# Patient Record
Sex: FEMALE | Race: WHITE | NOT HISPANIC OR LATINO | Employment: OTHER | URBAN - METROPOLITAN AREA
[De-identification: names, ages, dates, MRNs, and addresses within clinical notes are randomized per-mention and may not be internally consistent; named-entity substitution may affect disease eponyms.]

---

## 2017-01-04 ENCOUNTER — HOSPITAL ENCOUNTER (OUTPATIENT)
Dept: LAB | Facility: MEDICAL CENTER | Age: 62
End: 2017-01-04
Attending: COLON & RECTAL SURGERY
Payer: MEDICARE

## 2017-01-04 LAB
25(OH)D3 SERPL-MCNC: 24 NG/ML (ref 30–100)
ALBUMIN SERPL BCP-MCNC: 4.3 G/DL (ref 3.2–4.9)
ALBUMIN/GLOB SERPL: 2 G/DL
ALP SERPL-CCNC: 59 U/L (ref 30–99)
ALT SERPL-CCNC: 8 U/L (ref 2–50)
ANION GAP SERPL CALC-SCNC: 8 MMOL/L (ref 0–11.9)
AST SERPL-CCNC: 14 U/L (ref 12–45)
BASOPHILS # BLD AUTO: 0.05 K/UL (ref 0–0.12)
BASOPHILS NFR BLD AUTO: 0.9 % (ref 0–1.8)
BILIRUB SERPL-MCNC: 0.5 MG/DL (ref 0.1–1.5)
BUN SERPL-MCNC: 14 MG/DL (ref 8–22)
CALCIUM SERPL-MCNC: 10 MG/DL (ref 8.5–10.5)
CHLORIDE SERPL-SCNC: 106 MMOL/L (ref 96–112)
CHOLEST SERPL-MCNC: 337 MG/DL (ref 100–199)
CO2 SERPL-SCNC: 27 MMOL/L (ref 20–33)
CREAT SERPL-MCNC: 0.88 MG/DL (ref 0.5–1.4)
EOSINOPHIL # BLD: 0.06 K/UL (ref 0–0.51)
EOSINOPHIL NFR BLD AUTO: 1.1 % (ref 0–6.9)
ERYTHROCYTE [DISTWIDTH] IN BLOOD BY AUTOMATED COUNT: 47.2 FL (ref 35.9–50)
FOLATE SERPL-MCNC: 7.2 NG/ML
GLOBULIN SER CALC-MCNC: 2.2 G/DL (ref 1.9–3.5)
GLUCOSE SERPL-MCNC: 85 MG/DL (ref 65–99)
HCT VFR BLD AUTO: 46.8 % (ref 37–47)
HDLC SERPL-MCNC: 51 MG/DL
HGB BLD-MCNC: 15.1 G/DL (ref 12–16)
IMM GRANULOCYTES # BLD AUTO: 0.01 K/UL (ref 0–0.11)
IMM GRANULOCYTES NFR BLD AUTO: 0.2 % (ref 0–0.9)
IRON SATN MFR SERPL: 16 % (ref 15–55)
IRON SERPL-MCNC: 55 UG/DL (ref 40–170)
LDLC SERPL CALC-MCNC: 238 MG/DL
LYMPHOCYTES # BLD: 2.26 K/UL (ref 1–4.8)
LYMPHOCYTES NFR BLD AUTO: 40 % (ref 22–41)
MCH RBC QN AUTO: 31.1 PG (ref 27–33)
MCHC RBC AUTO-ENTMCNC: 32.3 G/DL (ref 33.6–35)
MCV RBC AUTO: 96.3 FL (ref 81.4–97.8)
MONOCYTES # BLD: 0.33 K/UL (ref 0–0.85)
MONOCYTES NFR BLD AUTO: 5.8 % (ref 0–13.4)
NEUTROPHILS # BLD: 2.94 K/UL (ref 2–7.15)
NEUTROPHILS NFR BLD AUTO: 52 % (ref 44–72)
NRBC # BLD AUTO: 0 K/UL
NRBC BLD-RTO: 0 /100 WBC
PLATELET # BLD AUTO: 230 K/UL (ref 164–446)
PMV BLD AUTO: 10.6 FL (ref 9–12.9)
POTASSIUM SERPL-SCNC: 4.1 MMOL/L (ref 3.6–5.5)
PREALB SERPL-MCNC: 18 MG/DL (ref 18–38)
PROT SERPL-MCNC: 6.5 G/DL (ref 6–8.2)
RBC # BLD AUTO: 4.86 M/UL (ref 4.2–5.4)
SODIUM SERPL-SCNC: 141 MMOL/L (ref 135–145)
TIBC SERPL-MCNC: 342 UG/DL (ref 250–450)
TRANSFERRIN SERPL-MCNC: 245 MG/DL (ref 200–370)
TRIGL SERPL-MCNC: 242 MG/DL (ref 0–149)
VIT B12 SERPL-MCNC: 236 PG/ML (ref 211–911)
WBC # BLD AUTO: 5.7 K/UL (ref 4.8–10.8)

## 2017-01-04 PROCEDURE — 83550 IRON BINDING TEST: CPT

## 2017-01-04 PROCEDURE — 82306 VITAMIN D 25 HYDROXY: CPT

## 2017-01-04 PROCEDURE — 80053 COMPREHEN METABOLIC PANEL: CPT

## 2017-01-04 PROCEDURE — 82607 VITAMIN B-12: CPT

## 2017-01-04 PROCEDURE — 36415 COLL VENOUS BLD VENIPUNCTURE: CPT

## 2017-01-04 PROCEDURE — 82746 ASSAY OF FOLIC ACID SERUM: CPT

## 2017-01-04 PROCEDURE — 83540 ASSAY OF IRON: CPT

## 2017-01-04 PROCEDURE — 85025 COMPLETE CBC W/AUTO DIFF WBC: CPT

## 2017-01-04 PROCEDURE — 84466 ASSAY OF TRANSFERRIN: CPT

## 2017-01-04 PROCEDURE — 84134 ASSAY OF PREALBUMIN: CPT

## 2017-01-04 PROCEDURE — 84425 ASSAY OF VITAMIN B-1: CPT

## 2017-01-04 PROCEDURE — 80061 LIPID PANEL: CPT

## 2017-01-07 LAB — VIT B1 BLD-MCNC: 72 NMOL/L (ref 70–180)

## 2017-02-08 ENCOUNTER — HOSPITAL ENCOUNTER (OUTPATIENT)
Dept: RADIOLOGY | Facility: MEDICAL CENTER | Age: 62
End: 2017-02-08
Attending: FAMILY MEDICINE
Payer: MEDICARE

## 2017-02-08 DIAGNOSIS — Z12.31 SCREENING MAMMOGRAM, ENCOUNTER FOR: ICD-10-CM

## 2017-02-08 PROCEDURE — 77063 BREAST TOMOSYNTHESIS BI: CPT

## 2017-03-16 ENCOUNTER — HOSPITAL ENCOUNTER (OUTPATIENT)
Dept: RADIOLOGY | Facility: REHABILITATION | Age: 62
End: 2017-03-16
Attending: PHYSICAL MEDICINE & REHABILITATION
Payer: MEDICARE

## 2017-03-16 ENCOUNTER — HOSPITAL ENCOUNTER (OUTPATIENT)
Dept: PAIN MANAGEMENT | Facility: REHABILITATION | Age: 62
End: 2017-03-16
Attending: PHYSICAL MEDICINE & REHABILITATION
Payer: MEDICARE

## 2017-03-16 VITALS
OXYGEN SATURATION: 100 % | RESPIRATION RATE: 16 BRPM | DIASTOLIC BLOOD PRESSURE: 68 MMHG | WEIGHT: 173.5 LBS | SYSTOLIC BLOOD PRESSURE: 122 MMHG | TEMPERATURE: 97.5 F | HEIGHT: 64 IN | HEART RATE: 52 BPM | BODY MASS INDEX: 29.62 KG/M2

## 2017-03-16 PROCEDURE — 64493 INJ PARAVERT F JNT L/S 1 LEV: CPT

## 2017-03-16 PROCEDURE — 700117 HCHG RX CONTRAST REV CODE 255

## 2017-03-16 PROCEDURE — 700111 HCHG RX REV CODE 636 W/ 250 OVERRIDE (IP)

## 2017-03-16 PROCEDURE — 700101 HCHG RX REV CODE 250

## 2017-03-16 PROCEDURE — 99152 MOD SED SAME PHYS/QHP 5/>YRS: CPT

## 2017-03-16 PROCEDURE — 64494 INJ PARAVERT F JNT L/S 2 LEV: CPT

## 2017-03-16 RX ORDER — BUPIVACAINE HYDROCHLORIDE 5 MG/ML
INJECTION, SOLUTION EPIDURAL; INTRACAUDAL
Status: COMPLETED
Start: 2017-03-16 | End: 2017-03-16

## 2017-03-16 RX ORDER — MIDAZOLAM HYDROCHLORIDE 1 MG/ML
INJECTION INTRAMUSCULAR; INTRAVENOUS
Status: COMPLETED
Start: 2017-03-16 | End: 2017-03-16

## 2017-03-16 RX ADMIN — BUPIVACAINE HYDROCHLORIDE 3 ML: 5 INJECTION, SOLUTION EPIDURAL; INTRACAUDAL; PERINEURAL at 11:11

## 2017-03-16 RX ADMIN — IOHEXOL 2 ML: 240 INJECTION, SOLUTION INTRATHECAL; INTRAVASCULAR; INTRAVENOUS; ORAL at 11:08

## 2017-03-16 RX ADMIN — MIDAZOLAM HYDROCHLORIDE 2 MG: 1 INJECTION, SOLUTION INTRAMUSCULAR; INTRAVENOUS at 11:00

## 2017-03-16 ASSESSMENT — PAIN SCALES - GENERAL: PAINLEVEL_OUTOF10: 8

## 2017-03-28 DIAGNOSIS — Z01.810 PRE-OPERATIVE CARDIOVASCULAR EXAMINATION: ICD-10-CM

## 2017-03-28 RX ORDER — BIOTIN 1 MG
1 TABLET ORAL DAILY
COMMUNITY
End: 2018-08-03

## 2017-03-30 LAB — EKG IMPRESSION: NORMAL

## 2017-04-03 ENCOUNTER — HOSPITAL ENCOUNTER (OUTPATIENT)
Dept: LAB | Facility: MEDICAL CENTER | Age: 62
End: 2017-04-03
Attending: COLON & RECTAL SURGERY
Payer: MEDICARE

## 2017-04-03 LAB
25(OH)D3 SERPL-MCNC: 34 NG/ML (ref 30–100)
ALBUMIN SERPL BCP-MCNC: 4.3 G/DL (ref 3.2–4.9)
ALBUMIN/GLOB SERPL: 1.7 G/DL
ALP SERPL-CCNC: 61 U/L (ref 30–99)
ALT SERPL-CCNC: 11 U/L (ref 2–50)
ANION GAP SERPL CALC-SCNC: 8 MMOL/L (ref 0–11.9)
AST SERPL-CCNC: 19 U/L (ref 12–45)
BASOPHILS # BLD AUTO: 0.5 % (ref 0–1.8)
BASOPHILS # BLD: 0.04 K/UL (ref 0–0.12)
BILIRUB SERPL-MCNC: 0.5 MG/DL (ref 0.1–1.5)
BUN SERPL-MCNC: 15 MG/DL (ref 8–22)
CALCIUM SERPL-MCNC: 9.7 MG/DL (ref 8.5–10.5)
CHLORIDE SERPL-SCNC: 104 MMOL/L (ref 96–112)
CHOLEST SERPL-MCNC: 207 MG/DL (ref 100–199)
CO2 SERPL-SCNC: 29 MMOL/L (ref 20–33)
CREAT SERPL-MCNC: 1 MG/DL (ref 0.5–1.4)
EOSINOPHIL # BLD AUTO: 0.04 K/UL (ref 0–0.51)
EOSINOPHIL NFR BLD: 0.5 % (ref 0–6.9)
ERYTHROCYTE [DISTWIDTH] IN BLOOD BY AUTOMATED COUNT: 44.9 FL (ref 35.9–50)
FOLATE SERPL-MCNC: 18 NG/ML
GFR SERPL CREATININE-BSD FRML MDRD: 56 ML/MIN/1.73 M 2
GLOBULIN SER CALC-MCNC: 2.6 G/DL (ref 1.9–3.5)
GLUCOSE SERPL-MCNC: 85 MG/DL (ref 65–99)
HCT VFR BLD AUTO: 43.4 % (ref 37–47)
HDLC SERPL-MCNC: 60 MG/DL
HGB BLD-MCNC: 14.2 G/DL (ref 12–16)
IMM GRANULOCYTES # BLD AUTO: 0.02 K/UL (ref 0–0.11)
IMM GRANULOCYTES NFR BLD AUTO: 0.3 % (ref 0–0.9)
IRON SATN MFR SERPL: 27 % (ref 15–55)
IRON SERPL-MCNC: 93 UG/DL (ref 40–170)
LDLC SERPL CALC-MCNC: 117 MG/DL
LYMPHOCYTES # BLD AUTO: 1.6 K/UL (ref 1–4.8)
LYMPHOCYTES NFR BLD: 21 % (ref 22–41)
MCH RBC QN AUTO: 30.9 PG (ref 27–33)
MCHC RBC AUTO-ENTMCNC: 32.7 G/DL (ref 33.6–35)
MCV RBC AUTO: 94.3 FL (ref 81.4–97.8)
MONOCYTES # BLD AUTO: 0.5 K/UL (ref 0–0.85)
MONOCYTES NFR BLD AUTO: 6.6 % (ref 0–13.4)
NEUTROPHILS # BLD AUTO: 5.42 K/UL (ref 2–7.15)
NEUTROPHILS NFR BLD: 71.1 % (ref 44–72)
NRBC # BLD AUTO: 0 K/UL
NRBC BLD AUTO-RTO: 0 /100 WBC
PLATELET # BLD AUTO: 227 K/UL (ref 164–446)
PMV BLD AUTO: 10.9 FL (ref 9–12.9)
POTASSIUM SERPL-SCNC: 3.7 MMOL/L (ref 3.6–5.5)
PREALB SERPL-MCNC: 19 MG/DL (ref 18–38)
PROT SERPL-MCNC: 6.9 G/DL (ref 6–8.2)
RBC # BLD AUTO: 4.6 M/UL (ref 4.2–5.4)
SODIUM SERPL-SCNC: 141 MMOL/L (ref 135–145)
TIBC SERPL-MCNC: 342 UG/DL (ref 250–450)
TRANSFERRIN SERPL-MCNC: 248 MG/DL (ref 200–370)
TRIGL SERPL-MCNC: 152 MG/DL (ref 0–149)
VIT B12 SERPL-MCNC: 430 PG/ML (ref 211–911)
WBC # BLD AUTO: 7.6 K/UL (ref 4.8–10.8)

## 2017-04-03 PROCEDURE — 36415 COLL VENOUS BLD VENIPUNCTURE: CPT

## 2017-04-03 PROCEDURE — 82746 ASSAY OF FOLIC ACID SERUM: CPT

## 2017-04-03 PROCEDURE — 80053 COMPREHEN METABOLIC PANEL: CPT

## 2017-04-03 PROCEDURE — 82607 VITAMIN B-12: CPT

## 2017-04-03 PROCEDURE — 85025 COMPLETE CBC W/AUTO DIFF WBC: CPT

## 2017-04-03 PROCEDURE — 83540 ASSAY OF IRON: CPT

## 2017-04-03 PROCEDURE — 84466 ASSAY OF TRANSFERRIN: CPT

## 2017-04-03 PROCEDURE — 80061 LIPID PANEL: CPT

## 2017-04-03 PROCEDURE — 82306 VITAMIN D 25 HYDROXY: CPT

## 2017-04-03 PROCEDURE — 84425 ASSAY OF VITAMIN B-1: CPT

## 2017-04-03 PROCEDURE — 84134 ASSAY OF PREALBUMIN: CPT

## 2017-04-03 PROCEDURE — 83550 IRON BINDING TEST: CPT

## 2017-04-04 ENCOUNTER — HOSPITAL ENCOUNTER (OUTPATIENT)
Facility: MEDICAL CENTER | Age: 62
End: 2017-04-04
Attending: OPHTHALMOLOGY | Admitting: OPHTHALMOLOGY
Payer: MEDICARE

## 2017-04-04 VITALS
DIASTOLIC BLOOD PRESSURE: 60 MMHG | WEIGHT: 171.08 LBS | HEIGHT: 64 IN | OXYGEN SATURATION: 93 % | RESPIRATION RATE: 16 BRPM | HEART RATE: 72 BPM | SYSTOLIC BLOOD PRESSURE: 99 MMHG | TEMPERATURE: 98.4 F | BODY MASS INDEX: 29.21 KG/M2

## 2017-04-04 PROBLEM — H25.811 COMBINED FORMS OF AGE-RELATED CATARACT OF RIGHT EYE: Status: ACTIVE | Noted: 2017-04-04

## 2017-04-04 PROCEDURE — 160002 HCHG RECOVERY MINUTES (STAT): Performed by: OPHTHALMOLOGY

## 2017-04-04 PROCEDURE — 160029 HCHG SURGERY MINUTES - 1ST 30 MINS LEVEL 4: Performed by: OPHTHALMOLOGY

## 2017-04-04 PROCEDURE — 700101 HCHG RX REV CODE 250: Performed by: OPHTHALMOLOGY

## 2017-04-04 PROCEDURE — 500791 HCHG KNIFE, SLIT: Performed by: OPHTHALMOLOGY

## 2017-04-04 PROCEDURE — 501749 HCHG SHELL REV 276: Performed by: OPHTHALMOLOGY

## 2017-04-04 PROCEDURE — 700111 HCHG RX REV CODE 636 W/ 250 OVERRIDE (IP)

## 2017-04-04 PROCEDURE — 160035 HCHG PACU - 1ST 60 MINS PHASE I: Performed by: OPHTHALMOLOGY

## 2017-04-04 PROCEDURE — 700101 HCHG RX REV CODE 250

## 2017-04-04 PROCEDURE — 160048 HCHG OR STATISTICAL LEVEL 1-5: Performed by: OPHTHALMOLOGY

## 2017-04-04 PROCEDURE — 700111 HCHG RX REV CODE 636 W/ 250 OVERRIDE (IP): Performed by: ANESTHESIOLOGY

## 2017-04-04 PROCEDURE — 99152 MOD SED SAME PHYS/QHP 5/>YRS: CPT | Performed by: OPHTHALMOLOGY

## 2017-04-04 PROCEDURE — 99153 MOD SED SAME PHYS/QHP EA: CPT | Performed by: OPHTHALMOLOGY

## 2017-04-04 DEVICE — IMPLANTABLE DEVICE: Type: IMPLANTABLE DEVICE | Status: FUNCTIONAL

## 2017-04-04 RX ORDER — TETRACAINE HYDROCHLORIDE 5 MG/ML
1 SOLUTION OPHTHALMIC
Status: COMPLETED | OUTPATIENT
Start: 2017-04-04 | End: 2017-04-04

## 2017-04-04 RX ORDER — MOXIFLOXACIN 5 MG/ML
1 SOLUTION/ DROPS OPHTHALMIC
Status: COMPLETED | OUTPATIENT
Start: 2017-04-04 | End: 2017-04-04

## 2017-04-04 RX ORDER — KETOROLAC TROMETHAMINE 5 MG/ML
1 SOLUTION OPHTHALMIC
Status: COMPLETED | OUTPATIENT
Start: 2017-04-04 | End: 2017-04-04

## 2017-04-04 RX ORDER — SODIUM CHLORIDE, SODIUM LACTATE, POTASSIUM CHLORIDE, CALCIUM CHLORIDE 600; 310; 30; 20 MG/100ML; MG/100ML; MG/100ML; MG/100ML
INJECTION, SOLUTION INTRAVENOUS CONTINUOUS
Status: DISCONTINUED | OUTPATIENT
Start: 2017-04-04 | End: 2017-04-04 | Stop reason: HOSPADM

## 2017-04-04 RX ORDER — PHENYLEPHRINE HYDROCHLORIDE 25 MG/ML
SOLUTION/ DROPS OPHTHALMIC
Status: DISCONTINUED
Start: 2017-04-04 | End: 2017-04-04

## 2017-04-04 RX ORDER — MOXIFLOXACIN 5 MG/ML
SOLUTION/ DROPS OPHTHALMIC
Status: DISCONTINUED
Start: 2017-04-04 | End: 2017-04-04

## 2017-04-04 RX ORDER — KETOROLAC TROMETHAMINE 5 MG/ML
SOLUTION OPHTHALMIC
Status: DISCONTINUED
Start: 2017-04-04 | End: 2017-04-04

## 2017-04-04 RX ORDER — DIAZEPAM 5 MG/1
5 TABLET ORAL EVERY 6 HOURS PRN
COMMUNITY
End: 2018-01-18

## 2017-04-04 RX ORDER — TROPICAMIDE 10 MG/ML
1 SOLUTION/ DROPS OPHTHALMIC
Status: COMPLETED | OUTPATIENT
Start: 2017-04-04 | End: 2017-04-04

## 2017-04-04 RX ORDER — PHENYLEPHRINE HYDROCHLORIDE 25 MG/ML
1 SOLUTION/ DROPS OPHTHALMIC
Status: COMPLETED | OUTPATIENT
Start: 2017-04-04 | End: 2017-04-04

## 2017-04-04 RX ORDER — MOXIFLOXACIN 5 MG/ML
SOLUTION OPHTHALMIC
Status: DISCONTINUED | OUTPATIENT
Start: 2017-04-04 | End: 2017-04-04 | Stop reason: HOSPADM

## 2017-04-04 RX ORDER — TETRACAINE HYDROCHLORIDE 5 MG/ML
SOLUTION OPHTHALMIC
Status: DISCONTINUED | OUTPATIENT
Start: 2017-04-04 | End: 2017-04-04 | Stop reason: HOSPADM

## 2017-04-04 RX ADMIN — MOXIFLOXACIN 1 DROP: 5 SOLUTION/ DROPS OPHTHALMIC at 08:00

## 2017-04-04 RX ADMIN — TROPICAMIDE 1 DROP: 10 SOLUTION/ DROPS OPHTHALMIC at 07:55

## 2017-04-04 RX ADMIN — TROPICAMIDE 1 DROP: 10 SOLUTION/ DROPS OPHTHALMIC at 08:00

## 2017-04-04 RX ADMIN — PHENYLEPHRINE HYDROCHLORIDE 1 DROP: 25 SOLUTION/ DROPS OPHTHALMIC at 07:50

## 2017-04-04 RX ADMIN — MOXIFLOXACIN 1 DROP: 5 SOLUTION/ DROPS OPHTHALMIC at 07:50

## 2017-04-04 RX ADMIN — KETOROLAC TROMETHAMINE 1 DROP: 5 SOLUTION OPHTHALMIC at 07:55

## 2017-04-04 RX ADMIN — TETRACAINE HYDROCHLORIDE 1 DROP: 5 SOLUTION OPHTHALMIC at 07:50

## 2017-04-04 RX ADMIN — TROPICAMIDE 1 DROP: 10 SOLUTION/ DROPS OPHTHALMIC at 07:50

## 2017-04-04 RX ADMIN — KETOROLAC TROMETHAMINE 1 DROP: 5 SOLUTION OPHTHALMIC at 08:00

## 2017-04-04 RX ADMIN — KETOROLAC TROMETHAMINE 1 DROP: 5 SOLUTION OPHTHALMIC at 07:50

## 2017-04-04 RX ADMIN — PHENYLEPHRINE HYDROCHLORIDE 1 DROP: 25 SOLUTION/ DROPS OPHTHALMIC at 07:55

## 2017-04-04 RX ADMIN — PHENYLEPHRINE HYDROCHLORIDE 1 DROP: 25 SOLUTION/ DROPS OPHTHALMIC at 08:00

## 2017-04-04 RX ADMIN — TETRACAINE HYDROCHLORIDE 1 DROP: 5 SOLUTION OPHTHALMIC at 08:00

## 2017-04-04 RX ADMIN — MOXIFLOXACIN 1 DROP: 5 SOLUTION/ DROPS OPHTHALMIC at 07:55

## 2017-04-04 RX ADMIN — SODIUM CHLORIDE, POTASSIUM CHLORIDE, SODIUM LACTATE AND CALCIUM CHLORIDE: 600; 310; 30; 20 INJECTION, SOLUTION INTRAVENOUS at 07:55

## 2017-04-04 ASSESSMENT — PAIN SCALES - GENERAL
PAINLEVEL_OUTOF10: 0
PAINLEVEL_OUTOF10: 0

## 2017-04-04 NOTE — DISCHARGE INSTRUCTIONS
ACTIVITY: Rest and take it easy for the first 24 hours.  A responsible adult is recommended to remain with you during that time.  It is normal to feel sleepy.  We encourage you to not do anything that requires balance, judgment or coordination.    MILD FLU-LIKE SYMPTOMS ARE NORMAL. YOU MAY EXPERIENCE GENERALIZED MUSCLE ACHES, THROAT IRRITATION, HEADACHE AND/OR SOME NAUSEA.    FOR 24 HOURS DO NOT:  Drive, operate machinery or run household appliances.  Drink beer or alcoholic beverages.   Make important decisions or sign legal documents.    SPECIAL INSTRUCTIONS: follow Dr. Christian's discharge instructions. Begin eye drops as prescribed. Keep operative eye closed frequently today.     DIET: To avoid nausea, slowly advance diet as tolerated, avoiding spicy or greasy foods for the first day.  Add more substantial food to your diet according to your physician's instructions.  Babies can be fed formula or breast milk as soon as they are hungry.  INCREASE FLUIDS AND FIBER TO AVOID CONSTIPATION.    SURGICAL DRESSING/BATHING: na    FOLLOW-UP APPOINTMENT:  A follow-up appointment should be arranged with your doctor TODAY; call to schedule.    You should CALL YOUR PHYSICIAN if you develop:  Fever greater than 101 degrees F.  Pain not relieved by medication, or persistent nausea or vomiting.  Excessive bleeding (blood soaking through dressing) or unexpected drainage from the wound.  Extreme redness or swelling around the incision site, drainage of pus or foul smelling drainage.  Inability to urinate or empty your bladder within 8 hours.  Problems with breathing or chest pain.    You should call 911 if you develop problems with breathing or chest pain.  If you are unable to contact your doctor or surgical center, you should go to the nearest emergency room or urgent care center.  Physician's telephone #: [phone]    If any questions arise, call your doctor.  If your doctor is not available, please feel free to call the Surgical  Center at [phone].  The Center is open Monday through Friday from 7AM to 7PM.  You can also call the HEALTH HOTLINE open 24 hours/day, 7 days/week and speak to a nurse at [phone], or toll free at [phone].    A registered nurse may call you a few days after your surgery to see how you are doing after your procedure.    MEDICATIONS: Resume taking daily medication.  Take prescribed pain medication with food.  If no medication is prescribed, you may take non-aspirin pain medication if needed.  PAIN MEDICATION CAN BE VERY CONSTIPATING.  Take a stool softener or laxative such as senokot, pericolace, or milk of magnesia if needed.    Prescription given for NA.  Last pain medication given at NA.    If your physician has prescribed pain medication that includes Acetaminophen (Tylenol), do not take additional Acetaminophen (Tylenol) while taking the prescribed medication.    Depression / Suicide Risk    As you are discharged from this [facility] facility, it is important to learn how to keep safe from harming yourself.    Recognize the warning signs:  · Abrupt changes in personality, positive or negative- including increase in energy   · Giving away possessions  · Change in eating patterns- significant weight changes-  positive or negative  · Change in sleeping patterns- unable to sleep or sleeping all the time   · Unwillingness or inability to communicate  · Depression  · Unusual sadness, discouragement and loneliness  · Talk of wanting to die  · Neglect of personal appearance   · Rebelliousness- reckless behavior  · Withdrawal from people/activities they love  · Confusion- inability to concentrate     If you or a loved one observes any of these behaviors or has concerns about self-harm, here's what you can do:  · Talk about it- your feelings and reasons for harming yourself  · Remove any means that you might use to hurt yourself (examples: pills, rope, extension cords, firearm)  · Get professional  help from the community (Mental Health, Substance Abuse, psychological counseling)  · Do not be alone:Call your Safe Contact- someone whom you trust who will be there for you.  · Call your local CRISIS HOTLINE 594-1228 or 446-095-1713  · Call your local Children's Mobile Crisis Response Team [place] (486) 324-0757 or www.[employer]  · Call the toll free National Suicide Prevention Hotlines   · National Suicide Prevention Lifeline 745-033-EBGQ (3018)  · National Hope Line Network 800-SUICIDE (368-3747)

## 2017-04-04 NOTE — IP AVS SNAPSHOT
" Home Care Instructions                                                                                                                Name:Nils Alfonso  Medical Record Number:[id number]  CSN: [id number]    YOB: 1955   Age: 61 y.o.  Sex: female  HT:1.626 m (5' 4\") WT: 77.6 kg (171 lb 1.2 oz)          Admit Date: 4/4/2017     Discharge Date:   Today's Date: 4/4/2017  Attending Doctor:  Stuart Estevez M.D.                  Allergies:  Ampicillin; Demerol; Keflex; Morphine; Tetracyclines; Codeine; Clindamycin; Dilaudid; Pcn; and Sulfa drugs                Discharge Instructions         ACTIVITY: Rest and take it easy for the first 24 hours.  A responsible adult is recommended to remain with you during that time.  It is normal to feel sleepy.  We encourage you to not do anything that requires balance, judgment or coordination.    MILD FLU-LIKE SYMPTOMS ARE NORMAL. YOU MAY EXPERIENCE GENERALIZED MUSCLE ACHES, THROAT IRRITATION, HEADACHE AND/OR SOME NAUSEA.    FOR 24 HOURS DO NOT:  Drive, operate machinery or run household appliances.  Drink beer or alcoholic beverages.   Make important decisions or sign legal documents.    SPECIAL INSTRUCTIONS: follow Dr. Christian's discharge instructions. Begin eye drops as prescribed. Keep operative eye closed frequently today.     DIET: To avoid nausea, slowly advance diet as tolerated, avoiding spicy or greasy foods for the first day.  Add more substantial food to your diet according to your physician's instructions.  Babies can be fed formula or breast milk as soon as they are hungry.  INCREASE FLUIDS AND FIBER TO AVOID CONSTIPATION.    SURGICAL DRESSING/BATHING: na    FOLLOW-UP APPOINTMENT:  A follow-up appointment should be arranged with your doctor TODAY; call to schedule.    You should CALL YOUR PHYSICIAN if you develop:  Fever greater than 101 degrees F.  Pain not relieved by medication, or persistent nausea or vomiting.  Excessive bleeding (blood soaking through " dressing) or unexpected drainage from the wound.  Extreme redness or swelling around the incision site, drainage of pus or foul smelling drainage.  Inability to urinate or empty your bladder within 8 hours.  Problems with breathing or chest pain.    You should call 911 if you develop problems with breathing or chest pain.  If you are unable to contact your doctor or surgical center, you should go to the nearest emergency room or urgent care center.  Physician's telephone #: [phone]    If any questions arise, call your doctor.  If your doctor is not available, please feel free to call the Surgical Center at [phone].  The Center is open Monday through Friday from 7AM to 7PM.  You can also call the [employer] HOTLINE open 24 hours/day, 7 days/week and speak to a nurse at [phone], or toll free at [phone].    A registered nurse may call you a few days after your surgery to see how you are doing after your procedure.    MEDICATIONS: Resume taking daily medication.  Take prescribed pain medication with food.  If no medication is prescribed, you may take non-aspirin pain medication if needed.  PAIN MEDICATION CAN BE VERY CONSTIPATING.  Take a stool softener or laxative such as senokot, pericolace, or milk of magnesia if needed.    Prescription given for NA.  Last pain medication given at .    If your physician has prescribed pain medication that includes Acetaminophen (Tylenol), do not take additional Acetaminophen (Tylenol) while taking the prescribed medication.    Depression / Suicide Risk    As you are discharged from this [facility] facility, it is important to learn how to keep safe from harming yourself.    Recognize the warning signs:  · Abrupt changes in personality, positive or negative- including increase in energy   · Giving away possessions  · Change in eating patterns- significant weight changes-  positive or negative  · Change in sleeping patterns- unable to sleep or sleeping all the  time   · Unwillingness or inability to communicate  · Depression  · Unusual sadness, discouragement and loneliness  · Talk of wanting to die  · Neglect of personal appearance   · Rebelliousness- reckless behavior  · Withdrawal from people/activities they love  · Confusion- inability to concentrate     If you or a loved one observes any of these behaviors or has concerns about self-harm, here's what you can do:  · Talk about it- your feelings and reasons for harming yourself  · Remove any means that you might use to hurt yourself (examples: pills, rope, extension cords, firearm)  · Get professional help from the community (Mental Health, Substance Abuse, psychological counseling)  · Do not be alone:Call your Safe Contact- someone whom you trust who will be there for you.  · Call your local CRISIS HOTLINE 519-4269 or 092-407-4237  · Call your local Children's Mobile Crisis Response Team [place] (249) 596-1145 or www.[employer]  · Call the toll free National Suicide Prevention Hotlines   · National Suicide Prevention Lifeline 932-405-VZDH (1314)  · [employer] Hope Line Network 800-SUICIDE (997-8947)       Medication List      ASK your doctor about these medications        Instructions    atorvastatin 40 MG Tabs   Commonly known as:  LIPITOR    Take 40 mg by mouth every evening.   Dose:  40 mg       Biotin 1000 MCG Tabs    Take 1 Tab by mouth every day.   Dose:  1 Tab       diazepam 5 MG Tabs   Commonly known as:  VALIUM    Take 5 mg by mouth every 6 hours as needed for Anxiety.   Dose:  5 mg       hydrocodone-acetaminophen 5-325 MG Tabs per tablet   Commonly known as:  NORCO    Doctor's comments:  Pt unable to tolerate liquid.  Please crush pill. Do not take with liquid pain medication.   Take 1-2 Tabs by mouth every 6 hours as needed (pain).   Dose:  1-2 Tab       lisinopril 5 MG Tabs   Commonly known as:  PRINIVIL    Take 1 Tab by mouth every day.   Dose:  5 mg       multivitamin Tabs    Take 1 Tab by mouth  every day.   Dose:  1 Tab       vitamin D 1000 UNIT Tabs   Commonly known as:  cholecalciferol    Take 1,000 Units by mouth every 48 hours.   Dose:  1000 Units               Medication Information     Above and/or attached are the medications your physician expects you to take upon discharge. Review all of your home medications and newly ordered medications with your doctor and/or pharmacist. Follow medication instructions as directed by your doctor and/or pharmacist. Please keep your medication list with you and share with your physician. Update the information when medications are discontinued, doses are changed, or new medications (including over-the-counter products) are added; and carry medication information at all times in the event of emergency situations.        Resources     Quit Smoking / Tobacco Use:    I understand the use of any tobacco products increases my chance of suffering from future heart disease or stroke and could cause other illnesses which may shorten my life. Quitting the use of tobacco products is the single most important thing I can do to improve my health. For further information on smoking / tobacco cessation call a Toll Free Quit Line at 1-523.887.4258 (*National [employer] [place]) or 1-530.369.5857 (American Lung Association) or you can access the web based program at www.lungusa.org.    [place] Tobacco Users Help Line:  (498) 784-1080       Toll Free: 1-214.725.2039  Quit Tobacco Program [facility] (463)165-8495    Crisis Hotline:    [place] Crisis Hotline:  3-362-THFOUDO or 1-348.662.3401    [place] Crisis Hotline:    1-976.729.4902 or 471-957-1282    Discharge Survey:   Thank you for choosing [facility]. We hope we did everything we could to make your hospital stay a pleasant one. You may be receiving a survey and we would appreciate your time and participation in answering the questions. Your input is very valuable to us in our efforts to improve our  service to our patients and their families.            Signatures     My signature on this form indicates that:    1. I acknowledge receipt and understanding of these Home Care Instruction.  2. My questions regarding this information have been answered to my satisfaction.  3. I have formulated a plan with my discharge nurse to obtain my prescribed medications for home.    __________________________________      __________________________________                   Patient Signature                                 Guardian/Responsible Adult Signature      __________________________________                 __________       ________                       Nurse Signature                                               Date                 Time

## 2017-04-04 NOTE — OR NURSING
0942: To PACU from OR via gurney, respirations spontaneous and non-labored.  right eye dilated.  No drainage/swelling/redness noted.  Pt denies pain/nausea.

## 2017-04-04 NOTE — OP REPORT
DATE OF SERVICE:  04/04/2017    DATE OF OPERATION:  04/04/2017     PREOPERATIVE DIAGNOSIS:   Cataract, right eye.     POSTOPERATIVE DIAGNOSIS:  Cataract, right eye.     PROCEDURE:   Clear cornea phacoemulsification, cataract extraction, with   posterior chamber intraocular lens implant, right eye.     SURGEON:  Stuart Estevez MD     ASSISTANT:  Kimi Milton, scrub tech     ANESTHESIOLOGIST:  Pramod Banegas MD     ANESTHESIA:  Topical with IV sedation.     INDICATION:   Visually significant cataract, right eye.  Patient requested   surgery.     COMPLICATIONS:  None.     PROCEDURE:   After informed consent was obtained, the patient was transported   to the operating room and placed in the supine position.  The right   periorbital area was sterilely prepped and draped.  A lid speculum was   inserted.  Tetracaine ophthalmic anesthetic was applied to the corneal   surface.  Using a fixation ring and a leonie Step-Knife, a temporal clear   corneal groove incision was created.  This was followed by a paracentesis.    The anterior chamber was inflated with Viscoat.  The temporal incision was   completed using a 2.4 mm keratome.  A cystotome was inserted and a   capsulorrhexis was initiated.  This was completed using Utrata forceps.  BSS   on a Binkhorst followed angle cannula was used to hydrodissect the lens.  The   nucleus splitters were inserted, and the lens was divided into 4 quadrants.    The phacoemulsification handpiece was inserted and the lens was removed with   phacoemulsification parameters of 5.22 CDE.  The irrigation aspiration   handpiece was used to remove cortical material.  The capsular bag was inflated   with Provisc.  An AcrySof intraocular lens mode PCB00 lens implant with 14.0   diopters of power was injected into the capsular bag.  Irrigation aspiration   was performed to remove viscoelastic.  The margins of the wound were hydrated   with BSS.  The wounds were noted to be maintaining  physiologic pressure.  The   speculum was removed and the eye was dressed with Vigamox  ophthalmic drops.  The patient tolerated the procedure well and was   transported to the recovery room in good condition.  No complications were   encountered.       ____________________________________     MD ZABRINA SMITH / RYNE    DD:  04/04/2017 10:12:59  DT:  04/04/2017 10:23:20    D#:  [id number]  Job#:  [id number]

## 2017-04-04 NOTE — OR NURSING
Patient allergies and NPO status verified, home medication reconciliation completed and belongings secured. Patient verbalizes understanding of pain scale, expected course of stay and plan of care. Surgical site verified with patient. . IV access established.   Tamia Castle

## 2017-04-04 NOTE — IP AVS SNAPSHOT
4/4/2017          Nils Alfonso  [address] [address]    Dear Nils:    [facility] wants to ensure your discharge home is safe and you or your loved ones have had all your questions answered regarding your care after you leave the hospital.    You may receive a telephone call within two days of your discharge.  This call is to make certain you understand your discharge instructions as well as ensure we provided you with the best care possible during your stay with us.     The call will only last approximately 3-5 minutes and will be done by a nurse.    Once again, we want to ensure your discharge home is safe and that you have a clear understanding of any next steps in your care.  If you have any questions or concerns, please do not hesitate to contact us, we are here for you.  Thank you for choosing [facility] for your healthcare needs.    Sincerely,    Mian Rucker    [facility]

## 2017-04-06 LAB — VIT B1 BLD-MCNC: 67 NMOL/L (ref 70–180)

## 2017-04-18 ENCOUNTER — HOSPITAL ENCOUNTER (OUTPATIENT)
Facility: MEDICAL CENTER | Age: 62
End: 2017-04-18
Attending: OPHTHALMOLOGY | Admitting: OPHTHALMOLOGY
Payer: MEDICARE

## 2017-04-18 VITALS
HEART RATE: 54 BPM | RESPIRATION RATE: 12 BRPM | TEMPERATURE: 97.4 F | SYSTOLIC BLOOD PRESSURE: 131 MMHG | DIASTOLIC BLOOD PRESSURE: 58 MMHG | WEIGHT: 163.36 LBS | HEIGHT: 64 IN | OXYGEN SATURATION: 97 % | BODY MASS INDEX: 27.89 KG/M2

## 2017-04-18 PROBLEM — H25.812 COMBINED FORM OF SENILE CATARACT OF LEFT EYE: Status: ACTIVE | Noted: 2017-04-18

## 2017-04-18 PROCEDURE — 99152 MOD SED SAME PHYS/QHP 5/>YRS: CPT | Performed by: OPHTHALMOLOGY

## 2017-04-18 PROCEDURE — 160048 HCHG OR STATISTICAL LEVEL 1-5: Performed by: OPHTHALMOLOGY

## 2017-04-18 PROCEDURE — 700101 HCHG RX REV CODE 250

## 2017-04-18 PROCEDURE — 500882 HCHG PACK, EYE CUSTOM CATARACT: Performed by: OPHTHALMOLOGY

## 2017-04-18 PROCEDURE — 502240 HCHG MISC OR SUPPLY RC 0272: Performed by: OPHTHALMOLOGY

## 2017-04-18 PROCEDURE — 160002 HCHG RECOVERY MINUTES (STAT): Performed by: OPHTHALMOLOGY

## 2017-04-18 PROCEDURE — 160035 HCHG PACU - 1ST 60 MINS PHASE I: Performed by: OPHTHALMOLOGY

## 2017-04-18 PROCEDURE — 700111 HCHG RX REV CODE 636 W/ 250 OVERRIDE (IP)

## 2017-04-18 PROCEDURE — 99153 MOD SED SAME PHYS/QHP EA: CPT | Performed by: OPHTHALMOLOGY

## 2017-04-18 PROCEDURE — 160029 HCHG SURGERY MINUTES - 1ST 30 MINS LEVEL 4: Performed by: OPHTHALMOLOGY

## 2017-04-18 PROCEDURE — 500778 HCHG KIT, SLEEVE PARTS (TURBO SONICS): Performed by: OPHTHALMOLOGY

## 2017-04-18 PROCEDURE — A4606 OXYGEN PROBE USED W OXIMETER: HCPCS | Performed by: OPHTHALMOLOGY

## 2017-04-18 PROCEDURE — 500855 HCHG NEEDLE, IRRIG CYSTOTOME 27G: Performed by: OPHTHALMOLOGY

## 2017-04-18 PROCEDURE — 501749 HCHG SHELL REV 276: Performed by: OPHTHALMOLOGY

## 2017-04-18 DEVICE — IMPLANTABLE DEVICE: Type: IMPLANTABLE DEVICE | Status: FUNCTIONAL

## 2017-04-18 RX ORDER — MOXIFLOXACIN 5 MG/ML
SOLUTION/ DROPS OPHTHALMIC
Status: COMPLETED
Start: 2017-04-18 | End: 2017-04-18

## 2017-04-18 RX ORDER — TETRACAINE HYDROCHLORIDE 5 MG/ML
SOLUTION OPHTHALMIC
Status: DISCONTINUED | OUTPATIENT
Start: 2017-04-18 | End: 2017-04-18 | Stop reason: HOSPADM

## 2017-04-18 RX ORDER — PHENYLEPHRINE HYDROCHLORIDE 25 MG/ML
SOLUTION/ DROPS OPHTHALMIC
Status: COMPLETED
Start: 2017-04-18 | End: 2017-04-18

## 2017-04-18 RX ORDER — TETRACAINE HYDROCHLORIDE 5 MG/ML
SOLUTION OPHTHALMIC
Status: DISCONTINUED
Start: 2017-04-18 | End: 2017-04-18 | Stop reason: HOSPADM

## 2017-04-18 RX ORDER — KETOROLAC TROMETHAMINE 5 MG/ML
SOLUTION OPHTHALMIC
Status: COMPLETED
Start: 2017-04-18 | End: 2017-04-18

## 2017-04-18 RX ORDER — SODIUM CHLORIDE, SODIUM LACTATE, POTASSIUM CHLORIDE, CALCIUM CHLORIDE 600; 310; 30; 20 MG/100ML; MG/100ML; MG/100ML; MG/100ML
INJECTION, SOLUTION INTRAVENOUS CONTINUOUS
Status: DISCONTINUED | OUTPATIENT
Start: 2017-04-18 | End: 2017-04-18 | Stop reason: HOSPADM

## 2017-04-18 RX ORDER — TETRACAINE HYDROCHLORIDE 5 MG/ML
SOLUTION OPHTHALMIC
Status: COMPLETED
Start: 2017-04-18 | End: 2017-04-18

## 2017-04-18 RX ORDER — TROPICAMIDE 10 MG/ML
SOLUTION/ DROPS OPHTHALMIC
Status: COMPLETED
Start: 2017-04-18 | End: 2017-04-18

## 2017-04-18 RX ORDER — VANCOMYCIN HYDROCHLORIDE 500 MG/10ML
INJECTION, POWDER, LYOPHILIZED, FOR SOLUTION INTRAVENOUS
Status: DISCONTINUED | OUTPATIENT
Start: 2017-04-18 | End: 2017-04-18 | Stop reason: HOSPADM

## 2017-04-18 RX ADMIN — TETRACAINE HYDROCHLORIDE 1 DROP: 5 SOLUTION OPHTHALMIC at 08:25

## 2017-04-18 RX ADMIN — MOXIFLOXACIN HYDROCHLORIDE 1 DROP: 5 SOLUTION/ DROPS OPHTHALMIC at 08:25

## 2017-04-18 RX ADMIN — TROPICAMIDE 1 DROP: 10 SOLUTION/ DROPS OPHTHALMIC at 08:15

## 2017-04-18 RX ADMIN — KETOROLAC TROMETHAMINE 1 DROP: 5 SOLUTION OPHTHALMIC at 08:25

## 2017-04-18 RX ADMIN — KETOROLAC TROMETHAMINE 1 DROP: 5 SOLUTION OPHTHALMIC at 08:20

## 2017-04-18 RX ADMIN — TETRACAINE HYDROCHLORIDE 1 DROP: 5 SOLUTION OPHTHALMIC at 08:15

## 2017-04-18 RX ADMIN — MOXIFLOXACIN HYDROCHLORIDE 1 DROP: 5 SOLUTION/ DROPS OPHTHALMIC at 08:20

## 2017-04-18 RX ADMIN — TROPICAMIDE 1 DROP: 10 SOLUTION/ DROPS OPHTHALMIC at 08:25

## 2017-04-18 RX ADMIN — PHENYLEPHRINE HYDROCHLORIDE 1 DROP: 2.5 SOLUTION/ DROPS OPHTHALMIC at 08:25

## 2017-04-18 RX ADMIN — SODIUM CHLORIDE, SODIUM LACTATE, POTASSIUM CHLORIDE, CALCIUM CHLORIDE: 600; 310; 30; 20 INJECTION, SOLUTION INTRAVENOUS at 08:15

## 2017-04-18 RX ADMIN — MOXIFLOXACIN HYDROCHLORIDE 1 DROP: 5 SOLUTION/ DROPS OPHTHALMIC at 08:15

## 2017-04-18 RX ADMIN — TROPICAMIDE 1 DROP: 10 SOLUTION/ DROPS OPHTHALMIC at 08:20

## 2017-04-18 RX ADMIN — PHENYLEPHRINE HYDROCHLORIDE 1 DROP: 2.5 SOLUTION/ DROPS OPHTHALMIC at 08:15

## 2017-04-18 RX ADMIN — PHENYLEPHRINE HYDROCHLORIDE 1 DROP: 2.5 SOLUTION/ DROPS OPHTHALMIC at 08:20

## 2017-04-18 RX ADMIN — KETOROLAC TROMETHAMINE 1 DROP: 5 SOLUTION OPHTHALMIC at 08:15

## 2017-04-18 ASSESSMENT — PAIN SCALES - GENERAL
PAINLEVEL_OUTOF10: 0
PAINLEVEL_OUTOF10: 0

## 2017-04-18 NOTE — OR NURSING
0954: [facility] report received from Dr. Banegas. respirations spontaneous and non-labored. left eye dilated.  No drainage/swelling/redness noted.  Pt denies pain/nausea.

## 2017-04-18 NOTE — DISCHARGE INSTRUCTIONS
ACTIVITY: Rest and take it easy for the first 24 hours.  A responsible adult is recommended to remain with you during that time.  It is normal to feel sleepy.  We encourage you to not do anything that requires balance, judgment or coordination.    MILD FLU-LIKE SYMPTOMS ARE NORMAL. YOU MAY EXPERIENCE GENERALIZED MUSCLE ACHES, THROAT IRRITATION, HEADACHE AND/OR SOME NAUSEA.    FOR 24 HOURS DO NOT:  Drive, operate machinery or run household appliances.  Drink beer or alcoholic beverages.   Make important decisions or sign legal documents.    SPECIAL INSTRUCTIONS: follow Dr. Christian's discharge instructions.  Begin eye drops today as prescribed.    DIET: To avoid nausea, slowly advance diet as tolerated, avoiding spicy or greasy foods for the first day.  Add more substantial food to your diet according to your physician's instructions.  Babies can be fed formula or breast milk as soon as they are hungry.  INCREASE FLUIDS AND FIBER TO AVOID CONSTIPATION.    SURGICAL DRESSING/BATHING: na    FOLLOW-UP APPOINTMENT:  A follow-up appointment should be arranged with your doctor in as directed.; call to schedule.    You should CALL YOUR PHYSICIAN if you develop:  Fever greater than 101 degrees F.  Pain not relieved by medication, or persistent nausea or vomiting.  Excessive bleeding (blood soaking through dressing) or unexpected drainage from the wound.  Extreme redness or swelling around the incision site, drainage of pus or foul smelling drainage.  Inability to urinate or empty your bladder within 8 hours.  Problems with breathing or chest pain.    You should call 911 if you develop problems with breathing or chest pain.  If you are unable to contact your doctor or surgical center, you should go to the nearest emergency room or urgent care center.  Physician's telephone #: [phone]    If any questions arise, call your doctor.  If your doctor is not available, please feel free to call the Surgical Center at [phone].  The  Center is open Monday through Friday from 7AM to 7PM.  You can also call the HEALTH HOTLINE open 24 hours/day, 7 days/week and speak to a nurse at [phone], or toll free at [phone].    A registered nurse may call you a few days after your surgery to see how you are doing after your procedure.    MEDICATIONS: Resume taking daily medication.  Take prescribed pain medication with food.  If no medication is prescribed, you may take non-aspirin pain medication if needed.  PAIN MEDICATION CAN BE VERY CONSTIPATING.  Take a stool softener or laxative such as senokot, pericolace, or milk of magnesia if needed.    Prescription given for na.  Last pain medication given at na.    If your physician has prescribed pain medication that includes Acetaminophen (Tylenol), do not take additional Acetaminophen (Tylenol) while taking the prescribed medication.    Depression / Suicide Risk    As you are discharged from this [facility] [place] [facility] facility, it is important to learn how to keep safe from harming yourself.    Recognize the warning signs:  · Abrupt changes in personality, positive or negative- including increase in energy   · Giving away possessions  · Change in eating patterns- significant weight changes-  positive or negative  · Change in sleeping patterns- unable to sleep or sleeping all the time   · Unwillingness or inability to communicate  · Depression  · Unusual sadness, discouragement and loneliness  · Talk of wanting to die  · Neglect of personal appearance   · Rebelliousness- reckless behavior  · Withdrawal from people/activities they love  · Confusion- inability to concentrate     If you or a loved one observes any of these behaviors or has concerns about self-harm, here's what you can do:  · Talk about it- your feelings and reasons for harming yourself  · Remove any means that you might use to hurt yourself (examples: pills, rope, extension cords, firearm)  · Get professional help from the community (Mental  Health, Substance Abuse, psychological counseling)  · Do not be alone:Call your Safe Contact- someone whom you trust who will be there for you.  · Call your local CRISIS HOTLINE 187-4293 or 644-973-8904  · Call your local Children's Mobile Crisis Response Team [place] (819) 050-6886 or www.[employer]  · Call the toll free National Suicide Prevention Hotlines   · National Suicide Prevention Lifeline 453-574-WNLW (4367)  · National Hope Line Network 800-SUICIDE (959-2491)

## 2017-04-18 NOTE — IP AVS SNAPSHOT
4/18/2017    Nils Alfonso  [address] [place] [address]    Dear Nils:    [facility] wants to ensure your discharge home is safe and you or your loved ones have had all of your questions answered regarding your care after you leave the hospital.    Below is a list of resources and contact information should you have any questions regarding your hospital stay, follow-up instructions, or active medical symptoms.    Questions or Concerns Regarding… Contact   Medical Questions Related to Your Discharge  (7 days a week, 8am-5pm) Contact a Nurse Care Coordinator   [phone]   Medical Questions Not Related to Your Discharge  (24 hours a day / 7 days a week)  Contact the Nurse Health Line   [phone]    Medications or Discharge Instructions Refer to your discharge packet   or contact your [facility] [place] Primary Care Provider   [phone]   Follow-up Appointment(s) Schedule your appointment via [email or web address]   or contact Scheduling [phone]   Billing Review your statement via [email or web address]  or contact Billing [phone]   Medical Records Review your records via [email or web address]   or contact Medical Records [phone]     You may receive a telephone call within two days of discharge. This call is to make certain you understand your discharge instructions and have the opportunity to have any questions answered. You can also easily access your medical information, test results and upcoming appointments via the [email or web address] free online health management tool. You can learn more and sign up at [email or web address]. For assistance setting up your [email or web address] account, please call [phone].    Once again, we want to ensure your discharge home is safe and that you have a clear understanding of any next steps in your care. If you have any questions or concerns, please do not hesitate to contact us, we are here for you. Thank you for choosing [facility] [place] for your healthcare needs.    Sincerely,    Your [facility] [place] Healthcare Team

## 2017-04-18 NOTE — IP AVS SNAPSHOT
" Home Care Instructions                                                                                                                Name:Nils Alfonso  Medical Record Number:[id number]  CSN: [id number]    YOB: 1955   Age: 61 y.o.  Sex: female  HT:1.626 m (5' 4\") WT: 74.1 kg (163 lb 5.8 oz)          Admit Date: 4/18/2017     Discharge Date:   Today's Date: 4/18/2017  Attending Doctor:  Stuart Estevez M.D.                  Allergies:  Ampicillin; Demerol; Keflex; Morphine; Tetracyclines; Codeine; Zanaflex; Clindamycin; Dilaudid; Pcn; and Sulfa drugs                Discharge Instructions         ACTIVITY: Rest and take it easy for the first 24 hours.  A responsible adult is recommended to remain with you during that time.  It is normal to feel sleepy.  We encourage you to not do anything that requires balance, judgment or coordination.    MILD FLU-LIKE SYMPTOMS ARE NORMAL. YOU MAY EXPERIENCE GENERALIZED MUSCLE ACHES, THROAT IRRITATION, HEADACHE AND/OR SOME NAUSEA.    FOR 24 HOURS DO NOT:  Drive, operate machinery or run household appliances.  Drink beer or alcoholic beverages.   Make important decisions or sign legal documents.    SPECIAL INSTRUCTIONS: follow Dr. Christian's discharge instructions.  Begin eye drops today as prescribed.    DIET: To avoid nausea, slowly advance diet as tolerated, avoiding spicy or greasy foods for the first day.  Add more substantial food to your diet according to your physician's instructions.  Babies can be fed formula or breast milk as soon as they are hungry.  INCREASE FLUIDS AND FIBER TO AVOID CONSTIPATION.    SURGICAL DRESSING/BATHING: na    FOLLOW-UP APPOINTMENT:  A follow-up appointment should be arranged with your doctor in as directed.; call to schedule.    You should CALL YOUR PHYSICIAN if you develop:  Fever greater than 101 degrees F.  Pain not relieved by medication, or persistent nausea or vomiting.  Excessive bleeding (blood soaking through dressing) or " unexpected drainage from the wound.  Extreme redness or swelling around the incision site, drainage of pus or foul smelling drainage.  Inability to urinate or empty your bladder within 8 hours.  Problems with breathing or chest pain.    You should call 911 if you develop problems with breathing or chest pain.  If you are unable to contact your doctor or surgical center, you should go to the nearest emergency room or urgent care center.  Physician's telephone #: [phone]    If any questions arise, call your doctor.  If your doctor is not available, please feel free to call the Surgical Center at [phone].  The Center is open Monday through Friday from 7AM to 7PM.  You can also call the [facility] HOTLINE open 24 hours/day, 7 days/week and speak to a nurse at [phone], or toll free at [phone].    A registered nurse may call you a few days after your surgery to see how you are doing after your procedure.    MEDICATIONS: Resume taking daily medication.  Take prescribed pain medication with food.  If no medication is prescribed, you may take non-aspirin pain medication if needed.  PAIN MEDICATION CAN BE VERY CONSTIPATING.  Take a stool softener or laxative such as senokot, pericolace, or milk of magnesia if needed.    Prescription given for na.  Last pain medication given at na.    If your physician has prescribed pain medication that includes Acetaminophen (Tylenol), do not take additional Acetaminophen (Tylenol) while taking the prescribed medication.    Depression / Suicide Risk    As you are discharged from this [facility] facility, it is important to learn how to keep safe from harming yourself.    Recognize the warning signs:  · Abrupt changes in personality, positive or negative- including increase in energy   · Giving away possessions  · Change in eating patterns- significant weight changes-  positive or negative  · Change in sleeping patterns- unable to sleep or sleeping all the  time   · Unwillingness or inability to communicate  · Depression  · Unusual sadness, discouragement and loneliness  · Talk of wanting to die  · Neglect of personal appearance   · Rebelliousness- reckless behavior  · Withdrawal from people/activities they love  · Confusion- inability to concentrate     If you or a loved one observes any of these behaviors or has concerns about self-harm, here's what you can do:  · Talk about it- your feelings and reasons for harming yourself  · Remove any means that you might use to hurt yourself (examples: pills, rope, extension cords, firearm)  · Get professional help from the community (Mental Health, Substance Abuse, psychological counseling)  · Do not be alone:Call your Safe Contact- someone whom you trust who will be there for you.  · Call your local CRISIS HOTLINE 846-3648 or 327-989-0923  · Call your local Children's Mobile Crisis Response Team [place] (264) 990-1842 or www.PubMatic  · Call the toll free National Suicide Prevention Hotlines   · National Suicide Prevention Lifeline 263-072-HZOM (0548)  · [facility] Network 800-SUICIDE (598-5541)       Medication List      ASK your doctor about these medications        Instructions    Morning Afternoon Evening Bedtime    atorvastatin 40 MG Tabs   Commonly known as:  LIPITOR        Take 40 mg by mouth every evening.   Dose:  40 mg                        Biotin 1000 MCG Tabs        Take 1 Tab by mouth every day.   Dose:  1 Tab                        diazepam 5 MG Tabs   Commonly known as:  VALIUM        Take 5 mg by mouth every 6 hours as needed for Anxiety.   Dose:  5 mg                        hydrocodone-acetaminophen 5-325 MG Tabs per tablet   Commonly known as:  NORCO        Doctor's comments:  Pt unable to tolerate liquid.  Please crush pill. Do not take with liquid pain medication.   Take 1-2 Tabs by mouth every 6 hours as needed (pain).   Dose:  1-2 Tab                        lisinopril 5 MG Tabs    Commonly known as:  PRINIVIL        Take 1 Tab by mouth every day.   Dose:  5 mg                        multivitamin Tabs        Take 1 Tab by mouth every day.   Dose:  1 Tab                        vitamin D 1000 UNIT Tabs   Commonly known as:  cholecalciferol        Take 1,000 Units by mouth every 48 hours.   Dose:  1000 Units                                Medication Information     Above and/or attached are the medications your physician expects you to take upon discharge. Review all of your home medications and newly ordered medications with your doctor and/or pharmacist. Follow medication instructions as directed by your doctor and/or pharmacist. Please keep your medication list with you and share with your physician. Update the information when medications are discontinued, doses are changed, or new medications (including over-the-counter products) are added; and carry medication information at all times in the event of emergency situations.        Resources     Quit Smoking / Tobacco Use:    I understand the use of any tobacco products increases my chance of suffering from future heart disease or stroke and could cause other illnesses which may shorten my life. Quitting the use of tobacco products is the single most important thing I can do to improve my health. For further information on smoking / tobacco cessation call a Toll Free Quit Line at 1-535.306.8734 (*[employer] [place]) or 1-648.245.2498 (American Lung Association) or you can access the web based program at www.lungusa.org.    [place] Tobacco Users Help Line:  (376) 567-1983       Toll Free: 1-471.758.1392  Quit Tobacco Program [facility] (170)258-3659    Crisis Hotline:    [place] Crisis Hotline:  5-087-BTVORCA or 1-317.839.9158    [place] Crisis Hotline:    1-189.501.1247 or 799-792-5962    Discharge Survey:   Thank you for choosing [facility]. We hope we did everything we could to make your hospital stay a  pleasant one. You may be receiving a survey and we would appreciate your time and participation in answering the questions. Your input is very valuable to us in our efforts to improve our service to our patients and their families.            Signatures     My signature on this form indicates that:    1. I acknowledge receipt and understanding of these Home Care Instruction.  2. My questions regarding this information have been answered to my satisfaction.  3. I have formulated a plan with my discharge nurse to obtain my prescribed medications for home.    __________________________________      __________________________________                   Patient Signature                                 Guardian/Responsible Adult Signature      __________________________________                 __________       ________                       Nurse Signature                                               Date                 Time

## 2017-04-24 NOTE — OP REPORT
DATE OF SERVICE:  04/18/2017    PREOPERATIVE DIAGNOSIS:   Cataract, left eye.      POSTOPERATIVE DIAGNOSIS:   Cataract, left eye.      PROCEDURE:   Clear cornea phacoemulsification, cataract extraction, with   posterior chamber intraocular lens implant, left eye.      SURGEON:  Stuart Estevez MD      ASSISTANT:  Sam Lantigua scrub tech.      ANESTHESIOLOGIST:  Pramod Banegas MD      ANESTHESIA:  Topical with IV sedation.      INDICATION:   Visually significant cataract, left eye.  Patient requested   surgery.      COMPLICATIONS:  None.      PROCEDURE:   After informed consent was obtained, the patient was transported   to the operating room and placed in the supine position.  The left periorbital   area was sterilely prepped and draped.  A lid speculum was inserted.    Tetracaine ophthalmic anesthetic was applied to the corneal surface.  Using a   fixation ring and a leonie Step-Knife, a temporal clear corneal groove   incision was created.  This was followed by a paracentesis.  The anterior   chamber was inflated with Viscoat.  The temporal incision was completed using   a 2.4 mm keratome.  A cystotome was inserted and a capsulorrhexis was   initiated.  This was completed using Utrata forceps.  BSS on a Binkhorst   followed angle cannula was used to hydrodissect the lens.  The nucleus   splitters were inserted, and the lens was divided into 4 quadrants.  The   phacoemulsification handpiece was inserted and the lens was removed with   phacoemulsification parameters of 5.10 CDE.  The irrigation aspiration   handpiece was used to remove cortical material.  The capsular bag was inflated   with Provisc.  An AcrySof intraocular lens mode PCB00 lens implant with 17.5   diopters of power was injected into the capsular bag.  Irrigation aspiration   was performed to remove viscoelastic.  The margins of the wound were hydrated   with BSS.  The wounds were noted to be maintaining physiologic pressure.  The   speculum was  removed and the eye was dressed with Vigamox  ophthalmic drops.  The patient tolerated the procedure well and was   transported to the recovery room in good condition.  No complications were   encountered.       ____________________________________     MD ZABRINA SMITH / RYNE    DD:  04/24/2017 08:29:35  DT:  04/24/2017 08:38:10    D#:  [id number]  Job#:  [id number]

## 2017-08-07 ENCOUNTER — HOSPITAL ENCOUNTER (OUTPATIENT)
Dept: LAB | Facility: MEDICAL CENTER | Age: 62
End: 2017-08-07
Attending: PHYSICIAN ASSISTANT
Payer: MEDICARE

## 2017-08-07 LAB
25(OH)D3 SERPL-MCNC: 32 NG/ML (ref 30–100)
ALBUMIN SERPL BCP-MCNC: 4 G/DL (ref 3.2–4.9)
ALBUMIN/GLOB SERPL: 1.6 G/DL
ALP SERPL-CCNC: 73 U/L (ref 30–99)
ALT SERPL-CCNC: 22 U/L (ref 2–50)
ANION GAP SERPL CALC-SCNC: 5 MMOL/L (ref 0–11.9)
AST SERPL-CCNC: 36 U/L (ref 12–45)
BASOPHILS # BLD AUTO: 1.2 % (ref 0–1.8)
BASOPHILS # BLD: 0.07 K/UL (ref 0–0.12)
BILIRUB SERPL-MCNC: 0.5 MG/DL (ref 0.1–1.5)
BUN SERPL-MCNC: 15 MG/DL (ref 8–22)
CALCIUM SERPL-MCNC: 10.1 MG/DL (ref 8.5–10.5)
CHLORIDE SERPL-SCNC: 105 MMOL/L (ref 96–112)
CHOLEST SERPL-MCNC: 282 MG/DL (ref 100–199)
CO2 SERPL-SCNC: 30 MMOL/L (ref 20–33)
CREAT SERPL-MCNC: 0.87 MG/DL (ref 0.5–1.4)
EOSINOPHIL # BLD AUTO: 0.11 K/UL (ref 0–0.51)
EOSINOPHIL NFR BLD: 1.9 % (ref 0–6.9)
ERYTHROCYTE [DISTWIDTH] IN BLOOD BY AUTOMATED COUNT: 42.9 FL (ref 35.9–50)
FOLATE SERPL-MCNC: 12.1 NG/ML
GFR SERPL CREATININE-BSD FRML MDRD: >60 ML/MIN/1.73 M 2
GLOBULIN SER CALC-MCNC: 2.5 G/DL (ref 1.9–3.5)
GLUCOSE SERPL-MCNC: 81 MG/DL (ref 65–99)
HCT VFR BLD AUTO: 45.4 % (ref 37–47)
HDLC SERPL-MCNC: 53 MG/DL
HGB BLD-MCNC: 15 G/DL (ref 12–16)
IMM GRANULOCYTES # BLD AUTO: 0.01 K/UL (ref 0–0.11)
IMM GRANULOCYTES NFR BLD AUTO: 0.2 % (ref 0–0.9)
IRON SATN MFR SERPL: 25 % (ref 15–55)
IRON SERPL-MCNC: 75 UG/DL (ref 40–170)
LDLC SERPL CALC-MCNC: 187 MG/DL
LYMPHOCYTES # BLD AUTO: 2.4 K/UL (ref 1–4.8)
LYMPHOCYTES NFR BLD: 41 % (ref 22–41)
MCH RBC QN AUTO: 31.3 PG (ref 27–33)
MCHC RBC AUTO-ENTMCNC: 33 G/DL (ref 33.6–35)
MCV RBC AUTO: 94.8 FL (ref 81.4–97.8)
MONOCYTES # BLD AUTO: 0.38 K/UL (ref 0–0.85)
MONOCYTES NFR BLD AUTO: 6.5 % (ref 0–13.4)
NEUTROPHILS # BLD AUTO: 2.88 K/UL (ref 2–7.15)
NEUTROPHILS NFR BLD: 49.2 % (ref 44–72)
NRBC # BLD AUTO: 0 K/UL
NRBC BLD AUTO-RTO: 0 /100 WBC
PLATELET # BLD AUTO: 221 K/UL (ref 164–446)
PMV BLD AUTO: 10.8 FL (ref 9–12.9)
POTASSIUM SERPL-SCNC: 4.4 MMOL/L (ref 3.6–5.5)
PREALB SERPL-MCNC: 18 MG/DL (ref 18–38)
PROT SERPL-MCNC: 6.5 G/DL (ref 6–8.2)
RBC # BLD AUTO: 4.79 M/UL (ref 4.2–5.4)
SODIUM SERPL-SCNC: 140 MMOL/L (ref 135–145)
TIBC SERPL-MCNC: 304 UG/DL (ref 250–450)
TRANSFERRIN SERPL-MCNC: 219 MG/DL (ref 200–370)
TRIGL SERPL-MCNC: 210 MG/DL (ref 0–149)
VIT B12 SERPL-MCNC: 1105 PG/ML (ref 211–911)
WBC # BLD AUTO: 5.9 K/UL (ref 4.8–10.8)

## 2017-08-07 PROCEDURE — 84425 ASSAY OF VITAMIN B-1: CPT

## 2017-08-07 PROCEDURE — 84134 ASSAY OF PREALBUMIN: CPT

## 2017-08-07 PROCEDURE — 80061 LIPID PANEL: CPT

## 2017-08-07 PROCEDURE — 83540 ASSAY OF IRON: CPT

## 2017-08-07 PROCEDURE — 82607 VITAMIN B-12: CPT

## 2017-08-07 PROCEDURE — 84466 ASSAY OF TRANSFERRIN: CPT

## 2017-08-07 PROCEDURE — 80053 COMPREHEN METABOLIC PANEL: CPT

## 2017-08-07 PROCEDURE — 82746 ASSAY OF FOLIC ACID SERUM: CPT

## 2017-08-07 PROCEDURE — 36415 COLL VENOUS BLD VENIPUNCTURE: CPT

## 2017-08-07 PROCEDURE — 82306 VITAMIN D 25 HYDROXY: CPT

## 2017-08-07 PROCEDURE — 85025 COMPLETE CBC W/AUTO DIFF WBC: CPT

## 2017-08-07 PROCEDURE — 83550 IRON BINDING TEST: CPT

## 2017-08-17 LAB — VIT B1 BLD-MCNC: 77 NMOL/L (ref 70–180)

## 2017-10-19 ENCOUNTER — APPOINTMENT (OUTPATIENT)
Dept: SOCIAL WORK | Facility: CLINIC | Age: 62
End: 2017-10-19
Payer: MEDICARE

## 2017-10-19 PROCEDURE — 90686 IIV4 VACC NO PRSV 0.5 ML IM: CPT | Performed by: REGISTERED NURSE

## 2017-10-19 PROCEDURE — G0008 ADMIN INFLUENZA VIRUS VAC: HCPCS | Performed by: REGISTERED NURSE

## 2017-10-31 ENCOUNTER — HOSPITAL ENCOUNTER (OUTPATIENT)
Dept: LAB | Facility: MEDICAL CENTER | Age: 62
End: 2017-10-31
Attending: PHYSICIAN ASSISTANT
Payer: MEDICARE

## 2017-10-31 LAB
25(OH)D3 SERPL-MCNC: 28 NG/ML (ref 30–100)
ALBUMIN SERPL BCP-MCNC: 3.8 G/DL (ref 3.2–4.9)
ALBUMIN/GLOB SERPL: 1.7 G/DL
ALP SERPL-CCNC: 57 U/L (ref 30–99)
ALT SERPL-CCNC: 8 U/L (ref 2–50)
ANION GAP SERPL CALC-SCNC: 6 MMOL/L (ref 0–11.9)
AST SERPL-CCNC: 16 U/L (ref 12–45)
BASOPHILS # BLD AUTO: 0.7 % (ref 0–1.8)
BASOPHILS # BLD: 0.04 K/UL (ref 0–0.12)
BILIRUB SERPL-MCNC: 0.5 MG/DL (ref 0.1–1.5)
BUN SERPL-MCNC: 17 MG/DL (ref 8–22)
CALCIUM SERPL-MCNC: 9.4 MG/DL (ref 8.5–10.5)
CHLORIDE SERPL-SCNC: 106 MMOL/L (ref 96–112)
CHOLEST SERPL-MCNC: 241 MG/DL (ref 100–199)
CO2 SERPL-SCNC: 28 MMOL/L (ref 20–33)
CREAT SERPL-MCNC: 0.81 MG/DL (ref 0.5–1.4)
EOSINOPHIL # BLD AUTO: 0.05 K/UL (ref 0–0.51)
EOSINOPHIL NFR BLD: 0.9 % (ref 0–6.9)
ERYTHROCYTE [DISTWIDTH] IN BLOOD BY AUTOMATED COUNT: 44 FL (ref 35.9–50)
GFR SERPL CREATININE-BSD FRML MDRD: >60 ML/MIN/1.73 M 2
GLOBULIN SER CALC-MCNC: 2.3 G/DL (ref 1.9–3.5)
GLUCOSE SERPL-MCNC: 81 MG/DL (ref 65–99)
HCT VFR BLD AUTO: 41.9 % (ref 37–47)
HDLC SERPL-MCNC: 53 MG/DL
HGB BLD-MCNC: 13.6 G/DL (ref 12–16)
IMM GRANULOCYTES # BLD AUTO: 0.01 K/UL (ref 0–0.11)
IMM GRANULOCYTES NFR BLD AUTO: 0.2 % (ref 0–0.9)
IRON SATN MFR SERPL: 23 % (ref 15–55)
IRON SERPL-MCNC: 66 UG/DL (ref 40–170)
LDLC SERPL CALC-MCNC: 155 MG/DL
LYMPHOCYTES # BLD AUTO: 2.37 K/UL (ref 1–4.8)
LYMPHOCYTES NFR BLD: 43.5 % (ref 22–41)
MCH RBC QN AUTO: 30.6 PG (ref 27–33)
MCHC RBC AUTO-ENTMCNC: 32.5 G/DL (ref 33.6–35)
MCV RBC AUTO: 94.4 FL (ref 81.4–97.8)
MONOCYTES # BLD AUTO: 0.32 K/UL (ref 0–0.85)
MONOCYTES NFR BLD AUTO: 5.9 % (ref 0–13.4)
NEUTROPHILS # BLD AUTO: 2.66 K/UL (ref 2–7.15)
NEUTROPHILS NFR BLD: 48.8 % (ref 44–72)
NRBC # BLD AUTO: 0 K/UL
NRBC BLD AUTO-RTO: 0 /100 WBC
PLATELET # BLD AUTO: 214 K/UL (ref 164–446)
PMV BLD AUTO: 10.9 FL (ref 9–12.9)
POTASSIUM SERPL-SCNC: 3.8 MMOL/L (ref 3.6–5.5)
PREALB SERPL-MCNC: 16 MG/DL (ref 18–38)
PROT SERPL-MCNC: 6.1 G/DL (ref 6–8.2)
RBC # BLD AUTO: 4.44 M/UL (ref 4.2–5.4)
SODIUM SERPL-SCNC: 140 MMOL/L (ref 135–145)
TIBC SERPL-MCNC: 287 UG/DL (ref 250–450)
TRANSFERRIN SERPL-MCNC: 213 MG/DL (ref 200–370)
TRIGL SERPL-MCNC: 165 MG/DL (ref 0–149)
VIT B12 SERPL-MCNC: 280 PG/ML (ref 211–911)
WBC # BLD AUTO: 5.5 K/UL (ref 4.8–10.8)

## 2017-10-31 PROCEDURE — 84425 ASSAY OF VITAMIN B-1: CPT

## 2017-10-31 PROCEDURE — 36415 COLL VENOUS BLD VENIPUNCTURE: CPT

## 2017-10-31 PROCEDURE — 83550 IRON BINDING TEST: CPT

## 2017-10-31 PROCEDURE — 80061 LIPID PANEL: CPT

## 2017-10-31 PROCEDURE — 83540 ASSAY OF IRON: CPT

## 2017-10-31 PROCEDURE — 82607 VITAMIN B-12: CPT

## 2017-10-31 PROCEDURE — 85025 COMPLETE CBC W/AUTO DIFF WBC: CPT

## 2017-10-31 PROCEDURE — 82306 VITAMIN D 25 HYDROXY: CPT

## 2017-10-31 PROCEDURE — 84466 ASSAY OF TRANSFERRIN: CPT

## 2017-10-31 PROCEDURE — 80053 COMPREHEN METABOLIC PANEL: CPT

## 2017-10-31 PROCEDURE — 84134 ASSAY OF PREALBUMIN: CPT

## 2017-11-03 LAB — VIT B1 BLD-MCNC: 92 NMOL/L (ref 70–180)

## 2018-01-18 ENCOUNTER — HOSPITAL ENCOUNTER (OUTPATIENT)
Dept: RADIOLOGY | Facility: REHABILITATION | Age: 63
End: 2018-01-18
Attending: PHYSICAL MEDICINE & REHABILITATION
Payer: MEDICARE

## 2018-01-18 ENCOUNTER — HOSPITAL ENCOUNTER (OUTPATIENT)
Dept: PAIN MANAGEMENT | Facility: REHABILITATION | Age: 63
End: 2018-01-18
Attending: PHYSICAL MEDICINE & REHABILITATION
Payer: MEDICARE

## 2018-01-18 VITALS
HEIGHT: 64 IN | SYSTOLIC BLOOD PRESSURE: 151 MMHG | DIASTOLIC BLOOD PRESSURE: 83 MMHG | BODY MASS INDEX: 23.79 KG/M2 | WEIGHT: 139.33 LBS | TEMPERATURE: 97.3 F | RESPIRATION RATE: 16 BRPM | HEART RATE: 52 BPM | OXYGEN SATURATION: 96 %

## 2018-01-18 PROCEDURE — 700111 HCHG RX REV CODE 636 W/ 250 OVERRIDE (IP)

## 2018-01-18 PROCEDURE — 700101 HCHG RX REV CODE 250

## 2018-01-18 PROCEDURE — 700117 HCHG RX CONTRAST REV CODE 255

## 2018-01-18 PROCEDURE — 62321 NJX INTERLAMINAR CRV/THRC: CPT

## 2018-01-18 PROCEDURE — 99152 MOD SED SAME PHYS/QHP 5/>YRS: CPT

## 2018-01-18 RX ORDER — DEXTROSE MONOHYDRATE 25 G/50ML
INJECTION, SOLUTION INTRAVENOUS
Status: COMPLETED
Start: 2018-01-18 | End: 2018-01-18

## 2018-01-18 RX ORDER — LIDOCAINE HYDROCHLORIDE 10 MG/ML
INJECTION, SOLUTION EPIDURAL; INFILTRATION; INTRACAUDAL; PERINEURAL
Status: COMPLETED
Start: 2018-01-18 | End: 2018-01-18

## 2018-01-18 RX ORDER — MIDAZOLAM HYDROCHLORIDE 1 MG/ML
INJECTION INTRAMUSCULAR; INTRAVENOUS
Status: COMPLETED
Start: 2018-01-18 | End: 2018-01-18

## 2018-01-18 RX ADMIN — MIDAZOLAM HYDROCHLORIDE 1 MG: 1 INJECTION, SOLUTION INTRAMUSCULAR; INTRAVENOUS at 10:18

## 2018-01-18 RX ADMIN — LIDOCAINE HYDROCHLORIDE 5 ML: 10 INJECTION, SOLUTION EPIDURAL; INFILTRATION; INTRACAUDAL; PERINEURAL at 10:15

## 2018-01-18 RX ADMIN — DEXTROSE MONOHYDRATE 1 ML: 25 INJECTION, SOLUTION INTRAVENOUS at 10:25

## 2018-01-18 RX ADMIN — IOHEXOL 8 ML: 240 INJECTION, SOLUTION INTRATHECAL; INTRAVASCULAR; INTRAVENOUS; ORAL at 10:23

## 2018-01-18 RX ADMIN — FENTANYL CITRATE 50 MCG: 50 INJECTION, SOLUTION INTRAMUSCULAR; INTRAVENOUS at 10:21

## 2018-01-18 ASSESSMENT — PAIN SCALES - GENERAL
PAINLEVEL_OUTOF10: 4
PAINLEVEL_OUTOF10: 0
PAINLEVEL_OUTOF10: 0

## 2018-01-18 NOTE — PROGRESS NOTES
Medication reconciliation reviewed with patient. Denied taking any blood thinners and any   anti- inflammatories medication.Patient had a  Clementina / spouse .Home care form and verbal  instruction given to patient and verbalized understanding. Dr. Gillette made aware  & spoke. Hand off reported to JAROCHO Li RN.

## 2018-01-18 NOTE — PROCEDURES
Date of Service: 1/18/2018    Physician/s: Moody Gillette MD    Pre-operative Diagnosis: Cervical radiculopathy, degenerative disc disease    Post-operative Diagnosis: Cervical radiculopathy, degenerative disc disease    Procedure: Cervical interlaminar epidural steroid injection #1    Description of procedure:    The risks, benefits, and alternatives of the procedure were reviewed and discussed with the patient.  Written informed consent was freely obtained. A pre-procedural time-out was conducted by the physician verifying patient’s identity, procedure to be performed, procedure site and side, and allergy verification. Appropriate equipment was determined to be in place for the procedure.     An IV was placed peripherally, and the patient received a low dose of IV Versed for anxiolysis with a low dose of IV Fentanyl for pain control. The patient's vital signs were carefully monitored before, throughout, and after the procedure.     In the fluoroscopy suite the patient was placed in a prone position, a pillow placed underneath their chest. The skin was prepped and draped in the usual sterile fashion. The fluoroscope was placed over the cervical neck at the appropriate injection AP angle view, and the target for injection was marked. A 25g needle was placed into the marked site, and approx 2cc of 1% Lidocaine was injected subcutaneously into the epidermal and dermal layers. The needle was removed. A 20g Tuohy needle was then placed and advanced under fluoroscopic guidance into the C6-7 interlaminar space at both the initial position AP view and at a lateral view to ensure proper location of the needle tip at all times. A hang drop technique was used to guide the needle into the epidural space in a lateral fluoroscopic view. In the AP and lateral views, contrast dye was used to highlight the epidural space spread while the fluoroscope was running live. Following negative aspiration, approx 3cc of 1% Lidocaine  preservative free was then injected. The syringe was replaced, and 8cc's of D50W was then injected into the epidural space, and the needle was removed intact after restyleted. The patient's back was covered with a 4x4 gauze, the area was cleansed with sterile normal saline, and a dressing was applied. There were no complications noted.     The patient was then evaluated post-procedure, and was hemodynamically stable prior to leaving the post-operative care unit.     Moody Gillette MD  PM&R/Pain Mgmt

## 2018-02-09 ENCOUNTER — HOSPITAL ENCOUNTER (OUTPATIENT)
Dept: RADIOLOGY | Facility: MEDICAL CENTER | Age: 63
End: 2018-02-09
Attending: FAMILY MEDICINE
Payer: MEDICARE

## 2018-02-09 DIAGNOSIS — Z12.39 SCREENING BREAST EXAMINATION: ICD-10-CM

## 2018-02-09 PROCEDURE — 77063 BREAST TOMOSYNTHESIS BI: CPT

## 2018-05-06 ENCOUNTER — APPOINTMENT (OUTPATIENT)
Dept: RADIOLOGY | Facility: MEDICAL CENTER | Age: 63
End: 2018-05-06
Attending: EMERGENCY MEDICINE
Payer: MEDICARE

## 2018-05-06 ENCOUNTER — HOSPITAL ENCOUNTER (EMERGENCY)
Facility: MEDICAL CENTER | Age: 63
End: 2018-05-07
Attending: EMERGENCY MEDICINE
Payer: MEDICARE

## 2018-05-06 DIAGNOSIS — K29.00 ACUTE GASTRITIS WITHOUT HEMORRHAGE, UNSPECIFIED GASTRITIS TYPE: ICD-10-CM

## 2018-05-06 LAB
ALBUMIN SERPL BCP-MCNC: 4.5 G/DL (ref 3.2–4.9)
ALBUMIN/GLOB SERPL: 2 G/DL
ALP SERPL-CCNC: 67 U/L (ref 30–99)
ALT SERPL-CCNC: 30 U/L (ref 2–50)
ANION GAP SERPL CALC-SCNC: 10 MMOL/L (ref 0–11.9)
AST SERPL-CCNC: 70 U/L (ref 12–45)
BASOPHILS # BLD AUTO: 0.7 % (ref 0–1.8)
BASOPHILS # BLD: 0.06 K/UL (ref 0–0.12)
BILIRUB SERPL-MCNC: 0.8 MG/DL (ref 0.1–1.5)
BUN SERPL-MCNC: 13 MG/DL (ref 8–22)
CALCIUM SERPL-MCNC: 9.4 MG/DL (ref 8.4–10.2)
CHLORIDE SERPL-SCNC: 105 MMOL/L (ref 96–112)
CO2 SERPL-SCNC: 24 MMOL/L (ref 20–33)
CREAT SERPL-MCNC: 0.99 MG/DL (ref 0.5–1.4)
EKG IMPRESSION: NORMAL
EOSINOPHIL # BLD AUTO: 0.05 K/UL (ref 0–0.51)
EOSINOPHIL NFR BLD: 0.6 % (ref 0–6.9)
ERYTHROCYTE [DISTWIDTH] IN BLOOD BY AUTOMATED COUNT: 41.5 FL (ref 35.9–50)
GLOBULIN SER CALC-MCNC: 2.2 G/DL (ref 1.9–3.5)
GLUCOSE SERPL-MCNC: 109 MG/DL (ref 65–99)
HCT VFR BLD AUTO: 44.3 % (ref 37–47)
HGB BLD-MCNC: 14.8 G/DL (ref 12–16)
IMM GRANULOCYTES # BLD AUTO: 0.02 K/UL (ref 0–0.11)
IMM GRANULOCYTES NFR BLD AUTO: 0.2 % (ref 0–0.9)
LYMPHOCYTES # BLD AUTO: 3.23 K/UL (ref 1–4.8)
LYMPHOCYTES NFR BLD: 36.2 % (ref 22–41)
MCH RBC QN AUTO: 30.6 PG (ref 27–33)
MCHC RBC AUTO-ENTMCNC: 33.4 G/DL (ref 33.6–35)
MCV RBC AUTO: 91.5 FL (ref 81.4–97.8)
MONOCYTES # BLD AUTO: 0.55 K/UL (ref 0–0.85)
MONOCYTES NFR BLD AUTO: 6.2 % (ref 0–13.4)
NEUTROPHILS # BLD AUTO: 5.02 K/UL (ref 2–7.15)
NEUTROPHILS NFR BLD: 56.1 % (ref 44–72)
NRBC # BLD AUTO: 0 K/UL
NRBC BLD-RTO: 0 /100 WBC
PLATELET # BLD AUTO: 170 K/UL (ref 164–446)
PMV BLD AUTO: 11.2 FL (ref 9–12.9)
POTASSIUM SERPL-SCNC: 3.6 MMOL/L (ref 3.6–5.5)
PROT SERPL-MCNC: 6.7 G/DL (ref 6–8.2)
RBC # BLD AUTO: 4.84 M/UL (ref 4.2–5.4)
SODIUM SERPL-SCNC: 139 MMOL/L (ref 135–145)
TROPONIN I SERPL-MCNC: <0.02 NG/ML (ref 0–0.04)
WBC # BLD AUTO: 8.9 K/UL (ref 4.8–10.8)

## 2018-05-06 PROCEDURE — 83690 ASSAY OF LIPASE: CPT

## 2018-05-06 PROCEDURE — 84484 ASSAY OF TROPONIN QUANT: CPT

## 2018-05-06 PROCEDURE — 74177 CT ABD & PELVIS W/CONTRAST: CPT

## 2018-05-06 PROCEDURE — 99285 EMERGENCY DEPT VISIT HI MDM: CPT

## 2018-05-06 PROCEDURE — 80053 COMPREHEN METABOLIC PANEL: CPT

## 2018-05-06 PROCEDURE — 700117 HCHG RX CONTRAST REV CODE 255: Performed by: EMERGENCY MEDICINE

## 2018-05-06 PROCEDURE — 700102 HCHG RX REV CODE 250 W/ 637 OVERRIDE(OP): Performed by: EMERGENCY MEDICINE

## 2018-05-06 PROCEDURE — 93005 ELECTROCARDIOGRAM TRACING: CPT | Performed by: EMERGENCY MEDICINE

## 2018-05-06 PROCEDURE — A9270 NON-COVERED ITEM OR SERVICE: HCPCS | Performed by: EMERGENCY MEDICINE

## 2018-05-06 PROCEDURE — 85025 COMPLETE CBC W/AUTO DIFF WBC: CPT

## 2018-05-06 PROCEDURE — 36415 COLL VENOUS BLD VENIPUNCTURE: CPT

## 2018-05-06 RX ORDER — ONDANSETRON 4 MG/1
4 TABLET, ORALLY DISINTEGRATING ORAL EVERY 6 HOURS PRN
Qty: 10 TAB | Refills: 0 | Status: SHIPPED | OUTPATIENT
Start: 2018-05-06 | End: 2018-05-16

## 2018-05-06 RX ORDER — FAMOTIDINE 20 MG/1
20 TABLET, FILM COATED ORAL ONCE
Status: COMPLETED | OUTPATIENT
Start: 2018-05-06 | End: 2018-05-06

## 2018-05-06 RX ORDER — OMEPRAZOLE 20 MG/1
20 CAPSULE, DELAYED RELEASE ORAL DAILY
Qty: 30 CAP | Refills: 0 | Status: SHIPPED | OUTPATIENT
Start: 2018-05-06 | End: 2021-09-20

## 2018-05-06 RX ADMIN — IOHEXOL 100 ML: 350 INJECTION, SOLUTION INTRAVENOUS at 22:08

## 2018-05-06 RX ADMIN — FAMOTIDINE 20 MG: 20 TABLET, FILM COATED ORAL at 23:09

## 2018-05-06 RX ADMIN — LIDOCAINE HYDROCHLORIDE 30 ML: 20 SOLUTION OROPHARYNGEAL at 23:09

## 2018-05-07 VITALS
BODY MASS INDEX: 22.58 KG/M2 | HEART RATE: 66 BPM | SYSTOLIC BLOOD PRESSURE: 130 MMHG | OXYGEN SATURATION: 95 % | TEMPERATURE: 97.6 F | RESPIRATION RATE: 18 BRPM | HEIGHT: 64 IN | WEIGHT: 132.28 LBS | DIASTOLIC BLOOD PRESSURE: 77 MMHG

## 2018-05-07 LAB — LIPASE SERPL-CCNC: 173 U/L (ref 11–82)

## 2018-05-07 NOTE — DISCHARGE INSTRUCTIONS
Gastritis, Adult  Gastritis is soreness and swelling (inflammation) of the lining of the stomach. Gastritis can develop as a sudden onset (acute) or long-term (chronic) condition. If gastritis is not treated, it can lead to stomach bleeding and ulcers.  CAUSES   Gastritis occurs when the stomach lining is weak or damaged. Digestive juices from the stomach then inflame the weakened stomach lining. The stomach lining may be weak or damaged due to viral or bacterial infections. One common bacterial infection is the Helicobacter pylori infection. Gastritis can also result from excessive alcohol consumption, taking certain medicines, or having too much acid in the stomach.   SYMPTOMS   In some cases, there are no symptoms. When symptoms are present, they may include:  · Pain or a burning sensation in the upper abdomen.  · Nausea.  · Vomiting.  · An uncomfortable feeling of fullness after eating.  DIAGNOSIS   Your caregiver may suspect you have gastritis based on your symptoms and a physical exam. To determine the cause of your gastritis, your caregiver may perform the following:  · Blood or stool tests to check for the H pylori bacterium.  · Gastroscopy. A thin, flexible tube (endoscope) is passed down the esophagus and into the stomach. The endoscope has a light and camera on the end. Your caregiver uses the endoscope to view the inside of the stomach.  · Taking a tissue sample (biopsy) from the stomach to examine under a microscope.  TREATMENT   Depending on the cause of your gastritis, medicines may be prescribed. If you have a bacterial infection, such as an H pylori infection, antibiotics may be given. If your gastritis is caused by too much acid in the stomach, H2 blockers or antacids may be given. Your caregiver may recommend that you stop taking aspirin, ibuprofen, or other nonsteroidal anti-inflammatory drugs (NSAIDs).  HOME CARE INSTRUCTIONS  · Only take over-the-counter or prescription medicines as directed by  your caregiver.  · If you were given antibiotic medicines, take them as directed. Finish them even if you start to feel better.  · Drink enough fluids to keep your urine clear or pale yellow.  · Avoid foods and drinks that make your symptoms worse, such as:  ¨ Caffeine or alcoholic drinks.  ¨ Chocolate.  ¨ Peppermint or mint flavorings.  ¨ Garlic and onions.  ¨ Spicy foods.  ¨ Citrus fruits, such as oranges, yifan, or limes.  ¨ Tomato-based foods such as sauce, chili, salsa, and pizza.  ¨ Fried and fatty foods.  · Eat small, frequent meals instead of large meals.  SEEK IMMEDIATE MEDICAL CARE IF:   · You have black or dark red stools.  · You vomit blood or material that looks like coffee grounds.  · You are unable to keep fluids down.  · Your abdominal pain gets worse.  · You have a fever.  · You do not feel better after 1 week.  · You have any other questions or concerns.  MAKE SURE YOU:  · Understand these instructions.  · Will watch your condition.  · Will get help right away if you are not doing well or get worse.  This information is not intended to replace advice given to you by your health care provider. Make sure you discuss any questions you have with your health care provider.  Document Released: 12/12/2002 Document Revised: 06/18/2013 Document Reviewed: 09/10/2016  [employer] Interactive Patient Education © 2017 [employer]

## 2018-05-07 NOTE — ED NOTES
Pt states some improvement with gi cocktail and famotidine given earlier. Pt resting quietly. Spouse remains at bedside.

## 2018-05-07 NOTE — ED NOTES
Pt resting quietly with spouse at bedside. Plan of care updated and support given. Questions answered.

## 2018-05-07 NOTE — ED NOTES
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

## 2018-05-07 NOTE — ED PROVIDER NOTES
ED Provider Note    CHIEF COMPLAINT  Chief Complaint   Patient presents with   • Abdominal Pain   • N/V       HPI  Nils Alfonso is a 62 y.o. female who presents to the emergency room complaining of midepigastric abdominal pain starting this evening. Past medical history significant for chronic pain most notably to low back and shoulders. She is on multiple pain management medications including gabapentin and a narcotics. She additionally notes prior pancreatitis which was ultimately deemed to be secondary to estrogen supplementation although she is not currently on this. She has been on a one-month trial of a herbal anticholesterol medication. She was previously on lisinopril although off of this after bariatric surgery as her blood pressure averaged lower than previous. Denies any chest pain or shortness breath. No diaphoresis. She has had some associated nausea with the midepigastric bowel pain tonight. Currently moderate in severity. No notable aggravating factors. She was unable to the [employer] beef and cabbage dinner due to pain.    REVIEW OF SYSTEMS  See HPI for further details. All other systems are negative.     PAST MEDICAL HISTORY   has a past medical history of Allergy, unspecified not elsewhere classified; Anemia; Anesthesia; Arthritis; Backpain; Bronchitis (2010); Cataract; Degeneration of cervical intervertebral disc; Dental disorder; Heart burn; Hiatus hernia syndrome; High cholesterol; Hyperlipidemia; Hypertension; Muscle disorder; and Reactive airway disease.    SOCIAL HISTORY  Social History     Social History Main Topics   • Smoking status: Former Smoker     Packs/day: 0.25     Years: 0.30     Types: Cigarettes     Quit date: 1/1/1973   • Smokeless tobacco: Never Used   • Alcohol use No   • Drug use: No   • Sexual activity: Not on file      Comment:        SURGICAL HISTORY   has a past surgical history that includes hysterectomy robotic (1/2/2009); tonsillectomy and adenoidectomy  "(1967); tubal ligation (1985); gastric resection (1982); primary c section (1976, 1979, 1985); lumbar fusion anterior (2007); cholecystectomy (1996); rotator cuff repair (Left, 5/2015); rotator cuff repair (Right, 7/7/2016); gastroscopy (N/A, 8/26/2016); gastric sleeve laparoscopy (10/19/2016); liver biopsy laparoscopic (10/19/2016); cataract phaco with iol (Right, 4/4/2017); and cataract phaco with iol (Left, 4/18/2017).    CURRENT MEDICATIONS  Home Medications    **Home medications have not yet been reviewed for this encounter**         ALLERGIES  Allergies   Allergen Reactions   • Ampicillin Rash   • Demerol Vomiting   • Keflex Diarrhea, Vomiting and Nausea     Pt states severe N/V/D   • Morphine Vomiting   • Tetracyclines Rash   • Codeine      Severe stomach pain, cramps, spasms   • Zanaflex [Tizanidine Hcl] Itching   • Clindamycin Vomiting     \"cleocin\"   • Dilaudid [Hydromorphone] Vomiting and Nausea   • Pcn [Penicillins] Itching   • Sulfa Drugs Itching       PHYSICAL EXAM  VITAL SIGNS: /77   Pulse 66   Temp 36.4 °C (97.6 °F)   Resp 18   Ht 1.626 m (5' 4\")   Wt 60 kg (132 lb 4.4 oz)   LMP 01/01/2009   SpO2 95%   BMI 22.71 kg/m²  @LORI[[id number]::@   Pulse ox interpretation: I interpret this pulse ox as normal.  Constitutional: Alert in no apparent distress.  HENT: No signs of trauma, Bilateral external ears normal, Nose normal.   Eyes: Pupils are equal and reactive, Conjunctiva normal, Non-icteric.   Neck: Normal range of motion, No tenderness, Supple, No stridor.   Cardiovascular: Regular rate and rhythm, no murmurs.   Thorax & Lungs: Normal breath sounds, No respiratory distress, No wheezing, No chest tenderness.   Abdomen: Bowel sounds normal, Soft, No appreciable tenderness, No masses, No pulsatile masses. No peritoneal signs.  Skin: Warm, Dry, No erythema, No rash.   Back: No bony tenderness, No CVA tenderness.   Extremities: Intact distal pulses, No edema, No tenderness, No " cyanosis.  Musculoskeletal: Good range of motion in all major joints. No tenderness to palpation or major deformities noted.   Neurologic: Alert , Normal motor function, Normal sensory function, No focal deficits noted.   Psychiatric: Affect normal, Judgment normal, Mood normal.       DIAGNOSTIC STUDIES / PROCEDURES    EKG  2207: Sinus rhythm at a rate of 54, normal axis, T-wave morphology to suggest left atrial enlargement, incomplete right bundle branch block.    LABS  Results for orders placed or performed during the hospital encounter of 05/06/18   CBC WITH DIFFERENTIAL   Result Value Ref Range    WBC 8.9 4.8 - 10.8 K/uL    RBC 4.84 4.20 - 5.40 M/uL    Hemoglobin 14.8 12.0 - 16.0 g/dL    Hematocrit 44.3 37.0 - 47.0 %    MCV 91.5 81.4 - 97.8 fL    MCH 30.6 27.0 - 33.0 pg    MCHC 33.4 (L) 33.6 - 35.0 g/dL    RDW 41.5 35.9 - 50.0 fL    Platelet Count 170 164 - 446 K/uL    MPV 11.2 9.0 - 12.9 fL    Neutrophils-Polys 56.10 44.00 - 72.00 %    Lymphocytes 36.20 22.00 - 41.00 %    Monocytes 6.20 0.00 - 13.40 %    Eosinophils 0.60 0.00 - 6.90 %    Basophils 0.70 0.00 - 1.80 %    Immature Granulocytes 0.20 0.00 - 0.90 %    Nucleated RBC 0.00 /100 WBC    Neutrophils (Absolute) 5.02 2.00 - 7.15 K/uL    Lymphs (Absolute) 3.23 1.00 - 4.80 K/uL    Monos (Absolute) 0.55 0.00 - 0.85 K/uL    Eos (Absolute) 0.05 0.00 - 0.51 K/uL    Baso (Absolute) 0.06 0.00 - 0.12 K/uL    Immature Granulocytes (abs) 0.02 0.00 - 0.11 K/uL    NRBC (Absolute) 0.00 K/uL   COMP METABOLIC PANEL   Result Value Ref Range    Sodium 139 135 - 145 mmol/L    Potassium 3.6 3.6 - 5.5 mmol/L    Chloride 105 96 - 112 mmol/L    Co2 24 20 - 33 mmol/L    Anion Gap 10.0 0.0 - 11.9    Glucose 109 (H) 65 - 99 mg/dL    Bun 13 8 - 22 mg/dL    Creatinine 0.99 0.50 - 1.40 mg/dL    Calcium 9.4 8.4 - 10.2 mg/dL    AST(SGOT) 70 (H) 12 - 45 U/L    ALT(SGPT) 30 2 - 50 U/L    Alkaline Phosphatase 67 30 - 99 U/L    Total Bilirubin 0.8 0.1 - 1.5 mg/dL    Albumin 4.5 3.2 - 4.9 g/dL     Total Protein 6.7 6.0 - 8.2 g/dL    Globulin 2.2 1.9 - 3.5 g/dL    A-G Ratio 2.0 g/dL   LIPASE   Result Value Ref Range    Lipase 173 (H) 11 - 82 U/L   TROPONIN   Result Value Ref Range    Troponin I <0.02 0.00 - 0.04 ng/mL   ESTIMATED GFR   Result Value Ref Range    GFR If African American >60 >60 mL/min/1.73 m 2    GFR If Non  57 (A) >60 mL/min/1.73 m 2   EKG (NOW)   Result Value Ref Range    Report       [facility]    Test Date:  2018  Pt Name:    KATERINA PATEL                  Department: [facility]  MRN:        [id number]                      Room:       -ROOM 4  Gender:     Female                       Technician: NAVARRO  :        1955                   Requested By:BRANDEN HEATH  Order #:    [id number]                    Reading MD:    Measurements  Intervals                                Axis  Rate:       54                           P:          79  AK:         159                          QRS:        59  QRSD:       123                          T:          42  QT:         455  QTc:        432    Interpretive Statements  Sinus rhythm  Left atrial enlargement  IVCD, consider atypical RBBB  Compared to ECG 2017 12:48:53  Atrial abnormality now present  Sinus bradycardia no longer present           RADIOLOGY  CT-ABDOMEN-PELVIS WITH   Final Result      1.  Postsurgical changes are noted in the stomach. There is mild mucosal enhancement with submucosal edema in the remaining portion of the stomach suspicious for gastritis. This is new since the previous CT scan dated 2011   2.  Again noted are prominent common bile duct and pancreatic ducts.   3.  There are changes secondary to the cholecystectomy.              COURSE & MEDICAL DECISION MAKING  Pertinent Labs & Imaging studies reviewed. (See chart for details)  Patient presented with abdominal pain.  Past history significant for prior pancreatitis which was believed to be secondary to  estrogen medication.the level evaluation as noted above. CT imaging shows evidence of gastritis but no acute pancreatic changes. Unfortunately the lipase was sent out to another local facility for processing. Given the prolonged observation in the ER for treatment and pain management as well as diagnostic evaluation at this point I discussed the pending lab which would help potentially differentiate between the gastritis and a recurrent pancreatitis.  At this time we have agreed that the patient will be discharged home with close follow-up by primary care physician but return precautions are recurrent pancreatitis. At This time we have agreed the patient will be discharged to home with close follow-up by primary care physician but return precautions are also understood.    Follow-up: Lipase was identified as being elevated on further review this following day. I have contacted the patient which is feeling significantly better by further questioning over the phone. She is continue with a bland diet mostly of clear liquids. At this point she has follow-up tomorrow and she will continue with that plan unless she worsens at which point she will come back to the hospital for ongoing inpatient care for possible mild pancreatitis however she does not meet criteria given current presentation and lipase level.        The patient will not drink alcohol nor drive with prescribed medications. The patient will return for worsening symptoms and is stable at the time of discharge. The patient verbalizes understanding and will comply.    FINAL IMPRESSION  1. Acute gastritis without hemorrhage, unspecified gastritis type      2. Elevated lipase      Electronically signed by: Osmar Mario, 5/6/2018 9:41 PM

## 2018-05-07 NOTE — ED NOTES
IV removed without problem. Questions answered and support given. RX x1 given. RX x1 called in. Pt will follow up with gastro tomorrow for appointment. VSS. Spouse is driving her home.

## 2018-05-16 ENCOUNTER — APPOINTMENT (OUTPATIENT)
Dept: ADMISSIONS | Facility: MEDICAL CENTER | Age: 63
End: 2018-05-16
Attending: INTERNAL MEDICINE
Payer: MEDICARE

## 2018-05-23 ENCOUNTER — HOSPITAL ENCOUNTER (OUTPATIENT)
Facility: MEDICAL CENTER | Age: 63
End: 2018-05-23
Attending: INTERNAL MEDICINE | Admitting: INTERNAL MEDICINE
Payer: MEDICARE

## 2018-05-23 VITALS
OXYGEN SATURATION: 98 % | SYSTOLIC BLOOD PRESSURE: 145 MMHG | TEMPERATURE: 96.6 F | RESPIRATION RATE: 16 BRPM | DIASTOLIC BLOOD PRESSURE: 79 MMHG | WEIGHT: 134.48 LBS | BODY MASS INDEX: 22.96 KG/M2 | HEIGHT: 64 IN | HEART RATE: 64 BPM

## 2018-05-23 PROCEDURE — 160002 HCHG RECOVERY MINUTES (STAT): Performed by: INTERNAL MEDICINE

## 2018-05-23 PROCEDURE — 160046 HCHG PACU - 1ST 60 MINS PHASE II: Performed by: INTERNAL MEDICINE

## 2018-05-23 PROCEDURE — 500066 HCHG BITE BLOCK, ECT: Performed by: INTERNAL MEDICINE

## 2018-05-23 PROCEDURE — 700101 HCHG RX REV CODE 250

## 2018-05-23 PROCEDURE — 700111 HCHG RX REV CODE 636 W/ 250 OVERRIDE (IP)

## 2018-05-23 PROCEDURE — 160009 HCHG ANES TIME/MIN: Performed by: INTERNAL MEDICINE

## 2018-05-23 PROCEDURE — 160035 HCHG PACU - 1ST 60 MINS PHASE I: Performed by: INTERNAL MEDICINE

## 2018-05-23 PROCEDURE — 160208 HCHG ENDO MINUTES - EA ADDL 1 MIN LEVEL 4: Performed by: INTERNAL MEDICINE

## 2018-05-23 PROCEDURE — 160048 HCHG OR STATISTICAL LEVEL 1-5: Performed by: INTERNAL MEDICINE

## 2018-05-23 PROCEDURE — 160025 RECOVERY II MINUTES (STATS): Performed by: INTERNAL MEDICINE

## 2018-05-23 PROCEDURE — 700105 HCHG RX REV CODE 258: Performed by: INTERNAL MEDICINE

## 2018-05-23 PROCEDURE — 160203 HCHG ENDO MINUTES - 1ST 30 MINS LEVEL 4: Performed by: INTERNAL MEDICINE

## 2018-05-23 RX ORDER — SODIUM CHLORIDE, SODIUM LACTATE, POTASSIUM CHLORIDE, CALCIUM CHLORIDE 600; 310; 30; 20 MG/100ML; MG/100ML; MG/100ML; MG/100ML
INJECTION, SOLUTION INTRAVENOUS CONTINUOUS
Status: DISCONTINUED | OUTPATIENT
Start: 2018-05-23 | End: 2018-05-23 | Stop reason: HOSPADM

## 2018-05-23 RX ADMIN — SODIUM CHLORIDE, POTASSIUM CHLORIDE, SODIUM LACTATE AND CALCIUM CHLORIDE 1000 ML: 600; 310; 30; 20 INJECTION, SOLUTION INTRAVENOUS at 08:15

## 2018-05-23 ASSESSMENT — PAIN SCALES - GENERAL
PAINLEVEL_OUTOF10: 0
PAINLEVEL_OUTOF10: ASSUMED PAIN PRESENT
PAINLEVEL_OUTOF10: 0

## 2018-05-23 NOTE — OR NURSING
0942 Pt to PACU from OR via gurney, respirations spontaneous and non-labored via nasal airway. Pt not arousable on calling, VSS.  0955 Nasal airway D/C'd, VSS. Pt arousable on calling with no complaints of pain or nausea.    1010 Pt reports still comfortable resting, VSS. Pt meets criteria for transfer to stage II.   1020 Report to TALITA Bautista  1022 Pt transported to stage II.

## 2018-05-23 NOTE — OR SURGEON
Immediate Post OP Note    PreOp Diagnosis: Abnormal CT - CBD, PD dilation, acute pancreatitis    PostOp Diagnosis: Same    Procedure(s):  GASTROSCOPY  EGD W/ENDOSCOPIC ULTRASOUND- RADIAL UPPER    Surgeon(s):  Fausto Casas M.D.    Anesthesiologist/Type of Anesthesia:  Anesthesiologist: Robin Carvajal M.D./* No anesthesia type entered *    Surgical Staff:  Circulator: Jose Hassan R.N.  Endoscopy Technician: Jatinder Nevarez; Ruthy Nash; Ade Green    Specimens removed if any:  * No specimens in log *    Dr. Casas  [facility]  [facility]  [phone]    EGD    EUS upper    Indication: Abnormal CT - CBD, PD dilation, acute pancreatitis    Sedation: MAC (Wei)    Findings:   EGD    Esophagus     Unremarkable    Stomach     Post gastric sleeve anatomy     Unremarkable mucosa    Duodenum     Unremarkable mucosa    Biliary ampulla     TJF scope used to visualize     Redundant folds     Ampulla pulled into small diverticulum     EUS    Celiac axis     No adenopathy    Pancreas body/tail     Normal appearing parenchyma throughout    Pancreatic duct     Normal course and caliber    Head of pancreas     Normal dorsal / ventral transition     Normal appearing parenchyma    Ampulla     No mass present     Surrounded by redundant folds    CBD     Polypoid filling defect      Distal duct      Layered structure      Consistent with periampullary diverticulum impingement     Normal course     No other filling defects (no stones/sludge)     Generous caliber- 7.3mm in mid duct    Plan:  Follow clinically    Will consider repeat CT vs MRCP 6 months to confirm stability      5/23/2018 9:09 AM Fausto Casas M.D.

## 2018-05-23 NOTE — PROCEDURES
DATE OF SERVICE:  05/23/2018    PROCEDURES:  1.  Esophagogastroduodenoscopy.  2.  Endoscopic ultrasound upper.    INDICATION:  History of acute pancreatitis.  Abnormal CT scan with dilated   common bile duct and pancreatic duct.    FINAL IMPRESSION:  ESOPHAGOGASTRODUODENOSCOPY FINDINGS:  1.  Esophagus:  Unremarkable mucosa throughout.  2.  Stomach:  Post-gastric sleeve anatomy, otherwise unremarkable mucosa.  3.  Duodenum:  Unremarkable mucosa.  4.  Biliary ampulla viewed with [facility] side-viewing scope demonstrating redundant   folds and ampulla pulled into a small diverticulum.    ENDOSCOPIC ULTRASOUND FINDINGS:  1.  Celiac axis, no adenopathy.  2.  Pancreatic body and tail, normal appearing parenchyma throughout.  3.  Pancreatic duct, normal course and caliber.  4.  Head of the pancreas, normal dorsal ventral transition with normal   appearing parenchyma throughout.  5.  Biliary ampulla.  No mass present.  Surrounded by redundant folds, making   it difficult to visualize.  6.  Common bile duct polyp with polypoid filling defect in the distal duct   with a layered structure just adjacent to the ampulla consistent with   periampullary diverticulum impingement.  7.  Common bile duct, normal course.  No other filling defects.  Specifically,   no stones or sludge.  Generous caliber through its length.    RECOMMENDATIONS:  1.  Follow clinically.  2.  Follow up in clinic in 3-6 months.  3.  We will consider repeat CT versus MRCP in 6 months to confirm stability   and rule out head of pancreas lesion.    DESCRIPTION OF PROCEDURE:  Prior to the procedure, physical exam was stable.    During procedure, vital signs remained within normal limits.  Prior to   sedation, an informed consent was obtained.  Risks, benefits, alternatives   including but not limited to risk of bleeding, infection, perforation, adverse   reaction to medication, failure to identify pathology, pancreatitis and death   explained at length to the patient,  she accepted all risks.  Patient prepped   in the left lateral position.  IV sedation was given in the form of propofol   by anesthesia.    EGD:  Scope tip of the Olympus flexible gastroscope passed in the proximal   esophagus.  Proximal middle and distal thirds of the esophagus were well   visualized and were unremarkable.  The stomach was entered.  Gastric pool   suctioned.  There was post-surgical anatomy consistent with previous gastric   sleeve surgery.  The immediate proximal stomach was slightly larger caliber   than the sleeve itself.  The mucosa was unremarkable throughout.  The pylorus   was visualized and was unremarkable.  Scope retroflexed in the distal stomach   and slowly withdrawn backwards with minimal resistance and unremarkable   findings.  Scope was gently straightened, then passed into the pylorus to the   duodenal bulb sweep second and third portion, which were unremarkable.  The   biliary ampulla could not be seen well and in the expected area.  The biliary   ampulla were redundant folds.  No mass was seen.  The gastroscope was   withdrawn and we proceeded with endoscopic ultrasound.    EUS:  A flexible radial echoendoscope passed in the proximal stomach.  Imaging   of the celiac axis was unremarkable with no adenopathy.  Imaging of the   pancreatic body and tail demonstrated very normal appearing parenchyma   throughout with normal salt and pepper appearance and no criteria for chronic   pancreatitis.  The pancreatic duct itself had a normal course and caliber   through to the tail.  It measured 2.4 mm in the body adjacent to the portal   vein.  The visualized portions of the left kidney and spleen were   unremarkable.  Scope was then advanced in the duodenum.  The biliary ampulla   was difficult to visualize, but there was no mass present and it was   surrounded by redundant folds with multiple layered structure.  The head of   the pancreas itself had a very normal appearing parenchyma.   Once again, no   criteria for chronic pancreatitis.  There was a normal dorsal ventral   transition.  The common bile duct was followed from the ampulla.  There was a   polypoid filling defect in the very distal common bile duct.  This had a   layered structure and likely represents periampullary diverticulum impingement   kinking the distal duct.  There did not seem to be a true solid component to   it and only layers were seen.  This measured approximately 5.5 mm in diameter.    The bile duct was followed along its course to the mid duct.  It was   generous in caliber, had a normal course and measured 7.3 mm in the mid duct.    No filling defects were seen.  Specifically, no stones or sludge.  Multiple   passes were made with the same findings.  The radial echoendoscope was   withdrawn and the side-viewing TJF flexible duodenoscope was passed, then to   the level of the biliary ampulla.  The ampulla could not be clearly   visualized.  There were redundant folds.  A biopsy forceps was passed down   this scope channel and these folds were teased to side to reveal a small   diameter opening diverticulum with the biliary ampulla pulled within it.  This   is consistent with the endoscopic ultrasound findings.  Once this was   complete, the procedure was deemed complete.  The scope was withdrawn.  Air   and liquid were suctioned.  Patient tolerated the procedure well and was sent   to recovery without immediate complications.       ____________________________________     MD ALEXANDRA PARNELL / RYNE    DD:  05/23/2018 09:54:41  DT:  05/23/2018 10:37:56    D#:  [id number]  Job#:  [id number]

## 2018-05-23 NOTE — OR NURSING
1022: Pt arrived post endo procedure/gastroscopy/EGD/EUS, RN report, Pt denies pain/nausea, Steady/dressed/sitting in recliner in Stg II  1033: Awaiting family  1038:  in Stg II, DC instructions completed with pt/, Verbalized understanding/MD also updated earlier, Cont to have no pain/nausea  1047: IV removed, Pt discharged via WC/volunteer

## 2018-05-23 NOTE — DISCHARGE INSTRUCTIONS
ENDOSCOPY HOME CARE INSTRUCTIONS    GASTROSCOPY OR ERCP  1. Don't eat or drink anything for about an hour after the test. You can then resume your regular diet.  2. Don't drive or drink alcohol for 24 hours. The medication you received will make you too drowsy.  3. Don't take any coffee, tea, or aspirin products until after you see your doctor. These can harm the lining of your stomach.  4. If you begin to vomit bloody material, or develop black or bloody stools, call your doctor as soon as possible.  5. If you have any neck, chest, abdominal pain or temp of 100 degrees, call your doctor.  6. See your doctor as scheduled   7. Additional instructions: Patient not to drive today.  Resume pre procedural diet.  8. Prescriptions: none given     Dr. Casas  [facility]  [facility]  [phone]     EGD     EUS upper     Indication:        Abnormal CT - CBD, PD dilation, acute pancreatitis     Sedation:         MAC (Wei)     Findings:              EGD                          Esophagus                                      Unremarkable                          Stomach                                      Post gastric sleeve anatomy                                      Unremarkable mucosa                          Duodenum                                      Unremarkable mucosa                          Biliary ampulla                                      TJF scope used to visualize                                      Redundant folds                                      Ampulla pulled into small diverticulum                 EUS                          Celiac axis                                      No adenopathy                          Pancreas body/tail                                      Normal appearing parenchyma throughout                          Pancreatic duct                                      Normal course and caliber                          Head of pancreas                                      Normal  dorsal / ventral transition                                      Normal appearing parenchyma                          Ampulla                                      No mass present                                      Surrounded by redundant folds                          CBD                                      Polypoid filling defect                                                  Distal duct                                                  Layered structure                                                  Consistent with periampullary diverticulum impingement                                      Normal course                                      No other filling defects (no stones/sludge)                                      Generous caliber- 7.3mm in mid duct     Plan:                Follow clinically                          Will consider repeat CT vs MRCP 6 months to confirm stability    You should call 911 if you develop problems with breathing or chest pain.  If any questions arise, call your doctor. If your doctor is not available, please feel free to call [phone]. You can also call the HEALTH HOTLINE open 24 hours/day, 7 days/week and speak to a nurse at [phone], or toll free [phone].    Depression / Suicide Risk    As you are discharged from this [facility] facility, it is important to learn how to keep safe from harming yourself.    Recognize the warning signs:  · Abrupt changes in personality, positive or negative- including increase in energy   · Giving away possessions  · Change in eating patterns- significant weight changes-  positive or negative  · Change in sleeping patterns- unable to sleep or sleeping all the time   · Unwillingness or inability to communicate  · Depression  · Unusual sadness, discouragement and loneliness  · Talk of wanting to die  · Neglect of personal appearance   · Rebelliousness- reckless behavior  · Withdrawal from people/activities they  love  · Confusion- inability to concentrate     If you or a loved one observes any of these behaviors or has concerns about self-harm, here's what you can do:  · Talk about it- your feelings and reasons for harming yourself  · Remove any means that you might use to hurt yourself (examples: pills, rope, extension cords, firearm)  · Get professional help from the community (Mental Health, Substance Abuse, psychological counseling)  · Do not be alone:Call your Safe Contact- someone whom you trust who will be there for you.  · Call your local CRISIS HOTLINE 108-2016 or 275-358-4238  · Call your local Children's Mobile Crisis Response Team [place] (521) 704-2209 or www.Lending a Helping Hand  · Call the toll free National Suicide Prevention Hotlines   · National Suicide Prevention Lifeline 330-155-WELJ (3147)  · National Hope Line Network 800-SUICIDE (120-2511)    I acknowledge receipt and understanding of these Home Care Instructions.

## 2018-08-03 ENCOUNTER — APPOINTMENT (OUTPATIENT)
Dept: ADMISSIONS | Facility: MEDICAL CENTER | Age: 63
End: 2018-08-03
Attending: COLON & RECTAL SURGERY
Payer: MEDICARE

## 2018-08-03 RX ORDER — ONDANSETRON 4 MG/1
4 TABLET, ORALLY DISINTEGRATING ORAL EVERY 6 HOURS PRN
COMMUNITY
End: 2022-11-09

## 2018-08-03 RX ORDER — HYDROCODONE BITARTRATE AND ACETAMINOPHEN 10; 325 MG/1; MG/1
0.5 TABLET ORAL EVERY 6 HOURS PRN
COMMUNITY
End: 2021-11-29 | Stop reason: ALTCHOICE

## 2018-08-08 ENCOUNTER — HOSPITAL ENCOUNTER (OUTPATIENT)
Facility: MEDICAL CENTER | Age: 63
End: 2018-08-08
Attending: COLON & RECTAL SURGERY | Admitting: COLON & RECTAL SURGERY
Payer: MEDICARE

## 2018-08-08 VITALS
WEIGHT: 128.75 LBS | SYSTOLIC BLOOD PRESSURE: 139 MMHG | BODY MASS INDEX: 21.98 KG/M2 | HEART RATE: 59 BPM | RESPIRATION RATE: 16 BRPM | TEMPERATURE: 97 F | OXYGEN SATURATION: 98 % | HEIGHT: 64 IN | DIASTOLIC BLOOD PRESSURE: 73 MMHG

## 2018-08-08 PROCEDURE — 700111 HCHG RX REV CODE 636 W/ 250 OVERRIDE (IP)

## 2018-08-08 PROCEDURE — 160036 HCHG PACU - EA ADDL 30 MINS PHASE I: Performed by: COLON & RECTAL SURGERY

## 2018-08-08 PROCEDURE — 160002 HCHG RECOVERY MINUTES (STAT): Performed by: COLON & RECTAL SURGERY

## 2018-08-08 PROCEDURE — 160025 RECOVERY II MINUTES (STATS): Performed by: COLON & RECTAL SURGERY

## 2018-08-08 PROCEDURE — 160035 HCHG PACU - 1ST 60 MINS PHASE I: Performed by: COLON & RECTAL SURGERY

## 2018-08-08 PROCEDURE — 160204 HCHG ENDO MINUTES - 1ST 30 MINS LEVEL 5: Performed by: COLON & RECTAL SURGERY

## 2018-08-08 PROCEDURE — 700101 HCHG RX REV CODE 250

## 2018-08-08 PROCEDURE — 160048 HCHG OR STATISTICAL LEVEL 1-5: Performed by: COLON & RECTAL SURGERY

## 2018-08-08 PROCEDURE — 160209 HCHG ENDO MINUTES - EA ADDL 1 MIN LEVEL 5: Performed by: COLON & RECTAL SURGERY

## 2018-08-08 PROCEDURE — 160009 HCHG ANES TIME/MIN: Performed by: COLON & RECTAL SURGERY

## 2018-08-08 PROCEDURE — 160046 HCHG PACU - 1ST 60 MINS PHASE II: Performed by: COLON & RECTAL SURGERY

## 2018-08-08 RX ORDER — MIDAZOLAM HYDROCHLORIDE 1 MG/ML
INJECTION INTRAMUSCULAR; INTRAVENOUS
Status: DISCONTINUED
Start: 2018-08-08 | End: 2018-08-08 | Stop reason: HOSPADM

## 2018-08-08 RX ORDER — LIDOCAINE HYDROCHLORIDE 10 MG/ML
INJECTION, SOLUTION INFILTRATION; PERINEURAL
Status: COMPLETED
Start: 2018-08-08 | End: 2018-08-08

## 2018-08-08 RX ORDER — SODIUM CHLORIDE, SODIUM LACTATE, POTASSIUM CHLORIDE, CALCIUM CHLORIDE 600; 310; 30; 20 MG/100ML; MG/100ML; MG/100ML; MG/100ML
INJECTION, SOLUTION INTRAVENOUS CONTINUOUS
Status: DISCONTINUED | OUTPATIENT
Start: 2018-08-08 | End: 2018-08-08 | Stop reason: HOSPADM

## 2018-08-08 RX ORDER — LIDOCAINE HYDROCHLORIDE 10 MG/ML
0.5 INJECTION, SOLUTION INFILTRATION; PERINEURAL
Status: DISCONTINUED | OUTPATIENT
Start: 2018-08-08 | End: 2018-08-08 | Stop reason: HOSPADM

## 2018-08-08 RX ORDER — GABAPENTIN 300 MG/1
600 CAPSULE ORAL 2 TIMES DAILY
COMMUNITY

## 2018-08-08 RX ADMIN — SODIUM CHLORIDE, SODIUM LACTATE, POTASSIUM CHLORIDE, CALCIUM CHLORIDE: 600; 310; 30; 20 INJECTION, SOLUTION INTRAVENOUS at 06:29

## 2018-08-08 RX ADMIN — LIDOCAINE HYDROCHLORIDE 0.5 ML: 10 INJECTION, SOLUTION INFILTRATION; PERINEURAL at 06:29

## 2018-08-08 ASSESSMENT — PAIN SCALES - GENERAL
PAINLEVEL_OUTOF10: 3
PAINLEVEL_OUTOF10: 0
PAINLEVEL_OUTOF10: 0

## 2018-08-08 NOTE — OR NURSING
I am unable to contact family or reception desk. The pt was moved to PACU 2 to wait for ride. She has been up with steady gait. Drank fluids, no nausea. No pain.

## 2018-08-08 NOTE — DISCHARGE INSTRUCTIONS
ACTIVITY: Rest and take it easy for the first 24 hours.  A responsible adult is recommended to remain with you during that time.  It is normal to feel sleepy.  We encourage you to not do anything that requires balance, judgment or coordination.    MILD FLU-LIKE SYMPTOMS ARE NORMAL. YOU MAY EXPERIENCE GENERALIZED MUSCLE ACHES, THROAT IRRITATION, HEADACHE AND/OR SOME NAUSEA.    FOR 24 HOURS DO NOT:  Drive, operate machinery or run household appliances.  Drink beer or alcoholic beverages.   Make important decisions or sign legal documents.    SPECIAL INSTRUCTIONS: Follow up in 3 months for bariatric follow up.  Repeat colonoscopy in 5-10 years.    DIET: To avoid nausea, slowly advance diet as tolerated, avoiding spicy or greasy foods for the first day.  Add more substantial food to your diet according to your physician's instructions.  INCREASE FLUIDS AND FIBER TO AVOID CONSTIPATION.    SURGICAL DRESSING/BATHING:   You may shower as normal in 24 hours.     FOLLOW-UP APPOINTMENT:  A follow-up appointment should be arranged with your doctor in 1-2 weeks; call to schedule.    You should CALL YOUR PHYSICIAN if you develop:  Fever greater than 101 degrees F.  Pain not relieved by medication, or persistent nausea or vomiting.  Excessive bleeding (blood soaking through dressing) or unexpected drainage from the wound.  Extreme redness or swelling around the incision site, drainage of pus or foul smelling drainage.  Inability to urinate or empty your bladder within 8 hours.  Problems with breathing or chest pain.    You should call 911 if you develop problems with breathing or chest pain.  If you are unable to contact your doctor or surgical center, you should go to the nearest emergency room or urgent care center.  Physician's telephone #: Dr. Condon [phone]    If any questions arise, call your doctor.  If your doctor is not available, please feel free to call the Surgical Center at [phone].  The Center is open  Monday through Friday from 7AM to 7PM.  You can also call the HEALTH HOTLINE open 24 hours/day, 7 days/week and speak to a nurse at [phone], or toll free at [phone].    A registered nurse may call you a few days after your surgery to see how you are doing after your procedure.    MEDICATIONS: Resume taking daily medication.  Take prescribed pain medication with food.  If no medication is prescribed, you may take non-aspirin pain medication if needed.  PAIN MEDICATION CAN BE VERY CONSTIPATING.  Take a stool softener or laxative such as senokot, pericolace, or milk of magnesia if needed.    If your physician has prescribed pain medication that includes Acetaminophen (Tylenol), do not take additional Acetaminophen (Tylenol) while taking the prescribed medication.    Depression / Suicide Risk    As you are discharged from this [facility] facility, it is important to learn how to keep safe from harming yourself.    Recognize the warning signs:  · Abrupt changes in personality, positive or negative- including increase in energy   · Giving away possessions  · Change in eating patterns- significant weight changes-  positive or negative  · Change in sleeping patterns- unable to sleep or sleeping all the time   · Unwillingness or inability to communicate  · Depression  · Unusual sadness, discouragement and loneliness  · Talk of wanting to die  · Neglect of personal appearance   · Rebelliousness- reckless behavior  · Withdrawal from people/activities they love  · Confusion- inability to concentrate     If you or a loved one observes any of these behaviors or has concerns about self-harm, here's what you can do:  · Talk about it- your feelings and reasons for harming yourself  · Remove any means that you might use to hurt yourself (examples: pills, rope, extension cords, firearm)  · Get professional help from the community (Mental Health, Substance Abuse, psychological counseling)  · Do not be alone:Call your  Safe Contact- someone whom you trust who will be there for you.  · Call your local CRISIS HOTLINE 057-3324 or 922-505-1790  · Call your local Children's Mobile Crisis Response Team [place] (522) 113-7326 or www.[employer]  · Call the toll free National Suicide Prevention Hotlines   · National Suicide Prevention Lifeline 254-686-OVPU (3984)  · National 9Lenses Line Network 800-SUICIDE (493-5926)

## 2018-08-08 NOTE — OP REPORT
NAME:  Nils Alfonso  MRN:  [id number]  :  1955      DATE OF OPERATION: 2018    PREOPERATIVE DIAGNOSIS: Change in bowel habits    POSTOPERATIVE DIAGNOSIS: Normal colonoscopy to cecum    OPERATION PERFORMED: Colonoscopy to cecum    SURGEON: Shukri Condon MD    ANESTHESIA: Blair Frost    SPECIMEN: None    COMPLICATIONS: None    INDICATIONS: The patient is a 63 y.o. female with a history of no colonoscopy in 13 years who has had some change in bowel habits. She is taken to the operating room today for Colonoscopy.    DETAILS OF PROCEDURE: After an extensive informed consent discussion process,   the patient was brought to the operating room. She was placed in a supine position in the endoscopy suite where he was placed in a left lateral decubitus position with continuous pulse oximetry, telemetry, and intermittent blood pressure readings. After administration of anesthesia, a digital rectal exam was performed which demonstrated generally normal tone and no lesions, mild hemorrhoids.    Colonoscopy was introduced. It was slowly advanced. The rectum appeared normal. The sigmoid colon exhibited diverticulosis. Gradually, the colonoscope was advanced beyond both flexures to the cecum. The ileocecal valve and appendiceal orifice were clearly identified and photographed for documentation purposes. The area was irrigated as significant amount of fecal material obscured the view in the cecum and throughout the colon, but mostly we had a fair to good view with multiple flushes and irrigations.     The colonoscope was slowly withdrawn. The vascular pattern appeared normal. No neoplasia or lesions were identified.  In the sigmoid colon, somewhat more prominent diverticulosis was present. Examination of the internal hemorrhoids demonstrated mild-to-moderate internal hemorrhoids. Air was aspirated and the colonoscope was withdrawn.      IMPRESSION/PLAN: Normal colonoscopy to cecum, prep suboptimal.     Recommend next  colonoscopy in 5 years.     The patient tolerated the procedure well and there were no apparent complications.  She was awakened and transferred to the recovery area in satisfactory condition.       ____________________________________   Shukri Condon MD  DD: 8/8/2018  7:52 AM    CC:  [facility];

## 2018-11-02 ENCOUNTER — HOSPITAL ENCOUNTER (OUTPATIENT)
Dept: LAB | Facility: MEDICAL CENTER | Age: 63
End: 2018-11-02
Attending: PHYSICIAN ASSISTANT
Payer: MEDICARE

## 2018-11-02 LAB
25(OH)D3 SERPL-MCNC: 69 NG/ML (ref 30–100)
CHOLEST SERPL-MCNC: 269 MG/DL (ref 100–199)
CHOLEST SERPL-MCNC: 282 MG/DL (ref 100–199)
FASTING STATUS PATIENT QL REPORTED: NORMAL
FASTING STATUS PATIENT QL REPORTED: NORMAL
HCV AB SER QL: NEGATIVE
HDLC SERPL-MCNC: 60 MG/DL
HDLC SERPL-MCNC: 61 MG/DL
IRON SATN MFR SERPL: 20 % (ref 15–55)
IRON SERPL-MCNC: 64 UG/DL (ref 40–170)
LDLC SERPL CALC-MCNC: 181 MG/DL
LDLC SERPL CALC-MCNC: 192 MG/DL
TIBC SERPL-MCNC: 319 UG/DL (ref 250–450)
TRIGL SERPL-MCNC: 142 MG/DL (ref 0–149)
TRIGL SERPL-MCNC: 147 MG/DL (ref 0–149)
VIT B12 SERPL-MCNC: 787 PG/ML (ref 211–911)

## 2018-11-02 PROCEDURE — 36415 COLL VENOUS BLD VENIPUNCTURE: CPT

## 2018-11-02 PROCEDURE — 80061 LIPID PANEL: CPT

## 2018-11-02 PROCEDURE — 84207 ASSAY OF VITAMIN B-6: CPT

## 2018-11-02 PROCEDURE — 86803 HEPATITIS C AB TEST: CPT

## 2018-11-02 PROCEDURE — 82306 VITAMIN D 25 HYDROXY: CPT

## 2018-11-02 PROCEDURE — 84252 ASSAY OF VITAMIN B-2: CPT

## 2018-11-02 PROCEDURE — 84425 ASSAY OF VITAMIN B-1: CPT

## 2018-11-02 PROCEDURE — 80061 LIPID PANEL: CPT | Mod: 91

## 2018-11-02 PROCEDURE — 83550 IRON BINDING TEST: CPT

## 2018-11-02 PROCEDURE — 82607 VITAMIN B-12: CPT

## 2018-11-02 PROCEDURE — 83540 ASSAY OF IRON: CPT

## 2018-11-06 LAB — VIT B2 SERPL-SCNC: 11 NMOL/L (ref 5–50)

## 2018-11-07 LAB — VIT B6 SERPL-MCNC: 32.5 NMOL/L (ref 20–125)

## 2018-11-08 LAB — VIT B1 BLD-MCNC: 93 NMOL/L (ref 70–180)

## 2018-12-17 ENCOUNTER — OFFICE VISIT (OUTPATIENT)
Dept: URGENT CARE | Facility: CLINIC | Age: 63
End: 2018-12-17
Payer: MEDICARE

## 2018-12-17 VITALS
SYSTOLIC BLOOD PRESSURE: 112 MMHG | OXYGEN SATURATION: 99 % | TEMPERATURE: 97.8 F | HEART RATE: 64 BPM | DIASTOLIC BLOOD PRESSURE: 74 MMHG | RESPIRATION RATE: 16 BRPM | HEIGHT: 64 IN | WEIGHT: 133 LBS | BODY MASS INDEX: 22.71 KG/M2

## 2018-12-17 DIAGNOSIS — J06.9 UPPER RESPIRATORY TRACT INFECTION, UNSPECIFIED TYPE: ICD-10-CM

## 2018-12-17 PROCEDURE — 99203 OFFICE O/P NEW LOW 30 MIN: CPT | Performed by: FAMILY MEDICINE

## 2018-12-17 RX ORDER — AZITHROMYCIN 250 MG/1
TABLET, FILM COATED ORAL
Qty: 6 TAB | Refills: 0 | Status: SHIPPED | OUTPATIENT
Start: 2018-12-17 | End: 2021-09-20

## 2018-12-17 ASSESSMENT — ENCOUNTER SYMPTOMS
SHORTNESS OF BREATH: 0
EYES NEGATIVE: 1
MUSCULOSKELETAL NEGATIVE: 1
CARDIOVASCULAR NEGATIVE: 1
NEUROLOGICAL NEGATIVE: 1
STRIDOR: 0
WHEEZING: 0
SINUS PAIN: 0
COUGH: 1
FEVER: 0

## 2018-12-18 NOTE — PROGRESS NOTES
"Subjective:      Nils Alfonso is a 63 y.o. female who presents with Cough            She is here for a 3-day history of congestion, cough without any wheezing or shortness of breath.  No travel history exposure to ill contacts  She denies any sinus surgery history.  Cough is productive.  She is a retired nurse.  She has been using over-the-counter medication for cough          Review of Systems   Constitutional: Negative for fever.   HENT: Positive for congestion. Negative for sinus pain.    Eyes: Negative.    Respiratory: Positive for cough. Negative for shortness of breath, wheezing and stridor.    Cardiovascular: Negative.    Musculoskeletal: Negative.    Skin: Negative.    Neurological: Negative.           Objective:     /74 (BP Location: Left arm, Patient Position: Sitting, BP Cuff Size: Adult)   Pulse 64   Temp 36.6 °C (97.8 °F) (Temporal)   Resp 16   Ht 1.626 m (5' 4\")   Wt 60.3 kg (133 lb)   LMP 01/01/2009   SpO2 99%   BMI 22.83 kg/m²      Physical Exam   Constitutional: She is oriented to person, place, and time. She appears well-developed and well-nourished.  Non-toxic appearance. She does not have a sickly appearance. She does not appear ill. No distress.   HENT:   Head: Normocephalic and atraumatic.   Right Ear: Tympanic membrane, external ear and ear canal normal.   Left Ear: Tympanic membrane, external ear and ear canal normal.   Nose: No rhinorrhea.   Mouth/Throat: Uvula is midline and oropharynx is clear and moist. No trismus in the jaw. No uvula swelling. No oropharyngeal exudate, posterior oropharyngeal edema, posterior oropharyngeal erythema or tonsillar abscesses. No tonsillar exudate.   Right tympanostomy tube in place   Eyes: Conjunctivae are normal.   Neck: Neck supple.   Cardiovascular: Normal rate.  Exam reveals no gallop and no friction rub.    No murmur heard.  Pulmonary/Chest: Effort normal. No stridor. No respiratory distress. She has no wheezes. She has no rales. "   Lymphadenopathy:     She has no cervical adenopathy.   Neurological: She is alert and oriented to person, place, and time.   Skin: Skin is warm. No rash noted. No pallor.               Assessment/Plan:     1. Upper respiratory tract infection, unspecified type  - azithromycin (ZITHROMAX) 250 MG Tab; 500mg on day One, then 250mg daily until finished  Dispense: 6 Tab; Refill: 0    likely viral  We discussed watchful waiting for now and Continue symptomatic care  Plan per orders and instructions  Warning signs reviewed

## 2019-02-19 ENCOUNTER — HOSPITAL ENCOUNTER (OUTPATIENT)
Dept: RADIOLOGY | Facility: MEDICAL CENTER | Age: 64
End: 2019-02-19
Attending: FAMILY MEDICINE
Payer: MEDICARE

## 2019-02-19 DIAGNOSIS — Z12.31 BREAST CANCER SCREENING BY MAMMOGRAM: ICD-10-CM

## 2019-02-19 PROCEDURE — 77063 BREAST TOMOSYNTHESIS BI: CPT

## 2019-02-22 ENCOUNTER — HOSPITAL ENCOUNTER (OUTPATIENT)
Dept: LAB | Facility: MEDICAL CENTER | Age: 64
End: 2019-02-22
Attending: INTERNAL MEDICINE
Payer: MEDICARE

## 2019-02-22 ENCOUNTER — HOSPITAL ENCOUNTER (OUTPATIENT)
Dept: RADIOLOGY | Facility: MEDICAL CENTER | Age: 64
End: 2019-02-22
Attending: INTERNAL MEDICINE
Payer: MEDICARE

## 2019-02-22 DIAGNOSIS — R93.811 ABNORMAL FINDING ON DIAGNOSTIC IMAGING OF RIGHT TESTICLE: ICD-10-CM

## 2019-02-22 DIAGNOSIS — R11.2 NAUSEA AND VOMITING, INTRACTABILITY OF VOMITING NOT SPECIFIED, UNSPECIFIED VOMITING TYPE: ICD-10-CM

## 2019-02-22 DIAGNOSIS — K85.00 IDIOPATHIC ACUTE PANCREATITIS, UNSPECIFIED COMPLICATION STATUS: ICD-10-CM

## 2019-02-22 DIAGNOSIS — R10.13 ABDOMINAL PAIN, EPIGASTRIC: ICD-10-CM

## 2019-02-22 DIAGNOSIS — K20.90 GASTROESOPHAGITIS: ICD-10-CM

## 2019-02-22 DIAGNOSIS — K29.70 GASTROESOPHAGITIS: ICD-10-CM

## 2019-02-22 DIAGNOSIS — Z12.11 SPECIAL SCREENING FOR MALIGNANT NEOPLASMS, COLON: ICD-10-CM

## 2019-02-22 LAB
ALBUMIN SERPL BCP-MCNC: 4.5 G/DL (ref 3.2–4.9)
ALBUMIN/GLOB SERPL: 1.7 G/DL
ALP SERPL-CCNC: 60 U/L (ref 30–99)
ALT SERPL-CCNC: 27 U/L (ref 2–50)
AMYLASE SERPL-CCNC: 51 U/L (ref 20–103)
ANION GAP SERPL CALC-SCNC: 7 MMOL/L (ref 0–11.9)
AST SERPL-CCNC: 29 U/L (ref 12–45)
BASOPHILS # BLD AUTO: 0.7 % (ref 0–1.8)
BASOPHILS # BLD: 0.06 K/UL (ref 0–0.12)
BILIRUB SERPL-MCNC: 0.4 MG/DL (ref 0.1–1.5)
BUN SERPL-MCNC: 12 MG/DL (ref 8–22)
CALCIUM SERPL-MCNC: 10.2 MG/DL (ref 8.5–10.5)
CHLORIDE SERPL-SCNC: 102 MMOL/L (ref 96–112)
CO2 SERPL-SCNC: 30 MMOL/L (ref 20–33)
CREAT SERPL-MCNC: 0.85 MG/DL (ref 0.5–1.4)
EOSINOPHIL # BLD AUTO: 0.04 K/UL (ref 0–0.51)
EOSINOPHIL NFR BLD: 0.5 % (ref 0–6.9)
ERYTHROCYTE [DISTWIDTH] IN BLOOD BY AUTOMATED COUNT: 45.2 FL (ref 35.9–50)
GLOBULIN SER CALC-MCNC: 2.6 G/DL (ref 1.9–3.5)
GLUCOSE SERPL-MCNC: 85 MG/DL (ref 65–99)
HCT VFR BLD AUTO: 44.2 % (ref 37–47)
HGB BLD-MCNC: 14.3 G/DL (ref 12–16)
IMM GRANULOCYTES # BLD AUTO: 0.02 K/UL (ref 0–0.11)
IMM GRANULOCYTES NFR BLD AUTO: 0.2 % (ref 0–0.9)
LIPASE SERPL-CCNC: 24 U/L (ref 11–82)
LYMPHOCYTES # BLD AUTO: 3.5 K/UL (ref 1–4.8)
LYMPHOCYTES NFR BLD: 40.9 % (ref 22–41)
MCH RBC QN AUTO: 31.4 PG (ref 27–33)
MCHC RBC AUTO-ENTMCNC: 32.4 G/DL (ref 33.6–35)
MCV RBC AUTO: 96.9 FL (ref 81.4–97.8)
MONOCYTES # BLD AUTO: 0.48 K/UL (ref 0–0.85)
MONOCYTES NFR BLD AUTO: 5.6 % (ref 0–13.4)
NEUTROPHILS # BLD AUTO: 4.45 K/UL (ref 2–7.15)
NEUTROPHILS NFR BLD: 52.1 % (ref 44–72)
NRBC # BLD AUTO: 0 K/UL
NRBC BLD-RTO: 0 /100 WBC
PLATELET # BLD AUTO: 218 K/UL (ref 164–446)
PMV BLD AUTO: 10 FL (ref 9–12.9)
POTASSIUM SERPL-SCNC: 3.7 MMOL/L (ref 3.6–5.5)
PROT SERPL-MCNC: 7.1 G/DL (ref 6–8.2)
RBC # BLD AUTO: 4.56 M/UL (ref 4.2–5.4)
SODIUM SERPL-SCNC: 139 MMOL/L (ref 135–145)
WBC # BLD AUTO: 8.6 K/UL (ref 4.8–10.8)

## 2019-02-22 PROCEDURE — 80053 COMPREHEN METABOLIC PANEL: CPT

## 2019-02-22 PROCEDURE — 36415 COLL VENOUS BLD VENIPUNCTURE: CPT

## 2019-02-22 PROCEDURE — 82150 ASSAY OF AMYLASE: CPT

## 2019-02-22 PROCEDURE — 700117 HCHG RX CONTRAST REV CODE 255: Performed by: INTERNAL MEDICINE

## 2019-02-22 PROCEDURE — 74170 CT ABD WO CNTRST FLWD CNTRST: CPT

## 2019-02-22 PROCEDURE — 83690 ASSAY OF LIPASE: CPT

## 2019-02-22 PROCEDURE — 85025 COMPLETE CBC W/AUTO DIFF WBC: CPT

## 2019-02-22 RX ADMIN — IOHEXOL 100 ML: 350 INJECTION, SOLUTION INTRAVENOUS at 17:19

## 2019-05-06 ENCOUNTER — APPOINTMENT (RX ONLY)
Dept: URBAN - METROPOLITAN AREA CLINIC 35 | Facility: CLINIC | Age: 64
Setting detail: DERMATOLOGY
End: 2019-05-06

## 2019-08-27 ENCOUNTER — HOSPITAL ENCOUNTER (OUTPATIENT)
Dept: LAB | Facility: MEDICAL CENTER | Age: 64
End: 2019-08-27
Attending: PHYSICIAN ASSISTANT
Payer: MEDICARE

## 2019-08-27 LAB
25(OH)D3 SERPL-MCNC: 64 NG/ML (ref 30–100)
25(OH)D3 SERPL-MCNC: 69 NG/ML (ref 30–100)
ALBUMIN SERPL BCP-MCNC: 4.3 G/DL (ref 3.2–4.9)
ALBUMIN SERPL BCP-MCNC: 4.4 G/DL (ref 3.2–4.9)
ALBUMIN/GLOB SERPL: 1.9 G/DL
ALBUMIN/GLOB SERPL: 1.9 G/DL
ALP SERPL-CCNC: 60 U/L (ref 30–99)
ALP SERPL-CCNC: 63 U/L (ref 30–99)
ALT SERPL-CCNC: 16 U/L (ref 2–50)
ALT SERPL-CCNC: 16 U/L (ref 2–50)
ANION GAP SERPL CALC-SCNC: 4 MMOL/L (ref 0–11.9)
ANION GAP SERPL CALC-SCNC: 6 MMOL/L (ref 0–11.9)
AST SERPL-CCNC: 22 U/L (ref 12–45)
AST SERPL-CCNC: 23 U/L (ref 12–45)
BASOPHILS # BLD AUTO: 1 % (ref 0–1.8)
BASOPHILS # BLD AUTO: 1.1 % (ref 0–1.8)
BASOPHILS # BLD: 0.06 K/UL (ref 0–0.12)
BASOPHILS # BLD: 0.07 K/UL (ref 0–0.12)
BILIRUB SERPL-MCNC: 0.5 MG/DL (ref 0.1–1.5)
BILIRUB SERPL-MCNC: 0.5 MG/DL (ref 0.1–1.5)
BUN SERPL-MCNC: 16 MG/DL (ref 8–22)
BUN SERPL-MCNC: 16 MG/DL (ref 8–22)
CALCIUM SERPL-MCNC: 9.6 MG/DL (ref 8.5–10.5)
CALCIUM SERPL-MCNC: 9.7 MG/DL (ref 8.5–10.5)
CHLORIDE SERPL-SCNC: 105 MMOL/L (ref 96–112)
CHLORIDE SERPL-SCNC: 105 MMOL/L (ref 96–112)
CHOLEST SERPL-MCNC: 166 MG/DL (ref 100–199)
CHOLEST SERPL-MCNC: 167 MG/DL (ref 100–199)
CO2 SERPL-SCNC: 30 MMOL/L (ref 20–33)
CO2 SERPL-SCNC: 32 MMOL/L (ref 20–33)
CREAT SERPL-MCNC: 0.99 MG/DL (ref 0.5–1.4)
CREAT SERPL-MCNC: 1 MG/DL (ref 0.5–1.4)
EOSINOPHIL # BLD AUTO: 0.05 K/UL (ref 0–0.51)
EOSINOPHIL # BLD AUTO: 0.06 K/UL (ref 0–0.51)
EOSINOPHIL NFR BLD: 0.8 % (ref 0–6.9)
EOSINOPHIL NFR BLD: 1 % (ref 0–6.9)
ERYTHROCYTE [DISTWIDTH] IN BLOOD BY AUTOMATED COUNT: 43.8 FL (ref 35.9–50)
ERYTHROCYTE [DISTWIDTH] IN BLOOD BY AUTOMATED COUNT: 44.6 FL (ref 35.9–50)
FASTING STATUS PATIENT QL REPORTED: NORMAL
FASTING STATUS PATIENT QL REPORTED: NORMAL
FOLATE SERPL-MCNC: >22.4 NG/ML
FOLATE SERPL-MCNC: >22.4 NG/ML
GLOBULIN SER CALC-MCNC: 2.3 G/DL (ref 1.9–3.5)
GLOBULIN SER CALC-MCNC: 2.3 G/DL (ref 1.9–3.5)
GLUCOSE SERPL-MCNC: 91 MG/DL (ref 65–99)
GLUCOSE SERPL-MCNC: 92 MG/DL (ref 65–99)
HCT VFR BLD AUTO: 44.7 % (ref 37–47)
HCT VFR BLD AUTO: 45.6 % (ref 37–47)
HDLC SERPL-MCNC: 60 MG/DL
HDLC SERPL-MCNC: 61 MG/DL
HGB BLD-MCNC: 14.3 G/DL (ref 12–16)
HGB BLD-MCNC: 14.3 G/DL (ref 12–16)
IMM GRANULOCYTES # BLD AUTO: 0.01 K/UL (ref 0–0.11)
IMM GRANULOCYTES # BLD AUTO: 0.01 K/UL (ref 0–0.11)
IMM GRANULOCYTES NFR BLD AUTO: 0.2 % (ref 0–0.9)
IMM GRANULOCYTES NFR BLD AUTO: 0.2 % (ref 0–0.9)
IRON SATN MFR SERPL: 19 % (ref 15–55)
IRON SERPL-MCNC: 64 UG/DL (ref 40–170)
LDLC SERPL CALC-MCNC: 84 MG/DL
LDLC SERPL CALC-MCNC: 84 MG/DL
LYMPHOCYTES # BLD AUTO: 2.6 K/UL (ref 1–4.8)
LYMPHOCYTES # BLD AUTO: 2.64 K/UL (ref 1–4.8)
LYMPHOCYTES NFR BLD: 41.4 % (ref 22–41)
LYMPHOCYTES NFR BLD: 42.2 % (ref 22–41)
MCH RBC QN AUTO: 30.6 PG (ref 27–33)
MCH RBC QN AUTO: 31 PG (ref 27–33)
MCHC RBC AUTO-ENTMCNC: 31.4 G/DL (ref 33.6–35)
MCHC RBC AUTO-ENTMCNC: 32 G/DL (ref 33.6–35)
MCV RBC AUTO: 96.8 FL (ref 81.4–97.8)
MCV RBC AUTO: 97.4 FL (ref 81.4–97.8)
MONOCYTES # BLD AUTO: 0.43 K/UL (ref 0–0.85)
MONOCYTES # BLD AUTO: 0.44 K/UL (ref 0–0.85)
MONOCYTES NFR BLD AUTO: 6.8 % (ref 0–13.4)
MONOCYTES NFR BLD AUTO: 7 % (ref 0–13.4)
NEUTROPHILS # BLD AUTO: 3.06 K/UL (ref 2–7.15)
NEUTROPHILS # BLD AUTO: 3.11 K/UL (ref 2–7.15)
NEUTROPHILS NFR BLD: 48.8 % (ref 44–72)
NEUTROPHILS NFR BLD: 49.5 % (ref 44–72)
NRBC # BLD AUTO: 0 K/UL
NRBC # BLD AUTO: 0 K/UL
NRBC BLD-RTO: 0 /100 WBC
NRBC BLD-RTO: 0 /100 WBC
PLATELET # BLD AUTO: 219 K/UL (ref 164–446)
PLATELET # BLD AUTO: 223 K/UL (ref 164–446)
PMV BLD AUTO: 10.1 FL (ref 9–12.9)
PMV BLD AUTO: 10.3 FL (ref 9–12.9)
POTASSIUM SERPL-SCNC: 4.2 MMOL/L (ref 3.6–5.5)
POTASSIUM SERPL-SCNC: 4.2 MMOL/L (ref 3.6–5.5)
PREALB SERPL-MCNC: 23 MG/DL (ref 18–38)
PROT SERPL-MCNC: 6.6 G/DL (ref 6–8.2)
PROT SERPL-MCNC: 6.7 G/DL (ref 6–8.2)
RBC # BLD AUTO: 4.62 M/UL (ref 4.2–5.4)
RBC # BLD AUTO: 4.68 M/UL (ref 4.2–5.4)
SODIUM SERPL-SCNC: 141 MMOL/L (ref 135–145)
SODIUM SERPL-SCNC: 141 MMOL/L (ref 135–145)
TIBC SERPL-MCNC: 339 UG/DL (ref 250–450)
TRANSFERRIN SERPL-MCNC: 242 MG/DL (ref 200–370)
TRIGL SERPL-MCNC: 110 MG/DL (ref 0–149)
TRIGL SERPL-MCNC: 111 MG/DL (ref 0–149)
VIT B12 SERPL-MCNC: 579 PG/ML (ref 211–911)
VIT B12 SERPL-MCNC: 579 PG/ML (ref 211–911)
WBC # BLD AUTO: 6.3 K/UL (ref 4.8–10.8)
WBC # BLD AUTO: 6.3 K/UL (ref 4.8–10.8)

## 2019-08-27 PROCEDURE — 82306 VITAMIN D 25 HYDROXY: CPT | Mod: 91

## 2019-08-27 PROCEDURE — 84425 ASSAY OF VITAMIN B-1: CPT

## 2019-08-27 PROCEDURE — 82746 ASSAY OF FOLIC ACID SERUM: CPT | Mod: 91

## 2019-08-27 PROCEDURE — 82607 VITAMIN B-12: CPT

## 2019-08-27 PROCEDURE — 80061 LIPID PANEL: CPT | Mod: 91

## 2019-08-27 PROCEDURE — 36415 COLL VENOUS BLD VENIPUNCTURE: CPT

## 2019-08-27 PROCEDURE — 80053 COMPREHEN METABOLIC PANEL: CPT

## 2019-08-27 PROCEDURE — 85025 COMPLETE CBC W/AUTO DIFF WBC: CPT | Mod: 91

## 2019-08-27 PROCEDURE — 85025 COMPLETE CBC W/AUTO DIFF WBC: CPT

## 2019-08-27 PROCEDURE — 84252 ASSAY OF VITAMIN B-2: CPT

## 2019-08-27 PROCEDURE — 82306 VITAMIN D 25 HYDROXY: CPT

## 2019-08-27 PROCEDURE — 82746 ASSAY OF FOLIC ACID SERUM: CPT

## 2019-08-27 PROCEDURE — 82607 VITAMIN B-12: CPT | Mod: 91

## 2019-08-27 PROCEDURE — 83550 IRON BINDING TEST: CPT

## 2019-08-27 PROCEDURE — 84134 ASSAY OF PREALBUMIN: CPT

## 2019-08-27 PROCEDURE — 80061 LIPID PANEL: CPT

## 2019-08-27 PROCEDURE — 84466 ASSAY OF TRANSFERRIN: CPT

## 2019-08-27 PROCEDURE — 83540 ASSAY OF IRON: CPT

## 2019-08-27 PROCEDURE — 84207 ASSAY OF VITAMIN B-6: CPT

## 2019-08-27 PROCEDURE — 80053 COMPREHEN METABOLIC PANEL: CPT | Mod: 91

## 2019-08-30 LAB — VIT B6 SERPL-MCNC: 77.9 NMOL/L (ref 20–125)

## 2019-08-31 LAB — VIT B1 BLD-MCNC: 139 NMOL/L (ref 70–180)

## 2019-09-01 LAB — VIT B2 SERPL-SCNC: 12 NMOL/L (ref 5–50)

## 2019-12-11 ENCOUNTER — HOSPITAL ENCOUNTER (OUTPATIENT)
Dept: LAB | Facility: MEDICAL CENTER | Age: 64
End: 2019-12-11
Attending: PHYSICIAN ASSISTANT
Payer: MEDICARE

## 2019-12-11 LAB
ALBUMIN SERPL BCP-MCNC: 4.3 G/DL (ref 3.2–4.9)
ALP SERPL-CCNC: 52 U/L (ref 30–99)
ALT SERPL-CCNC: 12 U/L (ref 2–50)
AST SERPL-CCNC: 20 U/L (ref 12–45)
BILIRUB CONJ SERPL-MCNC: <0.1 MG/DL (ref 0.1–0.5)
BILIRUB INDIRECT SERPL-MCNC: NORMAL MG/DL (ref 0–1)
BILIRUB SERPL-MCNC: 0.4 MG/DL (ref 0.1–1.5)
PROT SERPL-MCNC: 6.4 G/DL (ref 6–8.2)

## 2019-12-11 PROCEDURE — 36415 COLL VENOUS BLD VENIPUNCTURE: CPT

## 2019-12-11 PROCEDURE — 80076 HEPATIC FUNCTION PANEL: CPT

## 2019-12-17 ENCOUNTER — APPOINTMENT (RX ONLY)
Dept: URBAN - METROPOLITAN AREA CLINIC 31 | Facility: CLINIC | Age: 64
Setting detail: DERMATOLOGY
End: 2019-12-17

## 2019-12-17 DIAGNOSIS — Z41.9 ENCOUNTER FOR PROCEDURE FOR PURPOSES OTHER THAN REMEDYING HEALTH STATE, UNSPECIFIED: ICD-10-CM

## 2019-12-17 DIAGNOSIS — D22 MELANOCYTIC NEVI: ICD-10-CM

## 2019-12-17 DIAGNOSIS — L57.0 ACTINIC KERATOSIS: ICD-10-CM

## 2019-12-17 DIAGNOSIS — L81.4 OTHER MELANIN HYPERPIGMENTATION: ICD-10-CM

## 2019-12-17 PROBLEM — D22.39 MELANOCYTIC NEVI OF OTHER PARTS OF FACE: Status: ACTIVE | Noted: 2019-12-17

## 2019-12-17 PROCEDURE — ? LIQUID NITROGEN

## 2019-12-17 PROCEDURE — 17000 DESTRUCT PREMALG LESION: CPT

## 2019-12-17 PROCEDURE — 17003 DESTRUCT PREMALG LES 2-14: CPT

## 2019-12-17 PROCEDURE — ? ADDITIONAL NOTES

## 2019-12-17 PROCEDURE — 99213 OFFICE O/P EST LOW 20 MIN: CPT | Mod: 25

## 2019-12-17 PROCEDURE — ? PRESCRIPTION

## 2019-12-17 PROCEDURE — ? COUNSELING

## 2019-12-17 RX ORDER — FLUOCINOLONE ACETONIDE, HYDROQUINONE, AND TRETINOIN .1; 40; .5 MG/G; MG/G; MG/G
CREAM TOPICAL QHS
Qty: 1 | Refills: 3 | Status: ERX | COMMUNITY
Start: 2019-12-17

## 2019-12-17 RX ADMIN — FLUOCINOLONE ACETONIDE, HYDROQUINONE, AND TRETINOIN: .1; 40; .5 CREAM TOPICAL at 00:00

## 2019-12-17 ASSESSMENT — LOCATION ZONE DERM
LOCATION ZONE: NOSE
LOCATION ZONE: EAR
LOCATION ZONE: FACE

## 2019-12-17 ASSESSMENT — LOCATION SIMPLE DESCRIPTION DERM
LOCATION SIMPLE: LEFT CHEEK
LOCATION SIMPLE: RIGHT NOSE
LOCATION SIMPLE: LEFT TEMPLE
LOCATION SIMPLE: LEFT FOREHEAD
LOCATION SIMPLE: RIGHT EAR
LOCATION SIMPLE: RIGHT CHEEK

## 2019-12-17 ASSESSMENT — LOCATION DETAILED DESCRIPTION DERM
LOCATION DETAILED: RIGHT INFERIOR CENTRAL MALAR CHEEK
LOCATION DETAILED: LEFT CENTRAL MALAR CHEEK
LOCATION DETAILED: RIGHT INFERIOR HELIX
LOCATION DETAILED: RIGHT NASAL SIDEWALL
LOCATION DETAILED: LEFT INFERIOR FOREHEAD
LOCATION DETAILED: LEFT CENTRAL TEMPLE
LOCATION DETAILED: RIGHT CENTRAL BUCCAL CHEEK

## 2019-12-17 NOTE — PROCEDURE: ADDITIONAL NOTES
Detail Level: Simple
Additional Notes: Pt interested in lasers and injectables. Given info for Dotty Noel, to schedule consult.
Additional Notes: Includes lesion of concern noted on intake.\\nRecommend FBE.

## 2020-01-28 ENCOUNTER — APPOINTMENT (RX ONLY)
Dept: URBAN - METROPOLITAN AREA CLINIC 31 | Facility: CLINIC | Age: 65
Setting detail: DERMATOLOGY
End: 2020-01-28

## 2020-01-28 DIAGNOSIS — L57.0 ACTINIC KERATOSIS: ICD-10-CM

## 2020-01-28 DIAGNOSIS — D18.0 HEMANGIOMA: ICD-10-CM

## 2020-01-28 DIAGNOSIS — L81.4 OTHER MELANIN HYPERPIGMENTATION: ICD-10-CM

## 2020-01-28 DIAGNOSIS — D22 MELANOCYTIC NEVI: ICD-10-CM

## 2020-01-28 DIAGNOSIS — R20.2 PARESTHESIA OF SKIN: ICD-10-CM

## 2020-01-28 DIAGNOSIS — L82.1 OTHER SEBORRHEIC KERATOSIS: ICD-10-CM

## 2020-01-28 PROBLEM — D22.71 MELANOCYTIC NEVI OF RIGHT LOWER LIMB, INCLUDING HIP: Status: ACTIVE | Noted: 2020-01-28

## 2020-01-28 PROBLEM — D22.72 MELANOCYTIC NEVI OF LEFT LOWER LIMB, INCLUDING HIP: Status: ACTIVE | Noted: 2020-01-28

## 2020-01-28 PROBLEM — D18.01 HEMANGIOMA OF SKIN AND SUBCUTANEOUS TISSUE: Status: ACTIVE | Noted: 2020-01-28

## 2020-01-28 PROBLEM — D22.62 MELANOCYTIC NEVI OF LEFT UPPER LIMB, INCLUDING SHOULDER: Status: ACTIVE | Noted: 2020-01-28

## 2020-01-28 PROBLEM — D22.61 MELANOCYTIC NEVI OF RIGHT UPPER LIMB, INCLUDING SHOULDER: Status: ACTIVE | Noted: 2020-01-28

## 2020-01-28 PROBLEM — D22.5 MELANOCYTIC NEVI OF TRUNK: Status: ACTIVE | Noted: 2020-01-28

## 2020-01-28 PROCEDURE — 17003 DESTRUCT PREMALG LES 2-14: CPT

## 2020-01-28 PROCEDURE — ? COUNSELING

## 2020-01-28 PROCEDURE — ? LIQUID NITROGEN

## 2020-01-28 PROCEDURE — 99213 OFFICE O/P EST LOW 20 MIN: CPT | Mod: 25

## 2020-01-28 PROCEDURE — 17000 DESTRUCT PREMALG LESION: CPT

## 2020-01-28 PROCEDURE — ? ADDITIONAL NOTES

## 2020-01-28 ASSESSMENT — LOCATION DETAILED DESCRIPTION DERM
LOCATION DETAILED: LEFT ANTERIOR PROXIMAL UPPER ARM
LOCATION DETAILED: RIGHT KNEE
LOCATION DETAILED: LEFT RADIAL DORSAL HAND
LOCATION DETAILED: RIGHT ANTECUBITAL SKIN
LOCATION DETAILED: LEFT KNEE
LOCATION DETAILED: LEFT DISTAL POSTERIOR UPPER ARM
LOCATION DETAILED: LEFT DISTAL LATERAL POSTERIOR THIGH
LOCATION DETAILED: LEFT CENTRAL EYEBROW
LOCATION DETAILED: RIGHT SUPERIOR LATERAL BUCCAL CHEEK
LOCATION DETAILED: RIGHT DISTAL POSTERIOR UPPER ARM
LOCATION DETAILED: LEFT PROXIMAL DORSAL FOREARM
LOCATION DETAILED: RIGHT POPLITEAL SKIN
LOCATION DETAILED: INFERIOR LUMBAR SPINE
LOCATION DETAILED: PERIUMBILICAL SKIN
LOCATION DETAILED: LEFT CENTRAL MALAR CHEEK
LOCATION DETAILED: LEFT INFERIOR MEDIAL MIDBACK
LOCATION DETAILED: RIGHT ANTERIOR DISTAL THIGH
LOCATION DETAILED: LEFT SUPERIOR MEDIAL UPPER BACK
LOCATION DETAILED: RIGHT CENTRAL MALAR CHEEK
LOCATION DETAILED: RIGHT INFERIOR MEDIAL LOWER BACK
LOCATION DETAILED: LEFT ANTECUBITAL SKIN
LOCATION DETAILED: LEFT ELBOW
LOCATION DETAILED: RIGHT ANTERIOR PROXIMAL UPPER ARM
LOCATION DETAILED: RIGHT ULNAR DORSAL HAND
LOCATION DETAILED: LEFT DISTAL POSTERIOR THIGH
LOCATION DETAILED: RIGHT PROXIMAL DORSAL FOREARM
LOCATION DETAILED: RIGHT SUPERIOR MEDIAL MIDBACK
LOCATION DETAILED: UPPER STERNUM
LOCATION DETAILED: LEFT ANTERIOR DISTAL THIGH
LOCATION DETAILED: RIGHT ELBOW
LOCATION DETAILED: RIGHT DISTAL POSTERIOR THIGH
LOCATION DETAILED: LEFT POPLITEAL SKIN
LOCATION DETAILED: RIGHT PROXIMAL RADIAL DORSAL FOREARM

## 2020-01-28 ASSESSMENT — LOCATION SIMPLE DESCRIPTION DERM
LOCATION SIMPLE: RIGHT POPLITEAL SKIN
LOCATION SIMPLE: LEFT KNEE
LOCATION SIMPLE: RIGHT UPPER ARM
LOCATION SIMPLE: ABDOMEN
LOCATION SIMPLE: RIGHT KNEE
LOCATION SIMPLE: LEFT LOWER BACK
LOCATION SIMPLE: LEFT FOREARM
LOCATION SIMPLE: LEFT UPPER ARM
LOCATION SIMPLE: RIGHT HAND
LOCATION SIMPLE: LEFT POPLITEAL SKIN
LOCATION SIMPLE: LEFT EYEBROW
LOCATION SIMPLE: LEFT CHEEK
LOCATION SIMPLE: LEFT THIGH
LOCATION SIMPLE: LEFT POSTERIOR THIGH
LOCATION SIMPLE: CHEST
LOCATION SIMPLE: LEFT ELBOW
LOCATION SIMPLE: RIGHT ELBOW
LOCATION SIMPLE: RIGHT LOWER BACK
LOCATION SIMPLE: LEFT UPPER BACK
LOCATION SIMPLE: RIGHT POSTERIOR UPPER ARM
LOCATION SIMPLE: LEFT HAND
LOCATION SIMPLE: LOWER BACK
LOCATION SIMPLE: RIGHT CHEEK
LOCATION SIMPLE: LEFT POSTERIOR UPPER ARM
LOCATION SIMPLE: RIGHT THIGH
LOCATION SIMPLE: RIGHT POSTERIOR THIGH
LOCATION SIMPLE: RIGHT FOREARM

## 2020-01-28 ASSESSMENT — LOCATION ZONE DERM
LOCATION ZONE: HAND
LOCATION ZONE: FACE
LOCATION ZONE: LEG
LOCATION ZONE: TRUNK
LOCATION ZONE: ARM

## 2020-01-28 NOTE — PROCEDURE: ADDITIONAL NOTES
Detail Level: Simple
Additional Notes: Pt reassured no skin change on exam. 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 Emollient moistuizers to avoid dry skin.\\nF/u in event of worsening sx or any arising change in skin appearance, lesion, etc.

## 2020-02-21 ENCOUNTER — HOSPITAL ENCOUNTER (OUTPATIENT)
Dept: RADIOLOGY | Facility: MEDICAL CENTER | Age: 65
End: 2020-02-21
Attending: FAMILY MEDICINE
Payer: MEDICARE

## 2020-02-21 DIAGNOSIS — Z12.31 VISIT FOR SCREENING MAMMOGRAM: ICD-10-CM

## 2020-02-21 PROCEDURE — 77067 SCR MAMMO BI INCL CAD: CPT

## 2020-08-21 ENCOUNTER — HOSPITAL ENCOUNTER (OUTPATIENT)
Dept: LAB | Facility: MEDICAL CENTER | Age: 65
End: 2020-08-21
Attending: PHYSICIAN ASSISTANT
Payer: MEDICARE

## 2020-08-21 LAB
25(OH)D3 SERPL-MCNC: 139 NG/ML (ref 30–100)
ALBUMIN SERPL BCP-MCNC: 4.2 G/DL (ref 3.2–4.9)
ALBUMIN/GLOB SERPL: 2.1 G/DL
ALP SERPL-CCNC: 76 U/L (ref 30–99)
ALT SERPL-CCNC: 31 U/L (ref 2–50)
ANION GAP SERPL CALC-SCNC: 14 MMOL/L (ref 7–16)
AST SERPL-CCNC: 92 U/L (ref 12–45)
BILIRUB SERPL-MCNC: 0.3 MG/DL (ref 0.1–1.5)
BUN SERPL-MCNC: 14 MG/DL (ref 8–22)
CALCIUM SERPL-MCNC: 9.7 MG/DL (ref 8.5–10.5)
CHLORIDE SERPL-SCNC: 103 MMOL/L (ref 96–112)
CHOLEST SERPL-MCNC: 154 MG/DL (ref 100–199)
CO2 SERPL-SCNC: 26 MMOL/L (ref 20–33)
CREAT SERPL-MCNC: 0.94 MG/DL (ref 0.5–1.4)
FASTING STATUS PATIENT QL REPORTED: NORMAL
GLOBULIN SER CALC-MCNC: 2 G/DL (ref 1.9–3.5)
GLUCOSE SERPL-MCNC: 80 MG/DL (ref 65–99)
HDLC SERPL-MCNC: 75 MG/DL
LDLC SERPL CALC-MCNC: 60 MG/DL
POTASSIUM SERPL-SCNC: 4.1 MMOL/L (ref 3.6–5.5)
PROT SERPL-MCNC: 6.2 G/DL (ref 6–8.2)
SARS-COV-2 AB SERPL QL IA: NORMAL
SODIUM SERPL-SCNC: 143 MMOL/L (ref 135–145)
TRIGL SERPL-MCNC: 97 MG/DL (ref 0–149)
TSH SERPL DL<=0.005 MIU/L-ACNC: 6.19 UIU/ML (ref 0.38–5.33)

## 2020-08-21 PROCEDURE — 80061 LIPID PANEL: CPT

## 2020-08-21 PROCEDURE — 82306 VITAMIN D 25 HYDROXY: CPT

## 2020-08-21 PROCEDURE — 86769 SARS-COV-2 COVID-19 ANTIBODY: CPT

## 2020-08-21 PROCEDURE — 80053 COMPREHEN METABOLIC PANEL: CPT

## 2020-08-21 PROCEDURE — 84443 ASSAY THYROID STIM HORMONE: CPT

## 2020-08-21 PROCEDURE — 36415 COLL VENOUS BLD VENIPUNCTURE: CPT

## 2020-10-07 ENCOUNTER — HOSPITAL ENCOUNTER (OUTPATIENT)
Dept: LAB | Facility: MEDICAL CENTER | Age: 65
End: 2020-10-07
Attending: PHYSICIAN ASSISTANT
Payer: MEDICARE

## 2020-10-07 PROCEDURE — 80076 HEPATIC FUNCTION PANEL: CPT

## 2020-10-07 PROCEDURE — 83690 ASSAY OF LIPASE: CPT

## 2020-10-07 PROCEDURE — 82150 ASSAY OF AMYLASE: CPT

## 2020-10-07 PROCEDURE — 84443 ASSAY THYROID STIM HORMONE: CPT

## 2020-10-07 PROCEDURE — 84439 ASSAY OF FREE THYROXINE: CPT

## 2020-10-07 PROCEDURE — 36415 COLL VENOUS BLD VENIPUNCTURE: CPT

## 2020-10-08 LAB
ALBUMIN SERPL BCP-MCNC: 4.3 G/DL (ref 3.2–4.9)
ALP SERPL-CCNC: 62 U/L (ref 30–99)
ALT SERPL-CCNC: 12 U/L (ref 2–50)
AMYLASE SERPL-CCNC: 75 U/L (ref 20–103)
AST SERPL-CCNC: 19 U/L (ref 12–45)
BILIRUB CONJ SERPL-MCNC: <0.2 MG/DL (ref 0.1–0.5)
BILIRUB INDIRECT SERPL-MCNC: NORMAL MG/DL (ref 0–1)
BILIRUB SERPL-MCNC: 0.4 MG/DL (ref 0.1–1.5)
LIPASE SERPL-CCNC: 32 U/L (ref 11–82)
PROT SERPL-MCNC: 6.5 G/DL (ref 6–8.2)
T4 FREE SERPL-MCNC: 1.15 NG/DL (ref 0.93–1.7)
TSH SERPL DL<=0.005 MIU/L-ACNC: 3.22 UIU/ML (ref 0.38–5.33)

## 2021-01-08 ENCOUNTER — HOSPITAL ENCOUNTER (OUTPATIENT)
Dept: LAB | Facility: MEDICAL CENTER | Age: 66
End: 2021-01-08
Attending: PHYSICIAN ASSISTANT
Payer: MEDICARE

## 2021-01-08 PROCEDURE — 36415 COLL VENOUS BLD VENIPUNCTURE: CPT

## 2021-01-08 PROCEDURE — 82306 VITAMIN D 25 HYDROXY: CPT

## 2021-01-09 LAB — 25(OH)D3 SERPL-MCNC: 123 NG/ML (ref 30–100)

## 2021-02-01 ENCOUNTER — APPOINTMENT (RX ONLY)
Dept: URBAN - METROPOLITAN AREA CLINIC 31 | Facility: CLINIC | Age: 66
Setting detail: DERMATOLOGY
End: 2021-02-01

## 2021-02-01 DIAGNOSIS — D22 MELANOCYTIC NEVI: ICD-10-CM

## 2021-02-01 DIAGNOSIS — Z71.89 OTHER SPECIFIED COUNSELING: ICD-10-CM

## 2021-02-01 DIAGNOSIS — L82.1 OTHER SEBORRHEIC KERATOSIS: ICD-10-CM

## 2021-02-01 DIAGNOSIS — L81.4 OTHER MELANIN HYPERPIGMENTATION: ICD-10-CM

## 2021-02-01 DIAGNOSIS — L57.0 ACTINIC KERATOSIS: ICD-10-CM

## 2021-02-01 PROBLEM — D22.39 MELANOCYTIC NEVI OF OTHER PARTS OF FACE: Status: ACTIVE | Noted: 2021-02-01

## 2021-02-01 PROCEDURE — 17000 DESTRUCT PREMALG LESION: CPT

## 2021-02-01 PROCEDURE — ? COUNSELING

## 2021-02-01 PROCEDURE — ? LIQUID NITROGEN

## 2021-02-01 PROCEDURE — 99213 OFFICE O/P EST LOW 20 MIN: CPT | Mod: 25

## 2021-02-01 PROCEDURE — 17003 DESTRUCT PREMALG LES 2-14: CPT

## 2021-02-01 ASSESSMENT — LOCATION DETAILED DESCRIPTION DERM
LOCATION DETAILED: LEFT CENTRAL MALAR CHEEK
LOCATION DETAILED: RIGHT INFERIOR CENTRAL MALAR CHEEK
LOCATION DETAILED: RIGHT CENTRAL MALAR CHEEK
LOCATION DETAILED: LEFT INFERIOR CENTRAL MALAR CHEEK
LOCATION DETAILED: MID-FRONTAL SCALP
LOCATION DETAILED: LEFT MEDIAL FRONTAL SCALP

## 2021-02-01 ASSESSMENT — LOCATION SIMPLE DESCRIPTION DERM
LOCATION SIMPLE: RIGHT CHEEK
LOCATION SIMPLE: LEFT CHEEK
LOCATION SIMPLE: ANTERIOR SCALP
LOCATION SIMPLE: LEFT SCALP

## 2021-02-01 ASSESSMENT — LOCATION ZONE DERM
LOCATION ZONE: SCALP
LOCATION ZONE: FACE

## 2021-03-01 ENCOUNTER — APPOINTMENT (OUTPATIENT)
Dept: RADIOLOGY | Facility: MEDICAL CENTER | Age: 66
End: 2021-03-01
Attending: FAMILY MEDICINE
Payer: MEDICARE

## 2021-03-03 ENCOUNTER — HOSPITAL ENCOUNTER (OUTPATIENT)
Dept: RADIOLOGY | Facility: MEDICAL CENTER | Age: 66
End: 2021-03-03
Attending: FAMILY MEDICINE
Payer: MEDICARE

## 2021-03-03 DIAGNOSIS — Z12.31 VISIT FOR SCREENING MAMMOGRAM: ICD-10-CM

## 2021-03-03 PROCEDURE — 77063 BREAST TOMOSYNTHESIS BI: CPT

## 2021-03-08 ENCOUNTER — APPOINTMENT (RX ONLY)
Dept: URBAN - METROPOLITAN AREA CLINIC 20 | Facility: CLINIC | Age: 66
Setting detail: DERMATOLOGY
End: 2021-03-08

## 2021-03-08 DIAGNOSIS — Z41.9 ENCOUNTER FOR PROCEDURE FOR PURPOSES OTHER THAN REMEDYING HEALTH STATE, UNSPECIFIED: ICD-10-CM

## 2021-03-08 PROCEDURE — ? COSMETIC CONSULTATION: FRACTIONAL RESURFACING

## 2021-03-08 PROCEDURE — ? ADDITIONAL NOTES

## 2021-03-08 ASSESSMENT — LOCATION SIMPLE DESCRIPTION DERM
LOCATION SIMPLE: LEFT CHEEK
LOCATION SIMPLE: RIGHT CHEEK
LOCATION SIMPLE: RIGHT FOREHEAD

## 2021-03-08 ASSESSMENT — LOCATION ZONE DERM: LOCATION ZONE: FACE

## 2021-03-08 ASSESSMENT — LOCATION DETAILED DESCRIPTION DERM
LOCATION DETAILED: RIGHT INFERIOR CENTRAL MALAR CHEEK
LOCATION DETAILED: RIGHT MEDIAL FOREHEAD
LOCATION DETAILED: LEFT INFERIOR CENTRAL MALAR CHEEK

## 2021-03-08 NOTE — HPI: COSMETIC CONSULTATION
When Outside In The Sun, Do You...: mostly burns, rarely tans
Additional History: Px has done LED light therapy previously. Px lost 90 lbs and has skin laxity due to weight loss.

## 2021-03-08 NOTE — PROCEDURE: ADDITIONAL NOTES
Render Risk Assessment In Note?: no
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 Recommended Nectar, ultra nourishing moisturizer and hydratint. Px looking to lesson deep lines on face (only) using Fraxel 1550 and understands many tx will be necessary to achieve desired results. No history of cold sores. Treating every 4 weeks.
Detail Level: Zone

## 2021-04-08 ENCOUNTER — APPOINTMENT (RX ONLY)
Dept: URBAN - METROPOLITAN AREA CLINIC 35 | Facility: CLINIC | Age: 66
Setting detail: DERMATOLOGY
End: 2021-04-08

## 2021-04-08 DIAGNOSIS — Z41.9 ENCOUNTER FOR PROCEDURE FOR PURPOSES OTHER THAN REMEDYING HEALTH STATE, UNSPECIFIED: ICD-10-CM

## 2021-04-08 PROCEDURE — ? ADDITIONAL NOTES

## 2021-04-08 PROCEDURE — ? FRAXEL

## 2021-04-08 ASSESSMENT — LOCATION DETAILED DESCRIPTION DERM
LOCATION DETAILED: LEFT INFERIOR CENTRAL MALAR CHEEK
LOCATION DETAILED: NASAL DORSUM
LOCATION DETAILED: RIGHT INFERIOR CENTRAL MALAR CHEEK
LOCATION DETAILED: LEFT CENTRAL MALAR CHEEK
LOCATION DETAILED: LEFT LATERAL EYEBROW
LOCATION DETAILED: LEFT LOWER CUTANEOUS LIP
LOCATION DETAILED: INFERIOR MID FOREHEAD

## 2021-04-08 ASSESSMENT — LOCATION SIMPLE DESCRIPTION DERM
LOCATION SIMPLE: INFERIOR FOREHEAD
LOCATION SIMPLE: LEFT LIP
LOCATION SIMPLE: LEFT CHEEK
LOCATION SIMPLE: RIGHT CHEEK
LOCATION SIMPLE: NOSE
LOCATION SIMPLE: LEFT EYEBROW

## 2021-04-08 ASSESSMENT — LOCATION ZONE DERM
LOCATION ZONE: LIP
LOCATION ZONE: FACE
LOCATION ZONE: NOSE

## 2021-04-08 NOTE — PROCEDURE: ADDITIONAL NOTES
Render Risk Assessment In Note?: no
Detail Level: Zone
Additional Notes: Px handled tx very well with mild pain. Px has moderate pigment, but opted for the deeper tx for more skin laxity and wrinkles. Px purchased Alastin SPF and was instructed on home care procedure. Px will return in 4 weeks for 2nd tx.

## 2021-04-08 NOTE — HPI: COSMETIC (LASER RESURFACING)
Eye Color: green
Have You Had Laser Resurfacing Before?: has not had previous treatments
When Outside In The Sun, Do You...: always burns, tans with difficulty

## 2021-04-08 NOTE — PROCEDURE: FRAXEL
Energy(Mj/Cm2): 15
Treatment Level: 1
Location: dorsal hands
Location: full face except eyelids
Depth In Microns (Use Numbers Only, No Special Characters Or $): 201
Topical Anesthesia Type: BLT cream (benzocaine 20%, lidocaine 6%, tetracaine 4%)
Total Coverage: 30%
External Cooling: [place] Cryo 5
Treatment Level: 3
Number Of Passes: 8
Energy(Mj/Cm2): 70
Treatment Level: 5
Location: neck
Length Of Topical Anesthesia Application (Optional): 60 minutes
Number Of Passes: 4
Detail Level: Zone
Consent: Written consent obtained, risks reviewed including but not limited to pain and incomplete improvement.
Price (Use Numbers Only, No Special Characters Or $): 175
Depth In Microns (Use Numbers Only, No Special Characters Or $): 6342
Add Post-Care Below To The Note: No
Location: decolletage of the chest
Wavelength: 1550nm
Total Coverage: 14%
Indication: resurfacing
Post-Care Instructions: I reviewed with the patient in detail post-care instructions. Patient should avoid sun until area fully healed. Applied hydra mist, Elta moisturizer and Elta tinted spf.

## 2021-05-12 ENCOUNTER — APPOINTMENT (RX ONLY)
Dept: URBAN - METROPOLITAN AREA CLINIC 35 | Facility: CLINIC | Age: 66
Setting detail: DERMATOLOGY
End: 2021-05-12

## 2021-05-12 DIAGNOSIS — Z41.9 ENCOUNTER FOR PROCEDURE FOR PURPOSES OTHER THAN REMEDYING HEALTH STATE, UNSPECIFIED: ICD-10-CM

## 2021-05-12 PROCEDURE — ? FRAXEL

## 2021-05-12 PROCEDURE — ? ADDITIONAL NOTES

## 2021-05-12 ASSESSMENT — LOCATION SIMPLE DESCRIPTION DERM
LOCATION SIMPLE: NOSE
LOCATION SIMPLE: RIGHT CHEEK
LOCATION SIMPLE: INFERIOR FOREHEAD
LOCATION SIMPLE: LEFT LIP
LOCATION SIMPLE: LEFT EYEBROW
LOCATION SIMPLE: LEFT CHEEK

## 2021-05-12 ASSESSMENT — LOCATION ZONE DERM
LOCATION ZONE: NOSE
LOCATION ZONE: FACE
LOCATION ZONE: LIP

## 2021-05-12 ASSESSMENT — LOCATION DETAILED DESCRIPTION DERM
LOCATION DETAILED: INFERIOR MID FOREHEAD
LOCATION DETAILED: LEFT CENTRAL MALAR CHEEK
LOCATION DETAILED: LEFT LOWER CUTANEOUS LIP
LOCATION DETAILED: NASAL DORSUM
LOCATION DETAILED: LEFT LATERAL EYEBROW
LOCATION DETAILED: RIGHT INFERIOR CENTRAL MALAR CHEEK
LOCATION DETAILED: LEFT INFERIOR CENTRAL MALAR CHEEK

## 2021-05-12 NOTE — PROCEDURE: FRAXEL
Energy(Mj/Cm2): 15
Treatment Level: 1
Location: dorsal hands
Location: full face except eyelids
Treatment Number: 2
Depth In Microns (Use Numbers Only, No Special Characters Or $): 051
Topical Anesthesia Type: 23% lidocaine, 7% tetracaine
Total Coverage: 30%
External Cooling: [place] Cryo 5
Treatment Level: 3
Number Of Passes: 8
Energy(Mj/Cm2): 70
External Cooling Fan Speed: 4
Treatment Level: 6
Location: neck
Length Of Topical Anesthesia Application (Optional): 60 minutes
Detail Level: Zone
Consent: Written consent obtained, risks reviewed including but not limited to pain and incomplete improvement.
Price (Use Numbers Only, No Special Characters Or $): 099
Depth In Microns (Use Numbers Only, No Special Characters Or $): 3510
Add Post-Care Below To The Note: No
Location: decolletage of the chest
Wavelength: 1550nm
Total Coverage: 17%
Indication: resurfacing
Post-Care Instructions: I reviewed with the patient in detail post-care instructions. Patient should avoid sun until area fully healed. Applied hydra mist, Elta moisturizer and Elta tinted spf.

## 2021-05-12 NOTE — PROCEDURE: ADDITIONAL NOTES
Render Risk Assessment In Note?: no
Detail Level: Zone
Additional Notes: Px handled tx very well with mild/moderate pain level. Px saw improvement in pigment, but we still deeper tx for more tightening and textural issues. Px was instructed on post tx care and is compliant. Px will return in 5 weeks for 3rd tx. Px had mild erythema when she left with ice packs. Px is extremely happy with results of the first tx.

## 2021-05-28 DIAGNOSIS — Z00.6 RESEARCH STUDY PATIENT: ICD-10-CM

## 2021-06-01 ENCOUNTER — HOSPITAL ENCOUNTER (OUTPATIENT)
Facility: MEDICAL CENTER | Age: 66
End: 2021-06-01
Attending: PATHOLOGY
Payer: COMMERCIAL

## 2021-06-01 DIAGNOSIS — Z00.6 RESEARCH STUDY PATIENT: ICD-10-CM

## 2021-06-10 ENCOUNTER — HOSPITAL ENCOUNTER (OUTPATIENT)
Dept: LAB | Facility: MEDICAL CENTER | Age: 66
End: 2021-06-10
Attending: PHYSICIAN ASSISTANT
Payer: MEDICARE

## 2021-06-10 LAB
CHOLEST SERPL-MCNC: 282 MG/DL (ref 100–199)
ELF SCORE: 9.2
FASTING STATUS PATIENT QL REPORTED: NORMAL
HDLC SERPL-MCNC: 69 MG/DL
LDLC SERPL CALC-MCNC: 179 MG/DL
RELATIVE RISK: NORMAL
RISK GROUP: NORMAL
RISK: 3.3 %
TRIGL SERPL-MCNC: 168 MG/DL (ref 0–149)

## 2021-06-10 PROCEDURE — 80061 LIPID PANEL: CPT

## 2021-06-10 PROCEDURE — 36415 COLL VENOUS BLD VENIPUNCTURE: CPT

## 2021-06-16 ENCOUNTER — APPOINTMENT (RX ONLY)
Dept: URBAN - METROPOLITAN AREA CLINIC 20 | Facility: CLINIC | Age: 66
Setting detail: DERMATOLOGY
End: 2021-06-16

## 2021-06-16 DIAGNOSIS — Z41.9 ENCOUNTER FOR PROCEDURE FOR PURPOSES OTHER THAN REMEDYING HEALTH STATE, UNSPECIFIED: ICD-10-CM

## 2021-06-16 PROCEDURE — ? FRAXEL

## 2021-06-16 ASSESSMENT — LOCATION SIMPLE DESCRIPTION DERM
LOCATION SIMPLE: LEFT CHEEK
LOCATION SIMPLE: RIGHT CHEEK
LOCATION SIMPLE: LEFT FOREHEAD
LOCATION SIMPLE: CHIN

## 2021-06-16 ASSESSMENT — LOCATION DETAILED DESCRIPTION DERM
LOCATION DETAILED: RIGHT INFERIOR CENTRAL MALAR CHEEK
LOCATION DETAILED: LEFT CHIN
LOCATION DETAILED: LEFT INFERIOR MEDIAL FOREHEAD
LOCATION DETAILED: LEFT INFERIOR CENTRAL MALAR CHEEK

## 2021-06-16 ASSESSMENT — LOCATION ZONE DERM: LOCATION ZONE: FACE

## 2021-06-16 NOTE — PROCEDURE: FRAXEL
Total Coverage: 17%
Energy(Mj/Cm2): 1
Consent: Written consent obtained, risks reviewed including but not limited to pain and incomplete improvement.
Wavelength: 1550nm
Topical Anesthesia Type: 23% lidocaine, 7% tetracaine
Location: decolletage of the chest
Add Post-Care Below To The Note: No
Total Coverage: 25%
Energy(Mj/Cm2): 20
Location: full face except eyelids
Length Of Topical Anesthesia Application (Optional): 60 minutes
Treatment Level: 4
Post-Care Instructions: I reviewed with the patient in detail post-care instructions. Patient should avoid sun until area fully healed.
Location: neck
Detail Level: Simple
Indication: resurfacing
Energy(Mj/Cm2): 15
Energy(Mj/Cm2): 50
Total Coverage: 35%
Treatment Level: 6
External Cooling Fan Speed: 5
Treatment Number: 3
Depth In Microns (Use Numbers Only, No Special Characters Or $): 9259
Price (Use Numbers Only, No Special Characters Or $): 390

## 2021-06-28 ENCOUNTER — HOSPITAL ENCOUNTER (OUTPATIENT)
Dept: LAB | Facility: MEDICAL CENTER | Age: 66
End: 2021-06-28
Attending: OBSTETRICS & GYNECOLOGY
Payer: MEDICARE

## 2021-06-28 LAB
ALBUMIN SERPL BCP-MCNC: 4.1 G/DL (ref 3.2–4.9)
ALBUMIN/GLOB SERPL: 1.8 G/DL
ALP SERPL-CCNC: 59 U/L (ref 30–99)
ALT SERPL-CCNC: 16 U/L (ref 2–50)
ANION GAP SERPL CALC-SCNC: 10 MMOL/L (ref 7–16)
AST SERPL-CCNC: 21 U/L (ref 12–45)
BILIRUB SERPL-MCNC: 0.3 MG/DL (ref 0.1–1.5)
BUN SERPL-MCNC: 13 MG/DL (ref 8–22)
CALCIUM SERPL-MCNC: 9.2 MG/DL (ref 8.5–10.5)
CHLORIDE SERPL-SCNC: 104 MMOL/L (ref 96–112)
CO2 SERPL-SCNC: 27 MMOL/L (ref 20–33)
CREAT SERPL-MCNC: 0.89 MG/DL (ref 0.5–1.4)
GLOBULIN SER CALC-MCNC: 2.3 G/DL (ref 1.9–3.5)
GLUCOSE SERPL-MCNC: 86 MG/DL (ref 65–99)
POTASSIUM SERPL-SCNC: 3.9 MMOL/L (ref 3.6–5.5)
PROT SERPL-MCNC: 6.4 G/DL (ref 6–8.2)
SODIUM SERPL-SCNC: 141 MMOL/L (ref 135–145)

## 2021-06-28 PROCEDURE — 36415 COLL VENOUS BLD VENIPUNCTURE: CPT

## 2021-06-28 PROCEDURE — 80053 COMPREHEN METABOLIC PANEL: CPT

## 2021-07-26 ENCOUNTER — HOSPITAL ENCOUNTER (OUTPATIENT)
Dept: LAB | Facility: MEDICAL CENTER | Age: 66
End: 2021-07-26
Attending: PHYSICIAN ASSISTANT
Payer: MEDICARE

## 2021-07-26 ENCOUNTER — PATIENT OUTREACH (OUTPATIENT)
Dept: HEALTH INFORMATION MANAGEMENT | Facility: OTHER | Age: 66
End: 2021-07-26

## 2021-07-26 LAB
ALBUMIN SERPL BCP-MCNC: 4.1 G/DL (ref 3.2–4.9)
ALBUMIN/GLOB SERPL: 1.7 G/DL
ALP SERPL-CCNC: 44 U/L (ref 30–99)
ALT SERPL-CCNC: 6 U/L (ref 2–50)
AMYLASE SERPL-CCNC: 55 U/L (ref 20–103)
ANION GAP SERPL CALC-SCNC: 12 MMOL/L (ref 7–16)
AST SERPL-CCNC: 16 U/L (ref 12–45)
BASOPHILS # BLD AUTO: 0.5 % (ref 0–1.8)
BASOPHILS # BLD: 0.05 K/UL (ref 0–0.12)
BILIRUB SERPL-MCNC: 0.4 MG/DL (ref 0.1–1.5)
BUN SERPL-MCNC: 16 MG/DL (ref 8–22)
CALCIUM SERPL-MCNC: 9 MG/DL (ref 8.5–10.5)
CHLORIDE SERPL-SCNC: 102 MMOL/L (ref 96–112)
CO2 SERPL-SCNC: 25 MMOL/L (ref 20–33)
CREAT SERPL-MCNC: 0.97 MG/DL (ref 0.5–1.4)
EOSINOPHIL # BLD AUTO: 0.04 K/UL (ref 0–0.51)
EOSINOPHIL NFR BLD: 0.4 % (ref 0–6.9)
ERYTHROCYTE [DISTWIDTH] IN BLOOD BY AUTOMATED COUNT: 43.2 FL (ref 35.9–50)
GLOBULIN SER CALC-MCNC: 2.4 G/DL (ref 1.9–3.5)
GLUCOSE SERPL-MCNC: 86 MG/DL (ref 65–99)
HCT VFR BLD AUTO: 41 % (ref 37–47)
HGB BLD-MCNC: 13.4 G/DL (ref 12–16)
IMM GRANULOCYTES # BLD AUTO: 0.02 K/UL (ref 0–0.11)
IMM GRANULOCYTES NFR BLD AUTO: 0.2 % (ref 0–0.9)
LIPASE SERPL-CCNC: 45 U/L (ref 11–82)
LYMPHOCYTES # BLD AUTO: 1.88 K/UL (ref 1–4.8)
LYMPHOCYTES NFR BLD: 18.7 % (ref 22–41)
MCH RBC QN AUTO: 30.9 PG (ref 27–33)
MCHC RBC AUTO-ENTMCNC: 32.7 G/DL (ref 33.6–35)
MCV RBC AUTO: 94.5 FL (ref 81.4–97.8)
MONOCYTES # BLD AUTO: 0.79 K/UL (ref 0–0.85)
MONOCYTES NFR BLD AUTO: 7.9 % (ref 0–13.4)
NEUTROPHILS # BLD AUTO: 7.25 K/UL (ref 2–7.15)
NEUTROPHILS NFR BLD: 72.3 % (ref 44–72)
NRBC # BLD AUTO: 0 K/UL
NRBC BLD-RTO: 0 /100 WBC
PLATELET # BLD AUTO: 229 K/UL (ref 164–446)
PMV BLD AUTO: 10.6 FL (ref 9–12.9)
POTASSIUM SERPL-SCNC: 3.9 MMOL/L (ref 3.6–5.5)
PROT SERPL-MCNC: 6.5 G/DL (ref 6–8.2)
RBC # BLD AUTO: 4.34 M/UL (ref 4.2–5.4)
SODIUM SERPL-SCNC: 139 MMOL/L (ref 135–145)
WBC # BLD AUTO: 10 K/UL (ref 4.8–10.8)

## 2021-07-26 PROCEDURE — 36415 COLL VENOUS BLD VENIPUNCTURE: CPT

## 2021-07-26 PROCEDURE — 85025 COMPLETE CBC W/AUTO DIFF WBC: CPT

## 2021-07-26 PROCEDURE — 82150 ASSAY OF AMYLASE: CPT

## 2021-07-26 PROCEDURE — 80053 COMPREHEN METABOLIC PANEL: CPT

## 2021-07-26 PROCEDURE — 83690 ASSAY OF LIPASE: CPT

## 2021-07-26 NOTE — NON-PROVIDER
Outcome: Left Message    Please transfer to Patient Outreach Team at [phone] when patient returns call.    [facility] Verified: yes    Attempt # 1

## 2021-09-20 ENCOUNTER — PRE-ADMISSION TESTING (OUTPATIENT)
Dept: ADMISSIONS | Facility: MEDICAL CENTER | Age: 66
End: 2021-09-20
Attending: INTERNAL MEDICINE
Payer: MEDICARE

## 2021-09-20 DIAGNOSIS — Z01.810 PRE-OPERATIVE CARDIOVASCULAR EXAMINATION: ICD-10-CM

## 2021-09-20 DIAGNOSIS — Z01.812 PRE-OPERATIVE LABORATORY EXAMINATION: ICD-10-CM

## 2021-09-20 LAB
ANION GAP SERPL CALC-SCNC: 12 MMOL/L (ref 7–16)
BUN SERPL-MCNC: 13 MG/DL (ref 8–22)
CALCIUM SERPL-MCNC: 9.5 MG/DL (ref 8.4–10.2)
CHLORIDE SERPL-SCNC: 103 MMOL/L (ref 96–112)
CO2 SERPL-SCNC: 26 MMOL/L (ref 20–33)
CREAT SERPL-MCNC: 0.84 MG/DL (ref 0.5–1.4)
EKG IMPRESSION: NORMAL
GLUCOSE SERPL-MCNC: 84 MG/DL (ref 65–99)
POTASSIUM SERPL-SCNC: 4.1 MMOL/L (ref 3.6–5.5)
SODIUM SERPL-SCNC: 141 MMOL/L (ref 135–145)

## 2021-09-20 PROCEDURE — 36415 COLL VENOUS BLD VENIPUNCTURE: CPT

## 2021-09-20 PROCEDURE — 80048 BASIC METABOLIC PNL TOTAL CA: CPT

## 2021-09-20 PROCEDURE — 93010 ELECTROCARDIOGRAM REPORT: CPT | Performed by: INTERNAL MEDICINE

## 2021-09-20 PROCEDURE — 93005 ELECTROCARDIOGRAM TRACING: CPT

## 2021-09-20 RX ORDER — EZETIMIBE 10 MG/1
10 TABLET ORAL EVERY EVENING
COMMUNITY
Start: 2021-07-26 | End: 2022-07-15

## 2021-09-20 RX ORDER — LOSARTAN POTASSIUM 25 MG/1
25 TABLET ORAL EVERY MORNING
Status: ON HOLD | COMMUNITY
End: 2021-10-15

## 2021-09-20 RX ORDER — CALCIUM CARBONATE 300MG(750)
400 TABLET,CHEWABLE ORAL EVERY EVENING
COMMUNITY
End: 2021-12-03

## 2021-09-20 ASSESSMENT — FIBROSIS 4 INDEX: FIB4 SCORE: 1.88

## 2021-09-20 NOTE — PREPROCEDURE INSTRUCTIONS
"Preadmit appointment: \" Preparing for your Procedure information\" sheet given to patient with verbal and written instructions. Patient instructed to continue prescribed medications through the day before surgery, instructed to take the following medications the day of surgery per anesthesia protocol: gabapentin, zofran if needed, pain meds if needed.  Pt states, \"no issues with anesthesia\".  Labs per anesthesia protocol. All Pt's questions and concerns answered.  "

## 2021-10-01 ENCOUNTER — HOSPITAL ENCOUNTER (OUTPATIENT)
Facility: MEDICAL CENTER | Age: 66
End: 2021-10-01
Attending: INTERNAL MEDICINE | Admitting: INTERNAL MEDICINE
Payer: MEDICARE

## 2021-10-01 ENCOUNTER — ANESTHESIA (OUTPATIENT)
Dept: SURGERY | Facility: MEDICAL CENTER | Age: 66
End: 2021-10-01
Payer: MEDICARE

## 2021-10-01 ENCOUNTER — ANESTHESIA EVENT (OUTPATIENT)
Dept: SURGERY | Facility: MEDICAL CENTER | Age: 66
End: 2021-10-01
Payer: MEDICARE

## 2021-10-01 ENCOUNTER — APPOINTMENT (OUTPATIENT)
Dept: RADIOLOGY | Facility: MEDICAL CENTER | Age: 66
End: 2021-10-01
Attending: INTERNAL MEDICINE
Payer: MEDICARE

## 2021-10-01 VITALS
WEIGHT: 138.89 LBS | HEART RATE: 51 BPM | RESPIRATION RATE: 16 BRPM | TEMPERATURE: 97.3 F | DIASTOLIC BLOOD PRESSURE: 56 MMHG | BODY MASS INDEX: 23.71 KG/M2 | HEIGHT: 64 IN | SYSTOLIC BLOOD PRESSURE: 156 MMHG | OXYGEN SATURATION: 95 %

## 2021-10-01 LAB
EXTERNAL QUALITY CONTROL: NORMAL
PATHOLOGY CONSULT NOTE: NORMAL
SARS-COV+SARS-COV-2 AG RESP QL IA.RAPID: NEGATIVE

## 2021-10-01 PROCEDURE — 160208 HCHG ENDO MINUTES - EA ADDL 1 MIN LEVEL 4: Performed by: INTERNAL MEDICINE

## 2021-10-01 PROCEDURE — 700102 HCHG RX REV CODE 250 W/ 637 OVERRIDE(OP): Performed by: INTERNAL MEDICINE

## 2021-10-01 PROCEDURE — 88305 TISSUE EXAM BY PATHOLOGIST: CPT

## 2021-10-01 PROCEDURE — 700111 HCHG RX REV CODE 636 W/ 250 OVERRIDE (IP): Performed by: ANESTHESIOLOGY

## 2021-10-01 PROCEDURE — 160036 HCHG PACU - EA ADDL 30 MINS PHASE I: Performed by: INTERNAL MEDICINE

## 2021-10-01 PROCEDURE — 160035 HCHG PACU - 1ST 60 MINS PHASE I: Performed by: INTERNAL MEDICINE

## 2021-10-01 PROCEDURE — 503082 HCHG INFLATOR (ENDO): Performed by: INTERNAL MEDICINE

## 2021-10-01 PROCEDURE — 160203 HCHG ENDO MINUTES - 1ST 30 MINS LEVEL 4: Performed by: INTERNAL MEDICINE

## 2021-10-01 PROCEDURE — 700105 HCHG RX REV CODE 258: Performed by: ANESTHESIOLOGY

## 2021-10-01 PROCEDURE — 700101 HCHG RX REV CODE 250: Performed by: ANESTHESIOLOGY

## 2021-10-01 PROCEDURE — 160002 HCHG RECOVERY MINUTES (STAT): Performed by: INTERNAL MEDICINE

## 2021-10-01 PROCEDURE — C1726 CATH, BAL DIL, NON-VASCULAR: HCPCS | Performed by: INTERNAL MEDICINE

## 2021-10-01 PROCEDURE — C1769 GUIDE WIRE: HCPCS | Performed by: INTERNAL MEDICINE

## 2021-10-01 PROCEDURE — 160048 HCHG OR STATISTICAL LEVEL 1-5: Performed by: INTERNAL MEDICINE

## 2021-10-01 PROCEDURE — 160009 HCHG ANES TIME/MIN: Performed by: INTERNAL MEDICINE

## 2021-10-01 PROCEDURE — 110371 HCHG SHELL REV 272: Performed by: INTERNAL MEDICINE

## 2021-10-01 PROCEDURE — 87426 SARSCOV CORONAVIRUS AG IA: CPT | Performed by: INTERNAL MEDICINE

## 2021-10-01 PROCEDURE — 700105 HCHG RX REV CODE 258: Performed by: INTERNAL MEDICINE

## 2021-10-01 PROCEDURE — 160025 RECOVERY II MINUTES (STATS): Performed by: INTERNAL MEDICINE

## 2021-10-01 PROCEDURE — A9270 NON-COVERED ITEM OR SERVICE: HCPCS | Performed by: INTERNAL MEDICINE

## 2021-10-01 PROCEDURE — 160046 HCHG PACU - 1ST 60 MINS PHASE II: Performed by: INTERNAL MEDICINE

## 2021-10-01 RX ORDER — LIDOCAINE HYDROCHLORIDE 20 MG/ML
INJECTION, SOLUTION EPIDURAL; INFILTRATION; INTRACAUDAL; PERINEURAL PRN
Status: DISCONTINUED | OUTPATIENT
Start: 2021-10-01 | End: 2021-10-01 | Stop reason: SURG

## 2021-10-01 RX ORDER — MIDAZOLAM HYDROCHLORIDE 1 MG/ML
INJECTION INTRAMUSCULAR; INTRAVENOUS PRN
Status: DISCONTINUED | OUTPATIENT
Start: 2021-10-01 | End: 2021-10-01 | Stop reason: SURG

## 2021-10-01 RX ORDER — DEXAMETHASONE SODIUM PHOSPHATE 4 MG/ML
INJECTION, SOLUTION INTRA-ARTICULAR; INTRALESIONAL; INTRAMUSCULAR; INTRAVENOUS; SOFT TISSUE PRN
Status: DISCONTINUED | OUTPATIENT
Start: 2021-10-01 | End: 2021-10-01 | Stop reason: SURG

## 2021-10-01 RX ORDER — ONDANSETRON 2 MG/ML
INJECTION INTRAMUSCULAR; INTRAVENOUS PRN
Status: DISCONTINUED | OUTPATIENT
Start: 2021-10-01 | End: 2021-10-01 | Stop reason: SURG

## 2021-10-01 RX ORDER — INDOMETHACIN 50 MG/1
100 SUPPOSITORY RECTAL ONCE
Status: COMPLETED | OUTPATIENT
Start: 2021-10-01 | End: 2021-10-01

## 2021-10-01 RX ORDER — OXYCODONE HCL 5 MG/5 ML
10 SOLUTION, ORAL ORAL
Status: DISCONTINUED | OUTPATIENT
Start: 2021-10-01 | End: 2021-10-01 | Stop reason: HOSPADM

## 2021-10-01 RX ORDER — ONDANSETRON 2 MG/ML
4 INJECTION INTRAMUSCULAR; INTRAVENOUS
Status: COMPLETED | OUTPATIENT
Start: 2021-10-01 | End: 2021-10-01

## 2021-10-01 RX ORDER — OXYCODONE HCL 5 MG/5 ML
5 SOLUTION, ORAL ORAL
Status: DISCONTINUED | OUTPATIENT
Start: 2021-10-01 | End: 2021-10-01 | Stop reason: HOSPADM

## 2021-10-01 RX ORDER — HALOPERIDOL 5 MG/ML
1 INJECTION INTRAMUSCULAR
Status: DISCONTINUED | OUTPATIENT
Start: 2021-10-01 | End: 2021-10-01 | Stop reason: HOSPADM

## 2021-10-01 RX ORDER — SUCCINYLCHOLINE CHLORIDE 20 MG/ML
INJECTION INTRAMUSCULAR; INTRAVENOUS PRN
Status: DISCONTINUED | OUTPATIENT
Start: 2021-10-01 | End: 2021-10-01 | Stop reason: SURG

## 2021-10-01 RX ORDER — SODIUM CHLORIDE, SODIUM LACTATE, POTASSIUM CHLORIDE, CALCIUM CHLORIDE 600; 310; 30; 20 MG/100ML; MG/100ML; MG/100ML; MG/100ML
INJECTION, SOLUTION INTRAVENOUS CONTINUOUS
Status: ACTIVE | OUTPATIENT
Start: 2021-10-01 | End: 2021-10-01

## 2021-10-01 RX ORDER — SODIUM CHLORIDE, SODIUM LACTATE, POTASSIUM CHLORIDE, CALCIUM CHLORIDE 600; 310; 30; 20 MG/100ML; MG/100ML; MG/100ML; MG/100ML
INJECTION, SOLUTION INTRAVENOUS
Status: DISCONTINUED | OUTPATIENT
Start: 2021-10-01 | End: 2021-10-01 | Stop reason: SURG

## 2021-10-01 RX ORDER — SODIUM CHLORIDE, SODIUM LACTATE, POTASSIUM CHLORIDE, CALCIUM CHLORIDE 600; 310; 30; 20 MG/100ML; MG/100ML; MG/100ML; MG/100ML
INJECTION, SOLUTION INTRAVENOUS CONTINUOUS
Status: DISCONTINUED | OUTPATIENT
Start: 2021-10-01 | End: 2021-10-01 | Stop reason: HOSPADM

## 2021-10-01 RX ADMIN — FENTANYL CITRATE 25 MCG: 50 INJECTION, SOLUTION INTRAMUSCULAR; INTRAVENOUS at 12:10

## 2021-10-01 RX ADMIN — SODIUM CHLORIDE, POTASSIUM CHLORIDE, SODIUM LACTATE AND CALCIUM CHLORIDE: 600; 310; 30; 20 INJECTION, SOLUTION INTRAVENOUS at 09:06

## 2021-10-01 RX ADMIN — DEXAMETHASONE SODIUM PHOSPHATE 8 MG: 4 INJECTION, SOLUTION INTRAMUSCULAR; INTRAVENOUS at 10:03

## 2021-10-01 RX ADMIN — MIDAZOLAM HYDROCHLORIDE 2 MG: 1 INJECTION, SOLUTION INTRAMUSCULAR; INTRAVENOUS at 09:59

## 2021-10-01 RX ADMIN — SODIUM CHLORIDE, POTASSIUM CHLORIDE, SODIUM LACTATE AND CALCIUM CHLORIDE: 600; 310; 30; 20 INJECTION, SOLUTION INTRAVENOUS at 10:00

## 2021-10-01 RX ADMIN — PROPOFOL 200 MG: 10 INJECTION, EMULSION INTRAVENOUS at 10:03

## 2021-10-01 RX ADMIN — ONDANSETRON 4 MG: 2 INJECTION INTRAMUSCULAR; INTRAVENOUS at 12:24

## 2021-10-01 RX ADMIN — SUCCINYLCHOLINE CHLORIDE 100 MG: 20 INJECTION, SOLUTION INTRAMUSCULAR; INTRAVENOUS at 10:03

## 2021-10-01 RX ADMIN — ONDANSETRON 4 MG: 2 INJECTION INTRAMUSCULAR; INTRAVENOUS at 10:03

## 2021-10-01 RX ADMIN — INDOMETHACIN 100 MG: 50 SUPPOSITORY RECTAL at 10:55

## 2021-10-01 RX ADMIN — LIDOCAINE HYDROCHLORIDE 100 MG: 20 INJECTION, SOLUTION EPIDURAL; INFILTRATION; INTRACAUDAL; PERINEURAL at 10:03

## 2021-10-01 ASSESSMENT — PAIN DESCRIPTION - PAIN TYPE
TYPE: CHRONIC PAIN
TYPE: CHRONIC PAIN
TYPE: ACUTE PAIN;SURGICAL PAIN

## 2021-10-01 ASSESSMENT — FIBROSIS 4 INDEX: FIB4 SCORE: 1.88

## 2021-10-01 NOTE — OR SURGEON
Immediate Post OP Note    PreOp Diagnosis: Acute recurrent pancreatitis     Bile duct dilation      PostOp Diagnosis: Same      Procedure(s):  ERCP (ENDOSCOPIC RETROGRADE CHOLANGIOPANCREATOGRAPHY) - Wound Class: None    Surgeon(s):  Fausto Casas M.D.    Anesthesiologist/Type of Anesthesia:  Anesthesiologist: Tobey Gansert, M.D./* No anesthesia type entered *    Surgical Staff:  Circulator: Sajan Wise R.N.  Endoscopy Technician: Alee Samuel; Vikas Andrew  Radiology Technologist: Trini Whittington    Specimens removed if any:  ID Type Source Tests Collected by Time Destination   A : Biopsy- Bile duct polyp Other Other PATHOLOGY SPECIMEN Fausto Casas M.D. 10/1/2021 1026        Dr. Casas  [facility]  [phone]    ERCP with: Biliary sphincterotomy    Biopsy    Bile duct ampulla dilation    Radiologic interpretation of static and dynamic fluoroscopic images    Indication: Acute recurrent pancreatitis    Bile duct dilation    Sedation: GA (T. Gansert)    Findings:    Ampulla     Distal in duodenum     Unremarkable appearance    Pancreatic duct     Not cannulated     Filled with spillover with normal course and generous caliber    CBD     Normal course     Dilated down to level of ampulla     4mm polyp at distal duct - seen after sphincterotomy and balloon sweep     Therapeutics    8mm biliary sphincterotomy with bow papillotome over covered wire    Bile duct balloon sweep - (-)For stone but evaginating distal biliary polyp    Biopsy of biliary polyp    Hydraulic dilation of biliary ampulla - 10mm x 40mm    Plan:  Follow pathology    Follow up in clinic      10/1/2021 10:47 AM Fausto Casas M.D.

## 2021-10-01 NOTE — ANESTHESIA POSTPROCEDURE EVALUATION
Patient: Nils Alfonso    Procedure Summary     Date: 10/01/21 Room / Location:  ENDOSCOPIC ULTRASOUND ROOM / SURGERY [facility]    Anesthesia Start: 1000 Anesthesia Stop: 1049    Procedure: ERCP (ENDOSCOPIC RETROGRADE CHOLANGIOPANCREATOGRAPHY) (Abdomen) Diagnosis: (IDIOPATHIC ACUTE PANCREATITIS)    Surgeons: Fausto Casas M.D. Responsible Provider: Tobey Gansert, M.D.    Anesthesia Type: general ASA Status: 2          Final Anesthesia Type: general  Last vitals  BP   Blood Pressure : (!) 167/71    Temp   36.4 °C (97.5 °F)    Pulse   (!) 50   Resp   (!) 11    SpO2   100 %      Anesthesia Post Evaluation    Patient location during evaluation: PACU  Patient participation: complete - patient participated  Level of consciousness: awake and alert    Airway patency: patent  Anesthetic complications: no  Cardiovascular status: hemodynamically stable  Respiratory status: acceptable  Hydration status: euvolemic    PONV: none          No complications documented.     Nurse Pain Score: 0 (NPRS)

## 2021-10-01 NOTE — DISCHARGE INSTRUCTIONS
ENDOSCOPY HOME CARE INSTRUCTIONS    GASTROSCOPY OR ERCP  1. Don't eat or drink anything for about an hour after the test. You can then resume your regular diet.  2. Don't drive or drink alcohol for 24 hours. The medication you received will make you too drowsy.  3. Don't take any coffee, tea, or aspirin products until after you see your doctor. These can harm the lining of your stomach.  4. If you begin to vomit bloody material, or develop black or bloody stools, call your doctor as soon as possible.  5. If you have any neck, chest, abdominal pain or temp of 100 degrees, call your doctor.  6. See your doctor; Call to schedule an appointment  7. Additional instructions: You will be contacted later with pathology results    Dr. Casas  [facility]  [phone]     ERCP with:      Biliary sphincterotomy                          Biopsy                          Bile duct ampulla dilation                          Radiologic interpretation of static and dynamic fluoroscopic images     Indication:        Acute recurrent pancreatitis                          Bile duct dilation     Sedation:         GA (T. Gansert)     Findings:                          Ampulla                                      Distal in duodenum                                      Unremarkable appearance                          Pancreatic duct                                      Not cannulated                                      Filled with spillover with normal course and generous caliber                          CBD                                      Normal course                                      Dilated down to level of ampulla                                      4mm polyp at distal duct - seen after sphincterotomy and balloon sweep                 Therapeutics                          8mm biliary sphincterotomy with bow papillotome over covered wire                          Bile duct balloon sweep - (-)For stone but  evaginating distal biliary polyp                          Biopsy of biliary polyp                          Hydraulic dilation of biliary ampulla - 10mm x 40mm     Plan:                Follow pathology                          Follow up in clinic    You should call [phone] if you develop problems with breathing or chest pain.  If any questions arise, call your doctor. If your doctor is not available, please feel free to call [phone]. You can also call the HEALTH HOTLINE open 24 hours/day, 7 days/week and speak to a nurse at [phone], or toll free [phone].    Depression / Suicide Risk    As you are discharged from this [facility] facility, it is important to learn how to keep safe from harming yourself.    Recognize the warning signs:  · Abrupt changes in personality, positive or negative- including increase in energy   · Giving away possessions  · Change in eating patterns- significant weight changes-  positive or negative  · Change in sleeping patterns- unable to sleep or sleeping all the time   · Unwillingness or inability to communicate  · Depression  · Unusual sadness, discouragement and loneliness  · Talk of wanting to die  · Neglect of personal appearance   · Rebelliousness- reckless behavior  · Withdrawal from people/activities they love  · Confusion- inability to concentrate     If you or a loved one observes any of these behaviors or has concerns about self-harm, here's what you can do:  · Talk about it- your feelings and reasons for harming yourself  · Remove any means that you might use to hurt yourself (examples: pills, rope, extension cords, firearm)  · Get professional help from the community (Mental Health, Substance Abuse, psychological counseling)  · Do not be alone:Call your Safe Contact- someone whom you trust who will be there for you.  · Call your local CRISIS HOTLINE 485-8882 or 258-225-4687  · Call your local Children's Mobile Crisis Response Team [facility] (905)  413-8034 or www.[employer]  · Call the toll free National Suicide Prevention Hotlines   · National Suicide Prevention Lifeline 176-940-DNMH (0187)  · National Hope Line Network 800-SUICIDE (151-3114)    I acknowledge receipt and understanding of these Home Care Instructions.

## 2021-10-01 NOTE — ANESTHESIA PREPROCEDURE EVALUATION
Relevant Problems   CARDIAC   (positive) HTN (hypertension)      GI   (positive) Esophageal reflux       Physical Exam    Airway   Mallampati: II  TM distance: >3 FB  Neck ROM: full       Cardiovascular - normal exam  Rhythm: regular  Rate: normal  (-) murmur     Dental - normal exam           Pulmonary - normal exam  Breath sounds clear to auscultation     Abdominal    Neurological - normal exam                 Anesthesia Plan    ASA 2       Plan - general       Airway plan will be ETT          Induction: intravenous    Postoperative Plan: Postoperative administration of opioids is intended.    Pertinent diagnostic labs and testing reviewed    Informed Consent:    Anesthetic plan and risks discussed with patient.

## 2021-10-01 NOTE — ANESTHESIA TIME REPORT
Anesthesia Start and Stop Event Times     Date Time Event    10/1/2021 0901 Ready for Procedure     1000 Anesthesia Start     1049 Anesthesia Stop        Responsible Staff  10/01/21    Name Role Begin End    Tobey Gansert, M.D. Anesth 1000 1049        Preop Diagnosis (Free Text):  Pre-op Diagnosis     IDIOPATHIC ACUTE PANCREATITIS        Preop Diagnosis (Codes):    Premium Reason  Non-Premium    Comments:

## 2021-10-01 NOTE — OR NURSING
0910 Pt allergies and NPO status verified.  Home medications reconciled.  Belongings secured.  Pt verbalizes understanding of pain scale;expected course of stay and plan of care.  Surgical site verified with pt.  IV access established.  Triple AIM completed All questions answered.  Bed in low position  Call light in reach.

## 2021-10-01 NOTE — OR NURSING
1048: To PACU from [facility] via gurney, respirations spontaneous and non-labored.  1100: Pt rouses to voice, denies pain or nausea, nods to feeling cold, warm blankets applied.  1115: pt resting, rouses to voice, denies pain or nausea.  1130: No change.  1145: No change.   1150: Meets criteria to transfer to Stage 2. Report called to Em RN.

## 2021-10-01 NOTE — PROCEDURES
DATE OF PROCEDURE:  10/01/2021     PROCEDURES:  Endoscopic retrograde cholangiopancreatography with:  1.  Biliary sphincterotomy.  2.  Bile duct biopsy.  3.  Bile duct ampulla dilation.  4.  Radiologic interpretation of static and dynamic fluoroscopic images by   myself at no time was a radiologist present in the room.     INDICATIONS:  Acute recurrent pancreatitis with bile duct dilation.     FINAL IMPRESSION:  1.  Biliary ampulla distal and the duodenum requiring scope to be in the long   position; otherwise, unremarkable appearance.  2.  Pancreatic duct not cannulated, but filled with spill over from biliary   injection, showing normal course and generous caliber.  3.  Common bile duct, normal course, dilated down to the level of the ampulla.    A 4 mm polyp at the distal duct seen after sphincterotomy and balloon sweep.     THERAPEUTICS:  1.  An 8 mm biliary sphincterotomy with bow papillotome over covered wire   limited by anatomy and size.  2.  Bile duct balloon sweep negative for stone, but invaginated distal biliary   polyp.  3.  Biopsy of distal biliary polyp.  4.  Hydraulic dilation of biliary ampulla with 10x40 mm balloon.     RECOMMENDATIONS:  1.  Follow up pathology.  2.  Follow up in clinic.     PROCEDURE:  Prior to the procedure, physical exam was stable.  During   procedure, vital signs remained within normal limits.  Prior to sedation,   informed consent was obtained.  Risks, benefits and alternatives including,   but not limited to risk of bleeding, infection, and adverse reaction to   medication, failure to identify pathology and pancreatitis and death explained   to the patient at length.  She accepted all risks.  The patient was   intubated, sedated by Anesthesia, prepped in the prone position.  Scope tip of   the Olympus flexible side-viewing TJF duodenoscope was passed in the duodenum   after the gastric pool was suctioned.  Initially, this was in the short   position and the ampulla could not  be seen.  The scope was passed through a   long position and the biliary ampulla was identified distal in the second   portion of the duodenum.  It had a redundant overlying fold, but otherwise   unremarkable appearance.  Page papillotome with a 0.035 wire was utilized for   selective cannulation of the bile duct.  This was fairly straightforward and   the wire passed along the expected course of the bile duct.  This was followed   with the tome for approximately 3 cm and aspiration was positive for bile.    Injection of contrast was made demonstrating a significantly dilated bile duct   down to the level of the ampulla.  At this point, there was filling of the   pancreatic duct consistent with spillover from a long common channel.  The   wire was left in place and a biliary sphincterotomy was performed with the bow   papillotome over the covered wire.  Given the long position and dynamics of   the scope and the tome, only an 8 mm biliary sphincterotomy was made out of   concerns for cutting in the wrong direction.  The wire was left in place and a   9-12 occlusion balloon was exchanged over the wire, passed to the common   hepatic duct, inflated and withdrawn down the duct.  At the distal duct, there   was slight resistance to balloon withdrawal.  Torque was applied along with   withdrawal pressure and the balloon pulled through the sphincterotomy site.    Along with the balloon, a 4 mm polyp was seen invaginated from the distal bile   duct.  This had a benign appearance.  Biopsy forceps were used for multiple   passes and multiple bites to perform polypectomy at the distal duct.  There   did not seem to be any residual tumor.  This was saved for pathology.    Following this, the bile duct was then re-cannulated with the tome and the   wire and a 10x40 mm hydraulic dilation balloon was passed across the ampulla   and inflated to 10 mm and held in place for over a minute.  Excellent results   were seen.  The balloon  was deflated and withdrawn.  The procedure was deemed   complete.  The scope was withdrawn.  The patient tolerated the procedure well   and was sent to recovery without immediate complications.        ______________________________  MD ALEXANDRA PARNELL/FARHAT    DD:  10/01/2021 11:00  DT:  10/01/2021 11:58    Job#:  [id number]

## 2021-10-01 NOTE — ANESTHESIA PROCEDURE NOTES
Airway    Date/Time: 10/1/2021 10:04 AM  Performed by: Tobey Gansert, M.D.  Authorized by: Tobey Gansert, M.D.     Location:  OR  Urgency:  Elective  Indications for Airway Management:  Anesthesia      Spontaneous Ventilation: absent    Sedation Level:  Deep  Preoxygenated: Yes    Patient Position:  Sniffing  Final Airway Type:  Endotracheal airway  Final Endotracheal Airway:  ETT  Cuffed: Yes    Technique Used for Successful ETT Placement:  Direct laryngoscopy    Insertion Site:  Oral  Blade Type:  Chanel  Laryngoscope Blade/Videolaryngoscope Blade Size:  3  ETT Size (mm):  7.0  Measured from:  Teeth  ETT to Teeth (cm):  22  Placement Verified by: auscultation and capnometry    Cormack-Lehane Classification:  Grade I - full view of glottis  Number of Attempts at Approach:  1

## 2021-10-01 NOTE — OR NURSING
1155: To Stage 2 from PACU.  Pt states pain is tolerable and denies nausea at this time.   Patient settled in recliner chair. Pt dressed with assist by CNA.   1210: medicated for abdominal pain. Dr Young here and discussed with pt about the procedure and cause of her pain.  1225: Pt states pain subsiding and tolerable. Complains of nausea. Zofran administer and pt given quease.   1240: Pt states feeling better and ready to go home  1244: Discharge instructions and medications gone over with Pt and ride. All questions answered. Pt meets discharge criteria. PIV DC'd. Pt transported to [facility] via wheelchair in stable condition.   Discharge to care of family post uneventful stay in PACU 2.

## 2021-10-02 ENCOUNTER — HOSPITAL ENCOUNTER (EMERGENCY)
Facility: MEDICAL CENTER | Age: 66
End: 2021-10-02
Attending: EMERGENCY MEDICINE | Admitting: FAMILY MEDICINE
Payer: MEDICARE

## 2021-10-02 ENCOUNTER — APPOINTMENT (OUTPATIENT)
Dept: RADIOLOGY | Facility: MEDICAL CENTER | Age: 66
End: 2021-10-02
Attending: EMERGENCY MEDICINE
Payer: MEDICARE

## 2021-10-02 VITALS
RESPIRATION RATE: 16 BRPM | BODY MASS INDEX: 23.69 KG/M2 | DIASTOLIC BLOOD PRESSURE: 83 MMHG | HEART RATE: 76 BPM | WEIGHT: 138 LBS | TEMPERATURE: 97.2 F | SYSTOLIC BLOOD PRESSURE: 145 MMHG | OXYGEN SATURATION: 94 %

## 2021-10-02 DIAGNOSIS — K85.10 ACUTE BILIARY PANCREATITIS WITHOUT INFECTION OR NECROSIS: ICD-10-CM

## 2021-10-02 PROBLEM — K91.89 POST-ERCP ACUTE PANCREATITIS: Status: ACTIVE | Noted: 2021-10-02

## 2021-10-02 PROBLEM — D72.829 LEUKOCYTOSIS: Status: ACTIVE | Noted: 2021-10-02

## 2021-10-02 PROBLEM — N17.9 AKI (ACUTE KIDNEY INJURY) (HCC): Status: ACTIVE | Noted: 2021-10-02

## 2021-10-02 PROBLEM — K85.90 POST-ERCP ACUTE PANCREATITIS: Status: ACTIVE | Noted: 2021-10-02

## 2021-10-02 LAB
ALBUMIN SERPL BCP-MCNC: 4.2 G/DL (ref 3.2–4.9)
ALBUMIN/GLOB SERPL: 1.8 G/DL
ALP SERPL-CCNC: 62 U/L (ref 30–99)
ALT SERPL-CCNC: 13 U/L (ref 2–50)
ANION GAP SERPL CALC-SCNC: 11 MMOL/L (ref 7–16)
AST SERPL-CCNC: 24 U/L (ref 12–45)
BASOPHILS # BLD AUTO: 0.2 % (ref 0–1.8)
BASOPHILS # BLD: 0.02 K/UL (ref 0–0.12)
BILIRUB SERPL-MCNC: 0.8 MG/DL (ref 0.1–1.5)
BUN SERPL-MCNC: 30 MG/DL (ref 8–22)
CALCIUM SERPL-MCNC: 8.8 MG/DL (ref 8.4–10.2)
CHLORIDE SERPL-SCNC: 97 MMOL/L (ref 96–112)
CO2 SERPL-SCNC: 26 MMOL/L (ref 20–33)
CREAT SERPL-MCNC: 1.13 MG/DL (ref 0.5–1.4)
EKG IMPRESSION: NORMAL
EOSINOPHIL # BLD AUTO: 0 K/UL (ref 0–0.51)
EOSINOPHIL NFR BLD: 0 % (ref 0–6.9)
ERYTHROCYTE [DISTWIDTH] IN BLOOD BY AUTOMATED COUNT: 43.8 FL (ref 35.9–50)
GLOBULIN SER CALC-MCNC: 2.3 G/DL (ref 1.9–3.5)
GLUCOSE SERPL-MCNC: 126 MG/DL (ref 65–99)
HCT VFR BLD AUTO: 49.4 % (ref 37–47)
HGB BLD-MCNC: 16.1 G/DL (ref 12–16)
IMM GRANULOCYTES # BLD AUTO: 0.04 K/UL (ref 0–0.11)
IMM GRANULOCYTES NFR BLD AUTO: 0.3 % (ref 0–0.9)
LIPASE SERPL-CCNC: 576 U/L (ref 7–58)
LYMPHOCYTES # BLD AUTO: 0.72 K/UL (ref 1–4.8)
LYMPHOCYTES NFR BLD: 5.6 % (ref 22–41)
MCH RBC QN AUTO: 30.4 PG (ref 27–33)
MCHC RBC AUTO-ENTMCNC: 32.6 G/DL (ref 33.6–35)
MCV RBC AUTO: 93.2 FL (ref 81.4–97.8)
MONOCYTES # BLD AUTO: 0.57 K/UL (ref 0–0.85)
MONOCYTES NFR BLD AUTO: 4.5 % (ref 0–13.4)
NEUTROPHILS # BLD AUTO: 11.43 K/UL (ref 2–7.15)
NEUTROPHILS NFR BLD: 89.4 % (ref 44–72)
NRBC # BLD AUTO: 0 K/UL
NRBC BLD-RTO: 0 /100 WBC
PLATELET # BLD AUTO: 206 K/UL (ref 164–446)
PMV BLD AUTO: 9.9 FL (ref 9–12.9)
POTASSIUM SERPL-SCNC: 4.3 MMOL/L (ref 3.6–5.5)
PROCALCITONIN SERPL-MCNC: 0.13 NG/ML
PROT SERPL-MCNC: 6.5 G/DL (ref 6–8.2)
RBC # BLD AUTO: 5.3 M/UL (ref 4.2–5.4)
SODIUM SERPL-SCNC: 134 MMOL/L (ref 135–145)
WBC # BLD AUTO: 12.8 K/UL (ref 4.8–10.8)

## 2021-10-02 PROCEDURE — 93005 ELECTROCARDIOGRAM TRACING: CPT | Performed by: EMERGENCY MEDICINE

## 2021-10-02 PROCEDURE — 96374 THER/PROPH/DIAG INJ IV PUSH: CPT

## 2021-10-02 PROCEDURE — 83690 ASSAY OF LIPASE: CPT

## 2021-10-02 PROCEDURE — 84145 PROCALCITONIN (PCT): CPT

## 2021-10-02 PROCEDURE — 96375 TX/PRO/DX INJ NEW DRUG ADDON: CPT

## 2021-10-02 PROCEDURE — 36415 COLL VENOUS BLD VENIPUNCTURE: CPT

## 2021-10-02 PROCEDURE — 96376 TX/PRO/DX INJ SAME DRUG ADON: CPT

## 2021-10-02 PROCEDURE — 700111 HCHG RX REV CODE 636 W/ 250 OVERRIDE (IP): Performed by: EMERGENCY MEDICINE

## 2021-10-02 PROCEDURE — 99285 EMERGENCY DEPT VISIT HI MDM: CPT

## 2021-10-02 PROCEDURE — 80053 COMPREHEN METABOLIC PANEL: CPT

## 2021-10-02 PROCEDURE — 700105 HCHG RX REV CODE 258: Performed by: EMERGENCY MEDICINE

## 2021-10-02 PROCEDURE — 74177 CT ABD & PELVIS W/CONTRAST: CPT | Mod: MG

## 2021-10-02 PROCEDURE — 85025 COMPLETE CBC W/AUTO DIFF WBC: CPT

## 2021-10-02 PROCEDURE — 700117 HCHG RX CONTRAST REV CODE 255: Performed by: EMERGENCY MEDICINE

## 2021-10-02 PROCEDURE — G0378 HOSPITAL OBSERVATION PER HR: HCPCS

## 2021-10-02 RX ORDER — PROPOFOL 10 MG/ML
200 INJECTION, EMULSION INTRAVENOUS ONCE
Status: DISCONTINUED | OUTPATIENT
Start: 2021-10-02 | End: 2021-10-02

## 2021-10-02 RX ORDER — ONDANSETRON 2 MG/ML
4 INJECTION INTRAMUSCULAR; INTRAVENOUS ONCE
Status: COMPLETED | OUTPATIENT
Start: 2021-10-02 | End: 2021-10-02

## 2021-10-02 RX ORDER — SODIUM CHLORIDE, SODIUM LACTATE, POTASSIUM CHLORIDE, CALCIUM CHLORIDE 600; 310; 30; 20 MG/100ML; MG/100ML; MG/100ML; MG/100ML
INJECTION, SOLUTION INTRAVENOUS CONTINUOUS
Status: DISCONTINUED | OUTPATIENT
Start: 2021-10-02 | End: 2021-10-02 | Stop reason: HOSPADM

## 2021-10-02 RX ORDER — SODIUM CHLORIDE 9 MG/ML
1000 INJECTION, SOLUTION INTRAVENOUS ONCE
Status: COMPLETED | OUTPATIENT
Start: 2021-10-02 | End: 2021-10-02

## 2021-10-02 RX ADMIN — ONDANSETRON 4 MG: 2 INJECTION INTRAMUSCULAR; INTRAVENOUS at 13:41

## 2021-10-02 RX ADMIN — FENTANYL CITRATE 50 MCG: 50 INJECTION, SOLUTION INTRAMUSCULAR; INTRAVENOUS at 15:15

## 2021-10-02 RX ADMIN — IOHEXOL 100 ML: 350 INJECTION, SOLUTION INTRAVENOUS at 14:21

## 2021-10-02 RX ADMIN — SODIUM CHLORIDE 1000 ML: 9 INJECTION, SOLUTION INTRAVENOUS at 13:40

## 2021-10-02 RX ADMIN — FENTANYL CITRATE 50 MCG: 50 INJECTION, SOLUTION INTRAMUSCULAR; INTRAVENOUS at 13:41

## 2021-10-02 ASSESSMENT — FIBROSIS 4 INDEX: FIB4 SCORE: 1.88

## 2021-10-02 NOTE — ASSESSMENT & PLAN NOTE
- CT with acute interstitial edematous pancreatitis with surrounding mesenteric edema tracking along the right paracolic gutter  - Lipase 576  - NPO, IVFs  - Has a white count but appears dehydrated and hemoconcentrated, will hold off on antibiotics and check a procal, hydrate   - No signs of perforation on CT

## 2021-10-02 NOTE — ED NOTES
Pt BIB [facility] from home with c/o RLQ and diffuse lower abdominal pain. Pt had ERCP yesterday. Pt had pancreatitis and they found a polyp on her bile duct. Pt states took gas-x yesterday and it helped but hasnt been able to pass gas today. Denies fever.

## 2021-10-02 NOTE — ASSESSMENT & PLAN NOTE
- Suspect hemoconcentration given her rise in hemoglobin compared to labs from July  - Will check procalcitonin, hold off on antibiotics unless that is elevated

## 2021-10-02 NOTE — ED PROVIDER NOTES
ED Provider Note    CHIEF COMPLAINT  Chief Complaint   Patient presents with   • Abdominal Pain   • Nausea        HPI  Nils Alfonso is a 66 y.o. female who presents to the ED with severe abdominal pain.  The patient had an ERCP done yesterday by Dr. Stringer.  Patient was discharged and started having some increasing abdominal pain primarily epigastric region with some nausea but no vomiting.  Because of the increasing pain she contacted the office today and they are concerned about the possibility of a perforation so sent the patient to the emergency department for evaluation.  Upon arrival here she describing a severe 8-10 over 10 type pain located primarily epigastric but the whole abdominal area describes nausea but no vomiting denies any other symptoms.    REVIEW OF SYSTEMS  See HPI for further details. All other systems are negative.     PAST MEDICAL HISTORY  Past Medical History:   Diagnosis Date   • Allergy, unspecified not elsewhere classified    • Anemia     not currently   • Anesthesia     PONV (Demerol/ Dilaudid)   • Arthritis     bilateral shoulders,sacrum, osteo   • Backpain     coccyx and sacrum   • Bronchitis 2010   • Cataract     bilateral IOLI   • Degeneration of cervical intervertebral disc     C5-6,C6-7   • Dental disorder     partial upper and lower   • Heart burn    • Hiatus hernia syndrome     not a problem after gastric sleeve   • High cholesterol     resolved after gastric surgery   • Hyperlipidemia    • Hypertension     off all medication, reveresed after gastric sleeve   • Muscle disorder    • Pain 05/16/2018    low back and SI joints and sacrum   • Reactive airway disease     rescue inhaler not needed unless with bronchitis       FAMILY HISTORY  Family History   Problem Relation Age of Onset   • Stroke Mother    • Cancer Father         larynx   • Diabetes Brother        SOCIAL HISTORY  Social History     Socioeconomic History   • Marital status:      Spouse name: Not on file    • Number of children: Not on file   • Years of education: Not on file   • Highest education level: Not on file   Occupational History   • Not on file   Tobacco Use   • Smoking status: Former Smoker     Packs/day: 0.25     Years: 0.10     Pack years: 0.02     Types: Cigarettes     Quit date: 1973     Years since quittin.7   • Smokeless tobacco: Never Used   Vaping Use   • Vaping Use: Never used   Substance and Sexual Activity   • Alcohol use: No   • Drug use: No   • Sexual activity: Not on file     Comment:    Other Topics Concern   • Not on file   Social History Narrative   • Not on file     Social Determinants of Health     Financial Resource Strain:    • Difficulty of Paying Living Expenses:    Food Insecurity:    • Worried About Running Out of Food in the Last Year:    • Ran Out of Food in the Last Year:    Transportation Needs:    • Lack of Transportation (Medical):    • Lack of Transportation (Non-Medical):    Physical Activity:    • Days of Exercise per Week:    • Minutes of Exercise per Session:    Stress:    • Feeling of Stress :    Social Connections:    • Frequency of Communication with Friends and Family:    • Frequency of Social Gatherings with Friends and Family:    • Attends [employer] Services:    • Active Member of Clubs or Organizations:    • Attends Club or Organization Meetings:    • Marital Status:    Intimate Partner Violence:    • Fear of Current or Ex-Partner:    • Emotionally Abused:    • Physically Abused:    • Sexually Abused:       Pratik Gordon M.D.      SURGICAL HISTORY  Past Surgical History:   Procedure Laterality Date   • PB ERCP,DIAGNOSTIC  10/1/2021    Procedure: ERCP (ENDOSCOPIC RETROGRADE CHOLANGIOPANCREATOGRAPHY);  Surgeon: Fausto Casas M.D.;  Location: SURGERY [facility];  Service: Gastroenterology   • COLONOSCOPY  2018    Procedure: COLONOSCOPY;  Surgeon: Shukri Condon M.D.;  Location: SURGERY [facility];  Service: General   •  GASTROSCOPY  5/23/2018    Procedure: GASTROSCOPY;  Surgeon: Fausto Casas M.D.;  Location: SURGERY [facility];  Service: Gastroenterology   • EGD W/ENDOSCOPIC ULTRASOUND  5/23/2018    Procedure: EGD W/ENDOSCOPIC ULTRASOUND- RADIAL UPPER;  Surgeon: Fausto Casas M.D.;  Location: SURGERY [facility];  Service: Gastroenterology   • CATARACT PHACO WITH IOL Left 4/18/2017    Procedure: CATARACT PHACO WITH IOL;  Surgeon: Stuart Estevez M.D.;  Location: SURGERY [facility] DAY [facility];  Service:    • CATARACT PHACO WITH IOL Right 4/4/2017    Procedure: CATARACT PHACO WITH IOL;  Surgeon: Stuart Estevez M.D.;  Location: SURGERY [facility];  Service:    • GASTRIC SLEEVE LAPAROSCOPY  10/19/2016    Procedure: GASTRIC SLEEVE LAPAROSCOPY, HIATAL HERNIA;  Surgeon: Shukri Condon M.D.;  Location: SURGERY [facility];  Service:    • LIVER BIOPSY LAPAROSCOPIC  10/19/2016    Procedure: LIVER BIOPSY LAPAROSCOPIC;  Surgeon: Shukri Condon M.D.;  Location: SURGERY [facility];  Service:    • GASTROSCOPY N/A 8/26/2016    Procedure: GASTROSCOPY;  Surgeon: Shukri Condon M.D.;  Location: SURGERY [facility];  Service:    • ROTATOR CUFF REPAIR Right 7/7/2016   • ROTATOR CUFF REPAIR Left 5/2015   • HYSTERECTOMY ROBOTIC  1/2/2009    Performed by MEG VILLEGAS at [facility]   • LUMBAR FUSION ANTERIOR  2007    Dr Hillman, L3-S1 fusion   • CHOLECYSTECTOMY  1996    laparoscopic   • TUBAL LIGATION  1985   • GASTRIC RESECTION  1982    gastric stapling   • TONSILLECTOMY AND ADENOIDECTOMY  1967   • PRIMARY C SECTION  1976, 1979, 1985    x3       CURRENT MEDICATIONS  Home Medications    **Home medications have not yet been reviewed for this encounter**       No current facility-administered medications on file prior to encounter.     Current Outpatient Medications on File Prior to Encounter   Medication Sig Dispense Refill   • ezetimibe (ZETIA) 10 MG Tab qhs     • Cyclobenzaprine HCl  "(FLEXERIL PO) Take  by mouth. qhs     • LOSARTAN POTASSIUM PO Take 25 mg by mouth.     • Magnesium 400 MG Tab Take  by mouth. qhs     • gabapentin (NEURONTIN) 300 MG Cap Take 600 mg by mouth 2 Times a Day.     • BIOTIN PO Take 100 mcg by mouth every day.     • HYDROcodone/acetaminophen (NORCO)  MG Tab Take 1-2 Tabs by mouth every 6 hours as needed. Takes 1/2 pill     • Cholecalciferol (VITAMIN D3) 12015 UNIT Cap Take  by mouth. 2000 iu     • ondansetron (ZOFRAN ODT) 4 MG TABLET DISPERSIBLE Take 4 mg by mouth every 6 hours as needed for Nausea.           ALLERGIES  Allergies   Allergen Reactions   • Ampicillin Rash   • Demerol Vomiting   • Keflex Diarrhea, Vomiting and Nausea     Pt states severe N/V/D   • Levaquin      numbness   • Morphine Vomiting   • Tetracyclines Rash   • Codeine      Severe stomach pain, cramps, spasms   • Zanaflex [Tizanidine Hcl] Itching   • Clindamycin Vomiting     \"cleocin\"   • Dilaudid [Hydromorphone] Vomiting and Nausea   • Pcn [Penicillins] Itching   • Sulfa Drugs Itching       PHYSICAL EXAM  VITAL SIGNS: /78   Pulse 83   Temp 36.3 °C (97.3 °F) (Temporal)   Resp 16   Wt 62.6 kg (138 lb)   LMP 01/01/2009   SpO2 92%   BMI 23.69 kg/m²    Pulse Oximetry was obtained. It showed a reading of Pulse Oximetry: 92 %.  I interpreted this as nonhypoxic.     Constitutional: Well developed, Well nourished, No acute distress, Non-toxic appearance.   HENT: Normocephalic, Atraumatic, Bilateral external ears normal, bilateral tympanic membranes normal, Oropharynx dry mucous membranes, No oral exudates, Nose normal.   Eyes:  conjunctiva is normal, there are no signs of exudate.   Neck: Supple, no cervical lymphadenopathy, no meningeal signs..   Lymphatic: No lymphadenopathy noted.   Cardiovascular: Regular rate and rhythm without murmurs gallops or rubs.   Thorax & Lungs: Lungs are clear to auscultation bilaterally, there are no wheezes no rales. Chest wall is nontender.  Abdomen: Soft " tender about the epigastric region with guarding.  Bowel sounds are hypoactive.  Skin: Warm, Dry, No erythema,   Back: No tenderness, No CVA tenderness.  Musculoskeletal: Good range of motion in all major joints. No tenderness to palpation or major deformities noted. Intact distal pulses, no clubbing, no cyanosis, no edema     Neurologic: Alert & oriented x 3, Normal motor function, Normal sensory function, No focal deficits noted.   Psychiatric: Affect normal, Judgment normal, Mood normal.     EKG  Interpreted below by myself    RADIOLOGY/PROCEDURES  CT-ABDOMEN-PELVIS WITH   Final Result      Acute interstitial edematous pancreatitis with surrounding mesenteric edema tracking along the right paracolic gutter. Small volume ascites in the pelvis. No walled off collections.          Results for orders placed or performed during the hospital encounter of 10/02/21   CBC WITH DIFFERENTIAL   Result Value Ref Range    WBC 12.8 (H) 4.8 - 10.8 K/uL    RBC 5.30 4.20 - 5.40 M/uL    Hemoglobin 16.1 (H) 12.0 - 16.0 g/dL    Hematocrit 49.4 (H) 37.0 - 47.0 %    MCV 93.2 81.4 - 97.8 fL    MCH 30.4 27.0 - 33.0 pg    MCHC 32.6 (L) 33.6 - 35.0 g/dL    RDW 43.8 35.9 - 50.0 fL    Platelet Count 206 164 - 446 K/uL    MPV 9.9 9.0 - 12.9 fL    Neutrophils-Polys 89.40 (H) 44.00 - 72.00 %    Lymphocytes 5.60 (L) 22.00 - 41.00 %    Monocytes 4.50 0.00 - 13.40 %    Eosinophils 0.00 0.00 - 6.90 %    Basophils 0.20 0.00 - 1.80 %    Immature Granulocytes 0.30 0.00 - 0.90 %    Nucleated RBC 0.00 /100 WBC    Neutrophils (Absolute) 11.43 (H) 2.00 - 7.15 K/uL    Lymphs (Absolute) 0.72 (L) 1.00 - 4.80 K/uL    Monos (Absolute) 0.57 0.00 - 0.85 K/uL    Eos (Absolute) 0.00 0.00 - 0.51 K/uL    Baso (Absolute) 0.02 0.00 - 0.12 K/uL    Immature Granulocytes (abs) 0.04 0.00 - 0.11 K/uL    NRBC (Absolute) 0.00 K/uL   COMP METABOLIC PANEL   Result Value Ref Range    Sodium 134 (L) 135 - 145 mmol/L    Potassium 4.3 3.6 - 5.5 mmol/L    Chloride 97 96 - 112  mmol/L    Co2 26 20 - 33 mmol/L    Anion Gap 11.0 7.0 - 16.0    Glucose 126 (H) 65 - 99 mg/dL    Bun 30 (H) 8 - 22 mg/dL    Creatinine 1.13 0.50 - 1.40 mg/dL    Calcium 8.8 8.4 - 10.2 mg/dL    AST(SGOT) 24 12 - 45 U/L    ALT(SGPT) 13 2 - 50 U/L    Alkaline Phosphatase 62 30 - 99 U/L    Total Bilirubin 0.8 0.1 - 1.5 mg/dL    Albumin 4.2 3.2 - 4.9 g/dL    Total Protein 6.5 6.0 - 8.2 g/dL    Globulin 2.3 1.9 - 3.5 g/dL    A-G Ratio 1.8 g/dL   LIPASE   Result Value Ref Range    Lipase 576 (H) 7 - 58 U/L   ESTIMATED GFR   Result Value Ref Range    GFR If  58 (A) >60 mL/min/1.73 m 2    GFR If Non  48 (A) >60 mL/min/1.73 m 2   EKG (NOW)   Result Value Ref Range    Report       [facility] Emergency Dept[facility]    Test Date:  2021-10-02  Pt Name:    KATERINA PATEL                  Department: [facility]  MRN:        [id number]                      Room:       [facility]  Gender:     Female                       Technician: [id number]  :        1955                   Requested By:VIRGIL MENDENHALL  Order #:    [id number]                    Reading MD: VIRGIL MENDENHALL MD    Measurements  Intervals                                Axis  Rate:       71                           P:          76  HI:         132                          QRS:        63  QRSD:       98                           T:          67  QT:         408  QTc:        444    Interpretive Statements  SINUS RHYTHM  CONSIDER LEFT ATRIAL ABNORMALITY  RSR' IN V1 OR V2, RIGHT VCD OR RVH  Compared to ECG 2021 10:49:58  Sinus bradycardia no longer present  no acute findings  Electronically Signed On 10-2-2021 15:06:38 PDT by VIRGIL MENDENHALL MD           COURSE & MEDICAL DECISION MAKING  Pertinent Labs & Imaging studies reviewed. (See chart for details)  Patient presents emerge department for evaluation.  IV was established patient given a liter bolus of normal saline for hydration purposes as well as a total of 100 mcg  of fentanyl for pain control during the hospital course.  Laboratory studies do show an elevated lipase lightly elevated glucose of 126 and elevated BUN consistent with mild dehydration.  CT scan does confirm pancreatitis as well as elevated lipase there is no signs of perforation.  I did speak with Dr. Stringer.  I feels that this is more medical management at this point.  I spoke with the patient she has had multiple bouts of pancreatitis in the past she does have her own oral medications at home as well as Zofran she feels comfortable being able to go home.  At this point patient does have a acute pancreatitis most likely secondary to the ERCP.  The patient feels better and feels comfortable being able to be discharged.  Recommended that there is any worsening symptoms return back to the emerge department for reevaluation but otherwise follow-up with Dr. Stringer in 1 week for recheck return as needed.    FINAL IMPRESSION  1. Acute biliary pancreatitis without infection or necrosis            The patient will return for new or worsening symptoms and is stable at the time of discharge.    The patient is referred to a primary physician for blood pressure management, diabetic screening, and for all other preventative health concerns.        DISPOSITION:  Patient will be discharged home in stable condition.    FOLLOW UP:  Fausto Casas M.D.  [id number]  [place] [id number]  [phone]    Schedule an appointment as soon as possible for a visit in 1 week  For re-check, Return if any symptoms worsen      OUTPATIENT MEDICATIONS:  New Prescriptions    No medications on file               Electronically signed by: Heraclio Caruso M.D., 10/2/2021 1:13 PM

## 2021-10-02 NOTE — ASSESSMENT & PLAN NOTE
- Start IVFs  - Recheck in the am   - Secondary to underlying pancreatitis, nausea with decreased oral intake

## 2021-10-08 ENCOUNTER — APPOINTMENT (OUTPATIENT)
Dept: RADIOLOGY | Facility: MEDICAL CENTER | Age: 66
DRG: 862 | End: 2021-10-08
Attending: EMERGENCY MEDICINE
Payer: MEDICARE

## 2021-10-08 ENCOUNTER — HOSPITAL ENCOUNTER (INPATIENT)
Facility: MEDICAL CENTER | Age: 66
LOS: 7 days | DRG: 862 | End: 2021-10-15
Attending: EMERGENCY MEDICINE | Admitting: INTERNAL MEDICINE
Payer: MEDICARE

## 2021-10-08 DIAGNOSIS — A02.1 SALMONELLA SEPSIS, UNSPECIFIED WHETHER ACUTE ORGAN DYSFUNCTION PRESENT (HCC): ICD-10-CM

## 2021-10-08 DIAGNOSIS — K83.9 BILE LEAK: ICD-10-CM

## 2021-10-08 DIAGNOSIS — K65.1 ABSCESS OF ABDOMINAL CAVITY (HCC): ICD-10-CM

## 2021-10-08 PROBLEM — A41.9 SEPSIS (HCC): Status: ACTIVE | Noted: 2021-10-08

## 2021-10-08 PROBLEM — R18.8 INTRAABDOMINAL FLUID COLLECTION: Status: ACTIVE | Noted: 2021-10-08

## 2021-10-08 PROBLEM — Z87.19 HISTORY OF PANCREATITIS: Status: ACTIVE | Noted: 2021-10-08

## 2021-10-08 LAB
ALBUMIN SERPL BCP-MCNC: 3.1 G/DL (ref 3.2–4.9)
ALBUMIN/GLOB SERPL: 0.9 G/DL
ALP SERPL-CCNC: 89 U/L (ref 30–99)
ALT SERPL-CCNC: 17 U/L (ref 2–50)
ANION GAP SERPL CALC-SCNC: 20 MMOL/L (ref 7–16)
APPEARANCE UR: CLEAR
AST SERPL-CCNC: 29 U/L (ref 12–45)
BACTERIA #/AREA URNS HPF: ABNORMAL /HPF
BASOPHILS # BLD AUTO: 0 % (ref 0–1.8)
BASOPHILS # BLD: 0 K/UL (ref 0–0.12)
BILIRUB SERPL-MCNC: 1.3 MG/DL (ref 0.1–1.5)
BILIRUB UR QL STRIP.AUTO: ABNORMAL
BUN SERPL-MCNC: 19 MG/DL (ref 8–22)
CALCIUM SERPL-MCNC: 9.2 MG/DL (ref 8.4–10.2)
CHLORIDE SERPL-SCNC: 89 MMOL/L (ref 96–112)
CO2 SERPL-SCNC: 21 MMOL/L (ref 20–33)
COLOR UR: YELLOW
CREAT SERPL-MCNC: 0.66 MG/DL (ref 0.5–1.4)
EOSINOPHIL # BLD AUTO: 0 K/UL (ref 0–0.51)
EOSINOPHIL NFR BLD: 0 % (ref 0–6.9)
EPI CELLS #/AREA URNS HPF: ABNORMAL /HPF
ERYTHROCYTE [DISTWIDTH] IN BLOOD BY AUTOMATED COUNT: 44.3 FL (ref 35.9–50)
GLOBULIN SER CALC-MCNC: 3.6 G/DL (ref 1.9–3.5)
GLUCOSE SERPL-MCNC: 77 MG/DL (ref 65–99)
GLUCOSE UR STRIP.AUTO-MCNC: NEGATIVE MG/DL
HCT VFR BLD AUTO: 39.5 % (ref 37–47)
HGB BLD-MCNC: 13.2 G/DL (ref 12–16)
INR PPP: 1.36 (ref 0.87–1.13)
KETONES UR STRIP.AUTO-MCNC: >=80 MG/DL
LACTATE BLD-SCNC: 1.6 MMOL/L (ref 0.5–2)
LACTATE BLD-SCNC: 1.7 MMOL/L (ref 0.5–2)
LACTATE BLD-SCNC: 1.8 MMOL/L (ref 0.5–2)
LACTATE BLD-SCNC: 2.1 MMOL/L (ref 0.5–2)
LACTATE BLD-SCNC: 2.6 MMOL/L (ref 0.5–2)
LEUKOCYTE ESTERASE UR QL STRIP.AUTO: NEGATIVE
LIPASE SERPL-CCNC: 27 U/L (ref 7–58)
LYMPHOCYTES # BLD AUTO: 0.71 K/UL (ref 1–4.8)
LYMPHOCYTES NFR BLD: 4 % (ref 22–41)
MANUAL DIFF BLD: NORMAL
MCH RBC QN AUTO: 30.4 PG (ref 27–33)
MCHC RBC AUTO-ENTMCNC: 33.4 G/DL (ref 33.6–35)
MCV RBC AUTO: 91 FL (ref 81.4–97.8)
MICRO URNS: ABNORMAL
MONOCYTES # BLD AUTO: 0.36 K/UL (ref 0–0.85)
MONOCYTES NFR BLD AUTO: 2 % (ref 0–13.4)
MYELOCYTES NFR BLD MANUAL: 1 %
NEUTROPHILS # BLD AUTO: 16.55 K/UL (ref 2–7.15)
NEUTROPHILS NFR BLD: 85 % (ref 44–72)
NEUTS BAND NFR BLD MANUAL: 8 % (ref 0–10)
NITRITE UR QL STRIP.AUTO: NEGATIVE
NRBC # BLD AUTO: 0 K/UL
NRBC BLD-RTO: 0 /100 WBC
PH UR STRIP.AUTO: 6.5 [PH] (ref 5–8)
PLATELET # BLD AUTO: 249 K/UL (ref 164–446)
PLATELET BLD QL SMEAR: NORMAL
PMV BLD AUTO: 10.7 FL (ref 9–12.9)
POTASSIUM SERPL-SCNC: 4.3 MMOL/L (ref 3.6–5.5)
PROT SERPL-MCNC: 6.7 G/DL (ref 6–8.2)
PROT UR QL STRIP: 30 MG/DL
PROTHROMBIN TIME: 15.7 SEC (ref 12–14.6)
RBC # BLD AUTO: 4.34 M/UL (ref 4.2–5.4)
RBC # URNS HPF: ABNORMAL /HPF
RBC BLD AUTO: NORMAL
RBC UR QL AUTO: ABNORMAL
SODIUM SERPL-SCNC: 130 MMOL/L (ref 135–145)
SP GR UR STRIP.AUTO: 1.01
UNIDENT CRYS URNS QL MICRO: ABNORMAL /HPF
WBC # BLD AUTO: 17.8 K/UL (ref 4.8–10.8)
WBC #/AREA URNS HPF: ABNORMAL /HPF

## 2021-10-08 PROCEDURE — 700105 HCHG RX REV CODE 258: Performed by: INTERNAL MEDICINE

## 2021-10-08 PROCEDURE — 87186 SC STD MICRODIL/AGAR DIL: CPT | Mod: 91

## 2021-10-08 PROCEDURE — 80053 COMPREHEN METABOLIC PANEL: CPT

## 2021-10-08 PROCEDURE — 700105 HCHG RX REV CODE 258: Performed by: EMERGENCY MEDICINE

## 2021-10-08 PROCEDURE — 36415 COLL VENOUS BLD VENIPUNCTURE: CPT

## 2021-10-08 PROCEDURE — 99223 1ST HOSP IP/OBS HIGH 75: CPT | Mod: AI | Performed by: INTERNAL MEDICINE

## 2021-10-08 PROCEDURE — 83690 ASSAY OF LIPASE: CPT

## 2021-10-08 PROCEDURE — 4410111 CT-DRAIN-PERITONEAL

## 2021-10-08 PROCEDURE — 700111 HCHG RX REV CODE 636 W/ 250 OVERRIDE (IP)

## 2021-10-08 PROCEDURE — 87040 BLOOD CULTURE FOR BACTERIA: CPT | Mod: 91

## 2021-10-08 PROCEDURE — 700111 HCHG RX REV CODE 636 W/ 250 OVERRIDE (IP): Performed by: EMERGENCY MEDICINE

## 2021-10-08 PROCEDURE — 87106 FUNGI IDENTIFICATION YEAST: CPT

## 2021-10-08 PROCEDURE — 0W9G30Z DRAINAGE OF PERITONEAL CAVITY WITH DRAINAGE DEVICE, PERCUTANEOUS APPROACH: ICD-10-PCS | Performed by: RADIOLOGY

## 2021-10-08 PROCEDURE — 700111 HCHG RX REV CODE 636 W/ 250 OVERRIDE (IP): Performed by: RADIOLOGY

## 2021-10-08 PROCEDURE — A9270 NON-COVERED ITEM OR SERVICE: HCPCS | Performed by: INTERNAL MEDICINE

## 2021-10-08 PROCEDURE — 94760 N-INVAS EAR/PLS OXIMETRY 1: CPT

## 2021-10-08 PROCEDURE — 99153 MOD SED SAME PHYS/QHP EA: CPT

## 2021-10-08 PROCEDURE — 700102 HCHG RX REV CODE 250 W/ 637 OVERRIDE(OP): Performed by: INTERNAL MEDICINE

## 2021-10-08 PROCEDURE — 85027 COMPLETE CBC AUTOMATED: CPT

## 2021-10-08 PROCEDURE — 700111 HCHG RX REV CODE 636 W/ 250 OVERRIDE (IP): Performed by: INTERNAL MEDICINE

## 2021-10-08 PROCEDURE — 85007 BL SMEAR W/DIFF WBC COUNT: CPT

## 2021-10-08 PROCEDURE — 96374 THER/PROPH/DIAG INJ IV PUSH: CPT

## 2021-10-08 PROCEDURE — 74177 CT ABD & PELVIS W/CONTRAST: CPT | Mod: ME

## 2021-10-08 PROCEDURE — 87205 SMEAR GRAM STAIN: CPT

## 2021-10-08 PROCEDURE — 700117 HCHG RX CONTRAST REV CODE 255: Performed by: EMERGENCY MEDICINE

## 2021-10-08 PROCEDURE — 87070 CULTURE OTHR SPECIMN AEROBIC: CPT

## 2021-10-08 PROCEDURE — 87077 CULTURE AEROBIC IDENTIFY: CPT

## 2021-10-08 PROCEDURE — 99285 EMERGENCY DEPT VISIT HI MDM: CPT

## 2021-10-08 PROCEDURE — 96375 TX/PRO/DX INJ NEW DRUG ADDON: CPT

## 2021-10-08 PROCEDURE — 81001 URINALYSIS AUTO W/SCOPE: CPT

## 2021-10-08 PROCEDURE — 83605 ASSAY OF LACTIC ACID: CPT | Mod: 91

## 2021-10-08 PROCEDURE — 85610 PROTHROMBIN TIME: CPT

## 2021-10-08 PROCEDURE — 770001 HCHG ROOM/CARE - MED/SURG/GYN PRIV*

## 2021-10-08 RX ORDER — CEFTRIAXONE 2 G/1
2 INJECTION, POWDER, FOR SOLUTION INTRAMUSCULAR; INTRAVENOUS ONCE
Status: COMPLETED | OUTPATIENT
Start: 2021-10-08 | End: 2021-10-08

## 2021-10-08 RX ORDER — ONDANSETRON 4 MG/1
4 TABLET, ORALLY DISINTEGRATING ORAL EVERY 4 HOURS PRN
Status: DISCONTINUED | OUTPATIENT
Start: 2021-10-08 | End: 2021-10-15 | Stop reason: HOSPADM

## 2021-10-08 RX ORDER — VITAMIN B COMPLEX
1000 TABLET ORAL DAILY
Status: DISCONTINUED | OUTPATIENT
Start: 2021-10-08 | End: 2021-10-15 | Stop reason: HOSPADM

## 2021-10-08 RX ORDER — METRONIDAZOLE 500 MG/1
500 TABLET ORAL EVERY 6 HOURS
Status: DISCONTINUED | OUTPATIENT
Start: 2021-10-08 | End: 2021-10-10

## 2021-10-08 RX ORDER — CYCLOBENZAPRINE HCL 10 MG
10 TABLET ORAL NIGHTLY PRN
Status: DISCONTINUED | OUTPATIENT
Start: 2021-10-08 | End: 2021-10-15 | Stop reason: HOSPADM

## 2021-10-08 RX ORDER — SODIUM CHLORIDE, SODIUM LACTATE, POTASSIUM CHLORIDE, AND CALCIUM CHLORIDE .6; .31; .03; .02 G/100ML; G/100ML; G/100ML; G/100ML
500 INJECTION, SOLUTION INTRAVENOUS
Status: DISCONTINUED | OUTPATIENT
Start: 2021-10-08 | End: 2021-10-15 | Stop reason: HOSPADM

## 2021-10-08 RX ORDER — LORAZEPAM 2 MG/ML
0.5 INJECTION INTRAMUSCULAR ONCE
Status: COMPLETED | OUTPATIENT
Start: 2021-10-08 | End: 2021-10-08

## 2021-10-08 RX ORDER — SODIUM CHLORIDE, SODIUM LACTATE, POTASSIUM CHLORIDE, CALCIUM CHLORIDE 600; 310; 30; 20 MG/100ML; MG/100ML; MG/100ML; MG/100ML
INJECTION, SOLUTION INTRAVENOUS CONTINUOUS
Status: DISCONTINUED | OUTPATIENT
Start: 2021-10-08 | End: 2021-10-12

## 2021-10-08 RX ORDER — OXYCODONE HYDROCHLORIDE 5 MG/1
2.5 TABLET ORAL
Status: DISCONTINUED | OUTPATIENT
Start: 2021-10-08 | End: 2021-10-09

## 2021-10-08 RX ORDER — SODIUM CHLORIDE 9 MG/ML
500 INJECTION, SOLUTION INTRAVENOUS
Status: ACTIVE | OUTPATIENT
Start: 2021-10-08 | End: 2021-10-08

## 2021-10-08 RX ORDER — LOSARTAN POTASSIUM 25 MG/1
25 TABLET ORAL EVERY MORNING
Status: DISCONTINUED | OUTPATIENT
Start: 2021-10-08 | End: 2021-10-11

## 2021-10-08 RX ORDER — ONDANSETRON 2 MG/ML
INJECTION INTRAMUSCULAR; INTRAVENOUS
Status: COMPLETED
Start: 2021-10-08 | End: 2021-10-08

## 2021-10-08 RX ORDER — ONDANSETRON 2 MG/ML
4 INJECTION INTRAMUSCULAR; INTRAVENOUS ONCE
Status: COMPLETED | OUTPATIENT
Start: 2021-10-08 | End: 2021-10-08

## 2021-10-08 RX ORDER — SODIUM CHLORIDE, SODIUM LACTATE, POTASSIUM CHLORIDE, AND CALCIUM CHLORIDE .6; .31; .03; .02 G/100ML; G/100ML; G/100ML; G/100ML
30 INJECTION, SOLUTION INTRAVENOUS ONCE
Status: COMPLETED | OUTPATIENT
Start: 2021-10-08 | End: 2021-10-08

## 2021-10-08 RX ORDER — POLYETHYLENE GLYCOL 3350 17 G/17G
1 POWDER, FOR SOLUTION ORAL
Status: DISCONTINUED | OUTPATIENT
Start: 2021-10-08 | End: 2021-10-11

## 2021-10-08 RX ORDER — CYCLOBENZAPRINE HCL 10 MG
10 TABLET ORAL EVERY EVENING
COMMUNITY

## 2021-10-08 RX ORDER — OXYCODONE HYDROCHLORIDE 5 MG/1
5 TABLET ORAL
Status: DISCONTINUED | OUTPATIENT
Start: 2021-10-08 | End: 2021-10-09

## 2021-10-08 RX ORDER — ONDANSETRON 2 MG/ML
4 INJECTION INTRAMUSCULAR; INTRAVENOUS PRN
Status: ACTIVE | OUTPATIENT
Start: 2021-10-08 | End: 2021-10-08

## 2021-10-08 RX ORDER — HYDROMORPHONE HYDROCHLORIDE 1 MG/ML
0.25 INJECTION, SOLUTION INTRAMUSCULAR; INTRAVENOUS; SUBCUTANEOUS
Status: DISCONTINUED | OUTPATIENT
Start: 2021-10-08 | End: 2021-10-09

## 2021-10-08 RX ORDER — FAMOTIDINE 20 MG/1
20 TABLET, FILM COATED ORAL 2 TIMES DAILY
Status: DISCONTINUED | OUTPATIENT
Start: 2021-10-08 | End: 2021-10-15 | Stop reason: HOSPADM

## 2021-10-08 RX ORDER — METRONIDAZOLE 500 MG/1
500 TABLET ORAL EVERY 8 HOURS
Status: DISCONTINUED | OUTPATIENT
Start: 2021-10-08 | End: 2021-10-08

## 2021-10-08 RX ORDER — MIDAZOLAM HYDROCHLORIDE 1 MG/ML
INJECTION INTRAMUSCULAR; INTRAVENOUS
Status: COMPLETED
Start: 2021-10-08 | End: 2021-10-08

## 2021-10-08 RX ORDER — EZETIMIBE 10 MG/1
10 TABLET ORAL EVERY EVENING
Status: DISCONTINUED | OUTPATIENT
Start: 2021-10-08 | End: 2021-10-15 | Stop reason: HOSPADM

## 2021-10-08 RX ORDER — BISACODYL 10 MG
10 SUPPOSITORY, RECTAL RECTAL
Status: DISCONTINUED | OUTPATIENT
Start: 2021-10-08 | End: 2021-10-11

## 2021-10-08 RX ORDER — AMOXICILLIN 250 MG
2 CAPSULE ORAL 2 TIMES DAILY
Status: DISCONTINUED | OUTPATIENT
Start: 2021-10-08 | End: 2021-10-11

## 2021-10-08 RX ORDER — ACETAMINOPHEN 325 MG/1
650 TABLET ORAL EVERY 6 HOURS PRN
Status: DISCONTINUED | OUTPATIENT
Start: 2021-10-08 | End: 2021-10-12

## 2021-10-08 RX ORDER — MIDAZOLAM HYDROCHLORIDE 1 MG/ML
INJECTION INTRAMUSCULAR; INTRAVENOUS
Status: ACTIVE
Start: 2021-10-08 | End: 2021-10-09

## 2021-10-08 RX ORDER — MIDAZOLAM HYDROCHLORIDE 1 MG/ML
.5-2 INJECTION INTRAMUSCULAR; INTRAVENOUS PRN
Status: ACTIVE | OUTPATIENT
Start: 2021-10-08 | End: 2021-10-08

## 2021-10-08 RX ORDER — NALOXONE HYDROCHLORIDE 0.4 MG/ML
INJECTION, SOLUTION INTRAMUSCULAR; INTRAVENOUS; SUBCUTANEOUS
Status: ACTIVE
Start: 2021-10-08 | End: 2021-10-09

## 2021-10-08 RX ORDER — ONDANSETRON 2 MG/ML
4 INJECTION INTRAMUSCULAR; INTRAVENOUS EVERY 4 HOURS PRN
Status: DISCONTINUED | OUTPATIENT
Start: 2021-10-08 | End: 2021-10-15 | Stop reason: HOSPADM

## 2021-10-08 RX ORDER — GABAPENTIN 300 MG/1
600 CAPSULE ORAL 2 TIMES DAILY
Status: DISCONTINUED | OUTPATIENT
Start: 2021-10-08 | End: 2021-10-15 | Stop reason: HOSPADM

## 2021-10-08 RX ADMIN — ONDANSETRON 4 MG: 2 INJECTION INTRAMUSCULAR; INTRAVENOUS at 12:40

## 2021-10-08 RX ADMIN — FENTANYL CITRATE 25 MCG: 50 INJECTION, SOLUTION INTRAMUSCULAR; INTRAVENOUS at 16:37

## 2021-10-08 RX ADMIN — CEFTRIAXONE SODIUM 2 G: 2 INJECTION, POWDER, FOR SOLUTION INTRAMUSCULAR; INTRAVENOUS at 10:21

## 2021-10-08 RX ADMIN — MIDAZOLAM HYDROCHLORIDE 1 MG: 1 INJECTION, SOLUTION INTRAMUSCULAR; INTRAVENOUS at 16:19

## 2021-10-08 RX ADMIN — Medication 400 MG: at 19:34

## 2021-10-08 RX ADMIN — SODIUM CHLORIDE, POTASSIUM CHLORIDE, SODIUM LACTATE AND CALCIUM CHLORIDE: 600; 310; 30; 20 INJECTION, SOLUTION INTRAVENOUS at 12:31

## 2021-10-08 RX ADMIN — FAMOTIDINE 20 MG: 20 TABLET ORAL at 19:17

## 2021-10-08 RX ADMIN — HYDROMORPHONE HYDROCHLORIDE 0.25 MG: 1 INJECTION, SOLUTION INTRAMUSCULAR; INTRAVENOUS; SUBCUTANEOUS at 12:07

## 2021-10-08 RX ADMIN — METRONIDAZOLE 500 MG: 500 TABLET ORAL at 12:07

## 2021-10-08 RX ADMIN — MIDAZOLAM HYDROCHLORIDE 1 MG: 1 INJECTION, SOLUTION INTRAMUSCULAR; INTRAVENOUS at 16:37

## 2021-10-08 RX ADMIN — GABAPENTIN 600 MG: 300 CAPSULE ORAL at 19:16

## 2021-10-08 RX ADMIN — EZETIMIBE 10 MG: 10 TABLET ORAL at 19:16

## 2021-10-08 RX ADMIN — SODIUM CHLORIDE, POTASSIUM CHLORIDE, SODIUM LACTATE AND CALCIUM CHLORIDE 1836 ML: 600; 310; 30; 20 INJECTION, SOLUTION INTRAVENOUS at 08:15

## 2021-10-08 RX ADMIN — ACETAMINOPHEN 650 MG: 325 TABLET ORAL at 19:17

## 2021-10-08 RX ADMIN — LORAZEPAM 0.5 MG: 2 INJECTION INTRAMUSCULAR; INTRAVENOUS at 06:37

## 2021-10-08 RX ADMIN — Medication 1000 UNITS: at 19:17

## 2021-10-08 RX ADMIN — ONDANSETRON 4 MG: 2 INJECTION INTRAMUSCULAR; INTRAVENOUS at 16:16

## 2021-10-08 RX ADMIN — IOHEXOL 100 ML: 350 INJECTION, SOLUTION INTRAVENOUS at 08:26

## 2021-10-08 RX ADMIN — MIDAZOLAM HYDROCHLORIDE 1 MG: 1 INJECTION, SOLUTION INTRAMUSCULAR; INTRAVENOUS at 16:30

## 2021-10-08 RX ADMIN — FENTANYL CITRATE 50 MCG: 50 INJECTION, SOLUTION INTRAMUSCULAR; INTRAVENOUS at 16:19

## 2021-10-08 RX ADMIN — OXYCODONE HYDROCHLORIDE 5 MG: 5 TABLET ORAL at 19:34

## 2021-10-08 RX ADMIN — ONDANSETRON HYDROCHLORIDE 4 MG: 2 SOLUTION INTRAMUSCULAR; INTRAVENOUS at 06:34

## 2021-10-08 RX ADMIN — OXYCODONE HYDROCHLORIDE 5 MG: 5 TABLET ORAL at 12:40

## 2021-10-08 RX ADMIN — LOSARTAN POTASSIUM 25 MG: 25 TABLET, FILM COATED ORAL at 19:17

## 2021-10-08 RX ADMIN — METRONIDAZOLE 500 MG: 500 TABLET ORAL at 19:16

## 2021-10-08 RX ADMIN — FENTANYL CITRATE 100 MCG: 50 INJECTION, SOLUTION INTRAMUSCULAR; INTRAVENOUS at 06:36

## 2021-10-08 RX ADMIN — FENTANYL CITRATE 50 MCG: 50 INJECTION, SOLUTION INTRAMUSCULAR; INTRAVENOUS at 16:30

## 2021-10-08 ASSESSMENT — COGNITIVE AND FUNCTIONAL STATUS - GENERAL
WALKING IN HOSPITAL ROOM: A LITTLE
MOVING TO AND FROM BED TO CHAIR: A LITTLE
SUGGESTED CMS G CODE MODIFIER DAILY ACTIVITY: CJ
MOBILITY SCORE: 18
DRESSING REGULAR LOWER BODY CLOTHING: A LITTLE
MOVING FROM LYING ON BACK TO SITTING ON SIDE OF FLAT BED: A LITTLE
CLIMB 3 TO 5 STEPS WITH RAILING: A LITTLE
TOILETING: A LITTLE
SUGGESTED CMS G CODE MODIFIER MOBILITY: CK
STANDING UP FROM CHAIR USING ARMS: A LITTLE
DAILY ACTIVITIY SCORE: 20
HELP NEEDED FOR BATHING: A LITTLE
TURNING FROM BACK TO SIDE WHILE IN FLAT BAD: A LITTLE
DRESSING REGULAR UPPER BODY CLOTHING: A LITTLE

## 2021-10-08 ASSESSMENT — ENCOUNTER SYMPTOMS
EYE REDNESS: 0
WHEEZING: 0
SEIZURES: 0
COUGH: 0
ABDOMINAL PAIN: 1
PALPITATIONS: 0
CHILLS: 0
FALLS: 0
FOCAL WEAKNESS: 0
SHORTNESS OF BREATH: 0
MYALGIAS: 0
TREMORS: 0
NAUSEA: 1
HEMOPTYSIS: 0
CONSTIPATION: 0
DIZZINESS: 0
NERVOUS/ANXIOUS: 0
LOSS OF CONSCIOUSNESS: 0
FEVER: 1
DIARRHEA: 0
WEAKNESS: 0
HEADACHES: 0
INSOMNIA: 0
EYE PAIN: 0
BLOOD IN STOOL: 0

## 2021-10-08 ASSESSMENT — LIFESTYLE VARIABLES
EVER HAD A DRINK FIRST THING IN THE MORNING TO STEADY YOUR NERVES TO GET RID OF A HANGOVER: NO
HAVE YOU EVER FELT YOU SHOULD CUT DOWN ON YOUR DRINKING: NO
EVER FELT BAD OR GUILTY ABOUT YOUR DRINKING: NO
HAVE PEOPLE ANNOYED YOU BY CRITICIZING YOUR DRINKING: NO
CONSUMPTION TOTAL: NEGATIVE
AVERAGE NUMBER OF DAYS PER WEEK YOU HAVE A DRINK CONTAINING ALCOHOL: 0
HOW MANY TIMES IN THE PAST YEAR HAVE YOU HAD 5 OR MORE DRINKS IN A DAY: 0
TOTAL SCORE: 0
TOTAL SCORE: 0
ON A TYPICAL DAY WHEN YOU DRINK ALCOHOL HOW MANY DRINKS DO YOU HAVE: 0
DO YOU DRINK ALCOHOL: NO
TOTAL SCORE: 0

## 2021-10-08 ASSESSMENT — COPD QUESTIONNAIRES
HAVE YOU SMOKED AT LEAST 100 CIGARETTES IN YOUR ENTIRE LIFE: NO/DON'T KNOW
DURING THE PAST 4 WEEKS HOW MUCH DID YOU FEEL SHORT OF BREATH: NONE/LITTLE OF THE TIME
COPD SCREENING SCORE: 2
DO YOU EVER COUGH UP ANY MUCUS OR PHLEGM?: NO/ONLY WITH OCCASIONAL COLDS OR INFECTIONS

## 2021-10-08 ASSESSMENT — PATIENT HEALTH QUESTIONNAIRE - PHQ9
2. FEELING DOWN, DEPRESSED, IRRITABLE, OR HOPELESS: NOT AT ALL
SUM OF ALL RESPONSES TO PHQ9 QUESTIONS 1 AND 2: 0
1. LITTLE INTEREST OR PLEASURE IN DOING THINGS: NOT AT ALL

## 2021-10-08 ASSESSMENT — FIBROSIS 4 INDEX
FIB4 SCORE: 1.86
FIB4 SCORE: 1.86
FIB4 SCORE: 2.13

## 2021-10-08 ASSESSMENT — PAIN DESCRIPTION - PAIN TYPE
TYPE: ACUTE PAIN

## 2021-10-08 NOTE — ED NOTES
Pharmacy Medication Reconciliation      ~Medication reconciliation updated and complete per pt at bedside  ~Allergies have been verified and updated   ~No oral ABX within the last 30 days  ~Patient home pharmacy:[facility]

## 2021-10-08 NOTE — ASSESSMENT & PLAN NOTE
This is Sepsis Present on admission  SIRS criteria identified on my evaluation include: Leukocytosis, with WBC greater than 12,000  Source is intraabdominal  Sepsis protocol initiated  Fluid resuscitation ordered per protocol  IV antibiotics as appropriate for source of sepsis  While organ dysfunction may be noted elsewhere in this problem list or in the chart, degree of organ dysfunction does not meet CMS criteria for severe sepsis  IR placed drain 10/8 - bilious appearing output  Fluid culture showing e coli and e faecalis - changed to zosyn  300cc out today after drain flushed  Blood cultures now positive, ID consulted

## 2021-10-08 NOTE — ED PROVIDER NOTES
ED Provider Note    ED Provider Note    Primary care provider: Pratik Gordon M.D.  Means of arrival: EMS  History obtained from: Patient    CHIEF COMPLAINT  Chief Complaint   Patient presents with   • RLQ Pain     Worsening RLQ pain over the past week. Dx with pancreatitis 1 week ago. Denies nausea or vomting     Seen at 6:12 AM.   HPI  Nils Alfonso is a 66 y.o. female who presents to the Emergency Department for recurrent pancreatitis.  The patient does have a history of current pancreatitis, she is a patient of Dr. Casas.  Over the past several days she has had waxing and waning severe diffuse abdominal pain right lower quadrant abdominal pain, the pain does radiate to her back, feels very similar to prior episodes of pancreatitis.  She has been nauseous but has not been vomiting as it has been controlled with Zofran.  She has had decreased p.o. intake secondary to the pain but has been able to tolerate water.    She denies any fevers, chills, chest pain, shortness of breath, numbness, focal weakness.  She is status post cholecystectomy.  She does still have her appendix.    REVIEW OF SYSTEMS  See HPI,   Remainder of ROS negative.     PAST MEDICAL HISTORY   has a past medical history of Allergy, unspecified not elsewhere classified, Anemia, Anesthesia, Arthritis, Backpain, Bronchitis (2010), Cataract, Degeneration of cervical intervertebral disc, Dental disorder, Heart burn, Hiatus hernia syndrome, High cholesterol, Hyperlipidemia, Hypertension, Muscle disorder, Pain (05/16/2018), and Reactive airway disease.    SURGICAL HISTORY   has a past surgical history that includes hysterectomy robotic (1/2/2009); tonsillectomy and adenoidectomy (1967); tubal ligation (1985); gastric resection (1982); primary c section (1976, 1979, 1985); lumbar fusion anterior (2007); cholecystectomy (1996); rotator cuff repair (Left, 5/2015); rotator cuff repair (Right, 7/7/2016); gastroscopy (N/A, 8/26/2016); gastric sleeve  "laparoscopy (10/19/2016); liver biopsy laparoscopic (10/19/2016); cataract phaco with iol (Right, 2017); cataract phaco with iol (Left, 2017); gastroscopy (2018); egd w/endoscopic ultrasound (2018); colonoscopy (2018); and ercp,diagnostic (10/1/2021).    SOCIAL HISTORY  Social History     Tobacco Use   • Smoking status: Former Smoker     Packs/day: 0.25     Years: 0.10     Pack years: 0.02     Types: Cigarettes     Quit date: 1973     Years since quittin.8   • Smokeless tobacco: Never Used   Vaping Use   • Vaping Use: Never used   Substance Use Topics   • Alcohol use: No   • Drug use: No      Social History     Substance and Sexual Activity   Drug Use No       FAMILY HISTORY  Family History   Problem Relation Age of Onset   • Stroke Mother    • Cancer Father         larynx   • Diabetes Brother        CURRENT MEDICATIONS  Reviewed.  See Encounter Summary.     ALLERGIES  Allergies   Allergen Reactions   • Ampicillin Rash     Rash     • Cephalexin Diarrhea, Vomiting and Nausea     .   • Clindamycin Vomiting     \"cleocin\"   • Codeine      Severe stomach pain, cramps, spasms   • Demerol Vomiting   • Levofloxacin Unspecified     Numbness     • Tetracyclines Rash     .   • Tizanidine Itching   • Hydromorphone Vomiting and Nausea   • Morphine Vomiting   • Pcn [Penicillins] Itching   • Sulfa Drugs Itching       PHYSICAL EXAM  VITAL SIGNS: /55   Pulse 87   Temp 36.5 °C (97.7 °F) (Temporal)   Resp (!) 24   Ht 1.6 m (5' 3\")   Wt 61.2 kg (135 lb)   LMP 2009   SpO2 100%   BMI 23.91 kg/m²   Constitutional: Awake, alert in no apparent distress.  Appears uncomfortable.  HENT: Normocephalic, Bilateral external ears normal. Nose normal.   Eyes: Conjunctiva normal, non-icteric, EOMI.    Thorax & Lungs: Easy unlabored respirations, Clear to ascultation bilaterally.  Cardiovascular: Regular rate, Regular rhythm, No murmurs, rubs or gallops. Bilateral pulses symmetrical.   Abdomen:  " Soft, epigastric, right-sided abdominal tenderness without rebound or guarding, negative psoas, negative obturator, nondistended, normal active bowel sounds.   :    Skin: Visualized skin is  Dry, No erythema, No rash.   Musculoskeletal:   No cyanosis, clubbing or edema. No leg asymmetry.   Neurologic: Alert, Grossly non-focal.   Psychiatric: Normal affect, Normal mood  Lymphatic:  No cervical LAD      RADIOLOGY  CT-ABDOMEN-PELVIS WITH   Final Result         1. Large irregular irregular fluid collection with thick wall occupying most of the right abdomen surrounding the right kidney and right colon.      Additional fluid and gas collection in the midline abdomen anterior to the pancreas concerning for abscess. This collection is probably connected to the right side collection via a junction in the jl hepatis      2. Severe wall thickening of the descending colon, transverse colon, second portion duodenum, likely reactive.      3. Pneumobilia with gas in the CBD, intrahepatic bile ducts as well as pancreatic ducts are of unclear etiology.      4. Small right pleural effusion with right basilar atelectasis.            IR-CONSULT AND TREAT    (Results Pending)   CT-DRAIN-PERITONEAL    (Results Pending)         COURSE & MEDICAL DECISION MAKING  Pertinent Labs & Imaging studies reviewed. (See chart for details)    Differential diagnoses include but are not limited to: Most likely continued recurrent pancreatitis.  Patient does have some right-sided abdominal pain but after review of the CT from several days ago she had fluid along the right pericolic gutter which is likely causing the pain.  Unlikely to be appendicitis.    6:12 AM - Medical record reviewed, patient with history of recurrent pancreatitis, patient of Dr. Fabian.  She underwent ERCP on October 1 with:  1.  Biliary sphincterotomy.  2.  Bile duct biopsy.  3.  Bile duct ampulla dilation.    She then returned to the emergency room in October 2 with  abdominal pain.  Labs showed a slight leukocytosis, mildly elevated lipase, CT at that time showed: Acute interstitial edematous pancreatitis with surrounding mesenteric edema tracking along the right paracolic gutter. Small volume ascites in the pelvis. No walled off collections.    7:51 AM: Initially I plan to do screening labs, control pain medications.  The patient has a white count of 17.8 with 85% neutrophils and 8% bandemia.  This is concerning for sepsis, therefore blood cultures, 30 cc/kg fluid bolus and repeat CT with IV contrast has been ordered at this time.  With regards to the IV fluids, this would be a CMS requirement for sepsis and cannot be replaced with p.o. hydration.    After IV fluids the patient is improved.    8:52 AM-general surgery, GI paged.  Patient reexamined, she feels somewhat improved.  Last food intake was yesterday, she did drink some water about 30 minutes prior to EMS transportation, therefore roughly 3.5 hours ago at this point.    9:18 AM: Case discussed with Dr. Chin, GI.    10:00 AM: Case discussed with Dr. Figueroa, he feels that the patient would be best served at the main [facility], they would need to call the acute surgical team as well as IR.    10:36 AM: Discussed the case with interventional radiology, the patient will have an IR guided drain this afternoon.  Dr. Inman, hospitalist aware of the patient.    Decision Making:  This is a pleasant 66 y.o. year old female who presents with worsening right-sided abdominal pain, now 1 week status post ERCP with CBD dilatation.  The patient is septic with a leukocytosis, bandemia.  CT reveals a large fluid collection that appears to be predominantly peritoneal.  I discussed the case with GI, general surgery and interventional radiology.  The best plan of action at this point will be to admit the patient to [facility], continue IV antibiotics.  She received Rocephin and Flagyl in the emergency department.  IR drainage by CT  this afternoon is planned.  Patient updated with the plan of action.    CRITICAL CARE  The very real possibilty of a deterioration of this patient's condition required the highest level of my preparedness for sudden, emergent intervention.  I provided critical care services, which included medication orders, frequent reevaluations of the patient's condition and response to treatment, ordering and reviewing test results, and discussing the case with various consultants.  The critical care time associated with the care of the patient was 35 minutes. Review chart for interventions. This time is exclusive of any other billable procedures.         FINAL IMPRESSION  1. Salmonella sepsis, unspecified whether acute organ dysfunction present (HCC)    2. Abscess of abdominal cavity (HCC)

## 2021-10-08 NOTE — H&P
Hospital Medicine History & Physical Note    Date of Service  10/8/2021    Primary Care Physician  Pratik Gordon M.D.    Consultants    Code Status  Full Code    Chief Complaint  Chief Complaint   Patient presents with   • RLQ Pain     Worsening RLQ pain over the past week. Dx with pancreatitis 1 week ago. Denies nausea or vomting       History of Presenting Illness  Nils Alfonso is a 66 y.o. female who presented 10/8/2021 with RLQ Pain (Worsening RLQ pain over the past week. Dx with pancreatitis 1 week ago. Denies nausea or vomting)  She has a history of recurrent and idiopathic (was initially thought ot be gallstone related in 2011) pancreatitis s/p ERCP with sphincterotomy by Dr. Herrera on 10/01/2021 complicated by ERCP pancreatitis, s/p cholecystectomy, ex lap obesity with fatty liver s/p sleeve gastrectomy followed by Dr. Condon, s/p hysterectomy and bilateral salpingo-oophoerctomy,dyslipidemia, hypertension, DJD of the spine s/p lumbar fusion. Ever since her ERCP, she has been in pain, nauseous, inteittent mild subjective fevers, poor PO intake and minimal to no bowel movement. She ws managed outpatient for what was felt was postERCP pancreatitis. This morning she was groaning in pain and her  suggested that it would be time for her to go to the ER.  At the ED, she is afebrile, normotensive.  CT scan showed:  1. Large irregular irregular fluid collection with thick wall occupying most of the right abdomen surrounding the right kidney and right colon.  Additional fluid and gas collection in the midline abdomen anterior to the pancreas concerning for abscess. This collection is probably connected to the right side collection via a junction in the jl hepatis  2. Severe wall thickening of the descending colon, transverse colon, second portion duodenum, likely reactive.  3. Pneumobilia with gas in the CBD, intrahepatic bile ducts as well as pancreatic ducts are of unclear etiology.  4. Small  right pleural effusion with right basilar atelectasis.  Leukocytosis. Mild hyponatremia. Normal liver function panel \, normal lipase, normal lactic acid.  Dr. Figueroa, Surgery and Dr. Chin GI was called by EDP who mentioned IR for drainage and possible transfer. Dr. Ritchie, EDP spoke with IR who recommended admitting here at [facility] and planned for IR drainage this afternoon.   When I saw her she was still having abdominal pain and was asking for some water.    I discussed the plan of care with patient, family and bedside RN.    Review of Systems  Review of Systems   Constitutional: Positive for fever and malaise/fatigue. Negative for chills.   HENT: Negative for congestion, hearing loss and nosebleeds.    Eyes: Negative for pain and redness.   Respiratory: Negative for cough, hemoptysis, shortness of breath and wheezing.    Cardiovascular: Negative for chest pain and palpitations.   Gastrointestinal: Positive for abdominal pain and nausea. Negative for blood in stool, constipation and diarrhea.   Genitourinary: Negative for dysuria, frequency and hematuria.   Musculoskeletal: Negative for falls, joint pain and myalgias.   Skin: Negative for rash.   Neurological: Negative for dizziness, tremors, focal weakness, seizures, loss of consciousness, weakness and headaches.   Psychiatric/Behavioral: The patient is not nervous/anxious and does not have insomnia.    All other systems reviewed and are negative.      Past Medical History   has a past medical history of Allergy, unspecified not elsewhere classified, Anemia, Anesthesia, Arthritis, Backpain, Bronchitis (2010), Cataract, Degeneration of cervical intervertebral disc, Dental disorder, Heart burn, Hiatus hernia syndrome, High cholesterol, Hyperlipidemia, Hypertension, Muscle disorder, Pain (05/16/2018), and Reactive airway disease.    Surgical History   has a past surgical history that includes hysterectomy robotic (1/2/2009); tonsillectomy and  "adenoidectomy (1967); tubal ligation (1985); gastric resection (1982); primary c section (1976, 1979, 1985); lumbar fusion anterior (2007); cholecystectomy (1996); rotator cuff repair (Left, 5/2015); rotator cuff repair (Right, 7/7/2016); gastroscopy (N/A, 8/26/2016); gastric sleeve laparoscopy (10/19/2016); liver biopsy laparoscopic (10/19/2016); cataract phaco with iol (Right, 4/4/2017); cataract phaco with iol (Left, 4/18/2017); gastroscopy (5/23/2018); egd w/endoscopic ultrasound (5/23/2018); colonoscopy (8/8/2018); and pr ercp,diagnostic (10/1/2021).     Family History  family history includes Cancer in her father; Diabetes in her brother; Stroke in her mother.   Family history reviewed with patient. There is no family history that is pertinent to the chief complaint.     Social History   reports that she quit smoking about 48 years ago. Her smoking use included cigarettes. She has a 0.03 pack-year smoking history. She has never used smokeless tobacco. She reports that she does not drink alcohol and does not use drugs.    Allergies  Allergies   Allergen Reactions   • Ampicillin Rash     Rash     • Cephalexin Diarrhea, Vomiting and Nausea     .   • Clindamycin Vomiting     \"cleocin\"   • Codeine      Severe stomach pain, cramps, spasms   • Demerol Vomiting   • Levofloxacin Unspecified     Numbness     • Tetracyclines Rash     .   • Tizanidine Itching   • Hydromorphone Vomiting and Nausea   • Morphine Vomiting   • Pcn [Penicillins] Itching   • Sulfa Drugs Itching       Medications  Prior to Admission Medications   Prescriptions Last Dose Informant Patient Reported? Taking?   BIOTIN PO LAST WEEK at [facility] Patient Yes No   Sig: Take 1 Tablet by mouth every day.   Cholecalciferol (VITAMIN D3 PO) LAST WEEK at [facility] Patient Yes No   Sig: Take 1 Each by mouth every day.   HYDROcodone/acetaminophen (NORCO)  MG Tab 10/7/2021 at PM Patient Yes No   Sig: Take 1-2 Tabs by mouth every 6 hours as needed. Takes 1/2 pill "   Magnesium 400 MG Tab LAST WEEK at [facility] Patient Yes No   Sig: Take 400 mg by mouth every evening.   cyclobenzaprine (FLEXERIL) 10 mg Tab 10/7/2021 at PM Patient Yes Yes   Sig: Take 10 mg by mouth every evening.   ezetimibe (ZETIA) 10 MG Tab 10/7/2021 at PM Patient Yes No   Sig: Take 10 mg by mouth every evening.   gabapentin (NEURONTIN) 300 MG Cap 10/7/2021 at PM Patient Yes No   Sig: Take 600 mg by mouth 2 Times a Day.   losartan (COZAAR) 25 MG Tab 10/7/2021 at AM Patient Yes No   Sig: Take 25 mg by mouth every morning.   ondansetron (ZOFRAN ODT) 4 MG TABLET DISPERSIBLE 10/7/2021 at PM Patient Yes No   Sig: Take 4 mg by mouth every 6 hours as needed for Nausea.      Facility-Administered Medications: None       Physical Exam  Temp:  [36.5 °C (97.7 °F)] 36.5 °C (97.7 °F)  Pulse:  [81-94] 87  Resp:  [16-29] 24  BP: (101-123)/(55-62) 120/55  SpO2:  [93 %-100 %] 100 %  Blood Pressure : 120/55   Temperature: 36.5 °C (97.7 °F)   Pulse: 87   Respiration: (!) 24   Pulse Oximetry: 100 %       Physical Exam  Vitals and nursing note reviewed.   Constitutional:       General: She is not in acute distress.  HENT:      Head: Normocephalic and atraumatic.      Right Ear: External ear normal.      Left Ear: External ear normal.      Nose: Nose normal.      Mouth/Throat:      Mouth: Mucous membranes are moist.   Eyes:      General: No scleral icterus.     Conjunctiva/sclera: Conjunctivae normal.   Cardiovascular:      Rate and Rhythm: Normal rate and regular rhythm.      Pulses: Normal pulses.      Heart sounds: No murmur heard.   No friction rub. No gallop.    Pulmonary:      Effort: Pulmonary effort is normal. No respiratory distress.      Breath sounds: Normal breath sounds. No stridor. No wheezing, rhonchi or rales.   Chest:      Chest wall: No tenderness.   Abdominal:      General: Abdomen is flat. Bowel sounds are normal. There is no distension.      Palpations: Abdomen is soft.      Tenderness: There is abdominal tenderness.  There is no guarding or rebound.   Musculoskeletal:         General: No swelling, tenderness or deformity. Normal range of motion.      Cervical back: Normal range of motion and neck supple. No rigidity.   Skin:     General: Skin is warm.      Coloration: Skin is not jaundiced.   Neurological:      General: No focal deficit present.      Mental Status: She is alert and oriented to person, place, and time. Mental status is at baseline.   Psychiatric:         Mood and Affect: Mood normal.         Behavior: Behavior normal.         Thought Content: Thought content normal.         Judgment: Judgment normal.         Laboratory:  Recent Labs     10/08/21  0630   WBC 17.8*   RBC 4.34   HEMOGLOBIN 13.2   HEMATOCRIT 39.5   MCV 91.0   MCH 30.4   MCHC 33.4*   RDW 44.3   PLATELETCT 249   MPV 10.7     Recent Labs     10/08/21  0630   SODIUM 130*   POTASSIUM 4.3   CHLORIDE 89*   CO2 21   GLUCOSE 77   BUN 19   CREATININE 0.66   CALCIUM 9.2     Recent Labs     10/08/21  0630   ALTSGPT 17   ASTSGOT 29   ALKPHOSPHAT 89   TBILIRUBIN 1.3   LIPASE 27   GLUCOSE 77         No results for input(s): NTPROBNP in the last 72 hours.      No results for input(s): TROPONINT in the last 72 hours.    Imaging:  CT-ABDOMEN-PELVIS WITH   Final Result         1. Large irregular irregular fluid collection with thick wall occupying most of the right abdomen surrounding the right kidney and right colon.      Additional fluid and gas collection in the midline abdomen anterior to the pancreas concerning for abscess. This collection is probably connected to the right side collection via a junction in the jl hepatis      2. Severe wall thickening of the descending colon, transverse colon, second portion duodenum, likely reactive.      3. Pneumobilia with gas in the CBD, intrahepatic bile ducts as well as pancreatic ducts are of unclear etiology.      4. Small right pleural effusion with right basilar atelectasis.            IR-CONSULT AND TREAT     (Results Pending)   CT-DRAIN-PERITONEAL    (Results Pending)       no X-Ray or EKG requiring interpretation    Assessment/Plan:  I anticipate this patient will require at least two midnights for appropriate medical management, necessitating inpatient admission.    * Sepsis due to intraabdominal fluid collection/abscess (HCC)  Assessment & Plan  This is Sepsis Present on admission  SIRS criteria identified on my evaluation include: Leukocytosis, with WBC greater than 12,000  Source is intraabdominal  Sepsis protocol initiated  Fluid resuscitation ordered per protocol  IV antibiotics as appropriate for source of sepsis  While organ dysfunction may be noted elsewhere in this problem list or in the chart, degree of organ dysfunction does not meet CMS criteria for severe sepsis  IR has been contacted to do fluid drainage this afternoon, 10/8  Bowel rest, IV famotidine, pain control, antiemetics  Ordered antibiotics  Ordered fluid culture and gram stain        Intraabdominal fluid collection  Assessment & Plan  As above    History of pancreatitis  Assessment & Plan  Normal lipase  Pain control and bowel rest    History of morbid obesity- (present on admission)  Assessment & Plan  Body mass index is 23.91 kg/m².  Noted.     Hyperlipidemia- (present on admission)  Assessment & Plan  Continue Zetia    Vitamin D deficiency- (present on admission)  Assessment & Plan  Continue Vit D supplementation    Hypertension- (present on admission)  Assessment & Plan  Stable  Continue losartan      VTE prophylaxis: enoxaparin ppx

## 2021-10-08 NOTE — PROGRESS NOTES
Report received from night shift RN. Assume care. Pt. AAOx4 pt is bed,  Assessment completed. VSS. C/o pain will medicated shortly Pt was update for the care for the day. White board updated, All question answered. Pt has call light within reach,  bed is in the lowest position. Pt has no other needs at this time. Star IV fluids infusion  Covid 19 surge in effect  1520 Pt leaving unit to get IR drain done.\  1750 Pt back from IR. Peritoneal drain in place draining well.

## 2021-10-08 NOTE — ASSESSMENT & PLAN NOTE
"- Suspected biliary leak post ERCP with sphincterotomy  - Intra-abdominal percutaneous drain placed with improvement in pain  - GI consulting, CT showed no evidence of contrast outside of the bowel   - HIDA scan shows no biliary leak  - Cultures from drain placement with e.coli and enterococcus - covering with Zosyn.  Allergy listed to Ampicillin as \"hives\" but is tolerating Zosyn fine.  Will discuss with ID.  -Unclear etiology - possibly a biliary leak after ERCP. Discussed with lab - fluid amylase still pending but fluid bili only 0.6. Abscess more likely?  - Repeat CT pending this am   "

## 2021-10-08 NOTE — ED NOTES
Pt presents to ER via [facility]:  Chief Complaint   Patient presents with   • RLQ Pain     Worsening RLQ pain over the past week. Dx with pancreatitis 1 week ago. Denies nausea or vomting

## 2021-10-08 NOTE — ASSESSMENT & PLAN NOTE
Pt normo to hypotensive in hospital - home Losartan stopped  - Chang[facility] LR to NS for volume expansion for hypotension, Bps better today

## 2021-10-08 NOTE — OR SURGEON
Immediate Post- Operative Note        Findings: extensive peritoneal fluid collection      Procedure(s): CT drain  80 mL thin turbid brown fluid to lab      Estimated Blood Loss: Less than 5 ml        Complications: None            10/8/2021     4:45 PM     Geovanni Grimes M.D.

## 2021-10-09 ENCOUNTER — APPOINTMENT (OUTPATIENT)
Dept: RADIOLOGY | Facility: MEDICAL CENTER | Age: 66
DRG: 862 | End: 2021-10-09
Attending: INTERNAL MEDICINE
Payer: MEDICARE

## 2021-10-09 LAB
ALBUMIN SERPL BCP-MCNC: 2 G/DL (ref 3.2–4.9)
ALBUMIN/GLOB SERPL: 0.7 G/DL
ALP SERPL-CCNC: 65 U/L (ref 30–99)
ALT SERPL-CCNC: 15 U/L (ref 2–50)
ANION GAP SERPL CALC-SCNC: 12 MMOL/L (ref 7–16)
AST SERPL-CCNC: 19 U/L (ref 12–45)
BILIRUB SERPL-MCNC: 0.8 MG/DL (ref 0.1–1.5)
BUN SERPL-MCNC: 18 MG/DL (ref 8–22)
CALCIUM SERPL-MCNC: 8.2 MG/DL (ref 8.4–10.2)
CHLORIDE SERPL-SCNC: 96 MMOL/L (ref 96–112)
CO2 SERPL-SCNC: 22 MMOL/L (ref 20–33)
CREAT SERPL-MCNC: 0.47 MG/DL (ref 0.5–1.4)
ERYTHROCYTE [DISTWIDTH] IN BLOOD BY AUTOMATED COUNT: 43.8 FL (ref 35.9–50)
GLOBULIN SER CALC-MCNC: 2.7 G/DL (ref 1.9–3.5)
GLUCOSE SERPL-MCNC: 97 MG/DL (ref 65–99)
GRAM STN SPEC: NORMAL
HCT VFR BLD AUTO: 29.8 % (ref 37–47)
HGB BLD-MCNC: 10 G/DL (ref 12–16)
LACTATE BLD-SCNC: 1.6 MMOL/L (ref 0.5–2)
MCH RBC QN AUTO: 30.4 PG (ref 27–33)
MCHC RBC AUTO-ENTMCNC: 33.6 G/DL (ref 33.6–35)
MCV RBC AUTO: 90.6 FL (ref 81.4–97.8)
PLATELET # BLD AUTO: 255 K/UL (ref 164–446)
PMV BLD AUTO: 9.1 FL (ref 9–12.9)
POTASSIUM SERPL-SCNC: 3.6 MMOL/L (ref 3.6–5.5)
PROT SERPL-MCNC: 4.7 G/DL (ref 6–8.2)
RBC # BLD AUTO: 3.29 M/UL (ref 4.2–5.4)
SIGNIFICANT IND 70042: NORMAL
SITE SITE: NORMAL
SODIUM SERPL-SCNC: 130 MMOL/L (ref 135–145)
SOURCE SOURCE: NORMAL
WBC # BLD AUTO: 14.8 K/UL (ref 4.8–10.8)

## 2021-10-09 PROCEDURE — 770001 HCHG ROOM/CARE - MED/SURG/GYN PRIV*

## 2021-10-09 PROCEDURE — 700102 HCHG RX REV CODE 250 W/ 637 OVERRIDE(OP): Performed by: INTERNAL MEDICINE

## 2021-10-09 PROCEDURE — 74170 CT ABD WO CNTRST FLWD CNTRST: CPT | Mod: MG

## 2021-10-09 PROCEDURE — 700105 HCHG RX REV CODE 258: Performed by: INTERNAL MEDICINE

## 2021-10-09 PROCEDURE — 85027 COMPLETE CBC AUTOMATED: CPT

## 2021-10-09 PROCEDURE — A9270 NON-COVERED ITEM OR SERVICE: HCPCS | Performed by: INTERNAL MEDICINE

## 2021-10-09 PROCEDURE — 99233 SBSQ HOSP IP/OBS HIGH 50: CPT | Performed by: INTERNAL MEDICINE

## 2021-10-09 PROCEDURE — 80053 COMPREHEN METABOLIC PANEL: CPT

## 2021-10-09 PROCEDURE — 700111 HCHG RX REV CODE 636 W/ 250 OVERRIDE (IP): Performed by: INTERNAL MEDICINE

## 2021-10-09 PROCEDURE — 83605 ASSAY OF LACTIC ACID: CPT

## 2021-10-09 PROCEDURE — 700117 HCHG RX CONTRAST REV CODE 255: Performed by: INTERNAL MEDICINE

## 2021-10-09 PROCEDURE — 36415 COLL VENOUS BLD VENIPUNCTURE: CPT

## 2021-10-09 RX ORDER — NICOTINE 21 MG/24HR
1 PATCH, TRANSDERMAL 24 HOURS TRANSDERMAL
Status: DISCONTINUED | OUTPATIENT
Start: 2021-10-09 | End: 2021-10-15 | Stop reason: HOSPADM

## 2021-10-09 RX ORDER — OXYCODONE HYDROCHLORIDE 5 MG/1
5 TABLET ORAL
Status: DISCONTINUED | OUTPATIENT
Start: 2021-10-09 | End: 2021-10-12

## 2021-10-09 RX ORDER — HYDROMORPHONE HYDROCHLORIDE 1 MG/ML
0.5 INJECTION, SOLUTION INTRAMUSCULAR; INTRAVENOUS; SUBCUTANEOUS
Status: DISCONTINUED | OUTPATIENT
Start: 2021-10-09 | End: 2021-10-12

## 2021-10-09 RX ORDER — OXYCODONE HYDROCHLORIDE 10 MG/1
10 TABLET ORAL
Status: DISCONTINUED | OUTPATIENT
Start: 2021-10-09 | End: 2021-10-12

## 2021-10-09 RX ADMIN — OXYCODONE HYDROCHLORIDE 5 MG: 5 TABLET ORAL at 03:50

## 2021-10-09 RX ADMIN — IOHEXOL 80 ML: 350 INJECTION, SOLUTION INTRAVENOUS at 14:13

## 2021-10-09 RX ADMIN — OXYCODONE HYDROCHLORIDE 5 MG: 5 TABLET ORAL at 08:30

## 2021-10-09 RX ADMIN — OXYCODONE HYDROCHLORIDE 10 MG: 10 TABLET ORAL at 17:59

## 2021-10-09 RX ADMIN — Medication 1000 UNITS: at 05:27

## 2021-10-09 RX ADMIN — OXYCODONE HYDROCHLORIDE 10 MG: 10 TABLET ORAL at 14:33

## 2021-10-09 RX ADMIN — Medication 400 MG: at 17:56

## 2021-10-09 RX ADMIN — GABAPENTIN 600 MG: 300 CAPSULE ORAL at 17:56

## 2021-10-09 RX ADMIN — METRONIDAZOLE 500 MG: 500 TABLET ORAL at 11:50

## 2021-10-09 RX ADMIN — METRONIDAZOLE 500 MG: 500 TABLET ORAL at 05:27

## 2021-10-09 RX ADMIN — CEFTRIAXONE SODIUM 2 G: 2 INJECTION, POWDER, FOR SOLUTION INTRAMUSCULAR; INTRAVENOUS at 05:14

## 2021-10-09 RX ADMIN — SODIUM CHLORIDE, POTASSIUM CHLORIDE, SODIUM LACTATE AND CALCIUM CHLORIDE: 600; 310; 30; 20 INJECTION, SOLUTION INTRAVENOUS at 20:40

## 2021-10-09 RX ADMIN — OXYCODONE HYDROCHLORIDE 10 MG: 10 TABLET ORAL at 21:00

## 2021-10-09 RX ADMIN — SODIUM CHLORIDE, POTASSIUM CHLORIDE, SODIUM LACTATE AND CALCIUM CHLORIDE: 600; 310; 30; 20 INJECTION, SOLUTION INTRAVENOUS at 11:49

## 2021-10-09 RX ADMIN — ONDANSETRON 4 MG: 2 INJECTION INTRAMUSCULAR; INTRAVENOUS at 10:03

## 2021-10-09 RX ADMIN — FAMOTIDINE 20 MG: 20 TABLET ORAL at 17:56

## 2021-10-09 RX ADMIN — ONDANSETRON 4 MG: 2 INJECTION INTRAMUSCULAR; INTRAVENOUS at 05:25

## 2021-10-09 RX ADMIN — ONDANSETRON 4 MG: 2 INJECTION INTRAMUSCULAR; INTRAVENOUS at 14:35

## 2021-10-09 RX ADMIN — ENOXAPARIN SODIUM 40 MG: 40 INJECTION SUBCUTANEOUS at 05:13

## 2021-10-09 RX ADMIN — GABAPENTIN 600 MG: 300 CAPSULE ORAL at 05:28

## 2021-10-09 RX ADMIN — METRONIDAZOLE 500 MG: 500 TABLET ORAL at 17:56

## 2021-10-09 RX ADMIN — ONDANSETRON 4 MG: 2 INJECTION INTRAMUSCULAR; INTRAVENOUS at 20:16

## 2021-10-09 RX ADMIN — OXYCODONE HYDROCHLORIDE 5 MG: 5 TABLET ORAL at 00:25

## 2021-10-09 RX ADMIN — EZETIMIBE 10 MG: 10 TABLET ORAL at 17:56

## 2021-10-09 RX ADMIN — FAMOTIDINE 20 MG: 20 TABLET ORAL at 05:28

## 2021-10-09 RX ADMIN — SODIUM CHLORIDE, POTASSIUM CHLORIDE, SODIUM LACTATE AND CALCIUM CHLORIDE: 600; 310; 30; 20 INJECTION, SOLUTION INTRAVENOUS at 01:20

## 2021-10-09 RX ADMIN — METRONIDAZOLE 500 MG: 500 TABLET ORAL at 00:25

## 2021-10-09 ASSESSMENT — ENCOUNTER SYMPTOMS
NAUSEA: 1
SORE THROAT: 0
DEPRESSION: 0
DIZZINESS: 1
HEADACHES: 0
DIARRHEA: 0
DIZZINESS: 0
CONSTIPATION: 1
PSYCHIATRIC NEGATIVE: 1
BLURRED VISION: 0
SENSORY CHANGE: 0
PHOTOPHOBIA: 0
WEAKNESS: 1
ABDOMINAL PAIN: 1
INSOMNIA: 0
MUSCULOSKELETAL NEGATIVE: 1
RESPIRATORY NEGATIVE: 1
CLAUDICATION: 0
CONSTIPATION: 0
SPEECH CHANGE: 0
CHILLS: 0
COUGH: 0
MYALGIAS: 0
SHORTNESS OF BREATH: 0
VOMITING: 0
NERVOUS/ANXIOUS: 0
TREMORS: 1
FEVER: 1
VOMITING: 1
CARDIOVASCULAR NEGATIVE: 1
WEAKNESS: 0
FEVER: 0
CHILLS: 1
EYES NEGATIVE: 1
HEARTBURN: 0

## 2021-10-09 ASSESSMENT — PAIN DESCRIPTION - PAIN TYPE
TYPE: ACUTE PAIN
TYPE: ACUTE PAIN

## 2021-10-09 NOTE — PROGRESS NOTES
Assumed care of patient. Bedside report received from night shift RN. Patient updated on plan of care. Patient instructed to call for assistance before getting out of bed. Patient verbalized understanding. Call light is within reach and fall precautions are in place. All needs met at this time. Hourly rounding in place.     COVID-19 surge in effect.

## 2021-10-09 NOTE — CONSULTS
Gastroenterology Consult Note:    AYE Maria  Date & Time note created:    10/9/2021   2:47 PM     Referring MD:  Dr. Ana Luisa Shearer    Patient ID:  Name:             Nils Alfonso   YOB: 1955  Age:                 66 y.o.  female   MRN:               [id number]                                                             Reason for Consult:      Abdominal pain    History of Present Illness:    This is a 66-year-old female PMH recurrent idiopathic pancreatitis s/p ERCP with sphincterotomy October 1, 2021 complicated by ERCP pancreatitis, sleeve gastrectomy, dyslipidemia, hypertension, osteoarthritis of the spine status post lumbar fusion who was admitted to the hospital 10/8/2021 with worsening right lower quadrant pain over the past week.  Ever since her ERCP October 1, 2021, she has been experiencing pain, intermittent subjective fevers, nausea.  In the emergency room-labs: CBC: WBC 17.8..  Sodium 130.  LFTs-WNL.  CT scan abdomen/pelvis-large irregular fluid collection with thick wall occupying most of the right abdomen surrounding the right kidney and colon.  Severe wall thickening of the descending, transverse colon, second portion of the duodenum likely reactive.  Pneumobilia with gas in the CBD.  Drain placement by IR was performed.  Currently, the abdominal pain has improved.  No vomiting.  Started on ceftriaxone and metronidazole IV.    ERCP October 1, 2021-common bile duct-dilated down to the level of the ampulla.  4 mm polyp at the distal duct seen after sphincterotomy.  8 mm sphincterotomy.  Negative for stone.  Bile duct polyp-benign with inflammatory and hyperplastic changes.  No evidence of epithelial dysplasia/neoplasia..    Review of Systems:      Review of Systems   Constitutional: Positive for chills, fever and malaise/fatigue.   HENT: Negative.    Eyes: Negative.    Respiratory: Negative.    Cardiovascular: Negative.    Gastrointestinal: Positive for abdominal  pain, constipation, nausea and vomiting.   Genitourinary: Negative.    Musculoskeletal: Negative.    Skin: Negative.    Neurological: Positive for dizziness, tremors and weakness.   Psychiatric/Behavioral: Negative.              Physical Exam:  Vitals/ General Appearance:   Weight/BMI: Body mass index is 25.33 kg/m².    Vitals:    10/09/21 0007 10/09/21 0600 10/09/21 0807 10/09/21 1200   BP: 125/75 (!) 91/53 111/59 (!) 99/56   Pulse: 73  74 79   Resp: 16  15 16   Temp: 36.6 °C (97.9 °F)  36.9 °C (98.5 °F) 37.2 °C (98.9 °F)   TempSrc: Oral  Axillary Oral   SpO2: 94%  92% 95%   Weight:       Height:         Oxygen Therapy:  Pulse Oximetry: 95 %, O2 (LPM): 0, FiO2%: 21 %, O2 Delivery Device: None - Room Air    Physical Exam  Vitals and nursing note reviewed.   Constitutional:       Appearance: She is ill-appearing.   HENT:      Head: Normocephalic and atraumatic.      Right Ear: Tympanic membrane normal.      Left Ear: Tympanic membrane normal.      Nose: Nose normal.      Mouth/Throat:      Mouth: Mucous membranes are dry.   Eyes:      Extraocular Movements: Extraocular movements intact.      Pupils: Pupils are equal, round, and reactive to light.   Cardiovascular:      Rate and Rhythm: Normal rate and regular rhythm.      Pulses: Normal pulses.      Heart sounds: Normal heart sounds.   Pulmonary:      Effort: Pulmonary effort is normal.      Breath sounds: Normal breath sounds.   Abdominal:      General: Abdomen is flat. Bowel sounds are normal.      Tenderness: There is abdominal tenderness.   Musculoskeletal:         General: Normal range of motion.      Cervical back: Normal range of motion and neck supple.   Skin:     General: Skin is warm and dry.      Capillary Refill: Capillary refill takes less than 2 seconds.   Neurological:      General: No focal deficit present.      Mental Status: She is alert and oriented to person, place, and time.   Psychiatric:         Mood and Affect: Mood normal.         Behavior:  Behavior normal.         Thought Content: Thought content normal.         Judgment: Judgment normal.         Past Medical History:   Past Medical History:   Diagnosis Date   • Allergy, unspecified not elsewhere classified    • Anemia     not currently   • Anesthesia     PONV (Demerol/ Dilaudid)   • Arthritis     bilateral shoulders,sacrum, osteo   • Backpain     coccyx and sacrum   • Bronchitis 2010   • Cataract     bilateral IOLI   • Degeneration of cervical intervertebral disc     C5-6,C6-7   • Dental disorder     partial upper and lower   • Heart burn    • Hiatus hernia syndrome     not a problem after gastric sleeve   • High cholesterol     resolved after gastric surgery   • Hyperlipidemia    • Hypertension     off all medication, reveresed after gastric sleeve   • Muscle disorder    • Pain 05/16/2018    low back and SI joints and sacrum   • Reactive airway disease     rescue inhaler not needed unless with bronchitis       Past Surgical History:  Past Surgical History:   Procedure Laterality Date   • PB ERCP,DIAGNOSTIC  10/1/2021    Procedure: ERCP (ENDOSCOPIC RETROGRADE CHOLANGIOPANCREATOGRAPHY);  Surgeon: Fausto Casas M.D.;  Location: SURGERY [facility];  Service: Gastroenterology   • COLONOSCOPY  8/8/2018    Procedure: COLONOSCOPY;  Surgeon: Shukri Condon M.D.;  Location: [facility];  Service: General   • GASTROSCOPY  5/23/2018    Procedure: GASTROSCOPY;  Surgeon: Fausto Casas M.D.;  Location: [facility];  Service: Gastroenterology   • EGD W/ENDOSCOPIC ULTRASOUND  5/23/2018    Procedure: EGD W/ENDOSCOPIC ULTRASOUND- RADIAL UPPER;  Surgeon: Fausto Casas M.D.;  Location: [facility];  Service: Gastroenterology   • CATARACT PHACO WITH IOL Left 4/18/2017    Procedure: CATARACT PHACO WITH IOL;  Surgeon: Stuart Estevez M.D.;  Location: SURGERY SAME DAY [facility];  Service:    • CATARACT PHACO WITH IOL Right 4/4/2017    Procedure: CATARACT  PHACO WITH IOL;  Surgeon: Stuart Estevez M.D.;  Location: SURGERY [facility];  Service:    • GASTRIC SLEEVE LAPAROSCOPY  10/19/2016    Procedure: GASTRIC SLEEVE LAPAROSCOPY, HIATAL HERNIA;  Surgeon: Shukri Condon M.D.;  Location: SURGERY [facility];  Service:    • LIVER BIOPSY LAPAROSCOPIC  10/19/2016    Procedure: LIVER BIOPSY LAPAROSCOPIC;  Surgeon: Shukri Condon M.D.;  Location: SURGERY [facility];  Service:    • GASTROSCOPY N/A 8/26/2016    Procedure: GASTROSCOPY;  Surgeon: Shukri Condon M.D.;  Location: SURGERY [facility];  Service:    • ROTATOR CUFF REPAIR Right 7/7/2016   • ROTATOR CUFF REPAIR Left 5/2015   • HYSTERECTOMY ROBOTIC  1/2/2009    Performed by MEG VILLEGAS at SURGERY [facility]   • LUMBAR FUSION ANTERIOR  2007    Dr Hillman, L3-S1 fusion   • CHOLECYSTECTOMY  1996    laparoscopic   • TUBAL LIGATION  1985   • GASTRIC RESECTION  1982    gastric stapling   • TONSILLECTOMY AND ADENOIDECTOMY  1967   • PRIMARY C SECTION  1976, 1979, 1985    x3       Hospital Medications:    Current Facility-Administered Medications:   •  oxyCODONE immediate-release (ROXICODONE) tablet 5 mg, 5 mg, Oral, Q3HRS PRN **OR** oxyCODONE immediate release (ROXICODONE) tablet 10 mg, 10 mg, Oral, Q3HRS PRN, 10 mg at 10/09/21 1433 **OR** HYDROmorphone (Dilaudid) injection 0.5 mg, 0.5 mg, Intravenous, Q3HRS PRN, Ana Luisa Shearer D.O.  •  nicotine (NICODERM) 21 MG/24HR 21 mg, 1 Patch, Transdermal, Daily-0600, Ana Luisa Shearer D.OUriel  •  vitamin D3 (cholecalciferol) tablet 1,000 Units, 1,000 Units, Oral, DAILY, Orlando Jones M.D., 1,000 Units at 10/09/21 0527  •  cyclobenzaprine (Flexeril) tablet 10 mg, 10 mg, Oral, HS PRN, Orlando Jones M.D.  •  ezetimibe (ZETIA) tablet 10 mg, 10 mg, Oral, Q EVENING, Orlando Jones M.D., 10 mg at 10/08/21 1916  •  gabapentin (NEURONTIN) capsule 600 mg, 600 mg, Oral, BID, Orlando Jones M.D., 600 mg at 10/09/21 0528  •  losartan (COZAAR) tablet 25 mg, 25 mg,  Oral, QAM, Orlando Jones M.D., 25 mg at 10/08/21 1917  •  magnesium oxide (MAG-OX) tablet 400 mg, 400 mg, Oral, Q EVENING, Orlando Jones M.D., 400 mg at 10/08/21 1934  •  senna-docusate (PERICOLACE or SENOKOT S) 8.6-50 MG per tablet 2 Tablet, 2 Tablet, Oral, BID **AND** polyethylene glycol/lytes (MIRALAX) PACKET 1 Packet, 1 Packet, Oral, QDAY PRN **AND** magnesium hydroxide (MILK OF MAGNESIA) suspension 30 mL, 30 mL, Oral, QDAY PRN **AND** bisacodyl (DULCOLAX) suppository 10 mg, 10 mg, Rectal, QDAY PRN, Orlando Jones M.D.  •  Respiratory Therapy Consult, , Nebulization, Continuous RT, Orlando Jones M.D.  •  lactated ringers infusion, , Intravenous, Continuous, Orlando Jones M.D., Last Rate: 125 mL/hr at 10/09/21 1149, New Bag at 10/09/21 1149  •  enoxaparin (LOVENOX) inj 40 mg, 40 mg, Subcutaneous, DAILY, Orlando Jones M.D., 40 mg at 10/09/21 0513  •  acetaminophen (Tylenol) tablet 650 mg, 650 mg, Oral, Q6HRS PRN, Orlando Jones M.D., 650 mg at 10/08/21 1917  •  Notify provider if pain remains uncontrolled, , , CONTINUOUS **AND** Use the Numeric Rating Scale (NRS), Colon-Baker Faces (WBF), or FLACC on regular floors and Critical-Care Pain Observation Tool (CPOT) on ICUs/Trauma to assess pain, , , CONTINUOUS **AND** Pulse Ox, , , CONTINUOUS **AND** Pharmacy Consult Request ...Pain Management Review 1 Each, 1 Each, Other, PHARMACY TO DOSE **AND** If patient difficult to arouse and/or has respiratory depression (respiratory rate of 10 or less), stop any opiates that are currently infusing and call a Rapid Response., , , CONTINUOUS, Orlando Jones M.D.  •  ondansetron (ZOFRAN) syringe/vial injection 4 mg, 4 mg, Intravenous, Q4HRS PRN, Orlando Jones M.D., 4 mg at 10/09/21 1435  •  ondansetron (ZOFRAN ODT) dispertab 4 mg, 4 mg, Oral, Q4HRS PRN, Orlando Jones M.D.  •  lactated ringers infusion (BOLUS), 500 mL, Intravenous, Once PRN, Orlando Jones M.D.  •  cefTRIAXone (Rocephin) 2 g  in  mL IVPB, 2 g, Intravenous, Q24HRS, Orlando Jones M.D., Stopped at 10/09/21 0544  •  famotidine (PEPCID) tablet 20 mg, 20 mg, Oral, BID, 20 mg at 10/09/21 0528 **OR** famotidine (PEPCID) injection 20 mg, 20 mg, Intravenous, BID, Orlando Jones M.D.  •  metroNIDAZOLE (FLAGYL) tablet 500 mg, 500 mg, Oral, Q6HRS, Orlando Jones M.D., 500 mg at 10/09/21 1150    Current Outpatient Medications:  Current Facility-Administered Medications   Medication Dose Route Frequency Provider Last Rate Last Admin   • oxyCODONE immediate-release (ROXICODONE) tablet 5 mg  5 mg Oral Q3HRS PRN Ana Luisa Shearer D.O.        Or   • oxyCODONE immediate release (ROXICODONE) tablet 10 mg  10 mg Oral Q3HRS PRN Ana Luisa Shearer D.OUriel   10 mg at 10/09/21 1433    Or   • HYDROmorphone (Dilaudid) injection 0.5 mg  0.5 mg Intravenous Q3HRS PRN Ana Luisa Shearer D.O.       • nicotine (NICODERM) 21 MG/24HR 21 mg  1 Patch Transdermal Daily-0600 Ana Luisa Shearer D.O.       • vitamin D3 (cholecalciferol) tablet 1,000 Units  1,000 Units Oral DAILY Orlando Jones M.D.   1,000 Units at 10/09/21 0527   • cyclobenzaprine (Flexeril) tablet 10 mg  10 mg Oral HS PRN Orlando Jones M.D.       • ezetimibe (ZETIA) tablet 10 mg  10 mg Oral Q EVENING Orlando Jones M.D.   10 mg at 10/08/21 1916   • gabapentin (NEURONTIN) capsule 600 mg  600 mg Oral BID Orlando Jones M.D.   600 mg at 10/09/21 0528   • losartan (COZAAR) tablet 25 mg  25 mg Oral QAM Orlando Jones M.D.   25 mg at 10/08/21 1917   • magnesium oxide (MAG-OX) tablet 400 mg  400 mg Oral Q EVENING Orlando Jones M.D.   400 mg at 10/08/21 1934   • senna-docusate (PERICOLACE or SENOKOT S) 8.6-50 MG per tablet 2 Tablet  2 Tablet Oral BID Orlando Jones M.D.        And   • polyethylene glycol/lytes (MIRALAX) PACKET 1 Packet  1 Packet Oral QDAY PRN Orlando Jones M.D.        And   • magnesium hydroxide (MILK OF MAGNESIA) suspension 30 mL  30 mL Oral QDAY PRN Orlando Jones M.D.     "    And   • bisacodyl (DULCOLAX) suppository 10 mg  10 mg Rectal QDAY PRN Orlando Jones M.D.       • Respiratory Therapy Consult   Nebulization Continuous RT Orlando Jones M.D.       • lactated ringers infusion   Intravenous Continuous Orlando Jones M.D. 125 mL/hr at 10/09/21 1149 New Bag at 10/09/21 1149   • enoxaparin (LOVENOX) inj 40 mg  40 mg Subcutaneous DAILY Orlando Jones M.D.   40 mg at 10/09/21 0513   • acetaminophen (Tylenol) tablet 650 mg  650 mg Oral Q6HRS PRN Orlando Jones M.D.   650 mg at 10/08/21 1917   • Pharmacy Consult Request ...Pain Management Review 1 Each  1 Each Other PHARMACY TO DOSE Orlando Jones M.D.       • ondansetron (ZOFRAN) syringe/vial injection 4 mg  4 mg Intravenous Q4HRS PRN Orlando Jones M.D.   4 mg at 10/09/21 1435   • ondansetron (ZOFRAN ODT) dispertab 4 mg  4 mg Oral Q4HRS PRN Orlando Jones M.D.       • lactated ringers infusion (BOLUS)  500 mL Intravenous Once PRN Orlando Jones M.D.       • cefTRIAXone (Rocephin) 2 g in  mL IVPB  2 g Intravenous Q24HRS Orlando Jones M.D.   Stopped at 10/09/21 0544   • famotidine (PEPCID) tablet 20 mg  20 mg Oral BID Orlando Jones M.D.   20 mg at 10/09/21 0528    Or   • famotidine (PEPCID) injection 20 mg  20 mg Intravenous BID Orlando Jones M.D.       • metroNIDAZOLE (FLAGYL) tablet 500 mg  500 mg Oral Q6HRS Orlando Jones M.D.   500 mg at 10/09/21 1150       Medication Allergy:  Allergies   Allergen Reactions   • Ampicillin Rash     Rash     • Cephalexin Diarrhea, Vomiting and Nausea     .   • Clindamycin Vomiting     \"cleocin\"   • Codeine      Severe stomach pain, cramps, spasms   • Demerol Vomiting   • Levofloxacin Unspecified     Numbness     • Tetracyclines Rash     .   • Tizanidine Itching   • Hydromorphone Vomiting and Nausea   • Morphine Vomiting   • Pcn [Penicillins] Itching   • Sulfa Drugs Itching       Family History:  Family History   Problem Relation Age of Onset   • Stroke " Mother    • Cancer Father         larynx   • Diabetes Brother        Social History:  Social History     Socioeconomic History   • Marital status:      Spouse name: Not on file   • Number of children: Not on file   • Years of education: Not on file   • Highest education level: Not on file   Occupational History   • Not on file   Tobacco Use   • Smoking status: Former Smoker     Packs/day: 0.25     Years: 0.10     Pack years: 0.02     Types: Cigarettes     Quit date: 1973     Years since quittin.8   • Smokeless tobacco: Never Used   Vaping Use   • Vaping Use: Never used   Substance and Sexual Activity   • Alcohol use: No   • Drug use: No   • Sexual activity: Not on file     Comment:    Other Topics Concern   • Not on file   Social History Narrative   • Not on file     Social Determinants of Health     Financial Resource Strain:    • Difficulty of Paying Living Expenses:    Food Insecurity:    • Worried About Running Out of Food in the Last Year:    • Ran Out of Food in the Last Year:    Transportation Needs:    • Lack of Transportation (Medical):    • Lack of Transportation (Non-Medical):    Physical Activity:    • Days of Exercise per Week:    • Minutes of Exercise per Session:    Stress:    • Feeling of Stress :    Social Connections:    • Frequency of Communication with Friends and Family:    • Frequency of Social Gatherings with Friends and Family:    • Attends [employer] Services:    • Active Member of Clubs or Organizations:    • Attends Club or Organization Meetings:    • Marital Status:    Intimate Partner Violence:    • Fear of Current or Ex-Partner:    • Emotionally Abused:    • Physically Abused:    • Sexually Abused:          MDM (Data Review):     Records reviewed and summarized in current documentation    Lab Data Review:  Recent Results (from the past 24 hour(s))   FLUID CULTURE W/GRAM STAIN    Collection Time: 10/08/21  4:45 PM    Specimen: Peritoneal Fluid; Body Fluid   Result  Value Ref Range    Significant Indicator POS (POS)     Source BF     Site PERITONEAL FLUID     Culture Result - (A)     Gram Stain Result       Many Gram negative rods.  Many Gram positive cocci.      Culture Result (A)      Non-lactose fermenting Gram negative christopher  Heavy growth      Culture Result Group D Enterococcus species  Heavy growth   (A)    GRAM STAIN    Collection Time: 10/08/21  4:45 PM    Specimen: Body Fluid   Result Value Ref Range    Significant Indicator .     Source BF     Site PERITONEAL FLUID     Gram Stain Result       Many Gram negative rods.  Many Gram positive cocci.     Lactic Acid Every four hours after STAT order    Collection Time: 10/08/21  5:52 PM   Result Value Ref Range    Lactic Acid 2.1 (H) 0.5 - 2.0 mmol/L   Lactic Acid Every four hours after STAT order    Collection Time: 10/08/21 10:07 PM   Result Value Ref Range    Lactic Acid 1.8 0.5 - 2.0 mmol/L   CBC without Differential    Collection Time: 10/09/21  2:09 AM   Result Value Ref Range    WBC 14.8 (H) 4.8 - 10.8 K/uL    RBC 3.29 (L) 4.20 - 5.40 M/uL    Hemoglobin 10.0 (L) 12.0 - 16.0 g/dL    Hematocrit 29.8 (L) 37.0 - 47.0 %    MCV 90.6 81.4 - 97.8 fL    MCH 30.4 27.0 - 33.0 pg    MCHC 33.6 33.6 - 35.0 g/dL    RDW 43.8 35.9 - 50.0 fL    Platelet Count 255 164 - 446 K/uL    MPV 9.1 9.0 - 12.9 fL   Comp Metabolic Panel (CMP)    Collection Time: 10/09/21  2:09 AM   Result Value Ref Range    Sodium 130 (L) 135 - 145 mmol/L    Potassium 3.6 3.6 - 5.5 mmol/L    Chloride 96 96 - 112 mmol/L    Co2 22 20 - 33 mmol/L    Anion Gap 12.0 7.0 - 16.0    Glucose 97 65 - 99 mg/dL    Bun 18 8 - 22 mg/dL    Creatinine 0.47 (L) 0.50 - 1.40 mg/dL    Calcium 8.2 (L) 8.4 - 10.2 mg/dL    AST(SGOT) 19 12 - 45 U/L    ALT(SGPT) 15 2 - 50 U/L    Alkaline Phosphatase 65 30 - 99 U/L    Total Bilirubin 0.8 0.1 - 1.5 mg/dL    Albumin 2.0 (L) 3.2 - 4.9 g/dL    Total Protein 4.7 (L) 6.0 - 8.2 g/dL    Globulin 2.7 1.9 - 3.5 g/dL    A-G Ratio 0.7 g/dL   LACTIC ACID     Collection Time: 10/09/21  2:09 AM   Result Value Ref Range    Lactic Acid 1.6 0.5 - 2.0 mmol/L   ESTIMATED GFR    Collection Time: 10/09/21  2:09 AM   Result Value Ref Range    GFR If African American >60 >60 mL/min/1.73 m 2    GFR If Non African American >60 >60 mL/min/1.73 m 2       Imaging/Procedures Review:      CT-ABDOMEN-PELVIS WITH   Final Result         1. Large irregular irregular fluid collection with thick wall occupying most of the right abdomen surrounding the right kidney and right colon.      Additional fluid and gas collection in the midline abdomen anterior to the pancreas concerning for abscess. This collection is probably connected to the right side collection via a junction in the lj hepatis      2. Severe wall thickening of the descending colon, transverse colon, second portion duodenum, likely reactive.      3. Pneumobilia with gas in the CBD, intrahepatic bile ducts as well as pancreatic ducts are of unclear etiology.      4. Small right pleural effusion with right basilar atelectasis.            CT-DRAIN-PERITONEAL    (Results Pending)   CT-ABDOMEN WITH & W/O    (Results Pending)        MDM (Assessment and Plan):     Patient Active Problem List    Diagnosis Date Noted   • Sepsis due to intraabdominal fluid collection/abscess (HCC) 10/08/2021   • History of pancreatitis 10/08/2021   • Intraabdominal fluid collection 10/08/2021   • Post-ERCP acute pancreatitis 10/02/2021   • Leukocytosis 10/02/2021   • MOLINA (acute kidney injury) (HCC) 10/02/2021   • Combined form of senile cataract of left eye 04/18/2017   • Combined forms of age-related cataract of right eye 04/04/2017   • History of morbid obesity 10/19/2016   • Esophageal reflux 08/26/2016   • Hyperlipidemia 06/29/2011   • Vitamin D deficiency 06/09/2011   • Hypertension 11/09/2009   • Degeneration of cervical intervertebral disc        This is a 66-year-old female with a history of recurrent idiopathic pancreatitis s/p ERCP with  biliary sphincterotomy and biopsy of a distal BD polyp on October 1, 2021.  Postop complications-pancreatitis.  Ever since then, complaints of subjective fevers, progressive abdominal pain, nausea/vomiting.  Began to have worsening right lower quadrant pain over the past 5 days.  CT scan abdomen/pelvis large irregular fluid collection with thick wall occupying most of the right abdomen surrounding right kidney and right colon.  Additional fluid and gas collection in the midline abdomen anterior to the pancreas concerning for abscess.  Severe wall thickening of the descending and transverse colon, second portion of duodenum likely reactive.  Pneumobilia with gas in the CBD, intrahepatic bile ducts as well as pancreatic ducts (likely secondary to recent ERCP with sphincterotomy).  IR placed a drain with improvement in abdominal pain.    ASSESSMENT:  1.  Sepsis due to intra-abdominal fluid collection/abscess  2.  History of pancreatitis  3.  Intra-abdominal fluid collection  4.  History of morbid obesity s/p laparoscopic sleeve    PLAN:  1.  CT scan abdomen/pelvis-results pending  2.  Continue IV antibiotics ceftriaxone and metronidazole  3.  Continue to trend CBC, CMP  4.  Depending on CT scan, may need to proceed with ERCP with stent placement if biliary leak versus conservative measures  5.  IV antibiotics, antiemetics, pain medication per primary team          Thank your for the opportunity to assist in the care of your patient.  Please call for any questions or concerns.    Ele Mcneal, [email or web address].        GI attending attestation:  Myrna Pimentel DO, FACCONRAD    I reviewed the patients medical records.  I agree with the assessment and recommendations above as edited by me.    The patient is a 66-year-old woman with a history of recurrent idiopathic pancreatitis status post ERCP on 10/1/2021 (sphincterotomy performed, no choledocholithiasis noted, distal CBD polyp status post biopsy).  She subsequently developed  post ERCP pancreatitis on 10/2/2021.  At that time she was noted to have pancreatic head edema with normal enhancement and surrounding peripancreatic/mesenteric edema and infiltration which tracks down the right paracolic gutter.  Since her ERCP she has had continued abdominal pain which is worsening over the past 5 days bring her back today ER.  On CT with abdomen/pelvis with IV contrast, she was noted to have an irregular fluid collection with thick wall occupying most of the right abdomen some pounding the right kidney and right colon, and additional fluid/gas collection in the midline anterior to the pancreas, severe wall thickening of the descending/transverse colon and second portion of the duodenum, and biliary/PD air.  She was admitted to the hospital and IR placed a drain into the intra-abdominal abscess.  She was started on antibiotics. Lipase was negative and CT did not demonstrate persistent pancreatitis.  -Continue antibiotics  -Appreciate surgery recs  -Continue IVF and supportive care  -CT ab/pelvis with IV/po contrast timed to assess for duodenal/ampullary perforation  -Trend CBC and CMP      Thank you for involving me in the care of this patient.  Please do not hesitate to call GI consultants with additional questions/concerns or changes in the patient's clinical status.

## 2021-10-09 NOTE — PROGRESS NOTES
Hospital Medicine Daily Progress Note    Date of Service  10/9/2021    Chief Complaint  Nils Alfonso is a 66 y.o. female admitted 10/8/2021 with abdominal pain.    Hospital Course  Nils Alfonso is a 66 y.o. female who presented 10/8/2021 with RLQ Pain (Worsening RLQ pain over the past week. Dx with pancreatitis 1 week ago. Denies nausea or vomting)  She has a history of recurrent and idiopathic ) pancreatitis s/p ERCP with sphincterotomy by Dr. Gillespie on 10/01/2021 complicated by ERCP pancreatitis, s/p cholecystectomy, ex lap obesity with fatty liver s/p sleeve gastrectomy.  Ever since her ERCP, she has been in pain, nauseous, inteittent mild subjective fevers, poor PO intake and minimal to no bowel movement. She ws managed outpatient for what was felt was post ERCP pancreatitis.  CT scan showed Large irregular irregular fluid collection with thick wall occupying most of the right abdomen surrounding the right kidney and right colon.  IR placed drain into this fluid collection and had significant improvement in her symptoms.  Dr. Figueroa, Surgery was consulted and case was also discussed with Dr Chin with GI who both agreed for patient to need IR drain to be placed.  Empiric antibiotics initiated due to leukocytosis and concern of sepsis.        Interval Problem Update  10/9 Patient states her pain is no longer in her abdomen, mostly in the back due to inactivity and bed rest.  Biliary drain in place with green bile in the collection bag.  Dr Chin was not officially consulted yesterday.  I did discuss the case with him and he recommends a UGI with gastrografin and will consult pending results tomorrow - patient notified.  Will continue with flagyl/rocephin and clear liquid diet only.     Addendum: After further discussion with radiology and GI, imaging study changed to CT abdomen with IV and oral contrast and important timing of oral contrast administration.  Coordinating this with radiology,   Malgorzata.  Imaging orders changed.     I have personally seen and examined the patient at bedside. I discussed the plan of care with patient, family, bedside RN, charge RN,  and pharmacy.    Consultants/Specialty  general surgery and GI    Code Status  Full Code    Disposition  Patient is not medically cleared.   Anticipate discharge to to home with close outpatient follow-up.  I have placed the appropriate orders for post-discharge needs.    Review of Systems  Review of Systems   Constitutional: Negative for chills and fever.   HENT: Negative for congestion and sore throat.    Eyes: Negative for blurred vision and photophobia.   Respiratory: Negative for cough and shortness of breath.    Cardiovascular: Negative for chest pain, claudication and leg swelling.   Gastrointestinal: Positive for abdominal pain (mostly resolved with placement of biliary drain.). Negative for constipation, diarrhea, heartburn and vomiting.   Genitourinary: Negative for dysuria and hematuria.   Musculoskeletal: Negative for joint pain and myalgias.   Skin: Negative for itching and rash.   Neurological: Negative for dizziness, sensory change, speech change, weakness and headaches.   Psychiatric/Behavioral: Negative for depression. The patient is not nervous/anxious and does not have insomnia.         Physical Exam  Temp:  [36.3 °C (97.3 °F)-38.4 °C (101.1 °F)] 36.9 °C (98.5 °F)  Pulse:  [73-94] 74  Resp:  [15-34] 15  BP: ()/(53-82) 111/59  SpO2:  [91 %-99 %] 92 %    Physical Exam  Vitals and nursing note reviewed.   Constitutional:       General: She is not in acute distress.     Appearance: Normal appearance. She is not ill-appearing.   HENT:      Head: Normocephalic and atraumatic.      Nose: Nose normal.   Cardiovascular:      Rate and Rhythm: Normal rate and regular rhythm.      Heart sounds: Normal heart sounds. No murmur heard.     Pulmonary:      Effort: Pulmonary effort is normal.      Breath sounds: Normal breath  sounds.   Abdominal:      General: Bowel sounds are normal. There is no distension.      Palpations: Abdomen is soft.      Tenderness: There is no abdominal tenderness. There is no guarding.      Comments: Biliary drain in place - bile in collection bag.     Musculoskeletal:         General: No swelling or tenderness.      Cervical back: Neck supple.   Skin:     General: Skin is warm and dry.   Neurological:      General: No focal deficit present.      Mental Status: She is alert and oriented to person, place, and time.   Psychiatric:         Mood and Affect: Mood normal.         Fluids    Intake/Output Summary (Last 24 hours) at 10/9/2021 1101  Last data filed at 10/9/2021 0807  Gross per 24 hour   Intake 360 ml   Output 575 ml   Net -215 ml       Laboratory  Recent Labs     10/08/21  0630 10/09/21  0209   WBC 17.8* 14.8*   RBC 4.34 3.29*   HEMOGLOBIN 13.2 10.0*   HEMATOCRIT 39.5 29.8*   MCV 91.0 90.6   MCH 30.4 30.4   MCHC 33.4* 33.6   RDW 44.3 43.8   PLATELETCT 249 255   MPV 10.7 9.1     Recent Labs     10/08/21  0630 10/09/21  0209   SODIUM 130* 130*   POTASSIUM 4.3 3.6   CHLORIDE 89* 96   CO2 21 22   GLUCOSE 77 97   BUN 19 18   CREATININE 0.66 0.47*   CALCIUM 9.2 8.2*     Recent Labs     10/08/21  1401   INR 1.36*               Imaging  CT-ABDOMEN-PELVIS WITH   Final Result         1. Large irregular irregular fluid collection with thick wall occupying most of the right abdomen surrounding the right kidney and right colon.      Additional fluid and gas collection in the midline abdomen anterior to the pancreas concerning for abscess. This collection is probably connected to the right side collection via a junction in the jl hepatis      2. Severe wall thickening of the descending colon, transverse colon, second portion duodenum, likely reactive.      3. Pneumobilia with gas in the CBD, intrahepatic bile ducts as well as pancreatic ducts are of unclear etiology.      4. Small right pleural effusion with right  basilar atelectasis.            CT-DRAIN-PERITONEAL    (Results Pending)   DX-UPPER GI-SMALL BOWEL FOLLOW THRU    (Results Pending)        Assessment/Plan  * Sepsis due to intraabdominal fluid collection/abscess (HCC)  Assessment & Plan  This is Sepsis Present on admission  SIRS criteria identified on my evaluation include: Leukocytosis, with WBC greater than 12,000  Source is intraabdominal  Sepsis protocol initiated  Fluid resuscitation ordered per protocol  IV antibiotics as appropriate for source of sepsis  While organ dysfunction may be noted elsewhere in this problem list or in the chart, degree of organ dysfunction does not meet CMS criteria for severe sepsis  IR placed drain 10/8 - bile output  Bowel rest, IV famotidine, pain control, antiemetics  Ordered antibiotics  Ordered fluid culture and gram stain        Intraabdominal fluid collection  Assessment & Plan  Suspected biliary leak post ERCP with sphincterotomy  Biliary drain placed with improvement in pain  Empiric rocephin/flagyl  Appreciate surgery consult - Dr Figueroa  D/w Dr Chin, Upper GI gastrografin with small bowel follow through recommended to evaluate for further biliary leakage, official consult tomorrow pending this result.    History of pancreatitis  Assessment & Plan  Not acute  Normal lipase  Pain control and bowel rest    History of morbid obesity- (present on admission)  Assessment & Plan  Body mass index is 23.91 kg/m².  S/p sleeve gastrectomy      Hyperlipidemia- (present on admission)  Assessment & Plan  Continue Zetia    Vitamin D deficiency- (present on admission)  Assessment & Plan  Continue Vit D supplementation    Hypertension- (present on admission)  Assessment & Plan  Stable  Continue losartan         VTE prophylaxis: enoxaparin ppx    I have performed a physical exam and reviewed and updated ROS and Plan today (10/9/2021). In review of yesterday's note (10/8/2021), there are no changes except as documented above.

## 2021-10-09 NOTE — PROCEDURES
14Fr locking loop pig tail Peritoneal drain placed to RLQ, approx 80 ml thin odorous fluid obtained, sent to lab for cx. Pt tolerated procedure with intense abd. Pain, arrived to radiology with c/o 10/10 abd pain with severe RLQ TTP. Pt needing coaching throughout procedure to hold still and keep hands away from sterile field.  VSS throughout procedure.  After procedure pt verbalized ongoing abdominal pain though improved compared to pre drain placement.  Report given to crys pt taken to room via hospital bed by RN.  Post procedure orders reviewed outside room.  also informed of procedure at bedside.

## 2021-10-09 NOTE — PROGRESS NOTES
Bedside report received from day shift RN and assumed Pt's cares. Call light within reach. Safety measures in place.

## 2021-10-09 NOTE — HOSPITAL COURSE
Nils Alfonso is a 66 y.o. female who presented 10/8/2021 with RLQ Pain (Worsening RLQ pain over the past week. Dx with pancreatitis 1 week ago. Denies nausea or vomting)  She has a history of recurrent and idiopathic ) pancreatitis s/p ERCP with sphincterotomy by Dr. Gillespie on 10/01/2021 complicated by ERCP pancreatitis, s/p cholecystectomy, ex lap obesity with fatty liver s/p sleeve gastrectomy.  Ever since her ERCP, she has been in pain, nauseous, inteittent mild subjective fevers, poor PO intake and minimal to no bowel movement. She ws managed outpatient for what was felt was post ERCP pancreatitis.  CT scan showed Large irregular irregular fluid collection with thick wall occupying most of the right abdomen surrounding the right kidney and right colon.  IR placed drain into this fluid collection and had significant improvement in her symptoms.  Dr. Figueroa, Surgery was consulted and case was also discussed with Dr Chin with GI who both agreed for patient to need IR drain to be placed.  Empiric antibiotics initiated due to leukocytosis and concern of sepsis.

## 2021-10-09 NOTE — PROGRESS NOTES
"Progress Note:  10/9/2021, 10:30 AM    S: No acute events.  Patient feels better than yesterday.  Vital signs stable.      O:  /59   Pulse 74   Temp 36.9 °C (98.5 °F) (Axillary)   Resp 15   Ht 1.6 m (5' 3\")   Wt 64.9 kg (143 lb)   SpO2 92%     NAD, awake, alert  Breathing is nonlabored on room air  Abdomen is soft, mildly tender, intra-abdominal drain to gravity bag with bilious output      A:   Active Hospital Problems    Diagnosis    • Sepsis due to intraabdominal fluid collection/abscess (HCC) [A41.9]    • History of pancreatitis [Z87.19]    • Intraabdominal fluid collection [R18.8]    • History of morbid obesity [Z87.898]    • Hyperlipidemia [E78.5]    • Vitamin D deficiency [E55.9]    • Hypertension [I10]          P:   -Continue percutaneous intra-abdominal drain to gravity drainage  -The patient's GI service should be consulted about her admission.  They may need to evaluate for any further intervention, and potentially may need to discuss the case with hepatobiliary surgery (see my initial consult note)  -No acute indication for surgical intervention at this time.  -Recommended n.p.o. or very minimal clear liquids to inhibit stimulation of the hepatobiliary system, such that what appears to be a small bile leak can hopefully heal spontaneously  -General surgery to follow intermittently.  Call with questions/concerns/updates    Norris Figueroa M.D.  [facility]  [phone]    "

## 2021-10-10 ENCOUNTER — APPOINTMENT (OUTPATIENT)
Dept: RADIOLOGY | Facility: MEDICAL CENTER | Age: 66
DRG: 862 | End: 2021-10-10
Attending: INTERNAL MEDICINE
Payer: MEDICARE

## 2021-10-10 LAB
ALBUMIN SERPL BCP-MCNC: 1.8 G/DL (ref 3.2–4.9)
ALBUMIN/GLOB SERPL: 0.7 G/DL
ALP SERPL-CCNC: 71 U/L (ref 30–99)
ALT SERPL-CCNC: 13 U/L (ref 2–50)
ANION GAP SERPL CALC-SCNC: 12 MMOL/L (ref 7–16)
AST SERPL-CCNC: 17 U/L (ref 12–45)
BACTERIA FLD AEROBE CULT: ABNORMAL
BILIRUB SERPL-MCNC: 0.6 MG/DL (ref 0.1–1.5)
BUN SERPL-MCNC: 15 MG/DL (ref 8–22)
CALCIUM SERPL-MCNC: 8 MG/DL (ref 8.4–10.2)
CHLORIDE SERPL-SCNC: 97 MMOL/L (ref 96–112)
CO2 SERPL-SCNC: 22 MMOL/L (ref 20–33)
CREAT SERPL-MCNC: 0.54 MG/DL (ref 0.5–1.4)
ERYTHROCYTE [DISTWIDTH] IN BLOOD BY AUTOMATED COUNT: 44.8 FL (ref 35.9–50)
GLOBULIN SER CALC-MCNC: 2.7 G/DL (ref 1.9–3.5)
GLUCOSE SERPL-MCNC: 83 MG/DL (ref 65–99)
GRAM STN SPEC: ABNORMAL
HCT VFR BLD AUTO: 29.1 % (ref 37–47)
HGB BLD-MCNC: 9.8 G/DL (ref 12–16)
MCH RBC QN AUTO: 30.2 PG (ref 27–33)
MCHC RBC AUTO-ENTMCNC: 33.7 G/DL (ref 33.6–35)
MCV RBC AUTO: 89.8 FL (ref 81.4–97.8)
PLATELET # BLD AUTO: 331 K/UL (ref 164–446)
PMV BLD AUTO: 9.4 FL (ref 9–12.9)
POTASSIUM SERPL-SCNC: 3.5 MMOL/L (ref 3.6–5.5)
PROT SERPL-MCNC: 4.5 G/DL (ref 6–8.2)
RBC # BLD AUTO: 3.24 M/UL (ref 4.2–5.4)
SIGNIFICANT IND 70042: ABNORMAL
SITE SITE: ABNORMAL
SODIUM SERPL-SCNC: 131 MMOL/L (ref 135–145)
SOURCE SOURCE: ABNORMAL
WBC # BLD AUTO: 19 K/UL (ref 4.8–10.8)

## 2021-10-10 PROCEDURE — 36415 COLL VENOUS BLD VENIPUNCTURE: CPT

## 2021-10-10 PROCEDURE — 700111 HCHG RX REV CODE 636 W/ 250 OVERRIDE (IP): Performed by: INTERNAL MEDICINE

## 2021-10-10 PROCEDURE — 700102 HCHG RX REV CODE 250 W/ 637 OVERRIDE(OP): Performed by: INTERNAL MEDICINE

## 2021-10-10 PROCEDURE — 82247 BILIRUBIN TOTAL: CPT

## 2021-10-10 PROCEDURE — 85027 COMPLETE CBC AUTOMATED: CPT

## 2021-10-10 PROCEDURE — 700105 HCHG RX REV CODE 258: Performed by: INTERNAL MEDICINE

## 2021-10-10 PROCEDURE — A9270 NON-COVERED ITEM OR SERVICE: HCPCS | Performed by: INTERNAL MEDICINE

## 2021-10-10 PROCEDURE — 78226 HEPATOBILIARY SYSTEM IMAGING: CPT | Mod: MC

## 2021-10-10 PROCEDURE — 82150 ASSAY OF AMYLASE: CPT

## 2021-10-10 PROCEDURE — 99233 SBSQ HOSP IP/OBS HIGH 50: CPT | Performed by: INTERNAL MEDICINE

## 2021-10-10 PROCEDURE — 770001 HCHG ROOM/CARE - MED/SURG/GYN PRIV*

## 2021-10-10 PROCEDURE — 80053 COMPREHEN METABOLIC PANEL: CPT

## 2021-10-10 RX ORDER — DIPHENHYDRAMINE HCL 25 MG
25 TABLET ORAL EVERY 6 HOURS PRN
Status: DISCONTINUED | OUTPATIENT
Start: 2021-10-10 | End: 2021-10-15 | Stop reason: HOSPADM

## 2021-10-10 RX ADMIN — OXYCODONE HYDROCHLORIDE 10 MG: 10 TABLET ORAL at 05:01

## 2021-10-10 RX ADMIN — Medication 1000 UNITS: at 05:02

## 2021-10-10 RX ADMIN — SODIUM CHLORIDE, POTASSIUM CHLORIDE, SODIUM LACTATE AND CALCIUM CHLORIDE: 600; 310; 30; 20 INJECTION, SOLUTION INTRAVENOUS at 05:02

## 2021-10-10 RX ADMIN — GABAPENTIN 600 MG: 300 CAPSULE ORAL at 17:28

## 2021-10-10 RX ADMIN — PIPERACILLIN AND TAZOBACTAM 3.38 G: 3; .375 INJECTION, POWDER, LYOPHILIZED, FOR SOLUTION INTRAVENOUS; PARENTERAL at 17:29

## 2021-10-10 RX ADMIN — OXYCODONE HYDROCHLORIDE 10 MG: 10 TABLET ORAL at 20:17

## 2021-10-10 RX ADMIN — CEFTRIAXONE SODIUM 2 G: 2 INJECTION, POWDER, FOR SOLUTION INTRAMUSCULAR; INTRAVENOUS at 05:02

## 2021-10-10 RX ADMIN — OXYCODONE HYDROCHLORIDE 10 MG: 10 TABLET ORAL at 13:32

## 2021-10-10 RX ADMIN — Medication 400 MG: at 17:29

## 2021-10-10 RX ADMIN — GABAPENTIN 600 MG: 300 CAPSULE ORAL at 05:01

## 2021-10-10 RX ADMIN — ONDANSETRON 4 MG: 2 INJECTION INTRAMUSCULAR; INTRAVENOUS at 13:23

## 2021-10-10 RX ADMIN — OXYCODONE HYDROCHLORIDE 10 MG: 10 TABLET ORAL at 09:10

## 2021-10-10 RX ADMIN — ONDANSETRON 4 MG: 2 INJECTION INTRAMUSCULAR; INTRAVENOUS at 20:18

## 2021-10-10 RX ADMIN — METRONIDAZOLE 500 MG: 500 TABLET ORAL at 00:12

## 2021-10-10 RX ADMIN — EZETIMIBE 10 MG: 10 TABLET ORAL at 17:29

## 2021-10-10 RX ADMIN — FAMOTIDINE 20 MG: 20 TABLET ORAL at 05:02

## 2021-10-10 RX ADMIN — PIPERACILLIN AND TAZOBACTAM 3.38 G: 3; .375 INJECTION, POWDER, LYOPHILIZED, FOR SOLUTION INTRAVENOUS; PARENTERAL at 15:02

## 2021-10-10 RX ADMIN — METRONIDAZOLE 500 MG: 500 TABLET ORAL at 05:01

## 2021-10-10 RX ADMIN — ENOXAPARIN SODIUM 40 MG: 40 INJECTION SUBCUTANEOUS at 05:02

## 2021-10-10 RX ADMIN — FAMOTIDINE 20 MG: 20 TABLET ORAL at 17:29

## 2021-10-10 ASSESSMENT — ENCOUNTER SYMPTOMS
WEAKNESS: 0
CARDIOVASCULAR NEGATIVE: 1
DIZZINESS: 0
MUSCULOSKELETAL NEGATIVE: 1
CONSTIPATION: 1
INSOMNIA: 0
CHILLS: 0
SHORTNESS OF BREATH: 0
CLAUDICATION: 0
PSYCHIATRIC NEGATIVE: 1
SENSORY CHANGE: 0
FEVER: 0
VOMITING: 0
CONSTIPATION: 0
COUGH: 0
DEPRESSION: 0
NERVOUS/ANXIOUS: 0
SORE THROAT: 0
MYALGIAS: 0
NAUSEA: 0
TREMORS: 0
PHOTOPHOBIA: 0
HEARTBURN: 0
WEAKNESS: 1
ABDOMINAL PAIN: 1
HEADACHES: 0
EYES NEGATIVE: 1
RESPIRATORY NEGATIVE: 1
SPEECH CHANGE: 0
DIARRHEA: 0
BLURRED VISION: 0

## 2021-10-10 ASSESSMENT — PAIN DESCRIPTION - PAIN TYPE: TYPE: ACUTE PAIN

## 2021-10-10 ASSESSMENT — PAIN SCALES - WONG BAKER: WONGBAKER_NUMERICALRESPONSE: DOESN'T HURT AT ALL

## 2021-10-10 NOTE — PROGRESS NOTES
"Progress Note:  10/10/2021, 8:56 AM    S: No acute events. Pain controlled. Tolerating liquid diet    O:  BP (!) 98/55   Pulse 75   Temp 36.9 °C (98.5 °F) (Temporal)   Resp 17   Ht 1.6 m (5' 3\")   Wt 64.9 kg (143 lb)   SpO2 90%     NAD, awake, alert  Breathing is nonlabored  Abdomen is soft, nondistended, unchanged bile drain to gravity container      A:   Active Hospital Problems    Diagnosis    • Sepsis due to intraabdominal fluid collection/abscess (HCC) [A41.9]    • History of pancreatitis [Z87.19]    • Intraabdominal fluid collection [R18.8]    • History of morbid obesity [Z87.898]    • Hyperlipidemia [E78.5]    • Vitamin D deficiency [E55.9]    • Hypertension [I10]          P:   -No further surgical recommendations at this time. Please see my prior notes regarding previous recommendations  -Call with questions/concerns/updates    Norris Figueroa M.D.  [facility]  [phone]    "

## 2021-10-10 NOTE — DISCHARGE PLANNING
Anticipated Discharge Disposition: Home    Action: LSW completed chart review. Pt lives in [place]. Pt has 6-clicks of 20. No needs identified or reported at this time.    Barriers to Discharge: None    Plan: LSW to follow and assist as needed.    Care Transition Team Assessment    Information Source  Information Given By:  (chart review)  Who is responsible for making decisions for patient? : Patient         Elopement Risk  Legal Hold: No  Ambulatory or Self Mobile in Wheelchair: Yes  Disoriented: No  Psychiatric Symptoms: None  History of Wandering: No  Elopement this Admit: No  Vocalizing Wanting to Leave: No  Displays Behaviors, Body Language Wanting to Leave: No-Not at Risk for Elopement  Elopement Risk: Not at Risk for Elopement    Interdisciplinary Discharge Planning  Primary Care Physician: Pratik Gordon MD  Support Systems: Spouse / Significant Other    Discharge Preparedness  What is your plan after discharge?: Home with help  What are your discharge supports?: Spouse  Prior Functional Level: Ambulatory    Functional Assesment  Prior Functional Level: Ambulatory    Finances  Financial Barriers to Discharge: No    Advance Directive  Advance Directive?: None    Domestic Abuse  Have you ever been the victim of abuse or violence?: No    Psychological Assessment  History of Substance Abuse: None    Discharge Risks or Barriers  Discharge risks or barriers?: No    Anticipated Discharge Information  Discharge Disposition: Discharged to home/self care (01)

## 2021-10-10 NOTE — PROGRESS NOTES
Hospital Medicine Daily Progress Note    Date of Service  10/10/2021    Chief Complaint  Nils Alfonso is a 66 y.o. female admitted 10/8/2021 with abdominal pain.    Hospital Course  Nils Alfonso is a 66 y.o. female who presented 10/8/2021 with RLQ Pain (Worsening RLQ pain over the past week. Dx with pancreatitis 1 week ago. Denies nausea or vomting)  She has a history of recurrent and idiopathic ) pancreatitis s/p ERCP with sphincterotomy by Dr. Gillespie on 10/01/2021 complicated by ERCP pancreatitis, s/p cholecystectomy, ex lap obesity with fatty liver s/p sleeve gastrectomy.  Ever since her ERCP, she has been in pain, nauseous, inteittent mild subjective fevers, poor PO intake and minimal to no bowel movement. She ws managed outpatient for what was felt was post ERCP pancreatitis.  CT scan showed Large irregular irregular fluid collection with thick wall occupying most of the right abdomen surrounding the right kidney and right colon.  IR placed drain into this fluid collection and had significant improvement in her symptoms.  Dr. Figueroa, Surgery was consulted and case was also discussed with Dr Chin with GI who both agreed for patient to need IR drain to be placed.  Empiric antibiotics initiated due to leukocytosis and concern of sepsis.        Interval Problem Update  10/9 Patient states her pain is no longer in her abdomen, mostly in the back due to inactivity and bed rest.  Biliary drain in place with green bile in the collection bag.  Dr Chin was not officially consulted yesterday.  I did discuss the case with him and he recommends a UGI with gastrografin and will consult pending results tomorrow - patient notified.  Will continue with flagyl/rocephin and clear liquid diet only.     Addendum: After further discussion with radiology and GI, imaging study changed to CT abdomen with IV and oral contrast and important timing of oral contrast administration.  Coordinating this with radiology,   Malgorzata.  Imaging orders changed.   10/10 Patient feeling okay today, she did have a increase in her WBC overnight and though the e coli was being treated by the rocephin, this would not cover e faecalis - changed antiobiotic to zosyn given patients ampicillin allergy and also ordered PRN benadryl in case this PCN still results in a rash and then I would have to adjust antibiotics again.  D/w Dr Margarito OWENS scan ordered.  Patient is tolerating the full liquid diet without issue.    I have personally seen and examined the patient at bedside. I discussed the plan of care with patient, family, bedside RN, charge RN,  and pharmacy.    Consultants/Specialty  general surgery and GI    Code Status  Full Code    Disposition  Patient is not medically cleared.   Anticipate discharge to to home with close outpatient follow-up.  I have placed the appropriate orders for post-discharge needs.    Review of Systems  Review of Systems   Constitutional: Negative for chills and fever.   HENT: Negative for congestion and sore throat.    Eyes: Negative for blurred vision and photophobia.   Respiratory: Negative for cough and shortness of breath.    Cardiovascular: Negative for chest pain, claudication and leg swelling.   Gastrointestinal: Positive for abdominal pain (mostly resolved with placement of biliary drain.). Negative for constipation, diarrhea, heartburn, nausea and vomiting.   Genitourinary: Negative for dysuria and hematuria.   Musculoskeletal: Negative for joint pain and myalgias.   Skin: Negative for itching and rash.   Neurological: Negative for dizziness, sensory change, speech change, weakness and headaches.   Psychiatric/Behavioral: Negative for depression. The patient is not nervous/anxious and does not have insomnia.         Physical Exam  Temp:  [36.7 °C (98.1 °F)-37.4 °C (99.4 °F)] 36.8 °C (98.2 °F)  Pulse:  [71-81] 71  Resp:  [14-18] 14  BP: ()/(50-62) 101/62  SpO2:  [90 %-95 %] 90  %    Physical Exam  Vitals and nursing note reviewed.   Constitutional:       General: She is not in acute distress.     Appearance: Normal appearance. She is not ill-appearing.   HENT:      Head: Normocephalic and atraumatic.      Nose: Nose normal.   Cardiovascular:      Rate and Rhythm: Normal rate and regular rhythm.      Heart sounds: Normal heart sounds. No murmur heard.     Pulmonary:      Effort: Pulmonary effort is normal.      Breath sounds: Normal breath sounds.   Abdominal:      General: Bowel sounds are normal. There is no distension.      Palpations: Abdomen is soft.      Tenderness: There is no abdominal tenderness. There is no guarding.      Comments: Biliary drain in place - bile in collection bag.     Musculoskeletal:         General: No swelling or tenderness.      Cervical back: Neck supple.   Skin:     General: Skin is warm and dry.   Neurological:      General: No focal deficit present.      Mental Status: She is alert and oriented to person, place, and time.   Psychiatric:         Mood and Affect: Mood normal.         Fluids    Intake/Output Summary (Last 24 hours) at 10/10/2021 1128  Last data filed at 10/10/2021 0800  Gross per 24 hour   Intake 5258.75 ml   Output 200 ml   Net 5058.75 ml       Laboratory  Recent Labs     10/08/21  0630 10/09/21  0209 10/10/21  0339   WBC 17.8* 14.8* 19.0*   RBC 4.34 3.29* 3.24*   HEMOGLOBIN 13.2 10.0* 9.8*   HEMATOCRIT 39.5 29.8* 29.1*   MCV 91.0 90.6 89.8   MCH 30.4 30.4 30.2   MCHC 33.4* 33.6 33.7   RDW 44.3 43.8 44.8   PLATELETCT 249 255 331   MPV 10.7 9.1 9.4     Recent Labs     10/08/21  0630 10/09/21  0209 10/10/21  0339   SODIUM 130* 130* 131*   POTASSIUM 4.3 3.6 3.5*   CHLORIDE 89* 96 97   CO2 21 22 22   GLUCOSE 77 97 83   BUN 19 18 15   CREATININE 0.66 0.47* 0.54   CALCIUM 9.2 8.2* 8.0*     Recent Labs     10/08/21  1401   INR 1.36*               Imaging  CT-ABDOMEN WITH & W/O   Final Result      1.  No findings of intraluminal contrast  extravasation from the opacified bowel loops, particularly no contrast extravasation from the duodenum at the ampulla of [place].   2.  A pigtail drainage catheter is present in the right abdomen in a air and fluid collection, the extent of which is difficult to measure. The collection tracks inferiorly and medially measuring approximately 17 cm in craniocaudal dimension.   3.  A smaller loculated collection in the anterior abdomen near the inferior anterior abdominal wall sutures measures 1.5 x 8.7 cm.   4.  Unchanged pneumobilia in the CBD, intrahepatic bile ducts as well as pancreatic ducts.   5.  There is a small right pleural effusion with adjacent enhancing segmental atelectasis.   6.   Coronary artery calcifications are present      CT-ABDOMEN-PELVIS WITH   Final Result         1. Large irregular irregular fluid collection with thick wall occupying most of the right abdomen surrounding the right kidney and right colon.      Additional fluid and gas collection in the midline abdomen anterior to the pancreas concerning for abscess. This collection is probably connected to the right side collection via a junction in the jl hepatis      2. Severe wall thickening of the descending colon, transverse colon, second portion duodenum, likely reactive.      3. Pneumobilia with gas in the CBD, intrahepatic bile ducts as well as pancreatic ducts are of unclear etiology.      4. Small right pleural effusion with right basilar atelectasis.            CT-DRAIN-PERITONEAL    (Results Pending)   NM-HEPATOBILIARY SCAN    (Results Pending)        Assessment/Plan  * Sepsis due to intraabdominal fluid collection/abscess (HCC)  Assessment & Plan  This is Sepsis Present on admission  SIRS criteria identified on my evaluation include: Leukocytosis, with WBC greater than 12,000  Source is intraabdominal  Sepsis protocol initiated  Fluid resuscitation ordered per protocol  IV antibiotics as appropriate for source of sepsis  While  organ dysfunction may be noted elsewhere in this problem list or in the chart, degree of organ dysfunction does not meet CMS criteria for severe sepsis  IR placed drain 10/8 - bile output  luid culture showing e coli and e faecalis - change to zosyn, gets rash with ampicillin        Intraabdominal fluid collection  Assessment & Plan  Suspected biliary leak post ERCP with sphincterotomy  Biliary drain placed with improvement in pain  Empiric rocephin/flagyl  Appreciate surgery consult - Dr Figueroa  GI consulting, CT showed no evidence of contrast outside of the bowel   HIDA scan pending to evaluation of biliary leak  Fluid studies for amylase and bilirubin per GI      History of pancreatitis  Assessment & Plan  Not acute  Normal lipase  Pain control and bowel rest    History of morbid obesity- (present on admission)  Assessment & Plan  Body mass index is 23.91 kg/m².  S/p sleeve gastrectomy      Hyperlipidemia- (present on admission)  Assessment & Plan  Continue Zetia    Vitamin D deficiency- (present on admission)  Assessment & Plan  Continue Vit D supplementation    Hypertension- (present on admission)  Assessment & Plan  Stable  Continue losartan       VTE prophylaxis: enoxaparin ppx    I have performed a physical exam and reviewed and updated ROS and Plan today (10/10/2021). In review of yesterday's note (10/9/2021), there are no changes except as documented above.

## 2021-10-10 NOTE — PROGRESS NOTES
Gastroenterology Consult Note:    AYE Maria  Date & Time note created:    10/10/2021   10:53 AM     Referring MD:  Dr. Ana Luisa Shearer    Patient ID:  Name:             Nils Alfonso   YOB: 1955  Age:                 66 y.o.  female   MRN:               [id number]                                                             Reason for Consult:      Abdominal pain    History of Present Illness:    This is a 66-year-old female PMH recurrent idiopathic pancreatitis s/p ERCP with sphincterotomy October 1, 2021 complicated by ERCP pancreatitis, sleeve gastrectomy, dyslipidemia, hypertension, osteoarthritis of the spine status post lumbar fusion who was admitted to the hospital 10/8/2021 with worsening right lower quadrant pain over the past week.  Ever since her ERCP October 1, 2021, she has been experiencing pain, intermittent subjective fevers, nausea.  In the emergency room-labs: CBC: WBC 17.8..  Sodium 130.  LFTs-WNL.  CT scan abdomen/pelvis-large irregular irregular fluid collection with thick wall occupying most of the right abdomen surrounding the right kidney and colon.  Severe wall thickening of the descending, transverse colon, second portion of the duodenum likely reactive.  Pneumobilia with gas in the CBD.  Drain placement by IR.  Currently, the abdominal pain has improved.  No vomiting.  Started on ceftriaxone and metronidazole IV.    ERCP October 1, 2021-common bile duct-dilated down to the level of the ampulla.  4 mm polyp at the distal duct seen after sphincterotomy.  8 mm sphincterotomy.  Negative for stone.  Bile duct polyp-benign with inflammatory and hyperplastic changes.  No evidence of epithelial dysplasia/neoplasia..    INTERVAL HISTORY:    10/10/21: Stable. Stated she is feeling a little stronger each day. Minimal abdominal pain. WBC trending up 19 (14.8 on 10/9/21). No vomiting.Tolerating po intake.                           Peritoneal drain: 350 mls in 24 hours.      CT scan abdomen/pelvis: 1.  No findings of intraluminal contrast extravasation from the opacified bowel loops, particularly no contrast extravasation from the duodenum at the ampulla of [place].  2.  A pigtail drainage catheter is present in the right abdomen in a air and fluid collection, the extent of which is difficult to measure. The collection tracks inferiorly and medially measuring approximately 17 cm in craniocaudal dimension.  3.  A smaller loculated collection in the anterior abdomen near the inferior anterior abdominal wall sutures measures 1.5 x 8.7 cm.  4.  Unchanged pneumobilia in the CBD, intrahepatic bile ducts as well as pancreatic ducts.  5.  There is a small right pleural effusion with adjacent enhancing segmental atelectasis.       6.   Coronary artery calcifications are present    Review of Systems:      Review of Systems   Constitutional: Positive for malaise/fatigue. Negative for chills and fever.   HENT: Negative.    Eyes: Negative.    Respiratory: Negative.    Cardiovascular: Negative.    Gastrointestinal: Positive for abdominal pain and constipation. Negative for nausea and vomiting.   Genitourinary: Negative.    Musculoskeletal: Negative.    Skin: Negative.    Neurological: Positive for weakness. Negative for dizziness and tremors.   Psychiatric/Behavioral: Negative.              Physical Exam:  Vitals/ General Appearance:   Weight/BMI: Body mass index is 25.33 kg/m².    Vitals:    10/09/21 2025 10/10/21 0003 10/10/21 0414 10/10/21 1006   BP: 107/50 111/58 (!) 98/55 101/62   Pulse: 74 81 75 71   Resp: 17 18 17 14   Temp: 37.4 °C (99.4 °F) 36.7 °C (98.1 °F) 36.9 °C (98.5 °F) 36.8 °C (98.2 °F)   TempSrc: Temporal Temporal Temporal Oral   SpO2: 94% 91% 90% 90%   Weight:       Height:         Oxygen Therapy:  Pulse Oximetry: 90 %, O2 (LPM): 0, O2 Delivery Device: None - Room Air    Physical Exam  Vitals and nursing note reviewed.   Constitutional:       Appearance: She is ill-appearing.    HENT:      Head: Normocephalic and atraumatic.      Right Ear: Tympanic membrane normal.      Left Ear: Tympanic membrane normal.      Nose: Nose normal.      Mouth/Throat:      Mouth: Mucous membranes are moist.   Eyes:      Extraocular Movements: Extraocular movements intact.      Pupils: Pupils are equal, round, and reactive to light.   Cardiovascular:      Rate and Rhythm: Normal rate and regular rhythm.      Pulses: Normal pulses.      Heart sounds: Normal heart sounds.   Pulmonary:      Effort: Pulmonary effort is normal.      Breath sounds: Normal breath sounds.   Abdominal:      General: Abdomen is flat. Bowel sounds are normal.      Tenderness: There is abdominal tenderness.      Comments: Drain 350mls in 24 hours of dark green liquid.   Musculoskeletal:         General: Normal range of motion.      Cervical back: Normal range of motion and neck supple.   Skin:     General: Skin is warm and dry.      Capillary Refill: Capillary refill takes less than 2 seconds.   Neurological:      General: No focal deficit present.      Mental Status: She is alert and oriented to person, place, and time.   Psychiatric:         Mood and Affect: Mood normal.         Behavior: Behavior normal.         Thought Content: Thought content normal.         Judgment: Judgment normal.         Past Medical History:   Past Medical History:   Diagnosis Date   • Allergy, unspecified not elsewhere classified    • Anemia     not currently   • Anesthesia     PONV (Demerol/ Dilaudid)   • Arthritis     bilateral shoulders,sacrum, osteo   • Backpain     coccyx and sacrum   • Bronchitis 2010   • Cataract     bilateral IOLI   • Degeneration of cervical intervertebral disc     C5-6,C6-7   • Dental disorder     partial upper and lower   • Heart burn    • Hiatus hernia syndrome     not a problem after gastric sleeve   • High cholesterol     resolved after gastric surgery   • Hyperlipidemia    • Hypertension     off all medication, reveresed after  gastric sleeve   • Muscle disorder    • Pain 05/16/2018    low back and SI joints and sacrum   • Reactive airway disease     rescue inhaler not needed unless with bronchitis       Past Surgical History:  Past Surgical History:   Procedure Laterality Date   • PB ERCP,DIAGNOSTIC  10/1/2021    Procedure: ERCP (ENDOSCOPIC RETROGRADE CHOLANGIOPANCREATOGRAPHY);  Surgeon: Fausto Casas M.D.;  Location: [facility];  Service: Gastroenterology   • COLONOSCOPY  8/8/2018    Procedure: COLONOSCOPY;  Surgeon: Shukri Condon M.D.;  Location: [facility];  Service: General   • GASTROSCOPY  5/23/2018    Procedure: GASTROSCOPY;  Surgeon: Fausto Casas M.D.;  Location: [facility];  Service: Gastroenterology   • EGD W/ENDOSCOPIC ULTRASOUND  5/23/2018    Procedure: EGD W/ENDOSCOPIC ULTRASOUND- RADIAL UPPER;  Surgeon: Fausto Casas M.D.;  Location: [facility];  Service: Gastroenterology   • CATARACT PHACO WITH IOL Left 4/18/2017    Procedure: CATARACT PHACO WITH IOL;  Surgeon: Stuart Estevez M.D.;  Location: SURGERY SAME DAY [facility];  Service:    • CATARACT PHACO WITH IOL Right 4/4/2017    Procedure: CATARACT PHACO WITH IOL;  Surgeon: Stuart Estevez M.D.;  Location: [facility];  Service:    • GASTRIC SLEEVE LAPAROSCOPY  10/19/2016    Procedure: GASTRIC SLEEVE LAPAROSCOPY, HIATAL HERNIA;  Surgeon: Shukri Condon M.D.;  Location: [facility];  Service:    • LIVER BIOPSY LAPAROSCOPIC  10/19/2016    Procedure: LIVER BIOPSY LAPAROSCOPIC;  Surgeon: Shukri Condon M.D.;  Location: [facility];  Service:    • GASTROSCOPY N/A 8/26/2016    Procedure: GASTROSCOPY;  Surgeon: Shukri Condon M.D.;  Location: [facility];  Service:    • ROTATOR CUFF REPAIR Right 7/7/2016   • ROTATOR CUFF REPAIR Left 5/2015   • HYSTERECTOMY ROBOTIC  1/2/2009    Performed by ME[facility] VILLEGAS at [facility]   • LUMBAR FUSION  ANTERIOR  2007    Dr Hillman, L3-S1 fusion   • CHOLECYSTECTOMY  1996    laparoscopic   • TUBAL LIGATION  1985   • GASTRIC RESECTION  1982    gastric stapling   • TONSILLECTOMY AND ADENOIDECTOMY  1967   • PRIMARY C SECTION  1976, 1979, 1985    x3       Hospital Medications:    Current Facility-Administered Medications:   •  oxyCODONE immediate-release (ROXICODONE) tablet 5 mg, 5 mg, Oral, Q3HRS PRN **OR** oxyCODONE immediate release (ROXICODONE) tablet 10 mg, 10 mg, Oral, Q3HRS PRN, 10 mg at 10/10/21 0910 **OR** HYDROmorphone (Dilaudid) injection 0.5 mg, 0.5 mg, Intravenous, Q3HRS PRN, Ana Luisa Shearer D.O.  •  nicotine (NICODERM) 21 MG/24HR 21 mg, 1 Patch, Transdermal, Daily-0600, Ana Luisa Shearer D.O.  •  vitamin D3 (cholecalciferol) tablet 1,000 Units, 1,000 Units, Oral, DAILY, Orlando Jones M.D., 1,000 Units at 10/10/21 0502  •  cyclobenzaprine (Flexeril) tablet 10 mg, 10 mg, Oral, HS PRN, Orlando Jones M.D.  •  ezetimibe (ZETIA) tablet 10 mg, 10 mg, Oral, Q EVENING, Orlando Jones M.D., 10 mg at 10/09/21 1756  •  gabapentin (NEURONTIN) capsule 600 mg, 600 mg, Oral, BID, Orlando Jones M.D., 600 mg at 10/10/21 0501  •  losartan (COZAAR) tablet 25 mg, 25 mg, Oral, QAM, Orlando Jones M.D., 25 mg at 10/08/21 1917  •  magnesium oxide (MAG-OX) tablet 400 mg, 400 mg, Oral, Q EVENING, Orlando Jones M.D., 400 mg at 10/09/21 1756  •  senna-docusate (PERICOLACE or SENOKOT S) 8.6-50 MG per tablet 2 Tablet, 2 Tablet, Oral, BID **AND** polyethylene glycol/lytes (MIRALAX) PACKET 1 Packet, 1 Packet, Oral, QDAY PRN **AND** magnesium hydroxide (MILK OF MAGNESIA) suspension 30 mL, 30 mL, Oral, QDAY PRN **AND** bisacodyl (DULCOLAX) suppository 10 mg, 10 mg, Rectal, QDAY PRN, Orlando Jones M.D.  •  Respiratory Therapy Consult, , Nebulization, Continuous RT, Orlando Jones M.D.  •  lactated ringers infusion, , Intravenous, Continuous, Orlando Jones M.D., Last Rate: 125 mL/hr at 10/10/21 0502, New Bag at  10/10/21 0502  •  enoxaparin (LOVENOX) inj 40 mg, 40 mg, Subcutaneous, DAILY, Orlando Jones M.D., 40 mg at 10/10/21 0502  •  acetaminophen (Tylenol) tablet 650 mg, 650 mg, Oral, Q6HRS PRN, Orlando Jones M.D., 650 mg at 10/08/21 1917  •  Notify provider if pain remains uncontrolled, , , CONTINUOUS **AND** Use the Numeric Rating Scale (NRS), Colon-Baker Faces (WBF), or FLACC on regular floors and Critical-Care Pain Observation Tool (CPOT) on ICUs/Trauma to assess pain, , , CONTINUOUS **AND** Pulse Ox, , , CONTINUOUS **AND** Pharmacy Consult Request ...Pain Management Review 1 Each, 1 Each, Other, PHARMACY TO DOSE **AND** If patient difficult to arouse and/or has respiratory depression (respiratory rate of 10 or less), stop any opiates that are currently infusing and call a Rapid Response., , , CONTINUOUS, Orlando Jones M.D.  •  ondansetron (ZOFRAN) syringe/vial injection 4 mg, 4 mg, Intravenous, Q4HRS PRN, Orlando Jones M.D., 4 mg at 10/09/21 2016  •  ondansetron (ZOFRAN ODT) dispertab 4 mg, 4 mg, Oral, Q4HRS PRN, Orlando Jones M.D.  •  lactated ringers infusion (BOLUS), 500 mL, Intravenous, Once PRN, Orlando Jones M.D.  •  cefTRIAXone (Rocephin) 2 g in  mL IVPB, 2 g, Intravenous, Q24HRS, Orlando Jones M.D., Stopped at 10/10/21 0532  •  famotidine (PEPCID) tablet 20 mg, 20 mg, Oral, BID, 20 mg at 10/10/21 0502 **OR** famotidine (PEPCID) injection 20 mg, 20 mg, Intravenous, BID, Orlando Jnoes M.D.  •  metroNIDAZOLE (FLAGYL) tablet 500 mg, 500 mg, Oral, Q6HRS, Orlando Jones M.D., 500 mg at 10/10/21 0501    Current Outpatient Medications:  Current Facility-Administered Medications   Medication Dose Route Frequency Provider Last Rate Last Admin   • oxyCODONE immediate-release (ROXICODONE) tablet 5 mg  5 mg Oral Q3HRS PRN Ana Luisa Shearer D.O.        Or   • oxyCODONE immediate release (ROXICODONE) tablet 10 mg  10 mg Oral Q3HRS PRN HERBERT SagastumeO.   10 mg at 10/10/21 0910    Or    • HYDROmorphone (Dilaudid) injection 0.5 mg  0.5 mg Intravenous Q3HRS PRN Ana Luisa Shearer D.O.       • nicotine (NICODERM) 21 MG/24HR 21 mg  1 Patch Transdermal Daily-0600 Ana Luisa Shearer D.O.       • vitamin D3 (cholecalciferol) tablet 1,000 Units  1,000 Units Oral DAILY Orlando Jones M.D.   1,000 Units at 10/10/21 0502   • cyclobenzaprine (Flexeril) tablet 10 mg  10 mg Oral HS PRN Orlando Jones M.D.       • ezetimibe (ZETIA) tablet 10 mg  10 mg Oral Q EVENING Orlando Jones M.D.   10 mg at 10/09/21 1756   • gabapentin (NEURONTIN) capsule 600 mg  600 mg Oral BID Orlando Jones M.D.   600 mg at 10/10/21 0501   • losartan (COZAAR) tablet 25 mg  25 mg Oral QAM Orlando Jones M.D.   25 mg at 10/08/21 1917   • magnesium oxide (MAG-OX) tablet 400 mg  400 mg Oral Q EVENING Orlando Jones M.D.   400 mg at 10/09/21 1756   • senna-docusate (PERICOLACE or SENOKOT S) 8.6-50 MG per tablet 2 Tablet  2 Tablet Oral BID Orlando Jones M.D.        And   • polyethylene glycol/lytes (MIRALAX) PACKET 1 Packet  1 Packet Oral QDAY PRN Orlando Jones M.D.        And   • magnesium hydroxide (MILK OF MAGNESIA) suspension 30 mL  30 mL Oral QDAY PRN Orlando Jones M.D.        And   • bisacodyl (DULCOLAX) suppository 10 mg  10 mg Rectal QDAY PRN Orlando Jones M.D.       • Respiratory Therapy Consult   Nebulization Continuous RT Orlando Jones M.D.       • lactated ringers infusion   Intravenous Continuous Orlando Jones M.D. 125 mL/hr at 10/10/21 0502 New Bag at 10/10/21 0502   • enoxaparin (LOVENOX) inj 40 mg  40 mg Subcutaneous DAILY Orlando Jones M.D.   40 mg at 10/10/21 0502   • acetaminophen (Tylenol) tablet 650 mg  650 mg Oral Q6HRS PRN Orlando Jones M.D.   650 mg at 10/08/21 1917   • Pharmacy Consult Request ...Pain Management Review 1 Each  1 Each Other PHARMACY TO DOSE Orlando Jones M.D.       • ondansetron (ZOFRAN) syringe/vial injection 4 mg  4 mg Intravenous Q4HRS PRN Orlando YANG  "SHARATH Jones   4 mg at 10/09/21 2016   • ondansetron (ZOFRAN ODT) dispertab 4 mg  4 mg Oral Q4HRS PRN Orlando Jones M.D.       • lactated ringers infusion (BOLUS)  500 mL Intravenous Once PRN Orlando Jones M.D.       • cefTRIAXone (Rocephin) 2 g in  mL IVPB  2 g Intravenous Q24HRS Orlando Jones M.D.   Stopped at 10/10/21 0532   • famotidine (PEPCID) tablet 20 mg  20 mg Oral BID Orlando Jones M.D.   20 mg at 10/10/21 0502    Or   • famotidine (PEPCID) injection 20 mg  20 mg Intravenous BID Orlando Jones M.D.       • metroNIDAZOLE (FLAGYL) tablet 500 mg  500 mg Oral Q6HRS Orlando Jones M.D.   500 mg at 10/10/21 0501       Medication Allergy:  Allergies   Allergen Reactions   • Ampicillin Rash     Rash     • Cephalexin Diarrhea, Vomiting and Nausea     .   • Clindamycin Vomiting     \"cleocin\"   • Codeine      Severe stomach pain, cramps, spasms   • Demerol Vomiting   • Levofloxacin Unspecified     Numbness     • Tetracyclines Rash     .   • Tizanidine Itching   • Hydromorphone Vomiting and Nausea   • Morphine Vomiting   • Pcn [Penicillins] Itching   • Sulfa Drugs Itching       Family History:  Family History   Problem Relation Age of Onset   • Stroke Mother    • Cancer Father         larynx   • Diabetes Brother        Social History:  Social History     Socioeconomic History   • Marital status:      Spouse name: Not on file   • Number of children: Not on file   • Years of education: Not on file   • Highest education level: Not on file   Occupational History   • Not on file   Tobacco Use   • Smoking status: Former Smoker     Packs/day: 0.25     Years: 0.10     Pack years: 0.02     Types: Cigarettes     Quit date: 1973     Years since quittin.8   • Smokeless tobacco: Never Used   Vaping Use   • Vaping Use: Never used   Substance and Sexual Activity   • Alcohol use: No   • Drug use: No   • Sexual activity: Not on file     Comment:    Other Topics Concern   • Not on file "   Social History Narrative   • Not on file     Social Determinants of Health     Financial Resource Strain:    • Difficulty of Paying Living Expenses:    Food Insecurity:    • Worried About Running Out of Food in the Last Year:    • Ran Out of Food in the Last Year:    Transportation Needs:    • Lack of Transportation (Medical):    • Lack of Transportation (Non-Medical):    Physical Activity:    • Days of Exercise per Week:    • Minutes of Exercise per Session:    Stress:    • Feeling of Stress :    Social Connections:    • Frequency of Communication with Friends and Family:    • Frequency of Social Gatherings with Friends and Family:    • Attends Zoroastrianism Services:    • Active Member of Clubs or Organizations:    • Attends Club or Organization Meetings:    • Marital Status:    Intimate Partner Violence:    • Fear of Current or Ex-Partner:    • Emotionally Abused:    • Physically Abused:    • Sexually Abused:          MDM (Data Review):     Records reviewed and summarized in current documentation    Lab Data Review:  Recent Results (from the past 24 hour(s))   CBC WITHOUT DIFFERENTIAL    Collection Time: 10/10/21  3:39 AM   Result Value Ref Range    WBC 19.0 (H) 4.8 - 10.8 K/uL    RBC 3.24 (L) 4.20 - 5.40 M/uL    Hemoglobin 9.8 (L) 12.0 - 16.0 g/dL    Hematocrit 29.1 (L) 37.0 - 47.0 %    MCV 89.8 81.4 - 97.8 fL    MCH 30.2 27.0 - 33.0 pg    MCHC 33.7 33.6 - 35.0 g/dL    RDW 44.8 35.9 - 50.0 fL    Platelet Count 331 164 - 446 K/uL    MPV 9.4 9.0 - 12.9 fL   Comp Metabolic Panel    Collection Time: 10/10/21  3:39 AM   Result Value Ref Range    Sodium 131 (L) 135 - 145 mmol/L    Potassium 3.5 (L) 3.6 - 5.5 mmol/L    Chloride 97 96 - 112 mmol/L    Co2 22 20 - 33 mmol/L    Anion Gap 12.0 7.0 - 16.0    Glucose 83 65 - 99 mg/dL    Bun 15 8 - 22 mg/dL    Creatinine 0.54 0.50 - 1.40 mg/dL    Calcium 8.0 (L) 8.4 - 10.2 mg/dL    AST(SGOT) 17 12 - 45 U/L    ALT(SGPT) 13 2 - 50 U/L    Alkaline Phosphatase 71 30 - 99 U/L     Total Bilirubin 0.6 0.1 - 1.5 mg/dL    Albumin 1.8 (L) 3.2 - 4.9 g/dL    Total Protein 4.5 (L) 6.0 - 8.2 g/dL    Globulin 2.7 1.9 - 3.5 g/dL    A-G Ratio 0.7 g/dL   ESTIMATED GFR    Collection Time: 10/10/21  3:39 AM   Result Value Ref Range    GFR If African American >60 >60 mL/min/1.73 m 2    GFR If Non African American >60 >60 mL/min/1.73 m 2       Imaging/Procedures Review:      CT-ABDOMEN WITH & W/O   Final Result      1.  No findings of intraluminal contrast extravasation from the opacified bowel loops, particularly no contrast extravasation from the duodenum at the ampulla of [place].   2.  A pigtail drainage catheter is present in the right abdomen in a air and fluid collection, the extent of which is difficult to measure. The collection tracks inferiorly and medially measuring approximately 17 cm in craniocaudal dimension.   3.  A smaller loculated collection in the anterior abdomen near the inferior anterior abdominal wall sutures measures 1.5 x 8.7 cm.   4.  Unchanged pneumobilia in the CBD, intrahepatic bile ducts as well as pancreatic ducts.   5.  There is a small right pleural effusion with adjacent enhancing segmental atelectasis.   6.   Coronary artery calcifications are present      CT-ABDOMEN-PELVIS WITH   Final Result         1. Large irregular irregular fluid collection with thick wall occupying most of the right abdomen surrounding the right kidney and right colon.      Additional fluid and gas collection in the midline abdomen anterior to the pancreas concerning for abscess. This collection is probably connected to the right side collection via a junction in the jl hepatis      2. Severe wall thickening of the descending colon, transverse colon, second portion duodenum, likely reactive.      3. Pneumobilia with gas in the CBD, intrahepatic bile ducts as well as pancreatic ducts are of unclear etiology.      4. Small right pleural effusion with right basilar atelectasis.             CT-DRAIN-PERITONEAL    (Results Pending)   NM-HEPATOBILIARY SCAN    (Results Pending)        MDM (Assessment and Plan):     Patient Active Problem List    Diagnosis Date Noted   • Sepsis due to intraabdominal fluid collection/abscess (HCC) 10/08/2021   • History of pancreatitis 10/08/2021   • Intraabdominal fluid collection 10/08/2021   • Post-ERCP acute pancreatitis 10/02/2021   • Leukocytosis 10/02/2021   • MOLINA (acute kidney injury) (HCC) 10/02/2021   • Combined form of senile cataract of left eye 04/18/2017   • Combined forms of age-related cataract of right eye 04/04/2017   • History of morbid obesity 10/19/2016   • Esophageal reflux 08/26/2016   • Hyperlipidemia 06/29/2011   • Vitamin D deficiency 06/09/2011   • Hypertension 11/09/2009   • Degeneration of cervical intervertebral disc      This is a 66-year-old female with a history of recurrent idiopathic pancreatitis s/p ERCP with biliary sphincterotomy October 1, 2021.  Postop complications-pancreatitis.  Ever since then, complaints of subjective fevers, progressive abdominal pain, nausea/vomiting.  Began to have worsening right lower quadrant pain over the past 5 days.  CT scan abdomen/pelvis large irregular irregular fluid collection with thick wall occupying most of the right abdomen surrounding right kidney and right colon.  Additional fluid and gas collection in the midline abdomen anterior to the pancreas concerning for abscess.  Severe wall thickening of the descending and transverse colon, second portion of duodenum likely reactive.  Pneumobilia with gas in the CBD, intrahepatic bile ducts as well as pancreatic ducts.  IR placed a drain with improvement in abdominal pain. CT scan abdomen/pelvis: no evidence of ampullary leak but unable to evaluate for biliary leak.     ASSESSMENT:  1.  Sepsis due to intra-abdominal fluid collection/abscess  2.  History of pancreatitis  3.  Intra-abdominal fluid collection  4.  History of morbid obesity s/p  laparoscopic sleeve    PLAN:  1.  HIDA scan CCK infusion  2.  Continue IV antibiotics ceftriaxone and metronidazole  3.  Continue to trend CBC, CMP  4.  Depending on HIDA scan, may need to proceed with ERCP with stent placement if biliary leak versus conservative measures  5.  IV antibiotics, antiemetics, pain medication per primary team      Thank your for the opportunity to assist in the care of your patient.  Please call for any questions or concerns.    JACOB Maria.      GI attending attestation:  Myrna Pimentel DO, FACP    I reviewed the patients medical records.  I agree with the assessment and recommendations above as edited by me.    Interval update:    The patient is a 66-year-old woman with a history of recurrent idiopathic pancreatitis status post ERCP on 10/1/2021 (sphincterotomy performed, no choledocholithiasis noted, distal CBD polyp status post biopsy).  She subsequently developed post ERCP pancreatitis on 10/2/2021.  At that time she was noted to have pancreatic head edema with normal enhancement and surrounding peripancreatic/mesenteric edema and infiltration which tracks down the right paracolic gutter.  Since her ERCP she has had worsening abdominal pain with subsequent admission.  On CT with abdomen/pelvis with IV contrast, she was noted to have an irregular fluid collection with thick wall occupying most of the right abdomen some pounding the right kidney and right colon, and additional fluid/gas collection in the midline anterior to the pancreas, severe wall thickening of the descending/transverse colon and second portion of the duodenum, and biliary/PD air.  She was admitted to the hospital and IR placed a drain into the intra-abdominal abscess.  She was started on ceftriaxone/Flagyl but continued to have worsening leukocytosis.  Peritoneal fluid cultures grew E. coli and Enterococcus faecalis for which her antibiotics were broadened to Zosyn.  Repeat CT was negative for  perforation.  HIDA scan was negative for bile leak her multi organism intra-abdominal abscess/fluid collection is concerning for biliary or duodenal perforation which is now contained.  Given prior fluid collection associated with pancreatitis she could also have PD leak or other related pathology.  Lipase was negative and CT did not demonstrate persistent pancreatitis.  -Continue Zosyn  -Appreciate surgery recs  -Continue IVF and supportive care  -Send drain fluid for amylase and bilirubin  -Trend CBC and CMP      Thank you for involving me in the care of this patient.  Please do not hesitate to call GI consultants with additional questions/concerns or changes in the patient's clinical status.

## 2021-10-10 NOTE — PROGRESS NOTES
Received bedside patient report from TALITA Hutchison. Patient resting comfortably in bed, no complaints at this time. Safety precautions in place. Will continue to monitor. COVID-19 surge in effect.

## 2021-10-11 LAB
ALBUMIN SERPL BCP-MCNC: 1.8 G/DL (ref 3.2–4.9)
ALBUMIN/GLOB SERPL: 0.6 G/DL
ALP SERPL-CCNC: 61 U/L (ref 30–99)
ALT SERPL-CCNC: 9 U/L (ref 2–50)
ANION GAP SERPL CALC-SCNC: 12 MMOL/L (ref 7–16)
AST SERPL-CCNC: 14 U/L (ref 12–45)
BILIRUB SERPL-MCNC: 0.6 MG/DL (ref 0.1–1.5)
BUN SERPL-MCNC: 14 MG/DL (ref 8–22)
CALCIUM SERPL-MCNC: 7.8 MG/DL (ref 8.4–10.2)
CHLORIDE SERPL-SCNC: 94 MMOL/L (ref 96–112)
CO2 SERPL-SCNC: 22 MMOL/L (ref 20–33)
CREAT SERPL-MCNC: 0.59 MG/DL (ref 0.5–1.4)
ERYTHROCYTE [DISTWIDTH] IN BLOOD BY AUTOMATED COUNT: 47.8 FL (ref 35.9–50)
GLOBULIN SER CALC-MCNC: 3.1 G/DL (ref 1.9–3.5)
GLUCOSE SERPL-MCNC: 83 MG/DL (ref 65–99)
HCT VFR BLD AUTO: 32.3 % (ref 37–47)
HGB BLD-MCNC: 10.3 G/DL (ref 12–16)
MCH RBC QN AUTO: 30 PG (ref 27–33)
MCHC RBC AUTO-ENTMCNC: 31.9 G/DL (ref 33.6–35)
MCV RBC AUTO: 94.2 FL (ref 81.4–97.8)
PLATELET # BLD AUTO: 373 K/UL (ref 164–446)
PMV BLD AUTO: 9.4 FL (ref 9–12.9)
POTASSIUM SERPL-SCNC: 3.6 MMOL/L (ref 3.6–5.5)
PROT SERPL-MCNC: 4.9 G/DL (ref 6–8.2)
RBC # BLD AUTO: 3.43 M/UL (ref 4.2–5.4)
SODIUM SERPL-SCNC: 128 MMOL/L (ref 135–145)
WBC # BLD AUTO: 16.6 K/UL (ref 4.8–10.8)

## 2021-10-11 PROCEDURE — 80053 COMPREHEN METABOLIC PANEL: CPT

## 2021-10-11 PROCEDURE — 770001 HCHG ROOM/CARE - MED/SURG/GYN PRIV*

## 2021-10-11 PROCEDURE — 700102 HCHG RX REV CODE 250 W/ 637 OVERRIDE(OP): Performed by: INTERNAL MEDICINE

## 2021-10-11 PROCEDURE — 85027 COMPLETE CBC AUTOMATED: CPT

## 2021-10-11 PROCEDURE — 700105 HCHG RX REV CODE 258: Performed by: INTERNAL MEDICINE

## 2021-10-11 PROCEDURE — 99232 SBSQ HOSP IP/OBS MODERATE 35: CPT | Performed by: INTERNAL MEDICINE

## 2021-10-11 PROCEDURE — 94760 N-INVAS EAR/PLS OXIMETRY 1: CPT

## 2021-10-11 PROCEDURE — A9270 NON-COVERED ITEM OR SERVICE: HCPCS | Performed by: INTERNAL MEDICINE

## 2021-10-11 PROCEDURE — 36415 COLL VENOUS BLD VENIPUNCTURE: CPT

## 2021-10-11 PROCEDURE — 700111 HCHG RX REV CODE 636 W/ 250 OVERRIDE (IP): Performed by: INTERNAL MEDICINE

## 2021-10-11 RX ORDER — POLYETHYLENE GLYCOL 3350 17 G/17G
1 POWDER, FOR SOLUTION ORAL
Status: DISCONTINUED | OUTPATIENT
Start: 2021-10-11 | End: 2021-10-15 | Stop reason: HOSPADM

## 2021-10-11 RX ORDER — BISACODYL 10 MG
10 SUPPOSITORY, RECTAL RECTAL ONCE
Status: ACTIVE | OUTPATIENT
Start: 2021-10-11 | End: 2021-10-12

## 2021-10-11 RX ORDER — AMOXICILLIN 250 MG
2 CAPSULE ORAL 2 TIMES DAILY
Status: DISCONTINUED | OUTPATIENT
Start: 2021-10-11 | End: 2021-10-13

## 2021-10-11 RX ADMIN — GABAPENTIN 600 MG: 300 CAPSULE ORAL at 17:11

## 2021-10-11 RX ADMIN — PIPERACILLIN AND TAZOBACTAM 3.38 G: 3; .375 INJECTION, POWDER, LYOPHILIZED, FOR SOLUTION INTRAVENOUS; PARENTERAL at 04:45

## 2021-10-11 RX ADMIN — FAMOTIDINE 20 MG: 20 TABLET ORAL at 06:00

## 2021-10-11 RX ADMIN — Medication 400 MG: at 17:12

## 2021-10-11 RX ADMIN — OXYCODONE HYDROCHLORIDE 5 MG: 5 TABLET ORAL at 07:54

## 2021-10-11 RX ADMIN — OXYCODONE HYDROCHLORIDE 10 MG: 10 TABLET ORAL at 21:06

## 2021-10-11 RX ADMIN — EZETIMIBE 10 MG: 10 TABLET ORAL at 17:12

## 2021-10-11 RX ADMIN — PIPERACILLIN AND TAZOBACTAM 3.38 G: 3; .375 INJECTION, POWDER, LYOPHILIZED, FOR SOLUTION INTRAVENOUS; PARENTERAL at 22:46

## 2021-10-11 RX ADMIN — OXYCODONE HYDROCHLORIDE 5 MG: 5 TABLET ORAL at 17:12

## 2021-10-11 RX ADMIN — ONDANSETRON 4 MG: 2 INJECTION INTRAMUSCULAR; INTRAVENOUS at 22:43

## 2021-10-11 RX ADMIN — OXYCODONE HYDROCHLORIDE 10 MG: 10 TABLET ORAL at 10:53

## 2021-10-11 RX ADMIN — GABAPENTIN 600 MG: 300 CAPSULE ORAL at 04:46

## 2021-10-11 RX ADMIN — PIPERACILLIN AND TAZOBACTAM 3.38 G: 3; .375 INJECTION, POWDER, LYOPHILIZED, FOR SOLUTION INTRAVENOUS; PARENTERAL at 15:00

## 2021-10-11 RX ADMIN — SODIUM CHLORIDE, POTASSIUM CHLORIDE, SODIUM LACTATE AND CALCIUM CHLORIDE: 600; 310; 30; 20 INJECTION, SOLUTION INTRAVENOUS at 15:01

## 2021-10-11 RX ADMIN — ENOXAPARIN SODIUM 40 MG: 40 INJECTION SUBCUTANEOUS at 04:46

## 2021-10-11 RX ADMIN — Medication 1000 UNITS: at 04:46

## 2021-10-11 RX ADMIN — OXYCODONE HYDROCHLORIDE 10 MG: 10 TABLET ORAL at 14:11

## 2021-10-11 RX ADMIN — FAMOTIDINE 20 MG: 20 TABLET ORAL at 17:12

## 2021-10-11 RX ADMIN — ONDANSETRON 4 MG: 2 INJECTION INTRAMUSCULAR; INTRAVENOUS at 11:51

## 2021-10-11 ASSESSMENT — ENCOUNTER SYMPTOMS
CLAUDICATION: 0
CONSTIPATION: 0
DIARRHEA: 0
INSOMNIA: 0
VOMITING: 0
NERVOUS/ANXIOUS: 0
HEARTBURN: 0
FEVER: 0
MYALGIAS: 0
BLURRED VISION: 0
PHOTOPHOBIA: 0
SENSORY CHANGE: 0
ABDOMINAL PAIN: 1
NAUSEA: 0
CHILLS: 0
DIZZINESS: 0
SPEECH CHANGE: 0
SORE THROAT: 0
SHORTNESS OF BREATH: 0
DEPRESSION: 0
COUGH: 0
HEADACHES: 0
WEAKNESS: 0

## 2021-10-11 ASSESSMENT — PAIN SCALES - WONG BAKER
WONGBAKER_NUMERICALRESPONSE: DOESN'T HURT AT ALL
WONGBAKER_NUMERICALRESPONSE: DOESN'T HURT AT ALL

## 2021-10-11 ASSESSMENT — PAIN DESCRIPTION - PAIN TYPE
TYPE: ACUTE PAIN
TYPE: ACUTE PAIN
TYPE: ACUTE PAIN;SURGICAL PAIN

## 2021-10-11 NOTE — PROGRESS NOTES
Gastroenterology Progress Note     Author: Jason Dixon M.D.   Date & Time Created: 10/11/2021 6:52 AM    Chief Complaint:  Abdominal pain    Interval History:  10/10 HPI  66-year-old female PMH recurrent idiopathic pancreatitis s/p ERCP with sphincterotomy October 1, 2021 complicated by ERCP pancreatitis, sleeve gastrectomy, dyslipidemia, hypertension, osteoarthritis of the spine status post lumbar fusion who was admitted to the hospital 10/8/2021 with worsening right lower quadrant pain over the past week.  Ever since her ERCP October 1, 2021, she has been experiencing pain, intermittent subjective fevers, nausea.  In the emergency room-labs: CBC: WBC 17.8..  Sodium 130.  LFTs-WNL.  CT scan abdomen/pelvis-large irregular fluid collection with thick wall occupying most of the right abdomen surrounding the right kidney and colon.  Severe wall thickening of the descending, transverse colon, second portion of the duodenum likely reactive.  Pneumobilia with gas in the CBD.  Drain placement by IR was performed.  Currently, the abdominal pain has improved.  No vomiting.  Started on ceftriaxone and metronidazole IV.     ERCP October 1, 2021-common bile duct-dilated down to the level of the ampulla.  4 mm polyp at the distal duct seen after sphincterotomy.  8 mm sphincterotomy.  Negative for stone.  Bile duct polyp-benign with inflammatory and hyperplastic changes.  No evidence of epithelial dysplasia/neoplasia.    10/11: interval HIDA scan showed no bile leak.  This morning she feels more comfortable, describing minimal abdominal pain but denying nausea vomiting and fevers.  She has been able to eat a little bit more and has full liquids beside her bed.  She expresses concern about being on losartan which she says was prescribed outpatient as as needed for high blood pressures.  She also states she has not passed a bowel movement in the week which she describes not eating much.    Review of Systems:  ROS  A complete  12 point review of systems was performed.    Constitutional: Denies fevers, chills, night sweats; Denies weight changes  Eyes: Denies eye pain, denies eye discharge  Ears/Nose/Throat/Mouth: Denies nasal congestion or sore throat   Cardiovascular: Denies chest pain or palpitations.  Respiratory: Denies shortness of breath, cough, and wheezing.  Gastrointestinal/Hepatic: see HPI  Genitourinary: Denies dysuria, increased frequency, hematuria  Musculoskeletal/Rheum: Denies joint pain and swelling, denies edema  Skin: Denies rash, denies wounds  Neurological: Denies headache, confusion, memory loss or focal weakness/parasthesias  Psychiatric: denies mood disorder, denies hallucinations   Endocrine: Denies thyroid problems; denies polydipsia  Heme/Oncology/Lymph Nodes: Denies enlarged lymph nodes, denies bruising or known bleeding disorder      Physical Exam:  Physical Exam  Cardiovascular:      Rate and Rhythm: Normal rate and regular rhythm.      Pulses: Normal pulses.      Heart sounds: Normal heart sounds.   Pulmonary:      Effort: Pulmonary effort is normal.      Breath sounds: Normal breath sounds.   Abdominal:      General: Abdomen is flat. Bowel sounds are normal.        Abdominal tenderness is absent.  There is a well-placed drain.  Has scant green fluid in it.  Labs:          Recent Labs     10/09/21  0209 10/10/21  0339 10/11/21  0210   SODIUM 130* 131* 128*   POTASSIUM 3.6 3.5* 3.6   CHLORIDE 96 97 94*   CO2 22 22 22   BUN 18 15 14   CREATININE 0.47* 0.54 0.59   CALCIUM 8.2* 8.0* 7.8*     Recent Labs     10/09/21  0209 10/10/21  0339 10/11/21  0210   ALTSGPT 15 13 9   ASTSGOT 19 17 14   ALKPHOSPHAT 65 71 61   TBILIRUBIN 0.8 0.6 0.6   GLUCOSE 97 83 83     Recent Labs     10/08/21  1401 10/09/21  0209 10/10/21  0339 10/11/21  0210   RBC  --  3.29* 3.24* 3.43*   HEMOGLOBIN  --  10.0* 9.8* 10.3*   HEMATOCRIT  --  29.8* 29.1* 32.3*   PLATELETCT  --  255 331 373   PROTHROMBTM 15.7*  --   --   --    INR 1.36*  --    --   --      Recent Labs     10/09/21  0209 10/10/21  0339 10/11/21  0210   WBC 14.8* 19.0* 16.6*   ASTSGOT 19 17 14   ALTSGPT 15 13 9   ALKPHOSPHAT 65 71 61   TBILIRUBIN 0.8 0.6 0.6     Hemodynamics:  Temp (24hrs), Av.1 °C (98.7 °F), Min:36.8 °C (98.2 °F), Max:37.6 °C (99.7 °F)  Temperature: 36.9 °C (98.5 °F)  Pulse  Av.7  Min: 68  Max: 94   Blood Pressure : (!) 96/59     Respiratory:    Respiration: 18, Pulse Oximetry: 96 %           Fluids:    Intake/Output Summary (Last 24 hours) at 10/11/2021 0652  Last data filed at 10/11/2021 0500  Gross per 24 hour   Intake 480 ml   Output 980 ml   Net -500 ml        GI/Nutrition:  Orders Placed This Encounter   Procedures   • Diet Order Diet: Full Liquid     Standing Status:   Standing     Number of Occurrences:   1     Order Specific Question:   Diet:     Answer:   Full Liquid [11]     Medical Decision Making, by Problem:  Active Hospital Problems    Diagnosis    • *Sepsis due to intraabdominal fluid collection/abscess (HCC) [A41.9]    • History of pancreatitis [Z87.19]    • Intraabdominal fluid collection [R18.8]    • History of morbid obesity [Z87.898]    • Hyperlipidemia [E78.5]    • Vitamin D deficiency [E55.9]    • Hypertension [I10]      66-year-old female with a history of recurrent idiopathic pancreatitis s/p ERCP with biliary sphincterotomy and biopsy of a distal BD polyp on 2021.  Postop complications-pancreatitis.  Ever since then, complaints of subjective fevers, progressive abdominal pain, nausea/vomiting.  Began to have worsening right lower quadrant pain over the past 5 days.  CT scan abdomen/pelvis large irregular fluid collection with thick wall occupying most of the right abdomen surrounding right kidney and right colon.  Additional fluid and gas collection in the midline abdomen anterior to the pancreas concerning for abscess.  Severe wall thickening of the descending and transverse colon, second portion of duodenum likely reactive.   Pneumobilia with gas in the CBD, intrahepatic bile ducts as well as pancreatic ducts (likely secondary to recent ERCP with sphincterotomy).  IR placed a drain with improvement in abdominal pain.    She now has source control of her abscess and she has symptomatic improvement with increasing tolerance to p.o.  Etiology remains unclear.  Query if the patient had a small bile leak that has since sealed off independently.  Does not appear to need invasive management at the moment.     ASSESSMENT:  1.  Sepsis due to intra-abdominal fluid collection/abscess  2.  History of pancreatitis  3.  Intra-abdominal fluid collection  4.  History of morbid obesity s/p laparoscopic sleeve  5.  Euvolemic hyponatremia  6.  Constipation     PLAN:    -Continue IV antibiotics ceftriaxone and metronidazole  -Continue to trend CBC, CMP  - IV antibiotics, antiemetics, pain medication per primary team  -Continue scheduled senna; have added one-time scheduled dose of bisacodyl suppository  -Criteria for removal of percutaneous catheters include resolution of sepsis signs, minimal drainage from the catheter, and resolution of the abscess cavity as demonstrated by ultrasonography or CT. Would observe her white count for another day if downtrending again consider repeat CT scan to evaluate the size of her abscesses and whether the drain can be pulled.  -Consider urine sodium and creatinine    My total time spent caring for the patient on the day of the encounter was 55 minutes.   This does not include time spent on separately billable procedures/tests.    Jason Dixon MD, [facility]  [facility]      Quality-Core Measures

## 2021-10-11 NOTE — PROGRESS NOTES
Hospital Medicine Daily Progress Note    Date of Service  10/11/2021    Chief Complaint  Nils Alfonso is a 66 y.o. female admitted 10/8/2021 with abdominal pain.    Hospital Course  Nils Alfonso is a 66 y.o. female who presented 10/8/2021 with RLQ Pain (Worsening RLQ pain over the past week. Dx with pancreatitis 1 week ago. Denies nausea or vomting)  She has a history of recurrent and idiopathic ) pancreatitis s/p ERCP with sphincterotomy by Dr. Gillespie on 10/01/2021 complicated by ERCP pancreatitis, s/p cholecystectomy, ex lap obesity with fatty liver s/p sleeve gastrectomy.  Ever since her ERCP, she has been in pain, nauseous, inteittent mild subjective fevers, poor PO intake and minimal to no bowel movement. She ws managed outpatient for what was felt was post ERCP pancreatitis.  CT scan showed Large irregular irregular fluid collection with thick wall occupying most of the right abdomen surrounding the right kidney and right colon.  IR placed drain into this fluid collection and had significant improvement in her symptoms.  Dr. Figueroa, Surgery was consulted and case was also discussed with Dr Chin with GI who both agreed for patient to need IR drain to be placed.  Empiric antibiotics initiated due to leukocytosis and concern of sepsis.        Interval Problem Update  10/9 Patient states her pain is no longer in her abdomen, mostly in the back due to inactivity and bed rest.  Biliary drain in place with green bile in the collection bag.  Dr Chin was not officially consulted yesterday.  I did discuss the case with him and he recommends a UGI with gastrografin and will consult pending results tomorrow - patient notified.  Will continue with flagyl/rocephin and clear liquid diet only.     Addendum: After further discussion with radiology and GI, imaging study changed to CT abdomen with IV and oral contrast and important timing of oral contrast administration.  Coordinating this with radiology,   Totgeoffrey.  Imaging orders changed.   10/10 Patient feeling okay today, she did have a increase in her WBC overnight and though the e coli was being treated by the rocephin, this would not cover e faecalis - changed antiobiotic to zosyn given patients ampicillin allergy and also ordered PRN benadryl in case this PCN still results in a rash and then I would have to adjust antibiotics again.  D/w Dr Pimentel - HIDA scan ordered.  Patient is tolerating the full liquid diet without issue.  10/11 Patient without complaint today.  HIDA showed no evidence of active leak.  Biliary drain still with significant output.  WBC has dropped with change in antibiotics.  She is not showing any signs of allergy to the zosyn.  Continue this and pending bilirubin and amylase from the drain in place.     I have personally seen and examined the patient at bedside. I discussed the plan of care with patient, family, bedside RN, charge RN,  and pharmacy.    Consultants/Specialty  general surgery and GI    Code Status  Full Code    Disposition  Patient is not medically cleared.   Anticipate discharge to to home with close outpatient follow-up.  I have placed the appropriate orders for post-discharge needs.    Review of Systems  Review of Systems   Constitutional: Negative for chills and fever.   HENT: Negative for congestion and sore throat.    Eyes: Negative for blurred vision and photophobia.   Respiratory: Negative for cough and shortness of breath.    Cardiovascular: Negative for chest pain, claudication and leg swelling.   Gastrointestinal: Positive for abdominal pain (mild). Negative for constipation, diarrhea, heartburn, nausea and vomiting.   Genitourinary: Negative for dysuria and hematuria.   Musculoskeletal: Negative for joint pain and myalgias.   Skin: Negative for itching and rash.   Neurological: Negative for dizziness, sensory change, speech change, weakness and headaches.   Psychiatric/Behavioral: Negative for  depression. The patient is not nervous/anxious and does not have insomnia.         Physical Exam  Temp:  [36.9 °C (98.4 °F)-37.6 °C (99.7 °F)] 36.9 °C (98.5 °F)  Pulse:  [68-71] 68  Resp:  [15-18] 18  BP: ()/(50-70) 96/59  SpO2:  [89 %-96 %] 96 %    Physical Exam  Vitals and nursing note reviewed.   Constitutional:       General: She is not in acute distress.     Appearance: Normal appearance. She is not ill-appearing.   HENT:      Head: Normocephalic and atraumatic.      Nose: Nose normal.   Cardiovascular:      Rate and Rhythm: Normal rate and regular rhythm.      Heart sounds: Normal heart sounds. No murmur heard.     Pulmonary:      Effort: Pulmonary effort is normal.      Breath sounds: Normal breath sounds.   Abdominal:      General: Bowel sounds are normal. There is no distension.      Palpations: Abdomen is soft.      Tenderness: There is no abdominal tenderness. There is no guarding.      Comments: Biliary drain in place - bile in collection bag.     Musculoskeletal:         General: No swelling or tenderness.      Cervical back: Neck supple.   Skin:     General: Skin is warm and dry.   Neurological:      General: No focal deficit present.      Mental Status: She is alert and oriented to person, place, and time.   Psychiatric:         Mood and Affect: Mood normal.         Fluids    Intake/Output Summary (Last 24 hours) at 10/11/2021 1026  Last data filed at 10/11/2021 0800  Gross per 24 hour   Intake 480 ml   Output 980 ml   Net -500 ml       Laboratory  Recent Labs     10/09/21  0209 10/10/21  0339 10/11/21  0210   WBC 14.8* 19.0* 16.6*   RBC 3.29* 3.24* 3.43*   HEMOGLOBIN 10.0* 9.8* 10.3*   HEMATOCRIT 29.8* 29.1* 32.3*   MCV 90.6 89.8 94.2   MCH 30.4 30.2 30.0   MCHC 33.6 33.7 31.9*   RDW 43.8 44.8 47.8   PLATELETCT 255 331 373   MPV 9.1 9.4 9.4     Recent Labs     10/09/21  0209 10/10/21  0339 10/11/21  0210   SODIUM 130* 131* 128*   POTASSIUM 3.6 3.5* 3.6   CHLORIDE 96 97 94*   CO2 22 22 22    GLUCOSE 97 83 83   BUN 18 15 14   CREATININE 0.47* 0.54 0.59   CALCIUM 8.2* 8.0* 7.8*     Recent Labs     10/08/21  1401   INR 1.36*               Imaging  IR-US GUIDED PIV   Final Result    Ultrasound-guided PERIPHERAL IV INSERTION performed by    qualified nursing staff as above.      NM-HEPATOBILIARY SCAN   Final Result      Normal hepatobiliary scan with no findings of a bile leak.      CT-ABDOMEN WITH & W/O   Final Result      1.  No findings of intraluminal contrast extravasation from the opacified bowel loops, particularly no contrast extravasation from the duodenum at the ampulla of [place].   2.  A pigtail drainage catheter is present in the right abdomen in a air and fluid collection, the extent of which is difficult to measure. The collection tracks inferiorly and medially measuring approximately 17 cm in craniocaudal dimension.   3.  A smaller loculated collection in the anterior abdomen near the inferior anterior abdominal wall sutures measures 1.5 x 8.7 cm.   4.  Unchanged pneumobilia in the CBD, intrahepatic bile ducts as well as pancreatic ducts.   5.  There is a small right pleural effusion with adjacent enhancing segmental atelectasis.   6.   Coronary artery calcifications are present      CT-DRAIN-PERITONEAL   Final Result      Successful peritoneal drainage tube placement.      Plan: Thrice daily flushes with 10 mL of sterile saline. Monitor outputs. Please contact interventional radiology if there is any concern for tube dysfunction.         CT-ABDOMEN-PELVIS WITH   Final Result         1. Large irregular irregular fluid collection with thick wall occupying most of the right abdomen surrounding the right kidney and right colon.      Additional fluid and gas collection in the midline abdomen anterior to the pancreas concerning for abscess. This collection is probably connected to the right side collection via a junction in the jl hepatis      2. Severe wall thickening of the descending colon,  transverse colon, second portion duodenum, likely reactive.      3. Pneumobilia with gas in the CBD, intrahepatic bile ducts as well as pancreatic ducts are of unclear etiology.      4. Small right pleural effusion with right basilar atelectasis.                 Assessment/Plan  * Sepsis due to intraabdominal fluid collection/abscess (HCC)  Assessment & Plan  This is Sepsis Present on admission  SIRS criteria identified on my evaluation include: Leukocytosis, with WBC greater than 12,000  Source is intraabdominal  Sepsis protocol initiated  Fluid resuscitation ordered per protocol  IV antibiotics as appropriate for source of sepsis  While organ dysfunction may be noted elsewhere in this problem list or in the chart, degree of organ dysfunction does not meet CMS criteria for severe sepsis  IR placed drain 10/8 - bile output  Fluid culture showing e coli and e faecalis - change to zosyn, gets rash with ampicillin        Intraabdominal fluid collection  Assessment & Plan  Suspected biliary leak post ERCP with sphincterotomy  Biliary drain placed with improvement in pain  Empiric rocephin/flagyl  Appreciate surgery consult - Dr Figueroa  GI consulting, CT showed no evidence of contrast outside of the bowel   HIDA scan shows no biliary leak  Fluid studies for amylase and bilirubin per GI - collected and pending.      History of pancreatitis  Assessment & Plan  Not acute  Normal lipase  Pain control and bowel rest    History of morbid obesity- (present on admission)  Assessment & Plan  Body mass index is 23.91 kg/m².  S/p sleeve gastrectomy      Hyperlipidemia- (present on admission)  Assessment & Plan  Continue Zetia    Vitamin D deficiency- (present on admission)  Assessment & Plan  Continue Vit D supplementation    Hypertension- (present on admission)  Assessment & Plan  Stable  Continue losartan       VTE prophylaxis: enoxaparin ppx    I have performed a physical exam and reviewed and updated ROS and Plan today  (10/11/2021). In review of yesterday's note (10/10/2021), there are no changes except as documented above.

## 2021-10-11 NOTE — CARE PLAN
Problem: Pain - Standard  Goal: Alleviation of pain or a reduction in pain to the patient’s comfort goal  Outcome: Progressing  Flowsheets (Taken 10/11/2021 5925)  Non Verbal Scale:   Calm   Unlabored Breathing  Note: Pt educated to call for pain medication when needed.    The patient is Stable - Low risk of patient condition declining or worsening    Shift Goals  Clinical Goals: Pain control and ABX  Patient Goals: Pain control and rest    Progress made toward(s) clinical / shift goals:  Oxycodone given per MAR.     Patient is not progressing towards the following goals:

## 2021-10-11 NOTE — PROGRESS NOTES
Covid-19 surge in effect    Assumed care. Pt Axo4. Spouse at bedside. RA, VSS. Biliary drain in place on the right lower abd with collection bag. + greenish  Output. Pt c/o pain and nausea. Prn meds given per MAR.assessment complete . educated to call when the need arise

## 2021-10-12 PROBLEM — E87.1 HYPONATREMIA: Status: ACTIVE | Noted: 2021-10-12

## 2021-10-12 PROBLEM — D64.9 ANEMIA: Status: ACTIVE | Noted: 2021-10-12

## 2021-10-12 LAB
ALBUMIN SERPL BCP-MCNC: 2.3 G/DL (ref 3.2–4.9)
ALBUMIN/GLOB SERPL: 0.9 G/DL
ALP SERPL-CCNC: 77 U/L (ref 30–99)
ALT SERPL-CCNC: 7 U/L (ref 2–50)
ANION GAP SERPL CALC-SCNC: 11 MMOL/L (ref 7–16)
AST SERPL-CCNC: 16 U/L (ref 12–45)
BILIRUB FLD-MCNC: 0.6 MG/DL
BILIRUB SERPL-MCNC: 0.7 MG/DL (ref 0.1–1.5)
BUN SERPL-MCNC: 12 MG/DL (ref 8–22)
CALCIUM SERPL-MCNC: 7.5 MG/DL (ref 8.4–10.2)
CHLORIDE SERPL-SCNC: 97 MMOL/L (ref 96–112)
CO2 SERPL-SCNC: 25 MMOL/L (ref 20–33)
CREAT SERPL-MCNC: 0.65 MG/DL (ref 0.5–1.4)
ERYTHROCYTE [DISTWIDTH] IN BLOOD BY AUTOMATED COUNT: 47.1 FL (ref 35.9–50)
GLOBULIN SER CALC-MCNC: 2.7 G/DL (ref 1.9–3.5)
GLUCOSE SERPL-MCNC: 117 MG/DL (ref 65–99)
HCT VFR BLD AUTO: 30.8 % (ref 37–47)
HGB BLD-MCNC: 10.1 G/DL (ref 12–16)
MCH RBC QN AUTO: 30.4 PG (ref 27–33)
MCHC RBC AUTO-ENTMCNC: 32.8 G/DL (ref 33.6–35)
MCV RBC AUTO: 92.8 FL (ref 81.4–97.8)
PLATELET # BLD AUTO: 468 K/UL (ref 164–446)
PMV BLD AUTO: 9 FL (ref 9–12.9)
POTASSIUM SERPL-SCNC: 3.6 MMOL/L (ref 3.6–5.5)
PROCALCITONIN SERPL-MCNC: 0.21 NG/ML
PROT SERPL-MCNC: 5 G/DL (ref 6–8.2)
RBC # BLD AUTO: 3.32 M/UL (ref 4.2–5.4)
SODIUM SERPL-SCNC: 133 MMOL/L (ref 135–145)
SPECIMEN SOURCE: NORMAL
WBC # BLD AUTO: 14.7 K/UL (ref 4.8–10.8)

## 2021-10-12 PROCEDURE — 99233 SBSQ HOSP IP/OBS HIGH 50: CPT | Performed by: FAMILY MEDICINE

## 2021-10-12 PROCEDURE — 85027 COMPLETE CBC AUTOMATED: CPT

## 2021-10-12 PROCEDURE — A9270 NON-COVERED ITEM OR SERVICE: HCPCS | Performed by: FAMILY MEDICINE

## 2021-10-12 PROCEDURE — 700111 HCHG RX REV CODE 636 W/ 250 OVERRIDE (IP): Performed by: INTERNAL MEDICINE

## 2021-10-12 PROCEDURE — 700111 HCHG RX REV CODE 636 W/ 250 OVERRIDE (IP): Performed by: FAMILY MEDICINE

## 2021-10-12 PROCEDURE — 36415 COLL VENOUS BLD VENIPUNCTURE: CPT

## 2021-10-12 PROCEDURE — A9270 NON-COVERED ITEM OR SERVICE: HCPCS | Performed by: INTERNAL MEDICINE

## 2021-10-12 PROCEDURE — 700102 HCHG RX REV CODE 250 W/ 637 OVERRIDE(OP): Performed by: FAMILY MEDICINE

## 2021-10-12 PROCEDURE — 84145 PROCALCITONIN (PCT): CPT

## 2021-10-12 PROCEDURE — 80053 COMPREHEN METABOLIC PANEL: CPT

## 2021-10-12 PROCEDURE — 700105 HCHG RX REV CODE 258: Performed by: FAMILY MEDICINE

## 2021-10-12 PROCEDURE — 700105 HCHG RX REV CODE 258: Performed by: INTERNAL MEDICINE

## 2021-10-12 PROCEDURE — 700102 HCHG RX REV CODE 250 W/ 637 OVERRIDE(OP): Performed by: INTERNAL MEDICINE

## 2021-10-12 PROCEDURE — 770001 HCHG ROOM/CARE - MED/SURG/GYN PRIV*

## 2021-10-12 RX ORDER — HYDROCODONE BITARTRATE AND ACETAMINOPHEN 5; 325 MG/1; MG/1
1-2 TABLET ORAL EVERY 6 HOURS PRN
Status: DISCONTINUED | OUTPATIENT
Start: 2021-10-12 | End: 2021-10-12

## 2021-10-12 RX ORDER — SODIUM CHLORIDE 9 MG/ML
INJECTION, SOLUTION INTRAVENOUS CONTINUOUS
Status: DISCONTINUED | OUTPATIENT
Start: 2021-10-12 | End: 2021-10-15 | Stop reason: HOSPADM

## 2021-10-12 RX ORDER — ACETAMINOPHEN 325 MG/1
650 TABLET ORAL EVERY 6 HOURS PRN
Status: DISCONTINUED | OUTPATIENT
Start: 2021-10-12 | End: 2021-10-15 | Stop reason: HOSPADM

## 2021-10-12 RX ORDER — HYDROMORPHONE HYDROCHLORIDE 1 MG/ML
0.5 INJECTION, SOLUTION INTRAMUSCULAR; INTRAVENOUS; SUBCUTANEOUS
Status: DISCONTINUED | OUTPATIENT
Start: 2021-10-12 | End: 2021-10-15 | Stop reason: HOSPADM

## 2021-10-12 RX ORDER — OXYCODONE HYDROCHLORIDE 10 MG/1
10 TABLET ORAL EVERY 4 HOURS PRN
Status: DISCONTINUED | OUTPATIENT
Start: 2021-10-12 | End: 2021-10-15 | Stop reason: HOSPADM

## 2021-10-12 RX ORDER — PROMETHAZINE HYDROCHLORIDE 25 MG/1
25 TABLET ORAL EVERY 6 HOURS PRN
Status: DISCONTINUED | OUTPATIENT
Start: 2021-10-12 | End: 2021-10-15 | Stop reason: HOSPADM

## 2021-10-12 RX ADMIN — PROMETHAZINE HYDROCHLORIDE 25 MG: 25 TABLET ORAL at 18:31

## 2021-10-12 RX ADMIN — HYDROMORPHONE HYDROCHLORIDE 0.5 MG: 1 INJECTION, SOLUTION INTRAMUSCULAR; INTRAVENOUS; SUBCUTANEOUS at 15:52

## 2021-10-12 RX ADMIN — ONDANSETRON 4 MG: 2 INJECTION INTRAMUSCULAR; INTRAVENOUS at 15:52

## 2021-10-12 RX ADMIN — FAMOTIDINE 20 MG: 20 TABLET ORAL at 17:08

## 2021-10-12 RX ADMIN — PIPERACILLIN AND TAZOBACTAM 3.38 G: 3; .375 INJECTION, POWDER, LYOPHILIZED, FOR SOLUTION INTRAVENOUS; PARENTERAL at 21:55

## 2021-10-12 RX ADMIN — GABAPENTIN 600 MG: 300 CAPSULE ORAL at 17:08

## 2021-10-12 RX ADMIN — Medication 1000 UNITS: at 05:12

## 2021-10-12 RX ADMIN — GABAPENTIN 600 MG: 300 CAPSULE ORAL at 05:12

## 2021-10-12 RX ADMIN — SODIUM CHLORIDE: 9 INJECTION, SOLUTION INTRAVENOUS at 12:50

## 2021-10-12 RX ADMIN — Medication 400 MG: at 17:08

## 2021-10-12 RX ADMIN — EZETIMIBE 10 MG: 10 TABLET ORAL at 17:08

## 2021-10-12 RX ADMIN — PIPERACILLIN AND TAZOBACTAM 3.38 G: 3; .375 INJECTION, POWDER, LYOPHILIZED, FOR SOLUTION INTRAVENOUS; PARENTERAL at 05:12

## 2021-10-12 RX ADMIN — HYDROCODONE BITARTRATE AND ACETAMINOPHEN 1 TABLET: 5; 325 TABLET ORAL at 10:44

## 2021-10-12 RX ADMIN — OXYCODONE HYDROCHLORIDE 10 MG: 10 TABLET ORAL at 13:47

## 2021-10-12 RX ADMIN — ENOXAPARIN SODIUM 40 MG: 40 INJECTION SUBCUTANEOUS at 05:12

## 2021-10-12 RX ADMIN — FAMOTIDINE 20 MG: 20 TABLET ORAL at 05:12

## 2021-10-12 RX ADMIN — PIPERACILLIN AND TAZOBACTAM 3.38 G: 3; .375 INJECTION, POWDER, LYOPHILIZED, FOR SOLUTION INTRAVENOUS; PARENTERAL at 12:51

## 2021-10-12 ASSESSMENT — ENCOUNTER SYMPTOMS
SORE THROAT: 0
BLOOD IN STOOL: 0
MUSCULOSKELETAL NEGATIVE: 1
SPEECH CHANGE: 0
FEVER: 0
INSOMNIA: 0
SENSORY CHANGE: 0
DEPRESSION: 0
HEARTBURN: 0
BLURRED VISION: 0
COUGH: 0
VOMITING: 0
HEADACHES: 0
CHILLS: 0
NAUSEA: 0
SHORTNESS OF BREATH: 0
CARDIOVASCULAR NEGATIVE: 1
MYALGIAS: 0
CONSTIPATION: 0
WEAKNESS: 0
DIZZINESS: 0
DIARRHEA: 0
ABDOMINAL PAIN: 1
PHOTOPHOBIA: 0
DIAPHORESIS: 0
NERVOUS/ANXIOUS: 0
CLAUDICATION: 0

## 2021-10-12 ASSESSMENT — PAIN DESCRIPTION - PAIN TYPE
TYPE: ACUTE PAIN
TYPE: ACUTE PAIN;SURGICAL PAIN
TYPE: ACUTE PAIN;SURGICAL PAIN
TYPE: ACUTE PAIN

## 2021-10-12 NOTE — PROGRESS NOTES
Received patient from Night shift RN . Patient is awake and alert.No sign of distress. Patient denies any nausea,numbness and tingling.. Patient complain of Pain 8/10 to Right side of abdomen where Biliary  drain is in place,patient declines for any pain medication at this time.Dresing in place to Biliary site with greenish color .Fall precaution in placed, kept bed in lowest position and call light within reach.

## 2021-10-12 NOTE — DISCHARGE PLANNING
Anticipated Discharge Disposition: Home     Action: Pt discussed during IDT rounds 10/11. Pt to continue on drain. Pt has a six clicks score of 20. No known d/c needs at this time.     Barriers to Discharge: None     Plan: LSW to continue to follow for d/c needs

## 2021-10-12 NOTE — CARE PLAN
The patient is Stable - Low risk of patient condition declining or worsening    Shift Goals  Clinical Goals:  (pain control to right side of abdomen)  Patient Goals:  (decrease pain, have shower, rest )    Progress made toward(s) clinical / shift goals:  Patient is having pain to right side of abdomen , received Dilaudid 0.5 mg IV and Norco 1 tab PRN as ordered. Patient also received Zofran 4 mg IV for nausea PRN. Other comfort measures rendered.Fall precaution observed, kept bed in lowest position and call bell within reach.To monitor the MARTHA drain and the output    Patient is not progressing towards the following goals:

## 2021-10-12 NOTE — PROGRESS NOTES
AT 0300,Lab report pt with  postive culture. Aerobic bottle positive for budding YEAST .report repeated to ,  On call MD notified at 0308. results read back by MD.

## 2021-10-12 NOTE — PROGRESS NOTES
Gastroenterology Progress Note     Author: Fausto Casas M.D.   Date & Time Created: 10/12/2021 7:37 AM    Chief Complaint:  Abdominal pain    Interval History:  10/10 HPI  66-year-old female PMH recurrent idiopathic pancreatitis s/p ERCP with sphincterotomy October 1, 2021 complicated by ERCP pancreatitis, sleeve gastrectomy, dyslipidemia, hypertension, osteoarthritis of the spine status post lumbar fusion who was admitted to the hospital 10/8/2021 with worsening right lower quadrant pain over the past week.  Ever since her ERCP October 1, 2021, she has been experiencing pain, intermittent subjective fevers, nausea.  In the emergency room-labs: CBC: WBC 17.8..  Sodium 130.  LFTs-WNL.  CT scan abdomen/pelvis-large irregular fluid collection with thick wall occupying most of the right abdomen surrounding the right kidney and colon.  Severe wall thickening of the descending, transverse colon, second portion of the duodenum likely reactive.  Pneumobilia with gas in the CBD.  Drain placement by IR was performed.  Currently, the abdominal pain has improved.  No vomiting.  Started on ceftriaxone and metronidazole IV.     ERCP October 1, 2021-common bile duct-dilated down to the level of the ampulla.  4 mm polyp at the distal duct seen after sphincterotomy.  8 mm sphincterotomy.  Negative for stone.  Bile duct polyp-benign with inflammatory and hyperplastic changes.  No evidence of epithelial dysplasia/neoplasia.    10/11: interval HIDA scan showed no bile leak.  This morning she feels more comfortable, describing minimal abdominal pain but denying nausea vomiting and fevers.  She has been able to eat a little bit more and has full liquids beside her bed.  She expresses concern about being on losartan which she says was prescribed outpatient as as needed for high blood pressures.  She also states she has not passed a bowel movement in the week which she describes not eating much.    10/12/2021 - No events  overnight.  Tolerating diet without nausea or vomiting.  Had spontaneous, formed, brown bowel movement this morning.  Abdominal pain improving.  Feeling weak, but better each day.  Leukocytosis improving.  States that she is on hydrocodone at home, managed by pain management, and asks that her current hospital pain meds be changed.    Review of Systems:  Review of Systems   Constitutional: Positive for malaise/fatigue. Negative for chills, diaphoresis and fever.   Cardiovascular: Negative.    Gastrointestinal: Positive for abdominal pain. Negative for blood in stool, constipation, heartburn, nausea and vomiting.   Musculoskeletal: Negative.      A complete 12 point review of systems was performed.    Constitutional: Denies fevers, chills, night sweats; Denies weight changes  Eyes: Denies eye pain, denies eye discharge  Ears/Nose/Throat/Mouth: Denies nasal congestion or sore throat   Cardiovascular: Denies chest pain or palpitations.  Respiratory: Denies shortness of breath, cough, and wheezing.  Gastrointestinal/Hepatic: see HPI  Genitourinary: Denies dysuria, increased frequency, hematuria  Musculoskeletal/Rheum: Denies joint pain and swelling, denies edema  Skin: Denies rash, denies wounds  Neurological: Denies headache, confusion, memory loss or focal weakness/parasthesias  Psychiatric: denies mood disorder, denies hallucinations   Endocrine: Denies thyroid problems; denies polydipsia  Heme/Oncology/Lymph Nodes: Denies enlarged lymph nodes, denies bruising or known bleeding disorder      Physical Exam:  Physical Exam  Vitals and nursing note reviewed.   Constitutional:       Appearance: Normal appearance.   HENT:      Head: Normocephalic and atraumatic.      Mouth/Throat:      Mouth: Mucous membranes are moist.      Pharynx: Oropharynx is clear.   Eyes:      Pupils: Pupils are equal, round, and reactive to light.   Cardiovascular:      Rate and Rhythm: Normal rate and regular rhythm.      Pulses: Normal pulses.       Heart sounds: Normal heart sounds.   Pulmonary:      Effort: Pulmonary effort is normal.      Breath sounds: Normal breath sounds.   Abdominal:      General: Abdomen is flat. Bowel sounds are normal.      Palpations: Abdomen is soft.      Tenderness: There is abdominal tenderness.   Musculoskeletal:      Cervical back: Neck supple.   Skin:     General: Skin is warm.   Neurological:      General: No focal deficit present.      Mental Status: She is alert and oriented to person, place, and time.   Psychiatric:         Mood and Affect: Mood normal.       Cardiovascular:      Rate and Rhythm: Normal rate and regular rhythm.      Pulses: Normal pulses.      Heart sounds: Normal heart sounds.   Pulmonary:      Effort: Pulmonary effort is normal.      Breath sounds: Normal breath sounds.   Abdominal:      General: Abdomen is flat. Bowel sounds are normal.        Abdominal tenderness is absent.  There is a well-placed drain.  Has scant green fluid in it.  Labs:          Recent Labs     10/10/21  0339 10/11/21  0210 10/12/21  0424   SODIUM 131* 128* 133*   POTASSIUM 3.5* 3.6 3.6   CHLORIDE 97 94* 97   CO2 22 22 25   BUN 15 14 12   CREATININE 0.54 0.59 0.65   CALCIUM 8.0* 7.8* 7.5*     Recent Labs     10/10/21  0339 10/11/21  0210 10/12/21  0424   ALTSGPT 13 9 7   ASTSGOT 17 14 16   ALKPHOSPHAT 71 61 77   TBILIRUBIN 0.6 0.6 0.7   GLUCOSE 83 83 117*     Recent Labs     10/10/21  0339 10/11/21  0210 10/12/21  0424   RBC 3.24* 3.43* 3.32*   HEMOGLOBIN 9.8* 10.3* 10.1*   HEMATOCRIT 29.1* 32.3* 30.8*   PLATELETCT 331 373 468*     Recent Labs     10/10/21  0339 10/11/21  0210 10/12/21  0424   WBC 19.0* 16.6* 14.7*   ASTSGOT 17 14 16   ALTSGPT 13 9 7   ALKPHOSPHAT 71 61 77   TBILIRUBIN 0.6 0.6 0.7     Hemodynamics:  Temp (24hrs), Av.7 °C (98.1 °F), Min:36.3 °C (97.3 °F), Max:37.2 °C (98.9 °F)  Temperature: 37.2 °C (98.9 °F)  Pulse  Av.2  Min: 68  Max: 94   Blood Pressure : 108/77     Respiratory:    Respiration: 18,  Pulse Oximetry: 91 %     Work Of Breathing / Effort: Within Normal Limits     Fluids:    Intake/Output Summary (Last 24 hours) at 10/11/2021 0652  Last data filed at 10/11/2021 0500  Gross per 24 hour   Intake 480 ml   Output 980 ml   Net -500 ml        GI/Nutrition:  Orders Placed This Encounter   Procedures   • Diet Order Diet: Low Fiber(GI Soft)     Standing Status:   Standing     Number of Occurrences:   1     Order Specific Question:   Diet:     Answer:   Low Fiber(GI Soft) [2]     Medical Decision Making, by Problem:  Active Hospital Problems    Diagnosis    • *Sepsis due to intraabdominal fluid collection/abscess (HCC) [A41.9]    • History of pancreatitis [Z87.19]    • Intraabdominal fluid collection [R18.8]    • History of morbid obesity [Z87.898]    • Hyperlipidemia [E78.5]    • Vitamin D deficiency [E55.9]    • Hypertension [I10]      66-year-old female with a history of recurrent idiopathic pancreatitis s/p ERCP with biliary sphincterotomy and biopsy of a distal BD polyp on October 1, 2021.  Postop complications-pancreatitis.  Ever since then, complaints of subjective fevers, progressive abdominal pain, nausea/vomiting.  Began to have worsening right lower quadrant pain over the past 5 days.  CT scan abdomen/pelvis large irregular fluid collection with thick wall occupying most of the right abdomen surrounding right kidney and right colon.  Additional fluid and gas collection in the midline abdomen anterior to the pancreas concerning for abscess.  Severe wall thickening of the descending and transverse colon, second portion of duodenum likely reactive.  Pneumobilia with gas in the CBD, intrahepatic bile ducts as well as pancreatic ducts (likely secondary to recent ERCP with sphincterotomy).  IR placed a drain with improvement in abdominal pain.    She now has source control of her abscess and she has symptomatic improvement with increasing tolerance to p.o.  Etiology remains unclear.  Query if the patient  had a small bile leak that has since sealed off independently.  Does not appear to need invasive management at the moment.     ASSESSMENT:  1.  Sepsis due to intra-abdominal fluid collection/abscess  2.  History of pancreatitis  3.  Intra-abdominal fluid collection  4.  History of morbid obesity s/p laparoscopic sleeve  5.  Euvolemic hyponatremia  6.  Constipation     PLAN:    -Continue IV antibiotics ceftriaxone and metronidazole  -Continue to trend CBC, CMP  - IV antibiotics, antiemetics, pain medication per primary team  -Continue scheduled senna; have added one-time scheduled dose of bisacodyl suppository  -Criteria for removal of percutaneous catheters include resolution of sepsis signs, minimal drainage from the catheter, and resolution of the abscess cavity as demonstrated by ultrasonography or CT. Would continue to observe her white count.  Since down trending, and drain output decreasing consider repeat CT scan to evaluate the size of her abscesses and whether the drain can be pulled.  -Consider changing current pain control regimen to mimic her usual home medications (hydrocodone)    Fausto Casas M.D.  [facility]      Quality-Core Measures

## 2021-10-12 NOTE — PROGRESS NOTES
Hospital Medicine Daily Progress Note    Date of Service  10/12/2021    Chief Complaint  Nils Alfonso is a 66 y.o. female admitted 10/8/2021 with abdominal pain.    Hospital Course  Nils Alfonso is a 66 y.o. female who presented 10/8/2021 with RLQ Pain (Worsening RLQ pain over the past week. Dx with pancreatitis 1 week ago. Denies nausea or vomting)  She has a history of recurrent and idiopathic ) pancreatitis s/p ERCP with sphincterotomy by Dr. Gillespie on 10/01/2021 complicated by ERCP pancreatitis, s/p cholecystectomy, ex lap obesity with fatty liver s/p sleeve gastrectomy.  Ever since her ERCP, she has been in pain, nauseous, inteittent mild subjective fevers, poor PO intake and minimal to no bowel movement. She ws managed outpatient for what was felt was post ERCP pancreatitis.  CT scan showed Large irregular irregular fluid collection with thick wall occupying most of the right abdomen surrounding the right kidney and right colon.  IR placed drain into this fluid collection and had significant improvement in her symptoms.  Dr. Figueroa, Surgery was consulted and case was also discussed with Dr Chin with GI who both agreed for patient to need IR drain to be placed.  Empiric antibiotics initiated due to leukocytosis and concern of sepsis.        Interval Problem Update  10/9 Patient states her pain is no longer in her abdomen, mostly in the back due to inactivity and bed rest.  Biliary drain in place with green bile in the collection bag.  Dr Chin was not officially consulted yesterday.  I did discuss the case with him and he recommends a UGI with gastrografin and will consult pending results tomorrow - patient notified.  Will continue with flagyl/rocephin and clear liquid diet only.     Addendum: After further discussion with radiology and GI, imaging study changed to CT abdomen with IV and oral contrast and important timing of oral contrast administration.  Coordinating this with radiology,   "[place].  Imaging orders changed.   10/10 Patient feeling okay today, she did have a increase in her WBC overnight and though the e coli was being treated by the rocephin, this would not cover e faecalis - changed antiobiotic to zosyn given patients ampicillin allergy and also ordered PRN benadryl in case this PCN still results in a rash and then I would have to adjust antibiotics again.  D/w Dr Pimentel - HIDA scan ordered.  Patient is tolerating the full liquid diet without issue.  10/11 Patient without complaint today.  HIDA showed no evidence of active leak.  Biliary drain still with significant output.  WBC has dropped with change in antibiotics.  She is not showing any signs of allergy to the zosyn.  Continue this and pending bilirubin and amylase from the drain in place.     10/12 - Pt having somewhat worsening pain today, but also states she believes it's because she \"gets behind\" her pain meds, doesn't get them as regularly at night. Remains afebrile, Bps are borderline low, remains off of home Losartan. Adjusting pain meds today, repeating CT to evaluate fluid collections.     I have personally seen and examined the patient at bedside. I discussed the plan of care with patient, family, bedside RN, charge RN,  and pharmacy.    Consultants/Specialty  general surgery and GI    Code Status  Full Code    Disposition  Patient is not medically cleared.   Anticipate discharge to to home with close outpatient follow-up.  I have placed the appropriate orders for post-discharge needs.    Review of Systems  Review of Systems   Constitutional: Negative for chills and fever.   HENT: Negative for congestion and sore throat.    Eyes: Negative for blurred vision and photophobia.   Respiratory: Negative for cough and shortness of breath.    Cardiovascular: Negative for chest pain, claudication and leg swelling.   Gastrointestinal: Positive for abdominal pain (mild). Negative for constipation, diarrhea, heartburn, " nausea and vomiting.   Genitourinary: Negative for dysuria and hematuria.   Musculoskeletal: Negative for joint pain and myalgias.   Skin: Negative for itching and rash.   Neurological: Negative for dizziness, sensory change, speech change, weakness and headaches.   Psychiatric/Behavioral: Negative for depression. The patient is not nervous/anxious and does not have insomnia.         Physical Exam  Temp:  [36.3 °C (97.3 °F)-37.2 °C (98.9 °F)] 36.7 °C (98 °F)  Pulse:  [65-74] 65  Resp:  [18] 18  BP: ()/(50-77) 99/50  SpO2:  [91 %-94 %] 94 %    Physical Exam  Vitals and nursing note reviewed.   Constitutional:       General: She is not in acute distress.     Appearance: Normal appearance. She is not ill-appearing.   HENT:      Head: Normocephalic and atraumatic.      Nose: Nose normal.   Cardiovascular:      Rate and Rhythm: Normal rate and regular rhythm.      Heart sounds: Normal heart sounds. No murmur heard.     Pulmonary:      Effort: Pulmonary effort is normal.      Breath sounds: Normal breath sounds.   Abdominal:      General: Bowel sounds are normal. There is no distension.      Palpations: Abdomen is soft.      Tenderness: There is abdominal tenderness (R sided, mild). There is no guarding.      Comments: Biliary drain in place - bile in collection bag.     Musculoskeletal:         General: No swelling or tenderness.      Cervical back: Neck supple.   Skin:     General: Skin is warm and dry.   Neurological:      General: No focal deficit present.      Mental Status: She is alert and oriented to person, place, and time.   Psychiatric:         Mood and Affect: Mood normal.         Fluids    Intake/Output Summary (Last 24 hours) at 10/12/2021 1321  Last data filed at 10/12/2021 0800  Gross per 24 hour   Intake 120 ml   Output 350 ml   Net -230 ml       Laboratory  Recent Labs     10/10/21  0339 10/11/21  0210 10/12/21  0424   WBC 19.0* 16.6* 14.7*   RBC 3.24* 3.43* 3.32*   HEMOGLOBIN 9.8* 10.3* 10.1*    HEMATOCRIT 29.1* 32.3* 30.8*   MCV 89.8 94.2 92.8   MCH 30.2 30.0 30.4   MCHC 33.7 31.9* 32.8*   RDW 44.8 47.8 47.1   PLATELETCT 331 373 468*   MPV 9.4 9.4 9.0     Recent Labs     10/10/21  0339 10/11/21  0210 10/12/21  0424   SODIUM 131* 128* 133*   POTASSIUM 3.5* 3.6 3.6   CHLORIDE 97 94* 97   CO2 22 22 25   GLUCOSE 83 83 117*   BUN 15 14 12   CREATININE 0.54 0.59 0.65   CALCIUM 8.0* 7.8* 7.5*                   Imaging  IR-US GUIDED PIV   Final Result    Ultrasound-guided PERIPHERAL IV INSERTION performed by    qualified nursing staff as above.      NM-HEPATOBILIARY SCAN   Final Result      Normal hepatobiliary scan with no findings of a bile leak.      CT-ABDOMEN WITH & W/O   Final Result      1.  No findings of intraluminal contrast extravasation from the opacified bowel loops, particularly no contrast extravasation from the duodenum at the ampulla of [place].   2.  A pigtail drainage catheter is present in the right abdomen in a air and fluid collection, the extent of which is difficult to measure. The collection tracks inferiorly and medially measuring approximately 17 cm in craniocaudal dimension.   3.  A smaller loculated collection in the anterior abdomen near the inferior anterior abdominal wall sutures measures 1.5 x 8.7 cm.   4.  Unchanged pneumobilia in the CBD, intrahepatic bile ducts as well as pancreatic ducts.   5.  There is a small right pleural effusion with adjacent enhancing segmental atelectasis.   6.   Coronary artery calcifications are present      CT-DRAIN-PERITONEAL   Final Result      Successful peritoneal drainage tube placement.      Plan: Thrice daily flushes with 10 mL of sterile saline. Monitor outputs. Please contact interventional radiology if there is any concern for tube dysfunction.         CT-ABDOMEN-PELVIS WITH   Final Result         1. Large irregular irregular fluid collection with thick wall occupying most of the right abdomen surrounding the right kidney and right colon.     "  Additional fluid and gas collection in the midline abdomen anterior to the pancreas concerning for abscess. This collection is probably connected to the right side collection via a junction in the jl hepatis      2. Severe wall thickening of the descending colon, transverse colon, second portion duodenum, likely reactive.      3. Pneumobilia with gas in the CBD, intrahepatic bile ducts as well as pancreatic ducts are of unclear etiology.      4. Small right pleural effusion with right basilar atelectasis.                 Assessment/Plan  * Sepsis due to intraabdominal fluid collection/abscess (HCC)  Assessment & Plan  This is Sepsis Present on admission  SIRS criteria identified on my evaluation include: Leukocytosis, with WBC greater than 12,000  Source is intraabdominal  Sepsis protocol initiated  Fluid resuscitation ordered per protocol  IV antibiotics as appropriate for source of sepsis  While organ dysfunction may be noted elsewhere in this problem list or in the chart, degree of organ dysfunction does not meet CMS criteria for severe sepsis  IR placed drain 10/8 - bilious output  Fluid culture showing e coli and e faecalis - changed to zosyn  150cc of fluid output overnight per charting and nursing - will order CT abd for the am       Hyponatremia  Assessment & Plan  - Change LR to NS  - Given concurrent hypotension, check am cortisol level    Anemia  Assessment & Plan  - Stable, likely secondary to acute illness       Intraabdominal fluid collection  Assessment & Plan  Suspected biliary leak post ERCP with sphincterotomy  Intra-abdominal percutaneous drain drain placed with improvement in pain  GI consulting, CT showed no evidence of contrast outside of the bowel   HIDA scan shows no biliary leak  Cultures with e.coli and enterococcus - covering with Zosyn.  Allergy listed to Ampicillin as \"hives\" - will discuss with pt possible Augmentin at discharge, as well as with pharmacy    History of " pancreatitis  Assessment & Plan  Not acute  Normal lipase  Pain control and bowel rest    History of morbid obesity- (present on admission)  Assessment & Plan  Body mass index is 23.91 kg/m².  S/p sleeve gastrectomy      Hyperlipidemia- (present on admission)  Assessment & Plan  Continue Zetia    Vitamin D deficiency- (present on admission)  Assessment & Plan  Continue Vit D supplementation    Hypertension- (present on admission)  Assessment & Plan  Pt normo to hypotensive in hospital - home Losartan stopped  - Change LR to NS for volume expansion for hypotension, recheck a procalcitonin        VTE prophylaxis: enoxaparin ppx    I have performed a physical exam and reviewed and updated ROS and Plan today (10/12/2021). In review of yesterday's note (10/11/2021), there are no changes except as documented above.

## 2021-10-13 ENCOUNTER — APPOINTMENT (OUTPATIENT)
Dept: RADIOLOGY | Facility: MEDICAL CENTER | Age: 66
DRG: 862 | End: 2021-10-13
Attending: FAMILY MEDICINE
Payer: MEDICARE

## 2021-10-13 PROBLEM — R78.81 BACTEREMIA DUE TO GRAM-NEGATIVE BACTERIA: Status: ACTIVE | Noted: 2021-10-13

## 2021-10-13 LAB
ALBUMIN SERPL BCP-MCNC: 1.9 G/DL (ref 3.2–4.9)
ALBUMIN/GLOB SERPL: 0.8 G/DL
ALP SERPL-CCNC: 58 U/L (ref 30–99)
ALT SERPL-CCNC: <5 U/L (ref 2–50)
ANION GAP SERPL CALC-SCNC: 11 MMOL/L (ref 7–16)
AST SERPL-CCNC: 12 U/L (ref 12–45)
BACTERIA BLD CULT: NORMAL
BASOPHILS # BLD AUTO: 0 % (ref 0–1.8)
BASOPHILS # BLD: 0 K/UL (ref 0–0.12)
BILIRUB SERPL-MCNC: 0.9 MG/DL (ref 0.1–1.5)
BUN SERPL-MCNC: 9 MG/DL (ref 8–22)
CALCIUM SERPL-MCNC: 7.3 MG/DL (ref 8.4–10.2)
CHLORIDE SERPL-SCNC: 99 MMOL/L (ref 96–112)
CO2 SERPL-SCNC: 23 MMOL/L (ref 20–33)
CORTIS SERPL-MCNC: 23.3 UG/DL (ref 0–23)
CREAT SERPL-MCNC: 0.57 MG/DL (ref 0.5–1.4)
EOSINOPHIL # BLD AUTO: 0 K/UL (ref 0–0.51)
EOSINOPHIL NFR BLD: 0 % (ref 0–6.9)
ERYTHROCYTE [DISTWIDTH] IN BLOOD BY AUTOMATED COUNT: 48.6 FL (ref 35.9–50)
GLOBULIN SER CALC-MCNC: 2.5 G/DL (ref 1.9–3.5)
GLUCOSE SERPL-MCNC: 100 MG/DL (ref 65–99)
HCT VFR BLD AUTO: 29.4 % (ref 37–47)
HGB BLD-MCNC: 9.5 G/DL (ref 12–16)
INR PPP: 1.45 (ref 0.87–1.13)
LYMPHOCYTES # BLD AUTO: 1.67 K/UL (ref 1–4.8)
LYMPHOCYTES NFR BLD: 12 % (ref 22–41)
MANUAL DIFF BLD: NORMAL
MCH RBC QN AUTO: 30 PG (ref 27–33)
MCHC RBC AUTO-ENTMCNC: 32.3 G/DL (ref 33.6–35)
MCV RBC AUTO: 92.7 FL (ref 81.4–97.8)
METAMYELOCYTES NFR BLD MANUAL: 1 %
MONOCYTES # BLD AUTO: 1.67 K/UL (ref 0–0.85)
MONOCYTES NFR BLD AUTO: 12 % (ref 0–13.4)
NEUTROPHILS # BLD AUTO: 10.43 K/UL (ref 2–7.15)
NEUTROPHILS NFR BLD: 73 % (ref 44–72)
NEUTS BAND NFR BLD MANUAL: 2 % (ref 0–10)
NRBC # BLD AUTO: 0 K/UL
NRBC BLD-RTO: 0 /100 WBC
PLATELET # BLD AUTO: 493 K/UL (ref 164–446)
PLATELET BLD QL SMEAR: NORMAL
PMV BLD AUTO: 8.7 FL (ref 9–12.9)
POTASSIUM SERPL-SCNC: 3.7 MMOL/L (ref 3.6–5.5)
PROT SERPL-MCNC: 4.4 G/DL (ref 6–8.2)
PROTHROMBIN TIME: 16.5 SEC (ref 12–14.6)
RBC # BLD AUTO: 3.17 M/UL (ref 4.2–5.4)
RBC BLD AUTO: NORMAL
SIGNIFICANT IND 70042: NORMAL
SITE SITE: NORMAL
SODIUM SERPL-SCNC: 133 MMOL/L (ref 135–145)
SOURCE SOURCE: NORMAL
WBC # BLD AUTO: 13.9 K/UL (ref 4.8–10.8)

## 2021-10-13 PROCEDURE — 99233 SBSQ HOSP IP/OBS HIGH 50: CPT | Performed by: FAMILY MEDICINE

## 2021-10-13 PROCEDURE — A9270 NON-COVERED ITEM OR SERVICE: HCPCS | Performed by: INTERNAL MEDICINE

## 2021-10-13 PROCEDURE — 87040 BLOOD CULTURE FOR BACTERIA: CPT | Mod: 91

## 2021-10-13 PROCEDURE — 700111 HCHG RX REV CODE 636 W/ 250 OVERRIDE (IP): Performed by: FAMILY MEDICINE

## 2021-10-13 PROCEDURE — 36415 COLL VENOUS BLD VENIPUNCTURE: CPT

## 2021-10-13 PROCEDURE — 700111 HCHG RX REV CODE 636 W/ 250 OVERRIDE (IP): Performed by: INTERNAL MEDICINE

## 2021-10-13 PROCEDURE — 85007 BL SMEAR W/DIFF WBC COUNT: CPT

## 2021-10-13 PROCEDURE — 700102 HCHG RX REV CODE 250 W/ 637 OVERRIDE(OP): Performed by: INTERNAL MEDICINE

## 2021-10-13 PROCEDURE — 700105 HCHG RX REV CODE 258: Performed by: INTERNAL MEDICINE

## 2021-10-13 PROCEDURE — 700105 HCHG RX REV CODE 258: Performed by: FAMILY MEDICINE

## 2021-10-13 PROCEDURE — 85610 PROTHROMBIN TIME: CPT

## 2021-10-13 PROCEDURE — A9270 NON-COVERED ITEM OR SERVICE: HCPCS | Performed by: FAMILY MEDICINE

## 2021-10-13 PROCEDURE — 770001 HCHG ROOM/CARE - MED/SURG/GYN PRIV*

## 2021-10-13 PROCEDURE — 82533 TOTAL CORTISOL: CPT

## 2021-10-13 PROCEDURE — 700102 HCHG RX REV CODE 250 W/ 637 OVERRIDE(OP): Performed by: FAMILY MEDICINE

## 2021-10-13 PROCEDURE — 94760 N-INVAS EAR/PLS OXIMETRY 1: CPT

## 2021-10-13 PROCEDURE — 74177 CT ABD & PELVIS W/CONTRAST: CPT | Mod: MG

## 2021-10-13 PROCEDURE — 700117 HCHG RX CONTRAST REV CODE 255: Performed by: FAMILY MEDICINE

## 2021-10-13 PROCEDURE — 85027 COMPLETE CBC AUTOMATED: CPT

## 2021-10-13 PROCEDURE — 80053 COMPREHEN METABOLIC PANEL: CPT

## 2021-10-13 RX ADMIN — FAMOTIDINE 20 MG: 20 TABLET ORAL at 17:12

## 2021-10-13 RX ADMIN — Medication 1000 UNITS: at 05:34

## 2021-10-13 RX ADMIN — PIPERACILLIN AND TAZOBACTAM 3.38 G: 3; .375 INJECTION, POWDER, LYOPHILIZED, FOR SOLUTION INTRAVENOUS; PARENTERAL at 05:31

## 2021-10-13 RX ADMIN — GABAPENTIN 600 MG: 300 CAPSULE ORAL at 05:34

## 2021-10-13 RX ADMIN — OXYCODONE HYDROCHLORIDE 10 MG: 10 TABLET ORAL at 18:40

## 2021-10-13 RX ADMIN — ONDANSETRON 4 MG: 2 INJECTION INTRAMUSCULAR; INTRAVENOUS at 10:43

## 2021-10-13 RX ADMIN — HYDROMORPHONE HYDROCHLORIDE 0.5 MG: 1 INJECTION, SOLUTION INTRAMUSCULAR; INTRAVENOUS; SUBCUTANEOUS at 20:56

## 2021-10-13 RX ADMIN — SODIUM CHLORIDE 950 ML: 9 INJECTION, SOLUTION INTRAVENOUS at 20:44

## 2021-10-13 RX ADMIN — GABAPENTIN 600 MG: 300 CAPSULE ORAL at 17:12

## 2021-10-13 RX ADMIN — FAMOTIDINE 20 MG: 20 TABLET ORAL at 05:34

## 2021-10-13 RX ADMIN — PIPERACILLIN AND TAZOBACTAM 3.38 G: 3; .375 INJECTION, POWDER, LYOPHILIZED, FOR SOLUTION INTRAVENOUS; PARENTERAL at 20:43

## 2021-10-13 RX ADMIN — ONDANSETRON 4 MG: 2 INJECTION INTRAMUSCULAR; INTRAVENOUS at 17:29

## 2021-10-13 RX ADMIN — ENOXAPARIN SODIUM 40 MG: 40 INJECTION SUBCUTANEOUS at 05:34

## 2021-10-13 RX ADMIN — OXYCODONE HYDROCHLORIDE 10 MG: 10 TABLET ORAL at 22:58

## 2021-10-13 RX ADMIN — MICAFUNGIN SODIUM 100 MG: 20 INJECTION, POWDER, LYOPHILIZED, FOR SOLUTION INTRAVENOUS at 14:13

## 2021-10-13 RX ADMIN — PIPERACILLIN AND TAZOBACTAM 3.38 G: 3; .375 INJECTION, POWDER, LYOPHILIZED, FOR SOLUTION INTRAVENOUS; PARENTERAL at 12:44

## 2021-10-13 RX ADMIN — OXYCODONE HYDROCHLORIDE 10 MG: 10 TABLET ORAL at 11:48

## 2021-10-13 RX ADMIN — IOHEXOL 100 ML: 350 INJECTION, SOLUTION INTRAVENOUS at 12:11

## 2021-10-13 RX ADMIN — SODIUM CHLORIDE 950 ML: 9 INJECTION, SOLUTION INTRAVENOUS at 05:31

## 2021-10-13 RX ADMIN — EZETIMIBE 10 MG: 10 TABLET ORAL at 17:12

## 2021-10-13 RX ADMIN — Medication 400 MG: at 17:13

## 2021-10-13 ASSESSMENT — ENCOUNTER SYMPTOMS
INSOMNIA: 0
SHORTNESS OF BREATH: 0
FEVER: 0
BACK PAIN: 1
SENSORY CHANGE: 0
DIZZINESS: 0
SPEECH CHANGE: 0
WEAKNESS: 1
COUGH: 0
WEAKNESS: 0
CONSTIPATION: 0
BLOOD IN STOOL: 0
VOMITING: 0
PHOTOPHOBIA: 0
HEADACHES: 0
CHILLS: 0
CARDIOVASCULAR NEGATIVE: 1
DIAPHORESIS: 0
CLAUDICATION: 0
HEARTBURN: 0
DEPRESSION: 0
NAUSEA: 0
MYALGIAS: 0
DIARRHEA: 0
NERVOUS/ANXIOUS: 0
SORE THROAT: 0
ABDOMINAL PAIN: 1
PALPITATIONS: 0
BLURRED VISION: 0

## 2021-10-13 ASSESSMENT — PAIN DESCRIPTION - PAIN TYPE
TYPE: ACUTE PAIN
TYPE: ACUTE PAIN;SURGICAL PAIN
TYPE: ACUTE PAIN

## 2021-10-13 ASSESSMENT — PAIN SCALES - WONG BAKER: WONGBAKER_NUMERICALRESPONSE: DOESN'T HURT AT ALL

## 2021-10-13 NOTE — ASSESSMENT & PLAN NOTE
- Blood cultures from 10/8 1/2 is positive for chryseobacterium and candida glabrata  - Repeat cultures ordered 10/13  - Micafungin added 10/13  - Discussed with ID, added echo

## 2021-10-13 NOTE — PROGRESS NOTES
0845: Received patient from Night shift RN . Patient is awake and alert.No sign of distress. Patient has nausea, received Zofran 4 mg IV PRN as ordered. Fall precaution in placed, kept bed in lowest position and call light within reach.  1050: Drain flushed as per order,  1142: 300mL output from biliary drain

## 2021-10-13 NOTE — PROGRESS NOTES
Rika from [facility] called with result of lab showing gram - rods and yeast in her blood culture at 1025. Lab result read back to Rika.   Dr. Segundo notified of lab result at 1028.  Lab result read back by Dr. Segundo.

## 2021-10-13 NOTE — PROGRESS NOTES
Hospital Medicine Daily Progress Note    Date of Service  10/13/2021    Chief Complaint  Nils Alfonso is a 66 y.o. female admitted 10/8/2021 with abdominal pain.    Hospital Course  Nils Alfonso is a 66 y.o. female who presented 10/8/2021 with RLQ Pain (Worsening RLQ pain over the past week. Dx with pancreatitis 1 week ago. Denies nausea or vomting)  She has a history of recurrent and idiopathic ) pancreatitis s/p ERCP with sphincterotomy by Dr. Gillespie on 10/01/2021 complicated by ERCP pancreatitis, s/p cholecystectomy, ex lap obesity with fatty liver s/p sleeve gastrectomy.  Ever since her ERCP, she has been in pain, nauseous, inteittent mild subjective fevers, poor PO intake and minimal to no bowel movement. She ws managed outpatient for what was felt was post ERCP pancreatitis.  CT scan showed Large irregular irregular fluid collection with thick wall occupying most of the right abdomen surrounding the right kidney and right colon.  IR placed drain into this fluid collection and had significant improvement in her symptoms.  Dr. Figueroa, Surgery was consulted and case was also discussed with Dr Chin with GI who both agreed for patient to need IR drain to be placed.  Empiric antibiotics initiated due to leukocytosis and concern of sepsis.        Interval Problem Update  10/9 Patient states her pain is no longer in her abdomen, mostly in the back due to inactivity and bed rest.  Biliary drain in place with green bile in the collection bag.  Dr Chin was not officially consulted yesterday.  I did discuss the case with him and he recommends a UGI with gastrografin and will consult pending results tomorrow - patient notified.  Will continue with flagyl/rocephin and clear liquid diet only.     Addendum: After further discussion with radiology and GI, imaging study changed to CT abdomen with IV and oral contrast and important timing of oral contrast administration.  Coordinating this with radiology,   "[place].  Imaging orders changed.   10/10 Patient feeling okay today, she did have a increase in her WBC overnight and though the e coli was being treated by the rocephin, this would not cover e faecalis - changed antiobiotic to zosyn given patients ampicillin allergy and also ordered PRN benadryl in case this PCN still results in a rash and then I would have to adjust antibiotics again.  D/w Dr Pimentel - HIDA scan ordered.  Patient is tolerating the full liquid diet without issue.  10/11 Patient without complaint today.  HIDA showed no evidence of active leak.  Biliary drain still with significant output.  WBC has dropped with change in antibiotics.  She is not showing any signs of allergy to the zosyn.  Continue this and pending bilirubin and amylase from the drain in place.     10/12 - Pt having somewhat worsening pain today, but also states she believes it's because she \"gets behind\" her pain meds, doesn't get them as regularly at night. Remains afebrile, Bps are borderline low, remains off of home Losartan. Adjusting pain meds today, repeating CT to evaluate fluid collections.     10/13 - Pt with intermittent hypotension, but pain has improved. Nausea is persistent, though Phenergan has been more effective.  Blood cultures from 10/8 just became positive today with Chryseobacterium and Candida glabrata - discussed with Pharmacy and ID, starting Micafungin for now, repeating blood cultures and checking echo. Getting repeat CT at noon.    I have personally seen and examined the patient at bedside. I discussed the plan of care with patient, family, bedside RN, charge RN,  and pharmacy.    Consultants/Specialty  general surgery, GI and infectious disease    Code Status  Full Code    Disposition  Patient is not medically cleared.   Anticipate discharge to to home with close outpatient follow-up.  I have placed the appropriate orders for post-discharge needs.    Review of Systems  Review of Systems "   Constitutional: Negative for chills and fever.   HENT: Negative for congestion and sore throat.    Eyes: Negative for blurred vision and photophobia.   Respiratory: Negative for cough and shortness of breath.    Cardiovascular: Negative for chest pain, claudication and leg swelling.   Gastrointestinal: Positive for abdominal pain (mild). Negative for constipation, diarrhea, heartburn, nausea and vomiting.   Genitourinary: Negative for dysuria and hematuria.   Musculoskeletal: Negative for joint pain and myalgias.   Skin: Negative for itching and rash.   Neurological: Negative for dizziness, sensory change, speech change, weakness and headaches.   Psychiatric/Behavioral: Negative for depression. The patient is not nervous/anxious and does not have insomnia.         Physical Exam  Temp:  [36.6 °C (97.8 °F)-36.9 °C (98.4 °F)] 36.6 °C (97.9 °F)  Pulse:  [61-84] 61  Resp:  [17-18] 17  BP: ()/(51-60) 110/57  SpO2:  [91 %-95 %] 95 %    Physical Exam  Vitals and nursing note reviewed.   Constitutional:       General: She is not in acute distress.     Appearance: Normal appearance. She is not ill-appearing.   HENT:      Head: Normocephalic and atraumatic.      Nose: Nose normal.   Cardiovascular:      Rate and Rhythm: Normal rate and regular rhythm.      Heart sounds: Normal heart sounds. No murmur heard.     Pulmonary:      Effort: Pulmonary effort is normal.      Breath sounds: Normal breath sounds.   Abdominal:      General: Bowel sounds are normal. There is no distension.      Palpations: Abdomen is soft.      Tenderness: There is abdominal tenderness (R sided, mild). There is no guarding.      Comments: Biliary drain in place - bile in collection bag.     Musculoskeletal:         General: No swelling or tenderness.      Cervical back: Neck supple.   Skin:     General: Skin is warm and dry.   Neurological:      General: No focal deficit present.      Mental Status: She is alert and oriented to person, place, and  time.   Psychiatric:         Mood and Affect: Mood normal.         Fluids    Intake/Output Summary (Last 24 hours) at 10/13/2021 1142  Last data filed at 10/13/2021 1051  Gross per 24 hour   Intake --   Output 115 ml   Net -115 ml       Laboratory  Recent Labs     10/11/21  0210 10/12/21  0424 10/13/21  0440   WBC 16.6* 14.7* 13.9*   RBC 3.43* 3.32* 3.17*   HEMOGLOBIN 10.3* 10.1* 9.5*   HEMATOCRIT 32.3* 30.8* 29.4*   MCV 94.2 92.8 92.7   MCH 30.0 30.4 30.0   MCHC 31.9* 32.8* 32.3*   RDW 47.8 47.1 48.6   PLATELETCT 373 468* 493*   MPV 9.4 9.0 8.7*     Recent Labs     10/11/21  0210 10/12/21  0424 10/13/21  0440   SODIUM 128* 133* 133*   POTASSIUM 3.6 3.6 3.7   CHLORIDE 94* 97 99   CO2 22 25 23   GLUCOSE 83 117* 100*   BUN 14 12 9   CREATININE 0.59 0.65 0.57   CALCIUM 7.8* 7.5* 7.3*     Recent Labs     10/13/21  0440   INR 1.45*               Imaging  IR-US GUIDED PIV   Final Result    Ultrasound-guided PERIPHERAL IV INSERTION performed by    qualified nursing staff as above.      NM-HEPATOBILIARY SCAN   Final Result      Normal hepatobiliary scan with no findings of a bile leak.      CT-ABDOMEN WITH & W/O   Final Result      1.  No findings of intraluminal contrast extravasation from the opacified bowel loops, particularly no contrast extravasation from the duodenum at the ampulla of [place].   2.  A pigtail drainage catheter is present in the right abdomen in a air and fluid collection, the extent of which is difficult to measure. The collection tracks inferiorly and medially measuring approximately 17 cm in craniocaudal dimension.   3.  A smaller loculated collection in the anterior abdomen near the inferior anterior abdominal wall sutures measures 1.5 x 8.7 cm.   4.  Unchanged pneumobilia in the CBD, intrahepatic bile ducts as well as pancreatic ducts.   5.  There is a small right pleural effusion with adjacent enhancing segmental atelectasis.   6.   Coronary artery calcifications are present       CT-DRAIN-PERITONEAL   Final Result      Successful peritoneal drainage tube placement.      Plan: Thrice daily flushes with 10 mL of sterile saline. Monitor outputs. Please contact interventional radiology if there is any concern for tube dysfunction.         CT-ABDOMEN-PELVIS WITH   Final Result         1. Large irregular irregular fluid collection with thick wall occupying most of the right abdomen surrounding the right kidney and right colon.      Additional fluid and gas collection in the midline abdomen anterior to the pancreas concerning for abscess. This collection is probably connected to the right side collection via a junction in the jl hepatis      2. Severe wall thickening of the descending colon, transverse colon, second portion duodenum, likely reactive.      3. Pneumobilia with gas in the CBD, intrahepatic bile ducts as well as pancreatic ducts are of unclear etiology.      4. Small right pleural effusion with right basilar atelectasis.            CT-ABDOMEN-PELVIS WITH    (Results Pending)        Assessment/Plan  * Sepsis due to intraabdominal fluid collection/abscess (HCC)  Assessment & Plan  This is Sepsis Present on admission  SIRS criteria identified on my evaluation include: Leukocytosis, with WBC greater than 12,000  Source is intraabdominal  Sepsis protocol initiated  Fluid resuscitation ordered per protocol  IV antibiotics as appropriate for source of sepsis  While organ dysfunction may be noted elsewhere in this problem list or in the chart, degree of organ dysfunction does not meet CMS criteria for severe sepsis  IR placed drain 10/8 - bilious appearing output  Fluid culture showing e coli and e faecalis - changed to zosyn  300cc out today after drain flushed  Blood cultures now positive, ID consulted     Bacteremia due to Gram-negative bacteria and fungemia  Assessment & Plan  - Blood cultures from 10/8 1/2 is positive for chryseobacterium and candida glabrata  - Repeat cultures  "ordered 10/13  - Micafungin added 10/13  - Discussed with ID, added echo      Hyponatremia  Assessment & Plan  - Changed LR to NS  - Given concurrent hypotension, check am cortisol level (pending)    Anemia  Assessment & Plan  - Stable, likely secondary to acute illness       Intraabdominal fluid collection  Assessment & Plan  - Suspected biliary leak post ERCP with sphincterotomy  - Intra-abdominal percutaneous drain placed with improvement in pain  - GI consulting, CT showed no evidence of contrast outside of the bowel   - HIDA scan shows no biliary leak  - Cultures from drain placement with e.coli and enterococcus - covering with Zosyn.  Allergy listed to Ampicillin as \"hives\" but is tolerating Zosyn fine.  Will discuss with ID.  -Unclear etiology - possibly a biliary leak after ERCP. Discussed with lab - fluid amylase still pending but fluid bili only 0.6. Abscess more likely?  - Repeat CT pending this am     History of pancreatitis  Assessment & Plan  Not acute  Normal lipase  Pain control and bowel rest    History of morbid obesity- (present on admission)  Assessment & Plan  Body mass index is 23.91 kg/m².  S/p sleeve gastrectomy      Hyperlipidemia- (present on admission)  Assessment & Plan  Continue Zetia    Vitamin D deficiency- (present on admission)  Assessment & Plan  Continue Vit D supplementation    Hypertension- (present on admission)  Assessment & Plan  Pt normo to hypotensive in hospital - home Losartan stopped  - Changde LR to NS for volume expansion for hypotension, Bps better today       VTE prophylaxis: enoxaparin ppx    I have performed a physical exam and reviewed and updated ROS and Plan today (10/13/2021). In review of yesterday's note (10/12/2021), there are no changes except as documented above.      "

## 2021-10-13 NOTE — CARE PLAN
The patient is Stable - Low risk of patient condition declining or worsening    Shift Goals  Clinical Goals:  (manage pain to Biliary drain site)  Patient Goals:  (monitor output from biliary tube )    Progress made toward(s) clinical / shift goals:  Patient received PRN oxy for pain medication as ordered.Biliary drain flushed and obtained with 300ml greenish output    Patient is not progressing towards the following goals:

## 2021-10-13 NOTE — PROGRESS NOTES
Gastroenterology Progress Note     Author: Fausto Casas M.D. with [facility] Jones  Date & Time Created: 10/13/2021 8:58 AM    Chief Complaint:  Abdominal pain    Interval History:  10/10 HPI  66-year-old female PMH recurrent idiopathic pancreatitis s/p ERCP with sphincterotomy October 1, 2021 complicated by ERCP pancreatitis, sleeve gastrectomy, dyslipidemia, hypertension, osteoarthritis of the spine status post lumbar fusion who was admitted to the hospital 10/8/2021 with worsening right lower quadrant pain over the past week.  Ever since her ERCP October 1, 2021, she has been experiencing pain, intermittent subjective fevers, nausea.  In the emergency room-labs: CBC: WBC 17.8..  Sodium 130.  LFTs-WNL.  CT scan abdomen/pelvis-large irregular fluid collection with thick wall occupying most of the right abdomen surrounding the right kidney and colon.  Severe wall thickening of the descending, transverse colon, second portion of the duodenum likely reactive.  Pneumobilia with gas in the CBD.  Drain placement by IR was performed.  Currently, the abdominal pain has improved.  No vomiting.  Started on ceftriaxone and metronidazole IV.     ERCP October 1, 2021-common bile duct-dilated down to the level of the ampulla.  4 mm polyp at the distal duct seen after sphincterotomy.  8 mm sphincterotomy.  Negative for stone.  Bile duct polyp-benign with inflammatory and hyperplastic changes.  No evidence of epithelial dysplasia/neoplasia.    10/11: interval HIDA scan showed no bile leak.  This morning she feels more comfortable, describing minimal abdominal pain but denying nausea vomiting and fevers.  She has been able to eat a little bit more and has full liquids beside her bed.  She expresses concern about being on losartan which she says was prescribed outpatient as as needed for high blood pressures.  She also states she has not passed a bowel movement in the week which she describes not eating much.    10/12/2021  - No events overnight.  Tolerating diet without nausea or vomiting.  Had spontaneous, formed, brown bowel movement this morning.  Abdominal pain improving.  Feeling weak, but better each day.  Leukocytosis improving.  States that she is on hydrocodone at home, managed by pain management, and asks that her current hospital pain meds be changed.    10/13/2021: Stable, no new events.  Drain output is minimal, but according to patient bedside nursing is not flushing the drain.  Patient is n.p.o. for planned CT today to reevaluate fluid collection.  Leukocytosis continues to improve.  Pain has improved greatly and patient has not taken the pain medication in the last 24 hours according to her recollection.  She is having regular bowel movements without assistance of laxatives.  Patient is very hungry.    Review of Systems:  Review of Systems   Constitutional: Positive for malaise/fatigue. Negative for chills, diaphoresis and fever.   Respiratory: Negative for cough and shortness of breath.    Cardiovascular: Negative.  Negative for chest pain and palpitations.   Gastrointestinal: Positive for abdominal pain. Negative for blood in stool, constipation, heartburn, nausea and vomiting.   Genitourinary: Negative for dysuria and urgency.   Musculoskeletal: Positive for back pain. Negative for myalgias.   Neurological: Positive for weakness. Negative for dizziness and headaches.         Physical Exam:  Physical Exam  Vitals and nursing note reviewed.   Constitutional:       Appearance: Normal appearance.   HENT:      Head: Normocephalic and atraumatic.      Mouth/Throat:      Mouth: Mucous membranes are moist.      Pharynx: Oropharynx is clear.   Eyes:      Pupils: Pupils are equal, round, and reactive to light.   Cardiovascular:      Rate and Rhythm: Normal rate and regular rhythm.      Pulses: Normal pulses.      Heart sounds: Normal heart sounds.   Pulmonary:      Effort: Pulmonary effort is normal.      Breath sounds:  Normal breath sounds.   Abdominal:      General: Abdomen is flat. Bowel sounds are normal.      Palpations: Abdomen is soft.      Tenderness: There is abdominal tenderness (LUQ, RUQ).      Comments: Right sided drain with minimal output. CDI   Musculoskeletal:      Cervical back: Neck supple.   Skin:     General: Skin is warm.   Neurological:      General: No focal deficit present.      Mental Status: She is alert and oriented to person, place, and time.   Psychiatric:         Mood and Affect: Mood normal.       Labs:          Recent Labs     10/11/21  0210 10/12/21  0424 10/13/21  0440   SODIUM 128* 133* 133*   POTASSIUM 3.6 3.6 3.7   CHLORIDE 94* 97 99   CO2 22 25 23   BUN 14 12 9   CREATININE 0.59 0.65 0.57   CALCIUM 7.8* 7.5* 7.3*     Recent Labs     10/11/21  0210 10/12/21  0424 10/13/21  0440   ALTSGPT 9 7 <5   ASTSGOT 14 16 12   ALKPHOSPHAT 61 77 58   TBILIRUBIN 0.6 0.7 0.9   GLUCOSE 83 117* 100*     Recent Labs     10/11/21  0210 10/12/21  0424 10/13/21  0440   RBC 3.43* 3.32* 3.17*   HEMOGLOBIN 10.3* 10.1* 9.5*   HEMATOCRIT 32.3* 30.8* 29.4*   PLATELETCT 373 468* 493*   PROTHROMBTM  --   --  16.5*   INR  --   --  1.45*     Recent Labs     10/11/21  0210 10/12/21  0424 10/13/21  0440   WBC 16.6* 14.7* 13.9*   NEUTSPOLYS  --   --  73.00*   LYMPHOCYTES  --   --  12.00*   MONOCYTES  --   --  12.00   EOSINOPHILS  --   --  0.00   BASOPHILS  --   --  0.00   ASTSGOT 14 16 12   ALTSGPT 9 7 <5   ALKPHOSPHAT 61 77 58   TBILIRUBIN 0.6 0.7 0.9     Hemodynamics:  Temp (24hrs), Av.7 °C (98.1 °F), Min:36.6 °C (97.8 °F), Max:36.9 °C (98.4 °F)  Temperature: 36.6 °C (97.8 °F)  Pulse  Av.6  Min: 65  Max: 94   Blood Pressure : (!) 98/52 (RN notified)     Respiratory:    Respiration: 18, Pulse Oximetry: 91 %     Work Of Breathing / Effort: Within Normal Limits     Fluids:    Intake/Output Summary (Last 24 hours) at 10/11/2021 0652  Last data filed at 10/11/2021 0500  Gross per 24 hour   Intake 480 ml   Output 980 ml    Net -500 ml        GI/Nutrition:  Orders Placed This Encounter   Procedures   • Diet NPO     Standing Status:   Standing     Number of Occurrences:   8     Order Specific Question:   Restrict to:     Answer:   Sips with Medications [3]     Medical Decision Making, by Problem:  Active Hospital Problems    Diagnosis    • *Sepsis due to intraabdominal fluid collection/abscess (HCC) [A41.9]    • History of pancreatitis [Z87.19]    • Intraabdominal fluid collection [R18.8]    • History of morbid obesity [Z87.898]    • Hyperlipidemia [E78.5]    • Vitamin D deficiency [E55.9]    • Hypertension [I10]      66-year-old female with a history of recurrent idiopathic pancreatitis s/p ERCP with biliary sphincterotomy and biopsy of a distal BD polyp on October 1, 2021.  Postop complications-pancreatitis.  Ever since then, complaints of subjective fevers, progressive abdominal pain, nausea/vomiting.  Began to have worsening right lower quadrant pain over the past 5 days.  CT scan abdomen/pelvis large irregular fluid collection with thick wall occupying most of the right abdomen surrounding right kidney and right colon.  Additional fluid and gas collection in the midline abdomen anterior to the pancreas concerning for abscess.  Severe wall thickening of the descending and transverse colon, second portion of duodenum likely reactive.  Pneumobilia with gas in the CBD, intrahepatic bile ducts as well as pancreatic ducts (likely secondary to recent ERCP with sphincterotomy).  IR placed a drain with improvement in abdominal pain.    She now has source control of her abscess and she has symptomatic improvement with increasing tolerance to p.o.  Etiology remains unclear.  Query if the patient had a small bile leak that has since sealed off independently.  Does not appear to need invasive management at the moment.     ASSESSMENT:  1.  Sepsis due to intra-abdominal fluid collection/abscess-unclear etiology  2.  History of pancreatitis  3.   Intra-abdominal fluid collection  4.  History of morbid obesity s/p laparoscopic sleeve  5.  Euvolemic hyponatremia  6.  Constipation     PLAN:    -Continue IV antibiotics ceftriaxone and metronidazole  -Continue to trend CBC, CMP  - IV antibiotics, antiemetics, pain medication per primary team  -Okay to discontinue laxatives at this time since pain medication ministration is less and patient had a regular bowel movements  -Criteria for removal of percutaneous catheters include resolution of sepsis signs, minimal drainage from the catheter, and resolution of the abscess cavity as demonstrated by ultrasonography or CT. CT plan today for reevaluation.  -Please ensure that bedside nursing is following instructions from interventional radiology of drain flushes    GI Attending -    Patient seen, examined, chart reviewed and case discussed and plan arrived at with A.P.R.N.  Agree with this  note.    Fausto Casas M.D.   [facility]  [facility]      Quality-Core Measures

## 2021-10-14 ENCOUNTER — APPOINTMENT (OUTPATIENT)
Dept: CARDIOLOGY | Facility: MEDICAL CENTER | Age: 66
DRG: 862 | End: 2021-10-14
Attending: FAMILY MEDICINE
Payer: MEDICARE

## 2021-10-14 VITALS
OXYGEN SATURATION: 93 % | SYSTOLIC BLOOD PRESSURE: 103 MMHG | HEIGHT: 63 IN | BODY MASS INDEX: 25.34 KG/M2 | HEART RATE: 65 BPM | TEMPERATURE: 98.5 F | WEIGHT: 143 LBS | DIASTOLIC BLOOD PRESSURE: 52 MMHG | RESPIRATION RATE: 18 BRPM

## 2021-10-14 LAB
ALBUMIN SERPL BCP-MCNC: 1.9 G/DL (ref 3.2–4.9)
ALBUMIN/GLOB SERPL: 0.7 G/DL
ALP SERPL-CCNC: 68 U/L (ref 30–99)
ALT SERPL-CCNC: 8 U/L (ref 2–50)
ANION GAP SERPL CALC-SCNC: 12 MMOL/L (ref 7–16)
ANISOCYTOSIS BLD QL SMEAR: ABNORMAL
AST SERPL-CCNC: 19 U/L (ref 12–45)
BASOPHILS # BLD AUTO: 0.2 % (ref 0–1.8)
BASOPHILS # BLD: 0.03 K/UL (ref 0–0.12)
BILIRUB SERPL-MCNC: 0.7 MG/DL (ref 0.1–1.5)
BUN SERPL-MCNC: 9 MG/DL (ref 8–22)
CALCIUM SERPL-MCNC: 7.5 MG/DL (ref 8.4–10.2)
CHLORIDE SERPL-SCNC: 101 MMOL/L (ref 96–112)
CO2 SERPL-SCNC: 21 MMOL/L (ref 20–33)
COMMENT 1642: NORMAL
CREAT SERPL-MCNC: 0.52 MG/DL (ref 0.5–1.4)
DACRYOCYTES BLD QL SMEAR: NORMAL
EOSINOPHIL # BLD AUTO: 0.01 K/UL (ref 0–0.51)
EOSINOPHIL NFR BLD: 0.1 % (ref 0–6.9)
ERYTHROCYTE [DISTWIDTH] IN BLOOD BY AUTOMATED COUNT: 52.8 FL (ref 35.9–50)
GLOBULIN SER CALC-MCNC: 2.9 G/DL (ref 1.9–3.5)
GLUCOSE SERPL-MCNC: 94 MG/DL (ref 65–99)
HCT VFR BLD AUTO: 34.1 % (ref 37–47)
HGB BLD-MCNC: 10.2 G/DL (ref 12–16)
HYPOCHROMIA BLD QL SMEAR: ABNORMAL
IMM GRANULOCYTES # BLD AUTO: 0.23 K/UL (ref 0–0.11)
IMM GRANULOCYTES NFR BLD AUTO: 1.8 % (ref 0–0.9)
LV EJECT FRACT  99904: 75
LV EJECT FRACT MOD 2C 99903: 77.07
LV EJECT FRACT MOD 4C 99902: 79.29
LV EJECT FRACT MOD BP 99901: 77.23
LYMPHOCYTES # BLD AUTO: 1.16 K/UL (ref 1–4.8)
LYMPHOCYTES NFR BLD: 9.1 % (ref 22–41)
MACROCYTES BLD QL SMEAR: ABNORMAL
MAGNESIUM SERPL-MCNC: 2.2 MG/DL (ref 1.5–2.5)
MCH RBC QN AUTO: 29.7 PG (ref 27–33)
MCHC RBC AUTO-ENTMCNC: 29.9 G/DL (ref 33.6–35)
MCV RBC AUTO: 99.4 FL (ref 81.4–97.8)
MONOCYTES # BLD AUTO: 1.49 K/UL (ref 0–0.85)
MONOCYTES NFR BLD AUTO: 11.7 % (ref 0–13.4)
NEUTROPHILS # BLD AUTO: 9.81 K/UL (ref 2–7.15)
NEUTROPHILS NFR BLD: 77.1 % (ref 44–72)
NRBC # BLD AUTO: 0 K/UL
NRBC BLD-RTO: 0 /100 WBC
OVALOCYTES BLD QL SMEAR: NORMAL
PHOSPHATE SERPL-MCNC: 2.3 MG/DL (ref 2.5–4.5)
PLATELET # BLD AUTO: 537 K/UL (ref 164–446)
PLATELET BLD QL SMEAR: NORMAL
PMV BLD AUTO: 9.1 FL (ref 9–12.9)
POIKILOCYTOSIS BLD QL SMEAR: NORMAL
POLYCHROMASIA BLD QL SMEAR: NORMAL
POTASSIUM SERPL-SCNC: 4.1 MMOL/L (ref 3.6–5.5)
PROT SERPL-MCNC: 4.8 G/DL (ref 6–8.2)
RBC # BLD AUTO: 3.43 M/UL (ref 4.2–5.4)
RBC BLD AUTO: PRESENT
SODIUM SERPL-SCNC: 134 MMOL/L (ref 135–145)
TEST NAME 95000: NORMAL
WBC # BLD AUTO: 12.7 K/UL (ref 4.8–10.8)

## 2021-10-14 PROCEDURE — 93306 TTE W/DOPPLER COMPLETE: CPT

## 2021-10-14 PROCEDURE — 83735 ASSAY OF MAGNESIUM: CPT

## 2021-10-14 PROCEDURE — 700102 HCHG RX REV CODE 250 W/ 637 OVERRIDE(OP): Performed by: FAMILY MEDICINE

## 2021-10-14 PROCEDURE — 84100 ASSAY OF PHOSPHORUS: CPT

## 2021-10-14 PROCEDURE — 85025 COMPLETE CBC W/AUTO DIFF WBC: CPT

## 2021-10-14 PROCEDURE — 700111 HCHG RX REV CODE 636 W/ 250 OVERRIDE (IP): Performed by: INTERNAL MEDICINE

## 2021-10-14 PROCEDURE — 700105 HCHG RX REV CODE 258: Performed by: INTERNAL MEDICINE

## 2021-10-14 PROCEDURE — A9270 NON-COVERED ITEM OR SERVICE: HCPCS | Performed by: FAMILY MEDICINE

## 2021-10-14 PROCEDURE — 770001 HCHG ROOM/CARE - MED/SURG/GYN PRIV*

## 2021-10-14 PROCEDURE — 700102 HCHG RX REV CODE 250 W/ 637 OVERRIDE(OP): Performed by: INTERNAL MEDICINE

## 2021-10-14 PROCEDURE — 99239 HOSP IP/OBS DSCHRG MGMT >30: CPT | Performed by: FAMILY MEDICINE

## 2021-10-14 PROCEDURE — 700111 HCHG RX REV CODE 636 W/ 250 OVERRIDE (IP): Performed by: FAMILY MEDICINE

## 2021-10-14 PROCEDURE — 80053 COMPREHEN METABOLIC PANEL: CPT

## 2021-10-14 PROCEDURE — 93306 TTE W/DOPPLER COMPLETE: CPT | Mod: 26 | Performed by: INTERNAL MEDICINE

## 2021-10-14 PROCEDURE — 700105 HCHG RX REV CODE 258: Performed by: FAMILY MEDICINE

## 2021-10-14 PROCEDURE — 99223 1ST HOSP IP/OBS HIGH 75: CPT | Performed by: INTERNAL MEDICINE

## 2021-10-14 PROCEDURE — A9270 NON-COVERED ITEM OR SERVICE: HCPCS | Performed by: INTERNAL MEDICINE

## 2021-10-14 PROCEDURE — 36415 COLL VENOUS BLD VENIPUNCTURE: CPT

## 2021-10-14 RX ORDER — OXYCODONE HYDROCHLORIDE 10 MG/1
10 TABLET ORAL EVERY 4 HOURS PRN
Status: CANCELLED | OUTPATIENT
Start: 2021-10-14

## 2021-10-14 RX ORDER — CYCLOBENZAPRINE HCL 10 MG
10 TABLET ORAL NIGHTLY PRN
Status: CANCELLED | OUTPATIENT
Start: 2021-10-14

## 2021-10-14 RX ORDER — ONDANSETRON 2 MG/ML
4 INJECTION INTRAMUSCULAR; INTRAVENOUS EVERY 4 HOURS PRN
Status: CANCELLED | OUTPATIENT
Start: 2021-10-14

## 2021-10-14 RX ORDER — EZETIMIBE 10 MG/1
10 TABLET ORAL EVERY EVENING
Status: CANCELLED | OUTPATIENT
Start: 2021-10-14

## 2021-10-14 RX ORDER — ONDANSETRON 4 MG/1
4 TABLET, ORALLY DISINTEGRATING ORAL EVERY 4 HOURS PRN
Status: CANCELLED | OUTPATIENT
Start: 2021-10-14

## 2021-10-14 RX ORDER — PROMETHAZINE HYDROCHLORIDE 25 MG/1
25 TABLET ORAL EVERY 6 HOURS PRN
Status: CANCELLED | OUTPATIENT
Start: 2021-10-14

## 2021-10-14 RX ORDER — SODIUM CHLORIDE 9 MG/ML
INJECTION, SOLUTION INTRAVENOUS CONTINUOUS
Status: CANCELLED | OUTPATIENT
Start: 2021-10-14

## 2021-10-14 RX ORDER — DIPHENHYDRAMINE HCL 25 MG
25 TABLET ORAL EVERY 6 HOURS PRN
Status: CANCELLED | OUTPATIENT
Start: 2021-10-14

## 2021-10-14 RX ORDER — ACETAMINOPHEN 325 MG/1
650 TABLET ORAL EVERY 6 HOURS PRN
Status: CANCELLED | OUTPATIENT
Start: 2021-10-14

## 2021-10-14 RX ORDER — POLYETHYLENE GLYCOL 3350 17 G/17G
1 POWDER, FOR SOLUTION ORAL
Status: CANCELLED | OUTPATIENT
Start: 2021-10-14

## 2021-10-14 RX ORDER — GABAPENTIN 300 MG/1
600 CAPSULE ORAL 2 TIMES DAILY
Status: CANCELLED | OUTPATIENT
Start: 2021-10-14

## 2021-10-14 RX ORDER — HYDROMORPHONE HYDROCHLORIDE 1 MG/ML
0.5 INJECTION, SOLUTION INTRAMUSCULAR; INTRAVENOUS; SUBCUTANEOUS
Status: CANCELLED | OUTPATIENT
Start: 2021-10-14

## 2021-10-14 RX ORDER — NICOTINE 21 MG/24HR
1 PATCH, TRANSDERMAL 24 HOURS TRANSDERMAL
Status: CANCELLED | OUTPATIENT
Start: 2021-10-15

## 2021-10-14 RX ORDER — VITAMIN B COMPLEX
1000 TABLET ORAL DAILY
Status: CANCELLED | OUTPATIENT
Start: 2021-10-15

## 2021-10-14 RX ORDER — FAMOTIDINE 20 MG/1
20 TABLET, FILM COATED ORAL 2 TIMES DAILY
Status: CANCELLED | OUTPATIENT
Start: 2021-10-14

## 2021-10-14 RX ADMIN — ONDANSETRON 4 MG: 2 INJECTION INTRAMUSCULAR; INTRAVENOUS at 05:12

## 2021-10-14 RX ADMIN — PIPERACILLIN AND TAZOBACTAM 3.38 G: 3; .375 INJECTION, POWDER, LYOPHILIZED, FOR SOLUTION INTRAVENOUS; PARENTERAL at 21:05

## 2021-10-14 RX ADMIN — GABAPENTIN 600 MG: 300 CAPSULE ORAL at 17:36

## 2021-10-14 RX ADMIN — OXYCODONE HYDROCHLORIDE 10 MG: 10 TABLET ORAL at 23:21

## 2021-10-14 RX ADMIN — ONDANSETRON 4 MG: 2 INJECTION INTRAMUSCULAR; INTRAVENOUS at 17:41

## 2021-10-14 RX ADMIN — ONDANSETRON 4 MG: 2 INJECTION INTRAMUSCULAR; INTRAVENOUS at 10:07

## 2021-10-14 RX ADMIN — SODIUM CHLORIDE: 9 INJECTION, SOLUTION INTRAVENOUS at 14:42

## 2021-10-14 RX ADMIN — OXYCODONE HYDROCHLORIDE 10 MG: 10 TABLET ORAL at 05:08

## 2021-10-14 RX ADMIN — OXYCODONE HYDROCHLORIDE 10 MG: 10 TABLET ORAL at 17:36

## 2021-10-14 RX ADMIN — OXYCODONE HYDROCHLORIDE 10 MG: 10 TABLET ORAL at 13:36

## 2021-10-14 RX ADMIN — PIPERACILLIN AND TAZOBACTAM 3.38 G: 3; .375 INJECTION, POWDER, LYOPHILIZED, FOR SOLUTION INTRAVENOUS; PARENTERAL at 13:28

## 2021-10-14 RX ADMIN — SODIUM CHLORIDE 950 ML: 9 INJECTION, SOLUTION INTRAVENOUS at 05:05

## 2021-10-14 RX ADMIN — PROMETHAZINE HYDROCHLORIDE 25 MG: 25 TABLET ORAL at 13:36

## 2021-10-14 RX ADMIN — HYDROMORPHONE HYDROCHLORIDE 0.5 MG: 1 INJECTION, SOLUTION INTRAMUSCULAR; INTRAVENOUS; SUBCUTANEOUS at 21:04

## 2021-10-14 RX ADMIN — ENOXAPARIN SODIUM 40 MG: 40 INJECTION SUBCUTANEOUS at 05:07

## 2021-10-14 RX ADMIN — MICAFUNGIN SODIUM 100 MG: 20 INJECTION, POWDER, LYOPHILIZED, FOR SOLUTION INTRAVENOUS at 13:28

## 2021-10-14 RX ADMIN — EZETIMIBE 10 MG: 10 TABLET ORAL at 17:36

## 2021-10-14 RX ADMIN — FAMOTIDINE 20 MG: 20 TABLET ORAL at 05:08

## 2021-10-14 RX ADMIN — PIPERACILLIN AND TAZOBACTAM 3.38 G: 3; .375 INJECTION, POWDER, LYOPHILIZED, FOR SOLUTION INTRAVENOUS; PARENTERAL at 05:06

## 2021-10-14 RX ADMIN — Medication 400 MG: at 17:36

## 2021-10-14 RX ADMIN — Medication 1000 UNITS: at 05:08

## 2021-10-14 RX ADMIN — ONDANSETRON 4 MG: 2 INJECTION INTRAMUSCULAR; INTRAVENOUS at 23:20

## 2021-10-14 RX ADMIN — FAMOTIDINE 20 MG: 20 TABLET ORAL at 17:36

## 2021-10-14 RX ADMIN — GABAPENTIN 600 MG: 300 CAPSULE ORAL at 05:08

## 2021-10-14 ASSESSMENT — ENCOUNTER SYMPTOMS
PALPITATIONS: 0
FEVER: 0
CHILLS: 0
ABDOMINAL PAIN: 1
BACK PAIN: 1
VOMITING: 0
CONSTIPATION: 0
COUGH: 0
DIAPHORESIS: 0
BLOOD IN STOOL: 0
HEARTBURN: 0
HEADACHES: 0
WEAKNESS: 1
DIZZINESS: 0
MYALGIAS: 0
CARDIOVASCULAR NEGATIVE: 1
NAUSEA: 0
SHORTNESS OF BREATH: 0

## 2021-10-14 ASSESSMENT — PAIN DESCRIPTION - PAIN TYPE
TYPE: ACUTE PAIN;SURGICAL PAIN

## 2021-10-14 ASSESSMENT — PAIN SCALES - WONG BAKER: WONGBAKER_NUMERICALRESPONSE: DOESN'T HURT AT ALL

## 2021-10-14 NOTE — DISCHARGE PLANNING
Anticipated Discharge Disposition: [facility]     Action: Pt discussed during IDT rounds and Dr. Segundo informed LSW that she has reached out to the transfer center to transfer pt to [facility].     LSW faxed PCS form to the transfer center. LSW notified Didi SANON with the transfer center of the fax. LSW informed that it may be a bit until they have a bed available at [facility].     Bedside Alona SANON informed LSW that pt is scheduled for antibiotics at 1300 and will require IV pump for four hours.     Barriers to Discharge: None     Plan: Await bed availability at [facility], LSW to continue to follow for d/c needs    Addendum 1324  LSW received a message from bedside Alona SANON and was informed that pt will require two IV pumps for [facility] transport if a bed becomes available in the next four hours.

## 2021-10-14 NOTE — PROGRESS NOTES
Gastroenterology Progress Note     Author: Fausto Casas M.D.  Date & Time Created: 10/14/2021 7:48 AM    Chief Complaint:  Abdominal pain    Interval History:  10/10 HPI  66-year-old female PMH recurrent idiopathic pancreatitis s/p ERCP with sphincterotomy October 1, 2021 complicated by ERCP pancreatitis, sleeve gastrectomy, dyslipidemia, hypertension, osteoarthritis of the spine status post lumbar fusion who was admitted to the hospital 10/8/2021 with worsening right lower quadrant pain over the past week.  Ever since her ERCP October 1, 2021, she has been experiencing pain, intermittent subjective fevers, nausea.  In the emergency room-labs: CBC: WBC 17.8..  Sodium 130.  LFTs-WNL.  CT scan abdomen/pelvis-large irregular fluid collection with thick wall occupying most of the right abdomen surrounding the right kidney and colon.  Severe wall thickening of the descending, transverse colon, second portion of the duodenum likely reactive.  Pneumobilia with gas in the CBD.  Drain placement by IR was performed.  Currently, the abdominal pain has improved.  No vomiting.  Started on ceftriaxone and metronidazole IV.     ERCP October 1, 2021-common bile duct-dilated down to the level of the ampulla.  4 mm polyp at the distal duct seen after sphincterotomy.  8 mm sphincterotomy.  Negative for stone.  Bile duct polyp-benign with inflammatory and hyperplastic changes.  No evidence of epithelial dysplasia/neoplasia.    10/11: interval HIDA scan showed no bile leak.  This morning she feels more comfortable, describing minimal abdominal pain but denying nausea vomiting and fevers.  She has been able to eat a little bit more and has full liquids beside her bed.  She expresses concern about being on losartan which she says was prescribed outpatient as as needed for high blood pressures.  She also states she has not passed a bowel movement in the week which she describes not eating much.    10/12/2021 - No events overnight.   Tolerating diet without nausea or vomiting.  Had spontaneous, formed, brown bowel movement this morning.  Abdominal pain improving.  Feeling weak, but better each day.  Leukocytosis improving.  States that she is on hydrocodone at home, managed by pain management, and asks that her current hospital pain meds be changed.    10/13/2021: Stable, no new events.  Drain output is minimal, but according to patient bedside nursing is not flushing the drain.  Patient is n.p.o. for planned CT today to reevaluate fluid collection.  Leukocytosis continues to improve.  Pain has improved greatly and patient has not taken the pain medication in the last 24 hours according to her recollection.  She is having regular bowel movements without assistance of laxatives.  Patient is very hungry.    10/14/2021 - No events overnight.  Abdominal pain controlled now.  Had nausea and vomiting yesterday with solid food, but tolerating bland diet.  Blood cultures from 10/8 positive for Chryseobacterium and Candida glabrata - Pharmacy and ID aware.  Micafungin started.  CT 10/13 showing extensive multiloculated rim-enhancing air and fluid collection/abscess within the right abdomen is not significantly changed in size from the prior study. The percutaneous pigtail drainage catheter appears well-positioned within the collection.    Review of Systems:  Review of Systems   Constitutional: Positive for malaise/fatigue. Negative for chills, diaphoresis and fever.   Respiratory: Negative for cough and shortness of breath.    Cardiovascular: Negative.  Negative for chest pain and palpitations.   Gastrointestinal: Positive for abdominal pain. Negative for blood in stool, constipation, heartburn, nausea and vomiting.   Genitourinary: Negative for dysuria and urgency.   Musculoskeletal: Positive for back pain. Negative for myalgias.   Neurological: Positive for weakness. Negative for dizziness and headaches.         Physical Exam:  Physical Exam  Vitals  and nursing note reviewed.   Constitutional:       Appearance: Normal appearance.   HENT:      Head: Normocephalic and atraumatic.      Mouth/Throat:      Mouth: Mucous membranes are moist.      Pharynx: Oropharynx is clear.   Eyes:      Pupils: Pupils are equal, round, and reactive to light.   Cardiovascular:      Rate and Rhythm: Normal rate and regular rhythm.      Pulses: Normal pulses.      Heart sounds: Normal heart sounds.   Pulmonary:      Effort: Pulmonary effort is normal.      Breath sounds: Normal breath sounds.   Abdominal:      General: Abdomen is flat. Bowel sounds are normal.      Palpations: Abdomen is soft.      Tenderness: There is abdominal tenderness (LUQ, RUQ).      Comments: Right sided drain with minimal output. CDI   Musculoskeletal:      Cervical back: Neck supple.   Skin:     General: Skin is warm.   Neurological:      General: No focal deficit present.      Mental Status: She is alert and oriented to person, place, and time.   Psychiatric:         Mood and Affect: Mood normal.       Labs:          Recent Labs     10/12/21  0424 10/13/21  0440 10/14/21  0429   SODIUM 133* 133* 134*   POTASSIUM 3.6 3.7 4.1   CHLORIDE 97 99 101   CO2 25 23 21   BUN 12 9 9   CREATININE 0.65 0.57 0.52   MAGNESIUM  --   --  2.2   PHOSPHORUS  --   --  2.3*   CALCIUM 7.5* 7.3* 7.5*     Recent Labs     10/12/21  0424 10/13/21  0440 10/14/21  0429   ALTSGPT 7 <5 8   ASTSGOT 16 12 19   ALKPHOSPHAT 77 58 68   TBILIRUBIN 0.7 0.9 0.7   GLUCOSE 117* 100* 94     Recent Labs     10/12/21  0424 10/13/21  0440 10/14/21  0429   RBC 3.32* 3.17* 3.43*   HEMOGLOBIN 10.1* 9.5* 10.2*   HEMATOCRIT 30.8* 29.4* 34.1*   PLATELETCT 468* 493* 537*   PROTHROMBTM  --  16.5*  --    INR  --  1.45*  --      Recent Labs     10/12/21  0424 10/13/21  0440 10/14/21  0429   WBC 14.7* 13.9* 12.7*   NEUTSPOLYS  --  73.00* 77.10*   LYMPHOCYTES  --  12.00* 9.10*   MONOCYTES  --  12.00 11.70   EOSINOPHILS  --  0.00 0.10   BASOPHILS  --  0.00 0.20    ASTSGOT 16 12 19   ALTSGPT 7 <5 8   ALKPHOSPHAT 77 58 68   TBILIRUBIN 0.7 0.9 0.7     Hemodynamics:  Temp (24hrs), Av.7 °C (98.1 °F), Min:36.6 °C (97.9 °F), Max:36.9 °C (98.4 °F)  Temperature: 36.9 °C (98.4 °F)  Pulse  Av.4  Min: 60  Max: 94   Blood Pressure : 144/70     Respiratory:    Respiration: 18, Pulse Oximetry: 91 %     Work Of Breathing / Effort: Within Normal Limits     Fluids:    Intake/Output Summary (Last 24 hours) at 10/11/2021 0652  Last data filed at 10/11/2021 0500  Gross per 24 hour   Intake 480 ml   Output 980 ml   Net -500 ml        GI/Nutrition:  Orders Placed This Encounter   Procedures   • Restrict to: Sips with Medications     Standing Status:   Standing     Number of Occurrences:   8     Order Specific Question:   Restrict to:     Answer:   Sips with Medications [3]     Medical Decision Making, by Problem:  Active Hospital Problems    Diagnosis    • *Sepsis due to intraabdominal fluid collection/abscess (HCC) [A41.9]    • History of pancreatitis [Z87.19]    • Intraabdominal fluid collection [R18.8]    • History of morbid obesity [Z87.898]    • Hyperlipidemia [E78.5]    • Vitamin D deficiency [E55.9]    • Hypertension [I10]      66-year-old female with a history of recurrent idiopathic pancreatitis s/p ERCP with biliary sphincterotomy and biopsy of a distal BD polyp on 2021.  Postop complications-pancreatitis.  Ever since then, complaints of subjective fevers, progressive abdominal pain, nausea/vomiting.  Began to have worsening right lower quadrant pain over the past 5 days.  CT scan abdomen/pelvis large irregular fluid collection with thick wall occupying most of the right abdomen surrounding right kidney and right colon.  Additional fluid and gas collection in the midline abdomen anterior to the pancreas concerning for abscess.  Severe wall thickening of the descending and transverse colon, second portion of duodenum likely reactive.  Pneumobilia with gas in the CBD,  intrahepatic bile ducts as well as pancreatic ducts (likely secondary to recent ERCP with sphincterotomy).  IR placed a drain with improvement in abdominal pain.    She now has source control of her abscess and she has symptomatic improvement with increasing tolerance to p.o.  Etiology remains unclear.  Query if the patient had a small bile leak that has since sealed off independently.  Does not appear to need invasive management at the moment.     ASSESSMENT:  1.  Sepsis due to intra-abdominal fluid collection/abscess-unclear etiology - Cultures (+)  2.  History of pancreatitis  3.  Intra-abdominal fluid collection  4.  History of morbid obesity s/p laparoscopic sleeve  5.  Euvolemic hyponatremia  6.  Constipation     PLAN:    -Continue IV antibiotics and antifungals  -Continue to trend CBC, CMP  - IV antibiotics, antiemetics, pain medication per primary team  -Criteria for removal of percutaneous catheters include resolution of sepsis signs, minimal drainage from the catheter, and resolution of the abscess cavity as demonstrated by ultrasonography or CT  -Please ensure that bedside nursing is following instructions from interventional radiology of drain flushes    Fausto Casas M.D.   [facility]      Quality-Core Measures

## 2021-10-14 NOTE — CONSULTS
INFECTIOUS DISEASES INPATIENT CONSULT NOTE     Date of Service: 10/14/2021    Consult Requested By: Elizabeth Segundo M.D.    Reason for Consultation: Intra-abdominal abscess, polymicrobial infection, candidemia    History of Present Illness:   Nils Alfonso is a 66 y.o. 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  Patient was diagnosed with pancreatitis 1 week prior to admission.  She has a history of recurrent idiopathic pancreatitis and underwent an ERCP with sphincterotomy by GI on 10/1/2021.  Unfortunately, she had postprocedural complications with ERCP pancreatitis.  Patient also has a history of cholecystectomy and sleeve gastrectomy.  On the morning of admission, she had significant abdominal pain.  She denies any associated fevers or chills.  CT scan of the abdomen and pelvis revealed a large irregular fluid collection with a thick wall in the right abdomen surrounding the right kidney and right colon.  There is also an additional fluid collection with gas anterior to the pancreas concerning for an abscess and severe wall thickening of the descending colon, transverse colon and second portion of the duodenum.  Patient underwent CT-guided drain placement on 10/8.  Fluid cultures are growing E. coli, and Enterococcus faecalis.  Blood cultures on 10/8 are growing Chryseobacterium species and Candida glabrata.  Repeat blood cultures on 10/13 are negative to date.  She is currently on Zosyn and micafungin.  Repeat CT scan of the abdomen and pelvis on 10/13 revealed extensive multiloculated rim-enhancing fluid collections in the right abdomen and hip that have not significantly changed from prior study.  GI is following.  Plan is for transfer to [facility] for evaluation by Dr. Cook for possible surgery versus multiple drain placement.   Infectious disease service consulted for antibiotic recommendations.    All other review of systems reviewed and negative except those documented above in the HPI.     PMH:   Past Medical History:   Diagnosis Date   • Allergy, unspecified not elsewhere classified    • Anemia     not currently   • Anesthesia     PONV (Demerol/ Dilaudid)   • Arthritis     bilateral shoulders,sacrum, osteo   • Backpain     coccyx and sacrum   • Bronchitis 2010   • Cataract     bilateral IOLI   • Degeneration of cervical intervertebral disc     C5-6,C6-7   • Dental disorder     partial upper and lower   • Heart burn    • Hiatus hernia syndrome     not a problem after gastric sleeve   • High cholesterol     resolved after gastric surgery   • Hyperlipidemia    • Hypertension     off all medication, reveresed after gastric sleeve   • Muscle disorder    • Pain 05/16/2018    low back and SI joints and sacrum   • Reactive airway disease     rescue inhaler not needed unless with bronchitis       PSH:  Past Surgical History:   Procedure Laterality Date   • PB ERCP,DIAGNOSTIC  10/1/2021    Procedure: ERCP (ENDOSCOPIC RETROGRADE CHOLANGIOPANCREATOGRAPHY);  Surgeon: Fausto Casas M.D.;  Location: [facility];  Service: Gastroenterology   • COLONOSCOPY  8/8/2018    Procedure: COLONOSCOPY;  Surgeon: Shukri Condon M.D.;  Location: [facility];  Service: General   • GASTROSCOPY  5/23/2018    Procedure: GASTROSCOPY;  Surgeon: Fausto Casas M.D.;  Location: [facility];  Service: Gastroenterology   • EGD W/ENDOSCOPIC ULTRASOUND  5/23/2018    Procedure: EGD W/ENDOSCOPIC ULTRASOUND- RADIAL UPPER;  Surgeon: Fausto Casas M.D.;  Location: [facility];  Service: Gastroenterology   • CATARACT PHACO WITH IOL Left 4/18/2017    Procedure: CATARACT PHACO WITH IOL;  Surgeon: Stuart Estevez M.D.;  Location: SURGERY SAME DAY [facility];  Service:    • CATARACT PHACO WITH IOL Right 4/4/2017     Procedure: CATARACT PHACO WITH IOL;  Surgeon: Stuart Estevez M.D.;  Location: SURGERY [facility];  Service:    • GASTRIC SLEEVE LAPAROSCOPY  10/19/2016    Procedure: GASTRIC SLEEVE LAPAROSCOPY, HIATAL HERNIA;  Surgeon: Shukri Condon M.D.;  Location: SURGERY [facility];  Service:    • LIVER BIOPSY LAPAROSCOPIC  10/19/2016    Procedure: LIVER BIOPSY LAPAROSCOPIC;  Surgeon: Shukri Condon M.D.;  Location: SURGERY [facility];  Service:    • GASTROSCOPY N/A 2016    Procedure: GASTROSCOPY;  Surgeon: Shukri Condon M.D.;  Location: SURGERY [facility];  Service:    • ROTATOR CUFF REPAIR Right 2016   • ROTATOR CUFF REPAIR Left 2015   • HYSTERECTOMY ROBOTIC  2009    Performed by MEG VILLEGAS at SURGERY [facility]   • LUMBAR FUSION ANTERIOR      Dr Hillman, L3-S1 fusion   • CHOLECYSTECTOMY      laparoscopic   • TUBAL LIGATION     • GASTRIC RESECTION      gastric stapling   • TONSILLECTOMY AND ADENOIDECTOMY     • PRIMARY C SECTION  , , 1985    x3       FAMILY HX:  Family History   Problem Relation Age of Onset   • Stroke Mother    • Cancer Father         larynx   • Diabetes Brother        SOCIAL HX:  Social History     Socioeconomic History   • Marital status:      Spouse name: Not on file   • Number of children: Not on file   • Years of education: Not on file   • Highest education level: Not on file   Occupational History   • Not on file   Tobacco Use   • Smoking status: Former Smoker     Packs/day: 0.25     Years: 0.10     Pack years: 0.02     Types: Cigarettes     Quit date: 1973     Years since quittin.8   • Smokeless tobacco: Never Used   Vaping Use   • Vaping Use: Never used   Substance and Sexual Activity   • Alcohol use: No   • Drug use: No   • Sexual activity: Not on file     Comment:    Other Topics Concern   • Not on file   Social History Narrative   • Not on file     Social Determinants of Health     Financial  "Resource Strain:    • Difficulty of Paying Living Expenses:    Food Insecurity:    • Worried About Running Out of Food in the Last Year:    • Ran Out of Food in the Last Year:    Transportation Needs:    • Lack of Transportation (Medical):    • Lack of Transportation (Non-Medical):    Physical Activity:    • Days of Exercise per Week:    • Minutes of Exercise per Session:    Stress:    • Feeling of Stress :    Social Connections:    • Frequency of Communication with Friends and Family:    • Frequency of Social Gatherings with Friends and Family:    • Attends [employer] Services:    • Active Member of Clubs or Organizations:    • Attends Club or Organization Meetings:    • Marital Status:    Intimate Partner Violence:    • Fear of Current or Ex-Partner:    • Emotionally Abused:    • Physically Abused:    • Sexually Abused:      Social History     Tobacco Use   Smoking Status Former Smoker   • Packs/day: 0.25   • Years: 0.10   • Pack years: 0.02   • Types: Cigarettes   • Quit date: 1973   • Years since quittin.8   Smokeless Tobacco Never Used     Social History     Substance and Sexual Activity   Alcohol Use No       Allergies/Intolerances:  Allergies   Allergen Reactions   • Ampicillin Rash     Rash     • Cephalexin Diarrhea, Vomiting and Nausea     .   • Clindamycin Vomiting     \"cleocin\"   • Codeine      Severe stomach pain, cramps, spasms   • Demerol Vomiting   • Levofloxacin Unspecified     Numbness     • Tetracyclines Rash     .   • Tizanidine Itching   • Hydromorphone Vomiting and Nausea   • Morphine Vomiting   • Pcn [Penicillins] Itching   • Sulfa Drugs Itching       History reviewed with the patient    Other Current Medications:    Current Facility-Administered Medications:   •  micafungin (MYCAMINE) 100 mg in  mL ivpb, 100 mg, Intravenous, Q24HRS, Elizabeth Segundo M.D., Stopped at 10/13/21 1513  •  NS infusion, , Intravenous, Continuous, Elizabeth Segundo M.D., Last Rate: 100 mL/hr at 10/14/21 " 0505, 950 mL at 10/14/21 0505  •  oxyCODONE immediate release (ROXICODONE) tablet 10 mg, 10 mg, Oral, Q4HRS PRN, Elizabeth Segundo M.D., 10 mg at 10/14/21 0508  •  [DISCONTINUED] oxyCODONE immediate-release (ROXICODONE) tablet 5 mg, 5 mg, Oral, Q3HRS PRN, 5 mg at 10/11/21 1712 **OR** [DISCONTINUED] oxyCODONE immediate release (ROXICODONE) tablet 10 mg, 10 mg, Oral, Q3HRS PRN, 10 mg at 10/11/21 2106 **OR** HYDROmorphone (Dilaudid) injection 0.5 mg, 0.5 mg, Intravenous, Q3HRS PRN, Elizabeth Segundo M.D., 0.5 mg at 10/13/21 2056  •  acetaminophen (Tylenol) tablet 650 mg, 650 mg, Oral, Q6HRS PRN, Elizabeth Segundo M.D.  •  promethazine (PHENERGAN) tablet 25 mg, 25 mg, Oral, Q6HRS PRN, Elizabeth Segundo M.D., 25 mg at 10/12/21 1831  •  [DISCONTINUED] senna-docusate (PERICOLACE or SENOKOT S) 8.6-50 MG per tablet 2 Tablet, 2 Tablet, Oral, BID **AND** polyethylene glycol/lytes (MIRALAX) PACKET 1 Packet, 1 Packet, Oral, QDAY PRN **AND** magnesium hydroxide (MILK OF MAGNESIA) suspension 30 mL, 30 mL, Oral, QDAY PRN **AND** [] bisacodyl (DULCOLAX) suppository 10 mg, 10 mg, Rectal, Once, Jason iDxon M.D.  •  diphenhydrAMINE (BENADRYL) tablet/capsule 25 mg, 25 mg, Oral, Q6HRS PRN, Ana Luisa Shearer D.O.  •  [COMPLETED] piperacillin-tazobactam (ZOSYN) 3.375 g in  mL IVPB, 3.375 g, Intravenous, Once, Last Rate: 200 mL/hr at 10/10/21 1502, 3.375 g at 10/10/21 1502 **AND** piperacillin-tazobactam (ZOSYN) 3.375 g in  mL IVPB, 3.375 g, Intravenous, Q8HRS, Ana Luisa Shearer D.O., Stopped at 10/14/21 0906  •  nicotine (NICODERM) 21 MG/24HR 21 mg, 1 Patch, Transdermal, Daily-0600, Ana Luisa Shearer D.O.  •  vitamin D3 (cholecalciferol) tablet 1,000 Units, 1,000 Units, Oral, DAILY, Orlando Jones M.D., 1,000 Units at 10/14/21 0508  •  cyclobenzaprine (Flexeril) tablet 10 mg, 10 mg, Oral, HS PRN, Orlando Jones M.D.  •  ezetimibe (ZETIA) tablet 10 mg, 10 mg, Oral, Q EVENING, Orlando Jones M.D., 10 mg at 10/13/21 1712  •   "gabapentin (NEURONTIN) capsule 600 mg, 600 mg, Oral, BID, Orlando Jones M.D., 600 mg at 10/14/21 0508  •  magnesium oxide (MAG-OX) tablet 400 mg, 400 mg, Oral, Q EVENING, Orlando Jones M.D., 400 mg at 10/13/21 1713  •  Respiratory Therapy Consult, , Nebulization, Continuous RT, Orlando Jones M.D.  •  enoxaparin (LOVENOX) inj 40 mg, 40 mg, Subcutaneous, DAILY, Orlando Jones M.D., 40 mg at 10/14/21 0507  •  Notify provider if pain remains uncontrolled, , , CONTINUOUS **AND** Use the Numeric Rating Scale (NRS), Colon-Baker Faces (WBF), or FLACC on regular floors and Critical-Care Pain Observation Tool (CPOT) on ICUs/Trauma to assess pain, , , CONTINUOUS **AND** Pulse Ox, , , CONTINUOUS **AND** Pharmacy Consult Request ...Pain Management Review 1 Each, 1 Each, Other, PHARMACY TO DOSE **AND** If patient difficult to arouse and/or has respiratory depression (respiratory rate of 10 or less), stop any opiates that are currently infusing and call a Rapid Response., , , CONTINUOUS, Orlando Jones M.D.  •  ondansetron (ZOFRAN) syringe/vial injection 4 mg, 4 mg, Intravenous, Q4HRS PRN, Orlanod Jones M.D., 4 mg at 10/14/21 1007  •  ondansetron (ZOFRAN ODT) dispertab 4 mg, 4 mg, Oral, Q4HRS PRN, Orlando Jones M.D.  •  lactated ringers infusion (BOLUS), 500 mL, Intravenous, Once PRN, Orlando Jones M.D.  •  famotidine (PEPCID) tablet 20 mg, 20 mg, Oral, BID, 20 mg at 10/14/21 0508 **OR** famotidine (PEPCID) injection 20 mg, 20 mg, Intravenous, BID, Orlando Jones M.D.  [unfilled]    Most Recent Vital Signs:  /48   Pulse 60   Temp 36.9 °C (98.4 °F) (Oral)   Resp 18   Ht 1.6 m (5' 3\")   Wt 64.9 kg (143 lb)   LMP 2009   SpO2 97%   BMI 25.33 kg/m²   Temp  Av.9 °C (98.5 °F)  Min: 36.3 °C (97.3 °F)  Max: 38.4 °C (101.1 °F)    Physical Exam:  General: well nourished, no diaphoresis, well-appearing, no acute distress  HEENT: sclera anicteric, PERRL, extraocular muscles " intact, mucous membranes moist, oropharynx clear and moist, no oral lesions or exudate  Neck: supple, no lymphadenopathy  Chest: CTAB, no rales, rhonchi or wheezes, normal work of breathing.  Cardiac: regular rate and rhythm, normal S1 S2, no murmurs, rubs or gallops  Abdomen: + bowel sounds, soft, tender to palpation, non-distended, no hepatosplenomegaly.  Right-sided drain in place with brown fluid bag  Extremities: WWP, no edema, 2+ pedal pulses  Skin: warm and dry, no rashes or worrisome lesions  Neuro: Alert and oriented times 3, non-focal exam, speech fluent, full range of motion to bilateral upper and lower extremities  Psych: normal mood and behavior, pleasant; memory intact, normal judgement    Pertinent Lab Results:  Recent Labs     10/12/21  0424 10/13/21  0440 10/14/21  0429   WBC 14.7* 13.9* 12.7*      Recent Labs     10/12/21  0424 10/13/21  0440 10/14/21  0429   HEMOGLOBIN 10.1* 9.5* 10.2*   HEMATOCRIT 30.8* 29.4* 34.1*   MCV 92.8 92.7 99.4*   MCH 30.4 30.0 29.7   MACROCYTOSIS  --   --  1+   ANISOCYTOSIS  --   --  1+   PLATELETCT 468* 493* 537*         Recent Labs     10/12/21  0424 10/13/21  0440 10/14/21  0429   SODIUM 133* 133* 134*   POTASSIUM 3.6 3.7 4.1   CHLORIDE 97 99 101   CO2 25 23 21   CREATININE 0.65 0.57 0.52        Recent Labs     10/12/21  0424 10/13/21  0440 10/14/21  0429   ALBUMIN 2.3* 1.9* 1.9*        Pertinent Micro:  Results     Procedure Component Value Units Date/Time    BLOOD CULTURE [[id number]] Collected: 10/13/21 1254    Order Status: Completed Specimen: Blood from Peripheral Updated: 10/14/21 0735     Significant Indicator NEG     Source BLD     Site PERIPHERAL     Culture Result No Growth  Note: Blood cultures are incubated for 5 days and  are monitored continuously.Positive blood cultures  are called to the RN and reported as soon as  they are identified.  Blood culture testing and Gram stain, if indicated, are  performed at [facility],  "[id number] [place] [address].  Positive blood cultures are  sent to [facility], [address] [place] [address], for organism identification and  susceptibility testing.      Narrative:      Per Hospital Policy: Only change Specimen Src: to \"Line\" if  specified by physician order.  Left Hand    BLOOD CULTURE [[id number]] Collected: 10/13/21 1254    Order Status: Completed Specimen: Blood from Peripheral Updated: 10/14/21 0735     Significant Indicator NEG     Source BLD     Site PERIPHERAL     Culture Result No Growth  Note: Blood cultures are incubated for 5 days and  are monitored continuously.Positive blood cultures  are called to the RN and reported as soon as  they are identified.  Blood culture testing and Gram stain, if indicated, are  performed at [facility], [address] [place] [address].  Positive blood cultures are  sent to [facility], [address] [place] [address], for organism identification and  susceptibility testing.      Narrative:      Per Hospital Policy: Only change Specimen Src: to \"Line\" if  specified by physician order.  Right Hand    BLOOD CULTURE [[id number]] Collected: 10/08/21 1013    Order Status: Completed Specimen: Blood from Peripheral Updated: 10/13/21 1700     Significant Indicator NEG     Source BLD     Site PERIPHERAL     Culture Result No growth after 5 days of incubation.    Narrative:      2 of 2 blood culture x2  Sites order. Per Hospital Policy:  Only change Specimen Src: to \"Line\" if specified by physician  order.  Left Hand    BLOOD CULTURE [[id number]]  (Abnormal) Collected: 10/08/21 0800    Order Status: Completed Specimen: Blood from Peripheral Updated: 10/13/21 1121     Significant Indicator POS     Source BLD     Site PERIPHERAL     Culture Result Growth detected by Bactec instrument. 10/12/2021  02:54      Chryseobacterium species  Chryseobacterium arthrosphaerae  Organism identified by MALDI-TOF " "research use only library.        Candida glabrata  Susceptibilities in progress      Narrative:      CALL  [facility]  [facility] tel. [phone],  CALLED  [facility] tel. [phone] 10/13/2021, 10:15, RB PERF. RESULTS CALLED  TO:[id number] RN (GNR)  1 of 2 for Blood Culture x 2 sites order. Per Hospital  Policy: Only change Specimen Src: to \"Line\" if specified by  physician order.  Right AC    FLUID CULTURE W/GRAM STAIN [[id number]]  (Abnormal)  (Susceptibility) Collected: 10/08/21 1645    Order Status: Completed Specimen: Body Fluid from Peritoneal Fluid Updated: 10/10/21 0908     Significant Indicator POS     Source BF     Site PERITONEAL FLUID     Culture Result -     Gram Stain Result Many Gram negative rods.  Many Gram positive cocci.       Culture Result Escherichia coli  Heavy growth        Enterococcus faecalis  Heavy growth      Narrative:      From intraabdominal abscess  From intraabdominal abscess    Susceptibility     Escherichia coli (1)     Antibiotic Interpretation Microscan Method Status    Ampicillin Sensitive <=8 mcg/mL MU Final    Ceftriaxone Sensitive <=1 mcg/mL MU Final    Cefazolin Sensitive <=2 mcg/mL MU Final    Ciprofloxacin Resistant >2 mcg/mL MU Final    Cefepime Sensitive <=2 mcg/mL MU Final    Cefuroxime Sensitive <=4 mcg/mL MU Final    Ertapenem Sensitive <=0.5 mcg/mL MU Final    Gentamicin Sensitive <=2 mcg/mL MU Final    Ampicillin/sulbactam Sensitive <=4/2 mcg/mL MU Final    Moxifloxacin Resistant >4 mcg/mL MU Final    Pip/Tazobactam Sensitive <=8 mcg/mL MU Final    Trimeth/Sulfa Sensitive <=0.5/9.5 mcg/mL MU Final    Tigecycline Sensitive <=2 mcg/mL MU Final    Tobramycin Sensitive <=2 mcg/mL MU Final          Enterococcus faecalis (2)     Antibiotic Interpretation Microscan Method Status    Ampicillin Sensitive <=2 mcg/mL MU Final    Vancomycin Sensitive 1 mcg/mL MU Final    Daptomycin Sensitive 2 mcg/mL MU Final    Gent Synergy Sensitive <=500 mcg/mL MU Final    Penicillin Sensitive 2 " mcg/mL MU Final            Condensed View                   GRAM STAIN [[id number]] Collected: 10/08/21 1645    Order Status: Completed Specimen: Body Fluid Updated: 10/09/21 0629     Significant Indicator .     Source BF     Site PERITONEAL FLUID     Gram Stain Result Many Gram negative rods.  Many Gram positive cocci.      Narrative:      From intraabdominal abscess  From intraabdominal abscess    FLUID CULTURE W/GRAM STAIN [[id number]]     Order Status: No result Specimen: Body Fluid from Peritoneal Fluid     URINALYSIS [[id number]]  (Abnormal) Collected: 10/08/21 0930    Order Status: Completed Specimen: Urine Updated: 10/08/21 0949     Color Yellow     Character Clear     Specific Gravity 1.010     Ph 6.5     Glucose Negative mg/dL      Ketones >=80 mg/dL      Protein 30 mg/dL      Bilirubin Small     Nitrite Negative     Leukocyte Esterase Negative     Occult Blood Trace     Micro Urine Req Microscopic    Blood Culture [[id number]] Collected: 10/08/21 0000    Order Status: Canceled Specimen: Other from Peripheral     Blood Culture [[id number]] Collected: 10/08/21 0000    Order Status: Canceled Specimen: Other from Peripheral     Urinalysis [[id number]] Collected: 10/08/21 0000    Order Status: Canceled Specimen: Urine         No results found for: BLOODCULTU, BLDCULT, BCHOLD     Studies:  CT-ABDOMEN WITH & W/O    Result Date: 10/9/2021  10/9/2021 1:38 PM HISTORY/REASON FOR EXAM:  eval for any ugi perforation given recent sphinterotomy during ERCP and now with fluid collection. TECHNIQUE/EXAM DESCRIPTION:  CT scan of the abdomen without and with contrast. Initial precontrast images were obtained from the diaphragmatic domes through the iliac crests using helical technique.  Following nonionic contrast administration in an intravenous bolus fashion, and postcontrast, thin-section helical scanning obtained from the diaphragmatic domes through the iliac crests. 80 mL of Omnipaque 350 nonionic contrast was administered.  Low dose optimization technique was utilized for this CT exam including automated exposure control and adjustment of the mA and/or kV according to patient size. COMPARISON: 10/8/2021, 10/2/2021. FINDINGS: There is a small right pleural effusion with adjacent enhancing segmental atelectasis.. Calcified left lower lobe granuloma. No pericardial effusion. Cardiac chambers are normal in size. Coronary artery calcifications are present. Air in the intra and extra hepatic bile ducts. Unremarkable appearance of the liver. The hepatic and portal veins are patent. Spleen is unremarkable. Again noted is air in the pancreatic duct. No adrenal gland nodules. Gallbladder is surgically absent. No hydronephrosis. No dilated loops of imaged bowel. There is anasarca and mesenteric edema. A pigtail drainage catheter is present in the right abdomen in a air and fluid collection, the extent of which is difficult to measure the collection tracks inferiorly and medially measuring approximately 17 cm in craniocaudal dimension. A smaller loculated collection in the anterior abdomen near the inferior anterior abdominal wall sutures measures 1.5 x 8.7 cm. No findings of contrast extravasation from the opacified bowel loops,  particularly no contrast extravasation at the ampulla of [place].     1.  No findings of intraluminal contrast extravasation from the opacified bowel loops, particularly no contrast extravasation from the duodenum at the ampulla of [place]. 2.  A pigtail drainage catheter is present in the right abdomen in a air and fluid collection, the extent of which is difficult to measure. The collection tracks inferiorly and medially measuring approximately 17 cm in craniocaudal dimension. 3.  A smaller loculated collection in the anterior abdomen near the inferior anterior abdominal wall sutures measures 1.5 x 8.7 cm. 4.  Unchanged pneumobilia in the CBD, intrahepatic bile ducts as well as pancreatic ducts. 5.  There is a small right  pleural effusion with adjacent enhancing segmental atelectasis. 6.   Coronary artery calcifications are present    CT-ABDOMEN-PELVIS WITH    Result Date: 10/13/2021  10/13/2021 11:57 AM HISTORY/REASON FOR EXAM:  Peritonitis or perforation suspected; Pt with known intraabdominal fluid collection in the abdomen of uncertain etiology - had recent ERCP but studies not consistent with bile leak.  Drain in place - reassess. TECHNIQUE/EXAM DESCRIPTION:   CT scan of the abdomen and pelvis with contrast. Contrast-enhanced helical scanning was obtained from the diaphragmatic domes through the pubic symphysis following the bolus administration of nonionic contrast without complication. 100 mL of Omnipaque 350 nonionic contrast was administered without complication. Low dose optimization technique was utilized for this CT exam including automated exposure control and adjustment of the mA and/or kV according to patient size. COMPARISON: 10/9/2021 FINDINGS: Lower Chest: Trace right pleural effusion with right basilar atelectasis. Calcified granuloma in the left lower lobe and trace left pleural fluid. Liver: Normal. Spleen: Unremarkable. Pancreas: Unremarkable. Gallbladder: The gallbladder has been resected. Biliary: Pneumobilia again noted. Adrenal glands: Normal. Kidneys: There is a small left renal cortical cyst which does not require imaging follow-up. No hydronephrosis.. Bowel: No bowel obstruction. Parts of the bowel are suboptimally evaluated due to the surrounding abscess and loss of the intervening fat planes. There is gastric sleeve surgery. Lymph nodes: No adenopathy. Vasculature: Scattered atherosclerosis. Peritoneum: A multiloculated although spatial rim-enhancing air-fluid collection within the right abdomen does not appear significantly changed in size the prior study. On series 2 image 54 measures about 15.5 x 8.7 cm. It extends from the upper abdomen,  where it is seen in the gallbladder fossa, inferiorly into  the pelvis. It insinuates around and partially encases the duodenum and right kidney and extends around ascending colon and some mesenteric branch vessels. There is a percutaneous pigtail drainage catheter which appears well positioned within the abscess in the right midabdomen. Musculoskeletal: No acute or destructive process. Postsurgical changes anterior lumbar spinal fusion Pelvis: Hysterectomy. There is a small rim-enhancing fluid collection within the pelvis measuring 7 x 3.1 x 2.8 cm suspicious for small abscess..     1.  Extensive multiloculated rim-enhancing air and fluid collection/abscess within the right abdomen is not significantly changed in size from the prior study. The percutaneous pigtail drainage catheter appears well-positioned within the collection. 2.  Small separate pelvic rim-enhancing fluid collection suggests an abscess.    CT-ABDOMEN-PELVIS WITH    Result Date: 10/8/2021  10/8/2021 8:06 AM HISTORY/REASON FOR EXAM:  Abdominal pain, fever. Right lower quadrant pain. Pancreatitis. TECHNIQUE/EXAM DESCRIPTION:   CT scan of the abdomen and pelvis with contrast. Contrast-enhanced helical scanning was obtained from the diaphragmatic domes through the pubic symphysis following the bolus administration of nonionic contrast without complication. 100 mL of Omnipaque 350 nonionic contrast was administered without complication. Low dose optimization technique was utilized for this CT exam including automated exposure control and adjustment of the mA and/or kV according to patient size. COMPARISON: 10/2/2021. FINDINGS: Lower Chest: Small right pleural effusion with right basilar opacities. Liver: Normal. Spleen: Unremarkable. Pancreas: Poorly visualized. There is gas in the pancreatic duct. Gallbladder: Surgically absent. Biliary: There is gas in the CBD and intrahepatic bile ducts, left more than right Adrenal glands: Normal. Kidneys: No hydronephrosis. Bilateral kidneys are enhancing symmetrically..  Bowel: Severe wall thickening of the descending colon, transverse colon, second portion duodenum. Postsurgical change in the stomach Lymph nodes: No adenopathy. Vasculature: The abdominal aorta is normal in caliber. Peritoneum: There is large irregular fluid collection occupying most of the right abdomen surrounding the right kidney and right colon. Additional fluid and gas collection in the midline abdomen anterior to the pancreas. This collection is probably connected to the right side collection via a junction in the jl hepatis Musculoskeletal: Postsurgical change in the lower lumbar spine. Pelvis: Trace pelvic free fluid.     1. Large irregular irregular fluid collection with thick wall occupying most of the right abdomen surrounding the right kidney and right colon. Additional fluid and gas collection in the midline abdomen anterior to the pancreas concerning for abscess. This collection is probably connected to the right side collection via a junction in the jl hepatis 2. Severe wall thickening of the descending colon, transverse colon, second portion duodenum, likely reactive. 3. Pneumobilia with gas in the CBD, intrahepatic bile ducts as well as pancreatic ducts are of unclear etiology. 4. Small right pleural effusion with right basilar atelectasis.     CT-ABDOMEN-PELVIS WITH    Result Date: 10/2/2021  10/2/2021 2:03 PM HISTORY/REASON FOR EXAM:  RLQ and diffuse lower abdominal pain; had ERCP yesterday. Pt has pancreatitis and they found a polyp on her bile duct. TECHNIQUE/EXAM DESCRIPTION:   CT scan of the abdomen and pelvis with contrast. Contrast-enhanced helical scanning was obtained from the diaphragmatic domes through the pubic symphysis following the bolus administration of nonionic contrast without complication. 100 mL of Omnipaque 350 nonionic contrast was administered without complication. Low dose optimization technique was utilized for this CT exam including automated exposure control and  adjustment of the mA and/or kV according to patient size. COMPARISON: 2/22/2019 FINDINGS: Lower Chest: Unremarkable. Liver: Normal. Hepatic and portal veins are patent. Spleen: Unremarkable. Pancreas: The pancreatic head is edematous though the parenchyma enhances normally. There is surrounding homogenous peripancreatic and mesenteric edema/infiltration which tracks down the right paracolic gutter and conforms to retroperitoneal structures. No defined walled off collection. There is small volume ascites in the pelvis. No focal fluid collection. Adrenal glands: Normal. Gallbladder: Cholecystectomy Biliary: Unchanged mild intrahepatic bile duct dilation. Kidneys: Symmetric nephrograms. No hydronephrosis. Pelvis: Hysterectomy. Normal appearance of the urinary bladder.. Bowel: There are no dilated loops of small or large bowel. Scattered colonic diverticuli with no inflammation. The appendix is normal. Prior gastric sleeve. Lymph nodes: No adenopathy. Vasculature: No aneurysm. Musculoskeletal: Fusion of L3-L5. Multifocal degenerative changes are present. No suspicious osseous abnormality.     Acute interstitial edematous pancreatitis with surrounding mesenteric edema tracking along the right paracolic gutter. Small volume ascites in the pelvis. No walled off collections.    CT-DRAIN-PERITONEAL    Result Date: 10/10/2021  HISTORY/REASON FOR EXAM:  66-year-old woman with pancreatitis and extensive intraperitoneal abscess. TECHNIQUE/EXAM DESCRIPTION AND NUMBER OF VIEWS: RIGHT peritoneal drain placement with CT guidance. Low dose optimization technique was utilized for this CT exam including automated exposure control and adjustment of the mA and/or kV according to patient size. COMPARISON:  CT 10/8/2021 MEDICATIONS: Moderate sedation was provided. Pulse oximetry and continuous cardiac monitoring by the nurse was performed throughout the exam. Intraservice time was 15 minutes. PROCEDURE:     The risks, benefits, goals and  objectives and alternatives were discussed. Risks were specified as including but not limited to bleeding, infection, damage to vessels or nerves, pain and discomfort as well as the possibility of incomplete resolution of underlying disease. Drain care related issues were discussed with the patient. The patient's questions were answered. Informed oral and written consent were obtained. The patient was placed on the CT gantry. Localizing scan was performed. The skin was prepped and draped in the usual sterile manner.  A timeout was performed. Local anesthetic result was achieved with administration of 1% lidocaine. A(n) 17-gauge access needle was advanced to the target using CT fluoroscopic guidance. Access was secured with a wire and the tract was sequentially dilated to accommodate a(n) 14 Tamazight locking loop drain. A total of 80 mL of thin brown turbid fluid was removed and sent for  laboratory evaluation. This was put in place and formed. The catheter was attached to bag drainage and secured to the skin with 2-0 nylon suture. Completion scan was performed which showed no complication. The patient tolerated the procedure well with no evidence of complication. The skin was cleaned and a dressing was applied. FINDINGS: Drainage tube in good position     Successful peritoneal drainage tube placement. Plan: Thrice daily flushes with 10 mL of sterile saline. Monitor outputs. Please contact interventional radiology if there is any concern for tube dysfunction.     IR-US GUIDED PIV    Result Date: 10/10/2021  EXAMINATION:                                                                    HISTORY/REASON FOR EXAM:  Ultrasound Guided PIV.  TECHNIQUE/EXAM DESCRIPTION AND NUMBER OF VIEWS:  Peripheral IV insertion with ultrasound guidance.  The procedure was prepared using maximal sterile barrier technique including sterile gown, mask, cap, and donning of sterile gloves following appropriate hand hygiene and/or sterile scrub.  Patient skin site was prepped with 2% Chlorhexidine solution.   FINDINGS: Peripheral IV insertion with Ultrasound Guidance was performed by qualified imaging nursing staff without the assistance of a Radiologist.      Ultrasound-guided PERIPHERAL IV INSERTION performed by qualified nursing staff as above.    NM-HEPATOBILIARY SCAN    Result Date: 10/10/2021  10/10/2021 11:12 AM HISTORY/REASON FOR EXAM:  rule out biliary leak post ERCP with sphincterotomy; R/O bile leak TECHNIQUE/EXAM DESCRIPTION AND NUMBER OF VIEWS: Hepatobiliary scan. COMPARISON: 10/9/2021 CT abdomen PROCEDURE:  5.4 mCi Tc 99m-Choletec was administered intravenously, followed by 90 minutes of anterior planar imaging. FINDINGS: Normal homogeneous uptake of radiotracer in the hepatic parenchyma with visualization of the tracer in the common bile duct and entering the small bowel loops. No abnormal pooling or collection radiotracer outside of the biliary system or bowel.     Normal hepatobiliary scan with no findings of a bile leak.    DX-PORTABLE FLUOROSCOPY < 1 HOUR    Result Date: 10/1/2021  10/1/2021 10:01 AM HISTORY/REASON FOR EXAM:  ERCP TECHNIQUE/EXAM DESCRIPTION AND NUMBER OF VIEWS: 2 images were obtained in the operating room. A total of 0.3 minutes of fluoroscopy time utilized. COMPARISON: Selected images CT abdomen and pelvis 2/22/2019 FINDINGS: Images show injection of contrast into the common bile duct which is dilated. There is also filling of the pancreatic duct. Fluoroscopy time: minutes     ERCP images as described      IMPRESSION:   1.  Multiple intra-abdominal abscesses    2.  Candidemia  3.  Polymicrobial infection  4.  Persistent leukocytosis   5.  Thrombocytosis  6.  Recent pancreatitis      PLAN:   Nils Alfonso is a 66 y.o. 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  Patient found to have multiple and extensive fluid collections in the abdomen and pelvis on imaging.  She underwent drain placement on 10/8.  Cultures are growing E. coli and Enterococcus faecalis.  Blood cultures are growing chryseobacterium species and Candida glabrata. Source likely due to the intra-abdominal abscesses. Repeat CT scan on 10/13 shows extensive multiloculated fluid collections in the abdomen and pelvis.  Plan for transfer to [facility] for surgery evaluation for possible surgery versus multiple drain placements    -Follow blood cultures on 10/8-Chryseobacterium species, Candida glabrata.  -Follow susceptibilities of Candida glabrata  -Follow repeat blood cultures on 10/13-negative to date  -Continue IV Zosyn and micafungin  -Awaiting surgery evaluation  -Follow-up echo-pending      Plan of care discussed with NAPOLEON Segundo M.D. and sister at bedside. Will continue to follow    Tamra Mcfarland M.D.      Please note that this dictation was created using voice recognition software. I have worked with technical experts from [facility] to optimize the interface.  I have made every reasonable attempt to correct obvious errors, but there may be errors of grammar and possibly content that I did not discover before finalizing the note.

## 2021-10-14 NOTE — DISCHARGE SUMMARY
Discharge Summary    CHIEF COMPLAINT ON ADMISSION  Chief Complaint   Patient presents with   • RLQ Pain     Worsening RLQ pain over the past week. Dx with pancreatitis 1 week ago. Denies nausea or vomting       Reason for Admission  EMS     Admission Date  10/8/2021    CODE STATUS  Full Code    HPI & HOSPITAL COURSE    Nils Alfonso is a 66 y.o. female who presented 10/8/2021 abdominal pain - she has a history of recurrent and idiopathic pancreatitis s/p ERCP with sphincterotomy by Dr. Gillespie on 10/01/2021 complicated by ERCP pancreatitis.CT on arrival to [facility] [place] showed a large irregular fluid collection with thick wall occupying most of the right abdomen surrounding the right kidney and right colon, colonic inflammation, and a fluid and gas collection in the midline abdomen anterior to the pancreas concerning for abscess.     IR placed monica into the large fluid collection and initially improved clinically.  Dr Figueroa and Dr Young have been following as well.  The source of the fluid collection is still unclear - it may be a biliary leak, though HIDA and contrasted CT were negative for leak. However, due to persistent pain, repeat CT was obtained and demonstrated no change in the size of the fluid collection, as well as a new pelvic abscess.  Additionally, yesterday, blood cultures from the 8th became positive for Candida glabrata and chryseobacterium.  Culture from the pigtail drain is growing E coli and enterococcus, and she is on Zosyn and Micafungin, and Dr Mcfarland has been consulted.     Given the lack of improvement in the size of her intraabdominal fluid despite significant pigtail output and correct placement verified on CT scan, Dr Dumont was consulted.  I have discussed the case with him, and he also has concern for biliary leak, and has requested transfer to [facility] for possible surgical intervention. We are currently awaiting bed availability.       Therefore, she is discharged in [facility] and  stable condition to a short-term general [facility] for inpatient care.    The patient met 2-midnight criteria for an inpatient stay at the time of discharge.    Discharge Date  10/14/21    FOLLOW UP ITEMS POST DISCHARGE  Culture data     DISCHARGE DIAGNOSES  Principal Problem:    Sepsis due to intraabdominal fluid collection/abscess (HCC) POA: Unknown  Active Problems:    Hypertension POA: Yes    Vitamin D deficiency POA: Yes    Hyperlipidemia POA: Yes    History of morbid obesity POA: Yes    History of pancreatitis POA: Unknown    Intraabdominal fluid collection POA: Unknown    Anemia POA: Unknown    Hyponatremia POA: Unknown    Bacteremia due to Gram-negative bacteria and fungemia POA: Unknown  Resolved Problems:    * No resolved hospital problems. *      FOLLOW UP  No future appointments.  No follow-up provider specified.    MEDICATIONS ON DISCHARGE     Medication List      ASK your doctor about these medications      Instructions   BIOTIN PO   Take 1 Tablet by mouth every day.  Dose: 1 Tablet     cyclobenzaprine 10 mg Tabs  Commonly known as: Flexeril  Ask about: Which instructions should I use?   Take 10 mg by mouth every evening.  Dose: 10 mg     ezetimibe 10 MG Tabs  Commonly known as: ZETIA   Take 10 mg by mouth every evening.  Dose: 10 mg     gabapentin 300 MG Caps  Commonly known as: NEURONTIN   Take 600 mg by mouth 2 Times a Day.  Dose: 600 mg     HYDROcodone/acetaminophen  MG Tabs  Commonly known as: NORCO   Take 1-2 Tabs by mouth every 6 hours as needed. Takes 1/2 pill  Dose: 1-2 Tablet     losartan 25 MG Tabs  Commonly known as: COZAAR   Take 25 mg by mouth every morning.  Dose: 25 mg     Magnesium 400 MG Tabs   Take 400 mg by mouth every evening.  Dose: 400 mg     ondansetron 4 MG Tbdp  Commonly known as: ZOFRAN ODT   Take 4 mg by mouth every 6 hours as needed for Nausea.  Dose: 4 mg     VITAMIN D3 PO   Take 1 Each by mouth every day.  Dose: 1 Each            Allergies  Allergies   Allergen  "Reactions   • Ampicillin Rash     Rash     • Cephalexin Diarrhea, Vomiting and Nausea     .   • Clindamycin Vomiting     \"cleocin\"   • Codeine      Severe stomach pain, cramps, spasms   • Demerol Vomiting   • Levofloxacin Unspecified     Numbness     • Tetracyclines Rash     .   • Tizanidine Itching   • Hydromorphone Vomiting and Nausea   • Morphine Vomiting   • Pcn [Penicillins] Itching   • Sulfa Drugs Itching       DIET  Orders Placed This Encounter   Procedures   • Diet Order Diet: Clear Liquid     Standing Status:   Standing     Number of Occurrences:   1     Order Specific Question:   Diet:     Answer:   Clear Liquid [10]   • Restrict to: Strict     Standing Status:   Standing     Number of Occurrences:   8     Order Specific Question:   Restrict to:     Answer:   Strict [1]       ACTIVITY  As tolerated.  Weight bearing as tolerated    CONSULTATIONS  Dr Hannah Grimes    PROCEDURES  Pigtail drain placement - Dr Grimes    LABORATORY  Lab Results   Component Value Date    SODIUM 134 (L) 10/14/2021    POTASSIUM 4.1 10/14/2021    CHLORIDE 101 10/14/2021    CO2 21 10/14/2021    GLUCOSE 94 10/14/2021    BUN 9 10/14/2021    CREATININE 0.52 10/14/2021    CREATININE 1.08 (H) 07/08/2011    GLOMRATE 45 (L) 02/24/2011        Lab Results   Component Value Date    WBC 12.7 (H) 10/14/2021    HEMOGLOBIN 10.2 (L) 10/14/2021    HEMATOCRIT 34.1 (L) 10/14/2021    PLATELETCT 537 (H) 10/14/2021        Total time of the discharge process exceeds 46 minutes.  "

## 2021-10-14 NOTE — PROGRESS NOTES
Received report from night shift RN. Patient denies pain, N/V, SOB at this time. Biliary drain in place. Dressing CDI. Call light, belongings in reach. Bed in lowest, locked position.     COVID-19 surge in effect.

## 2021-10-14 NOTE — PROGRESS NOTES
Date of Service  10/14/2021    [facility]    Transferring facility: [facility]  Transferring physician: Dr. Elizabeth Segundo  Transferring facility/physician contact number: Transfer Center    Reason for transfer: biliary leak, sepsis    Chief complaint: abdominal pain    Pertinent history & patient course:   Had abdominal pain after ERCP admitted 10/08/2021. Recent recurrent pancreatitis, She has a history of recurrent and idiopathic (was initially thought ot be gallstone related in 2011) pancreatitis s/p ERCP with sphincterotomy by Dr. Herrera on 10/01/2021 complicated by ERCP pancreatitis, s/p cholecystectomy, ex lap obesity with fatty liver s/p sleeve gastrectomy followed by Dr. Condon, s/p hysterectomy and bilateral salpingo-oophoerctomy,dyslipidemia, hypertension, DJD of the spine s/p lumbar fusion.    Fluid collection, Biliary drain in place by IR, [facility] following patient.  Blood cultures positive with Chryseobacterium and Candida glabrata, on Micafungin. Repeat CT scan showed new pelvic abscess, and continued fluid collection. HIDA scan negative. No biliary leak on CT scan. Peritoneal fluid E. Coli, Enterococcus faecalis.  ID is following.    Dr. Cook consulted may need surgery versus multiple drains.    Pertinent imaging & lab results:   Leukocytosis, sodium 134, T bili 0.7, AST 19, ALT 8      Code Status: FULL CODE per transferring provider, I personally verified with the transferring provider patient's code status and the transferring provider has confirmed this with the patient.    Further work up or recommendations per triage officer prior to transfer: none    Consultants called prior to transfer and pertinent input from consultants: Dr. Cook    Consultants to be called upon arrival: Dr. Cook, re-consult Infectious Disease.    Patient accepted for transfer: Yes  Admission status: Inpatient.   Floor  requested: medical with remote monitoring      Please inform the triage officer upon arrival of the patient to [facility] for assignment of a hospitalist to perform admission.     For any question or concerns regarding the care of this patient, please reach out to the assigned hospitalist.

## 2021-10-14 NOTE — PROGRESS NOTES
Covid-19 surge in effect     assumed care. Pt ax04, RA. C/o pain at rlq area. Prn given per MAR. Drain flushed with 10ml cc NS, safety precautions in place , npo at midnight . educated to call when the need arise

## 2021-10-14 NOTE — DISCHARGE PLANNING
Anticipated Discharge Disposition: Home      Action: Pt discussed during IDT rounds 10/13. Per Dr. Segundo pt to have a repeat CT and pending ID consult. Pt has a six clicks score of 20. No known d/c needs at this time.      Barriers to Discharge: None      Plan: LSW to continue to follow for d/c needs

## 2021-10-15 ENCOUNTER — APPOINTMENT (OUTPATIENT)
Dept: RADIOLOGY | Facility: MEDICAL CENTER | Age: 66
DRG: 856 | End: 2021-10-15
Attending: HOSPITALIST
Payer: MEDICARE

## 2021-10-15 ENCOUNTER — APPOINTMENT (OUTPATIENT)
Dept: RADIOLOGY | Facility: MEDICAL CENTER | Age: 66
DRG: 856 | End: 2021-10-15
Attending: SURGERY
Payer: MEDICARE

## 2021-10-15 ENCOUNTER — HOSPITAL ENCOUNTER (INPATIENT)
Facility: MEDICAL CENTER | Age: 66
LOS: 44 days | DRG: 856 | End: 2021-11-28
Attending: INTERNAL MEDICINE | Admitting: STUDENT IN AN ORGANIZED HEALTH CARE EDUCATION/TRAINING PROGRAM
Payer: MEDICARE

## 2021-10-15 DIAGNOSIS — A41.51 SEPSIS DUE TO ESCHERICHIA COLI WITHOUT ACUTE ORGAN DYSFUNCTION (HCC): ICD-10-CM

## 2021-10-15 DIAGNOSIS — Z87.19 HISTORY OF PANCREATITIS: ICD-10-CM

## 2021-10-15 DIAGNOSIS — K68.11 POSTPROCEDURAL RETROPERITONEAL ABSCESS: ICD-10-CM

## 2021-10-15 DIAGNOSIS — R53.1 GENERALIZED WEAKNESS: ICD-10-CM

## 2021-10-15 DIAGNOSIS — R78.81 BACTEREMIA DUE TO GRAM-NEGATIVE BACTERIA: ICD-10-CM

## 2021-10-15 DIAGNOSIS — K63.1 DUODENAL PERFORATION (HCC): ICD-10-CM

## 2021-10-15 DIAGNOSIS — R18.8 INTRAABDOMINAL FLUID COLLECTION: ICD-10-CM

## 2021-10-15 DIAGNOSIS — K85.90 POST-ERCP ACUTE PANCREATITIS: ICD-10-CM

## 2021-10-15 DIAGNOSIS — J96.01 ACUTE RESPIRATORY FAILURE WITH HYPOXIA (HCC): ICD-10-CM

## 2021-10-15 DIAGNOSIS — K91.89 POST-ERCP ACUTE PANCREATITIS: ICD-10-CM

## 2021-10-15 LAB
ALBUMIN SERPL BCP-MCNC: 2.1 G/DL (ref 3.2–4.9)
ALBUMIN/GLOB SERPL: 0.8 G/DL
ALP SERPL-CCNC: 56 U/L (ref 30–99)
ALT SERPL-CCNC: 14 U/L (ref 2–50)
ANION GAP SERPL CALC-SCNC: 9 MMOL/L (ref 7–16)
AST SERPL-CCNC: 28 U/L (ref 12–45)
BACTERIA BLD CULT: ABNORMAL
BASOPHILS # BLD AUTO: 0.2 % (ref 0–1.8)
BASOPHILS # BLD: 0.03 K/UL (ref 0–0.12)
BILIRUB SERPL-MCNC: 0.6 MG/DL (ref 0.1–1.5)
BUN SERPL-MCNC: 7 MG/DL (ref 8–22)
CALCIUM SERPL-MCNC: 7.4 MG/DL (ref 8.5–10.5)
CHLORIDE SERPL-SCNC: 102 MMOL/L (ref 96–112)
CO2 SERPL-SCNC: 23 MMOL/L (ref 20–33)
CREAT SERPL-MCNC: 0.53 MG/DL (ref 0.5–1.4)
EOSINOPHIL # BLD AUTO: 0 K/UL (ref 0–0.51)
EOSINOPHIL NFR BLD: 0 % (ref 0–6.9)
ERYTHROCYTE [DISTWIDTH] IN BLOOD BY AUTOMATED COUNT: 48.7 FL (ref 35.9–50)
GLOBULIN SER CALC-MCNC: 2.7 G/DL (ref 1.9–3.5)
GLUCOSE SERPL-MCNC: 107 MG/DL (ref 65–99)
HCT VFR BLD AUTO: 28.9 % (ref 37–47)
HGB BLD-MCNC: 9.6 G/DL (ref 12–16)
IMM GRANULOCYTES # BLD AUTO: 0.18 K/UL (ref 0–0.11)
IMM GRANULOCYTES NFR BLD AUTO: 1.3 % (ref 0–0.9)
INR PPP: 1.49 (ref 0.87–1.13)
LYMPHOCYTES # BLD AUTO: 1.15 K/UL (ref 1–4.8)
LYMPHOCYTES NFR BLD: 8.5 % (ref 22–41)
MCH RBC QN AUTO: 30.5 PG (ref 27–33)
MCHC RBC AUTO-ENTMCNC: 33.2 G/DL (ref 33.6–35)
MCV RBC AUTO: 91.7 FL (ref 81.4–97.8)
MONOCYTES # BLD AUTO: 1.59 K/UL (ref 0–0.85)
MONOCYTES NFR BLD AUTO: 11.8 % (ref 0–13.4)
NEUTROPHILS # BLD AUTO: 10.52 K/UL (ref 2–7.15)
NEUTROPHILS NFR BLD: 78.2 % (ref 44–72)
NRBC # BLD AUTO: 0 K/UL
NRBC BLD-RTO: 0 /100 WBC
PLATELET # BLD AUTO: 557 K/UL (ref 164–446)
PMV BLD AUTO: 8.6 FL (ref 9–12.9)
POTASSIUM SERPL-SCNC: 3.7 MMOL/L (ref 3.6–5.5)
PROT SERPL-MCNC: 4.8 G/DL (ref 6–8.2)
PROTHROMBIN TIME: 17.6 SEC (ref 12–14.6)
RBC # BLD AUTO: 3.15 M/UL (ref 4.2–5.4)
SIGNIFICANT IND 70042: ABNORMAL
SITE SITE: ABNORMAL
SODIUM SERPL-SCNC: 134 MMOL/L (ref 135–145)
SOURCE SOURCE: ABNORMAL
WBC # BLD AUTO: 13.5 K/UL (ref 4.8–10.8)

## 2021-10-15 PROCEDURE — 99223 1ST HOSP IP/OBS HIGH 75: CPT | Mod: AI | Performed by: STUDENT IN AN ORGANIZED HEALTH CARE EDUCATION/TRAINING PROGRAM

## 2021-10-15 PROCEDURE — 85610 PROTHROMBIN TIME: CPT

## 2021-10-15 PROCEDURE — 770001 HCHG ROOM/CARE - MED/SURG/GYN PRIV*

## 2021-10-15 PROCEDURE — 85025 COMPLETE CBC W/AUTO DIFF WBC: CPT

## 2021-10-15 PROCEDURE — 80053 COMPREHEN METABOLIC PANEL: CPT

## 2021-10-15 PROCEDURE — 99222 1ST HOSP IP/OBS MODERATE 55: CPT | Performed by: SURGERY

## 2021-10-15 PROCEDURE — 3E0436Z INTRODUCTION OF NUTRITIONAL SUBSTANCE INTO CENTRAL VEIN, PERCUTANEOUS APPROACH: ICD-10-PCS | Performed by: SURGERY

## 2021-10-15 PROCEDURE — A9270 NON-COVERED ITEM OR SERVICE: HCPCS | Performed by: FAMILY MEDICINE

## 2021-10-15 PROCEDURE — 99233 SBSQ HOSP IP/OBS HIGH 50: CPT | Performed by: INTERNAL MEDICINE

## 2021-10-15 PROCEDURE — 700102 HCHG RX REV CODE 250 W/ 637 OVERRIDE(OP): Performed by: FAMILY MEDICINE

## 2021-10-15 PROCEDURE — 700105 HCHG RX REV CODE 258: Performed by: FAMILY MEDICINE

## 2021-10-15 PROCEDURE — 700111 HCHG RX REV CODE 636 W/ 250 OVERRIDE (IP): Mod: JG | Performed by: FAMILY MEDICINE

## 2021-10-15 PROCEDURE — C1751 CATH, INF, PER/CENT/MIDLINE: HCPCS

## 2021-10-15 PROCEDURE — 700105 HCHG RX REV CODE 258: Performed by: STUDENT IN AN ORGANIZED HEALTH CARE EDUCATION/TRAINING PROGRAM

## 2021-10-15 PROCEDURE — 36415 COLL VENOUS BLD VENIPUNCTURE: CPT

## 2021-10-15 PROCEDURE — 02HV33Z INSERTION OF INFUSION DEVICE INTO SUPERIOR VENA CAVA, PERCUTANEOUS APPROACH: ICD-10-PCS | Performed by: SURGERY

## 2021-10-15 RX ORDER — HYDROMORPHONE HYDROCHLORIDE 1 MG/ML
0.5 INJECTION, SOLUTION INTRAMUSCULAR; INTRAVENOUS; SUBCUTANEOUS
Status: DISCONTINUED | OUTPATIENT
Start: 2021-10-15 | End: 2021-11-05

## 2021-10-15 RX ORDER — OXYCODONE HYDROCHLORIDE 10 MG/1
10 TABLET ORAL EVERY 4 HOURS PRN
Status: DISCONTINUED | OUTPATIENT
Start: 2021-10-15 | End: 2021-11-05

## 2021-10-15 RX ORDER — ACETAMINOPHEN 325 MG/1
650 TABLET ORAL EVERY 6 HOURS PRN
Status: DISCONTINUED | OUTPATIENT
Start: 2021-10-15 | End: 2021-11-13

## 2021-10-15 RX ORDER — GABAPENTIN 300 MG/1
600 CAPSULE ORAL 2 TIMES DAILY
Status: DISCONTINUED | OUTPATIENT
Start: 2021-10-15 | End: 2021-11-21

## 2021-10-15 RX ORDER — ONDANSETRON 2 MG/ML
4 INJECTION INTRAMUSCULAR; INTRAVENOUS EVERY 4 HOURS PRN
Status: DISCONTINUED | OUTPATIENT
Start: 2021-10-15 | End: 2021-11-05

## 2021-10-15 RX ORDER — POLYETHYLENE GLYCOL 3350 17 G/17G
1 POWDER, FOR SOLUTION ORAL
Status: DISCONTINUED | OUTPATIENT
Start: 2021-10-15 | End: 2021-11-19

## 2021-10-15 RX ORDER — DIPHENHYDRAMINE HCL 25 MG
25 TABLET ORAL EVERY 6 HOURS PRN
Status: DISCONTINUED | OUTPATIENT
Start: 2021-10-15 | End: 2021-11-05

## 2021-10-15 RX ORDER — HEPARIN SODIUM 5000 [USP'U]/ML
5000 INJECTION, SOLUTION INTRAVENOUS; SUBCUTANEOUS EVERY 8 HOURS
Status: DISCONTINUED | OUTPATIENT
Start: 2021-10-15 | End: 2021-10-27

## 2021-10-15 RX ORDER — CYCLOBENZAPRINE HCL 10 MG
10 TABLET ORAL NIGHTLY PRN
Status: DISCONTINUED | OUTPATIENT
Start: 2021-10-15 | End: 2021-11-06

## 2021-10-15 RX ORDER — EZETIMIBE 10 MG/1
10 TABLET ORAL EVERY EVENING
Status: DISCONTINUED | OUTPATIENT
Start: 2021-10-15 | End: 2021-11-21

## 2021-10-15 RX ORDER — ONDANSETRON 4 MG/1
4 TABLET, ORALLY DISINTEGRATING ORAL EVERY 4 HOURS PRN
Status: DISCONTINUED | OUTPATIENT
Start: 2021-10-15 | End: 2021-11-19

## 2021-10-15 RX ORDER — VITAMIN B COMPLEX
1000 TABLET ORAL DAILY
Status: DISCONTINUED | OUTPATIENT
Start: 2021-10-15 | End: 2021-11-21

## 2021-10-15 RX ORDER — NICOTINE 21 MG/24HR
1 PATCH, TRANSDERMAL 24 HOURS TRANSDERMAL
Status: DISCONTINUED | OUTPATIENT
Start: 2021-10-15 | End: 2021-10-17

## 2021-10-15 RX ORDER — SODIUM CHLORIDE 9 MG/ML
INJECTION, SOLUTION INTRAVENOUS CONTINUOUS
Status: DISCONTINUED | OUTPATIENT
Start: 2021-10-15 | End: 2021-10-15

## 2021-10-15 RX ORDER — FAMOTIDINE 20 MG/1
20 TABLET, FILM COATED ORAL 2 TIMES DAILY
Status: DISCONTINUED | OUTPATIENT
Start: 2021-10-15 | End: 2021-10-16

## 2021-10-15 RX ORDER — PROMETHAZINE HYDROCHLORIDE 25 MG/1
25 TABLET ORAL EVERY 6 HOURS PRN
Status: DISCONTINUED | OUTPATIENT
Start: 2021-10-15 | End: 2021-11-06

## 2021-10-15 RX ORDER — SODIUM CHLORIDE, SODIUM LACTATE, POTASSIUM CHLORIDE, CALCIUM CHLORIDE 600; 310; 30; 20 MG/100ML; MG/100ML; MG/100ML; MG/100ML
INJECTION, SOLUTION INTRAVENOUS CONTINUOUS
Status: ACTIVE | OUTPATIENT
Start: 2021-10-15 | End: 2021-10-16

## 2021-10-15 RX ADMIN — OXYCODONE HYDROCHLORIDE 10 MG: 10 TABLET ORAL at 18:03

## 2021-10-15 RX ADMIN — ONDANSETRON 4 MG: 2 INJECTION INTRAMUSCULAR; INTRAVENOUS at 12:54

## 2021-10-15 RX ADMIN — FAMOTIDINE 20 MG: 20 TABLET ORAL at 05:08

## 2021-10-15 RX ADMIN — OXYCODONE HYDROCHLORIDE 10 MG: 10 TABLET ORAL at 12:54

## 2021-10-15 RX ADMIN — ONDANSETRON 4 MG: 2 INJECTION INTRAMUSCULAR; INTRAVENOUS at 22:07

## 2021-10-15 RX ADMIN — EZETIMIBE 10 MG: 10 TABLET ORAL at 18:03

## 2021-10-15 RX ADMIN — GABAPENTIN 600 MG: 300 CAPSULE ORAL at 18:03

## 2021-10-15 RX ADMIN — SODIUM CHLORIDE, POTASSIUM CHLORIDE, SODIUM LACTATE AND CALCIUM CHLORIDE: 600; 310; 30; 20 INJECTION, SOLUTION INTRAVENOUS at 21:29

## 2021-10-15 RX ADMIN — PIPERACILLIN AND TAZOBACTAM 3.38 G: 3; .375 INJECTION, POWDER, LYOPHILIZED, FOR SOLUTION INTRAVENOUS; PARENTERAL at 12:54

## 2021-10-15 RX ADMIN — OXYCODONE HYDROCHLORIDE 10 MG: 10 TABLET ORAL at 08:25

## 2021-10-15 RX ADMIN — ONDANSETRON 4 MG: 2 INJECTION INTRAMUSCULAR; INTRAVENOUS at 18:11

## 2021-10-15 RX ADMIN — FAMOTIDINE 20 MG: 20 TABLET ORAL at 18:03

## 2021-10-15 RX ADMIN — ONDANSETRON 4 MG: 2 INJECTION INTRAMUSCULAR; INTRAVENOUS at 02:21

## 2021-10-15 RX ADMIN — OXYCODONE HYDROCHLORIDE 10 MG: 10 TABLET ORAL at 02:21

## 2021-10-15 RX ADMIN — HYDROMORPHONE HYDROCHLORIDE 0.5 MG: 1 INJECTION, SOLUTION INTRAMUSCULAR; INTRAVENOUS; SUBCUTANEOUS at 22:07

## 2021-10-15 RX ADMIN — PIPERACILLIN AND TAZOBACTAM 3.38 G: 3; .375 INJECTION, POWDER, LYOPHILIZED, FOR SOLUTION INTRAVENOUS; PARENTERAL at 21:30

## 2021-10-15 RX ADMIN — Medication 400 MG: at 18:03

## 2021-10-15 RX ADMIN — PIPERACILLIN AND TAZOBACTAM 3.38 G: 3; .375 INJECTION, POWDER, LYOPHILIZED, FOR SOLUTION INTRAVENOUS; PARENTERAL at 05:08

## 2021-10-15 RX ADMIN — PROMETHAZINE HYDROCHLORIDE 25 MG: 25 TABLET ORAL at 04:59

## 2021-10-15 RX ADMIN — MICAFUNGIN SODIUM 100 MG: 100 INJECTION, POWDER, LYOPHILIZED, FOR SOLUTION INTRAVENOUS at 14:40

## 2021-10-15 RX ADMIN — SODIUM CHLORIDE, POTASSIUM CHLORIDE, SODIUM LACTATE AND CALCIUM CHLORIDE: 600; 310; 30; 20 INJECTION, SOLUTION INTRAVENOUS at 04:22

## 2021-10-15 RX ADMIN — ONDANSETRON 4 MG: 2 INJECTION INTRAMUSCULAR; INTRAVENOUS at 08:28

## 2021-10-15 RX ADMIN — Medication 1000 UNITS: at 05:08

## 2021-10-15 RX ADMIN — GABAPENTIN 600 MG: 300 CAPSULE ORAL at 05:07

## 2021-10-15 RX ADMIN — SODIUM CHLORIDE: 9 INJECTION, SOLUTION INTRAVENOUS at 02:11

## 2021-10-15 ASSESSMENT — ENCOUNTER SYMPTOMS
DIZZINESS: 0
CARDIOVASCULAR NEGATIVE: 1
WHEEZING: 0
DIARRHEA: 1
WEAKNESS: 1
NEUROLOGICAL NEGATIVE: 1
HEADACHES: 0
MYALGIAS: 0
MEMORY LOSS: 0
VOMITING: 0
FOCAL WEAKNESS: 0
COUGH: 0
EYES NEGATIVE: 1
FALLS: 0
CONSTIPATION: 0
FEVER: 0
NAUSEA: 0
CHILLS: 0
ABDOMINAL PAIN: 1
PSYCHIATRIC NEGATIVE: 1
SHORTNESS OF BREATH: 0
RESPIRATORY NEGATIVE: 1
MUSCULOSKELETAL NEGATIVE: 1

## 2021-10-15 ASSESSMENT — PAIN DESCRIPTION - PAIN TYPE
TYPE: ACUTE PAIN

## 2021-10-15 ASSESSMENT — FIBROSIS 4 INDEX
FIB4 SCORE: 0.89
FIB4 SCORE: 0.83

## 2021-10-15 ASSESSMENT — LIFESTYLE VARIABLES: SUBSTANCE_ABUSE: 0

## 2021-10-15 NOTE — PROGRESS NOTES
Pt transferred to T7 by ARIADNE. Pt A&Ox4, VSS, on RA. Pt updated on POC, verbalizes no questions at this time. Pt oriented to room and educated on use of call light.

## 2021-10-15 NOTE — PROGRESS NOTES
Pharmacy TPN Day # 0       10/15/2021    · D/w MD Fabrizio Vargas, PICC line ordered. Ok to start TPN once PICC is confirmed.    Sajan Paz PharmD BCPS

## 2021-10-15 NOTE — PROGRESS NOTES
Infectious Disease Progress Note    Author: Tamra Mcfarland M.D. Date & Time of service: 10/15/2021  9:23 AM    Chief Complaint:  Follow-up for multiple intra-abdominal abscesses, polymicrobial bacteremia, candidemia    Interval History:  10/15 afebrile, WBC 13.5 patient transferred to [facility] overnight.  Patient having diarrhea however abdominal pain is slightly improved today.  She was evaluated by Dr. ST this morning who is recommending additional drain placement and holding off on surgery at this time    Labs Reviewed, Medications Reviewed and Radiology Reviewed.    Review of Systems:  Review of Systems   Constitutional: Positive for malaise/fatigue. Negative for chills and fever.   Respiratory: Negative for cough and shortness of breath.    Gastrointestinal: Positive for abdominal pain and diarrhea. Negative for nausea.   Neurological: Negative for dizziness and headaches.   All other systems reviewed and are negative.      Hemodynamics:  Temp (24hrs), Av.8 °C (98.2 °F), Min:36.2 °C (97.2 °F), Max:37.4 °C (99.3 °F)  Temperature: 36.2 °C (97.2 °F)  Pulse  Av  Min: 58  Max: 85   Blood Pressure : 120/67       Physical Exam:  Physical Exam  Vitals and nursing note reviewed.   Constitutional:       Appearance: Normal appearance.   HENT:      Mouth/Throat:      Mouth: Mucous membranes are moist.   Eyes:      Extraocular Movements: Extraocular movements intact.      Pupils: Pupils are equal, round, and reactive to light.   Cardiovascular:      Rate and Rhythm: Normal rate and regular rhythm.   Abdominal:      Palpations: Abdomen is soft.      Tenderness: There is abdominal tenderness.      Comments: Right-sided drain with thick brown fluid in bag   Musculoskeletal:         General: No swelling.   Skin:     General: Skin is warm and dry.   Neurological:      General: No focal deficit present.      Mental Status: She is alert and oriented to person, place, and time.   Psychiatric:         Mood and Affect:  Mood normal.         Behavior: Behavior normal.      Comments: Pleasant         Meds:    Current Facility-Administered Medications:   •  ondansetron  •  ondansetron  •  polyethylene glycol/lytes **AND** magnesium hydroxide  •  acetaminophen  •  HYDROmorphone  •  Notify provider if pain remains uncontrolled **AND** Use the Numeric Rating Scale (NRS), Colon-Baker Faces (WBF), or FLACC on regular floors and Critical-Care Pain Observation Tool (CPOT) on ICUs/Trauma to assess pain **AND** Pulse Ox **AND** Pharmacy Consult Request **AND** If patient difficult to arouse and/or has respiratory depression (respiratory rate of 10 or less), stop any opiates that are currently infusing and call a Rapid Response.  •  cyclobenzaprine  •  diphenhydrAMINE  •  ezetimibe  •  famotidine **OR** famotidine  •  gabapentin  •  magnesium oxide  •  micafungin (MYCAMINE) ivpb  •  nicotine  •  oxyCODONE immediate release  •  piperacillin-tazobactam  •  promethazine  •  vitamin D3  •  LR  •  heparin  •  Special Contact Isolation **AND** C Diff by PCR rflx Toxin **AND** Pharmacy    Labs:  Recent Labs     10/13/21  0440 10/14/21  0429 10/15/21  0328   WBC 13.9* 12.7* 13.5*   RBC 3.17* 3.43* 3.15*   HEMOGLOBIN 9.5* 10.2* 9.6*   HEMATOCRIT 29.4* 34.1* 28.9*   MCV 92.7 99.4* 91.7   MCH 30.0 29.7 30.5   RDW 48.6 52.8* 48.7   PLATELETCT 493* 537* 557*   MPV 8.7* 9.1 8.6*   NEUTSPOLYS 73.00* 77.10* 78.20*   LYMPHOCYTES 12.00* 9.10* 8.50*   MONOCYTES 12.00 11.70 11.80   EOSINOPHILS 0.00 0.10 0.00   BASOPHILS 0.00 0.20 0.20   RBCMORPHOLO Normal Present  --      Recent Labs     10/13/21  0440 10/14/21  0429 10/15/21  0328   SODIUM 133* 134* 134*   POTASSIUM 3.7 4.1 3.7   CHLORIDE 99 101 102   CO2 23 21 23   GLUCOSE 100* 94 107*   BUN 9 9 7*     Recent Labs     10/13/21  0440 10/14/21  0429 10/15/21  0328   ALBUMIN 1.9* 1.9* 2.1*   TBILIRUBIN 0.9 0.7 0.6   ALKPHOSPHAT 58 68 56   TOTPROTEIN 4.4* 4.8* 4.8*   ALTSGPT <5 8 14   ASTSGOT 12 19 28   CREATININE  0.57 0.52 0.53       Imaging:  CT-ABDOMEN WITH & W/O    Result Date: 10/9/2021  10/9/2021 1:38 PM HISTORY/REASON FOR EXAM:  eval for any ugi perforation given recent sphinterotomy during ERCP and now with fluid collection. TECHNIQUE/EXAM DESCRIPTION:  CT scan of the abdomen without and with contrast. Initial precontrast images were obtained from the diaphragmatic domes through the iliac crests using helical technique.  Following nonionic contrast administration in an intravenous bolus fashion, and postcontrast, thin-section helical scanning obtained from the diaphragmatic domes through the iliac crests. 80 mL of Omnipaque 350 nonionic contrast was administered. Low dose optimization technique was utilized for this CT exam including automated exposure control and adjustment of the mA and/or kV according to patient size. COMPARISON: 10/8/2021, 10/2/2021. FINDINGS: There is a small right pleural effusion with adjacent enhancing segmental atelectasis.. Calcified left lower lobe granuloma. No pericardial effusion. Cardiac chambers are normal in size. Coronary artery calcifications are present. Air in the intra and extra hepatic bile ducts. Unremarkable appearance of the liver. The hepatic and portal veins are patent. Spleen is unremarkable. Again noted is air in the pancreatic duct. No adrenal gland nodules. Gallbladder is surgically absent. No hydronephrosis. No dilated loops of imaged bowel. There is anasarca and mesenteric edema. A pigtail drainage catheter is present in the right abdomen in a air and fluid collection, the extent of which is difficult to measure the collection tracks inferiorly and medially measuring approximately 17 cm in craniocaudal dimension. A smaller loculated collection in the anterior abdomen near the inferior anterior abdominal wall sutures measures 1.5 x 8.7 cm. No findings of contrast extravasation from the opacified bowel loops,  particularly no contrast extravasation at the ampulla of  [place].     1.  No findings of intraluminal contrast extravasation from the opacified bowel loops, particularly no contrast extravasation from the duodenum at the ampulla of [place]. 2.  A pigtail drainage catheter is present in the right abdomen in a air and fluid collection, the extent of which is difficult to measure. The collection tracks inferiorly and medially measuring approximately 17 cm in craniocaudal dimension. 3.  A smaller loculated collection in the anterior abdomen near the inferior anterior abdominal wall sutures measures 1.5 x 8.7 cm. 4.  Unchanged pneumobilia in the CBD, intrahepatic bile ducts as well as pancreatic ducts. 5.  There is a small right pleural effusion with adjacent enhancing segmental atelectasis. 6.   Coronary artery calcifications are present    CT-ABDOMEN-PELVIS WITH    Result Date: 10/13/2021  10/13/2021 11:57 AM HISTORY/REASON FOR EXAM:  Peritonitis or perforation suspected; Pt with known intraabdominal fluid collection in the abdomen of uncertain etiology - had recent ERCP but studies not consistent with bile leak.  Drain in place - reassess. TECHNIQUE/EXAM DESCRIPTION:   CT scan of the abdomen and pelvis with contrast. Contrast-enhanced helical scanning was obtained from the diaphragmatic domes through the pubic symphysis following the bolus administration of nonionic contrast without complication. 100 mL of Omnipaque 350 nonionic contrast was administered without complication. Low dose optimization technique was utilized for this CT exam including automated exposure control and adjustment of the mA and/or kV according to patient size. COMPARISON: 10/9/2021 FINDINGS: Lower Chest: Trace right pleural effusion with right basilar atelectasis. Calcified granuloma in the left lower lobe and trace left pleural fluid. Liver: Normal. Spleen: Unremarkable. Pancreas: Unremarkable. Gallbladder: The gallbladder has been resected. Biliary: Pneumobilia again noted. Adrenal glands: Normal.  Kidneys: There is a small left renal cortical cyst which does not require imaging follow-up. No hydronephrosis.. Bowel: No bowel obstruction. Parts of the bowel are suboptimally evaluated due to the surrounding abscess and loss of the intervening fat planes. There is gastric sleeve surgery. Lymph nodes: No adenopathy. Vasculature: Scattered atherosclerosis. Peritoneum: A multiloculated although spatial rim-enhancing air-fluid collection within the right abdomen does not appear significantly changed in size the prior study. On series 2 image 54 measures about 15.5 x 8.7 cm. It extends from the upper abdomen,  where it is seen in the gallbladder fossa, inferiorly into the pelvis. It insinuates around and partially encases the duodenum and right kidney and extends around ascending colon and some mesenteric branch vessels. There is a percutaneous pigtail drainage catheter which appears well positioned within the abscess in the right midabdomen. Musculoskeletal: No acute or destructive process. Postsurgical changes anterior lumbar spinal fusion Pelvis: Hysterectomy. There is a small rim-enhancing fluid collection within the pelvis measuring 7 x 3.1 x 2.8 cm suspicious for small abscess..     1.  Extensive multiloculated rim-enhancing air and fluid collection/abscess within the right abdomen is not significantly changed in size from the prior study. The percutaneous pigtail drainage catheter appears well-positioned within the collection. 2.  Small separate pelvic rim-enhancing fluid collection suggests an abscess.    CT-ABDOMEN-PELVIS WITH    Result Date: 10/8/2021  10/8/2021 8:06 AM HISTORY/REASON FOR EXAM:  Abdominal pain, fever. Right lower quadrant pain. Pancreatitis. TECHNIQUE/EXAM DESCRIPTION:   CT scan of the abdomen and pelvis with contrast. Contrast-enhanced helical scanning was obtained from the diaphragmatic domes through the pubic symphysis following the bolus administration of nonionic contrast without  complication. 100 mL of Omnipaque 350 nonionic contrast was administered without complication. Low dose optimization technique was utilized for this CT exam including automated exposure control and adjustment of the mA and/or kV according to patient size. COMPARISON: 10/2/2021. FINDINGS: Lower Chest: Small right pleural effusion with right basilar opacities. Liver: Normal. Spleen: Unremarkable. Pancreas: Poorly visualized. There is gas in the pancreatic duct. Gallbladder: Surgically absent. Biliary: There is gas in the CBD and intrahepatic bile ducts, left more than right Adrenal glands: Normal. Kidneys: No hydronephrosis. Bilateral kidneys are enhancing symmetrically.. Bowel: Severe wall thickening of the descending colon, transverse colon, second portion duodenum. Postsurgical change in the stomach Lymph nodes: No adenopathy. Vasculature: The abdominal aorta is normal in caliber. Peritoneum: There is large irregular fluid collection occupying most of the right abdomen surrounding the right kidney and right colon. Additional fluid and gas collection in the midline abdomen anterior to the pancreas. This collection is probably connected to the right side collection via a junction in the jl hepatis Musculoskeletal: Postsurgical change in the lower lumbar spine. Pelvis: Trace pelvic free fluid.     1. Large irregular irregular fluid collection with thick wall occupying most of the right abdomen surrounding the right kidney and right colon. Additional fluid and gas collection in the midline abdomen anterior to the pancreas concerning for abscess. This collection is probably connected to the right side collection via a junction in the jl hepatis 2. Severe wall thickening of the descending colon, transverse colon, second portion duodenum, likely reactive. 3. Pneumobilia with gas in the CBD, intrahepatic bile ducts as well as pancreatic ducts are of unclear etiology. 4. Small right pleural effusion with right basilar  atelectasis.     CT-ABDOMEN-PELVIS WITH    Result Date: 10/2/2021  10/2/2021 2:03 PM HISTORY/REASON FOR EXAM:  RLQ and diffuse lower abdominal pain; had ERCP yesterday. Pt has pancreatitis and they found a polyp on her bile duct. TECHNIQUE/EXAM DESCRIPTION:   CT scan of the abdomen and pelvis with contrast. Contrast-enhanced helical scanning was obtained from the diaphragmatic domes through the pubic symphysis following the bolus administration of nonionic contrast without complication. 100 mL of Omnipaque 350 nonionic contrast was administered without complication. Low dose optimization technique was utilized for this CT exam including automated exposure control and adjustment of the mA and/or kV according to patient size. COMPARISON: 2/22/2019 FINDINGS: Lower Chest: Unremarkable. Liver: Normal. Hepatic and portal veins are patent. Spleen: Unremarkable. Pancreas: The pancreatic head is edematous though the parenchyma enhances normally. There is surrounding homogenous peripancreatic and mesenteric edema/infiltration which tracks down the right paracolic gutter and conforms to retroperitoneal structures. No defined walled off collection. There is small volume ascites in the pelvis. No focal fluid collection. Adrenal glands: Normal. Gallbladder: Cholecystectomy Biliary: Unchanged mild intrahepatic bile duct dilation. Kidneys: Symmetric nephrograms. No hydronephrosis. Pelvis: Hysterectomy. Normal appearance of the urinary bladder.. Bowel: There are no dilated loops of small or large bowel. Scattered colonic diverticuli with no inflammation. The appendix is normal. Prior gastric sleeve. Lymph nodes: No adenopathy. Vasculature: No aneurysm. Musculoskeletal: Fusion of L3-L5. Multifocal degenerative changes are present. No suspicious osseous abnormality.     Acute interstitial edematous pancreatitis with surrounding mesenteric edema tracking along the right paracolic gutter. Small volume ascites in the pelvis. No walled  off collections.    CT-DRAIN-PERITONEAL    Result Date: 10/10/2021  HISTORY/REASON FOR EXAM:  66-year-old woman with pancreatitis and extensive intraperitoneal abscess. TECHNIQUE/EXAM DESCRIPTION AND NUMBER OF VIEWS: RIGHT peritoneal drain placement with CT guidance. Low dose optimization technique was utilized for this CT exam including automated exposure control and adjustment of the mA and/or kV according to patient size. COMPARISON:  CT 10/8/2021 MEDICATIONS: Moderate sedation was provided. Pulse oximetry and continuous cardiac monitoring by the nurse was performed throughout the exam. Intraservice time was 15 minutes. PROCEDURE:     The risks, benefits, goals and objectives and alternatives were discussed. Risks were specified as including but not limited to bleeding, infection, damage to vessels or nerves, pain and discomfort as well as the possibility of incomplete resolution of underlying disease. Drain care related issues were discussed with the patient. The patient's questions were answered. Informed oral and written consent were obtained. The patient was placed on the CT gantry. Localizing scan was performed. The skin was prepped and draped in the usual sterile manner.  A timeout was performed. Local anesthetic result was achieved with administration of 1% lidocaine. A(n) 17-gauge access needle was advanced to the target using CT fluoroscopic guidance. Access was secured with a wire and the tract was sequentially dilated to accommodate a(n) 14 [place] locking loop drain. A total of 80 mL of thin brown turbid fluid was removed and sent for  laboratory evaluation. This was put in place and formed. The catheter was attached to bag drainage and secured to the skin with 2-0 nylon suture. Completion scan was performed which showed no complication. The patient tolerated the procedure well with no evidence of complication. The skin was cleaned and a dressing was applied. FINDINGS: Drainage tube in good position      Successful peritoneal drainage tube placement. Plan: Thrice daily flushes with 10 mL of sterile saline. Monitor outputs. Please contact interventional radiology if there is any concern for tube dysfunction.     IR-US GUIDED PIV    Result Date: 10/10/2021  EXAMINATION:                                                                    HISTORY/REASON FOR EXAM:  Ultrasound Guided PIV.  TECHNIQUE/EXAM DESCRIPTION AND NUMBER OF VIEWS:  Peripheral IV insertion with ultrasound guidance.  The procedure was prepared using maximal sterile barrier technique including sterile gown, mask, cap, and donning of sterile gloves following appropriate hand hygiene and/or sterile scrub. Patient skin site was prepped with 2% Chlorhexidine solution.   FINDINGS: Peripheral IV insertion with Ultrasound Guidance was performed by qualified imaging nursing staff without the assistance of a Radiologist.      Ultrasound-guided PERIPHERAL IV INSERTION performed by qualified nursing staff as above.    NM-HEPATOBILIARY SCAN    Result Date: 10/10/2021  10/10/2021 11:12 AM HISTORY/REASON FOR EXAM:  rule out biliary leak post ERCP with sphincterotomy; R/O bile leak TECHNIQUE/EXAM DESCRIPTION AND NUMBER OF VIEWS: Hepatobiliary scan. COMPARISON: 10/9/2021 CT abdomen PROCEDURE:  5.4 mCi Tc 99m-Choletec was administered intravenously, followed by 90 minutes of anterior planar imaging. FINDINGS: Normal homogeneous uptake of radiotracer in the hepatic parenchyma with visualization of the tracer in the common bile duct and entering the small bowel loops. No abnormal pooling or collection radiotracer outside of the biliary system or bowel.     Normal hepatobiliary scan with no findings of a bile leak.    DX-PORTABLE FLUOROSCOPY < 1 HOUR    Result Date: 10/1/2021  10/1/2021 10:01 AM HISTORY/REASON FOR EXAM:  ERCP TECHNIQUE/EXAM DESCRIPTION AND NUMBER OF VIEWS: 2 images were obtained in the operating room. A total of 0.3 minutes of fluoroscopy time  utilized. COMPARISON: Selected images CT abdomen and pelvis 2019 FINDINGS: Images show injection of contrast into the common bile duct which is dilated. There is also filling of the pancreatic duct. Fluoroscopy time: minutes     ERCP images as described    EC-ECHOCARDIOGRAM COMPLETE W/O CONT    Result Date: 10/14/2021  Transthoracic Echo Report Echocardiography Laboratory CONCLUSIONS Normal left ventricular chamber size. Hyperdynamic left ventricular systolic function. The left ventricular ejection fraction is visually estimated to be greater than 75%. Normal right ventricular size and systolic function. Enlarged right atrium. Mild tricuspid regurgitation. Right ventricular systolic pressure is estimated to be  40 mmHg; mild pulmonary hypertension. Normal pericardium without effusion. No prior study is available for comparison. KATERINA PATEL Exam Date:         10/14/2021                    16:15 Exam Location:     Inpatient Priority:          Routine Ordering Physician:        YUE TOURE Referring Physician:       [id number] Campos Sonographer:               Aroldo Nolan                            RDCS, RVT Age:    66     Gender:    F MRN:    [id number] :    1955 BSA:    1.68   Ht (in):    63     Wt (lb):    143 Exam Type:     Complete Indications:     Endocarditis ICD Codes:       421 CPT Codes:       01016 BP:   106    /   48     HR:   78 Technical Quality:       Fair MEASUREMENTS  (Male / Female) Normal Values 2D ECHO LV Diastolic Diameter PLAX        3.9 cm                4.2 - 5.9 / 3.9 - 5.3 cm LV Systolic Diameter PLAX         2.7 cm                2.1 - 4.0 cm IVS Diastolic Thickness           0.88 cm               LVPW Diastolic Thickness          0.75 cm               LVOT Diameter                     2 cm                  Estimated LV Ejection Fraction    75 %                  LV Ejection Fraction MOD BP       77.2 %                >= 55  % LV Ejection Fraction MOD 4C       79.3 %                 LV Ejection Fraction MOD 2C       77.1 %                IVC Diameter                      1.5 cm                DOPPLER AV Peak Velocity                  1.8 m/s               AV Peak Gradient                  13.5 mmHg             AV Mean Gradient                  6.3 mmHg              LVOT Peak Velocity                1.7 m/s               AV Area Cont Eq vti               2.8 cm2               MV Velocity Time Integral         42.6 cm               Mitral E Point Velocity           1.3 m/s               Mitral E to A Ratio               1.5                   MV Pressure Half Time             77.3 ms               MV Area PHT                       2.8 cm2               MV Deceleration Time              266 ms                TR Peak Velocity                  269 cm/s              PV Peak Velocity                  0.96 m/s              PV Peak Gradient                  3.7 mmHg              RVOT Peak Velocity                0.73 m/s              * Indicates values subject to auto-interpretation LV EF:  75    % FINDINGS Left Ventricle Normal left ventricular chamber size. Normal left ventricular wall thickness. Hyperdynamic left ventricular systolic function. The left ventricular ejection fraction is visually estimated to be greater than 75%. Normal regional wall motion. Normal diastolic function. Right Ventricle Normal right ventricular size. Normal right ventricular systolic function. Right Atrium Enlarged right atrium. Normal inferior vena cava size and inspiratory collapse. Left Atrium Normal left atrial size. Left atrial volume index is 26  mL/sq m. Mitral Valve Structurally normal mitral valve. No mitral stenosis. Trace mitral regurgitation. Aortic Valve The aortic valve is not well visualized. Cannot rule out bicuspid aortic valve. No aortic stenosis. No aortic insufficiency. Tricuspid Valve Structurally normal tricuspid valve. No tricuspid stenosis. Mild tricuspid regurgitation. Right ventricular  systolic pressure is estimated to be  40 mmHg. Pulmonic Valve The pulmonic valve is not well visualized. No pulmonic stenosis. No pulmonic insufficiency. Pericardium Normal pericardium without effusion. Aorta The ascending aorta diameter is 3.1  cm. Nate Sarmiento MD (Electronically Signed) Final Date:     14 October 2021                 17:39      Micro:  Results     Procedure Component Value Units Date/Time    C Diff by PCR rflx Toxin [[id number]]     Order Status: No result Specimen: Stool     FLUID CULTURE W/GRAM STAIN [[id number]]     Order Status: No result Specimen: Body Fluid from Peritoneal Fluid           Assessment:  Active Hospital Problems    Diagnosis    • Bacteremia due to Gram-negative bacteria and fungemia [R78.81]    • Sepsis due to intraabdominal fluid collection/abscess (HCC) [A41.9]    • Hyperlipidemia [E78.5]    • Hypertension [I10]      66 y.o. 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  Patient found to have multiple and extensive fluid collections in the abdomen and pelvis on imaging.  She underwent drain placement on 10/8.  Cultures are growing E. coli and Enterococcus faecalis.  Blood cultures are growing chryseobacterium species and Candida glabrata. Source likely due to the intra-abdominal abscesses. Repeat CT scan on 10/13 shows extensive multiloculated fluid collections in the abdomen and pelvis.    Pertinent diagnoses:  Multiple intra-abdominal abscesses  Candidemia  Chryseobacterium bacteremia  Polymicrobial infection  Persistent leukocytosis  Thrombocytosis  Diarrhea  Recent ERCP pancreatitis    Plan:  -Blood cultures on 10/8 - Chryseobacterium species, Candida glabrata.  -Follow susceptibilities of Candida glabrata-pending  -Follow repeat blood  cultures on 10/13-negative to date  -TTE-negative  -Continue to monitor for any vision changes-patient currently denies  -Continue IV Zosyn and micafungin  -Evaluated by Dr. [facility] today-plans for additional drain today and will hold off on surgery at this time  -Patient will need a repeat CT scan in 5 to 7 days from drain placement to assess for interval improvement/resolution  -Agree with checking C. difficile PCR given diarrhea    Discussed with internal medicine/Dr. Silveira

## 2021-10-15 NOTE — CARE PLAN
Problem: Knowledge Deficit - Standard  Goal: Patient and family/care givers will demonstrate understanding of plan of care, disease process/condition, diagnostic tests and medications  Outcome: Progressing  Note: Questions answered throughout shift      Problem: Respiratory  Goal: Patient will achieve/maintain optimum respiratory ventilation and gas exchange  Outcome: Progressing  Note: Pt has not needed supplemental oxygen throughout shift      Problem: Pain - Standard  Goal: Alleviation of pain or a reduction in pain to the patient’s comfort goal  Outcome: Progressing  Note: Pts pain has been addressed and medicated per MAR     Problem: Skin Integrity  Goal: Skin integrity is maintained or improved  Outcome: Progressing   The patient is Watcher - Medium risk of patient condition declining or worsening    Shift Goals  Clinical Goals: Monitor drain output  Patient Goals: pain control

## 2021-10-15 NOTE — H&P
"Hospital Medicine History & Physical Note    Date of Service  10/15/2021    Primary Care Physician  Pratik Gordon M.D.    Consultants  None     Code Status  Full Code    Chief Complaint  Intraabdominal abscess     History of Presenting Illness  \"Nils Alfonso is a 66 y.o. female who presented 10/8/2021 abdominal pain - she has a history of recurrent and idiopathic pancreatitis s/p ERCP with sphincterotomy by Dr. Gillespie on 10/01/2021 complicated by ERCP pancreatitis.CT on arrival to [facility] showed a large irregular fluid collection with thick wall occupying most of the right abdomen surrounding the right kidney and right colon, colonic inflammation, and a fluid and gas collection in the midline abdomen anterior to the pancreas concerning for abscess.      IR placed monica into the large fluid collection and initially improved clinically.  Dr Figueroa and Dr Young have been following as well.  The source of the fluid collection is still unclear - it may be a biliary leak, though HIDA and contrasted CT were negative for leak. However, due to persistent pain, repeat CT was obtained and demonstrated no change in the size of the fluid collection, as well as a new pelvic abscess.  Additionally, yesterday, blood cultures from the 8th became positive for Candida glabrata and chryseobacterium.  Culture from the pigtail drain is growing E coli and enterococcus, and she is on Zosyn and Micafungin, and Dr Mcfarland has been consulted.      Given the lack of improvement in the size of her intraabdominal fluid despite significant pigtail output and correct placement verified on CT scan, Dr Dumont was consulted.\"  Surgery recommended transfer to [facility] for  concerns of bile leak.    Patient seen at bedside.  She states that her pain is adequately controlled.  Currently comfortable at this time.       I discussed the plan of care with patient.    Review of Systems  Review of Systems   Constitutional: Positive for " malaise/fatigue.   HENT: Negative.    Eyes: Negative.    Respiratory: Negative.    Cardiovascular: Negative.    Gastrointestinal: Positive for abdominal pain.   Genitourinary: Negative.    Musculoskeletal: Negative.    Skin: Negative.    Neurological: Negative.    Endo/Heme/Allergies: Negative.    Psychiatric/Behavioral: Negative.        Past Medical History   has a past medical history of Allergy, unspecified not elsewhere classified, Anemia, Anesthesia, Arthritis, Backpain, Bronchitis (2010), Cataract, Degeneration of cervical intervertebral disc, Dental disorder, Heart burn, Hiatus hernia syndrome, High cholesterol, Hyperlipidemia, Hypertension, Muscle disorder, Pain (05/16/2018), and Reactive airway disease.    Surgical History   has a past surgical history that includes hysterectomy robotic (1/2/2009); tonsillectomy and adenoidectomy (1967); tubal ligation (1985); gastric resection (1982); primary c section (1976, 1979, 1985); lumbar fusion anterior (2007); cholecystectomy (1996); rotator cuff repair (Left, 5/2015); rotator cuff repair (Right, 7/7/2016); gastroscopy (N/A, 8/26/2016); gastric sleeve laparoscopy (10/19/2016); liver biopsy laparoscopic (10/19/2016); cataract phaco with iol (Right, 4/4/2017); cataract phaco with iol (Left, 4/18/2017); gastroscopy (5/23/2018); egd w/endoscopic ultrasound (5/23/2018); colonoscopy (8/8/2018); and pr ercp,diagnostic (10/1/2021).     Family History  family history includes Cancer in her father; Diabetes in her brother; Stroke in her mother.   Family history reviewed with patient. There is no family history that is pertinent to the chief complaint.     Social History   reports that she quit smoking about 48 years ago. Her smoking use included cigarettes. She has a 0.03 pack-year smoking history. She has never used smokeless tobacco. She reports that she does not drink alcohol and does not use drugs.    Allergies  Allergies   Allergen Reactions   • Ampicillin Rash      "Rash     • Cephalexin Diarrhea, Vomiting and Nausea     .   • Clindamycin Vomiting     \"cleocin\"   • Codeine      Severe stomach pain, cramps, spasms   • Demerol Vomiting   • Levofloxacin Unspecified     Numbness     • Tetracyclines Rash     .   • Tizanidine Itching   • Hydromorphone Vomiting and Nausea   • Morphine Vomiting   • Pcn [Penicillins] Itching   • Sulfa Drugs Itching       Medications  Prior to Admission Medications   Prescriptions Last Dose Informant Patient Reported? Taking?   BIOTIN PO  Patient Yes No   Sig: Take 1 Tablet by mouth every day.   Cholecalciferol (VITAMIN D3 PO)  Patient Yes No   Sig: Take 1 Each by mouth every day.   HYDROcodone/acetaminophen (NORCO)  MG Tab  Patient Yes No   Sig: Take 1-2 Tabs by mouth every 6 hours as needed. Takes 1/2 pill   Magnesium 400 MG Tab  Patient Yes No   Sig: Take 400 mg by mouth every evening.   cyclobenzaprine (FLEXERIL) 10 mg Tab  Patient Yes No   Sig: Take 10 mg by mouth every evening.   ezetimibe (ZETIA) 10 MG Tab  Patient Yes No   Sig: Take 10 mg by mouth every evening.   gabapentin (NEURONTIN) 300 MG Cap  Patient Yes No   Sig: Take 600 mg by mouth 2 Times a Day.   losartan (COZAAR) 25 MG Tab  Patient Yes No   Sig: Take 25 mg by mouth every morning.   ondansetron (ZOFRAN ODT) 4 MG TABLET DISPERSIBLE  Patient Yes No   Sig: Take 4 mg by mouth every 6 hours as needed for Nausea.      Facility-Administered Medications: None       Physical Exam  Temp:  [36.9 °C (98.4 °F)-37.4 °C (99.3 °F)] 37.4 °C (99.3 °F)  Pulse:  [60-87] 85  Resp:  [16-18] 16  BP: (103-144)/(48-78) 144/78  SpO2:  [90 %-97 %] 91 %  Blood Pressure : 144/78   Temperature: 37.4 °C (99.3 °F)   Pulse: 85   Respiration: 16   Pulse Oximetry: 91 %       Physical Exam  Constitutional:       Appearance: Normal appearance. She is normal weight.   HENT:      Head: Normocephalic.      Nose: Nose normal.      Mouth/Throat:      Mouth: Mucous membranes are moist.   Eyes:      Pupils: Pupils are " equal, round, and reactive to light.   Cardiovascular:      Rate and Rhythm: Normal rate and regular rhythm.   Pulmonary:      Effort: Pulmonary effort is normal.      Breath sounds: Normal breath sounds.   Abdominal:      General: Abdomen is flat. Bowel sounds are normal.      Palpations: Abdomen is soft.      Comments: Right lower quadrant reveals pigtail catheter draining bloody bilious colored fluid   Musculoskeletal:         General: No swelling. Normal range of motion.      Cervical back: Normal range of motion.   Skin:     General: Skin is warm.   Neurological:      General: No focal deficit present.      Mental Status: She is alert and oriented to person, place, and time. Mental status is at baseline.   Psychiatric:         Mood and Affect: Mood normal.         Thought Content: Thought content normal.         Judgment: Judgment normal.         Laboratory:  Recent Labs     10/12/21  0424 10/13/21  0440 10/14/21  0429   WBC 14.7* 13.9* 12.7*   RBC 3.32* 3.17* 3.43*   HEMOGLOBIN 10.1* 9.5* 10.2*   HEMATOCRIT 30.8* 29.4* 34.1*   MCV 92.8 92.7 99.4*   MCH 30.4 30.0 29.7   MCHC 32.8* 32.3* 29.9*   RDW 47.1 48.6 52.8*   PLATELETCT 468* 493* 537*   MPV 9.0 8.7* 9.1     Recent Labs     10/12/21  0424 10/13/21  0440 10/14/21  0429   SODIUM 133* 133* 134*   POTASSIUM 3.6 3.7 4.1   CHLORIDE 97 99 101   CO2 25 23 21   GLUCOSE 117* 100* 94   BUN 12 9 9   CREATININE 0.65 0.57 0.52   CALCIUM 7.5* 7.3* 7.5*     Recent Labs     10/12/21  0424 10/13/21  0440 10/14/21  0429   ALTSGPT 7 <5 8   ASTSGOT 16 12 19   ALKPHOSPHAT 77 58 68   TBILIRUBIN 0.7 0.9 0.7   GLUCOSE 117* 100* 94     Recent Labs     10/13/21  0440   INR 1.45*     No results for input(s): NTPROBNP in the last 72 hours.      No results for input(s): TROPONINT in the last 72 hours.    Imaging:  EC-ECHOCARDIOGRAM COMPLETE W/ CONT    (Results Pending)       no X-Ray or EKG requiring interpretation    Assessment/Plan:  I anticipate this patient will require at least  two midnights for appropriate medical management, necessitating inpatient admission.    Bacteremia due to Gram-negative bacteria and fungemia- (present on admission)  Assessment & Plan  Surgery consult in a.m. for possible biliary leak  ID follow-up in a.m.  Continue Zosyn and micafungin  Peritoneal fluid blood cultures on October 8 shows E. coli and Enterococcus  Peripheral blood cultures on October 8 shows chryseobacterium and candida glabrata (susceptibility pending)  Lactated Ringer 150 mL/h  N.p.o.  Pain medication  Echocardiogram yesterday does not show any sign of endocarditis    Sepsis due to intraabdominal fluid collection/abscess (HCC)- (present on admission)  Assessment & Plan  This is Sepsis Present on admission  SIRS criteria identified on my evaluation include: Leukocytosis, with WBC greater than 12,000  Source is Intraabdominal abscess  Sepsis protocol initiated  Fluid resuscitation ordered per protocol  IV antibiotics as appropriate for source of sepsis  While organ dysfunction may be noted elsewhere in this problem list or in the chart, degree of organ dysfunction does not meet CMS criteria for severe sepsis          Hyperlipidemia- (present on admission)  Assessment & Plan  Continue zetia 10 mg every night    Hypertension- (present on admission)  Assessment & Plan  Restart home medication as needed      VTE prophylaxis: heparin ppx

## 2021-10-15 NOTE — CARE PLAN
The patient is Stable - Low risk of patient condition declining or worsening    Shift Goals  Clinical Goals: Monitor biliary drain output  Patient Goals:  (pain control)    Progress made toward(s) clinical / shift goals:    Problem: Knowledge Deficit - Standard  Goal: Patient and family/care givers will demonstrate understanding of plan of care, disease process/condition, diagnostic tests and medications  Outcome: Progressing     Problem: Hemodynamics  Goal: Patient's hemodynamics, fluid balance and neurologic status will be stable or improve  Outcome: Progressing     Problem: Urinary - Renal Perfusion  Goal: Ability to achieve and maintain adequate renal perfusion and functioning will improve  Outcome: Progressing     Problem: Pain - Standard  Goal: Alleviation of pain or a reduction in pain to the patient’s comfort goal  Outcome: Progressing       Patient is not progressing towards the following goals:

## 2021-10-15 NOTE — PROGRESS NOTES
Report received, pt care assumed, tele box on and rate verified. VSS and pt is on 2 L O2 via NC. Pt is resting with no signs of distress noted at this time. Bed in lowest position, bed alarm on, pt educated on fall risk and verbalized understanding, call light within reach, will continue with plan of care.      Covid-19 surge in effect

## 2021-10-15 NOTE — PROGRESS NOTES
Received report from TALITA Sage. Safety precautions in place. Bed locked and low. Offered assist. Call light and belongings within reach.

## 2021-10-15 NOTE — ASSESSMENT & PLAN NOTE
-Sepsis resolved  -Source intra-abdominal  -Zosyn/Mycafungin/Bactrim DS per infectious disease end date 11/24/2021  -ID signed off  -Re-imaging per ID, IR, and Surgery  -S/P Ex lap w I/D and debridement nec fasc 11/5/2021  -11/8 Repeat CT. Updated surgical team re: perinephric fluid collection. Further CT may be considered with IV and Oral contrast if needed.  -11/9:  She has remained afebrile over last 48 hours.  VSS.  Does not appear to be septic at this time.  Continue antibx.   -11/19/2021: sp biliary stent placement. Hold PO meds/change to IV to general surgery. Bactrim changed to IV.

## 2021-10-15 NOTE — PROGRESS NOTES
Report called to TALITA Mejias. Plans to transfer to [id number]. All belongings gathered. Pt updated on [facility] ETA 0045.

## 2021-10-15 NOTE — ASSESSMENT & PLAN NOTE
-Previously on ezetimibe.  -PO meds held per general surgery,   -Will resume when cleared to have PO medications

## 2021-10-15 NOTE — ASSESSMENT & PLAN NOTE
-KALANI negative for signs of endocarditis  -Cont bactrim, Zosyn, and Micafungin per ID for 4-week course with stop date of 11/24.  Repeat imaging prior to discontinuation.   -Repeat Bld Cx neg

## 2021-10-15 NOTE — PROGRESS NOTES
REMSA here to  patient. Report given. Pt and [facility] educated on importance of strict NPO for possible surgery in AM.

## 2021-10-15 NOTE — CONSULTS
DATE OF SERVICE:  10/15/2021     CONSULT REQUESTED BY:  Elizabeth Segundo MD  And Dr. Figueroa over at [facility].    PROBLEMS:  DUODENAL PERFORATION  SEPSIS  BACTEREMIA  ABDOMINAL PAIN  RETROPERITONEAL ABSCESS     HISTORY OF PRESENT ILLNESS:  The patient is a 66-year-old female patient who   was recently admitted to [facility] with abdominal pain.  This was   following a workup and extensive following closely by Dr. Casas for   idiopathic pancreatitis.  She had multiple studies, ERCPs and sphincterotomy,   but recently she underwent a procedure on 10/01/2021, which involved a   polypectomy and a sphincterotomy.  Unfortunately, she developed abdominal   pain.  She was seen in the ER, underwent a CT scan on arrival on 10/08/2021   and found to have a large fluid collection and thickening of wall around the   duodenum.  This all was retroperitoneal.  It was not intraperitoneal.  The   patient had an elevated white count at the time of admission. Her white count   at the time of admission was 17.8.  She was admitted to the hospitalist   service at [facility], Dr. Figueroa was called.  IR did place a drain into   the fluid collection and she has been followed conservatively. During this   time, she was placed on Zosyn.  Her white count has dropped slowly and now   recently was 12.  In any event, the patient had a repeat imaging study on   10/13/2021, which I personally reviewed along with the CT scan from 10/09/2021   and there was a dramatic change in that. There is significant amount of   retroperitoneal air and fluid.  The drain is in good position, not too far   from the area of the duodenum, where we presumed perforated, but the fluid   collection tracked down the right side and it is fairly large.  It has grown   since her initial CT, but it is mostly air. In any event, the patient was   being followed by Dr. Figueroa and he requested my assistance as well.  He   consulted me on the case and I reviewed the  films and because of the fact that   we may need to operate on the patient and not having the adequate equipment   at [facility], I requested that the patient be transferred to the main   hospital.  She was transferred up on 10/14/2021 and she is being seen today on   10/15/2021 is the date of the consult.     Past surgical history, the patient has initially started with a gastric   stapling back in the 80s.  This was changed to a sleeve approximately 4-5   years ago by Dr. Condon.  The patient has lost some weight.  She is in good   spirits this morning with her  next to her.  She is afebrile and   comfortable and the drain is putting out liquid now that they are irrigating   it.     REVIEW OF SYSTEMS:  Other than fatigue and slight abdominal pain around the   drain site and along the right side, the rest of her review of systems are   negative.     PAST MEDICAL HISTORY:  Again is anemia, back pain, bronchitis and cataracts,   back in 2010.  Dental disorder, heartburn, hiatal hernia.  She says she has    reflux and she has a reactive airway disease, hypertension, hyperlipidemia,   but she has overcome her obesity.     PAST SURGICAL HISTORY:  As described above, but also she has had a   tonsillectomy in ___  adenoids in 1967, tubal ligation in 1985, C-sections in   1976, 1979, and 1985, lumbar fusion in 2007, cholecystectomy in 1996, rotator   cuff in 05/2015 on the left and the right side was repaired in 2016,   laparoscopic liver biopsy along with a sleeve gastrectomy in 2016.  She has   had multiple endoscopies since 2018 throughout 2021. The patient again suffers   from idiopathic pancreatitis.     FAMILY HISTORY:  Includes cancer in her father, diabetes in her brother and   stroke in her mother.  She has no history of any of these.     SOCIAL HISTORY:  She does not smoke.  She drinks a glass of wine once in a   while.     ALLERGIES:  PRESENTLY, SHE HAS ALLERGIES TO AMPICILLIN. CLEOCIN, CAUSES    VOMITING. CEPHALEXIN DOES CAUSE SOME GI DISTURBANCE. CODEINE, AGAIN ABDOMINAL   PAIN.  DEMEROL, VOMITING. LEVOFLOXACIN, SHE DOES HAVE SOME UNUSUAL NUMBNESS.   TETRACYCLINE, SHE HAS A RASH.  TIZANIDINE, ITCHING. HYDROMORPHONE AND MORPHINE   GIVE HER NAUSEA AND VOMITING. PENICILLIN, ITCHING AND SULFA DRUGS, ITCHING.     MEDICATIONS:  Her present medications while in the hospital, she is on Pepcid,   Zetia, Neurontin for pain.  She takes magnesium oxide for constipation,   Mycamine, Nicoderm, vitamin D3 and Zosyn.  She is on p.r.n. pain medicine p.o.   and Benadryl.     PHYSICAL EXAMINATION:  VITAL SIGNS:  She is afebrile.  Vital signs are stable.  HEENT:  Extraocular movements are intact.  No sign of jaundice.  NECK:  Soft, supple.  There is no cervical, supraclavicular, or occipital   lymphadenopathy.  LUNGS:  Clear to auscultation.  Good inspiratory effort.  CARDIOVASCULAR:  Regular rate and rhythm.  ABDOMEN:  Soft, a little discomfort around the lateral side on the right side   where this larger fluid/air pocket collection is. Her drain is putting out   what appears to be succus entericus.  It does confirm the duodenal injury   whether there is any injury to the diverticulum or it is a de patty   perforation.  No peritoneal signs, rebound or guarding.  EXTREMITIES:  No clubbing, cyanosis or edema.  PELVIC:  Deferred due to present consultation.  NEUROLOGIC:  She is grossly intact. All cranial nerves, motor function and   peripheral exam are all normal.    PROBLEMS:  DUODENAL PERFORATION  SEPSIS  BACTEREMIA  ABDOMINAL PAIN  RETROPERITONEAL ABSCESS     ASSESSMENT AND PLAN:  1.  Based on my findings, my recommendation is to start TPN and placed a PICC   line in order to keep her n.p.o. except for meds.  2.  Plan will be to add a secondary drain into the lower portion of this right   sided air pocket in retroperitoneum and then having both drains irrigated.  3.  Keeping very strict I's and O's on the output of the  drains and label on   as 1 and 2 after IR places the second one.  4.  Continue with antibiotics, but add Diflucan due to the fact that this is   an upper airway perforation.  I will continue to follow the patient.  I have   given them my card.  I have spoken to the  along with the patient and   our goal is to avoid surgery and hopefully ____.        ______________________________  MD SERGO Bahena/PORSHA/ALFREDA    DD:  10/15/2021 10:20  DT:  10/15/2021 11:06    Job#:  [id number]    CC:MD GLORIA PARNELL MD

## 2021-10-15 NOTE — CONSULTS
Gastroenterology Initial Consult Note               Author:  AYE Jones Date & Time Created: 10/15/2021 8:43 AM       Patient ID:  Name:             Nils Alfonso  YOB: 1955  Age:                 66 y.o.  female  MRN:               [id number]      Referring Provider:  Ana Luisa Shearer MD      Presenting Chief Complaint:  Abdominal pain      History of Present Illness:    10/10 HPI  66-year-old female PMH recurrent idiopathic pancreatitis s/p ERCP with sphincterotomy October 1, 2021 complicated by ERCP pancreatitis, sleeve gastrectomy, dyslipidemia, hypertension, osteoarthritis of the spine status post lumbar fusion who was admitted to the hospital 10/8/2021 with worsening right lower quadrant pain over the past week.  Ever since her ERCP October 1, 2021, she has been experiencing pain, intermittent subjective fevers, nausea.  In the emergency room-labs: CBC: WBC 17.8..  Sodium 130.  LFTs-WNL.  CT scan abdomen/pelvis-large irregular fluid collection with thick wall occupying most of the right abdomen surrounding the right kidney and colon.  Severe wall thickening of the descending, transverse colon, second portion of the duodenum likely reactive.  Pneumobilia with gas in the CBD.  Drain placement by IR was performed.  Currently, the abdominal pain has improved.  No vomiting.  Started on ceftriaxone and metronidazole IV.     ERCP October 1, 2021-common bile duct-dilated down to the level of the ampulla.  4 mm polyp at the distal duct seen after sphincterotomy.  8 mm sphincterotomy.  Negative for stone.  Bile duct polyp-benign with inflammatory and hyperplastic changes.  No evidence of epithelial dysplasia/neoplasia.       Interval History:  10/11: interval HIDA scan showed no bile leak.  This morning she feels more comfortable, describing minimal abdominal pain but denying nausea vomiting and fevers.  She has been able to eat a little bit more and has full liquids beside her bed.  She  expresses concern about being on losartan which she says was prescribed outpatient as as needed for high blood pressures.  She also states she has not passed a bowel movement in the week which she describes not eating much.     10/12/2021 - No events overnight.  Tolerating diet without nausea or vomiting.  Had spontaneous, formed, brown bowel movement this morning.  Abdominal pain improving.  Feeling weak, but better each day.  Leukocytosis improving.  States that she is on hydrocodone at home, managed by pain management, and asks that her current hospital pain meds be changed.     10/13/2021: Stable, no new events.  Drain output is minimal, but according to patient bedside nursing is not flushing the drain.  Patient is n.p.o. for planned CT today to reevaluate fluid collection.  Leukocytosis continues to improve.  Pain has improved greatly and patient has not taken the pain medication in the last 24 hours according to her recollection.  She is having regular bowel movements without assistance of laxatives.  Patient is very hungry.     10/14/2021 - No events overnight.  Abdominal pain controlled now.  Had nausea and vomiting yesterday with solid food, but tolerating bland diet.  Blood cultures from 10/8 positive for Chryseobacterium and Candida glabrata - Pharmacy and ID aware.  Micafungin started.  CT 10/13 showing extensive multiloculated rim-enhancing air and fluid collection/abscess within the right abdomen is not significantly changed in size from the prior study. The percutaneous pigtail drainage catheter appears well-positioned within the collection.    10/15/2021: Patient transferred to [facility] overnight per surgery recommendations      Review of Systems:  Review of Systems   Constitutional: Positive for malaise/fatigue. Negative for chills and fever.   Respiratory: Negative for cough, shortness of breath and wheezing.    Cardiovascular: Negative for chest pain and leg swelling.    Gastrointestinal: Positive for abdominal pain. Negative for constipation, melena, nausea and vomiting.   Genitourinary: Negative for dysuria and urgency.   Musculoskeletal: Negative for falls and myalgias.   Skin: Negative for itching and rash.   Neurological: Positive for weakness. Negative for focal weakness and headaches.   Psychiatric/Behavioral: Negative for memory loss and substance abuse.             Past Medical History:  Past Medical History:   Diagnosis Date   • Allergy, unspecified not elsewhere classified    • Anemia     not currently   • Anesthesia     PONV (Demerol/ Dilaudid)   • Arthritis     bilateral shoulders,sacrum, osteo   • Backpain     coccyx and sacrum   • Bronchitis 2010   • Cataract     bilateral IOLI   • Degeneration of cervical intervertebral disc     C5-6,C6-7   • Dental disorder     partial upper and lower   • Heart burn    • Hiatus hernia syndrome     not a problem after gastric sleeve   • High cholesterol     resolved after gastric surgery   • Hyperlipidemia    • Hypertension     off all medication, reveresed after gastric sleeve   • Muscle disorder    • Pain 05/16/2018    low back and SI joints and sacrum   • Reactive airway disease     rescue inhaler not needed unless with bronchitis     Active Hospital Problems    Diagnosis    • Bacteremia due to Gram-negative bacteria and fungemia [R78.81]    • Sepsis due to intraabdominal fluid collection/abscess (HCC) [A41.9]    • Hyperlipidemia [E78.5]    • Hypertension [I10]          Past Surgical History:  Past Surgical History:   Procedure Laterality Date   • PB ERCP,DIAGNOSTIC  10/1/2021    Procedure: ERCP (ENDOSCOPIC RETROGRADE CHOLANGIOPANCREATOGRAPHY);  Surgeon: Fausto Casas M.D.;  Location: SURGERY [facility];  Service: Gastroenterology   • COLONOSCOPY  8/8/2018    Procedure: COLONOSCOPY;  Surgeon: Shukri Condon M.D.;  Location: SURGERY [facility];  Service: General   • GASTROSCOPY  5/23/2018    Procedure: GASTROSCOPY;   Surgeon: Fausto Casas M.D.;  Location: SURGERY [facility];  Service: Gastroenterology   • EGD W/ENDOSCOPIC ULTRASOUND  5/23/2018    Procedure: EGD W/ENDOSCOPIC ULTRASOUND- RADIAL UPPER;  Surgeon: Fausto Casas M.D.;  Location: SURGERY [facility];  Service: Gastroenterology   • CATARACT PHACO WITH IOL Left 4/18/2017    Procedure: CATARACT PHACO WITH IOL;  Surgeon: Stuart Estevez M.D.;  Location: SURGERY SAME DAY [facility];  Service:    • CATARACT PHACO WITH IOL Right 4/4/2017    Procedure: CATARACT PHACO WITH IOL;  Surgeon: Stuart Estevez M.D.;  Location: SURGERY [facility];  Service:    • GASTRIC SLEEVE LAPAROSCOPY  10/19/2016    Procedure: GASTRIC SLEEVE LAPAROSCOPY, HIATAL HERNIA;  Surgeon: Shukri Condon M.D.;  Location: SURGERY [facility];  Service:    • LIVER BIOPSY LAPAROSCOPIC  10/19/2016    Procedure: LIVER BIOPSY LAPAROSCOPIC;  Surgeon: Shukri Condon M.D.;  Location: SURGERY [facility];  Service:    • GASTROSCOPY N/A 8/26/2016    Procedure: GASTROSCOPY;  Surgeon: Shukri Condon M.D.;  Location: SURGERY [facility];  Service:    • ROTATOR CUFF REPAIR Right 7/7/2016   • ROTATOR CUFF REPAIR Left 5/2015   • HYSTERECTOMY ROBOTIC  1/2/2009    Performed by MEG VILLEGAS at [facility]   • LUMBAR FUSION ANTERIOR  2007    Dr Hillman, L3-S1 fusion   • CHOLECYSTECTOMY  1996    laparoscopic   • TUBAL LIGATION  1985   • GASTRIC RESECTION  1982    gastric stapling   • TONSILLECTOMY AND ADENOIDECTOMY  1967   • PRIMARY C SECTION  1976, 1979, 1985    x3           Hospital Medications:  Current Facility-Administered Medications   Medication Dose Frequency Provider Last Rate Last Admin   • ondansetron (ZOFRAN ODT) dispertab 4 mg  4 mg Q4HRS PRN Elizabeth Segundo M.D.       • ondansetron (ZOFRAN) syringe/vial injection 4 mg  4 mg Q4HRS PRN Elizabeth Segundo M.D.   4 mg at 10/15/21 0828   • polyethylene glycol/lytes (MIRALAX) PACKET 1 Packet  1 Packet QDAY  PRN Elizabeth Segundo M.D.        And   • magnesium hydroxide (MILK OF MAGNESIA) suspension 30 mL  30 mL QDAY PRN Elizabeth Segundo M.D.       • acetaminophen (Tylenol) tablet 650 mg  650 mg Q6HRS PRN Elizabeth Segundo M.D.       • HYDROmorphone (Dilaudid) injection 0.5 mg  0.5 mg Q3HRS PRN Elizabeth Segundo M.D.       • Pharmacy Consult Request ...Pain Management Review 1 Each  1 Each PHARMACY TO DOSE Elizabeth Segundo M.D.       • cyclobenzaprine (Flexeril) tablet 10 mg  10 mg HS PRN Elizabeth Segundo M.D.       • diphenhydrAMINE (BENADRYL) tablet/capsule 25 mg  25 mg Q6HRS PRN Elizabeth Segundo M.D.       • ezetimibe (ZETIA) tablet 10 mg  10 mg Q EVENING Elizabeth Segundo M.D.       • famotidine (PEPCID) tablet 20 mg  20 mg BID Elizabeth Segundo M.D.   20 mg at 10/15/21 0508    Or   • famotidine (PEPCID) injection 20 mg  20 mg BID Elizabeth Segundo M.D.       • gabapentin (NEURONTIN) capsule 600 mg  600 mg BID Elizabeth Segundo M.D.   600 mg at 10/15/21 0507   • magnesium oxide (MAG-OX) tablet 400 mg  400 mg Q EVENING Elizabeth Segundo M.D.       • micafungin (MYCAMINE) 100 mg in  mL ivpb  100 mg Q24HRS Elizabeth Segundo M.D.       • nicotine (NICODERM) 21 MG/24HR 21 mg  1 Patch Daily-0600 Elizabeth Segundo M.D.       • oxyCODONE immediate release (ROXICODONE) tablet 10 mg  10 mg Q4HRS PRN Elizabeth Segundo M.D.   10 mg at 10/15/21 0825   • piperacillin-tazobactam (ZOSYN) 3.375 g in  mL IVPB  3.375 g Q8HRS Elizabeth Segundo M.D. 25 mL/hr at 10/15/21 0508 3.375 g at 10/15/21 0508   • promethazine (PHENERGAN) tablet 25 mg  25 mg Q6HRS PRN Elizabeth Segundo M.D.   25 mg at 10/15/21 0459   • vitamin D3 (cholecalciferol) tablet 1,000 Units  1,000 Units DAILY Elizabeth Segundo M.D.   1,000 Units at 10/15/21 0508   • lactated ringers infusion   Continuous Stuart Avalos M.D. 150 mL/hr at 10/15/21 0422 New Bag at 10/15/21 0422   • heparin injection 5,000 Units  5,000 Units Q8HRS Stuart Avalos M.D.       Last reviewed on 10/15/2021   "1:55 AM by Geovanni Reeder R.N.        Current Outpatient Medications:  Medications Prior to Admission   Medication Sig Dispense Refill Last Dose   • cyclobenzaprine (FLEXERIL) 10 mg Tab Take 10 mg by mouth every evening.      • ezetimibe (ZETIA) 10 MG Tab Take 10 mg by mouth every evening.      • losartan (COZAAR) 25 MG Tab Take 25 mg by mouth every morning.      • Magnesium 400 MG Tab Take 400 mg by mouth every evening.      • gabapentin (NEURONTIN) 300 MG Cap Take 600 mg by mouth 2 Times a Day.      • BIOTIN PO Take 1 Tablet by mouth every day.      • HYDROcodone/acetaminophen (NORCO)  MG Tab Take 1-2 Tabs by mouth every 6 hours as needed. Takes 1/2 pill      • Cholecalciferol (VITAMIN D3 PO) Take 1 Each by mouth every day.      • ondansetron (ZOFRAN ODT) 4 MG TABLET DISPERSIBLE Take 4 mg by mouth every 6 hours as needed for Nausea.            Medication Allergies:  Allergies   Allergen Reactions   • Ampicillin Rash     Rash     • Cephalexin Diarrhea, Vomiting and Nausea     .   • Clindamycin Vomiting     \"cleocin\"   • Codeine      Severe stomach pain, cramps, spasms   • Demerol Vomiting   • Levofloxacin Unspecified     Numbness     • Tetracyclines Rash     .   • Tizanidine Itching   • Hydromorphone Vomiting and Nausea   • Morphine Vomiting   • Pcn [Penicillins] Itching   • Sulfa Drugs Itching         Family Medical History:  Family History   Problem Relation Age of Onset   • Stroke Mother    • Cancer Father         larynx   • Diabetes Brother          Social History:  Social History     Socioeconomic History   • Marital status:      Spouse name: Not on file   • Number of children: Not on file   • Years of education: Not on file   • Highest education level: Not on file   Occupational History   • Not on file   Tobacco Use   • Smoking status: Former Smoker     Packs/day: 0.25     Years: 0.10     Pack years: 0.02     Types: Cigarettes     Quit date: 1973     Years since quittin.8   • " Smokeless tobacco: Never Used   Vaping Use   • Vaping Use: Never used   Substance and Sexual Activity   • Alcohol use: No   • Drug use: No   • Sexual activity: Not on file     Comment:    Other Topics Concern   • Not on file   Social History Narrative   • Not on file     Social Determinants of Health     Financial Resource Strain:    • Difficulty of Paying Living Expenses:    Food Insecurity:    • Worried About Running Out of Food in the Last Year:    • Ran Out of Food in the Last Year:    Transportation Needs:    • Lack of Transportation (Medical):    • Lack of Transportation (Non-Medical):    Physical Activity:    • Days of Exercise per Week:    • Minutes of Exercise per Session:    Stress:    • Feeling of Stress :    Social Connections:    • Frequency of Communication with Friends and Family:    • Frequency of Social Gatherings with Friends and Family:    • Attends [employer] Services:    • Active Member of Clubs or Organizations:    • Attends Club or Organization Meetings:    • Marital Status:    Intimate Partner Violence:    • Fear of Current or Ex-Partner:    • Emotionally Abused:    • Physically Abused:    • Sexually Abused:          Vital signs:  Weight/BMI: Body mass index is 27.73 kg/m².  /67   Pulse (!) 58   Temp 36.2 °C (97.2 °F) (Temporal)   Resp 16   Wt 71 kg (156 lb 8.4 oz)   SpO2 94%   Vitals:    10/15/21 0137 10/15/21 0445 10/15/21 0802 10/15/21 0815   BP: 144/78 132/69 120/67    Pulse: 85 67 (!) 58    Resp: 16 16 16    Temp: 37.4 °C (99.3 °F) 36.7 °C (98.1 °F) 36.2 °C (97.2 °F)    TempSrc: Temporal Temporal Temporal    SpO2: 91% 95% 97% 94%   Weight: 71 kg (156 lb 8.4 oz)        Oxygen Therapy:  Pulse Oximetry: 94 %, O2 (LPM): 0, O2 Delivery Device: None - Room Air  No intake or output data in the 24 hours ending 10/15/21 0843      Physical Exam:  Physical Exam  Vitals and nursing note reviewed.   Constitutional:       Appearance: She is ill-appearing.   HENT:      Head:  Normocephalic and atraumatic.      Right Ear: External ear normal.      Left Ear: External ear normal.      Nose: Nose normal.      Mouth/Throat:      Mouth: Mucous membranes are dry.      Pharynx: Oropharynx is clear.   Eyes:      General: No scleral icterus.     Extraocular Movements: Extraocular movements intact.      Pupils: Pupils are equal, round, and reactive to light.   Cardiovascular:      Rate and Rhythm: Normal rate and regular rhythm.      Pulses: Normal pulses.      Heart sounds: Normal heart sounds. No murmur heard.     Pulmonary:      Effort: Pulmonary effort is normal. No respiratory distress.      Breath sounds: No wheezing or rales.      Comments: Decreased breath sounds in bases  Abdominal:      General: Abdomen is flat.      Palpations: Abdomen is soft.      Tenderness: There is abdominal tenderness (RUQ).   Musculoskeletal:      Cervical back: Neck supple.      Right lower leg: No edema.      Left lower leg: No edema.   Skin:     General: Skin is warm and dry.   Neurological:      General: No focal deficit present.      Mental Status: She is alert and oriented to person, place, and time.   Psychiatric:         Thought Content: Thought content normal.         Judgment: Judgment normal.             Labs:  Recent Labs     10/13/21  0440 10/14/21  0429 10/15/21  0328   SODIUM 133* 134* 134*   POTASSIUM 3.7 4.1 3.7   CHLORIDE 99 101 102   CO2 23 21 23   BUN 9 9 7*   CREATININE 0.57 0.52 0.53   MAGNESIUM  --  2.2  --    PHOSPHORUS  --  2.3*  --    CALCIUM 7.3* 7.5* 7.4*     Recent Labs     10/13/21  0440 10/14/21  0429 10/15/21  0328   ALTSGPT <5 8 14   ASTSGOT 12 19 28   ALKPHOSPHAT 58 68 56   TBILIRUBIN 0.9 0.7 0.6   GLUCOSE 100* 94 107*     Recent Labs     10/13/21  0440 10/14/21  0429 10/15/21  0328   WBC 13.9* 12.7* 13.5*   NEUTSPOLYS 73.00* 77.10* 78.20*   LYMPHOCYTES 12.00* 9.10* 8.50*   MONOCYTES 12.00 11.70 11.80   EOSINOPHILS 0.00 0.10 0.00   BASOPHILS 0.00 0.20 0.20   ASTSGOT 12 19 28    ALTSGPT <5 8 14   ALKPHOSPHAT 58 68 56   TBILIRUBIN 0.9 0.7 0.6     Recent Labs     10/13/21  0440 10/14/21  0429 10/15/21  0328   RBC 3.17* 3.43* 3.15*   HEMOGLOBIN 9.5* 10.2* 9.6*   HEMATOCRIT 29.4* 34.1* 28.9*   PLATELETCT 493* 537* 557*   PROTHROMBTM 16.5*  --  17.6*   INR 1.45*  --  1.49*     Recent Results (from the past 24 hour(s))   EC-ECHOCARDIOGRAM COMPLETE W/O CONT    Collection Time: 10/14/21  4:50 PM   Result Value Ref Range    Eject.Frac. MOD BP 77.23     Eject.Frac. MOD 4C 79.29     Eject.Frac. MOD 2C 77.07     Left Ventrical Ejection Fraction 75    CBC WITH DIFFERENTIAL    Collection Time: 10/15/21  3:28 AM   Result Value Ref Range    WBC 13.5 (H) 4.8 - 10.8 K/uL    RBC 3.15 (L) 4.20 - 5.40 M/uL    Hemoglobin 9.6 (L) 12.0 - 16.0 g/dL    Hematocrit 28.9 (L) 37.0 - 47.0 %    MCV 91.7 81.4 - 97.8 fL    MCH 30.5 27.0 - 33.0 pg    MCHC 33.2 (L) 33.6 - 35.0 g/dL    RDW 48.7 35.9 - 50.0 fL    Platelet Count 557 (H) 164 - 446 K/uL    MPV 8.6 (L) 9.0 - 12.9 fL    Neutrophils-Polys 78.20 (H) 44.00 - 72.00 %    Lymphocytes 8.50 (L) 22.00 - 41.00 %    Monocytes 11.80 0.00 - 13.40 %    Eosinophils 0.00 0.00 - 6.90 %    Basophils 0.20 0.00 - 1.80 %    Immature Granulocytes 1.30 (H) 0.00 - 0.90 %    Nucleated RBC 0.00 /100 WBC    Neutrophils (Absolute) 10.52 (H) 2.00 - 7.15 K/uL    Lymphs (Absolute) 1.15 1.00 - 4.80 K/uL    Monos (Absolute) 1.59 (H) 0.00 - 0.85 K/uL    Eos (Absolute) 0.00 0.00 - 0.51 K/uL    Baso (Absolute) 0.03 0.00 - 0.12 K/uL    Immature Granulocytes (abs) 0.18 (H) 0.00 - 0.11 K/uL    NRBC (Absolute) 0.00 K/uL   Comp Metabolic Panel    Collection Time: 10/15/21  3:28 AM   Result Value Ref Range    Sodium 134 (L) 135 - 145 mmol/L    Potassium 3.7 3.6 - 5.5 mmol/L    Chloride 102 96 - 112 mmol/L    Co2 23 20 - 33 mmol/L    Anion Gap 9.0 7.0 - 16.0    Glucose 107 (H) 65 - 99 mg/dL    Bun 7 (L) 8 - 22 mg/dL    Creatinine 0.53 0.50 - 1.40 mg/dL    Calcium 7.4 (L) 8.5 - 10.5 mg/dL    AST(SGOT) 28 12  - 45 U/L    ALT(SGPT) 14 2 - 50 U/L    Alkaline Phosphatase 56 30 - 99 U/L    Total Bilirubin 0.6 0.1 - 1.5 mg/dL    Albumin 2.1 (L) 3.2 - 4.9 g/dL    Total Protein 4.8 (L) 6.0 - 8.2 g/dL    Globulin 2.7 1.9 - 3.5 g/dL    A-G Ratio 0.8 g/dL   Prothrombin Time    Collection Time: 10/15/21  3:28 AM   Result Value Ref Range    PT 17.6 (H) 12.0 - 14.6 sec    INR 1.49 (H) 0.87 - 1.13   ESTIMATED GFR    Collection Time: 10/15/21  3:28 AM   Result Value Ref Range    GFR If African American >60 >60 mL/min/1.73 m 2    GFR If Non African American >60 >60 mL/min/1.73 m 2         Radiology Review:  No orders to display         MDM (Data Review):   -Records reviewed and summarized in current documentation  -I personally reviewed and interpreted the laboratory results  -I personally reviewed the radiology images        Medical Decision Making, by Problem:  Active Hospital Problems    Diagnosis    • Bacteremia due to Gram-negative bacteria and fungemia [R78.81]    • Sepsis due to intraabdominal fluid collection/abscess (HCC) [A41.9]    • Hyperlipidemia [E78.5]    • Hypertension [I10]      66-year-old female with a history of recurrent idiopathic pancreatitis s/p ERCP with biliary sphincterotomy and biopsy of a distal BD polyp on October 1, 2021.  Postop complications-pancreatitis.  Ever since then, complaints of subjective fevers, progressive abdominal pain, nausea/vomiting.  Began to have worsening right lower quadrant pain over the past 5 days.  CT scan abdomen/pelvis large irregular fluid collection with thick wall occupying most of the right abdomen surrounding right kidney and right colon.  Additional fluid and gas collection in the midline abdomen anterior to the pancreas concerning for abscess.  Severe wall thickening of the descending and transverse colon, second portion of duodenum likely reactive.  Pneumobilia with gas in the CBD, intrahepatic bile ducts as well as pancreatic ducts (likely secondary to recent ERCP with  sphincterotomy).  IR placed a drain with improvement in abdominal pain initially, but then drain output slowed to no output. Nursing was discovered to have have flushed the drain for at least 2 days. After flush by bedside nurse, reportedly 300 cc of fluid came out. Fluid bilirubin 0.6. Fluid amylase still pending. Blood cultures from 10/8 positive for Chryseobacterium and Candida glabrata. ID following. Repeat CT with no change in fluid collection and new pelvic fluid collection/abscess.          Assessment/Recommendations:  ASSESSMENT:  1.  Sepsis due to intra-abdominal fluid collection/abscess-unclear etiology suspect possible bowel leak versus ?from recent pancreatitis. Bile leak does not seem to be the cause as HIDA was negative and fluid bilirubin normal at 0.6. Awaiting fluid amylase which was ordered by previous hospitalist but not resulted yet - Cultures (+)  2.  History of pancreatitis  3.  Intra-abdominal fluid collection, unchanged with new pelvic fluid collection  4.  History of morbid obesity s/p laparoscopic sleeve  5.  Euvolemic hyponatremia  6.  Constipation     PLAN:     -Continue IV antibiotics and antifungals per ID and pharmacy recommendations  -Continue to trend CBC, CMP  - IV antibiotics, antiemetics, pain medication per primary team  -Surgical consultation with Dr. Duomnt today. Appreciate recommendations. May need second IR drain placed.  -Can consider gastrografin upper GI series and small bowel follow through to evaluate for an intestinal leak  -Please ensure that bedside nursing is following instructions from interventional radiology of drain flushes    Thank you very much for allowing me to participate in the care of your patient.  Please feel free to contact me anytime at [phone].     Clement Conway A.P.R.N.    Core Quality Measures   Reviewed items:  Labs, Medications and Radiology reports reviewed

## 2021-10-15 NOTE — PROCEDURES
Vascular Access Team     Date of Insertion: 10/15/2021  Arm Circumference: 27  Internal length: 41  External Length: 0  Vein Occupancy %: 31   Reason for PICC: TPN  Labs: WBC 13.5, , BUN 7, Cr 0.53, GFR >60, INR 1.49     Consents confirmed, vessel patency confirmed with ultrasound. Risks and benefits of procedure explained to patient and education regarding central line associated bloodstream infections provided. Questions answered.      PICC placed in [facility] per licensed provider order with ultrasound guidance.  5 Fr, 2 lumen PICC placed in Brachial vein after 1 attempt(s). 2 mL of 1% lidocaine injected intradermally at the insertion site. A 21 gauge microintroducer needle and modified Seldinger technique was used to obtain access to the vein. 41 cm catheter inserted and brisk blood return was observed from each lumen upon aspiration. Line secured at the 0 cm marker. TCS stylet removed and observed to be fully intact. Each lumen flushed using pulsatile method without resistance with 10 mL 0.9% normal saline. PICC line secured with Biopatch and Tegaderm.     PICC tip placement location is confirmed by nurse to be in the Superior Vena Cava (SVC) utilizing 3CG technology. PICC line is appropriate for use at this time. Patient tolerated procedure well, without complications.  Patient condition relayed to primary RN or ordering physician via this post procedure note in the EMR.      Ultrasound images uploaded to PACS and viewable in the EMR - yes  Ultrasound imaged printed and placed in paper chart - no     BARD Power PICC ref # [id number], Lot # [id number], Expiration Date 8/31/2022

## 2021-10-15 NOTE — PROGRESS NOTES
Procedure Requested: Additional CT guided Drainage Catheter Placement    Date of request: 10/15/2021    Date of consultation: same    Requesting Provider: Dr. Freitas    Summary:  65 yo female s/p pancreatitis and biliary stent placement who developed an extensive intraabdominal organized collection which recently had large bore drain placed with little effect.    Imaging Findings:  Large extensive intraabdominal pseudocyst/abscess which had little effect from drain placement    Interventional Radiology Recommendation:    1.  Additional drain placement will be of little benefit.  Reccomend surgical washout.

## 2021-10-15 NOTE — PROGRESS NOTES
Patient admitted this morning by Dr. Avalos.  Initially admitted to [facility] for evaluation of abdominal pain after recent ERCP with polypectomy and sphincterotomy and noted to have large intra-abdominal fluid collection for which she underwent drainage with interventional radiology and was evaluated by infectious disease and surgery.  She was transferred to [facility] for higher level of care.  On examination her abdomen is soft with mild epigastric and right upper quadrant tenderness.  She has been having watery diarrhea with bowel incontinence.  I discussed her case with Dr. Cook from surgery and his recommendation at this time is to keep the patient n.p.o. start her on TPN and add a secondary drain to the lower portion of her right sided fluid collection and continue antibiotics with Zosyn and micafungin.  Discussed with MARIO Mcfarland and nursing staff.

## 2021-10-16 ENCOUNTER — APPOINTMENT (OUTPATIENT)
Dept: RADIOLOGY | Facility: MEDICAL CENTER | Age: 66
DRG: 856 | End: 2021-10-16
Attending: SURGERY
Payer: MEDICARE

## 2021-10-16 PROBLEM — K63.1 DUODENAL PERFORATION (HCC): Status: ACTIVE | Noted: 2021-10-16

## 2021-10-16 PROBLEM — K68.11 POSTPROCEDURAL RETROPERITONEAL ABSCESS: Status: ACTIVE | Noted: 2021-10-16

## 2021-10-16 PROBLEM — E87.8 ELECTROLYTE ABNORMALITY: Status: ACTIVE | Noted: 2021-10-16

## 2021-10-16 PROBLEM — R19.7 DIARRHEA: Status: ACTIVE | Noted: 2021-10-16

## 2021-10-16 LAB
ALBUMIN SERPL BCP-MCNC: 1.7 G/DL (ref 3.2–4.9)
ALBUMIN/GLOB SERPL: 0.8 G/DL
ALP SERPL-CCNC: 40 U/L (ref 30–99)
ALT SERPL-CCNC: 12 U/L (ref 2–50)
AMYLASE FLD-CCNC: >7500 U/L
ANION GAP SERPL CALC-SCNC: 10 MMOL/L (ref 7–16)
APPEARANCE FLD: NORMAL
AST SERPL-CCNC: 20 U/L (ref 12–45)
BASOPHILS # BLD AUTO: 0.1 % (ref 0–1.8)
BASOPHILS # BLD: 0.01 K/UL (ref 0–0.12)
BILIRUB SERPL-MCNC: 0.4 MG/DL (ref 0.1–1.5)
BODY FLD TYPE: NORMAL
BODY FLD TYPE: NORMAL
BUN SERPL-MCNC: 6 MG/DL (ref 8–22)
C DIFF DNA SPEC QL NAA+PROBE: NEGATIVE
C DIFF TOX GENS STL QL NAA+PROBE: NEGATIVE
CALCIUM SERPL-MCNC: 7.3 MG/DL (ref 8.5–10.5)
CHLORIDE SERPL-SCNC: 101 MMOL/L (ref 96–112)
CO2 SERPL-SCNC: 21 MMOL/L (ref 20–33)
COLOR FLD: NORMAL
CREAT SERPL-MCNC: 0.43 MG/DL (ref 0.5–1.4)
CSF COMMENTS 1658: NORMAL
EOSINOPHIL # BLD AUTO: 0.01 K/UL (ref 0–0.51)
EOSINOPHIL NFR BLD: 0.1 % (ref 0–6.9)
ERYTHROCYTE [DISTWIDTH] IN BLOOD BY AUTOMATED COUNT: 48.7 FL (ref 35.9–50)
GLOBULIN SER CALC-MCNC: 2.1 G/DL (ref 1.9–3.5)
GLUCOSE BLD-MCNC: 64 MG/DL (ref 65–99)
GLUCOSE SERPL-MCNC: 62 MG/DL (ref 65–99)
GRAM STN SPEC: NORMAL
HCT VFR BLD AUTO: 23.5 % (ref 37–47)
HGB BLD-MCNC: 7.4 G/DL (ref 12–16)
IMM GRANULOCYTES # BLD AUTO: 0.14 K/UL (ref 0–0.11)
IMM GRANULOCYTES NFR BLD AUTO: 1.7 % (ref 0–0.9)
LYMPHOCYTES # BLD AUTO: 1 K/UL (ref 1–4.8)
LYMPHOCYTES NFR BLD: 11.9 % (ref 22–41)
MAGNESIUM SERPL-MCNC: 1.6 MG/DL (ref 1.5–2.5)
MCH RBC QN AUTO: 29.8 PG (ref 27–33)
MCHC RBC AUTO-ENTMCNC: 31.5 G/DL (ref 33.6–35)
MCV RBC AUTO: 94.8 FL (ref 81.4–97.8)
MONOCYTES # BLD AUTO: 0.78 K/UL (ref 0–0.85)
MONOCYTES NFR BLD AUTO: 9.3 % (ref 0–13.4)
MORPHOLOGY BLD-IMP: NORMAL
NEUTROPHILS # BLD AUTO: 6.47 K/UL (ref 2–7.15)
NEUTROPHILS NFR BLD: 76.9 % (ref 44–72)
NRBC # BLD AUTO: 0 K/UL
NRBC BLD-RTO: 0 /100 WBC
PHOSPHATE SERPL-MCNC: 2 MG/DL (ref 2.5–4.5)
PLATELET # BLD AUTO: 458 K/UL (ref 164–446)
PMV BLD AUTO: 8.9 FL (ref 9–12.9)
POTASSIUM SERPL-SCNC: 3.9 MMOL/L (ref 3.6–5.5)
PROT SERPL-MCNC: 3.8 G/DL (ref 6–8.2)
RBC # BLD AUTO: 2.48 M/UL (ref 4.2–5.4)
SIGNIFICANT IND 70042: NORMAL
SITE SITE: NORMAL
SODIUM SERPL-SCNC: 132 MMOL/L (ref 135–145)
SOURCE SOURCE: NORMAL
WBC # BLD AUTO: 8.4 K/UL (ref 4.8–10.8)
WBC # FLD: NORMAL CELLS/UL

## 2021-10-16 PROCEDURE — 700111 HCHG RX REV CODE 636 W/ 250 OVERRIDE (IP): Performed by: RADIOLOGY

## 2021-10-16 PROCEDURE — 85025 COMPLETE CBC W/AUTO DIFF WBC: CPT

## 2021-10-16 PROCEDURE — 700105 HCHG RX REV CODE 258: Performed by: FAMILY MEDICINE

## 2021-10-16 PROCEDURE — A9270 NON-COVERED ITEM OR SERVICE: HCPCS | Performed by: FAMILY MEDICINE

## 2021-10-16 PROCEDURE — 99152 MOD SED SAME PHYS/QHP 5/>YRS: CPT

## 2021-10-16 PROCEDURE — 87493 C DIFF AMPLIFIED PROBE: CPT

## 2021-10-16 PROCEDURE — 89051 BODY FLUID CELL COUNT: CPT

## 2021-10-16 PROCEDURE — 80053 COMPREHEN METABOLIC PANEL: CPT

## 2021-10-16 PROCEDURE — 99233 SBSQ HOSP IP/OBS HIGH 50: CPT | Performed by: SURGERY

## 2021-10-16 PROCEDURE — 700101 HCHG RX REV CODE 250: Performed by: HOSPITALIST

## 2021-10-16 PROCEDURE — 700105 HCHG RX REV CODE 258: Performed by: STUDENT IN AN ORGANIZED HEALTH CARE EDUCATION/TRAINING PROGRAM

## 2021-10-16 PROCEDURE — 99233 SBSQ HOSP IP/OBS HIGH 50: CPT | Performed by: INTERNAL MEDICINE

## 2021-10-16 PROCEDURE — 700111 HCHG RX REV CODE 636 W/ 250 OVERRIDE (IP): Performed by: HOSPITALIST

## 2021-10-16 PROCEDURE — 770001 HCHG ROOM/CARE - MED/SURG/GYN PRIV*

## 2021-10-16 PROCEDURE — 36415 COLL VENOUS BLD VENIPUNCTURE: CPT

## 2021-10-16 PROCEDURE — 700111 HCHG RX REV CODE 636 W/ 250 OVERRIDE (IP): Mod: JG | Performed by: FAMILY MEDICINE

## 2021-10-16 PROCEDURE — 83735 ASSAY OF MAGNESIUM: CPT

## 2021-10-16 PROCEDURE — 84100 ASSAY OF PHOSPHORUS: CPT

## 2021-10-16 PROCEDURE — 700102 HCHG RX REV CODE 250 W/ 637 OVERRIDE(OP): Performed by: FAMILY MEDICINE

## 2021-10-16 PROCEDURE — 87205 SMEAR GRAM STAIN: CPT

## 2021-10-16 PROCEDURE — 87106 FUNGI IDENTIFICATION YEAST: CPT | Mod: 91

## 2021-10-16 PROCEDURE — 99233 SBSQ HOSP IP/OBS HIGH 50: CPT | Performed by: HOSPITALIST

## 2021-10-16 PROCEDURE — 700105 HCHG RX REV CODE 258: Performed by: HOSPITALIST

## 2021-10-16 PROCEDURE — 82962 GLUCOSE BLOOD TEST: CPT

## 2021-10-16 PROCEDURE — 700111 HCHG RX REV CODE 636 W/ 250 OVERRIDE (IP)

## 2021-10-16 PROCEDURE — 87070 CULTURE OTHR SPECIMN AEROBIC: CPT

## 2021-10-16 PROCEDURE — 0F9G30Z DRAINAGE OF PANCREAS WITH DRAINAGE DEVICE, PERCUTANEOUS APPROACH: ICD-10-PCS | Performed by: RADIOLOGY

## 2021-10-16 PROCEDURE — 82150 ASSAY OF AMYLASE: CPT

## 2021-10-16 PROCEDURE — 700111 HCHG RX REV CODE 636 W/ 250 OVERRIDE (IP): Performed by: STUDENT IN AN ORGANIZED HEALTH CARE EDUCATION/TRAINING PROGRAM

## 2021-10-16 PROCEDURE — 87186 SC STD MICRODIL/AGAR DIL: CPT

## 2021-10-16 PROCEDURE — 87077 CULTURE AEROBIC IDENTIFY: CPT

## 2021-10-16 RX ORDER — POTASSIUM CHLORIDE 7.45 MG/ML
10 INJECTION INTRAVENOUS
Status: COMPLETED | OUTPATIENT
Start: 2021-10-16 | End: 2021-10-16

## 2021-10-16 RX ORDER — MAGNESIUM SULFATE HEPTAHYDRATE 40 MG/ML
2 INJECTION, SOLUTION INTRAVENOUS ONCE
Status: DISCONTINUED | OUTPATIENT
Start: 2021-10-16 | End: 2021-10-16

## 2021-10-16 RX ORDER — ONDANSETRON 2 MG/ML
4 INJECTION INTRAMUSCULAR; INTRAVENOUS PRN
Status: ACTIVE | OUTPATIENT
Start: 2021-10-16 | End: 2021-10-16

## 2021-10-16 RX ORDER — FLUMAZENIL 0.1 MG/ML
INJECTION INTRAVENOUS
Status: COMPLETED
Start: 2021-10-16 | End: 2021-10-16

## 2021-10-16 RX ORDER — MAGNESIUM SULFATE 1 G/100ML
1 INJECTION INTRAVENOUS ONCE
Status: DISCONTINUED | OUTPATIENT
Start: 2021-10-16 | End: 2021-10-16

## 2021-10-16 RX ORDER — MIDAZOLAM HYDROCHLORIDE 1 MG/ML
.5-2 INJECTION INTRAMUSCULAR; INTRAVENOUS PRN
Status: ACTIVE | OUTPATIENT
Start: 2021-10-16 | End: 2021-10-16

## 2021-10-16 RX ORDER — MIDAZOLAM HYDROCHLORIDE 1 MG/ML
INJECTION INTRAMUSCULAR; INTRAVENOUS
Status: COMPLETED
Start: 2021-10-16 | End: 2021-10-16

## 2021-10-16 RX ORDER — NALOXONE HYDROCHLORIDE 0.4 MG/ML
INJECTION, SOLUTION INTRAMUSCULAR; INTRAVENOUS; SUBCUTANEOUS
Status: COMPLETED
Start: 2021-10-16 | End: 2021-10-16

## 2021-10-16 RX ORDER — SODIUM CHLORIDE 9 MG/ML
500 INJECTION, SOLUTION INTRAVENOUS
Status: ACTIVE | OUTPATIENT
Start: 2021-10-16 | End: 2021-10-16

## 2021-10-16 RX ORDER — ONDANSETRON 2 MG/ML
INJECTION INTRAMUSCULAR; INTRAVENOUS
Status: COMPLETED
Start: 2021-10-16 | End: 2021-10-16

## 2021-10-16 RX ADMIN — FENTANYL CITRATE 25 MCG: 50 INJECTION, SOLUTION INTRAMUSCULAR; INTRAVENOUS at 13:37

## 2021-10-16 RX ADMIN — PIPERACILLIN AND TAZOBACTAM 3.38 G: 3; .375 INJECTION, POWDER, LYOPHILIZED, FOR SOLUTION INTRAVENOUS; PARENTERAL at 14:23

## 2021-10-16 RX ADMIN — HEPARIN SODIUM 5000 UNITS: 5000 INJECTION, SOLUTION INTRAVENOUS; SUBCUTANEOUS at 21:33

## 2021-10-16 RX ADMIN — ONDANSETRON 4 MG: 2 INJECTION INTRAMUSCULAR; INTRAVENOUS at 21:42

## 2021-10-16 RX ADMIN — GABAPENTIN 600 MG: 300 CAPSULE ORAL at 17:29

## 2021-10-16 RX ADMIN — MAGNESIUM SULFATE HEPTAHYDRATE 3 G: 500 INJECTION, SOLUTION INTRAMUSCULAR; INTRAVENOUS at 10:41

## 2021-10-16 RX ADMIN — MIDAZOLAM HYDROCHLORIDE 1 MG: 1 INJECTION, SOLUTION INTRAMUSCULAR; INTRAVENOUS at 13:37

## 2021-10-16 RX ADMIN — OXYCODONE HYDROCHLORIDE 10 MG: 10 TABLET ORAL at 17:29

## 2021-10-16 RX ADMIN — ONDANSETRON 4 MG: 2 INJECTION INTRAMUSCULAR; INTRAVENOUS at 17:29

## 2021-10-16 RX ADMIN — OXYCODONE HYDROCHLORIDE 10 MG: 10 TABLET ORAL at 07:51

## 2021-10-16 RX ADMIN — FENTANYL CITRATE 50 MCG: 50 INJECTION, SOLUTION INTRAMUSCULAR; INTRAVENOUS at 13:32

## 2021-10-16 RX ADMIN — POTASSIUM CHLORIDE 10 MEQ: 7.45 INJECTION INTRAVENOUS at 18:11

## 2021-10-16 RX ADMIN — EZETIMIBE 10 MG: 10 TABLET ORAL at 17:29

## 2021-10-16 RX ADMIN — PIPERACILLIN AND TAZOBACTAM 3.38 G: 3; .375 INJECTION, POWDER, LYOPHILIZED, FOR SOLUTION INTRAVENOUS; PARENTERAL at 21:33

## 2021-10-16 RX ADMIN — PIPERACILLIN AND TAZOBACTAM 3.38 G: 3; .375 INJECTION, POWDER, LYOPHILIZED, FOR SOLUTION INTRAVENOUS; PARENTERAL at 06:09

## 2021-10-16 RX ADMIN — ONDANSETRON 4 MG: 2 INJECTION INTRAMUSCULAR; INTRAVENOUS at 11:51

## 2021-10-16 RX ADMIN — GABAPENTIN 600 MG: 300 CAPSULE ORAL at 06:10

## 2021-10-16 RX ADMIN — Medication 1000 UNITS: at 06:10

## 2021-10-16 RX ADMIN — MIDAZOLAM HYDROCHLORIDE 2 MG: 1 INJECTION, SOLUTION INTRAMUSCULAR; INTRAVENOUS at 13:32

## 2021-10-16 RX ADMIN — MIDAZOLAM HYDROCHLORIDE 1 MG: 1 INJECTION, SOLUTION INTRAMUSCULAR; INTRAVENOUS at 13:34

## 2021-10-16 RX ADMIN — POTASSIUM CHLORIDE 10 MEQ: 7.45 INJECTION INTRAVENOUS at 19:54

## 2021-10-16 RX ADMIN — OXYCODONE HYDROCHLORIDE 10 MG: 10 TABLET ORAL at 11:51

## 2021-10-16 RX ADMIN — CALCIUM GLUCONATE: 98 INJECTION, SOLUTION INTRAVENOUS at 21:47

## 2021-10-16 RX ADMIN — FAMOTIDINE 20 MG: 20 TABLET ORAL at 06:10

## 2021-10-16 RX ADMIN — OXYCODONE HYDROCHLORIDE 10 MG: 10 TABLET ORAL at 21:33

## 2021-10-16 RX ADMIN — ONDANSETRON 4 MG: 2 INJECTION INTRAMUSCULAR; INTRAVENOUS at 07:51

## 2021-10-16 RX ADMIN — OXYCODONE HYDROCHLORIDE 10 MG: 10 TABLET ORAL at 02:54

## 2021-10-16 RX ADMIN — FENTANYL CITRATE 50 MCG: 50 INJECTION INTRAMUSCULAR; INTRAVENOUS at 13:32

## 2021-10-16 RX ADMIN — ONDANSETRON: 2 INJECTION INTRAMUSCULAR; INTRAVENOUS at 13:21

## 2021-10-16 RX ADMIN — FENTANYL CITRATE 25 MCG: 50 INJECTION, SOLUTION INTRAMUSCULAR; INTRAVENOUS at 13:34

## 2021-10-16 RX ADMIN — SODIUM CHLORIDE, POTASSIUM CHLORIDE, SODIUM LACTATE AND CALCIUM CHLORIDE: 600; 310; 30; 20 INJECTION, SOLUTION INTRAVENOUS at 04:30

## 2021-10-16 RX ADMIN — MICAFUNGIN SODIUM 100 MG: 100 INJECTION, POWDER, LYOPHILIZED, FOR SOLUTION INTRAVENOUS at 16:27

## 2021-10-16 ASSESSMENT — ENCOUNTER SYMPTOMS
ABDOMINAL PAIN: 1
DIARRHEA: 1
HEADACHES: 0
NAUSEA: 0
DIZZINESS: 0
COUGH: 0
FALLS: 0
SHORTNESS OF BREATH: 0
WHEEZING: 0
FOCAL WEAKNESS: 0
FEVER: 0
CHILLS: 0
MEMORY LOSS: 0
CONSTIPATION: 0
WEAKNESS: 1
VOMITING: 0
MYALGIAS: 0

## 2021-10-16 ASSESSMENT — PAIN DESCRIPTION - PAIN TYPE
TYPE: ACUTE PAIN

## 2021-10-16 ASSESSMENT — LIFESTYLE VARIABLES: SUBSTANCE_ABUSE: 0

## 2021-10-16 NOTE — ASSESSMENT & PLAN NOTE
-After ERCP with retroperitoneal abscess and bacteremia  -Uncertain etiology - ddx includes pancreatitis, bile leak, iatrogenic  -Pepcid BID  -11/9: Concern perforation may have reopened.  Surgery team recommending NPO status and TPN  -11/10:  Start octreotide.   -11/17: Patient to go for upper GI today to see if leak at perforated site near ampulla.  If leaking, general surgeon to discuss with GI regarding placing a wall duodenal stent/esophageal stent  -11/18: Upper GI showed duodenal leak. Plan for stent placement tomorrow.   -11/19: Endoscope/ERCP, biliary stent, and cortrak placement done yesterday with Dr. Lawrence.    Continue abx, trend drain OP, KUB confirmed NTG placement- enteral feedings to be started.    Per General Surgery- hold oral medications/change to PO medications to IV at this time.   -11/20: Tolerating tube feedings, increase as tolerated. Continue NPO other than enteral feedings. Plan for repeat upper GI this week per general surgery.  -11/21: Tolerating TF. Decreased output to collection bags. Remain NPO beside TF. Plan for repeat upper GI in coming days.   -11/22: Seen by GI. Repeat UGI done today- no duodenal leaks seen. Continue TF, advance diet per general surgery.   -11/24: Repeat CT indicating improvement in abscess, no leak detected; CLD initiated today, core track discontinued  -11/26: REMOVED MARTHA drain by Dr. ST  -Pain control with dilaudid IV.   -Antiemetics PRN.

## 2021-10-16 NOTE — CARE PLAN
The patient is Watcher - Medium risk of patient condition declining or worsening    Shift Goals  Clinical Goals: Monitor drain output  Patient Goals: Pain control    Progress made toward(s) clinical / shift goals:        Problem: Hemodynamics  Goal: Patient's hemodynamics, fluid balance and neurologic status will be stable or improve  Outcome: Progressing     Problem: Fluid Volume  Goal: Fluid volume balance will be maintained  Outcome: Progressing     Problem: Urinary - Renal Perfusion  Goal: Ability to achieve and maintain adequate renal perfusion and functioning will improve  Outcome: Progressing     Problem: Skin Integrity  Goal: Skin integrity is maintained or improved  Outcome: Progressing

## 2021-10-16 NOTE — PROGRESS NOTES
Infectious Disease Progress Note    Author: Tamra Mcfarland M.D. Date & Time of service: 10/16/2021  11:13 AM    Chief Complaint:  Follow-up for multiple intra-abdominal abscesses, polymicrobial bacteremia, candidemia    Interval History:  10/15 afebrile, WBC 13.5 patient transferred to [facility] overnight.  Patient having diarrhea however abdominal pain is slightly improved today.  She was evaluated by Dr. ST this morning who is recommending additional drain placement and holding off on surgery at this time  10/16 afebrile, WBC 8.4.  Plan for second drain placement today.  Also patient to start TPN.  Had one episode of liquid dark stools.  C differential PCR pending    Labs Reviewed, Medications Reviewed and Radiology Reviewed.    Review of Systems:  Review of Systems   Constitutional: Positive for malaise/fatigue. Negative for chills and fever.   Respiratory: Negative for cough and shortness of breath.    Gastrointestinal: Positive for abdominal pain and diarrhea. Negative for nausea.   Neurological: Negative for dizziness and headaches.   All other systems reviewed and are negative.      Hemodynamics:  Temp (24hrs), Av.6 °C (97.9 °F), Min:36.1 °C (96.9 °F), Max:37.1 °C (98.8 °F)  Temperature: 36.1 °C (97 °F)  Pulse  Av  Min: 57  Max: 85   Blood Pressure : 137/67       Physical Exam:  Physical Exam  Vitals and nursing note reviewed.   Constitutional:       Appearance: Normal appearance.   HENT:      Mouth/Throat:      Mouth: Mucous membranes are moist.   Eyes:      Extraocular Movements: Extraocular movements intact.      Pupils: Pupils are equal, round, and reactive to light.   Cardiovascular:      Rate and Rhythm: Normal rate and regular rhythm.   Abdominal:      Palpations: Abdomen is soft.      Tenderness: There is abdominal tenderness.      Comments: Right-sided drain with thick brown fluid in bag   Musculoskeletal:         General: No swelling.      Comments: Left upper extremity PICC line in  place-nontender, no surrounding erythema   Skin:     General: Skin is warm and dry.   Neurological:      General: No focal deficit present.      Mental Status: She is alert and oriented to person, place, and time.   Psychiatric:         Mood and Affect: Mood normal.         Behavior: Behavior normal.      Comments: Pleasant         Meds:    Current Facility-Administered Medications:   •  magnesium sulfate  •  ondansetron  •  ondansetron  •  polyethylene glycol/lytes **AND** magnesium hydroxide  •  acetaminophen  •  HYDROmorphone  •  Notify provider if pain remains uncontrolled **AND** Use the Numeric Rating Scale (NRS), Colon-Baker Faces (WBF), or FLACC on regular floors and Critical-Care Pain Observation Tool (CPOT) on ICUs/Trauma to assess pain **AND** Pulse Ox **AND** Pharmacy Consult Request **AND** If patient difficult to arouse and/or has respiratory depression (respiratory rate of 10 or less), stop any opiates that are currently infusing and call a Rapid Response.  •  cyclobenzaprine  •  diphenhydrAMINE  •  ezetimibe  •  famotidine **OR** famotidine  •  gabapentin  •  magnesium oxide  •  micafungin (MYCAMINE) ivpb  •  nicotine  •  oxyCODONE immediate release  •  piperacillin-tazobactam  •  promethazine  •  vitamin D3  •  LR  •  heparin  •  Special Contact Isolation **AND** [COMPLETED] C Diff by PCR rflx Toxin **AND** Pharmacy  •  MD Alert...TPN per Pharmacy    Labs:  Recent Labs     10/14/21  0429 10/15/21  0328 10/16/21  0233   WBC 12.7* 13.5* 8.4   RBC 3.43* 3.15* 2.48*   HEMOGLOBIN 10.2* 9.6* 7.4*   HEMATOCRIT 34.1* 28.9* 23.5*   MCV 99.4* 91.7 94.8   MCH 29.7 30.5 29.8   RDW 52.8* 48.7 48.7   PLATELETCT 537* 557* 458*   MPV 9.1 8.6* 8.9*   NEUTSPOLYS 77.10* 78.20* 76.90*   LYMPHOCYTES 9.10* 8.50* 11.90*   MONOCYTES 11.70 11.80 9.30   EOSINOPHILS 0.10 0.00 0.10   BASOPHILS 0.20 0.20 0.10   RBCMORPHOLO Present  --   --      Recent Labs     10/14/21  0429 10/15/21  0328 10/16/21  0233   SODIUM 134* 134* 132*    POTASSIUM 4.1 3.7 3.9   CHLORIDE 101 102 101   CO2 21 23 21   GLUCOSE 94 107* 62*   BUN 9 7* 6*     Recent Labs     10/14/21  0429 10/15/21  0328 10/16/21  0233   ALBUMIN 1.9* 2.1* 1.7*   TBILIRUBIN 0.7 0.6 0.4   ALKPHOSPHAT 68 56 40   TOTPROTEIN 4.8* 4.8* 3.8*   ALTSGPT 8 14 12   ASTSGOT 19 28 20   CREATININE 0.52 0.53 0.43*       Imaging:  CT-ABDOMEN WITH & W/O    Result Date: 10/9/2021  10/9/2021 1:38 PM HISTORY/REASON FOR EXAM:  eval for any ugi perforation given recent sphinterotomy during ERCP and now with fluid collection. TECHNIQUE/EXAM DESCRIPTION:  CT scan of the abdomen without and with contrast. Initial precontrast images were obtained from the diaphragmatic domes through the iliac crests using helical technique.  Following nonionic contrast administration in an intravenous bolus fashion, and postcontrast, thin-section helical scanning obtained from the diaphragmatic domes through the iliac crests. 80 mL of Omnipaque 350 nonionic contrast was administered. Low dose optimization technique was utilized for this CT exam including automated exposure control and adjustment of the mA and/or kV according to patient size. COMPARISON: 10/8/2021, 10/2/2021. FINDINGS: There is a small right pleural effusion with adjacent enhancing segmental atelectasis.. Calcified left lower lobe granuloma. No pericardial effusion. Cardiac chambers are normal in size. Coronary artery calcifications are present. Air in the intra and extra hepatic bile ducts. Unremarkable appearance of the liver. The hepatic and portal veins are patent. Spleen is unremarkable. Again noted is air in the pancreatic duct. No adrenal gland nodules. Gallbladder is surgically absent. No hydronephrosis. No dilated loops of imaged bowel. There is anasarca and mesenteric edema. A pigtail drainage catheter is present in the right abdomen in a air and fluid collection, the extent of which is difficult to measure the collection tracks inferiorly and medially  measuring approximately 17 cm in craniocaudal dimension. A smaller loculated collection in the anterior abdomen near the inferior anterior abdominal wall sutures measures 1.5 x 8.7 cm. No findings of contrast extravasation from the opacified bowel loops,  particularly no contrast extravasation at the ampulla of [place].     1.  No findings of intraluminal contrast extravasation from the opacified bowel loops, particularly no contrast extravasation from the duodenum at the ampulla of [place]. 2.  A pigtail drainage catheter is present in the right abdomen in a air and fluid collection, the extent of which is difficult to measure. The collection tracks inferiorly and medially measuring approximately 17 cm in craniocaudal dimension. 3.  A smaller loculated collection in the anterior abdomen near the inferior anterior abdominal wall sutures measures 1.5 x 8.7 cm. 4.  Unchanged pneumobilia in the CBD, intrahepatic bile ducts as well as pancreatic ducts. 5.  There is a small right pleural effusion with adjacent enhancing segmental atelectasis. 6.   Coronary artery calcifications are present    CT-ABDOMEN-PELVIS WITH    Result Date: 10/13/2021  10/13/2021 11:57 AM HISTORY/REASON FOR EXAM:  Peritonitis or perforation suspected; Pt with known intraabdominal fluid collection in the abdomen of uncertain etiology - had recent ERCP but studies not consistent with bile leak.  Drain in place - reassess. TECHNIQUE/EXAM DESCRIPTION:   CT scan of the abdomen and pelvis with contrast. Contrast-enhanced helical scanning was obtained from the diaphragmatic domes through the pubic symphysis following the bolus administration of nonionic contrast without complication. 100 mL of Omnipaque 350 nonionic contrast was administered without complication. Low dose optimization technique was utilized for this CT exam including automated exposure control and adjustment of the mA and/or kV according to patient size. COMPARISON: 10/9/2021 FINDINGS: Lower  Chest: Trace right pleural effusion with right basilar atelectasis. Calcified granuloma in the left lower lobe and trace left pleural fluid. Liver: Normal. Spleen: Unremarkable. Pancreas: Unremarkable. Gallbladder: The gallbladder has been resected. Biliary: Pneumobilia again noted. Adrenal glands: Normal. Kidneys: There is a small left renal cortical cyst which does not require imaging follow-up. No hydronephrosis.. Bowel: No bowel obstruction. Parts of the bowel are suboptimally evaluated due to the surrounding abscess and loss of the intervening fat planes. There is gastric sleeve surgery. Lymph nodes: No adenopathy. Vasculature: Scattered atherosclerosis. Peritoneum: A multiloculated although spatial rim-enhancing air-fluid collection within the right abdomen does not appear significantly changed in size the prior study. On series 2 image 54 measures about 15.5 x 8.7 cm. It extends from the upper abdomen,  where it is seen in the gallbladder fossa, inferiorly into the pelvis. It insinuates around and partially encases the duodenum and right kidney and extends around ascending colon and some mesenteric branch vessels. There is a percutaneous pigtail drainage catheter which appears well positioned within the abscess in the right midabdomen. Musculoskeletal: No acute or destructive process. Postsurgical changes anterior lumbar spinal fusion Pelvis: Hysterectomy. There is a small rim-enhancing fluid collection within the pelvis measuring 7 x 3.1 x 2.8 cm suspicious for small abscess..     1.  Extensive multiloculated rim-enhancing air and fluid collection/abscess within the right abdomen is not significantly changed in size from the prior study. The percutaneous pigtail drainage catheter appears well-positioned within the collection. 2.  Small separate pelvic rim-enhancing fluid collection suggests an abscess.    CT-ABDOMEN-PELVIS WITH    Result Date: 10/8/2021  10/8/2021 8:06 AM HISTORY/REASON FOR EXAM:  Abdominal  pain, fever. Right lower quadrant pain. Pancreatitis. TECHNIQUE/EXAM DESCRIPTION:   CT scan of the abdomen and pelvis with contrast. Contrast-enhanced helical scanning was obtained from the diaphragmatic domes through the pubic symphysis following the bolus administration of nonionic contrast without complication. 100 mL of Omnipaque 350 nonionic contrast was administered without complication. Low dose optimization technique was utilized for this CT exam including automated exposure control and adjustment of the mA and/or kV according to patient size. COMPARISON: 10/2/2021. FINDINGS: Lower Chest: Small right pleural effusion with right basilar opacities. Liver: Normal. Spleen: Unremarkable. Pancreas: Poorly visualized. There is gas in the pancreatic duct. Gallbladder: Surgically absent. Biliary: There is gas in the CBD and intrahepatic bile ducts, left more than right Adrenal glands: Normal. Kidneys: No hydronephrosis. Bilateral kidneys are enhancing symmetrically.. Bowel: Severe wall thickening of the descending colon, transverse colon, second portion duodenum. Postsurgical change in the stomach Lymph nodes: No adenopathy. Vasculature: The abdominal aorta is normal in caliber. Peritoneum: There is large irregular fluid collection occupying most of the right abdomen surrounding the right kidney and right colon. Additional fluid and gas collection in the midline abdomen anterior to the pancreas. This collection is probably connected to the right side collection via a junction in the jl hepatis Musculoskeletal: Postsurgical change in the lower lumbar spine. Pelvis: Trace pelvic free fluid.     1. Large irregular irregular fluid collection with thick wall occupying most of the right abdomen surrounding the right kidney and right colon. Additional fluid and gas collection in the midline abdomen anterior to the pancreas concerning for abscess. This collection is probably connected to the right side collection via a  junction in the jl hepatis 2. Severe wall thickening of the descending colon, transverse colon, second portion duodenum, likely reactive. 3. Pneumobilia with gas in the CBD, intrahepatic bile ducts as well as pancreatic ducts are of unclear etiology. 4. Small right pleural effusion with right basilar atelectasis.     CT-ABDOMEN-PELVIS WITH    Result Date: 10/2/2021  10/2/2021 2:03 PM HISTORY/REASON FOR EXAM:  RLQ and diffuse lower abdominal pain; had ERCP yesterday. Pt has pancreatitis and they found a polyp on her bile duct. TECHNIQUE/EXAM DESCRIPTION:   CT scan of the abdomen and pelvis with contrast. Contrast-enhanced helical scanning was obtained from the diaphragmatic domes through the pubic symphysis following the bolus administration of nonionic contrast without complication. 100 mL of Omnipaque 350 nonionic contrast was administered without complication. Low dose optimization technique was utilized for this CT exam including automated exposure control and adjustment of the mA and/or kV according to patient size. COMPARISON: 2/22/2019 FINDINGS: Lower Chest: Unremarkable. Liver: Normal. Hepatic and portal veins are patent. Spleen: Unremarkable. Pancreas: The pancreatic head is edematous though the parenchyma enhances normally. There is surrounding homogenous peripancreatic and mesenteric edema/infiltration which tracks down the right paracolic gutter and conforms to retroperitoneal structures. No defined walled off collection. There is small volume ascites in the pelvis. No focal fluid collection. Adrenal glands: Normal. Gallbladder: Cholecystectomy Biliary: Unchanged mild intrahepatic bile duct dilation. Kidneys: Symmetric nephrograms. No hydronephrosis. Pelvis: Hysterectomy. Normal appearance of the urinary bladder.. Bowel: There are no dilated loops of small or large bowel. Scattered colonic diverticuli with no inflammation. The appendix is normal. Prior gastric sleeve. Lymph nodes: No adenopathy.  Vasculature: No aneurysm. Musculoskeletal: Fusion of L3-L5. Multifocal degenerative changes are present. No suspicious osseous abnormality.     Acute interstitial edematous pancreatitis with surrounding mesenteric edema tracking along the right paracolic gutter. Small volume ascites in the pelvis. No walled off collections.    CT-DRAIN-PERITONEAL    Result Date: 10/10/2021  HISTORY/REASON FOR EXAM:  66-year-old woman with pancreatitis and extensive intraperitoneal abscess. TECHNIQUE/EXAM DESCRIPTION AND NUMBER OF VIEWS: RIGHT peritoneal drain placement with CT guidance. Low dose optimization technique was utilized for this CT exam including automated exposure control and adjustment of the mA and/or kV according to patient size. COMPARISON:  CT 10/8/2021 MEDICATIONS: Moderate sedation was provided. Pulse oximetry and continuous cardiac monitoring by the nurse was performed throughout the exam. Intraservice time was 15 minutes. PROCEDURE:     The risks, benefits, goals and objectives and alternatives were discussed. Risks were specified as including but not limited to bleeding, infection, damage to vessels or nerves, pain and discomfort as well as the possibility of incomplete resolution of underlying disease. Drain care related issues were discussed with the patient. The patient's questions were answered. Informed oral and written consent were obtained. The patient was placed on the CT gantry. Localizing scan was performed. The skin was prepped and draped in the usual sterile manner.  A timeout was performed. Local anesthetic result was achieved with administration of 1% lidocaine. A(n) 17-gauge access needle was advanced to the target using CT fluoroscopic guidance. Access was secured with a wire and the tract was sequentially dilated to accommodate a(n) 14 [place] locking loop drain. A total of 80 mL of thin brown turbid fluid was removed and sent for  laboratory evaluation. This was put in place and formed. The  catheter was attached to bag drainage and secured to the skin with 2-0 nylon suture. Completion scan was performed which showed no complication. The patient tolerated the procedure well with no evidence of complication. The skin was cleaned and a dressing was applied. FINDINGS: Drainage tube in good position     Successful peritoneal drainage tube placement. Plan: Thrice daily flushes with 10 mL of sterile saline. Monitor outputs. Please contact interventional radiology if there is any concern for tube dysfunction.     IR-US GUIDED PIV    Result Date: 10/10/2021  EXAMINATION:                                                                    HISTORY/REASON FOR EXAM:  Ultrasound Guided PIV.  TECHNIQUE/EXAM DESCRIPTION AND NUMBER OF VIEWS:  Peripheral IV insertion with ultrasound guidance.  The procedure was prepared using maximal sterile barrier technique including sterile gown, mask, cap, and donning of sterile gloves following appropriate hand hygiene and/or sterile scrub. Patient skin site was prepped with 2% Chlorhexidine solution.   FINDINGS: Peripheral IV insertion with Ultrasound Guidance was performed by qualified imaging nursing staff without the assistance of a Radiologist.      Ultrasound-guided PERIPHERAL IV INSERTION performed by qualified nursing staff as above.    NM-HEPATOBILIARY SCAN    Result Date: 10/10/2021  10/10/2021 11:12 AM HISTORY/REASON FOR EXAM:  rule out biliary leak post ERCP with sphincterotomy; R/O bile leak TECHNIQUE/EXAM DESCRIPTION AND NUMBER OF VIEWS: Hepatobiliary scan. COMPARISON: 10/9/2021 CT abdomen PROCEDURE:  5.4 mCi Tc 99m-Choletec was administered intravenously, followed by 90 minutes of anterior planar imaging. FINDINGS: Normal homogeneous uptake of radiotracer in the hepatic parenchyma with visualization of the tracer in the common bile duct and entering the small bowel loops. No abnormal pooling or collection radiotracer outside of the biliary system or bowel.     Normal  hepatobiliary scan with no findings of a bile leak.    DX-PORTABLE FLUOROSCOPY < 1 HOUR    Result Date: 10/1/2021  10/1/2021 10:01 AM HISTORY/REASON FOR EXAM:  ERCP TECHNIQUE/EXAM DESCRIPTION AND NUMBER OF VIEWS: 2 images were obtained in the operating room. A total of 0.3 minutes of fluoroscopy time utilized. COMPARISON: Selected images CT abdomen and pelvis 2019 FINDINGS: Images show injection of contrast into the common bile duct which is dilated. There is also filling of the pancreatic duct. Fluoroscopy time: minutes     ERCP images as described    EC-ECHOCARDIOGRAM COMPLETE W/O CONT    Result Date: 10/14/2021  Transthoracic Echo Report Echocardiography Laboratory CONCLUSIONS Normal left ventricular chamber size. Hyperdynamic left ventricular systolic function. The left ventricular ejection fraction is visually estimated to be greater than 75%. Normal right ventricular size and systolic function. Enlarged right atrium. Mild tricuspid regurgitation. Right ventricular systolic pressure is estimated to be  40 mmHg; mild pulmonary hypertension. Normal pericardium without effusion. No prior study is available for comparison. KATERINA PATEL Exam Date:         10/14/2021                    16:15 Exam Location:     Inpatient Priority:          Routine Ordering Physician:        YUE TOURE Referring Physician:       [id number] Campos Sonographer:               Aroldo Nolan RDCS, KAMRYN Age:    66     Gender:    F MRN:    [id number] :    1955 BSA:    1.68   Ht (in):    63     Wt (lb):    143 Exam Type:     Complete Indications:     Endocarditis ICD Codes:       421 CPT Codes:       33589 BP:   106    /   48     HR:   78 Technical Quality:       Fair MEASUREMENTS  (Male / Female) Normal Values 2D ECHO LV Diastolic Diameter PLAX        3.9 cm                4.2 - 5.9 / 3.9 - 5.3 cm LV Systolic Diameter PLAX         2.7 cm                2.1 - 4.0 cm IVS Diastolic Thickness            0.88 cm               LVPW Diastolic Thickness          0.75 cm               LVOT Diameter                     2 cm                  Estimated LV Ejection Fraction    75 %                  LV Ejection Fraction MOD BP       77.2 %                >= 55  % LV Ejection Fraction MOD 4C       79.3 %                LV Ejection Fraction MOD 2C       77.1 %                IVC Diameter                      1.5 cm                DOPPLER AV Peak Velocity                  1.8 m/s               AV Peak Gradient                  13.5 mmHg             AV Mean Gradient                  6.3 mmHg              LVOT Peak Velocity                1.7 m/s               AV Area Cont Eq vti               2.8 cm2               MV Velocity Time Integral         42.6 cm               Mitral E Point Velocity           1.3 m/s               Mitral E to A Ratio               1.5                   MV Pressure Half Time             77.3 ms               MV Area PHT                       2.8 cm2               MV Deceleration Time              266 ms                TR Peak Velocity                  269 cm/s              PV Peak Velocity                  0.96 m/s              PV Peak Gradient                  3.7 mmHg              RVOT Peak Velocity                0.73 m/s              * Indicates values subject to auto-interpretation LV EF:  75    % FINDINGS Left Ventricle Normal left ventricular chamber size. Normal left ventricular wall thickness. Hyperdynamic left ventricular systolic function. The left ventricular ejection fraction is visually estimated to be greater than 75%. Normal regional wall motion. Normal diastolic function. Right Ventricle Normal right ventricular size. Normal right ventricular systolic function. Right Atrium Enlarged right atrium. Normal inferior vena cava size and inspiratory collapse. Left Atrium Normal left atrial size. Left atrial volume index is 26  mL/sq m. Mitral Valve Structurally normal mitral valve. No mitral  "stenosis. Trace mitral regurgitation. Aortic Valve The aortic valve is not well visualized. Cannot rule out bicuspid aortic valve. No aortic stenosis. No aortic insufficiency. Tricuspid Valve Structurally normal tricuspid valve. No tricuspid stenosis. Mild tricuspid regurgitation. Right ventricular systolic pressure is estimated to be  40 mmHg. Pulmonic Valve The pulmonic valve is not well visualized. No pulmonic stenosis. No pulmonic insufficiency. Pericardium Normal pericardium without effusion. Aorta The ascending aorta diameter is 3.1  cm. Nate Sarmiento MD (Electronically Signed) Final Date:     14 October 2021                 17:39      Micro:  Results     Procedure Component Value Units Date/Time    C Diff by PCR rflx Toxin [[id number]] Collected: 10/16/21 0840    Order Status: Completed Specimen: Stool Updated: 10/16/21 1025    Narrative:      Special Contact [id number] TARA DIAZ  Does this patient have risk factors for C-diff?->Yes  Has patient taken stool softeners or laxatives in the last 5  days?->No  Indication for CDiff by PCR?->Greater than/equal to 3 stools  in 24 hr & \"takes shape of container\"    FLUID CULTURE W/GRAM STAIN [[id number]]     Order Status: No result Specimen: Body Fluid from Peritoneal Fluid           Assessment:  Active Hospital Problems    Diagnosis    • Bacteremia due to Gram-negative bacteria and fungemia [R78.81]    • Sepsis due to intraabdominal fluid collection/abscess (HCC) [A41.9]    • Hyperlipidemia [E78.5]    • Hypertension [I10]      66 y.o. 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  Patient found to have multiple and extensive fluid collections in the abdomen and pelvis on imaging.  She underwent drain placement on 10/8.  Cultures are " growing E. coli and Enterococcus faecalis.  Blood cultures are growing chryseobacterium species and Candida glabrata. Source likely due to the intra-abdominal abscesses. Repeat CT scan on 10/13 shows extensive multiloculated fluid collections in the abdomen and pelvis.    Pertinent diagnoses:  Multiple intra-abdominal abscesses  Candidemia  Chryseobacterium bacteremia  Polymicrobial infection  Persistent leukocytosis, resolved  Thrombocytosis  Diarrhea  Recent ERCP pancreatitis    Plan:  -Blood cultures on 10/8 - Chryseobacterium species, Candida glabrata.  -Chryseobacterium species have been known to be susceptible to Zosyn, Bactrim and ciprofloxacin  -Follow repeat blood cultures on 10/13-negative to date  -TTE-negative  -Continue to monitor for any vision changes-patient currently denies  -Continue IV Zosyn and micafungin  -Dr. Cook following-recommended additional drain placement  -Plan for second drain placement by IR today today   -Recommend a repeat CT scan in 5 to 7 days from 2nd drain placement to assess for interval improvement/resolution  -C. difficile PCR-pending  -To be started on TPN    I have performed a physical exam and reviewed and updated ROS and plan today 10/16/2021.  In review of yesterday's note 10/15/2021, there are no changes except as documented above.      Discussed with internal medicine/Dr. Silveira

## 2021-10-16 NOTE — ASSESSMENT & PLAN NOTE
Moderate 127  Likely due to dehydration secondary to slowly titrating TF.   NS 75/hr.   Continue to monitor

## 2021-10-16 NOTE — PROGRESS NOTES
Hospital Medicine Daily Progress Note    Date of Service  10/16/2021    Chief Complaint  Nils Alfonso is a 66 y.o. female admitted 10/15/2021 with abdominal pain    Hospital Course  Initially admitted to [facility] for evaluation of abdominal pain after recent ERCP with polypectomy and sphincterotomy and noted to have large intra-abdominal fluid collection for which she underwent drainage with interventional radiology and was evaluated by infectious disease and surgery.  She was transferred to [facility] for higher level of care.  She was evaluated by Dr Cook who recommended conservative management with placement of a second drain      Interval Problem Update    Complains of pain at the drain site  Remains n.p.o. discussed with pharmacist to start TPN  Scheduled for drain placement  Discussed with surgery and GI  On RA   Afebrile        I have personally seen and examined the patient at bedside. I discussed the plan of care with patient, bedside RN and general surgery and infectious disease.    Consultants/Specialty  general surgery, GI and infectious disease    Code Status  Full Code    Disposition  Patient is not medically cleared.   Anticipate discharge to to home with organized home healthcare and close outpatient follow-up.  I have placed the appropriate orders for post-discharge needs.    Review of Systems  Review of Systems   Constitutional: Negative for chills and fever.   Respiratory: Negative for shortness of breath.    Cardiovascular: Negative for chest pain.   Gastrointestinal: Positive for diarrhea (Improved). Negative for nausea.   All other systems reviewed and are negative.       Physical Exam  Temp:  [36.1 °C (96.9 °F)-37.1 °C (98.8 °F)] 36.3 °C (97.3 °F)  Pulse:  [57-78] 69  Resp:  [11-19] 14  BP: (109-150)/(61-80) 131/71  SpO2:  [90 %-99 %] 91 %    Physical Exam  Vitals and nursing note reviewed.   Constitutional:       General: She is not in acute  distress.  HENT:      Head: Normocephalic and atraumatic.      Nose: Nose normal. No rhinorrhea.      Mouth/Throat:      Pharynx: No oropharyngeal exudate or posterior oropharyngeal erythema.   Eyes:      General: No scleral icterus.        Right eye: No discharge.         Left eye: No discharge.   Cardiovascular:      Rate and Rhythm: Normal rate and regular rhythm.      Heart sounds: Normal heart sounds. No murmur heard.   No friction rub. No gallop.    Pulmonary:      Effort: Pulmonary effort is normal. No respiratory distress.      Breath sounds: Normal breath sounds. No wheezing, rhonchi or rales.   Chest:      Chest wall: No tenderness.   Abdominal:      General: Bowel sounds are normal. There is no distension.      Palpations: Abdomen is soft. There is no mass.      Tenderness: There is abdominal tenderness. There is no rebound.      Comments: Drain with dark yellow clear drainage   Musculoskeletal:         General: Swelling present. No tenderness.      Cervical back: Neck supple. No rigidity.   Skin:     General: Skin is warm and dry.      Coloration: Skin is not cyanotic or jaundiced.      Nails: There is no clubbing.   Neurological:      General: No focal deficit present.      Mental Status: She is alert and oriented to person, place, and time.      Cranial Nerves: No cranial nerve deficit.      Motor: No weakness.   Psychiatric:         Mood and Affect: Mood normal.         Behavior: Behavior normal.         Fluids    Intake/Output Summary (Last 24 hours) at 10/16/2021 1431  Last data filed at 10/16/2021 1336  Gross per 24 hour   Intake --   Output 200 ml   Net -200 ml       Laboratory  Recent Labs     10/14/21  0429 10/15/21  0328 10/16/21  0233   WBC 12.7* 13.5* 8.4   RBC 3.43* 3.15* 2.48*   HEMOGLOBIN 10.2* 9.6* 7.4*   HEMATOCRIT 34.1* 28.9* 23.5*   MCV 99.4* 91.7 94.8   MCH 29.7 30.5 29.8   MCHC 29.9* 33.2* 31.5*   RDW 52.8* 48.7 48.7   PLATELETCT 537* 557* 458*   MPV 9.1 8.6* 8.9*     Recent Labs      10/14/21  0429 10/15/21  0328 10/16/21  0233   SODIUM 134* 134* 132*   POTASSIUM 4.1 3.7 3.9   CHLORIDE 101 102 101   CO2 21 23 21   GLUCOSE 94 107* 62*   BUN 9 7* 6*   CREATININE 0.52 0.53 0.43*   CALCIUM 7.5* 7.4* 7.3*     Recent Labs     10/15/21  0328   INR 1.49*               Imaging  CT-DRAIN-PERITONEAL   Final Result      1.  CT guided pancreatic pseudocyst catheter drainage.   2.  The current plan is to obtain a follow-up CT in 5-7 days..      IR-PICC LINE PLACEMENT W/ GUIDANCE > AGE 5   Final Result                  Ultrasound-guided PICC placement performed by qualified nursing staff as    above.               Assessment/Plan  Electrolyte abnormality  Assessment & Plan  Hypomagnesemia hypophosphatemia hypokalemia    Replete and monitor    Diarrhea  Assessment & Plan  F/u on pending cdiff    Duodenal perforation (HCC)- (present on admission)  Assessment & Plan  Following ERCP with retroperitoneal abscess and bacteremia    Continue Zosyn and Mycamine  Place second drain with IR per surgery recommendations  Keep n.p.o.  Start TPN    Bacteremia due to Gram-negative bacteria and fungemia- (present on admission)  Assessment & Plan  Peritoneal fluid cultures grew E. coli and Enterococcus   Blood cultures grew chryseobacterium and Candida glabrata   Repeat blood cultures from 10/13/2021 remain negative  ID and surgery following  Plan is to have second drain placed today and follow-up CT in a few days  Keep n.p.o. per surgery recommendations and start TPN discussed with pharmacist    KALANI negative for signs of endocarditis    Hyponatremia- (present on admission)  Assessment & Plan  Continue IV hydration until TPN started and monitor electrolytes    Sepsis due to intraabdominal fluid collection/abscess (HCC)- (present on admission)  Assessment & Plan  Sepsis resolved  Source intra-abdominal  Continue Zosyn and Mycamine      Hyperlipidemia- (present on admission)  Assessment & Plan  Continue Zetia    Hypertension-  (present on admission)  Assessment & Plan  Stable off medical therapy continue to monitor       VTE prophylaxis: heparin ppx    I have performed a physical exam and reviewed and updated ROS and Plan today (10/16/2021). In review of yesterday's note (10/15/2021), there are no changes except as documented above.

## 2021-10-16 NOTE — OR SURGEON
Immediate Post- Operative Note        Findings:  Large Pancreatic Pseudocyst      Procedure(s): CT Drainage with 14 Fr Dran    Estimated Blood Loss: Less than 5 ml    Complications: None      10/16/2021     1:43 PM     Ant Randhawa M.D.

## 2021-10-16 NOTE — PROGRESS NOTES
IR RN note    Patient underwent a Peritoneal Drain Placement by Dr. Randhawa.  Procedure site was verified by MD using CT imaging guidance.  IR antoni [place] assisted. Patient was placed in a supine position.  Vitals were taken every 5 minutes and remained stable during procedure (see doc flow sheet for results).  CO2 waveform capnography was monitored throughout procedure, see chart.   A sutures, gauze and medical tape dressing was placed over surgical site. Report called to [facility]. Pt transported by bed with RN to [facility].    42 mL greenish fluid from left middle quadrant peritoneal drain sent to lab for testing.     Left middle quadrant peritoneal Drain - RailRunner Flexima APDL Locking pigtail drainage catheter system 14 Fr  REF # [id number]  LOT # [id number]  Exp. 5/17/24

## 2021-10-16 NOTE — CARE PLAN
The patient is Stable - Low risk of patient condition declining or worsening    Shift Goals  Clinical Goals: patient going to IR  Patient Goals: pain control    Progress made toward(s) clinical / shift goals:  patient went to IR and got new drain placed      Patient is not progressing towards the following goals:

## 2021-10-16 NOTE — PROGRESS NOTES
Surgical Progress Note    Author: Jaden Cook M.D. Date & Time created: 10/16/2021   9:52 AM     Interval Events:  Feels better   PICC in   TPN to start this Am  Spoke with dr. Randhawa in IR about reasons for second drain lower  Patient understand plan  IR drainage clearing up    Review of Systems   Constitutional: Positive for malaise/fatigue.   Gastrointestinal: Positive for abdominal pain.   All other systems reviewed and are negative.    Hemodynamics:  Temp (24hrs), Av.6 °C (97.9 °F), Min:36.1 °C (96.9 °F), Max:37.1 °C (98.8 °F)  Temperature: 36.1 °C (97 °F)  Pulse  Av  Min: 57  Max: 85   Blood Pressure : 137/67     Respiratory:    Respiration: 16, Pulse Oximetry: 90 %     Work Of Breathing / Effort: Within Normal Limits     Neuro:  GCS       Fluids:    Intake/Output Summary (Last 24 hours) at 10/16/2021 0952  Last data filed at 10/15/2021 1600  Gross per 24 hour   Intake --   Output 250 ml   Net -250 ml     Weight: 71.4 kg (157 lb 6.5 oz)  Current Diet Order   Procedures   • Restrict to: Sips with Medications     Physical Exam  Vitals and nursing note reviewed.   Constitutional:       Appearance: Normal appearance.   HENT:      Head: Normocephalic and atraumatic.      Nose: Nose normal.      Mouth/Throat:      Mouth: Mucous membranes are moist.   Eyes:      Conjunctiva/sclera: Conjunctivae normal.      Pupils: Pupils are equal, round, and reactive to light.   Cardiovascular:      Rate and Rhythm: Normal rate and regular rhythm.   Pulmonary:      Effort: Pulmonary effort is normal.      Breath sounds: Normal breath sounds.   Abdominal:      General: Abdomen is flat. Bowel sounds are normal.      Palpations: Abdomen is soft.      Tenderness: There is abdominal tenderness.      Comments: Pain near IR drain- very mild  Drain clearing up slightly bilious   Musculoskeletal:         General: Normal range of motion.   Skin:     General: Skin is warm and dry.   Neurological:      General: No focal  deficit present.      Mental Status: She is alert and oriented to person, place, and time. Mental status is at baseline.   Psychiatric:         Mood and Affect: Mood normal.         Behavior: Behavior normal.         Thought Content: Thought content normal.         Judgment: Judgment normal.      Comments: In good spirits       Labs:  Recent Results (from the past 24 hour(s))   Comp Metabolic Panel    Collection Time: 10/16/21  2:33 AM   Result Value Ref Range    Sodium 132 (L) 135 - 145 mmol/L    Potassium 3.9 3.6 - 5.5 mmol/L    Chloride 101 96 - 112 mmol/L    Co2 21 20 - 33 mmol/L    Anion Gap 10.0 7.0 - 16.0    Glucose 62 (L) 65 - 99 mg/dL    Bun 6 (L) 8 - 22 mg/dL    Creatinine 0.43 (L) 0.50 - 1.40 mg/dL    Calcium 7.3 (L) 8.5 - 10.5 mg/dL    AST(SGOT) 20 12 - 45 U/L    ALT(SGPT) 12 2 - 50 U/L    Alkaline Phosphatase 40 30 - 99 U/L    Total Bilirubin 0.4 0.1 - 1.5 mg/dL    Albumin 1.7 (L) 3.2 - 4.9 g/dL    Total Protein 3.8 (L) 6.0 - 8.2 g/dL    Globulin 2.1 1.9 - 3.5 g/dL    A-G Ratio 0.8 g/dL   CBC WITH DIFFERENTIAL    Collection Time: 10/16/21  2:33 AM   Result Value Ref Range    WBC 8.4 4.8 - 10.8 K/uL    RBC 2.48 (L) 4.20 - 5.40 M/uL    Hemoglobin 7.4 (L) 12.0 - 16.0 g/dL    Hematocrit 23.5 (L) 37.0 - 47.0 %    MCV 94.8 81.4 - 97.8 fL    MCH 29.8 27.0 - 33.0 pg    MCHC 31.5 (L) 33.6 - 35.0 g/dL    RDW 48.7 35.9 - 50.0 fL    Platelet Count 458 (H) 164 - 446 K/uL    MPV 8.9 (L) 9.0 - 12.9 fL    Neutrophils-Polys 76.90 (H) 44.00 - 72.00 %    Lymphocytes 11.90 (L) 22.00 - 41.00 %    Monocytes 9.30 0.00 - 13.40 %    Eosinophils 0.10 0.00 - 6.90 %    Basophils 0.10 0.00 - 1.80 %    Immature Granulocytes 1.70 (H) 0.00 - 0.90 %    Nucleated RBC 0.00 /100 WBC    Neutrophils (Absolute) 6.47 2.00 - 7.15 K/uL    Lymphs (Absolute) 1.00 1.00 - 4.80 K/uL    Monos (Absolute) 0.78 0.00 - 0.85 K/uL    Eos (Absolute) 0.01 0.00 - 0.51 K/uL    Baso (Absolute) 0.01 0.00 - 0.12 K/uL    Immature Granulocytes (abs) 0.14 (H) 0.00 -  0.11 K/uL    NRBC (Absolute) 0.00 K/uL   MAGNESIUM    Collection Time: 10/16/21  2:33 AM   Result Value Ref Range    Magnesium 1.6 1.5 - 2.5 mg/dL   PHOSPHORUS    Collection Time: 10/16/21  2:33 AM   Result Value Ref Range    Phosphorus 2.0 (L) 2.5 - 4.5 mg/dL   ESTIMATED GFR    Collection Time: 10/16/21  2:33 AM   Result Value Ref Range    GFR If African American >60 >60 mL/min/1.73 m 2    GFR If Non African American >60 >60 mL/min/1.73 m 2   PERIPHERAL SMEAR REVIEW    Collection Time: 10/16/21  2:33 AM   Result Value Ref Range    Peripheral Smear Review see below    POCT glucose device results    Collection Time: 10/16/21  7:50 AM   Result Value Ref Range    Glucose - Accu-Ck 64 (L) 65 - 99 mg/dL     Medical Decision Making, by Problem:  Active Hospital Problems    Diagnosis    • Bacteremia due to Gram-negative bacteria and fungemia [R78.81]    • Sepsis due to intraabdominal fluid collection/abscess (HCC) [A41.9]    • Hyperlipidemia [E78.5]    • Hypertension [I10]      Plan:  NPO accept ice chips and sips of clears with meds with TPN  IV Anitbiotics  PT/OT  Add second large drain    Quality Measures:  Quality-Core Measures    Discussed patient condition with Family

## 2021-10-16 NOTE — CARE PLAN
Problem: Nutritional:  Goal: Nutrition support tolerated and meeting greater than 85% of estimated needs  Outcome: Not Progressing  See RD note.

## 2021-10-16 NOTE — PROGRESS NOTES
Pharmacy TPN Day # 1       10/16/2021    Dosing Weight   60 kg  Adjusted Body Weight     TPN currently providing 50% of goal  TPN goal: 1700 kcal/day including 1.5 gm/kg/day Protein  TPN indication: Perforated duodenum    Pertinent PMH: 67 y/o F recently admitted to [facility] with abdominal pain. Underwent a procedure on 10/01/2021, which involved a polypectomy and a sphincterotomy.  CT on 10/08/202, found to have a large fluid collection and thickening of wall around the duodenum, collection was retroperitoneal. IR placed drain. CT on 10/09/2021, significant amount of retroperitoneal air and fluid.  [facility].    Temp (24hrs), Av.7 °C (98 °F), Min:36.1 °C (96.9 °F), Max:37.1 °C (98.8 °F)  .  Recent Labs     10/14/21  0429 10/15/21  0328 10/16/21  0233   SODIUM 134* 134* 132*   POTASSIUM 4.1 3.7 3.9   CHLORIDE 101 102 101   CO2 21 23 21   BUN 9 7* 6*   CREATININE 0.52 0.53 0.43*   GLUCOSE 94 107* 62*   CALCIUM 7.5* 7.4* 7.3*   ASTSGOT 19 28 20   ALTSGPT 8 14 12   ALBUMIN 1.9* 2.1* 1.7*   TBILIRUBIN 0.7 0.6 0.4   PHOSPHORUS 2.3*  --  2.0*   MAGNESIUM 2.2  --  1.6     Accu-Checks  Recent Labs     10/16/21  0750   POCGLUCOSE 64*       Vitals:    10/15/21 2002 10/16/21 0332 10/16/21 0711 10/16/21 1136   BP: 141/74 129/79 137/67 138/73   Weight:       Height:           Intake/Output Summary (Last 24 hours) at 10/16/2021 1201  Last data filed at 10/15/2021 1600  Gross per 24 hour   Intake --   Output 150 ml   Net -150 ml       Orders Placed This Encounter   Procedures   • Restrict to: Sips with Medications     Standing Status:   Standing     Number of Occurrences:   1     Order Specific Question:   Restrict to:     Answer:   Sips with Medications [3]         TPN for past 72 hours (Show up to 3 orders; newest on the left.)     Start date and time   10/16/2021 2000      TPN Central Line Formulation [[id number]]    Order Status  Active       Base    Clinisol 15%  45 g     dextrose 70%  120 g    fat emulsions 20%  25 g       Additives    potassium phosphate  25 mmol    sodium acetate  150 mEq    sodium chloride  150 mEq    magnesium sulfate  12 mEq    calcium GLUConate  4.65 mEq    M.T.E.-4  1 mL    M.V.I. Adult  10 mL    famotidine  40 mg       QS Base    sterile water  1,246.81 mL       Energy Contribution    Proteins  --    Dextrose  408 kcal    Lipids  250 kcal    Total  658 kcal       Electrolyte Ion Calculated Amount    Sodium  300 mEq    Potassium  36.67 mEq    Calcium  4.65 mEq    Magnesium  12 mEq    Aluminum  --    Phosphate  25 mmol    Chloride  150 mEq    Acetate  188.1 mEq       Other    Total Protein  45 g    Total Protein/kg  0.63 g/kg    Total Amino Acid  --    Total Amino Acid/kg  --    Glucose Infusion Rate  1.17 mg/kg/min    Osmolarity (Estimated)  --    Volume  1,992 mL    Rate  83 mL/hr    Dosing Weight  71.4 kg    Infusion Site  Central          This formula provides:  % kcal as lipids = 30  Grams protein/kg =0.75  Non-protein calories = 658  Kcals/kg = 14  Total daily calories = 838    Comments:  · Pepcid added to formulation.  · D/w pam BLAIR to add Kriders to MAR 2/2 KCL 2meq/mL shortage.  · Diet: Restricted to sips w/ medications   · TPN initiated at 50% goal , will advance as tolerated.    Fluids:  · 33 mL/kg  · TPN to start at 2000, MIVF to stop at 2000    Macronutrients:  · AA 21%CHO 49%Lipids 30%  · GIR: 1.47mg/kg/min    Micronutrients:  · Na 300meq   · K 36meq ( 20meq daily added to MAR as Kriders )   · Ca 4.65meq  · Mg 12meq (3g Mg rider also given)  · Phos 25mmol      Sajan AkersD BCPS

## 2021-10-16 NOTE — PROGRESS NOTES
Bedside report received from day RN. Assumed care of pt at 1900. No s/s of pain or discomfort. Educated to call light. Bed is in low and locked position.    Crisis charting COVID-19 surge in effect.

## 2021-10-16 NOTE — PROGRESS NOTES
Handoff report received from night shift nurse. Pt care assumed. Pt is currently resting in bed. POC discussed with Pt and Pt verbalizes no questions at this time. Pt is AAOx4, on 2L NC, patient is medical, and VSS. Call light and belongings within reach, bed in lowest and locked position, and Pt educated on use of call light. Will continue to monitor.

## 2021-10-16 NOTE — PROGRESS NOTES
Gastroenterology Progress Note               Author:  AYE Jones Date & Time Created: 10/16/2021 11:05 AM       Patient ID:  Name:             Nils Alfonso  YOB: 1955  Age:                 66 y.o.  female  MRN:               [id number]      Referring Provider:  Ana Luisa Shearer MD      Presenting Chief Complaint:  Abdominal pain      History of Present Illness:    10/10 HPI  66-year-old female PMH recurrent idiopathic pancreatitis s/p ERCP with sphincterotomy October 1, 2021 complicated by ERCP pancreatitis, sleeve gastrectomy, dyslipidemia, hypertension, osteoarthritis of the spine status post lumbar fusion who was admitted to the hospital 10/8/2021 with worsening right lower quadrant pain over the past week.  Ever since her ERCP October 1, 2021, she has been experiencing pain, intermittent subjective fevers, nausea.  In the emergency room-labs: CBC: WBC 17.8..  Sodium 130.  LFTs-WNL.  CT scan abdomen/pelvis-large irregular fluid collection with thick wall occupying most of the right abdomen surrounding the right kidney and colon.  Severe wall thickening of the descending, transverse colon, second portion of the duodenum likely reactive.  Pneumobilia with gas in the CBD.  Drain placement by IR was performed.  Currently, the abdominal pain has improved.  No vomiting.  Started on ceftriaxone and metronidazole IV.     ERCP October 1, 2021-common bile duct-dilated down to the level of the ampulla.  4 mm polyp at the distal duct seen after sphincterotomy.  8 mm sphincterotomy.  Negative for stone.  Bile duct polyp-benign with inflammatory and hyperplastic changes.  No evidence of epithelial dysplasia/neoplasia.       Interval History:  10/11: interval HIDA scan showed no bile leak.  This morning she feels more comfortable, describing minimal abdominal pain but denying nausea vomiting and fevers.  She has been able to eat a little bit more and has full liquids beside her bed.  She expresses  concern about being on losartan which she says was prescribed outpatient as as needed for high blood pressures.  She also states she has not passed a bowel movement in the week which she describes not eating much.     10/12/2021 - No events overnight.  Tolerating diet without nausea or vomiting.  Had spontaneous, formed, brown bowel movement this morning.  Abdominal pain improving.  Feeling weak, but better each day.  Leukocytosis improving.  States that she is on hydrocodone at home, managed by pain management, and asks that her current hospital pain meds be changed.     10/13/2021: Stable, no new events.  Drain output is minimal, but according to patient bedside nursing is not flushing the drain.  Patient is n.p.o. for planned CT today to reevaluate fluid collection.  Leukocytosis continues to improve.  Pain has improved greatly and patient has not taken the pain medication in the last 24 hours according to her recollection.  She is having regular bowel movements without assistance of laxatives.  Patient is very hungry.     10/14/2021 - No events overnight.  Abdominal pain controlled now.  Had nausea and vomiting yesterday with solid food, but tolerating bland diet.  Blood cultures from 10/8 positive for Chryseobacterium and Candida glabrata - Pharmacy and ID aware.  Micafungin started.  CT 10/13 showing extensive multiloculated rim-enhancing air and fluid collection/abscess within the right abdomen is not significantly changed in size from the prior study. The percutaneous pigtail drainage catheter appears well-positioned within the collection.    10/15/2021: Patient transferred to [facility] overnight per surgery recommendations    10/16/2021: Drain output clearing up slightly. Dr. Dumont with surgery has recommended second IR drain to be placed. Initially interventional radiology did not think that this would be helpful for the patient, but after further discussion this is the plan  going forward. Patient is n.p.o. with plans for TPN. WBCs normalized.      Review of Systems:  Review of Systems   Constitutional: Positive for malaise/fatigue. Negative for chills and fever.   Respiratory: Negative for cough, shortness of breath and wheezing.    Cardiovascular: Negative for chest pain and leg swelling.   Gastrointestinal: Positive for abdominal pain. Negative for constipation, melena, nausea and vomiting.   Genitourinary: Negative for dysuria and urgency.   Musculoskeletal: Negative for falls and myalgias.   Skin: Negative for itching and rash.   Neurological: Positive for weakness. Negative for focal weakness and headaches.   Psychiatric/Behavioral: Negative for memory loss and substance abuse.             Past Medical History:  Past Medical History:   Diagnosis Date   • Allergy, unspecified not elsewhere classified    • Anemia     not currently   • Anesthesia     PONV (Demerol/ Dilaudid)   • Arthritis     bilateral shoulders,sacrum, osteo   • Backpain     coccyx and sacrum   • Bronchitis 2010   • Cataract     bilateral IOLI   • Degeneration of cervical intervertebral disc     C5-6,C6-7   • Dental disorder     partial upper and lower   • Heart burn    • Hiatus hernia syndrome     not a problem after gastric sleeve   • High cholesterol     resolved after gastric surgery   • Hyperlipidemia    • Hypertension     off all medication, reveresed after gastric sleeve   • Muscle disorder    • Pain 05/16/2018    low back and SI joints and sacrum   • Reactive airway disease     rescue inhaler not needed unless with bronchitis     Active Hospital Problems    Diagnosis    • Duodenal perforation (HCC) [K63.1]    • Postprocedural retroperitoneal abscess [K68.11]    • Bacteremia due to Gram-negative bacteria and fungemia [R78.81]    • Sepsis due to intraabdominal fluid collection/abscess (HCC) [A41.9]    • Hyperlipidemia [E78.5]    • Hypertension [I10]          Past Surgical History:  Past Surgical History:    Procedure Laterality Date   • PB ERCP,DIAGNOSTIC  10/1/2021    Procedure: ERCP (ENDOSCOPIC RETROGRADE CHOLANGIOPANCREATOGRAPHY);  Surgeon: Fausto Casas M.D.;  Location: [facility];  Service: Gastroenterology   • COLONOSCOPY  8/8/2018    Procedure: COLONOSCOPY;  Surgeon: Shukri Condon M.D.;  Location: [facility];  Service: General   • GASTROSCOPY  5/23/2018    Procedure: GASTROSCOPY;  Surgeon: Fausto Casas M.D.;  Location: [facility];  Service: Gastroenterology   • EGD W/ENDOSCOPIC ULTRASOUND  5/23/2018    Procedure: EGD W/ENDOSCOPIC ULTRASOUND- RADIAL UPPER;  Surgeon: Fausto Casas M.D.;  Location: [facility];  Service: Gastroenterology   • CATARACT PHACO WITH IOL Left 4/18/2017    Procedure: CATARACT PHACO WITH IOL;  Surgeon: Stuart Estevez M.D.;  Location: SURGERY SAME DAY [facility];  Service:    • CATARACT PHACO WITH IOL Right 4/4/2017    Procedure: CATARACT PHACO WITH IOL;  Surgeon: Stuart Estevez M.D.;  Location: [facility];  Service:    • GASTRIC SLEEVE LAPAROSCOPY  10/19/2016    Procedure: GASTRIC SLEEVE LAPAROSCOPY, HIATAL HERNIA;  Surgeon: Shukri Condon M.D.;  Location: [facility];  Service:    • LIVER BIOPSY LAPAROSCOPIC  10/19/2016    Procedure: LIVER BIOPSY LAPAROSCOPIC;  Surgeon: Shukri Condon M.D.;  Location: [facility];  Service:    • GASTROSCOPY N/A 8/26/2016    Procedure: GASTROSCOPY;  Surgeon: Shukri Condon M.D.;  Location: [facility];  Service:    • ROTATOR CUFF REPAIR Right 7/7/2016   • ROTATOR CUFF REPAIR Left 5/2015   • HYSTERECTOMY ROBOTIC  1/2/2009    Performed by MEG VILLEGAS at [facility]   • LUMBAR FUSION ANTERIOR  2007    Dr Hillman, L3-S1 fusion   • CHOLECYSTECTOMY  1996    laparoscopic   • TUBAL LIGATION  1985   • GASTRIC RESECTION  1982    gastric stapling   • TONSILLECTOMY AND ADENOIDECTOMY  1967   • PRIMARY C SECTION  1976,  1979, 1985    x3           Hospital Medications:  Current Facility-Administered Medications   Medication Dose Frequency Provider Last Rate Last Admin   • magnesium sulfate 3 g in  mL IVPB  3 g Once Pritesh Vargas M.D. 33.3 mL/hr at 10/16/21 1041 3 g at 10/16/21 1041   • ondansetron (ZOFRAN ODT) dispertab 4 mg  4 mg Q4HRS PRN Elizabeth Segundo M.D.       • ondansetron (ZOFRAN) syringe/vial injection 4 mg  4 mg Q4HRS PRN Elizabeth Segundo M.D.   4 mg at 10/16/21 0751   • polyethylene glycol/lytes (MIRALAX) PACKET 1 Packet  1 Packet QDAY PRN Elizabeth Segundo M.D.        And   • magnesium hydroxide (MILK OF MAGNESIA) suspension 30 mL  30 mL QDAY PRN Elizabeth Segundo M.D.       • acetaminophen (Tylenol) tablet 650 mg  650 mg Q6HRS PRN Elizabeth Segundo M.D.       • HYDROmorphone (Dilaudid) injection 0.5 mg  0.5 mg Q3HRS PRN Elizabeth Segundo M.D.   0.5 mg at 10/15/21 2207   • Pharmacy Consult Request ...Pain Management Review 1 Each  1 Each PHARMACY TO DOSE Elizabeth Segundo M.D.       • cyclobenzaprine (Flexeril) tablet 10 mg  10 mg HS PRN Elizabeth Segundo M.D.       • diphenhydrAMINE (BENADRYL) tablet/capsule 25 mg  25 mg Q6HRS PRN Elizabeth Segundo M.D.       • ezetimibe (ZETIA) tablet 10 mg  10 mg Q EVENING Elizabeth Segundo M.D.   10 mg at 10/15/21 1803   • famotidine (PEPCID) tablet 20 mg  20 mg BID Elizabeth Segundo M.D.   20 mg at 10/16/21 0610    Or   • famotidine (PEPCID) injection 20 mg  20 mg BID Elizabeth Segundo M.D.       • gabapentin (NEURONTIN) capsule 600 mg  600 mg BID Elizabeth Segundo M.D.   600 mg at 10/16/21 0610   • magnesium oxide (MAG-OX) tablet 400 mg  400 mg Q EVENING Elizabeth Segundo M.D.   400 mg at 10/15/21 1803   • micafungin (MYCAMINE) 100 mg in  mL ivpb  100 mg Q24HRS Elizabeth Segundo M.D.   Stopped at 10/15/21 1540   • nicotine (NICODERM) 21 MG/24HR 21 mg  1 Patch Daily-0600 Elizabeth Segundo M.D.       • oxyCODONE immediate release (ROXICODONE) tablet 10 mg  10 mg Q4HRS PRN Elizabeth Segundo M.D.  "  10 mg at 10/16/21 0751   • piperacillin-tazobactam (ZOSYN) 3.375 g in  mL IVPB  3.375 g Q8HRS Elizabeth Segundo M.D.   Stopped at 10/16/21 1009   • promethazine (PHENERGAN) tablet 25 mg  25 mg Q6HRS PRN Elizabeth Segundo M.D.   25 mg at 10/15/21 0459   • vitamin D3 (cholecalciferol) tablet 1,000 Units  1,000 Units DAILY Elizabeth Segundo M.D.   1,000 Units at 10/16/21 0610   • lactated ringers infusion   Continuous Stuart Avalos M.D. 150 mL/hr at 10/16/21 0430 New Bag at 10/16/21 0430   • heparin injection 5,000 Units  5,000 Units Q8HRS Stuart Avalos M.D.       • Pharmacy Consult Request  1 Each PHARMACY TO DOSE Pritesh Vargas M.D.       • MD Alert...TPN per Pharmacy   PHARMACY TO DOSE Jdaen Cook M.D.       Last reviewed on 10/15/2021 11:02 AM by Jhoana Benjamin [facility]        Current Outpatient Medications:  Medications Prior to Admission   Medication Sig Dispense Refill Last Dose   • cyclobenzaprine (FLEXERIL) 10 mg Tab Take 10 mg by mouth every evening.   9/30/2021 at [facility]   • ezetimibe (ZETIA) 10 MG Tab Take 10 mg by mouth every evening.   9/30/2021 at [facility]   • Magnesium 400 MG Tab Take 400 mg by mouth every evening.   9/30/2021 at [facility]   • gabapentin (NEURONTIN) 300 MG Cap Take 600 mg by mouth 2 Times a Day.   9/30/2021 at [facility]   • BIOTIN PO Take 1 Tablet by mouth every day.   9/30/2021 at [facility]   • HYDROcodone/acetaminophen (NORCO)  MG Tab Take 0.5 Tablets by mouth every 6 hours as needed for Moderate Pain.   10/8/2021 at [facility]   • Cholecalciferol (VITAMIN D3 PO) Take 1 Each by mouth every day.   9/30/2021 at [facility]   • ondansetron (ZOFRAN ODT) 4 MG TABLET DISPERSIBLE Take 4 mg by mouth every 6 hours as needed for Nausea.   10/8/2021 at [facility]         Medication Allergies:  Allergies   Allergen Reactions   • Ampicillin Rash     Rash     • Cephalexin Diarrhea, Vomiting and Nausea     .   • Clindamycin Vomiting     \"cleocin\"   • Codeine      Severe stomach pain, cramps, spasms   • Demerol " Vomiting   • Levofloxacin Unspecified     Numbness     • Tetracyclines Rash     .   • Tizanidine Itching   • Hydromorphone Vomiting and Nausea   • Morphine Vomiting   • Pcn [Penicillins] Itching   • Sulfa Drugs Itching         Family Medical History:  Family History   Problem Relation Age of Onset   • Stroke Mother    • Cancer Father         larynx   • Diabetes Brother          Social History:  Social History     Socioeconomic History   • Marital status:      Spouse name: Not on file   • Number of children: Not on file   • Years of education: Not on file   • Highest education level: Not on file   Occupational History   • Not on file   Tobacco Use   • Smoking status: Former Smoker     Packs/day: 0.25     Years: 0.10     Pack years: 0.02     Types: Cigarettes     Quit date: 1973     Years since quittin.8   • Smokeless tobacco: Never Used   Vaping Use   • Vaping Use: Never used   Substance and Sexual Activity   • Alcohol use: No   • Drug use: No   • Sexual activity: Not on file     Comment:    Other Topics Concern   • Not on file   Social History Narrative   • Not on file     Social Determinants of Health     Financial Resource Strain:    • Difficulty of Paying Living Expenses:    Food Insecurity:    • Worried About Running Out of Food in the Last Year:    • Ran Out of Food in the Last Year:    Transportation Needs:    • Lack of Transportation (Medical):    • Lack of Transportation (Non-Medical):    Physical Activity:    • Days of Exercise per Week:    • Minutes of Exercise per Session:    Stress:    • Feeling of Stress :    Social Connections:    • Frequency of Communication with Friends and Family:    • Frequency of Social Gatherings with Friends and Family:    • Attends [employer]ah[employer] Services:    • Active Member of Clubs or Organizations:    • Attends Club or Organization Meetings:    • Marital Status:    Intimate Partner Violence:    • Fear of Current or Ex-Partner:    • Emotionally Abused:    •  "Physically Abused:    • Sexually Abused:          Vital signs:  Weight/BMI: Body mass index is 27.89 kg/m².  /67   Pulse (!) 57   Temp 36.1 °C (97 °F) (Temporal)   Resp 16   Ht 1.6 m (5' 2.99\")   Wt 71.4 kg (157 lb 6.5 oz)   SpO2 90%   Vitals:    10/15/21 1627 10/15/21 2002 10/16/21 0332 10/16/21 0711   BP:  141/74 129/79 137/67   Pulse:  78 77 (!) 57   Resp:  16 16 16   Temp:  37.1 °C (98.8 °F) 36.1 °C (96.9 °F) 36.1 °C (97 °F)   TempSrc:  Oral Temporal Temporal   SpO2:  93% 92% 90%   Weight: 71.4 kg (157 lb 6.5 oz)      Height:         Oxygen Therapy:  Pulse Oximetry: 90 %, O2 (LPM): 0, O2 Delivery Device: None - Room Air    Intake/Output Summary (Last 24 hours) at 10/16/2021 1105  Last data filed at 10/15/2021 1600  Gross per 24 hour   Intake --   Output 250 ml   Net -250 ml         Physical Exam:  Physical Exam  Vitals and nursing note reviewed.   Constitutional:       Appearance: She is ill-appearing.   HENT:      Head: Normocephalic and atraumatic.      Right Ear: External ear normal.      Left Ear: External ear normal.      Nose: Nose normal.      Mouth/Throat:      Mouth: Mucous membranes are dry.      Pharynx: Oropharynx is clear.   Eyes:      General: No scleral icterus.     Extraocular Movements: Extraocular movements intact.      Pupils: Pupils are equal, round, and reactive to light.   Cardiovascular:      Rate and Rhythm: Normal rate and regular rhythm.      Pulses: Normal pulses.      Heart sounds: Normal heart sounds. No murmur heard.     Pulmonary:      Effort: Pulmonary effort is normal. No respiratory distress.      Breath sounds: No wheezing or rales.      Comments: Decreased breath sounds in bases  Abdominal:      General: Abdomen is flat.      Palpations: Abdomen is soft.      Tenderness: There is abdominal tenderness (RUQ).   Musculoskeletal:      Cervical back: Neck supple.      Right lower leg: No edema.      Left lower leg: No edema.   Skin:     General: Skin is warm and dry. "   Neurological:      General: No focal deficit present.      Mental Status: She is alert and oriented to person, place, and time.   Psychiatric:         Thought Content: Thought content normal.         Judgment: Judgment normal.             Labs:  Recent Labs     10/14/21  0429 10/15/21  0328 10/16/21  0233   SODIUM 134* 134* 132*   POTASSIUM 4.1 3.7 3.9   CHLORIDE 101 102 101   CO2 21 23 21   BUN 9 7* 6*   CREATININE 0.52 0.53 0.43*   MAGNESIUM 2.2  --  1.6   PHOSPHORUS 2.3*  --  2.0*   CALCIUM 7.5* 7.4* 7.3*     Recent Labs     10/14/21  0429 10/15/21  0328 10/16/21  0233   ALTSGPT 8 14 12   ASTSGOT 19 28 20   ALKPHOSPHAT 68 56 40   TBILIRUBIN 0.7 0.6 0.4   GLUCOSE 94 107* 62*     Recent Labs     10/14/21  0429 10/15/21  0328 10/16/21  0233   WBC 12.7* 13.5* 8.4   NEUTSPOLYS 77.10* 78.20* 76.90*   LYMPHOCYTES 9.10* 8.50* 11.90*   MONOCYTES 11.70 11.80 9.30   EOSINOPHILS 0.10 0.00 0.10   BASOPHILS 0.20 0.20 0.10   ASTSGOT 19 28 20   ALTSGPT 8 14 12   ALKPHOSPHAT 68 56 40   TBILIRUBIN 0.7 0.6 0.4     Recent Labs     10/14/21  0429 10/15/21  0328 10/16/21  0233   RBC 3.43* 3.15* 2.48*   HEMOGLOBIN 10.2* 9.6* 7.4*   HEMATOCRIT 34.1* 28.9* 23.5*   PLATELETCT 537* 557* 458*   PROTHROMBTM  --  17.6*  --    INR  --  1.49*  --      Recent Results (from the past 24 hour(s))   Comp Metabolic Panel    Collection Time: 10/16/21  2:33 AM   Result Value Ref Range    Sodium 132 (L) 135 - 145 mmol/L    Potassium 3.9 3.6 - 5.5 mmol/L    Chloride 101 96 - 112 mmol/L    Co2 21 20 - 33 mmol/L    Anion Gap 10.0 7.0 - 16.0    Glucose 62 (L) 65 - 99 mg/dL    Bun 6 (L) 8 - 22 mg/dL    Creatinine 0.43 (L) 0.50 - 1.40 mg/dL    Calcium 7.3 (L) 8.5 - 10.5 mg/dL    AST(SGOT) 20 12 - 45 U/L    ALT(SGPT) 12 2 - 50 U/L    Alkaline Phosphatase 40 30 - 99 U/L    Total Bilirubin 0.4 0.1 - 1.5 mg/dL    Albumin 1.7 (L) 3.2 - 4.9 g/dL    Total Protein 3.8 (L) 6.0 - 8.2 g/dL    Globulin 2.1 1.9 - 3.5 g/dL    A-G Ratio 0.8 g/dL   CBC WITH DIFFERENTIAL     Collection Time: 10/16/21  2:33 AM   Result Value Ref Range    WBC 8.4 4.8 - 10.8 K/uL    RBC 2.48 (L) 4.20 - 5.40 M/uL    Hemoglobin 7.4 (L) 12.0 - 16.0 g/dL    Hematocrit 23.5 (L) 37.0 - 47.0 %    MCV 94.8 81.4 - 97.8 fL    MCH 29.8 27.0 - 33.0 pg    MCHC 31.5 (L) 33.6 - 35.0 g/dL    RDW 48.7 35.9 - 50.0 fL    Platelet Count 458 (H) 164 - 446 K/uL    MPV 8.9 (L) 9.0 - 12.9 fL    Neutrophils-Polys 76.90 (H) 44.00 - 72.00 %    Lymphocytes 11.90 (L) 22.00 - 41.00 %    Monocytes 9.30 0.00 - 13.40 %    Eosinophils 0.10 0.00 - 6.90 %    Basophils 0.10 0.00 - 1.80 %    Immature Granulocytes 1.70 (H) 0.00 - 0.90 %    Nucleated RBC 0.00 /100 WBC    Neutrophils (Absolute) 6.47 2.00 - 7.15 K/uL    Lymphs (Absolute) 1.00 1.00 - 4.80 K/uL    Monos (Absolute) 0.78 0.00 - 0.85 K/uL    Eos (Absolute) 0.01 0.00 - 0.51 K/uL    Baso (Absolute) 0.01 0.00 - 0.12 K/uL    Immature Granulocytes (abs) 0.14 (H) 0.00 - 0.11 K/uL    NRBC (Absolute) 0.00 K/uL   MAGNESIUM    Collection Time: 10/16/21  2:33 AM   Result Value Ref Range    Magnesium 1.6 1.5 - 2.5 mg/dL   PHOSPHORUS    Collection Time: 10/16/21  2:33 AM   Result Value Ref Range    Phosphorus 2.0 (L) 2.5 - 4.5 mg/dL   ESTIMATED GFR    Collection Time: 10/16/21  2:33 AM   Result Value Ref Range    GFR If African American >60 >60 mL/min/1.73 m 2    GFR If Non African American >60 >60 mL/min/1.73 m 2   PERIPHERAL SMEAR REVIEW    Collection Time: 10/16/21  2:33 AM   Result Value Ref Range    Peripheral Smear Review see below    POCT glucose device results    Collection Time: 10/16/21  7:50 AM   Result Value Ref Range    Glucose - Accu-Ck 64 (L) 65 - 99 mg/dL         Radiology Review:  IR-PICC LINE PLACEMENT W/ GUIDANCE > AGE 5   Final Result                  Ultrasound-guided PICC placement performed by qualified nursing staff as    above.          IR-DRAIN-RETROPERITONEAL    (Results Pending)         MDM (Data Review):   -Records reviewed and summarized in current documentation  -I  personally reviewed and interpreted the laboratory results  -I personally reviewed the radiology images        Medical Decision Making, by Problem:  Active Hospital Problems    Diagnosis    • Bacteremia due to Gram-negative bacteria and fungemia [R78.81]    • Sepsis due to intraabdominal fluid collection/abscess (HCC) [A41.9]    • Hyperlipidemia [E78.5]    • Hypertension [I10]      66-year-old female with a history of recurrent idiopathic pancreatitis s/p ERCP with biliary sphincterotomy and biopsy of a distal BD polyp on October 1, 2021.  Postop complications-pancreatitis.  Ever since then, complaints of subjective fevers, progressive abdominal pain, nausea/vomiting.  Began to have worsening right lower quadrant pain over the past 5 days.  CT scan abdomen/pelvis large irregular fluid collection with thick wall occupying most of the right abdomen surrounding right kidney and right colon.  Additional fluid and gas collection in the midline abdomen anterior to the pancreas concerning for abscess.  Severe wall thickening of the descending and transverse colon, second portion of duodenum likely reactive.  Pneumobilia with gas in the CBD, intrahepatic bile ducts as well as pancreatic ducts (likely secondary to recent ERCP with sphincterotomy).  IR placed a drain with improvement in abdominal pain initially, but then drain output slowed to no output. Nursing was discovered to have have flushed the drain for at least 2 days. After flush by bedside nurse, reportedly 300 cc of fluid came out. Fluid bilirubin 0.6. Fluid amylase still pending. Blood cultures from 10/8 positive for Chryseobacterium and Candida glabrata. ID following. Repeat CT with no change in fluid collection and new pelvic fluid collection/abscess. Plan for second IR drain placed. Surgery on board as well.         Assessment/Recommendations:  ASSESSMENT:  1.  Sepsis due to intra-abdominal fluid collection/abscess-unclear etiology suspect previous bile or  bowel leak that has since healed versus ?from recent pancreatitis. Ongoing bile leak does not seem to be the cause as HIDA was negative and fluid bilirubin normal at 0.6. Awaiting fluid amylase which was ordered by previous hospitalist but not resulted yet - Cultures (+)  2.  History of pancreatitis  3.  Intra-abdominal fluid collection, unchanged with new pelvic fluid collection  4.  History of morbid obesity s/p laparoscopic sleeve  5.  Euvolemic hyponatremia  6.  Constipation     PLAN:     -Continue IV antibiotics and antifungals per ID and pharmacy recommendations  -Continue to trend CBC, CMP  - IV antibiotics, antiemetics, pain medication per primary team  -Appreciate IR and surgery recommendations/management  -Can consider gastrografin upper GI series and small bowel follow through to evaluate for ongoing intestinal leak if this is a concern  -Continue drain flushes per IR recommendations daily    GI team will be back Monday to check on patient and for any further recommendations    Thank you very much for allowing me to participate in the care of your patient.  Please feel free to contact me anytime at [phone].     GARFIELD Jones.R.N.    Core Quality Measures   Reviewed items:  Labs, Medications and Radiology reports reviewed

## 2021-10-16 NOTE — DIETARY
"Nutrition Support Assessment   Day 1 of admit.  Nils Alfonso is a 66 y.o. female with admitting DX of Bile duct leak; Postprocedural intraabdominal abscess.     Current problem list:  Hypertension   Hyperlipidemia   Sepsis due to intraabdominal fluid collection/abscess   Bacteremia due to Gram-negative bacteria and fungemia     Assessment:  Estimated Nutritional Needs: based on:   Weight: 71.4 kg (157 lb 6.5 oz) -- via stand up scale  Wt to use in calculations: 63 kg (138 lbs 14.2 oz) via stand up scale 10/01/21 at [facility]; suspect closer to dry wt as pt noted w/ intradbdominal fluid collection and wt trending up over past month per chart review.  Body mass index is 27.88 kg/m²., BMI classification: Overweight    Calculation/Equation: MSJ x 1.2 - 1.5 = 1482 - 1710 kcals  Total Calories / day: 1575 - 1890 (Calories / k - 30)  Total Grams Protein / day: 76 - 95 (Grams Protein / k.2 - 1.5)     Evaluation:   1. Pt presented to [facility] 10/8 w/ abdominal pain, transferred to [facility] 10[place]15. Currently C. diff r/o.  2. PMHx includes laparoscopic sleeve gastrectomy (), idiopathic pancreatitis, ERCP w/ spincterotomy and polypectomy (10/01/21)  3. PO hx from [facility] per chart review: 0-100% variable with no apparent trends, w/ more than half of meals doc @ <50%.  4. Potential intestinal leak is indication for TPN per surgery MD.  5. PICC placed 10/14  6. +RLQ w/ pigtail catheter draining bloody bilious colored fluid; tentative plan for 2nd drain  7. Labs: Na 132, glu 62, BUN 6, Crea 0.43, Ca 7.3 (corrected = 9.14), phos 2.0, WBC 8.4  8. MAR: pepcid, dilaudid, LR @ 150 mL/hr, mag-ox, mycamine, zofran, oxycodone, abx, phenergan, cholecalciferol  9. Last BM: 10/15: \"small;brown;loose;watery\"     Malnutrition Risk: At risk with poor/variable PO intake over past week.     Recommendations/Plan:  1. TPN per PharmD - consider RD recommendations for estimated needs in above note.  2. Fluids per MD.  3. PO diet as medically feasible " to be per MD. RUFFIN following.

## 2021-10-17 LAB
ALBUMIN SERPL BCP-MCNC: 1.6 G/DL (ref 3.2–4.9)
ALBUMIN SERPL BCP-MCNC: 2.2 G/DL (ref 3.2–4.9)
ALBUMIN/GLOB SERPL: 0.8 G/DL
ALBUMIN/GLOB SERPL: 0.9 G/DL
ALP SERPL-CCNC: 36 U/L (ref 30–99)
ALP SERPL-CCNC: 45 U/L (ref 30–99)
ALT SERPL-CCNC: 10 U/L (ref 2–50)
ALT SERPL-CCNC: 17 U/L (ref 2–50)
ANION GAP SERPL CALC-SCNC: 16 MMOL/L (ref 7–16)
ANION GAP SERPL CALC-SCNC: 7 MMOL/L (ref 7–16)
AST SERPL-CCNC: 26 U/L (ref 12–45)
AST SERPL-CCNC: 34 U/L (ref 12–45)
BASOPHILS # BLD AUTO: 0.2 % (ref 0–1.8)
BASOPHILS # BLD: 0.01 K/UL (ref 0–0.12)
BILIRUB SERPL-MCNC: 0.3 MG/DL (ref 0.1–1.5)
BILIRUB SERPL-MCNC: 0.4 MG/DL (ref 0.1–1.5)
BUN SERPL-MCNC: 7 MG/DL (ref 8–22)
BUN SERPL-MCNC: 8 MG/DL (ref 8–22)
CALCIUM SERPL-MCNC: 6.8 MG/DL (ref 8.5–10.5)
CALCIUM SERPL-MCNC: 7.5 MG/DL (ref 8.5–10.5)
CHLORIDE SERPL-SCNC: 101 MMOL/L (ref 96–112)
CHLORIDE SERPL-SCNC: 93 MMOL/L (ref 96–112)
CO2 SERPL-SCNC: 21 MMOL/L (ref 20–33)
CO2 SERPL-SCNC: 26 MMOL/L (ref 20–33)
CREAT SERPL-MCNC: 0.53 MG/DL (ref 0.5–1.4)
CREAT SERPL-MCNC: 0.58 MG/DL (ref 0.5–1.4)
EOSINOPHIL # BLD AUTO: 0.01 K/UL (ref 0–0.51)
EOSINOPHIL NFR BLD: 0.2 % (ref 0–6.9)
ERYTHROCYTE [DISTWIDTH] IN BLOOD BY AUTOMATED COUNT: 49.5 FL (ref 35.9–50)
GLOBULIN SER CALC-MCNC: 2.1 G/DL (ref 1.9–3.5)
GLOBULIN SER CALC-MCNC: 2.4 G/DL (ref 1.9–3.5)
GLUCOSE BLD-MCNC: 115 MG/DL (ref 65–99)
GLUCOSE BLD-MCNC: 117 MG/DL (ref 65–99)
GLUCOSE BLD-MCNC: 120 MG/DL (ref 65–99)
GLUCOSE SERPL-MCNC: 113 MG/DL (ref 65–99)
GLUCOSE SERPL-MCNC: 732 MG/DL (ref 65–99)
HCT VFR BLD AUTO: 27.4 % (ref 37–47)
HGB BLD-MCNC: 9.3 G/DL (ref 12–16)
IMM GRANULOCYTES # BLD AUTO: 0.07 K/UL (ref 0–0.11)
IMM GRANULOCYTES NFR BLD AUTO: 1.2 % (ref 0–0.9)
LYMPHOCYTES # BLD AUTO: 0.91 K/UL (ref 1–4.8)
LYMPHOCYTES NFR BLD: 15.1 % (ref 22–41)
MAGNESIUM SERPL-MCNC: 2.1 MG/DL (ref 1.5–2.5)
MAGNESIUM SERPL-MCNC: 2.8 MG/DL (ref 1.5–2.5)
MCH RBC QN AUTO: 32.3 PG (ref 27–33)
MCHC RBC AUTO-ENTMCNC: 33.9 G/DL (ref 33.6–35)
MCV RBC AUTO: 95.1 FL (ref 81.4–97.8)
MONOCYTES # BLD AUTO: 0.72 K/UL (ref 0–0.85)
MONOCYTES NFR BLD AUTO: 11.9 % (ref 0–13.4)
NEUTROPHILS # BLD AUTO: 4.31 K/UL (ref 2–7.15)
NEUTROPHILS NFR BLD: 71.4 % (ref 44–72)
NRBC # BLD AUTO: 0 K/UL
NRBC BLD-RTO: 0 /100 WBC
PHOSPHATE SERPL-MCNC: 2.7 MG/DL (ref 2.5–4.5)
PHOSPHATE SERPL-MCNC: 7.8 MG/DL (ref 2.5–4.5)
PLATELET # BLD AUTO: 377 K/UL (ref 164–446)
PMV BLD AUTO: 8.7 FL (ref 9–12.9)
POTASSIUM SERPL-SCNC: 3.8 MMOL/L (ref 3.6–5.5)
POTASSIUM SERPL-SCNC: 5.7 MMOL/L (ref 3.6–5.5)
PROT SERPL-MCNC: 3.7 G/DL (ref 6–8.2)
PROT SERPL-MCNC: 4.6 G/DL (ref 6–8.2)
RBC # BLD AUTO: 2.88 M/UL (ref 4.2–5.4)
SODIUM SERPL-SCNC: 130 MMOL/L (ref 135–145)
SODIUM SERPL-SCNC: 134 MMOL/L (ref 135–145)
WBC # BLD AUTO: 6 K/UL (ref 4.8–10.8)

## 2021-10-17 PROCEDURE — 700105 HCHG RX REV CODE 258: Performed by: HOSPITALIST

## 2021-10-17 PROCEDURE — 700105 HCHG RX REV CODE 258: Performed by: FAMILY MEDICINE

## 2021-10-17 PROCEDURE — 770001 HCHG ROOM/CARE - MED/SURG/GYN PRIV*

## 2021-10-17 PROCEDURE — A9270 NON-COVERED ITEM OR SERVICE: HCPCS | Performed by: FAMILY MEDICINE

## 2021-10-17 PROCEDURE — 82962 GLUCOSE BLOOD TEST: CPT | Mod: 91

## 2021-10-17 PROCEDURE — 85025 COMPLETE CBC W/AUTO DIFF WBC: CPT

## 2021-10-17 PROCEDURE — 700102 HCHG RX REV CODE 250 W/ 637 OVERRIDE(OP): Performed by: FAMILY MEDICINE

## 2021-10-17 PROCEDURE — 700101 HCHG RX REV CODE 250: Performed by: HOSPITALIST

## 2021-10-17 PROCEDURE — 80053 COMPREHEN METABOLIC PANEL: CPT

## 2021-10-17 PROCEDURE — 99233 SBSQ HOSP IP/OBS HIGH 50: CPT | Performed by: INTERNAL MEDICINE

## 2021-10-17 PROCEDURE — 700111 HCHG RX REV CODE 636 W/ 250 OVERRIDE (IP): Mod: JG | Performed by: FAMILY MEDICINE

## 2021-10-17 PROCEDURE — 700111 HCHG RX REV CODE 636 W/ 250 OVERRIDE (IP): Performed by: STUDENT IN AN ORGANIZED HEALTH CARE EDUCATION/TRAINING PROGRAM

## 2021-10-17 PROCEDURE — 700111 HCHG RX REV CODE 636 W/ 250 OVERRIDE (IP): Performed by: HOSPITALIST

## 2021-10-17 PROCEDURE — 83735 ASSAY OF MAGNESIUM: CPT | Mod: 91

## 2021-10-17 PROCEDURE — 99232 SBSQ HOSP IP/OBS MODERATE 35: CPT | Performed by: HOSPITALIST

## 2021-10-17 PROCEDURE — 84100 ASSAY OF PHOSPHORUS: CPT | Mod: 91

## 2021-10-17 RX ORDER — POTASSIUM CHLORIDE 7.45 MG/ML
10 INJECTION INTRAVENOUS
Status: COMPLETED | OUTPATIENT
Start: 2021-10-17 | End: 2021-10-17

## 2021-10-17 RX ADMIN — GABAPENTIN 600 MG: 300 CAPSULE ORAL at 16:09

## 2021-10-17 RX ADMIN — OXYCODONE HYDROCHLORIDE 10 MG: 10 TABLET ORAL at 23:31

## 2021-10-17 RX ADMIN — POTASSIUM CHLORIDE 10 MEQ: 10 INJECTION, SOLUTION INTRAVENOUS at 12:06

## 2021-10-17 RX ADMIN — HYDROMORPHONE HYDROCHLORIDE 0.5 MG: 1 INJECTION, SOLUTION INTRAMUSCULAR; INTRAVENOUS; SUBCUTANEOUS at 15:58

## 2021-10-17 RX ADMIN — POTASSIUM CHLORIDE 10 MEQ: 10 INJECTION, SOLUTION INTRAVENOUS at 13:13

## 2021-10-17 RX ADMIN — PIPERACILLIN AND TAZOBACTAM 3.38 G: 3; .375 INJECTION, POWDER, LYOPHILIZED, FOR SOLUTION INTRAVENOUS; PARENTERAL at 22:36

## 2021-10-17 RX ADMIN — OXYCODONE HYDROCHLORIDE 10 MG: 10 TABLET ORAL at 18:48

## 2021-10-17 RX ADMIN — OXYCODONE HYDROCHLORIDE 10 MG: 10 TABLET ORAL at 06:06

## 2021-10-17 RX ADMIN — OXYCODONE HYDROCHLORIDE 10 MG: 10 TABLET ORAL at 10:10

## 2021-10-17 RX ADMIN — POTASSIUM CHLORIDE 10 MEQ: 10 INJECTION, SOLUTION INTRAVENOUS at 14:23

## 2021-10-17 RX ADMIN — HEPARIN SODIUM 5000 UNITS: 5000 INJECTION, SOLUTION INTRAVENOUS; SUBCUTANEOUS at 22:36

## 2021-10-17 RX ADMIN — HYDROMORPHONE HYDROCHLORIDE 0.5 MG: 1 INJECTION, SOLUTION INTRAMUSCULAR; INTRAVENOUS; SUBCUTANEOUS at 20:43

## 2021-10-17 RX ADMIN — PIPERACILLIN AND TAZOBACTAM 3.38 G: 3; .375 INJECTION, POWDER, LYOPHILIZED, FOR SOLUTION INTRAVENOUS; PARENTERAL at 06:07

## 2021-10-17 RX ADMIN — EZETIMIBE 10 MG: 10 TABLET ORAL at 16:09

## 2021-10-17 RX ADMIN — MICAFUNGIN SODIUM 100 MG: 100 INJECTION, POWDER, LYOPHILIZED, FOR SOLUTION INTRAVENOUS at 20:44

## 2021-10-17 RX ADMIN — Medication 1000 UNITS: at 06:06

## 2021-10-17 RX ADMIN — CALCIUM GLUCONATE: 98 INJECTION, SOLUTION INTRAVENOUS at 20:45

## 2021-10-17 RX ADMIN — GABAPENTIN 600 MG: 300 CAPSULE ORAL at 06:07

## 2021-10-17 RX ADMIN — HEPARIN SODIUM 5000 UNITS: 5000 INJECTION, SOLUTION INTRAVENOUS; SUBCUTANEOUS at 06:06

## 2021-10-17 RX ADMIN — PIPERACILLIN AND TAZOBACTAM 3.38 G: 3; .375 INJECTION, POWDER, LYOPHILIZED, FOR SOLUTION INTRAVENOUS; PARENTERAL at 15:48

## 2021-10-17 RX ADMIN — ONDANSETRON 4 MG: 2 INJECTION INTRAMUSCULAR; INTRAVENOUS at 10:10

## 2021-10-17 RX ADMIN — HEPARIN SODIUM 5000 UNITS: 5000 INJECTION, SOLUTION INTRAVENOUS; SUBCUTANEOUS at 14:46

## 2021-10-17 RX ADMIN — HYDROMORPHONE HYDROCHLORIDE 0.5 MG: 1 INJECTION, SOLUTION INTRAMUSCULAR; INTRAVENOUS; SUBCUTANEOUS at 01:07

## 2021-10-17 RX ADMIN — ONDANSETRON 4 MG: 2 INJECTION INTRAMUSCULAR; INTRAVENOUS at 14:42

## 2021-10-17 RX ADMIN — OXYCODONE HYDROCHLORIDE 10 MG: 10 TABLET ORAL at 14:43

## 2021-10-17 ASSESSMENT — ENCOUNTER SYMPTOMS
FEVER: 0
HEADACHES: 0
DIARRHEA: 1
ABDOMINAL PAIN: 1
DIZZINESS: 0
ABDOMINAL PAIN: 0
NAUSEA: 0
COUGH: 0
SHORTNESS OF BREATH: 0
CHILLS: 0
VOMITING: 0

## 2021-10-17 ASSESSMENT — PAIN DESCRIPTION - PAIN TYPE
TYPE: ACUTE PAIN

## 2021-10-17 NOTE — PROGRESS NOTES
Infectious Disease Progress Note    Author: Tamra Mcfarland M.D. Date & Time of service: 10/17/2021  9:31 AM    Chief Complaint:  Follow-up for multiple intra-abdominal abscesses, polymicrobial bacteremia, candidemia    Interval History:  10/15 afebrile, WBC 13.5 patient transferred to [facility] overnight.  Patient having diarrhea however abdominal pain is slightly improved today.  She was evaluated by Dr. ST this morning who is recommending additional drain placement and holding off on surgery at this time  10/16 afebrile, WBC 8.4.  Plan for second drain placement today.  Also patient to start TPN.  Had one episode of liquid dark stools.  C differential PCR pending  10/17 afebrile, WBC 6 underwent CT-guided drain placement yesterday, Gram stain with few yeast. C. difficile negative. On TPN, planning to ambulate in the halls today    Labs Reviewed, Medications Reviewed and Radiology Reviewed.    Review of Systems:  Review of Systems   Constitutional: Positive for malaise/fatigue. Negative for chills and fever.   Respiratory: Negative for cough and shortness of breath.    Gastrointestinal: Positive for diarrhea. Negative for abdominal pain, nausea and vomiting.   Neurological: Negative for dizziness and headaches.   All other systems reviewed and are negative.      Hemodynamics:  Temp (24hrs), Av.7 °C (98.1 °F), Min:36.3 °C (97.3 °F), Max:37.5 °C (99.5 °F)  Temperature: 36.4 °C (97.6 °F)  Pulse  Av.1  Min: 57  Max: 85   Blood Pressure : 146/66       Physical Exam:  Physical Exam  Vitals and nursing note reviewed.   Constitutional:       Appearance: Normal appearance.   HENT:      Mouth/Throat:      Mouth: Mucous membranes are moist.   Eyes:      Extraocular Movements: Extraocular movements intact.      Pupils: Pupils are equal, round, and reactive to light.   Cardiovascular:      Rate and Rhythm: Normal rate and regular rhythm.   Abdominal:      Palpations: Abdomen is soft.      Tenderness: There is  abdominal tenderness.      Comments: Right-sided MARTHA drain with scant serosanguineous fluid in bulb    Left lower quadrant MARTHA drain with purulent fluid in bulb   Musculoskeletal:         General: No swelling.      Comments: Left upper extremity PICC line in place-nontender, no surrounding erythema   Skin:     General: Skin is warm and dry.   Neurological:      General: No focal deficit present.      Mental Status: She is alert and oriented to person, place, and time.   Psychiatric:         Mood and Affect: Mood normal.         Behavior: Behavior normal.      Comments: Pleasant         Meds:    Current Facility-Administered Medications:   •  TPN Central Line Formulation  •  ondansetron  •  ondansetron  •  polyethylene glycol/lytes **AND** magnesium hydroxide  •  acetaminophen  •  HYDROmorphone  •  Notify provider if pain remains uncontrolled **AND** Use the Numeric Rating Scale (NRS), Colon-Baker Faces (WBF), or FLACC on regular floors and Critical-Care Pain Observation Tool (CPOT) on ICUs/Trauma to assess pain **AND** Pulse Ox **AND** Pharmacy Consult Request **AND** If patient difficult to arouse and/or has respiratory depression (respiratory rate of 10 or less), stop any opiates that are currently infusing and call a Rapid Response.  •  cyclobenzaprine  •  diphenhydrAMINE  •  ezetimibe  •  gabapentin  •  micafungin (MYCAMINE) ivpb  •  nicotine  •  oxyCODONE immediate release  •  piperacillin-tazobactam  •  promethazine  •  vitamin D3  •  heparin  •  Special Contact Isolation **AND** [COMPLETED] C Diff by PCR rflx Toxin **AND** Pharmacy  •  MD Alert...TPN per Pharmacy    Labs:  Recent Labs     10/15/21  0328 10/16/21  0233 10/17/21  0319   WBC 13.5* 8.4 6.0   RBC 3.15* 2.48* 2.88*   HEMOGLOBIN 9.6* 7.4* 9.3*   HEMATOCRIT 28.9* 23.5* 27.4*   MCV 91.7 94.8 95.1   MCH 30.5 29.8 32.3   RDW 48.7 48.7 49.5   PLATELETCT 557* 458* 377   MPV 8.6* 8.9* 8.7*   NEUTSPOLYS 78.20* 76.90* 71.40   LYMPHOCYTES 8.50* 11.90* 15.10*    MONOCYTES 11.80 9.30 11.90   EOSINOPHILS 0.00 0.10 0.20   BASOPHILS 0.20 0.10 0.20     Recent Labs     10/16/21  0233 10/17/21  0319 10/17/21  0830   SODIUM 132* 130* 134*   POTASSIUM 3.9 5.7* 3.8   CHLORIDE 101 93* 101   CO2 21 21 26   GLUCOSE 62* 732* 113*   BUN 6* 7* 8     Recent Labs     10/16/21  0233 10/17/21  0319 10/17/21  0830   ALBUMIN 1.7* 1.6* 2.2*   TBILIRUBIN 0.4 0.3 0.4   ALKPHOSPHAT 40 36 45   TOTPROTEIN 3.8* 3.7* 4.6*   ALTSGPT 12 10 17   ASTSGOT 20 26 34   CREATININE 0.43* 0.58 0.53       Imaging:  CT-ABDOMEN WITH & W/O    Result Date: 10/9/2021  10/9/2021 1:38 PM HISTORY/REASON FOR EXAM:  eval for any ugi perforation given recent sphinterotomy during ERCP and now with fluid collection. TECHNIQUE/EXAM DESCRIPTION:  CT scan of the abdomen without and with contrast. Initial precontrast images were obtained from the diaphragmatic domes through the iliac crests using helical technique.  Following nonionic contrast administration in an intravenous bolus fashion, and postcontrast, thin-section helical scanning obtained from the diaphragmatic domes through the iliac crests. 80 mL of Omnipaque 350 nonionic contrast was administered. Low dose optimization technique was utilized for this CT exam including automated exposure control and adjustment of the mA and/or kV according to patient size. COMPARISON: 10/8/2021, 10/2/2021. FINDINGS: There is a small right pleural effusion with adjacent enhancing segmental atelectasis.. Calcified left lower lobe granuloma. No pericardial effusion. Cardiac chambers are normal in size. Coronary artery calcifications are present. Air in the intra and extra hepatic bile ducts. Unremarkable appearance of the liver. The hepatic and portal veins are patent. Spleen is unremarkable. Again noted is air in the pancreatic duct. No adrenal gland nodules. Gallbladder is surgically absent. No hydronephrosis. No dilated loops of imaged bowel. There is anasarca and mesenteric edema. A  pigtail drainage catheter is present in the right abdomen in a air and fluid collection, the extent of which is difficult to measure the collection tracks inferiorly and medially measuring approximately 17 cm in craniocaudal dimension. A smaller loculated collection in the anterior abdomen near the inferior anterior abdominal wall sutures measures 1.5 x 8.7 cm. No findings of contrast extravasation from the opacified bowel loops,  particularly no contrast extravasation at the ampulla of Busy.     1.  No findings of intraluminal contrast extravasation from the opacified bowel loops, particularly no contrast extravasation from the duodenum at the ampulla of Busy. 2.  A pigtail drainage catheter is present in the right abdomen in a air and fluid collection, the extent of which is difficult to measure. The collection tracks inferiorly and medially measuring approximately 17 cm in craniocaudal dimension. 3.  A smaller loculated collection in the anterior abdomen near the inferior anterior abdominal wall sutures measures 1.5 x 8.7 cm. 4.  Unchanged pneumobilia in the CBD, intrahepatic bile ducts as well as pancreatic ducts. 5.  There is a small right pleural effusion with adjacent enhancing segmental atelectasis. 6.   Coronary artery calcifications are present    CT-ABDOMEN-PELVIS WITH    Result Date: 10/13/2021  10/13/2021 11:57 AM HISTORY/REASON FOR EXAM:  Peritonitis or perforation suspected; Pt with known intraabdominal fluid collection in the abdomen of uncertain etiology - had recent ERCP but studies not consistent with bile leak.  Drain in place - reassess. TECHNIQUE/EXAM DESCRIPTION:   CT scan of the abdomen and pelvis with contrast. Contrast-enhanced helical scanning was obtained from the diaphragmatic domes through the pubic symphysis following the bolus administration of nonionic contrast without complication. 100 mL of Omnipaque 350 nonionic contrast was administered without complication. Low dose  optimization technique was utilized for this CT exam including automated exposure control and adjustment of the mA and/or kV according to patient size. COMPARISON: 10/9/2021 FINDINGS: Lower Chest: Trace right pleural effusion with right basilar atelectasis. Calcified granuloma in the left lower lobe and trace left pleural fluid. Liver: Normal. Spleen: Unremarkable. Pancreas: Unremarkable. Gallbladder: The gallbladder has been resected. Biliary: Pneumobilia again noted. Adrenal glands: Normal. Kidneys: There is a small left renal cortical cyst which does not require imaging follow-up. No hydronephrosis.. Bowel: No bowel obstruction. Parts of the bowel are suboptimally evaluated due to the surrounding abscess and loss of the intervening fat planes. There is gastric sleeve surgery. Lymph nodes: No adenopathy. Vasculature: Scattered atherosclerosis. Peritoneum: A multiloculated although spatial rim-enhancing air-fluid collection within the right abdomen does not appear significantly changed in size the prior study. On series 2 image 54 measures about 15.5 x 8.7 cm. It extends from the upper abdomen,  where it is seen in the gallbladder fossa, inferiorly into the pelvis. It insinuates around and partially encases the duodenum and right kidney and extends around ascending colon and some mesenteric branch vessels. There is a percutaneous pigtail drainage catheter which appears well positioned within the abscess in the right midabdomen. Musculoskeletal: No acute or destructive process. Postsurgical changes anterior lumbar spinal fusion Pelvis: Hysterectomy. There is a small rim-enhancing fluid collection within the pelvis measuring 7 x 3.1 x 2.8 cm suspicious for small abscess..     1.  Extensive multiloculated rim-enhancing air and fluid collection/abscess within the right abdomen is not significantly changed in size from the prior study. The percutaneous pigtail drainage catheter appears well-positioned within the  collection. 2.  Small separate pelvic rim-enhancing fluid collection suggests an abscess.    CT-ABDOMEN-PELVIS WITH    Result Date: 10/8/2021  10/8/2021 8:06 AM HISTORY/REASON FOR EXAM:  Abdominal pain, fever. Right lower quadrant pain. Pancreatitis. TECHNIQUE/EXAM DESCRIPTION:   CT scan of the abdomen and pelvis with contrast. Contrast-enhanced helical scanning was obtained from the diaphragmatic domes through the pubic symphysis following the bolus administration of nonionic contrast without complication. 100 mL of Omnipaque 350 nonionic contrast was administered without complication. Low dose optimization technique was utilized for this CT exam including automated exposure control and adjustment of the mA and/or kV according to patient size. COMPARISON: 10/2/2021. FINDINGS: Lower Chest: Small right pleural effusion with right basilar opacities. Liver: Normal. Spleen: Unremarkable. Pancreas: Poorly visualized. There is gas in the pancreatic duct. Gallbladder: Surgically absent. Biliary: There is gas in the CBD and intrahepatic bile ducts, left more than right Adrenal glands: Normal. Kidneys: No hydronephrosis. Bilateral kidneys are enhancing symmetrically.. Bowel: Severe wall thickening of the descending colon, transverse colon, second portion duodenum. Postsurgical change in the stomach Lymph nodes: No adenopathy. Vasculature: The abdominal aorta is normal in caliber. Peritoneum: There is large irregular fluid collection occupying most of the right abdomen surrounding the right kidney and right colon. Additional fluid and gas collection in the midline abdomen anterior to the pancreas. This collection is probably connected to the right side collection via a junction in the jl hepatis Musculoskeletal: Postsurgical change in the lower lumbar spine. Pelvis: Trace pelvic free fluid.     1. Large irregular irregular fluid collection with thick wall occupying most of the right abdomen surrounding the right kidney and  right colon. Additional fluid and gas collection in the midline abdomen anterior to the pancreas concerning for abscess. This collection is probably connected to the right side collection via a junction in the jl hepatis 2. Severe wall thickening of the descending colon, transverse colon, second portion duodenum, likely reactive. 3. Pneumobilia with gas in the CBD, intrahepatic bile ducts as well as pancreatic ducts are of unclear etiology. 4. Small right pleural effusion with right basilar atelectasis.     CT-ABDOMEN-PELVIS WITH    Result Date: 10/2/2021  10/2/2021 2:03 PM HISTORY/REASON FOR EXAM:  RLQ and diffuse lower abdominal pain; had ERCP yesterday. Pt has pancreatitis and they found a polyp on her bile duct. TECHNIQUE/EXAM DESCRIPTION:   CT scan of the abdomen and pelvis with contrast. Contrast-enhanced helical scanning was obtained from the diaphragmatic domes through the pubic symphysis following the bolus administration of nonionic contrast without complication. 100 mL of Omnipaque 350 nonionic contrast was administered without complication. Low dose optimization technique was utilized for this CT exam including automated exposure control and adjustment of the mA and/or kV according to patient size. COMPARISON: 2/22/2019 FINDINGS: Lower Chest: Unremarkable. Liver: Normal. Hepatic and portal veins are patent. Spleen: Unremarkable. Pancreas: The pancreatic head is edematous though the parenchyma enhances normally. There is surrounding homogenous peripancreatic and mesenteric edema/infiltration which tracks down the right paracolic gutter and conforms to retroperitoneal structures. No defined walled off collection. There is small volume ascites in the pelvis. No focal fluid collection. Adrenal glands: Normal. Gallbladder: Cholecystectomy Biliary: Unchanged mild intrahepatic bile duct dilation. Kidneys: Symmetric nephrograms. No hydronephrosis. Pelvis: Hysterectomy. Normal appearance of the urinary  bladder.. Bowel: There are no dilated loops of small or large bowel. Scattered colonic diverticuli with no inflammation. The appendix is normal. Prior gastric sleeve. Lymph nodes: No adenopathy. Vasculature: No aneurysm. Musculoskeletal: Fusion of L3-L5. Multifocal degenerative changes are present. No suspicious osseous abnormality.     Acute interstitial edematous pancreatitis with surrounding mesenteric edema tracking along the right paracolic gutter. Small volume ascites in the pelvis. No walled off collections.    CT-DRAIN-PERITONEAL    Result Date: 10/10/2021  HISTORY/REASON FOR EXAM:  66-year-old woman with pancreatitis and extensive intraperitoneal abscess. TECHNIQUE/EXAM DESCRIPTION AND NUMBER OF VIEWS: RIGHT peritoneal drain placement with CT guidance. Low dose optimization technique was utilized for this CT exam including automated exposure control and adjustment of the mA and/or kV according to patient size. COMPARISON:  CT 10/8/2021 MEDICATIONS: Moderate sedation was provided. Pulse oximetry and continuous cardiac monitoring by the nurse was performed throughout the exam. Intraservice time was 15 minutes. PROCEDURE:     The risks, benefits, goals and objectives and alternatives were discussed. Risks were specified as including but not limited to bleeding, infection, damage to vessels or nerves, pain and discomfort as well as the possibility of incomplete resolution of underlying disease. Drain care related issues were discussed with the patient. The patient's questions were answered. Informed oral and written consent were obtained. The patient was placed on the CT gantry. Localizing scan was performed. The skin was prepped and draped in the usual sterile manner.  A timeout was performed. Local anesthetic result was achieved with administration of 1% lidocaine. A(n) 17-gauge access needle was advanced to the target using CT fluoroscopic guidance. Access was secured with a wire and the tract was  sequentially dilated to accommodate a(n) 14 [place] locking loop drain. A total of 80 mL of thin brown turbid fluid was removed and sent for  laboratory evaluation. This was put in place and formed. The catheter was attached to bag drainage and secured to the skin with 2-0 nylon suture. Completion scan was performed which showed no complication. The patient tolerated the procedure well with no evidence of complication. The skin was cleaned and a dressing was applied. FINDINGS: Drainage tube in good position     Successful peritoneal drainage tube placement. Plan: Thrice daily flushes with 10 mL of sterile saline. Monitor outputs. Please contact interventional radiology if there is any concern for tube dysfunction.     IR-US GUIDED PIV    Result Date: 10/10/2021  EXAMINATION:                                                                    HISTORY/REASON FOR EXAM:  Ultrasound Guided PIV.  TECHNIQUE/EXAM DESCRIPTION AND NUMBER OF VIEWS:  Peripheral IV insertion with ultrasound guidance.  The procedure was prepared using maximal sterile barrier technique including sterile gown, mask, cap, and donning of sterile gloves following appropriate hand hygiene and/or sterile scrub. Patient skin site was prepped with 2% Chlorhexidine solution.   FINDINGS: Peripheral IV insertion with Ultrasound Guidance was performed by qualified imaging nursing staff without the assistance of a Radiologist.      Ultrasound-guided PERIPHERAL IV INSERTION performed by qualified nursing staff as above.    NM-HEPATOBILIARY SCAN    Result Date: 10/10/2021  10/10/2021 11:12 AM HISTORY/REASON FOR EXAM:  rule out biliary leak post ERCP with sphincterotomy; R/O bile leak TECHNIQUE/EXAM DESCRIPTION AND NUMBER OF VIEWS: Hepatobiliary scan. COMPARISON: 10/9/2021 CT abdomen PROCEDURE:  5.4 mCi Tc 99m-Choletec was administered intravenously, followed by 90 minutes of anterior planar imaging. FINDINGS: Normal homogeneous uptake of radiotracer in the hepatic  parenchyma with visualization of the tracer in the common bile duct and entering the small bowel loops. No abnormal pooling or collection radiotracer outside of the biliary system or bowel.     Normal hepatobiliary scan with no findings of a bile leak.    DX-PORTABLE FLUOROSCOPY < 1 HOUR    Result Date: 10/1/2021  10/1/2021 10:01 AM HISTORY/REASON FOR EXAM:  ERCP TECHNIQUE/EXAM DESCRIPTION AND NUMBER OF VIEWS: 2 images were obtained in the operating room. A total of 0.3 minutes of fluoroscopy time utilized. COMPARISON: Selected images CT abdomen and pelvis 2019 FINDINGS: Images show injection of contrast into the common bile duct which is dilated. There is also filling of the pancreatic duct. Fluoroscopy time: minutes     ERCP images as described    EC-ECHOCARDIOGRAM COMPLETE W/O CONT    Result Date: 10/14/2021  Transthoracic Echo Report Echocardiography Laboratory CONCLUSIONS Normal left ventricular chamber size. Hyperdynamic left ventricular systolic function. The left ventricular ejection fraction is visually estimated to be greater than 75%. Normal right ventricular size and systolic function. Enlarged right atrium. Mild tricuspid regurgitation. Right ventricular systolic pressure is estimated to be  40 mmHg; mild pulmonary hypertension. Normal pericardium without effusion. No prior study is available for comparison. KATERINA PATEL Exam Date:         10/14/2021                    16:15 Exam Location:     Inpatient Priority:          Routine Ordering Physician:        YUE TOURE Referring Physician:       MESFIN Cobb Sonographer:               Aroldo Nolan RDCS, ESME Age:    66     Gender:    F MRN:    [id number] :    1955 BSA:    1.68   Ht (in):    63     Wt (lb):    143 Exam Type:     Complete Indications:     Endocarditis ICD Codes:       421 CPT Codes:       19706 BP:   106    /   48     HR:   78 Technical Quality:       Fair MEASUREMENTS  (Male / Female)  Normal Values 2D ECHO LV Diastolic Diameter PLAX        3.9 cm                4.2 - 5.9 / 3.9 - 5.3 cm LV Systolic Diameter PLAX         2.7 cm                2.1 - 4.0 cm IVS Diastolic Thickness           0.88 cm               LVPW Diastolic Thickness          0.75 cm               LVOT Diameter                     2 cm                  Estimated LV Ejection Fraction    75 %                  LV Ejection Fraction MOD BP       77.2 %                >= 55  % LV Ejection Fraction MOD 4C       79.3 %                LV Ejection Fraction MOD 2C       77.1 %                IVC Diameter                      1.5 cm                DOPPLER AV Peak Velocity                  1.8 m/s               AV Peak Gradient                  13.5 mmHg             AV Mean Gradient                  6.3 mmHg              LVOT Peak Velocity                1.7 m/s               AV Area Cont Eq vti               2.8 cm2               MV Velocity Time Integral         42.6 cm               Mitral E Point Velocity           1.3 m/s               Mitral E to A Ratio               1.5                   MV Pressure Half Time             77.3 ms               MV Area PHT                       2.8 cm2               MV Deceleration Time              266 ms                TR Peak Velocity                  269 cm/s              PV Peak Velocity                  0.96 m/s              PV Peak Gradient                  3.7 mmHg              RVOT Peak Velocity                0.73 m/s              * Indicates values subject to auto-interpretation LV EF:  75    % FINDINGS Left Ventricle Normal left ventricular chamber size. Normal left ventricular wall thickness. Hyperdynamic left ventricular systolic function. The left ventricular ejection fraction is visually estimated to be greater than 75%. Normal regional wall motion. Normal diastolic function. Right Ventricle Normal right ventricular size. Normal right ventricular systolic function. Right Atrium Enlarged  right atrium. Normal inferior vena cava size and inspiratory collapse. Left Atrium Normal left atrial size. Left atrial volume index is 26  mL/sq m. Mitral Valve Structurally normal mitral valve. No mitral stenosis. Trace mitral regurgitation. Aortic Valve The aortic valve is not well visualized. Cannot rule out bicuspid aortic valve. No aortic stenosis. No aortic insufficiency. Tricuspid Valve Structurally normal tricuspid valve. No tricuspid stenosis. Mild tricuspid regurgitation. Right ventricular systolic pressure is estimated to be  40 mmHg. Pulmonic Valve The pulmonic valve is not well visualized. No pulmonic stenosis. No pulmonic insufficiency. Pericardium Normal pericardium without effusion. Aorta The ascending aorta diameter is 3.1  cm. Nate Sarmiento MD (Electronically Signed) Final Date:     14 October 2021                 17:39      Micro:  Results     Procedure Component Value Units Date/Time    GRAM STAIN [[id number]] Collected: 10/16/21 1340    Order Status: Completed Specimen: Wound Updated: 10/16/21 1937     Significant Indicator .     Source WND     Site LMQ Peritoneal Drain     Gram Stain Result Few Yeast.    Narrative:      Special Contact [id number] GABY SHADY J  Drain LMQ 42 mL  Special Contact [id number] GABY SHADY J    CULTURE WOUND W/ GRAM STAIN [[id number]] Collected: 10/16/21 1340    Order Status: Completed Specimen: Wound Updated: 10/16/21 1937     Significant Indicator NEG     Source WND     Site LMQ Peritoneal Drain     Culture Result -     Gram Stain Result Few Yeast.    Narrative:      Special Contact [id number] GABY SHADY J  Drain LMQ 42 mL  Special Contact [id number] GABY SHADY J    C Diff by PCR rflx Toxin [[id number]] Collected: 10/16/21 0840    Order Status: Completed Specimen: Stool Updated: 10/16/21 1539     C Diff by PCR Negative     Comment: C. difficile NOT detected by PCR.  Treatment not indicated per guidelines.  Repeat testing not indicated  "within 7 days.          027-NAP1-BI Presumptive Negative     Comment: Presumptive 027/NAP1/BI target DNA sequences are NOT DETECTED.       Narrative:      Special Contact [id number] TARA DIAZ  Does this patient have risk factors for C-diff?->Yes  Has patient taken stool softeners or laxatives in the last 5  days?->No  Indication for CDiff by PCR?->Greater than/equal to 3 stools  in 24 hr & \"takes shape of container\"    FLUID CULTURE W/GRAM STAIN [[id number]] Collected: 10/16/21 1340    Order Status: Canceled Specimen: Other from Peritoneal Fluid     FLUID CULTURE W/GRAM STAIN [[id number]] Collected: 10/16/21 1340    Order Status: Canceled Specimen: Other from Peritoneal Fluid           Assessment:  Active Hospital Problems    Diagnosis    • Bacteremia due to Gram-negative bacteria and fungemia [R78.81]    • Sepsis due to intraabdominal fluid collection/abscess (HCC) [A41.9]    • Hyperlipidemia [E78.5]    • Hypertension [I10]      66 y.o. 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  Patient found to have multiple and extensive fluid collections in the abdomen and pelvis on imaging.  She underwent drain placement on 10/8.  Cultures are growing E. coli and Enterococcus faecalis.  Blood cultures are growing chryseobacterium species and Candida glabrata. Source likely due to the intra-abdominal abscesses. Repeat CT scan on 10/13 shows extensive multiloculated fluid collections in the abdomen and pelvis.    Pertinent diagnoses:  Multiple intra-abdominal abscesses  Candidemia  Chryseobacterium bacteremia  Polymicrobial infection  Persistent leukocytosis, resolved  Thrombocytosis  Diarrhea, C diff PCR negative  Recent ERCP pancreatitis    Plan:  -Blood cultures on 10/8 - Chryseobacterium " species, Candida glabrata.  -Chryseobacterium species have been known to be susceptible to Zosyn, Bactrim and ciprofloxacin  -Follow repeat blood cultures on 10/13-negative to date  -TTE-negative  -Continue to monitor for any vision changes-patient currently denies  -Continue IV Zosyn and micafungin  -Continue IV micafungin for treatment of candidemia for a 14-day course from 10/13. Stop date 10/27/2021 however micafungin may be extended pending fluid culture results and persistent abscesses  -Dr. Cook following  -Initial drain placed on 10/8. Cultures +E. coli and Enterococcus faecalis  -Status post second drain placement by IR on 10/16.  Gram stain with few yeast.  Cultures-pending  -Recommend a repeat CT scan in 5 to 7 days from 2nd drain placement to assess for interval improvement/resolution  Duration of antibiotics will depend on results of repeat CT scan  -C. difficile PCR-negative  -On TPN    I have performed a physical exam and reviewed and updated ROS and plan today 10/17/2021.  In review of yesterday's note 10/16/2021, there are no changes except as documented above.      Discussed with internal medicine/Dr. Silveira and  at bedside

## 2021-10-17 NOTE — PROGRESS NOTES
Hospital Medicine Daily Progress Note    Date of Service  10/17/2021    Chief Complaint  Nils Alfonso is a 66 y.o. female admitted 10/15/2021 with abdominal pain    Hospital Course  Initially admitted to [facility] for evaluation of abdominal pain after recent ERCP with polypectomy and sphincterotomy and noted to have large intra-abdominal fluid collection for which she underwent drainage with interventional radiology and was evaluated by infectious disease and surgery.  She was transferred to [facility] for higher level of care.  She was evaluated by Dr Cook who recommended conservative management with placement of a second drain      Interval Problem Update    Had 2nd drain placed by interventional radiology yesterday  C. difficile negative  Fluid culture growing yeast and strep species  Tolerating TPN        I have personally seen and examined the patient at bedside. I discussed the plan of care with patient, bedside RN and infectious disease.    Consultants/Specialty  general surgery, GI and infectious disease    Code Status  Full Code    Disposition  Patient is not medically cleared.   Anticipate discharge to to home with organized home healthcare and close outpatient follow-up.  I have placed the appropriate orders for post-discharge needs.    Review of Systems  Review of Systems   Constitutional: Negative for chills and fever.   Respiratory: Negative for cough and shortness of breath.    Cardiovascular: Negative for chest pain.   Gastrointestinal: Positive for abdominal pain and diarrhea.   All other systems reviewed and are negative.       Physical Exam  Temp:  [36.3 °C (97.3 °F)-37.5 °C (99.5 °F)] 36.4 °C (97.6 °F)  Pulse:  [62-79] 62  Resp:  [14-16] 16  BP: (107-153)/(61-89) 146/66  SpO2:  [90 %-99 %] 90 %    Physical Exam  Vitals and nursing note reviewed.   Constitutional:       General: She is not in acute distress.  HENT:      Head: Normocephalic and  atraumatic.      Nose: Nose normal. No rhinorrhea.      Mouth/Throat:      Pharynx: No oropharyngeal exudate or posterior oropharyngeal erythema.   Eyes:      General: No scleral icterus.        Right eye: No discharge.         Left eye: No discharge.   Cardiovascular:      Rate and Rhythm: Normal rate and regular rhythm.      Heart sounds: Normal heart sounds. No murmur heard.   No friction rub. No gallop.    Pulmonary:      Effort: Pulmonary effort is normal. No respiratory distress.      Breath sounds: Normal breath sounds. No stridor. No wheezing, rhonchi or rales.   Chest:      Chest wall: No tenderness.   Abdominal:      General: Bowel sounds are normal. There is no distension.      Palpations: Abdomen is soft. There is no mass.      Tenderness: There is abdominal tenderness. There is no guarding or rebound.      Comments: Abdominal drains in place with dark yellow drainage noted   Musculoskeletal:         General: Swelling present. No tenderness.      Cervical back: Neck supple. No rigidity.   Skin:     General: Skin is warm and dry.      Coloration: Skin is not cyanotic or jaundiced.      Nails: There is no clubbing.   Neurological:      General: No focal deficit present.      Mental Status: She is alert and oriented to person, place, and time.      Cranial Nerves: No cranial nerve deficit.      Motor: No weakness.   Psychiatric:         Mood and Affect: Mood normal.         Behavior: Behavior normal.         Fluids    Intake/Output Summary (Last 24 hours) at 10/17/2021 1342  Last data filed at 10/17/2021 0800  Gross per 24 hour   Intake --   Output 428 ml   Net -428 ml       Laboratory  Recent Labs     10/15/21  0328 10/16/21  0233 10/17/21  0319   WBC 13.5* 8.4 6.0   RBC 3.15* 2.48* 2.88*   HEMOGLOBIN 9.6* 7.4* 9.3*   HEMATOCRIT 28.9* 23.5* 27.4*   MCV 91.7 94.8 95.1   MCH 30.5 29.8 32.3   MCHC 33.2* 31.5* 33.9   RDW 48.7 48.7 49.5   PLATELETCT 557* 458* 377   MPV 8.6* 8.9* 8.7*     Recent Labs      10/16/21  0233 10/17/21  0319 10/17/21  0830   SODIUM 132* 130* 134*   POTASSIUM 3.9 5.7* 3.8   CHLORIDE 101 93* 101   CO2 21 21 26   GLUCOSE 62* 732* 113*   BUN 6* 7* 8   CREATININE 0.43* 0.58 0.53   CALCIUM 7.3* 6.8* 7.5*     Recent Labs     10/15/21  0328   INR 1.49*               Imaging  CT-DRAIN-PERITONEAL   Final Result      1.  CT guided pancreatic pseudocyst catheter drainage.   2.  The current plan is to obtain a follow-up CT in 5-7 days..      IR-PICC LINE PLACEMENT W/ GUIDANCE > AGE 5   Final Result                  Ultrasound-guided PICC placement performed by qualified nursing staff as    above.               Assessment/Plan  Electrolyte abnormality  Assessment & Plan  Discussed with pharmacist electrolytes repletion via TPN    Diarrhea  Assessment & Plan  C. difficile negative    Duodenal perforation (HCC)- (present on admission)  Assessment & Plan  Following ERCP with retroperitoneal abscess and bacteremia    Continue current antibiotics  Keep n.p.o.  Continue TPN  Follow-up CT in a few days    Bacteremia due to Gram-negative bacteria and fungemia- (present on admission)  Assessment & Plan  Peritoneal fluid cultures grew E. coli and Enterococcus   Blood cultures grew chryseobacterium and Candida glabrata   Repeat blood cultures from 10/13/2021 are negative    Peritoneal fluid from 10/16/2021 growing yeast and strep species follow-up on final results  ID and surgery following discussed with Dr. Mcfarland plan is to proceed with follow-up CT in a few days  Continue TPN and keep patient n.p.o.    KALANI negative for signs of endocarditis    Hyponatremia- (present on admission)  Assessment & Plan  Monitor electrolytes with TPN    Sepsis due to intraabdominal fluid collection/abscess (HCC)- (present on admission)  Assessment & Plan  Sepsis resolved  Source intra-abdominal  On Zosyn and Mycamine follow-up on repeat cultures  Discussed with ID    Hyperlipidemia- (present on admission)  Assessment & Plan  Continue  Zetia    Hypertension- (present on admission)  Assessment & Plan  Monitor blood pressure         VTE prophylaxis: heparin ppx    I have performed a physical exam and reviewed and updated ROS and Plan today (10/17/2021). In review of yesterday's note (10/16/2021), there are no changes except as documented above.

## 2021-10-17 NOTE — PROGRESS NOTES
Bedside report received from day RN. Assumed care of pt at 1900. Pt resting comfortably in bed, c/o 7/10 abd pain. VSS. Dave light and belongings in reach. Educated to call light. Bed is in low and locked position.    Crisis charting COVID-19 surge in effect.

## 2021-10-17 NOTE — PROGRESS NOTES
Bedside report received from NOC RN. Assumed care of pt. Pt awake, laying in bed. A/Ox4, VSS. No concerns, complaints or distress.  Pt educated to call before getting out of bed. POC reviewed and white board updated. Pt is medical.  Call light in reach. Bed locked in lowest position with 2 upper bed rails up. Bed alarm on.

## 2021-10-17 NOTE — PROGRESS NOTES
Pharmacy TPN Day # 2       10/17/2021    Dosing Weight   60 kg  Adjusted Body Weight      TPN currently providing 50% of goal  TPN goal: 1700 kcal/day including 1.5 gm/kg/day Protein  TPN indication: Perforated duodenum     Pertinent PMH: 65 y/o F recently admitted to [facility] with abdominal pain. Underwent a procedure on 10/01/2021, which involved a polypectomy and a sphincterotomy.  CT on 10/08/202, found to have a large fluid collection and thickening of wall around the duodenum, collection was retroperitoneal. IR placed drain. CT on 10/09/2021, significant amount of retroperitoneal air and fluid.  [facility] evaluation.    Temp (24hrs), Av.7 °C (98.1 °F), Min:36.3 °C (97.3 °F), Max:37.5 °C (99.5 °F)  .  Recent Labs     10/16/21  0233 10/17/21  0319 10/17/21  0830   SODIUM 132* 130* 134*   POTASSIUM 3.9 5.7* 3.8   CHLORIDE 101 93* 101   CO2 21 21 26   BUN 6* 7* 8   CREATININE 0.43* 0.58 0.53   GLUCOSE 62* 732* 113*   CALCIUM 7.3* 6.8* 7.5*   ASTSGOT 20 26 34   ALTSGPT 12 10 17   ALBUMIN 1.7* 1.6* 2.2*   TBILIRUBIN 0.4 0.3 0.4   PHOSPHORUS 2.0* 7.8* 2.7   MAGNESIUM 1.6 2.8* 2.1     Accu-Checks  Recent Labs     10/16/21  0750 10/17/21  0511   POCGLUCOSE 64* 115*       Vitals:    10/16/21 1530 10/16/21 1917 10/17/21 0326 10/17/21 0748   BP: 148/73 143/83 153/89 146/66   Weight:       Height:           Intake/Output Summary (Last 24 hours) at 10/17/2021 1100  Last data filed at 10/17/2021 0800  Gross per 24 hour   Intake --   Output 478 ml   Net -478 ml       Orders Placed This Encounter   Procedures   • Restrict to: Sips with Medications     Standing Status:   Standing     Number of Occurrences:   1     Order Specific Question:   Restrict to:     Answer:   Sips with Medications [3]         TPN for past 72 hours (Show up to 3 orders; newest on the left.)     Start date and time   10/16/2021 2000      TPN Central Line Formulation [[id number]]    Order Status  Active    Last Admin   Rate Verify at 10/17/2021 0726 by Aria Saleem R.N.       Base    Clinisol 15%  45 g    dextrose 70%  120 g    fat emulsions 20%  25 g       Additives    potassium phosphate  25 mmol    sodium acetate  150 mEq    sodium chloride  150 mEq    magnesium sulfate  12 mEq    calcium GLUConate  4.65 mEq    M.T.E.-4  1 mL    M.V.I. Adult  10 mL    famotidine  40 mg       QS Base    sterile water  1,246.81 mL       Energy Contribution    Proteins  --    Dextrose  408 kcal    Lipids  250 kcal    Total  658 kcal       Electrolyte Ion Calculated Amount    Sodium  300 mEq    Potassium  36.67 mEq    Calcium  4.65 mEq    Magnesium  12 mEq    Aluminum  --    Phosphate  25 mmol    Chloride  150 mEq    Acetate  188.1 mEq       Other    Total Protein  45 g    Total Protein/kg  0.63 g/kg    Total Amino Acid  --    Total Amino Acid/kg  --    Glucose Infusion Rate  1.17 mg/kg/min    Osmolarity (Estimated)  --    Volume  1,992 mL    Rate  83 mL/hr    Dosing Weight  71.4 kg    Infusion Site  Central            This formula provides:  % kcal as lipids = 30  Grams protein/kg =0.75  Non-protein calories = 658  Kcals/kg = 14  Total daily calories = 838     Comments:  · Pepcid added to formulation.  · Kriders to MAR 2/2 KCL 2meq/mL shortage.  · Diet: Restricted to sips w/ medications   · TPN initiated at 50% goal , may advance to 100% tomorrow pending labs.   · Appears AM labs were collected prior to lines being adequately flushed, labs re-drawn and results were wnl. CMP, Mg , Phos w/ AM labs.     Fluids:  · 33 mL/kg     Macronutrients:  · AA 21%CHO 49%Lipids 30%  · GIR: 1.47mg/kg/min     Micronutrients:  · Na 300 meq   · K 36 meq ( 30meq daily added to MAR as Kriders )   · Ca 4.65 meq ( Corrected Ca 8.9 mg/dL , ok )  · Mg 12 meq  · Phos 25 mmol        Sajan Paz PharmD BCPS

## 2021-10-17 NOTE — PROGRESS NOTES
Alon from Lab called with critical result of Calcium 6.8 and Glucose 732 at 0505. Critical lab result read back to Alon.   Dr. Sood notified of critical lab result at 0513.  Critical lab result read back by Dr. Sood.    POC glucose resulted at 115. Per MD, repeat CMP not indicated.    No new orders given regarding Calcium.

## 2021-10-18 LAB
ALBUMIN SERPL BCP-MCNC: 2.2 G/DL (ref 3.2–4.9)
ALBUMIN SERPL BCP-MCNC: 2.4 G/DL (ref 3.2–4.9)
ALBUMIN/GLOB SERPL: 0.8 G/DL
ALBUMIN/GLOB SERPL: 0.9 G/DL
ALP SERPL-CCNC: 49 U/L (ref 30–99)
ALP SERPL-CCNC: 51 U/L (ref 30–99)
ALT SERPL-CCNC: 23 U/L (ref 2–50)
ALT SERPL-CCNC: 30 U/L (ref 2–50)
ANION GAP SERPL CALC-SCNC: 7 MMOL/L (ref 7–16)
ANION GAP SERPL CALC-SCNC: 9 MMOL/L (ref 7–16)
AST SERPL-CCNC: 46 U/L (ref 12–45)
AST SERPL-CCNC: 50 U/L (ref 12–45)
BACTERIA BLD CULT: NORMAL
BACTERIA BLD CULT: NORMAL
BACTERIA WND AEROBE CULT: ABNORMAL
BASOPHILS # BLD AUTO: 0 % (ref 0–1.8)
BASOPHILS # BLD: 0 K/UL (ref 0–0.12)
BILIRUB SERPL-MCNC: 0.4 MG/DL (ref 0.1–1.5)
BILIRUB SERPL-MCNC: 0.4 MG/DL (ref 0.1–1.5)
BUN SERPL-MCNC: 8 MG/DL (ref 8–22)
BUN SERPL-MCNC: 9 MG/DL (ref 8–22)
CALCIUM SERPL-MCNC: 7.7 MG/DL (ref 8.5–10.5)
CALCIUM SERPL-MCNC: 7.9 MG/DL (ref 8.5–10.5)
CHLORIDE SERPL-SCNC: 98 MMOL/L (ref 96–112)
CHLORIDE SERPL-SCNC: 99 MMOL/L (ref 96–112)
CO2 SERPL-SCNC: 26 MMOL/L (ref 20–33)
CO2 SERPL-SCNC: 27 MMOL/L (ref 20–33)
CREAT SERPL-MCNC: 0.54 MG/DL (ref 0.5–1.4)
CREAT SERPL-MCNC: 0.58 MG/DL (ref 0.5–1.4)
EOSINOPHIL # BLD AUTO: 0.07 K/UL (ref 0–0.51)
EOSINOPHIL NFR BLD: 0.9 % (ref 0–6.9)
ERYTHROCYTE [DISTWIDTH] IN BLOOD BY AUTOMATED COUNT: 47.9 FL (ref 35.9–50)
ERYTHROCYTE [DISTWIDTH] IN BLOOD BY AUTOMATED COUNT: 48.3 FL (ref 35.9–50)
GIANT PLATELETS BLD QL SMEAR: NORMAL
GLOBULIN SER CALC-MCNC: 2.7 G/DL (ref 1.9–3.5)
GLOBULIN SER CALC-MCNC: 2.8 G/DL (ref 1.9–3.5)
GLUCOSE BLD-MCNC: 155 MG/DL (ref 65–99)
GLUCOSE SERPL-MCNC: 102 MG/DL (ref 65–99)
GLUCOSE SERPL-MCNC: 167 MG/DL (ref 65–99)
GRAM STN SPEC: ABNORMAL
HCT VFR BLD AUTO: 16 % (ref 37–47)
HCT VFR BLD AUTO: 31.6 % (ref 37–47)
HGB BLD-MCNC: 10 G/DL (ref 12–16)
HGB BLD-MCNC: 5.1 G/DL (ref 12–16)
HYPOCHROMIA BLD QL SMEAR: ABNORMAL
LYMPHOCYTES # BLD AUTO: 0.76 K/UL (ref 1–4.8)
LYMPHOCYTES NFR BLD: 9.5 % (ref 22–41)
MAGNESIUM SERPL-MCNC: 2.2 MG/DL (ref 1.5–2.5)
MAGNESIUM SERPL-MCNC: 2.2 MG/DL (ref 1.5–2.5)
MANUAL DIFF BLD: NORMAL
MCH RBC QN AUTO: 29.8 PG (ref 27–33)
MCH RBC QN AUTO: 30 PG (ref 27–33)
MCHC RBC AUTO-ENTMCNC: 31.6 G/DL (ref 33.6–35)
MCHC RBC AUTO-ENTMCNC: 31.9 G/DL (ref 33.6–35)
MCV RBC AUTO: 94 FL (ref 81.4–97.8)
MCV RBC AUTO: 94.1 FL (ref 81.4–97.8)
MICROCYTES BLD QL SMEAR: ABNORMAL
MONOCYTES # BLD AUTO: 0.49 K/UL (ref 0–0.85)
MONOCYTES NFR BLD AUTO: 6.1 % (ref 0–13.4)
MORPHOLOGY BLD-IMP: NORMAL
MYELOCYTES NFR BLD MANUAL: 0.9 %
NEUTROPHILS # BLD AUTO: 6.61 K/UL (ref 2–7.15)
NEUTROPHILS NFR BLD: 77.4 % (ref 44–72)
NEUTS BAND NFR BLD MANUAL: 5.2 % (ref 0–10)
NRBC # BLD AUTO: 0 K/UL
NRBC BLD-RTO: 0 /100 WBC
PHOSPHATE SERPL-MCNC: 2.8 MG/DL (ref 2.5–4.5)
PHOSPHATE SERPL-MCNC: 3 MG/DL (ref 2.5–4.5)
PLATELET # BLD AUTO: 398 K/UL (ref 164–446)
PLATELET # BLD AUTO: 496 K/UL (ref 164–446)
PLATELET BLD QL SMEAR: NORMAL
PMV BLD AUTO: 8.6 FL (ref 9–12.9)
PMV BLD AUTO: 8.8 FL (ref 9–12.9)
POLYCHROMASIA BLD QL SMEAR: NORMAL
POTASSIUM SERPL-SCNC: 4.1 MMOL/L (ref 3.6–5.5)
POTASSIUM SERPL-SCNC: 4.3 MMOL/L (ref 3.6–5.5)
PREALB SERPL-MCNC: 7 MG/DL (ref 18–38)
PROT SERPL-MCNC: 5 G/DL (ref 6–8.2)
PROT SERPL-MCNC: 5.1 G/DL (ref 6–8.2)
RBC # BLD AUTO: 1.7 M/UL (ref 4.2–5.4)
RBC # BLD AUTO: 3.36 M/UL (ref 4.2–5.4)
RBC BLD AUTO: PRESENT
SCHISTOCYTES BLD QL SMEAR: NORMAL
SIGNIFICANT IND 70042: ABNORMAL
SIGNIFICANT IND 70042: NORMAL
SIGNIFICANT IND 70042: NORMAL
SITE SITE: ABNORMAL
SITE SITE: NORMAL
SITE SITE: NORMAL
SODIUM SERPL-SCNC: 133 MMOL/L (ref 135–145)
SODIUM SERPL-SCNC: 133 MMOL/L (ref 135–145)
SOURCE SOURCE: ABNORMAL
SOURCE SOURCE: NORMAL
SOURCE SOURCE: NORMAL
WBC # BLD AUTO: 11.6 K/UL (ref 4.8–10.8)
WBC # BLD AUTO: 8 K/UL (ref 4.8–10.8)

## 2021-10-18 PROCEDURE — 99232 SBSQ HOSP IP/OBS MODERATE 35: CPT | Performed by: SURGERY

## 2021-10-18 PROCEDURE — 84134 ASSAY OF PREALBUMIN: CPT

## 2021-10-18 PROCEDURE — 87040 BLOOD CULTURE FOR BACTERIA: CPT

## 2021-10-18 PROCEDURE — 99232 SBSQ HOSP IP/OBS MODERATE 35: CPT | Performed by: HOSPITALIST

## 2021-10-18 PROCEDURE — 700105 HCHG RX REV CODE 258: Performed by: HOSPITALIST

## 2021-10-18 PROCEDURE — 83735 ASSAY OF MAGNESIUM: CPT

## 2021-10-18 PROCEDURE — 99233 SBSQ HOSP IP/OBS HIGH 50: CPT | Performed by: INTERNAL MEDICINE

## 2021-10-18 PROCEDURE — 82962 GLUCOSE BLOOD TEST: CPT

## 2021-10-18 PROCEDURE — 700111 HCHG RX REV CODE 636 W/ 250 OVERRIDE (IP): Performed by: HOSPITALIST

## 2021-10-18 PROCEDURE — 770001 HCHG ROOM/CARE - MED/SURG/GYN PRIV*

## 2021-10-18 PROCEDURE — 700105 HCHG RX REV CODE 258: Performed by: FAMILY MEDICINE

## 2021-10-18 PROCEDURE — 700111 HCHG RX REV CODE 636 W/ 250 OVERRIDE (IP): Mod: JB | Performed by: SURGERY

## 2021-10-18 PROCEDURE — 85027 COMPLETE CBC AUTOMATED: CPT | Mod: 91

## 2021-10-18 PROCEDURE — 700111 HCHG RX REV CODE 636 W/ 250 OVERRIDE (IP): Performed by: STUDENT IN AN ORGANIZED HEALTH CARE EDUCATION/TRAINING PROGRAM

## 2021-10-18 PROCEDURE — 700102 HCHG RX REV CODE 250 W/ 637 OVERRIDE(OP): Performed by: FAMILY MEDICINE

## 2021-10-18 PROCEDURE — 85007 BL SMEAR W/DIFF WBC COUNT: CPT

## 2021-10-18 PROCEDURE — 36415 COLL VENOUS BLD VENIPUNCTURE: CPT

## 2021-10-18 PROCEDURE — A9270 NON-COVERED ITEM OR SERVICE: HCPCS | Performed by: FAMILY MEDICINE

## 2021-10-18 PROCEDURE — 80053 COMPREHEN METABOLIC PANEL: CPT | Mod: 91

## 2021-10-18 PROCEDURE — 700111 HCHG RX REV CODE 636 W/ 250 OVERRIDE (IP): Performed by: FAMILY MEDICINE

## 2021-10-18 PROCEDURE — 700101 HCHG RX REV CODE 250: Performed by: HOSPITALIST

## 2021-10-18 PROCEDURE — 84100 ASSAY OF PHOSPHORUS: CPT | Mod: 91

## 2021-10-18 RX ORDER — OCTREOTIDE ACETATE 100 UG/ML
100 INJECTION, SOLUTION INTRAVENOUS; SUBCUTANEOUS 3 TIMES DAILY
Status: DISCONTINUED | OUTPATIENT
Start: 2021-10-18 | End: 2021-10-24

## 2021-10-18 RX ORDER — FUROSEMIDE 10 MG/ML
20 INJECTION INTRAMUSCULAR; INTRAVENOUS ONCE
Status: COMPLETED | OUTPATIENT
Start: 2021-10-18 | End: 2021-10-18

## 2021-10-18 RX ADMIN — OCTREOTIDE ACETATE 100 MCG: 100 INJECTION, SOLUTION INTRAVENOUS; SUBCUTANEOUS at 17:10

## 2021-10-18 RX ADMIN — PIPERACILLIN AND TAZOBACTAM 3.38 G: 3; .375 INJECTION, POWDER, LYOPHILIZED, FOR SOLUTION INTRAVENOUS; PARENTERAL at 21:07

## 2021-10-18 RX ADMIN — MICAFUNGIN SODIUM 100 MG: 100 INJECTION, POWDER, LYOPHILIZED, FOR SOLUTION INTRAVENOUS at 12:35

## 2021-10-18 RX ADMIN — OXYCODONE HYDROCHLORIDE 10 MG: 10 TABLET ORAL at 21:10

## 2021-10-18 RX ADMIN — HEPARIN SODIUM 5000 UNITS: 5000 INJECTION, SOLUTION INTRAVENOUS; SUBCUTANEOUS at 22:14

## 2021-10-18 RX ADMIN — PIPERACILLIN AND TAZOBACTAM 3.38 G: 3; .375 INJECTION, POWDER, LYOPHILIZED, FOR SOLUTION INTRAVENOUS; PARENTERAL at 14:32

## 2021-10-18 RX ADMIN — EZETIMIBE 10 MG: 10 TABLET ORAL at 17:00

## 2021-10-18 RX ADMIN — ONDANSETRON 4 MG: 2 INJECTION INTRAMUSCULAR; INTRAVENOUS at 06:26

## 2021-10-18 RX ADMIN — OXYCODONE HYDROCHLORIDE 10 MG: 10 TABLET ORAL at 12:30

## 2021-10-18 RX ADMIN — OXYCODONE HYDROCHLORIDE 10 MG: 10 TABLET ORAL at 17:00

## 2021-10-18 RX ADMIN — GABAPENTIN 600 MG: 300 CAPSULE ORAL at 04:47

## 2021-10-18 RX ADMIN — OXYCODONE HYDROCHLORIDE 10 MG: 10 TABLET ORAL at 03:35

## 2021-10-18 RX ADMIN — FUROSEMIDE 20 MG: 10 INJECTION, SOLUTION INTRAMUSCULAR; INTRAVENOUS at 17:01

## 2021-10-18 RX ADMIN — HEPARIN SODIUM 5000 UNITS: 5000 INJECTION, SOLUTION INTRAVENOUS; SUBCUTANEOUS at 14:32

## 2021-10-18 RX ADMIN — CALCIUM GLUCONATE: 98 INJECTION, SOLUTION INTRAVENOUS at 21:07

## 2021-10-18 RX ADMIN — OCTREOTIDE ACETATE 100 MCG: 100 INJECTION, SOLUTION INTRAVENOUS; SUBCUTANEOUS at 12:34

## 2021-10-18 RX ADMIN — OCTREOTIDE ACETATE 100 MCG: 100 INJECTION, SOLUTION INTRAVENOUS; SUBCUTANEOUS at 10:10

## 2021-10-18 RX ADMIN — HEPARIN SODIUM 5000 UNITS: 5000 INJECTION, SOLUTION INTRAVENOUS; SUBCUTANEOUS at 04:47

## 2021-10-18 RX ADMIN — OXYCODONE HYDROCHLORIDE 10 MG: 10 TABLET ORAL at 08:14

## 2021-10-18 RX ADMIN — Medication 1000 UNITS: at 04:47

## 2021-10-18 RX ADMIN — PIPERACILLIN AND TAZOBACTAM 3.38 G: 3; .375 INJECTION, POWDER, LYOPHILIZED, FOR SOLUTION INTRAVENOUS; PARENTERAL at 04:46

## 2021-10-18 RX ADMIN — HYDROMORPHONE HYDROCHLORIDE 0.5 MG: 1 INJECTION, SOLUTION INTRAMUSCULAR; INTRAVENOUS; SUBCUTANEOUS at 04:47

## 2021-10-18 RX ADMIN — GABAPENTIN 600 MG: 300 CAPSULE ORAL at 17:00

## 2021-10-18 ASSESSMENT — ENCOUNTER SYMPTOMS
MYALGIAS: 0
WHEEZING: 0
COUGH: 0
BRUISES/BLEEDS EASILY: 0
HEADACHES: 0
BLURRED VISION: 0
NAUSEA: 0
PALPITATIONS: 0
MEMORY LOSS: 0
CONSTIPATION: 0
SHORTNESS OF BREATH: 0
CHILLS: 0
DIARRHEA: 0
DIZZINESS: 0
FOCAL WEAKNESS: 0
WEAKNESS: 1
ABDOMINAL PAIN: 1
HEMOPTYSIS: 0
VOMITING: 0
FALLS: 0
FEVER: 0

## 2021-10-18 ASSESSMENT — PAIN DESCRIPTION - PAIN TYPE
TYPE: ACUTE PAIN
TYPE: ACUTE PAIN;SURGICAL PAIN
TYPE: ACUTE PAIN
TYPE: ACUTE PAIN

## 2021-10-18 ASSESSMENT — LIFESTYLE VARIABLES: SUBSTANCE_ABUSE: 0

## 2021-10-18 NOTE — PROGRESS NOTES
Assumed care of patient at 1915, received bedside report from day shift RN. Bed is locked and in lowest position with call light within reach. Treaded socks in place. Patient updated on plan of care. Pt medicated for pain in abdomen. White board updated. Pt A&Ox4. Patient's breathing pattern is unlabored. Tele monitor in place and cardiac rhythm being monitored. Family at bedside.    Pt is medical.

## 2021-10-18 NOTE — PROGRESS NOTES
Radiology Progress Note   Author: AYE Mahan Date & Time created: 10/18/2021  3:28 PM   Date of admission  10/15/2021  Note to reader: this note follows the APSO format rather than the historical SOAP format. Assessment and plan located at the top of the note for ease of use.    Chief Complaint  66 y.o. female admitted 10/15/2021 with abd pain     HPI  66-year-old female PMH recurrent idiopathic pancreatitis s/p ERCP with sphincterotomy October 1, 2021 complicated by ERCP pancreatitis, sleeve gastrectomy, dyslipidemia, hypertension, osteoarthritis of the spine status post lumbar fusion who was admitted to the hospital 10/8/2021 with worsening right lower quadrant pain over the past week.  Ever since her ERCP October 1, 2021, she has been experiencing pain, intermittent subjective fevers, nausea.  In the emergency room-labs: CBC: WBC 17.8..  Sodium 130.  LFTs-WNL.  CT scan abdomen/pelvis-large irregular fluid collection with thick wall occupying most of the right abdomen surrounding the right kidney and colon.  Severe wall thickening of the descending, transverse colon, second portion of the duodenum likely reactive.  Pneumobilia with gas in the CBD.  Drain placement by IR was performed.  Currently, the abdominal pain has improved.      Assessment/Plan  Interval History   Active Problems:    Hypertension    Hyperlipidemia    Sepsis due to intraabdominal fluid collection/abscess (HCC)    Hyponatremia    Bacteremia due to Gram-negative bacteria and fungemia    Duodenal perforation (HCC)    Postprocedural retroperitoneal abscess    Diarrhea    Electrolyte abnormality      Plan IR  - Irrigate Left and Right abdominal drain with 20 ml of sterile saline three time a shift    - Surgery, ID, GI following   - Repeat CT scan on 10/22        [id number]  IR:   10/8- Right abd drain placed, + Chryseobacterium indologenes   Chryseobacterium arthrosphaewolf  Candida glabrata   10/15- transfer from [facility]   10/16-  Left abd drain placed, drain +  Enterococcus faecalis, Candida albicans, Candida glabrata         Review of Systems  Physical Exam   Review of Systems   Constitutional: Positive for malaise/fatigue. Negative for chills and fever.   HENT: Negative for hearing loss.    Eyes: Negative for blurred vision.   Respiratory: Negative for cough, hemoptysis and shortness of breath.    Cardiovascular: Negative for chest pain and palpitations.   Gastrointestinal: Positive for abdominal pain. Negative for vomiting.   Genitourinary: Negative for dysuria.   Musculoskeletal: Negative for myalgias.   Skin: Negative for rash.   Neurological: Positive for weakness. Negative for dizziness and headaches.   Endo/Heme/Allergies: Does not bruise/bleed easily.   Psychiatric/Behavioral: Negative for suicidal ideas.      Vitals:    10/18/21 1511   BP: 160/76   Pulse: 64   Resp: 18   Temp: 35.9 °C (96.7 °F)   SpO2: 98%        Physical Exam  Constitutional:       Appearance: Normal appearance. She is ill-appearing.   HENT:      Head: Normocephalic.      Nose: Nose normal.      Mouth/Throat:      Mouth: Mucous membranes are moist.   Eyes:      Pupils: Pupils are equal, round, and reactive to light.   Cardiovascular:      Rate and Rhythm: Normal rate.   Pulmonary:      Effort: Pulmonary effort is normal. No respiratory distress.   Abdominal:      Palpations: Abdomen is soft.      Tenderness: There is abdominal tenderness.      Comments: Left and right abdominal drains   Drain sites CDI, no redness or swelling   Connect to MARTHA bulbs    Musculoskeletal:         General: No tenderness or deformity.      Cervical back: Normal range of motion.   Skin:     General: Skin is warm and dry.      Capillary Refill: Capillary refill takes less than 2 seconds.      Coloration: Skin is not jaundiced or pale.   Neurological:      General: No focal deficit present.      Mental Status: She is alert.      Motor: No weakness.   Psychiatric:         Mood and Affect:  Mood normal.         Behavior: Behavior normal.             Labs    Recent Labs     10/17/21  0319 10/18/21  0830 10/18/21  1010   WBC 6.0 11.6* 8.0   RBC 2.88* 1.70* 3.36*   HEMOGLOBIN 9.3* 5.1* 10.0*   HEMATOCRIT 27.4* 16.0* 31.6*   MCV 95.1 94.1 94.0   MCH 32.3 30.0 29.8   MCHC 33.9 31.9* 31.6*   RDW 49.5 47.9 48.3   PLATELETCT 377 496* 398   MPV 8.7* 8.8* 8.6*     Recent Labs     10/17/21  0319 10/17/21  0830 10/18/21  0840   SODIUM 130* 134* 133*   POTASSIUM 5.7* 3.8 4.1   CHLORIDE 93* 101 99   CO2 21 26 27   GLUCOSE 732* 113* 102*   BUN 7* 8 9   CREATININE 0.58 0.53 0.54   CALCIUM 6.8* 7.5* 7.7*     Recent Labs     10/17/21  0319 10/17/21  0830 10/18/21  0840   ALBUMIN 1.6* 2.2* 2.2*   TBILIRUBIN 0.3 0.4 0.4   ALKPHOSPHAT 36 45 49   TOTPROTEIN 3.7* 4.6* 5.0*   ALTSGPT 10 17 23   ASTSGOT 26 34 46*   CREATININE 0.58 0.53 0.54     CT-DRAIN-PERITONEAL   Final Result      1.  CT guided pancreatic pseudocyst catheter drainage.   2.  The current plan is to obtain a follow-up CT in 5-7 days..      IR-PICC LINE PLACEMENT W/ GUIDANCE > AGE 5   Final Result                  Ultrasound-guided PICC placement performed by qualified nursing staff as    above.              INR   Date Value Ref Range Status   10/15/2021 1.49 (H) 0.87 - 1.13 Final     Comment:     INR - Non-therapeutic Reference Range: 0.87-1.13  INR - Therapeutic Reference Range: 2.0-4.0       No results found for: POCINR     Intake/Output Summary (Last 24 hours) at 10/18/2021 1528  Last data filed at 10/18/2021 0500  Gross per 24 hour   Intake --   Output 80 ml   Net -80 ml      Labs not explicitly included in this progress note were reviewed by the author. Radiology/imaging not explicitly included in this progress note was reviewed by the author.     I have performed a physical exam and reviewed and updated ROS and Plan today (10/18/2021).     25 minutes in directly providing and coordinating care and extensive data review.  No time overlap and excludes  procedures.

## 2021-10-18 NOTE — PROGRESS NOTES
Surgical Progress Note    Author: Jaden Cook M.D. Date & Time created: 10/18/2021   7:33 AM     Interval Events:  Patient seen this morning.  Does complain of intermittent pain and nausea.  Having difficulty last night with nursing to continue with getting pain meds.  Right upper quadrant drain clearing up.  New right lower quadrant drain on left side purulent green fluid.  Not high output.  Labs okay and afebrile.  She is in good spirits but wants her diet Coke    Review of Systems   Constitutional: Positive for malaise/fatigue.   Gastrointestinal: Positive for abdominal pain.   All other systems reviewed and are negative.    Hemodynamics:  Temp (24hrs), Av.2 °C (97.2 °F), Min:35.8 °C (96.5 °F), Max:36.5 °C (97.7 °F)  Temperature: 35.8 °C (96.5 °F)  Pulse  Av.3  Min: 57  Max: 85   Blood Pressure : 138/71     Respiratory:    Respiration: 16, Pulse Oximetry: 98 %     Work Of Breathing / Effort: Within Normal Limits     Neuro:  GCS       Fluids:    Intake/Output Summary (Last 24 hours) at 10/18/2021 0733  Last data filed at 10/18/2021 0500  Gross per 24 hour   Intake --   Output 133 ml   Net -133 ml        Current Diet Order   Procedures   • Restrict to: Sips with Medications     Physical Exam  Vitals and nursing note reviewed.   Constitutional:       Appearance: Normal appearance.   HENT:      Head: Normocephalic and atraumatic.      Nose: Nose normal.      Mouth/Throat:      Mouth: Mucous membranes are moist.   Eyes:      Extraocular Movements: Extraocular movements intact.      Conjunctiva/sclera: Conjunctivae normal.      Pupils: Pupils are equal, round, and reactive to light.   Cardiovascular:      Rate and Rhythm: Normal rate and regular rhythm.      Pulses: Normal pulses.      Heart sounds: Normal heart sounds.   Pulmonary:      Effort: Pulmonary effort is normal.      Breath sounds: Normal breath sounds.   Abdominal:      General: Abdomen is flat. Bowel sounds are normal.       Palpations: Abdomen is soft.      Comments: MARTHA upper right is clearing up, new MARTHA drain has green purulent fluid.  Will be irrigating both.   Musculoskeletal:         General: Normal range of motion.      Cervical back: Normal range of motion and neck supple.   Skin:     General: Skin is warm and dry.   Neurological:      General: No focal deficit present.      Mental Status: She is alert and oriented to person, place, and time.   Psychiatric:         Mood and Affect: Mood normal.         Behavior: Behavior normal.         Thought Content: Thought content normal.         Judgment: Judgment normal.       Labs:  Recent Results (from the past 24 hour(s))   Comp Metabolic Panel    Collection Time: 10/17/21  8:30 AM   Result Value Ref Range    Sodium 134 (L) 135 - 145 mmol/L    Potassium 3.8 3.6 - 5.5 mmol/L    Chloride 101 96 - 112 mmol/L    Co2 26 20 - 33 mmol/L    Anion Gap 7.0 7.0 - 16.0    Glucose 113 (H) 65 - 99 mg/dL    Bun 8 8 - 22 mg/dL    Creatinine 0.53 0.50 - 1.40 mg/dL    Calcium 7.5 (L) 8.5 - 10.5 mg/dL    AST(SGOT) 34 12 - 45 U/L    ALT(SGPT) 17 2 - 50 U/L    Alkaline Phosphatase 45 30 - 99 U/L    Total Bilirubin 0.4 0.1 - 1.5 mg/dL    Albumin 2.2 (L) 3.2 - 4.9 g/dL    Total Protein 4.6 (L) 6.0 - 8.2 g/dL    Globulin 2.4 1.9 - 3.5 g/dL    A-G Ratio 0.9 g/dL   MAGNESIUM    Collection Time: 10/17/21  8:30 AM   Result Value Ref Range    Magnesium 2.1 1.5 - 2.5 mg/dL   PHOSPHORUS    Collection Time: 10/17/21  8:30 AM   Result Value Ref Range    Phosphorus 2.7 2.5 - 4.5 mg/dL   ESTIMATED GFR    Collection Time: 10/17/21  8:30 AM   Result Value Ref Range    GFR If African American >60 >60 mL/min/1.73 m 2    GFR If Non African American >60 >60 mL/min/1.73 m 2   POCT glucose device results    Collection Time: 10/17/21  4:07 PM   Result Value Ref Range    Glucose - Accu-Ck 120 (H) 65 - 99 mg/dL   POCT glucose device results    Collection Time: 10/17/21 11:34 PM   Result Value Ref Range    Glucose - Accu-Ck 117 (H)  65 - 99 mg/dL     Medical Decision Making, by Problem:  Active Hospital Problems    Diagnosis    • Duodenal perforation (HCC) [K63.1]      Priority: High   • Postprocedural retroperitoneal abscess [K68.11]      Priority: High   • Bacteremia due to Gram-negative bacteria and fungemia [R78.81]      Priority: High   • Sepsis due to intraabdominal fluid collection/abscess (HCC) [A41.9]      Priority: High   • Diarrhea [R19.7]    • Electrolyte abnormality [E87.8]    • Hyponatremia [E87.1]    • Hyperlipidemia [E78.5]    • Hypertension [I10]      Plan:  Start octreotide subcu every 8 hours  Okay to have Diet Coke with ice but nothing else.  Aggressively irrigate both drains  N.p.o. and TPN and IV antibiotics with antifungals    Quality Measures:  Quality-Core Measures    Discussed patient condition with Family

## 2021-10-18 NOTE — CARE PLAN
The patient is Stable - Low risk of patient condition declining or worsening    Shift Goals  Clinical Goals: Monitor drain output/ hemodynamic stability  Patient Goals: Pain control/ Ambulate    Progress made toward(s) clinical / shift goals: Patient drains are draining appropriately. New, left sided drain has more output than right sided drain. Patient is hemodymaically stable. No knowledge deficit and very motivated to improve condition.     Patient is not progressing towards the following goals: Patient had breakthrough pain at the beginning of the shift but is able to rest comfortably after pain meds and heat packs are applied.       Problem: Knowledge Deficit - Standard  Goal: Patient and family/care givers will demonstrate understanding of plan of care, disease process/condition, diagnostic tests and medications  Outcome: Met     Problem: Hemodynamics  Goal: Patient's hemodynamics, fluid balance and neurologic status will be stable or improve  Outcome: Progressing     Problem: Pain - Standard  Goal: Alleviation of pain or a reduction in pain to the patient’s comfort goal  Outcome: Progressing

## 2021-10-18 NOTE — PROGRESS NOTES
Infectious Disease Progress Note    Author: Katelin Lopez M.D. Date & Time of service: 10/18/2021  12:15 PM    Chief Complaint:  Follow-up for multiple intra-abdominal abscesses, polymicrobial bacteremia, candidemia     Interval History:  10/15 afebrile, WBC 13.5 patient transferred to [facility] overnight.  Patient having diarrhea however abdominal pain is slightly improved today.  She was evaluated by Dr. ST this morning who is recommending additional drain placement and holding off on surgery at this time  10/16 afebrile, WBC 8.4.  Plan for second drain placement today.  Also patient to start TPN.  Had one episode of liquid dark stools.  C differential PCR pending  10/17 afebrile, WBC 6 underwent CT-guided drain placement yesterday, Gram stain with few yeast. C. difficile negative. On TPN, planning to ambulate in the halls today    Review of Systems:  Review of Systems   Constitutional: Positive for malaise/fatigue. Negative for chills and fever.   Respiratory: Negative for cough and shortness of breath.    Gastrointestinal: Positive for abdominal pain. Negative for constipation, diarrhea, nausea and vomiting.   Musculoskeletal: Negative for joint pain and myalgias.       Hemodynamics:  Temp (24hrs), Av.2 °C (97.2 °F), Min:35.8 °C (96.5 °F), Max:36.5 °C (97.7 °F)  Temperature: 35.8 °C (96.5 °F)  Pulse  Av.3  Min: 57  Max: 85   Blood Pressure : 138/71       Physical Exam:  Physical Exam  Constitutional:       Appearance: Normal appearance.   Cardiovascular:      Rate and Rhythm: Normal rate and regular rhythm.      Heart sounds: Normal heart sounds.   Pulmonary:      Effort: Pulmonary effort is normal.      Breath sounds: Normal breath sounds.   Abdominal:      General: Abdomen is flat. Bowel sounds are normal. There is no distension.      Palpations: Abdomen is soft.      Tenderness: There is abdominal tenderness. There is guarding.      Comments: 2 drains in place   Musculoskeletal:      Right lower  leg: Edema present.      Left lower leg: Edema present.   Skin:     General: Skin is warm and dry.   Neurological:      General: No focal deficit present.      Mental Status: She is alert and oriented to person, place, and time.   Psychiatric:         Mood and Affect: Mood normal.         Behavior: Behavior normal.         Meds:    Current Facility-Administered Medications:   •  octreotide  •  TPN Central Line Formulation  •  MD Alert...TPN per Pharmacy  •  ondansetron  •  ondansetron  •  polyethylene glycol/lytes **AND** magnesium hydroxide  •  acetaminophen  •  HYDROmorphone  •  Notify provider if pain remains uncontrolled **AND** Use the Numeric Rating Scale (NRS), Colon-Baker Faces (WBF), or FLACC on regular floors and Critical-Care Pain Observation Tool (CPOT) on ICUs/Trauma to assess pain **AND** Pulse Ox **AND** Pharmacy Consult Request **AND** If patient difficult to arouse and/or has respiratory depression (respiratory rate of 10 or less), stop any opiates that are currently infusing and call a Rapid Response.  •  cyclobenzaprine  •  diphenhydrAMINE  •  ezetimibe  •  gabapentin  •  micafungin (MYCAMINE) ivpb  •  oxyCODONE immediate release  •  piperacillin-tazobactam  •  promethazine  •  vitamin D3  •  heparin    Labs:  Recent Labs     10/16/21  0233 10/16/21  0233 10/17/21  0319 10/18/21  0830 10/18/21  1010   WBC 8.4   < > 6.0 11.6* 8.0   RBC 2.48*   < > 2.88* 1.70* 3.36*   HEMOGLOBIN 7.4*   < > 9.3* 5.1* 10.0*   HEMATOCRIT 23.5*   < > 27.4* 16.0* 31.6*   MCV 94.8   < > 95.1 94.1 94.0   MCH 29.8   < > 32.3 30.0 29.8   RDW 48.7   < > 49.5 47.9 48.3   PLATELETCT 458*   < > 377 496* 398   MPV 8.9*   < > 8.7* 8.8* 8.6*   NEUTSPOLYS 76.90*  --  71.40  --  77.40*   LYMPHOCYTES 11.90*  --  15.10*  --  9.50*   MONOCYTES 9.30  --  11.90  --  6.10   EOSINOPHILS 0.10  --  0.20  --  0.90   BASOPHILS 0.10  --  0.20  --  0.00   RBCMORPHOLO  --   --   --   --  Present    < > = values in this interval not displayed.      Recent Labs     10/17/21  0319 10/17/21  0830 10/18/21  0840   SODIUM 130* 134* 133*   POTASSIUM 5.7* 3.8 4.1   CHLORIDE 93* 101 99   CO2 21 26 27   GLUCOSE 732* 113* 102*   BUN 7* 8 9     Recent Labs     10/17/21  0319 10/17/21  0830 10/18/21  0840   ALBUMIN 1.6* 2.2* 2.2*   TBILIRUBIN 0.3 0.4 0.4   ALKPHOSPHAT 36 45 49   TOTPROTEIN 3.7* 4.6* 5.0*   ALTSGPT 10 17 23   ASTSGOT 26 34 46*   CREATININE 0.58 0.53 0.54       Imaging:  CT-ABDOMEN WITH & W/O    Result Date: 10/9/2021  10/9/2021 1:38 PM HISTORY/REASON FOR EXAM:  eval for any ugi perforation given recent sphinterotomy during ERCP and now with fluid collection. TECHNIQUE/EXAM DESCRIPTION:  CT scan of the abdomen without and with contrast. Initial precontrast images were obtained from the diaphragmatic domes through the iliac crests using helical technique.  Following nonionic contrast administration in an intravenous bolus fashion, and postcontrast, thin-section helical scanning obtained from the diaphragmatic domes through the iliac crests. 80 mL of Omnipaque 350 nonionic contrast was administered. Low dose optimization technique was utilized for this CT exam including automated exposure control and adjustment of the mA and/or kV according to patient size. COMPARISON: 10/8/2021, 10/2/2021. FINDINGS: There is a small right pleural effusion with adjacent enhancing segmental atelectasis.. Calcified left lower lobe granuloma. No pericardial effusion. Cardiac chambers are normal in size. Coronary artery calcifications are present. Air in the intra and extra hepatic bile ducts. Unremarkable appearance of the liver. The hepatic and portal veins are patent. Spleen is unremarkable. Again noted is air in the pancreatic duct. No adrenal gland nodules. Gallbladder is surgically absent. No hydronephrosis. No dilated loops of imaged bowel. There is anasarca and mesenteric edema. A pigtail drainage catheter is present in the right abdomen in a air and fluid collection,  the extent of which is difficult to measure the collection tracks inferiorly and medially measuring approximately 17 cm in craniocaudal dimension. A smaller loculated collection in the anterior abdomen near the inferior anterior abdominal wall sutures measures 1.5 x 8.7 cm. No findings of contrast extravasation from the opacified bowel loops,  particularly no contrast extravasation at the ampulla of [place].     1.  No findings of intraluminal contrast extravasation from the opacified bowel loops, particularly no contrast extravasation from the duodenum at the ampulla of [place]. 2.  A pigtail drainage catheter is present in the right abdomen in a air and fluid collection, the extent of which is difficult to measure. The collection tracks inferiorly and medially measuring approximately 17 cm in craniocaudal dimension. 3.  A smaller loculated collection in the anterior abdomen near the inferior anterior abdominal wall sutures measures 1.5 x 8.7 cm. 4.  Unchanged pneumobilia in the CBD, intrahepatic bile ducts as well as pancreatic ducts. 5.  There is a small right pleural effusion with adjacent enhancing segmental atelectasis. 6.   Coronary artery calcifications are present    CT-ABDOMEN-PELVIS WITH    Result Date: 10/13/2021  10/13/2021 11:57 AM HISTORY/REASON FOR EXAM:  Peritonitis or perforation suspected; Pt with known intraabdominal fluid collection in the abdomen of uncertain etiology - had recent ERCP but studies not consistent with bile leak.  Drain in place - reassess. TECHNIQUE/EXAM DESCRIPTION:   CT scan of the abdomen and pelvis with contrast. Contrast-enhanced helical scanning was obtained from the diaphragmatic domes through the pubic symphysis following the bolus administration of nonionic contrast without complication. 100 mL of Omnipaque 350 nonionic contrast was administered without complication. Low dose optimization technique was utilized for this CT exam including automated exposure control and  adjustment of the mA and/or kV according to patient size. COMPARISON: 10/9/2021 FINDINGS: Lower Chest: Trace right pleural effusion with right basilar atelectasis. Calcified granuloma in the left lower lobe and trace left pleural fluid. Liver: Normal. Spleen: Unremarkable. Pancreas: Unremarkable. Gallbladder: The gallbladder has been resected. Biliary: Pneumobilia again noted. Adrenal glands: Normal. Kidneys: There is a small left renal cortical cyst which does not require imaging follow-up. No hydronephrosis.. Bowel: No bowel obstruction. Parts of the bowel are suboptimally evaluated due to the surrounding abscess and loss of the intervening fat planes. There is gastric sleeve surgery. Lymph nodes: No adenopathy. Vasculature: Scattered atherosclerosis. Peritoneum: A multiloculated although spatial rim-enhancing air-fluid collection within the right abdomen does not appear significantly changed in size the prior study. On series 2 image 54 measures about 15.5 x 8.7 cm. It extends from the upper abdomen,  where it is seen in the gallbladder fossa, inferiorly into the pelvis. It insinuates around and partially encases the duodenum and right kidney and extends around ascending colon and some mesenteric branch vessels. There is a percutaneous pigtail drainage catheter which appears well positioned within the abscess in the right midabdomen. Musculoskeletal: No acute or destructive process. Postsurgical changes anterior lumbar spinal fusion Pelvis: Hysterectomy. There is a small rim-enhancing fluid collection within the pelvis measuring 7 x 3.1 x 2.8 cm suspicious for small abscess..     1.  Extensive multiloculated rim-enhancing air and fluid collection/abscess within the right abdomen is not significantly changed in size from the prior study. The percutaneous pigtail drainage catheter appears well-positioned within the collection. 2.  Small separate pelvic rim-enhancing fluid collection suggests an  abscess.    CT-ABDOMEN-PELVIS WITH    Result Date: 10/8/2021  10/8/2021 8:06 AM HISTORY/REASON FOR EXAM:  Abdominal pain, fever. Right lower quadrant pain. Pancreatitis. TECHNIQUE/EXAM DESCRIPTION:   CT scan of the abdomen and pelvis with contrast. Contrast-enhanced helical scanning was obtained from the diaphragmatic domes through the pubic symphysis following the bolus administration of nonionic contrast without complication. 100 mL of Omnipaque 350 nonionic contrast was administered without complication. Low dose optimization technique was utilized for this CT exam including automated exposure control and adjustment of the mA and/or kV according to patient size. COMPARISON: 10/2/2021. FINDINGS: Lower Chest: Small right pleural effusion with right basilar opacities. Liver: Normal. Spleen: Unremarkable. Pancreas: Poorly visualized. There is gas in the pancreatic duct. Gallbladder: Surgically absent. Biliary: There is gas in the CBD and intrahepatic bile ducts, left more than right Adrenal glands: Normal. Kidneys: No hydronephrosis. Bilateral kidneys are enhancing symmetrically.. Bowel: Severe wall thickening of the descending colon, transverse colon, second portion duodenum. Postsurgical change in the stomach Lymph nodes: No adenopathy. Vasculature: The abdominal aorta is normal in caliber. Peritoneum: There is large irregular fluid collection occupying most of the right abdomen surrounding the right kidney and right colon. Additional fluid and gas collection in the midline abdomen anterior to the pancreas. This collection is probably connected to the right side collection via a junction in the jl hepatis Musculoskeletal: Postsurgical change in the lower lumbar spine. Pelvis: Trace pelvic free fluid.     1. Large irregular irregular fluid collection with thick wall occupying most of the right abdomen surrounding the right kidney and right colon. Additional fluid and gas collection in the midline abdomen anterior  to the pancreas concerning for abscess. This collection is probably connected to the right side collection via a junction in the jl hepatis 2. Severe wall thickening of the descending colon, transverse colon, second portion duodenum, likely reactive. 3. Pneumobilia with gas in the CBD, intrahepatic bile ducts as well as pancreatic ducts are of unclear etiology. 4. Small right pleural effusion with right basilar atelectasis.     CT-ABDOMEN-PELVIS WITH    Result Date: 10/2/2021  10/2/2021 2:03 PM HISTORY/REASON FOR EXAM:  RLQ and diffuse lower abdominal pain; had ERCP yesterday. Pt has pancreatitis and they found a polyp on her bile duct. TECHNIQUE/EXAM DESCRIPTION:   CT scan of the abdomen and pelvis with contrast. Contrast-enhanced helical scanning was obtained from the diaphragmatic domes through the pubic symphysis following the bolus administration of nonionic contrast without complication. 100 mL of Omnipaque 350 nonionic contrast was administered without complication. Low dose optimization technique was utilized for this CT exam including automated exposure control and adjustment of the mA and/or kV according to patient size. COMPARISON: 2/22/2019 FINDINGS: Lower Chest: Unremarkable. Liver: Normal. Hepatic and portal veins are patent. Spleen: Unremarkable. Pancreas: The pancreatic head is edematous though the parenchyma enhances normally. There is surrounding homogenous peripancreatic and mesenteric edema/infiltration which tracks down the right paracolic gutter and conforms to retroperitoneal structures. No defined walled off collection. There is small volume ascites in the pelvis. No focal fluid collection. Adrenal glands: Normal. Gallbladder: Cholecystectomy Biliary: Unchanged mild intrahepatic bile duct dilation. Kidneys: Symmetric nephrograms. No hydronephrosis. Pelvis: Hysterectomy. Normal appearance of the urinary bladder.. Bowel: There are no dilated loops of small or large bowel. Scattered colonic  diverticuli with no inflammation. The appendix is normal. Prior gastric sleeve. Lymph nodes: No adenopathy. Vasculature: No aneurysm. Musculoskeletal: Fusion of L3-L5. Multifocal degenerative changes are present. No suspicious osseous abnormality.     Acute interstitial edematous pancreatitis with surrounding mesenteric edema tracking along the right paracolic gutter. Small volume ascites in the pelvis. No walled off collections.    CT-DRAIN-PERITONEAL    Result Date: 10/16/2021  10/16/2021 1:06 PM HISTORY/REASON FOR EXAM: Large pancreatic pseudocyst, not draining well following placement of first drained. Place largest drain to lower area of retroperitoneal air/fluid region- leave upper drain in place. TECHNIQUE/EXAM DESCRIPTION: Pancreatic pseudocyst drainage with CT guidance. Low dose optimization technique was utilized for this CT exam including automated exposure control and adjustment of the mA and/or kV according to patient size. PROCEDURE: Informed consent was obtained. SEDATION: Moderate sedation was provided. Pulse oximetry and continuous cardiac monitoring by the nurse was performed throughout the exam. Intraservice time was 30 minutes. Localizing CT images were obtained with the patient in supine position. The skin was prepped with Betadine and draped in a sterile fashion. Following local anesthesia with 1% Lidocaine, a 17 G guiding needle was placed and needle path confirmed with CT. An Amplatz guidewire was placed and following serial tract dilatation, a 14 [place]ese pigtail locking catheter was placed. A specimen was collected and submitted for amylase, culture and sensitivity and Gram stain. Total of 400 mL of brownish fluid was drained. The catheter was secured to the skin and connected to suction bulb drainage. Final CT images were obtained documenting catheter position. The patient tolerated the procedure well with no evidence of complication. COMPARISON: Recent CT scan abdomen/pelvis FINDINGS: The  final CT images show satisfactory catheter position dependently within the target collection.     1.  CT guided pancreatic pseudocyst catheter drainage. 2.  The current plan is to obtain a follow-up CT in 5-7 days..    CT-DRAIN-PERITONEAL    Result Date: 10/10/2021  HISTORY/REASON FOR EXAM:  66-year-old woman with pancreatitis and extensive intraperitoneal abscess. TECHNIQUE/EXAM DESCRIPTION AND NUMBER OF VIEWS: RIGHT peritoneal drain placement with CT guidance. Low dose optimization technique was utilized for this CT exam including automated exposure control and adjustment of the mA and/or kV according to patient size. COMPARISON:  CT 10/8/2021 MEDICATIONS: Moderate sedation was provided. Pulse oximetry and continuous cardiac monitoring by the nurse was performed throughout the exam. Intraservice time was 15 minutes. PROCEDURE:     The risks, benefits, goals and objectives and alternatives were discussed. Risks were specified as including but not limited to bleeding, infection, damage to vessels or nerves, pain and discomfort as well as the possibility of incomplete resolution of underlying disease. Drain care related issues were discussed with the patient. The patient's questions were answered. Informed oral and written consent were obtained. The patient was placed on the CT gantry. Localizing scan was performed. The skin was prepped and draped in the usual sterile manner.  A timeout was performed. Local anesthetic result was achieved with administration of 1% lidocaine. A(n) 17-gauge access needle was advanced to the target using CT fluoroscopic guidance. Access was secured with a wire and the tract was sequentially dilated to accommodate a(n) 14 [place] locking loop drain. A total of 80 mL of thin brown turbid fluid was removed and sent for  laboratory evaluation. This was put in place and formed. The catheter was attached to bag drainage and secured to the skin with 2-0 nylon suture. Completion scan was  performed which showed no complication. The patient tolerated the procedure well with no evidence of complication. The skin was cleaned and a dressing was applied. FINDINGS: Drainage tube in good position     Successful peritoneal drainage tube placement. Plan: Thrice daily flushes with 10 mL of sterile saline. Monitor outputs. Please contact interventional radiology if there is any concern for tube dysfunction.     IR-US GUIDED PIV    Result Date: 10/10/2021  EXAMINATION:                                                                    HISTORY/REASON FOR EXAM:  Ultrasound Guided PIV.  TECHNIQUE/EXAM DESCRIPTION AND NUMBER OF VIEWS:  Peripheral IV insertion with ultrasound guidance.  The procedure was prepared using maximal sterile barrier technique including sterile gown, mask, cap, and donning of sterile gloves following appropriate hand hygiene and/or sterile scrub. Patient skin site was prepped with 2% Chlorhexidine solution.   FINDINGS: Peripheral IV insertion with Ultrasound Guidance was performed by qualified imaging nursing staff without the assistance of a Radiologist.      Ultrasound-guided PERIPHERAL IV INSERTION performed by qualified nursing staff as above.    NM-HEPATOBILIARY SCAN    Result Date: 10/10/2021  10/10/2021 11:12 AM HISTORY/REASON FOR EXAM:  rule out biliary leak post ERCP with sphincterotomy; R/O bile leak TECHNIQUE/EXAM DESCRIPTION AND NUMBER OF VIEWS: Hepatobiliary scan. COMPARISON: 10/9/2021 CT abdomen PROCEDURE:  5.4 mCi Tc 99m-Choletec was administered intravenously, followed by 90 minutes of anterior planar imaging. FINDINGS: Normal homogeneous uptake of radiotracer in the hepatic parenchyma with visualization of the tracer in the common bile duct and entering the small bowel loops. No abnormal pooling or collection radiotracer outside of the biliary system or bowel.     Normal hepatobiliary scan with no findings of a bile leak.    DX-PORTABLE FLUOROSCOPY < 1 HOUR    Result Date:  10/1/2021  10/1/2021 10:01 AM HISTORY/REASON FOR EXAM:  ERCP TECHNIQUE/EXAM DESCRIPTION AND NUMBER OF VIEWS: 2 images were obtained in the operating room. A total of 0.3 minutes of fluoroscopy time utilized. COMPARISON: Selected images CT abdomen and pelvis 2019 FINDINGS: Images show injection of contrast into the common bile duct which is dilated. There is also filling of the pancreatic duct. Fluoroscopy time: minutes     ERCP images as described    EC-ECHOCARDIOGRAM COMPLETE W/O CONT    Result Date: 10/14/2021  Transthoracic Echo Report Echocardiography Laboratory CONCLUSIONS Normal left ventricular chamber size. Hyperdynamic left ventricular systolic function. The left ventricular ejection fraction is visually estimated to be greater than 75%. Normal right ventricular size and systolic function. Enlarged right atrium. Mild tricuspid regurgitation. Right ventricular systolic pressure is estimated to be  40 mmHg; mild pulmonary hypertension. Normal pericardium without effusion. No prior study is available for comparison. KATERINA PATEL Exam Date:         10/14/2021                    16:15 Exam Location:     Inpatient Priority:          Routine Ordering Physician:        YUE TOURE Referring Physician:       [id number] Campos Sonographer:               Aroldo Nolan                            [facility] Age:    66     Gender:    F MRN:    [id number] :    1955 BSA:    1.68   Ht (in):    63     Wt (lb):    143 Exam Type:     Complete Indications:     Endocarditis ICD Codes:       421 CPT Codes:       02531 BP:   106    /   48     HR:   78 Technical Quality:       Fair MEASUREMENTS  (Male / Female) Normal Values 2D ECHO LV Diastolic Diameter PLAX        3.9 cm                4.2 - 5.9 / 3.9 - 5.3 cm LV Systolic Diameter PLAX         2.7 cm                2.1 - 4.0 cm IVS Diastolic Thickness           0.88 cm               LVPW Diastolic Thickness          0.75 cm               LVOT Diameter                      2 cm                  Estimated LV Ejection Fraction    75 %                  LV Ejection Fraction MOD BP       77.2 %                >= 55  % LV Ejection Fraction MOD 4C       79.3 %                LV Ejection Fraction MOD 2C       77.1 %                IVC Diameter                      1.5 cm                DOPPLER AV Peak Velocity                  1.8 m/s               AV Peak Gradient                  13.5 mmHg             AV Mean Gradient                  6.3 mmHg              LVOT Peak Velocity                1.7 m/s               AV Area Cont Eq vti               2.8 cm2               MV Velocity Time Integral         42.6 cm               Mitral E Point Velocity           1.3 m/s               Mitral E to A Ratio               1.5                   MV Pressure Half Time             77.3 ms               MV Area PHT                       2.8 cm2               MV Deceleration Time              266 ms                TR Peak Velocity                  269 cm/s              PV Peak Velocity                  0.96 m/s              PV Peak Gradient                  3.7 mmHg              RVOT Peak Velocity                0.73 m/s              * Indicates values subject to auto-interpretation LV EF:  75    % FINDINGS Left Ventricle Normal left ventricular chamber size. Normal left ventricular wall thickness. Hyperdynamic left ventricular systolic function. The left ventricular ejection fraction is visually estimated to be greater than 75%. Normal regional wall motion. Normal diastolic function. Right Ventricle Normal right ventricular size. Normal right ventricular systolic function. Right Atrium Enlarged right atrium. Normal inferior vena cava size and inspiratory collapse. Left Atrium Normal left atrial size. Left atrial volume index is 26  mL/sq m. Mitral Valve Structurally normal mitral valve. No mitral stenosis. Trace mitral regurgitation. Aortic Valve The aortic valve is not well visualized. Cannot rule  out bicuspid aortic valve. No aortic stenosis. No aortic insufficiency. Tricuspid Valve Structurally normal tricuspid valve. No tricuspid stenosis. Mild tricuspid regurgitation. Right ventricular systolic pressure is estimated to be  40 mmHg. Pulmonic Valve The pulmonic valve is not well visualized. No pulmonic stenosis. No pulmonic insufficiency. Pericardium Normal pericardium without effusion. Aorta The ascending aorta diameter is 3.1  cm. Nate Sarmiento MD (Electronically Signed) Final Date:     14 October 2021                 17:39    IR-PICC LINE PLACEMENT W/ GUIDANCE > AGE 5    Result Date: 10/15/2021  HISTORY/REASON FOR EXAM:   PICC placement. TECHNIQUE/EXAM DESCRIPTION AND NUMBER OF VIEWS:   PICC line insertion with ultrasound guidance.  The procedure was performed using maximal sterile barrier technique including sterile gown, mask, cap, and donning of sterile gloves following appropriate hand hygiene and/or sterile scrub. Patient skin site was prepped with 2% Chlorhexidine solution. FINDINGS:  PICC line insertion with Ultrasound Guidance was performed by qualified nursing staff without the assistance of a Radiologist. PICC positioning appropriateness confirmed by 3CG technology; chest xray only needed in the instance 3CG unable to confirm placement.              Ultrasound-guided PICC placement performed by qualified nursing staff as above.       Micro:  Results     Procedure Component Value Units Date/Time    CULTURE WOUND W/ GRAM STAIN [[id number]]  (Abnormal)  (Susceptibility) Collected: 10/16/21 1340    Order Status: Completed Specimen: Wound Updated: 10/18/21 0820     Significant Indicator POS     Source WND     Site LMQ Peritoneal Drain     Culture Result -     Gram Stain Result Few Yeast.     Culture Result Enterococcus faecalis  Moderate growth        Candida albicans  Moderate growth        Saritha glabrata  Moderate growth      Narrative:      Special Contact [id number] GABY GRAY  Drain  "LMQ 42 mL  Special Contact [id number] GABY GRAY    Susceptibility     Enterococcus faecalis (1)     Antibiotic Interpretation Microscan Method Status    Daptomycin Sensitive 2 mcg/mL MU Final    Gent Synergy Sensitive <=500 mcg/mL MU Final    Ampicillin Sensitive <=2 mcg/mL MU Final    Vancomycin Sensitive 1 mcg/mL MU Final    Penicillin Sensitive 2 mcg/mL MU Final                   GRAM STAIN [[id number]] Collected: 10/16/21 1340    Order Status: Completed Specimen: Wound Updated: 10/16/21 1937     Significant Indicator .     Source WND     Site LMQ Peritoneal Drain     Gram Stain Result Few Yeast.    Narrative:      Special Contact [id number] GABY GRAY  Drain LMQ 42 mL  Special Contact [id number] GABY GRAY    C Diff by PCR rflx Toxin [[id number]] Collected: 10/16/21 0840    Order Status: Completed Specimen: Stool Updated: 10/16/21 1539     C Diff by PCR Negative     Comment: C. difficile NOT detected by PCR.  Treatment not indicated per guidelines.  Repeat testing not indicated within 7 days.          027-NAP1-BI Presumptive Negative     Comment: Presumptive 027/NAP1/BI target DNA sequences are NOT DETECTED.       Narrative:      Special Contact [id number] PACHECO EMILY  Does this patient have risk factors for C-diff?->Yes  Has patient taken stool softeners or laxatives in the last 5  days?->No  Indication for CDiff by PCR?->Greater than/equal to 3 stools  in 24 hr & \"takes shape of container\"    FLUID CULTURE W/GRAM STAIN [[id number]] Collected: 10/16/21 1340    Order Status: Canceled Specimen: Other from Peritoneal Fluid     FLUID CULTURE W/GRAM STAIN [[id number]] Collected: 10/16/21 1340    Order Status: Canceled Specimen: Other from Peritoneal Fluid           Assessment:  Active Hospital Problems    Diagnosis    • Duodenal perforation (HCC) [K63.1]    • Postprocedural retroperitoneal abscess [K68.11]    • Diarrhea [R19.7]    • Electrolyte abnormality [E87.8]    • Bacteremia " due to Gram-negative bacteria and fungemia [R78.81]    • Hyponatremia [E87.1]    • Sepsis due to intraabdominal fluid collection/abscess (HCC) [A41.9]    • Hyperlipidemia [E78.5]    • Hypertension [I10]      Interval 24 hours:      AF, O2 2 L NC  Labs reviewed  Imaging personally reviewed both images and report.   Micro reviewed    Patient with ongoing abdominal pain but overall improving.  She continues to have 2 drains in place with minimal output on the right.  Continued on antibiotics as below.    Assessment:  66 y.o. 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  Patient found to have multiple and extensive fluid collections in the abdomen and pelvis on imaging.  She underwent drain placement on 10/8.  Cultures are growing E. coli and Enterococcus faecalis.  Blood cultures are growing chryseobacterium species and Candida glabrata. Source likely due to the intra-abdominal abscesses. Repeat CT scan on 10/13 shows extensive multiloculated fluid collections in the abdomen and pelvis.  She underwent peritoneal drainage on 10/15 and cultures + Enterococcus faecalis, ampicillin sensitive, Candida albicans and Candida glabrata     Pertinent diagnoses:  Multiple intra-abdominal abscesses  Candidemia  Chryseobacterium bacteremia  Polymicrobial infection  Persistent leukocytosis, resolved  Thrombocytosis  Diarrhea, C diff PCR negative  Recent ERCP pancreatitis     Plan:  -Blood cultures on 10/8 - Chryseobacterium species, Candida glabrata.  -Chryseobacterium species -the patient has been treated with Zosyn for multiple days which has intermediate sensitivity per chart.  However, repeat blood cultures are negative and will again do a surveillance blood culture today to ensure no growth of Chryseobacterium in  the blood.  -Follow repeat blood cultures on 10/13-negative to date   --Ordered surveillance of blood cultures to ensure no further growth of the Chryseobacterium species  -TTE-negative  -Continue to monitor for any vision changes-patient currently denies  -Continue IV Zosyn to treat the E. Coli, Chryseobacterium species and the Enterococcus  -Continue IV micafungin for treatment of candidemia for at least a 14-day course.  However, may need to extend given the growth of Candida species in the abscess.    -Dr. Cook following  -Recommend a repeat CT scan in 5 to 7 days from 2nd drain placement to assess for interval improvement/resolution  Duration of antibiotics will depend on results of repeat CT scan  -On TPN     Discussed with internal medicine Dr. Fabrizio Vargas.  ID will follow.

## 2021-10-18 NOTE — PROGRESS NOTES
Gastroenterology Progress Note               Author:  AYE Jones Date & Time Created: 10/18/2021 11:22 AM       Patient ID:  Name:             Nils Alfonso  YOB: 1955  Age:                 66 y.o.  female  MRN:               [id number]      Referring Provider:  Ana Luisa Shearer MD      Presenting Chief Complaint:  Abdominal pain      History of Present Illness:    10/10 HPI  66-year-old female PMH recurrent idiopathic pancreatitis s/p ERCP with sphincterotomy October 1, 2021 complicated by ERCP pancreatitis, sleeve gastrectomy, dyslipidemia, hypertension, osteoarthritis of the spine status post lumbar fusion who was admitted to the hospital 10/8/2021 with worsening right lower quadrant pain over the past week.  Ever since her ERCP October 1, 2021, she has been experiencing pain, intermittent subjective fevers, nausea.  In the emergency room-labs: CBC: WBC 17.8..  Sodium 130.  LFTs-WNL.  CT scan abdomen/pelvis-large irregular fluid collection with thick wall occupying most of the right abdomen surrounding the right kidney and colon.  Severe wall thickening of the descending, transverse colon, second portion of the duodenum likely reactive.  Pneumobilia with gas in the CBD.  Drain placement by IR was performed.  Currently, the abdominal pain has improved.  No vomiting.  Started on ceftriaxone and metronidazole IV.     ERCP October 1, 2021-common bile duct-dilated down to the level of the ampulla.  4 mm polyp at the distal duct seen after sphincterotomy.  8 mm sphincterotomy.  Negative for stone.  Bile duct polyp-benign with inflammatory and hyperplastic changes.  No evidence of epithelial dysplasia/neoplasia.       Interval History:  10/11: interval HIDA scan showed no bile leak.  This morning she feels more comfortable, describing minimal abdominal pain but denying nausea vomiting and fevers.  She has been able to eat a little bit more and has full liquids beside her bed.  She expresses  concern about being on losartan which she says was prescribed outpatient as as needed for high blood pressures.  She also states she has not passed a bowel movement in the week which she describes not eating much.     10/12/2021 - No events overnight.  Tolerating diet without nausea or vomiting.  Had spontaneous, formed, brown bowel movement this morning.  Abdominal pain improving.  Feeling weak, but better each day.  Leukocytosis improving.  States that she is on hydrocodone at home, managed by pain management, and asks that her current hospital pain meds be changed.     10/13/2021: Stable, no new events.  Drain output is minimal, but according to patient bedside nursing is not flushing the drain.  Patient is n.p.o. for planned CT today to reevaluate fluid collection.  Leukocytosis continues to improve.  Pain has improved greatly and patient has not taken the pain medication in the last 24 hours according to her recollection.  She is having regular bowel movements without assistance of laxatives.  Patient is very hungry.     10/14/2021 - No events overnight.  Abdominal pain controlled now.  Had nausea and vomiting yesterday with solid food, but tolerating bland diet.  Blood cultures from 10/8 positive for Chryseobacterium and Candida glabrata - Pharmacy and ID aware.  Micafungin started.  CT 10/13 showing extensive multiloculated rim-enhancing air and fluid collection/abscess within the right abdomen is not significantly changed in size from the prior study. The percutaneous pigtail drainage catheter appears well-positioned within the collection.    10/15/2021: Patient transferred to [facility] overnight per surgery recommendations    10/16/2021: Drain output clearing up slightly. Dr. Dumont with surgery has recommended second IR drain to be placed. Initially interventional radiology did not think that this would be helpful for the patient, but after further discussion this is the plan  going forward. Patient is n.p.o. with plans for TPN. WBCs normalized.    10/18/2021: Stable. WBCs are normal after CBC redraw that was originally contaminated. Hgb stable. Original drain with decreased output and is clearing up. New IR drain with purulent green output. Patient has TPN but some PO intake.       Review of Systems:  Review of Systems   Constitutional: Positive for malaise/fatigue. Negative for chills and fever.   Respiratory: Negative for cough, shortness of breath and wheezing.    Cardiovascular: Negative for chest pain and leg swelling.   Gastrointestinal: Positive for abdominal pain. Negative for constipation, melena, nausea and vomiting.   Genitourinary: Negative for dysuria and urgency.   Musculoskeletal: Negative for falls and myalgias.   Skin: Negative for itching and rash.   Neurological: Positive for weakness. Negative for focal weakness and headaches.   Psychiatric/Behavioral: Negative for memory loss and substance abuse.             Past Medical History:  Past Medical History:   Diagnosis Date   • Allergy, unspecified not elsewhere classified    • Anemia     not currently   • Anesthesia     PONV (Demerol/ Dilaudid)   • Arthritis     bilateral shoulders,sacrum, osteo   • Backpain     coccyx and sacrum   • Bronchitis 2010   • Cataract     bilateral IOLI   • Degeneration of cervical intervertebral disc     C5-6,C6-7   • Dental disorder     partial upper and lower   • Heart burn    • Hiatus hernia syndrome     not a problem after gastric sleeve   • High cholesterol     resolved after gastric surgery   • Hyperlipidemia    • Hypertension     off all medication, reveresed after gastric sleeve   • Muscle disorder    • Pain 05/16/2018    low back and SI joints and sacrum   • Reactive airway disease     rescue inhaler not needed unless with bronchitis     Active Hospital Problems    Diagnosis    • Duodenal perforation (HCC) [K63.1]    • Postprocedural retroperitoneal abscess [K68.11]    • Diarrhea  [R19.7]    • Electrolyte abnormality [E87.8]    • Bacteremia due to Gram-negative bacteria and fungemia [R78.81]    • Hyponatremia [E87.1]    • Sepsis due to intraabdominal fluid collection/abscess (HCC) [A41.9]    • Hyperlipidemia [E78.5]    • Hypertension [I10]          Past Surgical History:  Past Surgical History:   Procedure Laterality Date   • PB ERCP,DIAGNOSTIC  10/1/2021    Procedure: ERCP (ENDOSCOPIC RETROGRADE CHOLANGIOPANCREATOGRAPHY);  Surgeon: Fausto Casas M.D.;  Location: [facility];  Service: Gastroenterology   • COLONOSCOPY  8/8/2018    Procedure: COLONOSCOPY;  Surgeon: Shukri Condon M.D.;  Location: [facility];  Service: General   • GASTROSCOPY  5/23/2018    Procedure: GASTROSCOPY;  Surgeon: Fausto Casas M.D.;  Location: [facility];  Service: Gastroenterology   • EGD W/ENDOSCOPIC ULTRASOUND  5/23/2018    Procedure: EGD W/ENDOSCOPIC ULTRASOUND- RADIAL UPPER;  Surgeon: Fausto Casas M.D.;  Location: [facility];  Service: Gastroenterology   • CATARACT PHACO WITH IOL Left 4/18/2017    Procedure: CATARACT PHACO WITH IOL;  Surgeon: Stuart Estevez M.D.;  Location: SURGERY SAME DAY [facility];  Service:    • CATARACT PHACO WITH IOL Right 4/4/2017    Procedure: CATARACT PHACO WITH IOL;  Surgeon: Stuart Estevez M.D.;  Location: [facility];  Service:    • GASTRIC SLEEVE LAPAROSCOPY  10/19/2016    Procedure: GASTRIC SLEEVE LAPAROSCOPY, HIATAL HERNIA;  Surgeon: Shukri Condon M.D.;  Location: [facility];  Service:    • LIVER BIOPSY LAPAROSCOPIC  10/19/2016    Procedure: LIVER BIOPSY LAPAROSCOPIC;  Surgeon: Shukri Condon M.D.;  Location: [facility];  Service:    • GASTROSCOPY N/A 8/26/2016    Procedure: GASTROSCOPY;  Surgeon: Shukri Condon M.D.;  Location: [facility];  Service:    • ROTATOR CUFF REPAIR Right 7/7/2016   • ROTATOR CUFF REPAIR Left 5/2015   • HYSTERECTOMY  ROBOTIC  1/2/2009    Performed by MEG VILLEGAS at SURGERY [facility] ORS   • LUMBAR FUSION ANTERIOR  2007    Dr Hillman, L3-S1 fusion   • CHOLECYSTECTOMY  1996    laparoscopic   • TUBAL LIGATION  1985   • GASTRIC RESECTION  1982    gastric stapling   • TONSILLECTOMY AND ADENOIDECTOMY  1967   • PRIMARY C SECTION  1976, 1979, 1985    x3           Hospital Medications:  Current Facility-Administered Medications   Medication Dose Frequency Provider Last Rate Last Admin   • octreotide (SANDOSTATIN) injection 100 mcg  100 mcg TID Jaden Cook M.D.   100 mcg at 10/18/21 1010   • TPN Central Line Formulation   TPN DAILY Pritesh Vargas M.D. 83 mL/hr at 10/17/21 2045 New Bag at 10/17/21 2045   • MD Alert...TPN per Pharmacy   PHARMACY TO DOSE Pritesh Vargas M.D.       • ondansetron (ZOFRAN ODT) dispertab 4 mg  4 mg Q4HRS PRN Elizabeth Segundo M.D.       • ondansetron (ZOFRAN) syringe/vial injection 4 mg  4 mg Q4HRS PRN Elizabeth Segundo M.D.   4 mg at 10/18/21 0626   • polyethylene glycol/lytes (MIRALAX) PACKET 1 Packet  1 Packet QDAY PRN Elizabeth Segundo M.D.        And   • magnesium hydroxide (MILK OF MAGNESIA) suspension 30 mL  30 mL QDAY PRN Elizabeth Segundo M.D.       • acetaminophen (Tylenol) tablet 650 mg  650 mg Q6HRS PRN Elizabeth Segundo M.D.       • HYDROmorphone (Dilaudid) injection 0.5 mg  0.5 mg Q3HRS PRN Elizabeth Segundo M.D.   0.5 mg at 10/18/21 0447   • Pharmacy Consult Request ...Pain Management Review 1 Each  1 Each PHARMACY TO DOSE Elizabeth Segundo M.D.       • cyclobenzaprine (Flexeril) tablet 10 mg  10 mg HS PRN Elizabeth Segundo M.D.       • diphenhydrAMINE (BENADRYL) tablet/capsule 25 mg  25 mg Q6HRS PRN Elizabeth Segundo M.D.       • ezetimibe (ZETIA) tablet 10 mg  10 mg Q EVENING Elizabeth Segundo M.D.   10 mg at 10/17/21 1609   • gabapentin (NEURONTIN) capsule 600 mg  600 mg BID Elizabeth Segundo M.D.   600 mg at 10/18/21 3297   • micafungin (MYCAMINE) 100 mg in  mL ivpb  100 mg  "Q24HRS Elizabeth Segundo M.D.   Stopped at 10/17/21 2144   • oxyCODONE immediate release (ROXICODONE) tablet 10 mg  10 mg Q4HRS PRN Elizabeth Segundo M.D.   10 mg at 10/18/21 0814   • piperacillin-tazobactam (ZOSYN) 3.375 g in  mL IVPB  3.375 g Q8HRS Elizabeth Segundo M.D.   Stopped at 10/18/21 0846   • promethazine (PHENERGAN) tablet 25 mg  25 mg Q6HRS PRN Elizabeth Segundo M.D.   25 mg at 10/15/21 0459   • vitamin D3 (cholecalciferol) tablet 1,000 Units  1,000 Units DAILY Elizabeth Segnudo M.D.   1,000 Units at 10/18/21 0447   • heparin injection 5,000 Units  5,000 Units Q8HRS tSuart Avalos M.D.   5,000 Units at 10/18/21 0447   Last reviewed on 10/15/2021 11:02 AM by Jhoana Benjamin KAMRYN        Current Outpatient Medications:  Medications Prior to Admission   Medication Sig Dispense Refill Last Dose   • cyclobenzaprine (FLEXERIL) 10 mg Tab Take 10 mg by mouth every evening.   9/30/2021 at [facility]   • ezetimibe (ZETIA) 10 MG Tab Take 10 mg by mouth every evening.   9/30/2021 at [facility]   • Magnesium 400 MG Tab Take 400 mg by mouth every evening.   9/30/2021 at [facility]   • gabapentin (NEURONTIN) 300 MG Cap Take 600 mg by mouth 2 Times a Day.   9/30/2021 at [facility]   • BIOTIN PO Take 1 Tablet by mouth every day.   9/30/2021 at [facility]   • HYDROcodone/acetaminophen (NORCO)  MG Tab Take 0.5 Tablets by mouth every 6 hours as needed for Moderate Pain.   10/8/2021 at [facility]   • Cholecalciferol (VITAMIN D3 PO) Take 1 Each by mouth every day.   9/30/2021 at [facility]   • ondansetron (ZOFRAN ODT) 4 MG TABLET DISPERSIBLE Take 4 mg by mouth every 6 hours as needed for Nausea.   10/8/2021 at [facility]         Medication Allergies:  Allergies   Allergen Reactions   • Ampicillin Rash     Rash     • Cephalexin Diarrhea, Vomiting and Nausea     .   • Clindamycin Vomiting     \"cleocin\"   • Codeine      Severe stomach pain, cramps, spasms   • Demerol Vomiting   • Levofloxacin Unspecified     Numbness     • Tetracyclines Rash     .   • Tizanidine Itching   • " Hydromorphone Vomiting and Nausea   • Morphine Vomiting   • Pcn [Penicillins] Itching   • Sulfa Drugs Itching         Family Medical History:  Family History   Problem Relation Age of Onset   • Stroke Mother    • Cancer Father         larynx   • Diabetes Brother          Social History:  Social History     Socioeconomic History   • Marital status:      Spouse name: Not on file   • Number of children: Not on file   • Years of education: Not on file   • Highest education level: Not on file   Occupational History   • Not on file   Tobacco Use   • Smoking status: Former Smoker     Packs/day: 0.25     Years: 0.10     Pack years: 0.02     Types: Cigarettes     Quit date: 1973     Years since quittin.8   • Smokeless tobacco: Never Used   Vaping Use   • Vaping Use: Never used   Substance and Sexual Activity   • Alcohol use: No   • Drug use: No   • Sexual activity: Not on file     Comment:    Other Topics Concern   • Not on file   Social History Narrative   • Not on file     Social Determinants of Health     Financial Resource Strain:    • Difficulty of Paying Living Expenses:    Food Insecurity:    • Worried About Running Out of Food in the Last Year:    • Ran Out of Food in the Last Year:    Transportation Needs:    • Lack of Transportation (Medical):    • Lack of Transportation (Non-Medical):    Physical Activity:    • Days of Exercise per Week:    • Minutes of Exercise per Session:    Stress:    • Feeling of Stress :    Social Connections:    • Frequency of Communication with Friends and Family:    • Frequency of Social Gatherings with Friends and Family:    • Attends Yazidi Services:    • Active Member of Clubs or Organizations:    • Attends Club or Organization Meetings:    • Marital Status:    Intimate Partner Violence:    • Fear of Current or Ex-Partner:    • Emotionally Abused:    • Physically Abused:    • Sexually Abused:          Vital signs:  Weight/BMI: Body mass index is 27.89  "kg/m².  /71   Pulse 62   Temp 35.8 °C (96.5 °F) (Temporal)   Resp 16   Ht 1.6 m (5' 2.99\")   Wt 71.4 kg (157 lb 6.5 oz)   SpO2 98%   Vitals:    10/17/21 2039 10/17/21 2338 10/18/21 0344 10/18/21 0658   BP: (!) 167/82 155/71 (!) 163/75 138/71   Pulse: 70  64 62   Resp: 18  16 16   Temp: 35.9 °C (96.7 °F)  36.5 °C (97.7 °F) 35.8 °C (96.5 °F)   TempSrc: Temporal  Temporal Temporal   SpO2: 96%  94% 98%   Weight:       Height:         Oxygen Therapy:  Pulse Oximetry: 98 %, O2 (LPM): 2, O2 Delivery Device: None - Room Air    Intake/Output Summary (Last 24 hours) at 10/18/2021 1122  Last data filed at 10/18/2021 0500  Gross per 24 hour   Intake --   Output 80 ml   Net -80 ml         Physical Exam:  Physical Exam  Vitals and nursing note reviewed.   Constitutional:       Appearance: She is ill-appearing.   HENT:      Head: Normocephalic and atraumatic.      Right Ear: External ear normal.      Left Ear: External ear normal.      Nose: Nose normal.      Mouth/Throat:      Mouth: Mucous membranes are dry.      Pharynx: Oropharynx is clear.   Eyes:      General: No scleral icterus.     Extraocular Movements: Extraocular movements intact.      Pupils: Pupils are equal, round, and reactive to light.   Cardiovascular:      Rate and Rhythm: Normal rate and regular rhythm.      Pulses: Normal pulses.      Heart sounds: Normal heart sounds. No murmur heard.     Pulmonary:      Effort: Pulmonary effort is normal. No respiratory distress.      Breath sounds: No wheezing or rales.      Comments: Decreased breath sounds in bases  Abdominal:      General: Abdomen is flat.      Palpations: Abdomen is soft.      Tenderness: There is abdominal tenderness (RUQ and epigastric).      Comments: Right sided MARTHA drain with light green and mid/left MARTHA drain with purulent green material. Both are CDI   Musculoskeletal:      Cervical back: Neck supple.      Right lower leg: No edema.      Left lower leg: No edema.   Skin:     General: " Skin is warm and dry.   Neurological:      General: No focal deficit present.      Mental Status: She is alert and oriented to person, place, and time.   Psychiatric:         Thought Content: Thought content normal.         Judgment: Judgment normal.             Labs:  Recent Labs     10/17/21  0319 10/17/21  0830 10/18/21  0840   SODIUM 130* 134* 133*   POTASSIUM 5.7* 3.8 4.1   CHLORIDE 93* 101 99   CO2 21 26 27   BUN 7* 8 9   CREATININE 0.58 0.53 0.54   MAGNESIUM 2.8* 2.1 2.2   PHOSPHORUS 7.8* 2.7 2.8   CALCIUM 6.8* 7.5* 7.7*     Recent Labs     10/17/21  0319 10/17/21  0830 10/18/21  0840   ALTSGPT 10 17 23   ASTSGOT 26 34 46*   ALKPHOSPHAT 36 45 49   TBILIRUBIN 0.3 0.4 0.4   GLUCOSE 732* 113* 102*     Recent Labs     10/16/21  0233 10/16/21  0233 10/17/21  0319 10/17/21  0830 10/18/21  0830 10/18/21  0840 10/18/21  1010   WBC 8.4   < > 6.0  --  11.6*  --  8.0   NEUTSPOLYS 76.90*  --  71.40  --   --   --  77.40*   LYMPHOCYTES 11.90*  --  15.10*  --   --   --  9.50*   MONOCYTES 9.30  --  11.90  --   --   --  6.10   EOSINOPHILS 0.10  --  0.20  --   --   --  0.90   BASOPHILS 0.10  --  0.20  --   --   --  0.00   ASTSGOT 20   < > 26 34  --  46*  --    ALTSGPT 12   < > 10 17  --  23  --    ALKPHOSPHAT 40   < > 36 45  --  49  --    TBILIRUBIN 0.4   < > 0.3 0.4  --  0.4  --     < > = values in this interval not displayed.     Recent Labs     10/17/21  0319 10/18/21  0830 10/18/21  1010   RBC 2.88* 1.70* 3.36*   HEMOGLOBIN 9.3* 5.1* 10.0*   HEMATOCRIT 27.4* 16.0* 31.6*   PLATELETCT 377 496* 398     Recent Results (from the past 24 hour(s))   POCT glucose device results    Collection Time: 10/17/21  4:07 PM   Result Value Ref Range    Glucose - Accu-Ck 120 (H) 65 - 99 mg/dL   POCT glucose device results    Collection Time: 10/17/21 11:34 PM   Result Value Ref Range    Glucose - Accu-Ck 117 (H) 65 - 99 mg/dL   CBC WITHOUT DIFFERENTIAL    Collection Time: 10/18/21  8:30 AM   Result Value Ref Range    WBC 11.6 (H) 4.8 - 10.8  K/uL    RBC 1.70 (L) 4.20 - 5.40 M/uL    Hemoglobin 5.1 (LL) 12.0 - 16.0 g/dL    Hematocrit 16.0 (LL) 37.0 - 47.0 %    MCV 94.1 81.4 - 97.8 fL    MCH 30.0 27.0 - 33.0 pg    MCHC 31.9 (L) 33.6 - 35.0 g/dL    RDW 47.9 35.9 - 50.0 fL    Platelet Count 496 (H) 164 - 446 K/uL    MPV 8.8 (L) 9.0 - 12.9 fL   Comp Metabolic Panel    Collection Time: 10/18/21  8:40 AM   Result Value Ref Range    Sodium 133 (L) 135 - 145 mmol/L    Potassium 4.1 3.6 - 5.5 mmol/L    Chloride 99 96 - 112 mmol/L    Co2 27 20 - 33 mmol/L    Anion Gap 7.0 7.0 - 16.0    Glucose 102 (H) 65 - 99 mg/dL    Bun 9 8 - 22 mg/dL    Creatinine 0.54 0.50 - 1.40 mg/dL    Calcium 7.7 (L) 8.5 - 10.5 mg/dL    AST(SGOT) 46 (H) 12 - 45 U/L    ALT(SGPT) 23 2 - 50 U/L    Alkaline Phosphatase 49 30 - 99 U/L    Total Bilirubin 0.4 0.1 - 1.5 mg/dL    Albumin 2.2 (L) 3.2 - 4.9 g/dL    Total Protein 5.0 (L) 6.0 - 8.2 g/dL    Globulin 2.8 1.9 - 3.5 g/dL    A-G Ratio 0.8 g/dL   MAGNESIUM    Collection Time: 10/18/21  8:40 AM   Result Value Ref Range    Magnesium 2.2 1.5 - 2.5 mg/dL   PHOSPHORUS    Collection Time: 10/18/21  8:40 AM   Result Value Ref Range    Phosphorus 2.8 2.5 - 4.5 mg/dL   ESTIMATED GFR    Collection Time: 10/18/21  8:40 AM   Result Value Ref Range    GFR If African American >60 >60 mL/min/1.73 m 2    GFR If Non African American >60 >60 mL/min/1.73 m 2   CBC WITH DIFFERENTIAL    Collection Time: 10/18/21 10:10 AM   Result Value Ref Range    WBC 8.0 4.8 - 10.8 K/uL    RBC 3.36 (L) 4.20 - 5.40 M/uL    Hemoglobin 10.0 (L) 12.0 - 16.0 g/dL    Hematocrit 31.6 (L) 37.0 - 47.0 %    MCV 94.0 81.4 - 97.8 fL    MCH 29.8 27.0 - 33.0 pg    MCHC 31.6 (L) 33.6 - 35.0 g/dL    RDW 48.3 35.9 - 50.0 fL    Platelet Count 398 164 - 446 K/uL    MPV 8.6 (L) 9.0 - 12.9 fL    Neutrophils-Polys 77.40 (H) 44.00 - 72.00 %    Lymphocytes 9.50 (L) 22.00 - 41.00 %    Monocytes 6.10 0.00 - 13.40 %    Eosinophils 0.90 0.00 - 6.90 %    Basophils 0.00 0.00 - 1.80 %    Nucleated RBC 0.00  /100 WBC    Neutrophils (Absolute) 6.61 2.00 - 7.15 K/uL    Lymphs (Absolute) 0.76 (L) 1.00 - 4.80 K/uL    Monos (Absolute) 0.49 0.00 - 0.85 K/uL    Eos (Absolute) 0.07 0.00 - 0.51 K/uL    Baso (Absolute) 0.00 0.00 - 0.12 K/uL    NRBC (Absolute) 0.00 K/uL    Hypochromia 2+ (A)     Microcytosis 1+    DIFFERENTIAL MANUAL    Collection Time: 10/18/21 10:10 AM   Result Value Ref Range    Bands-Stabs 5.20 0.00 - 10.00 %    Myelocytes 0.90 %    Manual Diff Status PERFORMED    PERIPHERAL SMEAR REVIEW    Collection Time: 10/18/21 10:10 AM   Result Value Ref Range    Peripheral Smear Review see below    PLATELET ESTIMATE    Collection Time: 10/18/21 10:10 AM   Result Value Ref Range    Plt Estimation Normal    MORPHOLOGY    Collection Time: 10/18/21 10:10 AM   Result Value Ref Range    RBC Morphology Present     Giant Platelets 1+     Polychromia 1+     Schistocytes 1+          Radiology Review:  CT-DRAIN-PERITONEAL   Final Result      1.  CT guided pancreatic pseudocyst catheter drainage.   2.  The current plan is to obtain a follow-up CT in 5-7 days..      IR-PICC LINE PLACEMENT W/ GUIDANCE > AGE 5   Final Result                  Ultrasound-guided PICC placement performed by qualified nursing staff as    above.                MDM (Data Review):   -Records reviewed and summarized in current documentation  -I personally reviewed and interpreted the laboratory results  -I personally reviewed the radiology images        Medical Decision Making, by Problem:  Active Hospital Problems    Diagnosis    • Bacteremia due to Gram-negative bacteria and fungemia [R78.81]    • Sepsis due to intraabdominal fluid collection/abscess (HCC) [A41.9]    • Hyperlipidemia [E78.5]    • Hypertension [I10]      66-year-old female with a history of recurrent idiopathic pancreatitis s/p ERCP with biliary sphincterotomy and biopsy of a distal BD polyp on October 1, 2021.  Postop complications-pancreatitis.  Ever since then, complaints of subjective  fevers, progressive abdominal pain, nausea/vomiting.  Began to have worsening right lower quadrant pain over the past 5 days.  CT scan abdomen/pelvis large irregular fluid collection with thick wall occupying most of the right abdomen surrounding right kidney and right colon.  Additional fluid and gas collection in the midline abdomen anterior to the pancreas concerning for abscess.  Severe wall thickening of the descending and transverse colon, second portion of duodenum likely reactive.  Pneumobilia with gas in the CBD, intrahepatic bile ducts as well as pancreatic ducts (likely secondary to recent ERCP with sphincterotomy).  IR placed a drain with improvement in abdominal pain initially, but then drain output slowed to no output. Nursing was discovered to have have flushed the drain for at least 2 days. After flush by bedside nurse, reportedly 300 cc of fluid came out. Fluid bilirubin 0.6. Fluid amylase still pending. Blood cultures from 10/8 positive for Chryseobacterium and Candida glabrata. ID following. Repeat CT with no change in fluid collection and new pelvic fluid collection/abscess. IR placed second drain.          Assessment/Recommendations:  ASSESSMENT:  1.  Sepsis due to intra-abdominal fluid collection/abscess-unclear etiology suspect previous bile or bowel leak that has since healed versus ?from recent pancreatitis. Ongoing bile leak does not seem to be the cause as HIDA was negative and fluid bilirubin normal at 0.6. Fluid amylase greater than 7500 making pancreatic origin a possibility - Cultures (+)  2.  History of pancreatitis and post ERCP pancreatitis  3.  Intra-abdominal fluid collection x 2  4.  History of morbid obesity s/p laparoscopic sleeve  5.  Euvolemic hyponatremia  6.  Constipation     PLAN:     -Continue IV antibiotics and antifungals per ID and pharmacy recommendations  -Continue to trend CBC, CMP  - IV antibiotics, antiemetics, pain medication per primary team  -Appreciate IR and  surgery recommendations/management  -Continue drain flushes per IR and surgery recommendations daily    GI will standby and follow ancillarily. Please call for any questions, concerns, or if GI intervention is needed.    Thank you very much for allowing me to participate in the care of your patient.  Please feel free to contact me anytime at [phone].     AYE Jones    Core Quality Measures   Reviewed items:  Labs, Medications and Radiology reports reviewed

## 2021-10-18 NOTE — PROGRESS NOTES
Pharmacy TPN Day # 3     Dosing Weight   60 kg (adjustedBW)         TPN goal: 7552-9203 kcal/day including 1.5 gm/kg/day Protein        TPN indication: perforated duodenum         Pertinent PMH: Admitted on 10/15/2021 for abdominal pain. Underwent a procedure on 10/01/2021, which involved a polypectomy and a sphincterotomy.  CT on 10/08/202, found to have a large fluid collection and thickening of wall around the duodenum, collection was retroperitoneal. IR placed drain. CT on 10/09/2021, significant amount of retroperitoneal air and fluid.  [facility] for further evaluation. 2 drains have been placed for fluid collections surrounding the R kidney and colon..     Temp (24hrs), Av.7 °C (98 °F), Min:36.3 °C (97.4 °F), Max:36.9 °C (98.4 °F)  Temp (24hrs), Av.2 °C (97.2 °F), Min:35.8 °C (96.5 °F), Max:36.5 °C (97.7 °F)  .  Recent Labs     10/17/21  0319 10/17/21  0830 10/18/21  0840   SODIUM 130* 134* 133*   POTASSIUM 5.7* 3.8 4.1   CHLORIDE 93* 101 99   CO2 21 26 27   BUN 7* 8 9   CREATININE 0.58 0.53 0.54   GLUCOSE 732* 113* 102*   CALCIUM 6.8* 7.5* 7.7*   ASTSGOT 26 34 46*   ALTSGPT 10    ALBUMIN 1.6* 2.2* 2.2*   TBILIRUBIN 0.3 0.4 0.4   PHOSPHORUS 7.8* 2.7 2.8   MAGNESIUM 2.8* 2.1 2.2     Accu-Checks  Recent Labs     10/17/21  0511 10/17/21  1607 10/17/21  2334   POCGLUCOSE 115* 120* 117*       Vitals:    10/17/21 2039 10/17/21 2338 10/18/21 0344 10/18/21 0658   BP: (!) 167/82 155/71 (!) 163/75 138/71   Weight:       Height:           Intake/Output Summary (Last 24 hours) at 10/18/2021 1309  Last data filed at 10/18/2021 0500  Gross per 24 hour   Intake --   Output 80 ml   Net -80 ml       Orders Placed This Encounter   Procedures   • Restrict to: Sips with Medications     Standing Status:   Standing     Number of Occurrences:   1     Order Specific Question:   Restrict to:     Answer:   Sips with Medications [3]           TPN for past 72 hours (Show up to 3 orders; newest on the left.  Changes between the two most recent orders are indicated.)     Start date and time   10/18/2021 2000 10/17/2021 2000 10/16/2021 2000      TPN Central Line Formulation [[id number]] TPN Central Line Formulation [[id number]] TPN Central Line Formulation [[id number]]    Order Status  Active Last Dose in Progress Completed    Last Admin   New Bag at 10/17/2021 2045 by Ibis Delacruz R.N. Rate Verify at 10/17/2021 0726 by Aria Saleem R.N.       Base    Clinisol 15%  90 g 45 g 45 g    dextrose 70%  240 g 120 g 120 g    fat emulsions 20%  50 g 25 g 25 g       Additives    potassium phosphate  25 mmol 25 mmol 25 mmol    sodium acetate  130 mEq 130 mEq 150 mEq    sodium chloride  170 mEq 170 mEq 150 mEq    magnesium sulfate  8 mEq 12 mEq 12 mEq    calcium GLUConate  9.3 mEq 4.65 mEq 4.65 mEq    M.T.E.-4  1 mL 1 mL 1 mL    M.V.I. Adult  10 mL 10 mL 10 mL    famotidine  40 mg 40 mg 40 mg       QS Base    sterile water  646.3 mL 1,251.81 mL 1,246.81 mL       Energy Contribution    Proteins  -- -- --    Dextrose  816 kcal 408 kcal 408 kcal    Lipids  500 kcal 250 kcal 250 kcal    Total  1,316 kcal 658 kcal 658 kcal       Electrolyte Ion Calculated Amount    Sodium  300 mEq 300 mEq 300 mEq    Potassium  36.67 mEq 36.67 mEq 36.67 mEq    Calcium  9.3 mEq 4.65 mEq 4.65 mEq    Magnesium  8 mEq 12 mEq 12 mEq    Aluminum  -- -- --    Phosphate  25 mmol 25 mmol 25 mmol    Chloride  170 mEq 170 mEq 150 mEq    Acetate  206.2 mEq 168.1 mEq 188.1 mEq       Other    Total Protein  90 g 45 g 45 g    Total Protein/kg  1.26 g/kg 0.63 g/kg 0.63 g/kg    Total Amino Acid  -- -- --    Total Amino Acid/kg  -- -- --    Glucose Infusion Rate  2.33 mg/kg/min 1.17 mg/kg/min 1.17 mg/kg/min    Osmolarity (Estimated)  -- -- --    Volume  1,992 mL 1,992 mL 1,992 mL    Rate  83 mL/hr 83 mL/hr 83 mL/hr    Dosing Weight  71.4 kg 71.4 kg 71.4 kg    Infusion [facility]  [facility]            Comments:  1. Plan for nutrition/clinical status:     Continuous TPN.   TPN currently providing 100% of goal.   Maintenance Fluid: none  Drains/fistula/I/O: last 24h: L abd drain 195 cc, R abd drain 238 cc out, urine not charted.    2. Macronutrients:  Increase to 100% goal    Prot:  90g  CHO:  240g  Lipid:  50g  This formula provides:  % kcal as lipids = 30  Grams protein/kg = 1.5  Non-protein calories = 1316  Kcals/kg = 25  Total daily calories = 1676     3. Micronutrients/electrolytes:   Na: no change, NS equivalence  K:no change  Mg: reduce to 8mEq  Phos:no change  Ca: 9.3mEq  Cl:Ace: no change, ~1:1    5.   Notes:   Glucose: range 102-120 over past 24h with 0 units of ssi utilized  Acid suppression: pepcid in TPN  CMP,Mag, Phos, repeat tomorrow 10/19    Patient started octreotide 100mcg subQ q8h today to target fistula tract.    Zak Gracia, PharmD, BCPS

## 2021-10-19 ENCOUNTER — APPOINTMENT (OUTPATIENT)
Dept: RADIOLOGY | Facility: MEDICAL CENTER | Age: 66
DRG: 856 | End: 2021-10-19
Attending: INTERNAL MEDICINE
Payer: MEDICARE

## 2021-10-19 PROBLEM — J96.01 ACUTE RESPIRATORY FAILURE WITH HYPOXIA (HCC): Status: ACTIVE | Noted: 2021-10-19

## 2021-10-19 LAB
ALBUMIN SERPL BCP-MCNC: 2.3 G/DL (ref 3.2–4.9)
ALBUMIN/GLOB SERPL: 1 G/DL
ALP SERPL-CCNC: 47 U/L (ref 30–99)
ALT SERPL-CCNC: 32 U/L (ref 2–50)
ANION GAP SERPL CALC-SCNC: 9 MMOL/L (ref 7–16)
ANISOCYTOSIS BLD QL SMEAR: ABNORMAL
AST SERPL-CCNC: 53 U/L (ref 12–45)
BASOPHILS # BLD AUTO: 1.7 % (ref 0–1.8)
BASOPHILS # BLD: 0.11 K/UL (ref 0–0.12)
BILIRUB SERPL-MCNC: 0.4 MG/DL (ref 0.1–1.5)
BUN SERPL-MCNC: 9 MG/DL (ref 8–22)
CALCIUM SERPL-MCNC: 7.7 MG/DL (ref 8.5–10.5)
CHLORIDE SERPL-SCNC: 96 MMOL/L (ref 96–112)
CO2 SERPL-SCNC: 29 MMOL/L (ref 20–33)
CREAT SERPL-MCNC: 0.62 MG/DL (ref 0.5–1.4)
EOSINOPHIL # BLD AUTO: 0 K/UL (ref 0–0.51)
EOSINOPHIL NFR BLD: 0 % (ref 0–6.9)
ERYTHROCYTE [DISTWIDTH] IN BLOOD BY AUTOMATED COUNT: 47.3 FL (ref 35.9–50)
GLOBULIN SER CALC-MCNC: 2.4 G/DL (ref 1.9–3.5)
GLUCOSE SERPL-MCNC: 171 MG/DL (ref 65–99)
HCT VFR BLD AUTO: 28.7 % (ref 37–47)
HGB BLD-MCNC: 9.4 G/DL (ref 12–16)
LYMPHOCYTES # BLD AUTO: 0.89 K/UL (ref 1–4.8)
LYMPHOCYTES NFR BLD: 13.3 % (ref 22–41)
MAGNESIUM SERPL-MCNC: 2 MG/DL (ref 1.5–2.5)
MANUAL DIFF BLD: NORMAL
MCH RBC QN AUTO: 30.8 PG (ref 27–33)
MCHC RBC AUTO-ENTMCNC: 32.8 G/DL (ref 33.6–35)
MCV RBC AUTO: 94.1 FL (ref 81.4–97.8)
MICROCYTES BLD QL SMEAR: ABNORMAL
MONOCYTES # BLD AUTO: 0.48 K/UL (ref 0–0.85)
MONOCYTES NFR BLD AUTO: 7.1 % (ref 0–13.4)
MORPHOLOGY BLD-IMP: NORMAL
MYELOCYTES NFR BLD MANUAL: 1.8 %
NEUTROPHILS # BLD AUTO: 5.04 K/UL (ref 2–7.15)
NEUTROPHILS NFR BLD: 66.4 % (ref 44–72)
NEUTS BAND NFR BLD MANUAL: 8.8 % (ref 0–10)
NRBC # BLD AUTO: 0 K/UL
NRBC BLD-RTO: 0 /100 WBC
OVALOCYTES BLD QL SMEAR: NORMAL
PHOSPHATE SERPL-MCNC: 3.2 MG/DL (ref 2.5–4.5)
PLATELET # BLD AUTO: 312 K/UL (ref 164–446)
PLATELET BLD QL SMEAR: NORMAL
PMV BLD AUTO: 8.7 FL (ref 9–12.9)
POIKILOCYTOSIS BLD QL SMEAR: NORMAL
POLYCHROMASIA BLD QL SMEAR: NORMAL
POTASSIUM SERPL-SCNC: 4 MMOL/L (ref 3.6–5.5)
PROT SERPL-MCNC: 4.7 G/DL (ref 6–8.2)
RBC # BLD AUTO: 3.05 M/UL (ref 4.2–5.4)
RBC BLD AUTO: PRESENT
SODIUM SERPL-SCNC: 134 MMOL/L (ref 135–145)
WBC # BLD AUTO: 6.7 K/UL (ref 4.8–10.8)

## 2021-10-19 PROCEDURE — 770001 HCHG ROOM/CARE - MED/SURG/GYN PRIV*

## 2021-10-19 PROCEDURE — 85027 COMPLETE CBC AUTOMATED: CPT

## 2021-10-19 PROCEDURE — 700101 HCHG RX REV CODE 250: Performed by: INTERNAL MEDICINE

## 2021-10-19 PROCEDURE — A9270 NON-COVERED ITEM OR SERVICE: HCPCS | Performed by: FAMILY MEDICINE

## 2021-10-19 PROCEDURE — 700105 HCHG RX REV CODE 258: Performed by: FAMILY MEDICINE

## 2021-10-19 PROCEDURE — 700102 HCHG RX REV CODE 250 W/ 637 OVERRIDE(OP): Performed by: FAMILY MEDICINE

## 2021-10-19 PROCEDURE — 84100 ASSAY OF PHOSPHORUS: CPT

## 2021-10-19 PROCEDURE — 700111 HCHG RX REV CODE 636 W/ 250 OVERRIDE (IP): Performed by: INTERNAL MEDICINE

## 2021-10-19 PROCEDURE — 700111 HCHG RX REV CODE 636 W/ 250 OVERRIDE (IP): Performed by: STUDENT IN AN ORGANIZED HEALTH CARE EDUCATION/TRAINING PROGRAM

## 2021-10-19 PROCEDURE — 700105 HCHG RX REV CODE 258: Performed by: INTERNAL MEDICINE

## 2021-10-19 PROCEDURE — 71045 X-RAY EXAM CHEST 1 VIEW: CPT

## 2021-10-19 PROCEDURE — 700111 HCHG RX REV CODE 636 W/ 250 OVERRIDE (IP): Mod: JB | Performed by: SURGERY

## 2021-10-19 PROCEDURE — 36415 COLL VENOUS BLD VENIPUNCTURE: CPT

## 2021-10-19 PROCEDURE — 700111 HCHG RX REV CODE 636 W/ 250 OVERRIDE (IP): Mod: JG | Performed by: FAMILY MEDICINE

## 2021-10-19 PROCEDURE — 85007 BL SMEAR W/DIFF WBC COUNT: CPT

## 2021-10-19 PROCEDURE — 80053 COMPREHEN METABOLIC PANEL: CPT

## 2021-10-19 PROCEDURE — 99233 SBSQ HOSP IP/OBS HIGH 50: CPT | Performed by: INTERNAL MEDICINE

## 2021-10-19 PROCEDURE — 99232 SBSQ HOSP IP/OBS MODERATE 35: CPT | Performed by: INTERNAL MEDICINE

## 2021-10-19 PROCEDURE — 83735 ASSAY OF MAGNESIUM: CPT

## 2021-10-19 RX ORDER — FUROSEMIDE 10 MG/ML
20 INJECTION INTRAMUSCULAR; INTRAVENOUS
Status: DISCONTINUED | OUTPATIENT
Start: 2021-10-19 | End: 2021-10-20

## 2021-10-19 RX ADMIN — OXYCODONE HYDROCHLORIDE 10 MG: 10 TABLET ORAL at 16:58

## 2021-10-19 RX ADMIN — OXYCODONE HYDROCHLORIDE 10 MG: 10 TABLET ORAL at 12:35

## 2021-10-19 RX ADMIN — OCTREOTIDE ACETATE 100 MCG: 100 INJECTION, SOLUTION INTRAVENOUS; SUBCUTANEOUS at 06:08

## 2021-10-19 RX ADMIN — MICAFUNGIN SODIUM 100 MG: 100 INJECTION, POWDER, LYOPHILIZED, FOR SOLUTION INTRAVENOUS at 12:35

## 2021-10-19 RX ADMIN — CALCIUM GLUCONATE: 98 INJECTION, SOLUTION INTRAVENOUS at 20:51

## 2021-10-19 RX ADMIN — HEPARIN SODIUM 5000 UNITS: 5000 INJECTION, SOLUTION INTRAVENOUS; SUBCUTANEOUS at 04:39

## 2021-10-19 RX ADMIN — GABAPENTIN 600 MG: 300 CAPSULE ORAL at 04:39

## 2021-10-19 RX ADMIN — FUROSEMIDE 20 MG: 10 INJECTION, SOLUTION INTRAMUSCULAR; INTRAVENOUS at 12:15

## 2021-10-19 RX ADMIN — EZETIMIBE 10 MG: 10 TABLET ORAL at 16:59

## 2021-10-19 RX ADMIN — Medication 1000 UNITS: at 04:39

## 2021-10-19 RX ADMIN — OXYCODONE HYDROCHLORIDE 10 MG: 10 TABLET ORAL at 07:54

## 2021-10-19 RX ADMIN — OXYCODONE HYDROCHLORIDE 10 MG: 10 TABLET ORAL at 20:51

## 2021-10-19 RX ADMIN — OXYCODONE HYDROCHLORIDE 10 MG: 10 TABLET ORAL at 02:20

## 2021-10-19 RX ADMIN — HEPARIN SODIUM 5000 UNITS: 5000 INJECTION, SOLUTION INTRAVENOUS; SUBCUTANEOUS at 20:50

## 2021-10-19 RX ADMIN — PIPERACILLIN AND TAZOBACTAM 3.38 G: 3; .375 INJECTION, POWDER, LYOPHILIZED, FOR SOLUTION INTRAVENOUS; PARENTERAL at 14:18

## 2021-10-19 RX ADMIN — HEPARIN SODIUM 5000 UNITS: 5000 INJECTION, SOLUTION INTRAVENOUS; SUBCUTANEOUS at 14:18

## 2021-10-19 RX ADMIN — GABAPENTIN 600 MG: 300 CAPSULE ORAL at 16:58

## 2021-10-19 RX ADMIN — PIPERACILLIN AND TAZOBACTAM 3.38 G: 3; .375 INJECTION, POWDER, LYOPHILIZED, FOR SOLUTION INTRAVENOUS; PARENTERAL at 20:50

## 2021-10-19 RX ADMIN — PIPERACILLIN AND TAZOBACTAM 3.38 G: 3; .375 INJECTION, POWDER, LYOPHILIZED, FOR SOLUTION INTRAVENOUS; PARENTERAL at 04:54

## 2021-10-19 ASSESSMENT — ENCOUNTER SYMPTOMS
DIARRHEA: 0
CONSTIPATION: 0
VOMITING: 0
HEADACHES: 0
SHORTNESS OF BREATH: 0
ABDOMINAL PAIN: 1
BLURRED VISION: 0
FEVER: 0
LOSS OF CONSCIOUSNESS: 0
WEAKNESS: 1
PALPITATIONS: 0
DIZZINESS: 0
SPUTUM PRODUCTION: 0
SEIZURES: 0
HEMOPTYSIS: 0
COUGH: 0
CHILLS: 0
MYALGIAS: 0
SHORTNESS OF BREATH: 1
NAUSEA: 0
FALLS: 0
BRUISES/BLEEDS EASILY: 0

## 2021-10-19 ASSESSMENT — PAIN DESCRIPTION - PAIN TYPE
TYPE: ACUTE PAIN
TYPE: ACUTE PAIN;SURGICAL PAIN

## 2021-10-19 NOTE — ASSESSMENT & PLAN NOTE
-S/P Ex lap, I/D, debridement nec fasc 11/5/2021  -Abx per ID end date 11/24/2021  -11/16/2021: CT abdomen 11/15/2021 showed increase fluid collection in the right mid abdomen and slight increase in trace bilateral pleural effusions.   -Patient go to IR for procedural drainage today  -11/17: RLQ drain was found to have fallen out in IR yesterday.  Fluid collection is smaller and no new drain was indicated.    -11/22: MARTHA drain in place to RUQ, decrease output seen. UGI today showed no duodenal leaks.  -11/24: Updated CT of the abdomen/pelvis, no leaks detected, improving size of abscess; goal is 50 male or less within 24 hours output for drain to be DC'd  -Pain control with dilaudid IV  -Antiemetics PRN  -Will continue to monitor

## 2021-10-19 NOTE — PROGRESS NOTES
Hospital Medicine Daily Progress Note    Date of Service  10/19/2021    Chief Complaint  Nils Alfonso is a 66 y.o. female admitted 10/15/2021 with abdominal pain    Hospital Course  Initially admitted to [facility] for evaluation of abdominal pain after recent ERCP with polypectomy and sphincterotomy and noted to have large intra-abdominal fluid collection for which she underwent drainage with interventional radiology and was evaluated by infectious disease and surgery.  She was transferred to [facility] for higher level of care.  She was evaluated by Dr Cook who recommended conservative management with placement of a second drain    Interval Problem Update  Patient was seen and examined at bedside.  No acute events overnight. Patient is resting comfortably in bed and in no acute distress.     IV Lasix x1 for lower extremity edema, oxygen demand  TPN  Zosyn, Micafungin  Post ERCP retroperitoneal abscess with drain x2  ID, GI, GS recommendations appreciated      I have personally seen and examined the patient at bedside. I discussed the plan of care with patient, bedside RN and infectious disease.    Consultants/Specialty  general surgery, GI and infectious disease    Code Status  Full Code    Disposition  Patient is not medically cleared.   Anticipate discharge to to home with organized home healthcare and close outpatient follow-up.  I have placed the appropriate orders for post-discharge needs.    Review of Systems  Review of Systems   Constitutional: Negative for chills and fever.   Respiratory: Positive for shortness of breath. Negative for cough.    Cardiovascular: Negative for chest pain and palpitations.   Gastrointestinal: Positive for abdominal pain. Negative for nausea and vomiting.   Genitourinary: Negative for dysuria and frequency.   Musculoskeletal: Negative for falls.   Neurological: Negative for seizures and loss of consciousness.   All other systems reviewed  and are negative.       Physical Exam  Temp:  [35.9 °C (96.7 °F)-36.6 °C (97.8 °F)] 36.4 °C (97.5 °F)  Pulse:  [59-67] 67  Resp:  [16-18] 16  BP: (134-160)/(70-83) 134/83  SpO2:  [95 %-98 %] 95 %    Physical Exam  Vitals and nursing note reviewed.   Constitutional:       General: She is not in acute distress.     Comments: Pleasant, conversational   HENT:      Head: Normocephalic and atraumatic.      Right Ear: External ear normal.      Left Ear: External ear normal.      Nose: Nose normal. No rhinorrhea.      Mouth/Throat:      Pharynx: No oropharyngeal exudate or posterior oropharyngeal erythema.   Eyes:      General: No scleral icterus.     Conjunctiva/sclera: Conjunctivae normal.   Cardiovascular:      Rate and Rhythm: Normal rate and regular rhythm.      Heart sounds: Normal heart sounds. No murmur heard.     Pulmonary:      Effort: Pulmonary effort is normal.      Breath sounds: Normal breath sounds. No wheezing.   Abdominal:      General: Bowel sounds are normal.      Palpations: Abdomen is soft.      Tenderness: There is abdominal tenderness. There is no guarding or rebound.      Comments: Abdominal drains in place x2   Musculoskeletal:      Cervical back: Neck supple. No rigidity.      Right lower leg: Edema present.      Left lower leg: Edema present.   Skin:     General: Skin is warm and dry.      Coloration: Skin is not cyanotic or jaundiced.      Findings: No bruising.      Nails: There is no clubbing.   Neurological:      General: No focal deficit present.      Mental Status: She is alert and oriented to person, place, and time.      Cranial Nerves: No cranial nerve deficit.      Motor: No weakness.   Psychiatric:         Mood and Affect: Mood normal.         Behavior: Behavior normal.         Fluids    Intake/Output Summary (Last 24 hours) at 10/19/2021 1145  Last data filed at 10/19/2021 0945  Gross per 24 hour   Intake 600 ml   Output 3310 ml   Net -2710 ml       Laboratory  Recent Labs      10/18/21  0830 10/18/21  1010 10/19/21  0249   WBC 11.6* 8.0 6.7   RBC 1.70* 3.36* 3.05*   HEMOGLOBIN 5.1* 10.0* 9.4*   HEMATOCRIT 16.0* 31.6* 28.7*   MCV 94.1 94.0 94.1   MCH 30.0 29.8 30.8   MCHC 31.9* 31.6* 32.8*   RDW 47.9 48.3 47.3   PLATELETCT 496* 398 312   MPV 8.8* 8.6* 8.7*     Recent Labs     10/18/21  0840 10/18/21  1517 10/19/21  0249   SODIUM 133* 133* 134*   POTASSIUM 4.1 4.3 4.0   CHLORIDE 99 98 96   CO2 27 26 29   GLUCOSE 102* 167* 171*   BUN 9 8 9   CREATININE 0.54 0.58 0.62   CALCIUM 7.7* 7.9* 7.7*                   Imaging  CT-DRAIN-PERITONEAL   Final Result      1.  CT guided pancreatic pseudocyst catheter drainage.   2.  The current plan is to obtain a follow-up CT in 5-7 days..      IR-PICC LINE PLACEMENT W/ GUIDANCE > AGE 5   Final Result                  Ultrasound-guided PICC placement performed by qualified nursing staff as    above.          DX-CHEST-LIMITED (1 VIEW)    (Results Pending)        Assessment/Plan  * Bacteremia due to Gram-negative bacteria and fungemia- (present on admission)  Assessment & Plan  Peritoneal fluid cultures grew E. coli and Enterococcus   Blood cultures grew chryseobacterium and Candida glabrata   Repeat blood cultures from 10/13/2021 are negative    Peritoneal fluid from 10/16/2021 growing yeast and strep species follow-up on final results  Monitor drain output and continue current antibiotics  Follow-up on repeat cultures  KALANI negative for signs of endocarditis    Acute respiratory failure with hypoxia (HCC)  Assessment & Plan  Requiring 1L supplemental oxygen  Likely volume overload with lower extremity edema present  Lasix x1    Postprocedural retroperitoneal abscess- (present on admission)  Assessment & Plan  Now with abdominal drain x2  General surgery, GI following    Duodenal perforation (HCC)- (present on admission)  Assessment & Plan  Following ERCP with retroperitoneal abscess and bacteremia    Continue current antibiotics  N.p.o. for surgery  Started  on subcu octreotide    Electrolyte abnormality  Assessment & Plan  Continue electrolyte repletion with TPN    Diarrhea  Assessment & Plan  C. difficile negative    Hyponatremia- (present on admission)  Assessment & Plan  Stable continue to monitor  Appears volume overloaded we will give 1 dose of Lasix today and monitor    Sepsis due to intraabdominal fluid collection/abscess (HCC)- (present on admission)  Assessment & Plan  Sepsis resolved  Source intra-abdominal  Continue Zosyn and Mycamine and follow-up on repeat cultures  ID following  Will need follow-up abdominal imaging in a few days    Hyperlipidemia- (present on admission)  Assessment & Plan  Continue Zetia    Hypertension- (present on admission)  Assessment & Plan  Monitor blood pressure         VTE prophylaxis: heparin ppx    I have performed a physical exam and reviewed and updated ROS and Plan today (10/19/2021). In review of yesterday's note (10/18/2021), there are no changes except as documented above.

## 2021-10-19 NOTE — PROGRESS NOTES
Hospital Medicine Daily Progress Note    Date of Service  10/18/2021    Chief Complaint  Nils Alfonso is a 66 y.o. female admitted 10/15/2021 with abdominal pain    Hospital Course  Initially admitted to [facility] for evaluation of abdominal pain after recent ERCP with polypectomy and sphincterotomy and noted to have large intra-abdominal fluid collection for which she underwent drainage with interventional radiology and was evaluated by infectious disease and surgery.  She was transferred to [facility] for higher level of care.  She was evaluated by Dr Cook who recommended conservative management with placement of a second drain      Interval Problem Update    Patient is tearful due to loss of nephew  Diarrhea improved  Pain is controlled        I have personally seen and examined the patient at bedside. I discussed the plan of care with patient, bedside RN and infectious disease.    Consultants/Specialty  general surgery, GI and infectious disease    Code Status  Full Code    Disposition  Patient is not medically cleared.   Anticipate discharge to to home with organized home healthcare and close outpatient follow-up.  I have placed the appropriate orders for post-discharge needs.    Review of Systems  Review of Systems   Constitutional: Negative for chills and fever.   Gastrointestinal: Positive for abdominal pain. Negative for nausea and vomiting.   All other systems reviewed and are negative.       Physical Exam  Temp:  [35.8 °C (96.5 °F)-36.5 °C (97.7 °F)] 35.9 °C (96.7 °F)  Pulse:  [62-70] 64  Resp:  [16-18] 18  BP: (138-167)/(71-82) 160/76  SpO2:  [94 %-98 %] 98 %    Physical Exam  Vitals and nursing note reviewed.   Constitutional:       General: She is not in acute distress.  HENT:      Head: Normocephalic and atraumatic.      Nose: Nose normal. No rhinorrhea.      Mouth/Throat:      Pharynx: No oropharyngeal exudate or posterior oropharyngeal erythema.   Eyes:       General: No scleral icterus.        Right eye: No discharge.         Left eye: No discharge.   Cardiovascular:      Rate and Rhythm: Normal rate and regular rhythm.      Heart sounds: Normal heart sounds. No murmur heard.   No friction rub. No gallop.    Pulmonary:      Effort: Pulmonary effort is normal. No respiratory distress.      Breath sounds: Normal breath sounds. No stridor. No wheezing, rhonchi or rales.   Chest:      Chest wall: No tenderness.   Abdominal:      General: Bowel sounds are normal. There is no distension.      Palpations: Abdomen is soft. There is no mass.      Tenderness: There is abdominal tenderness. There is no rebound.      Comments: Abdominal drains in place   Musculoskeletal:         General: Swelling present. No tenderness.      Cervical back: Neck supple. No rigidity.   Skin:     General: Skin is warm and dry.      Coloration: Skin is not cyanotic or jaundiced.      Nails: There is no clubbing.   Neurological:      General: No focal deficit present.      Mental Status: She is alert and oriented to person, place, and time.      Cranial Nerves: No cranial nerve deficit.      Motor: No weakness.   Psychiatric:         Mood and Affect: Mood normal.         Behavior: Behavior normal.         Fluids    Intake/Output Summary (Last 24 hours) at 10/18/2021 1726  Last data filed at 10/18/2021 0500  Gross per 24 hour   Intake --   Output 40 ml   Net -40 ml       Laboratory  Recent Labs     10/17/21  0319 10/18/21  0830 10/18/21  1010   WBC 6.0 11.6* 8.0   RBC 2.88* 1.70* 3.36*   HEMOGLOBIN 9.3* 5.1* 10.0*   HEMATOCRIT 27.4* 16.0* 31.6*   MCV 95.1 94.1 94.0   MCH 32.3 30.0 29.8   MCHC 33.9 31.9* 31.6*   RDW 49.5 47.9 48.3   PLATELETCT 377 496* 398   MPV 8.7* 8.8* 8.6*     Recent Labs     10/17/21  0830 10/18/21  0840 10/18/21  1517   SODIUM 134* 133* 133*   POTASSIUM 3.8 4.1 4.3   CHLORIDE 101 99 98   CO2 26 27 26   GLUCOSE 113* 102* 167*   BUN 8 9 8   CREATININE 0.53 0.54 0.58   CALCIUM 7.5*  7.7* 7.9*                   Imaging  CT-DRAIN-PERITONEAL   Final Result      1.  CT guided pancreatic pseudocyst catheter drainage.   2.  The current plan is to obtain a follow-up CT in 5-7 days..      IR-PICC LINE PLACEMENT W/ GUIDANCE > AGE 5   Final Result                  Ultrasound-guided PICC placement performed by qualified nursing staff as    above.               Assessment/Plan  Electrolyte abnormality  Assessment & Plan  Continue electrolyte repletion with TPN    Diarrhea  Assessment & Plan  C. difficile negative    Duodenal perforation (HCC)- (present on admission)  Assessment & Plan  Following ERCP with retroperitoneal abscess and bacteremia    Continue current antibiotics  N.p.o. for surgery  Started on subcu octreotide    Bacteremia due to Gram-negative bacteria and fungemia- (present on admission)  Assessment & Plan  Peritoneal fluid cultures grew E. coli and Enterococcus   Blood cultures grew chryseobacterium and Candida glabrata   Repeat blood cultures from 10/13/2021 are negative    Peritoneal fluid from 10/16/2021 growing yeast and strep species follow-up on final results  Monitor drain output and continue current antibiotics  Follow-up on repeat cultures  KALANI negative for signs of endocarditis    Hyponatremia- (present on admission)  Assessment & Plan  Stable continue to monitor  Appears volume overloaded we will give 1 dose of Lasix today and monitor    Sepsis due to intraabdominal fluid collection/abscess (HCC)- (present on admission)  Assessment & Plan  Sepsis resolved  Source intra-abdominal  Continue Zosyn and Mycamine and follow-up on repeat cultures  ID following  Will need follow-up abdominal imaging in a few days    Hyperlipidemia- (present on admission)  Assessment & Plan  Continue Zetia    Hypertension- (present on admission)  Assessment & Plan  Monitor blood pressure         VTE prophylaxis: heparin ppx    I have performed a physical exam and reviewed and updated ROS and Plan today  (10/18/2021). In review of yesterday's note (10/17/2021), there are no changes except as documented above.

## 2021-10-19 NOTE — PROGRESS NOTES
Assumed care of patient at 0715, received bedside report from night shift RN. Bed is locked and in lowest position with call light within reach. Treaded socks in place. Patient updated on plan of care, no complaints or pain at this time. White board updated. Pt A&O4. Patient's breathing pattern is unlabored. Pt is medical. All needs met at this time.

## 2021-10-19 NOTE — PROGRESS NOTES
Infectious Disease Progress Note    Author: Katelin Lopez M.D. Date & Time of service: 10/19/2021  3:27 PM    Chief Complaint:  Follow-up for multiple intra-abdominal abscesses, polymicrobial bacteremia, candidemia     Interval History:  10/15 afebrile, WBC 13.5 patient transferred to [facility] overnight.  Patient having diarrhea however abdominal pain is slightly improved today.  She was evaluated by Dr. ST this morning who is recommending additional drain placement and holding off on surgery at this time  10/16 afebrile, WBC 8.4.  Plan for second drain placement today.  Also patient to start TPN.  Had one episode of liquid dark stools.  C differential PCR pending  10/17 afebrile, WBC 6 underwent CT-guided drain placement yesterday, Gram stain with few yeast. C. difficile negative. On TPN, planning to ambulate in the halls today  10/18 AF, O2 2 L NC, ongoing abdominal pain but overall improving.  She continues to have 2 drains in place with minimal output on the right.     Review of Systems:  Review of Systems   Constitutional: Negative for chills and fever.   Respiratory: Negative for cough, sputum production and shortness of breath.    Cardiovascular: Negative for chest pain.   Gastrointestinal: Positive for abdominal pain. Negative for constipation, diarrhea, nausea and vomiting.       Hemodynamics:  Temp (24hrs), Av.5 °C (97.7 °F), Min:36.4 °C (97.5 °F), Max:36.6 °C (97.8 °F)  Temperature: 36.4 °C (97.5 °F)  Pulse  Av.5  Min: 57  Max: 85   Blood Pressure : 134/83       Physical Exam:  Physical Exam  Constitutional:       Appearance: Normal appearance.   Cardiovascular:      Rate and Rhythm: Normal rate and regular rhythm.      Heart sounds: Normal heart sounds.   Pulmonary:      Effort: Pulmonary effort is normal.      Breath sounds: Normal breath sounds.   Abdominal:      General: There is distension.      Palpations: Abdomen is soft.      Tenderness: There is abdominal tenderness.      Comments:  Two drains in place with whitish fluid   Musculoskeletal:      Right lower leg: Edema present.      Left lower leg: Edema present.   Skin:     General: Skin is warm and dry.   Neurological:      General: No focal deficit present.      Mental Status: She is alert and oriented to person, place, and time.   Psychiatric:         Mood and Affect: Mood normal.         Behavior: Behavior normal.         Meds:    Current Facility-Administered Medications:   •  furosemide  •  TPN Central Line Formulation  •  octreotide  •  TPN Central Line Formulation  •  MD Alert...TPN per Pharmacy  •  ondansetron  •  ondansetron  •  polyethylene glycol/lytes **AND** magnesium hydroxide  •  acetaminophen  •  HYDROmorphone  •  Notify provider if pain remains uncontrolled **AND** Use the Numeric Rating Scale (NRS), Colon-Baker Faces (WBF), or FLACC on regular floors and Critical-Care Pain Observation Tool (CPOT) on ICUs/Trauma to assess pain **AND** Pulse Ox **AND** Pharmacy Consult Request **AND** If patient difficult to arouse and/or has respiratory depression (respiratory rate of 10 or less), stop any opiates that are currently infusing and call a Rapid Response.  •  cyclobenzaprine  •  diphenhydrAMINE  •  ezetimibe  •  gabapentin  •  micafungin (MYCAMINE) ivpb  •  oxyCODONE immediate release  •  piperacillin-tazobactam  •  promethazine  •  vitamin D3  •  heparin    Labs:  Recent Labs     10/17/21  0319 10/17/21  0319 10/18/21  0830 10/18/21  1010 10/19/21  0249   WBC 6.0   < > 11.6* 8.0 6.7   RBC 2.88*   < > 1.70* 3.36* 3.05*   HEMOGLOBIN 9.3*   < > 5.1* 10.0* 9.4*   HEMATOCRIT 27.4*   < > 16.0* 31.6* 28.7*   MCV 95.1   < > 94.1 94.0 94.1   MCH 32.3   < > 30.0 29.8 30.8   RDW 49.5   < > 47.9 48.3 47.3   PLATELETCT 377   < > 496* 398 312   MPV 8.7*   < > 8.8* 8.6* 8.7*   NEUTSPOLYS 71.40  --   --  77.40* 66.40   LYMPHOCYTES 15.10*  --   --  9.50* 13.30*   MONOCYTES 11.90  --   --  6.10 7.10   EOSINOPHILS 0.20  --   --  0.90 0.00   BASOPHILS  0.20  --   --  0.00 1.70   RBCMORPHOLO  --   --   --  Present Present    < > = values in this interval not displayed.     Recent Labs     10/18/21  0840 10/18/21  1517 10/19/21  0249   SODIUM 133* 133* 134*   POTASSIUM 4.1 4.3 4.0   CHLORIDE 99 98 96   CO2 27 26 29   GLUCOSE 102* 167* 171*   BUN 9 8 9     Recent Labs     10/18/21  0840 10/18/21  1517 10/19/21  0249   ALBUMIN 2.2* 2.4* 2.3*   TBILIRUBIN 0.4 0.4 0.4   ALKPHOSPHAT 49 51 47   TOTPROTEIN 5.0* 5.1* 4.7*   ALTSGPT 23 30 32   ASTSGOT 46* 50* 53*   CREATININE 0.54 0.58 0.62       Imaging:  CT-ABDOMEN WITH & W/O    Result Date: 10/9/2021  10/9/2021 1:38 PM HISTORY/REASON FOR EXAM:  eval for any ugi perforation given recent sphinterotomy during ERCP and now with fluid collection. TECHNIQUE/EXAM DESCRIPTION:  CT scan of the abdomen without and with contrast. Initial precontrast images were obtained from the diaphragmatic domes through the iliac crests using helical technique.  Following nonionic contrast administration in an intravenous bolus fashion, and postcontrast, thin-section helical scanning obtained from the diaphragmatic domes through the iliac crests. 80 mL of Omnipaque 350 nonionic contrast was administered. Low dose optimization technique was utilized for this CT exam including automated exposure control and adjustment of the mA and/or kV according to patient size. COMPARISON: 10/8/2021, 10/2/2021. FINDINGS: There is a small right pleural effusion with adjacent enhancing segmental atelectasis.. Calcified left lower lobe granuloma. No pericardial effusion. Cardiac chambers are normal in size. Coronary artery calcifications are present. Air in the intra and extra hepatic bile ducts. Unremarkable appearance of the liver. The hepatic and portal veins are patent. Spleen is unremarkable. Again noted is air in the pancreatic duct. No adrenal gland nodules. Gallbladder is surgically absent. No hydronephrosis. No dilated loops of imaged bowel. There is  anasarca and mesenteric edema. A pigtail drainage catheter is present in the right abdomen in a air and fluid collection, the extent of which is difficult to measure the collection tracks inferiorly and medially measuring approximately 17 cm in craniocaudal dimension. A smaller loculated collection in the anterior abdomen near the inferior anterior abdominal wall sutures measures 1.5 x 8.7 cm. No findings of contrast extravasation from the opacified bowel loops,  particularly no contrast extravasation at the ampulla of [place].     1.  No findings of intraluminal contrast extravasation from the opacified bowel loops, particularly no contrast extravasation from the duodenum at the ampulla of [place]. 2.  A pigtail drainage catheter is present in the right abdomen in a air and fluid collection, the extent of which is difficult to measure. The collection tracks inferiorly and medially measuring approximately 17 cm in craniocaudal dimension. 3.  A smaller loculated collection in the anterior abdomen near the inferior anterior abdominal wall sutures measures 1.5 x 8.7 cm. 4.  Unchanged pneumobilia in the CBD, intrahepatic bile ducts as well as pancreatic ducts. 5.  There is a small right pleural effusion with adjacent enhancing segmental atelectasis. 6.   Coronary artery calcifications are present    CT-ABDOMEN-PELVIS WITH    Result Date: 10/13/2021  10/13/2021 11:57 AM HISTORY/REASON FOR EXAM:  Peritonitis or perforation suspected; Pt with known intraabdominal fluid collection in the abdomen of uncertain etiology - had recent ERCP but studies not consistent with bile leak.  Drain in place - reassess. TECHNIQUE/EXAM DESCRIPTION:   CT scan of the abdomen and pelvis with contrast. Contrast-enhanced helical scanning was obtained from the diaphragmatic domes through the pubic symphysis following the bolus administration of nonionic contrast without complication. 100 mL of Omnipaque 350 nonionic contrast was administered without  complication. Low dose optimization technique was utilized for this CT exam including automated exposure control and adjustment of the mA and/or kV according to patient size. COMPARISON: 10/9/2021 FINDINGS: Lower Chest: Trace right pleural effusion with right basilar atelectasis. Calcified granuloma in the left lower lobe and trace left pleural fluid. Liver: Normal. Spleen: Unremarkable. Pancreas: Unremarkable. Gallbladder: The gallbladder has been resected. Biliary: Pneumobilia again noted. Adrenal glands: Normal. Kidneys: There is a small left renal cortical cyst which does not require imaging follow-up. No hydronephrosis.. Bowel: No bowel obstruction. Parts of the bowel are suboptimally evaluated due to the surrounding abscess and loss of the intervening fat planes. There is gastric sleeve surgery. Lymph nodes: No adenopathy. Vasculature: Scattered atherosclerosis. Peritoneum: A multiloculated although spatial rim-enhancing air-fluid collection within the right abdomen does not appear significantly changed in size the prior study. On series 2 image 54 measures about 15.5 x 8.7 cm. It extends from the upper abdomen,  where it is seen in the gallbladder fossa, inferiorly into the pelvis. It insinuates around and partially encases the duodenum and right kidney and extends around ascending colon and some mesenteric branch vessels. There is a percutaneous pigtail drainage catheter which appears well positioned within the abscess in the right midabdomen. Musculoskeletal: No acute or destructive process. Postsurgical changes anterior lumbar spinal fusion Pelvis: Hysterectomy. There is a small rim-enhancing fluid collection within the pelvis measuring 7 x 3.1 x 2.8 cm suspicious for small abscess..     1.  Extensive multiloculated rim-enhancing air and fluid collection/abscess within the right abdomen is not significantly changed in size from the prior study. The percutaneous pigtail drainage catheter appears  well-positioned within the collection. 2.  Small separate pelvic rim-enhancing fluid collection suggests an abscess.    CT-ABDOMEN-PELVIS WITH    Result Date: 10/8/2021  10/8/2021 8:06 AM HISTORY/REASON FOR EXAM:  Abdominal pain, fever. Right lower quadrant pain. Pancreatitis. TECHNIQUE/EXAM DESCRIPTION:   CT scan of the abdomen and pelvis with contrast. Contrast-enhanced helical scanning was obtained from the diaphragmatic domes through the pubic symphysis following the bolus administration of nonionic contrast without complication. 100 mL of Omnipaque 350 nonionic contrast was administered without complication. Low dose optimization technique was utilized for this CT exam including automated exposure control and adjustment of the mA and/or kV according to patient size. COMPARISON: 10/2/2021. FINDINGS: Lower Chest: Small right pleural effusion with right basilar opacities. Liver: Normal. Spleen: Unremarkable. Pancreas: Poorly visualized. There is gas in the pancreatic duct. Gallbladder: Surgically absent. Biliary: There is gas in the CBD and intrahepatic bile ducts, left more than right Adrenal glands: Normal. Kidneys: No hydronephrosis. Bilateral kidneys are enhancing symmetrically.. Bowel: Severe wall thickening of the descending colon, transverse colon, second portion duodenum. Postsurgical change in the stomach Lymph nodes: No adenopathy. Vasculature: The abdominal aorta is normal in caliber. Peritoneum: There is large irregular fluid collection occupying most of the right abdomen surrounding the right kidney and right colon. Additional fluid and gas collection in the midline abdomen anterior to the pancreas. This collection is probably connected to the right side collection via a junction in the jl hepatis Musculoskeletal: Postsurgical change in the lower lumbar spine. Pelvis: Trace pelvic free fluid.     1. Large irregular irregular fluid collection with thick wall occupying most of the right abdomen  surrounding the right kidney and right colon. Additional fluid and gas collection in the midline abdomen anterior to the pancreas concerning for abscess. This collection is probably connected to the right side collection via a junction in the jl hepatis 2. Severe wall thickening of the descending colon, transverse colon, second portion duodenum, likely reactive. 3. Pneumobilia with gas in the CBD, intrahepatic bile ducts as well as pancreatic ducts are of unclear etiology. 4. Small right pleural effusion with right basilar atelectasis.     CT-ABDOMEN-PELVIS WITH    Result Date: 10/2/2021  10/2/2021 2:03 PM HISTORY/REASON FOR EXAM:  RLQ and diffuse lower abdominal pain; had ERCP yesterday. Pt has pancreatitis and they found a polyp on her bile duct. TECHNIQUE/EXAM DESCRIPTION:   CT scan of the abdomen and pelvis with contrast. Contrast-enhanced helical scanning was obtained from the diaphragmatic domes through the pubic symphysis following the bolus administration of nonionic contrast without complication. 100 mL of Omnipaque 350 nonionic contrast was administered without complication. Low dose optimization technique was utilized for this CT exam including automated exposure control and adjustment of the mA and/or kV according to patient size. COMPARISON: 2/22/2019 FINDINGS: Lower Chest: Unremarkable. Liver: Normal. Hepatic and portal veins are patent. Spleen: Unremarkable. Pancreas: The pancreatic head is edematous though the parenchyma enhances normally. There is surrounding homogenous peripancreatic and mesenteric edema/infiltration which tracks down the right paracolic gutter and conforms to retroperitoneal structures. No defined walled off collection. There is small volume ascites in the pelvis. No focal fluid collection. Adrenal glands: Normal. Gallbladder: Cholecystectomy Biliary: Unchanged mild intrahepatic bile duct dilation. Kidneys: Symmetric nephrograms. No hydronephrosis. Pelvis: Hysterectomy. Normal  appearance of the urinary bladder.. Bowel: There are no dilated loops of small or large bowel. Scattered colonic diverticuli with no inflammation. The appendix is normal. Prior gastric sleeve. Lymph nodes: No adenopathy. Vasculature: No aneurysm. Musculoskeletal: Fusion of L3-L5. Multifocal degenerative changes are present. No suspicious osseous abnormality.     Acute interstitial edematous pancreatitis with surrounding mesenteric edema tracking along the right paracolic gutter. Small volume ascites in the pelvis. No walled off collections.    CT-DRAIN-PERITONEAL    Result Date: 10/16/2021  10/16/2021 1:06 PM HISTORY/REASON FOR EXAM: Large pancreatic pseudocyst, not draining well following placement of first drained. Place largest drain to lower area of retroperitoneal air/fluid region- leave upper drain in place. TECHNIQUE/EXAM DESCRIPTION: Pancreatic pseudocyst drainage with CT guidance. Low dose optimization technique was utilized for this CT exam including automated exposure control and adjustment of the mA and/or kV according to patient size. PROCEDURE: Informed consent was obtained. SEDATION: Moderate sedation was provided. Pulse oximetry and continuous cardiac monitoring by the nurse was performed throughout the exam. Intraservice time was 30 minutes. Localizing CT images were obtained with the patient in supine position. The skin was prepped with Betadine and draped in a sterile fashion. Following local anesthesia with 1% Lidocaine, a 17 G guiding needle was placed and needle path confirmed with CT. An Amplatz guidewire was placed and following serial tract dilatation, a 14 [place] pigtail locking catheter was placed. A specimen was collected and submitted for amylase, culture and sensitivity and Gram stain. Total of 400 mL of brownish fluid was drained. The catheter was secured to the skin and connected to suction bulb drainage. Final CT images were obtained documenting catheter position. The patient  tolerated the procedure well with no evidence of complication. COMPARISON: Recent CT scan abdomen/pelvis FINDINGS: The final CT images show satisfactory catheter position dependently within the target collection.     1.  CT guided pancreatic pseudocyst catheter drainage. 2.  The current plan is to obtain a follow-up CT in 5-7 days..    CT-DRAIN-PERITONEAL    Result Date: 10/10/2021  HISTORY/REASON FOR EXAM:  66-year-old woman with pancreatitis and extensive intraperitoneal abscess. TECHNIQUE/EXAM DESCRIPTION AND NUMBER OF VIEWS: RIGHT peritoneal drain placement with CT guidance. Low dose optimization technique was utilized for this CT exam including automated exposure control and adjustment of the mA and/or kV according to patient size. COMPARISON:  CT 10/8/2021 MEDICATIONS: Moderate sedation was provided. Pulse oximetry and continuous cardiac monitoring by the nurse was performed throughout the exam. Intraservice time was 15 minutes. PROCEDURE:     The risks, benefits, goals and objectives and alternatives were discussed. Risks were specified as including but not limited to bleeding, infection, damage to vessels or nerves, pain and discomfort as well as the possibility of incomplete resolution of underlying disease. Drain care related issues were discussed with the patient. The patient's questions were answered. Informed oral and written consent were obtained. The patient was placed on the CT gantry. Localizing scan was performed. The skin was prepped and draped in the usual sterile manner.  A timeout was performed. Local anesthetic result was achieved with administration of 1% lidocaine. A(n) 17-gauge access needle was advanced to the target using CT fluoroscopic guidance. Access was secured with a wire and the tract was sequentially dilated to accommodate a(n) 14 Romanian locking loop drain. A total of 80 mL of thin brown turbid fluid was removed and sent for  laboratory evaluation. This was put in place and  formed. The catheter was attached to bag drainage and secured to the skin with 2-0 nylon suture. Completion scan was performed which showed no complication. The patient tolerated the procedure well with no evidence of complication. The skin was cleaned and a dressing was applied. FINDINGS: Drainage tube in good position     Successful peritoneal drainage tube placement. Plan: Thrice daily flushes with 10 mL of sterile saline. Monitor outputs. Please contact interventional radiology if there is any concern for tube dysfunction.     DX-CHEST-LIMITED (1 VIEW)    Result Date: 10/19/2021  10/19/2021 11:52 AM HISTORY/REASON FOR EXAM:  Shortness of Breath TECHNIQUE/EXAM DESCRIPTION AND NUMBER OF VIEWS: Single AP view of the chest. COMPARISON: 10/18/2016 FINDINGS: Left PICC projects over the SVC. The cardiomediastinal silhouette is stable. There is right basilar atelectasis/consolidation. There may be a small right pleural effusion. There is a calcified granuloma in the left lower lobe. There are surgical clips in  the upper abdomen. Atherosclerotic calcification is seen.     1.  Right basilar atelectasis or consolidation. Small right pleural effusion not excluded. 2.  Atherosclerotic plaque.    IR-US GUIDED PIV    Result Date: 10/10/2021  EXAMINATION:                                                                    HISTORY/REASON FOR EXAM:  Ultrasound Guided PIV.  TECHNIQUE/EXAM DESCRIPTION AND NUMBER OF VIEWS:  Peripheral IV insertion with ultrasound guidance.  The procedure was prepared using maximal sterile barrier technique including sterile gown, mask, cap, and donning of sterile gloves following appropriate hand hygiene and/or sterile scrub. Patient skin site was prepped with 2% Chlorhexidine solution.   FINDINGS: Peripheral IV insertion with Ultrasound Guidance was performed by qualified imaging nursing staff without the assistance of a Radiologist.      Ultrasound-guided PERIPHERAL IV INSERTION performed by  qualified nursing staff as above.    NM-HEPATOBILIARY SCAN    Result Date: 10/10/2021  10/10/2021 11:12 AM HISTORY/REASON FOR EXAM:  rule out biliary leak post ERCP with sphincterotomy; R/O bile leak TECHNIQUE/EXAM DESCRIPTION AND NUMBER OF VIEWS: Hepatobiliary scan. COMPARISON: 10/9/2021 CT abdomen PROCEDURE:  5.4 mCi Tc 99m-Choletec was administered intravenously, followed by 90 minutes of anterior planar imaging. FINDINGS: Normal homogeneous uptake of radiotracer in the hepatic parenchyma with visualization of the tracer in the common bile duct and entering the small bowel loops. No abnormal pooling or collection radiotracer outside of the biliary system or bowel.     Normal hepatobiliary scan with no findings of a bile leak.    DX-PORTABLE FLUOROSCOPY < 1 HOUR    Result Date: 10/1/2021  10/1/2021 10:01 AM HISTORY/REASON FOR EXAM:  ERCP TECHNIQUE/EXAM DESCRIPTION AND NUMBER OF VIEWS: 2 images were obtained in the operating room. A total of 0.3 minutes of fluoroscopy time utilized. COMPARISON: Selected images CT abdomen and pelvis 2/22/2019 FINDINGS: Images show injection of contrast into the common bile duct which is dilated. There is also filling of the pancreatic duct. Fluoroscopy time: minutes     ERCP images as described    EC-ECHOCARDIOGRAM COMPLETE W/O CONT    Result Date: 10/14/2021  Transthoracic Echo Report Echocardiography Laboratory CONCLUSIONS Normal left ventricular chamber size. Hyperdynamic left ventricular systolic function. The left ventricular ejection fraction is visually estimated to be greater than 75%. Normal right ventricular size and systolic function. Enlarged right atrium. Mild tricuspid regurgitation. Right ventricular systolic pressure is estimated to be  40 mmHg; mild pulmonary hypertension. Normal pericardium without effusion. No prior study is available for comparison. KATERINA PATEL Exam Date:         10/14/2021                    16:15 Exam Location:     Inpatient Priority:           Routine Ordering Physician:        YUE TOURE Referring Physician:       [id number] Campos Sonographer:               Aroldo Nolan                            RDCS, RVT Age:    66     Gender:    F MRN:    [id number] :    1955 BSA:    1.68   Ht (in):    63     Wt (lb):    143 Exam Type:     Complete Indications:     Endocarditis ICD Codes:       421 CPT Codes:       58368 BP:   106    /   48     HR:   78 Technical Quality:       Fair MEASUREMENTS  (Male / Female) Normal Values 2D ECHO LV Diastolic Diameter PLAX        3.9 cm                4.2 - 5.9 / 3.9 - 5.3 cm LV Systolic Diameter PLAX         2.7 cm                2.1 - 4.0 cm IVS Diastolic Thickness           0.88 cm               LVPW Diastolic Thickness          0.75 cm               LVOT Diameter                     2 cm                  Estimated LV Ejection Fraction    75 %                  LV Ejection Fraction MOD BP       77.2 %                >= 55  % LV Ejection Fraction MOD 4C       79.3 %                LV Ejection Fraction MOD 2C       77.1 %                IVC Diameter                      1.5 cm                DOPPLER AV Peak Velocity                  1.8 m/s               AV Peak Gradient                  13.5 mmHg             AV Mean Gradient                  6.3 mmHg              LVOT Peak Velocity                1.7 m/s               AV Area Cont Eq vti               2.8 cm2               MV Velocity Time Integral         42.6 cm               Mitral E Point Velocity           1.3 m/s               Mitral E to A Ratio               1.5                   MV Pressure Half Time             77.3 ms               MV Area PHT                       2.8 cm2               MV Deceleration Time              266 ms                TR Peak Velocity                  269 cm/s              PV Peak Velocity                  0.96 m/s              PV Peak Gradient                  3.7 mmHg              RVOT Peak Velocity                0.73 m/s               * Indicates values subject to auto-interpretation LV EF:  75    % FINDINGS Left Ventricle Normal left ventricular chamber size. Normal left ventricular wall thickness. Hyperdynamic left ventricular systolic function. The left ventricular ejection fraction is visually estimated to be greater than 75%. Normal regional wall motion. Normal diastolic function. Right Ventricle Normal right ventricular size. Normal right ventricular systolic function. Right Atrium Enlarged right atrium. Normal inferior vena cava size and inspiratory collapse. Left Atrium Normal left atrial size. Left atrial volume index is 26  mL/sq m. Mitral Valve Structurally normal mitral valve. No mitral stenosis. Trace mitral regurgitation. Aortic Valve The aortic valve is not well visualized. Cannot rule out bicuspid aortic valve. No aortic stenosis. No aortic insufficiency. Tricuspid Valve Structurally normal tricuspid valve. No tricuspid stenosis. Mild tricuspid regurgitation. Right ventricular systolic pressure is estimated to be  40 mmHg. Pulmonic Valve The pulmonic valve is not well visualized. No pulmonic stenosis. No pulmonic insufficiency. Pericardium Normal pericardium without effusion. Aorta The ascending aorta diameter is 3.1  cm. Nate Sarmiento MD (Electronically Signed) Final Date:     14 October 2021                 17:39    IR-PICC LINE PLACEMENT W/ GUIDANCE > AGE 5    Result Date: 10/15/2021  HISTORY/REASON FOR EXAM:   PICC placement. TECHNIQUE/EXAM DESCRIPTION AND NUMBER OF VIEWS:   PICC line insertion with ultrasound guidance.  The procedure was performed using maximal sterile barrier technique including sterile gown, mask, cap, and donning of sterile gloves following appropriate hand hygiene and/or sterile scrub. Patient skin site was prepped with 2% Chlorhexidine solution. FINDINGS:  PICC line insertion with Ultrasound Guidance was performed by qualified nursing staff without the assistance of a Radiologist. PICC  "positioning appropriateness confirmed by 3CG technology; chest xray only needed in the instance 3CG unable to confirm placement.              Ultrasound-guided PICC placement performed by qualified nursing staff as above.       Micro:  Results     Procedure Component Value Units Date/Time    BLOOD CULTURE [[id number]] Collected: 10/18/21 1517    Order Status: Completed Specimen: Blood from Peripheral Updated: 10/19/21 0841     Significant Indicator NEG     Source BLD     Site PERIPHERAL     Culture Result No Growth  Note: Blood cultures are incubated for 5 days and  are monitored continuously.Positive blood cultures  are called to the RN and reported as soon as  they are identified.      Narrative:      Special Contact Isolation  Per Hospital Policy: Only change Specimen Src: to \"Line\" if  specified by physician order.  Left Forearm/Arm    BLOOD CULTURE [[id number]] Collected: 10/18/21 1520    Order Status: Completed Specimen: Blood from Line Updated: 10/19/21 0841     Significant Indicator NEG     Source BLD     Site LINE     Culture Result No Growth  Note: Blood cultures are incubated for 5 days and  are monitored continuously.Positive blood cultures  are called to the RN and reported as soon as  they are identified.      Narrative:      Special Contact Isolation  Per Hospital Policy: Only change Specimen Src: to \"Line\" if  specified by physician order.  No site indicated    BLOOD CULTURE [[id number]]     Order Status: Canceled Specimen: Blood from Peripheral     CULTURE WOUND W/ GRAM STAIN [[id number]]  (Abnormal)  (Susceptibility) Collected: 10/16/21 1340    Order Status: Completed Specimen: Wound Updated: 10/18/21 0820     Significant Indicator POS     Source WND     Site LMQ Peritoneal Drain     Culture Result -     Gram Stain Result Few Yeast.     Culture Result Enterococcus faecalis  Moderate growth        Candida albicans  Moderate growth        Saritha glabrata  Moderate growth      Narrative:      Special " "Contact [id number] GABY GRAY  Drain LMQ 42 mL  Special Contact [id number] GABY GRAY    Susceptibility     Enterococcus faecalis (1)     Antibiotic Interpretation Microscan Method Status    Daptomycin Sensitive 2 mcg/mL MU Final    Gent Synergy Sensitive <=500 mcg/mL MU Final    Ampicillin Sensitive <=2 mcg/mL MU Final    Vancomycin Sensitive 1 mcg/mL MU Final    Penicillin Sensitive 2 mcg/mL MU Final                   GRAM STAIN [[id number]] Collected: 10/16/21 1340    Order Status: Completed Specimen: Wound Updated: 10/16/21 1937     Significant Indicator .     Source WND     Site LMQ Peritoneal Drain     Gram Stain Result Few Yeast.    Narrative:      Special Contact [id number] GABY GRAY  Drain LMQ 42 mL  Special Contact [id number] GABY GRAY    C Diff by PCR rflx Toxin [[id number]] Collected: 10/16/21 0840    Order Status: Completed Specimen: Stool Updated: 10/16/21 1539     C Diff by PCR Negative     Comment: C. difficile NOT detected by PCR.  Treatment not indicated per guidelines.  Repeat testing not indicated within 7 days.          027-NAP1-BI Presumptive Negative     Comment: Presumptive 027/NAP1/BI target DNA sequences are NOT DETECTED.       Narrative:      Special Contact [id number] PACHECO EMILY  Does this patient have risk factors for C-diff?->Yes  Has patient taken stool softeners or laxatives in the last 5  days?->No  Indication for CDiff by PCR?->Greater than/equal to 3 stools  in 24 hr & \"takes shape of container\"    FLUID CULTURE W/GRAM STAIN [[id number]] Collected: 10/16/21 1340    Order Status: Canceled Specimen: Other from Peritoneal Fluid     FLUID CULTURE W/GRAM STAIN [[id number]] Collected: 10/16/21 1340    Order Status: Canceled Specimen: Other from Peritoneal Fluid           Assessment:  Active Hospital Problems    Diagnosis    • *Bacteremia due to Gram-negative bacteria and fungemia [R78.81]    • Acute respiratory failure with hypoxia (HCC) " [J96.01]    • Duodenal perforation (HCC) [K63.1]    • Postprocedural retroperitoneal abscess [K68.11]    • Diarrhea [R19.7]    • Electrolyte abnormality [E87.8]    • Hyponatremia [E87.1]    • Sepsis due to intraabdominal fluid collection/abscess (HCC) [A41.9]    • Hyperlipidemia [E78.5]    • Hypertension [I10]      Interval 24 hours:      AF, O2 1 L NC   Labs reviewed  Imaging personally reviewed both images and report.   Micro reviewed    Patient with ongoing abdominal pain but states it is improving.  Still with significant drain output, 185 cc yesterday,  440 reported today.  Patient continued on antibiotics as below.    Assessment:  66 y.o. 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  Patient found to have multiple and extensive fluid collections in the abdomen and pelvis on imaging.  She underwent drain placement on 10/8.  Cultures are growing E. coli and Enterococcus faecalis.  Blood cultures are growing chryseobacterium species and Candida glabrata. Source likely due to the intra-abdominal abscesses. Repeat CT scan on 10/13 shows extensive multiloculated fluid collections in the abdomen and pelvis.  She underwent peritoneal drainage on 10/15 and cultures + Enterococcus faecalis, ampicillin sensitive, Candida albicans and Candida glabrata     Pertinent diagnoses:  Multiple intra-abdominal abscesses  Candidemia  Chryseobacterium bacteremia  Polymicrobial infection  Persistent leukocytosis, resolved  Thrombocytosis  Diarrhea, C diff PCR negative  Recent ERCP pancreatitis     Plan:  -Blood cultures on 10/8 - Chryseobacterium species, Candida glabrata.  -Chryseobacterium species -the patient has been treated with Zosyn for multiple days which has intermediate sensitivity per chart.  However, repeat  blood cultures are negative and will again do a surveillance blood culture today to ensure no growth of Chryseobacterium in the blood -Repeat blood culture on 10/18, no growth to date  -Blood cultures from 10/13 are no growth and final    -TTE-negative  -Continue to monitor for any vision changes-patient currently denies  -Continue IV Zosyn to treat the E. Coli, Chryseobacterium species and the Enterococcus  -Continue IV micafungin for treatment of candidemia for at least a 14-day course.  However, may need to extend given the growth of Candida species in the abscess.    -Dr. Cook following  -Repeat CT scan ordered by IR on 10/21 -will await imaging and then make further recommendations   -On TPN     Discussed with internal medicine Dr. Ladd.  ID will follow.

## 2021-10-19 NOTE — PROGRESS NOTES
Received report from Day shift RN. Assumed care of patient at 1900. Patient A/Ox4 at this this time. On RA, no signs of respiratory distress. Pt up self to the bathroom. No acute complaints of pain or discomfort. Call light and belongings within reach. Bed in lowest locked position. TPN running @83ml/hr.Upper side rails raised. Hourly rounding in place. Will continue to monitor.           COVID-19 Surge in effect

## 2021-10-19 NOTE — CARE PLAN
"The patient is Stable - Low risk of patient condition declining or worsening    Shift Goals  Clinical Goals: Free from s/s of infection and bleeding   Patient Goals: manage pain  Family Goals: not present    Progress made toward(s) clinical / shift goals:        Problem: Fluid Volume  Goal: Fluid volume balance will be maintained  Outcome: Progressing  Note: Patient is educated to importance of fluid intake. Patient I/O's are monitored. Toileting at scheduled intervals in place. Patient indicates understanding.        Problem: Urinary - Renal Perfusion  Goal: Ability to achieve and maintain adequate renal perfusion and functioning will improve  Outcome: Progressing     Problem: Respiratory  Goal: Patient will achieve/maintain optimum respiratory ventilation and gas exchange  Outcome: Progressing  Note: Pt stated \"I feel like my breathing is better\" after diuresis. WCTM.     Problem: Skin Integrity  Goal: Skin integrity is maintained or improved  Outcome: Progressing       Patient is not progressing towards the following goals: n/a      "

## 2021-10-19 NOTE — PROGRESS NOTES
Radiology Progress Note   Author: AYE Mahan Date & Time created: 10/19/2021  12:35 PM   Date of admission  10/15/2021  Note to reader: this note follows the APSO format rather than the historical SOAP format. Assessment and plan located at the top of the note for ease of use.    Chief Complaint  66 y.o. female admitted 10/15/2021 with abd pain     HPI  66-year-old female PMH recurrent idiopathic pancreatitis s/p ERCP with sphincterotomy October 1, 2021 complicated by ERCP pancreatitis, sleeve gastrectomy, dyslipidemia, hypertension, osteoarthritis of the spine status post lumbar fusion who was admitted to the hospital 10/8/2021 with worsening right lower quadrant pain over the past week.  Ever since her ERCP October 1, 2021, she has been experiencing pain, intermittent subjective fevers, nausea.  In the emergency room-labs: CBC: WBC 17.8..  Sodium 130.  LFTs-WNL.  CT scan abdomen/pelvis-large irregular fluid collection with thick wall occupying most of the right abdomen surrounding the right kidney and colon.  Severe wall thickening of the descending, transverse colon, second portion of the duodenum likely reactive.  Pneumobilia with gas in the CBD.  Drain placement by IR was performed.  Currently, the abdominal pain has improved.      Assessment/Plan  Interval History   Principal Problem:    Bacteremia due to Gram-negative bacteria and fungemia  Active Problems:    Hypertension    Hyperlipidemia    Sepsis due to intraabdominal fluid collection/abscess (HCC)    Hyponatremia    Duodenal perforation (HCC)    Postprocedural retroperitoneal abscess    Diarrhea    Electrolyte abnormality    Acute respiratory failure with hypoxia (HCC)      Plan IR  - Irrigate Left and Right abdominal drain with 50 ml of sterile saline q4 hrs-   - Surgery, ID, GI following   - Repeat CT scan on 10/22 for abscess eval        [id number]  IR:   10/8- Right abd drain placed, + Chryseobacterium indologenes   Chryseobacterium  arthrosphaerae  Candida glabrata   10/15- transfer from [facility]   10/16- Left abd drain placed, drain +  Enterococcus faecalis, Candida albicans, Candida glabrata  10/17- Left Abd drain - 195 ml, green milky  Right abd drain- 238 ml  10/18   Left drain- 75 ml  Right drain- 110 ml  10/19   Left drain- 210 ml in am   Right drain-  270 ml in am            Review of Systems  Physical Exam   Review of Systems   Constitutional: Positive for malaise/fatigue. Negative for chills and fever.   HENT: Negative for hearing loss.    Eyes: Negative for blurred vision.   Respiratory: Negative for cough, hemoptysis and shortness of breath.    Cardiovascular: Negative for chest pain and palpitations.   Gastrointestinal: Positive for abdominal pain. Negative for vomiting.   Genitourinary: Negative for dysuria.   Musculoskeletal: Negative for myalgias.   Skin: Negative for rash.   Neurological: Positive for weakness. Negative for dizziness and headaches.   Endo/Heme/Allergies: Does not bruise/bleed easily.   Psychiatric/Behavioral: Negative for suicidal ideas.      Vitals:    10/19/21 0724   BP: 134/83   Pulse: 67   Resp: 16   Temp: 36.4 °C (97.5 °F)   SpO2: 95%        Physical Exam  Constitutional:       Appearance: Normal appearance.   HENT:      Head: Normocephalic.      Nose: Nose normal.      Mouth/Throat:      Mouth: Mucous membranes are moist.   Eyes:      Pupils: Pupils are equal, round, and reactive to light.   Cardiovascular:      Rate and Rhythm: Normal rate.   Pulmonary:      Effort: Pulmonary effort is normal. No respiratory distress.   Abdominal:      Palpations: Abdomen is soft.      Tenderness: There is abdominal tenderness.      Comments: Left and right abdominal drains   Drain sites CDI, no redness or swelling   Connect to MARTHA bulbs    Musculoskeletal:         General: No tenderness or deformity.      Cervical back: Normal range of motion.      Right lower leg: Edema present.      Left lower leg: Edema present.    Skin:     General: Skin is warm and dry.      Capillary Refill: Capillary refill takes less than 2 seconds.      Coloration: Skin is not jaundiced or pale.   Neurological:      General: No focal deficit present.      Mental Status: She is alert.      Motor: No weakness.   Psychiatric:         Mood and Affect: Mood normal.         Behavior: Behavior normal.             Labs    Recent Labs     10/18/21  0830 10/18/21  1010 10/19/21  0249   WBC 11.6* 8.0 6.7   RBC 1.70* 3.36* 3.05*   HEMOGLOBIN 5.1* 10.0* 9.4*   HEMATOCRIT 16.0* 31.6* 28.7*   MCV 94.1 94.0 94.1   MCH 30.0 29.8 30.8   MCHC 31.9* 31.6* 32.8*   RDW 47.9 48.3 47.3   PLATELETCT 496* 398 312   MPV 8.8* 8.6* 8.7*     Recent Labs     10/18/21  0840 10/18/21  1517 10/19/21  0249   SODIUM 133* 133* 134*   POTASSIUM 4.1 4.3 4.0   CHLORIDE 99 98 96   CO2 27 26 29   GLUCOSE 102* 167* 171*   BUN 9 8 9   CREATININE 0.54 0.58 0.62   CALCIUM 7.7* 7.9* 7.7*     Recent Labs     10/18/21  0840 10/18/21  1517 10/19/21  0249   ALBUMIN 2.2* 2.4* 2.3*   TBILIRUBIN 0.4 0.4 0.4   ALKPHOSPHAT 49 51 47   TOTPROTEIN 5.0* 5.1* 4.7*   ALTSGPT 23 30 32   ASTSGOT 46* 50* 53*   CREATININE 0.54 0.58 0.62     CT-DRAIN-PERITONEAL   Final Result      1.  CT guided pancreatic pseudocyst catheter drainage.   2.  The current plan is to obtain a follow-up CT in 5-7 days..      IR-PICC LINE PLACEMENT W/ GUIDANCE > AGE 5   Final Result                  Ultrasound-guided PICC placement performed by qualified nursing staff as    above.          DX-CHEST-LIMITED (1 VIEW)    (Results Pending)       INR   Date Value Ref Range Status   10/15/2021 1.49 (H) 0.87 - 1.13 Final     Comment:     INR - Non-therapeutic Reference Range: 0.87-1.13  INR - Therapeutic Reference Range: 2.0-4.0       No results found for: POCINR     Intake/Output Summary (Last 24 hours) at 10/18/2021 1528  Last data filed at 10/18/2021 0500  Gross per 24 hour   Intake --   Output 80 ml   Net -80 ml      Labs not explicitly  included in this progress note were reviewed by the author. Radiology/imaging not explicitly included in this progress note was reviewed by the author.   I have performed a physical exam and reviewed and updated ROS and Plan today (10/19/2021).     20 minutes in directly providing and coordinating care and extensive data review.  No time overlap and excludes procedures.

## 2021-10-19 NOTE — PROGRESS NOTES
Pharmacy TPN Day # 4    Dosing Weight   60 kg (adjustedBW)                                                     TPN goal: 9244-4446 kcal/day including 1.5 gm/kg/day Protein                                                    TPN indication: perforated duodenum                                                                Pertinent PMH: Admitted on 10/15/2021 for abdominal pain. Underwent a procedure on 10/01/2021, which involved a polypectomy and a sphincterotomy.  CT on 10/08/202, found to have a large fluid collection and thickening of wall around the duodenum, collection was retroperitoneal. IR placed drain. CT on 10/09/2021, significant amount of retroperitoneal air and fluid.  [facility] for further evaluation. 2 drains have been placed for fluid collections surrounding the R kidney and colon..     Temp (24hrs), Av.7 °C (98 °F), Min:36.3 °C (97.4 °F), Max:36.9 °C (98.4 °F)  Temp (24hrs), Av.3 °C (97.4 °F), Min:35.9 °C (96.7 °F), Max:36.6 °C (97.8 °F)  .  Recent Labs     10/18/21  0840 10/18/21  1517 10/19/21  0249   SODIUM 133* 133* 134*   POTASSIUM 4.1 4.3 4.0   CHLORIDE 99 98 96   CO2 27 26 29   BUN 9 8 9   CREATININE 0.54 0.58 0.62   GLUCOSE 102* 167* 171*   CALCIUM 7.7* 7.9* 7.7*   ASTSGOT 46* 50* 53*   ALTSGPT 23 30 32   ALBUMIN 2.2* 2.4* 2.3*   TBILIRUBIN 0.4 0.4 0.4   PHOSPHORUS 2.8 3.0 3.2   MAGNESIUM 2.2 2.2 2.0   PREALBUMIN  --  7.0*  --      Accu-Checks  Recent Labs     10/17/21  1607 10/17/21  2334 10/18/21  1708   POCGLUCOSE 120* 117* 155*       Vitals:    10/18/21 1805 10/18/21 1938 10/19/21 0343 10/19/21 0724   BP: 148/70 142/72 141/79 134/83   Weight:       Height:           Intake/Output Summary (Last 24 hours) at 10/19/2021 1153  Last data filed at 10/19/2021 0945  Gross per 24 hour   Intake 600 ml   Output 3310 ml   Net -2710 ml       Orders Placed This Encounter   Procedures   • Restrict to: Sips with Medications     Standing Status:   Standing     Number of  Occurrences:   1     Order Specific Question:   Diet NPO Restrict to:     Answer:   Sips with Medications [3]         TPN for past 72 hours (Show up to 3 orders; newest on the left. Changes between the two most recent orders are indicated.)     Start date and time   10/19/2021 2000 10/18/2021 2000 10/17/2021 2000      TPN Central Line Formulation [[id number]] TPN Central Line Formulation [[id number]] TPN Central Line Formulation [[id number]]    Order Status  Active Last Dose in Progress Completed    Last Admin   New Bag at 10/18/2021 2107 by Tatyana Melton RUrielNUriel New Bag at 10/17/2021 2045 by Ibis Delacruz RANAM       Base    Clinisol 15%  90 g 90 g 45 g    dextrose 70%  240 g 240 g 120 g    fat emulsions 20%  50 g 50 g 25 g       Additives    potassium phosphate  20 mmol 25 mmol 25 mmol    sodium acetate  110 mEq 130 mEq 130 mEq    sodium chloride  110 mEq 170 mEq 170 mEq    magnesium sulfate  8 mEq 8 mEq 12 mEq    calcium GLUConate  9.3 mEq 9.3 mEq 4.65 mEq    M.T.E.-4  1 mL 1 mL 1 mL    M.V.I. Adult  10 mL 10 mL 10 mL    famotidine  40 mg 40 mg 40 mg       QS Base    sterile water  120.96 mL 646.3 mL 1,251.81 mL       Energy Contribution    Proteins  -- -- --    Dextrose  816 kcal 816 kcal 408 kcal    Lipids  500 kcal 500 kcal 250 kcal    Total  1,316 kcal 1,316 kcal 658 kcal       Electrolyte Ion Calculated Amount    Sodium  220 mEq 300 mEq 300 mEq    Potassium  29.33 mEq 36.67 mEq 36.67 mEq    Calcium  9.3 mEq 9.3 mEq 4.65 mEq    Magnesium  8 mEq 8 mEq 12 mEq    Aluminum  -- -- --    Phosphate  20 mmol 25 mmol 25 mmol    Chloride  110 mEq 170 mEq 170 mEq    Acetate  186.2 mEq 206.2 mEq 168.1 mEq       Other    Total Protein  90 g 90 g 45 g    Total Protein/kg  1.26 g/kg 1.26 g/kg 0.63 g/kg    Total Amino Acid  -- -- --    Total Amino Acid/kg  -- -- --    Glucose Infusion Rate  2.33 mg/kg/min 2.33 mg/kg/min 1.17 mg/kg/min    Osmolarity (Estimated)  -- -- --    Volume  1,440 mL 1,992 mL 1,992 mL    Rate  60  mL/hr 83 mL/hr 83 mL/hr    Dosing Weight  71.4 kg 71.4 kg 71.4 kg    Infusion Site  [facility]          Comments:  1. Plan for nutrition/clinical status:    Continuous TPN.   TPN currently providing 100% of goal.   Maintenance Fluid: none  Drains/fistula/I/O: last 24h: L abd drain 75 cc, R abd drain 110 cc out, urine 2750 cc (lasix started yesterday)     2. Macronutrients:  Increase to 100% goal    Prot:  90g  CHO:  240g  Lipid:  50g  This formula provides:  % kcal as lipids = 30  Grams protein/kg = 1.5  Non-protein calories = 1316  Kcals/kg = 25  Total daily calories = 1676      3. Micronutrients/electrolytes:   Na:  NS equivalence  K: slight decrease  Mg: no change  Phos: slight decrease  Ca: 9.3mEq  Cl:Ace: no change, ~1:1     5.   Notes:   Glucose: range 102-171 over past 24h with 0 units of ssi utilized  Acid suppression: pepcid in TPN  CMP,Mag, Phos, repeat tomorrow 10/20     Patient started octreotide 100mcg subQ q8h today to target fistula tract.  Diuresing per hospitalist plan. Concentrated TPN. Removed 550cc extra fluid for TPN that starts tonight 10/19 @ 2000.     Zak Gracia, PharmD, BCPS

## 2021-10-19 NOTE — CARE PLAN
Problem: Nutritional:  Goal: Nutrition support tolerated and meeting greater than 85% of estimated needs  Outcome: Met     TPN is at goal and providing 1676 kcals and 90 gm of protein. This should meet pt's estimated nutrition needs. RD will continue to follow.

## 2021-10-20 LAB
ALBUMIN SERPL BCP-MCNC: 2.4 G/DL (ref 3.2–4.9)
ALBUMIN/GLOB SERPL: 1 G/DL
ALP SERPL-CCNC: 45 U/L (ref 30–99)
ALT SERPL-CCNC: 29 U/L (ref 2–50)
ANION GAP SERPL CALC-SCNC: 7 MMOL/L (ref 7–16)
AST SERPL-CCNC: 34 U/L (ref 12–45)
BASOPHILS # BLD AUTO: 0.9 % (ref 0–1.8)
BASOPHILS # BLD: 0.06 K/UL (ref 0–0.12)
BILIRUB SERPL-MCNC: 0.3 MG/DL (ref 0.1–1.5)
BUN SERPL-MCNC: 12 MG/DL (ref 8–22)
CALCIUM SERPL-MCNC: 8.1 MG/DL (ref 8.5–10.5)
CHLORIDE SERPL-SCNC: 96 MMOL/L (ref 96–112)
CO2 SERPL-SCNC: 31 MMOL/L (ref 20–33)
CREAT SERPL-MCNC: 0.59 MG/DL (ref 0.5–1.4)
DOHLE BOD BLD QL SMEAR: NORMAL
EOSINOPHIL # BLD AUTO: 0.06 K/UL (ref 0–0.51)
EOSINOPHIL NFR BLD: 0.9 % (ref 0–6.9)
ERYTHROCYTE [DISTWIDTH] IN BLOOD BY AUTOMATED COUNT: 47.4 FL (ref 35.9–50)
GLOBULIN SER CALC-MCNC: 2.4 G/DL (ref 1.9–3.5)
GLUCOSE SERPL-MCNC: 139 MG/DL (ref 65–99)
HCT VFR BLD AUTO: 27.9 % (ref 37–47)
HGB BLD-MCNC: 9 G/DL (ref 12–16)
LYMPHOCYTES # BLD AUTO: 0.97 K/UL (ref 1–4.8)
LYMPHOCYTES NFR BLD: 14 % (ref 22–41)
MAGNESIUM SERPL-MCNC: 1.9 MG/DL (ref 1.5–2.5)
MANUAL DIFF BLD: NORMAL
MCH RBC QN AUTO: 30.5 PG (ref 27–33)
MCHC RBC AUTO-ENTMCNC: 32.3 G/DL (ref 33.6–35)
MCV RBC AUTO: 94.6 FL (ref 81.4–97.8)
MONOCYTES # BLD AUTO: 0.42 K/UL (ref 0–0.85)
MONOCYTES NFR BLD AUTO: 6.1 % (ref 0–13.4)
MORPHOLOGY BLD-IMP: NORMAL
MYELOCYTES NFR BLD MANUAL: 0.9 %
NEUTROPHILS # BLD AUTO: 5.33 K/UL (ref 2–7.15)
NEUTROPHILS NFR BLD: 77.2 % (ref 44–72)
NRBC # BLD AUTO: 0 K/UL
NRBC BLD-RTO: 0 /100 WBC
PHOSPHATE SERPL-MCNC: 2.8 MG/DL (ref 2.5–4.5)
PLATELET # BLD AUTO: 272 K/UL (ref 164–446)
PLATELET BLD QL SMEAR: NORMAL
PMV BLD AUTO: 8.9 FL (ref 9–12.9)
POLYCHROMASIA BLD QL SMEAR: NORMAL
POTASSIUM SERPL-SCNC: 3.9 MMOL/L (ref 3.6–5.5)
PROT SERPL-MCNC: 4.8 G/DL (ref 6–8.2)
RBC # BLD AUTO: 2.95 M/UL (ref 4.2–5.4)
RBC BLD AUTO: PRESENT
SODIUM SERPL-SCNC: 134 MMOL/L (ref 135–145)
TOXIC GRANULES BLD QL SMEAR: NORMAL
WBC # BLD AUTO: 6.9 K/UL (ref 4.8–10.8)

## 2021-10-20 PROCEDURE — 36415 COLL VENOUS BLD VENIPUNCTURE: CPT

## 2021-10-20 PROCEDURE — A9270 NON-COVERED ITEM OR SERVICE: HCPCS | Performed by: FAMILY MEDICINE

## 2021-10-20 PROCEDURE — 83735 ASSAY OF MAGNESIUM: CPT

## 2021-10-20 PROCEDURE — 80053 COMPREHEN METABOLIC PANEL: CPT

## 2021-10-20 PROCEDURE — 700105 HCHG RX REV CODE 258: Performed by: FAMILY MEDICINE

## 2021-10-20 PROCEDURE — 700111 HCHG RX REV CODE 636 W/ 250 OVERRIDE (IP): Performed by: INTERNAL MEDICINE

## 2021-10-20 PROCEDURE — 700111 HCHG RX REV CODE 636 W/ 250 OVERRIDE (IP): Performed by: STUDENT IN AN ORGANIZED HEALTH CARE EDUCATION/TRAINING PROGRAM

## 2021-10-20 PROCEDURE — 770001 HCHG ROOM/CARE - MED/SURG/GYN PRIV*

## 2021-10-20 PROCEDURE — 700111 HCHG RX REV CODE 636 W/ 250 OVERRIDE (IP): Mod: JB | Performed by: SURGERY

## 2021-10-20 PROCEDURE — 700105 HCHG RX REV CODE 258: Performed by: INTERNAL MEDICINE

## 2021-10-20 PROCEDURE — 85007 BL SMEAR W/DIFF WBC COUNT: CPT

## 2021-10-20 PROCEDURE — 700111 HCHG RX REV CODE 636 W/ 250 OVERRIDE (IP): Mod: JG | Performed by: FAMILY MEDICINE

## 2021-10-20 PROCEDURE — 99233 SBSQ HOSP IP/OBS HIGH 50: CPT | Performed by: SURGERY

## 2021-10-20 PROCEDURE — 85027 COMPLETE CBC AUTOMATED: CPT

## 2021-10-20 PROCEDURE — 84100 ASSAY OF PHOSPHORUS: CPT

## 2021-10-20 PROCEDURE — 700101 HCHG RX REV CODE 250: Performed by: INTERNAL MEDICINE

## 2021-10-20 PROCEDURE — 99232 SBSQ HOSP IP/OBS MODERATE 35: CPT | Performed by: INTERNAL MEDICINE

## 2021-10-20 PROCEDURE — 700102 HCHG RX REV CODE 250 W/ 637 OVERRIDE(OP): Performed by: FAMILY MEDICINE

## 2021-10-20 RX ADMIN — OXYCODONE HYDROCHLORIDE 10 MG: 10 TABLET ORAL at 15:56

## 2021-10-20 RX ADMIN — CALCIUM GLUCONATE: 98 INJECTION, SOLUTION INTRAVENOUS at 20:06

## 2021-10-20 RX ADMIN — OXYCODONE HYDROCHLORIDE 10 MG: 10 TABLET ORAL at 05:14

## 2021-10-20 RX ADMIN — OXYCODONE HYDROCHLORIDE 10 MG: 10 TABLET ORAL at 09:41

## 2021-10-20 RX ADMIN — Medication 1000 UNITS: at 04:51

## 2021-10-20 RX ADMIN — GABAPENTIN 600 MG: 300 CAPSULE ORAL at 04:51

## 2021-10-20 RX ADMIN — EZETIMIBE 10 MG: 10 TABLET ORAL at 17:52

## 2021-10-20 RX ADMIN — ONDANSETRON 4 MG: 4 TABLET, ORALLY DISINTEGRATING ORAL at 15:57

## 2021-10-20 RX ADMIN — PIPERACILLIN AND TAZOBACTAM 3.38 G: 3; .375 INJECTION, POWDER, LYOPHILIZED, FOR SOLUTION INTRAVENOUS; PARENTERAL at 13:24

## 2021-10-20 RX ADMIN — HEPARIN SODIUM 5000 UNITS: 5000 INJECTION, SOLUTION INTRAVENOUS; SUBCUTANEOUS at 21:52

## 2021-10-20 RX ADMIN — HEPARIN SODIUM 5000 UNITS: 5000 INJECTION, SOLUTION INTRAVENOUS; SUBCUTANEOUS at 13:24

## 2021-10-20 RX ADMIN — GABAPENTIN 600 MG: 300 CAPSULE ORAL at 17:51

## 2021-10-20 RX ADMIN — PIPERACILLIN AND TAZOBACTAM 3.38 G: 3; .375 INJECTION, POWDER, LYOPHILIZED, FOR SOLUTION INTRAVENOUS; PARENTERAL at 04:51

## 2021-10-20 RX ADMIN — HEPARIN SODIUM 5000 UNITS: 5000 INJECTION, SOLUTION INTRAVENOUS; SUBCUTANEOUS at 04:51

## 2021-10-20 RX ADMIN — OCTREOTIDE ACETATE 100 MCG: 100 INJECTION, SOLUTION INTRAVENOUS; SUBCUTANEOUS at 04:50

## 2021-10-20 RX ADMIN — PIPERACILLIN AND TAZOBACTAM 3.38 G: 3; .375 INJECTION, POWDER, LYOPHILIZED, FOR SOLUTION INTRAVENOUS; PARENTERAL at 21:52

## 2021-10-20 RX ADMIN — FUROSEMIDE 20 MG: 10 INJECTION, SOLUTION INTRAMUSCULAR; INTRAVENOUS at 04:51

## 2021-10-20 RX ADMIN — MICAFUNGIN SODIUM 100 MG: 100 INJECTION, POWDER, LYOPHILIZED, FOR SOLUTION INTRAVENOUS at 11:26

## 2021-10-20 ASSESSMENT — ENCOUNTER SYMPTOMS
NAUSEA: 0
WEAKNESS: 1
SHORTNESS OF BREATH: 1
PALPITATIONS: 0
SHORTNESS OF BREATH: 0
FALLS: 0
ABDOMINAL PAIN: 1
LOSS OF CONSCIOUSNESS: 0
HEMOPTYSIS: 0
DIZZINESS: 0
CHILLS: 0
COUGH: 0
FEVER: 0
HEADACHES: 0
MYALGIAS: 0
BLURRED VISION: 0
SEIZURES: 0
BRUISES/BLEEDS EASILY: 0
VOMITING: 0

## 2021-10-20 ASSESSMENT — COGNITIVE AND FUNCTIONAL STATUS - GENERAL
SUGGESTED CMS G CODE MODIFIER MOBILITY: CH
MOBILITY SCORE: 24
DAILY ACTIVITIY SCORE: 24
SUGGESTED CMS G CODE MODIFIER DAILY ACTIVITY: CH

## 2021-10-20 ASSESSMENT — PAIN DESCRIPTION - PAIN TYPE
TYPE: ACUTE PAIN
TYPE: ACUTE PAIN

## 2021-10-20 ASSESSMENT — FIBROSIS 4 INDEX: FIB4 SCORE: 1.53

## 2021-10-20 NOTE — PROGRESS NOTES
Radiology Progress Note   Author: AYE Mahan Date & Time created: 10/20/2021  1:42 PM   Date of admission  10/15/2021  Note to reader: this note follows the APSO format rather than the historical SOAP format. Assessment and plan located at the top of the note for ease of use.    Chief Complaint  66 y.o. female admitted 10/15/2021 with abd pain     HPI  66-year-old female PMH recurrent idiopathic pancreatitis s/p ERCP with sphincterotomy October 1, 2021 complicated by ERCP pancreatitis, sleeve gastrectomy, dyslipidemia, hypertension, osteoarthritis of the spine status post lumbar fusion who was admitted to the hospital 10/8/2021 with worsening right lower quadrant pain over the past week.  Ever since her ERCP October 1, 2021, she has been experiencing pain, intermittent subjective fevers, nausea.  In the emergency room-labs: CBC: WBC 17.8..  Sodium 130.  LFTs-WNL.  CT scan abdomen/pelvis-large irregular fluid collection with thick wall occupying most of the right abdomen surrounding the right kidney and colon.  Severe wall thickening of the descending, transverse colon, second portion of the duodenum likely reactive.  Pneumobilia with gas in the CBD.  Drain placement by IR was performed.  Currently, the abdominal pain has improved.      Assessment/Plan  Interval History   Principal Problem:    Bacteremia due to Gram-negative bacteria and fungemia  Active Problems:    Hypertension    Hyperlipidemia    Sepsis due to intraabdominal fluid collection/abscess (HCC)    Hyponatremia    Duodenal perforation (HCC)    Postprocedural retroperitoneal abscess    Diarrhea    Electrolyte abnormality    Acute respiratory failure with hypoxia (HCC)      Plan IR  - Irrigate Left and Right abdominal drain with 50 ml of sterile saline q4 hrs-   - Surgery, ID, GI following   - Repeat CT scan on 10/22 for abscess eval        [id number]  IR:   10/8- Right abd drain placed, + Chryseobacterium indologenes   Chryseobacterium  arthrosphaerae  Candida glabrata   10/15- transfer from [facility]   10/16- Left abd drain placed, drain +  Enterococcus faecalis, Candida albicans, Candida glabrata  10/17- Left Abd drain - 195 ml, green milky  Right abd drain- 238 ml  10/18   Left drain- 75 ml  Right drain- 110 ml  10/19   Left drain- 230 ml    Right drain-  280 ml   10/20  Left drain-  125 ml  Brown green   Right drain- 405 ml brown green           Review of Systems  Physical Exam   Review of Systems   Constitutional: Positive for malaise/fatigue. Negative for chills and fever.   HENT: Negative for hearing loss.    Eyes: Negative for blurred vision.   Respiratory: Negative for cough, hemoptysis and shortness of breath.    Cardiovascular: Negative for chest pain and palpitations.   Gastrointestinal: Positive for abdominal pain. Negative for vomiting.   Genitourinary: Negative for dysuria.   Musculoskeletal: Negative for myalgias.   Skin: Negative for rash.   Neurological: Positive for weakness. Negative for dizziness and headaches.   Endo/Heme/Allergies: Does not bruise/bleed easily.   Psychiatric/Behavioral: Negative for suicidal ideas.      Vitals:    10/20/21 0743   BP: 146/70   Pulse: 63   Resp: 16   Temp: 36.8 °C (98.3 °F)   SpO2: 93%        Physical Exam  Constitutional:       Appearance: Normal appearance.   HENT:      Head: Normocephalic.      Nose: Nose normal.      Mouth/Throat:      Mouth: Mucous membranes are moist.   Eyes:      Pupils: Pupils are equal, round, and reactive to light.   Cardiovascular:      Rate and Rhythm: Normal rate.   Pulmonary:      Effort: Pulmonary effort is normal. No respiratory distress.   Abdominal:      Palpations: Abdomen is soft.      Tenderness: There is abdominal tenderness.      Comments: Left and right abdominal drains   Drain sites CDI, no redness or swelling   Connect to MARTHA bulbs    Musculoskeletal:         General: No tenderness or deformity.      Cervical back: Normal range of motion.      Right  lower leg: Edema present.      Left lower leg: Edema present.      Comments: Edema improving    Skin:     General: Skin is warm and dry.      Capillary Refill: Capillary refill takes less than 2 seconds.      Coloration: Skin is not jaundiced or pale.   Neurological:      General: No focal deficit present.      Mental Status: She is alert.      Motor: No weakness.   Psychiatric:         Mood and Affect: Mood normal.         Behavior: Behavior normal.             Labs    Recent Labs     10/18/21  1010 10/19/21  0249 10/20/21  0312   WBC 8.0 6.7 6.9   RBC 3.36* 3.05* 2.95*   HEMOGLOBIN 10.0* 9.4* 9.0*   HEMATOCRIT 31.6* 28.7* 27.9*   MCV 94.0 94.1 94.6   MCH 29.8 30.8 30.5   MCHC 31.6* 32.8* 32.3*   RDW 48.3 47.3 47.4   PLATELETCT 398 312 272   MPV 8.6* 8.7* 8.9*     Recent Labs     10/18/21  1517 10/19/21  0249 10/20/21  0312   SODIUM 133* 134* 134*   POTASSIUM 4.3 4.0 3.9   CHLORIDE 98 96 96   CO2 26 29 31   GLUCOSE 167* 171* 139*   BUN 8 9 12   CREATININE 0.58 0.62 0.59   CALCIUM 7.9* 7.7* 8.1*     Recent Labs     10/18/21  1517 10/19/21  0249 10/20/21  0312   ALBUMIN 2.4* 2.3* 2.4*   TBILIRUBIN 0.4 0.4 0.3   ALKPHOSPHAT 51 47 45   TOTPROTEIN 5.1* 4.7* 4.8*   ALTSGPT 30 32 29   ASTSGOT 50* 53* 34   CREATININE 0.58 0.62 0.59     DX-CHEST-LIMITED (1 VIEW)   Final Result      1.  Right basilar atelectasis or consolidation. Small right pleural effusion not excluded.   2.  Atherosclerotic plaque.      CT-DRAIN-PERITONEAL   Final Result      1.  CT guided pancreatic pseudocyst catheter drainage.   2.  The current plan is to obtain a follow-up CT in 5-7 days..      IR-PICC LINE PLACEMENT W/ GUIDANCE > AGE 5   Final Result                  Ultrasound-guided PICC placement performed by qualified nursing staff as    above.              INR   Date Value Ref Range Status   10/15/2021 1.49 (H) 0.87 - 1.13 Final     Comment:     INR - Non-therapeutic Reference Range: 0.87-1.13  INR - Therapeutic Reference Range: 2.0-4.0       No  results found for: POCINR     Intake/Output Summary (Last 24 hours) at 10/18/2021 1528  Last data filed at 10/18/2021 0500  Gross per 24 hour   Intake --   Output 80 ml   Net -80 ml      Labs not explicitly included in this progress note were reviewed by the author. Radiology/imaging not explicitly included in this progress note was reviewed by the author.   I have performed a physical exam and reviewed and updated ROS and Plan today (10/20/2021).     20 minutes in directly providing and coordinating care and extensive data review.  No time overlap and excludes procedures.

## 2021-10-20 NOTE — CARE PLAN
The patient is Stable - Low risk of patient condition declining or worsening    Shift Goals  Clinical Goals: improved s/s of infection  Patient Goals: free from pain, ambulate in the halls  Family Goals: get her better    Progress made toward(s) clinical / shift goals: Pt ambulating in halls with family at change of shift, drains flushed and dressings monitored for leaking       Problem: Urinary - Renal Perfusion  Goal: Ability to achieve and maintain adequate renal perfusion and functioning will improve  Outcome: Progressing     Problem: Respiratory  Goal: Patient will achieve/maintain optimum respiratory ventilation and gas exchange  Outcome: Progressing       Patient is not progressing towards the following goals: Discussed pain regimen and goals with pt. Pt agreeable.      Problem: Pain - Standard  Goal: Alleviation of pain or a reduction in pain to the patient’s comfort goal  Outcome: Not Met

## 2021-10-20 NOTE — CARE PLAN
The patient is Watcher - Medium risk of patient condition declining or worsening    Shift Goals  Clinical Goals: improved s/s of infection  Patient Goals: free from pain, ambulate in the halls  Family Goals: get her better    Progress made toward(s) clinical / shift goals:  maintain drain flushes and drainage with accurate I/o    Patient is not progressing towards the following goals: N/A      Problem: Fluid Volume  Goal: Fluid volume balance will be maintained  Outcome: Progressing     Problem: Pain - Standard  Goal: Alleviation of pain or a reduction in pain to the patient’s comfort goal  Outcome: Progressing     Problem: Skin Integrity  Goal: Skin integrity is maintained or improved  Outcome: Progressing

## 2021-10-20 NOTE — DISCHARGE PLANNING
Anticipated Discharge Disposition: home     Action: Pt discussed during IDDR. Pt is currently on TPN and drain output is being monitored. Pt is anticipated to be medically cleared in ~3 days. Pt 6 clicks score is 24, no social needs identified.    Barriers to Discharge: medical clearance    Plan: continue to follow

## 2021-10-20 NOTE — PROGRESS NOTES
Received report from Day shift RN. Assumed care of patient at 1900. Patient A/Ox4 with family at the bedside, ambulating in the halls at this this time. On RA, no signs of respiratory distress. No acute complaints of pain or discomfort. Call light and belongings within reach. Bed in lowest locked position. Upper side rails raised. Hourly rounding in place. Will continue to monitor.         COVID-19 Surge in effect

## 2021-10-20 NOTE — PROGRESS NOTES
Pharmacy TPN Day # 5     Dosing Weight   60 kg (adjustedBW)                                                     TPN goal: 1055-7109 kcal/day including 1.5 gm/kg/day Protein                                                    TPN indication: perforated duodenum                                                                Pertinent PMH: Admitted on 10/15/2021 for abdominal pain. Underwent a procedure on 10/01/2021, which involved a polypectomy and a sphincterotomy.  CT on 10/08/202, found to have a large fluid collection and thickening of wall around the duodenum, collection was retroperitoneal. IR placed drain. CT on 10/09/2021, significant amount of retroperitoneal air and fluid.  [facility] for further evaluation. 2 drains have been placed for fluid collections surrounding the R kidney and colon.    Temp (24hrs), Av.7 °C (98 °F), Min:36.3 °C (97.4 °F), Max:36.9 °C (98.4 °F)  Temp (24hrs), Av.6 °C (97.8 °F), Min:35.9 °C (96.7 °F), Max:36.9 °C (98.4 °F)  .  Recent Labs     10/18/21  1517 10/19/21  0249 10/20/21  0312   SODIUM 133* 134* 134*   POTASSIUM 4.3 4.0 3.9   CHLORIDE 98 96 96   CO2 26 29 31   BUN 8 9 12   CREATININE 0.58 0.62 0.59   GLUCOSE 167* 171* 139*   CALCIUM 7.9* 7.7* 8.1*   ASTSGOT 50* 53* 34   ALTSGPT 30 32 29   ALBUMIN 2.4* 2.3* 2.4*   TBILIRUBIN 0.4 0.4 0.3   PHOSPHORUS 3.0 3.2 2.8   MAGNESIUM 2.2 2.0 1.9   PREALBUMIN 7.0*  --   --      Accu-Checks  Recent Labs     10/17/21  1607 10/17/21  2334 10/18/21  1708   POCGLUCOSE 120* 117* 155*       Vitals:    10/19/21 0724 10/19/21 2154 10/20/21 0358 10/20/21 0743   BP: 134/83 139/71 151/70 146/70   Weight:       Height:           Intake/Output Summary (Last 24 hours) at 10/20/2021 1213  Last data filed at 10/20/2021 1013  Gross per 24 hour   Intake 150 ml   Output 3570 ml   Net -3420 ml       Orders Placed This Encounter   Procedures   • Diet Order Diet: Clear Liquid (NO RED COLORED PRODUCTS)     Standing Status:   Standing      Number of Occurrences:   1     Order Specific Question:   Diet:     Answer:   Clear Liquid [10]     Comments:   NO RED COLORED PRODUCTS         TPN for past 72 hours (Show up to 3 orders; newest on the left. Changes between the two most recent orders are indicated.)     Start date and time   10/19/2021 2000 10/18/2021 2000 10/17/2021 2000      TPN Central Line Formulation [[id number]] TPN Central Line Formulation [[id number]] TPN Central Line Formulation [[id number]]    Order Status  Active Completed Completed    Last Admin  New Bag at 10/19/2021 2051 by Tatyana Melton RANAM New Bag at 10/18/2021 2107 by Tatyana Melton R.N. New Bag at 10/17/2021 2045 by Ibis Delacruz R.N.       Base    Clinisol 15%  90 g 90 g 45 g    dextrose 70%  240 g 240 g 120 g    fat emulsions 20%  50 g 50 g 25 g       Additives    potassium phosphate  20 mmol 25 mmol 25 mmol    sodium acetate  110 mEq 130 mEq 130 mEq    sodium chloride  110 mEq 170 mEq 170 mEq    magnesium sulfate  8 mEq 8 mEq 12 mEq    calcium GLUConate  9.3 mEq 9.3 mEq 4.65 mEq    M.T.E.-4  1 mL 1 mL 1 mL    M.V.I. Adult  10 mL 10 mL 10 mL    famotidine  40 mg 40 mg 40 mg       QS Base    sterile water  120.96 mL 646.3 mL 1,251.81 mL       Energy Contribution    Proteins  -- -- --    Dextrose  816 kcal 816 kcal 408 kcal    Lipids  500 kcal 500 kcal 250 kcal    Total  1,316 kcal 1,316 kcal 658 kcal       Electrolyte Ion Calculated Amount    Sodium  220 mEq 300 mEq 300 mEq    Potassium  29.33 mEq 36.67 mEq 36.67 mEq    Calcium  9.3 mEq 9.3 mEq 4.65 mEq    Magnesium  8 mEq 8 mEq 12 mEq    Aluminum  -- -- --    Phosphate  20 mmol 25 mmol 25 mmol    Chloride  110 mEq 170 mEq 170 mEq    Acetate  186.2 mEq 206.2 mEq 168.1 mEq       Other    Total Protein  90 g 90 g 45 g    Total Protein/kg  1.26 g/kg 1.26 g/kg 0.63 g/kg    Total Amino Acid  -- -- --    Total Amino Acid/kg  -- -- --    Glucose Infusion Rate  2.33 mg/kg/min 2.33 mg/kg/min 1.17 mg/kg/min    Osmolarity  (Estimated)  -- -- --    Volume  1,440 mL 1,992 mL 1,992 mL    Rate  60 mL/hr 83 mL/hr 83 mL/hr    Dosing Weight  71.4 kg 71.4 kg 71.4 kg    Infusion Site  [facility]          Comments:  1. Plan for nutrition/clinical status:    Continuous TPN.   TPN currently providing 100% of goal.   Maintenance Fluid: none  Drains/fistula/I/O: last 24h: L abd drain 230 cc, R abd drain 280 cc out, urine 2800 cc (lasix continues)     2. Macronutrients:  Increase to 100% goal    Prot:  90g  CHO:  240g  Lipid:  50g  This formula provides:  % kcal as lipids = 30  Grams protein/kg = 1.5  Non-protein calories = 1316  Kcals/kg = 25  Total daily calories = 1676      3. Micronutrients/electrolytes:   Na:  NS equivalence  K: no change  Mg: no change  Phos: no change  Ca: 9.3mEq, no change  Cl:Ace: no change, ~1:1     5.   Notes:   Glucose: range 102-171 over past 24h with 0 units of ssi utilized  Acid suppression: pepcid in TPN  CMP,Mag, Phos, repeat tomorrow 10/21     Patient started octreotide 100mcg subQ q8h on 10/18  to target fistula tract.  Diuresing per hospitalist plan. Concentrated TPN. Repeat blood cultures remain negative.     Zak Gracia, PharmD, BCPS

## 2021-10-20 NOTE — PROGRESS NOTES
Hospital Medicine Daily Progress Note    Date of Service  10/20/2021    Chief Complaint  Nils Alfonso is a 66 y.o. female admitted 10/15/2021 with abdominal pain    Hospital Course  Initially admitted to [facility] for evaluation of abdominal pain after recent ERCP with polypectomy and sphincterotomy and noted to have large intra-abdominal fluid collection for which she underwent drainage with interventional radiology and was evaluated by infectious disease and surgery.  She was transferred to [facility] for higher level of care.  She was evaluated by Dr Cook who recommended conservative management with placement of a second drain    Interval Problem Update  Patient was seen and examined at bedside.  No acute events overnight. Patient is resting comfortably in bed and in no acute distress.      TPN  Zosyn, Micafungin  Post ERCP retroperitoneal abscess with drain x2   Drain irrigation q4 hours  Hold lasix - had good response  ID, GI, GS recommendations appreciated  Clear liquids - monitor drain      I have personally seen and examined the patient at bedside. I discussed the plan of care with patient, bedside RN and infectious disease.    Consultants/Specialty  general surgery, GI and infectious disease    Code Status  Full Code    Disposition  Patient is not medically cleared.   Anticipate discharge to to home with organized home healthcare and close outpatient follow-up.  I have placed the appropriate orders for post-discharge needs.    Review of Systems  Review of Systems   Constitutional: Negative for chills and fever.   Respiratory: Positive for shortness of breath. Negative for cough.    Cardiovascular: Negative for chest pain and palpitations.   Gastrointestinal: Positive for abdominal pain. Negative for nausea and vomiting.   Genitourinary: Negative for dysuria and frequency.   Musculoskeletal: Negative for falls.   Neurological: Negative for seizures and loss of  consciousness.   All other systems reviewed and are negative.       Physical Exam  Temp:  [35.9 °C (96.7 °F)-36.9 °C (98.4 °F)] 36.8 °C (98.3 °F)  Pulse:  [61-65] 63  Resp:  [16-18] 16  BP: (139-151)/(70-71) 146/70  SpO2:  [90 %-97 %] 93 %    Physical Exam  Vitals and nursing note reviewed.   Constitutional:       General: She is not in acute distress.     Comments: Pleasant, conversational   HENT:      Head: Normocephalic and atraumatic.      Right Ear: External ear normal.      Left Ear: External ear normal.      Nose: Nose normal. No rhinorrhea.      Mouth/Throat:      Pharynx: No oropharyngeal exudate or posterior oropharyngeal erythema.   Eyes:      General: No scleral icterus.     Conjunctiva/sclera: Conjunctivae normal.   Cardiovascular:      Rate and Rhythm: Normal rate and regular rhythm.      Heart sounds: Normal heart sounds. No murmur heard.     Pulmonary:      Effort: Pulmonary effort is normal.      Breath sounds: Normal breath sounds. No wheezing.   Abdominal:      General: Bowel sounds are normal.      Palpations: Abdomen is soft.      Tenderness: There is abdominal tenderness. There is no guarding or rebound.      Comments: Abdominal drains in place x2   Musculoskeletal:      Cervical back: Neck supple. No rigidity.      Comments: Lower extemity edema improved   Skin:     General: Skin is warm and dry.      Coloration: Skin is not cyanotic or jaundiced.      Findings: No bruising.      Nails: There is no clubbing.   Neurological:      General: No focal deficit present.      Mental Status: She is alert and oriented to person, place, and time.      Cranial Nerves: No cranial nerve deficit.      Motor: No weakness.   Psychiatric:         Mood and Affect: Mood normal.         Behavior: Behavior normal.         Fluids    Intake/Output Summary (Last 24 hours) at 10/20/2021 1226  Last data filed at 10/20/2021 1013  Gross per 24 hour   Intake 150 ml   Output 3570 ml   Net -3420 ml       Laboratory  Recent  Labs     10/18/21  1010 10/19/21  0249 10/20/21  0312   WBC 8.0 6.7 6.9   RBC 3.36* 3.05* 2.95*   HEMOGLOBIN 10.0* 9.4* 9.0*   HEMATOCRIT 31.6* 28.7* 27.9*   MCV 94.0 94.1 94.6   MCH 29.8 30.8 30.5   MCHC 31.6* 32.8* 32.3*   RDW 48.3 47.3 47.4   PLATELETCT 398 312 272   MPV 8.6* 8.7* 8.9*     Recent Labs     10/18/21  1517 10/19/21  0249 10/20/21  0312   SODIUM 133* 134* 134*   POTASSIUM 4.3 4.0 3.9   CHLORIDE 98 96 96   CO2 26 29 31   GLUCOSE 167* 171* 139*   BUN 8 9 12   CREATININE 0.58 0.62 0.59   CALCIUM 7.9* 7.7* 8.1*                   Imaging  DX-CHEST-LIMITED (1 VIEW)   Final Result      1.  Right basilar atelectasis or consolidation. Small right pleural effusion not excluded.   2.  Atherosclerotic plaque.      CT-DRAIN-PERITONEAL   Final Result      1.  CT guided pancreatic pseudocyst catheter drainage.   2.  The current plan is to obtain a follow-up CT in 5-7 days..      IR-PICC LINE PLACEMENT W/ GUIDANCE > AGE 5   Final Result                  Ultrasound-guided PICC placement performed by qualified nursing staff as    above.               Assessment/Plan  * Bacteremia due to Gram-negative bacteria and fungemia- (present on admission)  Assessment & Plan  Peritoneal fluid cultures grew E. coli and Enterococcus   Blood cultures grew chryseobacterium and Candida glabrata   Repeat blood cultures from 10/13/2021 are negative    Peritoneal fluid from 10/16/2021 growing yeast and strep species follow-up on final results  Monitor drain output and continue current antibiotics  KALANI negative for signs of endocarditis    Acute respiratory failure with hypoxia (HCC)  Assessment & Plan  Requiring 1L supplemental oxygen  Likely volume overload with lower extremity edema present  Improved with diuresis  Now on room air  Hold lasix    Postprocedural retroperitoneal abscess- (present on admission)  Assessment & Plan  Now with abdominal drain x2  General surgery, GI following    Duodenal perforation (HCC)- (present on  admission)  Assessment & Plan  Following ERCP with retroperitoneal abscess and bacteremia    Continue current antibiotics  Started on subcu octreotide  Trial clear liquids per GS    Electrolyte abnormality  Assessment & Plan  Continue electrolyte repletion with TPN    Diarrhea  Assessment & Plan  C. difficile negative    Hyponatremia- (present on admission)  Assessment & Plan  Stable continue to monitor  Appears volume overloaded we will give 1 dose of Lasix today and monitor    Sepsis due to intraabdominal fluid collection/abscess (HCC)- (present on admission)  Assessment & Plan  Sepsis resolved  Source intra-abdominal  Continue Zosyn and Mycamine and follow-up on repeat cultures  ID following  Will need follow-up abdominal imaging in a few days    Hyperlipidemia- (present on admission)  Assessment & Plan  Continue Zetia    Hypertension- (present on admission)  Assessment & Plan  Monitor blood pressure         VTE prophylaxis: heparin ppx    I have performed a physical exam and reviewed and updated ROS and Plan today (10/20/2021). In review of yesterday's note (10/19/2021), there are no changes except as documented above.

## 2021-10-21 PROBLEM — E87.6 HYPOKALEMIA: Status: ACTIVE | Noted: 2021-10-21

## 2021-10-21 LAB
ALBUMIN SERPL BCP-MCNC: 2.7 G/DL (ref 3.2–4.9)
ALBUMIN/GLOB SERPL: 1 G/DL
ALP SERPL-CCNC: 53 U/L (ref 30–99)
ALT SERPL-CCNC: 29 U/L (ref 2–50)
ANION GAP SERPL CALC-SCNC: 10 MMOL/L (ref 7–16)
AST SERPL-CCNC: 30 U/L (ref 12–45)
BASOPHILS # BLD AUTO: 0.4 % (ref 0–1.8)
BASOPHILS # BLD: 0.03 K/UL (ref 0–0.12)
BILIRUB SERPL-MCNC: 0.3 MG/DL (ref 0.1–1.5)
BUN SERPL-MCNC: 16 MG/DL (ref 8–22)
CALCIUM SERPL-MCNC: 8.5 MG/DL (ref 8.5–10.5)
CHLORIDE SERPL-SCNC: 95 MMOL/L (ref 96–112)
CO2 SERPL-SCNC: 31 MMOL/L (ref 20–33)
CREAT SERPL-MCNC: 0.5 MG/DL (ref 0.5–1.4)
EOSINOPHIL # BLD AUTO: 0.01 K/UL (ref 0–0.51)
EOSINOPHIL NFR BLD: 0.1 % (ref 0–6.9)
ERYTHROCYTE [DISTWIDTH] IN BLOOD BY AUTOMATED COUNT: 48.2 FL (ref 35.9–50)
GLOBULIN SER CALC-MCNC: 2.7 G/DL (ref 1.9–3.5)
GLUCOSE SERPL-MCNC: 104 MG/DL (ref 65–99)
HCT VFR BLD AUTO: 29 % (ref 37–47)
HGB BLD-MCNC: 9.4 G/DL (ref 12–16)
IMM GRANULOCYTES # BLD AUTO: 0.11 K/UL (ref 0–0.11)
IMM GRANULOCYTES NFR BLD AUTO: 1.6 % (ref 0–0.9)
LYMPHOCYTES # BLD AUTO: 1.06 K/UL (ref 1–4.8)
LYMPHOCYTES NFR BLD: 15.6 % (ref 22–41)
MAGNESIUM SERPL-MCNC: 1.9 MG/DL (ref 1.5–2.5)
MCH RBC QN AUTO: 30.2 PG (ref 27–33)
MCHC RBC AUTO-ENTMCNC: 32.4 G/DL (ref 33.6–35)
MCV RBC AUTO: 93.2 FL (ref 81.4–97.8)
MONOCYTES # BLD AUTO: 0.78 K/UL (ref 0–0.85)
MONOCYTES NFR BLD AUTO: 11.5 % (ref 0–13.4)
NEUTROPHILS # BLD AUTO: 4.82 K/UL (ref 2–7.15)
NEUTROPHILS NFR BLD: 70.8 % (ref 44–72)
NRBC # BLD AUTO: 0 K/UL
NRBC BLD-RTO: 0 /100 WBC
PHOSPHATE SERPL-MCNC: 2.7 MG/DL (ref 2.5–4.5)
PLATELET # BLD AUTO: 263 K/UL (ref 164–446)
PMV BLD AUTO: 8.9 FL (ref 9–12.9)
POTASSIUM SERPL-SCNC: 3.6 MMOL/L (ref 3.6–5.5)
PROT SERPL-MCNC: 5.4 G/DL (ref 6–8.2)
RBC # BLD AUTO: 3.11 M/UL (ref 4.2–5.4)
SODIUM SERPL-SCNC: 136 MMOL/L (ref 135–145)
WBC # BLD AUTO: 6.8 K/UL (ref 4.8–10.8)

## 2021-10-21 PROCEDURE — 700101 HCHG RX REV CODE 250: Performed by: INTERNAL MEDICINE

## 2021-10-21 PROCEDURE — 99232 SBSQ HOSP IP/OBS MODERATE 35: CPT | Performed by: INTERNAL MEDICINE

## 2021-10-21 PROCEDURE — 99232 SBSQ HOSP IP/OBS MODERATE 35: CPT | Performed by: SURGERY

## 2021-10-21 PROCEDURE — 700102 HCHG RX REV CODE 250 W/ 637 OVERRIDE(OP): Performed by: FAMILY MEDICINE

## 2021-10-21 PROCEDURE — 700105 HCHG RX REV CODE 258: Performed by: INTERNAL MEDICINE

## 2021-10-21 PROCEDURE — 700111 HCHG RX REV CODE 636 W/ 250 OVERRIDE (IP): Performed by: INTERNAL MEDICINE

## 2021-10-21 PROCEDURE — A9270 NON-COVERED ITEM OR SERVICE: HCPCS | Performed by: INTERNAL MEDICINE

## 2021-10-21 PROCEDURE — A9270 NON-COVERED ITEM OR SERVICE: HCPCS | Performed by: FAMILY MEDICINE

## 2021-10-21 PROCEDURE — 84100 ASSAY OF PHOSPHORUS: CPT

## 2021-10-21 PROCEDURE — 700102 HCHG RX REV CODE 250 W/ 637 OVERRIDE(OP): Performed by: INTERNAL MEDICINE

## 2021-10-21 PROCEDURE — 700105 HCHG RX REV CODE 258: Performed by: FAMILY MEDICINE

## 2021-10-21 PROCEDURE — 700111 HCHG RX REV CODE 636 W/ 250 OVERRIDE (IP): Performed by: FAMILY MEDICINE

## 2021-10-21 PROCEDURE — 700111 HCHG RX REV CODE 636 W/ 250 OVERRIDE (IP): Performed by: STUDENT IN AN ORGANIZED HEALTH CARE EDUCATION/TRAINING PROGRAM

## 2021-10-21 PROCEDURE — 770001 HCHG ROOM/CARE - MED/SURG/GYN PRIV*

## 2021-10-21 PROCEDURE — 700111 HCHG RX REV CODE 636 W/ 250 OVERRIDE (IP): Mod: JB | Performed by: SURGERY

## 2021-10-21 PROCEDURE — 80053 COMPREHEN METABOLIC PANEL: CPT

## 2021-10-21 PROCEDURE — 83735 ASSAY OF MAGNESIUM: CPT

## 2021-10-21 PROCEDURE — 85025 COMPLETE CBC W/AUTO DIFF WBC: CPT

## 2021-10-21 RX ORDER — POTASSIUM CHLORIDE 20 MEQ/1
40 TABLET, EXTENDED RELEASE ORAL ONCE
Status: COMPLETED | OUTPATIENT
Start: 2021-10-21 | End: 2021-10-21

## 2021-10-21 RX ADMIN — GABAPENTIN 600 MG: 300 CAPSULE ORAL at 16:49

## 2021-10-21 RX ADMIN — Medication 1000 UNITS: at 05:23

## 2021-10-21 RX ADMIN — CALCIUM GLUCONATE: 98 INJECTION, SOLUTION INTRAVENOUS at 20:54

## 2021-10-21 RX ADMIN — PIPERACILLIN AND TAZOBACTAM 3.38 G: 3; .375 INJECTION, POWDER, LYOPHILIZED, FOR SOLUTION INTRAVENOUS; PARENTERAL at 21:02

## 2021-10-21 RX ADMIN — ONDANSETRON 4 MG: 2 INJECTION INTRAMUSCULAR; INTRAVENOUS at 21:09

## 2021-10-21 RX ADMIN — HEPARIN SODIUM 5000 UNITS: 5000 INJECTION, SOLUTION INTRAVENOUS; SUBCUTANEOUS at 21:02

## 2021-10-21 RX ADMIN — OXYCODONE HYDROCHLORIDE 10 MG: 10 TABLET ORAL at 09:50

## 2021-10-21 RX ADMIN — OCTREOTIDE ACETATE 100 MCG: 100 INJECTION, SOLUTION INTRAVENOUS; SUBCUTANEOUS at 06:28

## 2021-10-21 RX ADMIN — HEPARIN SODIUM 5000 UNITS: 5000 INJECTION, SOLUTION INTRAVENOUS; SUBCUTANEOUS at 14:17

## 2021-10-21 RX ADMIN — OXYCODONE HYDROCHLORIDE 10 MG: 10 TABLET ORAL at 14:19

## 2021-10-21 RX ADMIN — HYDROMORPHONE HYDROCHLORIDE 0.5 MG: 1 INJECTION, SOLUTION INTRAMUSCULAR; INTRAVENOUS; SUBCUTANEOUS at 20:23

## 2021-10-21 RX ADMIN — GABAPENTIN 600 MG: 300 CAPSULE ORAL at 05:23

## 2021-10-21 RX ADMIN — MICAFUNGIN SODIUM 100 MG: 100 INJECTION, POWDER, LYOPHILIZED, FOR SOLUTION INTRAVENOUS at 12:29

## 2021-10-21 RX ADMIN — OXYCODONE HYDROCHLORIDE 10 MG: 10 TABLET ORAL at 01:00

## 2021-10-21 RX ADMIN — OXYCODONE HYDROCHLORIDE 10 MG: 10 TABLET ORAL at 05:23

## 2021-10-21 RX ADMIN — EZETIMIBE 10 MG: 10 TABLET ORAL at 16:49

## 2021-10-21 RX ADMIN — HYDROMORPHONE HYDROCHLORIDE 0.5 MG: 1 INJECTION, SOLUTION INTRAMUSCULAR; INTRAVENOUS; SUBCUTANEOUS at 16:09

## 2021-10-21 RX ADMIN — ONDANSETRON 4 MG: 2 INJECTION INTRAMUSCULAR; INTRAVENOUS at 16:09

## 2021-10-21 RX ADMIN — PIPERACILLIN AND TAZOBACTAM 3.38 G: 3; .375 INJECTION, POWDER, LYOPHILIZED, FOR SOLUTION INTRAVENOUS; PARENTERAL at 14:17

## 2021-10-21 RX ADMIN — PIPERACILLIN AND TAZOBACTAM 3.38 G: 3; .375 INJECTION, POWDER, LYOPHILIZED, FOR SOLUTION INTRAVENOUS; PARENTERAL at 05:23

## 2021-10-21 RX ADMIN — HEPARIN SODIUM 5000 UNITS: 5000 INJECTION, SOLUTION INTRAVENOUS; SUBCUTANEOUS at 05:22

## 2021-10-21 RX ADMIN — PROMETHAZINE HYDROCHLORIDE 25 MG: 25 TABLET ORAL at 08:20

## 2021-10-21 RX ADMIN — POTASSIUM CHLORIDE 40 MEQ: 1500 TABLET, EXTENDED RELEASE ORAL at 12:28

## 2021-10-21 RX ADMIN — ONDANSETRON 4 MG: 2 INJECTION INTRAMUSCULAR; INTRAVENOUS at 01:00

## 2021-10-21 RX ADMIN — ONDANSETRON 4 MG: 2 INJECTION INTRAMUSCULAR; INTRAVENOUS at 05:23

## 2021-10-21 ASSESSMENT — ENCOUNTER SYMPTOMS
HEMOPTYSIS: 0
SHORTNESS OF BREATH: 0
COUGH: 0
DIZZINESS: 0
WEAKNESS: 1
PALPITATIONS: 0
NAUSEA: 0
SHORTNESS OF BREATH: 1
HEADACHES: 0
LOSS OF CONSCIOUSNESS: 0
MYALGIAS: 0
CHILLS: 0
FALLS: 0
BLURRED VISION: 0
VOMITING: 0
FEVER: 0
BRUISES/BLEEDS EASILY: 0
SEIZURES: 0
ABDOMINAL PAIN: 1

## 2021-10-21 ASSESSMENT — FIBROSIS 4 INDEX: FIB4 SCORE: 1.4

## 2021-10-21 ASSESSMENT — PAIN DESCRIPTION - PAIN TYPE
TYPE: ACUTE PAIN

## 2021-10-21 NOTE — PROGRESS NOTES
Hospital Medicine Daily Progress Note    Date of Service  10/21/2021    Chief Complaint  Nils Alfonso is a 66 y.o. female admitted 10/15/2021 with abdominal pain    Hospital Course  Initially admitted to [facility] [place] for evaluation of abdominal pain after recent ERCP with polypectomy and sphincterotomy and noted to have large intra-abdominal fluid collection for which she underwent drainage with interventional radiology and was evaluated by infectious disease and surgery.  She was transferred to [facility] for higher level of care.  She was evaluated by Dr Cook who recommended conservative management with placement of a second drain    Interval Problem Update  Patient was seen and examined at bedside.  No acute events overnight. Patient is resting comfortably in bed and in no acute distress.     TPN  Micafungin for candademia: day 6  Zosyn for E.coli and enterococcus bactermia: day 7  Post ERCP retroperitoneal abscess with drain x2   Drain irrigation q4 hours  ID, GI, GS recommendations appreciated  Tolerating clear liquids  Repeat CT today    I have personally seen and examined the patient at bedside. I discussed the plan of care with patient, bedside RN and infectious disease.    Consultants/Specialty  general surgery, GI and infectious disease    Code Status  Full Code    Disposition  Patient is not medically cleared.   Anticipate discharge to to home with organized home healthcare and close outpatient follow-up.  I have placed the appropriate orders for post-discharge needs.    Review of Systems  Review of Systems   Constitutional: Negative for chills and fever.   Respiratory: Positive for shortness of breath. Negative for cough.    Cardiovascular: Negative for chest pain and palpitations.   Gastrointestinal: Positive for abdominal pain. Negative for nausea and vomiting.   Genitourinary: Negative for dysuria and frequency.   Musculoskeletal: Negative for falls.    Neurological: Negative for seizures and loss of consciousness.   All other systems reviewed and are negative.       Physical Exam  Temp:  [36.2 °C (97.2 °F)-37.1 °C (98.7 °F)] 36.7 °C (98 °F)  Pulse:  [57-61] 59  Resp:  [18] 18  BP: (142-155)/(64-78) 148/70  SpO2:  [92 %-94 %] 92 %    Physical Exam  Vitals and nursing note reviewed.   Constitutional:       General: She is not in acute distress.     Comments: Pleasant, conversational   HENT:      Head: Normocephalic and atraumatic.      Right Ear: External ear normal.      Left Ear: External ear normal.      Nose: Nose normal. No rhinorrhea.      Mouth/Throat:      Pharynx: No oropharyngeal exudate or posterior oropharyngeal erythema.   Eyes:      General: No scleral icterus.     Conjunctiva/sclera: Conjunctivae normal.   Cardiovascular:      Rate and Rhythm: Normal rate and regular rhythm.      Heart sounds: Normal heart sounds. No murmur heard.     Pulmonary:      Effort: Pulmonary effort is normal.      Breath sounds: Normal breath sounds. No wheezing.   Abdominal:      General: Bowel sounds are normal.      Palpations: Abdomen is soft.      Tenderness: There is abdominal tenderness. There is no guarding or rebound.      Comments: Abdominal drains in place x2   Musculoskeletal:      Cervical back: Neck supple. No rigidity.      Comments: Lower extemity edema improved   Skin:     General: Skin is warm and dry.      Coloration: Skin is not cyanotic or jaundiced.      Findings: No bruising.      Nails: There is no clubbing.   Neurological:      General: No focal deficit present.      Mental Status: She is alert and oriented to person, place, and time.      Cranial Nerves: No cranial nerve deficit.      Motor: No weakness.   Psychiatric:         Mood and Affect: Mood normal.         Behavior: Behavior normal.     Patient was seen and examined at bedside. No changes in physical exam from prior evaluation.      Fluids    Intake/Output Summary (Last 24 hours) at  10/21/2021 1213  Last data filed at 10/21/2021 1038  Gross per 24 hour   Intake --   Output 720 ml   Net -720 ml       Laboratory  Recent Labs     10/19/21  0249 10/20/21  0312 10/21/21  0045   WBC 6.7 6.9 6.8   RBC 3.05* 2.95* 3.11*   HEMOGLOBIN 9.4* 9.0* 9.4*   HEMATOCRIT 28.7* 27.9* 29.0*   MCV 94.1 94.6 93.2   MCH 30.8 30.5 30.2   MCHC 32.8* 32.3* 32.4*   RDW 47.3 47.4 48.2   PLATELETCT 312 272 263   MPV 8.7* 8.9* 8.9*     Recent Labs     10/19/21  0249 10/20/21  0312 10/21/21  0045   SODIUM 134* 134* 136   POTASSIUM 4.0 3.9 3.6   CHLORIDE 96 96 95*   CO2 29 31 31   GLUCOSE 171* 139* 104*   BUN 9 12 16   CREATININE 0.62 0.59 0.50   CALCIUM 7.7* 8.1* 8.5                   Imaging  DX-CHEST-LIMITED (1 VIEW)   Final Result      1.  Right basilar atelectasis or consolidation. Small right pleural effusion not excluded.   2.  Atherosclerotic plaque.      CT-DRAIN-PERITONEAL   Final Result      1.  CT guided pancreatic pseudocyst catheter drainage.   2.  The current plan is to obtain a follow-up CT in 5-7 days..      IR-PICC LINE PLACEMENT W/ GUIDANCE > AGE 5   Final Result                  Ultrasound-guided PICC placement performed by qualified nursing staff as    above.               Assessment/Plan  * Bacteremia due to Gram-negative bacteria and fungemia- (present on admission)  Assessment & Plan  Peritoneal fluid cultures grew E. coli and Enterococcus   Blood cultures grew chryseobacterium and Candida glabrata   Repeat blood cultures from 10/13/2021 are negative    Peritoneal fluid from 10/16/2021 growing yeast and strep species follow-up on final results  Monitor drain output and continue current antibiotics  KALANI negative for signs of endocarditis    Micafungin for candademia: day 6  Zosyn for E.coli and enterococcus bactermia: day 7    Acute respiratory failure with hypoxia (HCC)  Assessment & Plan  Requiring 1L supplemental oxygen  Likely volume overload with lower extremity edema present  Improved with  diuresis  Now on room air  Resolved    Postprocedural retroperitoneal abscess- (present on admission)  Assessment & Plan  Now with abdominal drain x2  General surgery, GI following    Duodenal perforation (HCC)- (present on admission)  Assessment & Plan  Following ERCP with retroperitoneal abscess and bacteremia    Continue current antibiotics  Started on subcu octreotide  Trial clear liquids per GS    Hypokalemia  Assessment & Plan  Monitor and replace    Electrolyte abnormality  Assessment & Plan  Continue electrolyte repletion with TPN    Diarrhea  Assessment & Plan  C. difficile negative    Hyponatremia- (present on admission)  Assessment & Plan  Stable continue to monitor  Appears volume overloaded we will give 1 dose of Lasix today and monitor    Sepsis due to intraabdominal fluid collection/abscess (HCC)- (present on admission)  Assessment & Plan  Sepsis resolved  Source intra-abdominal  Continue Zosyn and Mycamine and follow-up on repeat cultures  ID following  Will need follow-up abdominal imaging in a few days    Hyperlipidemia- (present on admission)  Assessment & Plan  Continue Zetia    Hypertension- (present on admission)  Assessment & Plan  Monitor blood pressure         VTE prophylaxis: heparin ppx    I have performed a physical exam and reviewed and updated ROS and Plan today (10/21/2021). In review of yesterday's note (10/20/2021), there are no changes except as documented above.

## 2021-10-21 NOTE — CARE PLAN
The patient is Stable - Low risk of patient condition declining or worsening    Shift Goals  Clinical Goals: Improved s/s infection  Patient Goals: Pain management  Family Goals: get better    Progress made toward(s) clinical / shift goals:        Problem: Urinary - Renal Perfusion  Goal: Ability to achieve and maintain adequate renal perfusion and functioning will improve  Outcome: Progressing     Problem: Pain - Standard  Goal: Alleviation of pain or a reduction in pain to the patient’s comfort goal  Outcome: Progressing   Pain was well controlled. Distraction and repositioning were utilized.

## 2021-10-21 NOTE — PROGRESS NOTES
Radiology Progress Note   Author: AYE Mahan Date & Time created: 10/21/2021  11:52 AM   Date of admission  10/15/2021  Note to reader: this note follows the APSO format rather than the historical SOAP format. Assessment and plan located at the top of the note for ease of use.    Chief Complaint  66 y.o. female admitted 10/15/2021 with abd pain     HPI  66-year-old female PMH recurrent idiopathic pancreatitis s/p ERCP with sphincterotomy October 1, 2021 complicated by ERCP pancreatitis, sleeve gastrectomy, dyslipidemia, hypertension, osteoarthritis of the spine status post lumbar fusion who was admitted to the hospital 10/8/2021 with worsening right lower quadrant pain over the past week.  Ever since her ERCP October 1, 2021, she has been experiencing pain, intermittent subjective fevers, nausea.  In the emergency room-labs: CBC: WBC 17.8..  Sodium 130.  LFTs-WNL.  CT scan abdomen/pelvis-large irregular fluid collection with thick wall occupying most of the right abdomen surrounding the right kidney and colon.  Severe wall thickening of the descending, transverse colon, second portion of the duodenum likely reactive.  Pneumobilia with gas in the CBD.  Drain placement by IR was performed.  Currently, the abdominal pain has improved.      Assessment/Plan  Interval History   Principal Problem:    Bacteremia due to Gram-negative bacteria and fungemia  Active Problems:    Hypertension    Hyperlipidemia    Sepsis due to intraabdominal fluid collection/abscess (HCC)    Hyponatremia    Duodenal perforation (HCC)    Postprocedural retroperitoneal abscess    Diarrhea    Electrolyte abnormality    Acute respiratory failure with hypoxia (HCC)      Plan IR  - Irrigate Left and Right abdominal drain with 50 ml of sterile saline q4 hrs-   - Surgery, ID, GI following   - Repeat CT scan on 10/22 for abscess eval        [id number]  IR:   10/8- Right abd drain placed, + Chryseobacterium indologenes   Chryseobacterium  arthrosphaerae  Candida glabrata   10/15- transfer from [facility]   10/16- Left abd drain placed, drain +  Enterococcus faecalis, Candida albicans, Candida glabrata  10/17- Left Abd drain - 195 ml, green milky  Right abd drain- 238 ml  10/18   Left drain- 75 ml  Right drain- 110 ml  10/19   Left drain- 230 ml    Right drain-  280 ml   10/20  Left drain-  125 ml  Brown green   Right drain- 405 ml brown green  10/21  Left drain-  90 ml  Brown green in am  Right drain- 290 ml brown green in am          Review of Systems  Physical Exam   Review of Systems   Constitutional: Positive for malaise/fatigue. Negative for chills and fever.   HENT: Negative for hearing loss.    Eyes: Negative for blurred vision.   Respiratory: Negative for cough, hemoptysis and shortness of breath.    Cardiovascular: Negative for chest pain and palpitations.   Gastrointestinal: Positive for abdominal pain. Negative for vomiting.   Genitourinary: Negative for dysuria.   Musculoskeletal: Negative for myalgias.   Skin: Negative for rash.   Neurological: Positive for weakness. Negative for dizziness and headaches.   Endo/Heme/Allergies: Does not bruise/bleed easily.   Psychiatric/Behavioral: Negative for suicidal ideas.      Vitals:    10/21/21 0704   BP: 148/70   Pulse: (!) 59   Resp: 18   Temp: 36.7 °C (98 °F)   SpO2: 92%        Physical Exam  Constitutional:       Appearance: Normal appearance.   HENT:      Head: Normocephalic.      Nose: Nose normal.      Mouth/Throat:      Mouth: Mucous membranes are moist.   Eyes:      Pupils: Pupils are equal, round, and reactive to light.   Cardiovascular:      Rate and Rhythm: Normal rate.   Pulmonary:      Effort: Pulmonary effort is normal. No respiratory distress.   Abdominal:      Palpations: Abdomen is soft.      Tenderness: There is abdominal tenderness.      Comments: Left and right abdominal drains   Drain sites CDI, no redness or swelling   Connect to MARTHA bulbs    Musculoskeletal:          General: No tenderness or deformity.      Cervical back: Normal range of motion.      Right lower leg: Edema present.      Left lower leg: Edema present.      Comments: Edema improving    Skin:     General: Skin is warm and dry.      Capillary Refill: Capillary refill takes less than 2 seconds.      Coloration: Skin is not jaundiced or pale.   Neurological:      General: No focal deficit present.      Mental Status: She is alert.      Motor: No weakness.   Psychiatric:         Mood and Affect: Mood normal.         Behavior: Behavior normal.             Labs    Recent Labs     10/19/21  0249 10/20/21  0312 10/21/21  0045   WBC 6.7 6.9 6.8   RBC 3.05* 2.95* 3.11*   HEMOGLOBIN 9.4* 9.0* 9.4*   HEMATOCRIT 28.7* 27.9* 29.0*   MCV 94.1 94.6 93.2   MCH 30.8 30.5 30.2   MCHC 32.8* 32.3* 32.4*   RDW 47.3 47.4 48.2   PLATELETCT 312 272 263   MPV 8.7* 8.9* 8.9*     Recent Labs     10/19/21  0249 10/20/21  0312 10/21/21  0045   SODIUM 134* 134* 136   POTASSIUM 4.0 3.9 3.6   CHLORIDE 96 96 95*   CO2 29 31 31   GLUCOSE 171* 139* 104*   BUN 9 12 16   CREATININE 0.62 0.59 0.50   CALCIUM 7.7* 8.1* 8.5     Recent Labs     10/19/21  0249 10/20/21  0312 10/21/21  0045   ALBUMIN 2.3* 2.4* 2.7*   TBILIRUBIN 0.4 0.3 0.3   ALKPHOSPHAT 47 45 53   TOTPROTEIN 4.7* 4.8* 5.4*   ALTSGPT 32 29 29   ASTSGOT 53* 34 30   CREATININE 0.62 0.59 0.50     DX-CHEST-LIMITED (1 VIEW)   Final Result      1.  Right basilar atelectasis or consolidation. Small right pleural effusion not excluded.   2.  Atherosclerotic plaque.      CT-DRAIN-PERITONEAL   Final Result      1.  CT guided pancreatic pseudocyst catheter drainage.   2.  The current plan is to obtain a follow-up CT in 5-7 days..      IR-PICC LINE PLACEMENT W/ GUIDANCE > AGE 5   Final Result                  Ultrasound-guided PICC placement performed by qualified nursing staff as    above.              INR   Date Value Ref Range Status   10/15/2021 1.49 (H) 0.87 - 1.13 Final     Comment:     INR -  Non-therapeutic Reference Range: 0.87-1.13  INR - Therapeutic Reference Range: 2.0-4.0       No results found for: POCINR     Intake/Output Summary (Last 24 hours) at 10/18/2021 1528  Last data filed at 10/18/2021 0500  Gross per 24 hour   Intake --   Output 80 ml   Net -80 ml      Labs not explicitly included in this progress note were reviewed by the author. Radiology/imaging not explicitly included in this progress note was reviewed by the author.   I have performed a physical exam and reviewed and updated ROS and Plan today (10/21/2021).     20 minutes in directly providing and coordinating care and extensive data review.  No time overlap and excludes procedures.

## 2021-10-21 NOTE — PROGRESS NOTES
Bedside report received from day RN. Assumed care of pt at 1900. Pt is on RA. Pt is A&Ox4. Educated to call light. Bed is in low and locked position.    Crisis charting COVID-19 surge in effect.

## 2021-10-21 NOTE — PROGRESS NOTES
Pharmacy Daily TPN Monitoring    TPN Day # 6      Dosing Weight:   60 kg (adjusted body weight) TPN as ordered provides: 100% of goal calories      Caloric goal: 9810-6253 kcal/day     Protein goal:  1.4-1.7 gm/kg/day     TPN indication: perforated duodenum with high drain output    Additional nutritional considerations:  Clear liquid diet, SQ octreotide for drain output    History of present illness:   Admitted on 10/15/2021 for abdominal pain. Underwent polypectomy and a sphincterotomy on 10/01/2021.  CT completed on 10/08/202, found to have a large fluid collection and thickening of wall around the duodenum with retroperitoneal fluid collection.  IR placed drain. CT completed on 10/09/2021 with a significant amount of retroperitoneal air and fluid.  Pt [facility] 10[facility]14/21 for further evaluation. 2 drains have been placed for fluid collections surrounding the R kidney and colon.  Peritoneal drain cultures have demonstrated polymicrobial growth however blood cultures have remained negative.    Temp (24hrs), Av.7 °C (98 °F), Min:36.2 °C (97.2 °F), Max:37.1 °C (98.7 °F)  .  Recent Labs     10/18/21  1517 10/18/21  1517 10/19/21  0249 10/20/21  0312 10/21/21  0045   SODIUM 133*   < > 134* 134* 136   POTASSIUM 4.3   < > 4.0 3.9 3.6   CHLORIDE 98   < > 96 96 95*   CO2 26   < > 29 31 31   BUN 8   < > 9 12 16   CREATININE 0.58   < > 0.62 0.59 0.50   GLUCOSE 167*   < > 171* 139* 104*   CALCIUM 7.9*   < > 7.7* 8.1* 8.5   ASTSGOT 50*   < > 53* 34 30   ALTSGPT 30   < > 32 29 29   ALBUMIN 2.4*   < > 2.3* 2.4* 2.7*   TBILIRUBIN 0.4   < > 0.4 0.3 0.3   PHOSPHORUS 3.0   < > 3.2 2.8 2.7   MAGNESIUM 2.2   < > 2.0 1.9 1.9   PREALBUMIN 7.0*  --   --   --   --     < > = values in this interval not displayed.     Accu-Checks  Recent Labs     10/18/21  1708   POCGLUCOSE 155*       Vitals:    10/20/21 0743 10/20/21 1912 10/21/21 0402 10/21/21 0704   BP: 146/70 142/64 155/78 148/70   Weight:  72.3 kg (159 lb  6.3 oz)     Height:           Intake/Output Summary (Last 24 hours) at 10/21/2021 1311  Last data filed at 10/21/2021 1229  Gross per 24 hour   Intake --   Output 725 ml   Net -725 ml       Orders Placed This Encounter   Procedures   • Diet Order Diet: Clear Liquid (NO RED COLORED PRODUCTS)     Standing Status:   Standing     Number of Occurrences:   1     Order Specific Question:   Diet:     Answer:   Clear Liquid [10]     Comments:   NO RED COLORED PRODUCTS         TPN for past 72 hours (Show up to 3 orders; newest on the left. Changes between the two most recent orders are indicated.)     Start date and time   10/19/2021 2000 10/18/2021 2000 10/17/2021 2000      TPN Central Line Formulation [[id number]] TPN Central Line Formulation [[id number]] TPN Central Line Formulation [[id number]]    Order Status  Active Completed Completed    Last Admin  New Bag at 10/20/2021 2006 by Geovanni Reeder RUrielNUriel New Bag at 10/18/2021 2107 by Tatyana Melton RUrielNUriel New Bag at 10/17/2021 2045 by Ibis Delacruz RANAM       Base    Clinisol 15%  90 g 90 g 45 g    dextrose 70%  240 g 240 g 120 g    fat emulsions 20%  50 g 50 g 25 g       Additives    potassium phosphate  20 mmol 25 mmol 25 mmol    sodium acetate  110 mEq 130 mEq 130 mEq    sodium chloride  110 mEq 170 mEq 170 mEq    magnesium sulfate  8 mEq 8 mEq 12 mEq    calcium GLUConate  9.3 mEq 9.3 mEq 4.65 mEq    M.T.E.-4  1 mL 1 mL 1 mL    M.V.I. Adult  10 mL 10 mL 10 mL    famotidine  40 mg 40 mg 40 mg       QS Base    sterile water  120.96 mL 646.3 mL 1,251.81 mL       Energy Contribution    Proteins  360 kcal -- --    Dextrose  816 kcal 816 kcal 408 kcal    Lipids  500 kcal 500 kcal 250 kcal    Total  1,676 kcal 1,316 kcal 658 kcal       Electrolyte Ion Calculated Amount    Sodium  220 mEq 300 mEq 300 mEq    Potassium  29.33 mEq 36.67 mEq 36.67 mEq    Calcium  9.3 mEq 9.3 mEq 4.65 mEq    Magnesium  8 mEq 8 mEq 12 mEq    Phosphate  20 mmol 25 mmol 25 mmol    Chloride  110 mEq  170 mEq 170 mEq    Acetate  186.2 mEq 206.2 mEq 168.1 mEq       Other    Total Protein  90 g 90 g 45 g    Total Protein/kg  1.5 g/kg 1.26 g/kg 0.63 g/kg    Glucose Infusion Rate  2.7 mg/kg/min 2.33 mg/kg/min 1.17 mg/kg/min    Volume  1,440 mL 1,992 mL 1,992 mL    Rate  60 mL/hr 83 mL/hr 83 mL/hr    Dosing Weight  60 kg 71.4 kg 71.4 kg    Infusion Site  [facility]            The current TPN formulation provides:  % kcal as lipids: 30%  Grams protein/k.5   Kcals/k   Non-protein calories: 1316 (NPC:N 91)  Total daily calories: 1676      Plan and Assessment:  · Overall Assessment:  ? Patient on TPN following multiple procedures with ongoing concerns for drain output.  Diet advanced to clear liquid 10/20 and drain output being followed closely.  No plans for diet advancement today.  Patient IS tolerating PO intake for medications and electrolytes as needed.  Diuresis stopped but TPN remains concentrated to ensure not contributing to volume concerns.    · Macronutrients:    ? Dextrose:  Blood sugars well controlled.  GIR 2.7 remains appropriate for current provisions.  ? Lipids:  Baseline triglycerides not measured but historically WNL.  Will check triglycerides tomorrow.    ? Protein:  Renal indices remain stable and appropriate for current provisions.    · Electrolytes:  ? 40 mEq of potassium given PO outside of the TPN today.  Electrolytes remain stable.  Recommend continuation of PO replacement as tolerated to minimize volume.  Sodium provisions are slightly above normal saline.  TPN contains the equivalent of 1 gram of magnesium daily.    ? Could consider adjusting sodium provisions (decrease acetate and increase chloride) if CO2 rises but currently stable from yesterday.    · Micronutrients and Vitamins:  ? TPN contains the standard MVI and trace elements.    ? TPN contains famotidine.      Thank you!  Rosio Don, PharmD, BCCCP

## 2021-10-21 NOTE — PROGRESS NOTES
Surgical Progress Note    Author: Jaden Cook M.D. Date & Time created: 10/21/2021   1:24 PM     Interval Events:  Feels better but still issues pain  MARTHA drains still purulent and nosign of bilious output  Tolerated po clears    Review of Systems   Gastrointestinal: Positive for abdominal pain.   All other systems reviewed and are negative.    Hemodynamics:  Temp (24hrs), Av.7 °C (98 °F), Min:36.2 °C (97.2 °F), Max:37.1 °C (98.7 °F)  Temperature: 36.7 °C (98 °F)  Pulse  Av.3  Min: 57  Max: 85   Blood Pressure : 148/70     Respiratory:    Respiration: 18, Pulse Oximetry: 92 %     Work Of Breathing / Effort: Within Normal Limits     Neuro:  GCS       Fluids:    Intake/Output Summary (Last 24 hours) at 10/21/2021 1324  Last data filed at 10/21/2021 1229  Gross per 24 hour   Intake --   Output 725 ml   Net -725 ml     Weight: 72.3 kg (159 lb 6.3 oz)  Current Diet Order   Procedures   • Diet Order Diet: Full Liquid (NO RED COLORED PRODUCTS)     Physical Exam  Constitutional:       Appearance: Normal appearance.   HENT:      Head: Normocephalic.      Mouth/Throat:      Mouth: Mucous membranes are dry.   Eyes:      Extraocular Movements: Extraocular movements intact.      Pupils: Pupils are equal, round, and reactive to light.   Cardiovascular:      Rate and Rhythm: Normal rate and regular rhythm.      Pulses: Normal pulses.      Heart sounds: Normal heart sounds.   Pulmonary:      Effort: Pulmonary effort is normal.      Breath sounds: Normal breath sounds.   Abdominal:      General: Abdomen is flat. Bowel sounds are normal.      Palpations: Abdomen is soft.      Tenderness: There is abdominal tenderness.      Comments: MARTHA right- now slight purulence  MARTHA left no clearing  No sign of juice or bile   Musculoskeletal:         General: Normal range of motion.      Cervical back: Normal range of motion and neck supple.   Skin:     General: Skin is warm.   Neurological:      General: No focal deficit  present.      Mental Status: She is alert and oriented to person, place, and time.   Psychiatric:         Mood and Affect: Mood normal.         Behavior: Behavior normal.         Thought Content: Thought content normal.         Judgment: Judgment normal.       Labs:  Recent Results (from the past 24 hour(s))   Comp Metabolic Panel    Collection Time: 10/21/21 12:45 AM   Result Value Ref Range    Sodium 136 135 - 145 mmol/L    Potassium 3.6 3.6 - 5.5 mmol/L    Chloride 95 (L) 96 - 112 mmol/L    Co2 31 20 - 33 mmol/L    Anion Gap 10.0 7.0 - 16.0    Glucose 104 (H) 65 - 99 mg/dL    Bun 16 8 - 22 mg/dL    Creatinine 0.50 0.50 - 1.40 mg/dL    Calcium 8.5 8.5 - 10.5 mg/dL    AST(SGOT) 30 12 - 45 U/L    ALT(SGPT) 29 2 - 50 U/L    Alkaline Phosphatase 53 30 - 99 U/L    Total Bilirubin 0.3 0.1 - 1.5 mg/dL    Albumin 2.7 (L) 3.2 - 4.9 g/dL    Total Protein 5.4 (L) 6.0 - 8.2 g/dL    Globulin 2.7 1.9 - 3.5 g/dL    A-G Ratio 1.0 g/dL   MAGNESIUM    Collection Time: 10/21/21 12:45 AM   Result Value Ref Range    Magnesium 1.9 1.5 - 2.5 mg/dL   PHOSPHORUS    Collection Time: 10/21/21 12:45 AM   Result Value Ref Range    Phosphorus 2.7 2.5 - 4.5 mg/dL   CBC WITH DIFFERENTIAL    Collection Time: 10/21/21 12:45 AM   Result Value Ref Range    WBC 6.8 4.8 - 10.8 K/uL    RBC 3.11 (L) 4.20 - 5.40 M/uL    Hemoglobin 9.4 (L) 12.0 - 16.0 g/dL    Hematocrit 29.0 (L) 37.0 - 47.0 %    MCV 93.2 81.4 - 97.8 fL    MCH 30.2 27.0 - 33.0 pg    MCHC 32.4 (L) 33.6 - 35.0 g/dL    RDW 48.2 35.9 - 50.0 fL    Platelet Count 263 164 - 446 K/uL    MPV 8.9 (L) 9.0 - 12.9 fL    Neutrophils-Polys 70.80 44.00 - 72.00 %    Lymphocytes 15.60 (L) 22.00 - 41.00 %    Monocytes 11.50 0.00 - 13.40 %    Eosinophils 0.10 0.00 - 6.90 %    Basophils 0.40 0.00 - 1.80 %    Immature Granulocytes 1.60 (H) 0.00 - 0.90 %    Nucleated RBC 0.00 /100 WBC    Neutrophils (Absolute) 4.82 2.00 - 7.15 K/uL    Lymphs (Absolute) 1.06 1.00 - 4.80 K/uL    Monos (Absolute) 0.78 0.00 - 0.85  K/uL    Eos (Absolute) 0.01 0.00 - 0.51 K/uL    Baso (Absolute) 0.03 0.00 - 0.12 K/uL    Immature Granulocytes (abs) 0.11 0.00 - 0.11 K/uL    NRBC (Absolute) 0.00 K/uL   ESTIMATED GFR    Collection Time: 10/21/21 12:45 AM   Result Value Ref Range    GFR If African American >60 >60 mL/min/1.73 m 2    GFR If Non African American >60 >60 mL/min/1.73 m 2     Medical Decision Making, by Problem:  Active Hospital Problems    Diagnosis    • Duodenal perforation (HCC) [K63.1]      Priority: High   • Postprocedural retroperitoneal abscess [K68.11]      Priority: High   • Bacteremia due to Gram-negative bacteria and fungemia [R78.81]      Priority: High   • Sepsis due to intraabdominal fluid collection/abscess (HCC) [A41.9]      Priority: High   • Hypokalemia [E87.6]    • Acute respiratory failure with hypoxia (HCC) [J96.01]    • Diarrhea [R19.7]    • Electrolyte abnormality [E87.8]    • Hyponatremia [E87.1]    • Hyperlipidemia [E78.5]    • Hypertension [I10]      Plan:  Full liquids  CPM  Plan repeat CT on Monday    Quality Measures:  Quality-Core Measures    Discussed patient condition with Family and RN

## 2021-10-21 NOTE — CARE PLAN
The patient is Stable - Low risk of patient condition declining or worsening    Shift Goals  Clinical Goals: manage marcel drain flushes  Patient Goals: pain management  Family Goals: get her better    Progress made toward(s) clinical / shift goals:  managing MARCEL drain, tolerating oral nutrition    Patient is not progressing towards the following goals: n/a        Problem: Hemodynamics  Goal: Patient's hemodynamics, fluid balance and neurologic status will be stable or improve  Outcome: Progressing     Problem: Fluid Volume  Goal: Fluid volume balance will be maintained  Outcome: Progressing     Problem: Respiratory  Goal: Patient will achieve/maintain optimum respiratory ventilation and gas exchange  Outcome: Progressing     Problem: Pain - Standard  Goal: Alleviation of pain or a reduction in pain to the patient’s comfort goal  Outcome: Progressing     Problem: Skin Integrity  Goal: Skin integrity is maintained or improved  Outcome: Progressing

## 2021-10-22 ENCOUNTER — APPOINTMENT (OUTPATIENT)
Dept: RADIOLOGY | Facility: MEDICAL CENTER | Age: 66
DRG: 856 | End: 2021-10-22
Attending: NURSE PRACTITIONER
Payer: MEDICARE

## 2021-10-22 PROBLEM — E83.39 HYPOPHOSPHATEMIA: Status: ACTIVE | Noted: 2021-10-22

## 2021-10-22 LAB
ALBUMIN SERPL BCP-MCNC: 3 G/DL (ref 3.2–4.9)
ALBUMIN/GLOB SERPL: 1.4 G/DL
ALP SERPL-CCNC: 57 U/L (ref 30–99)
ALT SERPL-CCNC: 19 U/L (ref 2–50)
ANION GAP SERPL CALC-SCNC: 5 MMOL/L (ref 7–16)
AST SERPL-CCNC: 19 U/L (ref 12–45)
BASOPHILS # BLD AUTO: 0.5 % (ref 0–1.8)
BASOPHILS # BLD: 0.03 K/UL (ref 0–0.12)
BILIRUB SERPL-MCNC: 0.3 MG/DL (ref 0.1–1.5)
BUN SERPL-MCNC: 16 MG/DL (ref 8–22)
CALCIUM SERPL-MCNC: 8.2 MG/DL (ref 8.5–10.5)
CHLORIDE SERPL-SCNC: 99 MMOL/L (ref 96–112)
CO2 SERPL-SCNC: 30 MMOL/L (ref 20–33)
CREAT SERPL-MCNC: 0.58 MG/DL (ref 0.5–1.4)
EOSINOPHIL # BLD AUTO: 0.03 K/UL (ref 0–0.51)
EOSINOPHIL NFR BLD: 0.5 % (ref 0–6.9)
ERYTHROCYTE [DISTWIDTH] IN BLOOD BY AUTOMATED COUNT: 48.4 FL (ref 35.9–50)
GLOBULIN SER CALC-MCNC: 2.1 G/DL (ref 1.9–3.5)
GLUCOSE SERPL-MCNC: 106 MG/DL (ref 65–99)
HCT VFR BLD AUTO: 27.5 % (ref 37–47)
HGB BLD-MCNC: 8.9 G/DL (ref 12–16)
IMM GRANULOCYTES # BLD AUTO: 0.19 K/UL (ref 0–0.11)
IMM GRANULOCYTES NFR BLD AUTO: 2.9 % (ref 0–0.9)
LYMPHOCYTES # BLD AUTO: 1.06 K/UL (ref 1–4.8)
LYMPHOCYTES NFR BLD: 16.3 % (ref 22–41)
MAGNESIUM SERPL-MCNC: 1.9 MG/DL (ref 1.5–2.5)
MCH RBC QN AUTO: 30.2 PG (ref 27–33)
MCHC RBC AUTO-ENTMCNC: 32.4 G/DL (ref 33.6–35)
MCV RBC AUTO: 93.2 FL (ref 81.4–97.8)
MONOCYTES # BLD AUTO: 0.72 K/UL (ref 0–0.85)
MONOCYTES NFR BLD AUTO: 11.1 % (ref 0–13.4)
NEUTROPHILS # BLD AUTO: 4.48 K/UL (ref 2–7.15)
NEUTROPHILS NFR BLD: 68.7 % (ref 44–72)
NRBC # BLD AUTO: 0 K/UL
NRBC BLD-RTO: 0 /100 WBC
PHOSPHATE SERPL-MCNC: 2.4 MG/DL (ref 2.5–4.5)
PLATELET # BLD AUTO: 190 K/UL (ref 164–446)
PMV BLD AUTO: 9.6 FL (ref 9–12.9)
POTASSIUM SERPL-SCNC: 3.9 MMOL/L (ref 3.6–5.5)
PROT SERPL-MCNC: 5.1 G/DL (ref 6–8.2)
RBC # BLD AUTO: 2.95 M/UL (ref 4.2–5.4)
SODIUM SERPL-SCNC: 134 MMOL/L (ref 135–145)
TRIGL SERPL-MCNC: 171 MG/DL (ref 0–149)
WBC # BLD AUTO: 6.5 K/UL (ref 4.8–10.8)

## 2021-10-22 PROCEDURE — 83735 ASSAY OF MAGNESIUM: CPT

## 2021-10-22 PROCEDURE — 700111 HCHG RX REV CODE 636 W/ 250 OVERRIDE (IP): Performed by: STUDENT IN AN ORGANIZED HEALTH CARE EDUCATION/TRAINING PROGRAM

## 2021-10-22 PROCEDURE — 700105 HCHG RX REV CODE 258: Performed by: STUDENT IN AN ORGANIZED HEALTH CARE EDUCATION/TRAINING PROGRAM

## 2021-10-22 PROCEDURE — 84100 ASSAY OF PHOSPHORUS: CPT

## 2021-10-22 PROCEDURE — 94760 N-INVAS EAR/PLS OXIMETRY 1: CPT

## 2021-10-22 PROCEDURE — 74177 CT ABD & PELVIS W/CONTRAST: CPT | Mod: MG

## 2021-10-22 PROCEDURE — 99233 SBSQ HOSP IP/OBS HIGH 50: CPT | Performed by: STUDENT IN AN ORGANIZED HEALTH CARE EDUCATION/TRAINING PROGRAM

## 2021-10-22 PROCEDURE — 700111 HCHG RX REV CODE 636 W/ 250 OVERRIDE (IP): Performed by: FAMILY MEDICINE

## 2021-10-22 PROCEDURE — 700102 HCHG RX REV CODE 250 W/ 637 OVERRIDE(OP): Performed by: FAMILY MEDICINE

## 2021-10-22 PROCEDURE — 700101 HCHG RX REV CODE 250: Performed by: STUDENT IN AN ORGANIZED HEALTH CARE EDUCATION/TRAINING PROGRAM

## 2021-10-22 PROCEDURE — 700102 HCHG RX REV CODE 250 W/ 637 OVERRIDE(OP): Performed by: STUDENT IN AN ORGANIZED HEALTH CARE EDUCATION/TRAINING PROGRAM

## 2021-10-22 PROCEDURE — A9270 NON-COVERED ITEM OR SERVICE: HCPCS | Performed by: STUDENT IN AN ORGANIZED HEALTH CARE EDUCATION/TRAINING PROGRAM

## 2021-10-22 PROCEDURE — 85025 COMPLETE CBC W/AUTO DIFF WBC: CPT

## 2021-10-22 PROCEDURE — A9270 NON-COVERED ITEM OR SERVICE: HCPCS | Performed by: FAMILY MEDICINE

## 2021-10-22 PROCEDURE — 700105 HCHG RX REV CODE 258: Performed by: FAMILY MEDICINE

## 2021-10-22 PROCEDURE — 770006 HCHG ROOM/CARE - MED/SURG/GYN SEMI*

## 2021-10-22 PROCEDURE — 80053 COMPREHEN METABOLIC PANEL: CPT

## 2021-10-22 PROCEDURE — 84478 ASSAY OF TRIGLYCERIDES: CPT

## 2021-10-22 RX ORDER — LABETALOL HYDROCHLORIDE 5 MG/ML
10 INJECTION, SOLUTION INTRAVENOUS EVERY 6 HOURS PRN
Status: DISCONTINUED | OUTPATIENT
Start: 2021-10-22 | End: 2021-10-26

## 2021-10-22 RX ORDER — LISINOPRIL 5 MG/1
5 TABLET ORAL
Status: DISCONTINUED | OUTPATIENT
Start: 2021-10-22 | End: 2021-11-02

## 2021-10-22 RX ADMIN — CALCIUM GLUCONATE: 98 INJECTION, SOLUTION INTRAVENOUS at 20:07

## 2021-10-22 RX ADMIN — CYCLOBENZAPRINE 10 MG: 10 TABLET, FILM COATED ORAL at 15:18

## 2021-10-22 RX ADMIN — GABAPENTIN 600 MG: 300 CAPSULE ORAL at 05:01

## 2021-10-22 RX ADMIN — DIBASIC SODIUM PHOSPHATE, MONOBASIC POTASSIUM PHOSPHATE AND MONOBASIC SODIUM PHOSPHATE 500 MG: 852; 155; 130 TABLET ORAL at 15:18

## 2021-10-22 RX ADMIN — EZETIMIBE 10 MG: 10 TABLET ORAL at 18:05

## 2021-10-22 RX ADMIN — LISINOPRIL 5 MG: 5 TABLET ORAL at 09:55

## 2021-10-22 RX ADMIN — Medication 1000 UNITS: at 05:01

## 2021-10-22 RX ADMIN — HYDROMORPHONE HYDROCHLORIDE 0.5 MG: 1 INJECTION, SOLUTION INTRAMUSCULAR; INTRAVENOUS; SUBCUTANEOUS at 22:01

## 2021-10-22 RX ADMIN — HYDROMORPHONE HYDROCHLORIDE 0.5 MG: 1 INJECTION, SOLUTION INTRAMUSCULAR; INTRAVENOUS; SUBCUTANEOUS at 18:06

## 2021-10-22 RX ADMIN — ONDANSETRON 4 MG: 2 INJECTION INTRAMUSCULAR; INTRAVENOUS at 09:56

## 2021-10-22 RX ADMIN — PIPERACILLIN AND TAZOBACTAM 3.38 G: 3; .375 INJECTION, POWDER, LYOPHILIZED, FOR SOLUTION INTRAVENOUS; PARENTERAL at 15:18

## 2021-10-22 RX ADMIN — HEPARIN SODIUM 5000 UNITS: 5000 INJECTION, SOLUTION INTRAVENOUS; SUBCUTANEOUS at 21:49

## 2021-10-22 RX ADMIN — HEPARIN SODIUM 5000 UNITS: 5000 INJECTION, SOLUTION INTRAVENOUS; SUBCUTANEOUS at 05:00

## 2021-10-22 RX ADMIN — GABAPENTIN 600 MG: 300 CAPSULE ORAL at 18:06

## 2021-10-22 RX ADMIN — HYDROMORPHONE HYDROCHLORIDE 0.5 MG: 1 INJECTION, SOLUTION INTRAMUSCULAR; INTRAVENOUS; SUBCUTANEOUS at 00:55

## 2021-10-22 RX ADMIN — HYDROMORPHONE HYDROCHLORIDE 0.5 MG: 1 INJECTION, SOLUTION INTRAMUSCULAR; INTRAVENOUS; SUBCUTANEOUS at 14:05

## 2021-10-22 RX ADMIN — PIPERACILLIN AND TAZOBACTAM 3.38 G: 3; .375 INJECTION, POWDER, LYOPHILIZED, FOR SOLUTION INTRAVENOUS; PARENTERAL at 21:49

## 2021-10-22 RX ADMIN — MICAFUNGIN SODIUM 100 MG: 100 INJECTION, POWDER, LYOPHILIZED, FOR SOLUTION INTRAVENOUS at 14:05

## 2021-10-22 RX ADMIN — HEPARIN SODIUM 5000 UNITS: 5000 INJECTION, SOLUTION INTRAVENOUS; SUBCUTANEOUS at 14:05

## 2021-10-22 RX ADMIN — OXYCODONE HYDROCHLORIDE 10 MG: 10 TABLET ORAL at 00:20

## 2021-10-22 RX ADMIN — PIPERACILLIN AND TAZOBACTAM 3.38 G: 3; .375 INJECTION, POWDER, LYOPHILIZED, FOR SOLUTION INTRAVENOUS; PARENTERAL at 05:01

## 2021-10-22 ASSESSMENT — ENCOUNTER SYMPTOMS
COUGH: 0
HEMOPTYSIS: 0
WHEEZING: 0
MEMORY LOSS: 0
CHILLS: 0
BLURRED VISION: 0
LOSS OF CONSCIOUSNESS: 0
WEAKNESS: 1
CONSTIPATION: 0
FALLS: 0
SHORTNESS OF BREATH: 0
MYALGIAS: 0
NAUSEA: 0
FOCAL WEAKNESS: 0
FEVER: 0
PALPITATIONS: 0
DIZZINESS: 0
VOMITING: 0
BRUISES/BLEEDS EASILY: 0
ABDOMINAL PAIN: 1
SHORTNESS OF BREATH: 1
HEADACHES: 0
SEIZURES: 0

## 2021-10-22 ASSESSMENT — PAIN DESCRIPTION - PAIN TYPE
TYPE: ACUTE PAIN;SURGICAL PAIN
TYPE: ACUTE PAIN
TYPE: ACUTE PAIN;SURGICAL PAIN

## 2021-10-22 ASSESSMENT — LIFESTYLE VARIABLES: SUBSTANCE_ABUSE: 0

## 2021-10-22 NOTE — PROGRESS NOTES
Hospital Medicine Daily Progress Note    Date of Service  10/22/2021    Chief Complaint  Nils Alfonso is a 66 y.o. female admitted 10/15/2021 with abdominal pain    Hospital Course  Initially admitted to [facility] for evaluation of abdominal pain after recent ERCP with polypectomy and sphincterotomy and noted to have large intra-abdominal fluid collection for which she underwent drainage with interventional radiology and was evaluated by infectious disease and surgery.  She was transferred to [facility] for higher level of care.  She was evaluated by Dr Cook who recommended conservative management with placement of a second drain    Interval Problem Update  Patient was seen and examined at bedside.  No acute events overnight. Patient is resting comfortably in bed and in no acute distress.     BP high, started lisinopril 5 mg daily, labetalol as needed  Phos 2.4 - replaced  Repeat CT 10/22 showed Grossly unchanged irregular fluid collection occupying the right abdomen surrounding the right kidney and right colon extending to midline.     Pneumobilia with gas in the CBD - s/p drain placement  TPN  Micafungin for candademia until 10/27  Zosyn for E.coli and enterococcus bactermi until 10/24  Post ERCP retroperitoneal abscess with drain x2   Drain irrigation q4 hours  ID, GI, GS recommendations appreciated  Tolerating clear liquids      I have personally seen and examined the patient at bedside. I discussed the plan of care with patient, bedside RN and infectious disease.    Consultants/Specialty  general surgery, GI and infectious disease    Code Status  Full Code    Disposition  Patient is not medically cleared.   Anticipate discharge to to home with organized home healthcare and close outpatient follow-up.  I have placed the appropriate orders for post-discharge needs.    Review of Systems  Review of Systems   Constitutional: Negative for chills and fever.   Respiratory:  Positive for shortness of breath. Negative for cough.    Cardiovascular: Negative for chest pain and palpitations.   Gastrointestinal: Positive for abdominal pain. Negative for nausea and vomiting.   Genitourinary: Negative for dysuria and frequency.   Musculoskeletal: Negative for falls.   Neurological: Negative for seizures and loss of consciousness.   All other systems reviewed and are negative.       Physical Exam  Temp:  [36.2 °C (97.2 °F)-37.2 °C (99 °F)] 37.2 °C (99 °F)  Pulse:  [57-78] 76  Resp:  [17-20] 17  BP: (136-173)/(58-78) 173/74  SpO2:  [88 %-96 %] 96 %    Physical Exam  Vitals and nursing note reviewed.   Constitutional:       General: She is not in acute distress.     Comments: Pleasant, conversational   HENT:      Head: Normocephalic and atraumatic.      Right Ear: External ear normal.      Left Ear: External ear normal.      Nose: Nose normal. No rhinorrhea.      Mouth/Throat:      Pharynx: No oropharyngeal exudate or posterior oropharyngeal erythema.   Eyes:      General: No scleral icterus.     Conjunctiva/sclera: Conjunctivae normal.   Cardiovascular:      Rate and Rhythm: Normal rate and regular rhythm.      Heart sounds: Normal heart sounds. No murmur heard.     Pulmonary:      Effort: Pulmonary effort is normal.      Breath sounds: Normal breath sounds. No wheezing.   Abdominal:      General: Bowel sounds are normal.      Palpations: Abdomen is soft.      Tenderness: There is abdominal tenderness. There is no guarding or rebound.      Comments: Abdominal drains in place x2   Musculoskeletal:      Cervical back: Neck supple. No rigidity.      Comments: Lower extemity edema improved   Skin:     General: Skin is warm and dry.      Coloration: Skin is not cyanotic or jaundiced.      Findings: No bruising.      Nails: There is no clubbing.   Neurological:      General: No focal deficit present.      Mental Status: She is alert and oriented to person, place, and time.      Cranial Nerves: No  cranial nerve deficit.      Motor: No weakness.   Psychiatric:         Mood and Affect: Mood normal.         Behavior: Behavior normal.     Patient was seen and examined at bedside. No changes in physical exam from prior evaluation.      Fluids    Intake/Output Summary (Last 24 hours) at 10/22/2021 1424  Last data filed at 10/21/2021 2102  Gross per 24 hour   Intake --   Output 230 ml   Net -230 ml       Laboratory  Recent Labs     10/20/21  0312 10/21/21  0045 10/22/21  1005   WBC 6.9 6.8 6.5   RBC 2.95* 3.11* 2.95*   HEMOGLOBIN 9.0* 9.4* 8.9*   HEMATOCRIT 27.9* 29.0* 27.5*   MCV 94.6 93.2 93.2   MCH 30.5 30.2 30.2   MCHC 32.3* 32.4* 32.4*   RDW 47.4 48.2 48.4   PLATELETCT 272 263 190   MPV 8.9* 8.9* 9.6     Recent Labs     10/20/21  0312 10/21/21  0045 10/22/21  1005   SODIUM 134* 136 134*   POTASSIUM 3.9 3.6 3.9   CHLORIDE 96 95* 99   CO2 31 31 30   GLUCOSE 139* 104* 106*   BUN 12 16 16   CREATININE 0.59 0.50 0.58   CALCIUM 8.1* 8.5 8.2*             Recent Labs     10/22/21  1005   TRIGLYCERIDE 171*       Imaging  CT-ABDOMEN-PELVIS WITH   Final Result         1. Grossly unchanged irregular fluid collection occupying the right abdomen surrounding the right kidney and right colon extending to midline. Additional pigtail catheter in the component about midline anterior abdomen. Both pigtail catheters seem to be    within the collection.      2. Small right pleural effusion with right basilar atelectasis.               DX-CHEST-LIMITED (1 VIEW)   Final Result      1.  Right basilar atelectasis or consolidation. Small right pleural effusion not excluded.   2.  Atherosclerotic plaque.      CT-DRAIN-PERITONEAL   Final Result      1.  CT guided pancreatic pseudocyst catheter drainage.   2.  The current plan is to obtain a follow-up CT in 5-7 days..      IR-PICC LINE PLACEMENT W/ GUIDANCE > AGE 5   Final Result                  Ultrasound-guided PICC placement performed by qualified nursing staff as    above.                Assessment/Plan  * Bacteremia due to Gram-negative bacteria and fungemia- (present on admission)  Assessment & Plan  Peritoneal fluid cultures grew E. coli and Enterococcus   Blood cultures grew chryseobacterium and Candida glabrata   Repeat blood cultures from 10/13/2021 are negative    Peritoneal fluid from 10/16/2021 growing yeast and strep species follow-up on final results  Monitor drain output and continue current antibiotics  KALANI negative for signs of endocarditis    Micafungin for candademia until 10/27  Zosyn for E.coli and enterococcus bactermi until 10/24    Postprocedural retroperitoneal abscess- (present on admission)  Assessment & Plan  Now with abdominal drain x2  General surgery, GI following    Repeat CT 10/22 showed Grossly unchanged irregular fluid collection occupying the right abdomen surrounding the right kidney and right colon extending to midline.     Hypophosphatemia  Assessment & Plan  Replaced  Continue to monitor    Hypokalemia  Assessment & Plan  Monitor and replace    Acute respiratory failure with hypoxia (HCC)  Assessment & Plan  Requiring 1L supplemental oxygen  Likely volume overload with lower extremity edema present  Improved with diuresis  Now on room air  Resolved    Electrolyte abnormality  Assessment & Plan  Continue electrolyte repletion with TPN    Diarrhea  Assessment & Plan  C. difficile negative    Duodenal perforation (HCC)- (present on admission)  Assessment & Plan  Following ERCP with retroperitoneal abscess and bacteremia    Continue current antibiotics  Started on subcu octreotide  Trial clear liquids per GS    Hyponatremia- (present on admission)  Assessment & Plan  Stable continue to monitor  Appears volume overloaded we will give 1 dose of Lasix today and monitor    Sepsis due to intraabdominal fluid collection/abscess (HCC)- (present on admission)  Assessment & Plan  Sepsis resolved  Source intra-abdominal  Continue Zosyn and Mycamine and follow-up on repeat  cultures  ID following  Will need follow-up abdominal imaging in a few days    Hyperlipidemia- (present on admission)  Assessment & Plan  Continue Zetia    Hypertension- (present on admission)  Assessment & Plan  started lisinopril 5 mg daily,   labetalol as needed         VTE prophylaxis: heparin ppx    I have performed a physical exam and reviewed and updated ROS and Plan today (10/22/2021). In review of yesterday's note (10/21/2021), there are no changes except as documented above.

## 2021-10-22 NOTE — CARE PLAN
Problem: Nutritional:  Goal: Achieve adequate nutritional intake  Description: Patient will consume >50% of meals  Outcome: Progressing   Diet advanced to full liquids yesterday. Pt reporting fair appetite. Continue to monitor for adequate PO intake and TPN wean.     RD following

## 2021-10-22 NOTE — DISCHARGE PLANNING
Anticipated Discharge Disposition: Home    Action: discussed pt in IDT rounds. Anticipate the pt will have repeat CT scan on Monday. Possible DC next week.    Barriers to Discharge: medical clearance.    Plan: No DC needs identified at this time.

## 2021-10-22 NOTE — PROGRESS NOTES
4 Eyes Skin Assessment Completed by Bahman RN and TALITA Tobar.    Head WDL  Ears WDL  Nose WDL  Mouth WDL  Neck WDL  Breast/Chest WDL  Shoulder Blades WDL  Spine WDL  (R) Arm/Elbow/Hand WDL  (L) Arm/Elbow/Hand WDL  Abdomen Redness and Incision  Groin WDL  Scrotum/Coccyx/Buttocks WDL  (R) Leg WDL  (L) Leg WDL  (R) Heel/Foot/Toe WDL  (L) Heel/Foot/Toe WDL          Devices In Places Blood Pressure Cuff, Central Line and Nasal Cannula      Interventions In Place Gray Ear Foams, Pillows and Pressure Redistribution Mattress    Possible Skin Injury No    Pictures Uploaded Into Epic N/A  Wound Consult Placed N/A  RN Wound Prevention Protocol Ordered No      MARTHA drains X2 bilateral ABD

## 2021-10-22 NOTE — PROGRESS NOTES
Gastroenterology Progress Note               Author:  Ele GRIFFITHS Date & Time Created: 10/22/2021 10:19 AM       Patient ID:  Name:             Nils Alfonso  YOB: 1955  Age:                 66 y.o.  female  MRN:               [id number]      Referring Provider:  Ana Luisa Shearer MD      Presenting Chief Complaint:  Abdominal pain      History of Present Illness:    10/10 HPI  66-year-old female PMH recurrent idiopathic pancreatitis s/p ERCP with sphincterotomy October 1, 2021 complicated by ERCP pancreatitis, sleeve gastrectomy, dyslipidemia, hypertension, osteoarthritis of the spine status post lumbar fusion who was admitted to the hospital 10/8/2021 with worsening right lower quadrant pain over the past week.  Ever since her ERCP October 1, 2021, she has been experiencing pain, intermittent subjective fevers, nausea.  In the emergency room-labs: CBC: WBC 17.8..  Sodium 130.  LFTs-WNL.  CT scan abdomen/pelvis-large irregular fluid collection with thick wall occupying most of the right abdomen surrounding the right kidney and colon.  Severe wall thickening of the descending, transverse colon, second portion of the duodenum likely reactive.  Pneumobilia with gas in the CBD.  Drain placement by IR was performed.  Currently, the abdominal pain has improved.  No vomiting.  Started on ceftriaxone and metronidazole IV.     ERCP October 1, 2021-common bile duct-dilated down to the level of the ampulla.  4 mm polyp at the distal duct seen after sphincterotomy.  8 mm sphincterotomy.  Negative for stone.  Bile duct polyp-benign with inflammatory and hyperplastic changes.  No evidence of epithelial dysplasia/neoplasia.       Interval History:  10/11: interval HIDA scan showed no bile leak.  This morning she feels more comfortable, describing minimal abdominal pain but denying nausea vomiting and fevers.  She has been able to eat a little bit more and has full liquids beside her bed.  She  expresses concern about being on losartan which she says was prescribed outpatient as as needed for high blood pressures.  She also states she has not passed a bowel movement in the week which she describes not eating much.     10/12/2021 - No events overnight.  Tolerating diet without nausea or vomiting.  Had spontaneous, formed, brown bowel movement this morning.  Abdominal pain improving.  Feeling weak, but better each day.  Leukocytosis improving.  States that she is on hydrocodone at home, managed by pain management, and asks that her current hospital pain meds be changed.     10/13/2021: Stable, no new events.  Drain output is minimal, but according to patient bedside nursing is not flushing the drain.  Patient is n.p.o. for planned CT today to reevaluate fluid collection.  Leukocytosis continues to improve.  Pain has improved greatly and patient has not taken the pain medication in the last 24 hours according to her recollection.  She is having regular bowel movements without assistance of laxatives.  Patient is very hungry.     10/14/2021 - No events overnight.  Abdominal pain controlled now.  Had nausea and vomiting yesterday with solid food, but tolerating bland diet.  Blood cultures from 10/8 positive for Chryseobacterium and Candida glabrata - Pharmacy and ID aware.  Micafungin started.  CT 10/13 showing extensive multiloculated rim-enhancing air and fluid collection/abscess within the right abdomen is not significantly changed in size from the prior study. The percutaneous pigtail drainage catheter appears well-positioned within the collection.    10/15/2021: Patient transferred to [facility] overnight per surgery recommendations    10/16/2021: Drain output clearing up slightly. Dr. Dumont with surgery has recommended second IR drain to be placed. Initially interventional radiology did not think that this would be helpful for the patient, but after further discussion this is  the plan going forward. Patient is n.p.o. with plans for TPN. WBCs normalized.    10/21/21: Abdominal pain slowly improving. Pain is worse with movement. MARTHA drains purulent. No vomiting. Plan for CT scan to re-evaluate fluid collection. Tolerating FLD.       Review of Systems:  Review of Systems   Constitutional: Positive for malaise/fatigue. Negative for chills and fever.   Respiratory: Negative for cough, shortness of breath and wheezing.    Cardiovascular: Negative for chest pain and leg swelling.   Gastrointestinal: Positive for abdominal pain. Negative for constipation, melena, nausea and vomiting.   Genitourinary: Negative for dysuria and urgency.   Musculoskeletal: Negative for falls and myalgias.   Skin: Negative for itching and rash.   Neurological: Positive for weakness. Negative for focal weakness and headaches.   Psychiatric/Behavioral: Negative for memory loss and substance abuse.             Past Medical History:  Past Medical History:   Diagnosis Date   • Allergy, unspecified not elsewhere classified    • Anemia     not currently   • Anesthesia     PONV (Demerol/ Dilaudid)   • Arthritis     bilateral shoulders,sacrum, osteo   • Backpain     coccyx and sacrum   • Bronchitis 2010   • Cataract     bilateral IOLI   • Degeneration of cervical intervertebral disc     C5-6,C6-7   • Dental disorder     partial upper and lower   • Heart burn    • Hiatus hernia syndrome     not a problem after gastric sleeve   • High cholesterol     resolved after gastric surgery   • Hyperlipidemia    • Hypertension     off all medication, reveresed after gastric sleeve   • Muscle disorder    • Pain 05/16/2018    low back and SI joints and sacrum   • Reactive airway disease     rescue inhaler not needed unless with bronchitis     Active Hospital Problems    Diagnosis    • Hypokalemia [E87.6]    • Acute respiratory failure with hypoxia ([facility]) [J96.01]    • Duodenal perforation ([facility]) [K63.1]    • Postprocedural retroperitoneal  abscess [K68.11]    • Diarrhea [R19.7]    • Electrolyte abnormality [E87.8]    • Bacteremia due to Gram-negative bacteria and fungemia [R78.81]    • Hyponatremia [E87.1]    • Sepsis due to intraabdominal fluid collection/abscess ([facility]) [A41.9]    • Hyperlipidemia [E78.5]    • Hypertension [I10]          Past Surgical History:  Past Surgical History:   Procedure Laterality Date   • PB ERCP,DIAGNOSTIC  10/1/2021    Procedure: ERCP (ENDOSCOPIC RETROGRADE CHOLANGIOPANCREATOGRAPHY);  Surgeon: Fausto Casas M.D.;  Location: [facility];  Service: Gastroenterology   • COLONOSCOPY  8/8/2018    Procedure: COLONOSCOPY;  Surgeon: Shukri Condon M.D.;  Location: [facility];  Service: General   • GASTROSCOPY  5/23/2018    Procedure: GASTROSCOPY;  Surgeon: Fausto Casas M.D.;  Location: [facility];  Service: Gastroenterology   • EGD W/ENDOSCOPIC ULTRASOUND  5/23/2018    Procedure: EGD W/ENDOSCOPIC ULTRASOUND- RADIAL UPPER;  Surgeon: Fausto Casas M.D.;  Location: [facility];  Service: Gastroenterology   • CATARACT PHACO WITH IOL Left 4/18/2017    Procedure: CATARACT PHACO WITH IOL;  Surgeon: Stuart Estevez M.D.;  Location: SURGERY SAME DAY [facility];  Service:    • CATARACT PHACO WITH IOL Right 4/4/2017    Procedure: CATARACT PHACO WITH IOL;  Surgeon: Stuart Estevez M.D.;  Location: SURGERY [facility];  Service:    • GASTRIC SLEEVE LAPAROSCOPY  10/19/2016    Procedure: GASTRIC SLEEVE LAPAROSCOPY, HIATAL HERNIA;  Surgeon: Shukri Condon M.D.;  Location: SURGERY [facility];  Service:    • LIVER BIOPSY LAPAROSCOPIC  10/19/2016    Procedure: LIVER BIOPSY LAPAROSCOPIC;  Surgeon: Shukri Condon M.D.;  Location: [facility];  Service:    • GASTROSCOPY N/A 8/26/2016    Procedure: GASTROSCOPY;  Surgeon: Shukri Condon M.D.;  Location: [facility];  Service:    • ROTATOR CUFF REPAIR Right 7/7/2016   • ROTATOR CUFF REPAIR  Left 5/2015   • HYSTERECTOMY ROBOTIC  1/2/2009    Performed by MEG VILLEGAS at SURGERY [facility]S   • LUMBAR FUSION ANTERIOR  2007    Dr Hillman, L3-S1 fusion   • CHOLECYSTECTOMY  1996    laparoscopic   • TUBAL LIGATION  1985   • GASTRIC RESECTION  1982    gastric stapling   • TONSILLECTOMY AND ADENOIDECTOMY  1967   • PRIMARY C SECTION  1976, 1979, 1985    x3           Hospital Medications:  Current Facility-Administered Medications   Medication Dose Frequency Provider Last Rate Last Admin   • lisinopril (PRINIVIL) tablet 5 mg  5 mg Q DAY Tiara Ballard M.D.   5 mg at 10/22/21 0955   • labetalol (NORMODYNE/TRANDATE) injection 10 mg  10 mg Q6HRS PRN Tiara Ballard M.D.       • TPN Central Line Formulation   TPN DAILY Shane Ladd D.O. 60 mL/hr at 10/22/21 0745 Rate Verify at 10/22/21 0745   • octreotide (SANDOSTATIN) injection 100 mcg  100 mcg TID Jaden Cook M.D.   100 mcg at 10/21/21 0628   • MD Alert...TPN per Pharmacy   PHARMACY TO DOSE Pritesh Vargas M.D.       • ondansetron (ZOFRAN ODT) dispertab 4 mg  4 mg Q4HRS PRN Elizabeth Segundo M.D.   4 mg at 10/20/21 1557   • ondansetron (ZOFRAN) syringe/vial injection 4 mg  4 mg Q4HRS PRN Elizabeth Segundo M.D.   4 mg at 10/22/21 0956   • polyethylene glycol/lytes (MIRALAX) PACKET 1 Packet  1 Packet QDAY PRN Elizabeth Segundo M.D.        And   • magnesium hydroxide (MILK OF MAGNESIA) suspension 30 mL  30 mL QDAY PRN Elizabeth Segundo M.D.       • acetaminophen (Tylenol) tablet 650 mg  650 mg Q6HRS PRN Elizabeth Segundo M.D.       • HYDROmorphone (Dilaudid) injection 0.5 mg  0.5 mg Q3HRS PRN Elizabeth Segundo M.D.   0.5 mg at 10/22/21 0055   • Pharmacy Consult Request ...Pain Management Review 1 Each  1 Each PHARMACY TO DOSE Elizabeth Segundo M.D.       • cyclobenzaprine (Flexeril) tablet 10 mg  10 mg HS PRN Elizabeth Segundo M.D.       • diphenhydrAMINE (BENADRYL) tablet/capsule 25 mg  25 mg Q6HRS PRN Elizabeth Segundo M.D.       • ezetimibe (ZETIA) tablet 10 mg  10  mg Q EVENING Elizabeth Segundo M.D.   10 mg at 10/21/21 1649   • gabapentin (NEURONTIN) capsule 600 mg  600 mg BID Elizabeth Segundo M.D.   600 mg at 10/22/21 0501   • micafungin (MYCAMINE) 100 mg in  mL ivpb  100 mg Q24HRS Elizabeth Segundo M.D.   Stopped at 10/21/21 1329   • oxyCODONE immediate release (ROXICODONE) tablet 10 mg  10 mg Q4HRS PRN Elizabeth Segundo M.D.   10 mg at 10/22/21 0956   • piperacillin-tazobactam (ZOSYN) 3.375 g in  mL IVPB  3.375 g Q8HRS Elizabeth Segundo M.D.   Stopped at 10/22/21 0901   • promethazine (PHENERGAN) tablet 25 mg  25 mg Q6HRS PRN Elizabeth Segundo M.D.   25 mg at 10/21/21 0820   • vitamin D3 (cholecalciferol) tablet 1,000 Units  1,000 Units DAILY Elizabeth Segundo M.D.   1,000 Units at 10/22/21 0501   • heparin injection 5,000 Units  5,000 Units Q8HRS Stuart Avalos M.D.   5,000 Units at 10/22/21 0500   Last reviewed on 10/15/2021 11:02 AM by Jhoana Benjamin KAMRYN        Current Outpatient Medications:  Medications Prior to Admission   Medication Sig Dispense Refill Last Dose   • cyclobenzaprine (FLEXERIL) 10 mg Tab Take 10 mg by mouth every evening.   9/30/2021 at [facility]   • ezetimibe (ZETIA) 10 MG Tab Take 10 mg by mouth every evening.   9/30/2021 at [facility]   • Magnesium 400 MG Tab Take 400 mg by mouth every evening.   9/30/2021 at [facility]   • gabapentin (NEURONTIN) 300 MG Cap Take 600 mg by mouth 2 Times a Day.   9/30/2021 at [facility]   • BIOTIN PO Take 1 Tablet by mouth every day.   9/30/2021 at [facility]   • HYDROcodone/acetaminophen (NORCO)  MG Tab Take 0.5 Tablets by mouth every 6 hours as needed for Moderate Pain.   10/8/2021 at [facility]   • Cholecalciferol (VITAMIN D3 PO) Take 1 Each by mouth every day.   9/30/2021 at [facility]   • ondansetron (ZOFRAN ODT) 4 MG TABLET DISPERSIBLE Take 4 mg by mouth every 6 hours as needed for Nausea.   10/8/2021 at [facility]         Medication Allergies:  Allergies   Allergen Reactions   • Ampicillin Rash     Rash     • Cephalexin Diarrhea, Vomiting and Nausea     .  "  • Clindamycin Vomiting     \"cleocin\"   • Codeine      Severe stomach pain, cramps, spasms   • Demerol Vomiting   • Levofloxacin Unspecified     Numbness     • Tetracyclines Rash     .   • Tizanidine Itching   • Hydromorphone Vomiting and Nausea   • Morphine Vomiting   • Pcn [Penicillins] Itching   • Sulfa Drugs Itching         Family Medical History:  Family History   Problem Relation Age of Onset   • Stroke Mother    • Cancer Father         larynx   • Diabetes Brother          Social History:  Social History     Socioeconomic History   • Marital status:      Spouse name: Not on file   • Number of children: Not on file   • Years of education: Not on file   • Highest education level: Not on file   Occupational History   • Not on file   Tobacco Use   • Smoking status: Former Smoker     Packs/day: 0.25     Years: 0.10     Pack years: 0.02     Types: Cigarettes     Quit date: 1973     Years since quittin.8   • Smokeless tobacco: Never Used   Vaping Use   • Vaping Use: Never used   Substance and Sexual Activity   • Alcohol use: No   • Drug use: No   • Sexual activity: Not on file     Comment:    Other Topics Concern   • Not on file   Social History Narrative   • Not on file     Social Determinants of Health     Financial Resource Strain:    • Difficulty of Paying Living Expenses:    Food Insecurity:    • Worried About Running Out of Food in the Last Year:    • Ran Out of Food in the Last Year:    Transportation Needs:    • Lack of Transportation (Medical):    • Lack of Transportation (Non-Medical):    Physical Activity:    • Days of Exercise per Week:    • Minutes of Exercise per Session:    Stress:    • Feeling of Stress :    Social Connections:    • Frequency of Communication with Friends and Family:    • Frequency of Social Gatherings with Friends and Family:    • Attends Muslim Services:    • Active Member of Clubs or Organizations:    • Attends Club or Organization Meetings:    • Marital " "Status:    Intimate Partner Violence:    • Fear of Current or Ex-Partner:    • Emotionally Abused:    • Physically Abused:    • Sexually Abused:          Vital signs:  Weight/BMI: Body mass index is 29.1 kg/m².  BP (!) 173/74   Pulse 76   Temp 37.2 °C (99 °F) (Temporal)   Resp 17   Ht 1.6 m (5' 2.99\")   Wt 74.5 kg (164 lb 3.9 oz)   SpO2 96%   Vitals:    10/21/21 2140 10/22/21 0138 10/22/21 0512 10/22/21 0746   BP:  157/63 136/61 (!) 173/74   Pulse:  78 66 76   Resp:  18 18 17   Temp:  36.4 °C (97.6 °F) 36.4 °C (97.6 °F) 37.2 °C (99 °F)   TempSrc:  Temporal Temporal Temporal   SpO2: 88% 96% 96% 96%   Weight:       Height:         Oxygen Therapy:  Pulse Oximetry: 96 %, O2 (LPM): 0, O2 Delivery Device: None - Room Air    Intake/Output Summary (Last 24 hours) at 10/22/2021 1019  Last data filed at 10/21/2021 2102  Gross per 24 hour   Intake --   Output 355 ml   Net -355 ml         Physical Exam:  Physical Exam  Vitals and nursing note reviewed.   Constitutional:       Appearance: She is ill-appearing.   HENT:      Head: Normocephalic and atraumatic.      Mouth/Throat:      Mouth: Mucous membranes are dry.   Pulmonary:      Comments: Decreased breath sounds in bases  Abdominal:      Tenderness: There is abdominal tenderness (RUQ).   Neurological:      General: No focal deficit present.      Mental Status: She is alert and oriented to person, place, and time.   Psychiatric:         Thought Content: Thought content normal.         Judgment: Judgment normal.             Labs:  Recent Labs     10/20/21  0312 10/21/21  0045   SODIUM 134* 136   POTASSIUM 3.9 3.6   CHLORIDE 96 95*   CO2 31 31   BUN 12 16   CREATININE 0.59 0.50   MAGNESIUM 1.9 1.9   PHOSPHORUS 2.8 2.7   CALCIUM 8.1* 8.5     Recent Labs     10/20/21  0312 10/21/21  0045   ALTSGPT 29 29   ASTSGOT 34 30   ALKPHOSPHAT 45 53   TBILIRUBIN 0.3 0.3   GLUCOSE 139* 104*     Recent Labs     10/20/21  0312 10/21/21  0045   WBC 6.9 6.8   NEUTSPOLYS 77.20* 70.80 "   LYMPHOCYTES 14.00* 15.60*   MONOCYTES 6.10 11.50   EOSINOPHILS 0.90 0.10   BASOPHILS 0.90 0.40   ASTSGOT 34 30   ALTSGPT 29 29   ALKPHOSPHAT 45 53   TBILIRUBIN 0.3 0.3     Recent Labs     10/20/21  0312 10/21/21  0045   RBC 2.95* 3.11*   HEMOGLOBIN 9.0* 9.4*   HEMATOCRIT 27.9* 29.0*   PLATELETCT 272 263     No results found for this or any previous visit (from the past 24 hour(s)).      Radiology Review:  DX-CHEST-LIMITED (1 VIEW)   Final Result      1.  Right basilar atelectasis or consolidation. Small right pleural effusion not excluded.   2.  Atherosclerotic plaque.      CT-DRAIN-PERITONEAL   Final Result      1.  CT guided pancreatic pseudocyst catheter drainage.   2.  The current plan is to obtain a follow-up CT in 5-7 days..      IR-PICC LINE PLACEMENT W/ GUIDANCE > AGE 5   Final Result                  Ultrasound-guided PICC placement performed by qualified nursing staff as    above.          CT-ABDOMEN-PELVIS WITH    (Results Pending)         MDM (Data Review):   -Records reviewed and summarized in current documentation  -I personally reviewed and interpreted the laboratory results  -I personally reviewed the radiology images        Medical Decision Making, by Problem:  Active Hospital Problems    Diagnosis    • Bacteremia due to Gram-negative bacteria and fungemia [R78.81]    • Sepsis due to intraabdominal fluid collection/abscess ([facility]) [A41.9]    • Hyperlipidemia [E78.5]    • Hypertension [I10]      66-year-old female with a history of recurrent idiopathic pancreatitis s/p ERCP with biliary sphincterotomy and biopsy of a distal BD polyp on October 1, 2021.  Postop complications-pancreatitis.  Ever since then, complaints of subjective fevers, progressive abdominal pain, nausea/vomiting.  Began to have worsening right lower quadrant pain over the past 5 days.  CT scan abdomen/pelvis large irregular fluid collection with thick wall occupying most of the right abdomen surrounding right kidney and right  colon.  Additional fluid and gas collection in the midline abdomen anterior to the pancreas concerning for abscess.  Severe wall thickening of the descending and transverse colon, second portion of duodenum likely reactive.  Pneumobilia with gas in the CBD, intrahepatic bile ducts as well as pancreatic ducts (likely secondary to recent ERCP with sphincterotomy).  IR placed a drain with improvement in abdominal pain initially, but then drain output slowed to no output. Nursing was discovered to have have flushed the drain for at least 2 days. After flush by bedside nurse, reportedly 300 cc of fluid came out. Fluid bilirubin 0.6. Fluid amylase still pending. Blood cultures from 10/8 positive for Chryseobacterium and Candida glabrata. ID following. Repeat CT with no change in fluid collection and new pelvic fluid collection/abscess. Plan for second IR drain placed. Surgery on board as well.         Assessment/Recommendations:  ASSESSMENT:  1.  Sepsis due to intra-abdominal fluid collection/abscess-unclear etiology suspect previous bile or bowel leak that has since healed versus ?from recent pancreatitis. Ongoing bile leak does not seem to be the cause as HIDA was negative and fluid bilirubin normal at 0.6. Awaiting fluid amylase which was ordered by previous hospitalist but not resulted yet - Cultures (+)  2.  History of pancreatitis  3.  Intra-abdominal fluid collection, unchanged with new pelvic fluid collection  4.  History of morbid obesity s/p laparoscopic sleeve  5.  Euvolemic hyponatremia  6.  Constipation     PLAN:     -Continue IV antibiotics and antifungals per ID and pharmacy recommendations  -Continue to trend CBC, CMP  - IV antibiotics, antiemetics, pain medication per primary team  -Appreciate IR and surgery recommendations/management  --Continue drain flushes per IR recommendations daily  -CT scan today  -GI will follow along in the periphery    Thank you very much for allowing me to participate in the  care of your patient.  Please feel free to contact me anytime at [phone].     Ele RIDER    Core Quality Measures   Reviewed items:  Labs, Medications and Radiology reports reviewed

## 2021-10-22 NOTE — PROGRESS NOTES
Bedside report given to Bahman RN, pt sent with all belongings to [id number]. Pt transported with 2 RN's.

## 2021-10-22 NOTE — PROGRESS NOTES
Radiology Progress Note   Author: AYE Mahan Date & Time created: 10/22/2021  9:34 AM   Date of admission  10/15/2021  Note to reader: this note follows the APSO format rather than the historical SOAP format. Assessment and plan located at the top of the note for ease of use.    Chief Complaint  66 y.o. female admitted 10/15/2021 with abd pain     HPI  66-year-old female PMH recurrent idiopathic pancreatitis s/p ERCP with sphincterotomy October 1, 2021 complicated by ERCP pancreatitis, sleeve gastrectomy, dyslipidemia, hypertension, osteoarthritis of the spine status post lumbar fusion who was admitted to the hospital 10/8/2021 with worsening right lower quadrant pain over the past week.  Ever since her ERCP October 1, 2021, she has been experiencing pain, intermittent subjective fevers, nausea.  In the emergency room-labs: CBC: WBC 17.8..  Sodium 130.  LFTs-WNL.  CT scan abdomen/pelvis-large irregular fluid collection with thick wall occupying most of the right abdomen surrounding the right kidney and colon.  Severe wall thickening of the descending, transverse colon, second portion of the duodenum likely reactive.  Pneumobilia with gas in the CBD.  Drain placement by IR was performed.  Currently, the abdominal pain has improved.      Assessment/Plan  Interval History   Principal Problem:    Bacteremia due to Gram-negative bacteria and fungemia  Active Problems:    Hypertension    Hyperlipidemia    Sepsis due to intraabdominal fluid collection/abscess (HCC)    Hyponatremia    Duodenal perforation (HCC)    Postprocedural retroperitoneal abscess    Diarrhea    Electrolyte abnormality    Acute respiratory failure with hypoxia (HCC)    Hypokalemia      Plan IR  - Irrigate Left and Right abdominal drain with 50 ml of sterile saline q4 hrs-   - Surgery, ID, GI following   - Repeat CT scan today for abscess eval        [id number]  IR:   10/8- Right abd drain placed, + Chryseobacterium indologenes    Courtney hurtado  Candida glabrata   10/15- transfer from [facility]   10/16- Left abd drain placed, drain +  Enterococcus faecalis, Candida albicans, Candida glabrata  10/17- Left Abd drain - 195 ml, green milky  Right abd drain- 238 ml  10/18   Left drain- 75 ml  Right drain- 110 ml  10/19   Left drain- 230 ml    Right drain-  280 ml   10/20  Left drain-  125 ml  Brown green   Right drain- 405 ml brown green  10/21  Left drain-  90 ml  Brown green in am  Right drain- 290 ml brown green in am   10/22  Left drain- 10 ml in am   Right drain- 50 ml in am          Review of Systems  Physical Exam   Review of Systems   Constitutional: Positive for malaise/fatigue. Negative for chills and fever.   HENT: Negative for hearing loss.    Eyes: Negative for blurred vision.   Respiratory: Negative for cough, hemoptysis and shortness of breath.    Cardiovascular: Negative for chest pain and palpitations.   Gastrointestinal: Positive for abdominal pain. Negative for vomiting.   Genitourinary: Negative for dysuria.   Musculoskeletal: Negative for myalgias.   Skin: Negative for rash.   Neurological: Positive for weakness. Negative for dizziness and headaches.   Endo/Heme/Allergies: Does not bruise/bleed easily.   Psychiatric/Behavioral: Negative for suicidal ideas.      Vitals:    10/22/21 0746   BP: (!) 173/74   Pulse: 76   Resp: 17   Temp: 37.2 °C (99 °F)   SpO2: 96%        Physical Exam  Constitutional:       Appearance: Normal appearance.   HENT:      Head: Normocephalic.      Nose: Nose normal.      Mouth/Throat:      Mouth: Mucous membranes are moist.   Eyes:      Pupils: Pupils are equal, round, and reactive to light.   Cardiovascular:      Rate and Rhythm: Normal rate.   Pulmonary:      Effort: Pulmonary effort is normal. No respiratory distress.   Abdominal:      Palpations: Abdomen is soft.      Tenderness: There is abdominal tenderness.      Comments: Left and right abdominal drains   Drain sites CDI,  no redness or swelling   Connect to MARTHA bulbs    Musculoskeletal:         General: No tenderness or deformity.      Cervical back: Normal range of motion.      Right lower leg: Edema present.      Left lower leg: Edema present.      Comments: Edema improving    Skin:     General: Skin is warm and dry.      Capillary Refill: Capillary refill takes less than 2 seconds.      Coloration: Skin is not jaundiced or pale.   Neurological:      General: No focal deficit present.      Mental Status: She is alert.      Motor: No weakness.   Psychiatric:         Mood and Affect: Mood normal.         Behavior: Behavior normal.             Labs    Recent Labs     10/20/21  0312 10/21/21  0045   WBC 6.9 6.8   RBC 2.95* 3.11*   HEMOGLOBIN 9.0* 9.4*   HEMATOCRIT 27.9* 29.0*   MCV 94.6 93.2   MCH 30.5 30.2   MCHC 32.3* 32.4*   RDW 47.4 48.2   PLATELETCT 272 263   MPV 8.9* 8.9*     Recent Labs     10/20/21  0312 10/21/21  0045   SODIUM 134* 136   POTASSIUM 3.9 3.6   CHLORIDE 96 95*   CO2 31 31   GLUCOSE 139* 104*   BUN 12 16   CREATININE 0.59 0.50   CALCIUM 8.1* 8.5     Recent Labs     10/20/21  0312 10/21/21  0045   ALBUMIN 2.4* 2.7*   TBILIRUBIN 0.3 0.3   ALKPHOSPHAT 45 53   TOTPROTEIN 4.8* 5.4*   ALTSGPT 29 29   ASTSGOT 34 30   CREATININE 0.59 0.50     DX-CHEST-LIMITED (1 VIEW)   Final Result      1.  Right basilar atelectasis or consolidation. Small right pleural effusion not excluded.   2.  Atherosclerotic plaque.      CT-DRAIN-PERITONEAL   Final Result      1.  CT guided pancreatic pseudocyst catheter drainage.   2.  The current plan is to obtain a follow-up CT in 5-7 days..      IR-PICC LINE PLACEMENT W/ GUIDANCE > AGE 5   Final Result                  Ultrasound-guided PICC placement performed by qualified nursing staff as    above.          CT-ABDOMEN-PELVIS WITH    (Results Pending)       INR   Date Value Ref Range Status   10/15/2021 1.49 (H) 0.87 - 1.13 Final     Comment:     INR - Non-therapeutic Reference Range:  0.87-1.13  INR - Therapeutic Reference Range: 2.0-4.0       No results found for: POCINR     Intake/Output Summary (Last 24 hours) at 10/18/2021 1528  Last data filed at 10/18/2021 0500  Gross per 24 hour   Intake --   Output 80 ml   Net -80 ml      Labs not explicitly included in this progress note were reviewed by the author. Radiology/imaging not explicitly included in this progress note was reviewed by the author.   I have performed a physical exam and reviewed and updated ROS and Plan today (10/22/2021).     20 minutes in directly providing and coordinating care and extensive data review.  No time overlap and excludes procedures.

## 2021-10-22 NOTE — DIETARY
Nutrition support weekly update:  Day 7 of admit.  Nils Alfonso is a 66 y.o. female with admitting DX of bile duct leak, postprocedural intraabdominal abscess.     TPN initiated on 10/16. Current TPN meeting 100% of estimated needs with 1676 kcal and 90 gm protein.     Assessment:  Weight (10/21): 74.5 kg via bed scale; up 3.5 kg from admit weight - likely from fluid status vs current weight being bed scale compared to stand up     Evaluation:   1. Full liquid diet started yesterday  · Spoke with pt at bedside who reports fair appetite and adequate tolerance to full liquids  · Politely declined Boost supplements at this time  · Pt eager to resume solid foods - pending results of today's CT scan  2. MAR: phosphorus, vitamin D3, TPN  3. Labs: Na 134, glucose 106, phos 2.4, triglycerides 171  4. GI: last BM 10/18 (to be expected d/t TPN  5. Current feeding via TPN remains appropriate - however nutritional intake expected to  on full liquid per pt's report of fair appetite and good tolerance     Malnutrition risk: No significant indicators of malnutrition identified at this time.     Recommendations/Plan:  1. Continue TPN per pharmacy   2. Consider weaning TPN tomorrow if pt continues to tolerate full liquid diet  3. Full liquid meal trays do provide sufficient nutrition - so should be able to wean TPN appropriately based on % meals consumed   4. Monitor weight trend    RD following

## 2021-10-22 NOTE — CARE PLAN
The patient is Stable - Low risk of patient condition declining or worsening    Shift Goals  Clinical Goals: Monitor MARTHA drains  Patient Goals: pain management  Family Goals: get her better    Progress made toward(s) clinical / shift goals:        Problem: Hemodynamics  Goal: Patient's hemodynamics, fluid balance and neurologic status will be stable or improve  Outcome: Progressing     Problem: Fluid Volume  Goal: Fluid volume balance will be maintained  Outcome: Progressing     Problem: Urinary - Renal Perfusion  Goal: Ability to achieve and maintain adequate renal perfusion and functioning will improve  Outcome: Progressing     Problem: Respiratory  Goal: Patient will achieve/maintain optimum respiratory ventilation and gas exchange  Outcome: Progressing

## 2021-10-22 NOTE — PROGRESS NOTES
Report received from TALITA Mejias.  RN agrees with assessment.    COVID-19 surge in effect.  Crisis charting utilized this shift.

## 2021-10-22 NOTE — PROGRESS NOTES
Assumed care of patient at 1915, received bedside report from day shift RN. Bed is locked and in lowest position with call light within reach. Treaded socks in place. Patient updated on plan of care. White board updated. Pt A&Ox4. Patient's breathing pattern is unlabored. Pt is medical. Family at bedside.    Crisis charting COVID-19 surge in effect.

## 2021-10-22 NOTE — ASSESSMENT & PLAN NOTE
Resolved with TPN  Monitoring per RD  11/6 Kphos; serum goal >3  11/18: 2.7  Will continue to monitor

## 2021-10-22 NOTE — PROGRESS NOTES
Pharmacy TPN Day # 7       10/22/2021    Dosing Weight:   60 kg (adjusted body weight)        TPN as ordered provides: 100% of goal calories                             Caloric goal: 8007-2091 kcal/day                           Protein goal:  1.4-1.7 gm/kg/day                           TPN indication: perforated duodenum with high drain output                          Additional nutritional considerations:  Full liquid diet, SQ octreotide for drain output     History of present illness:   Admitted on 10/15/2021 for abdominal pain. Underwent polypectomy and a sphincterotomy on 10/01/2021.  CT completed on 10/08/202, found to have a large fluid collection and thickening of wall around the duodenum with retroperitoneal fluid collection.  IR placed drain. CT completed on 10/09/2021 with a significant amount of retroperitoneal air and fluid.  Pt was transferred to [facility] 10/14/21 for further evaluation. 2 drains have been placed for fluid collections surrounding the R kidney and colon.  Peritoneal drain cultures have demonstrated polymicrobial growth however blood cultures have remained negative.    Temp (24hrs), Av.7 °C (98 °F), Min:36.2 °C (97.2 °F), Max:37.2 °C (99 °F)  .  Recent Labs     10/20/21  0312 10/21/21  0045 10/22/21  1005   SODIUM 134* 136 134*   POTASSIUM 3.9 3.6 3.9   CHLORIDE 96 95* 99   CO2 31 31 30   BUN 12 16 16   CREATININE 0.59 0.50 0.58   GLUCOSE 139* 104* 106*   CALCIUM 8.1* 8.5 8.2*   ASTSGOT 34 30 19   ALTSGPT 29 29 19   ALBUMIN 2.4* 2.7* 3.0*   TBILIRUBIN 0.3 0.3 0.3   PHOSPHORUS 2.8 2.7 2.4*   MAGNESIUM 1.9 1.9 1.9     Accu-Checks  No results for input(s): POCGLUCOSE in the last 72 hours.    Vitals:    10/21/21 2107 10/22/21 0138 10/22/21 0512 10/22/21 0746   BP: 156/74 157/63 136/61 (!) 173/74   Weight:       Height:           Intake/Output Summary (Last 24 hours) at 10/22/2021 1441  Last data filed at 10/21/2021 2102  Gross per 24 hour   Intake --   Output 230 ml   Net -230 ml        Orders Placed This Encounter   Procedures   • Diet Order Diet: Full Liquid (NO RED COLORED PRODUCTS)     Standing Status:   Standing     Number of Occurrences:   1     Order Specific Question:   Diet:     Answer:   Full Liquid [11]     Comments:   NO RED COLORED PRODUCTS         TPN for past 72 hours (Show up to 3 orders; newest on the left. Changes between the two most recent orders are indicated.)     Start date and time   10/22/2021 2000 10/19/2021 2000 10/18/2021 2000      TPN Central Line Formulation [[id number]] TPN Central Line Formulation [[id number]] TPN Central Line Formulation [[id number]]    Order Status  Active Last Dose in Progress Completed    Last Admin   Rate Verify at 10/22/2021 0745 by Bahman Monique RUrielNUriel New Bag at 10/18/2021 2107 by Tatyana Melton RANAM       Base    Clinisol 15%  90 g 90 g 90 g    dextrose 70%  240 g 240 g 240 g    fat emulsions 20%  50 g 50 g 50 g       Additives    potassium phosphate  25 mmol 20 mmol 25 mmol    sodium acetate  145 mEq 110 mEq 130 mEq    sodium chloride  75 mEq 110 mEq 170 mEq    magnesium sulfate  8 mEq 8 mEq 8 mEq    calcium GLUConate  9.3 mEq 9.3 mEq 9.3 mEq    M.T.E.-4  1 mL 1 mL 1 mL    M.V.I. Adult  10 mL 10 mL 10 mL    famotidine  40 mg 40 mg 40 mg       QS Base    sterile water  110.55 mL 120.96 mL 646.3 mL       Energy Contribution    Proteins  -- -- --    Dextrose  816 kcal 816 kcal 816 kcal    Lipids  500 kcal 500 kcal 500 kcal    Total  1,316 kcal 1,316 kcal 1,316 kcal       Electrolyte Ion Calculated Amount    Sodium  220 mEq 220 mEq 300 mEq    Potassium  36.67 mEq 29.33 mEq 36.67 mEq    Calcium  9.3 mEq 9.3 mEq 9.3 mEq    Magnesium  8 mEq 8 mEq 8 mEq    Aluminum  -- -- --    Phosphate  25 mmol 20 mmol 25 mmol    Chloride  75 mEq 110 mEq 170 mEq    Acetate  221.2 mEq 186.2 mEq 206.2 mEq       Other    Total Protein  90 g 90 g 90 g    Total Protein/kg  1.21 g/kg 1.26 g/kg 1.26 g/kg    Total Amino Acid  -- -- --    Total Amino Acid/kg   -- -- --    Glucose Infusion Rate  2.24 mg/kg/min 2.24 mg/kg/min 2.33 mg/kg/min    Osmolarity (Estimated)  -- -- --    Volume  1,440 mL 1,440 mL 1,992 mL    Rate  60 mL/hr 60 mL/hr 83 mL/hr    Dosing Weight  74.5 kg 71.4 kg 71.4 kg    Infusion Site  [facility]            This formula provides:  % kcal as lipids = 30  Grams protein/kg = 1.5  Non-protein calories = 1316  Kcals/kg = 28  Total daily calories = 1676    Comments:  1. Patient was advanced to full liquid diet yesterday afternoon, but is still not eating much. Will continue TPN at 100% tonight, but consider weaning tomorrow to stimulate appetite.     2. Labs reviewed:     - Low phos - Phos tabs ordered x 3 doses = 48 mmol PO                         - increased slightly in TPN, but limited by drug shortages     - slight adjustment to Cl-/aceteate balance     - sodium slightly low today, TPN already at NS equivalent     - TPN still contains famotidine     3. CMP ordered for tomorrow per MD, anticipate full TPN labs per protocol Monday if pt still on TPN (not currently ordered).      Yanci Funez, PharmD, BCOP

## 2021-10-23 LAB
BACTERIA BLD CULT: NORMAL
BACTERIA BLD CULT: NORMAL
SIGNIFICANT IND 70042: NORMAL
SIGNIFICANT IND 70042: NORMAL
SITE SITE: NORMAL
SITE SITE: NORMAL
SOURCE SOURCE: NORMAL
SOURCE SOURCE: NORMAL

## 2021-10-23 PROCEDURE — 700111 HCHG RX REV CODE 636 W/ 250 OVERRIDE (IP): Performed by: STUDENT IN AN ORGANIZED HEALTH CARE EDUCATION/TRAINING PROGRAM

## 2021-10-23 PROCEDURE — 700111 HCHG RX REV CODE 636 W/ 250 OVERRIDE (IP): Mod: JG | Performed by: FAMILY MEDICINE

## 2021-10-23 PROCEDURE — A9270 NON-COVERED ITEM OR SERVICE: HCPCS | Performed by: FAMILY MEDICINE

## 2021-10-23 PROCEDURE — 700102 HCHG RX REV CODE 250 W/ 637 OVERRIDE(OP): Performed by: STUDENT IN AN ORGANIZED HEALTH CARE EDUCATION/TRAINING PROGRAM

## 2021-10-23 PROCEDURE — 700105 HCHG RX REV CODE 258: Performed by: FAMILY MEDICINE

## 2021-10-23 PROCEDURE — 700102 HCHG RX REV CODE 250 W/ 637 OVERRIDE(OP): Performed by: FAMILY MEDICINE

## 2021-10-23 PROCEDURE — 99233 SBSQ HOSP IP/OBS HIGH 50: CPT | Performed by: STUDENT IN AN ORGANIZED HEALTH CARE EDUCATION/TRAINING PROGRAM

## 2021-10-23 PROCEDURE — 700105 HCHG RX REV CODE 258: Performed by: STUDENT IN AN ORGANIZED HEALTH CARE EDUCATION/TRAINING PROGRAM

## 2021-10-23 PROCEDURE — A9270 NON-COVERED ITEM OR SERVICE: HCPCS | Performed by: STUDENT IN AN ORGANIZED HEALTH CARE EDUCATION/TRAINING PROGRAM

## 2021-10-23 PROCEDURE — 770006 HCHG ROOM/CARE - MED/SURG/GYN SEMI*

## 2021-10-23 PROCEDURE — 700101 HCHG RX REV CODE 250: Performed by: STUDENT IN AN ORGANIZED HEALTH CARE EDUCATION/TRAINING PROGRAM

## 2021-10-23 RX ADMIN — OXYCODONE HYDROCHLORIDE 10 MG: 10 TABLET ORAL at 20:03

## 2021-10-23 RX ADMIN — ONDANSETRON 4 MG: 2 INJECTION INTRAMUSCULAR; INTRAVENOUS at 10:28

## 2021-10-23 RX ADMIN — FENTANYL CITRATE 25 MCG: 50 INJECTION, SOLUTION INTRAMUSCULAR; INTRAVENOUS at 03:42

## 2021-10-23 RX ADMIN — ONDANSETRON 4 MG: 2 INJECTION INTRAMUSCULAR; INTRAVENOUS at 23:17

## 2021-10-23 RX ADMIN — HYDROMORPHONE HYDROCHLORIDE 0.5 MG: 1 INJECTION, SOLUTION INTRAMUSCULAR; INTRAVENOUS; SUBCUTANEOUS at 02:25

## 2021-10-23 RX ADMIN — MICAFUNGIN SODIUM 100 MG: 100 INJECTION, POWDER, LYOPHILIZED, FOR SOLUTION INTRAVENOUS at 12:50

## 2021-10-23 RX ADMIN — PIPERACILLIN AND TAZOBACTAM 3.38 G: 3; .375 INJECTION, POWDER, LYOPHILIZED, FOR SOLUTION INTRAVENOUS; PARENTERAL at 06:45

## 2021-10-23 RX ADMIN — HYDROMORPHONE HYDROCHLORIDE 0.5 MG: 1 INJECTION, SOLUTION INTRAMUSCULAR; INTRAVENOUS; SUBCUTANEOUS at 17:11

## 2021-10-23 RX ADMIN — ONDANSETRON 4 MG: 2 INJECTION INTRAMUSCULAR; INTRAVENOUS at 06:58

## 2021-10-23 RX ADMIN — DIBASIC SODIUM PHOSPHATE, MONOBASIC POTASSIUM PHOSPHATE AND MONOBASIC SODIUM PHOSPHATE 500 MG: 852; 155; 130 TABLET ORAL at 09:00

## 2021-10-23 RX ADMIN — HYDROMORPHONE HYDROCHLORIDE 0.5 MG: 1 INJECTION, SOLUTION INTRAMUSCULAR; INTRAVENOUS; SUBCUTANEOUS at 14:28

## 2021-10-23 RX ADMIN — HYDROMORPHONE HYDROCHLORIDE: 1 INJECTION, SOLUTION INTRAMUSCULAR; INTRAVENOUS; SUBCUTANEOUS at 23:17

## 2021-10-23 RX ADMIN — GABAPENTIN 600 MG: 300 CAPSULE ORAL at 17:02

## 2021-10-23 RX ADMIN — CALCIUM GLUCONATE: 98 INJECTION, SOLUTION INTRAVENOUS at 20:07

## 2021-10-23 RX ADMIN — EZETIMIBE 10 MG: 10 TABLET ORAL at 17:02

## 2021-10-23 RX ADMIN — CYCLOBENZAPRINE 10 MG: 10 TABLET, FILM COATED ORAL at 17:02

## 2021-10-23 RX ADMIN — PIPERACILLIN AND TAZOBACTAM 3.38 G: 3; .375 INJECTION, POWDER, LYOPHILIZED, FOR SOLUTION INTRAVENOUS; PARENTERAL at 14:02

## 2021-10-23 RX ADMIN — OXYCODONE HYDROCHLORIDE 10 MG: 10 TABLET ORAL at 09:13

## 2021-10-23 RX ADMIN — PIPERACILLIN AND TAZOBACTAM 3.38 G: 3; .375 INJECTION, POWDER, LYOPHILIZED, FOR SOLUTION INTRAVENOUS; PARENTERAL at 20:03

## 2021-10-23 ASSESSMENT — ENCOUNTER SYMPTOMS
FEVER: 0
NAUSEA: 0
CHILLS: 0
VOMITING: 0
ABDOMINAL PAIN: 1
SHORTNESS OF BREATH: 1
FALLS: 0
COUGH: 0
SEIZURES: 0
LOSS OF CONSCIOUSNESS: 0
PALPITATIONS: 0

## 2021-10-23 ASSESSMENT — PAIN DESCRIPTION - PAIN TYPE
TYPE: ACUTE PAIN

## 2021-10-23 NOTE — PROGRESS NOTES
" \"COVID-19 surge in effect\"      Patient A&O x 4, medicated for pain per MAR. Patient's right MARTHA drain was noted to  Have blood and milky drainage. Dressing was changed and reinforced with abdominal pads while insuring tubing is in place. Left MARTHA drain had small bloody and milky drainage and was also reinforced with abdominal pads. Dressing remained dry and intact the rest of the shift. Patient complained of increased pain to abdomen, notified on call hospitalist and received order for Fentanyl 0.25 micrograms.     "

## 2021-10-23 NOTE — PROGRESS NOTES
Pharmacy TPN Day # 8       10/23/2021    Dosing Weight:   60 kg (AdjBW)      TPN currently providing 100% of goal   TPN goal: 3710-7258 kcal/day including 1.4-1.7 gm/kg/day Protein   TPN indication: perforated duodenum with high drain output         Pertinent PMH: Admitted on 10/15/2021 for abdominal pain. Underwent polypectomy and a sphincterotomy on 10/01/2021.  CT completed on 10/08/202, found to have a large fluid collection and thickening of wall around the duodenum with retroperitoneal fluid collection.  IR placed drain. CT completed on 10/09/2021 with a significant amount of retroperitoneal air and fluid.  Pt was transferred to [facility] 10/14/21 for further evaluation. 2 drains have been placed for fluid collections surrounding the R kidney and colon.  Peritoneal drain cultures have demonstrated polymicrobial growth however blood cultures have remained negative.    Temp (24hrs), Av.1 °C (98.7 °F), Min:36.5 °C (97.7 °F), Max:37.5 °C (99.5 °F)  .  Recent Labs     10/21/21  0045 10/22/21  1005   SODIUM 136 134*   POTASSIUM 3.6 3.9   CHLORIDE 95* 99   CO2 31 30   BUN 16 16   CREATININE 0.50 0.58   GLUCOSE 104* 106*   CALCIUM 8.5 8.2*   ASTSGOT 30 19   ALTSGPT 29 19   ALBUMIN 2.7* 3.0*   TBILIRUBIN 0.3 0.3   PHOSPHORUS 2.7 2.4*   MAGNESIUM 1.9 1.9     Accu-Checks  No results for input(s): POCGLUCOSE in the last 72 hours.    Vitals:    10/22/21 2100 10/23/21 0400 10/23/21 0649 10/23/21 0710   BP: (!) 162/67 (!) 170/67 (!) 162/98 156/63   Weight:       Height:           Intake/Output Summary (Last 24 hours) at 10/23/2021 1451  Last data filed at 10/23/2021 1239  Gross per 24 hour   Intake 620 ml   Output 45 ml   Net 575 ml       TPN for past 72 hours (Show up to 3 orders; newest on the left. Changes between the two most recent orders are indicated.)     Start date and time   10/22/2021 2000 10/19/2021 2000 10/18/2021 2000      TPN Central Line Formulation [[id number]] TPN Central Line Formulation  [[id number]] TPN Central Line Formulation [[id number]]    Order Status  Active Completed Completed    Last Admin  New Bag at 10/22/2021 2007 by Ayesha Hartman R.N. Rate Verify at 10/22/2021 0745 by Bahman Monique R.N. New Bag at 10/18/2021 2107 by Tatyana Melton R.N.       Base    Clinisol 15%  90 g 90 g 90 g    dextrose 70%  240 g 240 g 240 g    fat emulsions 20%  50 g 50 g 50 g       Additives    potassium phosphate  25 mmol 20 mmol 25 mmol    sodium acetate  145 mEq 110 mEq 130 mEq    sodium chloride  77 mEq 110 mEq 170 mEq    magnesium sulfate  8 mEq 8 mEq 8 mEq    calcium GLUConate  9.3 mEq 9.3 mEq 9.3 mEq    M.T.E.-4  1 mL 1 mL 1 mL    M.V.I. Adult  10 mL 10 mL 10 mL    famotidine  40 mg 40 mg 40 mg       QS Base    sterile water  110.05 mL 120.96 mL 646.3 mL       Energy Contribution    Proteins  -- -- --    Dextrose  816 kcal 816 kcal 816 kcal    Lipids  500 kcal 500 kcal 500 kcal    Total  1,316 kcal 1,316 kcal 1,316 kcal       Electrolyte Ion Calculated Amount    Sodium  222 mEq 220 mEq 300 mEq    Potassium  36.67 mEq 29.33 mEq 36.67 mEq    Calcium  9.3 mEq 9.3 mEq 9.3 mEq    Magnesium  8 mEq 8 mEq 8 mEq    Aluminum  -- -- --    Phosphate  25 mmol 20 mmol 25 mmol    Chloride  77 mEq 110 mEq 170 mEq    Acetate  221.2 mEq 186.2 mEq 206.2 mEq       Other    Total Protein  90 g 90 g 90 g    Total Protein/kg  1.21 g/kg 1.26 g/kg 1.26 g/kg    Total Amino Acid  -- -- --    Total Amino Acid/kg  -- -- --    Glucose Infusion Rate  2.24 mg/kg/min 2.24 mg/kg/min 2.33 mg/kg/min    Osmolarity (Estimated)  -- -- --    Volume  1,440 mL 1,440 mL 1,992 mL    Rate  60 mL/hr 60 mL/hr 83 mL/hr    Dosing Weight  74.5 kg 71.4 kg 71.4 kg    Infusion Site  [facility]            This formula provides:  % kcal as lipids = 30  Grams protein/kg = 1.5  Non-protein calories = 1316  Kcals/kg = 28  Total daily calories = 1676    Comments:   1.  Clinical status: Pt transitioned to full liquid diet 2 days ago  and is tolerating well.  No advancement in diet today, however.     2.  Nutrition at 100% of calorie goal   3.  Macronutrients:  Protein at 1.5 g/kg/day (100% of goal); remaining calories split 62% (carbohydrate) and 38% (fat)   4.  Glycemic Control: NNL   5.  Micronutrients:  Remains stable.    · K - no changes  · Na - no changes  · Phos - no changes  · Mag - no changes    · Ca - no changes  · Acid-Base balance - 2/3 Ac vs 1/3 Cl   6.  Other:    · Famotidine 40 mg/day    7.  Fluid status: continue at 60 ml/hr   8.  Labs: CMP + Mag + Phos daily x 5 days      Nidla Hammond, PharmD

## 2021-10-23 NOTE — PROGRESS NOTES
Pt is A&O x4, on RA, VSS.  Pt denies having any pain at the present time.  Pt has a MARTHA drain placed in both R and L abdomen.  at bedside, frequent checks in place.

## 2021-10-23 NOTE — PROGRESS NOTES
Hospital Medicine Daily Progress Note    Date of Service  10/23/2021    Chief Complaint  Nils Alfonso is a 66 y.o. female admitted 10/15/2021 with abdominal pain    Hospital Course  Initially admitted to [facility] for evaluation of abdominal pain after recent ERCP with polypectomy and sphincterotomy and noted to have large intra-abdominal fluid collection for which she underwent drainage with interventional radiology and was evaluated by infectious disease and surgery.  She was transferred to [facility] for higher level of care.  She was evaluated by Dr Cook who recommended conservative management with placement of a second drain    Pneumobilia with gas in the CBD - s/p drain placement  TPN  Post ERCP retroperitoneal abscess with drain x2     Repeat CT 10/22 showed Grossly unchanged irregular fluid collection occupying the right abdomen surrounding the right kidney and right colon extending to midline.       Interval Problem Update  Patient was seen and examined at bedside.  No acute events overnight.  Reported abdominal pain but tolerable.  Normal bowel movement.  Some nausea    BP high,  lisinopril 5 mg daily,  labetalol as needed  Phos 2.4 - replaced  Tolerating full liquids    Micafungin for candademia until 10/27  Zosyn for E.coli and enterococcus bactermi until 10/24  Drain irrigation q4 hours    Follow-up ID, GI, IR, surgery recommendations     I have personally seen and examined the patient at bedside. I discussed the plan of care with patient, bedside RN and infectious disease.    Consultants/Specialty  general surgery, GI and infectious disease    Code Status  Full Code    Disposition  Patient is not medically cleared.   Anticipate discharge to to home with organized home healthcare and close outpatient follow-up.  I have placed the appropriate orders for post-discharge needs.    Review of Systems  Review of Systems   Constitutional: Negative for chills and fever.    Respiratory: Positive for shortness of breath. Negative for cough.    Cardiovascular: Negative for chest pain and palpitations.   Gastrointestinal: Positive for abdominal pain. Negative for nausea and vomiting.   Genitourinary: Negative for dysuria and frequency.   Musculoskeletal: Negative for falls.   Neurological: Negative for seizures and loss of consciousness.   All other systems reviewed and are negative.       Physical Exam  Temp:  [36.5 °C (97.7 °F)-37.5 °C (99.5 °F)] 36.8 °C (98.3 °F)  Pulse:  [54-71] 71  Resp:  [16-20] 16  BP: (140-170)/(63-98) 156/63  SpO2:  [92 %-97 %] 92 %    Physical Exam  Vitals and nursing note reviewed.   Constitutional:       General: She is not in acute distress.     Comments: Pleasant, conversational   HENT:      Head: Normocephalic and atraumatic.      Right Ear: External ear normal.      Left Ear: External ear normal.      Nose: Nose normal. No rhinorrhea.      Mouth/Throat:      Pharynx: No oropharyngeal exudate or posterior oropharyngeal erythema.   Eyes:      General: No scleral icterus.     Conjunctiva/sclera: Conjunctivae normal.   Cardiovascular:      Rate and Rhythm: Normal rate and regular rhythm.      Heart sounds: Normal heart sounds. No murmur heard.     Pulmonary:      Effort: Pulmonary effort is normal.      Breath sounds: Normal breath sounds. No wheezing.   Abdominal:      General: Bowel sounds are normal.      Palpations: Abdomen is soft.      Tenderness: There is abdominal tenderness. There is no guarding or rebound.      Comments: Abdominal drains in place x2   Musculoskeletal:      Cervical back: Neck supple. No rigidity.      Comments: Lower extemity edema improved   Skin:     General: Skin is warm and dry.      Coloration: Skin is not cyanotic or jaundiced.      Findings: No bruising.      Nails: There is no clubbing.   Neurological:      General: No focal deficit present.      Mental Status: She is alert and oriented to person, place, and time.       Cranial Nerves: No cranial nerve deficit.      Motor: No weakness.   Psychiatric:         Mood and Affect: Mood normal.         Behavior: Behavior normal.     Patient was seen and examined at bedside. No changes in physical exam from prior evaluation.      Fluids    Intake/Output Summary (Last 24 hours) at 10/23/2021 1152  Last data filed at 10/23/2021 0400  Gross per 24 hour   Intake 220 ml   Output 45 ml   Net 175 ml       Laboratory  Recent Labs     10/21/21  0045 10/22/21  1005   WBC 6.8 6.5   RBC 3.11* 2.95*   HEMOGLOBIN 9.4* 8.9*   HEMATOCRIT 29.0* 27.5*   MCV 93.2 93.2   MCH 30.2 30.2   MCHC 32.4* 32.4*   RDW 48.2 48.4   PLATELETCT 263 190   MPV 8.9* 9.6     Recent Labs     10/21/21  0045 10/22/21  1005   SODIUM 136 134*   POTASSIUM 3.6 3.9   CHLORIDE 95* 99   CO2 31 30   GLUCOSE 104* 106*   BUN 16 16   CREATININE 0.50 0.58   CALCIUM 8.5 8.2*             Recent Labs     10/22/21  1005   TRIGLYCERIDE 171*       Imaging  CT-ABDOMEN-PELVIS WITH   Final Result         1. Grossly unchanged irregular fluid collection occupying the right abdomen surrounding the right kidney and right colon extending to midline. Additional pigtail catheter in the component about midline anterior abdomen. Both pigtail catheters seem to be    within the collection.      2. Small right pleural effusion with right basilar atelectasis.               DX-CHEST-LIMITED (1 VIEW)   Final Result      1.  Right basilar atelectasis or consolidation. Small right pleural effusion not excluded.   2.  Atherosclerotic plaque.      CT-DRAIN-PERITONEAL   Final Result      1.  CT guided pancreatic pseudocyst catheter drainage.   2.  The current plan is to obtain a follow-up CT in 5-7 days..      IR-PICC LINE PLACEMENT W/ GUIDANCE > AGE 5   Final Result                  Ultrasound-guided PICC placement performed by qualified nursing staff as    above.               Assessment/Plan  * Bacteremia due to Gram-negative bacteria and fungemia- (present on  admission)  Assessment & Plan  Peritoneal fluid cultures grew E. coli and Enterococcus   Blood cultures grew chryseobacterium and Candida glabrata   Repeat blood cultures from 10/13/2021 are negative    Peritoneal fluid from 10/16/2021 growing yeast and strep species follow-up on final results  Monitor drain output and continue current antibiotics  KALANI negative for signs of endocarditis    Micafungin for candademia until 10/27  Zosyn for E.coli and enterococcus bactermi until 10/24    Postprocedural retroperitoneal abscess- (present on admission)  Assessment & Plan  Now with abdominal drain x2  General surgery, GI following    Repeat CT 10/22 showed Grossly unchanged irregular fluid collection occupying the right abdomen surrounding the right kidney and right colon extending to midline.     Hypophosphatemia  Assessment & Plan  Replaced  Continue to monitor    Hypokalemia  Assessment & Plan  Monitor and replace    Acute respiratory failure with hypoxia (HCC)  Assessment & Plan  Requiring 1L supplemental oxygen  Likely volume overload with lower extremity edema present  Improved with diuresis  Now on room air  Resolved    Electrolyte abnormality  Assessment & Plan  Continue electrolyte repletion with TPN    Diarrhea  Assessment & Plan  C. difficile negative    Duodenal perforation (HCC)- (present on admission)  Assessment & Plan  Following ERCP with retroperitoneal abscess and bacteremia    Continue current antibiotics  Started on subcu octreotide  Trial clear liquids per GS    Hyponatremia- (present on admission)  Assessment & Plan  Stable continue to monitor  Appears volume overloaded we will give 1 dose of Lasix today and monitor    Sepsis due to intraabdominal fluid collection/abscess (HCC)- (present on admission)  Assessment & Plan  Sepsis resolved  Source intra-abdominal  Continue Zosyn and Mycamine and follow-up on repeat cultures  ID following  Will need follow-up abdominal imaging in a few  days    Hyperlipidemia- (present on admission)  Assessment & Plan  Continue Zetia    Hypertension- (present on admission)  Assessment & Plan  started lisinopril 5 mg daily,   labetalol as needed         VTE prophylaxis: heparin ppx    I have performed a physical exam and reviewed and updated ROS and Plan today (10/23/2021). In review of yesterday's note (10/22/2021), there are no changes except as documented above.

## 2021-10-24 LAB
ALBUMIN SERPL BCP-MCNC: 2.1 G/DL (ref 3.2–4.9)
ALBUMIN/GLOB SERPL: 0.9 G/DL
ALP SERPL-CCNC: 51 U/L (ref 30–99)
ALT SERPL-CCNC: 12 U/L (ref 2–50)
ANION GAP SERPL CALC-SCNC: 7 MMOL/L (ref 7–16)
AST SERPL-CCNC: 17 U/L (ref 12–45)
BASOPHILS # BLD AUTO: 0.4 % (ref 0–1.8)
BASOPHILS # BLD: 0.03 K/UL (ref 0–0.12)
BILIRUB SERPL-MCNC: 0.2 MG/DL (ref 0.1–1.5)
BUN SERPL-MCNC: 17 MG/DL (ref 8–22)
CALCIUM SERPL-MCNC: 7.8 MG/DL (ref 8.5–10.5)
CHLORIDE SERPL-SCNC: 102 MMOL/L (ref 96–112)
CO2 SERPL-SCNC: 28 MMOL/L (ref 20–33)
CREAT SERPL-MCNC: 0.61 MG/DL (ref 0.5–1.4)
EOSINOPHIL # BLD AUTO: 0.04 K/UL (ref 0–0.51)
EOSINOPHIL NFR BLD: 0.6 % (ref 0–6.9)
ERYTHROCYTE [DISTWIDTH] IN BLOOD BY AUTOMATED COUNT: 53.6 FL (ref 35.9–50)
GLOBULIN SER CALC-MCNC: 2.4 G/DL (ref 1.9–3.5)
GLUCOSE SERPL-MCNC: 97 MG/DL (ref 65–99)
HCT VFR BLD AUTO: 25.1 % (ref 37–47)
HGB BLD-MCNC: 8.4 G/DL (ref 12–16)
IMM GRANULOCYTES # BLD AUTO: 0.14 K/UL (ref 0–0.11)
IMM GRANULOCYTES NFR BLD AUTO: 2.1 % (ref 0–0.9)
LYMPHOCYTES # BLD AUTO: 0.95 K/UL (ref 1–4.8)
LYMPHOCYTES NFR BLD: 14.2 % (ref 22–41)
MAGNESIUM SERPL-MCNC: 1.9 MG/DL (ref 1.5–2.5)
MCH RBC QN AUTO: 33.2 PG (ref 27–33)
MCHC RBC AUTO-ENTMCNC: 33.5 G/DL (ref 33.6–35)
MCV RBC AUTO: 99.2 FL (ref 81.4–97.8)
MONOCYTES # BLD AUTO: 0.55 K/UL (ref 0–0.85)
MONOCYTES NFR BLD AUTO: 8.2 % (ref 0–13.4)
NEUTROPHILS # BLD AUTO: 4.97 K/UL (ref 2–7.15)
NEUTROPHILS NFR BLD: 74.5 % (ref 44–72)
NRBC # BLD AUTO: 0 K/UL
NRBC BLD-RTO: 0 /100 WBC
PHOSPHATE SERPL-MCNC: 3.2 MG/DL (ref 2.5–4.5)
PLATELET # BLD AUTO: 183 K/UL (ref 164–446)
PMV BLD AUTO: 10.7 FL (ref 9–12.9)
POTASSIUM SERPL-SCNC: 3.5 MMOL/L (ref 3.6–5.5)
PROT SERPL-MCNC: 4.5 G/DL (ref 6–8.2)
RBC # BLD AUTO: 2.53 M/UL (ref 4.2–5.4)
SODIUM SERPL-SCNC: 137 MMOL/L (ref 135–145)
WBC # BLD AUTO: 6.7 K/UL (ref 4.8–10.8)

## 2021-10-24 PROCEDURE — 700111 HCHG RX REV CODE 636 W/ 250 OVERRIDE (IP): Performed by: FAMILY MEDICINE

## 2021-10-24 PROCEDURE — 99233 SBSQ HOSP IP/OBS HIGH 50: CPT | Performed by: STUDENT IN AN ORGANIZED HEALTH CARE EDUCATION/TRAINING PROGRAM

## 2021-10-24 PROCEDURE — 700105 HCHG RX REV CODE 258: Performed by: STUDENT IN AN ORGANIZED HEALTH CARE EDUCATION/TRAINING PROGRAM

## 2021-10-24 PROCEDURE — 85025 COMPLETE CBC W/AUTO DIFF WBC: CPT

## 2021-10-24 PROCEDURE — 83735 ASSAY OF MAGNESIUM: CPT

## 2021-10-24 PROCEDURE — 700101 HCHG RX REV CODE 250: Performed by: STUDENT IN AN ORGANIZED HEALTH CARE EDUCATION/TRAINING PROGRAM

## 2021-10-24 PROCEDURE — A9270 NON-COVERED ITEM OR SERVICE: HCPCS | Performed by: STUDENT IN AN ORGANIZED HEALTH CARE EDUCATION/TRAINING PROGRAM

## 2021-10-24 PROCEDURE — 700111 HCHG RX REV CODE 636 W/ 250 OVERRIDE (IP): Performed by: STUDENT IN AN ORGANIZED HEALTH CARE EDUCATION/TRAINING PROGRAM

## 2021-10-24 PROCEDURE — 700105 HCHG RX REV CODE 258: Performed by: FAMILY MEDICINE

## 2021-10-24 PROCEDURE — 770006 HCHG ROOM/CARE - MED/SURG/GYN SEMI*

## 2021-10-24 PROCEDURE — 700102 HCHG RX REV CODE 250 W/ 637 OVERRIDE(OP): Performed by: STUDENT IN AN ORGANIZED HEALTH CARE EDUCATION/TRAINING PROGRAM

## 2021-10-24 PROCEDURE — 700102 HCHG RX REV CODE 250 W/ 637 OVERRIDE(OP): Performed by: FAMILY MEDICINE

## 2021-10-24 PROCEDURE — 80053 COMPREHEN METABOLIC PANEL: CPT

## 2021-10-24 PROCEDURE — A9270 NON-COVERED ITEM OR SERVICE: HCPCS | Performed by: FAMILY MEDICINE

## 2021-10-24 PROCEDURE — 84100 ASSAY OF PHOSPHORUS: CPT

## 2021-10-24 RX ORDER — FAMOTIDINE 20 MG/1
20 TABLET, FILM COATED ORAL 2 TIMES DAILY
Status: CANCELLED | OUTPATIENT
Start: 2021-10-24

## 2021-10-24 RX ORDER — FAMOTIDINE 20 MG/1
20 TABLET, FILM COATED ORAL DAILY
Status: DISCONTINUED | OUTPATIENT
Start: 2021-10-24 | End: 2021-10-24

## 2021-10-24 RX ORDER — POLYETHYLENE GLYCOL 3350 17 G/17G
1 POWDER, FOR SOLUTION ORAL DAILY
Status: DISCONTINUED | OUTPATIENT
Start: 2021-10-24 | End: 2021-11-06

## 2021-10-24 RX ORDER — POTASSIUM CHLORIDE 20 MEQ/1
40 TABLET, EXTENDED RELEASE ORAL ONCE
Status: COMPLETED | OUTPATIENT
Start: 2021-10-24 | End: 2021-10-24

## 2021-10-24 RX ADMIN — ONDANSETRON 4 MG: 2 INJECTION INTRAMUSCULAR; INTRAVENOUS at 20:20

## 2021-10-24 RX ADMIN — LISINOPRIL 5 MG: 5 TABLET ORAL at 04:05

## 2021-10-24 RX ADMIN — GABAPENTIN 600 MG: 300 CAPSULE ORAL at 04:04

## 2021-10-24 RX ADMIN — PIPERACILLIN AND TAZOBACTAM 3.38 G: 3; .375 INJECTION, POWDER, LYOPHILIZED, FOR SOLUTION INTRAVENOUS; PARENTERAL at 12:47

## 2021-10-24 RX ADMIN — OXYCODONE HYDROCHLORIDE 10 MG: 10 TABLET ORAL at 04:04

## 2021-10-24 RX ADMIN — PIPERACILLIN AND TAZOBACTAM 3.38 G: 3; .375 INJECTION, POWDER, LYOPHILIZED, FOR SOLUTION INTRAVENOUS; PARENTERAL at 04:04

## 2021-10-24 RX ADMIN — EZETIMIBE 10 MG: 10 TABLET ORAL at 16:48

## 2021-10-24 RX ADMIN — POTASSIUM CHLORIDE 40 MEQ: 1500 TABLET, EXTENDED RELEASE ORAL at 12:47

## 2021-10-24 RX ADMIN — PIPERACILLIN AND TAZOBACTAM 3.38 G: 3; .375 INJECTION, POWDER, LYOPHILIZED, FOR SOLUTION INTRAVENOUS; PARENTERAL at 20:20

## 2021-10-24 RX ADMIN — OXYCODONE HYDROCHLORIDE 10 MG: 10 TABLET ORAL at 11:06

## 2021-10-24 RX ADMIN — HYDROMORPHONE HYDROCHLORIDE 0.5 MG: 1 INJECTION, SOLUTION INTRAMUSCULAR; INTRAVENOUS; SUBCUTANEOUS at 08:45

## 2021-10-24 RX ADMIN — Medication 1000 UNITS: at 04:04

## 2021-10-24 RX ADMIN — ONDANSETRON 4 MG: 2 INJECTION INTRAMUSCULAR; INTRAVENOUS at 15:11

## 2021-10-24 RX ADMIN — MICAFUNGIN SODIUM 100 MG: 100 INJECTION, POWDER, LYOPHILIZED, FOR SOLUTION INTRAVENOUS at 16:48

## 2021-10-24 RX ADMIN — GABAPENTIN 600 MG: 300 CAPSULE ORAL at 16:48

## 2021-10-24 RX ADMIN — OXYCODONE HYDROCHLORIDE 10 MG: 10 TABLET ORAL at 20:13

## 2021-10-24 RX ADMIN — OXYCODONE HYDROCHLORIDE 10 MG: 10 TABLET ORAL at 14:40

## 2021-10-24 RX ADMIN — CALCIUM GLUCONATE: 98 INJECTION, SOLUTION INTRAVENOUS at 20:08

## 2021-10-24 ASSESSMENT — ENCOUNTER SYMPTOMS
FALLS: 0
ABDOMINAL PAIN: 1
FEVER: 0
VOMITING: 0
CHILLS: 0
SHORTNESS OF BREATH: 1
NAUSEA: 0
COUGH: 0
PALPITATIONS: 0
SEIZURES: 0
LOSS OF CONSCIOUSNESS: 0

## 2021-10-24 ASSESSMENT — PAIN DESCRIPTION - PAIN TYPE
TYPE: ACUTE PAIN

## 2021-10-24 NOTE — CARE PLAN
The patient is Stable - Low risk of patient condition declining or worsening    Shift Goals  Clinical Goals: comfort  Patient Goals: rest  Family Goals: get her better    Progress made toward(s) clinical / shift goals:      Patient is not progressing towards the following goals:

## 2021-10-24 NOTE — PROGRESS NOTES
Alert and oriented x4. Offered fluids. Safety precautions in placed. Bed in lowest position. Upper side rails up. Treaded socks on. Reinforced the use of call light when needing assistance.

## 2021-10-24 NOTE — PROGRESS NOTES
Hospital Medicine Daily Progress Note    Date of Service  10/24/2021    Chief Complaint  Nils Alfonso is a 66 y.o. female admitted 10/15/2021 with abdominal pain    Hospital Course  Initially admitted to [facility] for evaluation of abdominal pain after recent ERCP with polypectomy and sphincterotomy and noted to have large intra-abdominal fluid collection for which she underwent drainage with interventional radiology and was evaluated by infectious disease and surgery.  She was transferred to [facility] for higher level of care.  She was evaluated by Dr Cook who recommended conservative management with placement of a second drain    Pneumobilia with gas in the CBD - s/p drain placement  TPN  Post ERCP retroperitoneal abscess with drain x2     Repeat CT 10/22 showed Grossly unchanged irregular fluid collection occupying the right abdomen surrounding the right kidney and right colon extending to midline.       Interval Problem Update  Patient was seen and examined at bedside.  No acute events overnight.  Reported abdominal pain but tolerable.  Normal bowel movement.      - Tolerating full liquids - still Some nausea, but no vomiting  - Drain fell out yesterday, it was placed back, now with dark red drainage.  Notified IR  - Patient concerned her home Pepcid not resumed.  I checked with the pharmacy, patient has been receiving Pepcid 40 mg through TPN.  Updated the patient  - On octreotide subcu for duodenal perforation, patient has been refusing due to the discomforts -check with pam Mckenna to dc  - BP high,  lisinopril 5 mg daily,  labetalol as needed, improving  -Plan to repeat CT Monday per IR    Micafungin for candademia until 10/27  Zosyn for E.coli and enterococcus bactermi until 10/24  Drain irrigation q4 hours    Follow-up ID, GI, IR, surgery recommendations     I have personally seen and examined the patient at bedside. I discussed the plan of care with  patient, bedside RN and infectious disease.    Consultants/Specialty  general surgery, GI and infectious disease    Code Status  Full Code    Disposition  Patient is not medically cleared.   Anticipate discharge to to home with organized home healthcare and close outpatient follow-up.  I have placed the appropriate orders for post-discharge needs.    Review of Systems  Review of Systems   Constitutional: Negative for chills and fever.   Respiratory: Positive for shortness of breath. Negative for cough.    Cardiovascular: Negative for chest pain and palpitations.   Gastrointestinal: Positive for abdominal pain. Negative for nausea and vomiting.   Genitourinary: Negative for dysuria and frequency.   Musculoskeletal: Negative for falls.   Neurological: Negative for seizures and loss of consciousness.   All other systems reviewed and are negative.       Physical Exam  Temp:  [36.4 °C (97.5 °F)-37.5 °C (99.5 °F)] 37.5 °C (99.5 °F)  Pulse:  [58-62] 62  Resp:  [16-18] 18  BP: (143-162)/(65-77) 162/77  SpO2:  [93 %-95 %] 95 %    Physical Exam  Vitals and nursing note reviewed.   Constitutional:       General: She is not in acute distress.     Comments: Pleasant, conversational   HENT:      Head: Normocephalic and atraumatic.      Right Ear: External ear normal.      Left Ear: External ear normal.      Nose: Nose normal. No rhinorrhea.      Mouth/Throat:      Pharynx: No oropharyngeal exudate or posterior oropharyngeal erythema.   Eyes:      General: No scleral icterus.     Conjunctiva/sclera: Conjunctivae normal.   Cardiovascular:      Rate and Rhythm: Normal rate and regular rhythm.      Heart sounds: Normal heart sounds. No murmur heard.     Pulmonary:      Effort: Pulmonary effort is normal.      Breath sounds: Normal breath sounds. No wheezing.   Abdominal:      General: Bowel sounds are normal.      Palpations: Abdomen is soft.      Tenderness: There is abdominal tenderness. There is no guarding or rebound.       Comments: Abdominal drains in place x2   Musculoskeletal:      Cervical back: Neck supple. No rigidity.      Comments: Lower extemity edema improved   Skin:     General: Skin is warm and dry.      Coloration: Skin is not cyanotic or jaundiced.      Findings: No bruising.      Nails: There is no clubbing.   Neurological:      General: No focal deficit present.      Mental Status: She is alert and oriented to person, place, and time.      Cranial Nerves: No cranial nerve deficit.      Motor: No weakness.   Psychiatric:         Mood and Affect: Mood normal.         Behavior: Behavior normal.     Patient was seen and examined at bedside. No changes in physical exam from prior evaluation.      Fluids    Intake/Output Summary (Last 24 hours) at 10/24/2021 1131  Last data filed at 10/24/2021 0628  Gross per 24 hour   Intake 700 ml   Output 590 ml   Net 110 ml       Laboratory  Recent Labs     10/22/21  1005 10/24/21  0415   WBC 6.5 6.7   RBC 2.95* 2.53*   HEMOGLOBIN 8.9* 8.4*   HEMATOCRIT 27.5* 25.1*   MCV 93.2 99.2*   MCH 30.2 33.2*   MCHC 32.4* 33.5*   RDW 48.4 53.6*   PLATELETCT 190 183   MPV 9.6 10.7     Recent Labs     10/22/21  1005 10/24/21  0645   SODIUM 134* 137   POTASSIUM 3.9 3.5*   CHLORIDE 99 102   CO2 30 28   GLUCOSE 106* 97   BUN 16 17   CREATININE 0.58 0.61   CALCIUM 8.2* 7.8*             Recent Labs     10/22/21  1005   TRIGLYCERIDE 171*       Imaging  CT-ABDOMEN-PELVIS WITH   Final Result         1. Grossly unchanged irregular fluid collection occupying the right abdomen surrounding the right kidney and right colon extending to midline. Additional pigtail catheter in the component about midline anterior abdomen. Both pigtail catheters seem to be    within the collection.      2. Small right pleural effusion with right basilar atelectasis.               DX-CHEST-LIMITED (1 VIEW)   Final Result      1.  Right basilar atelectasis or consolidation. Small right pleural effusion not excluded.   2.   Atherosclerotic plaque.      CT-DRAIN-PERITONEAL   Final Result      1.  CT guided pancreatic pseudocyst catheter drainage.   2.  The current plan is to obtain a follow-up CT in 5-7 days..      IR-PICC LINE PLACEMENT W/ GUIDANCE > AGE 5   Final Result                  Ultrasound-guided PICC placement performed by qualified nursing staff as    above.               Assessment/Plan  * Bacteremia due to Gram-negative bacteria and fungemia- (present on admission)  Assessment & Plan  Peritoneal fluid cultures grew E. coli and Enterococcus   Blood cultures grew chryseobacterium and Candida glabrata   Repeat blood cultures from 10/13/2021 are negative    Peritoneal fluid from 10/16/2021 growing yeast and strep species follow-up on final results  Monitor drain output and continue current antibiotics  KALANI negative for signs of endocarditis    Micafungin for candademia until 10/27  Zosyn for E.coli and enterococcus bactermi until 10/24    Postprocedural retroperitoneal abscess- (present on admission)  Assessment & Plan  Now with abdominal drain x2  General surgery, GI following    Repeat CT 10/22 showed Grossly unchanged irregular fluid collection occupying the right abdomen surrounding the right kidney and right colon extending to midline.     Hypophosphatemia  Assessment & Plan  Replaced  Continue to monitor    Hypokalemia  Assessment & Plan  Monitor and replace    Acute respiratory failure with hypoxia (HCC)  Assessment & Plan  Requiring 1L supplemental oxygen  Likely volume overload with lower extremity edema present  Improved with diuresis  Now on room air  Resolved    Electrolyte abnormality  Assessment & Plan  Continue electrolyte repletion with TPN    Diarrhea  Assessment & Plan  C. difficile negative    Duodenal perforation (HCC)- (present on admission)  Assessment & Plan  Following ERCP with retroperitoneal abscess and bacteremia    Continue current antibiotics  Trial clear liquids per GS    On octreotide subcu since  10/15, checked with pam Mckenna to dc (10/24)    Hyponatremia- (present on admission)  Assessment & Plan  Stable continue to monitor  Appears volume overloaded we will give 1 dose of Lasix today and monitor    Sepsis due to intraabdominal fluid collection/abscess (HCC)- (present on admission)  Assessment & Plan  Sepsis resolved  Source intra-abdominal  Continue Zosyn and Mycamine and follow-up on repeat cultures  ID following  Will need follow-up abdominal imaging in a few days    Hyperlipidemia- (present on admission)  Assessment & Plan  Continue Zetia    Hypertension- (present on admission)  Assessment & Plan  started lisinopril 5 mg daily,   labetalol as needed         VTE prophylaxis: heparin ppx    I have performed a physical exam and reviewed and updated ROS and Plan today (10/24/2021). In review of yesterday's note (10/23/2021), there are no changes except as documented above.

## 2021-10-24 NOTE — PROGRESS NOTES
Pharmacy TPN Day# 8: 10/23/2021     Dosing Weight: 60 kg (AdjBW)            TPN currently providing 50% of goal  TPN goal: 0100-7987 kcal/day including 1.4-1.7 gm/kg/day Protein  TPN indication: perforated duodenum with high drain output                                                                Pertinent PMH: Admitted on 10/15/2021 for abdominal pain. Underwent polypectomy and a sphincterotomy on 10/01/2021.  CT completed on 10/08/202, found to have a large fluid collection and thickening of wall around the duodenum with retroperitoneal fluid collection.  IR placed drain. CT completed on 10/09/2021 with a significant amount of retroperitoneal air and fluid.  Pt was transferred to [facility] 10/14/21 for further evaluation. 2 drains have been placed for fluid collections surrounding the R kidney and colon.  Peritoneal drain cultures have demonstrated polymicrobial growth however blood cultures have remained negative.      Temp (24hrs), Av.8 °C (98.2 °F), Min:36.4 °C (97.5 °F), Max:37.5 °C (99.5 °F)  .  Recent Labs     10/22/21  1005 10/24/21  0645   SODIUM 134* 137   POTASSIUM 3.9 3.5*   CHLORIDE 99 102   CO2 30 28   BUN 16 17   CREATININE 0.58 0.61   GLUCOSE 106* 97   CALCIUM 8.2* 7.8*   ASTSGOT 19 17   ALTSGPT 19 12   ALBUMIN 3.0* 2.1*   TBILIRUBIN 0.3 0.2   PHOSPHORUS 2.4* 3.2   MAGNESIUM 1.9 1.9     Accu-Checks  No results for input(s): POCGLUCOSE in the last 72 hours.    Vitals:    10/23/21 1450 10/23/21 1953 10/24/21 0310 10/24/21 0840   BP: 155/67 143/65 149/70 (!) 162/77   Weight:       Height:           Intake/Output Summary (Last 24 hours) at 10/24/2021 1407  Last data filed at 10/24/2021 0628  Gross per 24 hour   Intake 300 ml   Output 590 ml   Net -290 ml       Orders Placed This Encounter   Procedures   • Diet Order Diet: Regular     Standing Status:   Standing     Number of Occurrences:   1     Order Specific Question:   Diet:     Answer:   Regular [1]         TPN for past 72 hours (Show  up to 3 orders; newest on the left. Changes between the two most recent orders are indicated.)     Start date and time   10/24/2021 2000 10/22/2021 2000 10/19/2021 2000      TPN Central Line Formulation [[id number]] TPN Central Line Formulation [[id number]] TPN Central Line Formulation [[id number]]    Order Status  Active Last Dose in Progress Completed    Last Admin   New Bag at 10/23/2021 2007 by Cornelio Chowdary R.N. Rate Verify at 10/22/2021 0745 by Bahman Monique R.N.       Base    Clinisol 15%  45 g 90 g 90 g    dextrose 70%  120 g 240 g 240 g    fat emulsions 20%  25 g 50 g 50 g       Additives    potassium phosphate  25 mmol 25 mmol 20 mmol    sodium acetate  145 mEq 145 mEq 110 mEq    sodium chloride  77 mEq 77 mEq 110 mEq    magnesium sulfate  8 mEq 8 mEq 8 mEq    calcium GLUConate  9.3 mEq 9.3 mEq 9.3 mEq    M.T.E.-4  1 mL 1 mL 1 mL    M.V.I. Adult  10 mL 10 mL 10 mL    famotidine  40 mg 40 mg 40 mg       QS Base    sterile water  706.55 mL 110.05 mL 120.96 mL       Energy Contribution    Proteins  -- -- --    Dextrose  408 kcal 816 kcal 816 kcal    Lipids  250 kcal 500 kcal 500 kcal    Total  658 kcal 1,316 kcal 1,316 kcal       Electrolyte Ion Calculated Amount    Sodium  222 mEq 222 mEq 220 mEq    Potassium  36.67 mEq 36.67 mEq 29.33 mEq    Calcium  9.3 mEq 9.3 mEq 9.3 mEq    Magnesium  8 mEq 8 mEq 8 mEq    Aluminum  -- -- --    Phosphate  25 mmol 25 mmol 20 mmol    Chloride  77 mEq 77 mEq 110 mEq    Acetate  183.1 mEq 221.2 mEq 186.2 mEq       Other    Total Protein  45 g 90 g 90 g    Total Protein/kg  0.6 g/kg 1.21 g/kg 1.26 g/kg    Total Amino Acid  -- -- --    Total Amino Acid/kg  -- -- --    Glucose Infusion Rate  1.12 mg/kg/min 2.24 mg/kg/min 2.24 mg/kg/min    Osmolarity (Estimated)  -- -- --    Volume  1,440 mL 1,440 mL 1,440 mL    Rate  60 mL/hr 60 mL/hr 60 mL/hr    Dosing Weight  74.5 kg 74.5 kg 71.4 kg    Infusion [facility]  [facility]        This formula provides:  % kcal as lipids  = 30  Grams protein/kg = 0.75  Non-protein calories = 658  Kcals/kg = 14  Total daily calories = ~840    Comments:  1. Patient was transitioned to a full liquid diet ~3 days ago, and to a regular diet this morning. Patient still has some nausea but no vomiting noted. Decreased TPN to provide 50% caloric needs. If patient tolerates regular diet can possibly stop TPN tomorrow.  2. Macronutrients:   Dextrose: ~410 kcal (GIR 1.5 mg/min/kg)   Protein: 180 kcal    Lipids: 250 kcal   - providing ~ 50% caloric needs  3. Micronutrients:    Potassium slightly low per labs this morning. Repleted with PO Kdur 40 mEq x1 outside of TPN.   All other electrolytes stable. No changes inside of TPN  4. Glucose: BG <170. No SSI on MAR  5. Famotidine inside of TPN. Will have to change to oral famotidine once TPN is stopped    Pharmacy will continue to follow labs and patient needs, and reformulate TPN as needed.       Alyssa Espinosa, PharmD

## 2021-10-25 ENCOUNTER — APPOINTMENT (OUTPATIENT)
Dept: RADIOLOGY | Facility: MEDICAL CENTER | Age: 66
DRG: 856 | End: 2021-10-25
Attending: SURGERY
Payer: MEDICARE

## 2021-10-25 LAB
ALBUMIN SERPL BCP-MCNC: 2.6 G/DL (ref 3.2–4.9)
ALBUMIN/GLOB SERPL: 1.1 G/DL
ALP SERPL-CCNC: 63 U/L (ref 30–99)
ALT SERPL-CCNC: 12 U/L (ref 2–50)
ANION GAP SERPL CALC-SCNC: 8 MMOL/L (ref 7–16)
AST SERPL-CCNC: 22 U/L (ref 12–45)
BASOPHILS # BLD AUTO: 0.5 % (ref 0–1.8)
BASOPHILS # BLD: 0.04 K/UL (ref 0–0.12)
BILIRUB SERPL-MCNC: 0.3 MG/DL (ref 0.1–1.5)
BUN SERPL-MCNC: 16 MG/DL (ref 8–22)
CALCIUM SERPL-MCNC: 8.2 MG/DL (ref 8.5–10.5)
CHLORIDE SERPL-SCNC: 99 MMOL/L (ref 96–112)
CO2 SERPL-SCNC: 26 MMOL/L (ref 20–33)
CREAT SERPL-MCNC: 0.72 MG/DL (ref 0.5–1.4)
EOSINOPHIL # BLD AUTO: 0.02 K/UL (ref 0–0.51)
EOSINOPHIL NFR BLD: 0.2 % (ref 0–6.9)
ERYTHROCYTE [DISTWIDTH] IN BLOOD BY AUTOMATED COUNT: 51.3 FL (ref 35.9–50)
GLOBULIN SER CALC-MCNC: 2.4 G/DL (ref 1.9–3.5)
GLUCOSE BLD-MCNC: 111 MG/DL (ref 65–99)
GLUCOSE BLD-MCNC: 97 MG/DL (ref 65–99)
GLUCOSE SERPL-MCNC: 81 MG/DL (ref 65–99)
HCT VFR BLD AUTO: 25.6 % (ref 37–47)
HGB BLD-MCNC: 8.4 G/DL (ref 12–16)
IMM GRANULOCYTES # BLD AUTO: 0.2 K/UL (ref 0–0.11)
IMM GRANULOCYTES NFR BLD AUTO: 2.3 % (ref 0–0.9)
LYMPHOCYTES # BLD AUTO: 1.33 K/UL (ref 1–4.8)
LYMPHOCYTES NFR BLD: 15.5 % (ref 22–41)
MAGNESIUM SERPL-MCNC: 2 MG/DL (ref 1.5–2.5)
MCH RBC QN AUTO: 30.8 PG (ref 27–33)
MCHC RBC AUTO-ENTMCNC: 32.8 G/DL (ref 33.6–35)
MCV RBC AUTO: 93.8 FL (ref 81.4–97.8)
MONOCYTES # BLD AUTO: 0.64 K/UL (ref 0–0.85)
MONOCYTES NFR BLD AUTO: 7.5 % (ref 0–13.4)
NEUTROPHILS # BLD AUTO: 6.35 K/UL (ref 2–7.15)
NEUTROPHILS NFR BLD: 74 % (ref 44–72)
NRBC # BLD AUTO: 0 K/UL
NRBC BLD-RTO: 0 /100 WBC
PHOSPHATE SERPL-MCNC: 3.2 MG/DL (ref 2.5–4.5)
PLATELET # BLD AUTO: 170 K/UL (ref 164–446)
PMV BLD AUTO: 9.9 FL (ref 9–12.9)
POTASSIUM SERPL-SCNC: 3.9 MMOL/L (ref 3.6–5.5)
PROT SERPL-MCNC: 5 G/DL (ref 6–8.2)
RBC # BLD AUTO: 2.73 M/UL (ref 4.2–5.4)
SARS-COV+SARS-COV-2 AG RESP QL IA.RAPID: NOTDETECTED
SODIUM SERPL-SCNC: 133 MMOL/L (ref 135–145)
SPECIMEN SOURCE: NORMAL
WBC # BLD AUTO: 8.6 K/UL (ref 4.8–10.8)

## 2021-10-25 PROCEDURE — 99152 MOD SED SAME PHYS/QHP 5/>YRS: CPT

## 2021-10-25 PROCEDURE — 83735 ASSAY OF MAGNESIUM: CPT

## 2021-10-25 PROCEDURE — 99233 SBSQ HOSP IP/OBS HIGH 50: CPT | Performed by: INTERNAL MEDICINE

## 2021-10-25 PROCEDURE — 700111 HCHG RX REV CODE 636 W/ 250 OVERRIDE (IP): Performed by: RADIOLOGY

## 2021-10-25 PROCEDURE — 85025 COMPLETE CBC W/AUTO DIFF WBC: CPT

## 2021-10-25 PROCEDURE — 700105 HCHG RX REV CODE 258: Performed by: FAMILY MEDICINE

## 2021-10-25 PROCEDURE — 700101 HCHG RX REV CODE 250: Performed by: STUDENT IN AN ORGANIZED HEALTH CARE EDUCATION/TRAINING PROGRAM

## 2021-10-25 PROCEDURE — 700111 HCHG RX REV CODE 636 W/ 250 OVERRIDE (IP): Performed by: INTERNAL MEDICINE

## 2021-10-25 PROCEDURE — 87205 SMEAR GRAM STAIN: CPT

## 2021-10-25 PROCEDURE — 700105 HCHG RX REV CODE 258: Performed by: STUDENT IN AN ORGANIZED HEALTH CARE EDUCATION/TRAINING PROGRAM

## 2021-10-25 PROCEDURE — 700102 HCHG RX REV CODE 250 W/ 637 OVERRIDE(OP): Performed by: STUDENT IN AN ORGANIZED HEALTH CARE EDUCATION/TRAINING PROGRAM

## 2021-10-25 PROCEDURE — 82962 GLUCOSE BLOOD TEST: CPT | Mod: 91

## 2021-10-25 PROCEDURE — 87186 SC STD MICRODIL/AGAR DIL: CPT

## 2021-10-25 PROCEDURE — A9270 NON-COVERED ITEM OR SERVICE: HCPCS | Performed by: STUDENT IN AN ORGANIZED HEALTH CARE EDUCATION/TRAINING PROGRAM

## 2021-10-25 PROCEDURE — 700105 HCHG RX REV CODE 258: Performed by: INTERNAL MEDICINE

## 2021-10-25 PROCEDURE — 87426 SARSCOV CORONAVIRUS AG IA: CPT

## 2021-10-25 PROCEDURE — 0W9G30Z DRAINAGE OF PERITONEAL CAVITY WITH DRAINAGE DEVICE, PERCUTANEOUS APPROACH: ICD-10-PCS | Performed by: RADIOLOGY

## 2021-10-25 PROCEDURE — 97602 WOUND(S) CARE NON-SELECTIVE: CPT

## 2021-10-25 PROCEDURE — 700102 HCHG RX REV CODE 250 W/ 637 OVERRIDE(OP): Performed by: FAMILY MEDICINE

## 2021-10-25 PROCEDURE — 700111 HCHG RX REV CODE 636 W/ 250 OVERRIDE (IP): Performed by: STUDENT IN AN ORGANIZED HEALTH CARE EDUCATION/TRAINING PROGRAM

## 2021-10-25 PROCEDURE — 87070 CULTURE OTHR SPECIMN AEROBIC: CPT

## 2021-10-25 PROCEDURE — 87077 CULTURE AEROBIC IDENTIFY: CPT

## 2021-10-25 PROCEDURE — 84100 ASSAY OF PHOSPHORUS: CPT

## 2021-10-25 PROCEDURE — 99233 SBSQ HOSP IP/OBS HIGH 50: CPT | Performed by: STUDENT IN AN ORGANIZED HEALTH CARE EDUCATION/TRAINING PROGRAM

## 2021-10-25 PROCEDURE — 700111 HCHG RX REV CODE 636 W/ 250 OVERRIDE (IP): Performed by: FAMILY MEDICINE

## 2021-10-25 PROCEDURE — A9270 NON-COVERED ITEM OR SERVICE: HCPCS | Performed by: FAMILY MEDICINE

## 2021-10-25 PROCEDURE — 770006 HCHG ROOM/CARE - MED/SURG/GYN SEMI*

## 2021-10-25 PROCEDURE — 80053 COMPREHEN METABOLIC PANEL: CPT

## 2021-10-25 RX ORDER — MIDAZOLAM HYDROCHLORIDE 1 MG/ML
.5-2 INJECTION INTRAMUSCULAR; INTRAVENOUS PRN
Status: ACTIVE | OUTPATIENT
Start: 2021-10-25 | End: 2021-10-25

## 2021-10-25 RX ORDER — PROCHLORPERAZINE EDISYLATE 5 MG/ML
10 INJECTION INTRAMUSCULAR; INTRAVENOUS ONCE
Status: COMPLETED | OUTPATIENT
Start: 2021-10-25 | End: 2021-10-25

## 2021-10-25 RX ORDER — ONDANSETRON 2 MG/ML
4 INJECTION INTRAMUSCULAR; INTRAVENOUS PRN
Status: ACTIVE | OUTPATIENT
Start: 2021-10-25 | End: 2021-10-25

## 2021-10-25 RX ORDER — SODIUM CHLORIDE 9 MG/ML
500 INJECTION, SOLUTION INTRAVENOUS
Status: ACTIVE | OUTPATIENT
Start: 2021-10-25 | End: 2021-10-25

## 2021-10-25 RX ADMIN — EZETIMIBE 10 MG: 10 TABLET ORAL at 17:53

## 2021-10-25 RX ADMIN — Medication 1000 UNITS: at 04:06

## 2021-10-25 RX ADMIN — HEPARIN SODIUM 5000 UNITS: 5000 INJECTION, SOLUTION INTRAVENOUS; SUBCUTANEOUS at 05:11

## 2021-10-25 RX ADMIN — HEPARIN SODIUM 5000 UNITS: 5000 INJECTION, SOLUTION INTRAVENOUS; SUBCUTANEOUS at 20:26

## 2021-10-25 RX ADMIN — PIPERACILLIN SODIUM AND TAZOBACTAM SODIUM 3.38 G: 3; .375 INJECTION, POWDER, FOR SOLUTION INTRAVENOUS at 13:00

## 2021-10-25 RX ADMIN — PIPERACILLIN AND TAZOBACTAM 3.38 G: 3; .375 INJECTION, POWDER, LYOPHILIZED, FOR SOLUTION INTRAVENOUS; PARENTERAL at 10:20

## 2021-10-25 RX ADMIN — MICAFUNGIN SODIUM 100 MG: 100 INJECTION, POWDER, LYOPHILIZED, FOR SOLUTION INTRAVENOUS at 11:15

## 2021-10-25 RX ADMIN — OXYCODONE HYDROCHLORIDE 10 MG: 10 TABLET ORAL at 09:00

## 2021-10-25 RX ADMIN — FENTANYL CITRATE 50 MCG: 0.05 INJECTION, SOLUTION INTRAMUSCULAR; INTRAVENOUS at 15:48

## 2021-10-25 RX ADMIN — MIDAZOLAM HYDROCHLORIDE 1 MG: 1 INJECTION, SOLUTION INTRAMUSCULAR; INTRAVENOUS at 15:57

## 2021-10-25 RX ADMIN — ONDANSETRON 4 MG: 2 INJECTION INTRAMUSCULAR; INTRAVENOUS at 09:00

## 2021-10-25 RX ADMIN — LISINOPRIL 5 MG: 5 TABLET ORAL at 04:06

## 2021-10-25 RX ADMIN — PIPERACILLIN SODIUM AND TAZOBACTAM SODIUM 3.38 G: 3; .375 INJECTION, POWDER, FOR SOLUTION INTRAVENOUS at 20:21

## 2021-10-25 RX ADMIN — MIDAZOLAM HYDROCHLORIDE 1 MG: 1 INJECTION, SOLUTION INTRAMUSCULAR; INTRAVENOUS at 15:48

## 2021-10-25 RX ADMIN — OXYCODONE HYDROCHLORIDE 10 MG: 10 TABLET ORAL at 20:26

## 2021-10-25 RX ADMIN — PROCHLORPERAZINE EDISYLATE 10 MG: 5 INJECTION INTRAMUSCULAR; INTRAVENOUS at 15:39

## 2021-10-25 RX ADMIN — CALCIUM GLUCONATE: 98 INJECTION, SOLUTION INTRAVENOUS at 20:15

## 2021-10-25 RX ADMIN — GABAPENTIN 600 MG: 300 CAPSULE ORAL at 17:53

## 2021-10-25 RX ADMIN — FENTANYL CITRATE 50 MCG: 0.05 INJECTION, SOLUTION INTRAMUSCULAR; INTRAVENOUS at 15:57

## 2021-10-25 RX ADMIN — ONDANSETRON 4 MG: 2 INJECTION INTRAMUSCULAR; INTRAVENOUS at 13:00

## 2021-10-25 RX ADMIN — ONDANSETRON 4 MG: 2 INJECTION INTRAMUSCULAR; INTRAVENOUS at 04:06

## 2021-10-25 RX ADMIN — ONDANSETRON 4 MG: 2 INJECTION INTRAMUSCULAR; INTRAVENOUS at 20:26

## 2021-10-25 RX ADMIN — OXYCODONE HYDROCHLORIDE 10 MG: 10 TABLET ORAL at 02:08

## 2021-10-25 RX ADMIN — GABAPENTIN 600 MG: 300 CAPSULE ORAL at 04:06

## 2021-10-25 RX ADMIN — HYDROMORPHONE HYDROCHLORIDE 0.5 MG: 1 INJECTION, SOLUTION INTRAMUSCULAR; INTRAVENOUS; SUBCUTANEOUS at 10:13

## 2021-10-25 RX ADMIN — OXYCODONE HYDROCHLORIDE 10 MG: 10 TABLET ORAL at 13:00

## 2021-10-25 ASSESSMENT — ENCOUNTER SYMPTOMS
SHORTNESS OF BREATH: 1
WHEEZING: 0
HEADACHES: 0
NERVOUS/ANXIOUS: 1
DIZZINESS: 0
CHILLS: 0
HEMOPTYSIS: 0
WEAKNESS: 1
PALPITATIONS: 0
FOCAL WEAKNESS: 0
DIARRHEA: 0
FEVER: 0
FALLS: 0
SHORTNESS OF BREATH: 0
BRUISES/BLEEDS EASILY: 0
MYALGIAS: 0
CONSTIPATION: 0
BLURRED VISION: 0
VOMITING: 0
COUGH: 0
ABDOMINAL PAIN: 1
LOSS OF CONSCIOUSNESS: 0
MEMORY LOSS: 0
NAUSEA: 0
SPUTUM PRODUCTION: 0
SEIZURES: 0

## 2021-10-25 ASSESSMENT — PAIN DESCRIPTION - PAIN TYPE
TYPE: ACUTE PAIN

## 2021-10-25 ASSESSMENT — LIFESTYLE VARIABLES: SUBSTANCE_ABUSE: 0

## 2021-10-25 NOTE — CARE PLAN
The patient is Stable - Low risk of patient condition declining or worsening    Shift Goals  Clinical Goals: comfort  Patient Goals: pt will be able to rest and sleep comfort  Family Goals:     Progress made toward(s) clinical / shift goals:      Patient is not progressing towards the following goals:

## 2021-10-25 NOTE — PROGRESS NOTES
Infectious Disease Progress Note    Author: Tamra Mcfarland M.D. Date & Time of service: 10/25/2021  8:48 AM    Chief Complaint:  Follow-up for multiple intra-abdominal abscesses, polymicrobial bacteremia, candidemia     Interval History:  10/15 afebrile, WBC 13.5 patient transferred to [facility] overnight.  Patient having diarrhea however abdominal pain is slightly improved today.  She was evaluated by Dr. ST this morning who is recommending additional drain placement and holding off on surgery at this time  10/16 afebrile, WBC 8.4.  Plan for second drain placement today.  Also patient to start TPN.  Had one episode of liquid dark stools.  C differential PCR pending  10/17 afebrile, WBC 6 underwent CT-guided drain placement yesterday, Gram stain with few yeast. C. difficile negative. On TPN, planning to ambulate in the halls today  10/18 AF, O2 2 L NC, ongoing abdominal pain but overall improving.  She continues to have 2 drains in place with minimal output on the right.   10/25 afebrile, WBC 8.6.  Repeat CT scan on 10/22 without any changes.  Plan for additional drain placement today.  Patient is very anxious.  Tolerating IV antibiotics without any issues.  Ongoing abdominal pain    Review of Systems:  Review of Systems   Constitutional: Negative for chills and fever.   Respiratory: Negative for cough, sputum production and shortness of breath.    Cardiovascular: Negative for chest pain.   Gastrointestinal: Positive for abdominal pain. Negative for constipation, diarrhea, nausea and vomiting.   Psychiatric/Behavioral: The patient is nervous/anxious.    All other systems reviewed and are negative.      Hemodynamics:  Temp (24hrs), Av.2 °C (98.9 °F), Min:36.7 °C (98.1 °F), Max:37.6 °C (99.7 °F)  Temperature: 36.7 °C (98.1 °F)  Pulse  Av.5  Min: 54  Max: 85   Blood Pressure : 130/72       Physical Exam:  Physical Exam  Constitutional:       Appearance: Normal appearance.   Cardiovascular:      Rate and  Rhythm: Normal rate and regular rhythm.      Heart sounds: Normal heart sounds.   Pulmonary:      Effort: Pulmonary effort is normal.      Breath sounds: Normal breath sounds.   Abdominal:      General: There is distension.      Palpations: Abdomen is soft.      Tenderness: There is abdominal tenderness.      Comments: Two drains in place.  Left upper quadrant drain with greenish drainage at insertion site    Right-sided MARTHA drain-dressing saturated   Musculoskeletal:      Right lower leg: Edema present.      Left lower leg: Edema present.   Skin:     General: Skin is warm and dry.   Neurological:      General: No focal deficit present.      Mental Status: She is alert and oriented to person, place, and time.   Psychiatric:         Mood and Affect: Mood normal.         Behavior: Behavior normal.         Meds:    Current Facility-Administered Medications:   •  polyethylene glycol/lytes  •  TPN Central Line Formulation  •  lisinopril  •  labetalol  •  MD Alert...TPN per Pharmacy  •  ondansetron  •  ondansetron  •  polyethylene glycol/lytes **AND** magnesium hydroxide  •  acetaminophen  •  HYDROmorphone  •  Notify provider if pain remains uncontrolled **AND** Use the Numeric Rating Scale (NRS), Colon-Baker Faces (WBF), or FLACC on regular floors and Critical-Care Pain Observation Tool (CPOT) on ICUs/Trauma to assess pain **AND** Pulse Ox **AND** Pharmacy Consult Request **AND** If patient difficult to arouse and/or has respiratory depression (respiratory rate of 10 or less), stop any opiates that are currently infusing and call a Rapid Response.  •  cyclobenzaprine  •  diphenhydrAMINE  •  ezetimibe  •  gabapentin  •  micafungin (MYCAMINE) ivpb  •  oxyCODONE immediate release  •  promethazine  •  vitamin D3  •  heparin    Labs:  Recent Labs     10/22/21  1005 10/24/21  0415 10/25/21  0420   WBC 6.5 6.7 8.6   RBC 2.95* 2.53* 2.73*   HEMOGLOBIN 8.9* 8.4* 8.4*   HEMATOCRIT 27.5* 25.1* 25.6*   MCV 93.2 99.2* 93.8   MCH 30.2  33.2* 30.8   RDW 48.4 53.6* 51.3*   PLATELETCT 190 183 170   MPV 9.6 10.7 9.9   NEUTSPOLYS 68.70 74.50* 74.00*   LYMPHOCYTES 16.30* 14.20* 15.50*   MONOCYTES 11.10 8.20 7.50   EOSINOPHILS 0.50 0.60 0.20   BASOPHILS 0.50 0.40 0.50     Recent Labs     10/22/21  1005 10/24/21  0645 10/25/21  0420   SODIUM 134* 137 133*   POTASSIUM 3.9 3.5* 3.9   CHLORIDE 99 102 99   CO2 30 28 26   GLUCOSE 106* 97 81   BUN 16 17 16     Recent Labs     10/22/21  1005 10/24/21  0645 10/25/21  0420   ALBUMIN 3.0* 2.1* 2.6*   TBILIRUBIN 0.3 0.2 0.3   ALKPHOSPHAT 57 51 63   TOTPROTEIN 5.1* 4.5* 5.0*   ALTSGPT 19 12 12   ASTSGOT 19 17 22   CREATININE 0.58 0.61 0.72       Imaging:  CT-ABDOMEN WITH & W/O    Result Date: 10/9/2021  10/9/2021 1:38 PM HISTORY/REASON FOR EXAM:  eval for any ugi perforation given recent sphinterotomy during ERCP and now with fluid collection. TECHNIQUE/EXAM DESCRIPTION:  CT scan of the abdomen without and with contrast. Initial precontrast images were obtained from the diaphragmatic domes through the iliac crests using helical technique.  Following nonionic contrast administration in an intravenous bolus fashion, and postcontrast, thin-section helical scanning obtained from the diaphragmatic domes through the iliac crests. 80 mL of Omnipaque 350 nonionic contrast was administered. Low dose optimization technique was utilized for this CT exam including automated exposure control and adjustment of the mA and/or kV according to patient size. COMPARISON: 10/8/2021, 10/2/2021. FINDINGS: There is a small right pleural effusion with adjacent enhancing segmental atelectasis.. Calcified left lower lobe granuloma. No pericardial effusion. Cardiac chambers are normal in size. Coronary artery calcifications are present. Air in the intra and extra hepatic bile ducts. Unremarkable appearance of the liver. The hepatic and portal veins are patent. Spleen is unremarkable. Again noted is air in the pancreatic duct. No adrenal gland  nodules. Gallbladder is surgically absent. No hydronephrosis. No dilated loops of imaged bowel. There is anasarca and mesenteric edema. A pigtail drainage catheter is present in the right abdomen in a air and fluid collection, the extent of which is difficult to measure the collection tracks inferiorly and medially measuring approximately 17 cm in craniocaudal dimension. A smaller loculated collection in the anterior abdomen near the inferior anterior abdominal wall sutures measures 1.5 x 8.7 cm. No findings of contrast extravasation from the opacified bowel loops,  particularly no contrast extravasation at the ampulla of [place].     1.  No findings of intraluminal contrast extravasation from the opacified bowel loops, particularly no contrast extravasation from the duodenum at the ampulla of [place]. 2.  A pigtail drainage catheter is present in the right abdomen in a air and fluid collection, the extent of which is difficult to measure. The collection tracks inferiorly and medially measuring approximately 17 cm in craniocaudal dimension. 3.  A smaller loculated collection in the anterior abdomen near the inferior anterior abdominal wall sutures measures 1.5 x 8.7 cm. 4.  Unchanged pneumobilia in the CBD, intrahepatic bile ducts as well as pancreatic ducts. 5.  There is a small right pleural effusion with adjacent enhancing segmental atelectasis. 6.   Coronary artery calcifications are present    CT-ABDOMEN-PELVIS WITH    Result Date: 10/13/2021  10/13/2021 11:57 AM HISTORY/REASON FOR EXAM:  Peritonitis or perforation suspected; Pt with known intraabdominal fluid collection in the abdomen of uncertain etiology - had recent ERCP but studies not consistent with bile leak.  Drain in place - reassess. TECHNIQUE/EXAM DESCRIPTION:   CT scan of the abdomen and pelvis with contrast. Contrast-enhanced helical scanning was obtained from the diaphragmatic domes through the pubic symphysis following the bolus administration of  nonionic contrast without complication. 100 mL of Omnipaque 350 nonionic contrast was administered without complication. Low dose optimization technique was utilized for this CT exam including automated exposure control and adjustment of the mA and/or kV according to patient size. COMPARISON: 10/9/2021 FINDINGS: Lower Chest: Trace right pleural effusion with right basilar atelectasis. Calcified granuloma in the left lower lobe and trace left pleural fluid. Liver: Normal. Spleen: Unremarkable. Pancreas: Unremarkable. Gallbladder: The gallbladder has been resected. Biliary: Pneumobilia again noted. Adrenal glands: Normal. Kidneys: There is a small left renal cortical cyst which does not require imaging follow-up. No hydronephrosis.. Bowel: No bowel obstruction. Parts of the bowel are suboptimally evaluated due to the surrounding abscess and loss of the intervening fat planes. There is gastric sleeve surgery. Lymph nodes: No adenopathy. Vasculature: Scattered atherosclerosis. Peritoneum: A multiloculated although spatial rim-enhancing air-fluid collection within the right abdomen does not appear significantly changed in size the prior study. On series 2 image 54 measures about 15.5 x 8.7 cm. It extends from the upper abdomen,  where it is seen in the gallbladder fossa, inferiorly into the pelvis. It insinuates around and partially encases the duodenum and right kidney and extends around ascending colon and some mesenteric branch vessels. There is a percutaneous pigtail drainage catheter which appears well positioned within the abscess in the right midabdomen. Musculoskeletal: No acute or destructive process. Postsurgical changes anterior lumbar spinal fusion Pelvis: Hysterectomy. There is a small rim-enhancing fluid collection within the pelvis measuring 7 x 3.1 x 2.8 cm suspicious for small abscess..     1.  Extensive multiloculated rim-enhancing air and fluid collection/abscess within the right abdomen is not  significantly changed in size from the prior study. The percutaneous pigtail drainage catheter appears well-positioned within the collection. 2.  Small separate pelvic rim-enhancing fluid collection suggests an abscess.    CT-ABDOMEN-PELVIS WITH    Result Date: 10/8/2021  10/8/2021 8:06 AM HISTORY/REASON FOR EXAM:  Abdominal pain, fever. Right lower quadrant pain. Pancreatitis. TECHNIQUE/EXAM DESCRIPTION:   CT scan of the abdomen and pelvis with contrast. Contrast-enhanced helical scanning was obtained from the diaphragmatic domes through the pubic symphysis following the bolus administration of nonionic contrast without complication. 100 mL of Omnipaque 350 nonionic contrast was administered without complication. Low dose optimization technique was utilized for this CT exam including automated exposure control and adjustment of the mA and/or kV according to patient size. COMPARISON: 10/2/2021. FINDINGS: Lower Chest: Small right pleural effusion with right basilar opacities. Liver: Normal. Spleen: Unremarkable. Pancreas: Poorly visualized. There is gas in the pancreatic duct. Gallbladder: Surgically absent. Biliary: There is gas in the CBD and intrahepatic bile ducts, left more than right Adrenal glands: Normal. Kidneys: No hydronephrosis. Bilateral kidneys are enhancing symmetrically.. Bowel: Severe wall thickening of the descending colon, transverse colon, second portion duodenum. Postsurgical change in the stomach Lymph nodes: No adenopathy. Vasculature: The abdominal aorta is normal in caliber. Peritoneum: There is large irregular fluid collection occupying most of the right abdomen surrounding the right kidney and right colon. Additional fluid and gas collection in the midline abdomen anterior to the pancreas. This collection is probably connected to the right side collection via a junction in the jl hepatis Musculoskeletal: Postsurgical change in the lower lumbar spine. Pelvis: Trace pelvic free fluid.      1. Large irregular irregular fluid collection with thick wall occupying most of the right abdomen surrounding the right kidney and right colon. Additional fluid and gas collection in the midline abdomen anterior to the pancreas concerning for abscess. This collection is probably connected to the right side collection via a junction in the jl hepatis 2. Severe wall thickening of the descending colon, transverse colon, second portion duodenum, likely reactive. 3. Pneumobilia with gas in the CBD, intrahepatic bile ducts as well as pancreatic ducts are of unclear etiology. 4. Small right pleural effusion with right basilar atelectasis.     CT-ABDOMEN-PELVIS WITH    Result Date: 10/2/2021  10/2/2021 2:03 PM HISTORY/REASON FOR EXAM:  RLQ and diffuse lower abdominal pain; had ERCP yesterday. Pt has pancreatitis and they found a polyp on her bile duct. TECHNIQUE/EXAM DESCRIPTION:   CT scan of the abdomen and pelvis with contrast. Contrast-enhanced helical scanning was obtained from the diaphragmatic domes through the pubic symphysis following the bolus administration of nonionic contrast without complication. 100 mL of Omnipaque 350 nonionic contrast was administered without complication. Low dose optimization technique was utilized for this CT exam including automated exposure control and adjustment of the mA and/or kV according to patient size. COMPARISON: 2/22/2019 FINDINGS: Lower Chest: Unremarkable. Liver: Normal. Hepatic and portal veins are patent. Spleen: Unremarkable. Pancreas: The pancreatic head is edematous though the parenchyma enhances normally. There is surrounding homogenous peripancreatic and mesenteric edema/infiltration which tracks down the right paracolic gutter and conforms to retroperitoneal structures. No defined walled off collection. There is small volume ascites in the pelvis. No focal fluid collection. Adrenal glands: Normal. Gallbladder: Cholecystectomy Biliary: Unchanged mild  intrahepatic bile duct dilation. Kidneys: Symmetric nephrograms. No hydronephrosis. Pelvis: Hysterectomy. Normal appearance of the urinary bladder.. Bowel: There are no dilated loops of small or large bowel. Scattered colonic diverticuli with no inflammation. The appendix is normal. Prior gastric sleeve. Lymph nodes: No adenopathy. Vasculature: No aneurysm. Musculoskeletal: Fusion of L3-L5. Multifocal degenerative changes are present. No suspicious osseous abnormality.     Acute interstitial edematous pancreatitis with surrounding mesenteric edema tracking along the right paracolic gutter. Small volume ascites in the pelvis. No walled off collections.    CT-DRAIN-PERITONEAL    Result Date: 10/16/2021  10/16/2021 1:06 PM HISTORY/REASON FOR EXAM: Large pancreatic pseudocyst, not draining well following placement of first drained. Place largest drain to lower area of retroperitoneal air/fluid region- leave upper drain in place. TECHNIQUE/EXAM DESCRIPTION: Pancreatic pseudocyst drainage with CT guidance. Low dose optimization technique was utilized for this CT exam including automated exposure control and adjustment of the mA and/or kV according to patient size. PROCEDURE: Informed consent was obtained. SEDATION: Moderate sedation was provided. Pulse oximetry and continuous cardiac monitoring by the nurse was performed throughout the exam. Intraservice time was 30 minutes. Localizing CT images were obtained with the patient in supine position. The skin was prepped with Betadine and draped in a sterile fashion. Following local anesthesia with 1% Lidocaine, a 17 G guiding needle was placed and needle path confirmed with CT. An Amplatz guidewire was placed and following serial tract dilatation, a 14 Chinese pigtail locking catheter was placed. A specimen was collected and submitted for amylase, culture and sensitivity and Gram stain. Total of 400 mL of brownish fluid was drained. The catheter was secured to the skin and  connected to suction bulb drainage. Final CT images were obtained documenting catheter position. The patient tolerated the procedure well with no evidence of complication. COMPARISON: Recent CT scan abdomen/pelvis FINDINGS: The final CT images show satisfactory catheter position dependently within the target collection.     1.  CT guided pancreatic pseudocyst catheter drainage. 2.  The current plan is to obtain a follow-up CT in 5-7 days..    CT-DRAIN-PERITONEAL    Result Date: 10/10/2021  HISTORY/REASON FOR EXAM:  66-year-old woman with pancreatitis and extensive intraperitoneal abscess. TECHNIQUE/EXAM DESCRIPTION AND NUMBER OF VIEWS: RIGHT peritoneal drain placement with CT guidance. Low dose optimization technique was utilized for this CT exam including automated exposure control and adjustment of the mA and/or kV according to patient size. COMPARISON:  CT 10/8/2021 MEDICATIONS: Moderate sedation was provided. Pulse oximetry and continuous cardiac monitoring by the nurse was performed throughout the exam. Intraservice time was 15 minutes. PROCEDURE:     The risks, benefits, goals and objectives and alternatives were discussed. Risks were specified as including but not limited to bleeding, infection, damage to vessels or nerves, pain and discomfort as well as the possibility of incomplete resolution of underlying disease. Drain care related issues were discussed with the patient. The patient's questions were answered. Informed oral and written consent were obtained. The patient was placed on the CT gantry. Localizing scan was performed. The skin was prepped and draped in the usual sterile manner.  A timeout was performed. Local anesthetic result was achieved with administration of 1% lidocaine. A(n) 17-gauge access needle was advanced to the target using CT fluoroscopic guidance. Access was secured with a wire and the tract was sequentially dilated to accommodate a(n) 14 [place] locking loop drain. A total of 80 mL  of thin brown turbid fluid was removed and sent for  laboratory evaluation. This was put in place and formed. The catheter was attached to bag drainage and secured to the skin with 2-0 nylon suture. Completion scan was performed which showed no complication. The patient tolerated the procedure well with no evidence of complication. The skin was cleaned and a dressing was applied. FINDINGS: Drainage tube in good position     Successful peritoneal drainage tube placement. Plan: Thrice daily flushes with 10 mL of sterile saline. Monitor outputs. Please contact interventional radiology if there is any concern for tube dysfunction.     DX-CHEST-LIMITED (1 VIEW)    Result Date: 10/19/2021  10/19/2021 11:52 AM HISTORY/REASON FOR EXAM:  Shortness of Breath TECHNIQUE/EXAM DESCRIPTION AND NUMBER OF VIEWS: Single AP view of the chest. COMPARISON: 10/18/2016 FINDINGS: Left PICC projects over the SVC. The cardiomediastinal silhouette is stable. There is right basilar atelectasis/consolidation. There may be a small right pleural effusion. There is a calcified granuloma in the left lower lobe. There are surgical clips in  the upper abdomen. Atherosclerotic calcification is seen.     1.  Right basilar atelectasis or consolidation. Small right pleural effusion not excluded. 2.  Atherosclerotic plaque.    IR-US GUIDED PIV    Result Date: 10/10/2021  EXAMINATION:                                                                    HISTORY/REASON FOR EXAM:  Ultrasound Guided PIV.  TECHNIQUE/EXAM DESCRIPTION AND NUMBER OF VIEWS:  Peripheral IV insertion with ultrasound guidance.  The procedure was prepared using maximal sterile barrier technique including sterile gown, mask, cap, and donning of sterile gloves following appropriate hand hygiene and/or sterile scrub. Patient skin site was prepped with 2% Chlorhexidine solution.   FINDINGS: Peripheral IV insertion with Ultrasound Guidance was performed by qualified imaging nursing staff  without the assistance of a Radiologist.      Ultrasound-guided PERIPHERAL IV INSERTION performed by qualified nursing staff as above.    NM-HEPATOBILIARY SCAN    Result Date: 10/10/2021  10/10/2021 11:12 AM HISTORY/REASON FOR EXAM:  rule out biliary leak post ERCP with sphincterotomy; R/O bile leak TECHNIQUE/EXAM DESCRIPTION AND NUMBER OF VIEWS: Hepatobiliary scan. COMPARISON: 10/9/2021 CT abdomen PROCEDURE:  5.4 mCi Tc 99m-Choletec was administered intravenously, followed by 90 minutes of anterior planar imaging. FINDINGS: Normal homogeneous uptake of radiotracer in the hepatic parenchyma with visualization of the tracer in the common bile duct and entering the small bowel loops. No abnormal pooling or collection radiotracer outside of the biliary system or bowel.     Normal hepatobiliary scan with no findings of a bile leak.    DX-PORTABLE FLUOROSCOPY < 1 HOUR    Result Date: 10/1/2021  10/1/2021 10:01 AM HISTORY/REASON FOR EXAM:  ERCP TECHNIQUE/EXAM DESCRIPTION AND NUMBER OF VIEWS: 2 images were obtained in the operating room. A total of 0.3 minutes of fluoroscopy time utilized. COMPARISON: Selected images CT abdomen and pelvis 2/22/2019 FINDINGS: Images show injection of contrast into the common bile duct which is dilated. There is also filling of the pancreatic duct. Fluoroscopy time: minutes     ERCP images as described    EC-ECHOCARDIOGRAM COMPLETE W/O CONT    Result Date: 10/14/2021  Transthoracic Echo Report Echocardiography Laboratory CONCLUSIONS Normal left ventricular chamber size. Hyperdynamic left ventricular systolic function. The left ventricular ejection fraction is visually estimated to be greater than 75%. Normal right ventricular size and systolic function. Enlarged right atrium. Mild tricuspid regurgitation. Right ventricular systolic pressure is estimated to be  40 mmHg; mild pulmonary hypertension. Normal pericardium without effusion. No prior study is available for comparison. KATERINA PATEL  Exam Date:         10/14/2021                    16:15 Exam Location:     Inpatient Priority:          Routine Ordering Physician:        YUE TOURE Referring Physician:       [id number] Mo Sonographer:               Aroldo Nolan RDCS, RVT Age:    66     Gender:    F MRN:    [id number] :    1955 BSA:    1.68   Ht (in):    63     Wt (lb):    143 Exam Type:     Complete Indications:     Endocarditis ICD Codes:       421 CPT Codes:       75561 BP:   106    /   48     HR:   78 Technical Quality:       Fair MEASUREMENTS  (Male / Female) Normal Values 2D ECHO LV Diastolic Diameter PLAX        3.9 cm                4.2 - 5.9 / 3.9 - 5.3 cm LV Systolic Diameter PLAX         2.7 cm                2.1 - 4.0 cm IVS Diastolic Thickness           0.88 cm               LVPW Diastolic Thickness          0.75 cm               LVOT Diameter                     2 cm                  Estimated LV Ejection Fraction    75 %                  LV Ejection Fraction MOD BP       77.2 %                >= 55  % LV Ejection Fraction MOD 4C       79.3 %                LV Ejection Fraction MOD 2C       77.1 %                IVC Diameter                      1.5 cm                DOPPLER AV Peak Velocity                  1.8 m/s               AV Peak Gradient                  13.5 mmHg             AV Mean Gradient                  6.3 mmHg              LVOT Peak Velocity                1.7 m/s               AV Area Cont Eq vti               2.8 cm2               MV Velocity Time Integral         42.6 cm               Mitral E Point Velocity           1.3 m/s               Mitral E to A Ratio               1.5                   MV Pressure Half Time             77.3 ms               MV Area PHT                       2.8 cm2               MV Deceleration Time              266 ms                TR Peak Velocity                  269 cm/s              PV Peak Velocity                  0.96 m/s               PV Peak Gradient                  3.7 mmHg              RVOT Peak Velocity                0.73 m/s              * Indicates values subject to auto-interpretation LV EF:  75    % FINDINGS Left Ventricle Normal left ventricular chamber size. Normal left ventricular wall thickness. Hyperdynamic left ventricular systolic function. The left ventricular ejection fraction is visually estimated to be greater than 75%. Normal regional wall motion. Normal diastolic function. Right Ventricle Normal right ventricular size. Normal right ventricular systolic function. Right Atrium Enlarged right atrium. Normal inferior vena cava size and inspiratory collapse. Left Atrium Normal left atrial size. Left atrial volume index is 26  mL/sq m. Mitral Valve Structurally normal mitral valve. No mitral stenosis. Trace mitral regurgitation. Aortic Valve The aortic valve is not well visualized. Cannot rule out bicuspid aortic valve. No aortic stenosis. No aortic insufficiency. Tricuspid Valve Structurally normal tricuspid valve. No tricuspid stenosis. Mild tricuspid regurgitation. Right ventricular systolic pressure is estimated to be  40 mmHg. Pulmonic Valve The pulmonic valve is not well visualized. No pulmonic stenosis. No pulmonic insufficiency. Pericardium Normal pericardium without effusion. Aorta The ascending aorta diameter is 3.1  cm. Nate Sarmiento MD (Electronically Signed) Final Date:     14 October 2021                 17:39    IR-PICC LINE PLACEMENT W/ GUIDANCE > AGE 5    Result Date: 10/15/2021  HISTORY/REASON FOR EXAM:   PICC placement. TECHNIQUE/EXAM DESCRIPTION AND NUMBER OF VIEWS:   PICC line insertion with ultrasound guidance.  The procedure was performed using maximal sterile barrier technique including sterile gown, mask, cap, and donning of sterile gloves following appropriate hand hygiene and/or sterile scrub. Patient skin site was prepped with 2% Chlorhexidine solution. FINDINGS:  PICC line insertion with Ultrasound  "Guidance was performed by qualified nursing staff without the assistance of a Radiologist. PICC positioning appropriateness confirmed by 3CG technology; chest xray only needed in the instance 3CG unable to confirm placement.              Ultrasound-guided PICC placement performed by qualified nursing staff as above.       Micro:  Results     Procedure Component Value Units Date/Time    BLOOD CULTURE [[id number]] Collected: 10/18/21 1520    Order Status: Completed Specimen: Blood from Line Updated: 10/23/21 1700     Significant Indicator NEG     Source BLD     Site PICC Line     Culture Result No growth after 5 days of incubation.    Narrative:      Special Contact Isolation  Per Hospital Policy: Only change Specimen Src: to \"Line\" if  specified by physician order.  No site indicated    BLOOD CULTURE [[id number]] Collected: 10/18/21 1517    Order Status: Completed Specimen: Blood from Peripheral Updated: 10/23/21 1700     Significant Indicator NEG     Source BLD     Site PERIPHERAL     Culture Result No growth after 5 days of incubation.    Narrative:      Special Contact Isolation  Per Hospital Policy: Only change Specimen Src: to \"Line\" if  specified by physician order.  Left Forearm/Arm    BLOOD CULTURE [[id number]]     Order Status: Canceled Specimen: Blood from Peripheral           Assessment:  Active Hospital Problems    Diagnosis    • *Bacteremia due to Gram-negative bacteria and fungemia [R78.81]    • Acute respiratory failure with hypoxia (HCC) [J96.01]    • Duodenal perforation (HCC) [K63.1]    • Postprocedural retroperitoneal abscess [K68.11]    • Diarrhea [R19.7]    • Electrolyte abnormality [E87.8]    • Hyponatremia [E87.1]    • Sepsis due to intraabdominal fluid collection/abscess (HCC) [A41.9]    • Hyperlipidemia [E78.5]    • Hypertension [I10]        Assessment:  66 y.o. 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  Patient found to have multiple and extensive fluid collections in the abdomen and pelvis on imaging.  She underwent drain placement on 10/8.  Cultures are growing E. coli and Enterococcus faecalis.  Blood cultures are growing chryseobacterium species and Candida glabrata. Source likely due to the intra-abdominal abscesses. Repeat CT scan on 10/13 shows extensive multiloculated fluid collections in the abdomen and pelvis.  She underwent peritoneal drainage on 10/15 and cultures + Enterococcus faecalis, ampicillin sensitive, Candida albicans and Candida glabrata     Pertinent diagnoses:  Multiple intra-abdominal abscesses  Candidemia  Chryseobacterium bacteremia  Polymicrobial infection  Persistent leukocytosis, resolved  Diarrhea, C diff PCR negative  Recent ERCP pancreatitis     Plan:  -Blood cultures on 10/8 - Chryseobacterium species, Candida glabrata.  -Chryseobacterium species -the patient has been treated with Zosyn for multiple days which has intermediate sensitivity per chart.  However, repeat blood cultures   -Repeat blood culture on 10/18, no growth to date  -Blood cultures from 10/13 are no growth and final    -TTE-negative  -Continue to monitor for any vision changes-patient currently denies  -Continue IV Zosyn to treat the E. Coli, Chryseobacterium species and the Enterococcus  -Continue IV micafungin for treatment of candidemia for at least a 14-day course.  However, may need to extend given the growth of Candida species in the abscess.    -Dr. Cook following  -Repeat CT scan ordered by IR on 10/21-grossly unchanged irregular fluid collections in the right abdomen   -On TPN  -Duration of antimicrobials unclear at this time given lack of source control     Discussed with internal medicine Dr. Ballard.  ID will follow.

## 2021-10-25 NOTE — PROGRESS NOTES
IR/CT NOTE: RLQ drain placed in CT IR by Dr. Pringle under moderate sedation. 35 mL aspirated and sent to lab for culture/gram stain. Pt tolerated procedure well, VSS, drsg CDI.     Flexima APDL Locking Pigtail drainage catheter system: REF [id number], LOT [id number], exp 2024-08-15.     Post procedure recovery orders to be placed by Dr. Pringle.

## 2021-10-25 NOTE — PROGRESS NOTES
Radiology Progress Note   Author: AYE Mahan Date & Time created: 10/25/2021  8:34 AM   Date of admission  10/15/2021  Note to reader: this note follows the APSO format rather than the historical SOAP format. Assessment and plan located at the top of the note for ease of use.    Chief Complaint  66 y.o. female admitted 10/15/2021 with abd pain     HPI  66-year-old female PMH recurrent idiopathic pancreatitis s/p ERCP with sphincterotomy October 1, 2021 complicated by ERCP pancreatitis, sleeve gastrectomy, dyslipidemia, hypertension, osteoarthritis of the spine status post lumbar fusion who was admitted to the hospital 10/8/2021 with worsening right lower quadrant pain over the past week.  Ever since her ERCP October 1, 2021, she has been experiencing pain, intermittent subjective fevers, nausea.  In the emergency room-labs: CBC: WBC 17.8..  Sodium 130.  LFTs-WNL.  CT scan abdomen/pelvis-large irregular fluid collection with thick wall occupying most of the right abdomen surrounding the right kidney and colon.  Severe wall thickening of the descending, transverse colon, second portion of the duodenum likely reactive.  Pneumobilia with gas in the CBD.  Drain placement by IR was performed.  Currently, the abdominal pain has improved.      Assessment/Plan  Interval History   Principal Problem:    Bacteremia due to Gram-negative bacteria and fungemia  Active Problems:    Hypertension    Hyperlipidemia    Sepsis due to intraabdominal fluid collection/abscess (HCC)    Hyponatremia    Duodenal perforation (HCC)    Postprocedural retroperitoneal abscess    Diarrhea    Electrolyte abnormality    Acute respiratory failure with hypoxia (HCC)    Hypokalemia    Hypophosphatemia      Plan IR  - Irrigate Left and right abdominal drains with 50 ml of sterile saline q4 hrs-   - Surgery, ID, GI following   - Repeat CT scan from 10/22 Grossly unchanged irregular fluid collection occupying the right abdomen surrounding  the right kidney and right colon extending to midline   - Additonal drain placement today per Surgery request   - NPO     [id number]  IR:   10/8- Right abd drain placed, + Chryseobacterium indologenes   Chryseobacterium arthrosphaerae  Candida glabrata   10/15- transfer from [facility]   10/16- Left abd drain placed, drain +  Enterococcus faecalis, Candida albicans, Candida glabrata  10/17- Left Abd drain - 195 ml, green milky  Right abd drain- 238 ml  10/18   Left drain- 75 ml  Right drain- 110 ml  10/19   Left drain- 230 ml    Right drain-  280 ml   10/20  Left drain-  125 ml  Brown green   Right drain- 405 ml brown green  10/21  Left drain-  90 ml  Brown green in am  Right drain- 290 ml brown green in am   10/22  Left drain- 25 ml i   Right drain- 50 ml    10/23   Left drain-  20 ml    Right drain 15  10/24  Left drain- 10 ml     Right drain= 10 ml   10/25  Left drain-      Right drain  New drain to be placed        Review of Systems  Physical Exam   Review of Systems   Constitutional: Positive for malaise/fatigue. Negative for chills and fever.   HENT: Negative for hearing loss.    Eyes: Negative for blurred vision.   Respiratory: Negative for cough, hemoptysis and shortness of breath.    Cardiovascular: Negative for chest pain and palpitations.   Gastrointestinal: Positive for abdominal pain. Negative for vomiting.   Genitourinary: Negative for dysuria.   Musculoskeletal: Negative for myalgias.   Skin: Negative for rash.   Neurological: Positive for weakness. Negative for dizziness and headaches.   Endo/Heme/Allergies: Does not bruise/bleed easily.   Psychiatric/Behavioral: Negative for suicidal ideas.      Vitals:    10/24/21 1939   BP: 130/72   Pulse: 79   Resp: 17   Temp: 36.7 °C (98.1 °F)   SpO2: 91%        Physical Exam  Constitutional:       Appearance: Normal appearance.   HENT:      Head: Normocephalic.      Nose: Nose normal.      Mouth/Throat:      Mouth: Mucous membranes are moist.   Eyes:       Pupils: Pupils are equal, round, and reactive to light.   Cardiovascular:      Rate and Rhythm: Normal rate.   Pulmonary:      Effort: Pulmonary effort is normal. No respiratory distress.   Abdominal:      Palpations: Abdomen is soft.      Tenderness: There is abdominal tenderness.      Comments: Left and right abdominal drains   Drain sites CDI, no redness or swelling   Connect to MARTHA bulbs    Musculoskeletal:         General: No tenderness or deformity.      Cervical back: Normal range of motion.      Right lower leg: Edema present.      Left lower leg: Edema present.      Comments: Edema improving    Skin:     General: Skin is warm and dry.      Capillary Refill: Capillary refill takes less than 2 seconds.      Coloration: Skin is not jaundiced or pale.   Neurological:      General: No focal deficit present.      Mental Status: She is alert.      Motor: No weakness.   Psychiatric:         Mood and Affect: Mood normal.         Behavior: Behavior normal.             Labs    Recent Labs     10/22/21  1005 10/24/21  0415 10/25/21  0420   WBC 6.5 6.7 8.6   RBC 2.95* 2.53* 2.73*   HEMOGLOBIN 8.9* 8.4* 8.4*   HEMATOCRIT 27.5* 25.1* 25.6*   MCV 93.2 99.2* 93.8   MCH 30.2 33.2* 30.8   MCHC 32.4* 33.5* 32.8*   RDW 48.4 53.6* 51.3*   PLATELETCT 190 183 170   MPV 9.6 10.7 9.9     Recent Labs     10/22/21  1005 10/24/21  0645 10/25/21  0420   SODIUM 134* 137 133*   POTASSIUM 3.9 3.5* 3.9   CHLORIDE 99 102 99   CO2 30 28 26   GLUCOSE 106* 97 81   BUN 16 17 16   CREATININE 0.58 0.61 0.72   CALCIUM 8.2* 7.8* 8.2*     Recent Labs     10/22/21  1005 10/24/21  0645 10/25/21  0420   ALBUMIN 3.0* 2.1* 2.6*   TBILIRUBIN 0.3 0.2 0.3   ALKPHOSPHAT 57 51 63   TOTPROTEIN 5.1* 4.5* 5.0*   ALTSGPT 19 12 12   ASTSGOT 19 17 22   CREATININE 0.58 0.61 0.72     CT-ABDOMEN-PELVIS WITH   Final Result         1. Grossly unchanged irregular fluid collection occupying the right abdomen surrounding the right kidney and right colon extending to midline.  Additional pigtail catheter in the component about midline anterior abdomen. Both pigtail catheters seem to be    within the collection.      2. Small right pleural effusion with right basilar atelectasis.               DX-CHEST-LIMITED (1 VIEW)   Final Result      1.  Right basilar atelectasis or consolidation. Small right pleural effusion not excluded.   2.  Atherosclerotic plaque.      CT-DRAIN-PERITONEAL   Final Result      1.  CT guided pancreatic pseudocyst catheter drainage.   2.  The current plan is to obtain a follow-up CT in 5-7 days..      IR-PICC LINE PLACEMENT W/ GUIDANCE > AGE 5   Final Result                  Ultrasound-guided PICC placement performed by qualified nursing staff as    above.          IR-DRAIN-RETROPERITONEAL    (Results Pending)       INR   Date Value Ref Range Status   10/15/2021 1.49 (H) 0.87 - 1.13 Final     Comment:     INR - Non-therapeutic Reference Range: 0.87-1.13  INR - Therapeutic Reference Range: 2.0-4.0       No results found for: POCINR     Intake/Output Summary (Last 24 hours) at 10/18/2021 1528  Last data filed at 10/18/2021 0500  Gross per 24 hour   Intake --   Output 80 ml   Net -80 ml      Labs not explicitly included in this progress note were reviewed by the author. Radiology/imaging not explicitly included in this progress note was reviewed by the author.   I have performed a physical exam and reviewed and updated ROS and Plan today (10/25/2021).     25 minutes in directly providing and coordinating care and extensive data review.  No time overlap and excludes procedures.

## 2021-10-25 NOTE — PROGRESS NOTES
Received bedside report from TALITA Grimes  Assumed care of pt [id number]  Pt awake and alert, reports all need to be met.  Communication board updated.

## 2021-10-25 NOTE — PROGRESS NOTES
Gastroenterology Progress Note               Author:  Garry Benson MD Date & Time Created: 10/25/2021 12:47 PM       Patient ID:  Name:             Nils Alfonso  YOB: 1955  Age:                 66 y.o.  female  MRN:               [id number]      Referring Provider:  Ana Luisa Shearer MD      Presenting Chief Complaint:  Abdominal pain      History of Present Illness:    10/10 HPI  66-year-old female PMH recurrent idiopathic pancreatitis s/p ERCP with sphincterotomy October 1, 2021 complicated by ERCP pancreatitis, sleeve gastrectomy, dyslipidemia, hypertension, osteoarthritis of the spine status post lumbar fusion who was admitted to the hospital 10/8/2021 with worsening right lower quadrant pain over the past week.  Ever since her ERCP October 1, 2021, she has been experiencing pain, intermittent subjective fevers, nausea.  In the emergency room-labs: CBC: WBC 17.8..  Sodium 130.  LFTs-WNL.  CT scan abdomen/pelvis-large irregular fluid collection with thick wall occupying most of the right abdomen surrounding the right kidney and colon.  Severe wall thickening of the descending, transverse colon, second portion of the duodenum likely reactive.  Pneumobilia with gas in the CBD.  Drain placement by IR was performed.  Currently, the abdominal pain has improved.  No vomiting.  Started on ceftriaxone and metronidazole IV.     ERCP October 1, 2021-common bile duct-dilated down to the level of the ampulla.  4 mm polyp at the distal duct seen after sphincterotomy.  8 mm sphincterotomy.  Negative for stone.  Bile duct polyp-benign with inflammatory and hyperplastic changes.  No evidence of epithelial dysplasia/neoplasia.       Interval History:  10/11: interval HIDA scan showed no bile leak.  This morning she feels more comfortable, describing minimal abdominal pain but denying nausea vomiting and fevers.  She has been able to eat a little bit more and has full liquids beside her bed.  She expresses concern  about being on losartan which she says was prescribed outpatient as as needed for high blood pressures.  She also states she has not passed a bowel movement in the week which she describes not eating much.     10/12/2021 - No events overnight.  Tolerating diet without nausea or vomiting.  Had spontaneous, formed, brown bowel movement this morning.  Abdominal pain improving.  Feeling weak, but better each day.  Leukocytosis improving.  States that she is on hydrocodone at home, managed by pain management, and asks that her current hospital pain meds be changed.     10/13/2021: Stable, no new events.  Drain output is minimal, but according to patient bedside nursing is not flushing the drain.  Patient is n.p.o. for planned CT today to reevaluate fluid collection.  Leukocytosis continues to improve.  Pain has improved greatly and patient has not taken the pain medication in the last 24 hours according to her recollection.  She is having regular bowel movements without assistance of laxatives.  Patient is very hungry.     10/14/2021 - No events overnight.  Abdominal pain controlled now.  Had nausea and vomiting yesterday with solid food, but tolerating bland diet.  Blood cultures from 10/8 positive for Chryseobacterium and Candida glabrata - Pharmacy and ID aware.  Micafungin started.  CT 10/13 showing extensive multiloculated rim-enhancing air and fluid collection/abscess within the right abdomen is not significantly changed in size from the prior study. The percutaneous pigtail drainage catheter appears well-positioned within the collection.    10/15/2021: Patient transferred to [facility] overnight per surgery recommendations    10/16/2021: Drain output clearing up slightly. Dr. Dumont with surgery has recommended second IR drain to be placed. Initially interventional radiology did not think that this would be helpful for the patient, but after further discussion this is the plan going  forward. Patient is n.p.o. with plans for TPN. WBCs normalized.    10/21/21: Abdominal pain slowly improving. Pain is worse with movement. MARTHA drains purulent. No vomiting. Plan for CT scan to re-evaluate fluid collection. Tolerating FLD.     10/25/2021 Abd pian controlled. Getting IR drain today. No questions.     Review of Systems:  Review of Systems   Constitutional: Positive for malaise/fatigue. Negative for chills and fever.   Respiratory: Negative for cough, shortness of breath and wheezing.    Cardiovascular: Negative for chest pain and leg swelling.   Gastrointestinal: Positive for abdominal pain. Negative for constipation, melena, nausea and vomiting.   Genitourinary: Negative for dysuria and urgency.   Musculoskeletal: Negative for falls and myalgias.   Skin: Negative for itching and rash.   Neurological: Positive for weakness. Negative for focal weakness and headaches.   Psychiatric/Behavioral: Negative for memory loss and substance abuse.             Past Medical History:  Past Medical History:   Diagnosis Date   • Allergy, unspecified not elsewhere classified    • Anemia     not currently   • Anesthesia     PONV (Demerol/ Dilaudid)   • Arthritis     bilateral shoulders,sacrum, osteo   • Backpain     coccyx and sacrum   • Bronchitis 2010   • Cataract     bilateral IOLI   • Degeneration of cervical intervertebral disc     C5-6,C6-7   • Dental disorder     partial upper and lower   • Heart burn    • Hiatus hernia syndrome     not a problem after gastric sleeve   • High cholesterol     resolved after gastric surgery   • Hyperlipidemia    • Hypertension     off all medication, reveresed after gastric sleeve   • Muscle disorder    • Pain 05/16/2018    low back and SI joints and sacrum   • Reactive airway disease     rescue inhaler not needed unless with bronchitis     Active Hospital Problems    Diagnosis    • Hypophosphatemia [E83.39]    • Hypokalemia [E87.6]    • Acute respiratory failure with hypoxia (HCC)  [J96.01]    • Duodenal perforation (HCC) [K63.1]    • Postprocedural retroperitoneal abscess [K68.11]    • Diarrhea [R19.7]    • Electrolyte abnormality [E87.8]    • Bacteremia due to Gram-negative bacteria and fungemia [R78.81]    • Hyponatremia [E87.1]    • Sepsis due to intraabdominal fluid collection/abscess (HCC) [A41.9]    • Hyperlipidemia [E78.5]    • Hypertension [I10]          Past Surgical History:  Past Surgical History:   Procedure Laterality Date   • PB ERCP,DIAGNOSTIC  10/1/2021    Procedure: ERCP (ENDOSCOPIC RETROGRADE CHOLANGIOPANCREATOGRAPHY);  Surgeon: Fausto Casas M.D.;  Location: [facility];  Service: Gastroenterology   • COLONOSCOPY  8/8/2018    Procedure: COLONOSCOPY;  Surgeon: Shukri Condon M.D.;  Location: [facility];  Service: General   • GASTROSCOPY  5/23/2018    Procedure: GASTROSCOPY;  Surgeon: Fausto Casas M.D.;  Location: [facility];  Service: Gastroenterology   • EGD W/ENDOSCOPIC ULTRASOUND  5/23/2018    Procedure: EGD W/ENDOSCOPIC ULTRASOUND- RADIAL UPPER;  Surgeon: Fausto Casas M.D.;  Location: [facility];  Service: Gastroenterology   • CATARACT PHACO WITH IOL Left 4/18/2017    Procedure: CATARACT PHACO WITH IOL;  Surgeon: Stuart Estevez M.D.;  Location: SURGERY SAME DAY [facility];  Service:    • CATARACT PHACO WITH IOL Right 4/4/2017    Procedure: CATARACT PHACO WITH IOL;  Surgeon: Stuart Estevez M.D.;  Location: SURGERY [facility];  Service:    • GASTRIC SLEEVE LAPAROSCOPY  10/19/2016    Procedure: GASTRIC SLEEVE LAPAROSCOPY, HIATAL HERNIA;  Surgeon: Shukri Condon M.D.;  Location: [facility];  Service:    • LIVER BIOPSY LAPAROSCOPIC  10/19/2016    Procedure: LIVER BIOPSY LAPAROSCOPIC;  Surgeon: Shukri Condon M.D.;  Location: [facility];  Service:    • GASTROSCOPY N/A 8/26/2016    Procedure: GASTROSCOPY;  Surgeon: Shukri Condon M.D.;  Location: [facility];  Service:    • ROTATOR CUFF REPAIR Right 7/7/2016   • ROTATOR CUFF REPAIR Left 5/2015   • HYSTERECTOMY ROBOTIC  1/2/2009    Performed by MEG VILLEGAS at SURGERY [facility]   • LUMBAR FUSION ANTERIOR  2007    Dr Hillman, L3-S1 fusion   • CHOLECYSTECTOMY  1996    laparoscopic   • TUBAL LIGATION  1985   • GASTRIC RESECTION  1982    gastric stapling   • TONSILLECTOMY AND ADENOIDECTOMY  1967   • PRIMARY C SECTION  1976, 1979, 1985    x3           Hospital Medications:  Current Facility-Administered Medications   Medication Dose Frequency Provider Last Rate Last Admin   • piperacillin-tazobactam (ZOSYN) 3.375 g in  mL IVPB  3.375 g Q8HRS Tamra Mcfarland M.D.       • polyethylene glycol/lytes (MIRALAX) PACKET 1 Packet  1 Packet DAILY Jaden Cook M.D.       • TPN Central Line Formulation   TPN DAILY Tiara Ballard M.D. 60 mL/hr at 10/25/21 0725 Rate Verify at 10/25/21 0725   • lisinopril (PRINIVIL) tablet 5 mg  5 mg Q DAY Tiara Ballard M.D.   5 mg at 10/25/21 0406   • labetalol (NORMODYNE/TRANDATE) injection 10 mg  10 mg Q6HRS PRN Tiara Ballard M.D.       • MD Alert...TPN per Pharmacy   PHARMACY TO DOSE Pritesh Vargas M.D.       • ondansetron (ZOFRAN ODT) dispertab 4 mg  4 mg Q4HRS PRN Elizabeth Segundo M.D.   4 mg at 10/20/21 1557   • ondansetron (ZOFRAN) syringe/vial injection 4 mg  4 mg Q4HRS PRN Elizabeth Segundo M.D.   4 mg at 10/25/21 0900   • polyethylene glycol/lytes (MIRALAX) PACKET 1 Packet  1 Packet QDAY PRN Elizabeth Segundo M.D.        And   • magnesium hydroxide (MILK OF MAGNESIA) suspension 30 mL  30 mL QDAY PRN Elizabeth Segundo M.D.       • acetaminophen (Tylenol) tablet 650 mg  650 mg Q6HRS PRN Elizabeth Segundo M.D.       • HYDROmorphone (Dilaudid) injection 0.5 mg  0.5 mg Q3HRS PRN Elizabeth Segundo M.D.   0.5 mg at 10/25/21 1013   • Pharmacy Consult Request ...Pain Management Review 1 Each  1 Each PHARMACY TO DOSE Elizabeth Segundo M.D.       • cyclobenzaprine (Flexeril) tablet 10  mg  10 mg HS PRN Elizabeth Segundo M.D.   10 mg at 10/23/21 1702   • diphenhydrAMINE (BENADRYL) tablet/capsule 25 mg  25 mg Q6HRS PRN Elizabeth Segundo M.D.       • ezetimibe (ZETIA) tablet 10 mg  10 mg Q EVENING Elizabeth Segundo M.D.   10 mg at 10/24/21 1648   • gabapentin (NEURONTIN) capsule 600 mg  600 mg BID Elizabeth Segundo M.D.   600 mg at 10/25/21 0406   • micafungin (MYCAMINE) 100 mg in  mL ivpb  100 mg Q24HRS Elizabeth Segundo M.D. 100 mL/hr at 10/25/21 1115 100 mg at 10/25/21 1115   • oxyCODONE immediate release (ROXICODONE) tablet 10 mg  10 mg Q4HRS PRN Elizabeth Segundo M.D.   10 mg at 10/25/21 0900   • promethazine (PHENERGAN) tablet 25 mg  25 mg Q6HRS PRN Elizabeth Segundo M.D.   25 mg at 10/21/21 0820   • vitamin D3 (cholecalciferol) tablet 1,000 Units  1,000 Units DAILY Elizabeth Segundo M.D.   1,000 Units at 10/25/21 0406   • heparin injection 5,000 Units  5,000 Units Q8HRS Stuart Avalos M.D.   5,000 Units at 10/25/21 0511   Last reviewed on 10/15/2021 11:02 AM by Sabi Doe        Current Outpatient Medications:  Medications Prior to Admission   Medication Sig Dispense Refill Last Dose   • cyclobenzaprine (FLEXERIL) 10 mg Tab Take 10 mg by mouth every evening.   9/30/2021 at [facility]   • ezetimibe (ZETIA) 10 MG Tab Take 10 mg by mouth every evening.   9/30/2021 at [facility]   • Magnesium 400 MG Tab Take 400 mg by mouth every evening.   9/30/2021 at [facility]   • gabapentin (NEURONTIN) 300 MG Cap Take 600 mg by mouth 2 Times a Day.   9/30/2021 at [facility]   • BIOTIN PO Take 1 Tablet by mouth every day.   9/30/2021 at [facility]   • HYDROcodone/acetaminophen (NORCO)  MG Tab Take 0.5 Tablets by mouth every 6 hours as needed for Moderate Pain.   10/8/2021 at [facility]   • Cholecalciferol (VITAMIN D3 PO) Take 1 Each by mouth every day.   9/30/2021 at [facility]   • ondansetron (ZOFRAN ODT) 4 MG TABLET DISPERSIBLE Take 4 mg by mouth every 6 hours as needed for Nausea.   10/8/2021 at [facility]         Medication Allergies:  Allergies  "  Allergen Reactions   • Ampicillin Rash     Rash     • Cephalexin Diarrhea, Vomiting and Nausea     .   • Clindamycin Vomiting     \"cleocin\"   • Codeine      Severe stomach pain, cramps, spasms   • Demerol Vomiting   • Levofloxacin Unspecified     Numbness     • Tetracyclines Rash     .   • Tizanidine Itching   • Hydromorphone Vomiting and Nausea   • Morphine Vomiting   • Pcn [Penicillins] Itching   • Sulfa Drugs Itching         Family Medical History:  Family History   Problem Relation Age of Onset   • Stroke Mother    • Cancer Father         larynx   • Diabetes Brother          Social History:  Social History     Socioeconomic History   • Marital status:      Spouse name: Not on file   • Number of children: Not on file   • Years of education: Not on file   • Highest education level: Not on file   Occupational History   • Not on file   Tobacco Use   • Smoking status: Former Smoker     Packs/day: 0.25     Years: 0.10     Pack years: 0.02     Types: Cigarettes     Quit date: 1973     Years since quittin.8   • Smokeless tobacco: Never Used   Vaping Use   • Vaping Use: Never used   Substance and Sexual Activity   • Alcohol use: No   • Drug use: No   • Sexual activity: Not on file     Comment:    Other Topics Concern   • Not on file   Social History Narrative   • Not on file     Social Determinants of Health     Financial Resource Strain:    • Difficulty of Paying Living Expenses:    Food Insecurity:    • Worried About Running Out of Food in the Last Year:    • Ran Out of Food in the Last Year:    Transportation Needs:    • Lack of Transportation (Medical):    • Lack of Transportation (Non-Medical):    Physical Activity:    • Days of Exercise per Week:    • Minutes of Exercise per Session:    Stress:    • Feeling of Stress :    Social Connections:    • Frequency of Communication with Friends and Family:    • Frequency of Social Gatherings with Friends and Family:    • Attends Buddhism Services:  " "  • Active Member of Clubs or Organizations:    • Attends Club or Organization Meetings:    • Marital Status:    Intimate Partner Violence:    • Fear of Current or Ex-Partner:    • Emotionally Abused:    • Physically Abused:    • Sexually Abused:          Vital signs:  Weight/BMI: Body mass index is 29.1 kg/m².  BP (!) 166/65   Pulse 68   Temp 37 °C (98.6 °F) (Temporal)   Resp 18   Ht 1.6 m (5' 2.99\")   Wt 74.5 kg (164 lb 3.9 oz)   SpO2 94%   Vitals:    10/24/21 1100 10/24/21 1525 10/24/21 1939 10/25/21 0805   BP:  160/89 130/72 (!) 166/65   Pulse:  68 79 68   Resp: 18 18 17 18   Temp:  37.6 °C (99.7 °F) 36.7 °C (98.1 °F) 37 °C (98.6 °F)   TempSrc:  Temporal Temporal Temporal   SpO2:  92% 91% 94%   Weight:       Height:         Oxygen Therapy:  Pulse Oximetry: 94 %, O2 Delivery Device: None - Room Air    Intake/Output Summary (Last 24 hours) at 10/25/2021 1247  Last data filed at 10/25/2021 0805  Gross per 24 hour   Intake 722 ml   Output --   Net 722 ml         Physical Exam:  Physical Exam  Vitals and nursing note reviewed.   Constitutional:       Appearance: She is ill-appearing.   HENT:      Head: Normocephalic and atraumatic.      Mouth/Throat:      Mouth: Mucous membranes are dry.   Pulmonary:      Comments: Decreased breath sounds in bases  Abdominal:      Tenderness: There is abdominal tenderness (RUQ).   Neurological:      General: No focal deficit present.      Mental Status: She is alert and oriented to person, place, and time.   Psychiatric:         Thought Content: Thought content normal.         Judgment: Judgment normal.             Labs:  Recent Labs     10/24/21  0645 10/25/21  0420   SODIUM 137 133*   POTASSIUM 3.5* 3.9   CHLORIDE 102 99   CO2 28 26   BUN 17 16   CREATININE 0.61 0.72   MAGNESIUM 1.9 2.0   PHOSPHORUS 3.2 3.2   CALCIUM 7.8* 8.2*     Recent Labs     10/24/21  0645 10/25/21  0420   ALTSGPT 12 12   ASTSGOT 17 22   ALKPHOSPHAT 51 63   TBILIRUBIN 0.2 0.3   GLUCOSE 97 81     Recent " Labs     10/24/21  0415 10/24/21  0645 10/25/21  0420   WBC 6.7  --  8.6   NEUTSPOLYS 74.50*  --  74.00*   LYMPHOCYTES 14.20*  --  15.50*   MONOCYTES 8.20  --  7.50   EOSINOPHILS 0.60  --  0.20   BASOPHILS 0.40  --  0.50   ASTSGOT  --  17 22   ALTSGPT  --  12 12   ALKPHOSPHAT  --  51 63   TBILIRUBIN  --  0.2 0.3     Recent Labs     10/24/21  0415 10/25/21  0420   RBC 2.53* 2.73*   HEMOGLOBIN 8.4* 8.4*   HEMATOCRIT 25.1* 25.6*   PLATELETCT 183 170     Recent Results (from the past 24 hour(s))   CBC WITH DIFFERENTIAL    Collection Time: 10/25/21  4:20 AM   Result Value Ref Range    WBC 8.6 4.8 - 10.8 K/uL    RBC 2.73 (L) 4.20 - 5.40 M/uL    Hemoglobin 8.4 (L) 12.0 - 16.0 g/dL    Hematocrit 25.6 (L) 37.0 - 47.0 %    MCV 93.8 81.4 - 97.8 fL    MCH 30.8 27.0 - 33.0 pg    MCHC 32.8 (L) 33.6 - 35.0 g/dL    RDW 51.3 (H) 35.9 - 50.0 fL    Platelet Count 170 164 - 446 K/uL    MPV 9.9 9.0 - 12.9 fL    Neutrophils-Polys 74.00 (H) 44.00 - 72.00 %    Lymphocytes 15.50 (L) 22.00 - 41.00 %    Monocytes 7.50 0.00 - 13.40 %    Eosinophils 0.20 0.00 - 6.90 %    Basophils 0.50 0.00 - 1.80 %    Immature Granulocytes 2.30 (H) 0.00 - 0.90 %    Nucleated RBC 0.00 /100 WBC    Neutrophils (Absolute) 6.35 2.00 - 7.15 K/uL    Lymphs (Absolute) 1.33 1.00 - 4.80 K/uL    Monos (Absolute) 0.64 0.00 - 0.85 K/uL    Eos (Absolute) 0.02 0.00 - 0.51 K/uL    Baso (Absolute) 0.04 0.00 - 0.12 K/uL    Immature Granulocytes (abs) 0.20 (H) 0.00 - 0.11 K/uL    NRBC (Absolute) 0.00 K/uL   Comp Metabolic Panel    Collection Time: 10/25/21  4:20 AM   Result Value Ref Range    Sodium 133 (L) 135 - 145 mmol/L    Potassium 3.9 3.6 - 5.5 mmol/L    Chloride 99 96 - 112 mmol/L    Co2 26 20 - 33 mmol/L    Anion Gap 8.0 7.0 - 16.0    Glucose 81 65 - 99 mg/dL    Bun 16 8 - 22 mg/dL    Creatinine 0.72 0.50 - 1.40 mg/dL    Calcium 8.2 (L) 8.5 - 10.5 mg/dL    AST(SGOT) 22 12 - 45 U/L    ALT(SGPT) 12 2 - 50 U/L    Alkaline Phosphatase 63 30 - 99 U/L    Total Bilirubin 0.3  0.1 - 1.5 mg/dL    Albumin 2.6 (L) 3.2 - 4.9 g/dL    Total Protein 5.0 (L) 6.0 - 8.2 g/dL    Globulin 2.4 1.9 - 3.5 g/dL    A-G Ratio 1.1 g/dL   PHOSPHORUS    Collection Time: 10/25/21  4:20 AM   Result Value Ref Range    Phosphorus 3.2 2.5 - 4.5 mg/dL   MAGNESIUM    Collection Time: 10/25/21  4:20 AM   Result Value Ref Range    Magnesium 2.0 1.5 - 2.5 mg/dL   ESTIMATED GFR    Collection Time: 10/25/21  4:20 AM   Result Value Ref Range    GFR If African American >60 >60 mL/min/1.73 m 2    GFR If Non African American >60 >60 mL/min/1.73 m 2   SARS-COV Antigen KUSH: Collect dry nasal swab    Collection Time: 10/25/21 11:18 AM   Result Value Ref Range    SARS-CoV-2 Source Nasal Swab     SARS-COV ANTIGEN KUSH NotDetected Not-Detected   POCT glucose device results    Collection Time: 10/25/21 11:27 AM   Result Value Ref Range    Glucose - Accu-Ck 111 (H) 65 - 99 mg/dL         Radiology Review:  CT-ABDOMEN-PELVIS WITH   Final Result         1. Grossly unchanged irregular fluid collection occupying the right abdomen surrounding the right kidney and right colon extending to midline. Additional pigtail catheter in the component about midline anterior abdomen. Both pigtail catheters seem to be    within the collection.      2. Small right pleural effusion with right basilar atelectasis.               DX-CHEST-LIMITED (1 VIEW)   Final Result      1.  Right basilar atelectasis or consolidation. Small right pleural effusion not excluded.   2.  Atherosclerotic plaque.      CT-DRAIN-PERITONEAL   Final Result      1.  CT guided pancreatic pseudocyst catheter drainage.   2.  The current plan is to obtain a follow-up CT in 5-7 days..      IR-PICC LINE PLACEMENT W/ GUIDANCE > AGE 5   Final Result                  Ultrasound-guided PICC placement performed by qualified nursing staff as    above.          CT-DRAIN-PERITONEAL    (Results Pending)         MDM (Data Review):   -Records reviewed and summarized in current documentation  -I  personally reviewed and interpreted the laboratory results  -I personally reviewed the radiology images        Medical Decision Making, by Problem:  Active Hospital Problems    Diagnosis    • Bacteremia due to Gram-negative bacteria and fungemia [R78.81]    • Sepsis due to intraabdominal fluid collection/abscess (HCC) [A41.9]    • Hyperlipidemia [E78.5]    • Hypertension [I10]      66-year-old female with a history of recurrent idiopathic pancreatitis s/p ERCP with biliary sphincterotomy and biopsy of a distal BD polyp on October 1, 2021.  Postop complications-pancreatitis.  Ever since then, complaints of subjective fevers, progressive abdominal pain, nausea/vomiting.  Began to have worsening right lower quadrant pain over the past 5 days.  CT scan abdomen/pelvis large irregular fluid collection with thick wall occupying most of the right abdomen surrounding right kidney and right colon.  Additional fluid and gas collection in the midline abdomen anterior to the pancreas concerning for abscess.  Severe wall thickening of the descending and transverse colon, second portion of duodenum likely reactive.  Pneumobilia with gas in the CBD, intrahepatic bile ducts as well as pancreatic ducts (likely secondary to recent ERCP with sphincterotomy).  IR placed a drain with improvement in abdominal pain initially, but then drain output slowed to no output. Nursing was discovered to have have flushed the drain for at least 2 days. After flush by bedside nurse, reportedly 300 cc of fluid came out. Fluid bilirubin 0.6. Fluid amylase still pending. Blood cultures from 10/8 positive for Chryseobacterium and Candida glabrata. ID following. Repeat CT with no change in fluid collection and new pelvic fluid collection/abscess. Plan for second IR drain placed. Surgery on board as well.         Assessment/Recommendations:  ASSESSMENT:  1.  Sepsis due to intra-abdominal fluid collection/abscess-unclear etiology suspect previous bile or  bowel leak that has since healed versus ?from recent pancreatitis. Ongoing bile leak does not seem to be the cause as HIDA was negative and fluid bilirubin normal at 0.6. Awaiting fluid amylase which was ordered by previous hospitalist but not resulted yet - Cultures (+)  2.  History of pancreatitis  3.  Intra-abdominal fluid collection, unchanged with new pelvic fluid collection  4.  History of morbid obesity s/p laparoscopic sleeve  5.  Euvolemic hyponatremia  6.  Constipation     PLAN:     -Continue IV antibiotics and antifungals per ID and pharmacy recommendations  -Continue to trend CBC, CMP  -IV antibiotics, antiemetics, pain medication per primary team  -Appreciate IR and surgery recommendations/management  -Continue drain flushes per IR recommendations daily      Core Quality Measures   Reviewed items:  Labs, Medications and Radiology reports reviewed    GI TO SIGN OFF / STAND BY - Thank you very much for allowing me to participate in the care of your patient.  GI to sign off at this time.  If the patient develops changes in clinical course/decompensation or you have any additional questions or concerns, please don't hesitate to reengage GI Consultants.  If necessary, follow up with [facility] will be arranged  The patient will be called to schedule an appointment time.  If the patient does not receive a call from GI consultants scheduling or they have additional questions, recommend the patient call the clinic at [phone] or [phone] to schedule an appointment.

## 2021-10-25 NOTE — WOUND TEAM
[facility]  Inpatient Services  Initial Wound and Skin Care Evaluation    Admission Date: 10/15/2021     Last order of IP CONSULT TO WOUND CARE was found on 10/25/2021 from Hospital Encounter on 10/14/2021     HPI, PMH, SH: Reviewed    Past Surgical History:   Procedure Laterality Date   • PB ERCP,DIAGNOSTIC  10/1/2021    Procedure: ERCP (ENDOSCOPIC RETROGRADE CHOLANGIOPANCREATOGRAPHY);  Surgeon: Fausto Casas M.D.;  Location: [facility];  Service: Gastroenterology   • COLONOSCOPY  8/8/2018    Procedure: COLONOSCOPY;  Surgeon: Shukri Condon M.D.;  Location: [facility];  Service: General   • GASTROSCOPY  5/23/2018    Procedure: GASTROSCOPY;  Surgeon: Fausto Casas M.D.;  Location: [facility];  Service: Gastroenterology   • EGD W/ENDOSCOPIC ULTRASOUND  5/23/2018    Procedure: EGD W/ENDOSCOPIC ULTRASOUND- RADIAL UPPER;  Surgeon: Fausto Casas M.D.;  Location: [facility];  Service: Gastroenterology   • CATARACT PHACO WITH IOL Left 4/18/2017    Procedure: CATARACT PHACO WITH IOL;  Surgeon: Stuart Estevez M.D.;  Location: SURGERY SAME DAY [facility];  Service:    • CATARACT PHACO WITH IOL Right 4/4/2017    Procedure: CATARACT PHACO WITH IOL;  Surgeon: Stuart Estevez M.D.;  Location: [facility];  Service:    • GASTRIC SLEEVE LAPAROSCOPY  10/19/2016    Procedure: GASTRIC SLEEVE LAPAROSCOPY, HIATAL HERNIA;  Surgeon: Shukri Condon M.D.;  Location: [facility];  Service:    • LIVER BIOPSY LAPAROSCOPIC  10/19/2016    Procedure: LIVER BIOPSY LAPAROSCOPIC;  Surgeon: Shukri Condon M.D.;  Location: [facility];  Service:    • GASTROSCOPY N/A 8/26/2016    Procedure: GASTROSCOPY;  Surgeon: Shukri Condon M.D.;  Location: [facility];  Service:    • ROTATOR CUFF REPAIR Right 7/7/2016   • ROTATOR CUFF REPAIR Left 5/2015   • HYSTERECTOMY ROBOTIC  1/2/2009    Performed by MEG VILLEGAS  MARINA at SURGERY [facility] ORS   • LUMBAR FUSION ANTERIOR      Dr Hillman, L3-S1 fusion   • CHOLECYSTECTOMY      laparoscopic   • TUBAL LIGATION     • GASTRIC RESECTION      gastric stapling   • TONSILLECTOMY AND ADENOIDECTOMY     • PRIMARY C SECTION  , , 1985    x3     Social History     Tobacco Use   • Smoking status: Former Smoker     Packs/day: 0.25     Years: 0.10     Pack years: 0.02     Types: Cigarettes     Quit date: 1973     Years since quittin.8   • Smokeless tobacco: Never Used   Substance Use Topics   • Alcohol use: No     No chief complaint on file.    Diagnosis: Bile duct leak [K83.8]  Postprocedural intraabdominal abscess [T81.43XA]  Intra-abdominal abscess (HCC) [K65.1]    Unit where seen by Wound Team: S615/02     WOUND CONSULT/FOLLOW UP RELATED TO:  L. And R. Abdomen IR incisions     WOUND HISTORY:  10/15 afebrile, WBC 13.5 patient transferred to regional overnight.  Patient having diarrhea however abdominal pain is slightly improved today.  She was evaluated by Dr. ST this morning who is recommending additional drain placement and holding off on surgery at this time  10/16 afebrile, WBC 8.4.  Plan for second drain placement today.  Also patient to start TPN.  Had one episode of liquid dark stools.  C differential PCR pending  10/17 afebrile, WBC 6 underwent CT-guided drain placement yesterday, Gram stain with few yeast. C. difficile negative. On TPN, planning to ambulate in the halls today  10/18 AF, O2 2 L NC, ongoing abdominal pain but overall improving.  She continues to have 2 drains in place with minimal output on the right.   10/25 afebrile, WBC 8.6.  Repeat CT scan on 10/22 without any changes.  Plan for additional drain placement today.  Patient is very anxious.  Tolerating IV antibiotics without any issues.  Ongoing abdominal pain        Patient had a previous surgery with Dr. Condon for a gastric bypass, history of non-alcoholic pancreatitis, under went an  ERCP and the next day was in so much pain that she called EMS.  Found an abdominal abscess.  Placed bilateral IR drains.      WOUND ASSESSMENT/LDA  Wound 10/16/21  Abdomen Lateral Right IR Drain insertion  (Active)   Wound Image   10/25/21 1200   Site Assessment Red;Purple;Brown;Drainage 10/25/21 1200   Periwound Assessment Red;Pink 10/25/21 1200   Margins Defined edges 10/25/21 1200   Closure Surface sutures;Adhesive bandage 10/25/21 1200   Drainage Amount Moderate 10/25/21 1200   Drainage Description Brown;Serosanguineous;Tan 10/25/21 1200   Treatments Cleansed;Site care;Irrigation 10/25/21 1200   Wound Cleansing Soap and Water 10/25/21 1200   Periwound Protectant Skin Protectant Wipes to Periwound;Hydrofiber 10/25/21 1200   Dressing Cleansing/Solutions Not Applicable 10/25/21 1200   Dressing Options Hydrofiber Silver;Split Gauze;Hypafix Tape 10/25/21 1200   Dressing Changed Changed 10/25/21 1200   Dressing Status Clean;Dry;Intact 10/25/21 1200   Dressing Change/Treatment Frequency Daily, and As Needed 10/25/21 1200   NEXT Dressing Change/Treatment Date 10/26/21 10/25/21 1200   Shape circular 10/25/21 1200   Wound Odor Mild 10/25/21 1200   Pulses N/A 10/25/21 1200   Exposed Structures MALVIN 10/25/21 1200   WOUND NURSE ONLY - Time Spent with Patient (mins) 60 10/25/21 1200   Number of days: 9       Wound 10/25/21 Abdomen Lateral Left IR drain insertion (Active)   Wound Image   10/25/21 1200   Site Assessment Pink;Red 10/25/21 1200   Periwound Assessment [place] 10/25/21 1200   Margins Defined edges 10/25/21 1200   Closure Adhesive bandage 10/25/21 1200   Drainage Amount Scant 10/25/21 1200   Drainage Description Serous 10/25/21 1200   Treatments Cleansed;Site care 10/25/21 1200   Wound Cleansing Soap and Water 10/25/21 1200   Periwound Protectant Skin Protectant Wipes to Periwound 10/25/21 1200   Dressing Cleansing/Solutions Not Applicable 10/25/21 1200   Dressing Options Hydrofera Blue Ready;Split Gauze;Hypafix Tape  10/25/21 1200   Dressing Changed Changed 10/25/21 1200   Dressing Status Clean;Dry;Intact 10/25/21 1200   Dressing Change/Treatment Frequency Daily, and As Needed 10/25/21 1200   NEXT Dressing Change/Treatment Date 10/26/21 10/25/21 1200   Shape circular 10/25/21 1200   Wound Odor None 10/25/21 1200   Pulses N/A 10/25/21 1200   Exposed Structures MALVIN 10/25/21 1200   WOUND NURSE ONLY - Time Spent with Patient (mins) 30 10/25/21 1200   Number of days: 0        Vascular:    CHARBEL:   No results found.    Lab Values:    Lab Results   Component Value Date/Time    WBC 8.6 10/25/2021 04:20 AM    RBC 2.73 (L) 10/25/2021 04:20 AM    HEMOGLOBIN 8.4 (L) 10/25/2021 04:20 AM    HEMATOCRIT 25.6 (L) 10/25/2021 04:20 AM    CREACTPROT 0.29 12/02/2015 07:48 AM    SEDRATEWES 10 12/02/2015 07:48 AM    HBA1C 5.7 (H) 09/13/2016 10:40 AM        Culture Results show:  Recent Results (from the past 720 hour(s))   CULTURE WOUND W/ GRAM STAIN    Collection Time: 10/16/21  1:40 PM    Specimen: Wound   Result Value Ref Range    Significant Indicator POS (POS)     Source WND     Site LMQ Peritoneal Drain     Culture Result - (A)     Gram Stain Result Few Yeast.     Culture Result Enterococcus faecalis  Moderate growth   (A)     Culture Result Candida albicans  Moderate growth   (A)     Culture Result Candida glabrata  Moderate growth   (A)        Susceptibility    Enterococcus faecalis - MU     Daptomycin 2 Sensitive mcg/mL     Gent Synergy <=500 Sensitive mcg/mL     Ampicillin <=2 Sensitive mcg/mL     Vancomycin 1 Sensitive mcg/mL     Penicillin 2 Sensitive mcg/mL       Pain Level/Medicated:  Patient medicated with IV       INTERVENTIONS BY WOUND TEAM:  Chart and images reviewed. Discussed with bedside RN. All areas of concern (based on picture review, LDA review and discussion with bedside RN) have been thoroughly assessed. Documentation of areas based on significant findings. This RN in to assess patient. Performed standard wound care which  includes appropriate positioning, dressing removal and non-selective debridement. Pictures and measurements obtained weekly if/when required.  Preparation for Dressing removal: Dressing soaked with n/a  Non-selectively Debrided with:  normal saline and gauze.  Sharp debridement: n/a  [place]go[place] wound: Cleansed with normal saline, Prepped with no sting  Primary Dressing: R. aquacel Ag left side hydrofera blue  Secondary (Outer) Dressing: fenestrated gauze and hypafix tape.      Interdisciplinary consultation: Patient, Bedside RN (Linette),     EVALUATION / RATIONALE FOR TREATMENT:  Most Recent Date:  10/25/21: patient soaked in drainage upon assessment.  L insertion site is intact, irrigated per active order and no drainage noted.  Placed hydrofera blue. Hydrofera Blue applied for the hydrophilic polyurethane foam which contains ethylene oxide used as a bactericidal, fungicidal, and sporicidal disinfectant. Hydrofera Blue also aids in maintaining a moist wound environment. The absorption properties of this dressing are important in collecting exudates and bacteria from the injured area. These harmful fluid secretions bind to the dressing removing it from the wound without the foam sticking to the wound causing more harm. R. Incision site had brown, tan, purulent drainage oozing from the insertion site.  When irrigated it drained out.  Placed aquacel silver to help keep skin dry.  Patient is to be seen again by IR today.  New dressing orders are on hold.       Goals: Steady decrease in wound area and depth weekly.    Wound team is not following patient. New orders placed for nursing to change dressings daily and PRN saturation.

## 2021-10-25 NOTE — OR SURGEON
Immediate Post- Operative Note        Findings: RLQ abscess with air and fluid      Procedure(s): Abscess drain into RLQ. 12 Fr. Drain placed.      Estimated Blood Loss: Less than 5 ml    Complications: None      10/25/2021     4:08 PM     Chavez Pringle M.D.

## 2021-10-26 PROBLEM — K52.1 ANTIBIOTIC-ASSOCIATED DIARRHEA: Status: ACTIVE | Noted: 2021-10-16

## 2021-10-26 PROBLEM — Z78.9 ON TOTAL PARENTERAL NUTRITION (TPN): Status: ACTIVE | Noted: 2021-10-26

## 2021-10-26 PROBLEM — T36.95XA ANTIBIOTIC-ASSOCIATED DIARRHEA: Status: ACTIVE | Noted: 2021-10-16

## 2021-10-26 LAB
GLUCOSE BLD-MCNC: 107 MG/DL (ref 65–99)
GRAM STN SPEC: NORMAL
SIGNIFICANT IND 70042: NORMAL
SITE SITE: NORMAL
SOURCE SOURCE: NORMAL

## 2021-10-26 PROCEDURE — A9270 NON-COVERED ITEM OR SERVICE: HCPCS | Performed by: FAMILY MEDICINE

## 2021-10-26 PROCEDURE — 770006 HCHG ROOM/CARE - MED/SURG/GYN SEMI*

## 2021-10-26 PROCEDURE — 700111 HCHG RX REV CODE 636 W/ 250 OVERRIDE (IP): Performed by: INTERNAL MEDICINE

## 2021-10-26 PROCEDURE — 99233 SBSQ HOSP IP/OBS HIGH 50: CPT | Performed by: STUDENT IN AN ORGANIZED HEALTH CARE EDUCATION/TRAINING PROGRAM

## 2021-10-26 PROCEDURE — 700111 HCHG RX REV CODE 636 W/ 250 OVERRIDE (IP): Performed by: STUDENT IN AN ORGANIZED HEALTH CARE EDUCATION/TRAINING PROGRAM

## 2021-10-26 PROCEDURE — A9270 NON-COVERED ITEM OR SERVICE: HCPCS | Performed by: STUDENT IN AN ORGANIZED HEALTH CARE EDUCATION/TRAINING PROGRAM

## 2021-10-26 PROCEDURE — 700111 HCHG RX REV CODE 636 W/ 250 OVERRIDE (IP): Performed by: FAMILY MEDICINE

## 2021-10-26 PROCEDURE — 700105 HCHG RX REV CODE 258: Performed by: FAMILY MEDICINE

## 2021-10-26 PROCEDURE — 700105 HCHG RX REV CODE 258: Performed by: INTERNAL MEDICINE

## 2021-10-26 PROCEDURE — 700102 HCHG RX REV CODE 250 W/ 637 OVERRIDE(OP): Performed by: STUDENT IN AN ORGANIZED HEALTH CARE EDUCATION/TRAINING PROGRAM

## 2021-10-26 PROCEDURE — 700102 HCHG RX REV CODE 250 W/ 637 OVERRIDE(OP): Performed by: FAMILY MEDICINE

## 2021-10-26 PROCEDURE — 700111 HCHG RX REV CODE 636 W/ 250 OVERRIDE (IP): Mod: JG | Performed by: STUDENT IN AN ORGANIZED HEALTH CARE EDUCATION/TRAINING PROGRAM

## 2021-10-26 PROCEDURE — 700105 HCHG RX REV CODE 258: Performed by: STUDENT IN AN ORGANIZED HEALTH CARE EDUCATION/TRAINING PROGRAM

## 2021-10-26 PROCEDURE — 82962 GLUCOSE BLOOD TEST: CPT

## 2021-10-26 PROCEDURE — 700101 HCHG RX REV CODE 250: Performed by: STUDENT IN AN ORGANIZED HEALTH CARE EDUCATION/TRAINING PROGRAM

## 2021-10-26 RX ORDER — FUROSEMIDE 10 MG/ML
40 INJECTION INTRAMUSCULAR; INTRAVENOUS
Status: DISCONTINUED | OUTPATIENT
Start: 2021-10-27 | End: 2021-11-06

## 2021-10-26 RX ORDER — LOPERAMIDE HYDROCHLORIDE 2 MG/1
2 CAPSULE ORAL 4 TIMES DAILY PRN
Status: DISCONTINUED | OUTPATIENT
Start: 2021-10-26 | End: 2021-11-19

## 2021-10-26 RX ADMIN — CALCIUM GLUCONATE: 98 INJECTION, SOLUTION INTRAVENOUS at 19:51

## 2021-10-26 RX ADMIN — PIPERACILLIN SODIUM AND TAZOBACTAM SODIUM 3.38 G: 3; .375 INJECTION, POWDER, FOR SOLUTION INTRAVENOUS at 10:30

## 2021-10-26 RX ADMIN — PIPERACILLIN SODIUM AND TAZOBACTAM SODIUM 3.38 G: 3; .375 INJECTION, POWDER, FOR SOLUTION INTRAVENOUS at 18:32

## 2021-10-26 RX ADMIN — Medication 1000 UNITS: at 05:02

## 2021-10-26 RX ADMIN — ONDANSETRON 4 MG: 2 INJECTION INTRAMUSCULAR; INTRAVENOUS at 20:14

## 2021-10-26 RX ADMIN — ONDANSETRON 4 MG: 2 INJECTION INTRAMUSCULAR; INTRAVENOUS at 13:53

## 2021-10-26 RX ADMIN — GABAPENTIN 600 MG: 300 CAPSULE ORAL at 05:02

## 2021-10-26 RX ADMIN — HYDROMORPHONE HYDROCHLORIDE 0.5 MG: 1 INJECTION, SOLUTION INTRAMUSCULAR; INTRAVENOUS; SUBCUTANEOUS at 04:59

## 2021-10-26 RX ADMIN — ONDANSETRON 4 MG: 2 INJECTION INTRAMUSCULAR; INTRAVENOUS at 18:32

## 2021-10-26 RX ADMIN — GABAPENTIN 600 MG: 300 CAPSULE ORAL at 17:00

## 2021-10-26 RX ADMIN — HEPARIN SODIUM 5000 UNITS: 5000 INJECTION, SOLUTION INTRAVENOUS; SUBCUTANEOUS at 13:53

## 2021-10-26 RX ADMIN — OXYCODONE HYDROCHLORIDE 10 MG: 10 TABLET ORAL at 03:19

## 2021-10-26 RX ADMIN — ALTEPLASE 2 MG: 2.2 INJECTION, POWDER, LYOPHILIZED, FOR SOLUTION INTRAVENOUS at 09:38

## 2021-10-26 RX ADMIN — HEPARIN SODIUM 5000 UNITS: 5000 INJECTION, SOLUTION INTRAVENOUS; SUBCUTANEOUS at 05:01

## 2021-10-26 RX ADMIN — OXYCODONE HYDROCHLORIDE 10 MG: 10 TABLET ORAL at 09:45

## 2021-10-26 RX ADMIN — LISINOPRIL 5 MG: 5 TABLET ORAL at 05:02

## 2021-10-26 RX ADMIN — HYDROMORPHONE HYDROCHLORIDE 0.5 MG: 1 INJECTION, SOLUTION INTRAMUSCULAR; INTRAVENOUS; SUBCUTANEOUS at 16:28

## 2021-10-26 RX ADMIN — HYDROMORPHONE HYDROCHLORIDE 0.5 MG: 1 INJECTION, SOLUTION INTRAMUSCULAR; INTRAVENOUS; SUBCUTANEOUS at 20:14

## 2021-10-26 RX ADMIN — ONDANSETRON 4 MG: 2 INJECTION INTRAMUSCULAR; INTRAVENOUS at 03:23

## 2021-10-26 RX ADMIN — ONDANSETRON 4 MG: 2 INJECTION INTRAMUSCULAR; INTRAVENOUS at 09:46

## 2021-10-26 RX ADMIN — PIPERACILLIN SODIUM AND TAZOBACTAM SODIUM 3.38 G: 3; .375 INJECTION, POWDER, FOR SOLUTION INTRAVENOUS at 23:31

## 2021-10-26 RX ADMIN — HYDROMORPHONE HYDROCHLORIDE 0.5 MG: 1 INJECTION, SOLUTION INTRAMUSCULAR; INTRAVENOUS; SUBCUTANEOUS at 11:55

## 2021-10-26 RX ADMIN — OXYCODONE HYDROCHLORIDE 10 MG: 10 TABLET ORAL at 13:52

## 2021-10-26 RX ADMIN — MICAFUNGIN SODIUM 100 MG: 100 INJECTION, POWDER, LYOPHILIZED, FOR SOLUTION INTRAVENOUS at 17:00

## 2021-10-26 RX ADMIN — EZETIMIBE 10 MG: 10 TABLET ORAL at 18:25

## 2021-10-26 RX ADMIN — OXYCODONE HYDROCHLORIDE 10 MG: 10 TABLET ORAL at 18:31

## 2021-10-26 ASSESSMENT — COGNITIVE AND FUNCTIONAL STATUS - GENERAL
CLIMB 3 TO 5 STEPS WITH RAILING: A LITTLE
SUGGESTED CMS G CODE MODIFIER MOBILITY: CI
SUGGESTED CMS G CODE MODIFIER DAILY ACTIVITY: CH
MOBILITY SCORE: 23
DAILY ACTIVITIY SCORE: 24

## 2021-10-26 ASSESSMENT — ENCOUNTER SYMPTOMS
SORE THROAT: 0
BACK PAIN: 0
SINUS PAIN: 0
ABDOMINAL PAIN: 0
BLOOD IN STOOL: 0
NAUSEA: 0
BLURRED VISION: 0
CONSTIPATION: 0
MYALGIAS: 0
SHORTNESS OF BREATH: 0
FLANK PAIN: 0
CHILLS: 0
SHORTNESS OF BREATH: 1
HEMOPTYSIS: 0
DEPRESSION: 0
VOMITING: 0
DIZZINESS: 0
DIARRHEA: 0
HEADACHES: 0
FEVER: 0
NERVOUS/ANXIOUS: 0
PALPITATIONS: 0
BRUISES/BLEEDS EASILY: 0
ABDOMINAL PAIN: 1
NECK PAIN: 0
COUGH: 0
WEAKNESS: 1

## 2021-10-26 ASSESSMENT — PAIN DESCRIPTION - PAIN TYPE
TYPE: ACUTE PAIN

## 2021-10-26 NOTE — CARE PLAN
The patient is Stable - Low risk of patient condition declining or worsening    Shift Goals  Clinical Goals: pain management   Patient Goals: rest  Family Goals: get her better    Progress made toward(s) clinical / shift goals: Medicated per MAR.  No signs of distress.    Patient is not progressing towards the following goals:

## 2021-10-26 NOTE — PROGRESS NOTES
Covid-19 surge in effect:    Pt is A&Ox4, c/o pain 9/10 in her abdomen, pt medicated with PRN Oxy per MAR. Family at bedside. Red lumen of PICC occluded, alteplase placed.

## 2021-10-26 NOTE — PROGRESS NOTES
Pharmacy TPN Day# 9: 10/25/2021     Dosing Weight: 60 kg (AdjBW)            TPN currently providing 50% of goal  TPN goal: 8968-3330 kcal/day including 1.4-1.7 gm/kg/day Protein  TPN indication: perforated duodenum with high drain output                                                                Pertinent PMH: Admitted on 10/15/2021 for abdominal pain. Underwent polypectomy and a sphincterotomy on 10/01/2021.  CT completed on 10/08/202, found to have a large fluid collection and thickening of wall around the duodenum with retroperitoneal fluid collection.  IR placed drain. CT completed on 10/09/2021 with a significant amount of retroperitoneal air and fluid.  Pt was transferred to [facility] 10/14/21 for further evaluation. 2 drains have been placed for fluid collections surrounding the R kidney and colon.  Peritoneal drain cultures have demonstrated polymicrobial growth however blood cultures have remained negative.      Temp (24hrs), Av.2 °C (98.9 °F), Min:36.7 °C (98.1 °F), Max:37.8 °C (100 °F)  .  Recent Labs     10/24/21  0645 10/25/21  0420   SODIUM 137 133*   POTASSIUM 3.5* 3.9   CHLORIDE 102 99   CO2 28 26   BUN 17 16   CREATININE 0.61 0.72   GLUCOSE 97 81   CALCIUM 7.8* 8.2*   ASTSGOT 17 22   ALTSGPT 12 12   ALBUMIN 2.1* 2.6*   TBILIRUBIN 0.2 0.3   PHOSPHORUS 3.2 3.2   MAGNESIUM 1.9 2.0     Accu-Checks  Recent Labs     10/25/21  1127   POCGLUCOSE 111*       Vitals:    10/25/21 1600 10/25/21 1605 10/25/21 1610 10/25/21 1700   BP: 135/69 114/59 122/61 116/97   Weight:       Height:           Intake/Output Summary (Last 24 hours) at 10/25/2021 1712  Last data filed at 10/25/2021 1606  Gross per 24 hour   Intake 722 ml   Output 35 ml   Net 687 ml       Orders Placed This Encounter   Procedures   • Diet Order Diet: Regular     Standing Status:   Standing     Number of Occurrences:   1     Order Specific Question:   Diet:     Answer:   Regular [1]         TPN for past 72 hours (Show up to 3 orders;  ruthann on the left. Changes between the two most recent orders are indicated.)     Start date and time   10/24/2021 2000 10/22/2021 2000 10/19/2021 2000      TPN Central Line Formulation [[id number]] TPN Central Line Formulation [[id number]] TPN Central Line Formulation [[id number]]    Order Status  Active Completed Completed    Last Admin  Rate Verify at 10/25/2021 0725 by Bernadette Casas R.N. New Bag at 10/23/2021 2007 by Cornelio Chowdary R.N. Rate Verify at 10/22/2021 0745 by Bahman Monique R.N.       Base    Clinisol 15%  45 g 90 g 90 g    dextrose 70%  120 g 240 g 240 g    fat emulsions 20%  25 g 50 g 50 g       Additives    potassium phosphate  25 mmol 25 mmol 20 mmol    sodium acetate  145 mEq 145 mEq 110 mEq    sodium chloride  77 mEq 77 mEq 110 mEq    magnesium sulfate  8 mEq 8 mEq 8 mEq    calcium GLUConate  9.3 mEq 9.3 mEq 9.3 mEq    M.T.E.-4  1 mL 1 mL 1 mL    M.V.I. Adult  10 mL 10 mL 10 mL    famotidine  40 mg 40 mg 40 mg       QS Base    sterile water  706.55 mL 110.05 mL 120.96 mL       Energy Contribution    Proteins  -- -- --    Dextrose  408 kcal 816 kcal 816 kcal    Lipids  250 kcal 500 kcal 500 kcal    Total  658 kcal 1,316 kcal 1,316 kcal       Electrolyte Ion Calculated Amount    Sodium  222 mEq 222 mEq 220 mEq    Potassium  36.67 mEq 36.67 mEq 29.33 mEq    Calcium  9.3 mEq 9.3 mEq 9.3 mEq    Magnesium  8 mEq 8 mEq 8 mEq    Aluminum  -- -- --    Phosphate  25 mmol 25 mmol 20 mmol    Chloride  77 mEq 77 mEq 110 mEq    Acetate  183.1 mEq 221.2 mEq 186.2 mEq       Other    Total Protein  45 g 90 g 90 g    Total Protein/kg  0.6 g/kg 1.21 g/kg 1.26 g/kg    Total Amino Acid  -- -- --    Total Amino Acid/kg  -- -- --    Glucose Infusion Rate  1.12 mg/kg/min 2.24 mg/kg/min 2.24 mg/kg/min    Osmolarity (Estimated)  -- -- --    Volume  1,440 mL 1,440 mL 1,440 mL    Rate  60 mL/hr 60 mL/hr 60 mL/hr    Dosing Weight  74.5 kg 74.5 kg 71.4 kg    Infusion Site  Central Central Central        This  formula provides:  % kcal as lipids = 30  Grams protein/kg = 0.75  Non-protein calories = 658  Kcals/kg = 14  Total daily calories = ~840    Comments:  1. Patient was taken back to the OR this afternoon for RLQ abscess drainage and drain placement. She has tolerated her breakfast this morning with no nausea and vomiting. Per discussion with provider, will keep TPN for x1 more day to ensure patient will continue tolerating PO diet after surgery. Anticipating can stop TPN tomorrow.   2. Macronutrients: TPN providing ~50% caloric needs. No changes to TPN today.   Dextrose: ~410 kcal (GIR 1.5 mg/min/kg)              Protein: 180 kcal               Lipids: 250 kcal  3. Micronutrients: All electrolytes WNL per labs this morning. No changes inside of TPN  4. Glucose: BG <180  5. Famotidine inside of TPN. Change to oral famotidine once TPN is stopped.        Pharmacy will continue to follow labs and patient needs, and reformulate TPN as needed.       Alyssa Espinosa, PharmD

## 2021-10-26 NOTE — PROGRESS NOTES
Hospital Medicine Daily Progress Note    Date of Service  10/25/2021    Chief Complaint  Nils Alfonso is a 66 y.o. female admitted 10/15/2021 with abdominal pain    Hospital Course  Initially admitted to [facility] for evaluation of abdominal pain after recent ERCP with polypectomy and sphincterotomy and noted to have large intra-abdominal fluid collection for which she underwent drainage with interventional radiology and was evaluated by infectious disease and surgery.  She was transferred to [facility] for higher level of care.  She was evaluated by Dr Cook who recommended conservative management with placement of a second drain    Pneumobilia with gas in the CBD - s/p drain placement  TPN  Post ERCP retroperitoneal abscess with drain x2     Repeat CT 10/22 showed Grossly unchanged irregular fluid collection occupying the right abdomen surrounding the right kidney and right colon extending to midline.       Interval Problem Update  Patient was seen and examined at bedside.  No acute events overnight.    Patient was seen after her drain placement today, tired, pain tolerable    - s/p drain replacement 10/25  - Tolerating full liquids - still Some nausea, but no vomiting  - BP high,  lisinopril 5 mg daily,  labetalol as needed, improving    Micafungin for candademia until 10/27  Zosyn for E.coli and enterococcus bactermi until 10/24  Drain irrigation q4 hours    Follow-up ID, GI, IR, surgery recommendations     I have personally seen and examined the patient at bedside. I discussed the plan of care with patient, bedside RN and infectious disease.    Consultants/Specialty  general surgery, GI and infectious disease    Code Status  Full Code    Disposition  Patient is not medically cleared.   Anticipate discharge to to home with organized home healthcare and close outpatient follow-up.  I have placed the appropriate orders for post-discharge needs.    Review of Systems  Review  of Systems   Constitutional: Negative for chills and fever.   Respiratory: Positive for shortness of breath. Negative for cough.    Cardiovascular: Negative for chest pain and palpitations.   Gastrointestinal: Positive for abdominal pain. Negative for nausea and vomiting.   Genitourinary: Negative for dysuria and frequency.   Musculoskeletal: Negative for falls.   Neurological: Negative for seizures and loss of consciousness.   All other systems reviewed and are negative.       Physical Exam  Temp:  [36.7 °C (98.1 °F)-37.8 °C (100 °F)] 37.8 °C (100 °F)  Pulse:  [68-88] 70  Resp:  [16-20] 18  BP: (114-174)/(59-97) 116/97  SpO2:  [91 %-97 %] 97 %    Physical Exam  Vitals and nursing note reviewed.   Constitutional:       General: She is not in acute distress.     Comments: Pleasant, conversational   HENT:      Head: Normocephalic and atraumatic.      Right Ear: External ear normal.      Left Ear: External ear normal.      Nose: Nose normal. No rhinorrhea.      Mouth/Throat:      Pharynx: No oropharyngeal exudate or posterior oropharyngeal erythema.   Eyes:      General: No scleral icterus.     Conjunctiva/sclera: Conjunctivae normal.   Cardiovascular:      Rate and Rhythm: Normal rate and regular rhythm.      Heart sounds: Normal heart sounds. No murmur heard.     Pulmonary:      Effort: Pulmonary effort is normal.      Breath sounds: Normal breath sounds. No wheezing.   Abdominal:      General: Bowel sounds are normal.      Palpations: Abdomen is soft.      Tenderness: There is abdominal tenderness. There is no guarding or rebound.      Comments: Abdominal drains in place x2   Musculoskeletal:      Cervical back: Neck supple. No rigidity.      Comments: Lower extemity edema improved   Skin:     General: Skin is warm and dry.      Coloration: Skin is not cyanotic or jaundiced.      Findings: No bruising.      Nails: There is no clubbing.   Neurological:      General: No focal deficit present.      Mental Status: She  is alert and oriented to person, place, and time.      Cranial Nerves: No cranial nerve deficit.      Motor: No weakness.   Psychiatric:         Mood and Affect: Mood normal.         Behavior: Behavior normal.     Patient was seen and examined at bedside. No changes in physical exam from prior evaluation.      Fluids    Intake/Output Summary (Last 24 hours) at 10/25/2021 1809  Last data filed at 10/25/2021 1606  Gross per 24 hour   Intake 722 ml   Output 35 ml   Net 687 ml       Laboratory  Recent Labs     10/24/21  0415 10/25/21  0420   WBC 6.7 8.6   RBC 2.53* 2.73*   HEMOGLOBIN 8.4* 8.4*   HEMATOCRIT 25.1* 25.6*   MCV 99.2* 93.8   MCH 33.2* 30.8   MCHC 33.5* 32.8*   RDW 53.6* 51.3*   PLATELETCT 183 170   MPV 10.7 9.9     Recent Labs     10/24/21  0645 10/25/21  0420   SODIUM 137 133*   POTASSIUM 3.5* 3.9   CHLORIDE 102 99   CO2 28 26   GLUCOSE 97 81   BUN 17 16   CREATININE 0.61 0.72   CALCIUM 7.8* 8.2*                   Imaging  CT-ABDOMEN-PELVIS WITH   Final Result         1. Grossly unchanged irregular fluid collection occupying the right abdomen surrounding the right kidney and right colon extending to midline. Additional pigtail catheter in the component about midline anterior abdomen. Both pigtail catheters seem to be    within the collection.      2. Small right pleural effusion with right basilar atelectasis.               DX-CHEST-LIMITED (1 VIEW)   Final Result      1.  Right basilar atelectasis or consolidation. Small right pleural effusion not excluded.   2.  Atherosclerotic plaque.      CT-DRAIN-PERITONEAL   Final Result      1.  CT guided pancreatic pseudocyst catheter drainage.   2.  The current plan is to obtain a follow-up CT in 5-7 days..      IR-PICC LINE PLACEMENT W/ GUIDANCE > AGE 5   Final Result                  Ultrasound-guided PICC placement performed by qualified nursing staff as    above.          CT-DRAIN-PERITONEAL    (Results Pending)        Assessment/Plan  * Bacteremia due to  Gram-negative bacteria and fungemia- (present on admission)  Assessment & Plan  Peritoneal fluid cultures grew E. coli and Enterococcus   Blood cultures grew chryseobacterium and Candida glabrata   Repeat blood cultures from 10/13/2021 are negative    Peritoneal fluid from 10/16/2021 growing yeast and strep species follow-up on final results  Monitor drain output and continue current antibiotics  KALANI negative for signs of endocarditis    Micafungin for candademia until 10/27  Zosyn for E.coli and enterococcus bactermi until 10/24    Postprocedural retroperitoneal abscess- (present on admission)  Assessment & Plan  Now with abdominal drain x2  General surgery, GI following    Repeat CT 10/22 showed Grossly unchanged irregular fluid collection occupying the right abdomen surrounding the right kidney and right colon extending to midline.   - s/p drain replacement 10/25    Hypophosphatemia  Assessment & Plan  Replaced  Continue to monitor    Hypokalemia  Assessment & Plan  Monitor and replace    Acute respiratory failure with hypoxia (HCC)  Assessment & Plan  Requiring 1L supplemental oxygen  Likely volume overload with lower extremity edema present  Improved with diuresis  Now on room air  Resolved    Electrolyte abnormality  Assessment & Plan  Continue electrolyte repletion with TPN    Diarrhea  Assessment & Plan  C. difficile negative    Duodenal perforation (HCC)- (present on admission)  Assessment & Plan  Following ERCP with retroperitoneal abscess and bacteremia    Continue current antibiotics  Trial clear liquids per GS    On octreotide subcu since 10/15, checked with pam Mckenna to dc (10/24)    Hyponatremia- (present on admission)  Assessment & Plan  Stable continue to monitor  Appears volume overloaded we will give 1 dose of Lasix today and monitor    Sepsis due to intraabdominal fluid collection/abscess (HCC)- (present on admission)  Assessment & Plan  Sepsis resolved  Source  intra-abdominal  Continue Zosyn and Mycamine and follow-up on repeat cultures  ID following  Will need follow-up abdominal imaging in a few days    Hyperlipidemia- (present on admission)  Assessment & Plan  Continue Zetia    Hypertension- (present on admission)  Assessment & Plan  started lisinopril 5 mg daily,   labetalol as needed         VTE prophylaxis: heparin ppx    I have performed a physical exam and reviewed and updated ROS and Plan today (10/25/2021). In review of yesterday's note (10/24/2021), there are no changes except as documented above.

## 2021-10-26 NOTE — PROGRESS NOTES
Radiology Progress Note   Author: AYE Mahan Date & Time created: 10/26/2021  1:23 PM   Date of admission  10/15/2021  Note to reader: this note follows the APSO format rather than the historical SOAP format. Assessment and plan located at the top of the note for ease of use.    Chief Complaint  66 y.o. female admitted 10/15/2021 with abd pain     HPI  66-year-old female PMH recurrent idiopathic pancreatitis s/p ERCP with sphincterotomy October 1, 2021 complicated by ERCP pancreatitis, sleeve gastrectomy, dyslipidemia, hypertension, osteoarthritis of the spine status post lumbar fusion who was admitted to the hospital 10/8/2021 with worsening right lower quadrant pain over the past week.  Ever since her ERCP October 1, 2021, she has been experiencing pain, intermittent subjective fevers, nausea.  In the emergency room-labs: CBC: WBC 17.8..  Sodium 130.  LFTs-WNL.  CT scan abdomen/pelvis-large irregular fluid collection with thick wall occupying most of the right abdomen surrounding the right kidney and colon.  Severe wall thickening of the descending, transverse colon, second portion of the duodenum likely reactive.  Pneumobilia with gas in the CBD.  Drain placement by IR was performed.  Currently, the abdominal pain has improved.      Assessment/Plan  Interval History   Principal Problem:    Bacteremia due to Gram-negative bacteria and fungemia  Active Problems:    Hypertension    Hyperlipidemia    Sepsis due to intraabdominal fluid collection/abscess (HCC)    Hyponatremia    Duodenal perforation (HCC)    Postprocedural retroperitoneal abscess    Diarrhea    Electrolyte abnormality    Acute respiratory failure with hypoxia (HCC)    Hypokalemia    Hypophosphatemia      Plan IR  - Irrigate Left and right abdominal drains with 50 ml of sterile saline q4 hrs-   - Surgery, ID, GI following   - Repeat CT scan from 10/22 Grossly unchanged irregular fluid collection occupying the right abdomen surrounding  the right kidney and right colon extending to midline   - Additonal drain placement per Surgery request   - Left mid upper abdominal drain leaking around site, drain reposition tomorrow 10/27 as it appears drain has been pulled back    - NPO at midnight  - Hold blood thinners/ ASA   - Repeat CT scan ordered for 10/30 for abscess eval     [id number]  IR:   10/8- Right abd drain placed, + Chryseobacterium indologenes   Chryseobacterium arthrosphaerae  Candida glabrata   10/15- transfer from [facility]   10/16- Left abd drain placed, drain +  Enterococcus faecalis, Candida albicans, Candida glabrata  10/17- Left Abd drain - 195 ml, green milky  Right abd drain- 238 ml  10/18   Left drain- 75 ml  Right drain- 110 ml  10/19   Left drain- 230 ml    Right drain-  280 ml   10/20  Left drain-  125 ml  Brown green   Right drain- 405 ml brown green  10/21  Left drain-  90 ml  Brown green in am  Right drain- 290 ml brown green in am   10/22  Left drain- 25 ml i   Right drain- 50 ml    10/23   Left drain-  20 ml    Right drain 15  10/24  Left drain- 10 ml     Right drain= 10 ml   10/25  Left drain-  Minimal output, leaking around drain site   Right drain- minimal output   Right lower drain ( new drain) - 55 ml bloody output  10/26  Left mid abd drain-  Minimal output, leaking around tube site   Right drain- 55 ml bloody output   Right lower drain-             Review of Systems  Physical Exam   Review of Systems   Constitutional: Positive for malaise/fatigue. Negative for chills and fever.   HENT: Negative for hearing loss.    Eyes: Negative for blurred vision.   Respiratory: Negative for cough, hemoptysis and shortness of breath.    Cardiovascular: Negative for chest pain and palpitations.   Gastrointestinal: Positive for abdominal pain. Negative for vomiting.   Genitourinary: Negative for dysuria.   Musculoskeletal: Negative for myalgias.   Skin: Negative for rash.   Neurological: Positive for weakness. Negative for dizziness  and headaches.   Endo/Heme/Allergies: Does not bruise/bleed easily.   Psychiatric/Behavioral: Negative for suicidal ideas.      Vitals:    10/26/21 0940   BP:    Pulse:    Resp: 16   Temp:    SpO2:         Physical Exam  Constitutional:       Appearance: Normal appearance.   HENT:      Head: Normocephalic.      Nose: Nose normal.      Mouth/Throat:      Mouth: Mucous membranes are moist.   Eyes:      Pupils: Pupils are equal, round, and reactive to light.   Cardiovascular:      Rate and Rhythm: Normal rate.   Pulmonary:      Effort: Pulmonary effort is normal. No respiratory distress.   Abdominal:      Palpations: Abdomen is soft.      Tenderness: There is abdominal tenderness.          Comments: Connect to MARTHA bulbs    Musculoskeletal:         General: No tenderness or deformity.      Cervical back: Normal range of motion.      Right lower leg: Edema present.      Left lower leg: Edema present.      Comments: Edema improving    Skin:     General: Skin is warm and dry.      Capillary Refill: Capillary refill takes less than 2 seconds.      Coloration: Skin is not jaundiced or pale.   Neurological:      General: No focal deficit present.      Mental Status: She is alert.      Motor: No weakness.   Psychiatric:         Mood and Affect: Mood normal.         Behavior: Behavior normal.             Labs    Recent Labs     10/24/21  0415 10/25/21  0420   WBC 6.7 8.6   RBC 2.53* 2.73*   HEMOGLOBIN 8.4* 8.4*   HEMATOCRIT 25.1* 25.6*   MCV 99.2* 93.8   MCH 33.2* 30.8   MCHC 33.5* 32.8*   RDW 53.6* 51.3*   PLATELETCT 183 170   MPV 10.7 9.9     Recent Labs     10/24/21  0645 10/25/21  0420   SODIUM 137 133*   POTASSIUM 3.5* 3.9   CHLORIDE 102 99   CO2 28 26   GLUCOSE 97 81   BUN 17 16   CREATININE 0.61 0.72   CALCIUM 7.8* 8.2*     Recent Labs     10/24/21  0645 10/25/21  0420   ALBUMIN 2.1* 2.6*   TBILIRUBIN 0.2 0.3   ALKPHOSPHAT 51 63   TOTPROTEIN 4.5* 5.0*   ALTSGPT 12 12   ASTSGOT 17 22   CREATININE 0.61 0.72      CT-ABDOMEN-PELVIS WITH   Final Result         1. Grossly unchanged irregular fluid collection occupying the right abdomen surrounding the right kidney and right colon extending to midline. Additional pigtail catheter in the component about midline anterior abdomen. Both pigtail catheters seem to be    within the collection.      2. Small right pleural effusion with right basilar atelectasis.               DX-CHEST-LIMITED (1 VIEW)   Final Result      1.  Right basilar atelectasis or consolidation. Small right pleural effusion not excluded.   2.  Atherosclerotic plaque.      CT-DRAIN-PERITONEAL   Final Result      1.  CT guided pancreatic pseudocyst catheter drainage.   2.  The current plan is to obtain a follow-up CT in 5-7 days..      IR-PICC LINE PLACEMENT W/ GUIDANCE > AGE 5   Final Result                  Ultrasound-guided PICC placement performed by qualified nursing staff as    above.          CT-DRAIN-PERITONEAL    (Results Pending)   IR-DRAINAGE-CATH EXCHANGE    (Results Pending)       INR   Date Value Ref Range Status   10/15/2021 1.49 (H) 0.87 - 1.13 Final     Comment:     INR - Non-therapeutic Reference Range: 0.87-1.13  INR - Therapeutic Reference Range: 2.0-4.0       No results found for: POCINR     Intake/Output Summary (Last 24 hours) at 10/18/2021 1528  Last data filed at 10/18/2021 0500  Gross per 24 hour   Intake --   Output 80 ml   Net -80 ml      Labs not explicitly included in this progress note were reviewed by the author. Radiology/imaging not explicitly included in this progress note was reviewed by the author.   I have performed a physical exam and reviewed and updated ROS and Plan today (10/26/2021).     30 minutes in directly providing and coordinating care and extensive data review.  No time overlap and excludes procedures.

## 2021-10-27 ENCOUNTER — APPOINTMENT (OUTPATIENT)
Dept: RADIOLOGY | Facility: MEDICAL CENTER | Age: 66
DRG: 856 | End: 2021-10-27
Attending: NURSE PRACTITIONER
Payer: MEDICARE

## 2021-10-27 LAB
ANION GAP SERPL CALC-SCNC: 9 MMOL/L (ref 7–16)
BASOPHILS # BLD AUTO: 0.6 % (ref 0–1.8)
BASOPHILS # BLD: 0.04 K/UL (ref 0–0.12)
BUN SERPL-MCNC: 10 MG/DL (ref 8–22)
CALCIUM SERPL-MCNC: 7.9 MG/DL (ref 8.5–10.5)
CHLORIDE SERPL-SCNC: 98 MMOL/L (ref 96–112)
CO2 SERPL-SCNC: 26 MMOL/L (ref 20–33)
CREAT SERPL-MCNC: 0.67 MG/DL (ref 0.5–1.4)
EOSINOPHIL # BLD AUTO: 0.03 K/UL (ref 0–0.51)
EOSINOPHIL NFR BLD: 0.4 % (ref 0–6.9)
ERYTHROCYTE [DISTWIDTH] IN BLOOD BY AUTOMATED COUNT: 48.4 FL (ref 35.9–50)
FERRITIN SERPL-MCNC: 395 NG/ML (ref 10–291)
GLUCOSE BLD-MCNC: 103 MG/DL (ref 65–99)
GLUCOSE BLD-MCNC: 123 MG/DL (ref 65–99)
GLUCOSE SERPL-MCNC: 93 MG/DL (ref 65–99)
HCT VFR BLD AUTO: 25.8 % (ref 37–47)
HGB BLD-MCNC: 8.5 G/DL (ref 12–16)
IMM GRANULOCYTES # BLD AUTO: 0.09 K/UL (ref 0–0.11)
IMM GRANULOCYTES NFR BLD AUTO: 1.3 % (ref 0–0.9)
IRON SATN MFR SERPL: 12 % (ref 15–55)
IRON SERPL-MCNC: 22 UG/DL (ref 40–170)
LYMPHOCYTES # BLD AUTO: 1 K/UL (ref 1–4.8)
LYMPHOCYTES NFR BLD: 14.2 % (ref 22–41)
MAGNESIUM SERPL-MCNC: 2 MG/DL (ref 1.5–2.5)
MCH RBC QN AUTO: 30.5 PG (ref 27–33)
MCHC RBC AUTO-ENTMCNC: 32.9 G/DL (ref 33.6–35)
MCV RBC AUTO: 92.5 FL (ref 81.4–97.8)
MONOCYTES # BLD AUTO: 0.42 K/UL (ref 0–0.85)
MONOCYTES NFR BLD AUTO: 5.9 % (ref 0–13.4)
NEUTROPHILS # BLD AUTO: 5.48 K/UL (ref 2–7.15)
NEUTROPHILS NFR BLD: 77.6 % (ref 44–72)
NRBC # BLD AUTO: 0 K/UL
NRBC BLD-RTO: 0 /100 WBC
PHOSPHATE SERPL-MCNC: 2.7 MG/DL (ref 2.5–4.5)
PLATELET # BLD AUTO: 200 K/UL (ref 164–446)
PMV BLD AUTO: 9.6 FL (ref 9–12.9)
POTASSIUM SERPL-SCNC: 3.7 MMOL/L (ref 3.6–5.5)
RBC # BLD AUTO: 2.79 M/UL (ref 4.2–5.4)
SODIUM SERPL-SCNC: 133 MMOL/L (ref 135–145)
TIBC SERPL-MCNC: 179 UG/DL (ref 250–450)
UIBC SERPL-MCNC: 157 UG/DL (ref 110–370)
WBC # BLD AUTO: 7.1 K/UL (ref 4.8–10.8)

## 2021-10-27 PROCEDURE — 75894 X-RAYS TRANSCATH THERAPY: CPT

## 2021-10-27 PROCEDURE — 99233 SBSQ HOSP IP/OBS HIGH 50: CPT | Performed by: STUDENT IN AN ORGANIZED HEALTH CARE EDUCATION/TRAINING PROGRAM

## 2021-10-27 PROCEDURE — A9270 NON-COVERED ITEM OR SERVICE: HCPCS | Performed by: STUDENT IN AN ORGANIZED HEALTH CARE EDUCATION/TRAINING PROGRAM

## 2021-10-27 PROCEDURE — 700111 HCHG RX REV CODE 636 W/ 250 OVERRIDE (IP): Performed by: STUDENT IN AN ORGANIZED HEALTH CARE EDUCATION/TRAINING PROGRAM

## 2021-10-27 PROCEDURE — 700111 HCHG RX REV CODE 636 W/ 250 OVERRIDE (IP): Performed by: FAMILY MEDICINE

## 2021-10-27 PROCEDURE — 770006 HCHG ROOM/CARE - MED/SURG/GYN SEMI*

## 2021-10-27 PROCEDURE — 700102 HCHG RX REV CODE 250 W/ 637 OVERRIDE(OP): Performed by: STUDENT IN AN ORGANIZED HEALTH CARE EDUCATION/TRAINING PROGRAM

## 2021-10-27 PROCEDURE — 83540 ASSAY OF IRON: CPT

## 2021-10-27 PROCEDURE — 700111 HCHG RX REV CODE 636 W/ 250 OVERRIDE (IP)

## 2021-10-27 PROCEDURE — 700111 HCHG RX REV CODE 636 W/ 250 OVERRIDE (IP): Performed by: INTERNAL MEDICINE

## 2021-10-27 PROCEDURE — 700111 HCHG RX REV CODE 636 W/ 250 OVERRIDE (IP): Mod: JG | Performed by: INTERNAL MEDICINE

## 2021-10-27 PROCEDURE — 83550 IRON BINDING TEST: CPT

## 2021-10-27 PROCEDURE — 85025 COMPLETE CBC W/AUTO DIFF WBC: CPT

## 2021-10-27 PROCEDURE — 82728 ASSAY OF FERRITIN: CPT

## 2021-10-27 PROCEDURE — 0W2GX0Z CHANGE DRAINAGE DEVICE IN PERITONEAL CAVITY, EXTERNAL APPROACH: ICD-10-PCS | Performed by: STUDENT IN AN ORGANIZED HEALTH CARE EDUCATION/TRAINING PROGRAM

## 2021-10-27 PROCEDURE — 700117 HCHG RX CONTRAST REV CODE 255: Performed by: NURSE PRACTITIONER

## 2021-10-27 PROCEDURE — 99152 MOD SED SAME PHYS/QHP 5/>YRS: CPT

## 2021-10-27 PROCEDURE — 0WPGX0Z REMOVAL OF DRAINAGE DEVICE FROM PERITONEAL CAVITY, EXTERNAL APPROACH: ICD-10-PCS | Performed by: STUDENT IN AN ORGANIZED HEALTH CARE EDUCATION/TRAINING PROGRAM

## 2021-10-27 PROCEDURE — 700105 HCHG RX REV CODE 258: Performed by: INTERNAL MEDICINE

## 2021-10-27 PROCEDURE — A9270 NON-COVERED ITEM OR SERVICE: HCPCS | Performed by: FAMILY MEDICINE

## 2021-10-27 PROCEDURE — 84100 ASSAY OF PHOSPHORUS: CPT

## 2021-10-27 PROCEDURE — 700102 HCHG RX REV CODE 250 W/ 637 OVERRIDE(OP): Performed by: FAMILY MEDICINE

## 2021-10-27 PROCEDURE — 80048 BASIC METABOLIC PNL TOTAL CA: CPT

## 2021-10-27 PROCEDURE — 82962 GLUCOSE BLOOD TEST: CPT

## 2021-10-27 PROCEDURE — 83735 ASSAY OF MAGNESIUM: CPT

## 2021-10-27 PROCEDURE — 99233 SBSQ HOSP IP/OBS HIGH 50: CPT | Performed by: INTERNAL MEDICINE

## 2021-10-27 RX ORDER — SODIUM CHLORIDE 9 MG/ML
1000 INJECTION, SOLUTION INTRAVENOUS ONCE
Status: DISCONTINUED | OUTPATIENT
Start: 2021-10-27 | End: 2021-10-27

## 2021-10-27 RX ORDER — MIDAZOLAM HYDROCHLORIDE 1 MG/ML
INJECTION INTRAMUSCULAR; INTRAVENOUS
Status: COMPLETED
Start: 2021-10-27 | End: 2021-10-27

## 2021-10-27 RX ORDER — SODIUM CHLORIDE 9 MG/ML
500 INJECTION, SOLUTION INTRAVENOUS
Status: ACTIVE | OUTPATIENT
Start: 2021-10-27 | End: 2021-10-27

## 2021-10-27 RX ORDER — FAMOTIDINE 20 MG/1
20 TABLET, FILM COATED ORAL 2 TIMES DAILY
Status: DISCONTINUED | OUTPATIENT
Start: 2021-10-28 | End: 2021-11-09

## 2021-10-27 RX ORDER — ONDANSETRON 2 MG/ML
4 INJECTION INTRAMUSCULAR; INTRAVENOUS PRN
Status: ACTIVE | OUTPATIENT
Start: 2021-10-27 | End: 2021-10-27

## 2021-10-27 RX ORDER — MIDAZOLAM HYDROCHLORIDE 1 MG/ML
.5-2 INJECTION INTRAMUSCULAR; INTRAVENOUS PRN
Status: ACTIVE | OUTPATIENT
Start: 2021-10-27 | End: 2021-10-27

## 2021-10-27 RX ADMIN — FENTANYL CITRATE 50 MCG: 0.05 INJECTION, SOLUTION INTRAMUSCULAR; INTRAVENOUS at 11:51

## 2021-10-27 RX ADMIN — FENTANYL CITRATE 50 MCG: 50 INJECTION, SOLUTION INTRAMUSCULAR; INTRAVENOUS at 11:53

## 2021-10-27 RX ADMIN — MICAFUNGIN SODIUM 100 MG: 100 INJECTION, POWDER, LYOPHILIZED, FOR SOLUTION INTRAVENOUS at 12:52

## 2021-10-27 RX ADMIN — PIPERACILLIN SODIUM AND TAZOBACTAM SODIUM 3.38 G: 3; .375 INJECTION, POWDER, FOR SOLUTION INTRAVENOUS at 16:00

## 2021-10-27 RX ADMIN — GABAPENTIN 600 MG: 300 CAPSULE ORAL at 05:24

## 2021-10-27 RX ADMIN — PIPERACILLIN SODIUM AND TAZOBACTAM SODIUM 3.38 G: 3; .375 INJECTION, POWDER, FOR SOLUTION INTRAVENOUS at 23:06

## 2021-10-27 RX ADMIN — OXYCODONE HYDROCHLORIDE 10 MG: 10 TABLET ORAL at 04:31

## 2021-10-27 RX ADMIN — OXYCODONE HYDROCHLORIDE 10 MG: 10 TABLET ORAL at 00:33

## 2021-10-27 RX ADMIN — MIDAZOLAM HYDROCHLORIDE 2 MG: 1 INJECTION, SOLUTION INTRAMUSCULAR; INTRAVENOUS at 11:51

## 2021-10-27 RX ADMIN — EZETIMIBE 10 MG: 10 TABLET ORAL at 18:10

## 2021-10-27 RX ADMIN — ONDANSETRON 4 MG: 2 INJECTION INTRAMUSCULAR; INTRAVENOUS at 04:32

## 2021-10-27 RX ADMIN — ONDANSETRON 4 MG: 2 INJECTION INTRAMUSCULAR; INTRAVENOUS at 16:06

## 2021-10-27 RX ADMIN — OXYCODONE HYDROCHLORIDE 10 MG: 10 TABLET ORAL at 08:51

## 2021-10-27 RX ADMIN — FUROSEMIDE 40 MG: 10 INJECTION, SOLUTION INTRAMUSCULAR; INTRAVENOUS at 05:25

## 2021-10-27 RX ADMIN — FENTANYL CITRATE 50 MCG: 50 INJECTION, SOLUTION INTRAMUSCULAR; INTRAVENOUS at 11:51

## 2021-10-27 RX ADMIN — OXYCODONE HYDROCHLORIDE 10 MG: 10 TABLET ORAL at 20:24

## 2021-10-27 RX ADMIN — Medication 1000 UNITS: at 05:24

## 2021-10-27 RX ADMIN — MIDAZOLAM HYDROCHLORIDE 1 MG: 1 INJECTION, SOLUTION INTRAMUSCULAR; INTRAVENOUS at 11:53

## 2021-10-27 RX ADMIN — LISINOPRIL 5 MG: 5 TABLET ORAL at 05:24

## 2021-10-27 RX ADMIN — ONDANSETRON 4 MG: 2 INJECTION INTRAMUSCULAR; INTRAVENOUS at 20:24

## 2021-10-27 RX ADMIN — OXYCODONE HYDROCHLORIDE 10 MG: 10 TABLET ORAL at 16:06

## 2021-10-27 RX ADMIN — IOHEXOL 10 ML: 300 INJECTION, SOLUTION INTRAVENOUS at 12:30

## 2021-10-27 RX ADMIN — PIPERACILLIN SODIUM AND TAZOBACTAM SODIUM 3.38 G: 3; .375 INJECTION, POWDER, FOR SOLUTION INTRAVENOUS at 05:27

## 2021-10-27 RX ADMIN — ONDANSETRON 4 MG: 2 INJECTION INTRAMUSCULAR; INTRAVENOUS at 00:33

## 2021-10-27 RX ADMIN — GABAPENTIN 600 MG: 300 CAPSULE ORAL at 18:10

## 2021-10-27 RX ADMIN — ONDANSETRON 4 MG: 2 INJECTION INTRAMUSCULAR; INTRAVENOUS at 08:51

## 2021-10-27 ASSESSMENT — ENCOUNTER SYMPTOMS
VOMITING: 0
NECK PAIN: 0
BRUISES/BLEEDS EASILY: 0
BLOOD IN STOOL: 0
FEVER: 0
NERVOUS/ANXIOUS: 0
SHORTNESS OF BREATH: 1
CHILLS: 0
ABDOMINAL PAIN: 1
WEAKNESS: 1
COUGH: 0
BLURRED VISION: 0
HEMOPTYSIS: 0
SHORTNESS OF BREATH: 0
FLANK PAIN: 0
DEPRESSION: 0
SPUTUM PRODUCTION: 0
CONSTIPATION: 0
NAUSEA: 0
DIZZINESS: 0
NERVOUS/ANXIOUS: 1
SINUS PAIN: 0
NAUSEA: 1
MYALGIAS: 0
SORE THROAT: 0
HEADACHES: 0
DIARRHEA: 0
BACK PAIN: 0
PALPITATIONS: 0

## 2021-10-27 ASSESSMENT — PAIN DESCRIPTION - PAIN TYPE
TYPE: ACUTE PAIN

## 2021-10-27 NOTE — CARE PLAN
Problem: Nutritional:  Goal: Achieve adequate nutritional intake  Description: Patient will consume >50% of meals  Outcome: Progressing slower than expected     See RD note.

## 2021-10-27 NOTE — OR SURGEON
Immediate Post- Operative Note        Findings: Persistent RUQ abscess      Procedure(s): LUQ drain removal. RUQ drain upsize      Estimated Blood Loss: Less than 5 ml        Complications: None            10/27/2021     12:00 PM     Darwin Conway M.D.

## 2021-10-27 NOTE — DIETARY
Nutrition Services: Update (Late entry due to Epic downtime on 10/26)   Day 12 of admit.  Nils Alfonso is a 66 y.o. female with admitting DX of Bile duct leak, Postprocedural intraabdominal abscess, Intra-abdominal abscess.    Evaluation:  1. RD visited pt at bedside 10/26.  2. Pt is currently on NPO diet, sips w/ meds--drain reposition procedure planned for today. Concurrent TPN running @ 50% of needs since 10/24. TPN initiated 10/16 and providing 100% estimated needs through 10/24. Pt previously on diet 10/26 with intermittent NPO status since admit.   3. PO intake of meals when on PO diet documented at <25%. RD discussed PO intake w/ RN on 10/26. RN reports observed intake of of 25% of a meal and confirmed pt states this is normal for her. Per RD note 10/16, assessed nutritional needs are [id number] kcals/day.   4. Communicated w/ MD regarding POC for pt. RD advised meal pattern of 5-6 small meals per day based on pt report of hx of gastric sleeve 5 years ago and normal dietary patterns at home. Per pt, current appetite and intake is about normal, though does describe some nausea without vomiting (zofran given per MAR). Pt verbalizes looking forward to dinner (10/26). RD reviewed protein-based snacks for between meals. Pt agreeable w/ meal plan.  5. Pt declines Boost and Magic Cups supplements at this time.  6. Wt (10/21): 74.5 kg via bed scale - increased from admit.  7. UPDATE: discussed pt w/ PharmD and MD 10/27. Sounds like pt is at baseline PO intake and consistently 25% of meals. Pt wt increased since admit, unsure if r/t fluid status. TPN meeting 100% of pt's needs for >7 days this admit. Unclear if pt will meet estimated needs through PO intake alone due to frequent NPO orders; anticipate improved intake on discharge home. RD will work to optimize pt's PO nutrition during admit.     Malnutrition Risk: Needs fully met by TPN through 10/24. Variable PO intake w/ 50% of needs met by TPN since  10/24.    Recommendations/Plan:  1. Once diet advanced from NPO, initiate meal pattern 5-6 small meals per day. RD communicated plan with nutrition rep.  2. Encourage intake of meals and snacks; document intake as % taken in ADL's to provide interdisciplinary communication across all shifts.   3. Monitor weight.  4. Nutrition rep will continue to see patient for ongoing meal and snack preferences.    RD following.

## 2021-10-27 NOTE — PROGRESS NOTES
IR RN note    Reviewed sedation orders with MD  Patient underwent a Abscessogram with Left drain removal and right upper drain replacement by Dr. Conway.  Procedure site was verified by MD using imaging guidance. Consent was signed.  TREE Moon and Reena assisted. Patient was placed in a supine position.  Vitals were taken every 5 minutes and remained stable during procedure (see doc flow sheet for results).  CO2 waveform capnography was monitored throughout procedure, see chart.  Right upper abdomen and left mid abdomen access site.   A biopatch, gauze, sutures and medical tape dressing was placed over surgical site. Report called to Karen SANON. Pt transported by bed with RN to [id number].     Right upper drain - [facility] Flexima APDL locking piigtail Drainage Catheter System 14 Fr   REF # [id number]  LOT # [id number]  Exp. 5/28/24

## 2021-10-27 NOTE — PROGRESS NOTES
Infectious Disease Progress Note    Author: Tamra Mcfarland M.D. Date & Time of service: 10/27/2021  7:00 AM    Chief Complaint:  Follow-up for multiple intra-abdominal abscesses, polymicrobial bacteremia, candidemia     Interval History:  10/15 afebrile, WBC 13.5 patient transferred to [facility] overnight.  Patient having diarrhea however abdominal pain is slightly improved today.  She was evaluated by Dr. ST this morning who is recommending additional drain placement and holding off on surgery at this time  10/16 afebrile, WBC 8.4.  Plan for second drain placement today.  Also patient to start TPN.  Had one episode of liquid dark stools.  C differential PCR pending  10/17 afebrile, WBC 6 underwent CT-guided drain placement yesterday, Gram stain with few yeast. C. difficile negative. On TPN, planning to ambulate in the halls today  10/18 AF, O2 2 L NC, ongoing abdominal pain but overall improving.  She continues to have 2 drains in place with minimal output on the right.   10/25 afebrile, WBC 8.6.  Repeat CT scan on 10/22 without any changes.  Plan for additional drain placement today.  Patient is very anxious.  Tolerating IV antibiotics without any issues.  Ongoing abdominal pain  10/27 T-max 100.6, WBC 7.1 patient underwent additional drain placement on 10/25.  Cultures pending.  Drain output decreasing overall.  Patient having pain around new drain placement.  States she is unable to take deep breaths.  Plan for drain positioning today      Review of Systems:  Review of Systems   Constitutional: Negative for chills and fever.   Respiratory: Negative for cough, sputum production and shortness of breath.    Cardiovascular: Negative for chest pain.   Gastrointestinal: Positive for abdominal pain. Negative for constipation, diarrhea, nausea and vomiting.   Psychiatric/Behavioral: The patient is nervous/anxious.    All other systems reviewed and are negative.      Hemodynamics:  Temp (24hrs), Av.2 °C (99 °F),  Min:36.6 °C (97.9 °F), Max:38.1 °C (100.6 °F)  Temperature: 36.6 °C (97.9 °F)  Pulse  Av.1  Min: 54  Max: 88   Blood Pressure : 150/65       Physical Exam:  Physical Exam  Constitutional:       Appearance: Normal appearance.   Cardiovascular:      Rate and Rhythm: Normal rate and regular rhythm.      Heart sounds: Normal heart sounds.   Pulmonary:      Effort: Pulmonary effort is normal.      Breath sounds: Normal breath sounds.   Abdominal:      General: There is distension.      Palpations: Abdomen is soft.      Tenderness: There is abdominal tenderness.      Comments: Two drains in place.  Left upper quadrant drain with greenish drainage at insertion site    Right-sided MARTHA drain-dressing saturated   Musculoskeletal:      Right lower leg: Edema present.      Left lower leg: Edema present.   Skin:     General: Skin is warm and dry.   Neurological:      General: No focal deficit present.      Mental Status: She is alert and oriented to person, place, and time.   Psychiatric:         Mood and Affect: Mood normal.         Behavior: Behavior normal.         Meds:    Current Facility-Administered Medications:   •  loperamide  •  furosemide  •  enoxaparin (LOVENOX) injection  •  [COMPLETED] piperacillin-tazobactam **AND** piperacillin-tazobactam  •  polyethylene glycol/lytes  •  TPN Adult Central Line  •  lisinopril  •  MD Alert...TPN per Pharmacy  •  ondansetron  •  ondansetron  •  polyethylene glycol/lytes **AND** magnesium hydroxide  •  acetaminophen  •  HYDROmorphone  •  Notify provider if pain remains uncontrolled **AND** Use the Numeric Rating Scale (NRS), Colon-Baker Faces (WBF), or FLACC on regular floors and Critical-Care Pain Observation Tool (CPOT) on ICUs/Trauma to assess pain **AND** Pulse Ox **AND** Pharmacy Consult Request **AND** If patient difficult to arouse and/or has respiratory depression (respiratory rate of 10 or less), stop any opiates that are currently infusing and call a Rapid Response.  •   cyclobenzaprine  •  diphenhydrAMINE  •  ezetimibe  •  gabapentin  •  micafungin (MYCAMINE) ivpb  •  oxyCODONE immediate release  •  promethazine  •  vitamin D3  •  [Held by provider] heparin    Labs:  Recent Labs     10/25/21  0420 10/27/21  0514   WBC 8.6 7.1   RBC 2.73* 2.79*   HEMOGLOBIN 8.4* 8.5*   HEMATOCRIT 25.6* 25.8*   MCV 93.8 92.5   MCH 30.8 30.5   RDW 51.3* 48.4   PLATELETCT 170 200   MPV 9.9 9.6   NEUTSPOLYS 74.00* 77.60*   LYMPHOCYTES 15.50* 14.20*   MONOCYTES 7.50 5.90   EOSINOPHILS 0.20 0.40   BASOPHILS 0.50 0.60     Recent Labs     10/25/21  0420 10/27/21  0514   SODIUM 133* 133*   POTASSIUM 3.9 3.7   CHLORIDE 99 98   CO2 26 26   GLUCOSE 81 93   BUN 16 10     Recent Labs     10/25/21  0420 10/27/21  0514   ALBUMIN 2.6*  --    TBILIRUBIN 0.3  --    ALKPHOSPHAT 63  --    TOTPROTEIN 5.0*  --    ALTSGPT 12  --    ASTSGOT 22  --    CREATININE 0.72 0.67       Imaging:  CT-ABDOMEN WITH & W/O    Result Date: 10/9/2021  10/9/2021 1:38 PM HISTORY/REASON FOR EXAM:  eval for any ugi perforation given recent sphinterotomy during ERCP and now with fluid collection. TECHNIQUE/EXAM DESCRIPTION:  CT scan of the abdomen without and with contrast. Initial precontrast images were obtained from the diaphragmatic domes through the iliac crests using helical technique.  Following nonionic contrast administration in an intravenous bolus fashion, and postcontrast, thin-section helical scanning obtained from the diaphragmatic domes through the iliac crests. 80 mL of Omnipaque 350 nonionic contrast was administered. Low dose optimization technique was utilized for this CT exam including automated exposure control and adjustment of the mA and/or kV according to patient size. COMPARISON: 10/8/2021, 10/2/2021. FINDINGS: There is a small right pleural effusion with adjacent enhancing segmental atelectasis.. Calcified left lower lobe granuloma. No pericardial effusion. Cardiac chambers are normal in size. Coronary artery  calcifications are present. Air in the intra and extra hepatic bile ducts. Unremarkable appearance of the liver. The hepatic and portal veins are patent. Spleen is unremarkable. Again noted is air in the pancreatic duct. No adrenal gland nodules. Gallbladder is surgically absent. No hydronephrosis. No dilated loops of imaged bowel. There is anasarca and mesenteric edema. A pigtail drainage catheter is present in the right abdomen in a air and fluid collection, the extent of which is difficult to measure the collection tracks inferiorly and medially measuring approximately 17 cm in craniocaudal dimension. A smaller loculated collection in the anterior abdomen near the inferior anterior abdominal wall sutures measures 1.5 x 8.7 cm. No findings of contrast extravasation from the opacified bowel loops,  particularly no contrast extravasation at the ampulla of [place].     1.  No findings of intraluminal contrast extravasation from the opacified bowel loops, particularly no contrast extravasation from the duodenum at the ampulla of [place]. 2.  A pigtail drainage catheter is present in the right abdomen in a air and fluid collection, the extent of which is difficult to measure. The collection tracks inferiorly and medially measuring approximately 17 cm in craniocaudal dimension. 3.  A smaller loculated collection in the anterior abdomen near the inferior anterior abdominal wall sutures measures 1.5 x 8.7 cm. 4.  Unchanged pneumobilia in the CBD, intrahepatic bile ducts as well as pancreatic ducts. 5.  There is a small right pleural effusion with adjacent enhancing segmental atelectasis. 6.   Coronary artery calcifications are present    CT-ABDOMEN-PELVIS WITH    Result Date: 10/13/2021  10/13/2021 11:57 AM HISTORY/REASON FOR EXAM:  Peritonitis or perforation suspected; Pt with known intraabdominal fluid collection in the abdomen of uncertain etiology - had recent ERCP but studies not consistent with bile leak.  Drain in  place - reassess. TECHNIQUE/EXAM DESCRIPTION:   CT scan of the abdomen and pelvis with contrast. Contrast-enhanced helical scanning was obtained from the diaphragmatic domes through the pubic symphysis following the bolus administration of nonionic contrast without complication. 100 mL of Omnipaque 350 nonionic contrast was administered without complication. Low dose optimization technique was utilized for this CT exam including automated exposure control and adjustment of the mA and/or kV according to patient size. COMPARISON: 10/9/2021 FINDINGS: Lower Chest: Trace right pleural effusion with right basilar atelectasis. Calcified granuloma in the left lower lobe and trace left pleural fluid. Liver: Normal. Spleen: Unremarkable. Pancreas: Unremarkable. Gallbladder: The gallbladder has been resected. Biliary: Pneumobilia again noted. Adrenal glands: Normal. Kidneys: There is a small left renal cortical cyst which does not require imaging follow-up. No hydronephrosis.. Bowel: No bowel obstruction. Parts of the bowel are suboptimally evaluated due to the surrounding abscess and loss of the intervening fat planes. There is gastric sleeve surgery. Lymph nodes: No adenopathy. Vasculature: Scattered atherosclerosis. Peritoneum: A multiloculated although spatial rim-enhancing air-fluid collection within the right abdomen does not appear significantly changed in size the prior study. On series 2 image 54 measures about 15.5 x 8.7 cm. It extends from the upper abdomen,  where it is seen in the gallbladder fossa, inferiorly into the pelvis. It insinuates around and partially encases the duodenum and right kidney and extends around ascending colon and some mesenteric branch vessels. There is a percutaneous pigtail drainage catheter which appears well positioned within the abscess in the right midabdomen. Musculoskeletal: No acute or destructive process. Postsurgical changes anterior lumbar spinal fusion Pelvis: Hysterectomy.  There is a small rim-enhancing fluid collection within the pelvis measuring 7 x 3.1 x 2.8 cm suspicious for small abscess..     1.  Extensive multiloculated rim-enhancing air and fluid collection/abscess within the right abdomen is not significantly changed in size from the prior study. The percutaneous pigtail drainage catheter appears well-positioned within the collection. 2.  Small separate pelvic rim-enhancing fluid collection suggests an abscess.    CT-ABDOMEN-PELVIS WITH    Result Date: 10/8/2021  10/8/2021 8:06 AM HISTORY/REASON FOR EXAM:  Abdominal pain, fever. Right lower quadrant pain. Pancreatitis. TECHNIQUE/EXAM DESCRIPTION:   CT scan of the abdomen and pelvis with contrast. Contrast-enhanced helical scanning was obtained from the diaphragmatic domes through the pubic symphysis following the bolus administration of nonionic contrast without complication. 100 mL of Omnipaque 350 nonionic contrast was administered without complication. Low dose optimization technique was utilized for this CT exam including automated exposure control and adjustment of the mA and/or kV according to patient size. COMPARISON: 10/2/2021. FINDINGS: Lower Chest: Small right pleural effusion with right basilar opacities. Liver: Normal. Spleen: Unremarkable. Pancreas: Poorly visualized. There is gas in the pancreatic duct. Gallbladder: Surgically absent. Biliary: There is gas in the CBD and intrahepatic bile ducts, left more than right Adrenal glands: Normal. Kidneys: No hydronephrosis. Bilateral kidneys are enhancing symmetrically.. Bowel: Severe wall thickening of the descending colon, transverse colon, second portion duodenum. Postsurgical change in the stomach Lymph nodes: No adenopathy. Vasculature: The abdominal aorta is normal in caliber. Peritoneum: There is large irregular fluid collection occupying most of the right abdomen surrounding the right kidney and right colon. Additional fluid and gas collection in the midline  abdomen anterior to the pancreas. This collection is probably connected to the right side collection via a junction in the jl hepatis Musculoskeletal: Postsurgical change in the lower lumbar spine. Pelvis: Trace pelvic free fluid.     1. Large irregular irregular fluid collection with thick wall occupying most of the right abdomen surrounding the right kidney and right colon. Additional fluid and gas collection in the midline abdomen anterior to the pancreas concerning for abscess. This collection is probably connected to the right side collection via a junction in the jl hepatis 2. Severe wall thickening of the descending colon, transverse colon, second portion duodenum, likely reactive. 3. Pneumobilia with gas in the CBD, intrahepatic bile ducts as well as pancreatic ducts are of unclear etiology. 4. Small right pleural effusion with right basilar atelectasis.     CT-ABDOMEN-PELVIS WITH    Result Date: 10/2/2021  10/2/2021 2:03 PM HISTORY/REASON FOR EXAM:  RLQ and diffuse lower abdominal pain; had ERCP yesterday. Pt has pancreatitis and they found a polyp on her bile duct. TECHNIQUE/EXAM DESCRIPTION:   CT scan of the abdomen and pelvis with contrast. Contrast-enhanced helical scanning was obtained from the diaphragmatic domes through the pubic symphysis following the bolus administration of nonionic contrast without complication. 100 mL of Omnipaque 350 nonionic contrast was administered without complication. Low dose optimization technique was utilized for this CT exam including automated exposure control and adjustment of the mA and/or kV according to patient size. COMPARISON: 2/22/2019 FINDINGS: Lower Chest: Unremarkable. Liver: Normal. Hepatic and portal veins are patent. Spleen: Unremarkable. Pancreas: The pancreatic head is edematous though the parenchyma enhances normally. There is surrounding homogenous peripancreatic and mesenteric edema/infiltration which tracks down the right paracolic gutter and  conforms to retroperitoneal structures. No defined walled off collection. There is small volume ascites in the pelvis. No focal fluid collection. Adrenal glands: Normal. Gallbladder: Cholecystectomy Biliary: Unchanged mild intrahepatic bile duct dilation. Kidneys: Symmetric nephrograms. No hydronephrosis. Pelvis: Hysterectomy. Normal appearance of the urinary bladder.. Bowel: There are no dilated loops of small or large bowel. Scattered colonic diverticuli with no inflammation. The appendix is normal. Prior gastric sleeve. Lymph nodes: No adenopathy. Vasculature: No aneurysm. Musculoskeletal: Fusion of L3-L5. Multifocal degenerative changes are present. No suspicious osseous abnormality.     Acute interstitial edematous pancreatitis with surrounding mesenteric edema tracking along the right paracolic gutter. Small volume ascites in the pelvis. No walled off collections.    CT-DRAIN-PERITONEAL    Result Date: 10/16/2021  10/16/2021 1:06 PM HISTORY/REASON FOR EXAM: Large pancreatic pseudocyst, not draining well following placement of first drained. Place largest drain to lower area of retroperitoneal air/fluid region- leave upper drain in place. TECHNIQUE/EXAM DESCRIPTION: Pancreatic pseudocyst drainage with CT guidance. Low dose optimization technique was utilized for this CT exam including automated exposure control and adjustment of the mA and/or kV according to patient size. PROCEDURE: Informed consent was obtained. SEDATION: Moderate sedation was provided. Pulse oximetry and continuous cardiac monitoring by the nurse was performed throughout the exam. Intraservice time was 30 minutes. Localizing CT images were obtained with the patient in supine position. The skin was prepped with Betadine and draped in a sterile fashion. Following local anesthesia with 1% Lidocaine, a 17 G guiding needle was placed and needle path confirmed with CT. An Amplatz guidewire was placed and following serial tract dilatation, a 14  Swedish pigtail locking catheter was placed. A specimen was collected and submitted for amylase, culture and sensitivity and Gram stain. Total of 400 mL of brownish fluid was drained. The catheter was secured to the skin and connected to suction bulb drainage. Final CT images were obtained documenting catheter position. The patient tolerated the procedure well with no evidence of complication. COMPARISON: Recent CT scan abdomen/pelvis FINDINGS: The final CT images show satisfactory catheter position dependently within the target collection.     1.  CT guided pancreatic pseudocyst catheter drainage. 2.  The current plan is to obtain a follow-up CT in 5-7 days..    CT-DRAIN-PERITONEAL    Result Date: 10/10/2021  HISTORY/REASON FOR EXAM:  66-year-old woman with pancreatitis and extensive intraperitoneal abscess. TECHNIQUE/EXAM DESCRIPTION AND NUMBER OF VIEWS: RIGHT peritoneal drain placement with CT guidance. Low dose optimization technique was utilized for this CT exam including automated exposure control and adjustment of the mA and/or kV according to patient size. COMPARISON:  CT 10/8/2021 MEDICATIONS: Moderate sedation was provided. Pulse oximetry and continuous cardiac monitoring by the nurse was performed throughout the exam. Intraservice time was 15 minutes. PROCEDURE:     The risks, benefits, goals and objectives and alternatives were discussed. Risks were specified as including but not limited to bleeding, infection, damage to vessels or nerves, pain and discomfort as well as the possibility of incomplete resolution of underlying disease. Drain care related issues were discussed with the patient. The patient's questions were answered. Informed oral and written consent were obtained. The patient was placed on the CT gantry. Localizing scan was performed. The skin was prepped and draped in the usual sterile manner.  A timeout was performed. Local anesthetic result was achieved with administration of 1% lidocaine.  A(n) 17-gauge access needle was advanced to the target using CT fluoroscopic guidance. Access was secured with a wire and the tract was sequentially dilated to accommodate a(n) 14 Italian locking loop drain. A total of 80 mL of thin brown turbid fluid was removed and sent for  laboratory evaluation. This was put in place and formed. The catheter was attached to bag drainage and secured to the skin with 2-0 nylon suture. Completion scan was performed which showed no complication. The patient tolerated the procedure well with no evidence of complication. The skin was cleaned and a dressing was applied. FINDINGS: Drainage tube in good position     Successful peritoneal drainage tube placement. Plan: Thrice daily flushes with 10 mL of sterile saline. Monitor outputs. Please contact interventional radiology if there is any concern for tube dysfunction.     DX-CHEST-LIMITED (1 VIEW)    Result Date: 10/19/2021  10/19/2021 11:52 AM HISTORY/REASON FOR EXAM:  Shortness of Breath TECHNIQUE/EXAM DESCRIPTION AND NUMBER OF VIEWS: Single AP view of the chest. COMPARISON: 10/18/2016 FINDINGS: Left PICC projects over the SVC. The cardiomediastinal silhouette is stable. There is right basilar atelectasis/consolidation. There may be a small right pleural effusion. There is a calcified granuloma in the left lower lobe. There are surgical clips in  the upper abdomen. Atherosclerotic calcification is seen.     1.  Right basilar atelectasis or consolidation. Small right pleural effusion not excluded. 2.  Atherosclerotic plaque.    IR-US GUIDED PIV    Result Date: 10/10/2021  EXAMINATION:                                                                    HISTORY/REASON FOR EXAM:  Ultrasound Guided PIV.  TECHNIQUE/EXAM DESCRIPTION AND NUMBER OF VIEWS:  Peripheral IV insertion with ultrasound guidance.  The procedure was prepared using maximal sterile barrier technique including sterile gown, mask, cap, and donning of sterile gloves following  appropriate hand hygiene and/or sterile scrub. Patient skin site was prepped with 2% Chlorhexidine solution.   FINDINGS: Peripheral IV insertion with Ultrasound Guidance was performed by qualified imaging nursing staff without the assistance of a Radiologist.      Ultrasound-guided PERIPHERAL IV INSERTION performed by qualified nursing staff as above.    NM-HEPATOBILIARY SCAN    Result Date: 10/10/2021  10/10/2021 11:12 AM HISTORY/REASON FOR EXAM:  rule out biliary leak post ERCP with sphincterotomy; R/O bile leak TECHNIQUE/EXAM DESCRIPTION AND NUMBER OF VIEWS: Hepatobiliary scan. COMPARISON: 10/9/2021 CT abdomen PROCEDURE:  5.4 mCi Tc 99m-Choletec was administered intravenously, followed by 90 minutes of anterior planar imaging. FINDINGS: Normal homogeneous uptake of radiotracer in the hepatic parenchyma with visualization of the tracer in the common bile duct and entering the small bowel loops. No abnormal pooling or collection radiotracer outside of the biliary system or bowel.     Normal hepatobiliary scan with no findings of a bile leak.    DX-PORTABLE FLUOROSCOPY < 1 HOUR    Result Date: 10/1/2021  10/1/2021 10:01 AM HISTORY/REASON FOR EXAM:  ERCP TECHNIQUE/EXAM DESCRIPTION AND NUMBER OF VIEWS: 2 images were obtained in the operating room. A total of 0.3 minutes of fluoroscopy time utilized. COMPARISON: Selected images CT abdomen and pelvis 2/22/2019 FINDINGS: Images show injection of contrast into the common bile duct which is dilated. There is also filling of the pancreatic duct. Fluoroscopy time: minutes     ERCP images as described    EC-ECHOCARDIOGRAM COMPLETE W/O CONT    Result Date: 10/14/2021  Transthoracic Echo Report Echocardiography Laboratory CONCLUSIONS Normal left ventricular chamber size. Hyperdynamic left ventricular systolic function. The left ventricular ejection fraction is visually estimated to be greater than 75%. Normal right ventricular size and systolic function. Enlarged right atrium.  Mild tricuspid regurgitation. Right ventricular systolic pressure is estimated to be  40 mmHg; mild pulmonary hypertension. Normal pericardium without effusion. No prior study is available for comparison. KATERINA PATEL Exam Date:         10/14/2021                    16:15 Exam Location:     Inpatient Priority:          Routine Ordering Physician:        YUE TOURE Referring Physician:       [id number] Campos Sonographer:               Aroldo Nolan                            RDCS, RVT Age:    66     Gender:    F MRN:    [id number] :    1955 BSA:    1.68   Ht (in):    63     Wt (lb):    143 Exam Type:     Complete Indications:     Endocarditis ICD Codes:       421 CPT Codes:       40563 BP:   106    /   48     HR:   78 Technical Quality:       Fair MEASUREMENTS  (Male / Female) Normal Values 2D ECHO LV Diastolic Diameter PLAX        3.9 cm                4.2 - 5.9 / 3.9 - 5.3 cm LV Systolic Diameter PLAX         2.7 cm                2.1 - 4.0 cm IVS Diastolic Thickness           0.88 cm               LVPW Diastolic Thickness          0.75 cm               LVOT Diameter                     2 cm                  Estimated LV Ejection Fraction    75 %                  LV Ejection Fraction MOD BP       77.2 %                >= 55  % LV Ejection Fraction MOD 4C       79.3 %                LV Ejection Fraction MOD 2C       77.1 %                IVC Diameter                      1.5 cm                DOPPLER AV Peak Velocity                  1.8 m/s               AV Peak Gradient                  13.5 mmHg             AV Mean Gradient                  6.3 mmHg              LVOT Peak Velocity                1.7 m/s               AV Area Cont Eq vti               2.8 cm2               MV Velocity Time Integral         42.6 cm               Mitral E Point Velocity           1.3 m/s               Mitral E to A Ratio               1.5                   MV Pressure Half Time             77.3 ms               MV Area  PHT                       2.8 cm2               MV Deceleration Time              266 ms                TR Peak Velocity                  269 cm/s              PV Peak Velocity                  0.96 m/s              PV Peak Gradient                  3.7 mmHg              RVOT Peak Velocity                0.73 m/s              * Indicates values subject to auto-interpretation LV EF:  75    % FINDINGS Left Ventricle Normal left ventricular chamber size. Normal left ventricular wall thickness. Hyperdynamic left ventricular systolic function. The left ventricular ejection fraction is visually estimated to be greater than 75%. Normal regional wall motion. Normal diastolic function. Right Ventricle Normal right ventricular size. Normal right ventricular systolic function. Right Atrium Enlarged right atrium. Normal inferior vena cava size and inspiratory collapse. Left Atrium Normal left atrial size. Left atrial volume index is 26  mL/sq m. Mitral Valve Structurally normal mitral valve. No mitral stenosis. Trace mitral regurgitation. Aortic Valve The aortic valve is not well visualized. Cannot rule out bicuspid aortic valve. No aortic stenosis. No aortic insufficiency. Tricuspid Valve Structurally normal tricuspid valve. No tricuspid stenosis. Mild tricuspid regurgitation. Right ventricular systolic pressure is estimated to be  40 mmHg. Pulmonic Valve The pulmonic valve is not well visualized. No pulmonic stenosis. No pulmonic insufficiency. Pericardium Normal pericardium without effusion. Aorta The ascending aorta diameter is 3.1  cm. Nate Sarmiento MD (Electronically Signed) Final Date:     14 October 2021                 17:39    IR-PICC LINE PLACEMENT W/ GUIDANCE > AGE 5    Result Date: 10/15/2021  HISTORY/REASON FOR EXAM:   PICC placement. TECHNIQUE/EXAM DESCRIPTION AND NUMBER OF VIEWS:   PICC line insertion with ultrasound guidance.  The procedure was performed using maximal sterile barrier technique including  "sterile gown, mask, cap, and donning of sterile gloves following appropriate hand hygiene and/or sterile scrub. Patient skin site was prepped with 2% Chlorhexidine solution. FINDINGS:  PICC line insertion with Ultrasound Guidance was performed by qualified nursing staff without the assistance of a Radiologist. PICC positioning appropriateness confirmed by 3CG technology; chest xray only needed in the instance 3CG unable to confirm placement.              Ultrasound-guided PICC placement performed by qualified nursing staff as above.       Micro:  Results     Procedure Component Value Units Date/Time    CULTURE WOUND W/ GRAM STAIN [[id number]] Collected: 10/25/21 1600    Order Status: Completed Specimen: Wound Updated: 10/26/21 1457     Significant Indicator NEG     Source WND     Site Abdominal Abscess     Culture Result Culture in progress.     Gram Stain Result Few WBCs.  No organisms seen.      Narrative:      Collected By:[id number] GARCIA  Collected By:[id number] NOHEMI GARCIA    GRAM STAIN [[id number]] Collected: 10/25/21 1600    Order Status: Completed Specimen: Wound Updated: 10/26/21 1215     Significant Indicator .     Source WND     Site Abdominal Abscess     Gram Stain Result Few WBCs.  No organisms seen.      Narrative:      Collected By:[id number] NOHEMI GARCIA  Collected By:[id number] NOHEMI GARCIA    FLUID CULTURE W/GRAM STAIN [[id number]] Collected: 10/25/21 1600    Order Status: Canceled Specimen: Other Body Fluid     BLOOD CULTURE [[id number]] Collected: 10/18/21 1520    Order Status: Completed Specimen: Blood from Line Updated: 10/23/21 1700     Significant Indicator NEG     Source BLD     Site PICC Line     Culture Result No growth after 5 days of incubation.    Narrative:      Special Contact Isolation  Per Hospital Policy: Only change Specimen Src: to \"Line\" if  specified by physician order.  No site indicated    BLOOD CULTURE [[id number]] Collected: 10/18/21 1517    Order Status: " "Completed Specimen: Blood from Peripheral Updated: 10/23/21 1700     Significant Indicator NEG     Source BLD     Site PERIPHERAL     Culture Result No growth after 5 days of incubation.    Narrative:      Special Contact Isolation  Per Hospital Policy: Only change Specimen Src: to \"Line\" if  specified by physician order.  Left Forearm/Arm          Assessment:  Active Hospital Problems    Diagnosis    • *Bacteremia due to Gram-negative bacteria and fungemia [R78.81]    • Acute respiratory failure with hypoxia (HCC) [J96.01]    • Duodenal perforation (HCC) [K63.1]    • Postprocedural retroperitoneal abscess [K68.11]    • Diarrhea [R19.7]    • Electrolyte abnormality [E87.8]    • Hyponatremia [E87.1]    • Sepsis due to intraabdominal fluid collection/abscess (HCC) [A41.9]    • Hyperlipidemia [E78.5]    • Hypertension [I10]        Assessment:  66 y.o. 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  Patient found to have multiple and extensive fluid collections in the abdomen and pelvis on imaging.  She underwent drain placement on 10/8.  Cultures are growing E. coli and Enterococcus faecalis.  Blood cultures are growing chryseobacterium species and Candida glabrata. Source likely due to the intra-abdominal abscesses. Repeat CT scan on 10/13 shows extensive multiloculated fluid collections in the abdomen and pelvis.  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  -Chryseobacterium species -the patient has been treated with Zosyn for multiple days which has intermediate sensitivity per chart.  However, repeat blood cultures negative   -Repeat blood culture on 10/18, no growth to date  -Blood cultures from 10/13 are no growth and final    -TTE-negative  -Wound cultures on 10/16-Enterococcus faecalis, Candida albicans and Candida glabrata  -Continue to monitor for any vision changes-patient currently denies  -Continue IV Zosyn  -Continue IV micafungin   -Dr. Cook following  -Repeat CT scan ordered by IR on 10/21-grossly unchanged irregular fluid collections in the right abdomen   -Status post IR drainage placement into right lower quadrant on 10/25.  Cultures-pending  -Plan for drain repositioning of left mid upper abdominal drain today secondary to leakage around insertion site  -On TPN  -Duration of antimicrobials unclear at this time given lack of source control  -Will need repeat CT scan this weekend  -IR following  -Monitor drain output    Discussed with internal medicine Dr. Duffy and bedside nurse.  ID will follow.      Addendum:  Left upper quadrant drain removed.  Right upper quadrant drain upsized  Abdominal wound abscess cultures on 10/25-yeast, nonlactose fermenting gram-negative christopher.  Follow speciation and susceptibilities  Continue IV Zosyn and micafungin  No plans for surgical intervention  Will plan a 4-week course of antibiotics from today with stop date of 11/24/2021  Repeat CT scan prior to IV antibiotic stop date  Primary team hoping to arrange for home IV antibiotics.  Will write home IV antibiotic orders once we have more information from the current cultures but anticipate IV micafungin 100 mg daily and continuous Zosyn infusion    Updated plan of care discussed with hospitalist,   Duffy

## 2021-10-27 NOTE — PROGRESS NOTES
Radiology Progress Note   Author: AYE Mahan Date & Time created: 10/27/2021  10:22 AM   Date of admission  10/15/2021  Note to reader: this note follows the APSO format rather than the historical SOAP format. Assessment and plan located at the top of the note for ease of use.    Chief Complaint  66 y.o. female admitted 10/15/2021 with abd pain     HPI  66-year-old female PMH recurrent idiopathic pancreatitis s/p ERCP with sphincterotomy October 1, 2021 complicated by ERCP pancreatitis, sleeve gastrectomy, dyslipidemia, hypertension, osteoarthritis of the spine status post lumbar fusion who was admitted to the hospital 10/8/2021 with worsening right lower quadrant pain over the past week.  Ever since her ERCP October 1, 2021, she has been experiencing pain, intermittent subjective fevers, nausea.  In the emergency room-labs: CBC: WBC 17.8..  Sodium 130.  LFTs-WNL.  CT scan abdomen/pelvis-large irregular fluid collection with thick wall occupying most of the right abdomen surrounding the right kidney and colon.  Severe wall thickening of the descending, transverse colon, second portion of the duodenum likely reactive.  Pneumobilia with gas in the CBD.  Drain placement by IR was performed.  Currently, the abdominal pain has improved.      Assessment/Plan  Interval History   Principal Problem:    Bacteremia due to Gram-negative bacteria and fungemia  Active Problems:    Primary hypertension    Hyperlipidemia    Sepsis due to intraabdominal fluid collection/abscess (HCC)    Normocytic anemia    Hyponatremia    Duodenal perforation (HCC)    Postprocedural retroperitoneal abscess    Antibiotic-associated diarrhea    Acute respiratory failure with hypoxia (HCC)    Hypokalemia    Hypophosphatemia    On total parenteral nutrition (TPN)      Plan IR  - Irrigate Left and right x2 abdominal drains with 50 ml of sterile saline q4 hrs-   - Surgery, ID, GI following   - Repeat CT scan from 10/22 Grossly unchanged  irregular fluid collection occupying the right abdomen surrounding the right kidney and right colon extending to midline   - Left mid upper abdominal drain leaking around site, drain reposition today 10/27    - Repeat CT scan ordered for 10/30 for abscess eval     [id number]  IR:   10/8- Right abd drain placed, + Chryseobacterium indologenes   Chryseobacterium arthrosphaerae  Candida glabrata   10/15- transfer from [facility]   10/16- Left mid abd drain placed, drain +  Enterococcus faecalis, Candida albicans, Candida glabrata  10/17- Left Abd drain - 195 ml, green milky  Right abd drain- 238 ml  10/18   Left  drain- 75 ml  Right drain- 110 ml  10/19   Left drain- 230 ml    Right drain-  280 ml   10/20  Left drain-  125 ml  Brown green   Right drain- 405 ml brown green  10/21  Left drain-  90 ml  Brown green in am  Right drain- 290 ml brown green in am   10/22  Left drain- 25 ml i   Right drain- 50 ml    10/23   Left drain-  20 ml    Right drain 15  10/24  Left drain- 10 ml     Right drain= 10 ml   10/25  Left drain-  Minimal output, leaking around drain site   Right drain- minimal output   Right lower drain ( new drain) - 55 ml bloody output  10/26  Left drain-  Minimal output 10 ml    Right drain- 2 ml  milky tan, leaking around tube site, drain to be exchanged today and repositions    Right lower drain- 40 ml bloody   10/27-   Left mid abd drain-  Minimal output  Right upper drain- 5 ml leaking around tube site, drain to be exchanged today and repositions    Right lower drain-  55 ml        Review of Systems  Physical Exam   Review of Systems   Constitutional: Positive for malaise/fatigue. Negative for chills and fever.   HENT: Negative for hearing loss.    Eyes: Negative for blurred vision.   Respiratory: Negative for cough, hemoptysis and shortness of breath.    Cardiovascular: Negative for chest pain and palpitations.   Gastrointestinal: Positive for abdominal pain. Negative for vomiting.   Genitourinary:  Negative for dysuria.   Musculoskeletal: Negative for myalgias.   Skin: Negative for rash.   Neurological: Positive for weakness. Negative for dizziness and headaches.   Endo/Heme/Allergies: Does not bruise/bleed easily.   Psychiatric/Behavioral: Negative for suicidal ideas.      Vitals:    10/27/21 0825   BP:    Pulse:    Resp: 16   Temp:    SpO2:         Physical Exam  Constitutional:       Appearance: Normal appearance.   HENT:      Head: Normocephalic.      Nose: Nose normal.      Mouth/Throat:      Mouth: Mucous membranes are moist.   Eyes:      Pupils: Pupils are equal, round, and reactive to light.   Cardiovascular:      Rate and Rhythm: Normal rate.   Pulmonary:      Effort: Pulmonary effort is normal. No respiratory distress.   Abdominal:      Palpations: Abdomen is soft.      Tenderness: There is abdominal tenderness.          Comments: Connect to MARTHA bulbs   Drain site with dressing, oozing fluid around catheter  from drain site    Musculoskeletal:         General: No tenderness or deformity.      Cervical back: Normal range of motion.      Right lower leg: Edema present.      Left lower leg: Edema present.      Comments: Edema improving    Skin:     General: Skin is warm and dry.      Capillary Refill: Capillary refill takes less than 2 seconds.      Coloration: Skin is not jaundiced or pale.   Neurological:      General: No focal deficit present.      Mental Status: She is alert.      Motor: No weakness.   Psychiatric:         Mood and Affect: Mood normal.         Behavior: Behavior normal.             Labs    Recent Labs     10/25/21  0420 10/27/21  0514   WBC 8.6 7.1   RBC 2.73* 2.79*   HEMOGLOBIN 8.4* 8.5*   HEMATOCRIT 25.6* 25.8*   MCV 93.8 92.5   MCH 30.8 30.5   MCHC 32.8* 32.9*   RDW 51.3* 48.4   PLATELETCT 170 200   MPV 9.9 9.6     Recent Labs     10/25/21  0420 10/27/21  0514   SODIUM 133* 133*   POTASSIUM 3.9 3.7   CHLORIDE 99 98   CO2 26 26   GLUCOSE 81 93   BUN 16 10   CREATININE 0.72 0.67    CALCIUM 8.2* 7.9*     Recent Labs     10/25/21  0420 10/27/21  0514   ALBUMIN 2.6*  --    TBILIRUBIN 0.3  --    ALKPHOSPHAT 63  --    TOTPROTEIN 5.0*  --    ALTSGPT 12  --    ASTSGOT 22  --    CREATININE 0.72 0.67     CT-ABDOMEN-PELVIS WITH   Final Result         1. Grossly unchanged irregular fluid collection occupying the right abdomen surrounding the right kidney and right colon extending to midline. Additional pigtail catheter in the component about midline anterior abdomen. Both pigtail catheters seem to be    within the collection.      2. Small right pleural effusion with right basilar atelectasis.               DX-CHEST-LIMITED (1 VIEW)   Final Result      1.  Right basilar atelectasis or consolidation. Small right pleural effusion not excluded.   2.  Atherosclerotic plaque.      CT-DRAIN-PERITONEAL   Final Result      1.  CT guided pancreatic pseudocyst catheter drainage.   2.  The current plan is to obtain a follow-up CT in 5-7 days..      IR-PICC LINE PLACEMENT W/ GUIDANCE > AGE 5   Final Result                  Ultrasound-guided PICC placement performed by qualified nursing staff as    above.          CT-DRAIN-PERITONEAL    (Results Pending)   IR-DRAINAGE-CATH EXCHANGE    (Results Pending)       INR   Date Value Ref Range Status   10/15/2021 1.49 (H) 0.87 - 1.13 Final     Comment:     INR - Non-therapeutic Reference Range: 0.87-1.13  INR - Therapeutic Reference Range: 2.0-4.0       No results found for: POCINR     Intake/Output Summary (Last 24 hours) at 10/18/2021 1528  Last data filed at 10/18/2021 0500  Gross per 24 hour   Intake --   Output 80 ml   Net -80 ml      Labs not explicitly included in this progress note were reviewed by the author. Radiology/imaging not explicitly included in this progress note was reviewed by the author.   I have performed a physical exam and reviewed and updated ROS and Plan today (10/27/2021).     25 minutes in directly providing and coordinating care and extensive  data review.  No time overlap and excludes procedures.

## 2021-10-27 NOTE — PROGRESS NOTES
Covid-19 surge in effect:     Pt is A&Ox4, c/o pain 8/10 in her abdomen, pt medicated with PRN Oxy per MAR, pt also given PRN Zofran for nausea. Drains flushed q4hr w/ 50mL. Pt on schedule in IR for drain repositioning.

## 2021-10-27 NOTE — FACE TO FACE
"Patient's POA (daughter) Madeline Nelson called on Friday 09/01/2017 regarding the details of Mrs. Taylor's visit with Dr. Riki Lowe. Mrs. Nelson Riki Lowe and Gregorio Cifuentes Interm . Gregorio stated she will call Mrs. Nelson to follow up.   " Face to Face Supporting Documentation - Home Health    The encounter with this patient was in whole or in part the primary reason for home health admission.    Date of encounter:   Patient:                    MRN:                       YOB: 2021  Nils Alfonso  [id number]  1955     Home health to see patient for:  Skilled Nursing care for assessment, interventions & education, Physical Therapy evaluation and treatment and Occupational therapy evaluation and treatment    Skilled need for:  New Onset Medical Diagnosis intra-abdominal abscesses with candida and GNR bacteremia and Medication Management IV zosyn and micafungin infusions    Skilled nursing interventions to include:  Venous access care, Home IV infusion therapy, Wound Care, Line/Drain/Airway education and care and Comment: TPN and drain care    Homebound status evidenced by:  Need the aid of supportive devices such as crutches, canes, wheelchairs or walkers or Needs the assistance of another person in order to leave the home. Leaving home requires a considerable and taxing effort. There is a normal inability to leave the home.    Community Physician to provide follow up care: Pratik Gordon M.D.     Optional Interventions? No      I certify the face to face encounter for this home health care referral meets the CMS requirements and the encounter/clinical assessment with the patient was, in whole, or in part, for the medical condition(s) listed above, which is the primary reason for home health care. Based on my clinical findings: the service(s) are medically necessary, support the need for home health care, and the homebound criteria are met.  I certify that this patient has had a face to face encounter by myself.  Jerod Duffy M.D. - NPI: [id number]

## 2021-10-27 NOTE — ASSESSMENT & PLAN NOTE
-Transfuse for Hgb <7  -Avoid regular phlebotomy  -Supplements per dietary: 11/6 added thiamine, 6 sm meals/d  -11/21: Thiamine PO held at this time. Will resume when cleared to have PO meds.

## 2021-10-27 NOTE — PROGRESS NOTES
Hospital Medicine Daily Progress Note    Date of Service  10/26/2021    Chief Complaint  Nils Alfonso is a 66 y.o. female admitted 10/15/2021 with abdominal pain    Hospital Course  Initially admitted to [facility] for evaluation of abdominal pain after recent ERCP with polypectomy and sphincterotomy and noted to have large intra-abdominal fluid collection for which she underwent drainage with interventional radiology and was evaluated by infectious disease and surgery.  She was transferred to [facility] for higher level of care.  She was evaluated by Dr Cook who recommended conservative management with placement of a second drain    Pneumobilia with gas in the CBD - s/p drain placement  TPN  Post ERCP retroperitoneal abscess with drain x2     Repeat CT 10/22 showed Grossly unchanged irregular fluid collection occupying the right abdomen surrounding the right kidney and right colon extending to midline.       Interval Problem Update  She developed leakage at Right superior drain site overnight.  Her abdominal pain is managed with current regimen.   Her appetite is doing ok. She has early satiety from a gastric sleeve by Dr. Condon.  She denies F/C, dyspnea, N/V, bowel/bladder dysfunction, bleeding, dysphoria.    I have personally seen and examined the patient at bedside. I discussed the plan of care with patient, bedside RN, charge RN, , pharmacy, dietitian and general surgery, infectious disease and interventional radiology.    POC with ID Dr. Mcfarland. She advised ongoing source control coordination. Currently planning to complete micafungin for bacteremia tomorrow.    POC discussed with Surgery Dr. Cook. He advised IR evaluation for the drain malfunction and further nonoperative management.    POC discussed with IR ANGI Baker. He will place orders and arrange for drain repositioning by IR tomorrow.    POC discussed with nutritionist LALY Valles. She advised  small frequent meals to meet caloric needs prior to discontinuation of TPN. This was relayed to pharmacist Irma to prepare TPN today.    Consultants/Specialty  general surgery, GI, infectious disease and interventional radiology    Code Status  Full Code    Disposition  Patient is not medically cleared.   Anticipate discharge to to home with organized home healthcare and close outpatient follow-up.  I have placed the appropriate orders for post-discharge needs.    Review of Systems  Review of Systems   Constitutional: Negative for chills, fever and malaise/fatigue.   HENT: Negative for ear pain, nosebleeds, sinus pain and sore throat.    Respiratory: Positive for shortness of breath. Negative for cough.    Cardiovascular: Negative for chest pain and palpitations.   Gastrointestinal: Negative for abdominal pain, blood in stool, constipation, diarrhea, melena, nausea and vomiting.   Genitourinary: Negative for dysuria, flank pain and hematuria.   Musculoskeletal: Negative for back pain, joint pain, myalgias and neck pain.   Neurological: Negative for headaches.   Endo/Heme/Allergies: Does not bruise/bleed easily.   Psychiatric/Behavioral: Negative for depression. The patient is not nervous/anxious.    All other systems reviewed and are negative.       Physical Exam  Temp:  [36.2 °C (97.1 °F)-38.1 °C (100.6 °F)] 38.1 °C (100.6 °F)  Pulse:  [67-86] 81  Resp:  [16-18] 18  BP: (147-175)/(62-89) 172/81  SpO2:  [92 %-96 %] 96 %    Physical Exam  Vitals and nursing note reviewed. Exam conducted with a chaperone present (Primary RN at bedside).   Constitutional:       General: She is not in acute distress.     Appearance: She is ill-appearing (Acutely). She is not diaphoretic.      Comments: Pleasant, conversational   HENT:      Head: Normocephalic and atraumatic.      Nose: Nose normal.      Mouth/Throat:      Mouth: Mucous membranes are moist.   Eyes:      General: No scleral icterus.     Conjunctiva/sclera: Conjunctivae  normal.   Cardiovascular:      Rate and Rhythm: Normal rate and regular rhythm.      Pulses: Normal pulses.      Heart sounds: Normal heart sounds. No murmur heard.   No friction rub. No gallop.    Pulmonary:      Effort: Pulmonary effort is normal. No respiratory distress.      Breath sounds: Normal breath sounds. No wheezing, rhonchi or rales.   Abdominal:      General: Bowel sounds are decreased. There is no distension.      Palpations: Abdomen is soft.      Tenderness: There is abdominal tenderness (Diffuse mild). There is no guarding or rebound.      Comments: Left MARTHA drain dressed, dry, minimal purulent output.  Right lateral MARTHA drain dressed, dry, sanguinous output.  Right medial MARTHA drain soiled dressing, brown / murky output.   Musculoskeletal:      Cervical back: Neck supple.      Right lower leg: Edema present.      Left lower leg: Edema present.      Comments: 3+ pretibial edema   Skin:     General: Skin is warm and dry.      Coloration: Skin is not cyanotic.      Nails: There is no clubbing.   Neurological:      Mental Status: She is alert.      Comments: Appropriately conversant   Psychiatric:         Mood and Affect: Mood normal.         Behavior: Behavior normal.         Thought Content: Thought content normal.         Judgment: Judgment normal.       Fluids    Intake/Output Summary (Last 24 hours) at 10/26/2021 1753  Last data filed at 10/26/2021 0920  Gross per 24 hour   Intake 250 ml   Output 1120 ml   Net -870 ml       Laboratory  Recent Labs     10/24/21  0415 10/25/21  0420   WBC 6.7 8.6   RBC 2.53* 2.73*   HEMOGLOBIN 8.4* 8.4*   HEMATOCRIT 25.1* 25.6*   MCV 99.2* 93.8   MCH 33.2* 30.8   MCHC 33.5* 32.8*   RDW 53.6* 51.3*   PLATELETCT 183 170   MPV 10.7 9.9     Recent Labs     10/24/21  0645 10/25/21  0420   SODIUM 137 133*   POTASSIUM 3.5* 3.9   CHLORIDE 102 99   CO2 28 26   GLUCOSE 97 81   BUN 17 16   CREATININE 0.61 0.72   CALCIUM 7.8* 8.2*                   Imaging  CT-ABDOMEN-PELVIS WITH    Final Result         1. Grossly unchanged irregular fluid collection occupying the right abdomen surrounding the right kidney and right colon extending to midline. Additional pigtail catheter in the component about midline anterior abdomen. Both pigtail catheters seem to be    within the collection.      2. Small right pleural effusion with right basilar atelectasis.               DX-CHEST-LIMITED (1 VIEW)   Final Result      1.  Right basilar atelectasis or consolidation. Small right pleural effusion not excluded.   2.  Atherosclerotic plaque.      CT-DRAIN-PERITONEAL   Final Result      1.  CT guided pancreatic pseudocyst catheter drainage.   2.  The current plan is to obtain a follow-up CT in 5-7 days..      IR-PICC LINE PLACEMENT W/ GUIDANCE > AGE 5   Final Result                  Ultrasound-guided PICC placement performed by qualified nursing staff as    above.          CT-DRAIN-PERITONEAL    (Results Pending)   IR-DRAINAGE-CATH EXCHANGE    (Results Pending)        Assessment/Plan  * Bacteremia due to Gram-negative bacteria and fungemia- (present on admission)  Assessment & Plan  ID consulted - completing micafungin 10/27, continue zosyn indefinitely due to incomplete source control  Source control per ID and IR consults    Peritoneal fluid cultures grew E. coli and Enterococcus   Blood cultures grew chryseobacterium and Candida glabrata   Repeat blood cultures from 10/13/2021 are negative    Peritoneal fluid from 10/16/2021 growing yeast and strep species follow-up on final results  Monitor drain output and continue current antibiotics  KALANI negative for signs of endocarditis    Postprocedural retroperitoneal abscess- (present on admission)  Assessment & Plan  Now with abdominal drain x3  General surgery consulted, recommend ongoing nonoperative management  IR consulted, positioning and revision of tubes per recommendations  Repeat CT 10/22 showed Grossly unchanged irregular fluid collection occupying the  right abdomen surrounding the right kidney and right colon extending to midline     Duodenal perforation (HCC)- (present on admission)  Assessment & Plan  After ERCP with retroperitoneal abscess and bacteremia  Uncertain etiology - ddx includes pancreatitis, bile leak, iatrogenic  GI consulted and s/o, management per ID / IR / Surgery    Sepsis due to intraabdominal fluid collection/abscess (HCC)- (present on admission)  Assessment & Plan  Sepsis resolved  Source intra-abdominal  Continue Zosyn and Mycamine and follow-up on repeat cultures  ID following  Re-imaging per ID, IR, and Surgery consults    On total parenteral nutrition (TPN)  Assessment & Plan  RD consulted for enteral nutrition recommendations  Wean TPN as tolerated  Outpatient TPN coordination TBD if unable to wean    Hypophosphatemia- (present on admission)  Assessment & Plan  Resolved with TPN  Monitoring per RD    Acute respiratory failure with hypoxia (HCC)- (present on admission)  Assessment & Plan  Resolved with diuresis  Likely volume overload with lower extremity edema present    Antibiotic-associated diarrhea  Assessment & Plan  Cdiff PCR negative  Loperamide PRN    Hyponatremia- (present on admission)  Assessment & Plan  Recurrent, mild  IVF with TPN  Diuresis as tolerated  Repeat AM BMP    Normocytic anemia- (present on admission)  Assessment & Plan  Likely AOCD due to abdominal infection  Iron testing deferred and IV Fe contraindicated during ongoing infection  Repeat AM CBC  Transfuse for Hgb <7    Hypokalemia- (present on admission)  Assessment & Plan  Resolved  Mg WNL  Replete per TPN    Hyperlipidemia- (present on admission)  Assessment & Plan  Continue ezetimibe    Primary hypertension- (present on admission)  Assessment & Plan  Continue lisinopril  No indication for strict BP control, PRN antihypertensives discontinued         VTE prophylaxis: heparin ppx    I have performed a physical exam and reviewed and updated ROS and Plan today  (10/26/2021). In review of yesterday's note (10/25/2021), there are no changes except as documented above.

## 2021-10-27 NOTE — PROGRESS NOTES
Pharmacy TPN Day # 10       10/26/2021    Dosing Weight: 60 kg (AdjBW)            TPN currently providing 50% of goal  TPN goal: 2839-7609 kcal/day including 1.4-1.7 gm/kg/day Protein  TPN indication: perforated duodenum with high drain output                                                                Pertinent PMH: Admitted on 10/15/2021 for abdominal pain. Underwent polypectomy and a sphincterotomy on 10/01/2021.  CT completed on 10/08/202, found to have a large fluid collection and thickening of wall around the duodenum with retroperitoneal fluid collection.  IR placed drain. CT completed on 10/09/2021 with a significant amount of retroperitoneal air and fluid.  Pt was transferred to [facility] 10/14/21 for further evaluation. 2 drains have been placed for fluid collections surrounding the R kidney and colon.  Peritoneal drain cultures have demonstrated polymicrobial growth however blood cultures have remained negative.    Temp (24hrs), Av.3 °C (99.2 °F), Min:36.2 °C (97.1 °F), Max:38.1 °C (100.6 °F)  .  Recent Labs     10/24/21  0645 10/25/21  0420   SODIUM 137 133*   POTASSIUM 3.5* 3.9   CHLORIDE 102 99   CO2 28 26   BUN 17 16   CREATININE 0.61 0.72   GLUCOSE 97 81   CALCIUM 7.8* 8.2*   ASTSGOT 17 22   ALTSGPT 12 12   ALBUMIN 2.1* 2.6*   TBILIRUBIN 0.2 0.3   PHOSPHORUS 3.2 3.2   MAGNESIUM 1.9 2.0     Accu-Checks  Recent Labs     10/25/21  1127 10/25/21  2025   POCGLUCOSE 111* 97       Vitals:    10/26/21 0329 10/26/21 0502 10/26/21 0730 10/26/21 1426   BP: (!) 175/89 147/65 150/71 (!) 172/81   Weight:       Height:           Intake/Output Summary (Last 24 hours) at 10/26/2021 1840  Last data filed at 10/26/2021 1800  Gross per 24 hour   Intake 368 ml   Output 1120 ml   Net -752 ml       Orders Placed This Encounter   Procedures   • Diet Order Diet: Regular     Standing Status:   Standing     Number of Occurrences:   1     Order Specific Question:   Diet:     Answer:   Regular [1]   • Diet NPO  Restrict to: Sips with Medications     Standing Status:   Standing     Number of Occurrences:   8     Order Specific Question:   Diet NPO Restrict to:     Answer:   Sips with Medications [3]         TPN for past 72 hours (Show up to 3 orders; newest on the left. Changes between the two most recent orders are indicated.)     Start date and time   10/24/2021 2000 10/22/2021 2000 10/19/2021 2000      TPN Central Line Formulation [[id number]] TPN Central Line Formulation [[id number]] TPN Central Line Formulation [[id number]]    Order Status  Active Completed Completed    Last Admin  New Bag at 10/25/2021 2015 by Madison Pino RANAM New Bag at 10/23/2021 2007 by Cornelio Chowdary R.N. Rate Verify at 10/22/2021 0745 by Bahman Monique R.N.       Base    Clinisol 15%  45 g 90 g 90 g    dextrose 70%  120 g 240 g 240 g    fat emulsions 20%  25 g 50 g 50 g       Additives    potassium phosphate  25 mmol 25 mmol 20 mmol    sodium acetate  145 mEq 145 mEq 110 mEq    sodium chloride  77 mEq 77 mEq 110 mEq    magnesium sulfate  8 mEq 8 mEq 8 mEq    calcium GLUConate  9.3 mEq 9.3 mEq 9.3 mEq    M.T.E.-4  1 mL 1 mL 1 mL    M.V.I. Adult  10 mL 10 mL 10 mL    famotidine  40 mg 40 mg 40 mg       QS Base    sterile water  706.55 mL 110.05 mL 120.96 mL       Energy Contribution    Proteins  -- -- --    Dextrose  408 kcal 816 kcal 816 kcal    Lipids  250 kcal 500 kcal 500 kcal    Total  658 kcal 1,316 kcal 1,316 kcal       Electrolyte Ion Calculated Amount    Sodium  222 mEq 222 mEq 220 mEq    Potassium  36.67 mEq 36.67 mEq 29.33 mEq    Calcium  9.3 mEq 9.3 mEq 9.3 mEq    Magnesium  8 mEq 8 mEq 8 mEq    Aluminum  -- -- --    Phosphate  25 mmol 25 mmol 20 mmol    Chloride  77 mEq 77 mEq 110 mEq    Acetate  183.1 mEq 221.2 mEq 186.2 mEq       Other    Total Protein  45 g 90 g 90 g    Total Protein/kg  0.6 g/kg 1.21 g/kg 1.26 g/kg    Total Amino Acid  -- -- --    Total Amino Acid/kg  -- -- --    Glucose Infusion Rate  1.12  mg/kg/min 2.24 mg/kg/min 2.24 mg/kg/min    Osmolarity (Estimated)  -- -- --    Volume  1,440 mL 1,440 mL 1,440 mL    Rate  60 mL/hr 60 mL/hr 60 mL/hr    Dosing Weight  74.5 kg 74.5 kg 71.4 kg    Infusion Site  [facility]        This formula provides:  % kcal as lipids = 30  Grams protein/kg = 0.75  Non-protein calories = 658  Kcals/kg = 14  Total daily calories = ~840    Comments:  1. No changes made today to TPN. Plan to keep on at 50% per RD until patient is consuming approx. 5-6 meals a day.   2. Pharmacy will continue to monitor.      Thank you!    Irma Manrique, PharmD, BCPS

## 2021-10-28 ENCOUNTER — HOME HEALTH ADMISSION (OUTPATIENT)
Dept: HOME HEALTH SERVICES | Facility: HOME HEALTHCARE | Age: 66
End: 2021-10-28
Payer: MEDICARE

## 2021-10-28 PROBLEM — E87.6 HYPOKALEMIA: Status: RESOLVED | Noted: 2021-10-21 | Resolved: 2021-10-28

## 2021-10-28 LAB
ANION GAP SERPL CALC-SCNC: 9 MMOL/L (ref 7–16)
BASOPHILS # BLD AUTO: 0.7 % (ref 0–1.8)
BASOPHILS # BLD: 0.05 K/UL (ref 0–0.12)
BUN SERPL-MCNC: 10 MG/DL (ref 8–22)
CALCIUM SERPL-MCNC: 8 MG/DL (ref 8.5–10.5)
CHLORIDE SERPL-SCNC: 96 MMOL/L (ref 96–112)
CO2 SERPL-SCNC: 28 MMOL/L (ref 20–33)
CREAT SERPL-MCNC: 0.7 MG/DL (ref 0.5–1.4)
EOSINOPHIL # BLD AUTO: 0.03 K/UL (ref 0–0.51)
EOSINOPHIL NFR BLD: 0.4 % (ref 0–6.9)
ERYTHROCYTE [DISTWIDTH] IN BLOOD BY AUTOMATED COUNT: 49.2 FL (ref 35.9–50)
GLUCOSE SERPL-MCNC: 93 MG/DL (ref 65–99)
HCT VFR BLD AUTO: 24.6 % (ref 37–47)
HGB BLD-MCNC: 8 G/DL (ref 12–16)
IMM GRANULOCYTES # BLD AUTO: 0.05 K/UL (ref 0–0.11)
IMM GRANULOCYTES NFR BLD AUTO: 0.7 % (ref 0–0.9)
LYMPHOCYTES # BLD AUTO: 1.04 K/UL (ref 1–4.8)
LYMPHOCYTES NFR BLD: 15.2 % (ref 22–41)
MAGNESIUM SERPL-MCNC: 2 MG/DL (ref 1.5–2.5)
MCH RBC QN AUTO: 29.9 PG (ref 27–33)
MCHC RBC AUTO-ENTMCNC: 32.5 G/DL (ref 33.6–35)
MCV RBC AUTO: 91.8 FL (ref 81.4–97.8)
MONOCYTES # BLD AUTO: 0.39 K/UL (ref 0–0.85)
MONOCYTES NFR BLD AUTO: 5.7 % (ref 0–13.4)
NEUTROPHILS # BLD AUTO: 5.26 K/UL (ref 2–7.15)
NEUTROPHILS NFR BLD: 77.3 % (ref 44–72)
NRBC # BLD AUTO: 0 K/UL
NRBC BLD-RTO: 0 /100 WBC
PHOSPHATE SERPL-MCNC: 2.6 MG/DL (ref 2.5–4.5)
PLATELET # BLD AUTO: 205 K/UL (ref 164–446)
PMV BLD AUTO: 9.7 FL (ref 9–12.9)
POTASSIUM SERPL-SCNC: 3.4 MMOL/L (ref 3.6–5.5)
RBC # BLD AUTO: 2.68 M/UL (ref 4.2–5.4)
SODIUM SERPL-SCNC: 133 MMOL/L (ref 135–145)
WBC # BLD AUTO: 6.8 K/UL (ref 4.8–10.8)

## 2021-10-28 PROCEDURE — 700102 HCHG RX REV CODE 250 W/ 637 OVERRIDE(OP): Performed by: STUDENT IN AN ORGANIZED HEALTH CARE EDUCATION/TRAINING PROGRAM

## 2021-10-28 PROCEDURE — 700102 HCHG RX REV CODE 250 W/ 637 OVERRIDE(OP): Performed by: FAMILY MEDICINE

## 2021-10-28 PROCEDURE — 94760 N-INVAS EAR/PLS OXIMETRY 1: CPT

## 2021-10-28 PROCEDURE — 770006 HCHG ROOM/CARE - MED/SURG/GYN SEMI*

## 2021-10-28 PROCEDURE — 83735 ASSAY OF MAGNESIUM: CPT

## 2021-10-28 PROCEDURE — 700111 HCHG RX REV CODE 636 W/ 250 OVERRIDE (IP): Performed by: FAMILY MEDICINE

## 2021-10-28 PROCEDURE — 700105 HCHG RX REV CODE 258: Performed by: INTERNAL MEDICINE

## 2021-10-28 PROCEDURE — 99233 SBSQ HOSP IP/OBS HIGH 50: CPT | Performed by: HOSPITALIST

## 2021-10-28 PROCEDURE — 85025 COMPLETE CBC W/AUTO DIFF WBC: CPT

## 2021-10-28 PROCEDURE — 99233 SBSQ HOSP IP/OBS HIGH 50: CPT | Performed by: INTERNAL MEDICINE

## 2021-10-28 PROCEDURE — A9270 NON-COVERED ITEM OR SERVICE: HCPCS | Performed by: STUDENT IN AN ORGANIZED HEALTH CARE EDUCATION/TRAINING PROGRAM

## 2021-10-28 PROCEDURE — 80048 BASIC METABOLIC PNL TOTAL CA: CPT

## 2021-10-28 PROCEDURE — 700111 HCHG RX REV CODE 636 W/ 250 OVERRIDE (IP): Mod: JG | Performed by: INTERNAL MEDICINE

## 2021-10-28 PROCEDURE — 700102 HCHG RX REV CODE 250 W/ 637 OVERRIDE(OP): Performed by: HOSPITALIST

## 2021-10-28 PROCEDURE — 700111 HCHG RX REV CODE 636 W/ 250 OVERRIDE (IP): Performed by: INTERNAL MEDICINE

## 2021-10-28 PROCEDURE — 700111 HCHG RX REV CODE 636 W/ 250 OVERRIDE (IP): Performed by: STUDENT IN AN ORGANIZED HEALTH CARE EDUCATION/TRAINING PROGRAM

## 2021-10-28 PROCEDURE — 84100 ASSAY OF PHOSPHORUS: CPT

## 2021-10-28 PROCEDURE — A9270 NON-COVERED ITEM OR SERVICE: HCPCS | Performed by: HOSPITALIST

## 2021-10-28 PROCEDURE — A9270 NON-COVERED ITEM OR SERVICE: HCPCS | Performed by: FAMILY MEDICINE

## 2021-10-28 RX ORDER — POTASSIUM CHLORIDE 20 MEQ/1
40 TABLET, EXTENDED RELEASE ORAL ONCE
Status: COMPLETED | OUTPATIENT
Start: 2021-10-28 | End: 2021-10-28

## 2021-10-28 RX ADMIN — PIPERACILLIN SODIUM AND TAZOBACTAM SODIUM 3.38 G: 3; .375 INJECTION, POWDER, FOR SOLUTION INTRAVENOUS at 06:22

## 2021-10-28 RX ADMIN — GABAPENTIN 600 MG: 300 CAPSULE ORAL at 06:16

## 2021-10-28 RX ADMIN — HYDROMORPHONE HYDROCHLORIDE 0.5 MG: 1 INJECTION, SOLUTION INTRAMUSCULAR; INTRAVENOUS; SUBCUTANEOUS at 20:49

## 2021-10-28 RX ADMIN — HYDROMORPHONE HYDROCHLORIDE 0.5 MG: 1 INJECTION, SOLUTION INTRAMUSCULAR; INTRAVENOUS; SUBCUTANEOUS at 03:23

## 2021-10-28 RX ADMIN — LISINOPRIL 5 MG: 5 TABLET ORAL at 06:15

## 2021-10-28 RX ADMIN — POTASSIUM CHLORIDE 40 MEQ: 1500 TABLET, EXTENDED RELEASE ORAL at 13:42

## 2021-10-28 RX ADMIN — ONDANSETRON 4 MG: 2 INJECTION INTRAMUSCULAR; INTRAVENOUS at 10:31

## 2021-10-28 RX ADMIN — ONDANSETRON 4 MG: 2 INJECTION INTRAMUSCULAR; INTRAVENOUS at 18:35

## 2021-10-28 RX ADMIN — PIPERACILLIN SODIUM AND TAZOBACTAM SODIUM 3.38 G: 3; .375 INJECTION, POWDER, FOR SOLUTION INTRAVENOUS at 14:44

## 2021-10-28 RX ADMIN — FAMOTIDINE 20 MG: 20 TABLET ORAL at 06:15

## 2021-10-28 RX ADMIN — Medication 1000 UNITS: at 06:17

## 2021-10-28 RX ADMIN — MICAFUNGIN SODIUM 100 MG: 100 INJECTION, POWDER, LYOPHILIZED, FOR SOLUTION INTRAVENOUS at 13:43

## 2021-10-28 RX ADMIN — OXYCODONE HYDROCHLORIDE 10 MG: 10 TABLET ORAL at 18:35

## 2021-10-28 RX ADMIN — GABAPENTIN 600 MG: 300 CAPSULE ORAL at 18:35

## 2021-10-28 RX ADMIN — ENOXAPARIN SODIUM 40 MG: 40 INJECTION SUBCUTANEOUS at 06:19

## 2021-10-28 RX ADMIN — EZETIMIBE 10 MG: 10 TABLET ORAL at 18:35

## 2021-10-28 RX ADMIN — OXYCODONE HYDROCHLORIDE 10 MG: 10 TABLET ORAL at 10:30

## 2021-10-28 RX ADMIN — FUROSEMIDE 40 MG: 10 INJECTION, SOLUTION INTRAMUSCULAR; INTRAVENOUS at 06:17

## 2021-10-28 RX ADMIN — FAMOTIDINE 20 MG: 20 TABLET ORAL at 18:35

## 2021-10-28 RX ADMIN — OXYCODONE HYDROCHLORIDE 10 MG: 10 TABLET ORAL at 01:57

## 2021-10-28 RX ADMIN — ONDANSETRON 4 MG: 2 INJECTION INTRAMUSCULAR; INTRAVENOUS at 01:57

## 2021-10-28 RX ADMIN — OXYCODONE HYDROCHLORIDE 10 MG: 10 TABLET ORAL at 06:17

## 2021-10-28 RX ADMIN — ONDANSETRON 4 MG: 2 INJECTION INTRAMUSCULAR; INTRAVENOUS at 06:19

## 2021-10-28 ASSESSMENT — ENCOUNTER SYMPTOMS
CONSTIPATION: 0
SHORTNESS OF BREATH: 0
BRUISES/BLEEDS EASILY: 0
DIZZINESS: 0
CHILLS: 0
ABDOMINAL PAIN: 1
WEAKNESS: 1
PALPITATIONS: 0
DIARRHEA: 0
VOMITING: 0
BLURRED VISION: 0
HEADACHES: 0
COUGH: 0
HEMOPTYSIS: 0
MYALGIAS: 0
FEVER: 0
NAUSEA: 0
SPUTUM PRODUCTION: 0

## 2021-10-28 ASSESSMENT — PAIN DESCRIPTION - PAIN TYPE
TYPE: ACUTE PAIN

## 2021-10-28 NOTE — DISCHARGE PLANNING
Received Choice form at 0840  Agency/Facility Name: [facility]  Referral sent per Choice form @ 0819

## 2021-10-28 NOTE — PROGRESS NOTES
Covid-19 surge in effect:     Pt is A&Ox4, c/o pain 9/10 in her abdomen, pt medicated with PRN Oxy per MAR, pt also given PRN Zofran for nausea. Drains flushed q4hr w/ 50mL.

## 2021-10-28 NOTE — PROGRESS NOTES
Hospital Medicine Daily Progress Note    Date of Service  10/27/2021    Chief Complaint  Nils Alfonso is a 66 y.o. female admitted 10/15/2021 with abdominal pain    Hospital Course  Initially admitted to [facility] for evaluation of abdominal pain after recent ERCP with polypectomy and sphincterotomy and noted to have large intra-abdominal fluid collection for which she underwent drainage with interventional radiology and was evaluated by infectious disease and surgery.  She was transferred to [facility] for higher level of care.  She was evaluated by Dr Cook who recommended conservative management with placement of a second drain    Pneumobilia with gas in the CBD - s/p drain placement  TPN  Post ERCP retroperitoneal abscess with drain x2     Repeat CT 10/22 showed Grossly unchanged irregular fluid collection occupying the right abdomen surrounding the right kidney and right colon extending to midline.       Interval Problem Update  Overnight she had more abdominal pain.  Pain improved with oxycodone, but it caused nausea for which she is taking it with zofran.  She has chronic early satiety but feels hungry and will eat more frequent small meals per the dietitian's recommendations.  Her peripheral edema is unchanged and she will wear compression socks. I requested her primary RN Karen obtain them from central supply.  She denies bleeding, F/C, bowel/bladder dysfunction, dyspnea, other pain, dysphoria.    I have personally seen and examined the patient at bedside. I discussed the plan of care with patient, bedside RN, charge RN, , pharmacy, dietitian and general surgery, infectious disease and interventional radiology.    POC discussed with TREE Baker. They are planning for drain repositioning today. Noe reported that the intra-abdominal fluid collections had not changed in size from prior exams and would be re-imaged on 10/30 per protocol    POC  discussed with surgeon Dr. Cook. He advised further non-operative care and agreed with drain repositioning per IR. He will see her in clinic after repeat CT.    POC with ID Dr. Mcfarland. She advised 4 weeks of ongoing antibiotics and re-imaging. She will provide OPAT orders after final susceptibility testing.    POC discussed with nutritionist LALY Valles. She will maximize PO nutrition to meet nutritional needs and we will stop TPN today.    Consultants/Specialty  general surgery, GI, infectious disease and interventional radiology    Code Status  Full Code    Disposition  Patient is not medically cleared.   Anticipate discharge to to home with organized home healthcare and close outpatient follow-up.  I have placed the appropriate orders for post-discharge needs.    Review of Systems  Review of Systems   Constitutional: Negative for chills, fever and malaise/fatigue.   HENT: Negative for ear pain, nosebleeds, sinus pain and sore throat.    Respiratory: Positive for shortness of breath. Negative for cough.    Cardiovascular: Positive for leg swelling. Negative for chest pain and palpitations.   Gastrointestinal: Positive for abdominal pain and nausea. Negative for blood in stool, constipation, diarrhea, melena and vomiting.   Genitourinary: Negative for dysuria, flank pain and hematuria.   Musculoskeletal: Negative for back pain, joint pain, myalgias and neck pain.   Neurological: Negative for headaches.   Endo/Heme/Allergies: Does not bruise/bleed easily.   Psychiatric/Behavioral: Negative for depression. The patient is not nervous/anxious.    All other systems reviewed and are negative.       Physical Exam  Temp:  [36.6 °C (97.9 °F)-37.8 °C (100.1 °F)] 37.8 °C (100.1 °F)  Pulse:  [54-81] 70  Resp:  [14-22] 18  BP: (108-179)/(65-88) 156/65  SpO2:  [90 %-100 %] 97 %    Physical Exam  Vitals and nursing note reviewed. Exam conducted with a chaperone present (Primary RN at bedside).   Constitutional:        General: She is not in acute distress.     Appearance: She is ill-appearing ([place]cutely). She is not diaphoretic.   HENT:      Head:      Comments: Bitemporal wasting     Nose: Nose normal.      Mouth/Throat:      Mouth: Mucous membranes are moist.   Eyes:      General: No scleral icterus.     Conjunctiva/sclera: Conjunctivae normal.   Cardiovascular:      Rate and Rhythm: Normal rate and regular rhythm.      Pulses: Normal pulses.      Heart sounds: Normal heart sounds. No murmur heard.   No friction rub. No gallop.    Pulmonary:      Effort: Pulmonary effort is normal. No respiratory distress.      Breath sounds: Normal breath sounds. No wheezing, rhonchi or rales.   Abdominal:      General: Bowel sounds are decreased. There is no distension.      Palpations: Abdomen is soft.      Tenderness: There is no abdominal tenderness. There is no guarding or rebound.      Comments: Left MARTHA drain dressed, dry, minimal serous output.  Right lateral MARTHA drain dressed, dry, sanguinous output.  Right medial MARTHA drain dressed, dry, sanguinous / murky output.   Musculoskeletal:      Cervical back: Neck supple.      Right lower leg: Edema present.      Left lower leg: Edema present.      Comments: 3+ pretibial edema   Skin:     General: Skin is warm and dry.      Coloration: Skin is not cyanotic.      Nails: There is no clubbing.   Neurological:      Mental Status: She is alert.      Comments: Appropriately conversant   Psychiatric:         Mood and Affect: Mood normal.         Behavior: Behavior normal.         Thought Content: Thought content normal.         Judgment: Judgment normal.       Fluids    Intake/Output Summary (Last 24 hours) at 10/27/2021 1841  Last data filed at 10/27/2021 0527  Gross per 24 hour   Intake --   Output 25 ml   Net -25 ml       Laboratory  Recent Labs     10/25/21  0420 10/27/21  0514   WBC 8.6 7.1   RBC 2.73* 2.79*   HEMOGLOBIN 8.4* 8.5*   HEMATOCRIT 25.6* 25.8*   MCV 93.8 92.5   MCH 30.8 30.5   MCHC  32.8* 32.9*   RDW 51.3* 48.4   PLATELETCT 170 200   MPV 9.9 9.6     Recent Labs     10/25/21  0420 10/27/21  0514   SODIUM 133* 133*   POTASSIUM 3.9 3.7   CHLORIDE 99 98   CO2 26 26   GLUCOSE 81 93   BUN 16 10   CREATININE 0.72 0.67   CALCIUM 8.2* 7.9*                   Imaging  IR-DRAINAGE-CATH EXCHANGE   Final Result      1.  Successful left-sided drainage catheter removal.   2.  Successful image guided superior right drainage catheter replacement.      Plan: Suction drainage. Monitor outputs. Please contact interventional radiology if there is any concern for tube dysfunction. Suggest routine tube maintenance at 3 months intervals if the tube remains in place.         CT-ABDOMEN-PELVIS WITH   Final Result         1. Grossly unchanged irregular fluid collection occupying the right abdomen surrounding the right kidney and right colon extending to midline. Additional pigtail catheter in the component about midline anterior abdomen. Both pigtail catheters seem to be    within the collection.      2. Small right pleural effusion with right basilar atelectasis.               DX-CHEST-LIMITED (1 VIEW)   Final Result      1.  Right basilar atelectasis or consolidation. Small right pleural effusion not excluded.   2.  Atherosclerotic plaque.      CT-DRAIN-PERITONEAL   Final Result      1.  CT guided pancreatic pseudocyst catheter drainage.   2.  The current plan is to obtain a follow-up CT in 5-7 days..      IR-PICC LINE PLACEMENT W/ GUIDANCE > AGE 5   Final Result                  Ultrasound-guided PICC placement performed by qualified nursing staff as    above.          CT-DRAIN-PERITONEAL    (Results Pending)   CT-ABDOMEN-PELVIS WITH    (Results Pending)        Assessment/Plan  * Bacteremia due to Gram-negative bacteria and fungemia- (present on admission)  Assessment & Plan  ID consulted - continue micafungin and zosyn indefinitely due to incomplete source control  Source control per ID, Surgery, and IR  consults    Peritoneal fluid cultures grew E. coli and Enterococcus   Blood cultures grew Chryseobacterium and Candida glabrata   Repeat blood cultures from 10/13/2021 are negative    Peritoneal fluid from 10/16/2021 growing yeast and strep species follow-up on final results  Monitor drain output and continue current antibiotics  KALANI negative for signs of endocarditis    Postprocedural retroperitoneal abscess- (present on admission)  Assessment & Plan  Now with abdominal drain x3  General surgery consulted, recommend ongoing nonoperative management  IR consulted, positioning and revision of tubes per recommendations  Repeat CT 10/22 showed Grossly unchanged irregular fluid collection occupying the right abdomen surrounding the right kidney and right colon extending to midline     ID consulted, recommending ongoing micafungin / zosyn and re-imaging in 4 weeks (ordered)  Will need to follow up in surgical clinic due to limited ID clinic availability    Duodenal perforation (HCC)- (present on admission)  Assessment & Plan  After ERCP with retroperitoneal abscess and bacteremia  Uncertain etiology - ddx includes pancreatitis, bile leak, iatrogenic  GI consulted and s/o, management per ID / IR / Surgery    Sepsis due to intraabdominal fluid collection/abscess (HCC)- (present on admission)  Assessment & Plan  Sepsis resolved  Source intra-abdominal  Continue Zosyn and Mycamine and follow-up on repeat cultures  ID following  Re-imaging per ID, IR, and Surgery consults    On total parenteral nutrition (TPN)  Assessment & Plan  RD consulted for enteral nutrition recommendations  TPN discontinued 10/27    Hypophosphatemia- (present on admission)  Assessment & Plan  Resolved with TPN  Monitoring per RD    Acute respiratory failure with hypoxia (HCC)- (present on admission)  Assessment & Plan  Resolved with diuresis  Likely volume overload with lower extremity edema present    Antibiotic-associated diarrhea  Assessment &  Plan  Cdiff PCR negative  Loperamide PRN    Hyponatremia- (present on admission)  Assessment & Plan  Recurrent, mild  Diuresis as tolerated  Repeat AM BMP    Normocytic anemia- (present on admission)  Assessment & Plan  Ferritin and Fe% consistent with AOCD from intra-abdominal infection  Repeat AM CBC  Transfuse for Hgb <7    Hypokalemia- (present on admission)  Assessment & Plan  Resolved  Mg WNL  Replete per TPN    Hyperlipidemia- (present on admission)  Assessment & Plan  Continue ezetimibe    Primary hypertension- (present on admission)  Assessment & Plan  Continue lisinopril  No indication for strict BP control, PRN antihypertensives discontinued       VTE prophylaxis: enoxaparin ppx    I have performed a physical exam and reviewed and updated ROS and Plan today (10/27/2021). In review of yesterday's note (10/26/2021), there are no changes except as documented above.

## 2021-10-28 NOTE — PROGRESS NOTES
Pt c/o pain 9/10, pt also very fatigued and falling asleep during conversation.  placed and pt found to be satting 86% on RA, O2 placed via NC, pt now satting 97%, pt medications held at this time.

## 2021-10-28 NOTE — PROGRESS NOTES
Infectious Disease Progress Note    Author: Tamra Mcfarland M.D. Date & Time of service: 10/28/2021  7:54 AM    Chief Complaint:  Follow-up for multiple intra-abdominal abscesses, polymicrobial bacteremia, candidemia     Interval History:  10/15 afebrile, WBC 13.5 patient transferred to [facility] overnight.  Patient having diarrhea however abdominal pain is slightly improved today.  She was evaluated by Dr. ST this morning who is recommending additional drain placement and holding off on surgery at this time  10/16 afebrile, WBC 8.4.  Plan for second drain placement today.  Also patient to start TPN.  Had one episode of liquid dark stools.  C differential PCR pending  10/17 afebrile, WBC 6 underwent CT-guided drain placement yesterday, Gram stain with few yeast. C. difficile negative. On TPN, planning to ambulate in the halls today  10/18 AF, O2 2 L NC, ongoing abdominal pain but overall improving.  She continues to have 2 drains in place with minimal output on the right.   10/25 afebrile, WBC 8.6.  Repeat CT scan on 10/22 without any changes.  Plan for additional drain placement today.  Patient is very anxious.  Tolerating IV antibiotics without any issues.  Ongoing abdominal pain  10/27 T-max 100.6, WBC 7.1 patient underwent additional drain placement on 10/25.  Cultures pending.  Drain output decreasing overall.  Patient having pain around new drain placement.  States she is unable to take deep breaths.  Plan for drain positioning today  10/28 afebrile, WBC 6.8 abdominal abscess wound culture growing yeast and stenotrophomonas.  Ongoing abdominal pain.  Left upper quadrant drain removed yesterday.  Right upper quadrant drain upsized.    Review of Systems:  Review of Systems   Constitutional: Negative for chills and fever.   Respiratory: Negative for cough, sputum production and shortness of breath.    Cardiovascular: Negative for chest pain.   Gastrointestinal: Positive for abdominal pain. Negative for  constipation, diarrhea, nausea and vomiting.   All other systems reviewed and are negative.      Hemodynamics:  Temp (24hrs), Av.2 °C (98.9 °F), Min:36.6 °C (97.8 °F), Max:37.8 °C (100.1 °F)  Temperature: 37.4 °C (99.3 °F)  Pulse  Av.2  Min: 54  Max: 93   Blood Pressure : 148/88       Physical Exam:  Physical Exam  Constitutional:       Appearance: Normal appearance.   HENT:      Mouth/Throat:      Mouth: Mucous membranes are moist.   Eyes:      Extraocular Movements: Extraocular movements intact.      Pupils: Pupils are equal, round, and reactive to light.   Cardiovascular:      Rate and Rhythm: Normal rate and regular rhythm.      Heart sounds: Normal heart sounds.   Pulmonary:      Effort: Pulmonary effort is normal.      Breath sounds: Normal breath sounds.   Abdominal:      General: There is distension.      Palpations: Abdomen is soft.      Tenderness: There is abdominal tenderness.      Comments: 2 right upper quadrant MARTHA drains in place-dressing clean    Left upper quadrant drain removed.  Site dressed    Left upper extremity PICC line in place   Musculoskeletal:      Cervical back: Neck supple.      Right lower leg: Edema present.      Left lower leg: Edema present.   Skin:     General: Skin is warm and dry.   Neurological:      General: No focal deficit present.      Mental Status: She is alert and oriented to person, place, and time.   Psychiatric:         Mood and Affect: Mood normal.         Behavior: Behavior normal.      Comments: Pleasant         Meds:    Current Facility-Administered Medications:   •  famotidine  •  loperamide  •  furosemide  •  enoxaparin (LOVENOX) injection  •  [COMPLETED] piperacillin-tazobactam **AND** piperacillin-tazobactam  •  polyethylene glycol/lytes  •  lisinopril  •  ondansetron  •  ondansetron  •  polyethylene glycol/lytes **AND** magnesium hydroxide  •  acetaminophen  •  HYDROmorphone  •  Notify provider if pain remains uncontrolled **AND** Use the Numeric  Rating Scale (NRS), Colon-Baker Faces (WBF), or FLACC on regular floors and Critical-Care Pain Observation Tool (CPOT) on ICUs/Trauma to assess pain **AND** Pulse Ox **AND** Pharmacy Consult Request **AND** If patient difficult to arouse and/or has respiratory depression (respiratory rate of 10 or less), stop any opiates that are currently infusing and call a Rapid Response.  •  cyclobenzaprine  •  diphenhydrAMINE  •  ezetimibe  •  gabapentin  •  micafungin (MYCAMINE) ivpb  •  oxyCODONE immediate release  •  promethazine  •  vitamin D3    Labs:  Recent Labs     10/27/21  0514 10/28/21  0330   WBC 7.1 6.8   RBC 2.79* 2.68*   HEMOGLOBIN 8.5* 8.0*   HEMATOCRIT 25.8* 24.6*   MCV 92.5 91.8   MCH 30.5 29.9   RDW 48.4 49.2   PLATELETCT 200 205   MPV 9.6 9.7   NEUTSPOLYS 77.60* 77.30*   LYMPHOCYTES 14.20* 15.20*   MONOCYTES 5.90 5.70   EOSINOPHILS 0.40 0.40   BASOPHILS 0.60 0.70     Recent Labs     10/27/21  0514 10/28/21  0330   SODIUM 133* 133*   POTASSIUM 3.7 3.4*   CHLORIDE 98 96   CO2 26 28   GLUCOSE 93 93   BUN 10 10     Recent Labs     10/27/21  0514 10/28/21  0330   CREATININE 0.67 0.70       Imaging:  CT-ABDOMEN WITH & W/O    Result Date: 10/9/2021  10/9/2021 1:38 PM HISTORY/REASON FOR EXAM:  eval for any ugi perforation given recent sphinterotomy during ERCP and now with fluid collection. TECHNIQUE/EXAM DESCRIPTION:  CT scan of the abdomen without and with contrast. Initial precontrast images were obtained from the diaphragmatic domes through the iliac crests using helical technique.  Following nonionic contrast administration in an intravenous bolus fashion, and postcontrast, thin-section helical scanning obtained from the diaphragmatic domes through the iliac crests. 80 mL of Omnipaque 350 nonionic contrast was administered. Low dose optimization technique was utilized for this CT exam including automated exposure control and adjustment of the mA and/or kV according to patient size. COMPARISON: 10/8/2021,  10/2/2021. FINDINGS: There is a small right pleural effusion with adjacent enhancing segmental atelectasis.. Calcified left lower lobe granuloma. No pericardial effusion. Cardiac chambers are normal in size. Coronary artery calcifications are present. Air in the intra and extra hepatic bile ducts. Unremarkable appearance of the liver. The hepatic and portal veins are patent. Spleen is unremarkable. Again noted is air in the pancreatic duct. No adrenal gland nodules. Gallbladder is surgically absent. No hydronephrosis. No dilated loops of imaged bowel. There is anasarca and mesenteric edema. A pigtail drainage catheter is present in the right abdomen in a air and fluid collection, the extent of which is difficult to measure the collection tracks inferiorly and medially measuring approximately 17 cm in craniocaudal dimension. A smaller loculated collection in the anterior abdomen near the inferior anterior abdominal wall sutures measures 1.5 x 8.7 cm. No findings of contrast extravasation from the opacified bowel loops,  particularly no contrast extravasation at the ampulla of [place].     1.  No findings of intraluminal contrast extravasation from the opacified bowel loops, particularly no contrast extravasation from the duodenum at the ampulla of [place]. 2.  A pigtail drainage catheter is present in the right abdomen in a air and fluid collection, the extent of which is difficult to measure. The collection tracks inferiorly and medially measuring approximately 17 cm in craniocaudal dimension. 3.  A smaller loculated collection in the anterior abdomen near the inferior anterior abdominal wall sutures measures 1.5 x 8.7 cm. 4.  Unchanged pneumobilia in the CBD, intrahepatic bile ducts as well as pancreatic ducts. 5.  There is a small right pleural effusion with adjacent enhancing segmental atelectasis. 6.   Coronary artery calcifications are present    CT-ABDOMEN-PELVIS WITH    Result Date: 10/13/2021  10/13/2021 11:57  AM HISTORY/REASON FOR EXAM:  Peritonitis or perforation suspected; Pt with known intraabdominal fluid collection in the abdomen of uncertain etiology - had recent ERCP but studies not consistent with bile leak.  Drain in place - reassess. TECHNIQUE/EXAM DESCRIPTION:   CT scan of the abdomen and pelvis with contrast. Contrast-enhanced helical scanning was obtained from the diaphragmatic domes through the pubic symphysis following the bolus administration of nonionic contrast without complication. 100 mL of Omnipaque 350 nonionic contrast was administered without complication. Low dose optimization technique was utilized for this CT exam including automated exposure control and adjustment of the mA and/or kV according to patient size. COMPARISON: 10/9/2021 FINDINGS: Lower Chest: Trace right pleural effusion with right basilar atelectasis. Calcified granuloma in the left lower lobe and trace left pleural fluid. Liver: Normal. Spleen: Unremarkable. Pancreas: Unremarkable. Gallbladder: The gallbladder has been resected. Biliary: Pneumobilia again noted. Adrenal glands: Normal. Kidneys: There is a small left renal cortical cyst which does not require imaging follow-up. No hydronephrosis.. Bowel: No bowel obstruction. Parts of the bowel are suboptimally evaluated due to the surrounding abscess and loss of the intervening fat planes. There is gastric sleeve surgery. Lymph nodes: No adenopathy. Vasculature: Scattered atherosclerosis. Peritoneum: A multiloculated although spatial rim-enhancing air-fluid collection within the right abdomen does not appear significantly changed in size the prior study. On series 2 image 54 measures about 15.5 x 8.7 cm. It extends from the upper abdomen,  where it is seen in the gallbladder fossa, inferiorly into the pelvis. It insinuates around and partially encases the duodenum and right kidney and extends around ascending colon and some mesenteric branch vessels. There is a percutaneous  pigtail drainage catheter which appears well positioned within the abscess in the right midabdomen. Musculoskeletal: No acute or destructive process. Postsurgical changes anterior lumbar spinal fusion Pelvis: Hysterectomy. There is a small rim-enhancing fluid collection within the pelvis measuring 7 x 3.1 x 2.8 cm suspicious for small abscess..     1.  Extensive multiloculated rim-enhancing air and fluid collection/abscess within the right abdomen is not significantly changed in size from the prior study. The percutaneous pigtail drainage catheter appears well-positioned within the collection. 2.  Small separate pelvic rim-enhancing fluid collection suggests an abscess.    CT-ABDOMEN-PELVIS WITH    Result Date: 10/8/2021  10/8/2021 8:06 AM HISTORY/REASON FOR EXAM:  Abdominal pain, fever. Right lower quadrant pain. Pancreatitis. TECHNIQUE/EXAM DESCRIPTION:   CT scan of the abdomen and pelvis with contrast. Contrast-enhanced helical scanning was obtained from the diaphragmatic domes through the pubic symphysis following the bolus administration of nonionic contrast without complication. 100 mL of Omnipaque 350 nonionic contrast was administered without complication. Low dose optimization technique was utilized for this CT exam including automated exposure control and adjustment of the mA and/or kV according to patient size. COMPARISON: 10/2/2021. FINDINGS: Lower Chest: Small right pleural effusion with right basilar opacities. Liver: Normal. Spleen: Unremarkable. Pancreas: Poorly visualized. There is gas in the pancreatic duct. Gallbladder: Surgically absent. Biliary: There is gas in the CBD and intrahepatic bile ducts, left more than right Adrenal glands: Normal. Kidneys: No hydronephrosis. Bilateral kidneys are enhancing symmetrically.. Bowel: Severe wall thickening of the descending colon, transverse colon, second portion duodenum. Postsurgical change in the stomach Lymph nodes: No adenopathy. Vasculature: The  abdominal aorta is normal in caliber. Peritoneum: There is large irregular fluid collection occupying most of the right abdomen surrounding the right kidney and right colon. Additional fluid and gas collection in the midline abdomen anterior to the pancreas. This collection is probably connected to the right side collection via a junction in the jl hepatis Musculoskeletal: Postsurgical change in the lower lumbar spine. Pelvis: Trace pelvic free fluid.     1. Large irregular irregular fluid collection with thick wall occupying most of the right abdomen surrounding the right kidney and right colon. Additional fluid and gas collection in the midline abdomen anterior to the pancreas concerning for abscess. This collection is probably connected to the right side collection via a junction in the jl hepatis 2. Severe wall thickening of the descending colon, transverse colon, second portion duodenum, likely reactive. 3. Pneumobilia with gas in the CBD, intrahepatic bile ducts as well as pancreatic ducts are of unclear etiology. 4. Small right pleural effusion with right basilar atelectasis.     CT-ABDOMEN-PELVIS WITH    Result Date: 10/2/2021  10/2/2021 2:03 PM HISTORY/REASON FOR EXAM:  RLQ and diffuse lower abdominal pain; had ERCP yesterday. Pt has pancreatitis and they found a polyp on her bile duct. TECHNIQUE/EXAM DESCRIPTION:   CT scan of the abdomen and pelvis with contrast. Contrast-enhanced helical scanning was obtained from the diaphragmatic domes through the pubic symphysis following the bolus administration of nonionic contrast without complication. 100 mL of Omnipaque 350 nonionic contrast was administered without complication. Low dose optimization technique was utilized for this CT exam including automated exposure control and adjustment of the mA and/or kV according to patient size. COMPARISON: 2/22/2019 FINDINGS: Lower Chest: Unremarkable. Liver: Normal. Hepatic and portal veins are patent. Spleen:  Unremarkable. Pancreas: The pancreatic head is edematous though the parenchyma enhances normally. There is surrounding homogenous peripancreatic and mesenteric edema/infiltration which tracks down the right paracolic gutter and conforms to retroperitoneal structures. No defined walled off collection. There is small volume ascites in the pelvis. No focal fluid collection. Adrenal glands: Normal. Gallbladder: Cholecystectomy Biliary: Unchanged mild intrahepatic bile duct dilation. Kidneys: Symmetric nephrograms. No hydronephrosis. Pelvis: Hysterectomy. Normal appearance of the urinary bladder.. Bowel: There are no dilated loops of small or large bowel. Scattered colonic diverticuli with no inflammation. The appendix is normal. Prior gastric sleeve. Lymph nodes: No adenopathy. Vasculature: No aneurysm. Musculoskeletal: Fusion of L3-L5. Multifocal degenerative changes are present. No suspicious osseous abnormality.     Acute interstitial edematous pancreatitis with surrounding mesenteric edema tracking along the right paracolic gutter. Small volume ascites in the pelvis. No walled off collections.    CT-DRAIN-PERITONEAL    Result Date: 10/16/2021  10/16/2021 1:06 PM HISTORY/REASON FOR EXAM: Large pancreatic pseudocyst, not draining well following placement of first drained. Place largest drain to lower area of retroperitoneal air/fluid region- leave upper drain in place. TECHNIQUE/EXAM DESCRIPTION: Pancreatic pseudocyst drainage with CT guidance. Low dose optimization technique was utilized for this CT exam including automated exposure control and adjustment of the mA and/or kV according to patient size. PROCEDURE: Informed consent was obtained. SEDATION: Moderate sedation was provided. Pulse oximetry and continuous cardiac monitoring by the nurse was performed throughout the exam. Intraservice time was 30 minutes. Localizing CT images were obtained with the patient in supine position. The skin was prepped with Betadine  and draped in a sterile fashion. Following local anesthesia with 1% Lidocaine, a 17 G guiding needle was placed and needle path confirmed with CT. An Amplatz guidewire was placed and following serial tract dilatation, a 14 Beninese pigtail locking catheter was placed. A specimen was collected and submitted for amylase, culture and sensitivity and Gram stain. Total of 400 mL of brownish fluid was drained. The catheter was secured to the skin and connected to suction bulb drainage. Final CT images were obtained documenting catheter position. The patient tolerated the procedure well with no evidence of complication. COMPARISON: Recent CT scan abdomen/pelvis FINDINGS: The final CT images show satisfactory catheter position dependently within the target collection.     1.  CT guided pancreatic pseudocyst catheter drainage. 2.  The current plan is to obtain a follow-up CT in 5-7 days..    CT-DRAIN-PERITONEAL    Result Date: 10/10/2021  HISTORY/REASON FOR EXAM:  66-year-old woman with pancreatitis and extensive intraperitoneal abscess. TECHNIQUE/EXAM DESCRIPTION AND NUMBER OF VIEWS: RIGHT peritoneal drain placement with CT guidance. Low dose optimization technique was utilized for this CT exam including automated exposure control and adjustment of the mA and/or kV according to patient size. COMPARISON:  CT 10/8/2021 MEDICATIONS: Moderate sedation was provided. Pulse oximetry and continuous cardiac monitoring by the nurse was performed throughout the exam. Intraservice time was 15 minutes. PROCEDURE:     The risks, benefits, goals and objectives and alternatives were discussed. Risks were specified as including but not limited to bleeding, infection, damage to vessels or nerves, pain and discomfort as well as the possibility of incomplete resolution of underlying disease. Drain care related issues were discussed with the patient. The patient's questions were answered. Informed oral and written consent were obtained. The  patient was placed on the CT gantry. Localizing scan was performed. The skin was prepped and draped in the usual sterile manner.  A timeout was performed. Local anesthetic result was achieved with administration of 1% lidocaine. A(n) 17-gauge access needle was advanced to the target using CT fluoroscopic guidance. Access was secured with a wire and the tract was sequentially dilated to accommodate a(n) 14 [place]ian locking loop drain. A total of 80 mL of thin brown turbid fluid was removed and sent for  laboratory evaluation. This was put in place and formed. The catheter was attached to bag drainage and secured to the skin with 2-0 nylon suture. Completion scan was performed which showed no complication. The patient tolerated the procedure well with no evidence of complication. The skin was cleaned and a dressing was applied. FINDINGS: Drainage tube in good position     Successful peritoneal drainage tube placement. Plan: Thrice daily flushes with 10 mL of sterile saline. Monitor outputs. Please contact interventional radiology if there is any concern for tube dysfunction.     DX-CHEST-LIMITED (1 VIEW)    Result Date: 10/19/2021  10/19/2021 11:52 AM HISTORY/REASON FOR EXAM:  Shortness of Breath TECHNIQUE/EXAM DESCRIPTION AND NUMBER OF VIEWS: Single AP view of the chest. COMPARISON: 10/18/2016 FINDINGS: Left PICC projects over the SVC. The cardiomediastinal silhouette is stable. There is right basilar atelectasis/consolidation. There may be a small right pleural effusion. There is a calcified granuloma in the left lower lobe. There are surgical clips in  the upper abdomen. Atherosclerotic calcification is seen.     1.  Right basilar atelectasis or consolidation. Small right pleural effusion not excluded. 2.  Atherosclerotic plaque.    IR-US GUIDED PIV    Result Date: 10/10/2021  EXAMINATION:                                                                    HISTORY/REASON FOR EXAM:  Ultrasound Guided PIV.   TECHNIQUE/EXAM DESCRIPTION AND NUMBER OF VIEWS:  Peripheral IV insertion with ultrasound guidance.  The procedure was prepared using maximal sterile barrier technique including sterile gown, mask, cap, and donning of sterile gloves following appropriate hand hygiene and/or sterile scrub. Patient skin site was prepped with 2% Chlorhexidine solution.   FINDINGS: Peripheral IV insertion with Ultrasound Guidance was performed by qualified imaging nursing staff without the assistance of a Radiologist.      Ultrasound-guided PERIPHERAL IV INSERTION performed by qualified nursing staff as above.    NM-HEPATOBILIARY SCAN    Result Date: 10/10/2021  10/10/2021 11:12 AM HISTORY/REASON FOR EXAM:  rule out biliary leak post ERCP with sphincterotomy; R/O bile leak TECHNIQUE/EXAM DESCRIPTION AND NUMBER OF VIEWS: Hepatobiliary scan. COMPARISON: 10/9/2021 CT abdomen PROCEDURE:  5.4 mCi Tc 99m-Choletec was administered intravenously, followed by 90 minutes of anterior planar imaging. FINDINGS: Normal homogeneous uptake of radiotracer in the hepatic parenchyma with visualization of the tracer in the common bile duct and entering the small bowel loops. No abnormal pooling or collection radiotracer outside of the biliary system or bowel.     Normal hepatobiliary scan with no findings of a bile leak.    DX-PORTABLE FLUOROSCOPY < 1 HOUR    Result Date: 10/1/2021  10/1/2021 10:01 AM HISTORY/REASON FOR EXAM:  ERCP TECHNIQUE/EXAM DESCRIPTION AND NUMBER OF VIEWS: 2 images were obtained in the operating room. A total of 0.3 minutes of fluoroscopy time utilized. COMPARISON: Selected images CT abdomen and pelvis 2/22/2019 FINDINGS: Images show injection of contrast into the common bile duct which is dilated. There is also filling of the pancreatic duct. Fluoroscopy time: minutes     ERCP images as described    EC-ECHOCARDIOGRAM COMPLETE W/O CONT    Result Date: 10/14/2021  Transthoracic Echo Report Echocardiography Laboratory CONCLUSIONS Normal  left ventricular chamber size. Hyperdynamic left ventricular systolic function. The left ventricular ejection fraction is visually estimated to be greater than 75%. Normal right ventricular size and systolic function. Enlarged right atrium. Mild tricuspid regurgitation. Right ventricular systolic pressure is estimated to be  40 mmHg; mild pulmonary hypertension. Normal pericardium without effusion. No prior study is available for comparison. KATERINA PATEL Exam Date:         10/14/2021                    16:15 Exam Location:     Inpatient Priority:          Routine Ordering Physician:        YUE TOURE Referring Physician:       MESFIN Cobb Sonographer:               Aroldo Nolan                            [facility], [facility] Age:    66     Gender:    F MRN:    [id number] :    1955 BSA:    1.68   Ht (in):    63     Wt (lb):    143 Exam Type:     Complete Indications:     Endocarditis ICD Codes:       421 CPT Codes:       71295 BP:   106    /   48     HR:   78 Technical Quality:       Fair MEASUREMENTS  (Male / Female) Normal Values 2D ECHO LV Diastolic Diameter PLAX        3.9 cm                4.2 - 5.9 / 3.9 - 5.3 cm LV Systolic Diameter PLAX         2.7 cm                2.1 - 4.0 cm IVS Diastolic Thickness           0.88 cm               LVPW Diastolic Thickness          0.75 cm               LVOT Diameter                     2 cm                  Estimated LV Ejection Fraction    75 %                  LV Ejection Fraction MOD BP       77.2 %                >= 55  % LV Ejection Fraction MOD 4C       79.3 %                LV Ejection Fraction MOD 2C       77.1 %                IVC Diameter                      1.5 cm                DOPPLER AV Peak Velocity                  1.8 m/s               AV Peak Gradient                  13.5 mmHg             AV Mean Gradient                  6.3 mmHg              LVOT Peak Velocity                1.7 m/s               AV Area Cont Eq vti               2.8 cm2                MV Velocity Time Integral         42.6 cm               Mitral E Point Velocity           1.3 m/s               Mitral E to A Ratio               1.5                   MV Pressure Half Time             77.3 ms               MV Area PHT                       2.8 cm2               MV Deceleration Time              266 ms                TR Peak Velocity                  269 cm/s              PV Peak Velocity                  0.96 m/s              PV Peak Gradient                  3.7 mmHg              RVOT Peak Velocity                0.73 m/s              * Indicates values subject to auto-interpretation LV EF:  75    % FINDINGS Left Ventricle Normal left ventricular chamber size. Normal left ventricular wall thickness. Hyperdynamic left ventricular systolic function. The left ventricular ejection fraction is visually estimated to be greater than 75%. Normal regional wall motion. Normal diastolic function. Right Ventricle Normal right ventricular size. Normal right ventricular systolic function. Right Atrium Enlarged right atrium. Normal inferior vena cava size and inspiratory collapse. Left Atrium Normal left atrial size. Left atrial volume index is 26  mL/sq m. Mitral Valve Structurally normal mitral valve. No mitral stenosis. Trace mitral regurgitation. Aortic Valve The aortic valve is not well visualized. Cannot rule out bicuspid aortic valve. No aortic stenosis. No aortic insufficiency. Tricuspid Valve Structurally normal tricuspid valve. No tricuspid stenosis. Mild tricuspid regurgitation. Right ventricular systolic pressure is estimated to be  40 mmHg. Pulmonic Valve The pulmonic valve is not well visualized. No pulmonic stenosis. No pulmonic insufficiency. Pericardium Normal pericardium without effusion. Aorta The ascending aorta diameter is 3.1  cm. Nate Sarmiento MD (Electronically Signed) Final Date:     14 October 2021                 17:39    IR-PICC LINE PLACEMENT W/ GUIDANCE > AGE 5    Result  Date: 10/15/2021  HISTORY/REASON FOR EXAM:   PICC placement. TECHNIQUE/EXAM DESCRIPTION AND NUMBER OF VIEWS:   PICC line insertion with ultrasound guidance.  The procedure was performed using maximal sterile barrier technique including sterile gown, mask, cap, and donning of sterile gloves following appropriate hand hygiene and/or sterile scrub. Patient skin site was prepped with 2% Chlorhexidine solution. FINDINGS:  PICC line insertion with Ultrasound Guidance was performed by qualified nursing staff without the assistance of a Radiologist. PICC positioning appropriateness confirmed by 3CG technology; chest xray only needed in the instance 3CG unable to confirm placement.              Ultrasound-guided PICC placement performed by qualified nursing staff as above.       Micro:  Results     Procedure Component Value Units Date/Time    CULTURE WOUND W/ GRAM STAIN [[id number]]  (Abnormal) Collected: 10/25/21 1600    Order Status: Completed Specimen: Wound Updated: 10/27/21 1720     Significant Indicator POS     Source WND     Site Abdominal Abscess     Culture Result Moderate growth usual site specific stephen, including     Gram Stain Result Few WBCs.  No organisms seen.       Culture Result Yeast  mixed morphologies        Stenotrophomonas maltophilia    Narrative:      Collected By:[id number] NOHEMI GARCIA  Collected By:[id number] NOHEMI GARCIA    GRAM STAIN [[id number]] Collected: 10/25/21 1600    Order Status: Completed Specimen: Wound Updated: 10/26/21 1215     Significant Indicator .     Source WND     Site Abdominal Abscess     Gram Stain Result Few WBCs.  No organisms seen.      Narrative:      Collected By:[id number] NOHEMI GARCIA  Collected By:[id number] NOHEMI GARCIA    FLUID CULTURE W/GRAM STAIN [[id number]] Collected: 10/25/21 1600    Order Status: Canceled Specimen: Other Body Fluid     BLOOD CULTURE [[id number]] Collected: 10/18/21 1520    Order Status: Completed Specimen: Blood from Line Updated:  "10/23/21 1700     Significant Indicator NEG     Source BLD     Site PICC Line     Culture Result No growth after 5 days of incubation.    Narrative:      Special Contact Isolation  Per Hospital Policy: Only change Specimen Src: to \"Line\" if  specified by physician order.  No site indicated    BLOOD CULTURE [[id number]] Collected: 10/18/21 1517    Order Status: Completed Specimen: Blood from Peripheral Updated: 10/23/21 1700     Significant Indicator NEG     Source BLD     Site PERIPHERAL     Culture Result No growth after 5 days of incubation.    Narrative:      Special Contact Isolation  Per Hospital Policy: Only change Specimen Src: to \"Line\" if  specified by physician order.  Left Forearm/Arm          Assessment:  Active Hospital Problems    Diagnosis    • *Bacteremia due to Gram-negative bacteria and fungemia [R78.81]    • Acute respiratory failure with hypoxia (HCC) [J96.01]    • Duodenal perforation (HCC) [K63.1]    • Postprocedural retroperitoneal abscess [K68.11]    • Diarrhea [R19.7]    • Electrolyte abnormality [E87.8]    • Hyponatremia [E87.1]    • Sepsis due to intraabdominal fluid collection/abscess (HCC) [A41.9]    • Hyperlipidemia [E78.5]    • Hypertension [I10]        Assessment:  66 y.o. 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  Patient found to have multiple and extensive fluid collections in the abdomen and pelvis on imaging.  She underwent drain placement on 10/8.  Cultures are growing E. coli and Enterococcus faecalis.  Blood cultures are growing chryseobacterium species and Candida glabrata. Source likely due to the intra-abdominal abscesses. Repeat CT scan on 10/13 shows extensive multiloculated fluid collections in the abdomen and pelvis.  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  -Chryseobacterium species -the patient has been treated with Zosyn for multiple days which has intermediate sensitivity per chart.  However, repeat blood cultures negative   -Repeat blood culture on 10/18, no growth to date  -Blood cultures from 10/13 are no growth and final    -TTE-negative  -Wound cultures on 10/16-Enterococcus faecalis, Candida albicans and Candida glabrata  -Abdominal wound abscess cultures on 10/25-yeast, stenotrophomonas maltophilia.  Follow speciation and susceptibilities.  Will hold off on antibiotic coverage of stenotrophomonas as this was not from the drain cultures and the patient is clinically stable without fevers nor leukocytosis  -Continue to monitor for any vision changes-patient currently denies  -Continue IV Zosyn  -Continue IV micafungin   -Dr. Cook following-no plans for surgical intervention  -Repeat CT scan ordered by IR on 10/21-grossly unchanged irregular fluid collections in the right abdomen   -Status post IR drainage placement into right lower quadrant on 10/25.  Cultures-pending  -Status post right upper quadrant drain upsizing and left upper quadrant drain removal on 10/27  -On TPN  -Duration of antimicrobials unclear at this time given lack of source control  -Plan for repeat CT scan on 10/30.  However per report, surgery stating that drainage is improving overall and recommending repeat CT scan in 2 to 3 weeks from now.  -IR following  -Monitor drain  output  -Will plan a 4-week course of antibiotics from 10/27.  Stop date 11/24/2021  -Repeat CT scan prior to stop date of IV antibiotics    I have performed a physical exam and reviewed and updated ROS and plan today 10/28/2021.  In review of yesterday's note 10/27/2021, there are no changes except as documented above.    Discussed with internal medicine Dr. Lindquist.  ID will follow.

## 2021-10-28 NOTE — DISCHARGE PLANNING
Good morning,    This referral has been escalated to a Clinical Supervisor for review in order to determine Home Health appropriateness.  This issue will be resolved as quickly as possible, but for any questions feel free to call us at [phone].  Thank you!

## 2021-10-28 NOTE — DISCHARGE PLANNING
Thank you for your referral request. In order to continue to process your request we will need the following information:     1. Referral updated to include both TPN and Drain care.  2. IV ABX orders  3. TPN orders including formula type, Dose, Frequency and Free water flush rate-Per Nurse Karen this was discontinued  4. Dc Date  5. If patient will have assistance at home and if patient is comfortable with the above.    Once  has all the requested we will continue to process you referral.     Thank you

## 2021-10-28 NOTE — PROGRESS NOTES
Radiology Progress Note   Author: AYE Mahan Date & Time created: 10/28/2021  8:05 AM   Date of admission  10/15/2021  Note to reader: this note follows the APSO format rather than the historical SOAP format. Assessment and plan located at the top of the note for ease of use.    Chief Complaint  66 y.o. female admitted 10/15/2021 with abd pain     HPI  66-year-old female PMH recurrent idiopathic pancreatitis s/p ERCP with sphincterotomy October 1, 2021 complicated by ERCP pancreatitis, sleeve gastrectomy, dyslipidemia, hypertension, osteoarthritis of the spine status post lumbar fusion who was admitted to the hospital 10/8/2021 with worsening right lower quadrant pain over the past week.  Ever since her ERCP October 1, 2021, she has been experiencing pain, intermittent subjective fevers, nausea.  In the emergency room-labs: CBC: WBC 17.8..  Sodium 130.  LFTs-WNL.  CT scan abdomen/pelvis-large irregular fluid collection with thick wall occupying most of the right abdomen surrounding the right kidney and colon.  Severe wall thickening of the descending, transverse colon, second portion of the duodenum likely reactive.  Pneumobilia with gas in the CBD.  Drain placement by IR was performed.  Currently, the abdominal pain has improved.      Assessment/Plan  Interval History   Principal Problem:    Bacteremia due to Gram-negative bacteria and fungemia  Active Problems:    Primary hypertension    Hyperlipidemia    Sepsis due to intraabdominal fluid collection/abscess (HCC)    Normocytic anemia    Hyponatremia    Duodenal perforation (HCC)    Postprocedural retroperitoneal abscess    Antibiotic-associated diarrhea    Acute respiratory failure with hypoxia (HCC)    Hypokalemia    Hypophosphatemia    On total parenteral nutrition (TPN)      Plan IR  - Irrigate Left and right x2 abdominal drains with 50 ml of sterile saline q4 hrs-   - Surgery, ID, GI following   - Repeat CT scan ordered for 10/30 for abscess  claire     [id number]  IR:   10/8- Right abd drain placed, + Chryseobacterium indologenes   Chryseobacterium arthrosphaerae  Candida glabrata   10/15- transfer from [facility]   10/16- Left mid abd drain placed, drain +  Enterococcus faecalis, Candida albicans, Candida glabrata  10/17- Left Abd drain - 195 ml, green milky  Right abd drain- 238 ml  10/18   Left  drain- 75 ml  Right drain- 110 ml  10/19   Left drain- 230 ml    Right drain-  280 ml   10/20  Left drain-  125 ml  Brown green   Right drain- 405 ml brown green  10/21  Left drain-  90 ml  Brown green in am  Right drain- 290 ml brown green in am   10/22  Left drain- 25 ml   Right drain- 50 ml    Repeat CT scan  Grossly unchanged irregular fluid collection occupying the right abdomen surrounding the right kidney and right colon extending to midline   10/23   Left drain-  20 ml    Right drain 15  10/24  Left drain- 10 ml     Right drain- 10 ml   10/25  Left drain-  Minimal output, leaking around drain site   Right drain- minimal output   Right lower drain ( new drain) - 55 ml bloody output  10/26  Left drain-  Minimal output 10 ml    Right drain- 2 ml  milky tan, leaking around tube site, drain to be exchanged today and repositions    Right lower drain- 40 ml bloody   10/27-   Left mid abd drain-  Minimal output ( removed)   Right upper drain- 5 ml leaking around tube site, drain to be exchanged     Right lower drain-  55 ml   10/28-   Right upper- 20 ml in am   Right lower- 60 ml in am      Review of Systems  Physical Exam   Review of Systems   Constitutional: Positive for malaise/fatigue. Negative for chills and fever.   HENT: Negative for hearing loss.    Eyes: Negative for blurred vision.   Respiratory: Negative for cough, hemoptysis and shortness of breath.    Cardiovascular: Negative for chest pain and palpitations.   Gastrointestinal: Positive for abdominal pain. Negative for vomiting.   Genitourinary: Negative for dysuria.   Musculoskeletal: Negative for  myalgias.   Skin: Negative for rash.   Neurological: Positive for weakness. Negative for dizziness and headaches.   Endo/Heme/Allergies: Does not bruise/bleed easily.   Psychiatric/Behavioral: Negative for suicidal ideas.      Vitals:    10/28/21 0724   BP: 148/88   Pulse: 83   Resp: 16   Temp: 37.4 °C (99.3 °F)   SpO2: 93%        Physical Exam  Constitutional:       Appearance: Normal appearance.   HENT:      Head: Normocephalic.      Nose: Nose normal.      Mouth/Throat:      Mouth: Mucous membranes are moist.   Eyes:      Pupils: Pupils are equal, round, and reactive to light.   Cardiovascular:      Rate and Rhythm: Normal rate.   Pulmonary:      Effort: Pulmonary effort is normal. No respiratory distress.   Abdominal:      Palpations: Abdomen is soft.      Tenderness: There is abdominal tenderness. There is guarding.          Comments: Connect to MARTHA bulbs   Drain site with dressing, oozing fluid around catheter  from drain site    Musculoskeletal:         General: No tenderness or deformity.      Cervical back: Normal range of motion.      Right lower leg: Edema present.      Left lower leg: Edema present.      Comments: Edema improving    Skin:     General: Skin is warm and dry.      Capillary Refill: Capillary refill takes less than 2 seconds.      Coloration: Skin is not jaundiced or pale.   Neurological:      General: No focal deficit present.      Mental Status: She is alert.      Motor: No weakness.   Psychiatric:         Mood and Affect: Mood normal.         Behavior: Behavior normal.             Labs    Recent Labs     10/27/21  0514 10/28/21  0330   WBC 7.1 6.8   RBC 2.79* 2.68*   HEMOGLOBIN 8.5* 8.0*   HEMATOCRIT 25.8* 24.6*   MCV 92.5 91.8   MCH 30.5 29.9   MCHC 32.9* 32.5*   RDW 48.4 49.2   PLATELETCT 200 205   MPV 9.6 9.7     Recent Labs     10/27/21  0514 10/28/21  0330   SODIUM 133* 133*   POTASSIUM 3.7 3.4*   CHLORIDE 98 96   CO2 26 28   GLUCOSE 93 93   BUN 10 10   CREATININE 0.67 0.70   CALCIUM  7.9* 8.0*     Recent Labs     10/27/21  0514 10/28/21  0330   CREATININE 0.67 0.70     IR-DRAINAGE-CATH EXCHANGE   Final Result      1.  Successful left-sided drainage catheter removal.   2.  Successful image guided superior right drainage catheter replacement.      Plan: Suction drainage. Monitor outputs. Please contact interventional radiology if there is any concern for tube dysfunction. Suggest routine tube maintenance at 3 months intervals if the tube remains in place.         CT-ABDOMEN-PELVIS WITH   Final Result         1. Grossly unchanged irregular fluid collection occupying the right abdomen surrounding the right kidney and right colon extending to midline. Additional pigtail catheter in the component about midline anterior abdomen. Both pigtail catheters seem to be    within the collection.      2. Small right pleural effusion with right basilar atelectasis.               DX-CHEST-LIMITED (1 VIEW)   Final Result      1.  Right basilar atelectasis or consolidation. Small right pleural effusion not excluded.   2.  Atherosclerotic plaque.      CT-DRAIN-PERITONEAL   Final Result      1.  CT guided pancreatic pseudocyst catheter drainage.   2.  The current plan is to obtain a follow-up CT in 5-7 days..      IR-PICC LINE PLACEMENT W/ GUIDANCE > AGE 5   Final Result                  Ultrasound-guided PICC placement performed by qualified nursing staff as    above.          CT-DRAIN-PERITONEAL    (Results Pending)   CT-ABDOMEN-PELVIS WITH    (Results Pending)       INR   Date Value Ref Range Status   10/15/2021 1.49 (H) 0.87 - 1.13 Final     Comment:     INR - Non-therapeutic Reference Range: 0.87-1.13  INR - Therapeutic Reference Range: 2.0-4.0       No results found for: POCINR     Intake/Output Summary (Last 24 hours) at 10/18/2021 1528  Last data filed at 10/18/2021 0500  Gross per 24 hour   Intake --   Output 80 ml   Net -80 ml      Labs not explicitly included in this progress note were reviewed by the  author. Radiology/imaging not explicitly included in this progress note was reviewed by the author.   I have performed a physical exam and reviewed and updated ROS and Plan today (10/28/2021).     25 minutes in directly providing and coordinating care and extensive data review.  No time overlap and excludes procedures.

## 2021-10-28 NOTE — PROGRESS NOTES
Hospital Medicine Daily Progress Note    Date of Service  10/28/2021    Chief Complaint  Nils Alfonso is a 66 y.o. female admitted 10/15/2021 with abdominal pain    Hospital Course  Initially admitted to [facility] for evaluation of abdominal pain after recent ERCP with polypectomy and sphincterotomy and noted to have large intra-abdominal fluid collection for which she underwent drainage with interventional radiology and was evaluated by infectious disease and surgery.  She was transferred to [facility] for higher level of care.  She was evaluated by Dr Cook who recommended conservative management with placement of a second drain    Pneumobilia with gas in the CBD - s/p drain placement  TPN  Post ERCP retroperitoneal abscess with drain x2     Repeat CT 10/22 showed Grossly unchanged irregular fluid collection occupying the right abdomen surrounding the right kidney and right colon extending to midline.       Interval Problem Update  Her abdominal pain today is okay, she states she had some epigastric/right upper quadrant severe pain at 1 AM which is since then improved  Abdominal wound cultures growing yeast and Stenotrophomonas maltophilia- susceptible to bactrim however pt has sulfa allergy. ID will assess options  I discussed with ID and they would like to repeat imaging prior to final abx plan  I d/w Dr. marshall and plan for f/u as outpatient in 3 weeks with him    I have personally seen and examined the patient at bedside. I discussed the plan of care with patient, bedside RN, charge RN, , pharmacy, dietitian and general surgery, infectious disease and interventional radiology.    Consultants/Specialty  general surgery, GI, infectious disease and interventional radiology    Code Status  Full Code    Disposition  Patient is not medically cleared.   Anticipate discharge to to home with organized home healthcare and close outpatient follow-up.  I have  placed the appropriate orders for post-discharge needs.    Review of Systems  Review of Systems   Constitutional: Negative for chills and fever.   Respiratory: Negative for cough and shortness of breath.    Cardiovascular: Negative for chest pain and palpitations.   Gastrointestinal: Positive for abdominal pain. Negative for nausea and vomiting.   Genitourinary: Negative for dysuria and urgency.   Neurological: Negative for dizziness and headaches.   All other systems reviewed and are negative.       Physical Exam  Temp:  [36.6 °C (97.8 °F)-37.8 °C (100.1 °F)] 37.4 °C (99.3 °F)  Pulse:  [54-93] 83  Resp:  [16-18] 16  BP: (134-156)/(65-88) 148/88  SpO2:  [91 %-100 %] 93 %    Physical Exam  Vitals and nursing note reviewed.   Constitutional:       Appearance: Normal appearance.   Cardiovascular:      Rate and Rhythm: Normal rate and regular rhythm.   Pulmonary:      Effort: Pulmonary effort is normal.      Breath sounds: Normal breath sounds.   Abdominal:      General: Abdomen is flat.      Palpations: Abdomen is soft.      Tenderness: There is no abdominal tenderness.      Comments: RUQ drain and R flank drain +   Musculoskeletal:      Right lower leg: Edema present.      Left lower leg: Edema present.   Skin:     General: Skin is warm and dry.   Neurological:      General: No focal deficit present.      Mental Status: She is alert and oriented to person, place, and time.       Fluids    Intake/Output Summary (Last 24 hours) at 10/28/2021 1219  Last data filed at 10/28/2021 0913  Gross per 24 hour   Intake 360 ml   Output 1180 ml   Net -820 ml       Laboratory  Recent Labs     10/27/21  0514 10/28/21  0330   WBC 7.1 6.8   RBC 2.79* 2.68*   HEMOGLOBIN 8.5* 8.0*   HEMATOCRIT 25.8* 24.6*   MCV 92.5 91.8   MCH 30.5 29.9   MCHC 32.9* 32.5*   RDW 48.4 49.2   PLATELETCT 200 205   MPV 9.6 9.7     Recent Labs     10/27/21  0514 10/28/21  0330   SODIUM 133* 133*   POTASSIUM 3.7 3.4*   CHLORIDE 98 96   CO2 26 28   GLUCOSE 93 93    BUN 10 10   CREATININE 0.67 0.70   CALCIUM 7.9* 8.0*                   Imaging  IR-DRAINAGE-CATH EXCHANGE   Final Result      1.  Successful left-sided drainage catheter removal.   2.  Successful image guided superior right drainage catheter replacement.      Plan: Suction drainage. Monitor outputs. Please contact interventional radiology if there is any concern for tube dysfunction. Suggest routine tube maintenance at 3 months intervals if the tube remains in place.         CT-DRAIN-PERITONEAL   Final Result      1.  CT GUIDED PERITONEAL RLQ abdominal CATHETER DRAINAGE.   2.  THE CURRENT PLAN IS TO MONITOR DRAINAGE OUTPUT AND OBTAIN A FOLLOWUP CT SCAN IN 5-7 DAYS IF CLINICALLY INDICATED.      CT-ABDOMEN-PELVIS WITH   Final Result         1. Grossly unchanged irregular fluid collection occupying the right abdomen surrounding the right kidney and right colon extending to midline. Additional pigtail catheter in the component about midline anterior abdomen. Both pigtail catheters seem to be    within the collection.      2. Small right pleural effusion with right basilar atelectasis.               DX-CHEST-LIMITED (1 VIEW)   Final Result      1.  Right basilar atelectasis or consolidation. Small right pleural effusion not excluded.   2.  Atherosclerotic plaque.      CT-DRAIN-PERITONEAL   Final Result      1.  CT guided pancreatic pseudocyst catheter drainage.   2.  The current plan is to obtain a follow-up CT in 5-7 days..      IR-PICC LINE PLACEMENT W/ GUIDANCE > AGE 5   Final Result                  Ultrasound-guided PICC placement performed by qualified nursing staff as    above.          CT-ABDOMEN-PELVIS WITH    (Results Pending)        Assessment/Plan  * Bacteremia due to Gram-negative bacteria and fungemia- (present on admission)  Assessment & Plan  ID consulted - continue micafungin and zosyn indefinitely due to incomplete source control  Source control per ID, Surgery, and IR consults    Peritoneal fluid  cultures grew E. coli and Enterococcus   Blood cultures grew Chryseobacterium and Candida glabrata   Repeat blood cultures from 10/13/2021 are negative    Peritoneal fluid from 10/16/2021 growing yeast and Stenotrophomonas maltophilia-  Monitor drain output and continue current antibiotics  KALANI negative for signs of endocarditis    On total parenteral nutrition (TPN)  Assessment & Plan  RD consulted for enteral nutrition recommendations  TPN discontinued 10/27    Hypophosphatemia- (present on admission)  Assessment & Plan  Resolved with TPN  Monitoring per RD    Acute respiratory failure with hypoxia (HCC)- (present on admission)  Assessment & Plan  Resolved with diuresis  Likely volume overload with lower extremity edema present    Antibiotic-associated diarrhea  Assessment & Plan  Cdiff PCR negative  Loperamide PRN    Postprocedural retroperitoneal abscess- (present on admission)  Assessment & Plan  Now with abdominal drain x3  General surgery consulted, recommend ongoing nonoperative management  IR consulted, positioning and revision of tubes per recommendations  Repeat CT 10/22 showed Grossly unchanged irregular fluid collection occupying the right abdomen surrounding the right kidney and right colon extending to midline     ID consulted, recommending ongoing micafungin / zosyn and re-imaging in 4 weeks (ordered)  Will need to follow up in surgical clinic due to limited ID clinic availability    Duodenal perforation (HCC)- (present on admission)  Assessment & Plan  After ERCP with retroperitoneal abscess and bacteremia  Uncertain etiology - ddx includes pancreatitis, bile leak, iatrogenic  GI consulted and s/o, management per ID / IR / Surgery    Hyponatremia- (present on admission)  Assessment & Plan  Recurrent, mild  Diuresis as tolerated  Repeat AM BMP    Normocytic anemia- (present on admission)  Assessment & Plan  Ferritin and Fe% consistent with AOCD from intra-abdominal infection  Repeat AM CBC  Transfuse  for Hgb <7    Sepsis due to intraabdominal fluid collection/abscess (HCC)- (present on admission)  Assessment & Plan  Sepsis resolved  Source intra-abdominal  Continue Zosyn and Mycamine and follow-up on repeat cultures  ID following  Re-imaging per ID, IR, and Surgery consults    Hyperlipidemia- (present on admission)  Assessment & Plan  Continue ezetimibe    Primary hypertension- (present on admission)  Assessment & Plan  Continue lisinopril  No indication for strict BP control, PRN antihypertensives discontinued       VTE prophylaxis: enoxaparin ppx    I have performed a physical exam and reviewed and updated ROS and Plan today (10/28/2021). In review of yesterday's note (10/27/2021), there are no changes except as documented above.

## 2021-10-28 NOTE — CARE PLAN
The patient is Stable - Low risk of patient condition declining or worsening    Shift Goals  Clinical Goals: pain management   Patient Goals: rest  Family Goals: get her better    Progress made toward(s) clinical / shift goals:  Medicated per MAR for pain.  Pt encouraged to call when in pain.      Patient is not progressing towards the following goals:

## 2021-10-28 NOTE — PROGRESS NOTES
0824: MARTHA drain x2 flushed with 50 mL NS   0157: MARTHA drain x2 flushed with 50 mL NS  0619: MARTHA drain x2 flushed with 50 mL NS

## 2021-10-28 NOTE — DISCHARGE PLANNING
Medical Social Work  Choice faxed to [facility], [facility] as patient has [facility]

## 2021-10-29 PROCEDURE — A9270 NON-COVERED ITEM OR SERVICE: HCPCS | Performed by: FAMILY MEDICINE

## 2021-10-29 PROCEDURE — A9270 NON-COVERED ITEM OR SERVICE: HCPCS | Performed by: STUDENT IN AN ORGANIZED HEALTH CARE EDUCATION/TRAINING PROGRAM

## 2021-10-29 PROCEDURE — 700111 HCHG RX REV CODE 636 W/ 250 OVERRIDE (IP): Performed by: STUDENT IN AN ORGANIZED HEALTH CARE EDUCATION/TRAINING PROGRAM

## 2021-10-29 PROCEDURE — 700105 HCHG RX REV CODE 258: Performed by: INTERNAL MEDICINE

## 2021-10-29 PROCEDURE — 700111 HCHG RX REV CODE 636 W/ 250 OVERRIDE (IP): Performed by: INTERNAL MEDICINE

## 2021-10-29 PROCEDURE — 700111 HCHG RX REV CODE 636 W/ 250 OVERRIDE (IP): Performed by: FAMILY MEDICINE

## 2021-10-29 PROCEDURE — 770006 HCHG ROOM/CARE - MED/SURG/GYN SEMI*

## 2021-10-29 PROCEDURE — 700102 HCHG RX REV CODE 250 W/ 637 OVERRIDE(OP): Performed by: STUDENT IN AN ORGANIZED HEALTH CARE EDUCATION/TRAINING PROGRAM

## 2021-10-29 PROCEDURE — 700111 HCHG RX REV CODE 636 W/ 250 OVERRIDE (IP): Mod: JG | Performed by: INTERNAL MEDICINE

## 2021-10-29 PROCEDURE — 99232 SBSQ HOSP IP/OBS MODERATE 35: CPT | Performed by: HOSPITALIST

## 2021-10-29 PROCEDURE — 700102 HCHG RX REV CODE 250 W/ 637 OVERRIDE(OP): Performed by: FAMILY MEDICINE

## 2021-10-29 PROCEDURE — 94760 N-INVAS EAR/PLS OXIMETRY 1: CPT

## 2021-10-29 RX ADMIN — ENOXAPARIN SODIUM 40 MG: 40 INJECTION SUBCUTANEOUS at 05:24

## 2021-10-29 RX ADMIN — LOPERAMIDE HYDROCHLORIDE 2 MG: 2 CAPSULE ORAL at 00:26

## 2021-10-29 RX ADMIN — OXYCODONE HYDROCHLORIDE 10 MG: 10 TABLET ORAL at 07:46

## 2021-10-29 RX ADMIN — PIPERACILLIN SODIUM AND TAZOBACTAM SODIUM 3.38 G: 3; .375 INJECTION, POWDER, FOR SOLUTION INTRAVENOUS at 03:35

## 2021-10-29 RX ADMIN — OXYCODONE HYDROCHLORIDE 10 MG: 10 TABLET ORAL at 03:35

## 2021-10-29 RX ADMIN — FAMOTIDINE 20 MG: 20 TABLET ORAL at 17:19

## 2021-10-29 RX ADMIN — ONDANSETRON 4 MG: 2 INJECTION INTRAMUSCULAR; INTRAVENOUS at 17:52

## 2021-10-29 RX ADMIN — ONDANSETRON 4 MG: 2 INJECTION INTRAMUSCULAR; INTRAVENOUS at 13:40

## 2021-10-29 RX ADMIN — OXYCODONE HYDROCHLORIDE 10 MG: 10 TABLET ORAL at 13:40

## 2021-10-29 RX ADMIN — FAMOTIDINE 20 MG: 20 TABLET ORAL at 05:23

## 2021-10-29 RX ADMIN — PIPERACILLIN SODIUM AND TAZOBACTAM SODIUM 3.38 G: 3; .375 INJECTION, POWDER, FOR SOLUTION INTRAVENOUS at 20:04

## 2021-10-29 RX ADMIN — GABAPENTIN 600 MG: 300 CAPSULE ORAL at 17:19

## 2021-10-29 RX ADMIN — ONDANSETRON 4 MG: 2 INJECTION INTRAMUSCULAR; INTRAVENOUS at 07:46

## 2021-10-29 RX ADMIN — GABAPENTIN 600 MG: 300 CAPSULE ORAL at 05:24

## 2021-10-29 RX ADMIN — PIPERACILLIN SODIUM AND TAZOBACTAM SODIUM 3.38 G: 3; .375 INJECTION, POWDER, FOR SOLUTION INTRAVENOUS at 12:00

## 2021-10-29 RX ADMIN — ONDANSETRON 4 MG: 2 INJECTION INTRAMUSCULAR; INTRAVENOUS at 00:32

## 2021-10-29 RX ADMIN — FUROSEMIDE 40 MG: 10 INJECTION, SOLUTION INTRAMUSCULAR; INTRAVENOUS at 05:24

## 2021-10-29 RX ADMIN — OXYCODONE HYDROCHLORIDE 10 MG: 10 TABLET ORAL at 00:26

## 2021-10-29 RX ADMIN — EZETIMIBE 10 MG: 10 TABLET ORAL at 17:19

## 2021-10-29 RX ADMIN — Medication 1000 UNITS: at 05:23

## 2021-10-29 RX ADMIN — ONDANSETRON 4 MG: 2 INJECTION INTRAMUSCULAR; INTRAVENOUS at 03:35

## 2021-10-29 RX ADMIN — HYDROMORPHONE HYDROCHLORIDE 0.5 MG: 1 INJECTION, SOLUTION INTRAMUSCULAR; INTRAVENOUS; SUBCUTANEOUS at 09:37

## 2021-10-29 RX ADMIN — LISINOPRIL 5 MG: 5 TABLET ORAL at 05:24

## 2021-10-29 RX ADMIN — OXYCODONE HYDROCHLORIDE 10 MG: 10 TABLET ORAL at 17:52

## 2021-10-29 RX ADMIN — MICAFUNGIN SODIUM 100 MG: 100 INJECTION, POWDER, LYOPHILIZED, FOR SOLUTION INTRAVENOUS at 12:00

## 2021-10-29 ASSESSMENT — ENCOUNTER SYMPTOMS
DIZZINESS: 0
ABDOMINAL PAIN: 1
COUGH: 0
FEVER: 0
PALPITATIONS: 0
HEADACHES: 0
VOMITING: 0
CHILLS: 0
SHORTNESS OF BREATH: 0
NAUSEA: 0

## 2021-10-29 ASSESSMENT — PAIN DESCRIPTION - PAIN TYPE
TYPE: ACUTE PAIN

## 2021-10-29 NOTE — CARE PLAN
Problem: Nutritional:  Goal: Achieve adequate nutritional intake  Description: Patient will consume >50% of meals  Outcome: Progressing   Per ADL documentation pt has consumed % of last 4 recorded meals (all 3 meals 10/28 and breakfast this morning 10/29). RD will continue to monitor.

## 2021-10-29 NOTE — PROGRESS NOTES
Bedside report received at change of shift. Pt is A&Ox4, reported R sided abdominal pain, medicated per MAR. Safety precautions in place. Educated on use of call light. All pt needs met at this time.

## 2021-10-29 NOTE — DISCHARGE PLANNING
Anticipated Discharge Disposition: Home with HH, home infusion for TPN and IV Abx, and drain care.    Action: This RNCM spoke with [facility] regarding status of patient acceptance. Per [facility], they need HH order updated to include: TPN orders, Drain care, IV Abx. They also want to know if patient has assistance at home and what the tentative dc date is.     RNCM sent voalte message to Dr. Lindquist requesting home infusion order and choice. Also inquired if attending or ID will be ordering IV Abx.    Barriers to Discharge: See above    Plan: [facility] will obtain choice for home infusions, fax updated HH order to [facility], and have discussion with patient regarding home support.    1632 Addendum:  This RNCM received voalte message from [facility] that patient is no longer receiving TPN.     DME choice for [facility]WW faxed to [facility]. Ortho tech assistance order placed for delivery and setup of FWW.     ID abx order form is in media tab under 10/27. [facility] needs infusion choice order, Dr. Lindquist aware.     HCM will need to refax HH order to [facility] once all items have been completed.

## 2021-10-29 NOTE — CARE PLAN
The patient is Stable - Low risk of patient condition declining or worsening    Shift Goals  Clinical Goals: Pain management   Patient Goals: rest  Family Goals: get her better    Progress made toward(s) clinical / shift goals:  Medicated per MAR PRN.  Hourly rounding in place.  No signs of distress.      Patient is not progressing towards the following goals:

## 2021-10-29 NOTE — CARE PLAN
The patient is Stable - Low risk of patient condition declining or worsening    Shift Goals  Clinical Goals: pt's pain will be managed to a comfortable level  Patient Goals: rest  Family Goals: get her better    Progress made toward(s) clinical / shift goals:  pt has PRN medication available for pain management. Encouraged to let staff know when she is in pain.     Patient is not progressing towards the following goals:

## 2021-10-29 NOTE — PROGRESS NOTES
Hospital Medicine Daily Progress Note    Date of Service  10/29/2021    Chief Complaint  Nils Alfonso is a 66 y.o. female admitted 10/15/2021 with abdominal pain    Hospital Course  Initially admitted to [facility] for evaluation of abdominal pain after recent ERCP with polypectomy and sphincterotomy and noted to have large intra-abdominal fluid collection for which she underwent drainage with interventional radiology and was evaluated by infectious disease and surgery.  She was transferred to [facility] for higher level of care.  She was evaluated by Dr Cook who recommended conservative management with placement of a second drain    Pneumobilia with gas in the CBD - s/p drain placement  TPN  Post ERCP retroperitoneal abscess with drain x2     Repeat CT 10/22 showed Grossly unchanged irregular fluid collection occupying the right abdomen surrounding the right kidney and right colon extending to midline.     I discussed with ID and they would like to repeat imaging prior to final abx plan- for now plan for 4 week abx course until 11/24.  I d/w Dr. marshall and plan for f/u as outpatient in 3 weeks with him      Interval Problem Update  Her abdo pain is controlled with current regimen  She states she feels weak and is requesting a walker  Her  is at bedside and all questions answered    I have personally seen and examined the patient at bedside. I discussed the plan of care with patient, family and bedside RN.    Consultants/Specialty  general surgery, GI, infectious disease and interventional radiology    Code Status  Full Code    Disposition  Patient is not medically cleared.   Anticipate discharge to to home with organized home healthcare and close outpatient follow-up.  I have placed the appropriate orders for post-discharge needs.    Review of Systems  Review of Systems   Constitutional: Negative for chills and fever.   Respiratory: Negative for cough and  shortness of breath.    Cardiovascular: Negative for chest pain and palpitations.   Gastrointestinal: Positive for abdominal pain. Negative for nausea and vomiting.   Genitourinary: Negative for dysuria and urgency.   Neurological: Negative for dizziness and headaches.   All other systems reviewed and are negative.       Physical Exam  Temp:  [36.1 °C (97 °F)-36.8 °C (98.2 °F)] 36.7 °C (98.1 °F)  Pulse:  [64-71] 71  Resp:  [16] 16  BP: (116-146)/(49-83) 116/72  SpO2:  [86 %-98 %] 98 %    Physical Exam  Vitals and nursing note reviewed.   Constitutional:       Appearance: Normal appearance.   Cardiovascular:      Rate and Rhythm: Normal rate and regular rhythm.   Pulmonary:      Effort: Pulmonary effort is normal.      Breath sounds: Normal breath sounds.   Abdominal:      General: Abdomen is flat.      Palpations: Abdomen is soft.      Tenderness: There is no abdominal tenderness.      Comments: RUQ drain and R flank drain +   Musculoskeletal:      Right lower leg: Edema present.      Left lower leg: Edema present.   Skin:     General: Skin is warm and dry.   Neurological:      General: No focal deficit present.      Mental Status: She is alert and oriented to person, place, and time.       Fluids    Intake/Output Summary (Last 24 hours) at 10/29/2021 0829  Last data filed at 10/29/2021 0400  Gross per 24 hour   Intake 480 ml   Output 245 ml   Net 235 ml       Laboratory  Recent Labs     10/27/21  0514 10/28/21  0330   WBC 7.1 6.8   RBC 2.79* 2.68*   HEMOGLOBIN 8.5* 8.0*   HEMATOCRIT 25.8* 24.6*   MCV 92.5 91.8   MCH 30.5 29.9   MCHC 32.9* 32.5*   RDW 48.4 49.2   PLATELETCT 200 205   MPV 9.6 9.7     Recent Labs     10/27/21  0514 10/28/21  0330   SODIUM 133* 133*   POTASSIUM 3.7 3.4*   CHLORIDE 98 96   CO2 26 28   GLUCOSE 93 93   BUN 10 10   CREATININE 0.67 0.70   CALCIUM 7.9* 8.0*                   Imaging  IR-DRAINAGE-CATH EXCHANGE   Final Result      1.  Successful left-sided drainage catheter removal.   2.   Successful image guided superior right drainage catheter replacement.      Plan: Suction drainage. Monitor outputs. Please contact interventional radiology if there is any concern for tube dysfunction. Suggest routine tube maintenance at 3 months intervals if the tube remains in place.         CT-DRAIN-PERITONEAL   Final Result      1.  CT GUIDED PERITONEAL RLQ abdominal CATHETER DRAINAGE.   2.  THE CURRENT PLAN IS TO MONITOR DRAINAGE OUTPUT AND OBTAIN A FOLLOWUP CT SCAN IN 5-7 DAYS IF CLINICALLY INDICATED.      CT-ABDOMEN-PELVIS WITH   Final Result         1. Grossly unchanged irregular fluid collection occupying the right abdomen surrounding the right kidney and right colon extending to midline. Additional pigtail catheter in the component about midline anterior abdomen. Both pigtail catheters seem to be    within the collection.      2. Small right pleural effusion with right basilar atelectasis.               DX-CHEST-LIMITED (1 VIEW)   Final Result      1.  Right basilar atelectasis or consolidation. Small right pleural effusion not excluded.   2.  Atherosclerotic plaque.      CT-DRAIN-PERITONEAL   Final Result      1.  CT guided pancreatic pseudocyst catheter drainage.   2.  The current plan is to obtain a follow-up CT in 5-7 days..      IR-PICC LINE PLACEMENT W/ GUIDANCE > AGE 5   Final Result                  Ultrasound-guided PICC placement performed by qualified nursing staff as    above.          CT-ABDOMEN-PELVIS WITH    (Results Pending)        Assessment/Plan  * Bacteremia due to Gram-negative bacteria and fungemia- (present on admission)  Assessment & Plan  ID consulted - continue micafungin and zosyn indefinitely due to incomplete source control  Source control per ID, Surgery, and IR consults    Peritoneal fluid cultures grew E. coli and Enterococcus   Blood cultures grew Chryseobacterium and Candida glabrata   Repeat blood cultures from 10/13/2021 are negative    Peritoneal fluid from 10/16/2021  growing yeast and Stenotrophomonas maltophilia-  Monitor drain output and continue current antibiotics  KALANI negative for signs of endocarditis    On total parenteral nutrition (TPN)  Assessment & Plan  RD consulted for enteral nutrition recommendations  TPN discontinued 10/27    Hypophosphatemia- (present on admission)  Assessment & Plan  Resolved with TPN  Monitoring per RD    Acute respiratory failure with hypoxia (HCC)- (present on admission)  Assessment & Plan  Resolved with diuresis  Likely volume overload with lower extremity edema present    Antibiotic-associated diarrhea  Assessment & Plan  Cdiff PCR negative  Loperamide PRN    Postprocedural retroperitoneal abscess- (present on admission)  Assessment & Plan  Now with abdominal drain x3  General surgery consulted, recommend ongoing nonoperative management  IR consulted, positioning and revision of tubes per recommendations  Repeat CT 10/22 showed Grossly unchanged irregular fluid collection occupying the right abdomen surrounding the right kidney and right colon extending to midline     ID consulted, recommending ongoing micafungin / zosyn and re-imaging in 4 weeks (ordered)  Will need to follow up in surgical clinic due to limited ID clinic availability    Duodenal perforation (HCC)- (present on admission)  Assessment & Plan  After ERCP with retroperitoneal abscess and bacteremia  Uncertain etiology - ddx includes pancreatitis, bile leak, iatrogenic  GI consulted and s/o, management per ID / IR / Surgery    Hyponatremia- (present on admission)  Assessment & Plan  Recurrent, mild  Diuresis as tolerated  Repeat AM BMP    Normocytic anemia- (present on admission)  Assessment & Plan  Ferritin and Fe% consistent with AOCD from intra-abdominal infection  Repeat AM CBC  Transfuse for Hgb <7    Sepsis due to intraabdominal fluid collection/abscess (HCC)- (present on admission)  Assessment & Plan  Sepsis resolved  Source intra-abdominal  Continue Zosyn and Mycamine  and follow-up on repeat cultures  ID following  Re-imaging per ID, IR, and Surgery consults    Hyperlipidemia- (present on admission)  Assessment & Plan  Continue ezetimibe    Primary hypertension- (present on admission)  Assessment & Plan  Continue lisinopril  No indication for strict BP control, PRN antihypertensives discontinued       VTE prophylaxis: enoxaparin ppx    I have performed a physical exam and reviewed and updated ROS and Plan today (10/29/2021). In review of yesterday's note (10/28/2021), there are no changes except as documented above.

## 2021-10-30 ENCOUNTER — APPOINTMENT (OUTPATIENT)
Dept: RADIOLOGY | Facility: MEDICAL CENTER | Age: 66
DRG: 856 | End: 2021-10-30
Attending: HOSPITALIST
Payer: MEDICARE

## 2021-10-30 LAB
ANION GAP SERPL CALC-SCNC: 10 MMOL/L (ref 7–16)
BASOPHILS # BLD AUTO: 0.6 % (ref 0–1.8)
BASOPHILS # BLD: 0.03 K/UL (ref 0–0.12)
BUN SERPL-MCNC: 9 MG/DL (ref 8–22)
CALCIUM SERPL-MCNC: 8 MG/DL (ref 8.5–10.5)
CHLORIDE SERPL-SCNC: 93 MMOL/L (ref 96–112)
CO2 SERPL-SCNC: 27 MMOL/L (ref 20–33)
CREAT SERPL-MCNC: 0.68 MG/DL (ref 0.5–1.4)
EOSINOPHIL # BLD AUTO: 0.01 K/UL (ref 0–0.51)
EOSINOPHIL NFR BLD: 0.2 % (ref 0–6.9)
ERYTHROCYTE [DISTWIDTH] IN BLOOD BY AUTOMATED COUNT: 46 FL (ref 35.9–50)
GLUCOSE SERPL-MCNC: 98 MG/DL (ref 65–99)
HCT VFR BLD AUTO: 26.8 % (ref 37–47)
HGB BLD-MCNC: 8.8 G/DL (ref 12–16)
IMM GRANULOCYTES # BLD AUTO: 0.02 K/UL (ref 0–0.11)
IMM GRANULOCYTES NFR BLD AUTO: 0.4 % (ref 0–0.9)
LYMPHOCYTES # BLD AUTO: 0.71 K/UL (ref 1–4.8)
LYMPHOCYTES NFR BLD: 15.2 % (ref 22–41)
MCH RBC QN AUTO: 29.5 PG (ref 27–33)
MCHC RBC AUTO-ENTMCNC: 32.8 G/DL (ref 33.6–35)
MCV RBC AUTO: 89.9 FL (ref 81.4–97.8)
MONOCYTES # BLD AUTO: 0.24 K/UL (ref 0–0.85)
MONOCYTES NFR BLD AUTO: 5.1 % (ref 0–13.4)
NEUTROPHILS # BLD AUTO: 3.67 K/UL (ref 2–7.15)
NEUTROPHILS NFR BLD: 78.5 % (ref 44–72)
NRBC # BLD AUTO: 0 K/UL
NRBC BLD-RTO: 0 /100 WBC
PLATELET # BLD AUTO: 226 K/UL (ref 164–446)
PMV BLD AUTO: 9.3 FL (ref 9–12.9)
POTASSIUM SERPL-SCNC: 3.4 MMOL/L (ref 3.6–5.5)
RBC # BLD AUTO: 2.98 M/UL (ref 4.2–5.4)
SODIUM SERPL-SCNC: 130 MMOL/L (ref 135–145)
WBC # BLD AUTO: 4.7 K/UL (ref 4.8–10.8)

## 2021-10-30 PROCEDURE — 74177 CT ABD & PELVIS W/CONTRAST: CPT

## 2021-10-30 PROCEDURE — A9270 NON-COVERED ITEM OR SERVICE: HCPCS | Performed by: STUDENT IN AN ORGANIZED HEALTH CARE EDUCATION/TRAINING PROGRAM

## 2021-10-30 PROCEDURE — 700111 HCHG RX REV CODE 636 W/ 250 OVERRIDE (IP): Performed by: STUDENT IN AN ORGANIZED HEALTH CARE EDUCATION/TRAINING PROGRAM

## 2021-10-30 PROCEDURE — 85025 COMPLETE CBC W/AUTO DIFF WBC: CPT

## 2021-10-30 PROCEDURE — 700111 HCHG RX REV CODE 636 W/ 250 OVERRIDE (IP): Performed by: HOSPITALIST

## 2021-10-30 PROCEDURE — 700111 HCHG RX REV CODE 636 W/ 250 OVERRIDE (IP): Mod: JG | Performed by: INTERNAL MEDICINE

## 2021-10-30 PROCEDURE — 770006 HCHG ROOM/CARE - MED/SURG/GYN SEMI*

## 2021-10-30 PROCEDURE — 700117 HCHG RX CONTRAST REV CODE 255: Performed by: HOSPITALIST

## 2021-10-30 PROCEDURE — 700102 HCHG RX REV CODE 250 W/ 637 OVERRIDE(OP): Performed by: FAMILY MEDICINE

## 2021-10-30 PROCEDURE — 700111 HCHG RX REV CODE 636 W/ 250 OVERRIDE (IP): Performed by: FAMILY MEDICINE

## 2021-10-30 PROCEDURE — 700105 HCHG RX REV CODE 258: Performed by: INTERNAL MEDICINE

## 2021-10-30 PROCEDURE — A9270 NON-COVERED ITEM OR SERVICE: HCPCS | Performed by: INTERNAL MEDICINE

## 2021-10-30 PROCEDURE — 700102 HCHG RX REV CODE 250 W/ 637 OVERRIDE(OP): Performed by: STUDENT IN AN ORGANIZED HEALTH CARE EDUCATION/TRAINING PROGRAM

## 2021-10-30 PROCEDURE — 99233 SBSQ HOSP IP/OBS HIGH 50: CPT | Performed by: INTERNAL MEDICINE

## 2021-10-30 PROCEDURE — 97162 PT EVAL MOD COMPLEX 30 MIN: CPT

## 2021-10-30 PROCEDURE — 80048 BASIC METABOLIC PNL TOTAL CA: CPT

## 2021-10-30 PROCEDURE — A9270 NON-COVERED ITEM OR SERVICE: HCPCS | Performed by: FAMILY MEDICINE

## 2021-10-30 PROCEDURE — 99233 SBSQ HOSP IP/OBS HIGH 50: CPT | Performed by: HOSPITALIST

## 2021-10-30 PROCEDURE — 700102 HCHG RX REV CODE 250 W/ 637 OVERRIDE(OP): Performed by: INTERNAL MEDICINE

## 2021-10-30 PROCEDURE — 700111 HCHG RX REV CODE 636 W/ 250 OVERRIDE (IP): Performed by: INTERNAL MEDICINE

## 2021-10-30 RX ORDER — DIPHENHYDRAMINE HYDROCHLORIDE 50 MG/ML
25 INJECTION INTRAMUSCULAR; INTRAVENOUS EVERY 6 HOURS PRN
Status: DISCONTINUED | OUTPATIENT
Start: 2021-10-30 | End: 2021-11-05

## 2021-10-30 RX ORDER — SULFAMETHOXAZOLE AND TRIMETHOPRIM 800; 160 MG/1; MG/1
1 TABLET ORAL EVERY 12 HOURS
Status: DISCONTINUED | OUTPATIENT
Start: 2021-10-30 | End: 2021-11-07

## 2021-10-30 RX ADMIN — FUROSEMIDE 40 MG: 10 INJECTION, SOLUTION INTRAMUSCULAR; INTRAVENOUS at 04:35

## 2021-10-30 RX ADMIN — PIPERACILLIN SODIUM AND TAZOBACTAM SODIUM 3.38 G: 3; .375 INJECTION, POWDER, FOR SOLUTION INTRAVENOUS at 12:08

## 2021-10-30 RX ADMIN — OXYCODONE HYDROCHLORIDE 10 MG: 10 TABLET ORAL at 00:42

## 2021-10-30 RX ADMIN — ONDANSETRON 4 MG: 2 INJECTION INTRAMUSCULAR; INTRAVENOUS at 00:43

## 2021-10-30 RX ADMIN — Medication 1000 UNITS: at 04:34

## 2021-10-30 RX ADMIN — ENOXAPARIN SODIUM 40 MG: 40 INJECTION SUBCUTANEOUS at 04:35

## 2021-10-30 RX ADMIN — FAMOTIDINE 20 MG: 20 TABLET ORAL at 04:35

## 2021-10-30 RX ADMIN — ONDANSETRON 4 MG: 2 INJECTION INTRAMUSCULAR; INTRAVENOUS at 07:25

## 2021-10-30 RX ADMIN — GABAPENTIN 600 MG: 300 CAPSULE ORAL at 17:45

## 2021-10-30 RX ADMIN — PIPERACILLIN SODIUM AND TAZOBACTAM SODIUM 3.38 G: 3; .375 INJECTION, POWDER, FOR SOLUTION INTRAVENOUS at 19:46

## 2021-10-30 RX ADMIN — OXYCODONE HYDROCHLORIDE 10 MG: 10 TABLET ORAL at 13:52

## 2021-10-30 RX ADMIN — GABAPENTIN 600 MG: 300 CAPSULE ORAL at 04:34

## 2021-10-30 RX ADMIN — OXYCODONE HYDROCHLORIDE 10 MG: 10 TABLET ORAL at 07:27

## 2021-10-30 RX ADMIN — ONDANSETRON 4 MG: 2 INJECTION INTRAMUSCULAR; INTRAVENOUS at 13:52

## 2021-10-30 RX ADMIN — PIPERACILLIN SODIUM AND TAZOBACTAM SODIUM 3.38 G: 3; .375 INJECTION, POWDER, FOR SOLUTION INTRAVENOUS at 03:57

## 2021-10-30 RX ADMIN — MICAFUNGIN SODIUM 100 MG: 100 INJECTION, POWDER, LYOPHILIZED, FOR SOLUTION INTRAVENOUS at 12:08

## 2021-10-30 RX ADMIN — SULFAMETHOXAZOLE AND TRIMETHOPRIM 1 TABLET: 800; 160 TABLET ORAL at 10:43

## 2021-10-30 RX ADMIN — FAMOTIDINE 20 MG: 20 TABLET ORAL at 17:45

## 2021-10-30 RX ADMIN — OXYCODONE HYDROCHLORIDE 10 MG: 10 TABLET ORAL at 20:00

## 2021-10-30 RX ADMIN — EZETIMIBE 10 MG: 10 TABLET ORAL at 17:45

## 2021-10-30 RX ADMIN — SULFAMETHOXAZOLE AND TRIMETHOPRIM 1 TABLET: 800; 160 TABLET ORAL at 17:45

## 2021-10-30 RX ADMIN — ONDANSETRON 4 MG: 2 INJECTION INTRAMUSCULAR; INTRAVENOUS at 20:07

## 2021-10-30 RX ADMIN — LISINOPRIL 5 MG: 5 TABLET ORAL at 04:34

## 2021-10-30 RX ADMIN — IOHEXOL 100 ML: 350 INJECTION, SOLUTION INTRAVENOUS at 13:09

## 2021-10-30 ASSESSMENT — PAIN DESCRIPTION - PAIN TYPE
TYPE: ACUTE PAIN

## 2021-10-30 ASSESSMENT — ENCOUNTER SYMPTOMS
PALPITATIONS: 0
VOMITING: 0
SHORTNESS OF BREATH: 0
COUGH: 0
ROS GI COMMENTS: STABLE
HEADACHES: 0
DIARRHEA: 0
SPUTUM PRODUCTION: 0
CONSTIPATION: 0
CHILLS: 0
ABDOMINAL PAIN: 1
FEVER: 0
DIZZINESS: 0
NAUSEA: 0

## 2021-10-30 ASSESSMENT — COGNITIVE AND FUNCTIONAL STATUS - GENERAL
WALKING IN HOSPITAL ROOM: A LITTLE
CLIMB 3 TO 5 STEPS WITH RAILING: TOTAL
TURNING FROM BACK TO SIDE WHILE IN FLAT BAD: A LITTLE
SUGGESTED CMS G CODE MODIFIER MOBILITY: CK
MOVING TO AND FROM BED TO CHAIR: A LITTLE
MOBILITY SCORE: 16
STANDING UP FROM CHAIR USING ARMS: A LITTLE
MOVING FROM LYING ON BACK TO SITTING ON SIDE OF FLAT BED: A LITTLE

## 2021-10-30 ASSESSMENT — GAIT ASSESSMENTS
DEVIATION: BRADYKINETIC;SHUFFLED GAIT
DISTANCE (FEET): 10
ASSISTIVE DEVICE: FRONT WHEEL WALKER
GAIT LEVEL OF ASSIST: SUPERVISED

## 2021-10-30 ASSESSMENT — FIBROSIS 4 INDEX: FIB4 SCORE: 1.85

## 2021-10-30 NOTE — PROGRESS NOTES
Infectious Disease Progress Note    Author: Tamra Mcfarland M.D. Date & Time of service: 10/30/2021  8:11 AM    Chief Complaint:  Follow-up for multiple intra-abdominal abscesses, polymicrobial bacteremia, candidemia     Interval History:  10/15 afebrile, WBC 13.5 patient transferred to [facility] overnight.  Patient having diarrhea however abdominal pain is slightly improved today.  She was evaluated by Dr. ST this morning who is recommending additional drain placement and holding off on surgery at this time  10/16 afebrile, WBC 8.4.  Plan for second drain placement today.  Also patient to start TPN.  Had one episode of liquid dark stools.  C differential PCR pending  10/17 afebrile, WBC 6 underwent CT-guided drain placement yesterday, Gram stain with few yeast. C. difficile negative. On TPN, planning to ambulate in the halls today  10/18 AF, O2 2 L NC, ongoing abdominal pain but overall improving.  She continues to have 2 drains in place with minimal output on the right.   10/25 afebrile, WBC 8.6.  Repeat CT scan on 10/22 without any changes.  Plan for additional drain placement today.  Patient is very anxious.  Tolerating IV antibiotics without any issues.  Ongoing abdominal pain  10/27 T-max 100.6, WBC 7.1 patient underwent additional drain placement on 10/25.  Cultures pending.  Drain output decreasing overall.  Patient having pain around new drain placement.  States she is unable to take deep breaths.  Plan for drain positioning today  10/28 afebrile, WBC 6.8 abdominal abscess wound culture growing yeast and stenotrophomonas.  Ongoing abdominal pain.  Left upper quadrant drain removed yesterday.  Right upper quadrant drain upsized.  10/30 afebrile, no CBC done.  Drains with good output.  States breakfast did not sit well with her.  A little nauseated.  Eating saltine crackers.  She did ambulate in the halls with her  yesterday and did quite well.    Review of Systems:  Review of Systems    Constitutional: Negative for chills and fever.   Respiratory: Negative for cough, sputum production and shortness of breath.    Cardiovascular: Negative for chest pain.   Gastrointestinal: Positive for abdominal pain. Negative for constipation, diarrhea, nausea and vomiting.        Stable   All other systems reviewed and are negative.      Hemodynamics:  Temp (24hrs), Av.9 °C (98.4 °F), Min:36.4 °C (97.6 °F), Max:37.3 °C (99.2 °F)  Temperature: 37.3 °C (99.2 °F)  Pulse  Av.5  Min: 54  Max: 93   Blood Pressure : 121/71       Physical Exam:  Physical Exam  Constitutional:       Appearance: Normal appearance.   HENT:      Mouth/Throat:      Mouth: Mucous membranes are moist.   Eyes:      Extraocular Movements: Extraocular movements intact.      Pupils: Pupils are equal, round, and reactive to light.   Cardiovascular:      Rate and Rhythm: Normal rate and regular rhythm.      Heart sounds: Normal heart sounds.   Pulmonary:      Effort: Pulmonary effort is normal.      Breath sounds: Normal breath sounds.   Abdominal:      General: There is distension.      Palpations: Abdomen is soft.      Tenderness: There is abdominal tenderness.      Comments: Ongoing purulent drainage at insertion site of right upper quadrant drain    Left upper quadrant drain removed.  Site dressed with feculent appearing drainage today    Left upper extremity PICC line in place   Musculoskeletal:      Cervical back: Neck supple.      Right lower leg: Edema present.      Left lower leg: Edema present.   Skin:     General: Skin is warm and dry.   Neurological:      General: No focal deficit present.      Mental Status: She is alert and oriented to person, place, and time.   Psychiatric:         Mood and Affect: Mood normal.         Behavior: Behavior normal.      Comments: Pleasant         Meds:    Current Facility-Administered Medications:   •  famotidine  •  loperamide  •  furosemide  •  enoxaparin (LOVENOX) injection  •  [COMPLETED]  piperacillin-tazobactam **AND** piperacillin-tazobactam  •  polyethylene glycol/lytes  •  lisinopril  •  ondansetron  •  ondansetron  •  polyethylene glycol/lytes **AND** magnesium hydroxide  •  acetaminophen  •  HYDROmorphone  •  Notify provider if pain remains uncontrolled **AND** Use the Numeric Rating Scale (NRS), Colon-Baker Faces (WBF), or FLACC on regular floors and Critical-Care Pain Observation Tool (CPOT) on ICUs/Trauma to assess pain **AND** Pulse Ox **AND** Pharmacy Consult Request **AND** If patient difficult to arouse and/or has respiratory depression (respiratory rate of 10 or less), stop any opiates that are currently infusing and call a Rapid Response.  •  cyclobenzaprine  •  diphenhydrAMINE  •  ezetimibe  •  gabapentin  •  micafungin (MYCAMINE) ivpb  •  oxyCODONE immediate release  •  promethazine  •  vitamin D3    Labs:  Recent Labs     10/28/21  0330   WBC 6.8   RBC 2.68*   HEMOGLOBIN 8.0*   HEMATOCRIT 24.6*   MCV 91.8   MCH 29.9   RDW 49.2   PLATELETCT 205   MPV 9.7   NEUTSPOLYS 77.30*   LYMPHOCYTES 15.20*   MONOCYTES 5.70   EOSINOPHILS 0.40   BASOPHILS 0.70     Recent Labs     10/28/21  0330   SODIUM 133*   POTASSIUM 3.4*   CHLORIDE 96   CO2 28   GLUCOSE 93   BUN 10     Recent Labs     10/28/21  0330   CREATININE 0.70       Imaging:  CT-ABDOMEN WITH & W/O    Result Date: 10/9/2021  10/9/2021 1:38 PM HISTORY/REASON FOR EXAM:  eval for any ugi perforation given recent sphinterotomy during ERCP and now with fluid collection. TECHNIQUE/EXAM DESCRIPTION:  CT scan of the abdomen without and with contrast. Initial precontrast images were obtained from the diaphragmatic domes through the iliac crests using helical technique.  Following nonionic contrast administration in an intravenous bolus fashion, and postcontrast, thin-section helical scanning obtained from the diaphragmatic domes through the iliac crests. 80 mL of Omnipaque 350 nonionic contrast was administered. Low dose optimization technique was  utilized for this CT exam including automated exposure control and adjustment of the mA and/or kV according to patient size. COMPARISON: 10/8/2021, 10/2/2021. FINDINGS: There is a small right pleural effusion with adjacent enhancing segmental atelectasis.. Calcified left lower lobe granuloma. No pericardial effusion. Cardiac chambers are normal in size. Coronary artery calcifications are present. Air in the intra and extra hepatic bile ducts. Unremarkable appearance of the liver. The hepatic and portal veins are patent. Spleen is unremarkable. Again noted is air in the pancreatic duct. No adrenal gland nodules. Gallbladder is surgically absent. No hydronephrosis. No dilated loops of imaged bowel. There is anasarca and mesenteric edema. A pigtail drainage catheter is present in the right abdomen in a air and fluid collection, the extent of which is difficult to measure the collection tracks inferiorly and medially measuring approximately 17 cm in craniocaudal dimension. A smaller loculated collection in the anterior abdomen near the inferior anterior abdominal wall sutures measures 1.5 x 8.7 cm. No findings of contrast extravasation from the opacified bowel loops,  particularly no contrast extravasation at the ampulla of [place].     1.  No findings of intraluminal contrast extravasation from the opacified bowel loops, particularly no contrast extravasation from the duodenum at the ampulla of [place]. 2.  A pigtail drainage catheter is present in the right abdomen in a air and fluid collection, the extent of which is difficult to measure. The collection tracks inferiorly and medially measuring approximately 17 cm in craniocaudal dimension. 3.  A smaller loculated collection in the anterior abdomen near the inferior anterior abdominal wall sutures measures 1.5 x 8.7 cm. 4.  Unchanged pneumobilia in the CBD, intrahepatic bile ducts as well as pancreatic ducts. 5.  There is a small right pleural effusion with adjacent  enhancing segmental atelectasis. 6.   Coronary artery calcifications are present    CT-ABDOMEN-PELVIS WITH    Result Date: 10/13/2021  10/13/2021 11:57 AM HISTORY/REASON FOR EXAM:  Peritonitis or perforation suspected; Pt with known intraabdominal fluid collection in the abdomen of uncertain etiology - had recent ERCP but studies not consistent with bile leak.  Drain in place - reassess. TECHNIQUE/EXAM DESCRIPTION:   CT scan of the abdomen and pelvis with contrast. Contrast-enhanced helical scanning was obtained from the diaphragmatic domes through the pubic symphysis following the bolus administration of nonionic contrast without complication. 100 mL of Omnipaque 350 nonionic contrast was administered without complication. Low dose optimization technique was utilized for this CT exam including automated exposure control and adjustment of the mA and/or kV according to patient size. COMPARISON: 10/9/2021 FINDINGS: Lower Chest: Trace right pleural effusion with right basilar atelectasis. Calcified granuloma in the left lower lobe and trace left pleural fluid. Liver: Normal. Spleen: Unremarkable. Pancreas: Unremarkable. Gallbladder: The gallbladder has been resected. Biliary: Pneumobilia again noted. Adrenal glands: Normal. Kidneys: There is a small left renal cortical cyst which does not require imaging follow-up. No hydronephrosis.. Bowel: No bowel obstruction. Parts of the bowel are suboptimally evaluated due to the surrounding abscess and loss of the intervening fat planes. There is gastric sleeve surgery. Lymph nodes: No adenopathy. Vasculature: Scattered atherosclerosis. Peritoneum: A multiloculated although spatial rim-enhancing air-fluid collection within the right abdomen does not appear significantly changed in size the prior study. On series 2 image 54 measures about 15.5 x 8.7 cm. It extends from the upper abdomen,  where it is seen in the gallbladder fossa, inferiorly into the pelvis. It insinuates around  and partially encases the duodenum and right kidney and extends around ascending colon and some mesenteric branch vessels. There is a percutaneous pigtail drainage catheter which appears well positioned within the abscess in the right midabdomen. Musculoskeletal: No acute or destructive process. Postsurgical changes anterior lumbar spinal fusion Pelvis: Hysterectomy. There is a small rim-enhancing fluid collection within the pelvis measuring 7 x 3.1 x 2.8 cm suspicious for small abscess..     1.  Extensive multiloculated rim-enhancing air and fluid collection/abscess within the right abdomen is not significantly changed in size from the prior study. The percutaneous pigtail drainage catheter appears well-positioned within the collection. 2.  Small separate pelvic rim-enhancing fluid collection suggests an abscess.    CT-ABDOMEN-PELVIS WITH    Result Date: 10/8/2021  10/8/2021 8:06 AM HISTORY/REASON FOR EXAM:  Abdominal pain, fever. Right lower quadrant pain. Pancreatitis. TECHNIQUE/EXAM DESCRIPTION:   CT scan of the abdomen and pelvis with contrast. Contrast-enhanced helical scanning was obtained from the diaphragmatic domes through the pubic symphysis following the bolus administration of nonionic contrast without complication. 100 mL of Omnipaque 350 nonionic contrast was administered without complication. Low dose optimization technique was utilized for this CT exam including automated exposure control and adjustment of the mA and/or kV according to patient size. COMPARISON: 10/2/2021. FINDINGS: Lower Chest: Small right pleural effusion with right basilar opacities. Liver: Normal. Spleen: Unremarkable. Pancreas: Poorly visualized. There is gas in the pancreatic duct. Gallbladder: Surgically absent. Biliary: There is gas in the CBD and intrahepatic bile ducts, left more than right Adrenal glands: Normal. Kidneys: No hydronephrosis. Bilateral kidneys are enhancing symmetrically.. Bowel: Severe wall thickening of  the descending colon, transverse colon, second portion duodenum. Postsurgical change in the stomach Lymph nodes: No adenopathy. Vasculature: The abdominal aorta is normal in caliber. Peritoneum: There is large irregular fluid collection occupying most of the right abdomen surrounding the right kidney and right colon. Additional fluid and gas collection in the midline abdomen anterior to the pancreas. This collection is probably connected to the right side collection via a junction in the jl hepatis Musculoskeletal: Postsurgical change in the lower lumbar spine. Pelvis: Trace pelvic free fluid.     1. Large irregular irregular fluid collection with thick wall occupying most of the right abdomen surrounding the right kidney and right colon. Additional fluid and gas collection in the midline abdomen anterior to the pancreas concerning for abscess. This collection is probably connected to the right side collection via a junction in the jl hepatis 2. Severe wall thickening of the descending colon, transverse colon, second portion duodenum, likely reactive. 3. Pneumobilia with gas in the CBD, intrahepatic bile ducts as well as pancreatic ducts are of unclear etiology. 4. Small right pleural effusion with right basilar atelectasis.     CT-ABDOMEN-PELVIS WITH    Result Date: 10/2/2021  10/2/2021 2:03 PM HISTORY/REASON FOR EXAM:  RLQ and diffuse lower abdominal pain; had ERCP yesterday. Pt has pancreatitis and they found a polyp on her bile duct. TECHNIQUE/EXAM DESCRIPTION:   CT scan of the abdomen and pelvis with contrast. Contrast-enhanced helical scanning was obtained from the diaphragmatic domes through the pubic symphysis following the bolus administration of nonionic contrast without complication. 100 mL of Omnipaque 350 nonionic contrast was administered without complication. Low dose optimization technique was utilized for this CT exam including automated exposure control and adjustment of the mA and/or kV  according to patient size. COMPARISON: 2/22/2019 FINDINGS: Lower Chest: Unremarkable. Liver: Normal. Hepatic and portal veins are patent. Spleen: Unremarkable. Pancreas: The pancreatic head is edematous though the parenchyma enhances normally. There is surrounding homogenous peripancreatic and mesenteric edema/infiltration which tracks down the right paracolic gutter and conforms to retroperitoneal structures. No defined walled off collection. There is small volume ascites in the pelvis. No focal fluid collection. Adrenal glands: Normal. Gallbladder: Cholecystectomy Biliary: Unchanged mild intrahepatic bile duct dilation. Kidneys: Symmetric nephrograms. No hydronephrosis. Pelvis: Hysterectomy. Normal appearance of the urinary bladder.. Bowel: There are no dilated loops of small or large bowel. Scattered colonic diverticuli with no inflammation. The appendix is normal. Prior gastric sleeve. Lymph nodes: No adenopathy. Vasculature: No aneurysm. Musculoskeletal: Fusion of L3-L5. Multifocal degenerative changes are present. No suspicious osseous abnormality.     Acute interstitial edematous pancreatitis with surrounding mesenteric edema tracking along the right paracolic gutter. Small volume ascites in the pelvis. No walled off collections.    CT-DRAIN-PERITONEAL    Result Date: 10/16/2021  10/16/2021 1:06 PM HISTORY/REASON FOR EXAM: Large pancreatic pseudocyst, not draining well following placement of first drained. Place largest drain to lower area of retroperitoneal air/fluid region- leave upper drain in place. TECHNIQUE/EXAM DESCRIPTION: Pancreatic pseudocyst drainage with CT guidance. Low dose optimization technique was utilized for this CT exam including automated exposure control and adjustment of the mA and/or kV according to patient size. PROCEDURE: Informed consent was obtained. SEDATION: Moderate sedation was provided. Pulse oximetry and continuous cardiac monitoring by the nurse was performed throughout the  exam. Intraservice time was 30 minutes. Localizing CT images were obtained with the patient in supine position. The skin was prepped with Betadine and draped in a sterile fashion. Following local anesthesia with 1% Lidocaine, a 17 G guiding needle was placed and needle path confirmed with CT. An Amplatz guidewire was placed and following serial tract dilatation, a 14 Brazilian pigtail locking catheter was placed. A specimen was collected and submitted for amylase, culture and sensitivity and Gram stain. Total of 400 mL of brownish fluid was drained. The catheter was secured to the skin and connected to suction bulb drainage. Final CT images were obtained documenting catheter position. The patient tolerated the procedure well with no evidence of complication. COMPARISON: Recent CT scan abdomen/pelvis FINDINGS: The final CT images show satisfactory catheter position dependently within the target collection.     1.  CT guided pancreatic pseudocyst catheter drainage. 2.  The current plan is to obtain a follow-up CT in 5-7 days..    CT-DRAIN-PERITONEAL    Result Date: 10/10/2021  HISTORY/REASON FOR EXAM:  66-year-old woman with pancreatitis and extensive intraperitoneal abscess. TECHNIQUE/EXAM DESCRIPTION AND NUMBER OF VIEWS: RIGHT peritoneal drain placement with CT guidance. Low dose optimization technique was utilized for this CT exam including automated exposure control and adjustment of the mA and/or kV according to patient size. COMPARISON:  CT 10/8/2021 MEDICATIONS: Moderate sedation was provided. Pulse oximetry and continuous cardiac monitoring by the nurse was performed throughout the exam. Intraservice time was 15 minutes. PROCEDURE:     The risks, benefits, goals and objectives and alternatives were discussed. Risks were specified as including but not limited to bleeding, infection, damage to vessels or nerves, pain and discomfort as well as the possibility of incomplete resolution of underlying disease. Drain  care related issues were discussed with the patient. The patient's questions were answered. Informed oral and written consent were obtained. The patient was placed on the CT gantry. Localizing scan was performed. The skin was prepped and draped in the usual sterile manner.  A timeout was performed. Local anesthetic result was achieved with administration of 1% lidocaine. A(n) 17-gauge access needle was advanced to the target using CT fluoroscopic guidance. Access was secured with a wire and the tract was sequentially dilated to accommodate a(n) 14 Cambodian locking loop drain. A total of 80 mL of thin brown turbid fluid was removed and sent for  laboratory evaluation. This was put in place and formed. The catheter was attached to bag drainage and secured to the skin with 2-0 nylon suture. Completion scan was performed which showed no complication. The patient tolerated the procedure well with no evidence of complication. The skin was cleaned and a dressing was applied. FINDINGS: Drainage tube in good position     Successful peritoneal drainage tube placement. Plan: Thrice daily flushes with 10 mL of sterile saline. Monitor outputs. Please contact interventional radiology if there is any concern for tube dysfunction.     DX-CHEST-LIMITED (1 VIEW)    Result Date: 10/19/2021  10/19/2021 11:52 AM HISTORY/REASON FOR EXAM:  Shortness of Breath TECHNIQUE/EXAM DESCRIPTION AND NUMBER OF VIEWS: Single AP view of the chest. COMPARISON: 10/18/2016 FINDINGS: Left PICC projects over the SVC. The cardiomediastinal silhouette is stable. There is right basilar atelectasis/consolidation. There may be a small right pleural effusion. There is a calcified granuloma in the left lower lobe. There are surgical clips in  the upper abdomen. Atherosclerotic calcification is seen.     1.  Right basilar atelectasis or consolidation. Small right pleural effusion not excluded. 2.  Atherosclerotic plaque.    IR-US GUIDED PIV    Result Date:  10/10/2021  EXAMINATION:                                                                    HISTORY/REASON FOR EXAM:  Ultrasound Guided PIV.  TECHNIQUE/EXAM DESCRIPTION AND NUMBER OF VIEWS:  Peripheral IV insertion with ultrasound guidance.  The procedure was prepared using maximal sterile barrier technique including sterile gown, mask, cap, and donning of sterile gloves following appropriate hand hygiene and/or sterile scrub. Patient skin site was prepped with 2% Chlorhexidine solution.   FINDINGS: Peripheral IV insertion with Ultrasound Guidance was performed by qualified imaging nursing staff without the assistance of a Radiologist.      Ultrasound-guided PERIPHERAL IV INSERTION performed by qualified nursing staff as above.    NM-HEPATOBILIARY SCAN    Result Date: 10/10/2021  10/10/2021 11:12 AM HISTORY/REASON FOR EXAM:  rule out biliary leak post ERCP with sphincterotomy; R/O bile leak TECHNIQUE/EXAM DESCRIPTION AND NUMBER OF VIEWS: Hepatobiliary scan. COMPARISON: 10/9/2021 CT abdomen PROCEDURE:  5.4 mCi Tc 99m-Choletec was administered intravenously, followed by 90 minutes of anterior planar imaging. FINDINGS: Normal homogeneous uptake of radiotracer in the hepatic parenchyma with visualization of the tracer in the common bile duct and entering the small bowel loops. No abnormal pooling or collection radiotracer outside of the biliary system or bowel.     Normal hepatobiliary scan with no findings of a bile leak.    DX-PORTABLE FLUOROSCOPY < 1 HOUR    Result Date: 10/1/2021  10/1/2021 10:01 AM HISTORY/REASON FOR EXAM:  ERCP TECHNIQUE/EXAM DESCRIPTION AND NUMBER OF VIEWS: 2 images were obtained in the operating room. A total of 0.3 minutes of fluoroscopy time utilized. COMPARISON: Selected images CT abdomen and pelvis 2/22/2019 FINDINGS: Images show injection of contrast into the common bile duct which is dilated. There is also filling of the pancreatic duct. Fluoroscopy time: minutes     ERCP images as  described    EC-ECHOCARDIOGRAM COMPLETE W/O CONT    Result Date: 10/14/2021  Transthoracic Echo Report Echocardiography Laboratory CONCLUSIONS Normal left ventricular chamber size. Hyperdynamic left ventricular systolic function. The left ventricular ejection fraction is visually estimated to be greater than 75%. Normal right ventricular size and systolic function. Enlarged right atrium. Mild tricuspid regurgitation. Right ventricular systolic pressure is estimated to be  40 mmHg; mild pulmonary hypertension. Normal pericardium without effusion. No prior study is available for comparison. KATERINA PATEL Exam Date:         10/14/2021                    16:15 Exam Location:     Inpatient Priority:          Routine Ordering Physician:        YUE TOURE Referring Physician:       [id number] Campos Sonographer:               Aroldo Nolan RDCS, [id number] Age:    66     Gender:    F MRN:    [id number] :    1955 BSA:    1.68   Ht (in):    63     Wt (lb):    143 Exam Type:     Complete Indications:     Endocarditis ICD Codes:       421 CPT Codes:       35818 BP:   106    /   48     HR:   78 Technical Quality:       Fair MEASUREMENTS  (Male / Female) Normal Values 2D ECHO LV Diastolic Diameter PLAX        3.9 cm                4.2 - 5.9 / 3.9 - 5.3 cm LV Systolic Diameter PLAX         2.7 cm                2.1 - 4.0 cm IVS Diastolic Thickness           0.88 cm               LVPW Diastolic Thickness          0.75 cm               LVOT Diameter                     2 cm                  Estimated LV Ejection Fraction    75 %                  LV Ejection Fraction MOD BP       77.2 %                >= 55  % LV Ejection Fraction MOD 4C       79.3 %                LV Ejection Fraction MOD 2C       77.1 %                IVC Diameter                      1.5 cm                DOPPLER AV Peak Velocity                  1.8 m/s               AV Peak Gradient                  13.5 mmHg             AV Mean  Gradient                  6.3 mmHg              LVOT Peak Velocity                1.7 m/s               AV Area Cont Eq vti               2.8 cm2               MV Velocity Time Integral         42.6 cm               Mitral E Point Velocity           1.3 m/s               Mitral E to A Ratio               1.5                   MV Pressure Half Time             77.3 ms               MV Area PHT                       2.8 cm2               MV Deceleration Time              266 ms                TR Peak Velocity                  269 cm/s              PV Peak Velocity                  0.96 m/s              PV Peak Gradient                  3.7 mmHg              RVOT Peak Velocity                0.73 m/s              * Indicates values subject to auto-interpretation LV EF:  75    % FINDINGS Left Ventricle Normal left ventricular chamber size. Normal left ventricular wall thickness. Hyperdynamic left ventricular systolic function. The left ventricular ejection fraction is visually estimated to be greater than 75%. Normal regional wall motion. Normal diastolic function. Right Ventricle Normal right ventricular size. Normal right ventricular systolic function. Right Atrium Enlarged right atrium. Normal inferior vena cava size and inspiratory collapse. Left Atrium Normal left atrial size. Left atrial volume index is 26  mL/sq m. Mitral Valve Structurally normal mitral valve. No mitral stenosis. Trace mitral regurgitation. Aortic Valve The aortic valve is not well visualized. Cannot rule out bicuspid aortic valve. No aortic stenosis. No aortic insufficiency. Tricuspid Valve Structurally normal tricuspid valve. No tricuspid stenosis. Mild tricuspid regurgitation. Right ventricular systolic pressure is estimated to be  40 mmHg. Pulmonic Valve The pulmonic valve is not well visualized. No pulmonic stenosis. No pulmonic insufficiency. Pericardium Normal pericardium without effusion. Aorta The ascending aorta diameter is 3.1  cm.  Nate Sarmiento MD (Electronically Signed) Final Date:     14 October 2021                 17:39    IR-PICC LINE PLACEMENT W/ GUIDANCE > AGE 5    Result Date: 10/15/2021  HISTORY/REASON FOR EXAM:   PICC placement. TECHNIQUE/EXAM DESCRIPTION AND NUMBER OF VIEWS:   PICC line insertion with ultrasound guidance.  The procedure was performed using maximal sterile barrier technique including sterile gown, mask, cap, and donning of sterile gloves following appropriate hand hygiene and/or sterile scrub. Patient skin site was prepped with 2% Chlorhexidine solution. FINDINGS:  PICC line insertion with Ultrasound Guidance was performed by qualified nursing staff without the assistance of a Radiologist. PICC positioning appropriateness confirmed by 3CG technology; chest xray only needed in the instance 3CG unable to confirm placement.              Ultrasound-guided PICC placement performed by qualified nursing staff as above.       Micro:  Results     Procedure Component Value Units Date/Time    CULTURE WOUND W/ GRAM STAIN [[id number]]  (Abnormal)  (Susceptibility) Collected: 10/25/21 1600    Order Status: Completed Specimen: Wound Updated: 10/28/21 0851     Significant Indicator POS     Source WND     Site Abdominal Abscess     Culture Result Moderate growth usual site specific stephen, including     Gram Stain Result Few WBCs.  No organisms seen.       Culture Result Yeast  mixed morphologies        Stenotrophomonas maltophilia    Narrative:      Collected By:[id number] NOHEMI GARCIA  Collected By:[id number] NOHEMI GARCIA    Susceptibility     Stenotrophomonas maltophilia (2)     Antibiotic Interpretation Microscan Method Status    Trimeth/Sulfa Sensitive <=0.5/9.5 mcg/mL MU Final                   GRAM STAIN [[id number]] Collected: 10/25/21 1600    Order Status: Completed Specimen: Wound Updated: 10/26/21 1215     Significant Indicator .     Source WND     Site Abdominal Abscess     Gram Stain Result Few WBCs.  No organisms  "seen.      Narrative:      Collected By:[id number] NOHEMI GARCIA  Collected By:[id number] NOHEMI GARCIA    FLUID CULTURE W/GRAM STAIN [[id number]] Collected: 10/25/21 1600    Order Status: Canceled Specimen: Other Body Fluid     BLOOD CULTURE [[id number]] Collected: 10/18/21 1520    Order Status: Completed Specimen: Blood from Line Updated: 10/23/21 1700     Significant Indicator NEG     Source BLD     Site PICC Line     Culture Result No growth after 5 days of incubation.    Narrative:      Special Contact Isolation  Per Hospital Policy: Only change Specimen Src: to \"Line\" if  specified by physician order.  No site indicated    BLOOD CULTURE [[id number]] Collected: 10/18/21 1517    Order Status: Completed Specimen: Blood from Peripheral Updated: 10/23/21 1700     Significant Indicator NEG     Source BLD     Site PERIPHERAL     Culture Result No growth after 5 days of incubation.    Narrative:      Special Contact Isolation  Per Hospital Policy: Only change Specimen Src: to \"Line\" if  specified by physician order.  Left Forearm/Arm          Assessment:  Active Hospital Problems    Diagnosis    • *Bacteremia due to Gram-negative bacteria and fungemia [R78.81]    • Acute respiratory failure with hypoxia (HCC) [J96.01]    • Duodenal perforation (HCC) [K63.1]    • Postprocedural retroperitoneal abscess [K68.11]    • Diarrhea [R19.7]    • Electrolyte abnormality [E87.8]    • Hyponatremia [E87.1]    • Sepsis due to intraabdominal fluid collection/abscess (HCC) [A41.9]    • Hyperlipidemia [E78.5]    • Hypertension [I10]        Assessment:  66 y.o. 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  Patient found to have multiple and extensive fluid collections in the abdomen and pelvis on imaging.  She underwent " drain placement on 10/8.  Cultures are growing E. coli and Enterococcus faecalis.  Blood cultures are growing chryseobacterium species and Candida glabrata. Source likely due to the intra-abdominal abscesses. Repeat CT scan on 10/13 shows extensive multiloculated fluid collections in the abdomen and pelvis.  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  -Chryseobacterium species -the patient has been treated with Zosyn for multiple days which has intermediate sensitivity per chart.  However, repeat blood cultures negative  -Repeat blood culture on 10/18, no growth final  -Blood cultures from 10/13 are no growth and final    -TTE-negative  -Wound cultures on 10/16-Enterococcus faecalis, Candida albicans and Candida glabrata  -Continue IV Zosyn and IV micafungin   -Dr. Cook following-no plans for surgical intervention  -Repeat CT scan ordered by IR on 10/21-grossly unchanged irregular fluid collections in the right abdomen   -Status post IR drainage placement into right lower quadrant on 10/25.  Cultures-yeast, stenotrophomonas maltophilia (sensitive to Bactrim).  Patient with allergy to sulfa in the past with itching however is willing to be rechallenged.  -Spoke to micro lab today and they will run levofloxacin E-test in case patient does not tolerate Bactrim.  Last EKG on 10/2 with QTc interval 444  -Status post right upper quadrant drain upsizing and left upper quadrant drain removal on 10/27  -IR  following  -Monitor drain output  -Start p.o. Bactrim 1 double strength tablet twice a day.  Discussed with patient and patient agreeable to being rechallenged.  Monitor for any side effects  -Will plan a 4-week course of antibiotics from 10/27.  Stop date 11/24/2021  -Repeat CT scan prior to stop date of IV antibiotics-hospitalist to order outpatient CT scan  -Home IV antibiotic orders for Zosyn 13.5 g continuous infusion every 23-24 hours and micafungin 100 mg daily written on 10/28    Disposition: Home IV antibiotics.  Patient should be discharged on Bactrim 1 double strength tablet twice a day, IV Zosyn and IV micafungin    Follow-up in ID clinic    Discussed with internal medicine Dr. Lindquist.  ID signing off.  Please reconsult if needed

## 2021-10-30 NOTE — PROGRESS NOTES
Hospital Medicine Daily Progress Note    Date of Service  10/30/2021    Chief Complaint  Nils Alfonso is a 66 y.o. female admitted 10/15/2021 with abdominal pain    Hospital Course  Initially admitted to [facility] for evaluation of abdominal pain after recent ERCP with polypectomy and sphincterotomy and noted to have large intra-abdominal fluid collection for which she underwent drainage with interventional radiology and was evaluated by infectious disease and surgery.  She was transferred to [facility] for higher level of care.  She was evaluated by Dr Cook who recommended conservative management with placement of a second drain    Pneumobilia with gas in the CBD - s/p drain placement  TPN  Post ERCP retroperitoneal abscess with drain x2     Repeat CT 10/22 showed Grossly unchanged irregular fluid collection occupying the right abdomen surrounding the right kidney and right colon extending to midline.     I discussed with ID and they would like to repeat imaging prior to final abx plan- for now plan for 4 week abx course until 11/24.  I d/w Dr. marshall and plan for f/u as outpatient in 3 weeks with him      Interval Problem Update  Her top drain is leaking around the drain and she is also having drainage from her left lower quadrant where her prior drain was  I discussed with ID and patient as well.  Although she has her Bactrim allergy we will give this a challenge.  I have ordered blood work as well as repeat CT abdomen pelvis  I d/w IR and Dr. Hancock recommends re imaging    I have personally seen and examined the patient at bedside. I discussed the plan of care with patient, family, bedside RN and infectious disease and IR.    Consultants/Specialty  general surgery, GI, infectious disease and interventional radiology    Code Status  Full Code    Disposition  Patient is not medically cleared.   Anticipate discharge to to home with organized home healthcare and  close outpatient follow-up.  I have placed the appropriate orders for post-discharge needs.    Review of Systems  Review of Systems   Constitutional: Negative for chills and fever.   Respiratory: Negative for cough and shortness of breath.    Cardiovascular: Negative for chest pain and palpitations.   Gastrointestinal: Positive for abdominal pain. Negative for nausea and vomiting.   Genitourinary: Negative for dysuria and urgency.   Neurological: Negative for dizziness and headaches.   All other systems reviewed and are negative.       Physical Exam  Temp:  [36.4 °C (97.6 °F)-37.3 °C (99.2 °F)] 37.3 °C (99.2 °F)  Pulse:  [66-90] 67  Resp:  [16-17] 17  BP: (107-125)/(58-77) 107/58  SpO2:  [92 %-94 %] 92 %    Physical Exam  Vitals and nursing note reviewed.   Constitutional:       Appearance: Normal appearance.   Cardiovascular:      Rate and Rhythm: Normal rate and regular rhythm.   Pulmonary:      Effort: Pulmonary effort is normal.      Breath sounds: Normal breath sounds.   Abdominal:      General: Abdomen is flat.      Palpations: Abdomen is soft.      Tenderness: There is no abdominal tenderness.      Comments: RUQ drain and R flank drain +  Brownish thick drainage around the right upper quadrant drain  There is also brownish thick drainage from left lower quadrant incision where prior drain was.    Nonodorous.   Musculoskeletal:      Right lower leg: Edema present.      Left lower leg: Edema present.   Skin:     General: Skin is warm and dry.   Neurological:      General: No focal deficit present.      Mental Status: She is alert and oriented to person, place, and time.       Fluids    Intake/Output Summary (Last 24 hours) at 10/30/2021 1239  Last data filed at 10/30/2021 1000  Gross per 24 hour   Intake 802 ml   Output 200 ml   Net 602 ml       Laboratory  Recent Labs     10/28/21  0330   WBC 6.8   RBC 2.68*   HEMOGLOBIN 8.0*   HEMATOCRIT 24.6*   MCV 91.8   MCH 29.9   MCHC 32.5*   RDW 49.2   PLATELETCT 205    MPV 9.7     Recent Labs     10/28/21  0330   SODIUM 133*   POTASSIUM 3.4*   CHLORIDE 96   CO2 28   GLUCOSE 93   BUN 10   CREATININE 0.70   CALCIUM 8.0*                   Imaging  IR-DRAINAGE-CATH EXCHANGE   Final Result      1.  Successful left-sided drainage catheter removal.   2.  Successful image guided superior right drainage catheter replacement.      Plan: Suction drainage. Monitor outputs. Please contact interventional radiology if there is any concern for tube dysfunction. Suggest routine tube maintenance at 3 months intervals if the tube remains in place.         CT-DRAIN-PERITONEAL   Final Result      1.  CT GUIDED PERITONEAL RLQ abdominal CATHETER DRAINAGE.   2.  THE CURRENT PLAN IS TO MONITOR DRAINAGE OUTPUT AND OBTAIN A FOLLOWUP CT SCAN IN 5-7 DAYS IF CLINICALLY INDICATED.      CT-ABDOMEN-PELVIS WITH   Final Result         1. Grossly unchanged irregular fluid collection occupying the right abdomen surrounding the right kidney and right colon extending to midline. Additional pigtail catheter in the component about midline anterior abdomen. Both pigtail catheters seem to be    within the collection.      2. Small right pleural effusion with right basilar atelectasis.               DX-CHEST-LIMITED (1 VIEW)   Final Result      1.  Right basilar atelectasis or consolidation. Small right pleural effusion not excluded.   2.  Atherosclerotic plaque.      CT-DRAIN-PERITONEAL   Final Result      1.  CT guided pancreatic pseudocyst catheter drainage.   2.  The current plan is to obtain a follow-up CT in 5-7 days..      IR-PICC LINE PLACEMENT W/ GUIDANCE > AGE 5   Final Result                  Ultrasound-guided PICC placement performed by qualified nursing staff as    above.          CT-ABDOMEN-PELVIS WITH    (Results Pending)   CT-ABDOMEN-PELVIS WITH    (Results Pending)        Assessment/Plan  * Bacteremia due to Gram-negative bacteria and fungemia- (present on admission)  Assessment & Plan  ID consulted -  continue micafungin and zosyn indefinitely due to incomplete source control  Source control per ID, Surgery, and IR consults    Peritoneal fluid cultures grew E. coli and Enterococcus   Blood cultures grew Chryseobacterium and Candida glabrata   Repeat blood cultures from 10/13/2021 are negative    Peritoneal fluid from 10/16/2021 growing yeast and Stenotrophomonas maltophilia-  Monitor drain output and continue current antibiotics  KALANI negative for signs of endocarditis    On total parenteral nutrition (TPN)  Assessment & Plan  RD consulted for enteral nutrition recommendations  TPN discontinued 10/27    Hypophosphatemia- (present on admission)  Assessment & Plan  Resolved with TPN  Monitoring per RD    Acute respiratory failure with hypoxia (HCC)- (present on admission)  Assessment & Plan  Resolved with diuresis  Likely volume overload with lower extremity edema present    Antibiotic-associated diarrhea  Assessment & Plan  Cdiff PCR negative  Loperamide PRN    Postprocedural retroperitoneal abscess- (present on admission)  Assessment & Plan  Now with abdominal drain x3  General surgery consulted, recommend ongoing nonoperative management  IR consulted, positioning and revision of tubes per recommendations  Repeat CT 10/22 showed Grossly unchanged irregular fluid collection occupying the right abdomen surrounding the right kidney and right colon extending to midline     ID consulted, recommending ongoing micafungin / zosyn and re-imaging in 4 weeks (ordered)  Will need to follow up in surgical clinic due to limited ID clinic availability    Thick brown discharge around RUQ drain and LLQ incision where prior drain was  Repeat ct imaging ordered  I discussed POC with IR and ID    Duodenal perforation (HCC)- (present on admission)  Assessment & Plan  After ERCP with retroperitoneal abscess and bacteremia  Uncertain etiology - ddx includes pancreatitis, bile leak, iatrogenic  GI consulted and s/o, management per ID /  IR / Surgery    Hyponatremia- (present on admission)  Assessment & Plan  Recurrent, mild  Diuresis as tolerated  Repeat AM BMP    Normocytic anemia- (present on admission)  Assessment & Plan  Ferritin and Fe% consistent with AOCD from intra-abdominal infection  Repeat AM CBC  Transfuse for Hgb <7    Sepsis due to intraabdominal fluid collection/abscess (HCC)- (present on admission)  Assessment & Plan  Sepsis resolved  Source intra-abdominal  Continue Zosyn and Mycamine and follow-up on repeat cultures  ID following  Re-imaging per ID, IR, and Surgery consults    Hyperlipidemia- (present on admission)  Assessment & Plan  Continue ezetimibe    Primary hypertension- (present on admission)  Assessment & Plan  Continue lisinopril  No indication for strict BP control, PRN antihypertensives discontinued       VTE prophylaxis: enoxaparin ppx    I have performed a physical exam and reviewed and updated ROS and Plan today (10/30/2021). In review of yesterday's note (10/29/2021), there are no changes except as documented above.

## 2021-10-30 NOTE — THERAPY
Physical Therapy   Initial Evaluation     Patient Name: Nils Alfonso  Age:  66 y.o., Sex:  female  Medical Record #: [id number]  Today's Date: 10/30/2021     Precautions  Precautions: Fall Risk    Assessment  Patient is 66 y.o. female with a diagnosis of bacteremia. Pt admitted on 10/15 /21 due to abdominal pain. Found to have large intraabdominal fluid collection, underwent drainage, currently has MARTHA's x 2.   Pt presenting with decreased activity tolerance with c/o dizziness in standing. Pt tolerated short distance ambulation from bed <> toilet. ( see below for status, PLOF and goals). Pt will benefit from continued inpatient PT while in house. Recommend FWW for home and home care PT follow up.    Plan    Recommend Physical Therapy 4 times per week until therapy goals are met for the following treatments:  Bed Mobility, Equipment, Gait Training, Neuro Re-Education / Balance, Self Care/Home Evaluation, Stair Training, Therapeutic Activities and Therapeutic Exercises    DC Equipment Recommendations: Front-Wheel Walker  Discharge Recommendations: Recommend home health for continued physical therapy services          Objective       10/30/21 1107   Prior Living Situation   Prior Services None   Housing / Facility 1 Story House   Steps Into Home 2   Steps In Home 0   Rail Right Rail (Steps into Home)   Bathroom Set up Grab Bars   Equipment Owned None   Lives with - Patient's Self Care Capacity Spouse   Comments home set up with grab bars   Prior Level of Functional Mobility   Bed Mobility Independent   Transfer Status Independent   Ambulation Independent   Distance Ambulation (Feet)   (community )   Assistive Devices Used None   Stairs Unable To Determine At This Time   Comments pt and spouse in the process of moving to their winter home but will stay in [facility] for now.    History of Falls   History of Falls No   Cognition    Level of Consciousness Alert   Active ROM Lower Body    Active ROM Lower Body  WDL  "  Strength Lower Body   Lower Body Strength  X   Gross Strength Generalized Weakness, Equal Bilaterally   Balance Assessment   Sitting Balance (Static) Good   Sitting Balance (Dynamic) Fair +   Standing Balance (Static) Fair +   Standing Balance (Dynamic) Fair   Weight Shift Sitting Good   Weight Shift Standing Fair   Comments w/ FWW   Gait Analysis   Gait Level Of Assist Supervised   Assistive Device Front Wheel Walker   Distance (Feet) 10   # of Times Distance was Traveled 2   Deviation Bradykinetic;Shuffled Gait  (heavy reliance on FWW for support.)   # of Stairs Climbed 0   Weight Bearing Status no restrictions.   Comments limited by c/o dizziness, BP dropped to 107/58    Bed Mobility    Supine to Sit Supervised   Sit to Supine Supervised   Scooting Supervised   Rolling Supervised   Comments extended time and increased effort   Functional Mobility   Sit to Stand Supervised   Bed, Chair, Wheelchair Transfer Supervised   Toilet Transfers Minimal Assist   Transfer Method Stand Step   Mobility room to bathroom then BTB   Comments cues for hand placement with sit <> stand   Edema / Skin Assessment   Comments Sina's x 2 intact, dressing CDI   Patient / Family Goals    Patient / Family Goal #1 \"go home\"   Short Term Goals    Short Term Goal # 1 Pt will perform transfers with LRAD at Mod I level for home by the 6th tx   Short Term Goal # 2 Pt will ambulate with LRAD for 150 ft with SPV to access all required areas of her home by the 6th tx   Short Term Goal # 3 Pt will ascend and descend step x 2 with SPV to access her home by the 6th tx   Education Group   Education Provided Role of Physical Therapist;Use of Assistive Device   Role of Physical Therapist Patient Response Patient;Eager;Explanation;Family;Verbal Demonstration   Use of Assistive Device Patient Response Patient;Family;Acceptance;Explanation;Demonstration;Reinforcement Needed   Additional Comments pt and spouse educated on the benefit for FWW vs 4WW for " stabilization. verbalized understanding.    Problem List    Problems Pain;Decreased Activity Tolerance;Impaired Balance;Functional Strength Deficit;Impaired Ambulation

## 2021-10-30 NOTE — CARE PLAN
The patient is Stable - Low risk of patient condition declining or worsening    Shift Goals  Clinical Goals: Pt's pain will be managed during this shift  Patient Goals: rest  Family Goals: get her better    Progress made toward(s) clinical / shift goals: Pt reports 8/10 right sided abdominal pain, administered medication per MAR. Provided heat packs.

## 2021-10-30 NOTE — PROGRESS NOTES
COVID-19 surge in effect    Pt is A&Ox4. Pt is resting in bed, VSS on RA. Pt reports 5/10 right abdominal pain, provided heat pack as requested. Call light & personal belongings within reach, bed in lowest position & locked, and fall precautions in place. All needs met at this time.

## 2021-10-30 NOTE — PROGRESS NOTES
Bedside report received at change of shift. Pt is A&Ox4, reported 8/10 pain. Medicated per MAR. Pt's top drain is leaking around the drain. MD Lindquist notified. Safety precautions in place. All pt needs met at this time.    COVID-19 surge in effect.

## 2021-10-31 PROCEDURE — 700111 HCHG RX REV CODE 636 W/ 250 OVERRIDE (IP): Performed by: FAMILY MEDICINE

## 2021-10-31 PROCEDURE — 700111 HCHG RX REV CODE 636 W/ 250 OVERRIDE (IP): Performed by: STUDENT IN AN ORGANIZED HEALTH CARE EDUCATION/TRAINING PROGRAM

## 2021-10-31 PROCEDURE — 700111 HCHG RX REV CODE 636 W/ 250 OVERRIDE (IP): Performed by: INTERNAL MEDICINE

## 2021-10-31 PROCEDURE — 700105 HCHG RX REV CODE 258: Performed by: INTERNAL MEDICINE

## 2021-10-31 PROCEDURE — 99233 SBSQ HOSP IP/OBS HIGH 50: CPT | Performed by: HOSPITALIST

## 2021-10-31 PROCEDURE — A9270 NON-COVERED ITEM OR SERVICE: HCPCS | Performed by: STUDENT IN AN ORGANIZED HEALTH CARE EDUCATION/TRAINING PROGRAM

## 2021-10-31 PROCEDURE — 700102 HCHG RX REV CODE 250 W/ 637 OVERRIDE(OP): Performed by: INTERNAL MEDICINE

## 2021-10-31 PROCEDURE — 700102 HCHG RX REV CODE 250 W/ 637 OVERRIDE(OP): Performed by: FAMILY MEDICINE

## 2021-10-31 PROCEDURE — 770006 HCHG ROOM/CARE - MED/SURG/GYN SEMI*

## 2021-10-31 PROCEDURE — A9270 NON-COVERED ITEM OR SERVICE: HCPCS | Performed by: FAMILY MEDICINE

## 2021-10-31 PROCEDURE — 700102 HCHG RX REV CODE 250 W/ 637 OVERRIDE(OP): Performed by: STUDENT IN AN ORGANIZED HEALTH CARE EDUCATION/TRAINING PROGRAM

## 2021-10-31 PROCEDURE — A9270 NON-COVERED ITEM OR SERVICE: HCPCS | Performed by: INTERNAL MEDICINE

## 2021-10-31 PROCEDURE — 700111 HCHG RX REV CODE 636 W/ 250 OVERRIDE (IP): Mod: JG | Performed by: INTERNAL MEDICINE

## 2021-10-31 PROCEDURE — 97166 OT EVAL MOD COMPLEX 45 MIN: CPT

## 2021-10-31 RX ADMIN — GABAPENTIN 600 MG: 300 CAPSULE ORAL at 05:22

## 2021-10-31 RX ADMIN — GABAPENTIN 600 MG: 300 CAPSULE ORAL at 16:54

## 2021-10-31 RX ADMIN — EZETIMIBE 10 MG: 10 TABLET ORAL at 16:54

## 2021-10-31 RX ADMIN — FAMOTIDINE 20 MG: 20 TABLET ORAL at 16:54

## 2021-10-31 RX ADMIN — Medication 1000 UNITS: at 05:22

## 2021-10-31 RX ADMIN — ONDANSETRON 4 MG: 2 INJECTION INTRAMUSCULAR; INTRAVENOUS at 17:12

## 2021-10-31 RX ADMIN — SULFAMETHOXAZOLE AND TRIMETHOPRIM 1 TABLET: 800; 160 TABLET ORAL at 16:54

## 2021-10-31 RX ADMIN — LISINOPRIL 5 MG: 5 TABLET ORAL at 05:22

## 2021-10-31 RX ADMIN — OXYCODONE HYDROCHLORIDE 10 MG: 10 TABLET ORAL at 17:12

## 2021-10-31 RX ADMIN — ONDANSETRON 4 MG: 2 INJECTION INTRAMUSCULAR; INTRAVENOUS at 13:10

## 2021-10-31 RX ADMIN — ENOXAPARIN SODIUM 40 MG: 40 INJECTION SUBCUTANEOUS at 05:23

## 2021-10-31 RX ADMIN — OXYCODONE HYDROCHLORIDE 10 MG: 10 TABLET ORAL at 23:33

## 2021-10-31 RX ADMIN — ONDANSETRON 4 MG: 2 INJECTION INTRAMUSCULAR; INTRAVENOUS at 23:33

## 2021-10-31 RX ADMIN — FUROSEMIDE 40 MG: 10 INJECTION, SOLUTION INTRAMUSCULAR; INTRAVENOUS at 05:26

## 2021-10-31 RX ADMIN — OXYCODONE HYDROCHLORIDE 10 MG: 10 TABLET ORAL at 13:10

## 2021-10-31 RX ADMIN — PIPERACILLIN SODIUM AND TAZOBACTAM SODIUM 3.38 G: 3; .375 INJECTION, POWDER, FOR SOLUTION INTRAVENOUS at 13:32

## 2021-10-31 RX ADMIN — PIPERACILLIN SODIUM AND TAZOBACTAM SODIUM 3.38 G: 3; .375 INJECTION, POWDER, FOR SOLUTION INTRAVENOUS at 04:39

## 2021-10-31 RX ADMIN — SULFAMETHOXAZOLE AND TRIMETHOPRIM 1 TABLET: 800; 160 TABLET ORAL at 08:41

## 2021-10-31 RX ADMIN — FAMOTIDINE 20 MG: 20 TABLET ORAL at 05:23

## 2021-10-31 RX ADMIN — MICAFUNGIN SODIUM 100 MG: 100 INJECTION, POWDER, LYOPHILIZED, FOR SOLUTION INTRAVENOUS at 12:28

## 2021-10-31 RX ADMIN — PIPERACILLIN SODIUM AND TAZOBACTAM SODIUM 3.38 G: 3; .375 INJECTION, POWDER, FOR SOLUTION INTRAVENOUS at 19:52

## 2021-10-31 ASSESSMENT — PAIN DESCRIPTION - PAIN TYPE
TYPE: ACUTE PAIN

## 2021-10-31 ASSESSMENT — ENCOUNTER SYMPTOMS
ABDOMINAL PAIN: 1
HEADACHES: 0
CHILLS: 0
VOMITING: 0
PALPITATIONS: 0
FEVER: 0
DIZZINESS: 0
NAUSEA: 0
SHORTNESS OF BREATH: 0
COUGH: 0

## 2021-10-31 ASSESSMENT — COGNITIVE AND FUNCTIONAL STATUS - GENERAL
TOILETING: A LITTLE
HELP NEEDED FOR BATHING: A LITTLE
DAILY ACTIVITIY SCORE: 21
DRESSING REGULAR LOWER BODY CLOTHING: A LITTLE
SUGGESTED CMS G CODE MODIFIER DAILY ACTIVITY: CJ

## 2021-10-31 ASSESSMENT — ACTIVITIES OF DAILY LIVING (ADL): TOILETING: INDEPENDENT

## 2021-10-31 NOTE — THERAPY
Occupational Therapy   Initial Evaluation     Patient Name: Nils Alfonso  Age:  66 y.o., Sex:  female  Medical Record #: [id number]  Today's Date: 10/31/2021     Precautions  Precautions: (P) Fall Risk    Assessment  Patient is 66 y.o. female with a diagnosis of Intraabdominal pn.  Pt currently limited by decreased functional mobility, activity tolerance, strength, balance, and pain which are affecting pt's ability to complete ADLs/IADLs at baseline. Pt would benefit from OT services in the acute care setting to maximize functional recovery.      Plan    Recommend Occupational Therapy 3 times per week until therapy goals are met for the following treatments:  Self Care/Activities of Daily Living and Therapeutic Activities.       Discharge Recommendations: (P) Anticipate that the patient will have no further occupational therapy needs after discharge from the hospital        10/31/21 1032   Prior Living Situation   Prior Services None   Housing / Facility 1 Story House   Steps Into Home 2   Steps In Home 0   Bathroom Set up Grab Bars   Equipment Owned None   Lives with - Patient's Self Care Capacity Spouse   Prior Level of ADL Function   Self Feeding Independent   Grooming / Hygiene Independent   Bathing Independent   Dressing Independent   Toileting Independent   ADL Assessment   Grooming Supervision   Upper Body Dressing Supervision   Lower Body Dressing Minimal Assist   Toileting Minimal Assist   Short Term Goals   Short Term Goal # 1 supervised with LB dressing   Short Term Goal # 2 supervised with ADL txfs

## 2021-10-31 NOTE — PROGRESS NOTES
Lateral right MARTHA drain is leaking with heavy drainage, requiring 3-4 dressing changes per shift. With each flush of 50mL, q4 as ordered,  fluid starts draining right out, saturating dressing.

## 2021-10-31 NOTE — PROGRESS NOTES
COVID-19 surge in effect    Pt is A&Ox4. Pt is resting in bed, no signs of labored breathing. Pt reports 8/10 right abdominal pain and nausea, administered medication per MAR. Superior MARTHA drain is leaking w/ moderate drainage to dressing. Call light & personal belongings within reach, bed in lowest position & locked. All needs met at this time.

## 2021-10-31 NOTE — PROGRESS NOTES
Hospital Medicine Daily Progress Note    Date of Service  10/31/2021    Chief Complaint  Nils Alfonso is a 66 y.o. female admitted 10/15/2021 with abdominal pain    Hospital Course  Initially admitted to [facility] for evaluation of abdominal pain after recent ERCP with polypectomy and sphincterotomy and noted to have large intra-abdominal fluid collection for which she underwent drainage with interventional radiology and was evaluated by infectious disease and surgery.  She was transferred to [facility] for higher level of care.  She was evaluated by Dr Cook who recommended conservative management with placement of a second drain    Pneumobilia with gas in the CBD - s/p drain placement  TPN  Post ERCP retroperitoneal abscess with drain x2     Repeat CT 10/22 showed Grossly unchanged irregular fluid collection occupying the right abdomen surrounding the right kidney and right colon extending to midline.     I discussed with ID and they would like to repeat imaging prior to final abx plan- for now plan for 4 week abx course until 11/24.  I d/w Dr. marshall and plan for f/u as outpatient in 3 weeks with him      Interval Problem Update  Still with continued drainage around her drain  I d/w IR and plan will be to upsize drain tomorrow.  However if this fails then at that time we will need to discuss with surgery again  CT showing unchanged size of abscess  Tolerating Bactrim okay    I have personally seen and examined the patient at bedside. I discussed the plan of care with patient, family, bedside RN and IR.    Consultants/Specialty  general surgery, GI, infectious disease and interventional radiology    Code Status  Full Code    Disposition  Patient is not medically cleared.   Anticipate discharge to to home with organized home healthcare and close outpatient follow-up.  I have placed the appropriate orders for post-discharge needs.    Review of Systems  Review of  Systems   Constitutional: Negative for chills and fever.   Respiratory: Negative for cough and shortness of breath.    Cardiovascular: Negative for chest pain and palpitations.   Gastrointestinal: Positive for abdominal pain. Negative for nausea and vomiting.   Genitourinary: Negative for dysuria and urgency.   Neurological: Negative for dizziness and headaches.   All other systems reviewed and are negative.       Physical Exam  Temp:  [36.4 °C (97.6 °F)-37.2 °C (99 °F)] 36.9 °C (98.4 °F)  Pulse:  [64-90] 64  Resp:  [17-20] 17  BP: (117-148)/(62-71) 117/63  SpO2:  [90 %-94 %] 91 %    Physical Exam  Vitals and nursing note reviewed.   Constitutional:       Appearance: Normal appearance.   Cardiovascular:      Rate and Rhythm: Normal rate and regular rhythm.   Pulmonary:      Effort: Pulmonary effort is normal.      Breath sounds: Normal breath sounds.   Abdominal:      General: Abdomen is flat.      Palpations: Abdomen is soft.      Tenderness: There is no abdominal tenderness.      Comments: RUQ drain and R flank drain +  Brownish thick drainage around the right upper quadrant drain  There is also brownish thick drainage from left lower quadrant incision where prior drain was.    Nonodorous.   Musculoskeletal:      Right lower leg: Edema present.      Left lower leg: Edema present.   Skin:     General: Skin is warm and dry.   Neurological:      General: No focal deficit present.      Mental Status: She is alert and oriented to person, place, and time.       Fluids    Intake/Output Summary (Last 24 hours) at 10/31/2021 1214  Last data filed at 10/31/2021 0900  Gross per 24 hour   Intake 1024 ml   Output 45 ml   Net 979 ml       Laboratory  Recent Labs     10/30/21  1320   WBC 4.7*   RBC 2.98*   HEMOGLOBIN 8.8*   HEMATOCRIT 26.8*   MCV 89.9   MCH 29.5   MCHC 32.8*   RDW 46.0   PLATELETCT 226   MPV 9.3     Recent Labs     10/30/21  1320   SODIUM 130*   POTASSIUM 3.4*   CHLORIDE 93*   CO2 27   GLUCOSE 98   BUN 9    CREATININE 0.68   CALCIUM 8.0*                   Imaging  CT-ABDOMEN-PELVIS WITH   Final Result      1.  There has been interval placement of an additional inferior lateral pigtail catheter within the complex right abdominal abscess. The other 2 pigtail catheters are stable in appearance.   2.  There has been no significant interval decrease in size of the large complex loculated abscess collection in the right abdomen.   3.  There is no new fluid collection.   4.  Gallbladder is surgically absent with continued pneumobilia.   5.  Interval decrease in the dependent pleural effusion with minimal airspace disease, likely atelectasis.      IR-DRAINAGE-CATH EXCHANGE   Final Result      1.  Successful left-sided drainage catheter removal.   2.  Successful image guided superior right drainage catheter replacement.      Plan: Suction drainage. Monitor outputs. Please contact interventional radiology if there is any concern for tube dysfunction. Suggest routine tube maintenance at 3 months intervals if the tube remains in place.         CT-DRAIN-PERITONEAL   Final Result      1.  CT GUIDED PERITONEAL RLQ abdominal CATHETER DRAINAGE.   2.  THE CURRENT PLAN IS TO MONITOR DRAINAGE OUTPUT AND OBTAIN A FOLLOWUP CT SCAN IN 5-7 DAYS IF CLINICALLY INDICATED.      CT-ABDOMEN-PELVIS WITH   Final Result         1. Grossly unchanged irregular fluid collection occupying the right abdomen surrounding the right kidney and right colon extending to midline. Additional pigtail catheter in the component about midline anterior abdomen. Both pigtail catheters seem to be    within the collection.      2. Small right pleural effusion with right basilar atelectasis.               DX-CHEST-LIMITED (1 VIEW)   Final Result      1.  Right basilar atelectasis or consolidation. Small right pleural effusion not excluded.   2.  Atherosclerotic plaque.      CT-DRAIN-PERITONEAL   Final Result      1.  CT guided pancreatic pseudocyst catheter drainage.    2.  The current plan is to obtain a follow-up CT in 5-7 days..      IR-PICC LINE PLACEMENT W/ GUIDANCE > AGE 5   Final Result                  Ultrasound-guided PICC placement performed by qualified nursing staff as    above.          CT-ABDOMEN-PELVIS WITH    (Results Pending)   IR-CONSULT AND TREAT    (Results Pending)        Assessment/Plan  * Bacteremia due to Gram-negative bacteria and fungemia- (present on admission)  Assessment & Plan  ID consulted - continue micafungin and zosyn indefinitely due to incomplete source control  Source control per ID, Surgery, and IR consults    Peritoneal fluid cultures grew E. coli and Enterococcus   Blood cultures grew Chryseobacterium and Candida glabrata   Repeat blood cultures from 10/13/2021 are negative    Peritoneal fluid from 10/16/2021 growing yeast and Stenotrophomonas maltophilia-  Monitor drain output and continue current antibiotics  KALANI negative for signs of endocarditis    On total parenteral nutrition (TPN)  Assessment & Plan  RD consulted for enteral nutrition recommendations  TPN discontinued 10/27    Hypophosphatemia- (present on admission)  Assessment & Plan  Resolved with TPN  Monitoring per RD    Acute respiratory failure with hypoxia (HCC)- (present on admission)  Assessment & Plan  Resolved with diuresis  Likely volume overload with lower extremity edema present    Antibiotic-associated diarrhea  Assessment & Plan  Cdiff PCR negative  Loperamide PRN    Postprocedural retroperitoneal abscess- (present on admission)  Assessment & Plan  Now with abdominal drain x3  General surgery consulted, recommend ongoing nonoperative management  IR consulted, positioning and revision of tubes per recommendations  Repeat CT 10/22 showed Grossly unchanged irregular fluid collection occupying the right abdomen surrounding the right kidney and right colon extending to midline     ID consulted, recommending ongoing micafungin / zosyn and re-imaging in 4 weeks  (ordered)  Will need to follow up in surgical clinic due to limited ID clinic availability    Repeat ct imaging with unchanged abscess size.  Continued drainage around drain.  I discussed with IR and plan will be to upsize drain on 11/1  I discussed POC with IR    Duodenal perforation (HCC)- (present on admission)  Assessment & Plan  After ERCP with retroperitoneal abscess and bacteremia  Uncertain etiology - ddx includes pancreatitis, bile leak, iatrogenic  GI consulted and s/o, management per ID / IR / Surgery    Hyponatremia- (present on admission)  Assessment & Plan  Recurrent, mild  Diuresis as tolerated  Repeat AM BMP    Normocytic anemia- (present on admission)  Assessment & Plan  Ferritin and Fe% consistent with AOCD from intra-abdominal infection  Repeat AM CBC  Transfuse for Hgb <7    Sepsis due to intraabdominal fluid collection/abscess (HCC)- (present on admission)  Assessment & Plan  Sepsis resolved  Source intra-abdominal  Continue Zosyn and Mycamine and follow-up on repeat cultures  ID following  Re-imaging per ID, IR, and Surgery consults    Hyperlipidemia- (present on admission)  Assessment & Plan  Continue ezetimibe    Primary hypertension- (present on admission)  Assessment & Plan  Continue lisinopril  No indication for strict BP control, PRN antihypertensives discontinued       VTE prophylaxis: enoxaparin ppx    I have performed a physical exam and reviewed and updated ROS and Plan today (10/31/2021). In review of yesterday's note (10/30/2021), there are no changes except as documented above.

## 2021-10-31 NOTE — CARE PLAN
The patient isStable - Low risk of patient condition declining or worsening    Shift Goals  Clinical Goals: Pt's pain will be managed during this shift  Patient Goals: rest  Family Goals: get her better    Progress made toward(s) clinical / shift goals: Pt reports 8/10 right sided abdominal pain, administered prn medication per MAR.

## 2021-11-01 LAB
BACTERIA WND AEROBE CULT: ABNORMAL
GRAM STN SPEC: ABNORMAL
SIGNIFICANT IND 70042: ABNORMAL
SITE SITE: ABNORMAL
SOURCE SOURCE: ABNORMAL

## 2021-11-01 PROCEDURE — 700111 HCHG RX REV CODE 636 W/ 250 OVERRIDE (IP): Performed by: FAMILY MEDICINE

## 2021-11-01 PROCEDURE — A9270 NON-COVERED ITEM OR SERVICE: HCPCS | Performed by: FAMILY MEDICINE

## 2021-11-01 PROCEDURE — 770006 HCHG ROOM/CARE - MED/SURG/GYN SEMI*

## 2021-11-01 PROCEDURE — 700102 HCHG RX REV CODE 250 W/ 637 OVERRIDE(OP): Performed by: STUDENT IN AN ORGANIZED HEALTH CARE EDUCATION/TRAINING PROGRAM

## 2021-11-01 PROCEDURE — 97116 GAIT TRAINING THERAPY: CPT

## 2021-11-01 PROCEDURE — 700102 HCHG RX REV CODE 250 W/ 637 OVERRIDE(OP): Performed by: INTERNAL MEDICINE

## 2021-11-01 PROCEDURE — 700102 HCHG RX REV CODE 250 W/ 637 OVERRIDE(OP): Performed by: FAMILY MEDICINE

## 2021-11-01 PROCEDURE — 99232 SBSQ HOSP IP/OBS MODERATE 35: CPT | Performed by: HOSPITALIST

## 2021-11-01 PROCEDURE — 700111 HCHG RX REV CODE 636 W/ 250 OVERRIDE (IP): Performed by: INTERNAL MEDICINE

## 2021-11-01 PROCEDURE — A9270 NON-COVERED ITEM OR SERVICE: HCPCS | Performed by: STUDENT IN AN ORGANIZED HEALTH CARE EDUCATION/TRAINING PROGRAM

## 2021-11-01 PROCEDURE — 700111 HCHG RX REV CODE 636 W/ 250 OVERRIDE (IP): Performed by: STUDENT IN AN ORGANIZED HEALTH CARE EDUCATION/TRAINING PROGRAM

## 2021-11-01 PROCEDURE — A9270 NON-COVERED ITEM OR SERVICE: HCPCS | Performed by: INTERNAL MEDICINE

## 2021-11-01 PROCEDURE — 700105 HCHG RX REV CODE 258: Performed by: INTERNAL MEDICINE

## 2021-11-01 PROCEDURE — 97530 THERAPEUTIC ACTIVITIES: CPT

## 2021-11-01 PROCEDURE — 700111 HCHG RX REV CODE 636 W/ 250 OVERRIDE (IP): Mod: JG | Performed by: INTERNAL MEDICINE

## 2021-11-01 RX ADMIN — PIPERACILLIN SODIUM AND TAZOBACTAM SODIUM 3.38 G: 3; .375 INJECTION, POWDER, FOR SOLUTION INTRAVENOUS at 03:59

## 2021-11-01 RX ADMIN — OXYCODONE HYDROCHLORIDE 10 MG: 10 TABLET ORAL at 23:06

## 2021-11-01 RX ADMIN — Medication 1000 UNITS: at 06:06

## 2021-11-01 RX ADMIN — MICAFUNGIN SODIUM 100 MG: 100 INJECTION, POWDER, LYOPHILIZED, FOR SOLUTION INTRAVENOUS at 11:43

## 2021-11-01 RX ADMIN — ONDANSETRON 4 MG: 2 INJECTION INTRAMUSCULAR; INTRAVENOUS at 17:43

## 2021-11-01 RX ADMIN — ONDANSETRON 4 MG: 2 INJECTION INTRAMUSCULAR; INTRAVENOUS at 23:06

## 2021-11-01 RX ADMIN — PIPERACILLIN SODIUM AND TAZOBACTAM SODIUM 3.38 G: 3; .375 INJECTION, POWDER, FOR SOLUTION INTRAVENOUS at 21:00

## 2021-11-01 RX ADMIN — ENOXAPARIN SODIUM 40 MG: 40 INJECTION SUBCUTANEOUS at 06:06

## 2021-11-01 RX ADMIN — PIPERACILLIN SODIUM AND TAZOBACTAM SODIUM 3.38 G: 3; .375 INJECTION, POWDER, FOR SOLUTION INTRAVENOUS at 11:51

## 2021-11-01 RX ADMIN — FUROSEMIDE 40 MG: 10 INJECTION, SOLUTION INTRAMUSCULAR; INTRAVENOUS at 06:07

## 2021-11-01 RX ADMIN — FAMOTIDINE 20 MG: 20 TABLET ORAL at 06:06

## 2021-11-01 RX ADMIN — ONDANSETRON 4 MG: 2 INJECTION INTRAMUSCULAR; INTRAVENOUS at 04:06

## 2021-11-01 RX ADMIN — OXYCODONE HYDROCHLORIDE 10 MG: 10 TABLET ORAL at 17:48

## 2021-11-01 RX ADMIN — ONDANSETRON 4 MG: 2 INJECTION INTRAMUSCULAR; INTRAVENOUS at 08:48

## 2021-11-01 RX ADMIN — LISINOPRIL 5 MG: 5 TABLET ORAL at 06:06

## 2021-11-01 RX ADMIN — GABAPENTIN 600 MG: 300 CAPSULE ORAL at 06:06

## 2021-11-01 RX ADMIN — LOPERAMIDE HYDROCHLORIDE 2 MG: 2 CAPSULE ORAL at 17:49

## 2021-11-01 RX ADMIN — SULFAMETHOXAZOLE AND TRIMETHOPRIM 1 TABLET: 800; 160 TABLET ORAL at 08:58

## 2021-11-01 ASSESSMENT — GAIT ASSESSMENTS
ASSISTIVE DEVICE: FRONT WHEEL WALKER
DISTANCE (FEET): 100
GAIT LEVEL OF ASSIST: SUPERVISED

## 2021-11-01 ASSESSMENT — ENCOUNTER SYMPTOMS
VOMITING: 0
BRUISES/BLEEDS EASILY: 0
HEMOPTYSIS: 0
HEADACHES: 0
COUGH: 0
BLURRED VISION: 0
MYALGIAS: 0
DIZZINESS: 0
CHILLS: 0
PALPITATIONS: 0
FEVER: 0
ABDOMINAL PAIN: 1
NAUSEA: 0
SHORTNESS OF BREATH: 0
WEAKNESS: 1

## 2021-11-01 ASSESSMENT — PAIN DESCRIPTION - PAIN TYPE
TYPE: ACUTE PAIN

## 2021-11-01 ASSESSMENT — COGNITIVE AND FUNCTIONAL STATUS - GENERAL
MOBILITY SCORE: 20
SUGGESTED CMS G CODE MODIFIER MOBILITY: CJ
CLIMB 3 TO 5 STEPS WITH RAILING: A LITTLE
TURNING FROM BACK TO SIDE WHILE IN FLAT BAD: UNABLE

## 2021-11-01 NOTE — PROGRESS NOTES
Hospital Medicine Daily Progress Note    Date of Service  11/1/2021    Chief Complaint  Nils Alfonso is a 66 y.o. female admitted 10/15/2021 with abdominal pain    Hospital Course  Initially admitted to [facility] for evaluation of abdominal pain after recent ERCP with polypectomy and sphincterotomy and noted to have large intra-abdominal fluid collection for which she underwent drainage with interventional radiology and was evaluated by infectious disease and surgery.  She was transferred to [facility] for higher level of care.  She was evaluated by Dr Cook who recommended conservative management with placement of a second drain    Pneumobilia with gas in the CBD - s/p drain placement  TPN  Post ERCP retroperitoneal abscess with drain x2     Repeat CT 10/22 showed Grossly unchanged irregular fluid collection occupying the right abdomen surrounding the right kidney and right colon extending to midline.     I discussed with ID and they would like to repeat imaging prior to final abx plan- for now plan for 4 week abx course until 11/24.  I d/w Dr. marshall and plan for f/u as outpatient in 3 weeks with him      Interval Problem Update  No acute events overnight  Patient will be getting her drains upsized today  She is feeling tired as she didn't sleep well last night  Drainage from LLQ has stopped, drainage around RUQ drain has reduced    I have personally seen and examined the patient at bedside. I discussed the plan of care with patient, family, bedside RN and IR.    Consultants/Specialty  general surgery, GI, infectious disease and interventional radiology    Code Status  Full Code    Disposition  Patient is not medically cleared.   Anticipate discharge to to home with organized home healthcare and close outpatient follow-up.  I have placed the appropriate orders for post-discharge needs.    Review of Systems  Review of Systems   Constitutional: Negative for chills and  fever.   Respiratory: Negative for cough and shortness of breath.    Cardiovascular: Negative for chest pain and palpitations.   Gastrointestinal: Positive for abdominal pain. Negative for nausea and vomiting.   Genitourinary: Negative for dysuria and urgency.   Neurological: Negative for dizziness and headaches.   All other systems reviewed and are negative.       Physical Exam  Temp:  [37.2 °C (98.9 °F)-38.2 °C (100.7 °F)] 37.5 °C (99.5 °F)  Pulse:  [65-82] 80  Resp:  [16-18] 18  BP: (116-144)/(61-74) 120/69  SpO2:  [92 %-94 %] 93 %    Physical Exam  Vitals and nursing note reviewed.   Constitutional:       Appearance: Normal appearance.   Cardiovascular:      Rate and Rhythm: Normal rate and regular rhythm.   Pulmonary:      Effort: Pulmonary effort is normal.      Breath sounds: Normal breath sounds.   Abdominal:      General: Abdomen is flat.      Palpations: Abdomen is soft.      Tenderness: There is no abdominal tenderness.      Comments: RUQ drain and R flank drain +  Brownish thick drainage around the right upper quadrant drain  Nonodorous.   Musculoskeletal:      Right lower leg: Edema present.      Left lower leg: Edema present.   Skin:     General: Skin is warm and dry.   Neurological:      General: No focal deficit present.      Mental Status: She is alert and oriented to person, place, and time.       Fluids    Intake/Output Summary (Last 24 hours) at 11/1/2021 1038  Last data filed at 11/1/2021 0700  Gross per 24 hour   Intake 444 ml   Output 105 ml   Net 339 ml       Laboratory  Recent Labs     10/30/21  1320   WBC 4.7*   RBC 2.98*   HEMOGLOBIN 8.8*   HEMATOCRIT 26.8*   MCV 89.9   MCH 29.5   MCHC 32.8*   RDW 46.0   PLATELETCT 226   MPV 9.3     Recent Labs     10/30/21  1320   SODIUM 130*   POTASSIUM 3.4*   CHLORIDE 93*   CO2 27   GLUCOSE 98   BUN 9   CREATININE 0.68   CALCIUM 8.0*                   Imaging  CT-ABDOMEN-PELVIS WITH   Final Result      1.  There has been interval placement of an  additional inferior lateral pigtail catheter within the complex right abdominal abscess. The other 2 pigtail catheters are stable in appearance.   2.  There has been no significant interval decrease in size of the large complex loculated abscess collection in the right abdomen.   3.  There is no new fluid collection.   4.  Gallbladder is surgically absent with continued pneumobilia.   5.  Interval decrease in the dependent pleural effusion with minimal airspace disease, likely atelectasis.      IR-DRAINAGE-CATH EXCHANGE   Final Result      1.  Successful left-sided drainage catheter removal.   2.  Successful image guided superior right drainage catheter replacement.      Plan: Suction drainage. Monitor outputs. Please contact interventional radiology if there is any concern for tube dysfunction. Suggest routine tube maintenance at 3 months intervals if the tube remains in place.         CT-DRAIN-PERITONEAL   Final Result      1.  CT GUIDED PERITONEAL RLQ abdominal CATHETER DRAINAGE.   2.  THE CURRENT PLAN IS TO MONITOR DRAINAGE OUTPUT AND OBTAIN A FOLLOWUP CT SCAN IN 5-7 DAYS IF CLINICALLY INDICATED.      CT-ABDOMEN-PELVIS WITH   Final Result         1. Grossly unchanged irregular fluid collection occupying the right abdomen surrounding the right kidney and right colon extending to midline. Additional pigtail catheter in the component about midline anterior abdomen. Both pigtail catheters seem to be    within the collection.      2. Small right pleural effusion with right basilar atelectasis.               DX-CHEST-LIMITED (1 VIEW)   Final Result      1.  Right basilar atelectasis or consolidation. Small right pleural effusion not excluded.   2.  Atherosclerotic plaque.      CT-DRAIN-PERITONEAL   Final Result      1.  CT guided pancreatic pseudocyst catheter drainage.   2.  The current plan is to obtain a follow-up CT in 5-7 days..      IR-PICC LINE PLACEMENT W/ GUIDANCE > AGE 5   Final Result                   Ultrasound-guided PICC placement performed by qualified nursing staff as    above.          CT-ABDOMEN-PELVIS WITH    (Results Pending)   IR-CONSULT AND TREAT    (Results Pending)        Assessment/Plan  * Bacteremia due to Gram-negative bacteria and fungemia- (present on admission)  Assessment & Plan  ID consulted - continue micafungin and zosyn indefinitely due to incomplete source control  Source control per ID, Surgery, and IR consults    Peritoneal fluid cultures grew E. coli and Enterococcus   Blood cultures grew Chryseobacterium and Candida glabrata   Repeat blood cultures from 10/13/2021 are negative    Peritoneal fluid from 10/16/2021 growing yeast and Stenotrophomonas maltophilia-  Monitor drain output and continue current antibiotics  KALANI negative for signs of endocarditis    On total parenteral nutrition (TPN)  Assessment & Plan  RD consulted for enteral nutrition recommendations  TPN discontinued 10/27    Hypophosphatemia- (present on admission)  Assessment & Plan  Resolved with TPN  Monitoring per RD    Acute respiratory failure with hypoxia (HCC)- (present on admission)  Assessment & Plan  Resolved with diuresis  Likely volume overload with lower extremity edema present    Antibiotic-associated diarrhea  Assessment & Plan  Cdiff PCR negative  Loperamide PRN    Postprocedural retroperitoneal abscess- (present on admission)  Assessment & Plan  Now with abdominal drain x3  General surgery consulted, recommend ongoing nonoperative management  IR consulted, positioning and revision of tubes per recommendations  Repeat CT 10/22 showed Grossly unchanged irregular fluid collection occupying the right abdomen surrounding the right kidney and right colon extending to midline     ID consulted, recommending ongoing micafungin / zosyn and re-imaging in 4 weeks (ordered)  Will need to follow up in surgical clinic due to limited ID clinic availability    Repeat ct imaging with unchanged abscess size.  Continued  drainage around drain.  I discussed with IR and plan will be to upsize drain on 11/1  If after upsizing drain she continues to have drainage around drain then will need to re-discuss POC with Dr. Dumont    Duodenal perforation (HCC)- (present on admission)  Assessment & Plan  After ERCP with retroperitoneal abscess and bacteremia  Uncertain etiology - ddx includes pancreatitis, bile leak, iatrogenic  GI consulted and s/o, management per ID / IR / Surgery    Hyponatremia- (present on admission)  Assessment & Plan  Recurrent, mild  Diuresis as tolerated    Normocytic anemia- (present on admission)  Assessment & Plan  Ferritin and Fe% consistent with AOCD from intra-abdominal infection  Repeat AM CBC  Transfuse for Hgb <7    Sepsis due to intraabdominal fluid collection/abscess (HCC)- (present on admission)  Assessment & Plan  Sepsis resolved  Source intra-abdominal  Continue Zosyn and Mycamine and follow-up on repeat cultures  ID following  Re-imaging per ID, IR, and Surgery consults    Hyperlipidemia- (present on admission)  Assessment & Plan  Continue ezetimibe    Primary hypertension- (present on admission)  Assessment & Plan  Continue lisinopril  No indication for strict BP control, PRN antihypertensives discontinued       VTE prophylaxis: enoxaparin ppx    I have performed a physical exam and reviewed and updated ROS and Plan today (11/1/2021). In review of yesterday's note (10/31/2021), there are no changes except as documented above.

## 2021-11-01 NOTE — THERAPY
Physical Therapy   Daily Treatment     Patient Name: Nils Alfonso  Age:  66 y.o., Sex:  female  Medical Record #: [id number]  Today's Date: 11/1/2021          Assessment    Rec'd alert, in bed, agreeable to work w/ PT>  She is able to move in/out of bed w/o assist and w/o bed features.  She is able to move sit to stand from the edge of the bed and from the toilet w/o assist.  Ambulated 150 ft w/ fww and spv.  Needs stairs x2 for d/c home    Plan    Continue current treatment plan.    DC Equipment Recommendations: Front-Wheel Walker  Discharge Recommendations: Recommend home health for continued physical therapy services        Objective       11/01/21 0732   Balance   Sitting Balance (Static) Fair +   Sitting Balance (Dynamic) Fair +   Standing Balance (Static) Fair +   Standing Balance (Dynamic) Fair +   Weight Shift Sitting Good   Weight Shift Standing Good   Comments w/ fww   Gait Analysis   Gait Level Of Assist Supervised   Assistive Device Front Wheel Walker   Distance (Feet) 100   Bed Mobility    Supine to Sit Supervised   Sit to Supine Supervised   Scooting Supervised   Functional Mobility   Sit to Stand Supervised   Bed, Chair, Wheelchair Transfer Supervised   Toilet Transfers Supervised   Short Term Goals    Short Term Goal # 2 Pt will ambulate with LRAD for 150 ft with SPV to access all required areas of her home by the 6th tx   Goal Outcome # 2 Goal met   Anticipated Discharge Equipment and Recommendations   DC Equipment Recommendations Front-Wheel Walker   Discharge Recommendations Recommend home health for continued physical therapy services

## 2021-11-02 ENCOUNTER — APPOINTMENT (OUTPATIENT)
Dept: RADIOLOGY | Facility: MEDICAL CENTER | Age: 66
DRG: 856 | End: 2021-11-02
Attending: HOSPITALIST
Payer: MEDICARE

## 2021-11-02 LAB
ANION GAP SERPL CALC-SCNC: 11 MMOL/L (ref 7–16)
BASOPHILS # BLD AUTO: 0.5 % (ref 0–1.8)
BASOPHILS # BLD: 0.02 K/UL (ref 0–0.12)
BUN SERPL-MCNC: 11 MG/DL (ref 8–22)
CALCIUM SERPL-MCNC: 8.1 MG/DL (ref 8.5–10.5)
CHLORIDE SERPL-SCNC: 98 MMOL/L (ref 96–112)
CO2 SERPL-SCNC: 26 MMOL/L (ref 20–33)
CREAT SERPL-MCNC: 0.77 MG/DL (ref 0.5–1.4)
EOSINOPHIL # BLD AUTO: 0.01 K/UL (ref 0–0.51)
EOSINOPHIL NFR BLD: 0.3 % (ref 0–6.9)
ERYTHROCYTE [DISTWIDTH] IN BLOOD BY AUTOMATED COUNT: 46 FL (ref 35.9–50)
GLUCOSE SERPL-MCNC: 121 MG/DL (ref 65–99)
GRAM STN SPEC: NORMAL
HCT VFR BLD AUTO: 28.1 % (ref 37–47)
HGB BLD-MCNC: 9.2 G/DL (ref 12–16)
IMM GRANULOCYTES # BLD AUTO: 0.03 K/UL (ref 0–0.11)
IMM GRANULOCYTES NFR BLD AUTO: 0.8 % (ref 0–0.9)
LYMPHOCYTES # BLD AUTO: 0.39 K/UL (ref 1–4.8)
LYMPHOCYTES NFR BLD: 10.6 % (ref 22–41)
MAGNESIUM SERPL-MCNC: 2 MG/DL (ref 1.5–2.5)
MCH RBC QN AUTO: 29.8 PG (ref 27–33)
MCHC RBC AUTO-ENTMCNC: 32.7 G/DL (ref 33.6–35)
MCV RBC AUTO: 90.9 FL (ref 81.4–97.8)
MONOCYTES # BLD AUTO: 0.26 K/UL (ref 0–0.85)
MONOCYTES NFR BLD AUTO: 7.1 % (ref 0–13.4)
NEUTROPHILS # BLD AUTO: 2.97 K/UL (ref 2–7.15)
NEUTROPHILS NFR BLD: 80.7 % (ref 44–72)
NRBC # BLD AUTO: 0 K/UL
NRBC BLD-RTO: 0 /100 WBC
PLATELET # BLD AUTO: 235 K/UL (ref 164–446)
PMV BLD AUTO: 9.4 FL (ref 9–12.9)
POTASSIUM SERPL-SCNC: 3.1 MMOL/L (ref 3.6–5.5)
RBC # BLD AUTO: 3.09 M/UL (ref 4.2–5.4)
SIGNIFICANT IND 70042: NORMAL
SITE SITE: NORMAL
SODIUM SERPL-SCNC: 135 MMOL/L (ref 135–145)
SOURCE SOURCE: NORMAL
WBC # BLD AUTO: 3.7 K/UL (ref 4.8–10.8)

## 2021-11-02 PROCEDURE — 0F9G30Z DRAINAGE OF PANCREAS WITH DRAINAGE DEVICE, PERCUTANEOUS APPROACH: ICD-10-PCS | Performed by: RADIOLOGY

## 2021-11-02 PROCEDURE — 87077 CULTURE AEROBIC IDENTIFY: CPT | Mod: 91

## 2021-11-02 PROCEDURE — 770006 HCHG ROOM/CARE - MED/SURG/GYN SEMI*

## 2021-11-02 PROCEDURE — 700111 HCHG RX REV CODE 636 W/ 250 OVERRIDE (IP): Performed by: INTERNAL MEDICINE

## 2021-11-02 PROCEDURE — 83735 ASSAY OF MAGNESIUM: CPT

## 2021-11-02 PROCEDURE — A9270 NON-COVERED ITEM OR SERVICE: HCPCS | Performed by: STUDENT IN AN ORGANIZED HEALTH CARE EDUCATION/TRAINING PROGRAM

## 2021-11-02 PROCEDURE — 80048 BASIC METABOLIC PNL TOTAL CA: CPT

## 2021-11-02 PROCEDURE — 99153 MOD SED SAME PHYS/QHP EA: CPT

## 2021-11-02 PROCEDURE — 700102 HCHG RX REV CODE 250 W/ 637 OVERRIDE(OP): Performed by: STUDENT IN AN ORGANIZED HEALTH CARE EDUCATION/TRAINING PROGRAM

## 2021-11-02 PROCEDURE — A9270 NON-COVERED ITEM OR SERVICE: HCPCS | Performed by: FAMILY MEDICINE

## 2021-11-02 PROCEDURE — 700111 HCHG RX REV CODE 636 W/ 250 OVERRIDE (IP): Performed by: FAMILY MEDICINE

## 2021-11-02 PROCEDURE — 87186 SC STD MICRODIL/AGAR DIL: CPT | Mod: 91

## 2021-11-02 PROCEDURE — 85025 COMPLETE CBC W/AUTO DIFF WBC: CPT

## 2021-11-02 PROCEDURE — 700102 HCHG RX REV CODE 250 W/ 637 OVERRIDE(OP): Performed by: FAMILY MEDICINE

## 2021-11-02 PROCEDURE — 700111 HCHG RX REV CODE 636 W/ 250 OVERRIDE (IP): Mod: JG | Performed by: INTERNAL MEDICINE

## 2021-11-02 PROCEDURE — 99233 SBSQ HOSP IP/OBS HIGH 50: CPT | Performed by: INTERNAL MEDICINE

## 2021-11-02 PROCEDURE — 700105 HCHG RX REV CODE 258: Performed by: INTERNAL MEDICINE

## 2021-11-02 PROCEDURE — 87070 CULTURE OTHR SPECIMN AEROBIC: CPT

## 2021-11-02 PROCEDURE — A9270 NON-COVERED ITEM OR SERVICE: HCPCS | Performed by: INTERNAL MEDICINE

## 2021-11-02 PROCEDURE — 700111 HCHG RX REV CODE 636 W/ 250 OVERRIDE (IP): Performed by: RADIOLOGY

## 2021-11-02 PROCEDURE — 700102 HCHG RX REV CODE 250 W/ 637 OVERRIDE(OP): Performed by: INTERNAL MEDICINE

## 2021-11-02 PROCEDURE — 700111 HCHG RX REV CODE 636 W/ 250 OVERRIDE (IP)

## 2021-11-02 PROCEDURE — 87205 SMEAR GRAM STAIN: CPT

## 2021-11-02 RX ORDER — SODIUM CHLORIDE 9 MG/ML
500 INJECTION, SOLUTION INTRAVENOUS
Status: DISCONTINUED | OUTPATIENT
Start: 2021-11-02 | End: 2021-11-02 | Stop reason: HOSPADM

## 2021-11-02 RX ORDER — ONDANSETRON 2 MG/ML
4 INJECTION INTRAMUSCULAR; INTRAVENOUS PRN
Status: ACTIVE | OUTPATIENT
Start: 2021-11-02 | End: 2021-11-02

## 2021-11-02 RX ORDER — MIDAZOLAM HYDROCHLORIDE 1 MG/ML
INJECTION INTRAMUSCULAR; INTRAVENOUS
Status: COMPLETED
Start: 2021-11-02 | End: 2021-11-02

## 2021-11-02 RX ORDER — FLUMAZENIL 0.1 MG/ML
INJECTION INTRAVENOUS
Status: DISPENSED
Start: 2021-11-02 | End: 2021-11-02

## 2021-11-02 RX ORDER — LISINOPRIL 10 MG/1
10 TABLET ORAL
Status: DISCONTINUED | OUTPATIENT
Start: 2021-11-03 | End: 2021-11-06

## 2021-11-02 RX ORDER — ONDANSETRON 2 MG/ML
INJECTION INTRAMUSCULAR; INTRAVENOUS
Status: COMPLETED
Start: 2021-11-02 | End: 2021-11-02

## 2021-11-02 RX ORDER — MIDAZOLAM HYDROCHLORIDE 1 MG/ML
.5-2 INJECTION INTRAMUSCULAR; INTRAVENOUS PRN
Status: DISCONTINUED | OUTPATIENT
Start: 2021-11-02 | End: 2021-11-02 | Stop reason: HOSPADM

## 2021-11-02 RX ORDER — SODIUM CHLORIDE 9 MG/ML
500 INJECTION, SOLUTION INTRAVENOUS
Status: ACTIVE | OUTPATIENT
Start: 2021-11-02 | End: 2021-11-02

## 2021-11-02 RX ORDER — ONDANSETRON 2 MG/ML
4 INJECTION INTRAMUSCULAR; INTRAVENOUS PRN
Status: DISCONTINUED | OUTPATIENT
Start: 2021-11-02 | End: 2021-11-02 | Stop reason: HOSPADM

## 2021-11-02 RX ORDER — MIDAZOLAM HYDROCHLORIDE 1 MG/ML
.5-2 INJECTION INTRAMUSCULAR; INTRAVENOUS PRN
Status: ACTIVE | OUTPATIENT
Start: 2021-11-02 | End: 2021-11-02

## 2021-11-02 RX ORDER — NALOXONE HYDROCHLORIDE 0.4 MG/ML
INJECTION, SOLUTION INTRAMUSCULAR; INTRAVENOUS; SUBCUTANEOUS
Status: DISPENSED
Start: 2021-11-02 | End: 2021-11-02

## 2021-11-02 RX ADMIN — OXYCODONE HYDROCHLORIDE 10 MG: 10 TABLET ORAL at 17:35

## 2021-11-02 RX ADMIN — ONDANSETRON 4 MG: 2 INJECTION INTRAMUSCULAR; INTRAVENOUS at 17:34

## 2021-11-02 RX ADMIN — PIPERACILLIN SODIUM AND TAZOBACTAM SODIUM 3.38 G: 3; .375 INJECTION, POWDER, FOR SOLUTION INTRAVENOUS at 05:12

## 2021-11-02 RX ADMIN — OXYCODONE HYDROCHLORIDE 10 MG: 10 TABLET ORAL at 12:45

## 2021-11-02 RX ADMIN — ONDANSETRON 4 MG: 2 INJECTION INTRAMUSCULAR; INTRAVENOUS at 05:12

## 2021-11-02 RX ADMIN — HYDROMORPHONE HYDROCHLORIDE 0.5 MG: 1 INJECTION, SOLUTION INTRAMUSCULAR; INTRAVENOUS; SUBCUTANEOUS at 15:55

## 2021-11-02 RX ADMIN — PIPERACILLIN SODIUM AND TAZOBACTAM SODIUM 3.38 G: 3; .375 INJECTION, POWDER, FOR SOLUTION INTRAVENOUS at 20:31

## 2021-11-02 RX ADMIN — MIDAZOLAM HYDROCHLORIDE 1 MG: 1 INJECTION, SOLUTION INTRAMUSCULAR; INTRAVENOUS at 10:50

## 2021-11-02 RX ADMIN — GABAPENTIN 600 MG: 300 CAPSULE ORAL at 17:35

## 2021-11-02 RX ADMIN — EZETIMIBE 10 MG: 10 TABLET ORAL at 17:34

## 2021-11-02 RX ADMIN — ONDANSETRON 4 MG: 4 TABLET, ORALLY DISINTEGRATING ORAL at 21:52

## 2021-11-02 RX ADMIN — PIPERACILLIN SODIUM AND TAZOBACTAM SODIUM 3.38 G: 3; .375 INJECTION, POWDER, FOR SOLUTION INTRAVENOUS at 13:19

## 2021-11-02 RX ADMIN — Medication 1000 UNITS: at 05:11

## 2021-11-02 RX ADMIN — ONDANSETRON 4 MG: 2 INJECTION INTRAMUSCULAR; INTRAVENOUS at 12:45

## 2021-11-02 RX ADMIN — SULFAMETHOXAZOLE AND TRIMETHOPRIM 1 TABLET: 800; 160 TABLET ORAL at 05:12

## 2021-11-02 RX ADMIN — FENTANYL CITRATE 50 MCG: 50 INJECTION, SOLUTION INTRAMUSCULAR; INTRAVENOUS at 11:03

## 2021-11-02 RX ADMIN — MICAFUNGIN SODIUM 100 MG: 100 INJECTION, POWDER, LYOPHILIZED, FOR SOLUTION INTRAVENOUS at 12:45

## 2021-11-02 RX ADMIN — MIDAZOLAM HYDROCHLORIDE 1 MG: 1 INJECTION, SOLUTION INTRAMUSCULAR; INTRAVENOUS at 11:03

## 2021-11-02 RX ADMIN — FAMOTIDINE 20 MG: 20 TABLET ORAL at 17:34

## 2021-11-02 RX ADMIN — ONDANSETRON 4 MG: 2 INJECTION INTRAMUSCULAR; INTRAVENOUS at 10:45

## 2021-11-02 RX ADMIN — FENTANYL CITRATE 50 MCG: 50 INJECTION, SOLUTION INTRAMUSCULAR; INTRAVENOUS at 10:50

## 2021-11-02 RX ADMIN — GABAPENTIN 600 MG: 300 CAPSULE ORAL at 05:11

## 2021-11-02 RX ADMIN — FAMOTIDINE 20 MG: 20 TABLET ORAL at 05:12

## 2021-11-02 RX ADMIN — OXYCODONE HYDROCHLORIDE 10 MG: 10 TABLET ORAL at 05:11

## 2021-11-02 RX ADMIN — OXYCODONE HYDROCHLORIDE 10 MG: 10 TABLET ORAL at 21:52

## 2021-11-02 RX ADMIN — SULFAMETHOXAZOLE AND TRIMETHOPRIM 1 TABLET: 800; 160 TABLET ORAL at 17:34

## 2021-11-02 RX ADMIN — LISINOPRIL 5 MG: 5 TABLET ORAL at 05:12

## 2021-11-02 ASSESSMENT — ENCOUNTER SYMPTOMS
COUGH: 0
FEVER: 0
PALPITATIONS: 0
DIZZINESS: 0
SHORTNESS OF BREATH: 0
VOMITING: 0
ABDOMINAL PAIN: 1
NAUSEA: 0
CHILLS: 0
HEADACHES: 0

## 2021-11-02 ASSESSMENT — PAIN DESCRIPTION - PAIN TYPE
TYPE: ACUTE PAIN
TYPE: ACUTE PAIN

## 2021-11-02 NOTE — DISCHARGE PLANNING
ATTN: Case Management  RE: Referral for Home Health    Please inform us when the patient discharges so we can coordinate care.    As of 11/02/21, we have accepted the Home Health referral for the patient listed above.    A [facility] Home Health clinician will be out to see the patient within 48 hours. If you have any questions or concerns regarding the patient’s transition to Home Health, please do not hesitate to contact us at [phone].      We look forward to collaborating with you,  [facility] Home Health Team

## 2021-11-02 NOTE — CARE PLAN
The patient is Stable - Low risk of patient condition declining or worsening    Shift Goals  Clinical Goals: IR proceedure drain replacement.   Patient Goals: Discharge rest and spleep.   Family Goals: get her better    Progress made toward(s) clinical / shift goals:  Progressing      Problem: Skin Integrity  Goal: Skin integrity is maintained or improved  Outcome: Progressing     Problem: Fall Risk  Goal: Patient will remain free from falls  Outcome: Progressing

## 2021-11-02 NOTE — OR SURGEON
Immediate Post- Operative Note        Findings: Thick Organized Pancreatiic Pseudocyst    Procedure(s): CT guided Placement of 14 Fr Drain posteriorly into Pancreatic Pseudocyst.  Minimal thick material obtained.      Estimated Blood Loss: Less than 5 ml        Complications: None            11/2/2021     11:32 AM     Ant Randhawa M.D.

## 2021-11-02 NOTE — PROGRESS NOTES
"Hospital Medicine Daily Progress Note    Date of Service  11/2/2021    Chief Complaint  Nils Alfonso is a 66 y.o. female admitted 10/15/2021 with abdominal pain    Hospital Course  Initially admitted to [facility] for evaluation of abdominal pain after recent ERCP with polypectomy and sphincterotomy and noted to have large intra-abdominal fluid collection for which she underwent drainage with interventional radiology and was evaluated by infectious disease and surgery.  She was transferred to [facility] for higher level of care.  She was evaluated by Dr Cook who recommended conservative management with placement of a second drain    Pneumobilia with gas in the CBD - s/p drain placement  TPN  Post ERCP retroperitoneal abscess with drain x2     Repeat CT 10/22 showed Grossly unchanged irregular fluid collection occupying the right abdomen surrounding the right kidney and right colon extending to midline.     I discussed with ID and they would like to repeat imaging prior to final abx plan- for now plan for 4 week abx course until 11/24.  I d/w Dr. marshall and plan for f/u as outpatient in 3 weeks with him      Interval Problem Update  No acute events overnight  Patient will be getting her drains upsized today  She is feeling tired as she didn't sleep well last night  Drainage from LLQ has stopped, drainage around RUQ drain has reduced\"    11/2. Another drain was placed making this the third. Patient seems to be weary and now is open to having surgical intervention. She prefers to be discharged off drains. I updated Dr. Marshall, Surgery and [facility] Radiology. Dr. Marshall will review the scans.    I have personally seen and examined the patient at bedside. I discussed the plan of care with patient, family, bedside RN and IR.    Consultants/Specialty  general surgery, GI, infectious disease and interventional radiology    Code Status  Full " Code    Disposition  Patient is not medically cleared.   Anticipate discharge to to home with organized home healthcare and close outpatient follow-up.  I have placed the appropriate orders for post-discharge needs.    Review of Systems  Review of Systems   Constitutional: Negative for chills and fever.   Respiratory: Negative for cough and shortness of breath.    Cardiovascular: Negative for chest pain and palpitations.   Gastrointestinal: Positive for abdominal pain. Negative for nausea and vomiting.   Genitourinary: Negative for dysuria and urgency.   Neurological: Negative for dizziness and headaches.   All other systems reviewed and are negative.       Physical Exam  Temp:  [36.2 °C (97.2 °F)-37.1 °C (98.7 °F)] 36.8 °C (98.3 °F)  Pulse:  [66-82] 66  Resp:  [16-18] 17  BP: (114-143)/(67-78) 129/76  SpO2:  [90 %-98 %] 98 %    Physical Exam  Vitals and nursing note reviewed.   Constitutional:       Appearance: Normal appearance.      Comments: Thin   Cardiovascular:      Rate and Rhythm: Normal rate and regular rhythm.   Pulmonary:      Effort: Pulmonary effort is normal.      Breath sounds: Normal breath sounds.   Abdominal:      General: Abdomen is flat.      Palpations: Abdomen is soft.      Tenderness: There is no abdominal tenderness.      Comments: RUQ drain and R flank drain   Another abdominal drain placed  Purulent 20cc discharge   Musculoskeletal:      Right lower leg: Edema present.      Left lower leg: Edema present.   Skin:     General: Skin is warm and dry.   Neurological:      General: No focal deficit present.      Mental Status: She is alert and oriented to person, place, and time.       Fluids  No intake or output data in the 24 hours ending 11/02/21 0809    Laboratory  Recent Labs     10/30/21  1320   WBC 4.7*   RBC 2.98*   HEMOGLOBIN 8.8*   HEMATOCRIT 26.8*   MCV 89.9   MCH 29.5   MCHC 32.8*   RDW 46.0   PLATELETCT 226   MPV 9.3     Recent Labs     10/30/21  1320   SODIUM 130*   POTASSIUM 3.4*    CHLORIDE 93*   CO2 27   GLUCOSE 98   BUN 9   CREATININE 0.68   CALCIUM 8.0*                   Imaging  CT-ABDOMEN-PELVIS WITH   Final Result      1.  There has been interval placement of an additional inferior lateral pigtail catheter within the complex right abdominal abscess. The other 2 pigtail catheters are stable in appearance.   2.  There has been no significant interval decrease in size of the large complex loculated abscess collection in the right abdomen.   3.  There is no new fluid collection.   4.  Gallbladder is surgically absent with continued pneumobilia.   5.  Interval decrease in the dependent pleural effusion with minimal airspace disease, likely atelectasis.      IR-DRAINAGE-CATH EXCHANGE   Final Result      1.  Successful left-sided drainage catheter removal.   2.  Successful image guided superior right drainage catheter replacement.      Plan: Suction drainage. Monitor outputs. Please contact interventional radiology if there is any concern for tube dysfunction. Suggest routine tube maintenance at 3 months intervals if the tube remains in place.         CT-DRAIN-PERITONEAL   Final Result      1.  CT GUIDED PERITONEAL RLQ abdominal CATHETER DRAINAGE.   2.  THE CURRENT PLAN IS TO MONITOR DRAINAGE OUTPUT AND OBTAIN A FOLLOWUP CT SCAN IN 5-7 DAYS IF CLINICALLY INDICATED.      CT-ABDOMEN-PELVIS WITH   Final Result         1. Grossly unchanged irregular fluid collection occupying the right abdomen surrounding the right kidney and right colon extending to midline. Additional pigtail catheter in the component about midline anterior abdomen. Both pigtail catheters seem to be    within the collection.      2. Small right pleural effusion with right basilar atelectasis.               DX-CHEST-LIMITED (1 VIEW)   Final Result      1.  Right basilar atelectasis or consolidation. Small right pleural effusion not excluded.   2.  Atherosclerotic plaque.      CT-DRAIN-PERITONEAL   Final Result      1.  CT guided  "pancreatic pseudocyst catheter drainage.   2.  The current plan is to obtain a follow-up CT in 5-7 days..      IR-PICC LINE PLACEMENT W/ GUIDANCE > AGE 5   Final Result                  Ultrasound-guided PICC placement performed by qualified nursing staff as    above.          CT-ABDOMEN-PELVIS WITH    (Results Pending)   CT-DRAINAGE-CATH EXCHANGE    (Results Pending)        Assessment/Plan  * Bacteremia due to Gram-negative bacteria and fungemia- (present on admission)  Assessment & Plan  ID consulted - continue micafungin and zosyn indefinitely due to incomplete source control  Source control per ID, Surgery, and IR consults    Peritoneal fluid cultures grew E. coli and Enterococcus   Blood cultures grew Chryseobacterium and Candida glabrata   Repeat blood cultures from 10/13/2021 are negative    Peritoneal fluid from 10/16/2021 growing yeast and Stenotrophomonas maltophilia-  Monitor drain output and continue current antibiotics  KALANI negative for signs of endocarditis\"    As above    Sepsis due to intraabdominal fluid collection/abscess, bacteremia and fungemia (HCC)- (present on admission)  Assessment & Plan  Sepsis resolved  Source intra-abdominal  Continue Zosyn and Mycamine and follow-up on repeat cultures  ID following  Re-imaging per ID, IR, and Surgery consults\"    IR placed another drain, total of 3  Dr. Dumont will review the scans, marah on InD  IV Zosyn continuous and IV micafungin 100mg BID STOP 11/24 per ID    On total parenteral nutrition (TPN)  Assessment & Plan  RD consulted for enteral nutrition recommendations  TPN discontinued 10/27    Hypophosphatemia- (present on admission)  Assessment & Plan  Resolved with TPN  Monitoring per RD    Acute respiratory failure with hypoxia (HCC)- (present on admission)  Assessment & Plan  Resolved with diuresis  Likely volume overload with lower extremity edema present    Antibiotic-associated diarrhea  Assessment & Plan  Cdiff PCR negative  Loperamide " "PRN    Postprocedural retroperitoneal abscess- (present on admission)  Assessment & Plan  Now with abdominal drain x3  General surgery consulted, recommend ongoing nonoperative management  IR consulted, positioning and revision of tubes per recommendations  Repeat CT 10/22 showed Grossly unchanged irregular fluid collection occupying the right abdomen surrounding the right kidney and right colon extending to midline     ID consulted, recommending ongoing micafungin / zosyn and re-imaging in 4 weeks (ordered)  Will need to follow up in surgical clinic due to limited ID clinic availability    Repeat ct imaging with unchanged abscess size.  Continued drainage around drain.  I discussed with IR and plan will be to upsize drain on 11/1  If after upsizing drain she continues to have drainage around drain then will need to re-discuss POC with Dr. Dumont\"    3rd drain placed  Dr. Dumont to review scans, considering surgery    Duodenal perforation (HCC)- (present on admission)  Assessment & Plan  After ERCP with retroperitoneal abscess and bacteremia  Uncertain etiology - ddx includes pancreatitis, bile leak, iatrogenic  GI consulted and s/o, management per ID / IR / Surgery    Hyponatremia- (present on admission)  Assessment & Plan  Recurrent, mild  Diuresis as tolerated    Normocytic anemia- (present on admission)  Assessment & Plan  Ferritin and Fe% consistent with AOCD from intra-abdominal infection  Repeat AM CBC  Transfuse for Hgb <7\"    Recent Labs     11/02/21  1500   HEMOGLOBIN 9.2*   HEMATOCRIT 28.1*   MCV 90.9   MCH 29.8   PLATELETCT 235         Hyperlipidemia- (present on admission)  Assessment & Plan  Continue ezetimibe    Primary hypertension- (present on admission)  Assessment & Plan  Continue lisinopril  No indication for strict BP control, PRN antihypertensives discontinued\"    Vitals:    11/02/21 1510   BP: 159/70   Pulse: 78   Resp: 18   Temp: 36.6 °C (97.9 °F)   SpO2: 96%     Increase " lisinopril       VTE prophylaxis: enoxaparin ppx    I have performed a physical exam and reviewed and updated ROS and Plan today (11/2/2021). In review of yesterday's note (11/1/2021), there are no changes except as documented above.

## 2021-11-02 NOTE — PROGRESS NOTES
CT NOTE:    RIGHT LOWER QUAD DRAIN PLACED POSTERIOR PLACED BY DR. GRIFFIN, 5ML LOVETT SUBSTANCE REMOVED, FLUSHED AND REMOVED SALINE, PT TOLERATED WELL AND VSS ON RA.       Flexima APDL locking pigtail drainage catheter system  14F x25cm  REF- [id number]  LOT- [id number]  EXP- 06/28/2024

## 2021-11-03 ENCOUNTER — APPOINTMENT (OUTPATIENT)
Dept: RADIOLOGY | Facility: MEDICAL CENTER | Age: 66
DRG: 856 | End: 2021-11-03
Attending: SURGERY
Payer: MEDICARE

## 2021-11-03 LAB
ALBUMIN SERPL BCP-MCNC: 2.6 G/DL (ref 3.2–4.9)
BASOPHILS # BLD AUTO: 1.8 % (ref 0–1.8)
BASOPHILS # BLD: 0.09 K/UL (ref 0–0.12)
BUN SERPL-MCNC: 10 MG/DL (ref 8–22)
CALCIUM SERPL-MCNC: 7.9 MG/DL (ref 8.5–10.5)
CHLORIDE SERPL-SCNC: 96 MMOL/L (ref 96–112)
CO2 SERPL-SCNC: 24 MMOL/L (ref 20–33)
CREAT SERPL-MCNC: 0.67 MG/DL (ref 0.5–1.4)
EOSINOPHIL # BLD AUTO: 0 K/UL (ref 0–0.51)
EOSINOPHIL NFR BLD: 0 % (ref 0–6.9)
ERYTHROCYTE [DISTWIDTH] IN BLOOD BY AUTOMATED COUNT: 44.9 FL (ref 35.9–50)
GLUCOSE SERPL-MCNC: 83 MG/DL (ref 65–99)
HCT VFR BLD AUTO: 28 % (ref 37–47)
HGB BLD-MCNC: 9.4 G/DL (ref 12–16)
LYMPHOCYTES # BLD AUTO: 0.48 K/UL (ref 1–4.8)
LYMPHOCYTES NFR BLD: 9.9 % (ref 22–41)
MANUAL DIFF BLD: NORMAL
MCH RBC QN AUTO: 30.3 PG (ref 27–33)
MCHC RBC AUTO-ENTMCNC: 33.6 G/DL (ref 33.6–35)
MCV RBC AUTO: 90.3 FL (ref 81.4–97.8)
MONOCYTES # BLD AUTO: 0.26 K/UL (ref 0–0.85)
MONOCYTES NFR BLD AUTO: 5.4 % (ref 0–13.4)
MORPHOLOGY BLD-IMP: NORMAL
NEUTROPHILS # BLD AUTO: 3.98 K/UL (ref 2–7.15)
NEUTROPHILS NFR BLD: 80.2 % (ref 44–72)
NEUTS BAND NFR BLD MANUAL: 2.7 % (ref 0–10)
NRBC # BLD AUTO: 0 K/UL
NRBC BLD-RTO: 0 /100 WBC
PHOSPHATE SERPL-MCNC: 2.4 MG/DL (ref 2.5–4.5)
PLATELET # BLD AUTO: 227 K/UL (ref 164–446)
PLATELET BLD QL SMEAR: NORMAL
PMV BLD AUTO: 9.3 FL (ref 9–12.9)
POTASSIUM SERPL-SCNC: 3.3 MMOL/L (ref 3.6–5.5)
RBC # BLD AUTO: 3.1 M/UL (ref 4.2–5.4)
RBC BLD AUTO: NORMAL
SODIUM SERPL-SCNC: 130 MMOL/L (ref 135–145)
WBC # BLD AUTO: 4.8 K/UL (ref 4.8–10.8)

## 2021-11-03 PROCEDURE — A9270 NON-COVERED ITEM OR SERVICE: HCPCS | Performed by: INTERNAL MEDICINE

## 2021-11-03 PROCEDURE — 700111 HCHG RX REV CODE 636 W/ 250 OVERRIDE (IP): Performed by: FAMILY MEDICINE

## 2021-11-03 PROCEDURE — 700111 HCHG RX REV CODE 636 W/ 250 OVERRIDE (IP): Performed by: STUDENT IN AN ORGANIZED HEALTH CARE EDUCATION/TRAINING PROGRAM

## 2021-11-03 PROCEDURE — 700117 HCHG RX CONTRAST REV CODE 255: Performed by: SURGERY

## 2021-11-03 PROCEDURE — 700105 HCHG RX REV CODE 258: Performed by: INTERNAL MEDICINE

## 2021-11-03 PROCEDURE — 700102 HCHG RX REV CODE 250 W/ 637 OVERRIDE(OP): Performed by: FAMILY MEDICINE

## 2021-11-03 PROCEDURE — 700102 HCHG RX REV CODE 250 W/ 637 OVERRIDE(OP): Performed by: INTERNAL MEDICINE

## 2021-11-03 PROCEDURE — A9270 NON-COVERED ITEM OR SERVICE: HCPCS | Performed by: FAMILY MEDICINE

## 2021-11-03 PROCEDURE — 80069 RENAL FUNCTION PANEL: CPT

## 2021-11-03 PROCEDURE — 74177 CT ABD & PELVIS W/CONTRAST: CPT

## 2021-11-03 PROCEDURE — 700111 HCHG RX REV CODE 636 W/ 250 OVERRIDE (IP): Mod: JG | Performed by: INTERNAL MEDICINE

## 2021-11-03 PROCEDURE — 700102 HCHG RX REV CODE 250 W/ 637 OVERRIDE(OP): Performed by: STUDENT IN AN ORGANIZED HEALTH CARE EDUCATION/TRAINING PROGRAM

## 2021-11-03 PROCEDURE — A9270 NON-COVERED ITEM OR SERVICE: HCPCS | Performed by: STUDENT IN AN ORGANIZED HEALTH CARE EDUCATION/TRAINING PROGRAM

## 2021-11-03 PROCEDURE — 85007 BL SMEAR W/DIFF WBC COUNT: CPT

## 2021-11-03 PROCEDURE — 700111 HCHG RX REV CODE 636 W/ 250 OVERRIDE (IP): Performed by: INTERNAL MEDICINE

## 2021-11-03 PROCEDURE — 85027 COMPLETE CBC AUTOMATED: CPT

## 2021-11-03 PROCEDURE — 770006 HCHG ROOM/CARE - MED/SURG/GYN SEMI*

## 2021-11-03 PROCEDURE — 99233 SBSQ HOSP IP/OBS HIGH 50: CPT | Performed by: INTERNAL MEDICINE

## 2021-11-03 RX ADMIN — Medication 1000 UNITS: at 05:18

## 2021-11-03 RX ADMIN — EZETIMIBE 10 MG: 10 TABLET ORAL at 17:23

## 2021-11-03 RX ADMIN — SULFAMETHOXAZOLE AND TRIMETHOPRIM 1 TABLET: 800; 160 TABLET ORAL at 17:23

## 2021-11-03 RX ADMIN — FUROSEMIDE 40 MG: 10 INJECTION, SOLUTION INTRAMUSCULAR; INTRAVENOUS at 05:18

## 2021-11-03 RX ADMIN — OXYCODONE HYDROCHLORIDE 10 MG: 10 TABLET ORAL at 20:30

## 2021-11-03 RX ADMIN — FAMOTIDINE 20 MG: 20 TABLET ORAL at 05:18

## 2021-11-03 RX ADMIN — HYDROMORPHONE HYDROCHLORIDE 0.5 MG: 1 INJECTION, SOLUTION INTRAMUSCULAR; INTRAVENOUS; SUBCUTANEOUS at 17:23

## 2021-11-03 RX ADMIN — PIPERACILLIN SODIUM AND TAZOBACTAM SODIUM 3.38 G: 3; .375 INJECTION, POWDER, FOR SOLUTION INTRAVENOUS at 03:28

## 2021-11-03 RX ADMIN — PIPERACILLIN SODIUM AND TAZOBACTAM SODIUM 3.38 G: 3; .375 INJECTION, POWDER, FOR SOLUTION INTRAVENOUS at 19:30

## 2021-11-03 RX ADMIN — PIPERACILLIN SODIUM AND TAZOBACTAM SODIUM 3.38 G: 3; .375 INJECTION, POWDER, FOR SOLUTION INTRAVENOUS at 13:53

## 2021-11-03 RX ADMIN — OXYCODONE HYDROCHLORIDE 10 MG: 10 TABLET ORAL at 06:43

## 2021-11-03 RX ADMIN — MICAFUNGIN SODIUM 100 MG: 100 INJECTION, POWDER, LYOPHILIZED, FOR SOLUTION INTRAVENOUS at 12:07

## 2021-11-03 RX ADMIN — SULFAMETHOXAZOLE AND TRIMETHOPRIM 1 TABLET: 800; 160 TABLET ORAL at 05:18

## 2021-11-03 RX ADMIN — ONDANSETRON 4 MG: 2 INJECTION INTRAMUSCULAR; INTRAVENOUS at 06:43

## 2021-11-03 RX ADMIN — ENOXAPARIN SODIUM 40 MG: 40 INJECTION SUBCUTANEOUS at 05:19

## 2021-11-03 RX ADMIN — OXYCODONE HYDROCHLORIDE 10 MG: 10 TABLET ORAL at 16:01

## 2021-11-03 RX ADMIN — OXYCODONE HYDROCHLORIDE 10 MG: 10 TABLET ORAL at 12:07

## 2021-11-03 RX ADMIN — GABAPENTIN 600 MG: 300 CAPSULE ORAL at 05:18

## 2021-11-03 RX ADMIN — GABAPENTIN 600 MG: 300 CAPSULE ORAL at 17:23

## 2021-11-03 RX ADMIN — HYDROMORPHONE HYDROCHLORIDE 0.5 MG: 1 INJECTION, SOLUTION INTRAMUSCULAR; INTRAVENOUS; SUBCUTANEOUS at 09:20

## 2021-11-03 RX ADMIN — ONDANSETRON 4 MG: 2 INJECTION INTRAMUSCULAR; INTRAVENOUS at 20:30

## 2021-11-03 RX ADMIN — IOHEXOL 25 ML: 240 INJECTION, SOLUTION INTRATHECAL; INTRAVASCULAR; INTRAVENOUS; ORAL at 17:00

## 2021-11-03 RX ADMIN — FAMOTIDINE 20 MG: 20 TABLET ORAL at 17:23

## 2021-11-03 RX ADMIN — LISINOPRIL 10 MG: 10 TABLET ORAL at 05:18

## 2021-11-03 RX ADMIN — ONDANSETRON 4 MG: 2 INJECTION INTRAMUSCULAR; INTRAVENOUS at 16:01

## 2021-11-03 RX ADMIN — IOHEXOL 100 ML: 350 INJECTION, SOLUTION INTRAVENOUS at 16:31

## 2021-11-03 RX ADMIN — HYDROMORPHONE HYDROCHLORIDE 0.5 MG: 1 INJECTION, SOLUTION INTRAMUSCULAR; INTRAVENOUS; SUBCUTANEOUS at 13:52

## 2021-11-03 RX ADMIN — ONDANSETRON 4 MG: 2 INJECTION INTRAMUSCULAR; INTRAVENOUS at 12:07

## 2021-11-03 ASSESSMENT — ENCOUNTER SYMPTOMS
CHILLS: 0
HEADACHES: 0
ABDOMINAL PAIN: 1
NAUSEA: 0
COUGH: 0
PALPITATIONS: 0
FEVER: 0
VOMITING: 0
SHORTNESS OF BREATH: 0
DIZZINESS: 0

## 2021-11-03 ASSESSMENT — PAIN DESCRIPTION - PAIN TYPE
TYPE: ACUTE PAIN
TYPE: ACUTE PAIN;SURGICAL PAIN
TYPE: ACUTE PAIN

## 2021-11-03 ASSESSMENT — PAIN SCALES - WONG BAKER: WONGBAKER_NUMERICALRESPONSE: HURTS JUST A LITTLE BIT

## 2021-11-03 NOTE — DIETARY
Nutrition Services: Update   Pt remains on a regular diet where PO intake is generally <25% - per flowsheet recently had a period of NPO status since last RD documentation 10/29 2/2 drain placement for pancreatic pseudocyst.  TPN was discontinued as of 10/27.  +Abdominal pain.  RD will adjust diet order and supplements - additional appropriate snacks reviewed.  In addition, K+ continues to be low (though is receiving Lasix),a dn Phosphorus low this AM.  Consider addition of Thiamine.     Recommendations/Plan:  1. 5-6 small meals.    2. Encourage intake of meals and supplements - downgraded to Boost Breeze for tolerance.    3. Document intake of all meals as % taken in ADL's to provide interdisciplinary communication across all shifts.   4. Continue to monitor weight - obtain new measured wt.   5. Nutrition rep will continue to see patient for ongoing meal and snack preferences.  6. Consider addition of Thiamine and continue to monitor refeeding labs.     RD follows

## 2021-11-03 NOTE — PROGRESS NOTES
"Hospital Medicine Daily Progress Note    Date of Service  11/3/2021    Chief Complaint  Nils Alfonso is a 66 y.o. female admitted 10/15/2021 with abdominal pain    Hospital Course  Initially admitted to [facility] for evaluation of abdominal pain after recent ERCP with polypectomy and sphincterotomy and noted to have large intra-abdominal fluid collection for which she underwent drainage with interventional radiology and was evaluated by infectious disease and surgery.  She was transferred to [facility] for higher level of care.  She was evaluated by Dr Cook who recommended conservative management with placement of a second drain    Pneumobilia with gas in the CBD - s/p drain placement  TPN  Post ERCP retroperitoneal abscess with drain x2     Repeat CT 10/22 showed Grossly unchanged irregular fluid collection occupying the right abdomen surrounding the right kidney and right colon extending to midline.     I discussed with ID and they would like to repeat imaging prior to final abx plan- for now plan for 4 week abx course until 11/24.  I d/w Dr. marshall and plan for f/u as outpatient in 3 weeks with him      Interval Problem Update  No acute events overnight  Patient will be getting her drains upsized today  She is feeling tired as she didn't sleep well last night  Drainage from LLQ has stopped, drainage around RUQ drain has reduced\"    11/2. Another drain was placed making this the third. Patient seems to be weary and now is open to having surgical intervention. She prefers to be discharged off drains. I updated Dr. Marshall, Surgery and [facility] Radiology. Dr. Marshall will review the scans.  11/3. Discussed with patient and Dr. Marshall. Will likely need surgical intervention.  at bedside.    I have personally seen and examined the patient at bedside. I discussed the plan of care with patient, family, bedside RN and " IR.    Consultants/Specialty  general surgery, GI, infectious disease and interventional radiology    Code Status  Full Code    Disposition  Patient is not medically cleared.   Anticipate discharge to to home with organized home healthcare and close outpatient follow-up.  I have placed the appropriate orders for post-discharge needs.    Review of Systems  Review of Systems   Constitutional: Negative for chills and fever.   Respiratory: Negative for cough and shortness of breath.    Cardiovascular: Negative for chest pain and palpitations.   Gastrointestinal: Positive for abdominal pain. Negative for nausea and vomiting.   Genitourinary: Negative for dysuria and urgency.   Neurological: Negative for dizziness and headaches.   All other systems reviewed and are negative.       Physical Exam  Temp:  [36.6 °C (97.9 °F)-37.1 °C (98.8 °F)] 36.7 °C (98 °F)  Pulse:  [74-85] 74  Resp:  [15-20] 17  BP: (108-159)/(59-72) 125/70  SpO2:  [90 %-99 %] 90 %    Physical Exam  Vitals and nursing note reviewed.   Constitutional:       Appearance: Normal appearance.      Comments: Thin   Cardiovascular:      Rate and Rhythm: Normal rate and regular rhythm.   Pulmonary:      Effort: Pulmonary effort is normal.      Breath sounds: Normal breath sounds.   Abdominal:      General: Abdomen is flat.      Palpations: Abdomen is soft.      Tenderness: There is no abdominal tenderness.      Comments: RUQ drain and R flank drain   Another abdominal drain placed  Purulent 20cc discharge  Third drain placed.  Foul smelling leakage on the drains   Musculoskeletal:      Right lower leg: Edema present.      Left lower leg: Edema present.   Skin:     General: Skin is warm and dry.   Neurological:      General: No focal deficit present.      Mental Status: She is alert and oriented to person, place, and time.   Psychiatric:      Comments: Anxious, wants surgery       Fluids    Intake/Output Summary (Last 24 hours) at 11/3/2021 2103  Last data filed at  11/2/2021 1700  Gross per 24 hour   Intake 240 ml   Output 905 ml   Net -665 ml       Laboratory  Recent Labs     11/02/21  1500 11/03/21  0558   WBC 3.7* 4.8   RBC 3.09* 3.10*   HEMOGLOBIN 9.2* 9.4*   HEMATOCRIT 28.1* 28.0*   MCV 90.9 90.3   MCH 29.8 30.3   MCHC 32.7* 33.6   RDW 46.0 44.9   PLATELETCT 235 227   MPV 9.4 9.3     Recent Labs     11/02/21  1500 11/03/21  0330   SODIUM 135 130*   POTASSIUM 3.1* 3.3*   CHLORIDE 98 96   CO2 26 24   GLUCOSE 121* 83   BUN 11 10   CREATININE 0.77 0.67   CALCIUM 8.1* 7.9*                   Imaging  CT-DRAINAGE-CATH EXCHANGE   Final Result         1. Organized pancreatic pseudocyst Drainage with CT guidance.      CT-ABDOMEN-PELVIS WITH   Final Result      1.  There has been interval placement of an additional inferior lateral pigtail catheter within the complex right abdominal abscess. The other 2 pigtail catheters are stable in appearance.   2.  There has been no significant interval decrease in size of the large complex loculated abscess collection in the right abdomen.   3.  There is no new fluid collection.   4.  Gallbladder is surgically absent with continued pneumobilia.   5.  Interval decrease in the dependent pleural effusion with minimal airspace disease, likely atelectasis.      IR-DRAINAGE-CATH EXCHANGE   Final Result      1.  Successful left-sided drainage catheter removal.   2.  Successful image guided superior right drainage catheter replacement.      Plan: Suction drainage. Monitor outputs. Please contact interventional radiology if there is any concern for tube dysfunction. Suggest routine tube maintenance at 3 months intervals if the tube remains in place.         CT-DRAIN-PERITONEAL   Final Result      1.  CT GUIDED PERITONEAL RLQ abdominal CATHETER DRAINAGE.   2.  THE CURRENT PLAN IS TO MONITOR DRAINAGE OUTPUT AND OBTAIN A FOLLOWUP CT SCAN IN 5-7 DAYS IF CLINICALLY INDICATED.      CT-ABDOMEN-PELVIS WITH   Final Result         1. Grossly unchanged irregular  "fluid collection occupying the right abdomen surrounding the right kidney and right colon extending to midline. Additional pigtail catheter in the component about midline anterior abdomen. Both pigtail catheters seem to be    within the collection.      2. Small right pleural effusion with right basilar atelectasis.               DX-CHEST-LIMITED (1 VIEW)   Final Result      1.  Right basilar atelectasis or consolidation. Small right pleural effusion not excluded.   2.  Atherosclerotic plaque.      CT-DRAIN-PERITONEAL   Final Result      1.  CT guided pancreatic pseudocyst catheter drainage.   2.  The current plan is to obtain a follow-up CT in 5-7 days..      IR-PICC LINE PLACEMENT W/ GUIDANCE > AGE 5   Final Result                  Ultrasound-guided PICC placement performed by qualified nursing staff as    above.          CT-ABDOMEN-PELVIS WITH    (Results Pending)        Assessment/Plan  * Bacteremia due to Gram-negative bacteria and fungemia- (present on admission)  Assessment & Plan  ID consulted - continue micafungin and zosyn indefinitely due to incomplete source control  Source control per ID, Surgery, and IR consults    Peritoneal fluid cultures grew E. coli and Enterococcus   Blood cultures grew Chryseobacterium and Candida glabrata   Repeat blood cultures from 10/13/2021 are negative    Peritoneal fluid from 10/16/2021 growing yeast and Stenotrophomonas maltophilia-  Monitor drain output and continue current antibiotics  KALANI negative for signs of endocarditis\"    As above    Sepsis due to intraabdominal fluid collection/abscess, bacteremia and fungemia (HCC)- (present on admission)  Assessment & Plan  Sepsis resolved  Source intra-abdominal  Continue Zosyn and Mycamine and follow-up on repeat cultures  ID following  Re-imaging per ID, IR, and Surgery consults\"    IR placed another drain, total of 3  Dr. Dumont will review the scans, marah on InD  IV Zosyn continuous and IV micafungin 100mg BID STOP " "11/24 per ID    On total parenteral nutrition (TPN)  Assessment & Plan  RD consulted for enteral nutrition recommendations  TPN discontinued 10/27    Hypophosphatemia- (present on admission)  Assessment & Plan  Resolved with TPN  Monitoring per RD    Acute respiratory failure with hypoxia (HCC)- (present on admission)  Assessment & Plan  Resolved with diuresis  Likely volume overload with lower extremity edema present    Antibiotic-associated diarrhea  Assessment & Plan  Cdiff PCR negative  Loperamide PRN    Postprocedural retroperitoneal abscess- (present on admission)  Assessment & Plan  Now with abdominal drain x3  General surgery consulted, recommend ongoing nonoperative management  IR consulted, positioning and revision of tubes per recommendations  Repeat CT 10/22 showed Grossly unchanged irregular fluid collection occupying the right abdomen surrounding the right kidney and right colon extending to midline     ID consulted, recommending ongoing micafungin / zosyn and re-imaging in 4 weeks (ordered)  Will need to follow up in surgical clinic due to limited ID clinic availability    Repeat ct imaging with unchanged abscess size.  Continued drainage around drain.  I discussed with IR and plan will be to upsize drain on 11/1  If after upsizing drain she continues to have drainage around drain then will need to re-discuss POC with Dr. Dumont\"    3rd drain placed  Dr. Dumont to review scans, considering surgery    Duodenal perforation (HCC)- (present on admission)  Assessment & Plan  After ERCP with retroperitoneal abscess and bacteremia  Uncertain etiology - ddx includes pancreatitis, bile leak, iatrogenic  GI consulted and s/o, management per ID / IR / Surgery    Hyponatremia- (present on admission)  Assessment & Plan  Recurrent, mild  Diuresis as tolerated    Normocytic anemia- (present on admission)  Assessment & Plan  Ferritin and Fe% consistent with AOCD from intra-abdominal infection  Repeat AM " "CBC  Transfuse for Hgb <7\"    Recent Labs     11/02/21  1500   HEMOGLOBIN 9.2*   HEMATOCRIT 28.1*   MCV 90.9   MCH 29.8   PLATELETCT 235         Hyperlipidemia- (present on admission)  Assessment & Plan  Continue ezetimibe    Primary hypertension- (present on admission)  Assessment & Plan  Continue lisinopril  No indication for strict BP control, PRN antihypertensives discontinued\"    Vitals:    11/02/21 1510   BP: 159/70   Pulse: 78   Resp: 18   Temp: 36.6 °C (97.9 °F)   SpO2: 96%     Increase lisinopril       VTE prophylaxis: enoxaparin ppx    I have performed a physical exam and reviewed and updated ROS and Plan today (11/3/2021). In review of yesterday's note (11/2/2021), there are no changes except as documented above.      "

## 2021-11-03 NOTE — PROGRESS NOTES
Surgical Progress Note    Author: Jaden Cook M.D. Date & Time created: 11/3/2021   12:05 PM     Interval Events:  Patient seen  MARTHA drainage now feculent/enteric in appearance which is different from previous output that jazmin clearing. Fear erosion into gut.  Labs ok except for albumin of 2.6.    Review of Systems   Constitutional: Positive for malaise/fatigue.   Gastrointestinal: Positive for abdominal pain.   All other systems reviewed and are negative.    Hemodynamics:  Temp (24hrs), Av.9 °C (98.4 °F), Min:36.6 °C (97.9 °F), Max:37.1 °C (98.8 °F)  Temperature: 37.1 °C (98.7 °F)  Pulse  Av.8  Min: 54  Max: 93   Blood Pressure : 128/65     Respiratory:    Respiration: 17, Pulse Oximetry: 91 %        RUL Breath Sounds: Clear, RML Breath Sounds: Diminished, RLL Breath Sounds: Diminished, TRISTAN Breath Sounds: Clear, LLL Breath Sounds: Diminished  Neuro:  GCS       Fluids:    Intake/Output Summary (Last 24 hours) at 11/3/2021 1205  Last data filed at 11/3/2021 1150  Gross per 24 hour   Intake 2960 ml   Output 1000 ml   Net 1960 ml        Current Diet Order   Procedures   • Diet Order Diet: Regular; Miscellaneous modifications: (optional): 6 Small Meals     Physical Exam  Vitals and nursing note reviewed.   Constitutional:       Appearance: Normal appearance.   HENT:      Head: Normocephalic and atraumatic.      Nose: Nose normal.      Mouth/Throat:      Mouth: Mucous membranes are moist.   Eyes:      Conjunctiva/sclera: Conjunctivae normal.      Pupils: Pupils are equal, round, and reactive to light.   Cardiovascular:      Rate and Rhythm: Normal rate and regular rhythm.      Pulses: Normal pulses.   Pulmonary:      Effort: Pulmonary effort is normal.   Abdominal:      General: Abdomen is flat. Bowel sounds are normal.      Palpations: Abdomen is soft.      Comments: IR drains now draining enteric/feculent appearing material- this different from previous output   Musculoskeletal:         General:  Normal range of motion.   Skin:     General: Skin is warm.   Neurological:      General: No focal deficit present.      Mental Status: She is alert. Mental status is at baseline.   Psychiatric:         Mood and Affect: Mood normal.         Behavior: Behavior normal.         Thought Content: Thought content normal.         Judgment: Judgment normal.       Labs:  Recent Results (from the past 24 hour(s))   CBC WITH DIFFERENTIAL    Collection Time: 11/02/21  3:00 PM   Result Value Ref Range    WBC 3.7 (L) 4.8 - 10.8 K/uL    RBC 3.09 (L) 4.20 - 5.40 M/uL    Hemoglobin 9.2 (L) 12.0 - 16.0 g/dL    Hematocrit 28.1 (L) 37.0 - 47.0 %    MCV 90.9 81.4 - 97.8 fL    MCH 29.8 27.0 - 33.0 pg    MCHC 32.7 (L) 33.6 - 35.0 g/dL    RDW 46.0 35.9 - 50.0 fL    Platelet Count 235 164 - 446 K/uL    MPV 9.4 9.0 - 12.9 fL    Neutrophils-Polys 80.70 (H) 44.00 - 72.00 %    Lymphocytes 10.60 (L) 22.00 - 41.00 %    Monocytes 7.10 0.00 - 13.40 %    Eosinophils 0.30 0.00 - 6.90 %    Basophils 0.50 0.00 - 1.80 %    Immature Granulocytes 0.80 0.00 - 0.90 %    Nucleated RBC 0.00 /100 WBC    Neutrophils (Absolute) 2.97 2.00 - 7.15 K/uL    Lymphs (Absolute) 0.39 (L) 1.00 - 4.80 K/uL    Monos (Absolute) 0.26 0.00 - 0.85 K/uL    Eos (Absolute) 0.01 0.00 - 0.51 K/uL    Baso (Absolute) 0.02 0.00 - 0.12 K/uL    Immature Granulocytes (abs) 0.03 0.00 - 0.11 K/uL    NRBC (Absolute) 0.00 K/uL   Basic Metabolic Panel    Collection Time: 11/02/21  3:00 PM   Result Value Ref Range    Sodium 135 135 - 145 mmol/L    Potassium 3.1 (L) 3.6 - 5.5 mmol/L    Chloride 98 96 - 112 mmol/L    Co2 26 20 - 33 mmol/L    Glucose 121 (H) 65 - 99 mg/dL    Bun 11 8 - 22 mg/dL    Creatinine 0.77 0.50 - 1.40 mg/dL    Calcium 8.1 (L) 8.5 - 10.5 mg/dL    Anion Gap 11.0 7.0 - 16.0   MAGNESIUM    Collection Time: 11/02/21  3:00 PM   Result Value Ref Range    Magnesium 2.0 1.5 - 2.5 mg/dL   ESTIMATED GFR    Collection Time: 11/02/21  3:00 PM   Result Value Ref Range    GFR If   American >60 >60 mL/min/1.73 m 2    GFR If Non African American >60 >60 mL/min/1.73 m 2   Renal Function Panel    Collection Time: 11/03/21  3:30 AM   Result Value Ref Range    Sodium 130 (L) 135 - 145 mmol/L    Potassium 3.3 (L) 3.6 - 5.5 mmol/L    Chloride 96 96 - 112 mmol/L    Co2 24 20 - 33 mmol/L    Glucose 83 65 - 99 mg/dL    Creatinine 0.67 0.50 - 1.40 mg/dL    Bun 10 8 - 22 mg/dL    Calcium 7.9 (L) 8.5 - 10.5 mg/dL    Phosphorus 2.4 (L) 2.5 - 4.5 mg/dL    Albumin 2.6 (L) 3.2 - 4.9 g/dL   ESTIMATED GFR    Collection Time: 11/03/21  3:30 AM   Result Value Ref Range    GFR If African American >60 >60 mL/min/1.73 m 2    GFR If Non African American >60 >60 mL/min/1.73 m 2   CBC WITH DIFFERENTIAL    Collection Time: 11/03/21  5:58 AM   Result Value Ref Range    WBC 4.8 4.8 - 10.8 K/uL    RBC 3.10 (L) 4.20 - 5.40 M/uL    Hemoglobin 9.4 (L) 12.0 - 16.0 g/dL    Hematocrit 28.0 (L) 37.0 - 47.0 %    MCV 90.3 81.4 - 97.8 fL    MCH 30.3 27.0 - 33.0 pg    MCHC 33.6 33.6 - 35.0 g/dL    RDW 44.9 35.9 - 50.0 fL    Platelet Count 227 164 - 446 K/uL    MPV 9.3 9.0 - 12.9 fL    Neutrophils-Polys 80.20 (H) 44.00 - 72.00 %    Lymphocytes 9.90 (L) 22.00 - 41.00 %    Monocytes 5.40 0.00 - 13.40 %    Eosinophils 0.00 0.00 - 6.90 %    Basophils 1.80 0.00 - 1.80 %    Nucleated RBC 0.00 /100 WBC    Neutrophils (Absolute) 3.98 2.00 - 7.15 K/uL    Lymphs (Absolute) 0.48 (L) 1.00 - 4.80 K/uL    Monos (Absolute) 0.26 0.00 - 0.85 K/uL    Eos (Absolute) 0.00 0.00 - 0.51 K/uL    Baso (Absolute) 0.09 0.00 - 0.12 K/uL    NRBC (Absolute) 0.00 K/uL   DIFFERENTIAL MANUAL    Collection Time: 11/03/21  5:58 AM   Result Value Ref Range    Bands-Stabs 2.70 0.00 - 10.00 %    Manual Diff Status PERFORMED    PERIPHERAL SMEAR REVIEW    Collection Time: 11/03/21  5:58 AM   Result Value Ref Range    Peripheral Smear Review see below    PLATELET ESTIMATE    Collection Time: 11/03/21  5:58 AM   Result Value Ref Range    Plt Estimation Normal    MORPHOLOGY     Collection Time: 11/03/21  5:58 AM   Result Value Ref Range    RBC Morphology Normal      Medical Decision Making, by Problem:  Active Hospital Problems    Diagnosis    • Duodenal perforation (HCC) [K63.1]      Priority: High   • Postprocedural retroperitoneal abscess [K68.11]      Priority: High   • Bacteremia due to Gram-negative bacteria and fungemia [R78.81]      Priority: High   • Sepsis due to intraabdominal fluid collection/abscess, bacteremia and fungemia (HCC) [A41.9]      Priority: High   • On total parenteral nutrition (TPN) [Z78.9]    • Hypophosphatemia [E83.39]    • Acute respiratory failure with hypoxia (HCC) [J96.01]    • Antibiotic-associated diarrhea [K52.1, T36.95XA]    • Hyponatremia [E87.1]    • Normocytic anemia [D64.9]    • Hyperlipidemia [E78.5]    • Primary hypertension [I10]      Plan:  Stat CT abd + pelvis with PO and IV contrast-concerend for new leak/GI erosion from drain  Ill help with surgical planning    Quality Measures:  Quality-Core Measures    Discussed patient condition with Family and RN

## 2021-11-03 NOTE — PROGRESS NOTES
Assumed care of pt after receiving report from dayshift RN. Pt is A&Ox 4 on RA. Pt c/o 8/10 pain in generalized abdomen - medicated pt per MAR. Changed pts MARTHA dressings as moderate, brown drainage saturating through split gauze. Updated pt on POC - verbalized understanding. Bed is locked and in lowest position, call light within reach, fall precautions in place. All needs met at this time.

## 2021-11-03 NOTE — CARE PLAN
The patient is Stable - Low risk of patient condition declining or worsening    Shift Goals  Clinical Goals: Pt pain will be managed  Patient Goals: Discharge rest and spleep.   Family Goals: get her better    Progress made toward(s) clinical / shift goals: Pain managed with PRN pain medications administered per MAR. Distraction and relaxation techniques also in use.    Patient is not progressing towards the following goals:

## 2021-11-03 NOTE — DISCHARGE PLANNING
Agency/Facility Name: [facility]  Spoke To: Admission  Outcome: Accepted but has a $519 per week copay.

## 2021-11-04 LAB
GRAM STN SPEC: NORMAL
INR PPP: 1.24 (ref 0.87–1.13)
MAGNESIUM SERPL-MCNC: 1.9 MG/DL (ref 1.5–2.5)
PHOSPHATE SERPL-MCNC: 2.2 MG/DL (ref 2.5–4.5)
PROTHROMBIN TIME: 15.2 SEC (ref 12–14.6)
SIGNIFICANT IND 70042: NORMAL
SITE SITE: NORMAL
SOURCE SOURCE: NORMAL

## 2021-11-04 PROCEDURE — 700111 HCHG RX REV CODE 636 W/ 250 OVERRIDE (IP): Performed by: STUDENT IN AN ORGANIZED HEALTH CARE EDUCATION/TRAINING PROGRAM

## 2021-11-04 PROCEDURE — 700102 HCHG RX REV CODE 250 W/ 637 OVERRIDE(OP): Performed by: STUDENT IN AN ORGANIZED HEALTH CARE EDUCATION/TRAINING PROGRAM

## 2021-11-04 PROCEDURE — 770006 HCHG ROOM/CARE - MED/SURG/GYN SEMI*

## 2021-11-04 PROCEDURE — 87077 CULTURE AEROBIC IDENTIFY: CPT | Mod: 91

## 2021-11-04 PROCEDURE — 700111 HCHG RX REV CODE 636 W/ 250 OVERRIDE (IP): Mod: JG | Performed by: INTERNAL MEDICINE

## 2021-11-04 PROCEDURE — 700102 HCHG RX REV CODE 250 W/ 637 OVERRIDE(OP): Performed by: FAMILY MEDICINE

## 2021-11-04 PROCEDURE — 87186 SC STD MICRODIL/AGAR DIL: CPT | Mod: 91

## 2021-11-04 PROCEDURE — 87205 SMEAR GRAM STAIN: CPT | Mod: 91

## 2021-11-04 PROCEDURE — 99233 SBSQ HOSP IP/OBS HIGH 50: CPT | Performed by: SURGERY

## 2021-11-04 PROCEDURE — 84100 ASSAY OF PHOSPHORUS: CPT

## 2021-11-04 PROCEDURE — 700105 HCHG RX REV CODE 258: Performed by: INTERNAL MEDICINE

## 2021-11-04 PROCEDURE — A9270 NON-COVERED ITEM OR SERVICE: HCPCS | Performed by: STUDENT IN AN ORGANIZED HEALTH CARE EDUCATION/TRAINING PROGRAM

## 2021-11-04 PROCEDURE — A9270 NON-COVERED ITEM OR SERVICE: HCPCS | Performed by: INTERNAL MEDICINE

## 2021-11-04 PROCEDURE — A9270 NON-COVERED ITEM OR SERVICE: HCPCS | Performed by: FAMILY MEDICINE

## 2021-11-04 PROCEDURE — 85610 PROTHROMBIN TIME: CPT

## 2021-11-04 PROCEDURE — 99233 SBSQ HOSP IP/OBS HIGH 50: CPT | Performed by: INTERNAL MEDICINE

## 2021-11-04 PROCEDURE — 83735 ASSAY OF MAGNESIUM: CPT

## 2021-11-04 PROCEDURE — 700102 HCHG RX REV CODE 250 W/ 637 OVERRIDE(OP): Performed by: INTERNAL MEDICINE

## 2021-11-04 PROCEDURE — 87070 CULTURE OTHR SPECIMN AEROBIC: CPT | Mod: 91

## 2021-11-04 PROCEDURE — 700111 HCHG RX REV CODE 636 W/ 250 OVERRIDE (IP): Performed by: INTERNAL MEDICINE

## 2021-11-04 PROCEDURE — 700111 HCHG RX REV CODE 636 W/ 250 OVERRIDE (IP): Performed by: FAMILY MEDICINE

## 2021-11-04 RX ADMIN — OXYCODONE HYDROCHLORIDE 10 MG: 10 TABLET ORAL at 16:34

## 2021-11-04 RX ADMIN — GABAPENTIN 600 MG: 300 CAPSULE ORAL at 18:39

## 2021-11-04 RX ADMIN — EZETIMIBE 10 MG: 10 TABLET ORAL at 18:39

## 2021-11-04 RX ADMIN — PIPERACILLIN SODIUM AND TAZOBACTAM SODIUM 3.38 G: 3; .375 INJECTION, POWDER, FOR SOLUTION INTRAVENOUS at 14:11

## 2021-11-04 RX ADMIN — ONDANSETRON 4 MG: 2 INJECTION INTRAMUSCULAR; INTRAVENOUS at 12:32

## 2021-11-04 RX ADMIN — Medication 1000 UNITS: at 04:35

## 2021-11-04 RX ADMIN — ONDANSETRON 4 MG: 2 INJECTION INTRAMUSCULAR; INTRAVENOUS at 20:45

## 2021-11-04 RX ADMIN — LISINOPRIL 10 MG: 10 TABLET ORAL at 04:35

## 2021-11-04 RX ADMIN — OXYCODONE HYDROCHLORIDE 10 MG: 10 TABLET ORAL at 08:00

## 2021-11-04 RX ADMIN — ONDANSETRON 4 MG: 2 INJECTION INTRAMUSCULAR; INTRAVENOUS at 03:43

## 2021-11-04 RX ADMIN — SULFAMETHOXAZOLE AND TRIMETHOPRIM 1 TABLET: 800; 160 TABLET ORAL at 18:39

## 2021-11-04 RX ADMIN — SULFAMETHOXAZOLE AND TRIMETHOPRIM 1 TABLET: 800; 160 TABLET ORAL at 04:36

## 2021-11-04 RX ADMIN — OXYCODONE HYDROCHLORIDE 10 MG: 10 TABLET ORAL at 12:32

## 2021-11-04 RX ADMIN — ONDANSETRON 4 MG: 2 INJECTION INTRAMUSCULAR; INTRAVENOUS at 08:00

## 2021-11-04 RX ADMIN — PIPERACILLIN SODIUM AND TAZOBACTAM SODIUM 3.38 G: 3; .375 INJECTION, POWDER, FOR SOLUTION INTRAVENOUS at 20:50

## 2021-11-04 RX ADMIN — ENOXAPARIN SODIUM 40 MG: 40 INJECTION SUBCUTANEOUS at 06:00

## 2021-11-04 RX ADMIN — MICAFUNGIN SODIUM 100 MG: 100 INJECTION, POWDER, LYOPHILIZED, FOR SOLUTION INTRAVENOUS at 12:59

## 2021-11-04 RX ADMIN — OXYCODONE HYDROCHLORIDE 10 MG: 10 TABLET ORAL at 03:43

## 2021-11-04 RX ADMIN — FAMOTIDINE 20 MG: 20 TABLET ORAL at 04:35

## 2021-11-04 RX ADMIN — OXYCODONE HYDROCHLORIDE 10 MG: 10 TABLET ORAL at 20:45

## 2021-11-04 RX ADMIN — GABAPENTIN 600 MG: 300 CAPSULE ORAL at 04:35

## 2021-11-04 RX ADMIN — FAMOTIDINE 20 MG: 20 TABLET ORAL at 18:39

## 2021-11-04 RX ADMIN — HYDROMORPHONE HYDROCHLORIDE 0.5 MG: 1 INJECTION, SOLUTION INTRAMUSCULAR; INTRAVENOUS; SUBCUTANEOUS at 05:08

## 2021-11-04 RX ADMIN — ONDANSETRON 4 MG: 2 INJECTION INTRAMUSCULAR; INTRAVENOUS at 16:34

## 2021-11-04 RX ADMIN — PIPERACILLIN SODIUM AND TAZOBACTAM SODIUM 3.38 G: 3; .375 INJECTION, POWDER, FOR SOLUTION INTRAVENOUS at 03:43

## 2021-11-04 ASSESSMENT — ENCOUNTER SYMPTOMS
VOMITING: 0
FEVER: 0
SHORTNESS OF BREATH: 0
COUGH: 0
ABDOMINAL PAIN: 1
CHILLS: 0
NAUSEA: 0
PALPITATIONS: 0
HEADACHES: 0
DIZZINESS: 0

## 2021-11-04 ASSESSMENT — PAIN DESCRIPTION - PAIN TYPE
TYPE: SURGICAL PAIN;ACUTE PAIN

## 2021-11-04 ASSESSMENT — PAIN SCALES - WONG BAKER: WONGBAKER_NUMERICALRESPONSE: HURTS A WHOLE LOT

## 2021-11-04 NOTE — CARE PLAN
Problem: Fluid Volume  Goal: Fluid volume balance will be maintained  Outcome: Progressing     Problem: Physical Regulation  Goal: Diagnostic test results will improve  Outcome: Progressing   The patient is Stable - Low risk of patient condition declining or worsening    Shift Goals  Clinical Goals: pain management, drain output, clean dressings  Patient Goals: surgery to abdomen   Family Goals: get her better    Progress made toward(s) clinical / shift goals:  administer pain medications as needed, flush and monitor drain output. Change dressings as needed.     Patient is not progressing towards the following goals:

## 2021-11-04 NOTE — PROGRESS NOTES
"Hospital Medicine Daily Progress Note    Date of Service  11/4/2021    Chief Complaint  Nils Alfonso is a 66 y.o. female admitted 10/15/2021 with abdominal pain    Hospital Course  Initially admitted to [facility] for evaluation of abdominal pain after recent ERCP with polypectomy and sphincterotomy and noted to have large intra-abdominal fluid collection for which she underwent drainage with interventional radiology and was evaluated by infectious disease and surgery.  She was transferred to [facility] for higher level of care.  She was evaluated by Dr Cook who recommended conservative management with placement of a second drain    Pneumobilia with gas in the CBD - s/p drain placement  TPN  Post ERCP retroperitoneal abscess with drain x2     Repeat CT 10/22 showed Grossly unchanged irregular fluid collection occupying the right abdomen surrounding the right kidney and right colon extending to midline.     I discussed with ID and they would like to repeat imaging prior to final abx plan- for now plan for 4 week abx course until 11/24.  I d/w Dr. marshall and plan for f/u as outpatient in 3 weeks with him      Interval Problem Update  No acute events overnight  Patient will be getting her drains upsized today  She is feeling tired as she didn't sleep well last night  Drainage from LLQ has stopped, drainage around RUQ drain has reduced\"    11/2. Another drain was placed making this the third. Patient seems to be weary and now is open to having surgical intervention. She prefers to be discharged off drains. I updated Dr. [place], Surgery and [facility]. Dr. [place] will review the scans.  11/3. Discussed with patient and Dr. [place]. Will likely need surgical intervention.  at bedside.  11/4. Discussed with Dr. ST at length. Surgery planned tomorrow.    I have personally seen and examined the patient at bedside. I discussed the plan of care " with patient, family, bedside RN and IR.    Consultants/Specialty  general surgery, GI, infectious disease and interventional radiology    Code Status  Full Code    Disposition  Patient is not medically cleared.   Anticipate discharge to to home with organized home healthcare and close outpatient follow-up.  I have placed the appropriate orders for post-discharge needs.    Review of Systems  Review of Systems   Constitutional: Negative for chills and fever.   Respiratory: Negative for cough and shortness of breath.    Cardiovascular: Negative for chest pain and palpitations.   Gastrointestinal: Positive for abdominal pain. Negative for nausea and vomiting.   Genitourinary: Negative for dysuria and urgency.   Neurological: Negative for dizziness and headaches.   All other systems reviewed and are negative.       Physical Exam  Temp:  [36.7 °C (98 °F)-37.6 °C (99.6 °F)] 37.4 °C (99.3 °F)  Pulse:  [70-77] 70  Resp:  [16-19] 17  BP: (115-141)/(61-70) 119/65  SpO2:  [90 %-94 %] 93 %    Physical Exam  Vitals and nursing note reviewed.   Constitutional:       Appearance: Normal appearance.      Comments: Thin   Cardiovascular:      Rate and Rhythm: Normal rate and regular rhythm.   Pulmonary:      Effort: Pulmonary effort is normal.      Breath sounds: Normal breath sounds.   Abdominal:      General: Abdomen is flat.      Palpations: Abdomen is soft.      Tenderness: There is no abdominal tenderness.      Comments: RUQ drain and R flank drain   Another abdominal drain placed  Now improved to 5cc discharge  Third drain placed.  Foul smelling leakage on the drains   Musculoskeletal:      Right lower leg: Edema present.      Left lower leg: Edema present.   Skin:     General: Skin is warm and dry.   Neurological:      General: No focal deficit present.      Mental Status: She is alert and oriented to person, place, and time.   Psychiatric:      Comments: Less anxious.       Fluids    Intake/Output Summary (Last 24 hours) at  11/4/2021 0825  Last data filed at 11/4/2021 0500  Gross per 24 hour   Intake --   Output 1015 ml   Net -1015 ml       Laboratory  Recent Labs     11/02/21  1500 11/03/21  0558   WBC 3.7* 4.8   RBC 3.09* 3.10*   HEMOGLOBIN 9.2* 9.4*   HEMATOCRIT 28.1* 28.0*   MCV 90.9 90.3   MCH 29.8 30.3   MCHC 32.7* 33.6   RDW 46.0 44.9   PLATELETCT 235 227   MPV 9.4 9.3     Recent Labs     11/02/21  1500 11/03/21  0330   SODIUM 135 130*   POTASSIUM 3.1* 3.3*   CHLORIDE 98 96   CO2 26 24   GLUCOSE 121* 83   BUN 11 10   CREATININE 0.77 0.67   CALCIUM 8.1* 7.9*                   Imaging  CT-ABDOMEN-PELVIS WITH   Final Result      1.  Slight interval decrease in size of the large RIGHT peritoneal abscess which now contains 3 drains   2.  Decreased atelectasis with persistent opacity in the RIGHT lung base likely atelectasis rather than pneumonia   3.  Small LEFT renal cyst   4.  Prior cholecystectomy with ongoing pneumobilia      CT-DRAINAGE-CATH EXCHANGE   Final Result         1. Organized pancreatic pseudocyst Drainage with CT guidance.      CT-ABDOMEN-PELVIS WITH   Final Result      1.  There has been interval placement of an additional inferior lateral pigtail catheter within the complex right abdominal abscess. The other 2 pigtail catheters are stable in appearance.   2.  There has been no significant interval decrease in size of the large complex loculated abscess collection in the right abdomen.   3.  There is no new fluid collection.   4.  Gallbladder is surgically absent with continued pneumobilia.   5.  Interval decrease in the dependent pleural effusion with minimal airspace disease, likely atelectasis.      IR-DRAINAGE-CATH EXCHANGE   Final Result      1.  Successful left-sided drainage catheter removal.   2.  Successful image guided superior right drainage catheter replacement.      Plan: Suction drainage. Monitor outputs. Please contact interventional radiology if there is any concern for tube dysfunction. Suggest  "routine tube maintenance at 3 months intervals if the tube remains in place.         CT-DRAIN-PERITONEAL   Final Result      1.  CT GUIDED PERITONEAL RLQ abdominal CATHETER DRAINAGE.   2.  THE CURRENT PLAN IS TO MONITOR DRAINAGE OUTPUT AND OBTAIN A FOLLOWUP CT SCAN IN 5-7 DAYS IF CLINICALLY INDICATED.      CT-ABDOMEN-PELVIS WITH   Final Result         1. Grossly unchanged irregular fluid collection occupying the right abdomen surrounding the right kidney and right colon extending to midline. Additional pigtail catheter in the component about midline anterior abdomen. Both pigtail catheters seem to be    within the collection.      2. Small right pleural effusion with right basilar atelectasis.               DX-CHEST-LIMITED (1 VIEW)   Final Result      1.  Right basilar atelectasis or consolidation. Small right pleural effusion not excluded.   2.  Atherosclerotic plaque.      CT-DRAIN-PERITONEAL   Final Result      1.  CT guided pancreatic pseudocyst catheter drainage.   2.  The current plan is to obtain a follow-up CT in 5-7 days..      IR-PICC LINE PLACEMENT W/ GUIDANCE > AGE 5   Final Result                  Ultrasound-guided PICC placement performed by qualified nursing staff as    above.          CT-ABDOMEN-PELVIS WITH    (Results Pending)        Assessment/Plan  * Bacteremia due to Gram-negative bacteria and fungemia- (present on admission)  Assessment & Plan  ID consulted - continue micafungin and zosyn indefinitely due to incomplete source control  Source control per ID, Surgery, and IR consults    Peritoneal fluid cultures grew E. coli and Enterococcus   Blood cultures grew Chryseobacterium and Candida glabrata   Repeat blood cultures from 10/13/2021 are negative    Peritoneal fluid from 10/16/2021 growing yeast and Stenotrophomonas maltophilia-  Monitor drain output and continue current antibiotics  KALANI negative for signs of endocarditis\"    As above    Sepsis due to intraabdominal fluid " "collection/abscess, bacteremia and fungemia (HCC)- (present on admission)  Assessment & Plan  Sepsis resolved  Source intra-abdominal  Continue Zosyn and Mycamine and follow-up on repeat cultures  ID following  Re-imaging per ID, IR, and Surgery consults\"    IR placed another drain, total of 3  Dr. Dumont will review the scans, marah on InD  IV Zosyn continuous and IV micafungin 100mg BID STOP 11/24 per ID  Dr. Dumont plans on surgical intervention tomorrow.    On total parenteral nutrition (TPN)  Assessment & Plan  RD consulted for enteral nutrition recommendations  TPN discontinued 10/27    Hypophosphatemia- (present on admission)  Assessment & Plan  Resolved with TPN  Monitoring per RD    Acute respiratory failure with hypoxia (HCC)- (present on admission)  Assessment & Plan  Resolved with diuresis  Likely volume overload with lower extremity edema present    Antibiotic-associated diarrhea  Assessment & Plan  Cdiff PCR negative  Loperamide PRN    Postprocedural retroperitoneal abscess- (present on admission)  Assessment & Plan  Now with abdominal drain x3  General surgery consulted, recommend ongoing nonoperative management  IR consulted, positioning and revision of tubes per recommendations  Repeat CT 10/22 showed Grossly unchanged irregular fluid collection occupying the right abdomen surrounding the right kidney and right colon extending to midline     ID consulted, recommending ongoing micafungin / zosyn and re-imaging in 4 weeks (ordered)  Will need to follow up in surgical clinic due to limited ID clinic availability    Repeat ct imaging with unchanged abscess size.  Continued drainage around drain.  I discussed with IR and plan will be to upsize drain on 11/1  If after upsizing drain she continues to have drainage around drain then will need to re-discuss POC with Dr. Dumont\"    3rd drain placed  Dr. Dumont to review scans, now planning surgery    Duodenal perforation (HCC)- (present " "on admission)  Assessment & Plan  After ERCP with retroperitoneal abscess and bacteremia  Uncertain etiology - ddx includes pancreatitis, bile leak, iatrogenic  GI consulted and s/o, management per ID / IR / Surgery    Hyponatremia- (present on admission)  Assessment & Plan  Recurrent, mild  Diuresis as tolerated    Normocytic anemia- (present on admission)  Assessment & Plan  Ferritin and Fe% consistent with AOCD from intra-abdominal infection  Repeat AM CBC  Transfuse for Hgb <7\"    Recent Labs     11/02/21  1500 11/03/21  0558   HEMOGLOBIN 9.2* 9.4*   HEMATOCRIT 28.1* 28.0*   MCV 90.9 90.3   MCH 29.8 30.3   PLATELETCT 235 227     Stable.    Hyperlipidemia- (present on admission)  Assessment & Plan  Continue ezetimibe    Primary hypertension- (present on admission)  Assessment & Plan  Continue lisinopril  No indication for strict BP control, PRN antihypertensives discontinued\"    Vitals:    11/04/21 0710   BP: 119/65   Pulse: 70   Resp: 17   Temp: 37.4 °C (99.3 °F)   SpO2: 93%     Increased lisinopril  Improved       VTE prophylaxis: enoxaparin ppx    I have performed a physical exam and reviewed and updated ROS and Plan today (11/4/2021). In review of yesterday's note (11/3/2021), there are no changes except as documented above.      "

## 2021-11-04 NOTE — CARE PLAN
Problem: Fluid Volume  Goal: Fluid volume balance will be maintained  Outcome: Progressing     Problem: Physical Regulation  Goal: Diagnostic test results will improve  Outcome: Progressing   The patient is Stable - Low risk of patient condition declining or worsening    Shift Goals  Clinical Goals: pain management,   Patient Goals: speak to surgeon for plan of drains vs washout.   Family Goals: get her better    Progress made toward(s) clinical / shift goals:  administer pain medications as needed, drain in place.     Patient is not progressing towards the following goals:

## 2021-11-04 NOTE — PROGRESS NOTES
COVID-19 surge in effect    Received report from TALITA Vega, assumed patient's care. Kept safe and monitored.

## 2021-11-04 NOTE — CARE PLAN
The patient is Stable - Low risk of patient condition declining or worsening    Shift Goals  Clinical Goals: Pts pain will be managed  Patient Goals: speak to surgeon for plan of drains vs washout.   Family Goals: get her better    Progress made toward(s) clinical / shift goals:  Pain managed with PRN pain medications administered per MAR. Distraction and relaxation techniques also in use.    Patient is not progressing towards the following goals:

## 2021-11-04 NOTE — PROGRESS NOTES
Surgical Progress Note    Author: Jaden Cook M.D. Date & Time created: 2021   12:36 PM     Interval Events:  Patient is seen following a CT scan with p.o. contrast.  There appears to be no contrast that extravasates is the retroperitoneum from her CT scan but there appears to be continued abscess that cannot be drained with multiple drains.  She now has 3 drains in and she has been managed conservatively for almost 3 weeks and unfortunately it is not causing this abscess to resolve.  Patient is very frustrated and anxious to proceed with an open surgery to correct this patient.  Her labs appear to be okay but her white counthas stayed stable.  My biggest concern is the change in consistency of the more superior anterior drain has what appears to be feculent/enteric appearance but may represent just a bleed that occurred post replacement of the drain.  I am here to discuss neck steps.       Review of Systems   Constitutional: Positive for malaise/fatigue.   Gastrointestinal: Positive for abdominal pain.   All other systems reviewed and are negative.    Hemodynamics:  Temp (24hrs), Av.3 °C (99.1 °F), Min:36.7 °C (98 °F), Max:37.6 °C (99.6 °F)  Temperature: 37.4 °C (99.3 °F)  Pulse  Av.9  Min: 54  Max: 93   Blood Pressure : 119/65     Respiratory:    Respiration: 17, Pulse Oximetry: 93 %        RUL Breath Sounds: Clear, RML Breath Sounds: Diminished, RLL Breath Sounds: Diminished, TRISTAN Breath Sounds: Clear, LLL Breath Sounds: Diminished  Neuro:  GCS       Fluids:    Intake/Output Summary (Last 24 hours) at 2021 1236  Last data filed at 2021 0800  Gross per 24 hour   Intake 240 ml   Output 22 ml   Net 218 ml        Current Diet Order   Procedures   • Diet Order Diet: Regular; Miscellaneous modifications: (optional): 6 Small Meals     Physical Exam  Vitals and nursing note reviewed.   Constitutional:       Appearance: Normal appearance. She is ill-appearing and toxic-appearing.    HENT:      Head: Normocephalic.      Nose: Nose normal.      Mouth/Throat:      Mouth: Mucous membranes are moist.   Eyes:      Conjunctiva/sclera: Conjunctivae normal.      Pupils: Pupils are equal, round, and reactive to light.   Cardiovascular:      Rate and Rhythm: Normal rate and regular rhythm.      Pulses: Normal pulses.      Heart sounds: Normal heart sounds.   Pulmonary:      Effort: Pulmonary effort is normal.      Breath sounds: Normal breath sounds.   Abdominal:      General: Abdomen is flat. Bowel sounds are normal.      Palpations: Abdomen is soft.      Comments: The patient has discomfort around the drain sites and a right-sided area of what appears to be purulent fluid draining around the drain.  It does cause maceration of her skin.  The drains that are coming out posteriorly are extremely tender.   Musculoskeletal:         General: Normal range of motion.      Cervical back: Normal range of motion and neck supple.   Skin:     General: Skin is warm and dry.   Neurological:      General: No focal deficit present.      Mental Status: She is alert and oriented to person, place, and time.   Psychiatric:         Mood and Affect: Mood normal.         Behavior: Behavior normal.         Thought Content: Thought content normal.         Judgment: Judgment normal.       Labs:  Recent Results (from the past 24 hour(s))   PHOSPHORUS    Collection Time: 11/04/21  5:40 AM   Result Value Ref Range    Phosphorus 2.2 (L) 2.5 - 4.5 mg/dL   MAGNESIUM    Collection Time: 11/04/21  5:40 AM   Result Value Ref Range    Magnesium 1.9 1.5 - 2.5 mg/dL   FLUID CULTURE W/GRAM STAIN    Collection Time: 11/04/21  8:00 AM    Specimen: Body Fluid   Result Value Ref Range    Significant Indicator NEG     Source BF     Site peritoneal fluid Drain 2     Culture Result -     Gram Stain Result -    FLUID CULTURE W/GRAM STAIN    Collection Time: 11/04/21  8:00 AM    Specimen: Body Fluid   Result Value Ref Range    Significant Indicator  NEG     Source BF     Site peritoneal fluid Drain 3     Culture Result -     Gram Stain Result -      Medical Decision Making, by Problem:  Active Hospital Problems    Diagnosis    • Duodenal perforation (HCC) [K63.1]      Priority: High   • Postprocedural retroperitoneal abscess [K68.11]      Priority: High   • Bacteremia due to Gram-negative bacteria and fungemia [R78.81]      Priority: High   • Sepsis due to intraabdominal fluid collection/abscess, bacteremia and fungemia (HCC) [A41.9]      Priority: High   • On total parenteral nutrition (TPN) [Z78.9]    • Hypophosphatemia [E83.39]    • Acute respiratory failure with hypoxia (HCC) [J96.01]    • Antibiotic-associated diarrhea [K52.1, T36.95XA]    • Hyponatremia [E87.1]    • Normocytic anemia [D64.9]    • Hyperlipidemia [E78.5]    • Primary hypertension [I10]      Plan:  In light of the present findings, the patient has been hospitalized now for 3 weeks with no resolution to her retroperitoneal abscess.  We have tried multiple interventional radiology drains and presently have 3 drains in and to this point she still has this unfortunate abscess cavity in the retroperitoneum secondary to a duodenal perforation from an ERCP.  Because of failure of surgery management my recommendation is to proceed to the OR with a open drainage of the peritoneal abscess washout possible bowel resection if needed and repair of duodenal hole, placement of better drainage system and then get out.  She understands the risks and benefits of the procedure including bleeding, infection, possible injury to the bowel due to her previous abdominal surgeries.  She understands we will try not to interrupt or disrupt the duodenal injury but this is a possibility that it may reopen after we clean her out.  Patient agreed the above plan and she has been scheduled for Friday, November 5 at 7 AM.        Quality Measures:  Quality-Core Measures    Discussed patient condition with Family and RN

## 2021-11-05 ENCOUNTER — ANESTHESIA EVENT (OUTPATIENT)
Dept: SURGERY | Facility: MEDICAL CENTER | Age: 66
DRG: 856 | End: 2021-11-05
Payer: MEDICARE

## 2021-11-05 ENCOUNTER — ANESTHESIA (OUTPATIENT)
Dept: SURGERY | Facility: MEDICAL CENTER | Age: 66
DRG: 856 | End: 2021-11-05
Payer: MEDICARE

## 2021-11-05 LAB
ALBUMIN SERPL BCP-MCNC: 2.5 G/DL (ref 3.2–4.9)
BACTERIA WND AEROBE CULT: ABNORMAL
BUN SERPL-MCNC: 9 MG/DL (ref 8–22)
CALCIUM SERPL-MCNC: 8.3 MG/DL (ref 8.5–10.5)
CHLORIDE SERPL-SCNC: 100 MMOL/L (ref 96–112)
CO2 SERPL-SCNC: 24 MMOL/L (ref 20–33)
CREAT SERPL-MCNC: 0.64 MG/DL (ref 0.5–1.4)
ERYTHROCYTE [DISTWIDTH] IN BLOOD BY AUTOMATED COUNT: 44.6 FL (ref 35.9–50)
EXTERNAL QUALITY CONTROL: NORMAL
GLUCOSE SERPL-MCNC: 96 MG/DL (ref 65–99)
GRAM STN SPEC: ABNORMAL
HCT VFR BLD AUTO: 25.5 % (ref 37–47)
HGB BLD-MCNC: 8.3 G/DL (ref 12–16)
MAGNESIUM SERPL-MCNC: 2 MG/DL (ref 1.5–2.5)
MCH RBC QN AUTO: 28.7 PG (ref 27–33)
MCHC RBC AUTO-ENTMCNC: 32.5 G/DL (ref 33.6–35)
MCV RBC AUTO: 88.2 FL (ref 81.4–97.8)
PHOSPHATE SERPL-MCNC: 2.4 MG/DL (ref 2.5–4.5)
PLATELET # BLD AUTO: 219 K/UL (ref 164–446)
PMV BLD AUTO: 9.1 FL (ref 9–12.9)
POTASSIUM SERPL-SCNC: 3.2 MMOL/L (ref 3.6–5.5)
RBC # BLD AUTO: 2.89 M/UL (ref 4.2–5.4)
SARS-COV+SARS-COV-2 AG RESP QL IA.RAPID: NEGATIVE
SIGNIFICANT IND 70042: ABNORMAL
SITE SITE: ABNORMAL
SODIUM SERPL-SCNC: 133 MMOL/L (ref 135–145)
SOURCE SOURCE: ABNORMAL
WBC # BLD AUTO: 3.7 K/UL (ref 4.8–10.8)

## 2021-11-05 PROCEDURE — 160042 HCHG SURGERY MINUTES - EA ADDL 1 MIN LEVEL 5: Performed by: SURGERY

## 2021-11-05 PROCEDURE — 87077 CULTURE AEROBIC IDENTIFY: CPT

## 2021-11-05 PROCEDURE — 700111 HCHG RX REV CODE 636 W/ 250 OVERRIDE (IP): Performed by: FAMILY MEDICINE

## 2021-11-05 PROCEDURE — 49060 DRAIN OPEN RETROPERI ABSCESS: CPT | Performed by: SURGERY

## 2021-11-05 PROCEDURE — 36415 COLL VENOUS BLD VENIPUNCTURE: CPT

## 2021-11-05 PROCEDURE — 501838 HCHG SUTURE GENERAL: Performed by: SURGERY

## 2021-11-05 PROCEDURE — A9270 NON-COVERED ITEM OR SERVICE: HCPCS | Performed by: ANESTHESIOLOGY

## 2021-11-05 PROCEDURE — A9270 NON-COVERED ITEM OR SERVICE: HCPCS | Performed by: FAMILY MEDICINE

## 2021-11-05 PROCEDURE — 80069 RENAL FUNCTION PANEL: CPT

## 2021-11-05 PROCEDURE — 87426 SARSCOV CORONAVIRUS AG IA: CPT | Performed by: SURGERY

## 2021-11-05 PROCEDURE — 700102 HCHG RX REV CODE 250 W/ 637 OVERRIDE(OP): Performed by: STUDENT IN AN ORGANIZED HEALTH CARE EDUCATION/TRAINING PROGRAM

## 2021-11-05 PROCEDURE — 700105 HCHG RX REV CODE 258: Performed by: ANESTHESIOLOGY

## 2021-11-05 PROCEDURE — 160002 HCHG RECOVERY MINUTES (STAT): Performed by: SURGERY

## 2021-11-05 PROCEDURE — 76998 US GUIDE INTRAOP: CPT | Mod: 26,80 | Performed by: SURGERY

## 2021-11-05 PROCEDURE — 85027 COMPLETE CBC AUTOMATED: CPT

## 2021-11-05 PROCEDURE — 700111 HCHG RX REV CODE 636 W/ 250 OVERRIDE (IP): Performed by: ANESTHESIOLOGY

## 2021-11-05 PROCEDURE — A9270 NON-COVERED ITEM OR SERVICE: HCPCS | Performed by: STUDENT IN AN ORGANIZED HEALTH CARE EDUCATION/TRAINING PROGRAM

## 2021-11-05 PROCEDURE — 700101 HCHG RX REV CODE 250: Performed by: ANESTHESIOLOGY

## 2021-11-05 PROCEDURE — A6402 STERILE GAUZE <= 16 SQ IN: HCPCS | Performed by: SURGERY

## 2021-11-05 PROCEDURE — 500371 HCHG DRAIN, BLAKE 10MM: Performed by: SURGERY

## 2021-11-05 PROCEDURE — 160031 HCHG SURGERY MINUTES - 1ST 30 MINS LEVEL 5: Performed by: SURGERY

## 2021-11-05 PROCEDURE — 0JD80ZZ EXTRACTION OF ABDOMEN SUBCUTANEOUS TISSUE AND FASCIA, OPEN APPROACH: ICD-10-PCS | Performed by: SURGERY

## 2021-11-05 PROCEDURE — 0T9B70Z DRAINAGE OF BLADDER WITH DRAINAGE DEVICE, VIA NATURAL OR ARTIFICIAL OPENING: ICD-10-PCS | Performed by: SURGERY

## 2021-11-05 PROCEDURE — 502240 HCHG MISC OR SUPPLY RC 0272: Performed by: SURGERY

## 2021-11-05 PROCEDURE — 700102 HCHG RX REV CODE 250 W/ 637 OVERRIDE(OP): Performed by: INTERNAL MEDICINE

## 2021-11-05 PROCEDURE — 62320 NJX INTERLAMINAR CRV/THRC: CPT | Performed by: SURGERY

## 2021-11-05 PROCEDURE — 99232 SBSQ HOSP IP/OBS MODERATE 35: CPT | Performed by: INTERNAL MEDICINE

## 2021-11-05 PROCEDURE — 11043 DBRDMT MUSC&/FSCA 1ST 20/<: CPT | Performed by: SURGERY

## 2021-11-05 PROCEDURE — 87075 CULTR BACTERIA EXCEPT BLOOD: CPT

## 2021-11-05 PROCEDURE — 160036 HCHG PACU - EA ADDL 30 MINS PHASE I: Performed by: SURGERY

## 2021-11-05 PROCEDURE — 500378 HCHG DRAIN, J-VAC ROUND 19FR: Performed by: SURGERY

## 2021-11-05 PROCEDURE — 160009 HCHG ANES TIME/MIN: Performed by: SURGERY

## 2021-11-05 PROCEDURE — 0JDC0ZZ EXTRACTION OF PELVIC REGION SUBCUTANEOUS TISSUE AND FASCIA, OPEN APPROACH: ICD-10-PCS | Performed by: SURGERY

## 2021-11-05 PROCEDURE — 49020 DRAINAGE ABDOM ABSCESS OPEN: CPT | Mod: 59 | Performed by: SURGERY

## 2021-11-05 PROCEDURE — 83735 ASSAY OF MAGNESIUM: CPT

## 2021-11-05 PROCEDURE — A9270 NON-COVERED ITEM OR SERVICE: HCPCS | Performed by: INTERNAL MEDICINE

## 2021-11-05 PROCEDURE — 700111 HCHG RX REV CODE 636 W/ 250 OVERRIDE (IP): Performed by: INTERNAL MEDICINE

## 2021-11-05 PROCEDURE — 160048 HCHG OR STATISTICAL LEVEL 1-5: Performed by: SURGERY

## 2021-11-05 PROCEDURE — 87205 SMEAR GRAM STAIN: CPT

## 2021-11-05 PROCEDURE — 770001 HCHG ROOM/CARE - MED/SURG/GYN PRIV*

## 2021-11-05 PROCEDURE — 49020 DRAINAGE ABDOM ABSCESS OPEN: CPT | Mod: 80 | Performed by: SURGERY

## 2021-11-05 PROCEDURE — 700102 HCHG RX REV CODE 250 W/ 637 OVERRIDE(OP): Performed by: FAMILY MEDICINE

## 2021-11-05 PROCEDURE — 700105 HCHG RX REV CODE 258: Performed by: INTERNAL MEDICINE

## 2021-11-05 PROCEDURE — 87070 CULTURE OTHR SPECIMN AEROBIC: CPT | Mod: 91

## 2021-11-05 PROCEDURE — 160035 HCHG PACU - 1ST 60 MINS PHASE I: Performed by: SURGERY

## 2021-11-05 PROCEDURE — 700102 HCHG RX REV CODE 250 W/ 637 OVERRIDE(OP): Performed by: ANESTHESIOLOGY

## 2021-11-05 PROCEDURE — 76998 US GUIDE INTRAOP: CPT | Mod: 26 | Performed by: SURGERY

## 2021-11-05 RX ORDER — ACETAMINOPHEN 650 MG/1
975 SUPPOSITORY RECTAL EVERY 6 HOURS PRN
Status: DISCONTINUED | OUTPATIENT
Start: 2021-11-05 | End: 2021-11-06

## 2021-11-05 RX ORDER — DIPHENHYDRAMINE HYDROCHLORIDE 50 MG/ML
12.5 INJECTION INTRAMUSCULAR; INTRAVENOUS EVERY 6 HOURS PRN
Status: DISCONTINUED | OUTPATIENT
Start: 2021-11-05 | End: 2021-11-06

## 2021-11-05 RX ORDER — DEXAMETHASONE SODIUM PHOSPHATE 4 MG/ML
INJECTION, SOLUTION INTRA-ARTICULAR; INTRALESIONAL; INTRAMUSCULAR; INTRAVENOUS; SOFT TISSUE PRN
Status: DISCONTINUED | OUTPATIENT
Start: 2021-11-05 | End: 2021-11-05 | Stop reason: SURG

## 2021-11-05 RX ORDER — SCOLOPAMINE TRANSDERMAL SYSTEM 1 MG/1
1 PATCH, EXTENDED RELEASE TRANSDERMAL ONCE
Status: COMPLETED | OUTPATIENT
Start: 2021-11-05 | End: 2021-11-08

## 2021-11-05 RX ORDER — HYDROMORPHONE HYDROCHLORIDE 1 MG/ML
0.4 INJECTION, SOLUTION INTRAMUSCULAR; INTRAVENOUS; SUBCUTANEOUS
Status: DISCONTINUED | OUTPATIENT
Start: 2021-11-05 | End: 2021-11-05 | Stop reason: HOSPADM

## 2021-11-05 RX ORDER — HYDRALAZINE HYDROCHLORIDE 20 MG/ML
5 INJECTION INTRAMUSCULAR; INTRAVENOUS
Status: DISCONTINUED | OUTPATIENT
Start: 2021-11-05 | End: 2021-11-05 | Stop reason: HOSPADM

## 2021-11-05 RX ORDER — ROPIVACAINE HYDROCHLORIDE 5 MG/ML
INJECTION, SOLUTION EPIDURAL; INFILTRATION; PERINEURAL PRN
Status: DISCONTINUED | OUTPATIENT
Start: 2021-11-05 | End: 2021-11-05 | Stop reason: SURG

## 2021-11-05 RX ORDER — ONDANSETRON 2 MG/ML
4 INJECTION INTRAMUSCULAR; INTRAVENOUS EVERY 4 HOURS PRN
Status: DISCONTINUED | OUTPATIENT
Start: 2021-11-05 | End: 2021-11-28 | Stop reason: HOSPADM

## 2021-11-05 RX ORDER — METOCLOPRAMIDE HYDROCHLORIDE 5 MG/ML
INJECTION INTRAMUSCULAR; INTRAVENOUS PRN
Status: DISCONTINUED | OUTPATIENT
Start: 2021-11-05 | End: 2021-11-05 | Stop reason: SURG

## 2021-11-05 RX ORDER — HALOPERIDOL 5 MG/ML
1 INJECTION INTRAMUSCULAR
Status: DISCONTINUED | OUTPATIENT
Start: 2021-11-05 | End: 2021-11-05 | Stop reason: HOSPADM

## 2021-11-05 RX ORDER — SCOLOPAMINE TRANSDERMAL SYSTEM 1 MG/1
1 PATCH, EXTENDED RELEASE TRANSDERMAL
Status: DISCONTINUED | OUTPATIENT
Start: 2021-11-08 | End: 2021-11-06

## 2021-11-05 RX ORDER — HALOPERIDOL 5 MG/ML
1 INJECTION INTRAMUSCULAR EVERY 6 HOURS PRN
Status: DISCONTINUED | OUTPATIENT
Start: 2021-11-05 | End: 2021-11-06

## 2021-11-05 RX ORDER — SODIUM CHLORIDE 9 MG/ML
INJECTION, SOLUTION INTRAVENOUS CONTINUOUS
Status: DISCONTINUED | OUTPATIENT
Start: 2021-11-05 | End: 2021-11-05 | Stop reason: HOSPADM

## 2021-11-05 RX ORDER — OXYCODONE HCL 5 MG/5 ML
10 SOLUTION, ORAL ORAL
Status: DISCONTINUED | OUTPATIENT
Start: 2021-11-05 | End: 2021-11-05 | Stop reason: HOSPADM

## 2021-11-05 RX ORDER — OXYCODONE HCL 5 MG/5 ML
5 SOLUTION, ORAL ORAL
Status: DISCONTINUED | OUTPATIENT
Start: 2021-11-05 | End: 2021-11-05 | Stop reason: HOSPADM

## 2021-11-05 RX ORDER — SODIUM CHLORIDE 9 MG/ML
INJECTION, SOLUTION INTRAVENOUS
Status: DISCONTINUED | OUTPATIENT
Start: 2021-11-05 | End: 2021-11-05 | Stop reason: SURG

## 2021-11-05 RX ORDER — HYDROMORPHONE HYDROCHLORIDE 1 MG/ML
0.5 INJECTION, SOLUTION INTRAMUSCULAR; INTRAVENOUS; SUBCUTANEOUS EVERY 4 HOURS PRN
Status: DISCONTINUED | OUTPATIENT
Start: 2021-11-05 | End: 2021-11-21

## 2021-11-05 RX ORDER — LIDOCAINE HYDROCHLORIDE AND EPINEPHRINE 15; 5 MG/ML; UG/ML
INJECTION, SOLUTION EPIDURAL
Status: COMPLETED | OUTPATIENT
Start: 2021-11-05 | End: 2021-11-05

## 2021-11-05 RX ORDER — ONDANSETRON 2 MG/ML
INJECTION INTRAMUSCULAR; INTRAVENOUS PRN
Status: DISCONTINUED | OUTPATIENT
Start: 2021-11-05 | End: 2021-11-05 | Stop reason: SURG

## 2021-11-05 RX ORDER — HYDROMORPHONE HYDROCHLORIDE 1 MG/ML
0.1 INJECTION, SOLUTION INTRAMUSCULAR; INTRAVENOUS; SUBCUTANEOUS
Status: DISCONTINUED | OUTPATIENT
Start: 2021-11-05 | End: 2021-11-05 | Stop reason: HOSPADM

## 2021-11-05 RX ORDER — LABETALOL HYDROCHLORIDE 5 MG/ML
5 INJECTION, SOLUTION INTRAVENOUS
Status: DISCONTINUED | OUTPATIENT
Start: 2021-11-05 | End: 2021-11-05 | Stop reason: HOSPADM

## 2021-11-05 RX ORDER — HYDROMORPHONE HYDROCHLORIDE 1 MG/ML
0.5 INJECTION, SOLUTION INTRAMUSCULAR; INTRAVENOUS; SUBCUTANEOUS EVERY 4 HOURS PRN
Status: DISCONTINUED | OUTPATIENT
Start: 2021-11-05 | End: 2021-11-05

## 2021-11-05 RX ORDER — ONDANSETRON 2 MG/ML
4 INJECTION INTRAMUSCULAR; INTRAVENOUS
Status: COMPLETED | OUTPATIENT
Start: 2021-11-05 | End: 2021-11-05

## 2021-11-05 RX ORDER — HYDROMORPHONE HYDROCHLORIDE 1 MG/ML
0.2 INJECTION, SOLUTION INTRAMUSCULAR; INTRAVENOUS; SUBCUTANEOUS
Status: DISCONTINUED | OUTPATIENT
Start: 2021-11-05 | End: 2021-11-05 | Stop reason: HOSPADM

## 2021-11-05 RX ORDER — KETOROLAC TROMETHAMINE 30 MG/ML
15 INJECTION, SOLUTION INTRAMUSCULAR; INTRAVENOUS EVERY 6 HOURS PRN
Status: DISCONTINUED | OUTPATIENT
Start: 2021-11-05 | End: 2021-11-06

## 2021-11-05 RX ORDER — DEXAMETHASONE SODIUM PHOSPHATE 4 MG/ML
4 INJECTION, SOLUTION INTRA-ARTICULAR; INTRALESIONAL; INTRAMUSCULAR; INTRAVENOUS; SOFT TISSUE
Status: DISCONTINUED | OUTPATIENT
Start: 2021-11-05 | End: 2021-11-06

## 2021-11-05 RX ORDER — PHENYLEPHRINE HYDROCHLORIDE 10 MG/ML
INJECTION, SOLUTION INTRAMUSCULAR; INTRAVENOUS; SUBCUTANEOUS PRN
Status: DISCONTINUED | OUTPATIENT
Start: 2021-11-05 | End: 2021-11-05 | Stop reason: SURG

## 2021-11-05 RX ADMIN — FENTANYL CITRATE 50 MCG: 50 INJECTION, SOLUTION INTRAMUSCULAR; INTRAVENOUS at 08:09

## 2021-11-05 RX ADMIN — SULFAMETHOXAZOLE AND TRIMETHOPRIM 1 TABLET: 800; 160 TABLET ORAL at 04:41

## 2021-11-05 RX ADMIN — PROPOFOL 100 MG: 10 INJECTION, EMULSION INTRAVENOUS at 07:34

## 2021-11-05 RX ADMIN — FENTANYL CITRATE 25 MCG: 50 INJECTION, SOLUTION INTRAMUSCULAR; INTRAVENOUS at 10:15

## 2021-11-05 RX ADMIN — ONDANSETRON 4 MG: 2 INJECTION INTRAMUSCULAR; INTRAVENOUS at 02:28

## 2021-11-05 RX ADMIN — KETOROLAC TROMETHAMINE 15 MG: 30 INJECTION, SOLUTION INTRAMUSCULAR at 14:02

## 2021-11-05 RX ADMIN — GABAPENTIN 600 MG: 300 CAPSULE ORAL at 17:33

## 2021-11-05 RX ADMIN — KETOROLAC TROMETHAMINE 15 MG: 30 INJECTION, SOLUTION INTRAMUSCULAR at 21:31

## 2021-11-05 RX ADMIN — EZETIMIBE 10 MG: 10 TABLET ORAL at 17:33

## 2021-11-05 RX ADMIN — SCOPALAMINE 1 PATCH: 1 PATCH, EXTENDED RELEASE TRANSDERMAL at 06:38

## 2021-11-05 RX ADMIN — SODIUM CHLORIDE: 9 INJECTION, SOLUTION INTRAVENOUS at 07:18

## 2021-11-05 RX ADMIN — Medication 1000 UNITS: at 04:40

## 2021-11-05 RX ADMIN — DEXAMETHASONE SODIUM PHOSPHATE 8 MG: 4 INJECTION, SOLUTION INTRA-ARTICULAR; INTRALESIONAL; INTRAMUSCULAR; INTRAVENOUS; SOFT TISSUE at 07:34

## 2021-11-05 RX ADMIN — FENTANYL CITRATE 50 MCG: 50 INJECTION, SOLUTION INTRAMUSCULAR; INTRAVENOUS at 09:42

## 2021-11-05 RX ADMIN — HYDROMORPHONE HYDROCHLORIDE 0.5 MG: 1 INJECTION, SOLUTION INTRAMUSCULAR; INTRAVENOUS; SUBCUTANEOUS at 02:28

## 2021-11-05 RX ADMIN — SODIUM CHLORIDE: 9 INJECTION, SOLUTION INTRAVENOUS at 09:35

## 2021-11-05 RX ADMIN — PIPERACILLIN SODIUM AND TAZOBACTAM SODIUM 3.38 G: 3; .375 INJECTION, POWDER, FOR SOLUTION INTRAVENOUS at 04:38

## 2021-11-05 RX ADMIN — ONDANSETRON 4 MG: 2 INJECTION INTRAMUSCULAR; INTRAVENOUS at 08:43

## 2021-11-05 RX ADMIN — HYDROMORPHONE HYDROCHLORIDE 0.5 MG: 1 INJECTION, SOLUTION INTRAMUSCULAR; INTRAVENOUS; SUBCUTANEOUS at 17:33

## 2021-11-05 RX ADMIN — ROCURONIUM BROMIDE 60 MG: 10 INJECTION, SOLUTION INTRAVENOUS at 07:34

## 2021-11-05 RX ADMIN — ROPIVACAINE HYDROCHLORIDE 3 ML: 5 INJECTION, SOLUTION EPIDURAL; INFILTRATION; PERINEURAL at 08:45

## 2021-11-05 RX ADMIN — MIDAZOLAM 2 MG: 1 INJECTION INTRAMUSCULAR; INTRAVENOUS at 07:18

## 2021-11-05 RX ADMIN — HYDROMORPHONE HYDROCHLORIDE: 1 INJECTION, SOLUTION INTRAMUSCULAR; INTRAVENOUS; SUBCUTANEOUS at 09:16

## 2021-11-05 RX ADMIN — FENTANYL CITRATE 50 MCG: 50 INJECTION, SOLUTION INTRAMUSCULAR; INTRAVENOUS at 08:22

## 2021-11-05 RX ADMIN — METOCLOPRAMIDE 10 MG: 5 INJECTION, SOLUTION INTRAMUSCULAR; INTRAVENOUS at 08:43

## 2021-11-05 RX ADMIN — ROPIVACAINE HYDROCHLORIDE 2 ML: 5 INJECTION, SOLUTION EPIDURAL; INFILTRATION; PERINEURAL at 09:06

## 2021-11-05 RX ADMIN — SUGAMMADEX 200 MG: 100 INJECTION, SOLUTION INTRAVENOUS at 09:01

## 2021-11-05 RX ADMIN — FAMOTIDINE 20 MG: 20 TABLET ORAL at 17:33

## 2021-11-05 RX ADMIN — PIPERACILLIN SODIUM AND TAZOBACTAM SODIUM 3.38 G: 3; .375 INJECTION, POWDER, FOR SOLUTION INTRAVENOUS at 20:25

## 2021-11-05 RX ADMIN — ONDANSETRON 4 MG: 2 INJECTION INTRAMUSCULAR; INTRAVENOUS at 10:00

## 2021-11-05 RX ADMIN — FENTANYL CITRATE 25 MCG: 50 INJECTION, SOLUTION INTRAMUSCULAR; INTRAVENOUS at 09:33

## 2021-11-05 RX ADMIN — LISINOPRIL 10 MG: 10 TABLET ORAL at 04:41

## 2021-11-05 RX ADMIN — FAMOTIDINE 20 MG: 20 TABLET ORAL at 04:41

## 2021-11-05 RX ADMIN — ROPIVACAINE HYDROCHLORIDE 2 ML: 5 INJECTION, SOLUTION EPIDURAL; INFILTRATION; PERINEURAL at 08:55

## 2021-11-05 RX ADMIN — SULFAMETHOXAZOLE AND TRIMETHOPRIM 1 TABLET: 800; 160 TABLET ORAL at 17:33

## 2021-11-05 RX ADMIN — GABAPENTIN 600 MG: 300 CAPSULE ORAL at 04:41

## 2021-11-05 RX ADMIN — FENTANYL CITRATE 50 MCG: 50 INJECTION, SOLUTION INTRAMUSCULAR; INTRAVENOUS at 08:45

## 2021-11-05 RX ADMIN — LIDOCAINE HYDROCHLORIDE,EPINEPHRINE BITARTRATE 5 ML: 15; .005 INJECTION, SOLUTION EPIDURAL; INFILTRATION; INTRACAUDAL; PERINEURAL at 07:25

## 2021-11-05 RX ADMIN — HALOPERIDOL LACTATE 1 MG: 5 INJECTION, SOLUTION INTRAMUSCULAR at 10:29

## 2021-11-05 RX ADMIN — PHENYLEPHRINE HYDROCHLORIDE 200 MCG: 10 INJECTION INTRAVENOUS at 07:43

## 2021-11-05 ASSESSMENT — ENCOUNTER SYMPTOMS
HEADACHES: 0
PALPITATIONS: 0
SHORTNESS OF BREATH: 0
VOMITING: 0
DIZZINESS: 0
FEVER: 0
COUGH: 0
ABDOMINAL PAIN: 1
CHILLS: 0
NAUSEA: 0

## 2021-11-05 ASSESSMENT — PAIN DESCRIPTION - PAIN TYPE
TYPE: ACUTE PAIN;SURGICAL PAIN
TYPE: SURGICAL PAIN;ACUTE PAIN

## 2021-11-05 NOTE — ANESTHESIA PREPROCEDURE EVALUATION
Relevant Problems   NEURO   (positive) History of pancreatitis      CARDIAC   (positive) Primary hypertension      Other   (positive) Antibiotic-associated diarrhea   (positive) Duodenal perforation (HCC)   (positive) Hyperlipidemia   (positive) Hyponatremia   (positive) Hypophosphatemia   (positive) On total parenteral nutrition (TPN)   (positive) Post-ERCP acute pancreatitis   (positive) Postprocedural retroperitoneal abscess   (positive) Sepsis due to intraabdominal fluid collection/abscess, bacteremia and fungemia (HCC)       Physical Exam    Airway   Mallampati: II  TM distance: <3 FB  Neck ROM: full       Cardiovascular - normal exam  Rhythm: regular  Rate: normal  (-) murmur     Dental   (+) upper dentures, lower dentures           Pulmonary - normal exam  Breath sounds clear to auscultation     Abdominal    Neurological - normal exam                 Anesthesia Plan    ASA 3 (Sepsis)   ASA physical status 3 criteria: other (comment)    Plan - general and epidural   Neuraxial block will be post-op pain control    Airway plan will be ETT          Induction: intravenous    Postoperative Plan: Postoperative administration of opioids is intended.    Pertinent diagnostic labs and testing reviewed    Informed Consent:    Anesthetic plan and risks discussed with patient and spouse.    Use of blood products discussed with: patient and spouse whom.       Dentures kept in per patient request.

## 2021-11-05 NOTE — ANESTHESIA PROCEDURE NOTES
Epidural Block    Date/Time: 11/5/2021 7:25 AM  Performed by: Tomi Ledezma M.D.  Authorized by: Tomi Ledezma M.D.     Patient Location:  OR  Start Time:  11/5/2021 7:25 AM  End Time:  11/5/2021 7:32 AM  Reason for Block: at surgeon's request and post-op pain management    patient identified, IV checked, site marked, risks and benefits discussed, surgical consent, monitors and equipment checked, pre-op evaluation and timeout performed    Patient Position:  Sitting  Prep: ChloraPrep, patient draped and sterile technique    Monitoring:  Blood pressure, continuous pulse oximetry and heart rate  Location:  T7-T8  Injection Technique:  JASMIN air and JASMIN saline  Skin infiltration:  Lidocaine  Strength:  1%  Dose:  3ml  Needle Type:  Tuohy  Needle Gauge:  17 G  Needle Length:  3.5 in  Loss of resistance::  5  Catheter Size:  19 G  Catheter at Skin Depth:  11  Test Dose Result:  Negative

## 2021-11-05 NOTE — OP REPORT
DATE OF SERVICE:  11/05/2021     INDICATIONS:  The patient is a 66-year-old female patient of Dr. Orlando Jones,   the hospitalist as well as myself who unfortunately had a perforated duodenum   secondary to an ERCP and sphincterotomy. Attempts were made for approximately   3 weeks to manage her conservatively with drainage, but unfortunately, she   developed multiple cavities that were loculated off as well as air pockets and   purulent fluid and the drains were not working.  It was extremely thick and   composed of necrotized tissue, so the patient was then taken to the operating   room for drainage of:  1.  Retroperitoneal abscess.  2.  Peritoneal abscess.  3.  Necrotizing fasciitis after we had a long discussion about the options.     SURGEON:  Jaden Cook MD     ASSISTANT SURGEON:  Lisy Vann MD     ANESTHESIOLOGIST:  Tomi Ledezma MD     ANESTHESIA:  General and epidural for postoperative pain management.     ESTIMATED BLOOD LOSS:  Approximately 150 mL     COMPLICATIONS:  No complications.     FINDINGS:    1.  The patient had a large retroperitoneal abscess containing necrotizing   fasciitis along the lateral wall of the muscle, transverse abdominis and   rectus sheath posteriorly and laterally and posterior to the back of the   muscle along with necrotizing soft tissue throughout the retroperitoneum.  2.  An anterior peritoneal abscess, the tract extending from her previous old   midline incisions extending around the colon and posteriorly into the   retroperitoneal abscess.  3.  Necrotizing soft tissue infection of the muscle and soft tissue of   retroperitoneum.     PROCEDURES DONE:    1.  Exploratory laparotomy.  2.  Intraoperative ultrasound survey of the retroperitoneum in order to   evaluate location of abscess.  3.  Incision and drainage and debridement of retroperitoneal abscess.  4.  Incision and drainage and debridement of anterior peritoneal abscess.  5.  Debridement of  necrotizing fasciitis of the lateral abdominal wall.     CASE CLASS:  Grossly contaminated with purulent pus, purulent fluid and old   bile and necrotic tissue from her necrotizing fasciitis, which is   polymicrobial.     DESCRIPTION OF PROCEDURE:  The patient was brought to OR, placed under general   anesthetic after having epidural placed by Dr. Ledezma. Both arms out.  All her   drains were removed.  She had three IR drains removed.  She was then covered   with sterile drapes.  She had a Corea catheter placed due to the patient's   incontinence and injury to her soft tissue around her labia as well as her   pelvis.  At that point, she was prepped with chlorhexidine prep, covered with   sterile drapes.  She had already given preoperative antibiotics.  She had been   on Zosyn, micafungin.  A timeout was done.  At that point, the patient did   have multiple midline incisions from multiple laparotomies.  We entered   approximately 4 cm above the umbilicus and extended it down approximately 6 cm   below the umbilicus and on the upper abdomen, we actually encountered wire   from an old laparotomy.  Three wires were cut and sent off carefully away from   the field.  We then entered the abdomen.  Two covers were placed on the   fascia and then using blunt dissection and Metzenbaum scissors, we entered the   anterior abscess and got a large amount of purulent fluid.  We then cultured   that, did a debridement of that and then using the Pulsavac, we used   approximately a liter and a half of sterile saline to debride that area. At   that point, we continued and followed that tract of the anterior abscess to   the retroperitoneum and I felt as though we entered a cavity of   retroperitoneum, but then the lateral edge of the retroperitoneum was   essentially fused.  We then used an intraoperative ultrasound 5/12 MHz T-probe   and using a variable ultrasound, we identified the cavity, identified the   colon and stayed lateral to  that in order to not injure the colon.  Upon   entering the cavity, we got a large amount of purulent fluid and old hematoma.    We were able to then open the cavity completely all the way down to the   pelvis and all the way up to the wall of the duodenum where we entered a third   cavity that was separate from the retroperitoneal area and connected all of   them.  We used the Pulsavac to complete approximately 4 liters of hot saline.    We debrided all the necrotic tissue and noted that there was necrotic fascia   along the retroperitoneum and lateral wall of the transverse abdominis   muscles.  We completed our debridement of necrotizing fasciitis in that region   along with the necrotic retroperitoneal tissue.  After irrigating completely   and there was no more purulent fluid and no more necrotic tissue that we could   identify and remove, as of note we did a separate culture of the   retroperitoneal cavity because it appeared to be slightly loculated to make   sure there was no other bugs.  Once that was completed, we brought a 24-Montserratian   round Ti drain from the right lower quadrant and placed it into the   retroperitoneum all the way up to the level of the duodenum.  We then took a   19-Montserratian round Ti drain and brought it up from the right upper quadrant,   placed it anteriorly through the anterior peritoneal abscess down through the   tract that connected to the retroperitoneum and brought it up into that   cavity.  All needle, sponge count and instrument counts were correct, so at   that point, we then closed the anterior fascia with interrupted   figure-of-eight fashion #1 PDS sutures due to the risk of herniation and   breakdown of the fascia.  Once that was completed, we then irrigated with   copious amounts of fluid on the incision and then ___ the soft tissue together   with interrupted 2-0 Vicryls and then we irrigated a third time and then   closed the skin with staples, placed a Prevena. The  both drains were secured   with 2-0 nylon secured to a bulb suction.  Unfortunately, there was no sign of   bile postoperatively in the recovery. Patient was then extubated and   transferred to recovery.  We did leave the Corea in due to the patient's   incontinence and injury to her labia and pelvic region with breakdown or   maceration.  The patient is being returned to the hospitalist service on the   U.         ______________________________  MD SERGO Bahena/JOS/TIFFANY    DD:  11/05/2021 09:22  DT:  11/05/2021 10:14    Job#:  [id number]    CC:MD Lisy Floyd MD(User)

## 2021-11-05 NOTE — OR NURSING
0915 Pt arrived to PACU with Anesthesiologist and OR RN. Oral airway in place. Even, unlabored respirations. VSS. Toes warm and mobile with brisk cap refill, palpable pedal pulses. Epidural infusion started.     0930 OPA removed. C/o 8/10 pain, see MAR and flowsheets for pain management details. Denies nausea. Prevena, MARTHA drain sites x2  CDI, draining serosanguinous fluid.     1000 Pt c/o nausea. Zofran and haldol given, pt sleeping comfortably.     1100 POC update given to Guicho, , over the phone. All questions answered. Belongings from pre-op with .     1123 BP 88/50, epidural titrated down, see MAR. BP now 92/54. Pt sleeping comfortably.     1200 Pt meets criteria. Report called to TALITA Ordoñez on [facility].     1214 Pt transported to T429 with RN. Chart, dentures (in place), and full oxygen tank with patient.

## 2021-11-05 NOTE — ANESTHESIA TIME REPORT
Anesthesia Start and Stop Event Times     Date Time Event    11/5/2021 0645 Ready for Procedure     0718 Anesthesia Start     0910 Anesthesia Stop        Responsible Staff  11/05/21    Name Role Begin End    Tomi Ledezma M.D. Anesth 0718 0910        Preop Diagnosis (Free Text):  Pre-op Diagnosis     Perforated Duodenum, Retroperitoneal and Peritoneal Abscess        Preop Diagnosis (Codes):    Premium Reason  Non-Premium    Comments:

## 2021-11-05 NOTE — CARE PLAN
The patient is Stable - Low risk of patient condition declining or worsening    Shift Goals  Clinical Goals: Pain management    Progress made toward(s) clinical / shift goals:  Pt reported severe pain to right lower abdomen at MARTHA drain sites. Pain managed with PRN oxycodone and PRN dilaudid per MAR.    Patient is not progressing towards the following goals:

## 2021-11-05 NOTE — ANESTHESIA PROCEDURE NOTES
Airway    Date/Time: 11/5/2021 7:35 AM  Performed by: Tomi Ledezma M.D.  Authorized by: Tomi Ledezma M.D.     Location:  OR  Urgency:  Elective  Difficult Airway: No    Indications for Airway Management:  Anesthesia      Spontaneous Ventilation: absent    Sedation Level:  Deep  Preoxygenated: Yes    Patient Position:  Sniffing  Final Airway Type:  Endotracheal airway  Final Endotracheal Airway:  ETT  Cuffed: Yes    Technique Used for Successful ETT Placement:  Direct laryngoscopy  Devices/Methods Used in Placement:  Intubating stylet    Insertion Site:  Oral  Blade Type:  Chanel  Laryngoscope Blade/Videolaryngoscope Blade Size:  4  ETT Size (mm):  7.0  Measured from:  Teeth  ETT to Teeth (cm):  21  Placement Verified by: auscultation and capnometry    Cormack-Lehane Classification:  Grade I - full view of glottis  Number of Attempts at Approach:  1

## 2021-11-06 ENCOUNTER — ANESTHESIA EVENT (OUTPATIENT)
Dept: ANESTHESIOLOGY | Facility: MEDICAL CENTER | Age: 66
End: 2021-11-06

## 2021-11-06 ENCOUNTER — ANESTHESIA (OUTPATIENT)
Dept: ANESTHESIOLOGY | Facility: MEDICAL CENTER | Age: 66
End: 2021-11-06

## 2021-11-06 PROBLEM — Z78.9 ON TOTAL PARENTERAL NUTRITION (TPN): Status: RESOLVED | Noted: 2021-10-26 | Resolved: 2021-11-06

## 2021-11-06 PROBLEM — I95.9 HYPOTENSION: Status: ACTIVE | Noted: 2021-11-06

## 2021-11-06 LAB
ANION GAP SERPL CALC-SCNC: 17 MMOL/L (ref 7–16)
BACTERIA FLD AEROBE CULT: ABNORMAL
BASOPHILS # BLD AUTO: 0.1 % (ref 0–1.8)
BASOPHILS # BLD: 0.01 K/UL (ref 0–0.12)
BUN SERPL-MCNC: 15 MG/DL (ref 8–22)
CALCIUM SERPL-MCNC: 8.8 MG/DL (ref 8.5–10.5)
CHLORIDE SERPL-SCNC: 100 MMOL/L (ref 96–112)
CO2 SERPL-SCNC: 20 MMOL/L (ref 20–33)
CREAT SERPL-MCNC: 0.72 MG/DL (ref 0.5–1.4)
EOSINOPHIL # BLD AUTO: 0.02 K/UL (ref 0–0.51)
EOSINOPHIL NFR BLD: 0.3 % (ref 0–6.9)
ERYTHROCYTE [DISTWIDTH] IN BLOOD BY AUTOMATED COUNT: 47.4 FL (ref 35.9–50)
GLUCOSE SERPL-MCNC: 100 MG/DL (ref 65–99)
GRAM STN SPEC: ABNORMAL
GRAM STN SPEC: NORMAL
GRAM STN SPEC: NORMAL
HCT VFR BLD AUTO: 24.2 % (ref 37–47)
HGB BLD-MCNC: 7.9 G/DL (ref 12–16)
IMM GRANULOCYTES # BLD AUTO: 0.06 K/UL (ref 0–0.11)
IMM GRANULOCYTES NFR BLD AUTO: 0.8 % (ref 0–0.9)
LYMPHOCYTES # BLD AUTO: 0.59 K/UL (ref 1–4.8)
LYMPHOCYTES NFR BLD: 8.1 % (ref 22–41)
MAGNESIUM SERPL-MCNC: 2.1 MG/DL (ref 1.5–2.5)
MCH RBC QN AUTO: 30 PG (ref 27–33)
MCHC RBC AUTO-ENTMCNC: 32.6 G/DL (ref 33.6–35)
MCV RBC AUTO: 92 FL (ref 81.4–97.8)
MONOCYTES # BLD AUTO: 0.38 K/UL (ref 0–0.85)
MONOCYTES NFR BLD AUTO: 5.2 % (ref 0–13.4)
NEUTROPHILS # BLD AUTO: 6.25 K/UL (ref 2–7.15)
NEUTROPHILS NFR BLD: 85.5 % (ref 44–72)
NRBC # BLD AUTO: 0 K/UL
NRBC BLD-RTO: 0 /100 WBC
PHOSPHATE SERPL-MCNC: 2.6 MG/DL (ref 2.5–4.5)
PLATELET # BLD AUTO: 252 K/UL (ref 164–446)
PMV BLD AUTO: 9.4 FL (ref 9–12.9)
POTASSIUM SERPL-SCNC: 3.8 MMOL/L (ref 3.6–5.5)
RBC # BLD AUTO: 2.63 M/UL (ref 4.2–5.4)
SIGNIFICANT IND 70042: ABNORMAL
SIGNIFICANT IND 70042: NORMAL
SIGNIFICANT IND 70042: NORMAL
SITE SITE: ABNORMAL
SITE SITE: NORMAL
SITE SITE: NORMAL
SODIUM SERPL-SCNC: 137 MMOL/L (ref 135–145)
SOURCE SOURCE: ABNORMAL
SOURCE SOURCE: NORMAL
SOURCE SOURCE: NORMAL
WBC # BLD AUTO: 7.3 K/UL (ref 4.8–10.8)

## 2021-11-06 PROCEDURE — A9270 NON-COVERED ITEM OR SERVICE: HCPCS | Performed by: NURSE PRACTITIONER

## 2021-11-06 PROCEDURE — 700102 HCHG RX REV CODE 250 W/ 637 OVERRIDE(OP): Performed by: STUDENT IN AN ORGANIZED HEALTH CARE EDUCATION/TRAINING PROGRAM

## 2021-11-06 PROCEDURE — 700111 HCHG RX REV CODE 636 W/ 250 OVERRIDE (IP): Mod: JG | Performed by: STUDENT IN AN ORGANIZED HEALTH CARE EDUCATION/TRAINING PROGRAM

## 2021-11-06 PROCEDURE — 80048 BASIC METABOLIC PNL TOTAL CA: CPT

## 2021-11-06 PROCEDURE — 700111 HCHG RX REV CODE 636 W/ 250 OVERRIDE (IP): Performed by: INTERNAL MEDICINE

## 2021-11-06 PROCEDURE — 700111 HCHG RX REV CODE 636 W/ 250 OVERRIDE (IP): Performed by: SURGERY

## 2021-11-06 PROCEDURE — 700102 HCHG RX REV CODE 250 W/ 637 OVERRIDE(OP): Performed by: INTERNAL MEDICINE

## 2021-11-06 PROCEDURE — 700105 HCHG RX REV CODE 258: Performed by: STUDENT IN AN ORGANIZED HEALTH CARE EDUCATION/TRAINING PROGRAM

## 2021-11-06 PROCEDURE — A9270 NON-COVERED ITEM OR SERVICE: HCPCS | Performed by: STUDENT IN AN ORGANIZED HEALTH CARE EDUCATION/TRAINING PROGRAM

## 2021-11-06 PROCEDURE — 770001 HCHG ROOM/CARE - MED/SURG/GYN PRIV*

## 2021-11-06 PROCEDURE — 700111 HCHG RX REV CODE 636 W/ 250 OVERRIDE (IP): Performed by: STUDENT IN AN ORGANIZED HEALTH CARE EDUCATION/TRAINING PROGRAM

## 2021-11-06 PROCEDURE — 85025 COMPLETE CBC W/AUTO DIFF WBC: CPT

## 2021-11-06 PROCEDURE — 36415 COLL VENOUS BLD VENIPUNCTURE: CPT

## 2021-11-06 PROCEDURE — A9270 NON-COVERED ITEM OR SERVICE: HCPCS | Performed by: INTERNAL MEDICINE

## 2021-11-06 PROCEDURE — 84100 ASSAY OF PHOSPHORUS: CPT

## 2021-11-06 PROCEDURE — 700105 HCHG RX REV CODE 258: Performed by: ANESTHESIOLOGY

## 2021-11-06 PROCEDURE — 700111 HCHG RX REV CODE 636 W/ 250 OVERRIDE (IP): Mod: JG | Performed by: INTERNAL MEDICINE

## 2021-11-06 PROCEDURE — 700105 HCHG RX REV CODE 258: Performed by: INTERNAL MEDICINE

## 2021-11-06 PROCEDURE — 700102 HCHG RX REV CODE 250 W/ 637 OVERRIDE(OP): Performed by: NURSE PRACTITIONER

## 2021-11-06 PROCEDURE — 700102 HCHG RX REV CODE 250 W/ 637 OVERRIDE(OP): Performed by: FAMILY MEDICINE

## 2021-11-06 PROCEDURE — 99024 POSTOP FOLLOW-UP VISIT: CPT | Performed by: SURGERY

## 2021-11-06 PROCEDURE — 83735 ASSAY OF MAGNESIUM: CPT

## 2021-11-06 PROCEDURE — A9270 NON-COVERED ITEM OR SERVICE: HCPCS | Performed by: FAMILY MEDICINE

## 2021-11-06 PROCEDURE — 99232 SBSQ HOSP IP/OBS MODERATE 35: CPT | Performed by: NURSE PRACTITIONER

## 2021-11-06 PROCEDURE — 700111 HCHG RX REV CODE 636 W/ 250 OVERRIDE (IP): Performed by: ANESTHESIOLOGY

## 2021-11-06 RX ORDER — GAUZE BANDAGE 2" X 2"
100 BANDAGE TOPICAL DAILY
Status: DISCONTINUED | OUTPATIENT
Start: 2021-11-06 | End: 2021-11-21

## 2021-11-06 RX ORDER — MIDODRINE HYDROCHLORIDE 5 MG/1
5 TABLET ORAL
Status: COMPLETED | OUTPATIENT
Start: 2021-11-06 | End: 2021-11-08

## 2021-11-06 RX ORDER — SODIUM CHLORIDE, SODIUM LACTATE, POTASSIUM CHLORIDE, AND CALCIUM CHLORIDE .6; .31; .03; .02 G/100ML; G/100ML; G/100ML; G/100ML
1000 INJECTION, SOLUTION INTRAVENOUS ONCE
Status: COMPLETED | OUTPATIENT
Start: 2021-11-06 | End: 2021-11-06

## 2021-11-06 RX ADMIN — Medication 1000 UNITS: at 04:19

## 2021-11-06 RX ADMIN — PIPERACILLIN SODIUM AND TAZOBACTAM SODIUM 3.38 G: 3; .375 INJECTION, POWDER, FOR SOLUTION INTRAVENOUS at 12:15

## 2021-11-06 RX ADMIN — PIPERACILLIN SODIUM AND TAZOBACTAM SODIUM 3.38 G: 3; .375 INJECTION, POWDER, FOR SOLUTION INTRAVENOUS at 20:24

## 2021-11-06 RX ADMIN — DIBASIC SODIUM PHOSPHATE, MONOBASIC POTASSIUM PHOSPHATE AND MONOBASIC SODIUM PHOSPHATE 250 MG: 852; 155; 130 TABLET ORAL at 17:56

## 2021-11-06 RX ADMIN — POTASSIUM BICARBONATE 25 MEQ: 978 TABLET, EFFERVESCENT ORAL at 08:57

## 2021-11-06 RX ADMIN — FAMOTIDINE 20 MG: 20 TABLET ORAL at 17:55

## 2021-11-06 RX ADMIN — EZETIMIBE 10 MG: 10 TABLET ORAL at 17:54

## 2021-11-06 RX ADMIN — Medication 100 MG: at 14:39

## 2021-11-06 RX ADMIN — ENOXAPARIN SODIUM 40 MG: 40 INJECTION SUBCUTANEOUS at 09:07

## 2021-11-06 RX ADMIN — ALTEPLASE 2 MG: 2.2 INJECTION, POWDER, LYOPHILIZED, FOR SOLUTION INTRAVENOUS at 09:00

## 2021-11-06 RX ADMIN — HYDROMORPHONE HYDROCHLORIDE: 1 INJECTION, SOLUTION INTRAMUSCULAR; INTRAVENOUS; SUBCUTANEOUS at 04:20

## 2021-11-06 RX ADMIN — FAMOTIDINE 20 MG: 20 TABLET ORAL at 04:19

## 2021-11-06 RX ADMIN — GABAPENTIN 600 MG: 300 CAPSULE ORAL at 04:19

## 2021-11-06 RX ADMIN — MIDODRINE HYDROCHLORIDE 5 MG: 5 TABLET ORAL at 17:45

## 2021-11-06 RX ADMIN — SODIUM CHLORIDE, POTASSIUM CHLORIDE, SODIUM LACTATE AND CALCIUM CHLORIDE 1000 ML: 600; 310; 30; 20 INJECTION, SOLUTION INTRAVENOUS at 06:44

## 2021-11-06 RX ADMIN — PIPERACILLIN SODIUM AND TAZOBACTAM SODIUM 3.38 G: 3; .375 INJECTION, POWDER, FOR SOLUTION INTRAVENOUS at 04:19

## 2021-11-06 RX ADMIN — HYDROMORPHONE HYDROCHLORIDE: 1 INJECTION, SOLUTION INTRAMUSCULAR; INTRAVENOUS; SUBCUTANEOUS at 23:41

## 2021-11-06 RX ADMIN — SULFAMETHOXAZOLE AND TRIMETHOPRIM 1 TABLET: 800; 160 TABLET ORAL at 18:00

## 2021-11-06 RX ADMIN — GABAPENTIN 600 MG: 300 CAPSULE ORAL at 17:55

## 2021-11-06 RX ADMIN — SULFAMETHOXAZOLE AND TRIMETHOPRIM 1 TABLET: 800; 160 TABLET ORAL at 04:19

## 2021-11-06 RX ADMIN — FUROSEMIDE 40 MG: 10 INJECTION, SOLUTION INTRAMUSCULAR; INTRAVENOUS at 10:43

## 2021-11-06 RX ADMIN — MICAFUNGIN SODIUM 100 MG: 100 INJECTION, POWDER, LYOPHILIZED, FOR SOLUTION INTRAVENOUS at 10:43

## 2021-11-06 ASSESSMENT — ENCOUNTER SYMPTOMS
FOCAL WEAKNESS: 0
COUGH: 0
CHILLS: 0
SEIZURES: 0
SHORTNESS OF BREATH: 0
MYALGIAS: 0
LOSS OF CONSCIOUSNESS: 0
ABDOMINAL PAIN: 1
FEVER: 0
NAUSEA: 0
VOMITING: 0

## 2021-11-06 ASSESSMENT — PAIN DESCRIPTION - PAIN TYPE
TYPE: ACUTE PAIN;SURGICAL PAIN

## 2021-11-06 NOTE — PROGRESS NOTES
Report received from RN, assumed care at 0700  Pt is A0X4. Drifting off during conversation with occasional forgetfulness   Pt declines any SOB, chest pain, new onset of numbness/ tingiling  Pt rates pain at 0/10, on a scale of 1-10, pt medicated with epidural. Epidural titrated down through shift to balance mentation  Pt is voiding adequatly and without hesitancy  Pt has - flatus, + bowel sounds, - BM  Pt not ambulating at this time d/t fatigue  Pt is tolerating a diet, pt denies any nausea/vomiting  Plan of care discussed, all questions answered. Explained importance of calling before getting OOB and pt verbalizes understanding. Explained importance of oral care. Call light is within reach, treaded slipper socks on, bed in lowest/ locked position, hourly rounding in place, all needs met at this time

## 2021-11-06 NOTE — PROGRESS NOTES
4 Eyes Skin Assessment Completed by Cande RN and Gabrielle RN.    Head WDL  Ears WDL  Nose WDL  Mouth WDL  Neck WDL  Breast/Chest WDL  Shoulder Blades WDL  Spine WDL   Back epidural site with dressing, scant/old leakage under dressing, otherwise CDI, lower back is red/blanchable with right lower dressing CDI  (R) Arm/Elbow/Hand WDL  (L) Arm/Elbow/Hand WDL  Abdomen 2 RLQ MARTHA drains, dressing CDI and midline prevena CDI  Groin WDL  Scrotum/Coccyx/Buttocks WDL  (R) Leg WDL  (L) Leg WDL  (R) Heel/Foot/Toe WDL  (L) Heel/Foot/Toe WDL          Devices In Places Pulse Ox, Corea, SCD's and Nasal Cannula      Interventions In Place Gray Ear Foams, Waffle Overlay and Pillows    Possible Skin Injury No    Pictures Uploaded Into Epic N/A  Wound Consult Placed N/A  RN Wound Prevention Protocol Ordered No

## 2021-11-06 NOTE — CARE PLAN
The patient is Stable - Low risk of patient condition declining or worsening    Shift Goals  Clinical Goals: pain control, BP WNL, ambulate, rest  Patient Goals: rest and pain control  Family Goals: get her better    Progress made toward(s) clinical / shift goals:  Pt is resting comfortably in bed. Pain is being controlled with epidural, PRN and scheduled medications. Pt is able to utilize 0-10 pain scale. Pt denies N/V at this time. BP is slightly low, pt is Aox4 and responsive, sleeping. Pt stated she will ambulate tomorrow.    Patient is not progressing towards the following goals:

## 2021-11-06 NOTE — CARE PLAN
Problem: Nutritional:  Goal: Achieve adequate nutritional intake  Description: Patient will consume >50% of meals  Outcome: Progressing  PO intake 11/5-6: 50-75% per ADLs, nursing report; improvement shown from <25% most meals previously.     RD continues to follow.

## 2021-11-06 NOTE — ANESTHESIA POSTPROCEDURE EVALUATION
Patient: Nils Alfonso    Procedure Summary     Date: 11/05/21 Room / Location: [place] [address] / SURGERY [facility]    Anesthesia Start: 0718 Anesthesia Stop: 0910    Procedures:       LAPAROTOMY, EXPLORATORY (Abdomen)      DRAINAGE, ABSCESS, ABDOMEN - PERITONEAL AND RETROPERITONEAL (Abdomen)      ULTRASOUND GUIDANCE (Abdomen) Diagnosis: (Perforated Duodenum, Retroperitoneal and Peritoneal Abscess)    Surgeons: Jaden Cook M.D. Responsible Provider: Tomi Ledezma M.D.    Anesthesia Type: general, neuraxial block ASA Status: 3          Final Anesthesia Type: general, neuraxial block  Last vitals  BP 95/62   Temp   36.2 °C (97.2 °F)    Pulse   68   Resp   14    SpO2   98 %      Anesthesia Post Evaluation    Patient location during evaluation: PACU  Patient participation: complete - patient participated  Level of consciousness: awake and alert    Airway patency: patent  Anesthetic complications: no  Cardiovascular status: hemodynamically stable  Respiratory status: acceptable  Hydration status: euvolemic    PONV: none          No complications documented.     Nurse Pain Score: 7 (NPRS)

## 2021-11-06 NOTE — PROGRESS NOTES
Hospital Medicine Daily Progress Note    Date of Service  11/6/2021    Chief Complaint  Nils Alfonso is a 66 y.o. female admitted 10/15/2021 with abdominal pain    Hospital Course  Initially admitted to [facility] for evaluation of abdominal pain after recent ERCP with polypectomy and sphincterotomy and noted to have large intra-abdominal fluid collection. Multiple IR drains had been placed, but her infection worsened.  She was transferred to [facility] for escalation of her surgical needs. ID has been following. She underwent ex lap w I/D of multiple loculated abscesses and debridement of nec fasciitis w Dr. ST 11/5/21. Prev cx w Stenotrophomonas maltophilia, mixed yeast, E faecalis, E coli and Chryseobacterium in the blood.    Interval Problem Update  11/6 POD #1 Ex lap w washout loculated abscesses; debridement nec fasciitis. Zosyn/Micafungin. ID following. Epidural placed in OR. MARTHA x 2, randolph, PICC, previous IR drains. Mag 2.1. Phos 2.6.      Consultants/Specialty  general surgery, GI, infectious disease and interventional radiology    Code Status  Full Code    Disposition  Patient is not medically cleared.   Anticipate discharge to to home with organized home healthcare and close outpatient follow-up.  I have placed the appropriate orders for post-discharge needs.    Review of Systems  Review of Systems   Constitutional: Negative for chills and fever.   Respiratory: Negative for cough and shortness of breath.    Cardiovascular: Negative for chest pain.   Gastrointestinal: Positive for abdominal pain. Negative for nausea and vomiting.   Genitourinary: Negative for dysuria, frequency and urgency.   Musculoskeletal: Negative for myalgias.   Skin: Negative for rash.   Neurological: Negative for focal weakness, seizures and loss of consciousness.   All other systems reviewed and are negative.       Physical Exam  Temp:  [36.2 °C (97.1 °F)-37.2 °C (99 °F)] 37.2 °C (99 °F)  Pulse:  [64-90] 71  Resp:  [14-18] 18  BP:  ()/(43-72) 82/43  SpO2:  [97 %-99 %] 97 %    Physical Exam  Vitals and nursing note reviewed.   Constitutional:       General: She is not in acute distress.  HENT:      Head: Normocephalic and atraumatic.      Nose: Nose normal.   Eyes:      Pupils: Pupils are equal, round, and reactive to light.   Cardiovascular:      Rate and Rhythm: Normal rate and regular rhythm.      Heart sounds: Normal heart sounds.   Pulmonary:      Effort: Pulmonary effort is normal.      Breath sounds: Normal breath sounds.   Abdominal:      General: There is no distension.      Palpations: Abdomen is soft.          Comments: Prevenal CDI   Musculoskeletal:      Cervical back: Neck supple.   Neurological:      Mental Status: She is alert and oriented to person, place, and time.   Psychiatric:         Mood and Affect: Mood normal.       Fluids    Intake/Output Summary (Last 24 hours) at 11/6/2021 1355  Last data filed at 11/6/2021 1118  Gross per 24 hour   Intake 1179 ml   Output 990 ml   Net 189 ml       Laboratory  Recent Labs     11/05/21  0550 11/06/21  1052   WBC 3.7* 7.3   RBC 2.89* 2.63*   HEMOGLOBIN 8.3* 7.9*   HEMATOCRIT 25.5* 24.2*   MCV 88.2 92.0   MCH 28.7 30.0   MCHC 32.5* 32.6*   RDW 44.6 47.4   PLATELETCT 219 252   MPV 9.1 9.4     Recent Labs     11/05/21  0550 11/06/21  0513   SODIUM 133* 137   POTASSIUM 3.2* 3.8   CHLORIDE 100 100   CO2 24 20   GLUCOSE 96 100*   BUN 9 15   CREATININE 0.64 0.72   CALCIUM 8.3* 8.8     Recent Labs     11/04/21  1317   INR 1.24*               Imaging  CT-ABDOMEN-PELVIS WITH   Final Result      1.  Slight interval decrease in size of the large RIGHT peritoneal abscess which now contains 3 drains   2.  Decreased atelectasis with persistent opacity in the RIGHT lung base likely atelectasis rather than pneumonia   3.  Small LEFT renal cyst   4.  Prior cholecystectomy with ongoing pneumobilia      CT-DRAINAGE-CATH EXCHANGE   Final Result         1. Organized pancreatic pseudocyst Drainage with  CT guidance.      CT-ABDOMEN-PELVIS WITH   Final Result      1.  There has been interval placement of an additional inferior lateral pigtail catheter within the complex right abdominal abscess. The other 2 pigtail catheters are stable in appearance.   2.  There has been no significant interval decrease in size of the large complex loculated abscess collection in the right abdomen.   3.  There is no new fluid collection.   4.  Gallbladder is surgically absent with continued pneumobilia.   5.  Interval decrease in the dependent pleural effusion with minimal airspace disease, likely atelectasis.      IR-DRAINAGE-CATH EXCHANGE   Final Result      1.  Successful left-sided drainage catheter removal.   2.  Successful image guided superior right drainage catheter replacement.      Plan: Suction drainage. Monitor outputs. Please contact interventional radiology if there is any concern for tube dysfunction. Suggest routine tube maintenance at 3 months intervals if the tube remains in place.         CT-DRAIN-PERITONEAL   Final Result      1.  CT GUIDED PERITONEAL RLQ abdominal CATHETER DRAINAGE.   2.  THE CURRENT PLAN IS TO MONITOR DRAINAGE OUTPUT AND OBTAIN A FOLLOWUP CT SCAN IN 5-7 DAYS IF CLINICALLY INDICATED.      CT-ABDOMEN-PELVIS WITH   Final Result         1. Grossly unchanged irregular fluid collection occupying the right abdomen surrounding the right kidney and right colon extending to midline. Additional pigtail catheter in the component about midline anterior abdomen. Both pigtail catheters seem to be    within the collection.      2. Small right pleural effusion with right basilar atelectasis.               DX-CHEST-LIMITED (1 VIEW)   Final Result      1.  Right basilar atelectasis or consolidation. Small right pleural effusion not excluded.   2.  Atherosclerotic plaque.      CT-DRAIN-PERITONEAL   Final Result      1.  CT guided pancreatic pseudocyst catheter drainage.   2.  The current plan is to obtain a  follow-up CT in 5-7 days..      IR-PICC LINE PLACEMENT W/ GUIDANCE > AGE 5   Final Result                  Ultrasound-guided PICC placement performed by qualified nursing staff as    above.          CT-ABDOMEN-PELVIS WITH    (Results Pending)        Assessment/Plan  * Bacteremia due to Gram-negative bacteria and fungemia- (present on admission)  Assessment & Plan  KALANI negative for signs of endocarditis  Cont Zosyn and Micafungin per ID  Repeat Bld Cx neg      Hypotension  Assessment & Plan  11/6: Stop lasix; Stop ACEI  Follow closely    Hypophosphatemia- (present on admission)  Assessment & Plan  Resolved with TPN  Monitoring per RD  11/6 Kphos; serum goal >3    Acute respiratory failure with hypoxia (HCC)- (present on admission)  Assessment & Plan  Resolved with diuresis  Likely volume overload with lower extremity edema present    Antibiotic-associated diarrhea  Assessment & Plan  Cdiff PCR negative  Loperamide PRN    Postprocedural retroperitoneal abscess- (present on admission)  Assessment & Plan  S/P Ex lap, I/D, debridement nec fasc 11/5/2021  Abx per ID  Follow cx  Pain control  Diet per sx  Antiemetics PRN    Duodenal perforation (HCC)- (present on admission)  Assessment & Plan  After ERCP with retroperitoneal abscess and bacteremia  Uncertain etiology - ddx includes pancreatitis, bile leak, iatrogenic  Pepcid BID    Hyponatremia- (present on admission)  Assessment & Plan  Recurrent, mild  Diuresis as tolerated    Normocytic anemia- (present on admission)  Assessment & Plan  Transfuse for Hgb <7  Avoid regular phlebotomy  Supplements per dietary: 11/6 added thiamine, 6 sm meals/d    Sepsis due to intraabdominal fluid collection/abscess, bacteremia and fungemia (HCC)- (present on admission)  Assessment & Plan  Sepsis resolved  Source intra-abdominal  Zosyn/Mycafungin/Bactrim DS and follow cx  ID following  Re-imaging per ID, IR, and Surgery  S/P Ex lap w I/D and debridement nec fasc  11/5/2021    Hyperlipidemia- (present on admission)  Assessment & Plan  Continue ezetimibe    Primary hypertension- (present on admission)  Assessment & Plan  Hypotensive 11/6 - stop lisinopril         VTE prophylaxis: enoxaparin ppx    I have performed a physical exam and reviewed and updated ROS and Plan today (11/6/2021). In review of yesterday's note (11/5/2021), there are no changes except as documented above.

## 2021-11-06 NOTE — CARE PLAN
Problem: Urinary - Renal Perfusion  Goal: Ability to achieve and maintain adequate renal perfusion and functioning will improve  Outcome: Progressing  Note: Pt has adequate UOP from randolph. Randolph inserted r/t epidural catheter.      Problem: Pain - Standard  Goal: Alleviation of pain or a reduction in pain to the patient’s comfort goal  Outcome: Progressing  Note: Patient experiencing no pain with epidural pain medications   The patient is Watcher - Medium risk of patient condition declining or worsening    Shift Goals  Clinical Goals: pain management, ambulate  Patient Goals: OOB  Family Goals: get her better    Progress made toward(s) clinical / shift goals:  Monitoring sedation level with epidural management.     Patient is not progressing towards the following goals: Sedation from epidural hindering ability to safely get OOB

## 2021-11-06 NOTE — PROGRESS NOTES
"Hospital Medicine Daily Progress Note    Date of Service  11/5/2021    Chief Complaint  Nils Alfonso is a 66 y.o. female admitted 10/15/2021 with abdominal pain    Hospital Course  Initially admitted to [facility] for evaluation of abdominal pain after recent ERCP with polypectomy and sphincterotomy and noted to have large intra-abdominal fluid collection for which she underwent drainage with interventional radiology and was evaluated by infectious disease and surgery.  She was transferred to [facility] for higher level of care.  She was evaluated by Dr Cook who recommended conservative management with placement of a second drain    Pneumobilia with gas in the CBD - s/p drain placement  TPN  Post ERCP retroperitoneal abscess with drain x2     Repeat CT 10/22 showed Grossly unchanged irregular fluid collection occupying the right abdomen surrounding the right kidney and right colon extending to midline.     I discussed with ID and they would like to repeat imaging prior to final abx plan- for now plan for 4 week abx course until 11/24.  I d/w Dr. marshall and plan for f/u as outpatient in 3 weeks with him      Interval Problem Update  No acute events overnight  Patient will be getting her drains upsized today  She is feeling tired as she didn't sleep well last night  Drainage from LLQ has stopped, drainage around RUQ drain has reduced\"    11/2. Another drain was placed making this the third. Patient seems to be weary and now is open to having surgical intervention. She prefers to be discharged off drains. I updated Dr. Marshall, Surgery and [facility]. Dr. Marshall will review the scans.  11/3. Discussed with patient and Dr. Marshall. Will likely need surgical intervention.  at bedside.  11/4. Discussed with Dr. ST at length. Surgery planned tomorrow.  11/5. Planned for surgery today.    I have personally seen and examined the patient at " bedside. I discussed the plan of care with patient, family, bedside RN and IR.    Consultants/Specialty  general surgery, GI, infectious disease and interventional radiology    Code Status  Full Code    Disposition  Patient is not medically cleared.   Anticipate discharge to to home with organized home healthcare and close outpatient follow-up.  I have placed the appropriate orders for post-discharge needs.    Review of Systems  Review of Systems   Constitutional: Negative for chills and fever.   Respiratory: Negative for cough and shortness of breath.    Cardiovascular: Negative for chest pain and palpitations.   Gastrointestinal: Positive for abdominal pain. Negative for nausea and vomiting.   Genitourinary: Negative for dysuria and urgency.   Neurological: Negative for dizziness and headaches.   All other systems reviewed and are negative.       Physical Exam  Temp:  [36.2 °C (97.2 °F)-37.5 °C (99.5 °F)] 36.2 °C (97.2 °F)  Pulse:  [64-82] 68  Resp:  [12-18] 14  BP: ()/(48-71) 72/58  SpO2:  [90 %-100 %] 98 %    Physical Exam  Vitals and nursing note reviewed.   Constitutional:       Appearance: Normal appearance.      Comments: Thin   Cardiovascular:      Rate and Rhythm: Normal rate and regular rhythm.   Pulmonary:      Effort: Pulmonary effort is normal.      Breath sounds: Normal breath sounds.   Abdominal:      General: Abdomen is flat.      Palpations: Abdomen is soft.      Tenderness: There is no abdominal tenderness.      Comments: RUQ drain and R flank drain   Another abdominal drain placed  Now improved to 5cc discharge  Third drain placed.  Foul smelling leakage on the drains   Musculoskeletal:      Right lower leg: Edema present.      Left lower leg: Edema present.   Skin:     General: Skin is warm and dry.   Neurological:      General: No focal deficit present.      Mental Status: She is alert and oriented to person, place, and time.   Psychiatric:      Comments: Anticipating surgery today        Fluids    Intake/Output Summary (Last 24 hours) at 11/5/2021 1745  Last data filed at 11/5/2021 1515  Gross per 24 hour   Intake 1440 ml   Output 600 ml   Net 840 ml       Laboratory  Recent Labs     11/03/21  0558 11/05/21  0550   WBC 4.8 3.7*   RBC 3.10* 2.89*   HEMOGLOBIN 9.4* 8.3*   HEMATOCRIT 28.0* 25.5*   MCV 90.3 88.2   MCH 30.3 28.7   MCHC 33.6 32.5*   RDW 44.9 44.6   PLATELETCT 227 219   MPV 9.3 9.1     Recent Labs     11/03/21  0330 11/05/21  0550   SODIUM 130* 133*   POTASSIUM 3.3* 3.2*   CHLORIDE 96 100   CO2 24 24   GLUCOSE 83 96   BUN 10 9   CREATININE 0.67 0.64   CALCIUM 7.9* 8.3*     Recent Labs     11/04/21  1317   INR 1.24*               Imaging  CT-ABDOMEN-PELVIS WITH   Final Result      1.  Slight interval decrease in size of the large RIGHT peritoneal abscess which now contains 3 drains   2.  Decreased atelectasis with persistent opacity in the RIGHT lung base likely atelectasis rather than pneumonia   3.  Small LEFT renal cyst   4.  Prior cholecystectomy with ongoing pneumobilia      CT-DRAINAGE-CATH EXCHANGE   Final Result         1. Organized pancreatic pseudocyst Drainage with CT guidance.      CT-ABDOMEN-PELVIS WITH   Final Result      1.  There has been interval placement of an additional inferior lateral pigtail catheter within the complex right abdominal abscess. The other 2 pigtail catheters are stable in appearance.   2.  There has been no significant interval decrease in size of the large complex loculated abscess collection in the right abdomen.   3.  There is no new fluid collection.   4.  Gallbladder is surgically absent with continued pneumobilia.   5.  Interval decrease in the dependent pleural effusion with minimal airspace disease, likely atelectasis.      IR-DRAINAGE-CATH EXCHANGE   Final Result      1.  Successful left-sided drainage catheter removal.   2.  Successful image guided superior right drainage catheter replacement.      Plan: Suction drainage. Monitor  outputs. Please contact interventional radiology if there is any concern for tube dysfunction. Suggest routine tube maintenance at 3 months intervals if the tube remains in place.         CT-DRAIN-PERITONEAL   Final Result      1.  CT GUIDED PERITONEAL RLQ abdominal CATHETER DRAINAGE.   2.  THE CURRENT PLAN IS TO MONITOR DRAINAGE OUTPUT AND OBTAIN A FOLLOWUP CT SCAN IN 5-7 DAYS IF CLINICALLY INDICATED.      CT-ABDOMEN-PELVIS WITH   Final Result         1. Grossly unchanged irregular fluid collection occupying the right abdomen surrounding the right kidney and right colon extending to midline. Additional pigtail catheter in the component about midline anterior abdomen. Both pigtail catheters seem to be    within the collection.      2. Small right pleural effusion with right basilar atelectasis.               DX-CHEST-LIMITED (1 VIEW)   Final Result      1.  Right basilar atelectasis or consolidation. Small right pleural effusion not excluded.   2.  Atherosclerotic plaque.      CT-DRAIN-PERITONEAL   Final Result      1.  CT guided pancreatic pseudocyst catheter drainage.   2.  The current plan is to obtain a follow-up CT in 5-7 days..      IR-PICC LINE PLACEMENT W/ GUIDANCE > AGE 5   Final Result                  Ultrasound-guided PICC placement performed by qualified nursing staff as    above.          CT-ABDOMEN-PELVIS WITH    (Results Pending)        Assessment/Plan  * Bacteremia due to Gram-negative bacteria and fungemia- (present on admission)  Assessment & Plan  ID consulted - continue micafungin and zosyn indefinitely due to incomplete source control  Source control per ID, Surgery, and IR consults    Peritoneal fluid cultures grew E. coli and Enterococcus   Blood cultures grew Chryseobacterium and Candida glabrata   Repeat blood cultures from 10/13/2021 are negative    Peritoneal fluid from 10/16/2021 growing yeast and Stenotrophomonas maltophilia-  Monitor drain output and continue current antibiotics  KALANI  "negative for signs of endocarditis\"    As above    Sepsis due to intraabdominal fluid collection/abscess, bacteremia and fungemia (HCC)- (present on admission)  Assessment & Plan  Sepsis resolved  Source intra-abdominal  Continue Zosyn and Mycamine and follow-up on repeat cultures  ID following  Re-imaging per ID, IR, and Surgery consults\"    IR placed another drain, total of 3  Dr. Dumont will review the scans, marah on InD  IV Zosyn continuous and IV micafungin 100mg BID STOP 11/24 per ID  Dr. Dumont plans on surgical intervention today    On total parenteral nutrition (TPN)  Assessment & Plan  RD consulted for enteral nutrition recommendations  TPN discontinued 10/27    Hypophosphatemia- (present on admission)  Assessment & Plan  Resolved with TPN  Monitoring per RD    Acute respiratory failure with hypoxia (HCC)- (present on admission)  Assessment & Plan  Resolved with diuresis  Likely volume overload with lower extremity edema present    Antibiotic-associated diarrhea  Assessment & Plan  Cdiff PCR negative  Loperamide PRN    Postprocedural retroperitoneal abscess- (present on admission)  Assessment & Plan  Now with abdominal drain x3  General surgery consulted, recommend ongoing nonoperative management  IR consulted, positioning and revision of tubes per recommendations  Repeat CT 10/22 showed Grossly unchanged irregular fluid collection occupying the right abdomen surrounding the right kidney and right colon extending to midline     ID consulted, recommending ongoing micafungin / zosyn and re-imaging in 4 weeks (ordered)  Will need to follow up in surgical clinic due to limited ID clinic availability    Repeat ct imaging with unchanged abscess size.  Continued drainage around drain.  I discussed with IR and plan will be to upsize drain on 11/1  If after upsizing drain she continues to have drainage around drain then will need to re-discuss POC with Dr. Dumont\"    3rd drain placed  DrUriel " "[place] to review scans, now planning surgery    Duodenal perforation (HCC)- (present on admission)  Assessment & Plan  After ERCP with retroperitoneal abscess and bacteremia  Uncertain etiology - ddx includes pancreatitis, bile leak, iatrogenic  GI consulted and s/o, management per ID / IR / Surgery    Hyponatremia- (present on admission)  Assessment & Plan  Recurrent, mild  Diuresis as tolerated    Normocytic anemia- (present on admission)  Assessment & Plan  Ferritin and Fe% consistent with AOCD from intra-abdominal infection  Repeat AM CBC  Transfuse for Hgb <7\"    Recent Labs     11/03/21  0558 11/05/21  0550   HEMOGLOBIN 9.4* 8.3*   HEMATOCRIT 28.0* 25.5*   MCV 90.3 88.2   MCH 30.3 28.7   PLATELETCT 227 219     Stable, prob dilutional  Monitor.    Hyperlipidemia- (present on admission)  Assessment & Plan  Continue ezetimibe    Primary hypertension- (present on admission)  Assessment & Plan  Continue lisinopril  No indication for strict BP control, PRN antihypertensives discontinued\"    Vitals:    11/04/21 0710   BP: 119/65   Pulse: 70   Resp: 17   Temp: 37.4 °C (99.3 °F)   SpO2: 93%     Increased lisinopril  Improved       VTE prophylaxis: enoxaparin ppx    I have performed a physical exam and reviewed and updated ROS and Plan today (11/5/2021). In review of yesterday's note (11/4/2021), there are no changes except as documented above.      "

## 2021-11-06 NOTE — PROGRESS NOTES
Bedside report received.  Assessment complete.  A&O x *4. Patient calls appropriately.  Patient ambulates with x2 FWW. Bed alarm on.  Patient has 5/10 pain. Pain managed with prescribed medications and epidural.  Denies N&V. Tolerating regular diet.  x2 MARTHA drains RLQ, to bulb suction, dressing CDI.  Midline prevena, CDI.  Corea in place.  + void, + flatus, last BM 11/03.  Patient denies SOB.  SCD's on. Waffle overlay on mattress.  Review plan with of care with patient. Call light and personal belongings with in reach. Hourly rounding in place. All needs met at this time.

## 2021-11-06 NOTE — PROGRESS NOTES
Surgical Progress Note    Author: Jaden Cook M.D. Date & Time created: 2021   9:52 AM     Interval Events:  Postop day 1 status post debridement and necrosectomy of fasciitis right lateral abscess and peritoneal abscess.  MARTHA drains are now putting out bilious stained fluid consistent with her previous duodenal injury, no purulent fluid.  Output is slowing down since surgery yesterday.  Tolerating a general diet  Review of Systems   All other systems reviewed and are negative.    Hemodynamics:  Temp (24hrs), Av.6 °C (97.8 °F), Min:36.2 °C (97.1 °F), Max:36.8 °C (98.3 °F)  Temperature: 36.8 °C (98.2 °F)  Pulse  Av.8  Min: 54  Max: 93   Blood Pressure : 114/70     Respiratory:    Respiration: 18, Pulse Oximetry: 98 %     Work Of Breathing / Effort: Within Normal Limits  RUL Breath Sounds: Clear, RML Breath Sounds: Diminished, RLL Breath Sounds: Diminished, TRISTAN Breath Sounds: Clear, LLL Breath Sounds: Diminished  Neuro:  GCS       Fluids:    Intake/Output Summary (Last 24 hours) at 2021 0952  Last data filed at 2021 0655  Gross per 24 hour   Intake 1879 ml   Output 880 ml   Net 999 ml        Current Diet Order   Procedures   • Diet Order Diet: Regular     Physical Exam  Vitals and nursing note reviewed.   Cardiovascular:      Rate and Rhythm: Normal rate and regular rhythm.      Pulses: Normal pulses.      Heart sounds: Normal heart sounds.   Abdominal:      General: Abdomen is flat. Bowel sounds are normal.      Palpations: Abdomen is soft.      Tenderness: There is abdominal tenderness.      Comments: Minimal pain  MARTHA drain does show green bilious consistent with having had duodenal injury reopened.  During debridement and drainage of large retroperitoneal abscess.  Continue MARTHA drains       Labs:  Recent Results (from the past 24 hour(s))   PHOSPHORUS    Collection Time: 21  5:13 AM   Result Value Ref Range    Phosphorus 2.6 2.5 - 4.5 mg/dL   MAGNESIUM    Collection Time:  11/06/21  5:13 AM   Result Value Ref Range    Magnesium 2.1 1.5 - 2.5 mg/dL   Basic Metabolic Panel    Collection Time: 11/06/21  5:13 AM   Result Value Ref Range    Sodium 137 135 - 145 mmol/L    Potassium 3.8 3.6 - 5.5 mmol/L    Chloride 100 96 - 112 mmol/L    Co2 20 20 - 33 mmol/L    Glucose 100 (H) 65 - 99 mg/dL    Bun 15 8 - 22 mg/dL    Creatinine 0.72 0.50 - 1.40 mg/dL    Calcium 8.8 8.5 - 10.5 mg/dL    Anion Gap 17.0 (H) 7.0 - 16.0   ESTIMATED GFR    Collection Time: 11/06/21  5:13 AM   Result Value Ref Range    GFR If African American >60 >60 mL/min/1.73 m 2    GFR If Non African American >60 >60 mL/min/1.73 m 2     Medical Decision Making, by Problem:  Active Hospital Problems    Diagnosis    • Duodenal perforation (HCC) [K63.1]      Priority: High   • Postprocedural retroperitoneal abscess [K68.11]      Priority: High   • Bacteremia due to Gram-negative bacteria and fungemia [R78.81]      Priority: High   • Sepsis due to intraabdominal fluid collection/abscess, bacteremia and fungemia (HCC) [A41.9]      Priority: High   • On total parenteral nutrition (TPN) [Z78.9]    • Hypophosphatemia [E83.39]    • Acute respiratory failure with hypoxia (HCC) [J96.01]    • Antibiotic-associated diarrhea [K52.1, T36.95XA]    • Hyponatremia [E87.1]    • Normocytic anemia [D64.9]    • Hyperlipidemia [E78.5]    • Primary hypertension [I10]      Plan:  Continue general diet  Plan to fistula eyes drainage from injury  IV antibiotics  Ambulate  Epidural to stay in until Tuesday morning  Quality Measures:  Quality-Core Measures    Discussed patient condition with Family and RN

## 2021-11-06 NOTE — ANESTHESIA POST-OP FOLLOW-UP NOTE
"Anesthesia Pain Service Note    Type:  Epidural catheter    Patient: Nils Alfonso    Patient seen and examined. and Patient chart reviewed.     /70   Pulse 70   Temp 36.8 °C (98.2 °F) (Temporal)   Resp 18   Ht 1.6 m (5' 2.99\")   Wt 66.4 kg (146 lb 6.2 oz)   LMP 01/01/2009   SpO2 98%   BMI 25.94 kg/m²     Complaints:  No complaints.    Pain:   0/10    LOC:   Awake    Rate:  16    Exam:  Vital signs stable, Afebrile and Site clean, dry, intact without tenderness or erythema    Impression:  Adequate analgesia    Assessment & Plan:  Acute post-procedural pain (G89.18)     No change  - continue      Simone Wright III, M.D.  11/6/2021  10:42 AM  "

## 2021-11-07 ENCOUNTER — ANESTHESIA EVENT (OUTPATIENT)
Dept: ANESTHESIOLOGY | Facility: MEDICAL CENTER | Age: 66
End: 2021-11-07

## 2021-11-07 ENCOUNTER — ANESTHESIA (OUTPATIENT)
Dept: ANESTHESIOLOGY | Facility: MEDICAL CENTER | Age: 66
End: 2021-11-07

## 2021-11-07 LAB
ABO + RH BLD: NORMAL
ABO GROUP BLD: NORMAL
ANION GAP SERPL CALC-SCNC: 10 MMOL/L (ref 7–16)
BACTERIA FLD AEROBE CULT: ABNORMAL
BARCODED ABORH UBTYP: 6200
BARCODED PRD CODE UBPRD: NORMAL
BARCODED UNIT NUM UBUNT: NORMAL
BLD GP AB SCN SERPL QL: NORMAL
BUN SERPL-MCNC: 14 MG/DL (ref 8–22)
CALCIUM SERPL-MCNC: 8.1 MG/DL (ref 8.5–10.5)
CHLORIDE SERPL-SCNC: 99 MMOL/L (ref 96–112)
CO2 SERPL-SCNC: 24 MMOL/L (ref 20–33)
COMPONENT R 8504R: NORMAL
CREAT SERPL-MCNC: 0.65 MG/DL (ref 0.5–1.4)
ERYTHROCYTE [DISTWIDTH] IN BLOOD BY AUTOMATED COUNT: 46.2 FL (ref 35.9–50)
GLUCOSE SERPL-MCNC: 90 MG/DL (ref 65–99)
GRAM STN SPEC: ABNORMAL
GRAM STN SPEC: ABNORMAL
HCT VFR BLD AUTO: 21.2 % (ref 37–47)
HGB BLD-MCNC: 6.9 G/DL (ref 12–16)
MAGNESIUM SERPL-MCNC: 1.9 MG/DL (ref 1.5–2.5)
MCH RBC QN AUTO: 29.4 PG (ref 27–33)
MCHC RBC AUTO-ENTMCNC: 32.5 G/DL (ref 33.6–35)
MCV RBC AUTO: 90.2 FL (ref 81.4–97.8)
PHOSPHATE SERPL-MCNC: 2.6 MG/DL (ref 2.5–4.5)
PLATELET # BLD AUTO: 241 K/UL (ref 164–446)
PMV BLD AUTO: 9.3 FL (ref 9–12.9)
POTASSIUM SERPL-SCNC: 3.5 MMOL/L (ref 3.6–5.5)
PRODUCT TYPE UPROD: NORMAL
RBC # BLD AUTO: 2.35 M/UL (ref 4.2–5.4)
RH BLD: NORMAL
SIGNIFICANT IND 70042: ABNORMAL
SIGNIFICANT IND 70042: ABNORMAL
SITE SITE: ABNORMAL
SITE SITE: ABNORMAL
SODIUM SERPL-SCNC: 133 MMOL/L (ref 135–145)
SOURCE SOURCE: ABNORMAL
SOURCE SOURCE: ABNORMAL
UNIT STATUS USTAT: NORMAL
WBC # BLD AUTO: 6.6 K/UL (ref 4.8–10.8)

## 2021-11-07 PROCEDURE — 770001 HCHG ROOM/CARE - MED/SURG/GYN PRIV*

## 2021-11-07 PROCEDURE — 99233 SBSQ HOSP IP/OBS HIGH 50: CPT | Performed by: NURSE PRACTITIONER

## 2021-11-07 PROCEDURE — 80048 BASIC METABOLIC PNL TOTAL CA: CPT

## 2021-11-07 PROCEDURE — 700111 HCHG RX REV CODE 636 W/ 250 OVERRIDE (IP): Performed by: INTERNAL MEDICINE

## 2021-11-07 PROCEDURE — A9270 NON-COVERED ITEM OR SERVICE: HCPCS | Performed by: INTERNAL MEDICINE

## 2021-11-07 PROCEDURE — A9270 NON-COVERED ITEM OR SERVICE: HCPCS | Performed by: STUDENT IN AN ORGANIZED HEALTH CARE EDUCATION/TRAINING PROGRAM

## 2021-11-07 PROCEDURE — 700111 HCHG RX REV CODE 636 W/ 250 OVERRIDE (IP): Performed by: ANESTHESIOLOGY

## 2021-11-07 PROCEDURE — 700102 HCHG RX REV CODE 250 W/ 637 OVERRIDE(OP): Performed by: FAMILY MEDICINE

## 2021-11-07 PROCEDURE — A9270 NON-COVERED ITEM OR SERVICE: HCPCS | Performed by: NURSE PRACTITIONER

## 2021-11-07 PROCEDURE — 86900 BLOOD TYPING SEROLOGIC ABO: CPT

## 2021-11-07 PROCEDURE — P9016 RBC LEUKOCYTES REDUCED: HCPCS

## 2021-11-07 PROCEDURE — 700105 HCHG RX REV CODE 258: Performed by: INTERNAL MEDICINE

## 2021-11-07 PROCEDURE — 85027 COMPLETE CBC AUTOMATED: CPT

## 2021-11-07 PROCEDURE — 700111 HCHG RX REV CODE 636 W/ 250 OVERRIDE (IP): Mod: JG | Performed by: INTERNAL MEDICINE

## 2021-11-07 PROCEDURE — 700111 HCHG RX REV CODE 636 W/ 250 OVERRIDE (IP): Performed by: FAMILY MEDICINE

## 2021-11-07 PROCEDURE — 700111 HCHG RX REV CODE 636 W/ 250 OVERRIDE (IP): Performed by: NURSE PRACTITIONER

## 2021-11-07 PROCEDURE — 700102 HCHG RX REV CODE 250 W/ 637 OVERRIDE(OP): Performed by: NURSE PRACTITIONER

## 2021-11-07 PROCEDURE — 83735 ASSAY OF MAGNESIUM: CPT

## 2021-11-07 PROCEDURE — 86923 COMPATIBILITY TEST ELECTRIC: CPT

## 2021-11-07 PROCEDURE — 700102 HCHG RX REV CODE 250 W/ 637 OVERRIDE(OP): Performed by: INTERNAL MEDICINE

## 2021-11-07 PROCEDURE — 36430 TRANSFUSION BLD/BLD COMPNT: CPT

## 2021-11-07 PROCEDURE — 700102 HCHG RX REV CODE 250 W/ 637 OVERRIDE(OP): Performed by: STUDENT IN AN ORGANIZED HEALTH CARE EDUCATION/TRAINING PROGRAM

## 2021-11-07 PROCEDURE — 700111 HCHG RX REV CODE 636 W/ 250 OVERRIDE (IP): Performed by: SURGERY

## 2021-11-07 PROCEDURE — A9270 NON-COVERED ITEM OR SERVICE: HCPCS | Performed by: FAMILY MEDICINE

## 2021-11-07 PROCEDURE — 700105 HCHG RX REV CODE 258: Performed by: ANESTHESIOLOGY

## 2021-11-07 PROCEDURE — 86901 BLOOD TYPING SEROLOGIC RH(D): CPT

## 2021-11-07 PROCEDURE — 84100 ASSAY OF PHOSPHORUS: CPT

## 2021-11-07 PROCEDURE — 86850 RBC ANTIBODY SCREEN: CPT

## 2021-11-07 RX ORDER — SULFAMETHOXAZOLE AND TRIMETHOPRIM 800; 160 MG/1; MG/1
1 TABLET ORAL EVERY 12 HOURS
Status: DISCONTINUED | OUTPATIENT
Start: 2021-11-07 | End: 2021-11-19

## 2021-11-07 RX ORDER — MAGNESIUM SULFATE 1 G/100ML
1 INJECTION INTRAVENOUS ONCE
Status: COMPLETED | OUTPATIENT
Start: 2021-11-07 | End: 2021-11-07

## 2021-11-07 RX ADMIN — ONDANSETRON 4 MG: 2 INJECTION INTRAMUSCULAR; INTRAVENOUS at 20:49

## 2021-11-07 RX ADMIN — MAGNESIUM SULFATE HEPTAHYDRATE 1 G: 1 INJECTION, SOLUTION INTRAVENOUS at 08:21

## 2021-11-07 RX ADMIN — Medication 100 MG: at 04:21

## 2021-11-07 RX ADMIN — PIPERACILLIN SODIUM AND TAZOBACTAM SODIUM 3.38 G: 3; .375 INJECTION, POWDER, FOR SOLUTION INTRAVENOUS at 04:21

## 2021-11-07 RX ADMIN — MICAFUNGIN SODIUM 100 MG: 100 INJECTION, POWDER, LYOPHILIZED, FOR SOLUTION INTRAVENOUS at 08:21

## 2021-11-07 RX ADMIN — DIBASIC SODIUM PHOSPHATE, MONOBASIC POTASSIUM PHOSPHATE AND MONOBASIC SODIUM PHOSPHATE 500 MG: 852; 155; 130 TABLET ORAL at 18:13

## 2021-11-07 RX ADMIN — GABAPENTIN 600 MG: 300 CAPSULE ORAL at 04:21

## 2021-11-07 RX ADMIN — MIDODRINE HYDROCHLORIDE 5 MG: 5 TABLET ORAL at 18:13

## 2021-11-07 RX ADMIN — DIBASIC SODIUM PHOSPHATE, MONOBASIC POTASSIUM PHOSPHATE AND MONOBASIC SODIUM PHOSPHATE 250 MG: 852; 155; 130 TABLET ORAL at 04:21

## 2021-11-07 RX ADMIN — ONDANSETRON 4 MG: 2 INJECTION INTRAMUSCULAR; INTRAVENOUS at 08:44

## 2021-11-07 RX ADMIN — PIPERACILLIN SODIUM AND TAZOBACTAM SODIUM 3.38 G: 3; .375 INJECTION, POWDER, FOR SOLUTION INTRAVENOUS at 20:46

## 2021-11-07 RX ADMIN — ACETAMINOPHEN 650 MG: 325 TABLET ORAL at 18:15

## 2021-11-07 RX ADMIN — Medication 1000 UNITS: at 04:21

## 2021-11-07 RX ADMIN — SULFAMETHOXAZOLE AND TRIMETHOPRIM 1 TABLET: 800; 160 TABLET ORAL at 18:13

## 2021-11-07 RX ADMIN — FAMOTIDINE 20 MG: 20 TABLET ORAL at 18:13

## 2021-11-07 RX ADMIN — PIPERACILLIN SODIUM AND TAZOBACTAM SODIUM 3.38 G: 3; .375 INJECTION, POWDER, FOR SOLUTION INTRAVENOUS at 13:49

## 2021-11-07 RX ADMIN — SULFAMETHOXAZOLE AND TRIMETHOPRIM 1 TABLET: 800; 160 TABLET ORAL at 04:21

## 2021-11-07 RX ADMIN — FAMOTIDINE 20 MG: 20 TABLET ORAL at 04:21

## 2021-11-07 RX ADMIN — HYDROMORPHONE HYDROCHLORIDE: 1 INJECTION, SOLUTION INTRAMUSCULAR; INTRAVENOUS; SUBCUTANEOUS at 11:13

## 2021-11-07 RX ADMIN — MIDODRINE HYDROCHLORIDE 5 MG: 5 TABLET ORAL at 13:49

## 2021-11-07 RX ADMIN — HYDROMORPHONE HYDROCHLORIDE: 1 INJECTION, SOLUTION INTRAMUSCULAR; INTRAVENOUS; SUBCUTANEOUS at 13:56

## 2021-11-07 RX ADMIN — POTASSIUM BICARBONATE 25 MEQ: 978 TABLET, EFFERVESCENT ORAL at 08:21

## 2021-11-07 RX ADMIN — MIDODRINE HYDROCHLORIDE 5 MG: 5 TABLET ORAL at 08:21

## 2021-11-07 RX ADMIN — EZETIMIBE 10 MG: 10 TABLET ORAL at 18:13

## 2021-11-07 RX ADMIN — GABAPENTIN 600 MG: 300 CAPSULE ORAL at 18:13

## 2021-11-07 ASSESSMENT — ENCOUNTER SYMPTOMS
MYALGIAS: 0
SEIZURES: 0
FOCAL WEAKNESS: 0
CHILLS: 0
NAUSEA: 0
LOSS OF CONSCIOUSNESS: 0
SHORTNESS OF BREATH: 0
VOMITING: 0
ABDOMINAL PAIN: 1
FEVER: 0
COUGH: 0

## 2021-11-07 ASSESSMENT — PAIN DESCRIPTION - PAIN TYPE
TYPE: ACUTE PAIN;SURGICAL PAIN

## 2021-11-07 NOTE — ANESTHESIA POST-OP FOLLOW-UP NOTE
"Anesthesia Pain Service Note    Type:  Epidural catheter     Patient: Nils Alfonso    Patient seen and examined. and Patient chart reviewed.     BP (!) 94/47   Pulse 69   Temp 36.8 °C (98.2 °F) (Temporal)   Resp 16   Ht 1.6 m (5' 2.99\")   Wt 66.4 kg (146 lb 6.2 oz)   LMP 01/01/2009   SpO2 94%   BMI 25.94 kg/m²     Complaints:  Pain and nausea    Pain:   7/10    LOC:   Awake    Rate:  14    Exam:  Vital signs stable, site clean, dry, and intact    Impression:  Inadequate analgesia    Assessment & Plan:  Acute post-procedural pain (G89.18)     Change (see below)  Adequate analgesia until epidural infusion changed last night. No sensory level appreciated. New epidural infusion requested and instructions left to call if analgesia remains inadequate.     Mick Calvo M.D.  11/7/2021  8:56 AM  "

## 2021-11-07 NOTE — PROGRESS NOTES
Hospital Medicine Daily Progress Note    Date of Service  11/7/2021    Chief Complaint  Nils Alfonso is a 66 y.o. female admitted 10/15/2021 with abdominal pain    Hospital Course  Initially admitted to [facility] for evaluation of abdominal pain after recent ERCP with polypectomy and sphincterotomy and noted to have large intra-abdominal fluid collection. Multiple IR drains had been placed, but her infection worsened.  She was transferred to [facility] for escalation of her surgical needs. ID has been following. She underwent ex lap w I/D of multiple loculated abscesses and debridement of nec fasciitis w Dr. ST 11/5/21. Prev cx w Stenotrophomonas maltophilia, mixed yeast, E faecalis, E coli and Chryseobacterium in the blood.    Interval Problem Update  11/7 Hg 6.9 - 1 unit RBC. BP improved on midodrine. Lytes borderline low.  Replaced.  No Hx anemia.  Anesthesia monitoring pain response to epidural.    11/6 POD #1 Ex lap w washout loculated abscesses; debridement nec fasciitis. Zosyn/Micafungin. ID following. Epidural placed in OR. MARTHA x 2, randolph, PICC, previous IR drains. Mag 2.1. Phos 2.6.      Consultants/Specialty  general surgery, GI, infectious disease and interventional radiology    Code Status  Full Code    Disposition  Patient is not medically cleared.   Anticipate discharge to to home with organized home healthcare and close outpatient follow-up.  I have placed the appropriate orders for post-discharge needs.    Review of Systems  Review of Systems   Constitutional: Negative for chills and fever.   Respiratory: Negative for cough and shortness of breath.    Cardiovascular: Negative for chest pain.   Gastrointestinal: Positive for abdominal pain. Negative for nausea and vomiting.   Genitourinary: Negative for dysuria, frequency and urgency.   Musculoskeletal: Negative for myalgias.   Skin: Negative for rash.   Neurological: Negative for focal weakness, seizures and loss of consciousness.   All other systems reviewed  and are negative.       Physical Exam  Temp:  [36.6 °C (97.8 °F)-37.2 °C (99 °F)] 36.8 °C (98.2 °F)  Pulse:  [62-83] 74  Resp:  [16-18] 16  BP: ()/(42-68) 111/43  SpO2:  [92 %-98 %] 92 %    Physical Exam  Vitals and nursing note reviewed.   Constitutional:       General: She is not in acute distress.  HENT:      Head: Normocephalic and atraumatic.      Nose: Nose normal.   Eyes:      Pupils: Pupils are equal, round, and reactive to light.   Cardiovascular:      Rate and Rhythm: Normal rate and regular rhythm.      Heart sounds: Normal heart sounds.   Pulmonary:      Effort: Pulmonary effort is normal.      Breath sounds: Normal breath sounds.   Abdominal:      General: There is no distension.      Palpations: Abdomen is soft.          Comments: Prevenal CDI   Musculoskeletal:      Cervical back: Neck supple.   Neurological:      Mental Status: She is alert and oriented to person, place, and time.   Psychiatric:         Mood and Affect: Mood normal.       Fluids    Intake/Output Summary (Last 24 hours) at 11/7/2021 1244  Last data filed at 11/7/2021 0729  Gross per 24 hour   Intake 424 ml   Output 1270 ml   Net -846 ml       Laboratory  Recent Labs     11/05/21  0550 11/06/21  1052 11/07/21  0418   WBC 3.7* 7.3 6.6   RBC 2.89* 2.63* 2.35*   HEMOGLOBIN 8.3* 7.9* 6.9*   HEMATOCRIT 25.5* 24.2* 21.2*   MCV 88.2 92.0 90.2   MCH 28.7 30.0 29.4   MCHC 32.5* 32.6* 32.5*   RDW 44.6 47.4 46.2   PLATELETCT 219 252 241   MPV 9.1 9.4 9.3     Recent Labs     11/05/21  0550 11/06/21  0513 11/07/21  0418   SODIUM 133* 137 133*   POTASSIUM 3.2* 3.8 3.5*   CHLORIDE 100 100 99   CO2 24 20 24   GLUCOSE 96 100* 90   BUN 9 15 14   CREATININE 0.64 0.72 0.65   CALCIUM 8.3* 8.8 8.1*                   Imaging  CT-ABDOMEN-PELVIS WITH   Final Result      1.  Slight interval decrease in size of the large RIGHT peritoneal abscess which now contains 3 drains   2.  Decreased atelectasis with persistent opacity in the RIGHT lung base likely  atelectasis rather than pneumonia   3.  Small LEFT renal cyst   4.  Prior cholecystectomy with ongoing pneumobilia      CT-DRAINAGE-CATH EXCHANGE   Final Result         1. Organized pancreatic pseudocyst Drainage with CT guidance.      CT-ABDOMEN-PELVIS WITH   Final Result      1.  There has been interval placement of an additional inferior lateral pigtail catheter within the complex right abdominal abscess. The other 2 pigtail catheters are stable in appearance.   2.  There has been no significant interval decrease in size of the large complex loculated abscess collection in the right abdomen.   3.  There is no new fluid collection.   4.  Gallbladder is surgically absent with continued pneumobilia.   5.  Interval decrease in the dependent pleural effusion with minimal airspace disease, likely atelectasis.      IR-DRAINAGE-CATH EXCHANGE   Final Result      1.  Successful left-sided drainage catheter removal.   2.  Successful image guided superior right drainage catheter replacement.      Plan: Suction drainage. Monitor outputs. Please contact interventional radiology if there is any concern for tube dysfunction. Suggest routine tube maintenance at 3 months intervals if the tube remains in place.         CT-DRAIN-PERITONEAL   Final Result      1.  CT GUIDED PERITONEAL RLQ abdominal CATHETER DRAINAGE.   2.  THE CURRENT PLAN IS TO MONITOR DRAINAGE OUTPUT AND OBTAIN A FOLLOWUP CT SCAN IN 5-7 DAYS IF CLINICALLY INDICATED.      CT-ABDOMEN-PELVIS WITH   Final Result         1. Grossly unchanged irregular fluid collection occupying the right abdomen surrounding the right kidney and right colon extending to midline. Additional pigtail catheter in the component about midline anterior abdomen. Both pigtail catheters seem to be    within the collection.      2. Small right pleural effusion with right basilar atelectasis.               DX-CHEST-LIMITED (1 VIEW)   Final Result      1.  Right basilar atelectasis or consolidation.  Small right pleural effusion not excluded.   2.  Atherosclerotic plaque.      CT-DRAIN-PERITONEAL   Final Result      1.  CT guided pancreatic pseudocyst catheter drainage.   2.  The current plan is to obtain a follow-up CT in 5-7 days..      IR-PICC LINE PLACEMENT W/ GUIDANCE > AGE 5   Final Result                  Ultrasound-guided PICC placement performed by qualified nursing staff as    above.          CT-ABDOMEN-PELVIS WITH    (Results Pending)        Assessment/Plan  * Bacteremia due to Gram-negative bacteria and fungemia- (present on admission)  Assessment & Plan  KALANI negative for signs of endocarditis  Cont Zosyn and Micafungin per ID  Repeat Bld Cx neg      Hypotension  Assessment & Plan  11/6: Stop lasix; Stop ACEI  11/7 started midodrine    Hypophosphatemia- (present on admission)  Assessment & Plan  Resolved with TPN  Monitoring per RD  11/6 Kphos; serum goal >3    Acute respiratory failure with hypoxia (HCC)- (present on admission)  Assessment & Plan  Resolved with diuresis  Likely volume overload with lower extremity edema present    Antibiotic-associated diarrhea  Assessment & Plan  Cdiff PCR negative  Loperamide PRN    Postprocedural retroperitoneal abscess- (present on admission)  Assessment & Plan  S/P Ex lap, I/D, debridement nec fasc 11/5/2021  Abx per ID  Follow cx  Pain control  Diet per sx  Antiemetics PRN    Duodenal perforation (HCC)- (present on admission)  Assessment & Plan  After ERCP with retroperitoneal abscess and bacteremia  Uncertain etiology - ddx includes pancreatitis, bile leak, iatrogenic  Pepcid BID    Hyponatremia- (present on admission)  Assessment & Plan  Recurrent, mild  Diuresis as tolerated    Normocytic anemia- (present on admission)  Assessment & Plan  Transfuse for Hgb <7  Avoid regular phlebotomy  Supplements per dietary: 11/6 added thiamine, 6 sm meals/d    Sepsis due to intraabdominal fluid collection/abscess, bacteremia and fungemia (HCC)- (present on  admission)  Assessment & Plan  Sepsis resolved  Source intra-abdominal  Zosyn/Mycafungin/Bactrim DS and follow cx  ID following  Re-imaging per ID, IR, and Surgery  S/P Ex lap w I/D and debridement nec fasc 11/5/2021    Hyperlipidemia- (present on admission)  Assessment & Plan  Continue ezetimibe    Primary hypertension- (present on admission)  Assessment & Plan  Hypotensive 11/6 - stop lisinopril         VTE prophylaxis: enoxaparin ppx    I have performed a physical exam and reviewed and updated ROS and Plan today (11/7/2021). In review of yesterday's note (11/6/2021), there are no changes except as documented above.

## 2021-11-07 NOTE — PROGRESS NOTES
Patient c/o ongoing pain after replacement of epidural infusate. Rate increased to 16ml/hr and bolus of 2% lidocaine 5ml given via epidural. Patient noted improved analgesia and appeared comfortable.

## 2021-11-07 NOTE — PROGRESS NOTES
Surgical Progress Note    Author: Jaden Cook M.D. Date & Time created: 2021   9:37 AM     Interval Events:  POD 2  Feels better  Drop in Hb- seed to transfuse if less than 7- leave to hospital ist to manage  Pain manageable  MARTHA drains slowing down and bilious    Review of Systems   Gastrointestinal: Positive for abdominal pain.   All other systems reviewed and are negative.    Hemodynamics:  Temp (24hrs), Av.8 °C (98.3 °F), Min:36.6 °C (97.8 °F), Max:37.2 °C (99 °F)  Temperature: 36.8 °C (98.2 °F)  Pulse  Av.8  Min: 54  Max: 93   Blood Pressure : (!) 94/47     Respiratory:    Respiration: 16, Pulse Oximetry: 94 %     Work Of Breathing / Effort: Within Normal Limits  RUL Breath Sounds: Clear, RML Breath Sounds: Diminished, RLL Breath Sounds: Diminished, TRISTAN Breath Sounds: Clear, LLL Breath Sounds: Diminished  Neuro:  GCS       Fluids:    Intake/Output Summary (Last 24 hours) at 2021 0937  Last data filed at 2021 0729  Gross per 24 hour   Intake 764 ml   Output 1570 ml   Net -806 ml        Current Diet Order   Procedures   • Diet Order Diet: Regular; Miscellaneous modifications: (optional): 6 Small Meals     Physical Exam  Vitals and nursing note reviewed.   Cardiovascular:      Rate and Rhythm: Normal rate and regular rhythm.   Pulmonary:      Effort: Pulmonary effort is normal.      Breath sounds: Normal breath sounds.   Abdominal:      General: Abdomen is flat. Bowel sounds are normal.      Palpations: Abdomen is soft.      Tenderness: There is abdominal tenderness.      Comments: MARTHA drains slowing down and still bilious       Labs:  Recent Results (from the past 24 hour(s))   CBC WITH DIFFERENTIAL    Collection Time: 21 10:52 AM   Result Value Ref Range    WBC 7.3 4.8 - 10.8 K/uL    RBC 2.63 (L) 4.20 - 5.40 M/uL    Hemoglobin 7.9 (L) 12.0 - 16.0 g/dL    Hematocrit 24.2 (L) 37.0 - 47.0 %    MCV 92.0 81.4 - 97.8 fL    MCH 30.0 27.0 - 33.0 pg    MCHC 32.6 (L) 33.6 - 35.0  g/dL    RDW 47.4 35.9 - 50.0 fL    Platelet Count 252 164 - 446 K/uL    MPV 9.4 9.0 - 12.9 fL    Neutrophils-Polys 85.50 (H) 44.00 - 72.00 %    Lymphocytes 8.10 (L) 22.00 - 41.00 %    Monocytes 5.20 0.00 - 13.40 %    Eosinophils 0.30 0.00 - 6.90 %    Basophils 0.10 0.00 - 1.80 %    Immature Granulocytes 0.80 0.00 - 0.90 %    Nucleated RBC 0.00 /100 WBC    Neutrophils (Absolute) 6.25 2.00 - 7.15 K/uL    Lymphs (Absolute) 0.59 (L) 1.00 - 4.80 K/uL    Monos (Absolute) 0.38 0.00 - 0.85 K/uL    Eos (Absolute) 0.02 0.00 - 0.51 K/uL    Baso (Absolute) 0.01 0.00 - 0.12 K/uL    Immature Granulocytes (abs) 0.06 0.00 - 0.11 K/uL    NRBC (Absolute) 0.00 K/uL   ABO Rh Confirm    Collection Time: 11/06/21 10:52 AM   Result Value Ref Range    ABO Rh Confirm A POS    Basic Metabolic Panel    Collection Time: 11/07/21  4:18 AM   Result Value Ref Range    Sodium 133 (L) 135 - 145 mmol/L    Potassium 3.5 (L) 3.6 - 5.5 mmol/L    Chloride 99 96 - 112 mmol/L    Co2 24 20 - 33 mmol/L    Glucose 90 65 - 99 mg/dL    Bun 14 8 - 22 mg/dL    Creatinine 0.65 0.50 - 1.40 mg/dL    Calcium 8.1 (L) 8.5 - 10.5 mg/dL    Anion Gap 10.0 7.0 - 16.0   CBC WITHOUT DIFFERENTIAL    Collection Time: 11/07/21  4:18 AM   Result Value Ref Range    WBC 6.6 4.8 - 10.8 K/uL    RBC 2.35 (L) 4.20 - 5.40 M/uL    Hemoglobin 6.9 (L) 12.0 - 16.0 g/dL    Hematocrit 21.2 (L) 37.0 - 47.0 %    MCV 90.2 81.4 - 97.8 fL    MCH 29.4 27.0 - 33.0 pg    MCHC 32.5 (L) 33.6 - 35.0 g/dL    RDW 46.2 35.9 - 50.0 fL    Platelet Count 241 164 - 446 K/uL    MPV 9.3 9.0 - 12.9 fL   MAGNESIUM    Collection Time: 11/07/21  4:18 AM   Result Value Ref Range    Magnesium 1.9 1.5 - 2.5 mg/dL   PHOSPHORUS    Collection Time: 11/07/21  4:18 AM   Result Value Ref Range    Phosphorus 2.6 2.5 - 4.5 mg/dL   ESTIMATED GFR    Collection Time: 11/07/21  4:18 AM   Result Value Ref Range    GFR If African American >60 >60 mL/min/1.73 m 2    GFR If Non African American >60 >60 mL/min/1.73 m 2   COD - Adult  (Type and Screen)    Collection Time: 11/07/21  4:18 AM   Result Value Ref Range    ABO Grouping Only A     Rh Grouping Only POS     Antibody Screen-Cod NEG      Medical Decision Making, by Problem:  Active Hospital Problems    Diagnosis    • Duodenal perforation (HCC) [K63.1]      Priority: High   • Postprocedural retroperitoneal abscess [K68.11]      Priority: High   • Bacteremia due to Gram-negative bacteria and fungemia [R78.81]      Priority: High   • Sepsis due to intraabdominal fluid collection/abscess, bacteremia and fungemia (HCC) [A41.9]      Priority: High   • Hypotension [I95.9]    • Hypophosphatemia [E83.39]    • Acute respiratory failure with hypoxia (HCC) [J96.01]    • Antibiotic-associated diarrhea [K52.1, T36.95XA]    • Hyponatremia [E87.1]    • Normocytic anemia [D64.9]    • Hyperlipidemia [E78.5]    • Primary hypertension [I10]      Plan:  Anesthesia to manage epidural and pain  CPM    Quality Measures:  Quality-Core Measures    Discussed patient condition with Family and RN

## 2021-11-07 NOTE — PROGRESS NOTES
Report received. Assumed care. Pt in bed resting, easily arousable. A/O x4. VSS, soft BPs. Responds appropriately. Denies SOB on 1L O2 via NC. C/O pain, medicated per MAR with continuous epidural. Assessment complete. Pt denies numbness or tingling in extremities. Corea in place. x2 MARTHA drains in place. Midline prevena wound vac in place. Discussed POC, pt verbalizes understanding. Explained importance of calling before getting OOB. Call light and belongings within reach. Bed alarm on. Bed in the lowest position. Treaded socks in place. Hourly rounding in progress. Will continue to monitor.    COVID-19 surge in effect.

## 2021-11-07 NOTE — CARE PLAN
The patient is Watcher - Medium risk of patient condition declining or worsening    Shift Goals  Clinical Goals: pain management, ambulate  Patient Goals: OOB  Family Goals: get her better    Progress made toward(s) clinical / shift goals:  VSS, on room air, epidural for pain, patient tolerating diet , randolph with output     Patient is not progressing towards the following goals: pain not controlled per patient, anesthesia to bedside to assess and adjust epidural       Problem: Hemodynamics  Goal: Patient's hemodynamics, fluid balance and neurologic status will be stable or improve  Outcome: Progressing     Problem: Fluid Volume  Goal: Fluid volume balance will be maintained  Outcome: Progressing     Problem: Urinary - Renal Perfusion  Goal: Ability to achieve and maintain adequate renal perfusion and functioning will improve  Outcome: Progressing     Problem: Respiratory  Goal: Patient will achieve/maintain optimum respiratory ventilation and gas exchange  Outcome: Progressing     Problem: Physical Regulation  Goal: Diagnostic test results will improve  Outcome: Progressing  Goal: Signs and symptoms of infection will decrease  Outcome: Progressing     Problem: Pain - Standard  Goal: Alleviation of pain or a reduction in pain to the patient’s comfort goal  Outcome: Progressing     Problem: Skin Integrity  Goal: Skin integrity is maintained or improved  Outcome: Progressing     Problem: Fall Risk  Goal: Patient will remain free from falls  Outcome: Progressing

## 2021-11-07 NOTE — CARE PLAN
The patient is Watcher - Medium risk of patient condition declining or worsening    Shift Goals  Clinical Goals: pain management, ambulate  Patient Goals: OOB  Family Goals: get her better    Progress made toward(s) clinical / shift goals:   Problem: Pain - Standard  Goal: Alleviation of pain or a reduction in pain to the patient’s comfort goal  Outcome: Progressing     Problem: Skin Integrity  Goal: Skin integrity is maintained or improved  Outcome: Progressing     Problem: Fall Risk  Goal: Patient will remain free from falls  Outcome: Progressing       Patient is not progressing towards the following goals:

## 2021-11-07 NOTE — PROGRESS NOTES
Anesthesia MD at bedside, orders to start a new bag of epidural medication to see if this helps patients pain. If it does not, possible epidural exchange later today. Lovenox held per MD Calvo.

## 2021-11-07 NOTE — PROGRESS NOTES
Patient sitting up in bed, RUQ abdomen site with brown leaking next to midline incision. Wound care given. Patient on room air, VSS. C/O 6/10 pain to abdomen, states that her epidural was increased overnight but she cannot tell if it is helping. Corea patent with clear yellow urine , prevena wound vac is green. Awaiting anesthesia rounds for epidural. Patient has call bell in hand, calls approp, bed locked

## 2021-11-07 NOTE — PROGRESS NOTES
"Patient called this RN to bedside, states her pain has returned, feels like \"electrical shocks.\" MD Calvo aware, order to increase rate of infusion per EMAR until he can assess her at bedside.   "

## 2021-11-08 ENCOUNTER — APPOINTMENT (OUTPATIENT)
Dept: RADIOLOGY | Facility: MEDICAL CENTER | Age: 66
DRG: 856 | End: 2021-11-08
Attending: NURSE PRACTITIONER
Payer: MEDICARE

## 2021-11-08 ENCOUNTER — ANESTHESIA EVENT (OUTPATIENT)
Dept: ANESTHESIOLOGY | Facility: MEDICAL CENTER | Age: 66
End: 2021-11-08

## 2021-11-08 ENCOUNTER — ANESTHESIA (OUTPATIENT)
Dept: ANESTHESIOLOGY | Facility: MEDICAL CENTER | Age: 66
End: 2021-11-08

## 2021-11-08 PROBLEM — R79.0 LOW MAGNESIUM LEVEL: Status: ACTIVE | Noted: 2021-11-08

## 2021-11-08 LAB
ANION GAP SERPL CALC-SCNC: 11 MMOL/L (ref 7–16)
BACTERIA SPEC ANAEROBE CULT: NORMAL
BACTERIA WND AEROBE CULT: ABNORMAL
BUN SERPL-MCNC: 12 MG/DL (ref 8–22)
CALCIUM SERPL-MCNC: 7.3 MG/DL (ref 8.5–10.5)
CHLORIDE SERPL-SCNC: 101 MMOL/L (ref 96–112)
CO2 SERPL-SCNC: 21 MMOL/L (ref 20–33)
CREAT SERPL-MCNC: 0.6 MG/DL (ref 0.5–1.4)
ERYTHROCYTE [DISTWIDTH] IN BLOOD BY AUTOMATED COUNT: 50.4 FL (ref 35.9–50)
GLUCOSE SERPL-MCNC: 110 MG/DL (ref 65–99)
GRAM STN SPEC: ABNORMAL
GRAM STN SPEC: ABNORMAL
HCT VFR BLD AUTO: 24.8 % (ref 37–47)
HGB BLD-MCNC: 8.4 G/DL (ref 12–16)
MCH RBC QN AUTO: 29.6 PG (ref 27–33)
MCHC RBC AUTO-ENTMCNC: 33.9 G/DL (ref 33.6–35)
MCV RBC AUTO: 87.3 FL (ref 81.4–97.8)
PLATELET # BLD AUTO: 227 K/UL (ref 164–446)
PMV BLD AUTO: 9.3 FL (ref 9–12.9)
POTASSIUM SERPL-SCNC: 3.6 MMOL/L (ref 3.6–5.5)
RBC # BLD AUTO: 2.84 M/UL (ref 4.2–5.4)
SIGNIFICANT IND 70042: ABNORMAL
SIGNIFICANT IND 70042: ABNORMAL
SIGNIFICANT IND 70042: NORMAL
SITE SITE: ABNORMAL
SITE SITE: ABNORMAL
SITE SITE: NORMAL
SODIUM SERPL-SCNC: 133 MMOL/L (ref 135–145)
SOURCE SOURCE: ABNORMAL
SOURCE SOURCE: ABNORMAL
SOURCE SOURCE: NORMAL
WBC # BLD AUTO: 5.8 K/UL (ref 4.8–10.8)

## 2021-11-08 PROCEDURE — 700102 HCHG RX REV CODE 250 W/ 637 OVERRIDE(OP): Performed by: STUDENT IN AN ORGANIZED HEALTH CARE EDUCATION/TRAINING PROGRAM

## 2021-11-08 PROCEDURE — A9270 NON-COVERED ITEM OR SERVICE: HCPCS | Performed by: NURSE PRACTITIONER

## 2021-11-08 PROCEDURE — 770001 HCHG ROOM/CARE - MED/SURG/GYN PRIV*

## 2021-11-08 PROCEDURE — 80048 BASIC METABOLIC PNL TOTAL CA: CPT

## 2021-11-08 PROCEDURE — 99024 POSTOP FOLLOW-UP VISIT: CPT | Performed by: SURGERY

## 2021-11-08 PROCEDURE — 700117 HCHG RX CONTRAST REV CODE 255: Performed by: NURSE PRACTITIONER

## 2021-11-08 PROCEDURE — 700111 HCHG RX REV CODE 636 W/ 250 OVERRIDE (IP): Performed by: SURGERY

## 2021-11-08 PROCEDURE — 700102 HCHG RX REV CODE 250 W/ 637 OVERRIDE(OP): Performed by: FAMILY MEDICINE

## 2021-11-08 PROCEDURE — 700105 HCHG RX REV CODE 258: Performed by: INTERNAL MEDICINE

## 2021-11-08 PROCEDURE — 700102 HCHG RX REV CODE 250 W/ 637 OVERRIDE(OP): Performed by: NURSE PRACTITIONER

## 2021-11-08 PROCEDURE — A9270 NON-COVERED ITEM OR SERVICE: HCPCS | Performed by: STUDENT IN AN ORGANIZED HEALTH CARE EDUCATION/TRAINING PROGRAM

## 2021-11-08 PROCEDURE — 700111 HCHG RX REV CODE 636 W/ 250 OVERRIDE (IP): Performed by: NURSE PRACTITIONER

## 2021-11-08 PROCEDURE — 700101 HCHG RX REV CODE 250: Performed by: NURSE PRACTITIONER

## 2021-11-08 PROCEDURE — 700105 HCHG RX REV CODE 258: Performed by: ANESTHESIOLOGY

## 2021-11-08 PROCEDURE — 97164 PT RE-EVAL EST PLAN CARE: CPT

## 2021-11-08 PROCEDURE — A9270 NON-COVERED ITEM OR SERVICE: HCPCS | Performed by: FAMILY MEDICINE

## 2021-11-08 PROCEDURE — A9270 NON-COVERED ITEM OR SERVICE: HCPCS | Performed by: INTERNAL MEDICINE

## 2021-11-08 PROCEDURE — 85027 COMPLETE CBC AUTOMATED: CPT

## 2021-11-08 PROCEDURE — 700111 HCHG RX REV CODE 636 W/ 250 OVERRIDE (IP): Performed by: INTERNAL MEDICINE

## 2021-11-08 PROCEDURE — 74177 CT ABD & PELVIS W/CONTRAST: CPT | Mod: ME

## 2021-11-08 PROCEDURE — 700105 HCHG RX REV CODE 258: Performed by: NURSE PRACTITIONER

## 2021-11-08 PROCEDURE — 99233 SBSQ HOSP IP/OBS HIGH 50: CPT | Performed by: NURSE PRACTITIONER

## 2021-11-08 PROCEDURE — 700111 HCHG RX REV CODE 636 W/ 250 OVERRIDE (IP): Performed by: ANESTHESIOLOGY

## 2021-11-08 PROCEDURE — 700111 HCHG RX REV CODE 636 W/ 250 OVERRIDE (IP): Mod: JG | Performed by: INTERNAL MEDICINE

## 2021-11-08 PROCEDURE — 700102 HCHG RX REV CODE 250 W/ 637 OVERRIDE(OP): Performed by: INTERNAL MEDICINE

## 2021-11-08 RX ORDER — MAGNESIUM SULFATE HEPTAHYDRATE 40 MG/ML
2 INJECTION, SOLUTION INTRAVENOUS ONCE
Status: COMPLETED | OUTPATIENT
Start: 2021-11-08 | End: 2021-11-08

## 2021-11-08 RX ADMIN — PIPERACILLIN SODIUM AND TAZOBACTAM SODIUM 3.38 G: 3; .375 INJECTION, POWDER, FOR SOLUTION INTRAVENOUS at 22:19

## 2021-11-08 RX ADMIN — HYDROMORPHONE HYDROCHLORIDE: 1 INJECTION, SOLUTION INTRAMUSCULAR; INTRAVENOUS; SUBCUTANEOUS at 03:57

## 2021-11-08 RX ADMIN — Medication 1000 UNITS: at 04:36

## 2021-11-08 RX ADMIN — Medication 100 MG: at 04:36

## 2021-11-08 RX ADMIN — FAMOTIDINE 20 MG: 20 TABLET ORAL at 04:36

## 2021-11-08 RX ADMIN — FAMOTIDINE 20 MG: 20 TABLET ORAL at 22:22

## 2021-11-08 RX ADMIN — GABAPENTIN 600 MG: 300 CAPSULE ORAL at 04:36

## 2021-11-08 RX ADMIN — SULFAMETHOXAZOLE AND TRIMETHOPRIM 1 TABLET: 800; 160 TABLET ORAL at 04:36

## 2021-11-08 RX ADMIN — MIDODRINE HYDROCHLORIDE 5 MG: 5 TABLET ORAL at 12:32

## 2021-11-08 RX ADMIN — ONDANSETRON 4 MG: 2 INJECTION INTRAMUSCULAR; INTRAVENOUS at 22:13

## 2021-11-08 RX ADMIN — MAGNESIUM SULFATE HEPTAHYDRATE 2 G: 40 INJECTION, SOLUTION INTRAVENOUS at 18:44

## 2021-11-08 RX ADMIN — POTASSIUM PHOSPHATE, MONOBASIC AND POTASSIUM PHOSPHATE, DIBASIC 30 MMOL: 224; 236 INJECTION, SOLUTION, CONCENTRATE INTRAVENOUS at 12:35

## 2021-11-08 RX ADMIN — MICAFUNGIN SODIUM 100 MG: 100 INJECTION, POWDER, LYOPHILIZED, FOR SOLUTION INTRAVENOUS at 09:28

## 2021-11-08 RX ADMIN — PIPERACILLIN SODIUM AND TAZOBACTAM SODIUM 3.38 G: 3; .375 INJECTION, POWDER, FOR SOLUTION INTRAVENOUS at 12:32

## 2021-11-08 RX ADMIN — MIDODRINE HYDROCHLORIDE 5 MG: 5 TABLET ORAL at 09:28

## 2021-11-08 RX ADMIN — EZETIMIBE 10 MG: 10 TABLET ORAL at 22:21

## 2021-11-08 RX ADMIN — HYDROMORPHONE HYDROCHLORIDE 0.5 MG: 1 INJECTION, SOLUTION INTRAMUSCULAR; INTRAVENOUS; SUBCUTANEOUS at 22:22

## 2021-11-08 RX ADMIN — PIPERACILLIN SODIUM AND TAZOBACTAM SODIUM 3.38 G: 3; .375 INJECTION, POWDER, FOR SOLUTION INTRAVENOUS at 03:59

## 2021-11-08 RX ADMIN — SULFAMETHOXAZOLE AND TRIMETHOPRIM 1 TABLET: 800; 160 TABLET ORAL at 22:21

## 2021-11-08 RX ADMIN — IOHEXOL 100 ML: 350 INJECTION, SOLUTION INTRAVENOUS at 11:53

## 2021-11-08 RX ADMIN — DIBASIC SODIUM PHOSPHATE, MONOBASIC POTASSIUM PHOSPHATE AND MONOBASIC SODIUM PHOSPHATE 500 MG: 852; 155; 130 TABLET ORAL at 04:36

## 2021-11-08 RX ADMIN — GABAPENTIN 600 MG: 300 CAPSULE ORAL at 22:19

## 2021-11-08 ASSESSMENT — ENCOUNTER SYMPTOMS
FEVER: 0
NAUSEA: 0
LOSS OF CONSCIOUSNESS: 0
SHORTNESS OF BREATH: 0
MYALGIAS: 0
SEIZURES: 0
FOCAL WEAKNESS: 0
CHILLS: 0
ABDOMINAL PAIN: 1
VOMITING: 0
COUGH: 0

## 2021-11-08 ASSESSMENT — COGNITIVE AND FUNCTIONAL STATUS - GENERAL
STANDING UP FROM CHAIR USING ARMS: A LITTLE
MOVING FROM LYING ON BACK TO SITTING ON SIDE OF FLAT BED: UNABLE
CLIMB 3 TO 5 STEPS WITH RAILING: A LOT
TURNING FROM BACK TO SIDE WHILE IN FLAT BAD: UNABLE
MOBILITY SCORE: 11
MOVING TO AND FROM BED TO CHAIR: UNABLE
WALKING IN HOSPITAL ROOM: A LITTLE
SUGGESTED CMS G CODE MODIFIER MOBILITY: CL

## 2021-11-08 ASSESSMENT — PAIN DESCRIPTION - PAIN TYPE
TYPE: ACUTE PAIN;SURGICAL PAIN

## 2021-11-08 ASSESSMENT — GAIT ASSESSMENTS
GAIT LEVEL OF ASSIST: UNABLE TO PARTICIPATE
ASSISTIVE DEVICE: FRONT WHEEL WALKER

## 2021-11-08 NOTE — PROGRESS NOTES
"Patient answering questions appropriately, but going off on tangents about pineapples and children. Easily re-oriented, but  does notice her intermittent confusion as well. She has a fever, medicated with tylenol. Epidural infusion decreased to 14 mls/hr. Bed alarm on,  is at bedside. Patient denies any new neuro symptoms, \"you guys think I'm crazy dont you.\" Continue to monitor   "

## 2021-11-08 NOTE — PROGRESS NOTES
Report received. Assumed care. Pt in bed resting but easily arousable. A/O x4, can be confused at times, easily re-oriented. VSS, can be hypotensive. Responds appropriately. Reports pain well controlled with epidural in place, denies SOB on 1L O2 via NC. Assessment complete. Pt denies numbness/tingling in BLE. Midline prevena. x2 MARTHA sites. x3 old MARTHA sites, wound care completed on RUQ site. Corea catheter. Discussed POC, pt verbalizes understanding. Explained importance of calling before getting OOB. Call light and belongings within reach. Bed alarm on. Bed in the lowest position. Treaded socks in place. Hourly rounding in progress. Will continue to monitor.    COVID-19 surge in effect.

## 2021-11-08 NOTE — CARE PLAN
The patient is Watcher - Medium risk of patient condition declining or worsening    Shift Goals  Clinical Goals: pain management, wound care   Patient Goals: pain management   Family Goals: get her better    Progress made toward(s) clinical / shift goals:    Problem: Pain - Standard  Goal: Alleviation of pain or a reduction in pain to the patient’s comfort goal  Outcome: Progressing     Problem: Skin Integrity  Goal: Skin integrity is maintained or improved  Outcome: Progressing     Problem: Fall Risk  Goal: Patient will remain free from falls  Outcome: Progressing       Patient is not progressing towards the following goals:

## 2021-11-08 NOTE — ANESTHESIA POST-OP FOLLOW-UP NOTE
"Anesthesia Pain Service Note    Type:  Epidural catheter    Patient: Nils Alfonso    Patient seen and examined.     BP (!) 92/69 Comment: RN notifed   Pulse 69   Temp 36.9 °C (98.4 °F) (Temporal)   Resp 17   Ht 1.6 m (5' 2.99\")   Wt 66.4 kg (146 lb 6.2 oz)   LMP 01/01/2009   SpO2 95%   BMI 25.94 kg/m²     Complaints:  No complaints.    Pain:   2/10    LOC:   Awake    Rate:  16    Exam:  Site clean, dry, intact without tenderness or erythema    Impression:  post op day 3. Epidural to be DCd    Assessment & Plan:  Other (specify) Patient has not received lovenox blood thinner for over 24 hours.  Epidural DCd, tip intact.  No problem, well tolerated.     D/C by MD (Tip intact, no apparent complications)      Heraclio Adhikari M.D.  11/8/2021  10:53 AM  "

## 2021-11-08 NOTE — PROGRESS NOTES
Hospital Medicine Daily Progress Note    Date of Service  11/8/2021    Chief Complaint  Nils Alfonso is a 66 y.o. female admitted 10/15/2021 with abdominal pain    Hospital Course  Initially admitted to [facility] for evaluation of abdominal pain after recent ERCP with polypectomy and sphincterotomy and noted to have large intra-abdominal fluid collection. Multiple IR drains had been placed, but her infection worsened.  She was transferred to [facility] for escalation of her surgical needs. ID has been following. She underwent ex lap w I/D of multiple loculated abscesses and debridement of nec fasciitis w Dr. ST 11/5/21. Prev cx w Stenotrophomonas maltophilia, mixed yeast, E faecalis, E coli and Chryseobacterium in the blood.    Interval Problem Update  11/8 Further bilious/cola colored drainage from prior RUQ IR drain site. Fever 101 yesterday. Now on midodrine for BP support. C/O abd pain - stable. WBC stable. Cx again growing pre-existing organisms. Replacing K/phos/mag daily. CT today shows sm R perinephric fluid collection near drains. Updated surgical team.    11/7 Hg 6.9 - 1 unit RBC. BP improved on midodrine. Lytes borderline low.  Replaced.  No Hx anemia.  Anesthesia monitoring pain response to epidural.    11/6 POD #1 Ex lap w washout loculated abscesses; debridement nec fasciitis. Zosyn/Micafungin. ID following. Epidural placed in OR. MARTHA x 2, randolph, PICC, previous IR drains. Mag 2.1. Phos 2.6.      Consultants/Specialty  general surgery, GI, infectious disease and interventional radiology    Code Status  Full Code    Disposition  Patient is not medically cleared.   Anticipate discharge to to home with organized home healthcare and close outpatient follow-up.  I have placed the appropriate orders for post-discharge needs.    Review of Systems  Review of Systems   Constitutional: Negative for chills and fever.   Respiratory: Negative for cough and shortness of breath.    Cardiovascular: Negative for chest pain.    Gastrointestinal: Positive for abdominal pain. Negative for nausea and vomiting.   Genitourinary: Negative for dysuria, frequency and urgency.   Musculoskeletal: Negative for myalgias.   Skin: Negative for rash.   Neurological: Negative for focal weakness, seizures and loss of consciousness.   All other systems reviewed and are negative.       Physical Exam  Temp:  [36.3 °C (97.3 °F)-38.3 °C (101 °F)] 37.1 °C (98.7 °F)  Pulse:  [53-69] 62  Resp:  [15-17] 15  BP: ()/(52-69) 100/52  SpO2:  [90 %-98 %] 91 %    Physical Exam  Vitals and nursing note reviewed.   Constitutional:       General: She is not in acute distress.  HENT:      Head: Normocephalic and atraumatic.      Nose: Nose normal.   Eyes:      Pupils: Pupils are equal, round, and reactive to light.   Cardiovascular:      Rate and Rhythm: Normal rate and regular rhythm.      Heart sounds: Normal heart sounds.   Pulmonary:      Effort: Pulmonary effort is normal.      Breath sounds: Normal breath sounds.   Abdominal:      General: There is no distension.      Palpations: Abdomen is soft.          Comments: Midline Prevena CDI; prior drain site RUQ with further cola/bilious discharge; saturating through dressing   Musculoskeletal:      Cervical back: Neck supple.   Neurological:      Mental Status: She is alert and oriented to person, place, and time.   Psychiatric:         Mood and Affect: Mood normal.       Fluids    Intake/Output Summary (Last 24 hours) at 11/8/2021 1315  Last data filed at 11/8/2021 1100  Gross per 24 hour   Intake 700 ml   Output 935 ml   Net -235 ml       Laboratory  Recent Labs     11/06/21  1052 11/07/21  0418 11/08/21  0930   WBC 7.3 6.6 5.8   RBC 2.63* 2.35* 2.84*   HEMOGLOBIN 7.9* 6.9* 8.4*   HEMATOCRIT 24.2* 21.2* 24.8*   MCV 92.0 90.2 87.3   MCH 30.0 29.4 29.6   MCHC 32.6* 32.5* 33.9   RDW 47.4 46.2 50.4*   PLATELETCT 252 241 227   MPV 9.4 9.3 9.3     Recent Labs     11/06/21  0513 11/07/21  0418 11/08/21  0930   SODIUM 137  133* 133*   POTASSIUM 3.8 3.5* 3.6   CHLORIDE 100 99 101   CO2 20 24 21   GLUCOSE 100* 90 110*   BUN 15 14 12   CREATININE 0.72 0.65 0.60   CALCIUM 8.8 8.1* 7.3*                   Imaging  CT-ABDOMEN-PELVIS WITH   Final Result         1. The components in the gallbladder fossa and right flank of the complex fluid collection are mostly resolved. There is still a small residual fluid collection in the perinephric space surrounding the inferior right kidney. Right lower quadrant MARTHA drain    and right upper quadrant catheter are outside of this residual collection.   2. Air-fluid levels throughout the colon could relate to ileus.   3. Bilateral pleural effusions with bibasilar atelectasis.   4. Pneumobilia.      CT-ABDOMEN-PELVIS WITH   Final Result      1.  Slight interval decrease in size of the large RIGHT peritoneal abscess which now contains 3 drains   2.  Decreased atelectasis with persistent opacity in the RIGHT lung base likely atelectasis rather than pneumonia   3.  Small LEFT renal cyst   4.  Prior cholecystectomy with ongoing pneumobilia      CT-DRAINAGE-CATH EXCHANGE   Final Result         1. Organized pancreatic pseudocyst Drainage with CT guidance.      CT-ABDOMEN-PELVIS WITH   Final Result      1.  There has been interval placement of an additional inferior lateral pigtail catheter within the complex right abdominal abscess. The other 2 pigtail catheters are stable in appearance.   2.  There has been no significant interval decrease in size of the large complex loculated abscess collection in the right abdomen.   3.  There is no new fluid collection.   4.  Gallbladder is surgically absent with continued pneumobilia.   5.  Interval decrease in the dependent pleural effusion with minimal airspace disease, likely atelectasis.      IR-DRAINAGE-CATH EXCHANGE   Final Result      1.  Successful left-sided drainage catheter removal.   2.  Successful image guided superior right drainage catheter replacement.       Plan: Suction drainage. Monitor outputs. Please contact interventional radiology if there is any concern for tube dysfunction. Suggest routine tube maintenance at 3 months intervals if the tube remains in place.         CT-DRAIN-PERITONEAL   Final Result      1.  CT GUIDED PERITONEAL RLQ abdominal CATHETER DRAINAGE.   2.  THE CURRENT PLAN IS TO MONITOR DRAINAGE OUTPUT AND OBTAIN A FOLLOWUP CT SCAN IN 5-7 DAYS IF CLINICALLY INDICATED.      CT-ABDOMEN-PELVIS WITH   Final Result         1. Grossly unchanged irregular fluid collection occupying the right abdomen surrounding the right kidney and right colon extending to midline. Additional pigtail catheter in the component about midline anterior abdomen. Both pigtail catheters seem to be    within the collection.      2. Small right pleural effusion with right basilar atelectasis.               DX-CHEST-LIMITED (1 VIEW)   Final Result      1.  Right basilar atelectasis or consolidation. Small right pleural effusion not excluded.   2.  Atherosclerotic plaque.      CT-DRAIN-PERITONEAL   Final Result      1.  CT guided pancreatic pseudocyst catheter drainage.   2.  The current plan is to obtain a follow-up CT in 5-7 days..      IR-PICC LINE PLACEMENT W/ GUIDANCE > AGE 5   Final Result                  Ultrasound-guided PICC placement performed by qualified nursing staff as    above.          CT-ABDOMEN-PELVIS WITH    (Results Pending)        Assessment/Plan  * Bacteremia due to Gram-negative bacteria and fungemia- (present on admission)  Assessment & Plan  KALANI negative for signs of endocarditis  Cont Zosyn and Micafungin per ID  Repeat Bld Cx neg      Hypotension  Assessment & Plan  11/6: Stop lasix; Stop ACEI  11/7 started midodrine    Hypophosphatemia- (present on admission)  Assessment & Plan  Resolved with TPN  Monitoring per RD  11/6 Kphos; serum goal >3    Hypokalemia- (present on admission)  Assessment & Plan  Periodic monitoring  11/6 K bicarb x 1  Serum goal  >4.0        Acute respiratory failure with hypoxia (HCC)- (present on admission)  Assessment & Plan  Resolved with diuresis  Likely volume overload with lower extremity edema present    Antibiotic-associated diarrhea  Assessment & Plan  Cdiff PCR negative  Loperamide PRN    Postprocedural retroperitoneal abscess- (present on admission)  Assessment & Plan  S/P Ex lap, I/D, debridement nec fasc 11/5/2021  Abx per ID  Follow cx  Pain control  Diet per sx  Antiemetics PRN  11/8 Repeat CT. Updated surgical team re: perinephric fluid collection. Further CT may be considered with IV and Oral contrast if needed if increased drainage or clinically deteriorates    Duodenal perforation (HCC)- (present on admission)  Assessment & Plan  After ERCP with retroperitoneal abscess and bacteremia  Uncertain etiology - ddx includes pancreatitis, bile leak, iatrogenic  Pepcid BID    Hyponatremia- (present on admission)  Assessment & Plan  Recurrent, mild  Diuresis as tolerated    Normocytic anemia- (present on admission)  Assessment & Plan  Transfuse for Hgb <7  Avoid regular phlebotomy  Supplements per dietary: 11/6 added thiamine, 6 sm meals/d    Sepsis due to intraabdominal fluid collection/abscess, bacteremia and fungemia (HCC)- (present on admission)  Assessment & Plan  Sepsis resolved  Source intra-abdominal  Zosyn/Mycafungin/Bactrim DS and follow cx  ID following  Re-imaging per ID, IR, and Surgery  S/P Ex lap w I/D and debridement nec fasc 11/5/2021 11/8 Repeat CT. Updated surgical team re: perinephric fluid collection. Further CT may be considered with IV and Oral contrast if needed.    Hyperlipidemia- (present on admission)  Assessment & Plan  Continue ezetimibe    Primary hypertension- (present on admission)  Assessment & Plan  Hypotensive 11/6 - stop lisinopril      Low magnesium level  Assessment & Plan  11/8 IV replacement       VTE prophylaxis: enoxaparin ppx    I have performed a physical exam and reviewed and updated  ROS and Plan today (11/8/2021). In review of yesterday's note (11/7/2021), there are no changes except as documented above.    11/8 patient is critically ill with high risk of deterioration and necessitating urgent repeat imaging, electrolyte replacement, coordination with subspecialist, and complex medical decision making.  Medical condition is precarious and potentially life-threatening.

## 2021-11-08 NOTE — PROGRESS NOTES
"Surgical Oncology Progress Note    Subjective:  No acute events.  Reports some issues with pain control - although better after adjustments to epidural.  Tolerating PO.  Continues to have thick, dark bilious fluid in drains (low volume)    Objective:  /52   Pulse 62   Temp 37.1 °C (98.7 °F) (Temporal)   Resp 15   Ht 1.6 m (5' 2.99\")   Wt 66.4 kg (146 lb 6.2 oz)   LMP 01/01/2009   SpO2 91%   BMI 25.94 kg/m²       Intake/Output Summary (Last 24 hours) at 11/8/2021 1435  Last data filed at 11/8/2021 1100  Gross per 24 hour   Intake 700 ml   Output 935 ml   Net -235 ml        Net IO Since Admission: -5,279.33 mL [11/08/21 1435]     Exam:  General: AAOx3, appropriate in conversation    Pulmonary: Stable on RA   CV: hemodynamically acceptable   Abdomen:  Soft, appropriately TTP.  MARTHA x2 with thick, bilious fluid.  Incisional vac in place  MSK: No peripheral edema, extremities warm      Assessment/Plan:  Patient is a 65 y/o F admitted after duodenal perforation following ERCP - found to have large retroperitoneal abscess which failed conservative management with IR drains.  OR 11/5/21 for ex-lap, drainage of intra-abdominal and retroperitoneal abscess, drain placement    POD#: 3    -Continue care per primary team  -CT obtained today and reviewed as well as discussed with primary team.  There is some remaining fluid in the deep retroperitoneum near the R kidney - however this collection appears to be in contact with one of the surgical drains.  Would favor holding off on having another image guided drain placed for now.  Continue to strip MARTHA drains.  Need accurate recording of output to help assist with clinical decision making.      Plan of care has been discussed with patient who is in agreement with the plan.  Primary team updated.    Lisy Vann MD  November 8, 2021, 2:35 PM      "

## 2021-11-08 NOTE — THERAPY
Physical Therapy Contact Note    PT treatment attempted. Patient with anesthesiologist on attempt. Will re attempt as able and appropriate.    Urszula De Anda, PT, DPT  [phone]

## 2021-11-09 LAB
ANION GAP SERPL CALC-SCNC: 8 MMOL/L (ref 7–16)
BACTERIA SPEC ANAEROBE CULT: NORMAL
BUN SERPL-MCNC: 10 MG/DL (ref 8–22)
CALCIUM SERPL-MCNC: 7.9 MG/DL (ref 8.5–10.5)
CHLORIDE SERPL-SCNC: 99 MMOL/L (ref 96–112)
CO2 SERPL-SCNC: 24 MMOL/L (ref 20–33)
CREAT SERPL-MCNC: 0.52 MG/DL (ref 0.5–1.4)
ERYTHROCYTE [DISTWIDTH] IN BLOOD BY AUTOMATED COUNT: 49.7 FL (ref 35.9–50)
GLUCOSE SERPL-MCNC: 94 MG/DL (ref 65–99)
HCT VFR BLD AUTO: 26.7 % (ref 37–47)
HGB BLD-MCNC: 8.7 G/DL (ref 12–16)
MAGNESIUM SERPL-MCNC: 2.2 MG/DL (ref 1.5–2.5)
MCH RBC QN AUTO: 28.7 PG (ref 27–33)
MCHC RBC AUTO-ENTMCNC: 32.6 G/DL (ref 33.6–35)
MCV RBC AUTO: 88.1 FL (ref 81.4–97.8)
PHOSPHATE SERPL-MCNC: 3.2 MG/DL (ref 2.5–4.5)
PLATELET # BLD AUTO: 256 K/UL (ref 164–446)
PMV BLD AUTO: 9.3 FL (ref 9–12.9)
POTASSIUM SERPL-SCNC: 4.2 MMOL/L (ref 3.6–5.5)
RBC # BLD AUTO: 3.03 M/UL (ref 4.2–5.4)
SIGNIFICANT IND 70042: NORMAL
SITE SITE: NORMAL
SODIUM SERPL-SCNC: 131 MMOL/L (ref 135–145)
SOURCE SOURCE: NORMAL
WBC # BLD AUTO: 6.6 K/UL (ref 4.8–10.8)

## 2021-11-09 PROCEDURE — 700102 HCHG RX REV CODE 250 W/ 637 OVERRIDE(OP): Performed by: FAMILY MEDICINE

## 2021-11-09 PROCEDURE — A9270 NON-COVERED ITEM OR SERVICE: HCPCS | Performed by: NURSE PRACTITIONER

## 2021-11-09 PROCEDURE — 700101 HCHG RX REV CODE 250: Performed by: SURGERY

## 2021-11-09 PROCEDURE — A9270 NON-COVERED ITEM OR SERVICE: HCPCS | Performed by: FAMILY MEDICINE

## 2021-11-09 PROCEDURE — 83735 ASSAY OF MAGNESIUM: CPT

## 2021-11-09 PROCEDURE — 700102 HCHG RX REV CODE 250 W/ 637 OVERRIDE(OP): Performed by: NURSE PRACTITIONER

## 2021-11-09 PROCEDURE — 700111 HCHG RX REV CODE 636 W/ 250 OVERRIDE (IP): Performed by: SURGERY

## 2021-11-09 PROCEDURE — 51798 US URINE CAPACITY MEASURE: CPT

## 2021-11-09 PROCEDURE — 700102 HCHG RX REV CODE 250 W/ 637 OVERRIDE(OP): Performed by: INTERNAL MEDICINE

## 2021-11-09 PROCEDURE — 700105 HCHG RX REV CODE 258: Performed by: INTERNAL MEDICINE

## 2021-11-09 PROCEDURE — 97530 THERAPEUTIC ACTIVITIES: CPT

## 2021-11-09 PROCEDURE — 700111 HCHG RX REV CODE 636 W/ 250 OVERRIDE (IP): Mod: JG | Performed by: INTERNAL MEDICINE

## 2021-11-09 PROCEDURE — 85027 COMPLETE CBC AUTOMATED: CPT

## 2021-11-09 PROCEDURE — 99024 POSTOP FOLLOW-UP VISIT: CPT | Performed by: SURGERY

## 2021-11-09 PROCEDURE — 99232 SBSQ HOSP IP/OBS MODERATE 35: CPT | Performed by: NURSE PRACTITIONER

## 2021-11-09 PROCEDURE — 700102 HCHG RX REV CODE 250 W/ 637 OVERRIDE(OP): Performed by: STUDENT IN AN ORGANIZED HEALTH CARE EDUCATION/TRAINING PROGRAM

## 2021-11-09 PROCEDURE — 97168 OT RE-EVAL EST PLAN CARE: CPT

## 2021-11-09 PROCEDURE — 700111 HCHG RX REV CODE 636 W/ 250 OVERRIDE (IP): Performed by: INTERNAL MEDICINE

## 2021-11-09 PROCEDURE — 97116 GAIT TRAINING THERAPY: CPT

## 2021-11-09 PROCEDURE — 770001 HCHG ROOM/CARE - MED/SURG/GYN PRIV*

## 2021-11-09 PROCEDURE — 80048 BASIC METABOLIC PNL TOTAL CA: CPT

## 2021-11-09 PROCEDURE — 700111 HCHG RX REV CODE 636 W/ 250 OVERRIDE (IP): Performed by: ANESTHESIOLOGY

## 2021-11-09 PROCEDURE — A9270 NON-COVERED ITEM OR SERVICE: HCPCS | Performed by: INTERNAL MEDICINE

## 2021-11-09 PROCEDURE — 97535 SELF CARE MNGMENT TRAINING: CPT

## 2021-11-09 PROCEDURE — 84100 ASSAY OF PHOSPHORUS: CPT

## 2021-11-09 PROCEDURE — 700105 HCHG RX REV CODE 258: Performed by: SURGERY

## 2021-11-09 PROCEDURE — A9270 NON-COVERED ITEM OR SERVICE: HCPCS | Performed by: STUDENT IN AN ORGANIZED HEALTH CARE EDUCATION/TRAINING PROGRAM

## 2021-11-09 RX ORDER — OXYCODONE HYDROCHLORIDE 5 MG/1
5 TABLET ORAL EVERY 4 HOURS PRN
Status: DISCONTINUED | OUTPATIENT
Start: 2021-11-09 | End: 2021-11-13

## 2021-11-09 RX ORDER — OXYCODONE HYDROCHLORIDE 10 MG/1
10 TABLET ORAL EVERY 4 HOURS PRN
Status: DISCONTINUED | OUTPATIENT
Start: 2021-11-09 | End: 2021-11-13

## 2021-11-09 RX ADMIN — SULFAMETHOXAZOLE AND TRIMETHOPRIM 1 TABLET: 800; 160 TABLET ORAL at 05:58

## 2021-11-09 RX ADMIN — ENOXAPARIN SODIUM 40 MG: 40 INJECTION SUBCUTANEOUS at 09:00

## 2021-11-09 RX ADMIN — CALCIUM GLUCONATE: 98 INJECTION, SOLUTION INTRAVENOUS at 20:15

## 2021-11-09 RX ADMIN — OXYCODONE HYDROCHLORIDE 10 MG: 10 TABLET ORAL at 22:01

## 2021-11-09 RX ADMIN — PIPERACILLIN SODIUM AND TAZOBACTAM SODIUM 3.38 G: 3; .375 INJECTION, POWDER, FOR SOLUTION INTRAVENOUS at 05:58

## 2021-11-09 RX ADMIN — GABAPENTIN 600 MG: 300 CAPSULE ORAL at 17:05

## 2021-11-09 RX ADMIN — FAMOTIDINE 20 MG: 20 TABLET ORAL at 05:58

## 2021-11-09 RX ADMIN — EZETIMIBE 10 MG: 10 TABLET ORAL at 17:05

## 2021-11-09 RX ADMIN — HYDROMORPHONE HYDROCHLORIDE 0.5 MG: 1 INJECTION, SOLUTION INTRAMUSCULAR; INTRAVENOUS; SUBCUTANEOUS at 05:56

## 2021-11-09 RX ADMIN — Medication 1000 UNITS: at 05:58

## 2021-11-09 RX ADMIN — PIPERACILLIN SODIUM AND TAZOBACTAM SODIUM 3.38 G: 3; .375 INJECTION, POWDER, FOR SOLUTION INTRAVENOUS at 15:19

## 2021-11-09 RX ADMIN — OXYCODONE HYDROCHLORIDE 10 MG: 10 TABLET ORAL at 12:13

## 2021-11-09 RX ADMIN — HYDROMORPHONE HYDROCHLORIDE 0.5 MG: 1 INJECTION, SOLUTION INTRAMUSCULAR; INTRAVENOUS; SUBCUTANEOUS at 02:17

## 2021-11-09 RX ADMIN — Medication 100 MG: at 05:58

## 2021-11-09 RX ADMIN — PIPERACILLIN SODIUM AND TAZOBACTAM SODIUM 3.38 G: 3; .375 INJECTION, POWDER, FOR SOLUTION INTRAVENOUS at 22:01

## 2021-11-09 RX ADMIN — MICAFUNGIN SODIUM 100 MG: 100 INJECTION, POWDER, LYOPHILIZED, FOR SOLUTION INTRAVENOUS at 09:00

## 2021-11-09 RX ADMIN — ONDANSETRON 4 MG: 2 INJECTION INTRAMUSCULAR; INTRAVENOUS at 05:56

## 2021-11-09 RX ADMIN — GABAPENTIN 600 MG: 300 CAPSULE ORAL at 05:57

## 2021-11-09 RX ADMIN — ONDANSETRON 4 MG: 2 INJECTION INTRAMUSCULAR; INTRAVENOUS at 17:55

## 2021-11-09 RX ADMIN — OXYCODONE HYDROCHLORIDE 10 MG: 10 TABLET ORAL at 17:17

## 2021-11-09 RX ADMIN — HYDROMORPHONE HYDROCHLORIDE 0.5 MG: 1 INJECTION, SOLUTION INTRAMUSCULAR; INTRAVENOUS; SUBCUTANEOUS at 20:15

## 2021-11-09 RX ADMIN — ONDANSETRON 4 MG: 2 INJECTION INTRAMUSCULAR; INTRAVENOUS at 22:36

## 2021-11-09 RX ADMIN — SULFAMETHOXAZOLE AND TRIMETHOPRIM 1 TABLET: 800; 160 TABLET ORAL at 17:05

## 2021-11-09 ASSESSMENT — PAIN DESCRIPTION - PAIN TYPE
TYPE: ACUTE PAIN;SURGICAL PAIN
TYPE: ACUTE PAIN
TYPE: ACUTE PAIN;SURGICAL PAIN

## 2021-11-09 ASSESSMENT — COGNITIVE AND FUNCTIONAL STATUS - GENERAL
DRESSING REGULAR UPPER BODY CLOTHING: A LOT
MOVING FROM LYING ON BACK TO SITTING ON SIDE OF FLAT BED: UNABLE
TURNING FROM BACK TO SIDE WHILE IN FLAT BAD: UNABLE
MOBILITY SCORE: 11
STANDING UP FROM CHAIR USING ARMS: A LITTLE
PERSONAL GROOMING: A LITTLE
EATING MEALS: A LITTLE
DAILY ACTIVITIY SCORE: 14
MOVING TO AND FROM BED TO CHAIR: UNABLE
HELP NEEDED FOR BATHING: A LOT
SUGGESTED CMS G CODE MODIFIER MOBILITY: CL
SUGGESTED CMS G CODE MODIFIER DAILY ACTIVITY: CK
DRESSING REGULAR LOWER BODY CLOTHING: A LOT
WALKING IN HOSPITAL ROOM: A LITTLE
CLIMB 3 TO 5 STEPS WITH RAILING: A LOT
TOILETING: A LOT

## 2021-11-09 ASSESSMENT — ENCOUNTER SYMPTOMS
SEIZURES: 0
FEVER: 0
ABDOMINAL PAIN: 1
NAUSEA: 0
CHILLS: 0
MYALGIAS: 1
VOMITING: 0
DIARRHEA: 0
BLURRED VISION: 0
SHORTNESS OF BREATH: 0
DOUBLE VISION: 0
FOCAL WEAKNESS: 0
WEAKNESS: 1

## 2021-11-09 ASSESSMENT — GAIT ASSESSMENTS
DISTANCE (FEET): 20
GAIT LEVEL OF ASSIST: MINIMAL ASSIST
ASSISTIVE DEVICE: FRONT WHEEL WALKER
DEVIATION: BRADYKINETIC;DECREASED HEEL STRIKE;DECREASED TOE OFF

## 2021-11-09 NOTE — CARE PLAN
The patient is Watcher - Medium risk of patient condition declining or worsening    Shift Goals  Clinical Goals: Pain management, output monitoring, rest  Patient Goals: Pain management,   Family Goals: get her better    Progress made toward(s) clinical / shift goals:  Medicated for pain; see MAR. Patient stated not enough with increased mobility. Patient noted falling asleep without further intervention.    Patient is not progressing towards the following goals: Noted leaking from old/new IR MARTHA sites; brown, watery drainage noted. Leaking/dripping with ambulation.     Problem: Bowel Elimination  Goal: Establish and maintain regular bowel function  Outcome: Not Progressing  LBM 11/03. Patient attempted to have BM with ambulation to/from BR. - BM.

## 2021-11-09 NOTE — WOUND TEAM
[facility]  Inpatient Services  Initial Wound and Skin Care Evaluation    Admission Date: 10/15/2021     Last order of IP CONSULT TO WOUND CARE was found on 11/9/2021 from Hospital Encounter on 10/14/2021     HPI, PMH, SH: Reviewed    Past Surgical History:   Procedure Laterality Date   • PB EXPLORATORY OF ABDOMEN  11/5/2021    Procedure: LAPAROTOMY, EXPLORATORY;  Surgeon: Jaden Cook M.D.;  Location: [facility];  Service: General   • PB ULTRASONIC GUIDANCE, INTRAOPERATIVE  11/5/2021    Procedure: ULTRASOUND GUIDANCE;  Surgeon: Jaden Cook M.D.;  Location: [facility];  Service: General   • ABDOMINAL ABSCESS DRAINAGE  11/5/2021    Procedure: DRAINAGE, ABSCESS, ABDOMEN - PERITONEAL AND RETROPERITONEAL;  Surgeon: Jaden Cook M.D.;  Location: [facility];  Service: General   • PB ERCP,DIAGNOSTIC  10/1/2021    Procedure: ERCP (ENDOSCOPIC RETROGRADE CHOLANGIOPANCREATOGRAPHY);  Surgeon: Fausto Casas M.D.;  Location: [facility];  Service: Gastroenterology   • COLONOSCOPY  8/8/2018    Procedure: COLONOSCOPY;  Surgeon: Shukri Condon M.D.;  Location: [facility];  Service: General   • GASTROSCOPY  5/23/2018    Procedure: GASTROSCOPY;  Surgeon: Fausto Casas M.D.;  Location: [facility];  Service: Gastroenterology   • EGD W/ENDOSCOPIC ULTRASOUND  5/23/2018    Procedure: EGD W/ENDOSCOPIC ULTRASOUND- RADIAL UPPER;  Surgeon: Fausto Casas M.D.;  Location: [facility];  Service: Gastroenterology   • CATARACT PHACO WITH IOL Left 4/18/2017    Procedure: CATARACT PHACO WITH IOL;  Surgeon: Stuart Estevez M.D.;  Location: SURGERY SAME DAY [facility];  Service:    • CATARACT PHACO WITH IOL Right 4/4/2017    Procedure: CATARACT PHACO WITH IOL;  Surgeon: Stuart Estevez M.D.;  Location: SURGERY [facility];  Service:    • GASTRIC SLEEVE LAPAROSCOPY  10/19/2016     Procedure: GASTRIC SLEEVE LAPAROSCOPY, HIATAL HERNIA;  Surgeon: Shukri Condon M.D.;  Location: SURGERY [facility];  Service:    • LIVER BIOPSY LAPAROSCOPIC  10/19/2016    Procedure: LIVER BIOPSY LAPAROSCOPIC;  Surgeon: Shukri Condon M.D.;  Location: SURGERY [facility];  Service:    • GASTROSCOPY N/A 2016    Procedure: GASTROSCOPY;  Surgeon: Shukri Condon M.D.;  Location: SURGERY [facility];  Service:    • ROTATOR CUFF REPAIR Right 2016   • ROTATOR CUFF REPAIR Left 2015   • HYSTERECTOMY ROBOTIC  2009    Performed by MEG VILLEGAS at SURGERY [facility]   • LUMBAR FUSION ANTERIOR      Dr Hillman, L3-S1 fusion   • CHOLECYSTECTOMY      laparoscopic   • TUBAL LIGATION     • GASTRIC RESECTION      gastric stapling   • TONSILLECTOMY AND ADENOIDECTOMY     • PRIMARY C SECTION  , , 1985    x3     Social History     Tobacco Use   • Smoking status: Former Smoker     Packs/day: 0.25     Years: 0.10     Pack years: 0.02     Types: Cigarettes     Quit date: 1973     Years since quittin.8   • Smokeless tobacco: Never Used   Substance Use Topics   • Alcohol use: No     No chief complaint on file.    Diagnosis: Bile duct leak [K83.8]  Postprocedural intraabdominal abscess [T81.43XA]  Intra-abdominal abscess (HCC) [K65.1]    Unit where seen by Wound Team: T429/00     WOUND CONSULT/FOLLOW UP RELATED TO:  Right upper abdomen     WOUND HISTORY:  Pt is an older woman with history of idiopathic pancreatitis who presented on  with complaints of abdominal pain. Pt has had ERCP with sphincterotomy by dr. Ramires on 10/1/21. Upon admission to [facility] pt had CT which revealed large irregular fluid collection with thick wall occupying most of the right abdomen, IR drains were placed. Unfortunately there was a lack of improvement in intraabdominal fluid and therefore Dr. Dumont was consulted. Pt underwent surgery with Dr. ST on  and was found to have a  "large retroperitoneal abscess containing necrotizing fascititis as well as an anterior peritoneal abscess and necrotizing soft tissue infection. IR placed drains were removed and new surgical drains were placed. Pt began having drainage from her old right upper quad IR drain hole and therefore wound team was requested to evaluate and treat/manage the drainage.     WOUND ASSESSMENT/LDA  Wound 11/09/21 Full Thickness Wound Abdomen Lateral;Upper;Quadrant Right (Active)   Wound Image    11/09/21 1400   Site Assessment Red;Drainage    Periwound Assessment Denuded;Red    Margins Attached edges;Defined edges    Closure Secondary intention    Drainage Amount Large    Drainage Description Brown    Treatments Cleansed;Site care    Wound Cleansing Approved Wound Cleanser    Periwound Protectant Skin Protectant Wipes to Periwound;Stoma Powder    Dressing Cleansing/Solutions Not Applicable    Dressing Options Other (Comments) 2 1/4\" 2 piece high output pouch to down drain    Dressing Changed New    Dressing Status Clean;Dry;Intact    Dressing Change/Treatment Frequency Daily, and As Needed    NEXT Dressing Change/Treatment Date 11/10/21    NEXT Weekly Photo (Inpatient Only) 11/16/21    Non-staged Wound Description Full thickness    Wound Length (cm) 0.4 cm    Wound Width (cm) 0.8 cm    Wound Surface Area (cm^2) 0.32 cm^2    Shape OVAL    Wound Odor None    Exposed Structures MALVIN    WOUND NURSE ONLY - Time Spent with Patient (mins) 60    Number of days: 0        Vascular:    CHARBEL:   No results found.    Lab Values:    Lab Results   Component Value Date/Time    WBC 6.6 11/09/2021 06:00 AM    RBC 3.03 (L) 11/09/2021 06:00 AM    HEMOGLOBIN 8.7 (L) 11/09/2021 06:00 AM    HEMATOCRIT 26.7 (L) 11/09/2021 06:00 AM    CREACTPROT 0.29 12/02/2015 07:48 AM    SEDRATEWES 10 12/02/2015 07:48 AM    HBA1C 5.7 (H) 09/13/2016 10:40 AM        Culture Results show:  Recent Results (from the past 720 hour(s))   CULTURE WOUND W/ GRAM STAIN    " "Collection Time: 11/05/21  8:27 AM    Specimen: Wound   Result Value Ref Range    Significant Indicator POS (POS)     Source WND     Site RETROPERITONEAL ABSCESS     Culture Result - (A)     Gram Stain Result Rare Gram positive cocci.  Few Yeast.       Culture Result (A)      Enterococcus faecium  Moderate growth  See previous culture for sensitivity report.      Culture Result Candida albicans  Rare growth   (A)     Culture Result (A)      Stenotrophomonas maltophilia  Light growth  See previous culture for sensitivity report.         Pain Level/Medicated:  Mild tolerable pain noted       INTERVENTIONS BY WOUND TEAM:  Chart and images reviewed. Discussed with bedside RN. All areas of concern (based on picture review, LDA review and discussion with bedside RN) have been thoroughly assessed. Documentation of areas based on significant findings. This RN in to assess patient. Performed standard wound care which includes appropriate positioning, dressing removal and non-selective debridement. Pictures and measurements obtained weekly if/when required.  Preparation for Dressing removal: NA saturated dressing removed without difficulty  Non-selectively Debrided with:  wound cleanser and gauze.  Sharp debridement: NA  Ngozi wound: Cleansed with wound cleanser, Prepped with stoma powder and no sting  Primary Dressing: A 2 1/4\" barrier was cut larger than wound. Paste ring was then stretched to fit opening. Barrier was applied down to skin and adhered with friction. High output pouch was attached and connected to down drain.  Secondary (Outer) Dressing: NA    Interdisciplinary consultation: Patient, Bedside RN (Carole), Wound RN (Flor)    EVALUATION / RATIONALE FOR TREATMENT:  Most Recent Date:  11/9/21: Pt with large amount of liquid brown drainage noted from right upper quadrant old MARTHA Site. Wound team requested to evaluate and pouch. Wound is small however large amount of drainage noted. High output pouch applied to down " drain to better manage output. Pt also having some drainage noted from around surgically placed drainage. Fenestrated gauze placed to better control output.     Goals: Steady decrease in wound area and depth weekly.    WOUND TEAM PLAN OF CARE ([X] for frequency of wound follow up,):   Nursing to follow orders written for wound care. Contact wound team if area fails to progress, deteriorates or with any questions/concerns  Dressing changes by wound team:    X               Follow up 3 times weekly:                NPWT change 3 times weekly:     Follow up 1-2 times weekly:      Follow up Bi-Monthly:                   Follow up as needed:     Other (explain): X    NURSING PLAN OF CARE ORDERS (X):  Dressing changes: See Dressing Care orders:   Skin care: See Skin Care orders:   RN Prevention Protocol:   Rectal tube care: See Rectal Tube Care orders:   Other orders:    RSKIN:   CURRENTLY IN PLACE (X), APPLIED THIS VISIT (A), ORDERED (O):   Q shift Anant:  X  Q shift pressure point assessments:  X    Surface/Positioning   Pressure redistribution mattress  X          Low Airloss          Bariatric foam      Bariatric ALBERT     Waffle cushion        Waffle Overlay    X      Reposition q 2 hours X     TAPs Turning system     Z Jorgito Pillow     Offloading/Redistribution   Sacral Mepilex (Silicone dressing)   MALVIN  Heel Mepilex (Silicone dressing)         Heel float boots (Prevalon boot)             Float Heels off Bed with Pillows           Respiratory RA  Silicone O2 tubing         Gray Foam Ear protectors     Cannula fixation Device (Tender )          High flow offloading Clip    Elastic head band offloading device      Anchorfast                                                         Trach with Optifoam split foam             Containment/Moisture Prevention     Rectal tube or BMS    Purwick/Condom Cath        Corea Catheter  X  Barrier wipes           Barrier paste       Antifungal tx      Interdry        Mobilization        Up to chair        Ambulate      PT/OT      Nutrition       Dietician        Diabetes Education      PO  X   TF     TPN     NPO   # days     Other        Anticipated discharge plans:   LTACH:        SNF/Rehab:                  Home Health Care:   X        Outpatient Wound Center:            Self/Family Care:        Other:                  Vac Discharge Needs:   Not Applicable Pt not on a wound vac:     X  Regular Vac while inpatient, alternative dressing at DC:        Regular Vac in use and continued at DC:            Reg. Vac w/ Skin Sub/Biologic in use. Will need to be changed 2x wkly:      Veraflo Vac while inpatient, ok to transition to Regular Vac on Discharge:           Veraflo Vac while inpatient, will need to remain on Veraflo Vac upon discharge:

## 2021-11-09 NOTE — THERAPY
Physical Therapy   Re-Evaluation     Patient Name: Nils Alfonso  Age:  66 y.o., Sex:  female  Medical Record #: [id number]  Today's Date: 11/8/2021     Precautions: Fall Risk  Comments: abd MARTHA x2, abd wound vac    Assessment  Pt seen for PT treatment session. Pt now s/p abdominal drain placement and ex lap since last PT session. Pt fixated on having her  seeing her mobilize, pt agreeable to mobilize with therapy following encouragement. Pt at Mod A for bed mobility tasks, VC's for log roll and sequencing. Min A for STS with FWW, VC's for hand positioning and sequencing for safe/efficient transfers. Session limited by issues with pt's abdominal drains: pt found to have drain site leaking and RN was notified. Even following dressing change, drain site continued to leak and RN was notified again. Pt fatigued at EOB during dressing evaluation/changing and was laid back in bed. Pt has experienced a decline in status and PT reevaluation has been completed. New goals added to reflect updated POC. Pt will benefit from continued acute care therapy services to increase functional mobility and OOB endurance. Pt would also benefit from postacute placement to further address functional deficits and facilitate a safe transition home. Will follow.     Plan  4x/wk, updated goals for re-eval  DC Equipment Recommendations: Unable to determine at this time  Discharge Recommendations: Recommend post-acute placement for additional physical therapy services prior to discharge home         11/08/21 1634   Vitals   Patient BP Position Sitting   Blood Pressure  136/85   Pulse Oximetry 93 %   O2 Delivery Device None - Room Air   Vitals Comments pt requested to have her BP taken, reporting that she needs more fluids. vitals were stable at EOB and pt reported no dizziness/lightheadedness   Pain 0 - 10 Group   Location Abdomen   Location Orientation Right   Pain Rating Scale (NPRS) not quantified   Description Aching   Therapist Pain  Assessment During Activity   Cognition    Cognition / Consciousness X   Level of Consciousness Alert   Comments pleasant and participatory, however pt very fixated on having her  come to PT and repeated saying that he would come to her room soon. Pt made occ comments that made no sense, however she was able to follow all cues appropriately.   Balance   Sitting Balance (Static) Fair +   Sitting Balance (Dynamic) Fair   Standing Balance (Static) Fair -   Standing Balance (Dynamic) Fair -   Weight Shift Sitting Fair   Weight Shift Standing Fair   Skilled Intervention Facilitation;Sequencing;Verbal Cuing;Tactile Cuing   Comments FWW in standing   Gait Analysis   Gait Level Of Assist Unable to Participate   Assistive Device Front Wheel Walker   Skilled Intervention Sequencing;Facilitation   Comments pt unable to amb due to issues with drains, however pt able to sidestep to L to reposition in bed with FWW and Min A   Bed Mobility    Supine to Sit Moderate Assist   Sit to Supine Moderate Assist   Scooting Moderate Assist   Rolling Moderate Assist to Lt.   Skilled Intervention Facilitation;Tactile Cuing;Sequencing;Verbal Cuing   Comments HOB elevated, used bedrails, VC's for sequencing and bedrail use,    Functional Mobility   Sit to Stand Minimal Assist   Bed, Chair, Wheelchair Transfer Minimal Assist   Skilled Intervention Facilitation;Sequencing;Tactile Cuing;Verbal Cuing   Comments FWW. VC's for hand positioning and sequencing during ascent   Short Term Goals    Short Term Goal # 1 pt will move supine<>EOB via log roll with HOB flat, no features, and SPV within 6 visits to facilitate getting in/out of bed.   Short Term Goal # 2 pt will complete all transfers with FWW and SPV within 6 weeks to increase functional mobility.   Short Term Goal # 3 pt will amb 150' with FWW and SPV within 6 visits for household mobility.   Short Term Goal # 4 Pt will ascend and descend step x 2 with SPV to access her home by the 6th  tx

## 2021-11-09 NOTE — DIETARY
"Nutrition Support Assessment   Day 25 of admit.  Nils Alfonso is a 66 y.o. female with admitting DX of bile duct leak, postprocedural intra abdominal abscess.     Current problem list:  1. Low magnesium level  2. Hypotension  3. Hypophosphatemia  4. Hypokalemia  5. Acute respiratory failure with hypoxia  6. Duodenal perforation  7. Postprocedural retroperitoneal abscess  8. Antibiotic-associated diarrhea  9. Bacteremia due to gram negative bacteria and fungemia  10. Normocytic anemia  11. Hyponatremia  12. Sepsis due to intra abdominal fluid collection/abscess, bacteremia and fungemia  13. Hyperlipidemia  14. Primary hypertension     Assessment:  Estimated Nutritional Needs: based on:   Height: 160 cm (5' 2.99\")  Weight: 66.4 kg (146 lb 6.2 oz)  Weight to Use in Calculations: 66.4 kg (146 lb 6.2 oz) - most recent stand up scale wt  Ideal Body Weight: 52.2 kg (115 lb)  Percent Ideal Body Weight: 127.3  Body mass index is 25.94 kg/m²., BMI classification: overweight    Calculation/Equation: MSJ x 1.2 = 1409 kcals/day  Total Calories / day: 1409 - 1594 (Calories / k - 24)  Total Grams Protein / day: 80 - 93 (Grams Protein / k.2 - 1.4)     Evaluation:   1. Previously on TPN for intestinal leak (assessed 10/16).  2. Ex lap I&D of retroperitoneal abscess, I&D of anterior peritoneal abscess, debridement of necrotizing fasciitis of lateral abdominal wall .  3. Pt has had thick, dark bilious fluid in drains.  4. Pt most recently on regular, six small meal diet, with Boost oral nutrition supplements. Per ADL flow sheet, PO has been averaging 50% of meals.  5. TPN to resume today per Surgery, as pt's duodenal perforation may have re-opened.  6. Wt decreased 5 kg from admit; some wt loss expected in lengthy hospital stay.  7. Sodium 131; Thiamine, Vitamin D on the MAR.     Malnutrition Risk: Does not meet criteria per ASPEN guidelines at this time.     Recommendations/Plan:  1. TPN per pharmacy.  2. Fluids per " MD.  3. PO diet per MD. RUFFIN following.

## 2021-11-09 NOTE — CARE PLAN
The patient is Watcher - Medium risk of patient condition declining or worsening    Shift Goals  Clinical Goals: pain management, abx, encourage PO intake  Patient Goals: rest, pain control   Family Goals: get her better    Progress made toward(s) clinical / shift goals:  tolerating diet, VSS, pain controlled     Patient is not progressing towards the following goals: minimal appetite, BPs in 90s, patient needs to be encouraged to turn and reposition, abd wound is leaking fluid, patient intermittently confused       Problem: Hemodynamics  Goal: Patient's hemodynamics, fluid balance and neurologic status will be stable or improve  Outcome: Progressing     Problem: Fluid Volume  Goal: Fluid volume balance will be maintained  Outcome: Progressing     Problem: Urinary - Renal Perfusion  Goal: Ability to achieve and maintain adequate renal perfusion and functioning will improve  Outcome: Progressing     Problem: Respiratory  Goal: Patient will achieve/maintain optimum respiratory ventilation and gas exchange  Outcome: Progressing     Problem: Physical Regulation  Goal: Diagnostic test results will improve  Outcome: Progressing  Goal: Signs and symptoms of infection will decrease  Outcome: Progressing

## 2021-11-09 NOTE — PROGRESS NOTES
Pharmacy TPN Day # 1 (restart)       2021    Dosing Weight   60 kg (Adj BW)        TPN Restart      TPN goal: 4716-4670 kcal/day including 1.4-1.7 gm/kg/day Protein      TPN indication: perforated duodenum with high drain output                                                                Pertinent PMH: Admitted on 10/15/2021 for abdominal pain. Underwent polypectomy and a sphincterotomy on 10/01/2021.  CT completed on 10/08/202, found to have a large fluid collection and thickening of wall around the duodenum with retroperitoneal fluid collection.  IR placed drain. CT completed on 10/09/2021 with a significant amount of retroperitoneal air and fluid.  Pt was transferred to [facility] 10/14/21 for further evaluation. 2 drains have been placed for fluid collections surrounding the R kidney and colon.  Peritoneal drain cultures have demonstrated polymicrobial growth however blood cultures have remained negative.  ID is consulting.  Pt was on TPN from 10/14 - 10/24.  Taken to the OR on  for ex-lap with drainage of intra-abdominal and retroperitoneal abscess with drain placement.  Concerns that her duodenal perforation may have reopened.  Surgery has recommended patient remain NPO and resume TPN with hope that drainage decreases.      Temp (24hrs), Av.1 °C (98.8 °F), Min:36.4 °C (97.6 °F), Max:37.5 °C (99.5 °F)  .  Recent Labs     21  0418 21  0930 21  0600   SODIUM 133* 133* 131*   POTASSIUM 3.5* 3.6 4.2   CHLORIDE 99 101 99   CO2  24   BUN 14 12 10   CREATININE 0.65 0.60 0.52   GLUCOSE 90 110* 94   CALCIUM 8.1* 7.3* 7.9*   PHOSPHORUS 2.6  --  3.2   MAGNESIUM 1.9  --  2.2     Accu-Checks  No results for input(s): POCGLUCOSE in the last 72 hours.    Vitals:    21 1900 21 2300 21 0346 21 0711   BP: 116/57 105/56 100/53 100/53   Weight:       Height:           Intake/Output Summary (Last 24 hours) at 2021 1407  Last data filed at 2021 1100  Gross  per 24 hour   Intake 769.8 ml   Output 740 ml   Net 29.8 ml       Orders Placed This Encounter   Procedures   • Diet NPO Restrict to: Sips with Medications     Standing Status:   Standing     Number of Occurrences:   1     Order Specific Question:   Diet NPO Restrict to:     Answer:   Sips with Medications [3]         TPN for past 72 hours (Show up to 3 orders; newest on the left. Changes between the two most recent orders are indicated.)     Start date and time   11/09/2021 2000 10/24/2021 2000 10/22/2021 2000      TPN Central Line Formulation [[id number]] TPN Central Line Formulation [[id number]] TPN Central Line Formulation [[id number]]    Order Status  Active Completed Completed    Last Admin   New Bag at 10/26/2021 1951 by Madison Pino RUrielNUriel New Bag at 10/23/2021 2007 by Cornelio Chowdary RANAM       Base    Clinisol 15%  90 g 45 g 90 g    dextrose 70%  240 g 120 g 240 g    fat emulsions 20%  50 g 25 g 50 g       Additives    potassium phosphate  20 mmol 25 mmol 25 mmol    potassium chloride  10 mEq -- --    sodium acetate  145 mEq 145 mEq 145 mEq    sodium chloride  77 mEq 77 mEq 77 mEq    magnesium sulfate  8 mEq 8 mEq 8 mEq    calcium GLUConate  9.3 mEq 9.3 mEq 9.3 mEq    M.T.E.-4  1 mL 1 mL 1 mL    M.V.I. Adult  10 mL 10 mL 10 mL    famotidine  40 mg 40 mg 40 mg       QS Base    sterile water  106.71 mL 706.55 mL 110.05 mL       Energy Contribution    Proteins  -- -- --    Dextrose  816 kcal 408 kcal 816 kcal    Lipids  500 kcal 250 kcal 500 kcal    Total  1,316 kcal 658 kcal 1,316 kcal       Electrolyte Ion Calculated Amount    Sodium  222 mEq 222 mEq 222 mEq    Potassium  39.33 mEq 36.67 mEq 36.67 mEq    Calcium  9.3 mEq 9.3 mEq 9.3 mEq    Magnesium  8 mEq 8 mEq 8 mEq    Aluminum  -- -- --    Phosphate  20 mmol 25 mmol 25 mmol    Chloride  87 mEq 77 mEq 77 mEq    Acetate  221.2 mEq 183.1 mEq 221.2 mEq       Other    Total Protein  90 g 45 g 90 g    Total Protein/kg  1.36 g/kg 0.6 g/kg 1.21 g/kg     Total Amino Acid  -- -- --    Total Amino Acid/kg  -- -- --    Glucose Infusion Rate  2.51 mg/kg/min 1.12 mg/kg/min 2.24 mg/kg/min    Osmolarity (Estimated)  -- -- --    Volume  1,440 mL 1,440 mL 1,440 mL    Rate  60 mL/hr 60 mL/hr 60 mL/hr    Dosing Weight  66.4 kg 74.5 kg 74.5 kg    Infusion Site  [facility]        This formula provides:  % kcal as lipids = 30  Grams protein/kg = 1.5  Non-protein calories = 1316  Kcals/kg = 28  Total daily calories = 1676    Comments:  1. Macronutrients:  Reviewed previous TPN formulations and restarted similar formulation that was providing 100% estimated daily goal nutrition.  Patient is now NPO except sips with meds.  2. Micronutrients:     - Na:  TPN at NS equivalents   - K: patient received 44 mEq from 30 mmol KPhos IVPB yesterday.  TPN will provide 39 mEq K.   - Ca: 2g Calcium gluconate   - Mg: patient received 2g Mg IVPB yesterday.  TPN to provide 1g Mg   - Phos:  Patient received 30 mmol Phos yesterday.  TPN to provide 20 mmol.   - Famotidine/MTE/MVI in TPN.  3. Glycemic Control:  AM BG < 180 mg/dL, CTM with initiation of TPN  4. Fluid Status:  Drain and UOP increased.  CTM output and volume status.  TPN will infuse at 60 ml/hr.  5. CMP/Mg/Phos, cholesterol ordered for tomorrow.      Shelley Christina, Pharm.D., BCPS

## 2021-11-09 NOTE — CARE PLAN
Problem: Nutritional:  Goal: Nutrition support tolerated and meeting greater than 85% of estimated needs  11/9/2021 1536 by Kylah Pfeiffer R.D.  Outcome: Not Met  11/9/2021 1536 by Kylah Pfeiffer R.D.  Reactivated

## 2021-11-09 NOTE — PROGRESS NOTES
Hospital Medicine Daily Progress Note    Date of Service  11/9/2021    Chief Complaint  Nils Alfonso is a 66 y.o. female admitted 10/15/2021 with abdominal pain    Hospital Course  Initially admitted to [facility] for evaluation of abdominal pain after recent ERCP with polypectomy and sphincterotomy and noted to have large intra-abdominal fluid collection. Multiple IR drains had been placed, but her infection worsened.  She was transferred to [facility] for escalation of her surgical needs. ID has been following. She underwent ex lap w I/D of multiple loculated abscesses and debridement of nec fasciitis w Dr. ST 11/5/21. Prev cx w Stenotrophomonas maltophilia, mixed yeast, E faecalis, E coli and Chryseobacterium in the blood.    Interval Problem Update  11/9: Continues to have significant drainage from prior right upper quadrant drain site.  Surgery team recommending ostomy bag placement for skin protection and strict monitoring of output.  Also recommending n.p.o. status and TPN for 3 to 5 days.  Patient otherwise remained afebrile with stable vital signs.  She does request removal of her Corea catheter secondary to discomfort.  She is also experiencing uncontrolled pain.  Medications have been adjusted.      Consultants/Specialty  general surgery, GI, infectious disease and interventional radiology    Code Status  Full Code    Disposition  Patient is not medically cleared.   Anticipate discharge to to home with organized home healthcare and close outpatient follow-up.  I have placed the appropriate orders for post-discharge needs.    Review of Systems  Review of Systems   Constitutional: Positive for malaise/fatigue. Negative for chills and fever.   HENT: Negative for congestion.    Eyes: Negative for blurred vision and double vision.   Respiratory: Negative for shortness of breath.    Cardiovascular: Negative for chest pain.   Gastrointestinal: Positive for abdominal pain. Negative for diarrhea, nausea and vomiting.    Genitourinary: Negative for dysuria and urgency.   Musculoskeletal: Positive for myalgias.   Skin: Negative for rash.   Neurological: Positive for weakness. Negative for focal weakness and seizures.   All other systems reviewed and are negative.       Physical Exam  Temp:  [36.4 °C (97.6 °F)-37.5 °C (99.5 °F)] 36.9 °C (98.5 °F)  Pulse:  [63-76] 63  Resp:  [16-18] 16  BP: (100-136)/(53-85) 100/53  SpO2:  [91 %-97 %] 92 %    Physical Exam  Vitals and nursing note reviewed.   Constitutional:       General: She is not in acute distress.     Appearance: She is ill-appearing.   HENT:      Head: Normocephalic and atraumatic.      Nose: Nose normal.      Mouth/Throat:      Mouth: Mucous membranes are dry.      Pharynx: Oropharynx is clear.   Eyes:      General:         Right eye: No discharge.         Left eye: No discharge.      Pupils: Pupils are equal, round, and reactive to light.   Cardiovascular:      Rate and Rhythm: Normal rate and regular rhythm.      Heart sounds: Normal heart sounds. No murmur heard.      Pulmonary:      Effort: Pulmonary effort is normal. No respiratory distress.      Breath sounds: Normal breath sounds. No wheezing.   Abdominal:      General: There is no distension.      Palpations: Abdomen is soft.      Tenderness: There is abdominal tenderness. There is no guarding.          Comments: Midline Prevena CDI; prior drain site RUQ with further cola/bilious discharge; saturating through dressing   Musculoskeletal:         General: No swelling.      Cervical back: Neck supple. No rigidity.      Right lower leg: No edema.      Left lower leg: No edema.   Skin:     General: Skin is warm and dry.   Neurological:      Mental Status: She is alert and oriented to person, place, and time.   Psychiatric:         Mood and Affect: Mood normal.       Fluids    Intake/Output Summary (Last 24 hours) at 11/9/2021 1347  Last data filed at 11/9/2021 1100  Gross per 24 hour   Intake 769.8 ml   Output 740 ml   Net  29.8 ml       Laboratory  Recent Labs     11/07/21  0418 11/08/21  0930 11/09/21  0600   WBC 6.6 5.8 6.6   RBC 2.35* 2.84* 3.03*   HEMOGLOBIN 6.9* 8.4* 8.7*   HEMATOCRIT 21.2* 24.8* 26.7*   MCV 90.2 87.3 88.1   MCH 29.4 29.6 28.7   MCHC 32.5* 33.9 32.6*   RDW 46.2 50.4* 49.7   PLATELETCT 241 227 256   MPV 9.3 9.3 9.3     Recent Labs     11/07/21  0418 11/08/21  0930 11/09/21  0600   SODIUM 133* 133* 131*   POTASSIUM 3.5* 3.6 4.2   CHLORIDE 99 101 99   CO2 24 21 24   GLUCOSE 90 110* 94   BUN 14 12 10   CREATININE 0.65 0.60 0.52   CALCIUM 8.1* 7.3* 7.9*                   Imaging  CT-ABDOMEN-PELVIS WITH   Final Result         1. The components in the gallbladder fossa and right flank of the complex fluid collection are mostly resolved. There is still a small residual fluid collection in the perinephric space surrounding the inferior right kidney. Right lower quadrant MARTHA drain    and right upper quadrant catheter are outside of this residual collection.   2. Air-fluid levels throughout the colon could relate to ileus.   3. Bilateral pleural effusions with bibasilar atelectasis.   4. Pneumobilia.      CT-ABDOMEN-PELVIS WITH   Final Result      1.  Slight interval decrease in size of the large RIGHT peritoneal abscess which now contains 3 drains   2.  Decreased atelectasis with persistent opacity in the RIGHT lung base likely atelectasis rather than pneumonia   3.  Small LEFT renal cyst   4.  Prior cholecystectomy with ongoing pneumobilia      CT-DRAINAGE-CATH EXCHANGE   Final Result         1. Organized pancreatic pseudocyst Drainage with CT guidance.      CT-ABDOMEN-PELVIS WITH   Final Result      1.  There has been interval placement of an additional inferior lateral pigtail catheter within the complex right abdominal abscess. The other 2 pigtail catheters are stable in appearance.   2.  There has been no significant interval decrease in size of the large complex loculated abscess collection in the right abdomen.   3.   There is no new fluid collection.   4.  Gallbladder is surgically absent with continued pneumobilia.   5.  Interval decrease in the dependent pleural effusion with minimal airspace disease, likely atelectasis.      IR-DRAINAGE-CATH EXCHANGE   Final Result      1.  Successful left-sided drainage catheter removal.   2.  Successful image guided superior right drainage catheter replacement.      Plan: Suction drainage. Monitor outputs. Please contact interventional radiology if there is any concern for tube dysfunction. Suggest routine tube maintenance at 3 months intervals if the tube remains in place.         CT-DRAIN-PERITONEAL   Final Result      1.  CT GUIDED PERITONEAL RLQ abdominal CATHETER DRAINAGE.   2.  THE CURRENT PLAN IS TO MONITOR DRAINAGE OUTPUT AND OBTAIN A FOLLOWUP CT SCAN IN 5-7 DAYS IF CLINICALLY INDICATED.      CT-ABDOMEN-PELVIS WITH   Final Result         1. Grossly unchanged irregular fluid collection occupying the right abdomen surrounding the right kidney and right colon extending to midline. Additional pigtail catheter in the component about midline anterior abdomen. Both pigtail catheters seem to be    within the collection.      2. Small right pleural effusion with right basilar atelectasis.               DX-CHEST-LIMITED (1 VIEW)   Final Result      1.  Right basilar atelectasis or consolidation. Small right pleural effusion not excluded.   2.  Atherosclerotic plaque.      CT-DRAIN-PERITONEAL   Final Result      1.  CT guided pancreatic pseudocyst catheter drainage.   2.  The current plan is to obtain a follow-up CT in 5-7 days..      IR-PICC LINE PLACEMENT W/ GUIDANCE > AGE 5   Final Result                  Ultrasound-guided PICC placement performed by qualified nursing staff as    above.          CT-ABDOMEN-PELVIS WITH    (Results Pending)        Assessment/Plan  * Bacteremia due to Gram-negative bacteria and fungemia- (present on admission)  Assessment & Plan  KALANI negative for signs of  endocarditis  Cont bactrim, Zosyn, and Micafungin per ID for 4-week course with stop date of 11/24.  Repeat imaging prior to discontinuation.   Repeat Bld Cx neg      Low magnesium level  Assessment & Plan  11/8 IV replacement    Hypotension  Assessment & Plan  11/6: Stop lasix; Stop ACEI  11/7 started midodrine    Hypophosphatemia- (present on admission)  Assessment & Plan  Resolved with TPN  Monitoring per RD  11/6 Kphos; serum goal >3    Hypokalemia- (present on admission)  Assessment & Plan  Periodic monitoring  11/6 K bicarb x 1  Serum goal >4.0        Acute respiratory failure with hypoxia (HCC)- (present on admission)  Assessment & Plan  Resolved with diuresis  Likely volume overload with lower extremity edema present    Antibiotic-associated diarrhea  Assessment & Plan  Cdiff PCR negative  Loperamide PRN    Postprocedural retroperitoneal abscess- (present on admission)  Assessment & Plan  S/P Ex lap, I/D, debridement nec fasc 11/5/2021  Abx per ID  Follow cx  Pain control  Diet per sx  Antiemetics PRN  11/8 Repeat CT. Updated surgical team re: perinephric fluid collection. Further CT may be considered with IV and Oral contrast if needed if increased drainage or clinically deteriorates  11/9:  Strict monitoring of output from drain.  Ostomy bag placed on prior drain site for further output monitoring. Initiate NPO status and TPN for 3-5 days.     Duodenal perforation (HCC)- (present on admission)  Assessment & Plan  After ERCP with retroperitoneal abscess and bacteremia  Uncertain etiology - ddx includes pancreatitis, bile leak, iatrogenic  Pepcid BID  11/9: Concern perforation may have reopened.  Surgery team recommending NPO status and TPN    Hyponatremia- (present on admission)  Assessment & Plan  Recurrent, mild  Diuresis as tolerated    Normocytic anemia- (present on admission)  Assessment & Plan  Transfuse for Hgb <7  Avoid regular phlebotomy  Supplements per dietary: 11/6 added thiamine, 6 sm  meals/d    Sepsis due to intraabdominal fluid collection/abscess, bacteremia and fungemia (HCC)- (present on admission)  Assessment & Plan  Sepsis resolved  Source intra-abdominal  Zosyn/Mycafungin/Bactrim DS and follow cx  ID following  Re-imaging per ID, IR, and Surgery  S/P Ex lap w I/D and debridement nec fasc 11/5/2021 11/8 Repeat CT. Updated surgical team re: perinephric fluid collection. Further CT may be considered with IV and Oral contrast if needed.  11/9:  She has remained afebrile over last 48 hours.  VSS.  Does not appear to be septic at this time.  Continue antibx.     Hyperlipidemia- (present on admission)  Assessment & Plan  Continue ezetimibe    Primary hypertension- (present on admission)  Assessment & Plan  Hypotensive 11/6 - stop lisinopril         VTE prophylaxis: enoxaparin ppx    I have performed a physical exam and reviewed and updated ROS and Plan today (11/9/2021). In review of yesterday's note (11/8/2021), there are no changes except as documented above.

## 2021-11-09 NOTE — PROGRESS NOTES
"Patient A+Ox4, still goes off on tangents that dont make sense in the conversation. Easily reoriented. Reports pain is \"adequate\" to her abdomen, epidural infusing. Neuros intact. On room air. No N/V. Encouraging PO intake.  at bedside. Patient still has moderate drainage coming from RUQ site, thick brown/green. APRN at bedside and assessed with this RN, abd CT ordered. Patient updated on plan of care , bed alarm on, call bell in hand   "

## 2021-11-09 NOTE — PROGRESS NOTES
Patient seen this afternoon following Dr. Vann's visit.  Patient has a small fluid collection and MARTHA anterior is running through this collection.  She has drainage her right retroperitoneum from an old IR drain.  My recommendation is to place in ileostomy bag on the draining site.  I am also recommending that the patient go n.p.o. and put on TPN for 24 days to see if this drainage slows down.  During the debridement as well as the drainage of the abscess and necrotizing fasciitis, it appears the patient's duodenal perforation may have reopened.  She responded very well to n.p.o. thus I am recommending TPN, n.p.o. for 3 to 5 days, continue antibiotics and place a ostomy bag on the draining old drain site.  This is not stools nor does have any component of stool.  Unfortunately patient was told it looked like stool which was incorrect.

## 2021-11-09 NOTE — PROGRESS NOTES
Bedside report received from AM RN; assumed care. A&O x 2-4; disoriented to time/situation. Waxes and wanes with clarity/confusion. Patient utilizing different verbiage for items within room. VSS, mildy HOTN. 96% on 2L NC.  Assessment complete. Patient reporting SOB with exertion, mild underlying nausea, dizziness, pain. Medicated for pain; see MAR. Heat packs being utilized. Patient denied numbness/tingling. Patient noted grimacing and moaning intermittently with movement/truncal flexion. Patient reports severe tenderness with dressing changes. Generalized edema noted throughout. MLI with prevena wound vac; no drainage noted cannister. Previous right flank old IR site; large, brown watery drainage noted; saturated gown/dressings/yessenia pad noted. Right upper IR MARTHA drain with brown drainage noted in bulb, serosang drainage noted around site. RLQ IR MARTHA drain, dressing changed; large brown drainage noted around site, small brown drainage noted in tubing. Dressing changed shortly after assessment. Patient assisted to bathroom, dripping drainage noted from lower MARTHA drain, previously from old MARTHA with supine position. Dressing changed again to all right sided MARTHA drains/old sites upon return to bed.  Abdomen round, distended, tender. Hypoactive BS x 4 noted. + void; yellow urine noted in randolph. Perineal care provided, randolph addressed d/t small amount of leakage around tubing. Perineal redness/edema noted. Dark yellow, cloudy urine noted in randolph bag. - eructation. + flatus. LBM (11/3 per charting). Patient stating tolerating diet. Patient up x 2 person assist with FWW. Steady/unsteady, wide based gait noted. Patient tires quickly. Patient premedicated for PO medications; see MAR. Double lumen PICC line, CDI dressing. Patient tolerating IV ABX. Discussed plan of care with patient. All questions answered.  High fall risk. Bed/strip alarm engaged. Charge RN notified. Bed in locked/lowest position.  Call light/personal  belongings within reach.  All needs met, patient sleeping at present time.

## 2021-11-09 NOTE — PROGRESS NOTES
"Patient refusing all medications at this time. \"I am too tired and I cant swallow anything right now, come back later.\" Meds retimed per patient request   "

## 2021-11-10 PROBLEM — R53.1 GENERALIZED WEAKNESS: Status: ACTIVE | Noted: 2021-11-10

## 2021-11-10 LAB
ALBUMIN SERPL BCP-MCNC: 2.3 G/DL (ref 3.2–4.9)
ALBUMIN/GLOB SERPL: 1 G/DL
ALP SERPL-CCNC: 63 U/L (ref 30–99)
ALT SERPL-CCNC: 21 U/L (ref 2–50)
ANION GAP SERPL CALC-SCNC: 8 MMOL/L (ref 7–16)
AST SERPL-CCNC: 39 U/L (ref 12–45)
BASOPHILS # BLD AUTO: 0.4 % (ref 0–1.8)
BASOPHILS # BLD: 0.02 K/UL (ref 0–0.12)
BILIRUB SERPL-MCNC: 0.2 MG/DL (ref 0.1–1.5)
BUN SERPL-MCNC: 11 MG/DL (ref 8–22)
CALCIUM SERPL-MCNC: 7.7 MG/DL (ref 8.5–10.5)
CHLORIDE SERPL-SCNC: 99 MMOL/L (ref 96–112)
CHOLEST SERPL-MCNC: 119 MG/DL (ref 100–199)
CO2 SERPL-SCNC: 23 MMOL/L (ref 20–33)
CREAT SERPL-MCNC: 0.64 MG/DL (ref 0.5–1.4)
EOSINOPHIL # BLD AUTO: 0.03 K/UL (ref 0–0.51)
EOSINOPHIL NFR BLD: 0.6 % (ref 0–6.9)
ERYTHROCYTE [DISTWIDTH] IN BLOOD BY AUTOMATED COUNT: 48.4 FL (ref 35.9–50)
GLOBULIN SER CALC-MCNC: 2.4 G/DL (ref 1.9–3.5)
GLUCOSE BLD-MCNC: 117 MG/DL (ref 65–99)
GLUCOSE BLD-MCNC: 153 MG/DL (ref 65–99)
GLUCOSE SERPL-MCNC: 113 MG/DL (ref 65–99)
HCT VFR BLD AUTO: 24.5 % (ref 37–47)
HGB BLD-MCNC: 8.1 G/DL (ref 12–16)
IMM GRANULOCYTES # BLD AUTO: 0.05 K/UL (ref 0–0.11)
IMM GRANULOCYTES NFR BLD AUTO: 1 % (ref 0–0.9)
LYMPHOCYTES # BLD AUTO: 0.52 K/UL (ref 1–4.8)
LYMPHOCYTES NFR BLD: 10 % (ref 22–41)
MAGNESIUM SERPL-MCNC: 2.1 MG/DL (ref 1.5–2.5)
MCH RBC QN AUTO: 29.2 PG (ref 27–33)
MCHC RBC AUTO-ENTMCNC: 33.1 G/DL (ref 33.6–35)
MCV RBC AUTO: 88.4 FL (ref 81.4–97.8)
MONOCYTES # BLD AUTO: 0.27 K/UL (ref 0–0.85)
MONOCYTES NFR BLD AUTO: 5.2 % (ref 0–13.4)
NEUTROPHILS # BLD AUTO: 4.3 K/UL (ref 2–7.15)
NEUTROPHILS NFR BLD: 82.8 % (ref 44–72)
NRBC # BLD AUTO: 0 K/UL
NRBC BLD-RTO: 0 /100 WBC
PHOSPHATE SERPL-MCNC: 2.6 MG/DL (ref 2.5–4.5)
PLATELET # BLD AUTO: 243 K/UL (ref 164–446)
PMV BLD AUTO: 9.3 FL (ref 9–12.9)
POTASSIUM SERPL-SCNC: 4.2 MMOL/L (ref 3.6–5.5)
PROT SERPL-MCNC: 4.7 G/DL (ref 6–8.2)
RBC # BLD AUTO: 2.77 M/UL (ref 4.2–5.4)
SODIUM SERPL-SCNC: 130 MMOL/L (ref 135–145)
WBC # BLD AUTO: 5.2 K/UL (ref 4.8–10.8)

## 2021-11-10 PROCEDURE — 770001 HCHG ROOM/CARE - MED/SURG/GYN PRIV*

## 2021-11-10 PROCEDURE — 700102 HCHG RX REV CODE 250 W/ 637 OVERRIDE(OP): Performed by: FAMILY MEDICINE

## 2021-11-10 PROCEDURE — 700111 HCHG RX REV CODE 636 W/ 250 OVERRIDE (IP): Performed by: INTERNAL MEDICINE

## 2021-11-10 PROCEDURE — 97530 THERAPEUTIC ACTIVITIES: CPT

## 2021-11-10 PROCEDURE — 80053 COMPREHEN METABOLIC PANEL: CPT

## 2021-11-10 PROCEDURE — 99232 SBSQ HOSP IP/OBS MODERATE 35: CPT | Performed by: NURSE PRACTITIONER

## 2021-11-10 PROCEDURE — 84100 ASSAY OF PHOSPHORUS: CPT

## 2021-11-10 PROCEDURE — 700101 HCHG RX REV CODE 250: Performed by: SURGERY

## 2021-11-10 PROCEDURE — 82962 GLUCOSE BLOOD TEST: CPT | Mod: 91

## 2021-11-10 PROCEDURE — 99223 1ST HOSP IP/OBS HIGH 75: CPT | Performed by: PHYSICAL MEDICINE & REHABILITATION

## 2021-11-10 PROCEDURE — 99024 POSTOP FOLLOW-UP VISIT: CPT | Performed by: SURGERY

## 2021-11-10 PROCEDURE — 700105 HCHG RX REV CODE 258: Performed by: INTERNAL MEDICINE

## 2021-11-10 PROCEDURE — 700111 HCHG RX REV CODE 636 W/ 250 OVERRIDE (IP): Mod: JG | Performed by: INTERNAL MEDICINE

## 2021-11-10 PROCEDURE — 700105 HCHG RX REV CODE 258: Performed by: SURGERY

## 2021-11-10 PROCEDURE — 82465 ASSAY BLD/SERUM CHOLESTEROL: CPT

## 2021-11-10 PROCEDURE — 700102 HCHG RX REV CODE 250 W/ 637 OVERRIDE(OP): Performed by: NURSE PRACTITIONER

## 2021-11-10 PROCEDURE — A9270 NON-COVERED ITEM OR SERVICE: HCPCS | Performed by: NURSE PRACTITIONER

## 2021-11-10 PROCEDURE — 700102 HCHG RX REV CODE 250 W/ 637 OVERRIDE(OP): Performed by: INTERNAL MEDICINE

## 2021-11-10 PROCEDURE — A9270 NON-COVERED ITEM OR SERVICE: HCPCS | Performed by: FAMILY MEDICINE

## 2021-11-10 PROCEDURE — 85025 COMPLETE CBC W/AUTO DIFF WBC: CPT

## 2021-11-10 PROCEDURE — 700111 HCHG RX REV CODE 636 W/ 250 OVERRIDE (IP): Performed by: SURGERY

## 2021-11-10 PROCEDURE — A9270 NON-COVERED ITEM OR SERVICE: HCPCS | Performed by: INTERNAL MEDICINE

## 2021-11-10 PROCEDURE — 700111 HCHG RX REV CODE 636 W/ 250 OVERRIDE (IP): Performed by: ANESTHESIOLOGY

## 2021-11-10 PROCEDURE — 83735 ASSAY OF MAGNESIUM: CPT

## 2021-11-10 RX ORDER — OCTREOTIDE ACETATE 100 UG/ML
100 INJECTION, SOLUTION INTRAVENOUS; SUBCUTANEOUS 2 TIMES DAILY
Status: DISCONTINUED | OUTPATIENT
Start: 2021-11-10 | End: 2021-11-25

## 2021-11-10 RX ADMIN — EZETIMIBE 10 MG: 10 TABLET ORAL at 17:39

## 2021-11-10 RX ADMIN — Medication 1000 UNITS: at 04:28

## 2021-11-10 RX ADMIN — OXYCODONE HYDROCHLORIDE 10 MG: 10 TABLET ORAL at 04:27

## 2021-11-10 RX ADMIN — OCTREOTIDE ACETATE 100 MCG: 100 INJECTION, SOLUTION INTRAVENOUS; SUBCUTANEOUS at 10:56

## 2021-11-10 RX ADMIN — ONDANSETRON 4 MG: 2 INJECTION INTRAMUSCULAR; INTRAVENOUS at 07:47

## 2021-11-10 RX ADMIN — HYDROMORPHONE HYDROCHLORIDE 0.5 MG: 1 INJECTION, SOLUTION INTRAMUSCULAR; INTRAVENOUS; SUBCUTANEOUS at 00:57

## 2021-11-10 RX ADMIN — ONDANSETRON 4 MG: 2 INJECTION INTRAMUSCULAR; INTRAVENOUS at 22:15

## 2021-11-10 RX ADMIN — PIPERACILLIN SODIUM AND TAZOBACTAM SODIUM 3.38 G: 3; .375 INJECTION, POWDER, FOR SOLUTION INTRAVENOUS at 04:27

## 2021-11-10 RX ADMIN — ONDANSETRON 4 MG: 2 INJECTION INTRAMUSCULAR; INTRAVENOUS at 04:34

## 2021-11-10 RX ADMIN — ONDANSETRON 4 MG: 2 INJECTION INTRAMUSCULAR; INTRAVENOUS at 13:31

## 2021-11-10 RX ADMIN — MICAFUNGIN SODIUM 100 MG: 100 INJECTION, POWDER, LYOPHILIZED, FOR SOLUTION INTRAVENOUS at 10:58

## 2021-11-10 RX ADMIN — HYDROMORPHONE HYDROCHLORIDE 0.5 MG: 1 INJECTION, SOLUTION INTRAMUSCULAR; INTRAVENOUS; SUBCUTANEOUS at 10:55

## 2021-11-10 RX ADMIN — GABAPENTIN 600 MG: 300 CAPSULE ORAL at 17:39

## 2021-11-10 RX ADMIN — SULFAMETHOXAZOLE AND TRIMETHOPRIM 1 TABLET: 800; 160 TABLET ORAL at 04:28

## 2021-11-10 RX ADMIN — OXYCODONE HYDROCHLORIDE 10 MG: 10 TABLET ORAL at 17:48

## 2021-11-10 RX ADMIN — GABAPENTIN 600 MG: 300 CAPSULE ORAL at 04:28

## 2021-11-10 RX ADMIN — OXYCODONE HYDROCHLORIDE 10 MG: 10 TABLET ORAL at 07:47

## 2021-11-10 RX ADMIN — Medication 100 MG: at 04:28

## 2021-11-10 RX ADMIN — ONDANSETRON 4 MG: 2 INJECTION INTRAMUSCULAR; INTRAVENOUS at 18:16

## 2021-11-10 RX ADMIN — OCTREOTIDE ACETATE 100 MCG: 100 INJECTION, SOLUTION INTRAVENOUS; SUBCUTANEOUS at 22:14

## 2021-11-10 RX ADMIN — SULFAMETHOXAZOLE AND TRIMETHOPRIM 1 TABLET: 800; 160 TABLET ORAL at 17:39

## 2021-11-10 RX ADMIN — OXYCODONE HYDROCHLORIDE 10 MG: 10 TABLET ORAL at 22:15

## 2021-11-10 RX ADMIN — ENOXAPARIN SODIUM 40 MG: 40 INJECTION SUBCUTANEOUS at 07:41

## 2021-11-10 RX ADMIN — OXYCODONE HYDROCHLORIDE 10 MG: 10 TABLET ORAL at 13:31

## 2021-11-10 RX ADMIN — CALCIUM GLUCONATE: 98 INJECTION, SOLUTION INTRAVENOUS at 19:52

## 2021-11-10 RX ADMIN — PIPERACILLIN SODIUM AND TAZOBACTAM SODIUM 3.38 G: 3; .375 INJECTION, POWDER, FOR SOLUTION INTRAVENOUS at 22:15

## 2021-11-10 RX ADMIN — PIPERACILLIN SODIUM AND TAZOBACTAM SODIUM 3.38 G: 3; .375 INJECTION, POWDER, FOR SOLUTION INTRAVENOUS at 13:31

## 2021-11-10 ASSESSMENT — COGNITIVE AND FUNCTIONAL STATUS - GENERAL
DRESSING REGULAR LOWER BODY CLOTHING: A LOT
EATING MEALS: A LITTLE
DAILY ACTIVITIY SCORE: 14
SUGGESTED CMS G CODE MODIFIER DAILY ACTIVITY: CK
TOILETING: A LOT
PERSONAL GROOMING: A LITTLE
HELP NEEDED FOR BATHING: A LOT
DRESSING REGULAR UPPER BODY CLOTHING: A LOT

## 2021-11-10 ASSESSMENT — ENCOUNTER SYMPTOMS
ABDOMINAL PAIN: 1
FOCAL WEAKNESS: 0
DIARRHEA: 0
VOMITING: 0
SORE THROAT: 0
SHORTNESS OF BREATH: 0
FEVER: 0
DOUBLE VISION: 0
MYALGIAS: 1
BLURRED VISION: 0
SEIZURES: 0
NAUSEA: 0
PALPITATIONS: 0
WEAKNESS: 1
CHILLS: 0

## 2021-11-10 ASSESSMENT — FIBROSIS 4 INDEX: FIB4 SCORE: 2.31

## 2021-11-10 ASSESSMENT — PAIN DESCRIPTION - PAIN TYPE
TYPE: ACUTE PAIN
TYPE: ACUTE PAIN

## 2021-11-10 NOTE — PROGRESS NOTES
Pharmacy TPN Day # 2 (restart)       11/10/2021    Dosing Weight   60 kg (Adj BW)     TPN Restart  TPN goal: 8081-9828 kcal/day including 1.4-1.7 gm/kg/day Protein  TPN indication: perforated duodenum with high drain output                                                                Pertinent PMH: Admitted on 10/15/2021 for abdominal pain. Underwent polypectomy and a sphincterotomy on 10/01/2021.  CT completed on 10/08/202, found to have a large fluid collection and thickening of wall around the duodenum with retroperitoneal fluid collection.  IR placed drain. CT completed on 10/09/2021 with a significant amount of retroperitoneal air and fluid.  Pt was transferred to [facility] 10/14/21 for further evaluation. 2 drains have been placed for fluid collections surrounding the R kidney and colon.  Peritoneal drain cultures have demonstrated polymicrobial growth however blood cultures have remained negative.  ID is consulting.  Pt was on TPN from 10/14 - 10/24.  Taken to the OR on  for ex-lap with drainage of intra-abdominal and retroperitoneal abscess with drain placement.  Concerns that her duodenal perforation may have reopened.  Surgery has recommended patient remain NPO and resume TPN with hope that drainage decreases.      Temp (24hrs), Av.2 °C (99 °F), Min:36.4 °C (97.6 °F), Max:37.7 °C (99.8 °F)  .  Recent Labs     21  0930 21  0600 11/10/21  0445   SODIUM 133* 131* 130*   POTASSIUM 3.6 4.2 4.2   CHLORIDE 101 99 99   CO2 21 24 23   BUN 12 10 11   CREATININE 0.60 0.52 0.64   GLUCOSE 110* 94 113*   CALCIUM 7.3* 7.9* 7.7*   ASTSGOT  --   --  39   ALTSGPT  --   --  21   ALBUMIN  --   --  2.3*   TBILIRUBIN  --   --  0.2   PHOSPHORUS  --  3.2 2.6   MAGNESIUM  --  2.2 2.1     Accu-Checks  Recent Labs     11/10/21  0450   POCGLUCOSE 117*       Vitals:    21 1933 11/10/21 0458 11/10/21 0600 11/10/21 0800   BP: 109/60 116/69  129/73   Weight:   69.6 kg (153 lb 7 oz)    Height:            Intake/Output Summary (Last 24 hours) at 11/10/2021 1339  Last data filed at 11/10/2021 0800  Gross per 24 hour   Intake --   Output 385 ml   Net -385 ml       Orders Placed This Encounter   Procedures   • Diet NPO Restrict to: Sips with Medications     Standing Status:   Standing     Number of Occurrences:   1     Order Specific Question:   Diet NPO Restrict to:     Answer:   Sips with Medications [3]         TPN for past 72 hours (Show up to 3 orders; newest on the left. Changes between the two most recent orders are indicated.)     Start date and time   11/09/2021 2000 10/24/2021 2000 10/22/2021 2000      TPN Adult Central Line [[id number]] TPN Central Line Formulation [[id number]] TPN Central Line Formulation [[id number]]    Order Status  Active Completed Completed    Last Admin  New Bag at 11/09/2021 2015 by Mckenna Barney R.N. New Bag at 10/26/2021 1951 by Madison Pino RANAM New Bag at 10/23/2021 2007 by Cornelio Chowdary RANAM       Base    Clinisol 15%  90 g 45 g 90 g    dextrose 70%  240 g 120 g 240 g    fat emulsion (soy) 20%  50 g -- --    fat emulsions 20%  -- 25 g 50 g       Additives    potassium phosphate  20 mmol 25 mmol 25 mmol    potassium chloride  10 mEq -- --    sodium acetate  145 mEq 145 mEq 145 mEq    sodium chloride  77 mEq 77 mEq 77 mEq    magnesium sulfate  8 mEq 8 mEq 8 mEq    calcium GLUConate  9.3 mEq 9.3 mEq 9.3 mEq    M.T.E.-4  1 mL 1 mL 1 mL    M.V.I. Adult  10 mL 10 mL 10 mL    famotidine  40 mg 40 mg 40 mg       QS Base    sterile water  356.71 mL 706.55 mL 110.05 mL       Energy Contribution    Proteins  -- -- --    Dextrose  816 kcal 408 kcal 816 kcal    Lipids  500 kcal 250 kcal 500 kcal    Total  1,316 kcal 658 kcal 1,316 kcal       Electrolyte Ion Calculated Amount    Sodium  222 mEq 222 mEq 222 mEq    Potassium  39.33 mEq 36.67 mEq 36.67 mEq    Calcium  9.3 mEq 9.3 mEq 9.3 mEq    Magnesium  8 mEq 8 mEq 8 mEq    Aluminum  -- -- --    Phosphate  20 mmol 25  mmol 25 mmol    Chloride  87 mEq 77 mEq 77 mEq    Acetate  221.2 mEq 183.1 mEq 221.2 mEq       Other    Total Protein  90 g 45 g 90 g    Total Protein/kg  1.36 g/kg 0.6 g/kg 1.21 g/kg    Total Amino Acid  -- -- --    Total Amino Acid/kg  -- -- --    Glucose Infusion Rate  2.51 mg/kg/min 1.12 mg/kg/min 2.24 mg/kg/min    Osmolarity (Estimated)  -- -- --    Volume  1,440 mL 1,440 mL 1,440 mL    Rate  60 mL/hr 60 mL/hr 60 mL/hr    Dosing Weight  66.4 kg 74.5 kg 74.5 kg    Infusion Site  Central Central Central            This formula provides:  % kcal as lipids = 30  Grams protein/kg = 1.5  Non-protein calories = 1316  Kcals/kg = 28  Total daily calories = 1676    Comments:  1) Electrolytes WNL except for Na+ remaining low. TPN will remain at NS equivalent  2) Pt remains NPO except sips with meds.  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

## 2021-11-10 NOTE — PROGRESS NOTES
AA&Ox4.    SpO2 97% on 1L NC. Denies SOB.    Reporting 10/10 pain. Medicated per MAR.    SKIN MLI with prevena, CDI. IR drains x2 covered with gauze and hypafix dressing saturated, changed to urostomy bag, CDI.     Drains RLQ IR drain compressed to self suction. Draining thick green output.    Tolerating NPO diet. Medicated for N/V per MAR.    + void.    + BM / LBM 11/03/2021.    Pt ambulates/up with Stand by assist and FFW.    All needs met at this time. Call light within reach. Pt calls appropriately.

## 2021-11-10 NOTE — ASSESSMENT & PLAN NOTE
-PT/OT following  -Recommending post-acute placement.  -Acute rehab evaluating patient for admission.   -Likely home with HH vs rehab at d/c pending patient's progress

## 2021-11-10 NOTE — THERAPY
"Physical Therapy   Daily Treatment     Patient Name: Nils Alfonso  Age:  66 y.o., Sex:  female  Medical Record #: [id number]  Today's Date: 11/9/2021     Precautions: Fall Risk  Comments: abd MARTHA x2, abd wound vac, colostomy    Assessment  Pt seen for PT treatment session. Pt appeared less confused today than yesterday, however she still made comments at times indicating she was not fully aware of situation. Pt still at Mod A for bed mobility tasks, however pt able to move smoother with less pain. Min A for transfers with FWW, VC's for hand positioning and sequencing. Min A for amb with FWW and no LOB, however distance was limited by abdominal pain; pt stating she was going to \"pass out from pain\". Pt will benefit from continued acute care therapy services to increase functional independence and endurance. Pt would also benefit from postacute placement to further address functional deficits. Will follow.    Plan  Continue current treatment plan.  DC Equipment Recommendations: Unable to determine at this time  Discharge Recommendations: Recommend post-acute placement for additional physical therapy services prior to discharge home         11/09/21 1611   Vitals   O2 Delivery Device None - Room Air   Pain 0 - 10 Group   Location Abdomen   Location Orientation Anterior   Pain Rating Scale (NPRS) not quantified   Description Aching   Therapist Pain Assessment During Activity   Cognition    Cognition / Consciousness X   Level of Consciousness Alert   Comments pleasant and participatory; pt more aware today than last session, however she still made occ comments that did not make sense with situation   Balance   Sitting Balance (Static) Fair +   Sitting Balance (Dynamic) Fair   Standing Balance (Static) Fair -   Standing Balance (Dynamic) Fair -   Weight Shift Sitting Fair   Weight Shift Standing Fair   Skilled Intervention Verbal Cuing;Sequencing;Tactile Cuing   Comments FWW in standing   Gait Analysis   Gait Level Of " Assist Minimal Assist   Assistive Device Front Wheel Walker   Distance (Feet) 20   # of Times Distance was Traveled 2   Deviation Bradykinetic;Decreased Heel Strike;Decreased Toe Off   Weight Bearing Status no restrictions.   Skilled Intervention Sequencing;Tactile Cuing;Verbal Cuing;Facilitation   Comments no LOB, distance limited by abdominal pain   Bed Mobility    Supine to Sit Moderate Assist   Sit to Supine Minimal Assist  (assist with LE's)   Rolling Moderate Assist to Lt.   Skilled Intervention Facilitation;Sequencing;Tactile Cuing;Verbal Cuing   Comments HOB elevated, used rails   Functional Mobility   Sit to Stand Minimal Assist   Toilet Transfers Minimal Assist   Skilled Intervention Facilitation;Sequencing;Tactile Cuing;Verbal Cuing   Comments FWW. VC's for hand positioning and sequencing   Short Term Goals    Short Term Goal # 1 pt will move supine<>EOB via log roll with HOB flat, no features, and SPV within 6 visits to facilitate getting in/out of bed.   Goal Outcome # 1 goal not met   Short Term Goal # 2 pt will complete all transfers with FWW and SPV within 6 weeks to increase functional mobility.   Goal Outcome # 2 Progressing as expected   Short Term Goal # 3 pt will amb 150' with FWW and SPV within 6 visits for household mobility.   Goal Outcome # 3 Progressing as expected   Short Term Goal # 4 Pt will ascend and descend step x 2 with SPV to access her home by the 6th tx   Goal Outcome # 4 Goal not met

## 2021-11-10 NOTE — CARE PLAN
The patient is Stable - Low risk of patient condition declining or worsening    Shift Goals  Clinical Goals: pain control  Patient Goals: rest  Family Goals: get her better    Progress made toward(s) clinical / shift goals:      Problem: Pain - Standard  Goal: Alleviation of pain or a reduction in pain to the patient’s comfort goal  Outcome: Progressing   Pt was educated on 0-10 pain scale, non-pharm methods of pain relief and MAR. Pt demonstrates understanding by rating pain as a 10 on 0-10 pain scale. Pt states that their pain is well controlled and declines pain medication.     Problem: Fall Risk  Goal: Patient will remain free from falls  Outcome: Progressing   Pt demonstrates appropriate use of call light to alert staff to needs.        Patient is not progressing towards the following goals:

## 2021-11-10 NOTE — THERAPY
"Occupational Therapy  Daily Treatment (re-eval)     Patient Name: Nils Alfonso  Age:  66 y.o., Sex:  female  Medical Record #: [id number]  Today's Date: 11/9/2021     Precautions  Precautions: Fall Risk  Comments: colostomy to catheter bag, MARTHA drains x2, abdominal wound vac.    Assessment    Patient seen for OT treatment. She had not been seen since 10/31 and has since had ex-lap and abdominal drain placement. Her level of function has decreased and she is now requiring Min A for functional mobility and Mod-max A for ADLs. She is limited by pain, decreased balance, decreased activity tolerance, and mild cognitive deficits. Will continue to follow in this setting, anticipate that she will require post-acute placement prior to DC home.     Plan    Continue current treatment plan.       Discharge Recommendations: Recommend post-acute placement for additional occupational therapy services prior to discharge home    Subjective    \"I smell antibiotics.\"     Objective       11/09/21 1616   Precautions   Precautions Fall Risk   Comments colostomy to catheter bag, MARTHA drains x2, abdominal wound vac.   Cognition    Cognition / Consciousness X   Level of Consciousness Alert   Ability To Follow Commands 1 Step   New Learning Impaired   Comments Pleasant and cooperative, sometimes tangential and repetitive.   Balance   Sitting Balance (Static) Fair +   Sitting Balance (Dynamic) Fair   Standing Balance (Static) Fair -   Standing Balance (Dynamic) Fair -   Weight Shift Sitting Fair   Weight Shift Standing Fair   Skilled Intervention Verbal Cuing;Facilitation   Comments With FWW   Bed Mobility    Supine to Sit Minimal Assist   Sit to Supine Moderate Assist   Skilled Intervention Verbal Cuing;Facilitation   Comments Cuing for log roll   Activities of Daily Living   Upper Body Dressing Moderate Assist   Lower Body Dressing Maximal Assist   Skilled Intervention Verbal Cuing;Facilitation   Comments UB dressing difficult with lines, " unable to reach feet without pain   Functional Mobility   Sit to Stand Minimal Assist   Bed, Chair, Wheelchair Transfer Minimal Assist   Toilet Transfers Minimal Assist   Transfer Method Stand Step   Mobility sup><sit, STS, toilet xfer   Skilled Intervention Facilitation;Sequencing;Tactile Cuing   Activity Tolerance   Sitting in Chair 7 mins on toilet   Sitting Edge of Bed 10 mins   Standing 5-6 mins   Patient / Family Goals   Patient / Family Goal #1 to return home   Goal #1 Outcome Goal not met   Short Term Goals   Short Term Goal # 1 Patient will complete LB dressing supv with AE PRN   Short Term Goal # 2 Patient will complete toilet xfer supv   Short Term Goal # 3 Patient will tolerate 8 mins of standing G/H supv

## 2021-11-10 NOTE — DISCHARGE PLANNING
[facility] Transitional Care Coordination    Referral from:  Elizabeth RIDER  Insurance Provider on Facesheet: SCP  Potential Rehab Diagnosis: Surgical    Chart review indicates patient may need on going medical management and my need therapy needs to possibly meet inpatient rehab facility criteria with the goal of returning to community.    D/C support: Spouse     Physiatry consultation forwarded per protocol.     Last Covid test:  11/5 Negative    Surgical complications - abd MARTHA x2, abd wound vac, colostomy. PMR to eval, TCC will continue to follow.     Thank you for the referral.

## 2021-11-10 NOTE — PROGRESS NOTES
Surgical Progress Note    Author: Jaden Cook M.D. Date & Time created: 11/10/2021   10:06 AM     Interval Events:  Feels much better than yesterday  ostomy bag on old drain site  Comfortable  Discussed adding octreotide to regimin    Review of Systems   Constitutional: Positive for malaise/fatigue.   Gastrointestinal: Positive for abdominal pain.   All other systems reviewed and are negative.    Hemodynamics:  Temp (24hrs), Av.2 °C (99 °F), Min:36.4 °C (97.6 °F), Max:37.7 °C (99.8 °F)  Temperature: 37.6 °C (99.7 °F)  Pulse  Av.6  Min: 53  Max: 93   Blood Pressure : 129/73     Respiratory:    Respiration: 16, Pulse Oximetry: 97 %     Work Of Breathing / Effort: Shallow  RUL Breath Sounds: Diminished, RML Breath Sounds: Diminished, RLL Breath Sounds: Diminished, TRISTAN Breath Sounds: Diminished, LLL Breath Sounds: Diminished  Neuro:  GCS       Fluids:    Intake/Output Summary (Last 24 hours) at 11/10/2021 1006  Last data filed at 11/10/2021 0800  Gross per 24 hour   Intake 120 ml   Output 385 ml   Net -265 ml     Weight: 69.6 kg (153 lb 7 oz)  Current Diet Order   Procedures   • Diet NPO Restrict to: Sips with Medications     Physical Exam  Constitutional:       Appearance: Normal appearance.   HENT:      Head: Normocephalic.   Abdominal:      General: Abdomen is flat. Bowel sounds are normal.      Palpations: Abdomen is soft.      Tenderness: There is abdominal tenderness.      Comments: Ostomy bag on previous IR drain site functioning well.  Other drain functioning   Neurological:      Mental Status: She is alert.       Labs:  Recent Results (from the past 24 hour(s))   CBC WITH DIFFERENTIAL    Collection Time: 11/10/21  4:45 AM   Result Value Ref Range    WBC 5.2 4.8 - 10.8 K/uL    RBC 2.77 (L) 4.20 - 5.40 M/uL    Hemoglobin 8.1 (L) 12.0 - 16.0 g/dL    Hematocrit 24.5 (L) 37.0 - 47.0 %    MCV 88.4 81.4 - 97.8 fL    MCH 29.2 27.0 - 33.0 pg    MCHC 33.1 (L) 33.6 - 35.0 g/dL    RDW 48.4 35.9 -  50.0 fL    Platelet Count 243 164 - 446 K/uL    MPV 9.3 9.0 - 12.9 fL    Neutrophils-Polys 82.80 (H) 44.00 - 72.00 %    Lymphocytes 10.00 (L) 22.00 - 41.00 %    Monocytes 5.20 0.00 - 13.40 %    Eosinophils 0.60 0.00 - 6.90 %    Basophils 0.40 0.00 - 1.80 %    Immature Granulocytes 1.00 (H) 0.00 - 0.90 %    Nucleated RBC 0.00 /100 WBC    Neutrophils (Absolute) 4.30 2.00 - 7.15 K/uL    Lymphs (Absolute) 0.52 (L) 1.00 - 4.80 K/uL    Monos (Absolute) 0.27 0.00 - 0.85 K/uL    Eos (Absolute) 0.03 0.00 - 0.51 K/uL    Baso (Absolute) 0.02 0.00 - 0.12 K/uL    Immature Granulocytes (abs) 0.05 0.00 - 0.11 K/uL    NRBC (Absolute) 0.00 K/uL   Comp Metabolic Panel    Collection Time: 11/10/21  4:45 AM   Result Value Ref Range    Sodium 130 (L) 135 - 145 mmol/L    Potassium 4.2 3.6 - 5.5 mmol/L    Chloride 99 96 - 112 mmol/L    Co2 23 20 - 33 mmol/L    Anion Gap 8.0 7.0 - 16.0    Glucose 113 (H) 65 - 99 mg/dL    Bun 11 8 - 22 mg/dL    Creatinine 0.64 0.50 - 1.40 mg/dL    Calcium 7.7 (L) 8.5 - 10.5 mg/dL    AST(SGOT) 39 12 - 45 U/L    ALT(SGPT) 21 2 - 50 U/L    Alkaline Phosphatase 63 30 - 99 U/L    Total Bilirubin 0.2 0.1 - 1.5 mg/dL    Albumin 2.3 (L) 3.2 - 4.9 g/dL    Total Protein 4.7 (L) 6.0 - 8.2 g/dL    Globulin 2.4 1.9 - 3.5 g/dL    A-G Ratio 1.0 g/dL   Cholesterol    Collection Time: 11/10/21  4:45 AM   Result Value Ref Range    Cholesterol,Tot 119 100 - 199 mg/dL   Magnesium    Collection Time: 11/10/21  4:45 AM   Result Value Ref Range    Magnesium 2.1 1.5 - 2.5 mg/dL   Phosphorus    Collection Time: 11/10/21  4:45 AM   Result Value Ref Range    Phosphorus 2.6 2.5 - 4.5 mg/dL   ESTIMATED GFR    Collection Time: 11/10/21  4:45 AM   Result Value Ref Range    GFR If African American >60 >60 mL/min/1.73 m 2    GFR If Non African American >60 >60 mL/min/1.73 m 2   POCT glucose device results    Collection Time: 11/10/21  4:50 AM   Result Value Ref Range    Glucose - Accu-Ck 117 (H) 65 - 99 mg/dL     Medical Decision Making,  by Problem:  Active Hospital Problems    Diagnosis    • Duodenal perforation (HCC) [K63.1]      Priority: High   • Postprocedural retroperitoneal abscess [K68.11]      Priority: High   • Bacteremia due to Gram-negative bacteria and fungemia [R78.81]      Priority: High   • Sepsis due to intraabdominal fluid collection/abscess, bacteremia and fungemia (HCC) [A41.9]      Priority: High   • Low magnesium level [R79.0]    • Hypotension [I95.9]    • Hypophosphatemia [E83.39]    • Hypokalemia [E87.6]    • Acute respiratory failure with hypoxia (HCC) [J96.01]    • Antibiotic-associated diarrhea [K52.1, T36.95XA]    • Hyponatremia [E87.1]    • Normocytic anemia [D64.9]    • Hyperlipidemia [E78.5]    • Primary hypertension [I10]      Plan:  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

## 2021-11-10 NOTE — PROGRESS NOTES
Hospital Medicine Daily Progress Note    Date of Service  11/10/2021    Chief Complaint  Nils Alfonso is a 66 y.o. female admitted 10/15/2021 with abdominal pain    Hospital Course  Initially admitted to [facility] for evaluation of abdominal pain after recent ERCP with polypectomy and sphincterotomy and noted to have large intra-abdominal fluid collection. Multiple IR drains had been placed, but her infection worsened.  She was transferred to [facility], [place] for escalation of her surgical needs. ID has been following. She underwent ex lap w I/D of multiple loculated abscesses and debridement of nec fasciitis w Dr. ST 11/5/21. Prev cx w Stenotrophomonas maltophilia, mixed yeast, E faecalis, E coli and Chryseobacterium in the blood.    Interval Problem Update  11/9: Continues to have significant drainage from prior right upper quadrant drain site.  Surgery team recommending ostomy bag placement for skin protection and strict monitoring of output.  Also recommending n.p.o. status and TPN for 3 to 5 days.  Patient otherwise remained afebrile with stable vital signs.  She does request removal of her Corea catheter secondary to discomfort.  She is also experiencing uncontrolled pain.  Medications have been adjusted.  11/10: Pain is better controlled today on current regimen.  Output is decreased with n.p.o. status.  Continue TPN.  Updated infectious disease team on recent postop cultures.  She remains afebrile with stable vital signs.    Consultants/Specialty  general surgery, GI, infectious disease and interventional radiology    Code Status  Full Code    Disposition  Patient is not medically cleared.   Anticipate discharge to rehab.  Referral placed.  I have placed the appropriate orders for post-discharge needs.    Review of Systems  Review of Systems   Constitutional: Positive for malaise/fatigue. Negative for chills and fever.   HENT: Negative for congestion and sore throat.    Eyes: Negative for blurred vision and double vision.    Respiratory: Negative for shortness of breath.    Cardiovascular: Negative for chest pain and palpitations.   Gastrointestinal: Positive for abdominal pain (improving). Negative for diarrhea, nausea and vomiting.   Genitourinary: Negative for dysuria and urgency.   Musculoskeletal: Positive for myalgias (improving).   Skin: Negative for itching and rash.   Neurological: Positive for weakness. Negative for focal weakness and seizures.   All other systems reviewed and are negative.       Physical Exam  Temp:  [36.4 °C (97.6 °F)-37.7 °C (99.8 °F)] 37.6 °C (99.7 °F)  Pulse:  [68-82] 74  Resp:  [16-18] 16  BP: (109-129)/(60-85) 129/73  SpO2:  [90 %-97 %] 97 %    Physical Exam  Vitals and nursing note reviewed.   Constitutional:       General: She is not in acute distress.     Appearance: She is ill-appearing. She is not toxic-appearing.   HENT:      Head: Normocephalic and atraumatic.      Nose: Nose normal.      Mouth/Throat:      Mouth: Mucous membranes are dry.      Pharynx: Oropharynx is clear.   Eyes:      General: No scleral icterus.        Right eye: No discharge.         Left eye: No discharge.      Pupils: Pupils are equal, round, and reactive to light.   Cardiovascular:      Rate and Rhythm: Normal rate and regular rhythm.      Heart sounds: Normal heart sounds. No murmur heard.      Pulmonary:      Effort: Pulmonary effort is normal. No respiratory distress.      Breath sounds: Normal breath sounds. No wheezing or rales.   Abdominal:      General: There is no distension.      Palpations: Abdomen is soft.      Tenderness: There is abdominal tenderness. There is no guarding.          Comments: Midline Prevena CDI; prior drain site RUQ with further cola/bilious discharge; saturating through dressing   Musculoskeletal:         General: No swelling.      Cervical back: Neck supple. No rigidity or tenderness.      Right lower leg: No edema.      Left lower leg: No edema.      Comments: Generalized weakness    Skin:     General: Skin is warm and dry.   Neurological:      Mental Status: She is alert and oriented to person, place, and time.   Psychiatric:         Mood and Affect: Mood normal.       Fluids    Intake/Output Summary (Last 24 hours) at 11/10/2021 1038  Last data filed at 11/10/2021 0800  Gross per 24 hour   Intake 120 ml   Output 385 ml   Net -265 ml       Laboratory  Recent Labs     11/08/21  0930 11/09/21  0600 11/10/21  0445   WBC 5.8 6.6 5.2   RBC 2.84* 3.03* 2.77*   HEMOGLOBIN 8.4* 8.7* 8.1*   HEMATOCRIT 24.8* 26.7* 24.5*   MCV 87.3 88.1 88.4   MCH 29.6 28.7 29.2   MCHC 33.9 32.6* 33.1*   RDW 50.4* 49.7 48.4   PLATELETCT 227 256 243   MPV 9.3 9.3 9.3     Recent Labs     11/08/21  0930 11/09/21  0600 11/10/21  0445   SODIUM 133* 131* 130*   POTASSIUM 3.6 4.2 4.2   CHLORIDE 101 99 99   CO2 21 24 23   GLUCOSE 110* 94 113*   BUN 12 10 11   CREATININE 0.60 0.52 0.64   CALCIUM 7.3* 7.9* 7.7*                   Imaging  CT-ABDOMEN-PELVIS WITH   Final Result         1. The components in the gallbladder fossa and right flank of the complex fluid collection are mostly resolved. There is still a small residual fluid collection in the perinephric space surrounding the inferior right kidney. Right lower quadrant MARTHA drain    and right upper quadrant catheter are outside of this residual collection.   2. Air-fluid levels throughout the colon could relate to ileus.   3. Bilateral pleural effusions with bibasilar atelectasis.   4. Pneumobilia.      CT-ABDOMEN-PELVIS WITH   Final Result      1.  Slight interval decrease in size of the large RIGHT peritoneal abscess which now contains 3 drains   2.  Decreased atelectasis with persistent opacity in the RIGHT lung base likely atelectasis rather than pneumonia   3.  Small LEFT renal cyst   4.  Prior cholecystectomy with ongoing pneumobilia      CT-DRAINAGE-CATH EXCHANGE   Final Result         1. Organized pancreatic pseudocyst Drainage with CT guidance.      CT-ABDOMEN-PELVIS  WITH   Final Result      1.  There has been interval placement of an additional inferior lateral pigtail catheter within the complex right abdominal abscess. The other 2 pigtail catheters are stable in appearance.   2.  There has been no significant interval decrease in size of the large complex loculated abscess collection in the right abdomen.   3.  There is no new fluid collection.   4.  Gallbladder is surgically absent with continued pneumobilia.   5.  Interval decrease in the dependent pleural effusion with minimal airspace disease, likely atelectasis.      IR-DRAINAGE-CATH EXCHANGE   Final Result      1.  Successful left-sided drainage catheter removal.   2.  Successful image guided superior right drainage catheter replacement.      Plan: Suction drainage. Monitor outputs. Please contact interventional radiology if there is any concern for tube dysfunction. Suggest routine tube maintenance at 3 months intervals if the tube remains in place.         CT-DRAIN-PERITONEAL   Final Result      1.  CT GUIDED PERITONEAL RLQ abdominal CATHETER DRAINAGE.   2.  THE CURRENT PLAN IS TO MONITOR DRAINAGE OUTPUT AND OBTAIN A FOLLOWUP CT SCAN IN 5-7 DAYS IF CLINICALLY INDICATED.      CT-ABDOMEN-PELVIS WITH   Final Result         1. Grossly unchanged irregular fluid collection occupying the right abdomen surrounding the right kidney and right colon extending to midline. Additional pigtail catheter in the component about midline anterior abdomen. Both pigtail catheters seem to be    within the collection.      2. Small right pleural effusion with right basilar atelectasis.               DX-CHEST-LIMITED (1 VIEW)   Final Result      1.  Right basilar atelectasis or consolidation. Small right pleural effusion not excluded.   2.  Atherosclerotic plaque.      CT-DRAIN-PERITONEAL   Final Result      1.  CT guided pancreatic pseudocyst catheter drainage.   2.  The current plan is to obtain a follow-up CT in 5-7 days..      IR-PICC  LINE PLACEMENT W/ GUIDANCE > AGE 5   Final Result                  Ultrasound-guided PICC placement performed by qualified nursing staff as    above.          CT-ABDOMEN-PELVIS WITH    (Results Pending)        Assessment/Plan  * Bacteremia due to Gram-negative bacteria and fungemia- (present on admission)  Assessment & Plan  KALANI negative for signs of endocarditis  Cont bactrim, Zosyn, and Micafungin per ID for 4-week course with stop date of 11/24.  Repeat imaging prior to discontinuation.   Repeat Bld Cx neg      Generalized weakness  Assessment & Plan  PT/OT following  Recommending post-acute placement.  Rehab consult placed.     Low magnesium level  Assessment & Plan  11/8 IV replacement    Hypotension  Assessment & Plan  11/6: Stop lasix; Stop ACEI  11/7 started midodrine    Hypophosphatemia- (present on admission)  Assessment & Plan  Resolved with TPN  Monitoring per RD  11/6 Kphos; serum goal >3    Hypokalemia- (present on admission)  Assessment & Plan  Periodic monitoring  11/6 K bicarb x 1  Serum goal >4.0        Acute respiratory failure with hypoxia (HCC)- (present on admission)  Assessment & Plan  Resolved with diuresis  Likely volume overload with lower extremity edema present    Antibiotic-associated diarrhea  Assessment & Plan  Cdiff PCR negative  Loperamide PRN    Postprocedural retroperitoneal abscess- (present on admission)  Assessment & Plan  S/P Ex lap, I/D, debridement nec fasc 11/5/2021  Abx per ID  Follow cx  Pain control  Diet per sx  Antiemetics PRN  11/8 Repeat CT. Updated surgical team re: perinephric fluid collection. Further CT may be considered with IV and Oral contrast if needed if increased drainage or clinically deteriorates  11/9:  Strict monitoring of output from drain.  Ostomy bag placed on prior drain site for further output monitoring. Initiate NPO status and TPN for 3-5 days.   11/10:  Output improving with decreased oral intake.     Duodenal perforation (HCC)- (present on  admission)  Assessment & Plan  After ERCP with retroperitoneal abscess and bacteremia  Uncertain etiology - ddx includes pancreatitis, bile leak, iatrogenic  Pepcid BID  11/9: Concern perforation may have reopened.  Surgery team recommending NPO status and TPN  11/10:  Start octreotide.     Hyponatremia- (present on admission)  Assessment & Plan  Recurrent, mild  Diuresis as tolerated    Normocytic anemia- (present on admission)  Assessment & Plan  Transfuse for Hgb <7  Avoid regular phlebotomy  Supplements per dietary: 11/6 added thiamine, 6 sm meals/d    Sepsis due to intraabdominal fluid collection/abscess, bacteremia and fungemia (HCC)- (present on admission)  Assessment & Plan  Sepsis resolved  Source intra-abdominal  Zosyn/Mycafungin/Bactrim DS and follow cx  ID following  Re-imaging per ID, IR, and Surgery  S/P Ex lap w I/D and debridement nec fasc 11/5/2021 11/8 Repeat CT. Updated surgical team re: perinephric fluid collection. Further CT may be considered with IV and Oral contrast if needed.  11/9:  She has remained afebrile over last 48 hours.  VSS.  Does not appear to be septic at this time.  Continue antibx.     Hyperlipidemia- (present on admission)  Assessment & Plan  Continue ezetimibe    Primary hypertension- (present on admission)  Assessment & Plan  Hypotensive 11/6 - stop lisinopril         VTE prophylaxis: enoxaparin ppx    I have performed a physical exam and reviewed and updated ROS and Plan today (11/10/2021). In review of yesterday's note (11/9/2021), there are no changes except as documented above.

## 2021-11-10 NOTE — THERAPY
Occupational Therapy  Daily Treatment     Patient Name: Nils Alfonso  Age:  66 y.o., Sex:  female  Medical Record #: [id number]  Today's Date: 11/10/2021     Precautions  Precautions: (P) Fall Risk  Comments: colostomy to catheter bag, MARTHA drains x2, abdominal wound vac.    Assessment    Pt currently limited by decreased functional mobility, activity tolerance, cognition,  strength, balance, and pain which are affecting pt's ability to complete ADLs/IADLs at baseline. Pt would benefit from OT services in the acute care setting to maximize functional recovery.     Plan    Continue current treatment plan.       Discharge Recommendations: (P) Recommend post-acute placement for additional occupational therapy services prior to discharge home       11/10/21 1046   Activities of Daily Living   Grooming Supervision   Upper Body Dressing Minimal Assist   Lower Body Dressing Maximal Assist   Functional Mobility   Sit to Stand Minimal Assist   Bed, Chair, Wheelchair Transfer Minimal Assist   Toilet Transfers Minimal Assist   Short Term Goals   Short Term Goal # 1 Patient will complete LB dressing supv with AE PRN   Goal Outcome # 1 Progressing as expected   Short Term Goal # 2 Patient will complete toilet xfer supv   Goal Outcome # 2 Progressing as expected   Short Term Goal # 3 Patient will tolerate 8 mins of standing G/H supv   Goal Outcome # 3 Progressing as expected

## 2021-11-10 NOTE — CONSULTS
"    Physical Medicine and Rehabilitation Consultation              Date of initial consultation: 11/10/2021  Consulting provider: ALEX Keith  Reason for consultation: assess for acute inpatient rehab appropriateness  LOS: 26 Day(s)    Chief complaint: nausea    HPI: The patient is a 66 y.o. right hand dominant female with a past medical history of hypertension, hyperlipidemia, arthritis;  who presented on 10/15/2021  1:40 AM with abdominal pain.  Patient was initially admitted to [facility] with abdominal pain after recent ERCP with polypectomy and sphincterotomy.  Patient was found to have very large intra-abdominal fluid collection, multiple IR drains were placed but her infection worsened.  Patient was transferred to [facility] for escalation of her surgical needs.  She underwent ex lap with I&D of multiple loculated abscesses and debridement of necrotizing fasciitis with Dr. Mari Phillips on 11/5/2021.  Cultures grew Stenotrophomonas maltophilia, mixed yeast, E faecalis, E coli and Chryseobacterium.  ID has been following.  Per notes she continues to have significant drainage from prior right upper quadrant drain site, surgery recommending ostomy bag placement for skin protection, n.p.o. status and TPN for 3 to 5 days.  Output has decreased with n.p.o. status    The patient currently reports abdominal pain and nausea.  Patient also reports significant lethargy and feeling \"drugged\".  Patient reports she is voiding well, and had her Corea removed recently.  She has multiple drain sites on her right abdomen which are draining stool.  No formal bowel movement recently.  Patient does not feel she could currently tolerate IPR level of therapy but is interested if she starts feeling better soon.  She is currently on TPN.    ROS  Pertinent positives are mentioned in the HPI, all others reviewed and are negative.    Social Hx:  1 SH  2 ARYA  With: Spouse    Employment: Retired nurse    THERAPY:  Restrictions: Fall risk  PT: " Functional mobility   11/9: Walking 20 feet x 2 at min assist with front wheel walker    OT: ADLs  11/9: Max assist lower body dressing    SLP:   None    IMAGING:  CT abdomen pelvis 11/8/2021  1. The components in the gallbladder fossa and right flank of the complex fluid collection are mostly resolved. There is still a small residual fluid collection in the perinephric space surrounding the inferior right kidney. Right lower quadrant MARTHA drain   and right upper quadrant catheter are outside of this residual collection.  2. Air-fluid levels throughout the colon could relate to ileus.  3. Bilateral pleural effusions with bibasilar atelectasis.  4. Pneumobilia.    PROCEDURES:  Jaden Simon MD [id number]  Drainage of retroperitoneal abscess, peritoneal abscess and necrotizing fasciitis    Ant Randhawa MD 11-21 CT-guided placement of drain into pancreatic pseudocyst    Darwin Conway MD 10/27/2021 left upper quadrant drain removal, right upper quadrant drain upsize    Chavez Pringle M.D. 10/25/2021 abscess drainage to right lower quadrant    Ant Randhawa MD 10/16/2021 CT-guided drainage of large pancreatic pseudocyst    [place] [id number] CT-guided drainage of extensive peritoneal fluid collection    PMH:  Past Medical History:   Diagnosis Date   • Allergy, unspecified not elsewhere classified    • Anemia     not currently   • Anesthesia     PONV (Demerol/ Dilaudid)   • Arthritis     bilateral shoulders,sacrum, osteo   • Backpain     coccyx and sacrum   • Bronchitis 2010   • Cataract     bilateral IOLI   • Degeneration of cervical intervertebral disc     C5-6,C6-7   • Dental disorder     partial upper and lower   • Heart burn    • Hiatus hernia syndrome     not a problem after gastric sleeve   • High cholesterol     resolved after gastric surgery   • Hyperlipidemia    • Hypertension     off all medication, reveresed after gastric sleeve   • Muscle disorder    • Pain 05/16/2018    low back and SI  joints and sacrum   • Reactive airway disease     rescue inhaler not needed unless with bronchitis       PSH:  Past Surgical History:   Procedure Laterality Date   • PB EXPLORATORY OF ABDOMEN  11/5/2021    Procedure: LAPAROTOMY, EXPLORATORY;  Surgeon: Jaden Cook M.D.;  Location: [facility];  Service: General   • PB ULTRASONIC GUIDANCE, INTRAOPERATIVE  11/5/2021    Procedure: ULTRASOUND GUIDANCE;  Surgeon: Jaden Cook M.D.;  Location: [facility];  Service: General   • ABDOMINAL ABSCESS DRAINAGE  11/5/2021    Procedure: DRAINAGE, ABSCESS, ABDOMEN - PERITONEAL AND RETROPERITONEAL;  Surgeon: Jaden Cook M.D.;  Location: [facility];  Service: General   • PB ERCP,DIAGNOSTIC  10/1/2021    Procedure: ERCP (ENDOSCOPIC RETROGRADE CHOLANGIOPANCREATOGRAPHY);  Surgeon: Fausto Casas M.D.;  Location: [facility];  Service: Gastroenterology   • COLONOSCOPY  8/8/2018    Procedure: COLONOSCOPY;  Surgeon: Shukri Condon M.D.;  Location: [facility];  Service: General   • GASTROSCOPY  5/23/2018    Procedure: GASTROSCOPY;  Surgeon: Fausto Casas M.D.;  Location: [facility];  Service: Gastroenterology   • EGD W/ENDOSCOPIC ULTRASOUND  5/23/2018    Procedure: EGD W/ENDOSCOPIC ULTRASOUND- RADIAL UPPER;  Surgeon: Fausto Casas M.D.;  Location: [facility];  Service: Gastroenterology   • CATARACT PHACO WITH IOL Left 4/18/2017    Procedure: CATARACT PHACO WITH IOL;  Surgeon: Stuart Estevez M.D.;  Location: SURGERY SAME DAY [facility];  Service:    • CATARACT PHACO WITH IOL Right 4/4/2017    Procedure: CATARACT PHACO WITH IOL;  Surgeon: Stuart Estevez M.D.;  Location: [facility];  Service:    • GASTRIC SLEEVE LAPAROSCOPY  10/19/2016    Procedure: GASTRIC SLEEVE LAPAROSCOPY, HIATAL HERNIA;  Surgeon: Shukri Condon M.D.;  Location: [facility];  Service:    • LIVER BIOPSY  LAPAROSCOPIC  10/19/2016    Procedure: LIVER BIOPSY LAPAROSCOPIC;  Surgeon: Shukri Condon M.D.;  Location: SURGERY [facility];  Service:    • GASTROSCOPY N/A 8/26/2016    Procedure: GASTROSCOPY;  Surgeon: Shukri Condon M.D.;  Location: SURGERY [facility];  Service:    • ROTATOR CUFF REPAIR Right 7/7/2016   • ROTATOR CUFF REPAIR Left 5/2015   • HYSTERECTOMY ROBOTIC  1/2/2009    Performed by [facility] at SURGERY [facility]   • LUMBAR FUSION ANTERIOR  2007    Dr Hillman, L3-S1 fusion   • CHOLECYSTECTOMY  1996    laparoscopic   • TUBAL LIGATION  1985   • GASTRIC RESECTION  1982    gastric stapling   • TONSILLECTOMY AND ADENOIDECTOMY  1967   • PRIMARY C SECTION  1976, 1979, 1985    x3       FHX:  Family History   Problem Relation Age of Onset   • Stroke Mother    • Cancer Father         larynx   • Diabetes Brother        Medications:  Current Facility-Administered Medications   Medication Dose   • octreotide (SANDOSTATIN) injection 100 mcg  100 mcg   • oxyCODONE immediate-release (ROXICODONE) tablet 5 mg  5 mg    Or   • oxyCODONE immediate release (ROXICODONE) tablet 10 mg  10 mg   • MD Alert...TPN per Pharmacy     • TPN Adult Central Line     • sulfamethoxazole-trimethoprim (BACTRIM DS) 800-160 MG tablet 1 Tablet  1 Tablet   • thiamine (Vitamin B-1) tablet 100 mg  100 mg   • ondansetron (ZOFRAN) syringe/vial injection 4 mg  4 mg   • enoxaparin (LOVENOX) inj 40 mg  40 mg   • HYDROmorphone (Dilaudid) injection 0.5 mg  0.5 mg   • loperamide (IMODIUM) capsule 2 mg  2 mg   • piperacillin-tazobactam (ZOSYN) 3.375 g in  mL IVPB  3.375 g   • ondansetron (ZOFRAN ODT) dispertab 4 mg  4 mg   • polyethylene glycol/lytes (MIRALAX) PACKET 1 Packet  1 Packet   • acetaminophen (Tylenol) tablet 650 mg  650 mg   • ezetimibe (ZETIA) tablet 10 mg  10 mg   • gabapentin (NEURONTIN) capsule 600 mg  600 mg   • micafungin (MYCAMINE) 100 mg in  mL ivpb  100 mg   • vitamin D3 (cholecalciferol) tablet 1,000  "Units  1,000 Units       Allergies:  Allergies   Allergen Reactions   • Ampicillin Rash     Rash  Tolerates Zosyn 10/21   • Cephalexin Diarrhea, Vomiting and Nausea     .   • Clindamycin Vomiting     \"cleocin\"   • Codeine      Severe stomach pain, cramps, spasms   • Demerol Vomiting   • Levofloxacin Unspecified     Numbness     • Tetracyclines Rash     .   • Tizanidine Itching   • Hydromorphone Vomiting and Nausea   • Morphine Vomiting   • Pcn [Penicillins] Itching     Tolerates Zosyn 10/21   • Sulfa Drugs Itching         Physical Exam:  Vitals: /73   Pulse 74   Temp 37.6 °C (99.7 °F) (Temporal)   Resp 16   Ht 1.6 m (5' 2.99\")   Wt 69.6 kg (153 lb 7 oz)   SpO2 97%   Gen: NAD  Head:  NC/AT  Eyes/ Nose/ Mouth: PERRLA, moist mucous membranes  Cardio: RRR, good distal perfusion, warm extremities  Pulm: normal respiratory effort, no cyanosis   Abd: Large midline incision covered with wound VAC, right abdomen has 2 colostomy bags in a drain site which are all draining stool colored fluid  Ext: No peripheral edema. No calf tenderness. No clubbing.    Mental status: answers questions appropriately follows commands  Speech: fluent, no aphasia or dysarthria      Motor:      Upper Extremity  Myotome R L   Shoulder flexion C5 4/5 4/5   Elbow flexion C5 4/5 4/5   Wrist extension C6 4/5 4/5   Elbow extension C7 4/5 4/5   Finger flexion C8 4/5 4/5   Finger abduction T1 4/5 4/5     Lower Extremity Myotome R L   Hip flexion L2 4/5 4/5   Knee extension L3 5 5   Ankle dorsiflexion L4 4/5 4/5   Toe extension L5 4/5 4/5   Ankle plantarflexion S1 4/5 4/5       Sensory:   intact to light touch through out    DTRs:  Right  Left    Brachioradialis  2+  2+   Patella tendon  2+ 2+       Labs: Reviewed and significant for   Recent Labs     11/08/21 0930 11/09/21  0600 11/10/21  0445   RBC 2.84* 3.03* 2.77*   HEMOGLOBIN 8.4* 8.7* 8.1*   HEMATOCRIT 24.8* 26.7* 24.5*   PLATELETCT 227 256 243     Recent Labs     11/08/21  0930 " 11/09/21  0600 11/10/21  0445   SODIUM 133* 131* 130*   POTASSIUM 3.6 4.2 4.2   CHLORIDE 101 99 99   CO2 21 24 23   GLUCOSE 110* 94 113*   BUN 12 10 11   CREATININE 0.60 0.52 0.64   CALCIUM 7.3* 7.9* 7.7*     Recent Results (from the past 24 hour(s))   CBC WITH DIFFERENTIAL    Collection Time: 11/10/21  4:45 AM   Result Value Ref Range    WBC 5.2 4.8 - 10.8 K/uL    RBC 2.77 (L) 4.20 - 5.40 M/uL    Hemoglobin 8.1 (L) 12.0 - 16.0 g/dL    Hematocrit 24.5 (L) 37.0 - 47.0 %    MCV 88.4 81.4 - 97.8 fL    MCH 29.2 27.0 - 33.0 pg    MCHC 33.1 (L) 33.6 - 35.0 g/dL    RDW 48.4 35.9 - 50.0 fL    Platelet Count 243 164 - 446 K/uL    MPV 9.3 9.0 - 12.9 fL    Neutrophils-Polys 82.80 (H) 44.00 - 72.00 %    Lymphocytes 10.00 (L) 22.00 - 41.00 %    Monocytes 5.20 0.00 - 13.40 %    Eosinophils 0.60 0.00 - 6.90 %    Basophils 0.40 0.00 - 1.80 %    Immature Granulocytes 1.00 (H) 0.00 - 0.90 %    Nucleated RBC 0.00 /100 WBC    Neutrophils (Absolute) 4.30 2.00 - 7.15 K/uL    Lymphs (Absolute) 0.52 (L) 1.00 - 4.80 K/uL    Monos (Absolute) 0.27 0.00 - 0.85 K/uL    Eos (Absolute) 0.03 0.00 - 0.51 K/uL    Baso (Absolute) 0.02 0.00 - 0.12 K/uL    Immature Granulocytes (abs) 0.05 0.00 - 0.11 K/uL    NRBC (Absolute) 0.00 K/uL   Comp Metabolic Panel    Collection Time: 11/10/21  4:45 AM   Result Value Ref Range    Sodium 130 (L) 135 - 145 mmol/L    Potassium 4.2 3.6 - 5.5 mmol/L    Chloride 99 96 - 112 mmol/L    Co2 23 20 - 33 mmol/L    Anion Gap 8.0 7.0 - 16.0    Glucose 113 (H) 65 - 99 mg/dL    Bun 11 8 - 22 mg/dL    Creatinine 0.64 0.50 - 1.40 mg/dL    Calcium 7.7 (L) 8.5 - 10.5 mg/dL    AST(SGOT) 39 12 - 45 U/L    ALT(SGPT) 21 2 - 50 U/L    Alkaline Phosphatase 63 30 - 99 U/L    Total Bilirubin 0.2 0.1 - 1.5 mg/dL    Albumin 2.3 (L) 3.2 - 4.9 g/dL    Total Protein 4.7 (L) 6.0 - 8.2 g/dL    Globulin 2.4 1.9 - 3.5 g/dL    A-G Ratio 1.0 g/dL   Cholesterol    Collection Time: 11/10/21  4:45 AM   Result Value Ref Range    Cholesterol,Tot 119 100  - 199 mg/dL   Magnesium    Collection Time: 11/10/21  4:45 AM   Result Value Ref Range    Magnesium 2.1 1.5 - 2.5 mg/dL   Phosphorus    Collection Time: 11/10/21  4:45 AM   Result Value Ref Range    Phosphorus 2.6 2.5 - 4.5 mg/dL   ESTIMATED GFR    Collection Time: 11/10/21  4:45 AM   Result Value Ref Range    GFR If African American >60 >60 mL/min/1.73 m 2    GFR If Non African American >60 >60 mL/min/1.73 m 2   POCT glucose device results    Collection Time: 11/10/21  4:50 AM   Result Value Ref Range    Glucose - Accu-Ck 117 (H) 65 - 99 mg/dL         ASSESSMENT:  Patient is a 66 y.o. female admitted with multiple abdominal abscesses now s/p IR drain placements    [facility] Code / Diagnosis to Support: 0017.8 - Medically Complex: Medical/Surgical Complications    Rehabilitation: Impaired ADLs and mobility  Questionable tolerance for IPR at this time, however she qualifies for inpatient rehab based on needs for PT, OT.  Patient will also benefit from family training.  Patient has a good discharge situation which will be home with spouse.     Barriers to transfer include: Insurance authorization, TCCs to verify disposition, medical clearance and bed availability     Additional Recommendations:  -Most appropriate for SNF level of care at this time, however if patient improves enough to tolerate 3 hours of therapy a day she would be a candidate for IPR.  She is currently receiving TPN and has several drain sites which are draining stool in her right abdomen, which would be incompatible with IPR due to high risk for complication.    -Will follow patient in periphery for resolution of her draining abdomen, and transition back to oral diet  -Currently on Zosyn 3 times daily through 11/25, Bactrim twice daily through 11/24 and Micafungin daily through 11/25.   -Pain controlled with Gabapentin 600 mg twice daily, Roxicodone 5 to 10 mg every 4 hours as needed  -Also currently requiring 2-3 doses per day of IV Dilaudid  -Not  medically cleared, does not have tolerance to participate in 3 hours of therapy a day, high risk for complication with intense therapy at this time.  Patient will be considered for IPR when her abdominal drains have been removed, wounds are healed, and she is stable on oral diet.   -PMR to follow in the periphery for rehab appropriateness, please reach out with questions or request for medical management      Medical Complexity:  Anemia  Hyponatremia  Infection      DVT PPX: Lovenox 40 mg injection daily      Thank you for allowing us to participate in the care of this patient.     Patient was seen for 112 minutes on unit/floor of which > 50% of time was spent on counseling and coordination of care regarding the above, including prognosis, risk reduction, benefits of treatment, and options for next stage of care.    Wil Jackson, DO   Physical Medicine and Rehabilitation     Please note that this dictation was created using voice recognition software. I have made every reasonable attempt to correct obvious errors, but there may be errors of grammar and possibly content that I did not discover before finalizing the note.

## 2021-11-10 NOTE — WOUND TEAM
[facility]  Inpatient Services  Initial Wound and Skin Care Evaluation    Admission Date: 10/15/2021     Last order of IP CONSULT TO WOUND CARE was found on 11/9/2021 from Hospital Encounter on 10/14/2021     HPI, PMH, SH: Reviewed    Past Surgical History:   Procedure Laterality Date   • PB EXPLORATORY OF ABDOMEN  11/5/2021    Procedure: LAPAROTOMY, EXPLORATORY;  Surgeon: Jaden Cook M.D.;  Location: [facility];  Service: General   • PB ULTRASONIC GUIDANCE, INTRAOPERATIVE  11/5/2021    Procedure: ULTRASOUND GUIDANCE;  Surgeon: Jaden Cook M.D.;  Location: [facility];  Service: General   • ABDOMINAL ABSCESS DRAINAGE  11/5/2021    Procedure: DRAINAGE, ABSCESS, ABDOMEN - PERITONEAL AND RETROPERITONEAL;  Surgeon: Jaden Cook M.D.;  Location: [facility];  Service: General   • PB ERCP,DIAGNOSTIC  10/1/2021    Procedure: ERCP (ENDOSCOPIC RETROGRADE CHOLANGIOPANCREATOGRAPHY);  Surgeon: Fausto Casas M.D.;  Location: [facility];  Service: Gastroenterology   • COLONOSCOPY  8/8/2018    Procedure: COLONOSCOPY;  Surgeon: Shukri Condon M.D.;  Location: [facility];  Service: General   • GASTROSCOPY  5/23/2018    Procedure: GASTROSCOPY;  Surgeon: Fausto Casas M.D.;  Location: [facility];  Service: Gastroenterology   • EGD W/ENDOSCOPIC ULTRASOUND  5/23/2018    Procedure: EGD W/ENDOSCOPIC ULTRASOUND- RADIAL UPPER;  Surgeon: Fausto Casas M.D.;  Location: [facility];  Service: Gastroenterology   • CATARACT PHACO WITH IOL Left 4/18/2017    Procedure: CATARACT PHACO WITH IOL;  Surgeon: Stuart Estevez M.D.;  Location: SURGERY SAME DAY [facility];  Service:    • CATARACT PHACO WITH IOL Right 4/4/2017    Procedure: CATARACT PHACO WITH IOL;  Surgeon: Stuart Estevez M.D.;  Location: SURGERY [facility];  Service:    • GASTRIC SLEEVE LAPAROSCOPY  10/19/2016     Procedure: GASTRIC SLEEVE LAPAROSCOPY, HIATAL HERNIA;  Surgeon: Shukri Condon M.D.;  Location: SURGERY [facility];  Service:    • LIVER BIOPSY LAPAROSCOPIC  10/19/2016    Procedure: LIVER BIOPSY LAPAROSCOPIC;  Surgeon: Shukri Condon M.D.;  Location: SURGERY [facility];  Service:    • GASTROSCOPY N/A 2016    Procedure: GASTROSCOPY;  Surgeon: Shukri Condon M.D.;  Location: SURGERY [facility];  Service:    • ROTATOR CUFF REPAIR Right 2016   • ROTATOR CUFF REPAIR Left 2015   • HYSTERECTOMY ROBOTIC  2009    Performed by MEG VILLEGAS at SURGERY [facility]   • LUMBAR FUSION ANTERIOR      Dr Hillman, L3-S1 fusion   • CHOLECYSTECTOMY      laparoscopic   • TUBAL LIGATION     • GASTRIC RESECTION      gastric stapling   • TONSILLECTOMY AND ADENOIDECTOMY     • PRIMARY C SECTION  , , 1985    x3     Social History     Tobacco Use   • Smoking status: Former Smoker     Packs/day: 0.25     Years: 0.10     Pack years: 0.02     Types: Cigarettes     Quit date: 1973     Years since quittin.8   • Smokeless tobacco: Never Used   Substance Use Topics   • Alcohol use: No     No chief complaint on file.    Diagnosis: Bile duct leak [K83.8]  Postprocedural intraabdominal abscess [T81.43XA]  Intra-abdominal abscess (HCC) [K65.1]    Unit where seen by Wound Team: T429/00     WOUND CONSULT/FOLLOW UP RELATED TO:  Right upper abdomen     WOUND HISTORY:  Pt is an older woman with history of idiopathic pancreatitis who presented on  with complaints of abdominal pain. Pt has had ERCP with sphincterotomy by dr. Ramires on 10/1/21. Upon admission to [facility] pt had CT which revealed large irregular fluid collection with thick wall occupying most of the right abdomen, IR drains were placed. Unfortunately there was a lack of improvement in intraabdominal fluid and therefore Dr. Dumont was consulted. Pt underwent surgery with Dr. ST on  and was found to have a  "large retroperitoneal abscess containing necrotizing fascititis as well as an anterior peritoneal abscess and necrotizing soft tissue infection. IR placed drains were removed and new surgical drains were placed. Pt began having drainage from her old right upper quad IR drain hole and therefore wound team was requested to evaluate and treat/manage the drainage.     WOUND ASSESSMENT/LDA  Wound 11/09/21 Full Thickness Wound Abdomen Lateral;Upper;Quadrant Right (Active)   Wound Image    11/09/21 1400   Site Assessment Red;Drainage    Periwound Assessment Denuded;Red    Margins Attached edges;Defined edges    Closure Secondary intention    Drainage Amount Large    Drainage Description Brown    Treatments Cleansed;Site care    Wound Cleansing Approved Wound Cleanser    Periwound Protectant Skin Protectant Wipes to Periwound;Stoma Powder    Dressing Cleansing/Solutions Not Applicable    Dressing Options Other (Comments) 2 1/4\" 2 piece high output pouch to down drain    Dressing Changed New    Dressing Status Clean;Dry;Intact    Dressing Change/Treatment Frequency Daily, and As Needed    NEXT Dressing Change/Treatment Date 11/10/21    NEXT Weekly Photo (Inpatient Only) 11/16/21    Non-staged Wound Description Full thickness    Wound Length (cm) 0.4 cm    Wound Width (cm) 0.8 cm    Wound Surface Area (cm^2) 0.32 cm^2    Shape OVAL    Wound Odor None    Exposed Structures MALVIN    WOUND NURSE ONLY - Time Spent with Patient (mins) 60    Number of days: 0      Wound 10/16/21  Abdomen Lateral Right IR Drain insertion  (Active)   Wound Image     Site Assessment Red    Periwound Assessment Painful;Red    Margins Attached edges;Defined edges    Closure Other (Comment)    Drainage Amount Small    Drainage Description Brown;Green    Treatments Cleansed;Site care    Wound Cleansing Not Applicable    Periwound Protectant Barrier Paste    Dressing Cleansing/Solutions Moist warm washcloth    Dressing Options Pouch    Dressing Changed New  "   Dressing Status Clean;Dry;Intact    Dressing Change/Treatment Frequency Daily, and As Needed    NEXT Dressing Change/Treatment Date 11/12/21    Shape Oval    Wound Odor None    Pulses N/A    Exposed Structures MALVIN    WOUND NURSE ONLY - Time Spent with Patient (mins) 60      Vascular:    CHARBEL:   No results found.    Lab Values:    Lab Results   Component Value Date/Time    WBC 5.2 11/10/2021 04:45 AM    RBC 2.77 (L) 11/10/2021 04:45 AM    HEMOGLOBIN 8.1 (L) 11/10/2021 04:45 AM    HEMATOCRIT 24.5 (L) 11/10/2021 04:45 AM    CREACTPROT 0.29 12/02/2015 07:48 AM    SEDRATEWES 10 12/02/2015 07:48 AM    HBA1C 5.7 (H) 09/13/2016 10:40 AM      Culture Results show:  Recent Results (from the past 720 hour(s))   CULTURE WOUND W/ GRAM STAIN    Collection Time: 11/05/21  8:27 AM    Specimen: Wound   Result Value Ref Range    Significant Indicator POS (POS)     Source WND     Site RETROPERITONEAL ABSCESS     Culture Result - (A)     Gram Stain Result Rare Gram positive cocci.  Few Yeast.       Culture Result (A)      Enterococcus faecium  Moderate growth  See previous culture for sensitivity report.      Culture Result Candida albicans  Rare growth   (A)     Culture Result (A)      Stenotrophomonas maltophilia  Light growth  See previous culture for sensitivity report.       Pain Level/Medicated:  Received IV dilaudid    INTERVENTIONS BY WOUND TEAM:  Chart and images reviewed. Discussed with bedside RN. All areas of concern (based on picture review, LDA review and discussion with bedside RN) have been thoroughly assessed. Documentation of areas based on significant findings. This RN in to assess patient. Performed standard wound care which includes appropriate positioning, dressing removal and non-selective debridement. Pictures and measurements obtained weekly if/when required.  Preparation for Dressing removal: NA saturated dressing removed without difficulty  Non-selectively Debrided with:  wound cleanser and gauze.  Sharp  "debridement: NA  Ngozi wound: Cleansed with wound cleanser, Prepped with stoma powder and no sting  Primary Dressing: A 2 1/4\" barrier was cut larger than wound. Paste ring was then stretched to fit opening. Barrier was applied down to skin around MARTHA Drain and adhered with friction. High output pouch was attached and spout closed. Hole was cut in pouch at the top and MARTHA tube threaded through hole. Pink tape to hole. MARTHA Bulb reattached.  Secondary (Outer) Dressing: NA    Appliance to upper right old MARTHA site remains intact to down drain bag and was not changed this visit.     Interdisciplinary consultation: Patient, Bedside RN (Carole), Wound RN (Flor)    EVALUATION / RATIONALE FOR TREATMENT:  Most Recent Date:  11/10/21: Pouch around RUQ old MARTHA site appears to be working well. Pt noted to have significant leaking around MARTHA drain to RLQ. Pouched around MARTHA drain to prevent skin deterioration. Will continue to follow.   11/9/21: Pt with large amount of liquid brown drainage noted from right upper quadrant old MARTHA Site. Wound team requested to evaluate and pouch. Wound is small however large amount of drainage noted. High output pouch applied to down drain to better manage output. Pt also having some drainage noted from around surgically placed drainage. Fenestrated gauze placed to better control output.     Goals: Steady decrease in wound area and depth weekly.    WOUND TEAM PLAN OF CARE ([X] for frequency of wound follow up,):   Nursing to follow orders written for wound care. Contact wound team if area fails to progress, deteriorates or with any questions/concerns  Dressing changes by wound team:    X               Follow up 3 times weekly:                NPWT change 3 times weekly:     Follow up 1-2 times weekly:      Follow up Bi-Monthly:                   Follow up as needed:     Other (explain): X    NURSING PLAN OF CARE ORDERS (X):  Dressing changes: See Dressing Care orders:   Skin care: See Skin Care orders: "   RN Prevention Protocol:   Rectal tube care: See Rectal Tube Care orders:   Other orders:    RSKIN:   CURRENTLY IN PLACE (X), APPLIED THIS VISIT (A), ORDERED (O):   Q shift Anant:  X  Q shift pressure point assessments:  X    Surface/Positioning   Pressure redistribution mattress  X          Low Airloss          Bariatric foam      Bariatric ALBERT     Waffle cushion        Waffle Overlay    X      Reposition q 2 hours X     TAPs Turning system     Z Jorgito Pillow     Offloading/Redistribution   Sacral Mepilex (Silicone dressing)   MALVIN  Heel Mepilex (Silicone dressing)         Heel float boots (Prevalon boot)             Float Heels off Bed with Pillows           Respiratory RA  Silicone O2 tubing         Gray Foam Ear protectors     Cannula fixation Device (Tender )          High flow offloading Clip    Elastic head band offloading device      Anchorfast                                                         Trach with Optifoam split foam             Containment/Moisture Prevention     Rectal tube or BMS    Purwick/Condom Cath        Corea Catheter  X  Barrier wipes           Barrier paste       Antifungal tx      Interdry        Mobilization       Up to chair        Ambulate      PT/OT      Nutrition       Dietician        Diabetes Education      PO  X   TF     TPN     NPO   # days     Other        Anticipated discharge plans:   LTACH:        SNF/Rehab:                  Home Health Care:   X        Outpatient Wound Center:            Self/Family Care:        Other:                  Vac Discharge Needs:   Not Applicable Pt not on a wound vac:     X  Regular Vac while inpatient, alternative dressing at DC:        Regular Vac in use and continued at DC:            Reg. Vac w/ Skin Sub/Biologic in use. Will need to be changed 2x wkly:      Veraflo Vac while inpatient, ok to transition to Regular Vac on Discharge:           Veraflo Vac while inpatient, will need to remain on Veraflo Vac upon discharge:

## 2021-11-11 LAB
ALBUMIN SERPL BCP-MCNC: 2.1 G/DL (ref 3.2–4.9)
ALBUMIN/GLOB SERPL: 0.8 G/DL
ALP SERPL-CCNC: 62 U/L (ref 30–99)
ALT SERPL-CCNC: 20 U/L (ref 2–50)
ANION GAP SERPL CALC-SCNC: 7 MMOL/L (ref 7–16)
AST SERPL-CCNC: 33 U/L (ref 12–45)
BILIRUB SERPL-MCNC: 0.2 MG/DL (ref 0.1–1.5)
BUN SERPL-MCNC: 12 MG/DL (ref 8–22)
CALCIUM SERPL-MCNC: 7.5 MG/DL (ref 8.5–10.5)
CHLORIDE SERPL-SCNC: 99 MMOL/L (ref 96–112)
CO2 SERPL-SCNC: 25 MMOL/L (ref 20–33)
CREAT SERPL-MCNC: 0.61 MG/DL (ref 0.5–1.4)
GLOBULIN SER CALC-MCNC: 2.6 G/DL (ref 1.9–3.5)
GLUCOSE BLD-MCNC: 142 MG/DL (ref 65–99)
GLUCOSE BLD-MCNC: 182 MG/DL (ref 65–99)
GLUCOSE SERPL-MCNC: 146 MG/DL (ref 65–99)
MAGNESIUM SERPL-MCNC: 2 MG/DL (ref 1.5–2.5)
PHOSPHATE SERPL-MCNC: 2.2 MG/DL (ref 2.5–4.5)
POTASSIUM SERPL-SCNC: 4.3 MMOL/L (ref 3.6–5.5)
PROT SERPL-MCNC: 4.7 G/DL (ref 6–8.2)
SODIUM SERPL-SCNC: 131 MMOL/L (ref 135–145)

## 2021-11-11 PROCEDURE — 700105 HCHG RX REV CODE 258: Performed by: INTERNAL MEDICINE

## 2021-11-11 PROCEDURE — 700102 HCHG RX REV CODE 250 W/ 637 OVERRIDE(OP): Performed by: INTERNAL MEDICINE

## 2021-11-11 PROCEDURE — 700111 HCHG RX REV CODE 636 W/ 250 OVERRIDE (IP): Performed by: ANESTHESIOLOGY

## 2021-11-11 PROCEDURE — 99233 SBSQ HOSP IP/OBS HIGH 50: CPT | Performed by: PHYSICAL MEDICINE & REHABILITATION

## 2021-11-11 PROCEDURE — 700102 HCHG RX REV CODE 250 W/ 637 OVERRIDE(OP): Performed by: PHYSICAL MEDICINE & REHABILITATION

## 2021-11-11 PROCEDURE — 87070 CULTURE OTHR SPECIMN AEROBIC: CPT

## 2021-11-11 PROCEDURE — 700111 HCHG RX REV CODE 636 W/ 250 OVERRIDE (IP): Performed by: INTERNAL MEDICINE

## 2021-11-11 PROCEDURE — 700101 HCHG RX REV CODE 250: Performed by: NURSE PRACTITIONER

## 2021-11-11 PROCEDURE — 99232 SBSQ HOSP IP/OBS MODERATE 35: CPT | Performed by: NURSE PRACTITIONER

## 2021-11-11 PROCEDURE — 87077 CULTURE AEROBIC IDENTIFY: CPT | Mod: 91

## 2021-11-11 PROCEDURE — 87106 FUNGI IDENTIFICATION YEAST: CPT

## 2021-11-11 PROCEDURE — 80053 COMPREHEN METABOLIC PANEL: CPT

## 2021-11-11 PROCEDURE — 700111 HCHG RX REV CODE 636 W/ 250 OVERRIDE (IP): Performed by: NURSE PRACTITIONER

## 2021-11-11 PROCEDURE — 99024 POSTOP FOLLOW-UP VISIT: CPT | Performed by: SURGERY

## 2021-11-11 PROCEDURE — A9270 NON-COVERED ITEM OR SERVICE: HCPCS | Performed by: FAMILY MEDICINE

## 2021-11-11 PROCEDURE — 700111 HCHG RX REV CODE 636 W/ 250 OVERRIDE (IP): Mod: JG | Performed by: INTERNAL MEDICINE

## 2021-11-11 PROCEDURE — 82962 GLUCOSE BLOOD TEST: CPT | Mod: 91

## 2021-11-11 PROCEDURE — 700105 HCHG RX REV CODE 258: Performed by: NURSE PRACTITIONER

## 2021-11-11 PROCEDURE — 700102 HCHG RX REV CODE 250 W/ 637 OVERRIDE(OP): Performed by: NURSE PRACTITIONER

## 2021-11-11 PROCEDURE — A9270 NON-COVERED ITEM OR SERVICE: HCPCS | Performed by: NURSE PRACTITIONER

## 2021-11-11 PROCEDURE — 84100 ASSAY OF PHOSPHORUS: CPT

## 2021-11-11 PROCEDURE — 770001 HCHG ROOM/CARE - MED/SURG/GYN PRIV*

## 2021-11-11 PROCEDURE — 87186 SC STD MICRODIL/AGAR DIL: CPT | Mod: 91

## 2021-11-11 PROCEDURE — 700111 HCHG RX REV CODE 636 W/ 250 OVERRIDE (IP): Mod: JB | Performed by: SURGERY

## 2021-11-11 PROCEDURE — 700102 HCHG RX REV CODE 250 W/ 637 OVERRIDE(OP): Performed by: FAMILY MEDICINE

## 2021-11-11 PROCEDURE — A9270 NON-COVERED ITEM OR SERVICE: HCPCS | Performed by: INTERNAL MEDICINE

## 2021-11-11 PROCEDURE — A9270 NON-COVERED ITEM OR SERVICE: HCPCS | Performed by: PHYSICAL MEDICINE & REHABILITATION

## 2021-11-11 PROCEDURE — 83735 ASSAY OF MAGNESIUM: CPT

## 2021-11-11 PROCEDURE — 87205 SMEAR GRAM STAIN: CPT

## 2021-11-11 RX ORDER — OXYCODONE HCL 10 MG/1
10 TABLET, FILM COATED, EXTENDED RELEASE ORAL EVERY 12 HOURS
Status: DISCONTINUED | OUTPATIENT
Start: 2021-11-11 | End: 2021-11-21

## 2021-11-11 RX ADMIN — PIPERACILLIN SODIUM AND TAZOBACTAM SODIUM 3.38 G: 3; .375 INJECTION, POWDER, FOR SOLUTION INTRAVENOUS at 04:55

## 2021-11-11 RX ADMIN — SULFAMETHOXAZOLE AND TRIMETHOPRIM 1 TABLET: 800; 160 TABLET ORAL at 16:47

## 2021-11-11 RX ADMIN — SULFAMETHOXAZOLE AND TRIMETHOPRIM 1 TABLET: 800; 160 TABLET ORAL at 04:55

## 2021-11-11 RX ADMIN — MICAFUNGIN SODIUM 100 MG: 100 INJECTION, POWDER, LYOPHILIZED, FOR SOLUTION INTRAVENOUS at 09:40

## 2021-11-11 RX ADMIN — OXYCODONE HYDROCHLORIDE 10 MG: 10 TABLET ORAL at 09:40

## 2021-11-11 RX ADMIN — ONDANSETRON 4 MG: 2 INJECTION INTRAMUSCULAR; INTRAVENOUS at 17:17

## 2021-11-11 RX ADMIN — ENOXAPARIN SODIUM 40 MG: 40 INJECTION SUBCUTANEOUS at 07:37

## 2021-11-11 RX ADMIN — CALCIUM GLUCONATE: 98 INJECTION, SOLUTION INTRAVENOUS at 19:55

## 2021-11-11 RX ADMIN — ONDANSETRON 4 MG: 2 INJECTION INTRAMUSCULAR; INTRAVENOUS at 07:35

## 2021-11-11 RX ADMIN — GABAPENTIN 600 MG: 300 CAPSULE ORAL at 04:55

## 2021-11-11 RX ADMIN — OXYCODONE HYDROCHLORIDE 10 MG: 10 TABLET ORAL at 03:08

## 2021-11-11 RX ADMIN — OCTREOTIDE ACETATE 100 MCG: 100 INJECTION, SOLUTION INTRAVENOUS; SUBCUTANEOUS at 19:52

## 2021-11-11 RX ADMIN — OXYCODONE HYDROCHLORIDE 10 MG: 10 TABLET ORAL at 23:05

## 2021-11-11 RX ADMIN — GABAPENTIN 600 MG: 300 CAPSULE ORAL at 16:47

## 2021-11-11 RX ADMIN — Medication 1000 UNITS: at 04:55

## 2021-11-11 RX ADMIN — OXYCODONE HYDROCHLORIDE 10 MG: 10 TABLET, FILM COATED, EXTENDED RELEASE ORAL at 12:32

## 2021-11-11 RX ADMIN — PIPERACILLIN SODIUM AND TAZOBACTAM SODIUM 3.38 G: 3; .375 INJECTION, POWDER, FOR SOLUTION INTRAVENOUS at 21:04

## 2021-11-11 RX ADMIN — ONDANSETRON 4 MG: 2 INJECTION INTRAMUSCULAR; INTRAVENOUS at 23:22

## 2021-11-11 RX ADMIN — EZETIMIBE 10 MG: 10 TABLET ORAL at 16:47

## 2021-11-11 RX ADMIN — ONDANSETRON 4 MG: 2 INJECTION INTRAMUSCULAR; INTRAVENOUS at 03:18

## 2021-11-11 RX ADMIN — Medication 100 MG: at 04:55

## 2021-11-11 RX ADMIN — OCTREOTIDE ACETATE 100 MCG: 100 INJECTION, SOLUTION INTRAVENOUS; SUBCUTANEOUS at 07:36

## 2021-11-11 RX ADMIN — PIPERACILLIN SODIUM AND TAZOBACTAM SODIUM 3.38 G: 3; .375 INJECTION, POWDER, FOR SOLUTION INTRAVENOUS at 14:25

## 2021-11-11 ASSESSMENT — PAIN DESCRIPTION - PAIN TYPE
TYPE: ACUTE PAIN
TYPE: ACUTE PAIN

## 2021-11-11 ASSESSMENT — ENCOUNTER SYMPTOMS
SORE THROAT: 0
DIARRHEA: 0
SHORTNESS OF BREATH: 0
SEIZURES: 0
DIZZINESS: 0
CONSTIPATION: 0
NAUSEA: 0
EYE DISCHARGE: 0
BLURRED VISION: 0
PALPITATIONS: 0
COUGH: 0
FEVER: 0
DOUBLE VISION: 0
FOCAL WEAKNESS: 0
VOMITING: 0
MYALGIAS: 1
WEAKNESS: 1
ABDOMINAL PAIN: 1

## 2021-11-11 NOTE — PROGRESS NOTES
AA&Ox4.    SpO2 93% on RA. Denies SOB.    Reporting 10/10 pain. Medicated per MAR.    SKIN MLI with prevena, CDI. IR drains x2 covered with gauze and hypafix dressing saturated, changed to urostomy bag, CDI.     Drains RLQ IR drain compressed to self suction. Draining thick green output    Tolerating NPO diet. Medicated for N/V per MAR.    + void.    + BM / LBM 11/03/2021.    Pt ambulates/up with Stand by assist and FFW.    All needs met at this time. Call light within reach. Pt calls appropriately.

## 2021-11-11 NOTE — PROGRESS NOTES
"Surgical Oncology Progress Note    Subjective:  No acute events.  States had some issues with pain overnight.  On TPN.  Ostomy appliance in place to collect drainage from prior drain site    Objective:  /78   Pulse 71   Temp 37.3 °C (99.2 °F) (Temporal)   Resp 18   Ht 1.6 m (5' 2.99\")   Wt 69.6 kg (153 lb 7 oz)   LMP 01/01/2009   SpO2 93%   BMI 27.19 kg/m²       Intake/Output Summary (Last 24 hours) at 11/11/2021 1428  Last data filed at 11/11/2021 0715  Gross per 24 hour   Intake 0 ml   Output 2090 ml   Net -2090 ml        Net IO Since Admission: -7,484.53 mL [11/11/21 1428]     Exam:  General: AAOx3, appropriate in conversation    Pulmonary: NC in place, no increased WOB  CV: hemodynamically acceptable   Abdomen:  Soft, appropriately TTP - incision covered with provena wound vac.  MARTHA x2 with thick, bilious fluid. Leaking around one of the drains - ostomy appliance in place.  Additional ostomy appliance around prior drain site.  All drains and appliances draining thin bilious/brown fluid.  No mal-odor.    MSK: No peripheral edema, extremities warm       Assessment/Plan:  Patient is a 67 y/o F admitted after duodenal perforation following ERCP - found to have large retroperitoneal abscess which failed conservative management with IR drains.  OR 11/5/21 for ex-lap, drainage of intra-abdominal and retroperitoneal abscess, drain placement     POD#: 5     -Continue care per primary team   -Continue NPO/TPN  -Octreotide started yesterday   -Will need to continue strict I/O of the drains and ostomy appliances as this will help guide clinical management  -Order placed to culture fluid draining from prior drain site.  Discussed with RN  -Will continue to follow     Plan of care has been discussed with patient who is in agreement with the plan.  Bedside RN updated.     Lisy Vann MD  November 11, 2021, 2:28 PM      "

## 2021-11-11 NOTE — WOUND TEAM
Wound RN's in to check ostomy appliances x2 on patient abdomen, both appliances are clean, dry, intact. Patient requesting wound RN's do not change appliances at this time since intact, and come back to check and change tomorrow.

## 2021-11-11 NOTE — CARE PLAN
The patient is Stable - Low risk of patient condition declining or worsening    Shift Goals  Clinical Goals: pain control  Patient Goals: rest  Family Goals: get her better    Progress made toward(s) clinical / shift goals:      Problem: Pain - Standard  Goal: Alleviation of pain or a reduction in pain to the patient’s comfort goal  Outcome: Progressing   Pt was educated on 0-10 pain scale, non-pharm methods of pain relief and MAR. Pt demonstrates understanding by rating pain as a 8 on 0-10 pain scale. Pt states that their pain is well controlled and declines pain medication.     Problem: Fall Risk  Goal: Patient will remain free from falls  Outcome: Progressing   Pt demonstrates appropriate use of call light to alert staff to needs.    Patient is not progressing towards the following goals:

## 2021-11-11 NOTE — DISCHARGE PLANNING
Anticipated Discharge Disposition: Acute Rehab VS Home with TPN and HHC.    Action: This case was discussed today during IDT Rounds. Per Mark, LAMAR.P.R.N, GI will determinate if patient will discharge home on TPN.    LSW explained, update referral to HHC and Infusion will need to be sent if patient discharges home.    Barriers to Discharge: None.    Plan: As Above. Awaiting recommendations from the medical team.

## 2021-11-11 NOTE — PROGRESS NOTES
"    Physical Medicine and Rehabilitation Consultation              Date of initial consultation: 11/10/2021  Consulting provider: ALEX Keith  Reason for consultation: assess for acute inpatient rehab appropriateness  LOS: 27 Day(s)    Chief complaint: nausea    HPI: The patient is a 66 y.o. right hand dominant female with a past medical history of hypertension, hyperlipidemia, arthritis;  who presented on 10/15/2021  1:40 AM with abdominal pain.  Patient was initially admitted to [facility] with abdominal pain after recent ERCP with polypectomy and sphincterotomy.  Patient was found to have very large intra-abdominal fluid collection, multiple IR drains were placed but her infection worsened.  Patient was transferred to [facility] for escalation of her surgical needs.  She underwent ex lap with I&D of multiple loculated abscesses and debridement of necrotizing fasciitis with Dr. Mari Phillips on 11/5/2021.  Cultures grew Stenotrophomonas maltophilia, mixed yeast, E faecalis, E coli and Chryseobacterium.  ID has been following.  Per notes she continues to have significant drainage from prior right upper quadrant drain site, surgery recommending ostomy bag placement for skin protection, n.p.o. status and TPN for 3 to 5 days.  Output has decreased with n.p.o. status    The patient currently reports abdominal pain and nausea.  Patient also reports significant lethargy and feeling \"drugged\".  Patient reports she is voiding well, and had her Corae removed recently.  She has multiple drain sites on her right abdomen which are draining stool.  No formal bowel movement recently.  Patient does not feel she could currently tolerate IPR level of therapy but is interested if she starts feeling better soon.  She is currently on TPN.    11/11/2021  Patient is complaining of severe pain overnight, chasing her pain with PRNs and not feeling well controlled. We discussed long acting medication yesterday which she didn't want to do, but " today would like to try it for better coverage. Her morphine Eq yesterday was 83. We also discussed going home vs rehab, and I strongly recommend rehab when she is feeling better. She does not have the tolerance for IPR at this time.     ROS  Pertinent positives are mentioned in the HPI, all others reviewed and are negative.    Social Hx:  1 SH  2 ARYA  With: Spouse    Employment: Retired nurse    THERAPY:  Restrictions: Fall risk  PT: Functional mobility   11/9: Walking 20 feet x 2 at min assist with front wheel walker    OT: ADLs  11/9: Max assist lower body dressing  11/10: Max assit LBD, min A UBD    SLP:   None    IMAGING:  CT abdomen pelvis 11/8/2021  1. The components in the gallbladder fossa and right flank of the complex fluid collection are mostly resolved. There is still a small residual fluid collection in the perinephric space surrounding the inferior right kidney. Right lower quadrant MARTHA drain   and right upper quadrant catheter are outside of this residual collection.  2. Air-fluid levels throughout the colon could relate to ileus.  3. Bilateral pleural effusions with bibasilar atelectasis.  4. Pneumobilia.    PROCEDURES:  Jaden Simon MD [id number]  Drainage of retroperitoneal abscess, peritoneal abscess and necrotizing fasciitis    Ant Randhawa MD 11-21 CT-guided placement of drain into pancreatic pseudocyst    Darwin Conway MD 10/27/2021 left upper quadrant drain removal, right upper quadrant drain upsize    Chavez Pringle M.D. 10/25/2021 abscess drainage to right lower quadrant    Ant Randhawa MD 10/16/2021 CT-guided drainage of large pancreatic pseudocyst    Geovanni Grimes [place] [id number] CT-guided drainage of extensive peritoneal fluid collection    PMH:  Past Medical History:   Diagnosis Date   • Allergy, unspecified not elsewhere classified    • Anemia     not currently   • Anesthesia     PONV (Demerol/ Dilaudid)   • Arthritis     bilateral shoulders,sacrum, osteo   • Backpain      coccyx and sacrum   • Bronchitis 2010   • Cataract     bilateral IOLI   • Degeneration of cervical intervertebral disc     C5-6,C6-7   • Dental disorder     partial upper and lower   • Heart burn    • Hiatus hernia syndrome     not a problem after gastric sleeve   • High cholesterol     resolved after gastric surgery   • Hyperlipidemia    • Hypertension     off all medication, reveresed after gastric sleeve   • Muscle disorder    • Pain 05/16/2018    low back and SI joints and sacrum   • Reactive airway disease     rescue inhaler not needed unless with bronchitis       PSH:  Past Surgical History:   Procedure Laterality Date   • PB EXPLORATORY OF ABDOMEN  11/5/2021    Procedure: LAPAROTOMY, EXPLORATORY;  Surgeon: Jaden Cook M.D.;  Location: [facility];  Service: General   • PB ULTRASONIC GUIDANCE, INTRAOPERATIVE  11/5/2021    Procedure: ULTRASOUND GUIDANCE;  Surgeon: Jaden Cook M.D.;  Location: [facility];  Service: General   • ABDOMINAL ABSCESS DRAINAGE  11/5/2021    Procedure: DRAINAGE, ABSCESS, ABDOMEN - PERITONEAL AND RETROPERITONEAL;  Surgeon: Jaden Cook M.D.;  Location: [facility];  Service: General   • PB ERCP,DIAGNOSTIC  10/1/2021    Procedure: ERCP (ENDOSCOPIC RETROGRADE CHOLANGIOPANCREATOGRAPHY);  Surgeon: Fausto Casas M.D.;  Location: [facility];  Service: Gastroenterology   • COLONOSCOPY  8/8/2018    Procedure: COLONOSCOPY;  Surgeon: Shukri Condon M.D.;  Location: [facility];  Service: General   • GASTROSCOPY  5/23/2018    Procedure: GASTROSCOPY;  Surgeon: Fausto Casas M.D.;  Location: [facility];  Service: Gastroenterology   • EGD W/ENDOSCOPIC ULTRASOUND  5/23/2018    Procedure: EGD W/ENDOSCOPIC ULTRASOUND- RADIAL UPPER;  Surgeon: Fausto Casas M.D.;  Location: [facility];  Service: Gastroenterology   • CATARACT PHACO WITH IOL Left 4/18/2017     Procedure: CATARACT PHACO WITH IOL;  Surgeon: Stuart Estevez M.D.;  Location: SURGERY SAME DAY [facility];  Service:    • CATARACT PHACO WITH IOL Right 4/4/2017    Procedure: CATARACT PHACO WITH IOL;  Surgeon: Stuart Estevez M.D.;  Location: SURGERY [facility];  Service:    • GASTRIC SLEEVE LAPAROSCOPY  10/19/2016    Procedure: GASTRIC SLEEVE LAPAROSCOPY, HIATAL HERNIA;  Surgeon: Shukri Condon M.D.;  Location: SURGERY [facility];  Service:    • LIVER BIOPSY LAPAROSCOPIC  10/19/2016    Procedure: LIVER BIOPSY LAPAROSCOPIC;  Surgeon: Shukri Condon M.D.;  Location: SURGERY [facility];  Service:    • GASTROSCOPY N/A 8/26/2016    Procedure: GASTROSCOPY;  Surgeon: Shukri Condon M.D.;  Location: SURGERY [facility];  Service:    • ROTATOR CUFF REPAIR Right 7/7/2016   • ROTATOR CUFF REPAIR Left 5/2015   • HYSTERECTOMY ROBOTIC  1/2/2009    Performed by MEG VILLEGAS at SURGERY [facility]   • LUMBAR FUSION ANTERIOR  2007    Dr Hillman, L3-S1 fusion   • CHOLECYSTECTOMY  1996    laparoscopic   • TUBAL LIGATION  1985   • GASTRIC RESECTION  1982    gastric stapling   • TONSILLECTOMY AND ADENOIDECTOMY  1967   • PRIMARY C SECTION  1976, 1979, 1985    x3       FHX:  Family History   Problem Relation Age of Onset   • Stroke Mother    • Cancer Father         larynx   • Diabetes Brother        Medications:  Current Facility-Administered Medications   Medication Dose   • octreotide (SANDOSTATIN) injection 100 mcg  100 mcg   • oxyCODONE immediate-release (ROXICODONE) tablet 5 mg  5 mg    Or   • oxyCODONE immediate release (ROXICODONE) tablet 10 mg  10 mg   • MD Alert...TPN per Pharmacy     • TPN Adult Central Line     • sulfamethoxazole-trimethoprim (BACTRIM DS) 800-160 MG tablet 1 Tablet  1 Tablet   • thiamine (Vitamin B-1) tablet 100 mg  100 mg   • ondansetron (ZOFRAN) syringe/vial injection 4 mg  4 mg   • enoxaparin (LOVENOX) inj 40 mg  40 mg   • HYDROmorphone (Dilaudid) injection 0.5 mg  0.5 mg  "  • loperamide (IMODIUM) capsule 2 mg  2 mg   • piperacillin-tazobactam (ZOSYN) 3.375 g in  mL IVPB  3.375 g   • ondansetron (ZOFRAN ODT) dispertab 4 mg  4 mg   • polyethylene glycol/lytes (MIRALAX) PACKET 1 Packet  1 Packet   • acetaminophen (Tylenol) tablet 650 mg  650 mg   • ezetimibe (ZETIA) tablet 10 mg  10 mg   • gabapentin (NEURONTIN) capsule 600 mg  600 mg   • micafungin (MYCAMINE) 100 mg in  mL ivpb  100 mg   • vitamin D3 (cholecalciferol) tablet 1,000 Units  1,000 Units       Allergies:  Allergies   Allergen Reactions   • Ampicillin Rash     Rash  Tolerates Zosyn 10/21   • Cephalexin Diarrhea, Vomiting and Nausea     .   • Clindamycin Vomiting     \"cleocin\"   • Codeine      Severe stomach pain, cramps, spasms   • Demerol Vomiting   • Levofloxacin Unspecified     Numbness     • Tetracyclines Rash     .   • Tizanidine Itching   • Hydromorphone Vomiting and Nausea   • Morphine Vomiting   • Pcn [Penicillins] Itching     Tolerates Zosyn 10/21   • Sulfa Drugs Itching         Physical Exam:  Vitals: /78   Pulse 71   Temp 37.3 °C (99.2 °F) (Temporal)   Resp 18   Ht 1.6 m (5' 2.99\")   Wt 69.6 kg (153 lb 7 oz)   SpO2 93%   Gen: NAD  Head:  NC/AT  Eyes/ Nose/ Mouth: moist mucous membranes  Cardio: RRR, good distal perfusion, warm extremities  Pulm: normal respiratory effort, no cyanosis   Abd: Large midline incision covered with wound VAC, right abdomen has 2 colostomy bags in a drain site which are all draining stool colored fluid  Ext: No peripheral edema. No calf tenderness. No clubbing.    Labs: Reviewed and significant for   Recent Labs     11/09/21  0600 11/10/21  0445   RBC 3.03* 2.77*   HEMOGLOBIN 8.7* 8.1*   HEMATOCRIT 26.7* 24.5*   PLATELETCT 256 243     Recent Labs     11/09/21  0600 11/10/21  0445 11/11/21  0411   SODIUM 131* 130* 131*   POTASSIUM 4.2 4.2 4.3   CHLORIDE 99 99 99   CO2 24 23 25   GLUCOSE 94 113* 146*   BUN 10 11 12   CREATININE 0.52 0.64 0.61   CALCIUM 7.9* 7.7* " 7.5*     Recent Results (from the past 24 hour(s))   POCT glucose device results    Collection Time: 11/10/21 12:22 PM   Result Value Ref Range    Glucose - Accu-Ck 153 (H) 65 - 99 mg/dL   POCT glucose device results    Collection Time: 11/11/21  4:00 AM   Result Value Ref Range    Glucose - Accu-Ck 142 (H) 65 - 99 mg/dL   Comp Metabolic Panel    Collection Time: 11/11/21  4:11 AM   Result Value Ref Range    Sodium 131 (L) 135 - 145 mmol/L    Potassium 4.3 3.6 - 5.5 mmol/L    Chloride 99 96 - 112 mmol/L    Co2 25 20 - 33 mmol/L    Anion Gap 7.0 7.0 - 16.0    Glucose 146 (H) 65 - 99 mg/dL    Bun 12 8 - 22 mg/dL    Creatinine 0.61 0.50 - 1.40 mg/dL    Calcium 7.5 (L) 8.5 - 10.5 mg/dL    AST(SGOT) 33 12 - 45 U/L    ALT(SGPT) 20 2 - 50 U/L    Alkaline Phosphatase 62 30 - 99 U/L    Total Bilirubin 0.2 0.1 - 1.5 mg/dL    Albumin 2.1 (L) 3.2 - 4.9 g/dL    Total Protein 4.7 (L) 6.0 - 8.2 g/dL    Globulin 2.6 1.9 - 3.5 g/dL    A-G Ratio 0.8 g/dL   Magnesium    Collection Time: 11/11/21  4:11 AM   Result Value Ref Range    Magnesium 2.0 1.5 - 2.5 mg/dL   Phosphorus    Collection Time: 11/11/21  4:11 AM   Result Value Ref Range    Phosphorus 2.2 (L) 2.5 - 4.5 mg/dL   ESTIMATED GFR    Collection Time: 11/11/21  4:11 AM   Result Value Ref Range    GFR If African American >60 >60 mL/min/1.73 m 2    GFR If Non African American >60 >60 mL/min/1.73 m 2         ASSESSMENT:  Patient is a 66 y.o. female admitted with multiple abdominal abscesses now s/p IR drain placements    [facility] Code / Diagnosis to Support: 0017.8 - Medically Complex: Medical/Surgical Complications    Rehabilitation: Impaired ADLs and mobility  Questionable tolerance for IPR at this time, however she qualifies for inpatient rehab based on needs for PT, OT.  Patient will also benefit from family training.  Patient has a good discharge situation which will be home with spouse.     Barriers to transfer include: Insurance authorization, TCCs to verify disposition, medical  clearance and bed availability     Additional Recommendations:    - patient is strongly recommended to come to IPR when she is feeling better. She currently does not have the tolerance for 3hrs of therapy 5 days a week but is expected to improve. She is high risk for complications and would benefit from close physician supervision while undergoing rehabilitative therapy.   -Currently on Zosyn 3 times daily through 11/25, Bactrim twice daily through 11/24 and Micafungin daily through 11/25.   -Not medically cleared, does not have tolerance to participate in 3 hours of therapy a day, high risk for complication with intense therapy at this time.  Patient will be considered for IPR when her abdominal drains have been removed, wounds are healed, and she is stable on oral diet.   -PMR to follow in the periphery for rehab appropriateness, please reach out with questions or request for medical management      Pain management: poor control of pain, patient is chasing her pain with PRNs  - Total MEDD yesterday was 83  - Starting Oxycontin 10mg BID (30 MEDDs)  - Continue with PRN oxycodone (patient counseled to start with 5mg if able)  - Avoid IV dilaudid (I am keeping it on the MAR for emergency use if needed but please avoid use)  - continue Gabapentin 600mg BID  - PMR will continue to monitor and make medication changes   - Consider starting gi ppx due to NPO except pills status, if compatible with abx        Medical Complexity:  Anemia  Hyponatremia  Infection      DVT PPX: Lovenox 40 mg injection daily      Thank you for allowing us to participate in the care of this patient.     Patient was seen for 45 minutes on unit/floor of which > 50% of time was spent on counseling and coordination of care regarding the above, including prognosis, risk reduction, benefits of treatment, and options for next stage of care.    Wil Jackson, DO   Physical Medicine and Rehabilitation     Please note that this dictation was created using  voice recognition software. I have made every reasonable attempt to correct obvious errors, but there may be errors of grammar and possibly content that I did not discover before finalizing the note.

## 2021-11-11 NOTE — DISCHARGE PLANNING
Following for post acute services monitoring tolerance to therapy intervention. Cont bactrim, Zosyn, and Micafungin per ID for 4-week course with stop date of 11/24.  Repeat imaging prior to discontinuation. On TPN will discuss with administration.

## 2021-11-11 NOTE — PROGRESS NOTES
Hospital Medicine Daily Progress Note    Date of Service  11/11/2021    Chief Complaint  Nils Alfonso is a 66 y.o. female admitted 10/15/2021 with abdominal pain    Hospital Course  Initially admitted to [facility] for evaluation of abdominal pain after recent ERCP with polypectomy and sphincterotomy and noted to have large intra-abdominal fluid collection. Multiple IR drains had been placed, but her infection worsened.  She was transferred to [facility] for escalation of her surgical needs. ID has been following. She underwent ex lap w I/D of multiple loculated abscesses and debridement of nec fasciitis w Dr. ST 11/5/21. Prev cx w Stenotrophomonas maltophilia, mixed yeast, E faecalis, E coli and Chryseobacterium in the blood.    Interval Problem Update  11/9: Continues to have significant drainage from prior right upper quadrant drain site.  Surgery team recommending ostomy bag placement for skin protection and strict monitoring of output.  Also recommending n.p.o. status and TPN for 3 to 5 days.  Patient otherwise remained afebrile with stable vital signs.  She does request removal of her Corea catheter secondary to discomfort.  She is also experiencing uncontrolled pain.  Medications have been adjusted.  11/10: Pain is better controlled today on current regimen.  Output is decreased with n.p.o. status.  Continue TPN.  Updated infectious disease team on recent postop cultures.  She remains afebrile with stable vital signs.  11/11:  Uncontrolled pain overnight to drain site.  Improved this AM.  She does have persistent nausea, which she suspects is secondary to not eating.  Will discuss minimal oral intake with surgery.  She remains afebrile with stable vital signs.     Consultants/Specialty  general surgery, GI, infectious disease and interventional radiology    Code Status  Full Code    Disposition  Patient is not medically cleared.   Anticipate discharge to rehab vs home with home health.  Referral placed.  I have placed  the appropriate orders for post-discharge needs.    Review of Systems  Review of Systems   Constitutional: Positive for malaise/fatigue. Negative for fever.   HENT: Negative for congestion and sore throat.    Eyes: Negative for blurred vision, double vision and discharge.   Respiratory: Negative for cough and shortness of breath.    Cardiovascular: Negative for chest pain, palpitations and leg swelling.   Gastrointestinal: Positive for abdominal pain. Negative for constipation, diarrhea, nausea and vomiting.   Genitourinary: Negative for dysuria and urgency.   Musculoskeletal: Positive for myalgias.   Skin: Negative for itching and rash.   Neurological: Positive for weakness. Negative for dizziness, focal weakness and seizures.   All other systems reviewed and are negative.       Physical Exam  Temp:  [36.4 °C (97.6 °F)-37.4 °C (99.3 °F)] 37.3 °C (99.2 °F)  Pulse:  [63-75] 71  Resp:  [17-18] 18  BP: (107-142)/(58-78) 142/78  SpO2:  [93 %-96 %] 93 %    Physical Exam  Vitals and nursing note reviewed.   Constitutional:       General: She is not in acute distress.     Appearance: She is ill-appearing.   HENT:      Head: Normocephalic and atraumatic.      Nose: Nose normal.      Mouth/Throat:      Mouth: Mucous membranes are dry.      Pharynx: Oropharynx is clear. No oropharyngeal exudate.   Eyes:      General:         Right eye: No discharge.         Left eye: No discharge.      Pupils: Pupils are equal, round, and reactive to light.   Cardiovascular:      Rate and Rhythm: Normal rate and regular rhythm.      Heart sounds: Normal heart sounds. No murmur heard.      Pulmonary:      Effort: Pulmonary effort is normal. No respiratory distress.      Breath sounds: Normal breath sounds. No wheezing.   Abdominal:      General: There is no distension.      Palpations: Abdomen is soft.      Tenderness: There is abdominal tenderness.          Comments: Midline Prevena CDI; prior drain site RUQ with further cola/bilious  discharge; saturating through dressing   Musculoskeletal:      Cervical back: Normal range of motion and neck supple. No rigidity.      Right lower leg: No edema.      Left lower leg: No edema.      Comments: Generalized weakness   Skin:     General: Skin is warm and dry.   Neurological:      Mental Status: She is alert and oriented to person, place, and time.   Psychiatric:         Mood and Affect: Mood normal.       Fluids    Intake/Output Summary (Last 24 hours) at 11/11/2021 0951  Last data filed at 11/11/2021 0715  Gross per 24 hour   Intake 0 ml   Output 2090 ml   Net -2090 ml       Laboratory  Recent Labs     11/09/21  0600 11/10/21  0445   WBC 6.6 5.2   RBC 3.03* 2.77*   HEMOGLOBIN 8.7* 8.1*   HEMATOCRIT 26.7* 24.5*   MCV 88.1 88.4   MCH 28.7 29.2   MCHC 32.6* 33.1*   RDW 49.7 48.4   PLATELETCT 256 243   MPV 9.3 9.3     Recent Labs     11/09/21  0600 11/10/21  0445 11/11/21  0411   SODIUM 131* 130* 131*   POTASSIUM 4.2 4.2 4.3   CHLORIDE 99 99 99   CO2 24 23 25   GLUCOSE 94 113* 146*   BUN 10 11 12   CREATININE 0.52 0.64 0.61   CALCIUM 7.9* 7.7* 7.5*                   Imaging  CT-ABDOMEN-PELVIS WITH   Final Result         1. The components in the gallbladder fossa and right flank of the complex fluid collection are mostly resolved. There is still a small residual fluid collection in the perinephric space surrounding the inferior right kidney. Right lower quadrant MARTHA drain    and right upper quadrant catheter are outside of this residual collection.   2. Air-fluid levels throughout the colon could relate to ileus.   3. Bilateral pleural effusions with bibasilar atelectasis.   4. Pneumobilia.      CT-ABDOMEN-PELVIS WITH   Final Result      1.  Slight interval decrease in size of the large RIGHT peritoneal abscess which now contains 3 drains   2.  Decreased atelectasis with persistent opacity in the RIGHT lung base likely atelectasis rather than pneumonia   3.  Small LEFT renal cyst   4.  Prior cholecystectomy  with ongoing pneumobilia      CT-DRAINAGE-CATH EXCHANGE   Final Result         1. Organized pancreatic pseudocyst Drainage with CT guidance.      CT-ABDOMEN-PELVIS WITH   Final Result      1.  There has been interval placement of an additional inferior lateral pigtail catheter within the complex right abdominal abscess. The other 2 pigtail catheters are stable in appearance.   2.  There has been no significant interval decrease in size of the large complex loculated abscess collection in the right abdomen.   3.  There is no new fluid collection.   4.  Gallbladder is surgically absent with continued pneumobilia.   5.  Interval decrease in the dependent pleural effusion with minimal airspace disease, likely atelectasis.      IR-DRAINAGE-CATH EXCHANGE   Final Result      1.  Successful left-sided drainage catheter removal.   2.  Successful image guided superior right drainage catheter replacement.      Plan: Suction drainage. Monitor outputs. Please contact interventional radiology if there is any concern for tube dysfunction. Suggest routine tube maintenance at 3 months intervals if the tube remains in place.         CT-DRAIN-PERITONEAL   Final Result      1.  CT GUIDED PERITONEAL RLQ abdominal CATHETER DRAINAGE.   2.  THE CURRENT PLAN IS TO MONITOR DRAINAGE OUTPUT AND OBTAIN A FOLLOWUP CT SCAN IN 5-7 DAYS IF CLINICALLY INDICATED.      CT-ABDOMEN-PELVIS WITH   Final Result         1. Grossly unchanged irregular fluid collection occupying the right abdomen surrounding the right kidney and right colon extending to midline. Additional pigtail catheter in the component about midline anterior abdomen. Both pigtail catheters seem to be    within the collection.      2. Small right pleural effusion with right basilar atelectasis.               DX-CHEST-LIMITED (1 VIEW)   Final Result      1.  Right basilar atelectasis or consolidation. Small right pleural effusion not excluded.   2.  Atherosclerotic plaque.       CT-DRAIN-PERITONEAL   Final Result      1.  CT guided pancreatic pseudocyst catheter drainage.   2.  The current plan is to obtain a follow-up CT in 5-7 days..      IR-PICC LINE PLACEMENT W/ GUIDANCE > AGE 5   Final Result                  Ultrasound-guided PICC placement performed by qualified nursing staff as    above.          CT-ABDOMEN-PELVIS WITH    (Results Pending)        Assessment/Plan  * Bacteremia due to Gram-negative bacteria and fungemia- (present on admission)  Assessment & Plan  KALANI negative for signs of endocarditis  Cont bactrim, Zosyn, and Micafungin per ID for 4-week course with stop date of 11/24.  Repeat imaging prior to discontinuation.   Repeat Bld Cx neg      Generalized weakness  Assessment & Plan  PT/OT following  Recommending post-acute placement.  Rehab consult placed.     Low magnesium level  Assessment & Plan  11/8 IV replacement    Hypotension  Assessment & Plan  11/6: Stop lasix; Stop ACEI  11/7 started midodrine    Hypophosphatemia- (present on admission)  Assessment & Plan  Resolved with TPN  Monitoring per RD  11/6 Kphos; serum goal >3    Hypokalemia- (present on admission)  Assessment & Plan  Periodic monitoring  11/6 K bicarb x 1  Serum goal >4.0        Acute respiratory failure with hypoxia (HCC)- (present on admission)  Assessment & Plan  Resolved with diuresis  Likely volume overload with lower extremity edema present    Antibiotic-associated diarrhea  Assessment & Plan  Cdiff PCR negative  Loperamide PRN    Postprocedural retroperitoneal abscess- (present on admission)  Assessment & Plan  S/P Ex lap, I/D, debridement nec fasc 11/5/2021  Abx per ID  Follow cx  Pain control  Diet per sx  Antiemetics PRN  11/8 Repeat CT. Updated surgical team re: perinephric fluid collection. Further CT may be considered with IV and Oral contrast if needed if increased drainage or clinically deteriorates  11/9:  Strict monitoring of output from drain.  Ostomy bag placed on prior drain site  for further output monitoring. Initiate NPO status and TPN for 3-5 days.   11/10:  Output improving with decreased oral intake.   11/11:  Continue to treat pain.     Duodenal perforation (HCC)- (present on admission)  Assessment & Plan  After ERCP with retroperitoneal abscess and bacteremia  Uncertain etiology - ddx includes pancreatitis, bile leak, iatrogenic  Pepcid BID  11/9: Concern perforation may have reopened.  Surgery team recommending NPO status and TPN  11/10:  Start octreotide.     Hyponatremia- (present on admission)  Assessment & Plan  Recurrent, mild  Diuresis as tolerated    Normocytic anemia- (present on admission)  Assessment & Plan  Transfuse for Hgb <7  Avoid regular phlebotomy  Supplements per dietary: 11/6 added thiamine, 6 sm meals/d    Sepsis due to intraabdominal fluid collection/abscess, bacteremia and fungemia (HCC)- (present on admission)  Assessment & Plan  Sepsis resolved  Source intra-abdominal  Zosyn/Mycafungin/Bactrim DS and follow cx  ID following  Re-imaging per ID, IR, and Surgery  S/P Ex lap w I/D and debridement nec fasc 11/5/2021 11/8 Repeat CT. Updated surgical team re: perinephric fluid collection. Further CT may be considered with IV and Oral contrast if needed.  11/9:  She has remained afebrile over last 48 hours.  VSS.  Does not appear to be septic at this time.  Continue antibx.     Hyperlipidemia- (present on admission)  Assessment & Plan  Continue ezetimibe    Primary hypertension- (present on admission)  Assessment & Plan  Hypotensive 11/6 - stop lisinopril         VTE prophylaxis: enoxaparin ppx    I have performed a physical exam and reviewed and updated ROS and Plan today (11/11/2021). In review of yesterday's note (11/10/2021), there are no changes except as documented above.

## 2021-11-11 NOTE — PROGRESS NOTES
Pharmacy TPN Day # 3 (restart)       2021    Dosing Weight    60 kg (Adj BW)     TPN Restart  TPN goal: 3676-1035 kcal/day including 1.4-1.7 gm/kg/day Protein  TPN indication: perforated duodenum with high drain output                                                                Pertinent PMH: Admitted on 10/15/2021 for abdominal pain. Underwent polypectomy and a sphincterotomy on 10/01/2021.  CT completed on 10/08/202, found to have a large fluid collection and thickening of wall around the duodenum with retroperitoneal fluid collection.  IR placed drain. CT completed on 10/09/2021 with a significant amount of retroperitoneal air and fluid.  Pt was transferred to [facility] 10/14/21 for further evaluation. 2 drains have been placed for fluid collections surrounding the R kidney and colon.  Peritoneal drain cultures have demonstrated polymicrobial growth however blood cultures have remained negative.  ID is consulting.  Pt was on TPN from 10/14 - 10/24.  Taken to the OR on  for ex-lap with drainage of intra-abdominal and retroperitoneal abscess with drain placement.  Concerns that her duodenal perforation may have reopened.  Surgery has recommended patient remain NPO and resume TPN with hope that drainage decreases.     Temp (24hrs), Av.1 °C (98.8 °F), Min:36.4 °C (97.6 °F), Max:37.4 °C (99.3 °F)  .  Recent Labs     21  0600 11/10/21  0445 21  0411   SODIUM 131* 130* 131*   POTASSIUM 4.2 4.2 4.3   CHLORIDE 99 99 99   CO2 24 23 25   BUN 10 11 12   CREATININE 0.52 0.64 0.61   GLUCOSE 94 113* 146*   CALCIUM 7.9* 7.7* 7.5*   ASTSGOT  --  39 33   ALTSGPT  --  21 20   ALBUMIN  --  2.3* 2.1*   TBILIRUBIN  --  0.2 0.2   PHOSPHORUS 3.2 2.6 2.2*   MAGNESIUM 2.2 2.1 2.0     Accu-Checks  Recent Labs     11/10/21  1222 21  0400 21  1234   POCGLUCOSE 153* 142* 182*       Vitals:    11/10/21 1614 11/10/21 2000 11/11/21 0316 21 0715   BP: 129/58 107/63 135/65 142/78   Weight:        Height:           Intake/Output Summary (Last 24 hours) at 11/11/2021 1332  Last data filed at 11/11/2021 0715  Gross per 24 hour   Intake 0 ml   Output 2090 ml   Net -2090 ml       Orders Placed This Encounter   Procedures   • Diet NPO Restrict to: Sips with Medications     Standing Status:   Standing     Number of Occurrences:   1     Order Specific Question:   Diet NPO Restrict to:     Answer:   Sips with Medications [3]         TPN for past 72 hours (Show up to 3 orders; newest on the left. Changes between the two most recent orders are indicated.)     Start date and time   11/11/2021 2000 11/09/2021 2000 10/24/2021 2000      TPN Central Line Formulation [[id number]] TPN Adult Central Line [[id number]] TPN Central Line Formulation [[id number]]    Order Status  Active Completed Completed    Last Admin   New Bag at 11/10/2021 1952 by Mckenna Barney RANAM New Bag at 10/26/2021 1951 by Madison Pino RANAM       Base    Clinisol 15%  90 g 90 g 45 g    dextrose 70%  240 g 240 g 120 g    fat emulsion (soy) 20%  50 g 50 g --    fat emulsions 20%  -- -- 25 g       Additives    potassium phosphate  30 mmol 20 mmol 25 mmol    potassium chloride  -- 10 mEq --    sodium acetate  145 mEq 145 mEq 145 mEq    sodium chloride  77 mEq 77 mEq 77 mEq    magnesium sulfate  12 mEq 8 mEq 8 mEq    calcium GLUConate  9.3 mEq 9.3 mEq 9.3 mEq    M.T.E.-4  1 mL 1 mL 1 mL    M.V.I. Adult  10 mL 10 mL 10 mL    famotidine  40 mg 40 mg 40 mg       QS Base    sterile water  107.39 mL 356.71 mL 706.55 mL       Energy Contribution    Proteins  -- -- --    Dextrose  816 kcal 816 kcal 408 kcal    Lipids  500 kcal 500 kcal 250 kcal    Total  1,316 kcal 1,316 kcal 658 kcal       Electrolyte Ion Calculated Amount    Sodium  222 mEq 222 mEq 222 mEq    Potassium  44 mEq 39.33 mEq 36.67 mEq    Calcium  9.3 mEq 9.3 mEq 9.3 mEq    Magnesium  12 mEq 8 mEq 8 mEq    Aluminum  -- -- --    Phosphate  30 mmol 20 mmol 25 mmol    Chloride  77 mEq 87  mEq 77 mEq    Acetate  221.2 mEq 221.2 mEq 183.1 mEq       Other    Total Protein  90 g 90 g 45 g    Total Protein/kg  1.29 g/kg 1.36 g/kg 0.6 g/kg    Total Amino Acid  -- -- --    Total Amino Acid/kg  -- -- --    Glucose Infusion Rate  2.39 mg/kg/min 2.39 mg/kg/min 1.12 mg/kg/min    Osmolarity (Estimated)  -- -- --    Volume  1,440 mL 1,440 mL 1,440 mL    Rate  60 mL/hr 60 mL/hr 60 mL/hr    Dosing Weight  69.6 kg 66.4 kg 74.5 kg    Infusion Site  [facility]            This formula provides:  % kcal as lipids = 30  Grams protein/kg = 1.5  Non-protein calories = 1316  Kcals/kg = 28  Total daily calories = 1676    Comments:  1) Phos a little low at 2.2. Will increase KPhos from 20 mmol to 30 mmol. Since increasing KPhos will also increase K+, KCL will be removed from the TPN. Total K+ in TPN will go from 40 meq to 44 meq.  2) Mag also decreased somewhat but is WNL. Will increase Mag in TPN to 12 meq  3) Na+ still low but stable (131 today). TPN is at ND equivalent  4) Pepcid is in TPN formulation  5) Pt remains NPO except sips with meds  6) Will continue current TPN formulation except as noted above      Oswald RubinD

## 2021-11-12 LAB
ANION GAP SERPL CALC-SCNC: 7 MMOL/L (ref 7–16)
BUN SERPL-MCNC: 14 MG/DL (ref 8–22)
CALCIUM SERPL-MCNC: 7.6 MG/DL (ref 8.5–10.5)
CHLORIDE SERPL-SCNC: 99 MMOL/L (ref 96–112)
CO2 SERPL-SCNC: 26 MMOL/L (ref 20–33)
CREAT SERPL-MCNC: 0.57 MG/DL (ref 0.5–1.4)
GLUCOSE BLD-MCNC: 141 MG/DL (ref 65–99)
GLUCOSE SERPL-MCNC: 133 MG/DL (ref 65–99)
GRAM STN SPEC: NORMAL
PHOSPHATE SERPL-MCNC: 2.1 MG/DL (ref 2.5–4.5)
POTASSIUM SERPL-SCNC: 4.3 MMOL/L (ref 3.6–5.5)
SIGNIFICANT IND 70042: NORMAL
SITE SITE: NORMAL
SODIUM SERPL-SCNC: 132 MMOL/L (ref 135–145)
SOURCE SOURCE: NORMAL

## 2021-11-12 PROCEDURE — 770006 HCHG ROOM/CARE - MED/SURG/GYN SEMI*

## 2021-11-12 PROCEDURE — 700111 HCHG RX REV CODE 636 W/ 250 OVERRIDE (IP): Mod: JB | Performed by: SURGERY

## 2021-11-12 PROCEDURE — 700102 HCHG RX REV CODE 250 W/ 637 OVERRIDE(OP): Performed by: NURSE PRACTITIONER

## 2021-11-12 PROCEDURE — 97535 SELF CARE MNGMENT TRAINING: CPT

## 2021-11-12 PROCEDURE — A9270 NON-COVERED ITEM OR SERVICE: HCPCS | Performed by: NURSE PRACTITIONER

## 2021-11-12 PROCEDURE — A9270 NON-COVERED ITEM OR SERVICE: HCPCS | Performed by: FAMILY MEDICINE

## 2021-11-12 PROCEDURE — 700111 HCHG RX REV CODE 636 W/ 250 OVERRIDE (IP): Mod: JG | Performed by: INTERNAL MEDICINE

## 2021-11-12 PROCEDURE — 700111 HCHG RX REV CODE 636 W/ 250 OVERRIDE (IP): Performed by: INTERNAL MEDICINE

## 2021-11-12 PROCEDURE — 700111 HCHG RX REV CODE 636 W/ 250 OVERRIDE (IP): Performed by: FAMILY MEDICINE

## 2021-11-12 PROCEDURE — A9270 NON-COVERED ITEM OR SERVICE: HCPCS | Performed by: PHYSICAL MEDICINE & REHABILITATION

## 2021-11-12 PROCEDURE — 700111 HCHG RX REV CODE 636 W/ 250 OVERRIDE (IP): Performed by: ANESTHESIOLOGY

## 2021-11-12 PROCEDURE — 700101 HCHG RX REV CODE 250: Performed by: NURSE PRACTITIONER

## 2021-11-12 PROCEDURE — 700105 HCHG RX REV CODE 258: Performed by: INTERNAL MEDICINE

## 2021-11-12 PROCEDURE — 80048 BASIC METABOLIC PNL TOTAL CA: CPT

## 2021-11-12 PROCEDURE — 700111 HCHG RX REV CODE 636 W/ 250 OVERRIDE (IP): Performed by: NURSE PRACTITIONER

## 2021-11-12 PROCEDURE — 700102 HCHG RX REV CODE 250 W/ 637 OVERRIDE(OP): Performed by: FAMILY MEDICINE

## 2021-11-12 PROCEDURE — 700105 HCHG RX REV CODE 258: Performed by: NURSE PRACTITIONER

## 2021-11-12 PROCEDURE — A9270 NON-COVERED ITEM OR SERVICE: HCPCS | Performed by: INTERNAL MEDICINE

## 2021-11-12 PROCEDURE — 84100 ASSAY OF PHOSPHORUS: CPT

## 2021-11-12 PROCEDURE — 700102 HCHG RX REV CODE 250 W/ 637 OVERRIDE(OP): Performed by: INTERNAL MEDICINE

## 2021-11-12 PROCEDURE — 99232 SBSQ HOSP IP/OBS MODERATE 35: CPT | Performed by: NURSE PRACTITIONER

## 2021-11-12 PROCEDURE — 700102 HCHG RX REV CODE 250 W/ 637 OVERRIDE(OP): Performed by: PHYSICAL MEDICINE & REHABILITATION

## 2021-11-12 PROCEDURE — 99233 SBSQ HOSP IP/OBS HIGH 50: CPT | Performed by: PHYSICAL MEDICINE & REHABILITATION

## 2021-11-12 PROCEDURE — 99024 POSTOP FOLLOW-UP VISIT: CPT | Performed by: SURGERY

## 2021-11-12 RX ADMIN — OXYCODONE 5 MG: 5 TABLET ORAL at 10:23

## 2021-11-12 RX ADMIN — OXYCODONE HYDROCHLORIDE 10 MG: 10 TABLET, FILM COATED, EXTENDED RELEASE ORAL at 04:38

## 2021-11-12 RX ADMIN — ONDANSETRON 4 MG: 4 TABLET, ORALLY DISINTEGRATING ORAL at 04:38

## 2021-11-12 RX ADMIN — MICAFUNGIN SODIUM 100 MG: 100 INJECTION, POWDER, LYOPHILIZED, FOR SOLUTION INTRAVENOUS at 09:40

## 2021-11-12 RX ADMIN — PIPERACILLIN SODIUM AND TAZOBACTAM SODIUM 3.38 G: 3; .375 INJECTION, POWDER, FOR SOLUTION INTRAVENOUS at 04:38

## 2021-11-12 RX ADMIN — GABAPENTIN 600 MG: 300 CAPSULE ORAL at 04:38

## 2021-11-12 RX ADMIN — SULFAMETHOXAZOLE AND TRIMETHOPRIM 1 TABLET: 800; 160 TABLET ORAL at 04:38

## 2021-11-12 RX ADMIN — PIPERACILLIN SODIUM AND TAZOBACTAM SODIUM 3.38 G: 3; .375 INJECTION, POWDER, FOR SOLUTION INTRAVENOUS at 21:17

## 2021-11-12 RX ADMIN — PIPERACILLIN SODIUM AND TAZOBACTAM SODIUM 3.38 G: 3; .375 INJECTION, POWDER, FOR SOLUTION INTRAVENOUS at 13:57

## 2021-11-12 RX ADMIN — OCTREOTIDE ACETATE 100 MCG: 100 INJECTION, SOLUTION INTRAVENOUS; SUBCUTANEOUS at 21:17

## 2021-11-12 RX ADMIN — OXYCODONE HYDROCHLORIDE 10 MG: 10 TABLET, FILM COATED, EXTENDED RELEASE ORAL at 17:45

## 2021-11-12 RX ADMIN — Medication 100 MG: at 04:38

## 2021-11-12 RX ADMIN — Medication 1000 UNITS: at 04:38

## 2021-11-12 RX ADMIN — SULFAMETHOXAZOLE AND TRIMETHOPRIM 1 TABLET: 800; 160 TABLET ORAL at 17:46

## 2021-11-12 RX ADMIN — OXYCODONE 5 MG: 5 TABLET ORAL at 15:58

## 2021-11-12 RX ADMIN — ONDANSETRON 4 MG: 2 INJECTION INTRAMUSCULAR; INTRAVENOUS at 10:25

## 2021-11-12 RX ADMIN — GABAPENTIN 600 MG: 300 CAPSULE ORAL at 17:46

## 2021-11-12 RX ADMIN — OCTREOTIDE ACETATE 100 MCG: 100 INJECTION, SOLUTION INTRAVENOUS; SUBCUTANEOUS at 09:40

## 2021-11-12 RX ADMIN — ONDANSETRON 4 MG: 2 INJECTION INTRAMUSCULAR; INTRAVENOUS at 16:03

## 2021-11-12 RX ADMIN — EZETIMIBE 10 MG: 10 TABLET ORAL at 17:46

## 2021-11-12 RX ADMIN — ENOXAPARIN SODIUM 40 MG: 40 INJECTION SUBCUTANEOUS at 09:40

## 2021-11-12 RX ADMIN — CALCIUM GLUCONATE: 98 INJECTION, SOLUTION INTRAVENOUS at 21:02

## 2021-11-12 ASSESSMENT — ENCOUNTER SYMPTOMS
MYALGIAS: 1
DIARRHEA: 0
PALPITATIONS: 0
DIZZINESS: 0
BLURRED VISION: 0
SEIZURES: 0
WEAKNESS: 1
SHORTNESS OF BREATH: 0
FOCAL WEAKNESS: 0
NAUSEA: 0
ABDOMINAL PAIN: 1
VOMITING: 0
DOUBLE VISION: 0
EYE DISCHARGE: 0
CHILLS: 0
FEVER: 0

## 2021-11-12 ASSESSMENT — LIFESTYLE VARIABLES
EVER FELT BAD OR GUILTY ABOUT YOUR DRINKING: NO
HOW MANY TIMES IN THE PAST YEAR HAVE YOU HAD 5 OR MORE DRINKS IN A DAY: 0
TOTAL SCORE: 0
ALCOHOL_USE: NO
HAVE PEOPLE ANNOYED YOU BY CRITICIZING YOUR DRINKING: NO
HAVE YOU EVER FELT YOU SHOULD CUT DOWN ON YOUR DRINKING: NO
AVERAGE NUMBER OF DAYS PER WEEK YOU HAVE A DRINK CONTAINING ALCOHOL: 0
CONSUMPTION TOTAL: NEGATIVE
EVER HAD A DRINK FIRST THING IN THE MORNING TO STEADY YOUR NERVES TO GET RID OF A HANGOVER: NO
TOTAL SCORE: 0
ON A TYPICAL DAY WHEN YOU DRINK ALCOHOL HOW MANY DRINKS DO YOU HAVE: 0
TOTAL SCORE: 0

## 2021-11-12 ASSESSMENT — PATIENT HEALTH QUESTIONNAIRE - PHQ9
1. LITTLE INTEREST OR PLEASURE IN DOING THINGS: NOT AT ALL
SUM OF ALL RESPONSES TO PHQ9 QUESTIONS 1 AND 2: 0
2. FEELING DOWN, DEPRESSED, IRRITABLE, OR HOPELESS: NOT AT ALL

## 2021-11-12 ASSESSMENT — PAIN DESCRIPTION - PAIN TYPE
TYPE: ACUTE PAIN

## 2021-11-12 NOTE — DISCHARGE PLANNING
Agency/Facility Name: [facility]   Spoke To: Marsha   Outcome: Marsha requested to know if Pt was going to rehab or home.  Per LSW, this is unknown at this time.  Marsha stated she would continue to follow

## 2021-11-12 NOTE — PROGRESS NOTES
Hospital Medicine Daily Progress Note    Date of Service  11/12/2021    Chief Complaint  Nils Alfonso is a 66 y.o. female admitted 10/15/2021 with abdominal pain    Hospital Course  Initially admitted to [facility] for evaluation of abdominal pain after recent ERCP with polypectomy and sphincterotomy and noted to have large intra-abdominal fluid collection. Multiple IR drains had been placed, but her infection worsened.  She was transferred to [facility] for escalation of her surgical needs. ID has been following. She underwent ex lap w I/D of multiple loculated abscesses and debridement of nec fasciitis w Dr. ST 11/5/21. Prev cx w Stenotrophomonas maltophilia, mixed yeast, E faecalis, E coli and Chryseobacterium in the blood.    Interval Problem Update  11/9: Continues to have significant drainage from prior right upper quadrant drain site.  Surgery team recommending ostomy bag placement for skin protection and strict monitoring of output.  Also recommending n.p.o. status and TPN for 3 to 5 days.  Patient otherwise remained afebrile with stable vital signs.  She does request removal of her Corea catheter secondary to discomfort.  She is also experiencing uncontrolled pain.  Medications have been adjusted.  11/10: Pain is better controlled today on current regimen.  Output is decreased with n.p.o. status.  Continue TPN.  Updated infectious disease team on recent postop cultures.  She remains afebrile with stable vital signs.  11/11:  Uncontrolled pain overnight to drain site.  Improved this AM.  She does have persistent nausea, which she suspects is secondary to not eating.  Will discuss minimal oral intake with surgery.  She remains afebrile with stable vital signs.   11/12:  Cx from drain site pending.  Pain is better controlled on current regimen.  Nausea also improved.  Her only complaint is dry mouth and is requesting to be able to have minimal amounts of ginger ale.      Consultants/Specialty  general surgery, GI,  infectious disease and interventional radiology    Code Status  Full Code    Disposition  Patient is not medically cleared.   Anticipate discharge to rehab vs home with home health.  Patient has great support at home and currently anticipate home with home health when medically clear.   I have placed the appropriate orders for post-discharge needs.    Review of Systems  Review of Systems   Constitutional: Positive for malaise/fatigue. Negative for chills and fever.   HENT: Negative for congestion.    Eyes: Negative for blurred vision, double vision and discharge.   Respiratory: Negative for shortness of breath.    Cardiovascular: Negative for chest pain and palpitations.   Gastrointestinal: Positive for abdominal pain (improving). Negative for diarrhea, nausea and vomiting.   Genitourinary: Negative for dysuria and urgency.   Musculoskeletal: Positive for myalgias (improving).   Skin: Negative for rash.   Neurological: Positive for weakness. Negative for dizziness, focal weakness and seizures.   All other systems reviewed and are negative.       Physical Exam  Temp:  [36.1 °C (97 °F)-37.8 °C (100 °F)] 36.1 °C (97 °F)  Pulse:  [72-78] 72  Resp:  [18] 18  BP: (127-139)/(71-72) 139/72  SpO2:  [92 %-96 %] 96 %    Physical Exam  Vitals and nursing note reviewed.   Constitutional:       General: She is not in acute distress.     Appearance: She is ill-appearing.   HENT:      Head: Normocephalic and atraumatic.      Nose: Nose normal.      Mouth/Throat:      Mouth: Mucous membranes are dry.      Pharynx: Oropharynx is clear. No oropharyngeal exudate or posterior oropharyngeal erythema.   Eyes:      General: No scleral icterus.        Right eye: No discharge.         Left eye: No discharge.      Pupils: Pupils are equal, round, and reactive to light.   Cardiovascular:      Rate and Rhythm: Normal rate and regular rhythm.      Heart sounds: Normal heart sounds. No murmur heard.      Pulmonary:      Effort: Pulmonary effort  is normal. No respiratory distress.      Breath sounds: Normal breath sounds. No wheezing or rales.   Abdominal:      General: There is no distension.      Palpations: Abdomen is soft.      Tenderness: There is abdominal tenderness. There is no guarding.          Comments: Midline Prevena CDI; prior drain site RUQ with further cola/bilious discharge; saturating through dressing   Musculoskeletal:         General: No tenderness.      Cervical back: Normal range of motion and neck supple. No rigidity or tenderness.      Right lower leg: No edema.      Left lower leg: No edema.      Comments: Generalized weakness   Skin:     General: Skin is warm and dry.   Neurological:      Mental Status: She is alert and oriented to person, place, and time.   Psychiatric:         Mood and Affect: Mood normal.       Fluids    Intake/Output Summary (Last 24 hours) at 11/12/2021 0752  Last data filed at 11/12/2021 0413  Gross per 24 hour   Intake 0 ml   Output 2100 ml   Net -2100 ml       Laboratory  Recent Labs     11/10/21  0445   WBC 5.2   RBC 2.77*   HEMOGLOBIN 8.1*   HEMATOCRIT 24.5*   MCV 88.4   MCH 29.2   MCHC 33.1*   RDW 48.4   PLATELETCT 243   MPV 9.3     Recent Labs     11/10/21  0445 11/11/21  0411 11/12/21  0010   SODIUM 130* 131* 132*   POTASSIUM 4.2 4.3 4.3   CHLORIDE 99 99 99   CO2 23 25 26   GLUCOSE 113* 146* 133*   BUN 11 12 14   CREATININE 0.64 0.61 0.57   CALCIUM 7.7* 7.5* 7.6*                   Imaging  CT-ABDOMEN-PELVIS WITH   Final Result         1. The components in the gallbladder fossa and right flank of the complex fluid collection are mostly resolved. There is still a small residual fluid collection in the perinephric space surrounding the inferior right kidney. Right lower quadrant MARTHA drain    and right upper quadrant catheter are outside of this residual collection.   2. Air-fluid levels throughout the colon could relate to ileus.   3. Bilateral pleural effusions with bibasilar atelectasis.   4.  Pneumobilia.      CT-ABDOMEN-PELVIS WITH   Final Result      1.  Slight interval decrease in size of the large RIGHT peritoneal abscess which now contains 3 drains   2.  Decreased atelectasis with persistent opacity in the RIGHT lung base likely atelectasis rather than pneumonia   3.  Small LEFT renal cyst   4.  Prior cholecystectomy with ongoing pneumobilia      CT-DRAINAGE-CATH EXCHANGE   Final Result         1. Organized pancreatic pseudocyst Drainage with CT guidance.      CT-ABDOMEN-PELVIS WITH   Final Result      1.  There has been interval placement of an additional inferior lateral pigtail catheter within the complex right abdominal abscess. The other 2 pigtail catheters are stable in appearance.   2.  There has been no significant interval decrease in size of the large complex loculated abscess collection in the right abdomen.   3.  There is no new fluid collection.   4.  Gallbladder is surgically absent with continued pneumobilia.   5.  Interval decrease in the dependent pleural effusion with minimal airspace disease, likely atelectasis.      IR-DRAINAGE-CATH EXCHANGE   Final Result      1.  Successful left-sided drainage catheter removal.   2.  Successful image guided superior right drainage catheter replacement.      Plan: Suction drainage. Monitor outputs. Please contact interventional radiology if there is any concern for tube dysfunction. Suggest routine tube maintenance at 3 months intervals if the tube remains in place.         CT-DRAIN-PERITONEAL   Final Result      1.  CT GUIDED PERITONEAL RLQ abdominal CATHETER DRAINAGE.   2.  THE CURRENT PLAN IS TO MONITOR DRAINAGE OUTPUT AND OBTAIN A FOLLOWUP CT SCAN IN 5-7 DAYS IF CLINICALLY INDICATED.      CT-ABDOMEN-PELVIS WITH   Final Result         1. Grossly unchanged irregular fluid collection occupying the right abdomen surrounding the right kidney and right colon extending to midline. Additional pigtail catheter in the component about midline anterior  abdomen. Both pigtail catheters seem to be    within the collection.      2. Small right pleural effusion with right basilar atelectasis.               DX-CHEST-LIMITED (1 VIEW)   Final Result      1.  Right basilar atelectasis or consolidation. Small right pleural effusion not excluded.   2.  Atherosclerotic plaque.      CT-DRAIN-PERITONEAL   Final Result      1.  CT guided pancreatic pseudocyst catheter drainage.   2.  The current plan is to obtain a follow-up CT in 5-7 days..      IR-PICC LINE PLACEMENT W/ GUIDANCE > AGE 5   Final Result                  Ultrasound-guided PICC placement performed by qualified nursing staff as    above.          CT-ABDOMEN-PELVIS WITH    (Results Pending)        Assessment/Plan  * Bacteremia due to Gram-negative bacteria and fungemia- (present on admission)  Assessment & Plan  KALANI negative for signs of endocarditis  Cont bactrim, Zosyn, and Micafungin per ID for 4-week course with stop date of 11/24.  Repeat imaging prior to discontinuation.   Repeat Bld Cx neg      Generalized weakness  Assessment & Plan  PT/OT following  Recommending post-acute placement, however patient has great support at home.  Currently anticipate discharge to home with home health when medically clear per surgery team.      Low magnesium level  Assessment & Plan  11/8 IV replacement    Hypotension  Assessment & Plan  11/6: Stop lasix; Stop ACEI  11/7 started midodrine    Hypophosphatemia- (present on admission)  Assessment & Plan  Resolved with TPN  Monitoring per RD  11/6 Kphos; serum goal >3    Hypokalemia- (present on admission)  Assessment & Plan  Periodic monitoring  11/6 K bicarb x 1  Serum goal >4.0        Acute respiratory failure with hypoxia (HCC)- (present on admission)  Assessment & Plan  Resolved with diuresis  Likely volume overload with lower extremity edema present    Antibiotic-associated diarrhea  Assessment & Plan  Cdiff PCR negative  Loperamide PRN    Postprocedural retroperitoneal  abscess- (present on admission)  Assessment & Plan  S/P Ex lap, I/D, debridement nec fasc 11/5/2021  Abx per ID  Follow cx  Pain control  Diet per sx  Antiemetics PRN  11/8 Repeat CT. Updated surgical team re: perinephric fluid collection. Further CT may be considered with IV and Oral contrast if needed if increased drainage or clinically deteriorates  11/9:  Strict monitoring of output from drain.  Ostomy bag placed on prior drain site for further output monitoring. Initiate NPO status and TPN for 3-5 days.   11/10:  Output improving with decreased oral intake.   11/11:  Continue to treat pain. Discussed recent OR cx with ID and recommend continuing current regimen.   11/12:  Follow cx from drainage. Pain control.     Duodenal perforation (HCC)- (present on admission)  Assessment & Plan  After ERCP with retroperitoneal abscess and bacteremia  Uncertain etiology - ddx includes pancreatitis, bile leak, iatrogenic  Pepcid BID  11/9: Concern perforation may have reopened.  Surgery team recommending NPO status and TPN  11/10:  Start octreotide.     Hyponatremia- (present on admission)  Assessment & Plan  Recurrent, mild  Diuresis as tolerated    Normocytic anemia- (present on admission)  Assessment & Plan  Transfuse for Hgb <7  Avoid regular phlebotomy  Supplements per dietary: 11/6 added thiamine, 6 sm meals/d    Sepsis due to intraabdominal fluid collection/abscess, bacteremia and fungemia (HCC)- (present on admission)  Assessment & Plan  Sepsis resolved  Source intra-abdominal  Zosyn/Mycafungin/Bactrim DS and follow cx  ID following  Re-imaging per ID, IR, and Surgery  S/P Ex lap w I/D and debridement nec fasc 11/5/2021 11/8 Repeat CT. Updated surgical team re: perinephric fluid collection. Further CT may be considered with IV and Oral contrast if needed.  11/9:  She has remained afebrile over last 48 hours.  VSS.  Does not appear to be septic at this time.  Continue antibx.     Hyperlipidemia- (present on  admission)  Assessment & Plan  Continue ezetimibe    Primary hypertension- (present on admission)  Assessment & Plan  Hypotensive 11/6 - stop lisinopril         VTE prophylaxis: enoxaparin ppx    I have performed a physical exam and reviewed and updated ROS and Plan today (11/12/2021). In review of yesterday's note (11/11/2021), there are no changes except as documented above.

## 2021-11-12 NOTE — PROGRESS NOTES
Pharmacy TPN Day # 4 (restart)       2021    Dosing Weight   60 kg (Adj BW)     TPN Restart  TPN goal: 5285-9427 kcal/day including 1.4-1.7 gm/kg/day Protein  TPN indication: perforated duodenum with high drain output                                                                Pertinent PMH: Admitted on 10/15/2021 for abdominal pain. Underwent polypectomy and a sphincterotomy on 10/01/2021.  CT completed on 10/08/202, found to have a large fluid collection and thickening of wall around the duodenum with retroperitoneal fluid collection.  IR placed drain. CT completed on 10/09/2021 with a significant amount of retroperitoneal air and fluid.  Pt was transferred to [facility] 10/14/21 for further evaluation. 2 drains have been placed for fluid collections surrounding the R kidney and colon.  Peritoneal drain cultures have demonstrated polymicrobial growth however blood cultures have remained negative.  ID is consulting.  Pt was on TPN from 10/14 - 10/24.  Taken to the OR on  for ex-lap with drainage of intra-abdominal and retroperitoneal abscess with drain placement.  Concerns that her duodenal perforation may have reopened.  Surgery has recommended patient remain NPO and resume TPN with hope that drainage decreases.     Temp (24hrs), Av.9 °C (98.4 °F), Min:36.1 °C (97 °F), Max:37.8 °C (100 °F)  .  Recent Labs     11/10/21  0445 21  0411 21  0010   SODIUM 130* 131* 132*   POTASSIUM 4.2 4.3 4.3   CHLORIDE 99 99 99   CO2 23 25 26   BUN 11 12 14   CREATININE 0.64 0.61 0.57   GLUCOSE 113* 146* 133*   CALCIUM 7.7* 7.5* 7.6*   ASTSGOT 39 33  --    ALTSGPT 21 20  --    ALBUMIN 2.3* 2.1*  --    TBILIRUBIN 0.2 0.2  --    PHOSPHORUS 2.6 2.2* 2.1*   MAGNESIUM 2.1 2.0  --      Accu-Checks  Recent Labs     21  0400 21  1234 21  2357   POCGLUCOSE 142* 182* 141*       Vitals:    21 1629 21 1927 21 0413 21 0730   BP: 127/71 136/72 139/72 122/68   Weight:        Height:           Intake/Output Summary (Last 24 hours) at 11/12/2021 1324  Last data filed at 11/12/2021 0940  Gross per 24 hour   Intake 100 ml   Output 2100 ml   Net -2000 ml       Orders Placed This Encounter   Procedures   • Diet Order Diet: Clear Liquid (sips of clears)     Standing Status:   Standing     Number of Occurrences:   1     Order Specific Question:   Diet:     Answer:   Clear Liquid [10]     Comments:   sips of clears         TPN for past 72 hours (Show up to 3 orders; newest on the left. Changes between the two most recent orders are indicated.)     Start date and time   11/12/2021 2000 11/11/2021 2000 11/09/2021 2000      TPN Central Line Formulation [[id number]] TPN Central Line Formulation [[id number]] TPN Adult Central Line [[id number]]    Order Status  Active Last Dose in Progress Completed    Last Admin   New Bag at 11/11/2021 1955 by Mckenna Barney RANAM New Bag at 11/10/2021 1952 by Mckenna Barney RJULIAN.       Base    Clinisol 15%  90 g 90 g 90 g    dextrose 70%  240 g 240 g 240 g    fat emulsion (soy) 20%  50 g 50 g 50 g       Additives    potassium phosphate  36 mmol 30 mmol 20 mmol    potassium chloride  -- -- 10 mEq    sodium acetate  111 mEq 145 mEq 145 mEq    sodium chloride  111 mEq 77 mEq 77 mEq    magnesium sulfate  12 mEq 12 mEq 8 mEq    calcium GLUConate  9.3 mEq 9.3 mEq 9.3 mEq    M.T.E.-4  1 mL 1 mL 1 mL    M.V.I. Adult  10 mL 10 mL 10 mL    famotidine  40 mg 40 mg 40 mg       QS Base    sterile water  113.89 mL 107.39 mL 356.71 mL       Energy Contribution    Proteins  -- -- --    Dextrose  816 kcal 816 kcal 816 kcal    Lipids  500 kcal 500 kcal 500 kcal    Total  1,316 kcal 1,316 kcal 1,316 kcal       Electrolyte Ion Calculated Amount    Sodium  222 mEq 222 mEq 222 mEq    Potassium  52.8 mEq 44 mEq 39.33 mEq    Calcium  9.3 mEq 9.3 mEq 9.3 mEq    Magnesium  12 mEq 12 mEq 8 mEq    Aluminum  -- -- --    Phosphate  36 mmol 30 mmol 20 mmol    Chloride  111 mEq 77 mEq  87 mEq    Acetate  187.2 mEq 221.2 mEq 221.2 mEq       Other    Total Protein  90 g 90 g 90 g    Total Protein/kg  1.29 g/kg 1.29 g/kg 1.36 g/kg    Total Amino Acid  -- -- --    Total Amino Acid/kg  -- -- --    Glucose Infusion Rate  2.39 mg/kg/min 2.39 mg/kg/min 2.39 mg/kg/min    Osmolarity (Estimated)  -- -- --    Volume  1,440 mL 1,440 mL 1,440 mL    Rate  60 mL/hr 60 mL/hr 60 mL/hr    Dosing Weight  69.6 kg 69.6 kg 66.4 kg    Infusion Site  Central Central Central            This formula provides:  % kcal as lipids = 30  Grams protein/kg = 1.5  Non-protein calories = 1316  Kcals/kg = 28  Total daily calories = 1676    Comments:  1) Phos level decreased again to 2.1. Will increase KPhos again, now from 30 mmol to 36 mmol. This will increase K+ in TPN from 44 meq to 52 meq. Will check BMP and Phos tomorrow. If K+ level starts trending up then will need to utilize Neutra-Phos tabs. NaPhos IV solution cannot be used due to national shortage.   2) Na+ still slightly low at 132. Will keep TPN at NS equivalent. Will balance the Na+ salts  3) Pepcid is in the TPN formulation  4) Pt to start sips of clears  5) Will continue current TPN formulation except as noted above    Oswald RubinD

## 2021-11-12 NOTE — DOCUMENTATION QUERY
[facility]                                                                       Query Response Note      PATIENT:               KATERINA PATEL  ACCT #:                  [id number]  MRN:                     [id number]  :                      1955  ADMIT DATE:       10/8/2021 6:04 AM  DISCH DATE:        10/15/2021 1:11 AM  RESPONDING  PROVIDER #:        [id number]           QUERY TEXT:    Please clarify the following:    NOTE:  If an appropriate response is not listed below, please respond with a new note.    Thank you,    [email or web address]                Documentation indicators that raised basis for question:   H&P:  sepsis due to intra-abd fluid collection/abscess  ERCP 10/1    Consults 10/14/2021 (1)  History of Present Illness:    Unfortunately, she had postprocedural complications with ERCP pancreatitis    Progress Notes 10/10/2021 (1)  MDM (Assessment and Plan):    Postop complications-pancreatitis.    DS   Sepsis due to intraabdominal fluid collection/abscess    Treatment:  IV Antibiotic    Risk:  Sepsis, bacteremia  Options provided:   -- Fluid collection/abscess is unexpected and a complication of the procedure performed   -- Fluid collection/abscess is not a complication due to or associated with the procedure   -- Fluid collection/abscess is routinely expected and integral/inherent to the procedure performed   -- Fluid collection/abscess is related to another etiology, (please document underlying etiology if known)   -- Unable to determine      Query created by: Yady Camejo on 2021 11:33 AM    RESPONSE TEXT:    Fluid collection/abscess is unexpected and a complication of the procedure performed          Electronically signed by:  YUE TOURE MD 2021 5:52 PM

## 2021-11-12 NOTE — CARE PLAN
The patient is Stable - Low risk of patient condition declining or worsening    Shift Goals  Clinical Goals: pain control, avoid PRNs if possible  Patient Goals: rest  Family Goals: get her better    Progress made toward(s) clinical / shift goals:  Pain currently managed with scheduled pain meds.      Problem: Pain - Standard  Goal: Alleviation of pain or a reduction in pain to the patient’s comfort goal  Outcome: Progressing       Patient is not progressing towards the following goals:

## 2021-11-12 NOTE — PROGRESS NOTES
Assumed care of patient at 0700. Patient is alert and oriented, respirations are unlabored and regular on room air. Lung sounds clear throughout, diminished in bases. Abdomen soft, tender, hypoactive bowel sounds, x3 right abdominal drains. #1 RLQ MARTHA with ostomy bag over top due to leakage. #2 MARTHA inner RUQ, BRYAN. #3 over wound site to a down drain. All drains with brown/green output. Voiding with purewick, clear yellow output. Sacrum red/blanching, patient turns frequently in bed. Bed in lowest position, call light within reach, family at bedside, patient states no further needs at this time.

## 2021-11-12 NOTE — DISCHARGE PLANNING
[facility] Transitional [facility] Coordination    Rehab follow for post acute services.  Would welcome clarification of plan for TPN duration.  Per Dr. Jackson, patient does not yet have tolerance to participate in 3 hrs of therapy per day.  Will continue to follow.

## 2021-11-12 NOTE — PROGRESS NOTES
"    Physical Medicine and Rehabilitation Consultation              Date of initial consultation: 11/10/2021  Consulting provider: ALEX Keith  Reason for consultation: assess for acute inpatient rehab appropriateness  LOS: 28 Day(s)    Chief complaint: nausea    HPI: The patient is a 66 y.o. right hand dominant female with a past medical history of hypertension, hyperlipidemia, arthritis;  who presented on 10/15/2021  1:40 AM with abdominal pain.  Patient was initially admitted to [facility] with abdominal pain after recent ERCP with polypectomy and sphincterotomy.  Patient was found to have very large intra-abdominal fluid collection, multiple IR drains were placed but her infection worsened.  Patient was transferred to [facility] for escalation of her surgical needs.  She underwent ex lap with I&D of multiple loculated abscesses and debridement of necrotizing fasciitis with Dr. Mari Phillips on 11/5/2021.  Cultures grew Stenotrophomonas maltophilia, mixed yeast, E faecalis, E coli and Chryseobacterium.  ID has been following.  Per notes she continues to have significant drainage from prior right upper quadrant drain site, surgery recommending ostomy bag placement for skin protection, n.p.o. status and TPN for 3 to 5 days.  Output has decreased with n.p.o. status    The patient currently reports abdominal pain and nausea.  Patient also reports significant lethargy and feeling \"drugged\".  Patient reports she is voiding well, and had her Corea removed recently.  She has multiple drain sites on her right abdomen which are draining stool.  No formal bowel movement recently.  Patient does not feel she could currently tolerate IPR level of therapy but is interested if she starts feeling better soon.  She is currently on TPN.    11/11/2021  Patient is complaining of severe pain overnight, chasing her pain with PRNs and not feeling well controlled. We discussed long acting medication yesterday which she didn't want to do, but " today would like to try it for better coverage. Her morphine Eq yesterday was 83. We also discussed going home vs rehab, and I strongly recommend rehab when she is feeling better. She does not have the tolerance for IPR at this time.     11/12/2021  Pain under better control today.  Seen by Dr. Cook who has cleared her for sips of clears.  Plan is to monitor output over the next 2 to 3 weeks, possibly reimage next week if she is still in house.  She remains a candidate for IPR.    ROS  Pertinent positives are mentioned in the HPI, all others reviewed and are negative.    Social Hx:  1 SH  2 ARYA  With: Spouse    Employment: Retired nurse    THERAPY:  Restrictions: Fall risk  PT: Functional mobility   11/9: Walking 20 feet x 2 at min assist with front wheel walker    OT: ADLs  11/9: Max assist lower body dressing  11/10: Max assit LBD, min A UBD    SLP:   None    IMAGING:  CT abdomen pelvis 11/8/2021  1. The components in the gallbladder fossa and right flank of the complex fluid collection are mostly resolved. There is still a small residual fluid collection in the perinephric space surrounding the inferior right kidney. Right lower quadrant MARTHA drain   and right upper quadrant catheter are outside of this residual collection.  2. Air-fluid levels throughout the colon could relate to ileus.  3. Bilateral pleural effusions with bibasilar atelectasis.  4. Pneumobilia.    PROCEDURES:  Jaden Simon MD [id number]  Drainage of retroperitoneal abscess, peritoneal abscess and necrotizing fasciitis    Ant Randhawa MD 11-21 CT-guided placement of drain into pancreatic pseudocyst    Darwin Conway MD 10/27/2021 left upper quadrant drain removal, right upper quadrant drain upsize    Chavez Pringle M.D. 10/25/2021 abscess drainage to right lower quadrant    Ant Randhawa MD 10/16/2021 CT-guided drainage of large pancreatic pseudocyst    [place] [id number] CT-guided drainage of extensive peritoneal  fluid collection    PMH:  Past Medical History:   Diagnosis Date   • Allergy, unspecified not elsewhere classified    • Anemia     not currently   • Anesthesia     PONV (Demerol/ Dilaudid)   • Arthritis     bilateral shoulders,sacrum, osteo   • Backpain     coccyx and sacrum   • Bronchitis 2010   • Cataract     bilateral IOLI   • Degeneration of cervical intervertebral disc     C5-6,C6-7   • Dental disorder     partial upper and lower   • Heart burn    • Hiatus hernia syndrome     not a problem after gastric sleeve   • High cholesterol     resolved after gastric surgery   • Hyperlipidemia    • Hypertension     off all medication, reveresed after gastric sleeve   • Muscle disorder    • Pain 05/16/2018    low back and SI joints and sacrum   • Reactive airway disease     rescue inhaler not needed unless with bronchitis       PSH:  Past Surgical History:   Procedure Laterality Date   • PB EXPLORATORY OF ABDOMEN  11/5/2021    Procedure: LAPAROTOMY, EXPLORATORY;  Surgeon: Jaden Cook M.D.;  Location: [facility];  Service: General   • PB ULTRASONIC GUIDANCE, INTRAOPERATIVE  11/5/2021    Procedure: ULTRASOUND GUIDANCE;  Surgeon: Jaden Cook M.D.;  Location: [facility];  Service: General   • ABDOMINAL ABSCESS DRAINAGE  11/5/2021    Procedure: DRAINAGE, ABSCESS, ABDOMEN - PERITONEAL AND RETROPERITONEAL;  Surgeon: Jaden Cook M.D.;  Location: [facility];  Service: General   • PB ERCP,DIAGNOSTIC  10/1/2021    Procedure: ERCP (ENDOSCOPIC RETROGRADE CHOLANGIOPANCREATOGRAPHY);  Surgeon: Fausto Casas M.D.;  Location: [facility];  Service: Gastroenterology   • COLONOSCOPY  8/8/2018    Procedure: COLONOSCOPY;  Surgeon: Shukri Condon M.D.;  Location: [facility];  Service: General   • GASTROSCOPY  5/23/2018    Procedure: GASTROSCOPY;  Surgeon: Fausto Casas M.D.;  Location: [facility];  Service: Gastroenterology    • EGD W/ENDOSCOPIC ULTRASOUND  5/23/2018    Procedure: EGD W/ENDOSCOPIC ULTRASOUND- RADIAL UPPER;  Surgeon: Fausto Casas M.D.;  Location: SURGERY [facility];  Service: Gastroenterology   • CATARACT PHACO WITH IOL Left 4/18/2017    Procedure: CATARACT PHACO WITH IOL;  Surgeon: Stuart Estevez M.D.;  Location: SURGERY SAME DAY [facility];  Service:    • CATARACT PHACO WITH IOL Right 4/4/2017    Procedure: CATARACT PHACO WITH IOL;  Surgeon: Stuart Estevez M.D.;  Location: SURGERY [facility];  Service:    • GASTRIC SLEEVE LAPAROSCOPY  10/19/2016    Procedure: GASTRIC SLEEVE LAPAROSCOPY, HIATAL HERNIA;  Surgeon: Shukri Condon M.D.;  Location: SURGERY [facility];  Service:    • LIVER BIOPSY LAPAROSCOPIC  10/19/2016    Procedure: LIVER BIOPSY LAPAROSCOPIC;  Surgeon: Shukri Condon M.D.;  Location: SURGERY [facility];  Service:    • GASTROSCOPY N/A 8/26/2016    Procedure: GASTROSCOPY;  Surgeon: Shukri Condon M.D.;  Location: SURGERY [facility];  Service:    • ROTATOR CUFF REPAIR Right 7/7/2016   • ROTATOR CUFF REPAIR Left 5/2015   • HYSTERECTOMY ROBOTIC  1/2/2009    Performed by MEG VILLEGAS at [facility]   • LUMBAR FUSION ANTERIOR  2007    Dr Hillman, L3-S1 fusion   • CHOLECYSTECTOMY  1996    laparoscopic   • TUBAL LIGATION  1985   • GASTRIC RESECTION  1982    gastric stapling   • TONSILLECTOMY AND ADENOIDECTOMY  1967   • PRIMARY C SECTION  1976, 1979, 1985    x3       FHX:  Family History   Problem Relation Age of Onset   • Stroke Mother    • Cancer Father         larynx   • Diabetes Brother        Medications:  Current Facility-Administered Medications   Medication Dose   • oxyCODONE CR (OXYCONTIN) tablet 10 mg  10 mg   • TPN Central Line Formulation     • octreotide (SANDOSTATIN) injection 100 mcg  100 mcg   • oxyCODONE immediate-release (ROXICODONE) tablet 5 mg  5 mg    Or   • oxyCODONE immediate release (ROXICODONE) tablet 10 mg  10 mg   • MD Alert...TPN  "per Pharmacy     • sulfamethoxazole-trimethoprim (BACTRIM DS) 800-160 MG tablet 1 Tablet  1 Tablet   • thiamine (Vitamin B-1) tablet 100 mg  100 mg   • ondansetron (ZOFRAN) syringe/vial injection 4 mg  4 mg   • enoxaparin (LOVENOX) inj 40 mg  40 mg   • HYDROmorphone (Dilaudid) injection 0.5 mg  0.5 mg   • loperamide (IMODIUM) capsule 2 mg  2 mg   • piperacillin-tazobactam (ZOSYN) 3.375 g in  mL IVPB  3.375 g   • ondansetron (ZOFRAN ODT) dispertab 4 mg  4 mg   • polyethylene glycol/lytes (MIRALAX) PACKET 1 Packet  1 Packet   • acetaminophen (Tylenol) tablet 650 mg  650 mg   • ezetimibe (ZETIA) tablet 10 mg  10 mg   • gabapentin (NEURONTIN) capsule 600 mg  600 mg   • micafungin (MYCAMINE) 100 mg in  mL ivpb  100 mg   • vitamin D3 (cholecalciferol) tablet 1,000 Units  1,000 Units       Allergies:  Allergies   Allergen Reactions   • Ampicillin Rash     Rash  Tolerates Zosyn 10/21   • Cephalexin Diarrhea, Vomiting and Nausea     .   • Clindamycin Vomiting     \"cleocin\"   • Codeine      Severe stomach pain, cramps, spasms   • Demerol Vomiting   • Levofloxacin Unspecified     Numbness     • Tetracyclines Rash     .   • Tizanidine Itching   • Hydromorphone Vomiting and Nausea   • Morphine Vomiting   • Pcn [Penicillins] Itching     Tolerates Zosyn 10/21   • Sulfa Drugs Itching         Physical Exam:  Vitals: /68   Pulse 63   Temp 36.8 °C (98.2 °F) (Temporal)   Resp 18   Ht 1.6 m (5' 2.99\")   Wt 69.6 kg (153 lb 7 oz)   SpO2 97%   Gen: NAD  Head:  NC/AT  Eyes/ Nose/ Mouth: moist mucous membranes  Cardio: RRR, good distal perfusion, warm extremities  Pulm: normal respiratory effort, no cyanosis   Abd: Large midline incision covered with wound VAC, right abdomen has 2 colostomy bags in a drain site which are all draining stool colored fluid  Ext: No peripheral edema. No calf tenderness. No clubbing.    Labs: Reviewed and significant for   Recent Labs     11/10/21  0445   RBC 2.77*   HEMOGLOBIN 8.1* "   HEMATOCRIT 24.5*   PLATELETCT 243     Recent Labs     11/10/21  0445 11/11/21  0411 11/12/21  0010   SODIUM 130* 131* 132*   POTASSIUM 4.2 4.3 4.3   CHLORIDE 99 99 99   CO2 23 25 26   GLUCOSE 113* 146* 133*   BUN 11 12 14   CREATININE 0.64 0.61 0.57   CALCIUM 7.7* 7.5* 7.6*     Recent Results (from the past 24 hour(s))   CULTURE WOUND W/ GRAM STAIN    Collection Time: 11/11/21  4:52 PM    Specimen: Wound   Result Value Ref Range    Significant Indicator NEG     Source WND     Site retroperitoneal abscess drain     Culture Result -     Gram Stain Result       Rare WBCs.  Many Gram positive cocci.  Few Yeast.     GRAM STAIN    Collection Time: 11/11/21  4:52 PM    Specimen: Wound   Result Value Ref Range    Significant Indicator .     Source WND     Site retroperitoneal abscess drain     Gram Stain Result       Rare WBCs.  Many Gram positive cocci.  Few Yeast.     POCT glucose device results    Collection Time: 11/11/21 11:57 PM   Result Value Ref Range    Glucose - Accu-Ck 141 (H) 65 - 99 mg/dL   BASIC METABOLIC PANEL    Collection Time: 11/12/21 12:10 AM   Result Value Ref Range    Sodium 132 (L) 135 - 145 mmol/L    Potassium 4.3 3.6 - 5.5 mmol/L    Chloride 99 96 - 112 mmol/L    Co2 26 20 - 33 mmol/L    Glucose 133 (H) 65 - 99 mg/dL    Bun 14 8 - 22 mg/dL    Creatinine 0.57 0.50 - 1.40 mg/dL    Calcium 7.6 (L) 8.5 - 10.5 mg/dL    Anion Gap 7.0 7.0 - 16.0   PHOSPHORUS    Collection Time: 11/12/21 12:10 AM   Result Value Ref Range    Phosphorus 2.1 (L) 2.5 - 4.5 mg/dL   ESTIMATED GFR    Collection Time: 11/12/21 12:10 AM   Result Value Ref Range    GFR If African American >60 >60 mL/min/1.73 m 2    GFR If Non African American >60 >60 mL/min/1.73 m 2         ASSESSMENT:  Patient is a 66 y.o. female admitted with multiple abdominal abscesses now s/p IR drain placements    [facility] Code / Diagnosis to Support: 0017.8 - Medically Complex: Medical/Surgical Complications    Rehabilitation: Impaired ADLs and  mobility  Questionable tolerance for IPR at this time, however she qualifies for inpatient rehab based on needs for PT, OT.  Patient will also benefit from family training.  Patient has a good discharge situation which will be home with spouse.     Barriers to transfer include: Insurance authorization, TCCs to verify disposition, medical clearance and bed availability     Additional Recommendations:  - patient is strongly recommended to come to IPR when medically cleared, and there is a clear plan for TPN duration. She is high risk for complications and would benefit from close physician supervision while undergoing rehabilitative therapy.   -Currently on Zosyn 3 times daily through 11/25, Bactrim twice daily through 11/24 and Micafungin daily through 11/25.   -Not medically cleared, does not have tolerance to participate in 3 hours of therapy a day, high risk for complication with intense therapy at this time.    -For rehab purposes, we could probably take her next week if medically cleared and approved for activity as tolerated.  She will need to complete 3 hours of therapy per day, 5 days a week, which would include ambulation, transfers, and stairs.  PMR would appreciate reimaging as well for the most updated recommendations.        Pain management: Better control of pain today  - Total MEDDs: 83 --> 75  -Continue Oxycontin 10mg BID (30 MEDDs)  - Continue with PRN oxycodone (patient counseled to start with 5mg if able)  - Avoid IV dilaudid (I am keeping it on the MAR for emergency use if needed but please avoid use)  - continue Gabapentin 600mg BID  - Consider starting gi ppx due to NPO except pills status, if compatible with abx      I am off service next week (11/15-11/19) please contact TCC Thony Benito or Dr. Shen for updates.     Medical Complexity:  Anemia  Hyponatremia  Infection      DVT PPX: Lovenox 40 mg injection daily      Thank you for allowing us to participate in the care of this  patient.     Patient was seen for 37 minutes on unit/floor of which > 50% of time was spent on counseling and coordination of care regarding the above, including prognosis, risk reduction, benefits of treatment, and options for next stage of care.    Wil Jackson, DO   Physical Medicine and Rehabilitation     Please note that this dictation was created using voice recognition software. I have made every reasonable attempt to correct obvious errors, but there may be errors of grammar and possibly content that I did not discover before finalizing the note.

## 2021-11-12 NOTE — DISCHARGE PLANNING
Anticipated Discharge Disposition: Acute Inpatient Rehab.    Action: Case discussed during IDT Rounds. Per MD, patient is amendable to acute rehab instead to discharging home with HHC and TPN.    Barriers to Discharge: None.    Plan: As Above. Possible acute rehab, pending medical clearance and acceptance from [facility].

## 2021-11-12 NOTE — THERAPY
"Attempted OT tx. Pt seen for education only. Pt declined all activity/mobilization including transfer to chair. Also declined bed-level ADL. Pt citing exacerbations of pain with difficulty regaining control following all mobility. Educated on pathology of bed rest. Pt continued to politely decline stating \"I just need a few days to rest in bed\". Pt is currently limited by significant pain with activity. Will continue to promote OOB activity/ADL. Anticipate pt will be appropriate for IP Rehab once pain decreases and activity tolerance improves.   "

## 2021-11-12 NOTE — CARE PLAN
Problem: Nutritional:  Goal: Nutrition support tolerated and meeting greater than 85% of estimated needs  Outcome: Met   Per PharmD note from 11/12, TPN @ 100% of goal: 1676 kcal, 90 gm protein.

## 2021-11-12 NOTE — PROGRESS NOTES
Patient is feeling okay but very fatigued.  Drain still putting out what appears to be enteric succus.  Each drain is now putting out similar fluids.  Patient's labs are okay, there is talk of getting her to rehab.  The patient has not mobile enough to be at home with her .  Would agree either rehab or skilled nursing facility.  Plan would be to fistula eyes each drain over the next 2 to 3 weeks.  Possibly reimage the patient next week if still in the hospital acute care.  Dr. Vann is rounding this weekend.  patient is tolerating octreotide    Okay to start sips of clears

## 2021-11-13 LAB
ALBUMIN SERPL BCP-MCNC: 2.3 G/DL (ref 3.2–4.9)
ALBUMIN/GLOB SERPL: 1 G/DL
ALP SERPL-CCNC: 77 U/L (ref 30–99)
ALT SERPL-CCNC: 24 U/L (ref 2–50)
ANION GAP SERPL CALC-SCNC: 7 MMOL/L (ref 7–16)
AST SERPL-CCNC: 36 U/L (ref 12–45)
BASOPHILS # BLD AUTO: 0.4 % (ref 0–1.8)
BASOPHILS # BLD: 0.02 K/UL (ref 0–0.12)
BILIRUB SERPL-MCNC: <0.2 MG/DL (ref 0.1–1.5)
BUN SERPL-MCNC: 16 MG/DL (ref 8–22)
CALCIUM SERPL-MCNC: 7.8 MG/DL (ref 8.5–10.5)
CHLORIDE SERPL-SCNC: 98 MMOL/L (ref 96–112)
CO2 SERPL-SCNC: 26 MMOL/L (ref 20–33)
CREAT SERPL-MCNC: 0.52 MG/DL (ref 0.5–1.4)
EOSINOPHIL # BLD AUTO: 0.02 K/UL (ref 0–0.51)
EOSINOPHIL NFR BLD: 0.4 % (ref 0–6.9)
ERYTHROCYTE [DISTWIDTH] IN BLOOD BY AUTOMATED COUNT: 48.7 FL (ref 35.9–50)
GLOBULIN SER CALC-MCNC: 2.2 G/DL (ref 1.9–3.5)
GLUCOSE SERPL-MCNC: 137 MG/DL (ref 65–99)
HCT VFR BLD AUTO: 23.9 % (ref 37–47)
HGB BLD-MCNC: 7.8 G/DL (ref 12–16)
IMM GRANULOCYTES # BLD AUTO: 0.04 K/UL (ref 0–0.11)
IMM GRANULOCYTES NFR BLD AUTO: 0.8 % (ref 0–0.9)
LYMPHOCYTES # BLD AUTO: 0.47 K/UL (ref 1–4.8)
LYMPHOCYTES NFR BLD: 9.6 % (ref 22–41)
MAGNESIUM SERPL-MCNC: 2.1 MG/DL (ref 1.5–2.5)
MCH RBC QN AUTO: 30.2 PG (ref 27–33)
MCHC RBC AUTO-ENTMCNC: 32.6 G/DL (ref 33.6–35)
MCV RBC AUTO: 92.6 FL (ref 81.4–97.8)
MONOCYTES # BLD AUTO: 0.19 K/UL (ref 0–0.85)
MONOCYTES NFR BLD AUTO: 3.9 % (ref 0–13.4)
NEUTROPHILS # BLD AUTO: 4.14 K/UL (ref 2–7.15)
NEUTROPHILS NFR BLD: 84.9 % (ref 44–72)
NRBC # BLD AUTO: 0 K/UL
NRBC BLD-RTO: 0 /100 WBC
PHOSPHATE SERPL-MCNC: 2.8 MG/DL (ref 2.5–4.5)
PLATELET # BLD AUTO: 205 K/UL (ref 164–446)
PMV BLD AUTO: 10.2 FL (ref 9–12.9)
POTASSIUM SERPL-SCNC: 4.2 MMOL/L (ref 3.6–5.5)
PROT SERPL-MCNC: 4.5 G/DL (ref 6–8.2)
RBC # BLD AUTO: 2.58 M/UL (ref 4.2–5.4)
SODIUM SERPL-SCNC: 131 MMOL/L (ref 135–145)
WBC # BLD AUTO: 4.9 K/UL (ref 4.8–10.8)

## 2021-11-13 PROCEDURE — 700102 HCHG RX REV CODE 250 W/ 637 OVERRIDE(OP): Performed by: PHYSICAL MEDICINE & REHABILITATION

## 2021-11-13 PROCEDURE — A9270 NON-COVERED ITEM OR SERVICE: HCPCS | Performed by: NURSE PRACTITIONER

## 2021-11-13 PROCEDURE — 700102 HCHG RX REV CODE 250 W/ 637 OVERRIDE(OP): Performed by: FAMILY MEDICINE

## 2021-11-13 PROCEDURE — 700105 HCHG RX REV CODE 258: Performed by: NURSE PRACTITIONER

## 2021-11-13 PROCEDURE — 700105 HCHG RX REV CODE 258: Performed by: INTERNAL MEDICINE

## 2021-11-13 PROCEDURE — 700111 HCHG RX REV CODE 636 W/ 250 OVERRIDE (IP): Performed by: NURSE PRACTITIONER

## 2021-11-13 PROCEDURE — 700111 HCHG RX REV CODE 636 W/ 250 OVERRIDE (IP): Mod: JB | Performed by: SURGERY

## 2021-11-13 PROCEDURE — 85025 COMPLETE CBC W/AUTO DIFF WBC: CPT

## 2021-11-13 PROCEDURE — 770006 HCHG ROOM/CARE - MED/SURG/GYN SEMI*

## 2021-11-13 PROCEDURE — 99232 SBSQ HOSP IP/OBS MODERATE 35: CPT | Performed by: NURSE PRACTITIONER

## 2021-11-13 PROCEDURE — 700101 HCHG RX REV CODE 250: Performed by: NURSE PRACTITIONER

## 2021-11-13 PROCEDURE — 700102 HCHG RX REV CODE 250 W/ 637 OVERRIDE(OP): Performed by: NURSE PRACTITIONER

## 2021-11-13 PROCEDURE — A9270 NON-COVERED ITEM OR SERVICE: HCPCS | Performed by: PHYSICAL MEDICINE & REHABILITATION

## 2021-11-13 PROCEDURE — 99024 POSTOP FOLLOW-UP VISIT: CPT | Performed by: SURGERY

## 2021-11-13 PROCEDURE — 83735 ASSAY OF MAGNESIUM: CPT

## 2021-11-13 PROCEDURE — A9270 NON-COVERED ITEM OR SERVICE: HCPCS | Performed by: FAMILY MEDICINE

## 2021-11-13 PROCEDURE — 700111 HCHG RX REV CODE 636 W/ 250 OVERRIDE (IP): Mod: JG | Performed by: INTERNAL MEDICINE

## 2021-11-13 PROCEDURE — 700102 HCHG RX REV CODE 250 W/ 637 OVERRIDE(OP): Performed by: INTERNAL MEDICINE

## 2021-11-13 PROCEDURE — A9270 NON-COVERED ITEM OR SERVICE: HCPCS | Performed by: INTERNAL MEDICINE

## 2021-11-13 PROCEDURE — 80053 COMPREHEN METABOLIC PANEL: CPT

## 2021-11-13 PROCEDURE — 97602 WOUND(S) CARE NON-SELECTIVE: CPT

## 2021-11-13 PROCEDURE — 700111 HCHG RX REV CODE 636 W/ 250 OVERRIDE (IP): Performed by: INTERNAL MEDICINE

## 2021-11-13 PROCEDURE — 700102 HCHG RX REV CODE 250 W/ 637 OVERRIDE(OP): Performed by: STUDENT IN AN ORGANIZED HEALTH CARE EDUCATION/TRAINING PROGRAM

## 2021-11-13 PROCEDURE — A9270 NON-COVERED ITEM OR SERVICE: HCPCS | Performed by: STUDENT IN AN ORGANIZED HEALTH CARE EDUCATION/TRAINING PROGRAM

## 2021-11-13 PROCEDURE — 84100 ASSAY OF PHOSPHORUS: CPT

## 2021-11-13 PROCEDURE — 700111 HCHG RX REV CODE 636 W/ 250 OVERRIDE (IP): Performed by: ANESTHESIOLOGY

## 2021-11-13 RX ORDER — NALOXONE HYDROCHLORIDE 0.4 MG/ML
0.2 INJECTION, SOLUTION INTRAMUSCULAR; INTRAVENOUS; SUBCUTANEOUS ONCE
Status: DISCONTINUED | OUTPATIENT
Start: 2021-11-13 | End: 2021-11-13

## 2021-11-13 RX ORDER — OXYCODONE HYDROCHLORIDE 10 MG/1
10 TABLET ORAL EVERY 8 HOURS PRN
Status: DISCONTINUED | OUTPATIENT
Start: 2021-11-13 | End: 2021-11-14

## 2021-11-13 RX ORDER — OXYCODONE HYDROCHLORIDE 5 MG/1
5 TABLET ORAL EVERY 6 HOURS PRN
Status: DISCONTINUED | OUTPATIENT
Start: 2021-11-13 | End: 2021-11-14

## 2021-11-13 RX ORDER — ACETAMINOPHEN 500 MG
1000 TABLET ORAL 3 TIMES DAILY
Status: DISCONTINUED | OUTPATIENT
Start: 2021-11-13 | End: 2021-11-19

## 2021-11-13 RX ADMIN — HYDROMORPHONE HYDROCHLORIDE 0.5 MG: 1 INJECTION, SOLUTION INTRAMUSCULAR; INTRAVENOUS; SUBCUTANEOUS at 05:44

## 2021-11-13 RX ADMIN — GABAPENTIN 600 MG: 300 CAPSULE ORAL at 04:27

## 2021-11-13 RX ADMIN — ACETAMINOPHEN 1000 MG: 500 TABLET ORAL at 20:27

## 2021-11-13 RX ADMIN — ONDANSETRON 4 MG: 2 INJECTION INTRAMUSCULAR; INTRAVENOUS at 09:53

## 2021-11-13 RX ADMIN — SULFAMETHOXAZOLE AND TRIMETHOPRIM 1 TABLET: 800; 160 TABLET ORAL at 04:27

## 2021-11-13 RX ADMIN — OXYCODONE HYDROCHLORIDE 10 MG: 10 TABLET ORAL at 03:46

## 2021-11-13 RX ADMIN — OXYCODONE HYDROCHLORIDE 10 MG: 10 TABLET ORAL at 14:21

## 2021-11-13 RX ADMIN — PIPERACILLIN SODIUM AND TAZOBACTAM SODIUM 3.38 G: 3; .375 INJECTION, POWDER, FOR SOLUTION INTRAVENOUS at 04:27

## 2021-11-13 RX ADMIN — EZETIMIBE 10 MG: 10 TABLET ORAL at 17:50

## 2021-11-13 RX ADMIN — PIPERACILLIN SODIUM AND TAZOBACTAM SODIUM 3.38 G: 3; .375 INJECTION, POWDER, FOR SOLUTION INTRAVENOUS at 14:22

## 2021-11-13 RX ADMIN — ENOXAPARIN SODIUM 40 MG: 40 INJECTION SUBCUTANEOUS at 09:29

## 2021-11-13 RX ADMIN — OCTREOTIDE ACETATE 100 MCG: 100 INJECTION, SOLUTION INTRAVENOUS; SUBCUTANEOUS at 10:34

## 2021-11-13 RX ADMIN — Medication 100 MG: at 04:27

## 2021-11-13 RX ADMIN — Medication 1000 UNITS: at 04:27

## 2021-11-13 RX ADMIN — SULFAMETHOXAZOLE AND TRIMETHOPRIM 1 TABLET: 800; 160 TABLET ORAL at 17:50

## 2021-11-13 RX ADMIN — OXYCODONE HYDROCHLORIDE 10 MG: 10 TABLET, FILM COATED, EXTENDED RELEASE ORAL at 04:27

## 2021-11-13 RX ADMIN — CALCIUM GLUCONATE: 98 INJECTION, SOLUTION INTRAVENOUS at 20:15

## 2021-11-13 RX ADMIN — GABAPENTIN 600 MG: 300 CAPSULE ORAL at 17:50

## 2021-11-13 RX ADMIN — OXYCODONE HYDROCHLORIDE 10 MG: 10 TABLET ORAL at 09:52

## 2021-11-13 RX ADMIN — PIPERACILLIN SODIUM AND TAZOBACTAM SODIUM 3.38 G: 3; .375 INJECTION, POWDER, FOR SOLUTION INTRAVENOUS at 22:21

## 2021-11-13 RX ADMIN — ONDANSETRON 4 MG: 2 INJECTION INTRAMUSCULAR; INTRAVENOUS at 03:12

## 2021-11-13 RX ADMIN — OXYCODONE HYDROCHLORIDE 10 MG: 10 TABLET, FILM COATED, EXTENDED RELEASE ORAL at 17:50

## 2021-11-13 RX ADMIN — OCTREOTIDE ACETATE 100 MCG: 100 INJECTION, SOLUTION INTRAVENOUS; SUBCUTANEOUS at 22:11

## 2021-11-13 RX ADMIN — MICAFUNGIN SODIUM 100 MG: 100 INJECTION, POWDER, LYOPHILIZED, FOR SOLUTION INTRAVENOUS at 09:29

## 2021-11-13 RX ADMIN — OXYCODONE HYDROCHLORIDE 10 MG: 10 TABLET ORAL at 22:19

## 2021-11-13 RX ADMIN — ONDANSETRON 4 MG: 2 INJECTION INTRAMUSCULAR; INTRAVENOUS at 22:10

## 2021-11-13 ASSESSMENT — PAIN DESCRIPTION - PAIN TYPE
TYPE: ACUTE PAIN
TYPE: ACUTE PAIN

## 2021-11-13 ASSESSMENT — ENCOUNTER SYMPTOMS
WEAKNESS: 1
SORE THROAT: 0
COUGH: 0
BLURRED VISION: 0
VOMITING: 0
NAUSEA: 0
SEIZURES: 0
DIZZINESS: 0
MYALGIAS: 1
ABDOMINAL PAIN: 1
CONSTIPATION: 0
DIARRHEA: 0
SHORTNESS OF BREATH: 0
FEVER: 0
PALPITATIONS: 0
DOUBLE VISION: 0

## 2021-11-13 NOTE — PROGRESS NOTES
Pharmacy TPN Day # 5       2021      Dosing Weight   60 kg (Adj BW)     TPN Restart  TPN goal: 3729-0565 kcal/day including 1.4-1.7 gm/kg/day Protein  TPN indication: perforated duodenum with high drain output                                                                Pertinent PMH: Admitted on 10/15/2021 for abdominal pain. Underwent polypectomy and a sphincterotomy on 10/01/2021.  CT completed on 10/08/202, found to have a large fluid collection and thickening of wall around the duodenum with retroperitoneal fluid collection.  IR placed drain. CT completed on 10/09/2021 with a significant amount of retroperitoneal air and fluid.  Pt was transferred to [facility] 10/14/21 for further evaluation. 2 drains have been placed for fluid collections surrounding the R kidney and colon.  Peritoneal drain cultures have demonstrated polymicrobial growth however blood cultures have remained negative.  ID is consulting.  Pt was on TPN from 10/14 - 10/24.  Taken to the OR on  for ex-lap with drainage of intra-abdominal and retroperitoneal abscess with drain placement.  Concerns that her duodenal perforation may have reopened.  Surgery has recommended patient remain NPO and resume TPN.    Temp (24hrs), Av.8 °C (98.3 °F), Min:36.2 °C (97.1 °F), Max:37.2 °C (99 °F)  .  Recent Labs     21  0411 21  0010 21  0401   SODIUM 131* 132* 131*   POTASSIUM 4.3 4.3 4.2   CHLORIDE 99 99 98   CO2 25 26 26   BUN 12 14 16   CREATININE 0.61 0.57 0.52   GLUCOSE 146* 133* 137*   CALCIUM 7.5* 7.6* 7.8*   ASTSGOT 33  --  36   ALTSGPT 20  --  24   ALBUMIN 2.1*  --  2.3*   TBILIRUBIN 0.2  --  <0.2   PHOSPHORUS 2.2* 2.1* 2.8   MAGNESIUM 2.0  --  2.1     Accu-Checks  Recent Labs     21  0400 21  1234 21  2357   POCGLUCOSE 142* 182* 141*       Vitals:    21 1514 21 1935 21 0332 21 0746   BP: 139/76 122/70 118/74 123/68   Weight:       Height:           Intake/Output Summary  (Last 24 hours) at 11/13/2021 1315  Last data filed at 11/13/2021 0500  Gross per 24 hour   Intake 340 ml   Output 905 ml   Net -565 ml       Orders Placed This Encounter   Procedures   • Diet NPO Restrict to: Ice Chips (ok for minimal sips and ice chips for comfort)     Standing Status:   Standing     Number of Occurrences:   1     Order Specific Question:   Diet NPO Restrict to:     Answer:   Ice Chips [2]     Comments:   ok for minimal sips and ice chips for comfort         TPN for past 72 hours (Show up to 3 orders; newest on the left. Changes between the two most recent orders are indicated.)     Start date and time   11/12/2021 2000 11/11/2021 2000 11/09/2021 2000      TPN Central Line Formulation [[id number]] TPN Central Line Formulation [[id number]] TPN Adult Central Line [[id number]]    Order Status  Active Completed Completed    Last Admin  New Bag at 11/12/2021 2102 by Josie Alva R.N. New Bag at 11/11/2021 1955 by Mckenna Barney R.N. New Bag at 11/10/2021 1952 by Mckenna Barney R.N.       Base    Clinisol 15%  90 g 90 g 90 g    dextrose 70%  240 g 240 g 240 g    fat emulsion (soy) 20%  50 g 50 g 50 g       Additives    potassium phosphate  36 mmol 30 mmol 20 mmol    potassium chloride  -- -- 10 mEq    sodium acetate  111 mEq 145 mEq 145 mEq    sodium chloride  111 mEq 77 mEq 77 mEq    magnesium sulfate  12 mEq 12 mEq 8 mEq    calcium GLUConate  9.3 mEq 9.3 mEq 9.3 mEq    M.T.E.-4  1 mL 1 mL 1 mL    M.V.I. Adult  10 mL 10 mL 10 mL    famotidine  40 mg 40 mg 40 mg       QS Base    sterile water  113.89 mL 107.39 mL 356.71 mL       Energy Contribution    Proteins  -- -- --    Dextrose  816 kcal 816 kcal 816 kcal    Lipids  500 kcal 500 kcal 500 kcal    Total  1,316 kcal 1,316 kcal 1,316 kcal       Electrolyte Ion Calculated Amount    Sodium  222 mEq 222 mEq 222 mEq    Potassium  52.8 mEq 44 mEq 39.33 mEq    Calcium  9.3 mEq 9.3 mEq 9.3 mEq    Magnesium  12 mEq 12 mEq 8 mEq    Aluminum   -- -- --    Phosphate  36 mmol 30 mmol 20 mmol    Chloride  111 mEq 77 mEq 87 mEq    Acetate  187.2 mEq 221.2 mEq 221.2 mEq       Other    Total Protein  90 g 90 g 90 g    Total Protein/kg  1.29 g/kg 1.29 g/kg 1.36 g/kg    Total Amino Acid  -- -- --    Total Amino Acid/kg  -- -- --    Glucose Infusion Rate  2.39 mg/kg/min 2.39 mg/kg/min 2.39 mg/kg/min    Osmolarity (Estimated)  -- -- --    Volume  1,440 mL 1,440 mL 1,440 mL    Rate  60 mL/hr 60 mL/hr 60 mL/hr    Dosing Weight  69.6 kg 69.6 kg 66.4 kg    Infusion Site  Central Central Central            This formula provides:  % kcal as lipids = 30  Grams protein/kg = 1.5  Non-protein calories = 1316  Kcals/kg = 28  Total daily calories = 1676    Comments:  Diet: NPO   Per surgery note: had at least 4 total cups of liquid yesterday.  Reports pain better controlled. Outputs recorded as higher than previous after addition liquids yesterday.  Continue current TPN formulation , may adjust Phos conc. in TPN tomorrow pending levels.     Macronutrient's:  CHO 49% AA 21% Lipids 30%  GIR: 2.95 mg/kg/min  Accuchecks: Not documented. AM glucose 137    Micronutrients:  Na 222 meq  K 53 meq  Ca 9.3 meq  Mg 12 meq  Phos 36 mmol      Sajan Paz PharmD BCPS

## 2021-11-13 NOTE — PROGRESS NOTES
"Surgical Oncology Progress Note    Subjective:  No acute events.  Thinks had at least 4 total cups of liquid yesterday.  Reports pain better controlled.  Is anxious to potentially get to rehab facility next week.  Outputs recorded as higher than previous after addition liquids yesterday.    Objective:  /68   Pulse 63   Temp 37.2 °C (98.9 °F) (Temporal)   Resp 18   Ht 1.6 m (5' 2.99\")   Wt 69.6 kg (153 lb 7 oz)   LMP 01/01/2009   SpO2 99%   BMI 27.19 kg/m²       Intake/Output Summary (Last 24 hours) at 11/13/2021 0818  Last data filed at 11/13/2021 0500  Gross per 24 hour   Intake 920 ml   Output 1355 ml   Net -435 ml        Net IO Since Admission: -9,779.53 mL [11/13/21 0818]     Exam:  General: AAOx3, appropriate in conversation    Pulmonary: NC in place, no increased WOB  CV: hemodynamically acceptable   Abdomen:  Soft, appropriately TTP Incision c/d/i with staples.  No surrounding erythema.  MARTHA x2 with minimal dark bilious fluid. Leaking around one of the drains - ostomy appliance in place.  Additional ostomy appliance around prior drain site.  All drains and appliances draining thin bilious/brown fluid - some particulate matter in ne of the ostomy appliances.    MSK: No peripheral edema, extremities warm       Assessment/Plan:  Patient is a 65 y/o F admitted after duodenal perforation following ERCP - found to have large retroperitoneal abscess which failed conservative management with IR drains.  OR 11/5/21 for ex-lap, drainage of intra-abdominal and retroperitoneal abscess, drain placement     POD#: 7     -Continue care per primary team   -Continue TPN for nutritional support.  It seems as though her outputs increased after addition of liquids yesterday (previous total <400 - last 24h nearly 600cc).  Recommend NPO with only minimal sips/ice chips for comfort as if outputs increasing with PO intake will take longer for fistula to close.  Order changed to NPO with minimal sips/ice chips.  -Continue " octreotide  -Will need to continue strict I/O of the drains and ostomy appliances as this will help guide clinical management   -Anticipate likely several weeks of NPO/TPN while fistula closes - will depend on outputs.  -If still inpatient next week will consider repeat axial imaging.  -Will continue to follow     Plan of care has been discussed with patient who is in agreement with the plan.  Bedside RN updated.  Primary team updated.    Lisy Vann MD  November 13, 2021, 8:18 AM

## 2021-11-13 NOTE — THERAPY
Physical Therapy Contact Note    Pt just declined OOB with OT upon attempt; please reinforce necessity of OOB outside of therapy sessions to normalize mobility; will return when able     Piedad YANG, PT,  [phone]

## 2021-11-13 NOTE — DISCHARGE PLANNING
Per attending, patient is medically cleared for inpatient rehab. Pending bed availability and insurance authorization from [facility].     Patient also declined therapy yesterday. PMR to re-assess on Monday, would appreciate updated therapy notes Sunday 11/14 for physiatry and insurance to review Monday morning. TCC to continue to follow.

## 2021-11-13 NOTE — PROGRESS NOTES
Hospital Medicine Daily Progress Note    Date of Service  11/13/2021    Chief Complaint  Nils Alfonso is a 66 y.o. female admitted 10/15/2021 with abdominal pain    Hospital Course  Initially admitted to [facility] for evaluation of abdominal pain after recent ERCP with polypectomy and sphincterotomy and noted to have large intra-abdominal fluid collection. Multiple IR drains had been placed, but her infection worsened.  She was transferred to [facility] for escalation of her surgical needs. ID has been following. She underwent ex lap w I/D of multiple loculated abscesses and debridement of nec fasciitis w Dr. ST 11/5/21. Prev cx w Stenotrophomonas maltophilia, mixed yeast, E faecalis, E coli and Chryseobacterium in the blood.    Interval Problem Update  11/9: Continues to have significant drainage from prior right upper quadrant drain site.  Surgery team recommending ostomy bag placement for skin protection and strict monitoring of output.  Also recommending n.p.o. status and TPN for 3 to 5 days.  Patient otherwise remained afebrile with stable vital signs.  She does request removal of her Corea catheter secondary to discomfort.  She is also experiencing uncontrolled pain.  Medications have been adjusted.  11/10: Pain is better controlled today on current regimen.  Output is decreased with n.p.o. status.  Continue TPN.  Updated infectious disease team on recent postop cultures.  She remains afebrile with stable vital signs.  11/11:  Uncontrolled pain overnight to drain site.  Improved this AM.  She does have persistent nausea, which she suspects is secondary to not eating.  Will discuss minimal oral intake with surgery.  She remains afebrile with stable vital signs.   11/12:  Cx from drain site pending.  Pain is better controlled on current regimen.  Nausea also improved.  Her only complaint is dry mouth and is requesting to be able to have minimal amounts of ginger ale.    11/13:  Patient with increased output after being  allowed minimal fluids.  Reinforced sips of liquids, which she understands.  She is motivated to work with therapy. Pain is controlled.   Patient is medically clear to transition to rehab facility.     Consultants/Specialty  general surgery, GI, infectious disease and interventional radiology    Code Status  Full Code    Disposition  Patient is not medically cleared.   Anticipate discharge to rehab vs SNF.  Rehab evaluating.   I have placed the appropriate orders for post-discharge needs.    Review of Systems  Review of Systems   Constitutional: Positive for malaise/fatigue. Negative for fever.   HENT: Negative for congestion and sore throat.    Eyes: Negative for blurred vision and double vision.   Respiratory: Negative for cough and shortness of breath.    Cardiovascular: Negative for chest pain, palpitations and leg swelling.   Gastrointestinal: Positive for abdominal pain (improving). Negative for constipation, diarrhea, nausea and vomiting.   Genitourinary: Negative for dysuria.   Musculoskeletal: Positive for myalgias (improving). Negative for joint pain.   Skin: Negative for rash.   Neurological: Positive for weakness. Negative for dizziness and seizures.   All other systems reviewed and are negative.       Physical Exam  Temp:  [36.2 °C (97.1 °F)-37.2 °C (99 °F)] 37.2 °C (98.9 °F)  Pulse:  [63-75] 63  Resp:  [17-18] 18  BP: (118-139)/(65-76) 123/68  SpO2:  [90 %-99 %] 99 %    Physical Exam  Vitals and nursing note reviewed.   Constitutional:       General: She is not in acute distress.     Appearance: She is ill-appearing. She is not toxic-appearing.   HENT:      Head: Normocephalic and atraumatic.      Nose: Nose normal. No congestion.      Mouth/Throat:      Mouth: Mucous membranes are dry.      Pharynx: Oropharynx is clear. No oropharyngeal exudate or posterior oropharyngeal erythema.   Eyes:      General:         Right eye: No discharge.         Left eye: No discharge.      Pupils: Pupils are equal,  round, and reactive to light.   Cardiovascular:      Rate and Rhythm: Normal rate and regular rhythm.      Heart sounds: Normal heart sounds.   Pulmonary:      Effort: Pulmonary effort is normal. No respiratory distress.      Breath sounds: Normal breath sounds. No wheezing.   Abdominal:      General: There is no distension.      Palpations: Abdomen is soft.      Tenderness: There is abdominal tenderness. There is no guarding.          Comments: Midline Prevena CDI; prior drain site RUQ with further cola/bilious discharge; saturating through dressing   Musculoskeletal:      Cervical back: Normal range of motion and neck supple. No rigidity.      Right lower leg: No edema.      Left lower leg: No edema.      Comments: Generalized weakness   Skin:     General: Skin is warm and dry.   Neurological:      Mental Status: She is alert and oriented to person, place, and time.   Psychiatric:         Mood and Affect: Mood normal.       Fluids    Intake/Output Summary (Last 24 hours) at 11/13/2021 0826  Last data filed at 11/13/2021 0500  Gross per 24 hour   Intake 920 ml   Output 1355 ml   Net -435 ml       Laboratory  Recent Labs     11/13/21  0303   WBC 4.9   RBC 2.58*   HEMOGLOBIN 7.8*   HEMATOCRIT 23.9*   MCV 92.6   MCH 30.2   MCHC 32.6*   RDW 48.7   PLATELETCT 205   MPV 10.2     Recent Labs     11/11/21  0411 11/12/21  0010 11/13/21  0401   SODIUM 131* 132* 131*   POTASSIUM 4.3 4.3 4.2   CHLORIDE 99 99 98   CO2 25 26 26   GLUCOSE 146* 133* 137*   BUN 12 14 16   CREATININE 0.61 0.57 0.52   CALCIUM 7.5* 7.6* 7.8*                   Imaging  CT-ABDOMEN-PELVIS WITH   Final Result         1. The components in the gallbladder fossa and right flank of the complex fluid collection are mostly resolved. There is still a small residual fluid collection in the perinephric space surrounding the inferior right kidney. Right lower quadrant MARTHA drain    and right upper quadrant catheter are outside of this residual collection.   2.  Air-fluid levels throughout the colon could relate to ileus.   3. Bilateral pleural effusions with bibasilar atelectasis.   4. Pneumobilia.      CT-ABDOMEN-PELVIS WITH   Final Result      1.  Slight interval decrease in size of the large RIGHT peritoneal abscess which now contains 3 drains   2.  Decreased atelectasis with persistent opacity in the RIGHT lung base likely atelectasis rather than pneumonia   3.  Small LEFT renal cyst   4.  Prior cholecystectomy with ongoing pneumobilia      CT-DRAINAGE-CATH EXCHANGE   Final Result         1. Organized pancreatic pseudocyst Drainage with CT guidance.      CT-ABDOMEN-PELVIS WITH   Final Result      1.  There has been interval placement of an additional inferior lateral pigtail catheter within the complex right abdominal abscess. The other 2 pigtail catheters are stable in appearance.   2.  There has been no significant interval decrease in size of the large complex loculated abscess collection in the right abdomen.   3.  There is no new fluid collection.   4.  Gallbladder is surgically absent with continued pneumobilia.   5.  Interval decrease in the dependent pleural effusion with minimal airspace disease, likely atelectasis.      IR-DRAINAGE-CATH EXCHANGE   Final Result      1.  Successful left-sided drainage catheter removal.   2.  Successful image guided superior right drainage catheter replacement.      Plan: Suction drainage. Monitor outputs. Please contact interventional radiology if there is any concern for tube dysfunction. Suggest routine tube maintenance at 3 months intervals if the tube remains in place.         CT-DRAIN-PERITONEAL   Final Result      1.  CT GUIDED PERITONEAL RLQ abdominal CATHETER DRAINAGE.   2.  THE CURRENT PLAN IS TO MONITOR DRAINAGE OUTPUT AND OBTAIN A FOLLOWUP CT SCAN IN 5-7 DAYS IF CLINICALLY INDICATED.      CT-ABDOMEN-PELVIS WITH   Final Result         1. Grossly unchanged irregular fluid collection occupying the right abdomen  surrounding the right kidney and right colon extending to midline. Additional pigtail catheter in the component about midline anterior abdomen. Both pigtail catheters seem to be    within the collection.      2. Small right pleural effusion with right basilar atelectasis.               DX-CHEST-LIMITED (1 VIEW)   Final Result      1.  Right basilar atelectasis or consolidation. Small right pleural effusion not excluded.   2.  Atherosclerotic plaque.      CT-DRAIN-PERITONEAL   Final Result      1.  CT guided pancreatic pseudocyst catheter drainage.   2.  The current plan is to obtain a follow-up CT in 5-7 days..      IR-PICC LINE PLACEMENT W/ GUIDANCE > AGE 5   Final Result                  Ultrasound-guided PICC placement performed by qualified nursing staff as    above.          CT-ABDOMEN-PELVIS WITH    (Results Pending)        Assessment/Plan  * Bacteremia due to Gram-negative bacteria and fungemia- (present on admission)  Assessment & Plan  KALANI negative for signs of endocarditis  Cont bactrim, Zosyn, and Micafungin per ID for 4-week course with stop date of 11/24.  Repeat imaging prior to discontinuation.   Repeat Bld Cx neg      Generalized weakness  Assessment & Plan  PT/OT following  Recommending post-acute placement.  Acute rehab evaluating patient for admission.     Low magnesium level  Assessment & Plan  11/8 IV replacement    Hypotension  Assessment & Plan  11/6: Stop lasix; Stop ACEI  11/7 started midodrine    Hypophosphatemia- (present on admission)  Assessment & Plan  Resolved with TPN  Monitoring per RD  11/6 Kphos; serum goal >3    Hypokalemia- (present on admission)  Assessment & Plan  Periodic monitoring  11/6 K bicarb x 1  Serum goal >4.0        Acute respiratory failure with hypoxia (HCC)- (present on admission)  Assessment & Plan  Resolved with diuresis  Likely volume overload with lower extremity edema present    Antibiotic-associated diarrhea  Assessment & Plan  Cdiff PCR negative  Loperamide  PRN    Postprocedural retroperitoneal abscess- (present on admission)  Assessment & Plan  S/P Ex lap, I/D, debridement nec fasc 11/5/2021  Abx per ID  Follow cx  Pain control  Diet per sx  Antiemetics PRN  11/8 Repeat CT. Updated surgical team re: perinephric fluid collection. Further CT may be considered with IV and Oral contrast if needed if increased drainage or clinically deteriorates  11/9:  Strict monitoring of output from drain.  Ostomy bag placed on prior drain site for further output monitoring. Initiate NPO status and TPN for 3-5 days.   11/10:  Output improving with decreased oral intake.   11/11:  Continue to treat pain. Discussed recent OR cx with ID and recommend continuing current regimen.   11/12:  Follow cx from drainage. Pain control.   11/13:  Allowed to have limited sips of liquids.  Monitor output closely.  Anticipate several weeks of TPN and limited oral intake.     Duodenal perforation (HCC)- (present on admission)  Assessment & Plan  After ERCP with retroperitoneal abscess and bacteremia  Uncertain etiology - ddx includes pancreatitis, bile leak, iatrogenic  Pepcid BID  11/9: Concern perforation may have reopened.  Surgery team recommending NPO status and TPN  11/10:  Start octreotide.     Hyponatremia- (present on admission)  Assessment & Plan  Recurrent, mild  Diuresis as tolerated    Normocytic anemia- (present on admission)  Assessment & Plan  Transfuse for Hgb <7  Avoid regular phlebotomy  Supplements per dietary: 11/6 added thiamine, 6 sm meals/d    Sepsis due to intraabdominal fluid collection/abscess, bacteremia and fungemia (HCC)- (present on admission)  Assessment & Plan  Sepsis resolved  Source intra-abdominal  Zosyn/Mycafungin/Bactrim DS and follow cx  ID following  Re-imaging per ID, IR, and Surgery  S/P Ex lap w I/D and debridement nec fasc 11/5/2021 11/8 Repeat CT. Updated surgical team re: perinephric fluid collection. Further CT may be considered with IV and Oral contrast if  needed.  11/9:  She has remained afebrile over last 48 hours.  VSS.  Does not appear to be septic at this time.  Continue antibx.     Hyperlipidemia- (present on admission)  Assessment & Plan  Continue ezetimibe    Primary hypertension- (present on admission)  Assessment & Plan  Hypotensive 11/6 - stop lisinopril         VTE prophylaxis: enoxaparin ppx    I have performed a physical exam and reviewed and updated ROS and Plan today (11/13/2021). In review of yesterday's note (11/12/2021), there are no changes except as documented above.

## 2021-11-14 LAB
ALBUMIN SERPL BCP-MCNC: 2.4 G/DL (ref 3.2–4.9)
ALBUMIN/GLOB SERPL: 1.1 G/DL
ALP SERPL-CCNC: 87 U/L (ref 30–99)
ALT SERPL-CCNC: 23 U/L (ref 2–50)
ANION GAP SERPL CALC-SCNC: 7 MMOL/L (ref 7–16)
AST SERPL-CCNC: 34 U/L (ref 12–45)
BILIRUB SERPL-MCNC: 0.2 MG/DL (ref 0.1–1.5)
BUN SERPL-MCNC: 19 MG/DL (ref 8–22)
CALCIUM SERPL-MCNC: 7.6 MG/DL (ref 8.5–10.5)
CHLORIDE SERPL-SCNC: 100 MMOL/L (ref 96–112)
CO2 SERPL-SCNC: 26 MMOL/L (ref 20–33)
CREAT SERPL-MCNC: 0.51 MG/DL (ref 0.5–1.4)
GLOBULIN SER CALC-MCNC: 2.2 G/DL (ref 1.9–3.5)
GLUCOSE SERPL-MCNC: 130 MG/DL (ref 65–99)
IRON SATN MFR SERPL: 9 % (ref 15–55)
IRON SERPL-MCNC: 16 UG/DL (ref 40–170)
MAGNESIUM SERPL-MCNC: 2.1 MG/DL (ref 1.5–2.5)
PHOSPHATE SERPL-MCNC: 3 MG/DL (ref 2.5–4.5)
POTASSIUM SERPL-SCNC: 4.3 MMOL/L (ref 3.6–5.5)
PROT SERPL-MCNC: 4.6 G/DL (ref 6–8.2)
SODIUM SERPL-SCNC: 133 MMOL/L (ref 135–145)
TIBC SERPL-MCNC: 171 UG/DL (ref 250–450)
UIBC SERPL-MCNC: 155 UG/DL (ref 110–370)

## 2021-11-14 PROCEDURE — 700111 HCHG RX REV CODE 636 W/ 250 OVERRIDE (IP): Mod: JG | Performed by: INTERNAL MEDICINE

## 2021-11-14 PROCEDURE — 700102 HCHG RX REV CODE 250 W/ 637 OVERRIDE(OP): Performed by: NURSE PRACTITIONER

## 2021-11-14 PROCEDURE — A9270 NON-COVERED ITEM OR SERVICE: HCPCS | Performed by: NURSE PRACTITIONER

## 2021-11-14 PROCEDURE — 99233 SBSQ HOSP IP/OBS HIGH 50: CPT | Performed by: NURSE PRACTITIONER

## 2021-11-14 PROCEDURE — A9270 NON-COVERED ITEM OR SERVICE: HCPCS | Performed by: INTERNAL MEDICINE

## 2021-11-14 PROCEDURE — 770006 HCHG ROOM/CARE - MED/SURG/GYN SEMI*

## 2021-11-14 PROCEDURE — 700102 HCHG RX REV CODE 250 W/ 637 OVERRIDE(OP): Performed by: FAMILY MEDICINE

## 2021-11-14 PROCEDURE — 83540 ASSAY OF IRON: CPT

## 2021-11-14 PROCEDURE — 700111 HCHG RX REV CODE 636 W/ 250 OVERRIDE (IP): Performed by: INTERNAL MEDICINE

## 2021-11-14 PROCEDURE — 700111 HCHG RX REV CODE 636 W/ 250 OVERRIDE (IP): Performed by: ANESTHESIOLOGY

## 2021-11-14 PROCEDURE — 700105 HCHG RX REV CODE 258: Performed by: NURSE PRACTITIONER

## 2021-11-14 PROCEDURE — 700111 HCHG RX REV CODE 636 W/ 250 OVERRIDE (IP): Mod: JB | Performed by: SURGERY

## 2021-11-14 PROCEDURE — A9270 NON-COVERED ITEM OR SERVICE: HCPCS | Performed by: FAMILY MEDICINE

## 2021-11-14 PROCEDURE — 700102 HCHG RX REV CODE 250 W/ 637 OVERRIDE(OP): Performed by: STUDENT IN AN ORGANIZED HEALTH CARE EDUCATION/TRAINING PROGRAM

## 2021-11-14 PROCEDURE — 83550 IRON BINDING TEST: CPT

## 2021-11-14 PROCEDURE — 700111 HCHG RX REV CODE 636 W/ 250 OVERRIDE (IP): Performed by: NURSE PRACTITIONER

## 2021-11-14 PROCEDURE — 700102 HCHG RX REV CODE 250 W/ 637 OVERRIDE(OP): Performed by: INTERNAL MEDICINE

## 2021-11-14 PROCEDURE — A9270 NON-COVERED ITEM OR SERVICE: HCPCS | Performed by: STUDENT IN AN ORGANIZED HEALTH CARE EDUCATION/TRAINING PROGRAM

## 2021-11-14 PROCEDURE — 84100 ASSAY OF PHOSPHORUS: CPT

## 2021-11-14 PROCEDURE — 700105 HCHG RX REV CODE 258: Performed by: INTERNAL MEDICINE

## 2021-11-14 PROCEDURE — 99024 POSTOP FOLLOW-UP VISIT: CPT | Performed by: SURGERY

## 2021-11-14 PROCEDURE — 83735 ASSAY OF MAGNESIUM: CPT

## 2021-11-14 PROCEDURE — 80053 COMPREHEN METABOLIC PANEL: CPT

## 2021-11-14 PROCEDURE — 700101 HCHG RX REV CODE 250: Performed by: NURSE PRACTITIONER

## 2021-11-14 RX ORDER — OXYCODONE HYDROCHLORIDE 5 MG/1
5 TABLET ORAL EVERY 4 HOURS PRN
Status: DISCONTINUED | OUTPATIENT
Start: 2021-11-14 | End: 2021-11-19

## 2021-11-14 RX ORDER — OXYCODONE HYDROCHLORIDE 10 MG/1
10 TABLET ORAL EVERY 4 HOURS PRN
Status: DISCONTINUED | OUTPATIENT
Start: 2021-11-14 | End: 2021-11-19

## 2021-11-14 RX ADMIN — OCTREOTIDE ACETATE 100 MCG: 100 INJECTION, SOLUTION INTRAVENOUS; SUBCUTANEOUS at 08:23

## 2021-11-14 RX ADMIN — OXYCODONE HYDROCHLORIDE 10 MG: 10 TABLET ORAL at 08:24

## 2021-11-14 RX ADMIN — SULFAMETHOXAZOLE AND TRIMETHOPRIM 1 TABLET: 800; 160 TABLET ORAL at 18:07

## 2021-11-14 RX ADMIN — GABAPENTIN 600 MG: 300 CAPSULE ORAL at 05:43

## 2021-11-14 RX ADMIN — ONDANSETRON 4 MG: 2 INJECTION INTRAMUSCULAR; INTRAVENOUS at 16:55

## 2021-11-14 RX ADMIN — GABAPENTIN 600 MG: 300 CAPSULE ORAL at 18:07

## 2021-11-14 RX ADMIN — MICAFUNGIN SODIUM 100 MG: 100 INJECTION, POWDER, LYOPHILIZED, FOR SOLUTION INTRAVENOUS at 10:16

## 2021-11-14 RX ADMIN — ONDANSETRON 4 MG: 2 INJECTION INTRAMUSCULAR; INTRAVENOUS at 05:36

## 2021-11-14 RX ADMIN — Medication 1000 UNITS: at 05:43

## 2021-11-14 RX ADMIN — PIPERACILLIN SODIUM AND TAZOBACTAM SODIUM 3.38 G: 3; .375 INJECTION, POWDER, FOR SOLUTION INTRAVENOUS at 14:08

## 2021-11-14 RX ADMIN — OCTREOTIDE ACETATE 100 MCG: 100 INJECTION, SOLUTION INTRAVENOUS; SUBCUTANEOUS at 20:29

## 2021-11-14 RX ADMIN — SULFAMETHOXAZOLE AND TRIMETHOPRIM 1 TABLET: 800; 160 TABLET ORAL at 05:43

## 2021-11-14 RX ADMIN — EZETIMIBE 10 MG: 10 TABLET ORAL at 18:07

## 2021-11-14 RX ADMIN — ENOXAPARIN SODIUM 40 MG: 40 INJECTION SUBCUTANEOUS at 08:24

## 2021-11-14 RX ADMIN — OXYCODONE HYDROCHLORIDE 10 MG: 10 TABLET ORAL at 12:42

## 2021-11-14 RX ADMIN — PIPERACILLIN SODIUM AND TAZOBACTAM SODIUM 3.38 G: 3; .375 INJECTION, POWDER, FOR SOLUTION INTRAVENOUS at 05:39

## 2021-11-14 RX ADMIN — ONDANSETRON 4 MG: 2 INJECTION INTRAMUSCULAR; INTRAVENOUS at 10:39

## 2021-11-14 RX ADMIN — SODIUM CHLORIDE 125 MG: 9 INJECTION, SOLUTION INTRAVENOUS at 18:43

## 2021-11-14 RX ADMIN — CALCIUM GLUCONATE: 98 INJECTION, SOLUTION INTRAVENOUS at 19:50

## 2021-11-14 RX ADMIN — Medication 100 MG: at 05:43

## 2021-11-14 RX ADMIN — PIPERACILLIN SODIUM AND TAZOBACTAM SODIUM 3.38 G: 3; .375 INJECTION, POWDER, FOR SOLUTION INTRAVENOUS at 21:53

## 2021-11-14 ASSESSMENT — ENCOUNTER SYMPTOMS
EYE DISCHARGE: 0
SHORTNESS OF BREATH: 0
VOMITING: 0
FEVER: 0
CHILLS: 0
MYALGIAS: 1
NAUSEA: 0
SEIZURES: 0
DIARRHEA: 0
BLURRED VISION: 0
DOUBLE VISION: 0
DIZZINESS: 0
ABDOMINAL PAIN: 1
WEAKNESS: 1

## 2021-11-14 ASSESSMENT — PAIN DESCRIPTION - PAIN TYPE
TYPE: ACUTE PAIN

## 2021-11-14 NOTE — PROGRESS NOTES
"Surgical Oncology Progress Note    Subjective:  Concern for confusion/sedation overnight prompting dose of narcan and holding narcotics - then issues with pain overnight.  Patient states night was \"very difficult.\"  On RA on my exam this AM.  Outputs significantly less with minimizing PO intake.  Did not ambulate yesterday - states motivated to get out of bed today.  No AM labs available for review today.    Objective:  /87   Pulse 64   Temp 36.1 °C (96.9 °F) (Temporal)   Resp 17   Ht 1.6 m (5' 2.99\")   Wt 69.6 kg (153 lb 7 oz)   LMP 01/01/2009   SpO2 94%   BMI 27.19 kg/m²       Intake/Output Summary (Last 24 hours) at 11/14/2021 0817  Last data filed at 11/14/2021 0400  Gross per 24 hour   Intake 120 ml   Output 470 ml   Net -350 ml        Net IO Since Admission: -10,409.53 mL [11/14/21 0817]     Exam:  General: AAOx3, appropriate in conversation    Pulmonary: on RA - no increased WOB  CV: hemodynamically acceptable   Abdomen:  Soft, appropriately TTP Incision c/d/i with staples.  No surrounding erythema.  MARTHA x2 with minimal dark bilious fluid. Leaking around one of the drains - ostomy appliance in place - appliance has a leak.  Additional ostomy appliance around prior drain site.  All drains and appliances draining thin bilious/brown fluid.  No significant mal-odor.    MSK: No peripheral edema, extremities warm       Assessment/Plan:  Patient is a 67 y/o F admitted after duodenal perforation following ERCP - found to have large retroperitoneal abscess which failed conservative management with IR drains.  OR 11/5/21 for ex-lap, drainage of intra-abdominal and retroperitoneal abscess, drain placement.  ? Starting to try and drain through R flank prior drain site     POD#: 8     -Continue care per primary team   -Continue TPN for nutritional support.  Outputs much less than yesterday (total recorded = 150 compared to 600 from day prior).  Seems to be draining preferentially around the drains.  -Continue " NPO with minimal ice chips/sips for comfort.  Would like to limit to 1 cup per day.  A popsicle would be ok daily if this is available.  -Continue octreotide  -Will need to continue strict I/O of the drains and ostomy appliances as this will help guide clinical management   -Anticipate likely several weeks of NPO/TPN while fistula closes - will depend on outputs.  -RN coordinating with wound care to get leaking ostomy appliance replaced  -Will plan for repeat CT tomorrow with PO and IV contrast.  Ok to drink the PO contrast over several hours if needed (patient has had difficulty drinking the contrast in the past)  -Will continue to follow      Plan of care has been discussed with patient who is in agreement with the plan.  Bedside RN updated.   Primary team updated.    Lisy Vann MD  November 14, 2021, 8:17 AM

## 2021-11-14 NOTE — PROGRESS NOTES
2000: pt is AOx4, no evidence of confusion  States some pain  Sat 93% on 1L  Drain sites clean, dry and intact  Output in each brown, green color  Small amt of output  Purewick in use, patient dry flow changed due to wetness  Pt denies want to mobilize at this time   TPN 60ml/hr    2150: Pt is AOx4  Complains of 8/10 pain  visually crying, grimacing, sweating  Temp checked, 97.8f  Pt complains of nausea, treated per mar  MD paged for pain meds    2200  MD call back, order to resume opiate pain medication with change in frequency  Patient treated accordingly per mar

## 2021-11-14 NOTE — CARE PLAN
Shift Goals  Clinical Goals: pain control; orientation  Patient Goals: rest  Family Goals: n/a    Progress made toward(s) clinical / shift goals:    Patient urinating adequately, patient skin integrity assessed as needed by this RN and CNA  Problem: Urinary - Renal Perfusion  Goal: Ability to achieve and maintain adequate renal perfusion and functioning will improve  Outcome: Progressing     Problem: Skin Integrity  Goal: Skin integrity is maintained or improved  Outcome: Progressing

## 2021-11-14 NOTE — WOUND TEAM
[facility]  Inpatient Services  Wound and Skin Care Evaluation    Admission Date: 10/15/2021     Last order of IP CONSULT TO WOUND CARE was found on 11/9/2021 from Hospital Encounter on 10/14/2021     HPI, PMH, SH: Reviewed    Past Surgical History:   Procedure Laterality Date   • PB EXPLORATORY OF ABDOMEN  11/5/2021    Procedure: LAPAROTOMY, EXPLORATORY;  Surgeon: Jaden Cook M.D.;  Location: [facility];  Service: General   • PB ULTRASONIC GUIDANCE, INTRAOPERATIVE  11/5/2021    Procedure: ULTRASOUND GUIDANCE;  Surgeon: Jaden Cook M.D.;  Location: [facility];  Service: General   • ABDOMINAL ABSCESS DRAINAGE  11/5/2021    Procedure: DRAINAGE, ABSCESS, ABDOMEN - PERITONEAL AND RETROPERITONEAL;  Surgeon: Jaden Cook M.D.;  Location: [facility];  Service: General   • PB ERCP,DIAGNOSTIC  10/1/2021    Procedure: ERCP (ENDOSCOPIC RETROGRADE CHOLANGIOPANCREATOGRAPHY);  Surgeon: Fausto Casas M.D.;  Location: [facility];  Service: Gastroenterology   • COLONOSCOPY  8/8/2018    Procedure: COLONOSCOPY;  Surgeon: Shukri Condon M.D.;  Location: [facility];  Service: General   • GASTROSCOPY  5/23/2018    Procedure: GASTROSCOPY;  Surgeon: Fausto Casas M.D.;  Location: [facility];  Service: Gastroenterology   • EGD W/ENDOSCOPIC ULTRASOUND  5/23/2018    Procedure: EGD W/ENDOSCOPIC ULTRASOUND- RADIAL UPPER;  Surgeon: Fausto Casas M.D.;  Location: [facility];  Service: Gastroenterology   • CATARACT PHACO WITH IOL Left 4/18/2017    Procedure: CATARACT PHACO WITH IOL;  Surgeon: Stuart Estevez M.D.;  Location: SURGERY SAME DAY [facility];  Service:    • CATARACT PHACO WITH IOL Right 4/4/2017    Procedure: CATARACT PHACO WITH IOL;  Surgeon: Stuart Estevez M.D.;  Location: SURGERY [facility];  Service:    • GASTRIC SLEEVE LAPAROSCOPY  10/19/2016    Procedure:  GASTRIC SLEEVE LAPAROSCOPY, HIATAL HERNIA;  Surgeon: Shukri Condon M.D.;  Location: SURGERY [facility];  Service:    • LIVER BIOPSY LAPAROSCOPIC  10/19/2016    Procedure: LIVER BIOPSY LAPAROSCOPIC;  Surgeon: Shukri Condon M.D.;  Location: SURGERY [facility];  Service:    • GASTROSCOPY N/A 2016    Procedure: GASTROSCOPY;  Surgeon: Shukri Condon M.D.;  Location: SURGERY [facility];  Service:    • ROTATOR CUFF REPAIR Right 2016   • ROTATOR CUFF REPAIR Left 2015   • HYSTERECTOMY ROBOTIC  2009    Performed by MEG VILLEGAS at SURGERY [facility]   • LUMBAR FUSION ANTERIOR      Dr Hillman, L3-S1 fusion   • CHOLECYSTECTOMY      laparoscopic   • TUBAL LIGATION     • GASTRIC RESECTION      gastric stapling   • TONSILLECTOMY AND ADENOIDECTOMY     • PRIMARY C SECTION  , , 1985    x3     Social History     Tobacco Use   • Smoking status: Former Smoker     Packs/day: 0.25     Years: 0.10     Pack years: 0.02     Types: Cigarettes     Quit date: 1973     Years since quittin.8   • Smokeless tobacco: Never Used   Substance Use Topics   • Alcohol use: No     No chief complaint on file.    Diagnosis: Bile duct leak [K83.8]  Postprocedural intraabdominal abscess [T81.43XA]  Intra-abdominal abscess (HCC) [K65.1]    Unit where seen by Wound Team: T429/00     WOUND CONSULT/FOLLOW UP RELATED TO:  Right upper abdomen     WOUND HISTORY:  Pt is an older woman with history of idiopathic pancreatitis who presented on  with complaints of abdominal pain. Pt has had ERCP with sphincterotomy by dr. Ramires on 10/1/21. Upon admission to [facility] pt had CT which revealed large irregular fluid collection with thick wall occupying most of the right abdomen, IR drains were placed. Unfortunately there was a lack of improvement in intraabdominal fluid and therefore Dr. Dumont was consulted. Pt underwent surgery with Dr. ST on  and was found to have a large  retroperitoneal abscess containing necrotizing fascititis as well as an anterior peritoneal abscess and necrotizing soft tissue infection. IR placed drains were removed and new surgical drains were placed. Pt began having drainage from her old right upper quad IR drain hole and therefore wound team was requested to evaluate and treat/manage the drainage.     WOUND ASSESSMENT/LDA          Wound 10/16/21  Abdomen Lateral;Lower Right IR Drain insertion (MARTHA bulb going through pouch) (Active)   Wound Image   10/25/21 1200   Site Assessment [place] 11/13/21 1400   Periwound Assessment Clean;Dry 11/13/21 1400   Margins Defined edges;Unattached edges 11/13/21 1400   Closure Other (Comment) 11/10/21 1100   Drainage Amount Scant 11/13/21 1400   Drainage Description Green;Brown 11/13/21 1400   Treatments Cleansed;Site care;Tape change 11/13/21 1400   Wound Cleansing Foam Cleanser/Washcloth 11/13/21 1400   Periwound Protectant Skin Protectant Wipes to Periwound;Stoma Powder;Paste Ring 11/13/21 1400   Dressing Cleansing/Solutions Not Applicable 11/13/21 1400   Dressing Options Other (Comments) 11/13/21 1400   Dressing Changed Changed 11/13/21 1400   Dressing Status Clean;Dry;Intact 11/13/21 1400   Dressing Change/Treatment Frequency Daily, and As Needed 11/13/21 1400   NEXT Dressing Change/Treatment Date 11/14/21 11/13/21 1400   Shape Oval 11/10/21 1100   Wound Odor None 11/10/21 1100   Pulses N/A 11/10/21 1100   Exposed Structures MALVIN 11/10/21 1100   WOUND NURSE ONLY - Time Spent with Patient (mins) 60 11/10/21 1100       Wound 11/09/21 Full Thickness Wound Abdomen Lateral;Upper;Quadrant Right  (has down drain bag) (Active)   Wound Image    11/09/21 1400   Site Assessment Clean;Dry;Intact;[place] 11/13/21 1400   Periwound Assessment Clean;Dry;Intact 11/13/21 1400   Margins Unattached edges;Defined edges 11/13/21 1400   Closure Other (Comment) 11/13/21 1400   Drainage Amount Moderate 11/13/21 1400   Drainage Description Green;Brown  11/13/21 1400   Treatments Cleansed;Site care;Tape change 11/13/21 1400   Wound Cleansing Foam Cleanser/Washcloth 11/13/21 1400   Periwound Protectant Skin Protectant Wipes to Periwound;Paste Ring;Stoma Powder 11/13/21 1400   Dressing Cleansing/Solutions Not Applicable 11/13/21 1400   Dressing Options Other (Comments) 11/13/21 1400   Dressing Changed Changed 11/13/21 1400   Dressing Status Clean;Dry;Intact 11/13/21 1400   Dressing Change/Treatment Frequency Daily, and As Needed 11/13/21 1400   NEXT Dressing Change/Treatment Date 11/14/21 11/13/21 1400   NEXT Weekly Photo (Inpatient Only) 11/16/21 11/13/21 1400   Non-staged Wound Description Full thickness 11/13/21 1400   Wound Length (cm) 0.4 cm 11/09/21 1400   Wound Width (cm) 0.8 cm 11/09/21 1400   Wound Surface Area (cm^2) 0.32 cm^2 11/09/21 1400   Shape OVAL 11/09/21 1400   Wound Odor None 11/09/21 1400   Exposed Structures MALVIN 11/09/21 1400   WOUND NURSE ONLY - Time Spent with Patient (mins) 75 11/13/21 1400          Vascular:    CHARBEL:   No results found.    Lab Values:    Lab Results   Component Value Date/Time    WBC 4.9 11/13/2021 03:03 AM    RBC 2.58 (L) 11/13/2021 03:03 AM    HEMOGLOBIN 7.8 (L) 11/13/2021 03:03 AM    HEMATOCRIT 23.9 (L) 11/13/2021 03:03 AM    CREACTPROT 0.29 12/02/2015 07:48 AM    SEDRATEWES 10 12/02/2015 07:48 AM    HBA1C 5.7 (H) 09/13/2016 10:40 AM      Culture Results show:  Recent Results (from the past 720 hour(s))   CULTURE WOUND W/ GRAM STAIN    Collection Time: 11/05/21  8:27 AM    Specimen: Wound   Result Value Ref Range    Significant Indicator POS (POS)     Source WND     Site RETROPERITONEAL ABSCESS     Culture Result - (A)     Gram Stain Result Rare Gram positive cocci.  Few Yeast.       Culture Result (A)      Enterococcus faecium  Moderate growth  See previous culture for sensitivity report.      Culture Result Candida albicans  Rare growth   (A)     Culture Result (A)      Stenotrophomonas maltophilia  Light growth  See  "previous culture for sensitivity report.       Pain Level/Medicated:  No pain med.     INTERVENTIONS BY WOUND TEAM:  Chart and images reviewed. Discussed with bedside RN. All areas of concern (based on picture review, LDA review and discussion with bedside RN) have been thoroughly assessed. Documentation of areas based on significant findings. This RN in to assess patient. Performed standard wound care which includes appropriate positioning, dressing removal and non-selective debridement. Pictures and measurements obtained weekly if/when required.  Preparation for Dressing removal: NA saturated dressing removed without difficulty  Non-selectively Debrided with:  wound cleanser and gauze.  Sharp debridement: NA  Ngozi wound: Cleansed with wound cleanser, Prepped with stoma powder and no sting  Primary Dressing: A 2 1/4\" barrier was cut larger than wound. Paste ring was then stretched to fit opening. Barrier was applied down to skin around MARTHA Drain and adhered with friction. High output pouch was attached and spout closed. Hole was cut in pouch at the top and MARTHA tube threaded through hole. Pink tape to hole. MARTHA Bulb reattached.  Secondary (Outer) Dressing: NA    Changed upper right quadrant old drain site as well: same intervention as above, just did not have to cut hole in bag for MARTHA drain.     Interdisciplinary consultation: Patient, Bedside RN (Carole), Wound RN (Flor)    EVALUATION / RATIONALE FOR TREATMENT:  Most Recent Date:  11/13/21: both appliances lasted with no leaks, the pouches seem to be managing the drainage better than any other dressing. Still high amounts of brown/green drainage present. Will continue to follow.     11/10/21: Pouch around RUQ old MARTHA site appears to be working well. Pt noted to have significant leaking around MARTHA drain to RLQ. Pouched around MARTHA drain to prevent skin deterioration. Will continue to follow.   11/9/21: Pt with large amount of liquid brown drainage noted from right upper " quadrant old MARTHA Site. Wound team requested to evaluate and pouch. Wound is small however large amount of drainage noted. High output pouch applied to down drain to better manage output. Pt also having some drainage noted from around surgically placed drainage. Fenestrated gauze placed to better control output.     Goals: Steady decrease in wound area and depth weekly.    WOUND TEAM PLAN OF CARE ([X] for frequency of wound follow up,):   Nursing to follow orders written for wound care. Contact wound team if area fails to progress, deteriorates or with any questions/concerns  Dressing changes by wound team:    X               Follow up 3 times weekly:                NPWT change 3 times weekly:     Follow up 1-2 times weekly:      Follow up Bi-Monthly:                   Follow up as needed:     Other (explain): X    NURSING PLAN OF CARE ORDERS (X):  Dressing changes: See Dressing Care orders:   Skin care: See Skin Care orders:   RN Prevention Protocol:   Rectal tube care: See Rectal Tube Care orders:   Other orders:    RSKIN:   CURRENTLY IN PLACE (X), APPLIED THIS VISIT (A), ORDERED (O):   Q shift Anant:  X  Q shift pressure point assessments:  X    Surface/Positioning   Pressure redistribution mattress  X          Low Airloss          Bariatric foam      Bariatric ALBERT     Waffle cushion        Waffle Overlay    X      Reposition q 2 hours X     TAPs Turning system     Z Jorgito Pillow     Offloading/Redistribution   Sacral Mepilex (Silicone dressing)   MALVIN  Heel Mepilex (Silicone dressing)         Heel float boots (Prevalon boot)             Float Heels off Bed with Pillows           Respiratory RA  Silicone O2 tubing         Gray Foam Ear protectors     Cannula fixation Device (Tender )          High flow offloading Clip    Elastic head band offloading device      Anchorfast                                                         Trach with Optifoam split foam             Containment/Moisture Prevention     Rectal  tube or BMS    Purwick/Condom Cath        Corea Catheter  X  Barrier wipes           Barrier paste       Antifungal tx      Interdry        Mobilization       Up to chair        Ambulate      PT/OT      Nutrition       Dietician        Diabetes Education      PO  X   TF     TPN     NPO   # days     Other        Anticipated discharge plans:   LTACH:        SNF/Rehab:                  Home Health Care:   X        Outpatient Wound Center:            Self/Family Care:        Other:                  Vac Discharge Needs:   Not Applicable Pt not on a wound vac:     X  Regular Vac while inpatient, alternative dressing at DC:        Regular Vac in use and continued at DC:            Reg. Vac w/ Skin Sub/Biologic in use. Will need to be changed 2x wkly:      Veraflo Vac while inpatient, ok to transition to Regular Vac on Discharge:           Veraflo Vac while inpatient, will need to remain on Veraflo Vac upon discharge:

## 2021-11-14 NOTE — PROGRESS NOTES
"Received report of patient at start of shift. Patient is AOx4, PRN medications administered for complaints of pain. Assessment complete, patient on 1L O2 via NC. Drains x3 to right abdomen. Drain output thick/brown, resembles stool - updated Dr. Vann via Voalte message. Patient resting in bed throughout shift. Purewick in use, linens changed for urinary incontinence. Encouraged out of bed activity, patient politely declines, states \"I just want to rest.\" Patient encouraged to use call light for any needs/assistance. Bed alarm on, safety education provided.    "

## 2021-11-14 NOTE — PROGRESS NOTES
Pharmacy TPN Day # 5       2021    Dosing Weight   60 kg (Adj BW)    TPN Restart  TPN currently providing 100% of goal  TPN goal: 2074-5201 kcal/day including 1.4-1.7 gm/kg/day Protein  TPN indication: perforated duodenum with high drain output    Pertinent PMH: Admitted on 10/15/2021 for abdominal pain. Underwent polypectomy and a sphincterotomy on 10/01/2021.  CT completed on 10/08/202, found to have a large fluid collection and thickening of wall around the duodenum with retroperitoneal fluid collection.  IR placed drain. CT completed on 10/09/2021 with a significant amount of retroperitoneal air and fluid.  Pt was transferred to [facility] 10/14/21 for further evaluation. 2 drains have been placed for fluid collections surrounding the R kidney and colon.  Peritoneal drain cultures have demonstrated polymicrobial growth however blood cultures have remained negative.  ID is consulting.  Pt was on TPN from 10/14 - 10/24.  Taken to the OR on  for ex-lap with drainage of intra-abdominal and retroperitoneal abscess with drain placement.  Concerns that her duodenal perforation may have reopened.  Surgery has recommended patient remain NPO and resume TPN.    Temp (24hrs), Av.8 °C (98.3 °F), Min:35.9 °C (96.7 °F), Max:37.7 °C (99.8 °F)  .  Recent Labs     21  0010 21  0401 21  1005   SODIUM 132* 131* 133*   POTASSIUM 4.3 4.2 4.3   CHLORIDE 99 98 100   CO2 26 26 26   BUN 14 16 19   CREATININE 0.57 0.52 0.51   GLUCOSE 133* 137* 130*   CALCIUM 7.6* 7.8* 7.6*   ASTSGOT  --  36 34   ALTSGPT  --  24 23   ALBUMIN  --  2.3* 2.4*   TBILIRUBIN  --  <0.2 0.2   PHOSPHORUS 2.1* 2.8 3.0   MAGNESIUM  --  2.1 2.1     Accu-Checks  Recent Labs     21  1234 21  2357   POCGLUCOSE 182* 141*       Vitals:    21 2000 21 2347 21 0348 21 0720   BP: 112/56 (!) 94/53 133/87 146/79   Weight:       Height:           Intake/Output Summary (Last 24 hours) at 2021 1126  Last  data filed at 11/14/2021 0720  Gross per 24 hour   Intake 120 ml   Output 881 ml   Net -761 ml       Orders Placed This Encounter   Procedures   • Diet NPO Restrict to: Ice Chips (ok for minimal sips and ice chips for comfort)     Standing Status:   Standing     Number of Occurrences:   1     Order Specific Question:   Diet NPO Restrict to:     Answer:   Ice Chips [2]     Comments:   ok for minimal sips and ice chips for comfort         TPN for past 72 hours (Show up to 3 orders; newest on the left. Changes between the two most recent orders are indicated.)     Start date and time   11/12/2021 2000 11/11/2021 2000 11/09/2021 2000      TPN Central Line Formulation [[id number]] TPN Central Line Formulation [[id number]] TPN Adult Central Line [[id number]]    Order Status  Active Completed Completed    Last Admin  New Bag at 11/13/2021 2015 by Jim Franco R.N. New Bag at 11/11/2021 1955 by Mckenna Barney R.N. New Bag at 11/10/2021 1952 by Mckenna Barney R.N.       Base    Clinisol 15%  90 g 90 g 90 g    dextrose 70%  240 g 240 g 240 g    fat emulsion (soy) 20%  50 g 50 g 50 g       Additives    potassium phosphate  36 mmol 30 mmol 20 mmol    potassium chloride  -- -- 10 mEq    sodium acetate  111 mEq 145 mEq 145 mEq    sodium chloride  111 mEq 77 mEq 77 mEq    magnesium sulfate  12 mEq 12 mEq 8 mEq    calcium GLUConate  9.3 mEq 9.3 mEq 9.3 mEq    M.T.E.-4  1 mL 1 mL 1 mL    M.V.I. Adult  10 mL 10 mL 10 mL    famotidine  40 mg 40 mg 40 mg       QS Base    sterile water  113.89 mL 107.39 mL 356.71 mL       Energy Contribution    Proteins  -- -- --    Dextrose  816 kcal 816 kcal 816 kcal    Lipids  500 kcal 500 kcal 500 kcal    Total  1,316 kcal 1,316 kcal 1,316 kcal       Electrolyte Ion Calculated Amount    Sodium  222 mEq 222 mEq 222 mEq    Potassium  52.8 mEq 44 mEq 39.33 mEq    Calcium  9.3 mEq 9.3 mEq 9.3 mEq    Magnesium  12 mEq 12 mEq 8 mEq    Aluminum  -- -- --    Phosphate  36 mmol 30 mmol 20  mmol    Chloride  111 mEq 77 mEq 87 mEq    Acetate  187.2 mEq 221.2 mEq 221.2 mEq       Other    Total Protein  90 g 90 g 90 g    Total Protein/kg  1.29 g/kg 1.29 g/kg 1.36 g/kg    Total Amino Acid  -- -- --    Total Amino Acid/kg  -- -- --    Glucose Infusion Rate  2.39 mg/kg/min 2.39 mg/kg/min 2.39 mg/kg/min    Osmolarity (Estimated)  -- -- --    Volume  1,440 mL 1,440 mL 1,440 mL    Rate  60 mL/hr 60 mL/hr 60 mL/hr    Dosing Weight  69.6 kg 69.6 kg 66.4 kg    Infusion Site  Central Central Central            This formula provides:  % kcal as lipids = 30  Grams protein/kg = 1.5  Non-protein calories = 1316  Kcals/kg = 28  Total daily calories = 1676     Comments:  Diet: NPO   Per surgery note: Continue NPO with minimal ice chips/sips for comfort. Limit to 1 cup per day. Continue TPN for nutritional support.  Outputs much less than yesterday (total recorded = 150 compared to 600 from day prior).    Continue current TPN formulation, at goal. CMP, Mg, Phos ordered tomorrow w/ AM labs. May decrease KPhos tomorrow pending lab results.      Macronutrient's:  CHO 49% AA 21% Lipids 30%  GIR: 2.95 mg/kg/min  Accuchecks: Not documented. AM glucose 130     Micronutrients:  Na 222 meq  K 53 meq  Ca 9.3 meq  Mg 12 meq  Phos 36 mmol        Sajan AkersD BCPS

## 2021-11-14 NOTE — CARE PLAN
The patient is Watcher - Medium risk of patient condition declining or worsening    Shift Goals  Clinical Goals: Increased mobility  Patient Goals: Pain control  Family Goals: n/a    Progress made toward(s) clinical / shift goals: PRN pain medications administered per MAR. Patient reports reduction in pain level post medication administration. Antibiotics administered per MAR.     Problem: Pain - Standard  Goal: Alleviation of pain or a reduction in pain to the patient’s comfort goal  Outcome: Progressing     Problem: Infection - Standard  Goal: Patient will remain free from infection  Outcome: Progressing     Patient is not progressing towards the following goals: Patient educated on importance of mobility post surgery. Patient declines mobilization.     Problem: Mobility  Goal: Patient's capacity to carry out activities will improve  Outcome: Not Progressing

## 2021-11-14 NOTE — PROGRESS NOTES
"Patient with increased confusion at time of evening med pass. Patient's  also reporting patient seems \"more confused.\" Night hospitalist, Dr. Rico paged with update. Per this MD, confusion likely related to use of opiate medications. Received orders for one time dose of Narcan and to hold opiates. Updated Dr. Rico that patient has reported severe pain throughout day. Vitals and respiratory status stable. Ok to hold Narcan and continue to monitor patient.   "

## 2021-11-14 NOTE — PROGRESS NOTES
Hospital Medicine Daily Progress Note    Date of Service  11/14/2021    Chief Complaint  Nils Alfonso is a 66 y.o. female admitted 10/15/2021 with abdominal pain    Hospital Course  Initially admitted to [facility] for evaluation of abdominal pain after recent ERCP with polypectomy and sphincterotomy and noted to have large intra-abdominal fluid collection. Multiple IR drains had been placed, but her infection worsened.  She was transferred to [facility] for escalation of her surgical needs. ID has been following. She underwent ex lap w I/D of multiple loculated abscesses and debridement of nec fasciitis w Dr. ST 11/5/21. Prev cx w Stenotrophomonas maltophilia, mixed yeast, E faecalis, E coli and Chryseobacterium in the blood.    Interval Problem Update  11/9: Continues to have significant drainage from prior right upper quadrant drain site.  Surgery team recommending ostomy bag placement for skin protection and strict monitoring of output.  Also recommending n.p.o. status and TPN for 3 to 5 days.  Patient otherwise remained afebrile with stable vital signs.  She does request removal of her Corea catheter secondary to discomfort.  She is also experiencing uncontrolled pain.  Medications have been adjusted.  11/10: Pain is better controlled today on current regimen.  Output is decreased with n.p.o. status.  Continue TPN.  Updated infectious disease team on recent postop cultures.  She remains afebrile with stable vital signs.  11/11:  Uncontrolled pain overnight to drain site.  Improved this AM.  She does have persistent nausea, which she suspects is secondary to not eating.  Will discuss minimal oral intake with surgery.  She remains afebrile with stable vital signs.   11/12:  Cx from drain site pending.  Pain is better controlled on current regimen.  Nausea also improved.  Her only complaint is dry mouth and is requesting to be able to have minimal amounts of ginger ale.    11/13:  Patient with increased output after being  allowed minimal fluids.  Reinforced sips of liquids, which she understands.  She is motivated to work with therapy. Pain is controlled.   Patient is medically clear to transition to rehab facility.   11/14: Patient reports uncontrolled pain overnight after decreasing frequency and availability of narcotics.  Her medications were being reduced secondary to mild confusion and excessive tiredness while on narcotics.  Will increase frequency of as needed's to help control pain.  Continue to hold OxyContin.  She is encouraged to take compliance with therapy.  She does report being motivated to do this.  Decreased output from drain overnight.  Plan for repeat CT tomorrow.    Consultants/Specialty  general surgery, GI, infectious disease and interventional radiology    Code Status  Full Code    Disposition  Patient is not medically cleared.   Anticipate discharge to rehab vs SNF.  Rehab evaluating.   I have placed the appropriate orders for post-discharge needs.    Review of Systems  Review of Systems   Constitutional: Positive for malaise/fatigue. Negative for chills and fever.   HENT: Negative for congestion.    Eyes: Negative for blurred vision, double vision and discharge.   Respiratory: Negative for shortness of breath.    Cardiovascular: Negative for chest pain and leg swelling.   Gastrointestinal: Positive for abdominal pain (improving). Negative for diarrhea, nausea and vomiting.   Genitourinary: Negative for dysuria and urgency.   Musculoskeletal: Positive for myalgias (improving).   Skin: Negative for itching and rash.   Neurological: Positive for weakness. Negative for dizziness and seizures.   All other systems reviewed and are negative.       Physical Exam  Temp:  [35.9 °C (96.7 °F)-37.7 °C (99.8 °F)] 37.1 °C (98.7 °F)  Pulse:  [60-78] 68  Resp:  [17-19] 18  BP: ()/(53-87) 149/72  SpO2:  [93 %-96 %] 93 %    Physical Exam  Vitals and nursing note reviewed.   Constitutional:       General: She is not in  acute distress.     Appearance: She is ill-appearing.   HENT:      Head: Normocephalic and atraumatic.      Nose: Nose normal. No congestion.      Mouth/Throat:      Mouth: Mucous membranes are dry.      Pharynx: Oropharynx is clear. No oropharyngeal exudate or posterior oropharyngeal erythema.   Eyes:      General: No scleral icterus.        Right eye: No discharge.         Left eye: No discharge.      Pupils: Pupils are equal, round, and reactive to light.   Cardiovascular:      Rate and Rhythm: Normal rate and regular rhythm.      Heart sounds: Normal heart sounds.   Pulmonary:      Effort: Pulmonary effort is normal. No respiratory distress.      Breath sounds: Normal breath sounds. No wheezing or rales.   Abdominal:      General: There is no distension.      Palpations: Abdomen is soft.      Tenderness: There is abdominal tenderness.          Comments: Midline Prevena CDI; prior drain site RUQ with further cola/bilious discharge; saturating through dressing   Musculoskeletal:         General: No tenderness.      Cervical back: Normal range of motion and neck supple. No rigidity or tenderness.      Right lower leg: No edema.      Left lower leg: No edema.      Comments: Generalized weakness   Skin:     General: Skin is warm and dry.   Neurological:      Mental Status: She is alert and oriented to person, place, and time.   Psychiatric:         Mood and Affect: Mood normal.       Fluids    Intake/Output Summary (Last 24 hours) at 11/14/2021 1217  Last data filed at 11/14/2021 1150  Gross per 24 hour   Intake 120 ml   Output 891 ml   Net -771 ml       Laboratory  Recent Labs     11/13/21  0303   WBC 4.9   RBC 2.58*   HEMOGLOBIN 7.8*   HEMATOCRIT 23.9*   MCV 92.6   MCH 30.2   MCHC 32.6*   RDW 48.7   PLATELETCT 205   MPV 10.2     Recent Labs     11/12/21  0010 11/13/21  0401 11/14/21  1005   SODIUM 132* 131* 133*   POTASSIUM 4.3 4.2 4.3   CHLORIDE 99 98 100   CO2 26 26 26   GLUCOSE 133* 137* 130*   BUN 14 16 19    CREATININE 0.57 0.52 0.51   CALCIUM 7.6* 7.8* 7.6*                   Imaging  CT-ABDOMEN-PELVIS WITH   Final Result         1. The components in the gallbladder fossa and right flank of the complex fluid collection are mostly resolved. There is still a small residual fluid collection in the perinephric space surrounding the inferior right kidney. Right lower quadrant MARTHA drain    and right upper quadrant catheter are outside of this residual collection.   2. Air-fluid levels throughout the colon could relate to ileus.   3. Bilateral pleural effusions with bibasilar atelectasis.   4. Pneumobilia.      CT-ABDOMEN-PELVIS WITH   Final Result      1.  Slight interval decrease in size of the large RIGHT peritoneal abscess which now contains 3 drains   2.  Decreased atelectasis with persistent opacity in the RIGHT lung base likely atelectasis rather than pneumonia   3.  Small LEFT renal cyst   4.  Prior cholecystectomy with ongoing pneumobilia      CT-DRAINAGE-CATH EXCHANGE   Final Result         1. Organized pancreatic pseudocyst Drainage with CT guidance.      CT-ABDOMEN-PELVIS WITH   Final Result      1.  There has been interval placement of an additional inferior lateral pigtail catheter within the complex right abdominal abscess. The other 2 pigtail catheters are stable in appearance.   2.  There has been no significant interval decrease in size of the large complex loculated abscess collection in the right abdomen.   3.  There is no new fluid collection.   4.  Gallbladder is surgically absent with continued pneumobilia.   5.  Interval decrease in the dependent pleural effusion with minimal airspace disease, likely atelectasis.      IR-DRAINAGE-CATH EXCHANGE   Final Result      1.  Successful left-sided drainage catheter removal.   2.  Successful image guided superior right drainage catheter replacement.      Plan: Suction drainage. Monitor outputs. Please contact interventional radiology if there is any concern for  tube dysfunction. Suggest routine tube maintenance at 3 months intervals if the tube remains in place.         CT-DRAIN-PERITONEAL   Final Result      1.  CT GUIDED PERITONEAL RLQ abdominal CATHETER DRAINAGE.   2.  THE CURRENT PLAN IS TO MONITOR DRAINAGE OUTPUT AND OBTAIN A FOLLOWUP CT SCAN IN 5-7 DAYS IF CLINICALLY INDICATED.      CT-ABDOMEN-PELVIS WITH   Final Result         1. Grossly unchanged irregular fluid collection occupying the right abdomen surrounding the right kidney and right colon extending to midline. Additional pigtail catheter in the component about midline anterior abdomen. Both pigtail catheters seem to be    within the collection.      2. Small right pleural effusion with right basilar atelectasis.               DX-CHEST-LIMITED (1 VIEW)   Final Result      1.  Right basilar atelectasis or consolidation. Small right pleural effusion not excluded.   2.  Atherosclerotic plaque.      CT-DRAIN-PERITONEAL   Final Result      1.  CT guided pancreatic pseudocyst catheter drainage.   2.  The current plan is to obtain a follow-up CT in 5-7 days..      IR-PICC LINE PLACEMENT W/ GUIDANCE > AGE 5   Final Result                  Ultrasound-guided PICC placement performed by qualified nursing staff as    above.          CT-ABDOMEN-PELVIS WITH    (Results Pending)        Assessment/Plan  * Bacteremia due to Gram-negative bacteria and fungemia- (present on admission)  Assessment & Plan  KALANI negative for signs of endocarditis  Cont bactrim, Zosyn, and Micafungin per ID for 4-week course with stop date of 11/24.  Repeat imaging prior to discontinuation.   Repeat Bld Cx neg      Generalized weakness  Assessment & Plan  PT/OT following  Recommending post-acute placement.  Acute rehab evaluating patient for admission.     Low magnesium level  Assessment & Plan  11/8 IV replacement    Hypotension  Assessment & Plan  11/6: Stop lasix; Stop ACEI  11/7 started midodrine    Hypophosphatemia- (present on  admission)  Assessment & Plan  Resolved with TPN  Monitoring per RD  11/6 Kphos; serum goal >3    Hypokalemia- (present on admission)  Assessment & Plan  Periodic monitoring  11/6 K bicarb x 1  Serum goal >4.0        Acute respiratory failure with hypoxia (HCC)- (present on admission)  Assessment & Plan  Resolved with diuresis  Likely volume overload with lower extremity edema present    Antibiotic-associated diarrhea  Assessment & Plan  Cdiff PCR negative  Loperamide PRN    Postprocedural retroperitoneal abscess- (present on admission)  Assessment & Plan  S/P Ex lap, I/D, debridement nec fasc 11/5/2021  Abx per ID  Follow cx  Pain control  Diet per sx  Antiemetics PRN  11/8 Repeat CT. Updated surgical team re: perinephric fluid collection. Further CT may be considered with IV and Oral contrast if needed if increased drainage or clinically deteriorates  11/9:  Strict monitoring of output from drain.  Ostomy bag placed on prior drain site for further output monitoring. Initiate NPO status and TPN for 3-5 days.   11/10:  Output improving with decreased oral intake.   11/11:  Continue to treat pain. Discussed recent OR cx with ID and recommend continuing current regimen.   11/12:  Follow cx from drainage. Pain control.   11/13:  Allowed to have limited sips of liquids.  Monitor output closely.  Anticipate several weeks of TPN and limited oral intake.   11/14:  Output much improved.  Plan for repeat CT tomorrow. Continue pain control.     Duodenal perforation (HCC)- (present on admission)  Assessment & Plan  After ERCP with retroperitoneal abscess and bacteremia  Uncertain etiology - ddx includes pancreatitis, bile leak, iatrogenic  Pepcid BID  11/9: Concern perforation may have reopened.  Surgery team recommending NPO status and TPN  11/10:  Start octreotide.     Hyponatremia- (present on admission)  Assessment & Plan  Recurrent, mild  Diuresis as tolerated    Normocytic anemia- (present on admission)  Assessment &  Plan  Transfuse for Hgb <7  Avoid regular phlebotomy  Supplements per dietary: 11/6 added thiamine, 6 sm meals/d  11/14:  Iron deficiency noted.  Replacement ordered.     Sepsis due to intraabdominal fluid collection/abscess, bacteremia and fungemia (HCC)- (present on admission)  Assessment & Plan  Sepsis resolved  Source intra-abdominal  Zosyn/Mycafungin/Bactrim DS and follow cx  ID following  Re-imaging per ID, IR, and Surgery  S/P Ex lap w I/D and debridement nec fasc 11/5/2021 11/8 Repeat CT. Updated surgical team re: perinephric fluid collection. Further CT may be considered with IV and Oral contrast if needed.  11/9:  She has remained afebrile over last 48 hours.  VSS.  Does not appear to be septic at this time.  Continue antibx.     Hyperlipidemia- (present on admission)  Assessment & Plan  Continue ezetimibe    Primary hypertension- (present on admission)  Assessment & Plan  Hypotensive 11/6 - stop lisinopril         VTE prophylaxis: enoxaparin ppx    I have performed a physical exam and reviewed and updated ROS and Plan today (11/14/2021). In review of yesterday's note (11/13/2021), there are no changes except as documented above.

## 2021-11-15 ENCOUNTER — APPOINTMENT (OUTPATIENT)
Dept: RADIOLOGY | Facility: MEDICAL CENTER | Age: 66
DRG: 856 | End: 2021-11-15
Attending: NURSE PRACTITIONER
Payer: MEDICARE

## 2021-11-15 LAB
ALBUMIN SERPL BCP-MCNC: 2.2 G/DL (ref 3.2–4.9)
ALBUMIN/GLOB SERPL: 1 G/DL
ALP SERPL-CCNC: 84 U/L (ref 30–99)
ALT SERPL-CCNC: 23 U/L (ref 2–50)
ANION GAP SERPL CALC-SCNC: 8 MMOL/L (ref 7–16)
AST SERPL-CCNC: 30 U/L (ref 12–45)
BACTERIA WND AEROBE CULT: ABNORMAL
BASOPHILS # BLD AUTO: 0.7 % (ref 0–1.8)
BASOPHILS # BLD: 0.03 K/UL (ref 0–0.12)
BILIRUB SERPL-MCNC: 0.2 MG/DL (ref 0.1–1.5)
BUN SERPL-MCNC: 19 MG/DL (ref 8–22)
CALCIUM SERPL-MCNC: 7.7 MG/DL (ref 8.5–10.5)
CHLORIDE SERPL-SCNC: 100 MMOL/L (ref 96–112)
CO2 SERPL-SCNC: 24 MMOL/L (ref 20–33)
CREAT SERPL-MCNC: 0.54 MG/DL (ref 0.5–1.4)
EOSINOPHIL # BLD AUTO: 0.01 K/UL (ref 0–0.51)
EOSINOPHIL NFR BLD: 0.2 % (ref 0–6.9)
ERYTHROCYTE [DISTWIDTH] IN BLOOD BY AUTOMATED COUNT: 46.7 FL (ref 35.9–50)
GLOBULIN SER CALC-MCNC: 2.2 G/DL (ref 1.9–3.5)
GLUCOSE SERPL-MCNC: 145 MG/DL (ref 65–99)
GRAM STN SPEC: ABNORMAL
HCT VFR BLD AUTO: 23.6 % (ref 37–47)
HGB BLD-MCNC: 7.5 G/DL (ref 12–16)
IMM GRANULOCYTES # BLD AUTO: 0.05 K/UL (ref 0–0.11)
IMM GRANULOCYTES NFR BLD AUTO: 1.1 % (ref 0–0.9)
LYMPHOCYTES # BLD AUTO: 0.53 K/UL (ref 1–4.8)
LYMPHOCYTES NFR BLD: 11.9 % (ref 22–41)
MAGNESIUM SERPL-MCNC: 2.1 MG/DL (ref 1.5–2.5)
MCH RBC QN AUTO: 28 PG (ref 27–33)
MCHC RBC AUTO-ENTMCNC: 31.8 G/DL (ref 33.6–35)
MCV RBC AUTO: 88.1 FL (ref 81.4–97.8)
MONOCYTES # BLD AUTO: 0.17 K/UL (ref 0–0.85)
MONOCYTES NFR BLD AUTO: 3.8 % (ref 0–13.4)
NEUTROPHILS # BLD AUTO: 3.66 K/UL (ref 2–7.15)
NEUTROPHILS NFR BLD: 82.3 % (ref 44–72)
NRBC # BLD AUTO: 0 K/UL
NRBC BLD-RTO: 0 /100 WBC
PHOSPHATE SERPL-MCNC: 3.1 MG/DL (ref 2.5–4.5)
PLATELET # BLD AUTO: 190 K/UL (ref 164–446)
PMV BLD AUTO: 10.3 FL (ref 9–12.9)
POTASSIUM SERPL-SCNC: 4.1 MMOL/L (ref 3.6–5.5)
PREALB SERPL-MCNC: 11.9 MG/DL (ref 18–38)
PROT SERPL-MCNC: 4.4 G/DL (ref 6–8.2)
RBC # BLD AUTO: 2.68 M/UL (ref 4.2–5.4)
SIGNIFICANT IND 70042: ABNORMAL
SITE SITE: ABNORMAL
SODIUM SERPL-SCNC: 132 MMOL/L (ref 135–145)
SOURCE SOURCE: ABNORMAL
TRIGL SERPL-MCNC: 191 MG/DL (ref 0–149)
WBC # BLD AUTO: 4.5 K/UL (ref 4.8–10.8)

## 2021-11-15 PROCEDURE — 85025 COMPLETE CBC W/AUTO DIFF WBC: CPT

## 2021-11-15 PROCEDURE — A9270 NON-COVERED ITEM OR SERVICE: HCPCS | Performed by: NURSE PRACTITIONER

## 2021-11-15 PROCEDURE — 700105 HCHG RX REV CODE 258: Performed by: INTERNAL MEDICINE

## 2021-11-15 PROCEDURE — 97535 SELF CARE MNGMENT TRAINING: CPT

## 2021-11-15 PROCEDURE — 84134 ASSAY OF PREALBUMIN: CPT

## 2021-11-15 PROCEDURE — 84478 ASSAY OF TRIGLYCERIDES: CPT

## 2021-11-15 PROCEDURE — 80053 COMPREHEN METABOLIC PANEL: CPT

## 2021-11-15 PROCEDURE — 700111 HCHG RX REV CODE 636 W/ 250 OVERRIDE (IP): Performed by: ANESTHESIOLOGY

## 2021-11-15 PROCEDURE — 700111 HCHG RX REV CODE 636 W/ 250 OVERRIDE (IP): Performed by: INTERNAL MEDICINE

## 2021-11-15 PROCEDURE — A9270 NON-COVERED ITEM OR SERVICE: HCPCS | Performed by: FAMILY MEDICINE

## 2021-11-15 PROCEDURE — A9270 NON-COVERED ITEM OR SERVICE: HCPCS | Performed by: INTERNAL MEDICINE

## 2021-11-15 PROCEDURE — 97530 THERAPEUTIC ACTIVITIES: CPT

## 2021-11-15 PROCEDURE — 700102 HCHG RX REV CODE 250 W/ 637 OVERRIDE(OP): Performed by: INTERNAL MEDICINE

## 2021-11-15 PROCEDURE — 700102 HCHG RX REV CODE 250 W/ 637 OVERRIDE(OP): Performed by: FAMILY MEDICINE

## 2021-11-15 PROCEDURE — 700101 HCHG RX REV CODE 250: Performed by: NURSE PRACTITIONER

## 2021-11-15 PROCEDURE — 83735 ASSAY OF MAGNESIUM: CPT

## 2021-11-15 PROCEDURE — 99232 SBSQ HOSP IP/OBS MODERATE 35: CPT | Performed by: NURSE PRACTITIONER

## 2021-11-15 PROCEDURE — 84100 ASSAY OF PHOSPHORUS: CPT

## 2021-11-15 PROCEDURE — 82962 GLUCOSE BLOOD TEST: CPT

## 2021-11-15 PROCEDURE — 74177 CT ABD & PELVIS W/CONTRAST: CPT

## 2021-11-15 PROCEDURE — 700111 HCHG RX REV CODE 636 W/ 250 OVERRIDE (IP): Mod: JB | Performed by: SURGERY

## 2021-11-15 PROCEDURE — 700102 HCHG RX REV CODE 250 W/ 637 OVERRIDE(OP): Performed by: NURSE PRACTITIONER

## 2021-11-15 PROCEDURE — 700117 HCHG RX CONTRAST REV CODE 255: Performed by: NURSE PRACTITIONER

## 2021-11-15 PROCEDURE — 700111 HCHG RX REV CODE 636 W/ 250 OVERRIDE (IP): Mod: JG | Performed by: INTERNAL MEDICINE

## 2021-11-15 PROCEDURE — 700105 HCHG RX REV CODE 258: Performed by: NURSE PRACTITIONER

## 2021-11-15 PROCEDURE — 700111 HCHG RX REV CODE 636 W/ 250 OVERRIDE (IP): Performed by: NURSE PRACTITIONER

## 2021-11-15 PROCEDURE — 770006 HCHG ROOM/CARE - MED/SURG/GYN SEMI*

## 2021-11-15 RX ADMIN — ONDANSETRON 4 MG: 2 INJECTION INTRAMUSCULAR; INTRAVENOUS at 05:10

## 2021-11-15 RX ADMIN — PIPERACILLIN SODIUM AND TAZOBACTAM SODIUM 3.38 G: 3; .375 INJECTION, POWDER, FOR SOLUTION INTRAVENOUS at 05:06

## 2021-11-15 RX ADMIN — Medication 1000 UNITS: at 05:06

## 2021-11-15 RX ADMIN — IOHEXOL 100 ML: 350 INJECTION, SOLUTION INTRAVENOUS at 12:20

## 2021-11-15 RX ADMIN — EZETIMIBE 10 MG: 10 TABLET ORAL at 18:10

## 2021-11-15 RX ADMIN — GABAPENTIN 600 MG: 300 CAPSULE ORAL at 18:09

## 2021-11-15 RX ADMIN — ENOXAPARIN SODIUM 40 MG: 40 INJECTION SUBCUTANEOUS at 08:49

## 2021-11-15 RX ADMIN — OXYCODONE HYDROCHLORIDE 10 MG: 10 TABLET ORAL at 18:29

## 2021-11-15 RX ADMIN — OCTREOTIDE ACETATE 100 MCG: 100 INJECTION, SOLUTION INTRAVENOUS; SUBCUTANEOUS at 09:56

## 2021-11-15 RX ADMIN — PIPERACILLIN SODIUM AND TAZOBACTAM SODIUM 3.38 G: 3; .375 INJECTION, POWDER, FOR SOLUTION INTRAVENOUS at 13:59

## 2021-11-15 RX ADMIN — OXYCODONE HYDROCHLORIDE 10 MG: 10 TABLET ORAL at 05:16

## 2021-11-15 RX ADMIN — ONDANSETRON 4 MG: 2 INJECTION INTRAMUSCULAR; INTRAVENOUS at 10:00

## 2021-11-15 RX ADMIN — PIPERACILLIN SODIUM AND TAZOBACTAM SODIUM 3.38 G: 3; .375 INJECTION, POWDER, FOR SOLUTION INTRAVENOUS at 22:30

## 2021-11-15 RX ADMIN — SULFAMETHOXAZOLE AND TRIMETHOPRIM 1 TABLET: 800; 160 TABLET ORAL at 18:10

## 2021-11-15 RX ADMIN — CALCIUM GLUCONATE: 98 INJECTION, SOLUTION INTRAVENOUS at 20:35

## 2021-11-15 RX ADMIN — SULFAMETHOXAZOLE AND TRIMETHOPRIM 1 TABLET: 800; 160 TABLET ORAL at 05:06

## 2021-11-15 RX ADMIN — OXYCODONE HYDROCHLORIDE 10 MG: 10 TABLET ORAL at 09:55

## 2021-11-15 RX ADMIN — GABAPENTIN 600 MG: 300 CAPSULE ORAL at 05:05

## 2021-11-15 RX ADMIN — MICAFUNGIN SODIUM 100 MG: 100 INJECTION, POWDER, LYOPHILIZED, FOR SOLUTION INTRAVENOUS at 10:05

## 2021-11-15 RX ADMIN — OCTREOTIDE ACETATE 100 MCG: 100 INJECTION, SOLUTION INTRAVENOUS; SUBCUTANEOUS at 20:35

## 2021-11-15 RX ADMIN — OXYCODONE HYDROCHLORIDE 10 MG: 10 TABLET ORAL at 13:59

## 2021-11-15 RX ADMIN — SODIUM CHLORIDE 125 MG: 9 INJECTION, SOLUTION INTRAVENOUS at 18:11

## 2021-11-15 RX ADMIN — Medication 100 MG: at 05:06

## 2021-11-15 ASSESSMENT — COGNITIVE AND FUNCTIONAL STATUS - GENERAL
SUGGESTED CMS G CODE MODIFIER MOBILITY: CK
STANDING UP FROM CHAIR USING ARMS: A LITTLE
MOVING TO AND FROM BED TO CHAIR: A LOT
WALKING IN HOSPITAL ROOM: A LITTLE
MOVING FROM LYING ON BACK TO SITTING ON SIDE OF FLAT BED: A LITTLE
MOBILITY SCORE: 15
CLIMB 3 TO 5 STEPS WITH RAILING: A LOT
TURNING FROM BACK TO SIDE WHILE IN FLAT BAD: A LOT

## 2021-11-15 ASSESSMENT — ENCOUNTER SYMPTOMS
WEAKNESS: 1
CHILLS: 0
MYALGIAS: 1
NAUSEA: 0
DIZZINESS: 0
CONSTIPATION: 0
VOMITING: 0
SEIZURES: 0
FEVER: 0
SORE THROAT: 0
SHORTNESS OF BREATH: 0
BLURRED VISION: 0
ABDOMINAL PAIN: 1
DOUBLE VISION: 0
DIARRHEA: 0
COUGH: 0

## 2021-11-15 ASSESSMENT — PAIN DESCRIPTION - PAIN TYPE
TYPE: ACUTE PAIN

## 2021-11-15 ASSESSMENT — GAIT ASSESSMENTS: GAIT LEVEL OF ASSIST: REFUSED

## 2021-11-15 NOTE — PROGRESS NOTES
Pt is AOx3  Mildly agitated  CHG bath performed   attempted to give patient tylenol to which she replied she did not want tylenol  She then drank a cup of water, and upon education regarding sips only allowed with medications or ice chips she fired this nurse and requested a different nurse. Charge nurse notified; report given to Karie June

## 2021-11-15 NOTE — WOUND TEAM
This RN in to check on pt appliances. Appliances intact from last night change. Pt going down for CT at 11am today. Wound team to continue to follow.

## 2021-11-15 NOTE — PROGRESS NOTES
Pt is A&O x4, fatigued  Pain 4/10   - nausea  Tolerating NPO, noncompliant  + Voids, purewick  + flatus  Last BM 11/14, ostomy  Up SBA  Bed alarm on, pt high fall risk per shaan browning  Reviewed plan of care with patient, bed in lowest position and locked, pt resting comfortably now, call light within reach, all needs met at this time. Interventions will be executed per plan of care

## 2021-11-15 NOTE — WOUND TEAM
[facility]  Inpatient Services  Wound and Skin Care Evaluation    Admission Date: 10/15/2021     Last order of IP CONSULT TO WOUND CARE was found on 11/9/2021 from Hospital Encounter on 10/14/2021     HPI, PMH, SH: Reviewed    Past Surgical History:   Procedure Laterality Date   • PB EXPLORATORY OF ABDOMEN  11/5/2021    Procedure: LAPAROTOMY, EXPLORATORY;  Surgeon: Jaden Cook M.D.;  Location: [facility];  Service: General   • PB ULTRASONIC GUIDANCE, INTRAOPERATIVE  11/5/2021    Procedure: ULTRASOUND GUIDANCE;  Surgeon: Jaden Cook M.D.;  Location: [facility];  Service: General   • ABDOMINAL ABSCESS DRAINAGE  11/5/2021    Procedure: DRAINAGE, ABSCESS, ABDOMEN - PERITONEAL AND RETROPERITONEAL;  Surgeon: Jaden Cook M.D.;  Location: [facility];  Service: General   • PB ERCP,DIAGNOSTIC  10/1/2021    Procedure: ERCP (ENDOSCOPIC RETROGRADE CHOLANGIOPANCREATOGRAPHY);  Surgeon: Fausto Casas M.D.;  Location: [facility];  Service: Gastroenterology   • COLONOSCOPY  8/8/2018    Procedure: COLONOSCOPY;  Surgeon: Shukri Condon M.D.;  Location: [facility];  Service: General   • GASTROSCOPY  5/23/2018    Procedure: GASTROSCOPY;  Surgeon: Fausto Casas M.D.;  Location: [facility];  Service: Gastroenterology   • EGD W/ENDOSCOPIC ULTRASOUND  5/23/2018    Procedure: EGD W/ENDOSCOPIC ULTRASOUND- RADIAL UPPER;  Surgeon: Fausto Casas M.D.;  Location: [facility];  Service: Gastroenterology   • CATARACT PHACO WITH IOL Left 4/18/2017    Procedure: CATARACT PHACO WITH IOL;  Surgeon: Stuart Estevez M.D.;  Location: SURGERY SAME DAY [facility];  Service:    • CATARACT PHACO WITH IOL Right 4/4/2017    Procedure: CATARACT PHACO WITH IOL;  Surgeon: Stuart Estevez M.D.;  Location: SURGERY [facility];  Service:    • GASTRIC SLEEVE LAPAROSCOPY  10/19/2016    Procedure:  GASTRIC SLEEVE LAPAROSCOPY, HIATAL HERNIA;  Surgeon: Shukri Condon M.D.;  Location: SURGERY [facility];  Service:    • LIVER BIOPSY LAPAROSCOPIC  10/19/2016    Procedure: LIVER BIOPSY LAPAROSCOPIC;  Surgeon: Shukri Condon M.D.;  Location: SURGERY [facility];  Service:    • GASTROSCOPY N/A 2016    Procedure: GASTROSCOPY;  Surgeon: Shukri Condon M.D.;  Location: SURGERY [facility];  Service:    • ROTATOR CUFF REPAIR Right 2016   • ROTATOR CUFF REPAIR Left 2015   • HYSTERECTOMY ROBOTIC  2009    Performed by MEG VILLEGAS at SURGERY [facility]   • LUMBAR FUSION ANTERIOR      Dr Hillman, L3-S1 fusion   • CHOLECYSTECTOMY      laparoscopic   • TUBAL LIGATION     • GASTRIC RESECTION      gastric stapling   • TONSILLECTOMY AND ADENOIDECTOMY     • PRIMARY C SECTION  , , 1985    x3     Social History     Tobacco Use   • Smoking status: Former Smoker     Packs/day: 0.25     Years: 0.10     Pack years: 0.02     Types: Cigarettes     Quit date: 1973     Years since quittin.9   • Smokeless tobacco: Never Used   Substance Use Topics   • Alcohol use: No     No chief complaint on file.    Diagnosis: Bile duct leak [K83.8]  Postprocedural intraabdominal abscess [T81.43XA]  Intra-abdominal abscess (HCC) [K65.1]    Unit where seen by Wound Team: T432/02     WOUND CONSULT/FOLLOW UP RELATED TO:  Right upper abdomen x3    WOUND HISTORY:  Pt is an older woman with history of idiopathic pancreatitis who presented on  with complaints of abdominal pain. Pt has had ERCP with sphincterotomy by dr. Ramires on 10/1/21. Upon admission to [facility] pt had CT which revealed large irregular fluid collection with thick wall occupying most of the right abdomen, IR drains were placed. Unfortunately there was a lack of improvement in intraabdominal fluid and therefore Dr. Dumont was consulted. Pt underwent surgery with Dr. ST on  and was found to have a large  retroperitoneal abscess containing necrotizing fascititis as well as an anterior peritoneal abscess and necrotizing soft tissue infection. IR placed drains were removed and new surgical drains were placed. Pt began having drainage from her old right upper quad IR drain hole and therefore wound team was requested to evaluate and treat/manage the drainage.     WOUND ASSESSMENT/LDA      Wound 10/16/21  Abdomen Lateral;Lower Right IR Drain insertion (MARTHA bulb going through pouch) (Active)   Wound Image   10/25/21 1200   Site Assessment Red;Drainage    Periwound Assessment Clean;Dry;Intact    Margins Attached edges;Defined edges    Closure Adhesive bandage    Drainage Amount Small    Drainage Description Brown    Treatments Cleansed;Site care;Offloading    Wound Cleansing Foam Cleanser/Washcloth    Periwound Protectant Skin Protectant Wipes to Periwound    Dressing Cleansing/Solutions Not Applicable    Dressing Options Other (Comments)    Dressing Changed Changed    Dressing Status Clean;Dry;Intact    Dressing Change/Treatment Frequency Daily, and As Needed    NEXT Dressing Change/Treatment Date 11/15/21    NEXT Weekly Photo (Inpatient Only) 11/21/21    Non-staged Wound Description Full thickness    Shape Oval    Wound Odor Mild    Pulses N/A    Exposed Structures None    WOUND NURSE ONLY - Time Spent with Patient (mins) 60        Wound 10/25/21 Abdomen;Flank Lateral;Upper Right IR drain insertion (Active)   Wound Image    11/14/21 1700   Site Assessment Drainage;Red    Periwound Assessment Intact;Dry;Clean    Margins Attached edges;Defined edges    Closure Adhesive bandage    Drainage Amount Small    Drainage Description Brown    Treatments Cleansed;Site care    Wound Cleansing Foam Cleanser/Washcloth    Periwound Protectant Stoma Powder;Skin Protectant Wipes to Periwound    Dressing Cleansing/Solutions Not Applicable    Dressing Options Other (Comments)    Dressing Changed Changed    Dressing Status Clean;Dry;Intact     Dressing Change/Treatment Frequency Daily, and As Needed    NEXT Dressing Change/Treatment Date 11/15/21    NEXT Weekly Photo (Inpatient Only) 11/21/21    Non-staged Wound Description Full thickness    Shape Circular    Wound Odor None    Pulses N/A    Exposed Structures MALVIN    WOUND NURSE ONLY - Time Spent with Patient (mins) 30    Wound 11/09/21 Full Thickness Wound Abdomen Lateral;Upper;Quadrant Right  (has down drain bag) (Active)   Wound Image    11/14/21 1700   Site Assessment Red;Drainage    Periwound Assessment Clean;Dry;Intact    Margins Defined edges;Attached edges    Closure Adhesive bandage    Drainage Amount Moderate    Drainage Description Brown    Treatments Cleansed;Site care    Wound Cleansing Foam Cleanser/Washcloth    Periwound Protectant Skin Protectant Wipes to Periwound;Stoma Powder    Dressing Cleansing/Solutions Not Applicable    Dressing Options Other (Comments)    Dressing Changed Changed    Dressing Status Clean;Dry;Intact    Dressing Change/Treatment Frequency Daily, and As Needed    NEXT Dressing Change/Treatment Date 11/15/21    NEXT Weekly Photo (Inpatient Only) 11/21/21    Non-staged Wound Description Full thickness    Wound Length (cm) 0.4 cm    Wound Width (cm) 0.8 cm    Wound Surface Area (cm^2) 0.32 cm^2    Shape Oval    Wound Odor None    Exposed Structures MALVIN    WOUND NURSE ONLY - Time Spent with Patient (mins) 60      Moisture Associated Skin Damage 11/14/21 (Active)   Wound Image    11/14/21 1700   NEXT Weekly Photo (Inpatient Only) 11/21/21    Drainage Amount None    Periwound Assessment Painful;Fragile    IAD Cleansing Dimethecone Wipes    Periwound Protectant Barrier Paste      Vascular:    CHARBEL:   No results found.    Lab Values:    Lab Results   Component Value Date/Time    WBC 4.9 11/13/2021 03:03 AM    RBC 2.58 (L) 11/13/2021 03:03 AM    HEMOGLOBIN 7.8 (L) 11/13/2021 03:03 AM    HEMATOCRIT 23.9 (L) 11/13/2021 03:03 AM    CREACTPROT 0.29 12/02/2015 07:48 AM     "KIKOWES 10 12/02/2015 07:48 AM    HBA1C 5.7 (H) 09/13/2016 10:40 AM      Culture Results show:  Recent Results (from the past 720 hour(s))   CULTURE WOUND W/ GRAM STAIN    Collection Time: 11/05/21  8:27 AM    Specimen: Wound   Result Value Ref Range    Significant Indicator POS (POS)     Source WND     Site RETROPERITONEAL ABSCESS     Culture Result - (A)     Gram Stain Result Rare Gram positive cocci.  Few Yeast.       Culture Result (A)      Enterococcus faecium  Moderate growth  See previous culture for sensitivity report.      Culture Result Candida albicans  Rare growth   (A)     Culture Result (A)      Stenotrophomonas maltophilia  Light growth  See previous culture for sensitivity report.       Pain Level/Medicated:  No pain med.     INTERVENTIONS BY WOUND TEAM:  Chart and images reviewed. Discussed with bedside RN. All areas of concern (based on picture review, LDA review and discussion with bedside RN) have been thoroughly assessed. Documentation of areas based on significant findings. This RN in to assess patient. Performed standard wound care which includes appropriate positioning, dressing removal and non-selective debridement. Pictures and measurements obtained weekly if/when required.  Preparation for Dressing removal: NA saturated dressing removed without difficulty  Non-selectively Debrided with:  moist warm washcloth and no rinse foam soap  Sharp debridement: NA  Ngozi wound: Cleansed with moist warm washcloth, Prepped with stoma powder and no sting  Primary Dressing: A 2 1/4\" barrier was cut larger than wound and MARTHA Lap stab. Paste ring was then stretched to fit openings of both barriers. Barriers were applied down to skin around (both) Old MARTHA site and MARTHA Drain and adhered with friction. High output pouch was attached to both barriers and RUQ pouch attached to down drain. RLQ pouch end closed. Hole was cut in pouch at the top and MARTHA tube threaded through hole. Pink tape to hole. MARTHA Bulb " "reattached.    R flank noted to be old drain site and also draining: cleansed and crusted, pediatric urostomy pouch cut to just under 1\" opening, half paste ring rolled and applied to barrier, applied to skin and closed end.      Barrier paste to buttocks, brianna changed.      Secondary (Outer) Dressing: NA    Interdisciplinary consultation: Patient, Bedside RN (Meli), Wound RN (Sajan)    EVALUATION / RATIONALE FOR TREATMENT:  Most Recent Date:  11/14/21:  Noted to be leaking today.  Patient still with large amounts of drainage from around drains and flank area.       11/13/21: both appliances lasted with no leaks, the pouches seem to be managing the drainage better than any other dressing. Still high amounts of brown/green drainage present. Will continue to follow.   11/10/21: Pouch around RUQ old MARTHA site appears to be working well. Pt noted to have significant leaking around MARTHA drain to RLQ. Pouched around MARTHA drain to prevent skin deterioration. Will continue to follow.   11/9/21: Pt with large amount of liquid brown drainage noted from right upper quadrant old MARTHA Site. Wound team requested to evaluate and pouch. Wound is small however large amount of drainage noted. High output pouch applied to down drain to better manage output. Pt also having some drainage noted from around surgically placed drainage. Fenestrated gauze placed to better control output.     Goals: Steady decrease in wound area and depth weekly.    WOUND TEAM PLAN OF CARE ([X] for frequency of wound follow up,):   Nursing to follow orders written for wound care. Contact wound team if area fails to progress, deteriorates or with any questions/concerns  Dressing changes by wound team:    X               Follow up 3 times weekly:                NPWT change 3 times weekly:     Follow up 1-2 times weekly:      Follow up Bi-Monthly:                   Follow up as needed:     Other (explain): X    NURSING PLAN OF CARE ORDERS (X):  Dressing changes: See " Dressing Care orders:   Skin care: See Skin Care orders:   RN Prevention Protocol:   Rectal tube care: See Rectal Tube Care orders:   Other orders:    RSKIN:   CURRENTLY IN PLACE (X), APPLIED THIS VISIT (A), ORDERED (O):   Q shift Anant:  X  Q shift pressure point assessments:  X    Surface/Positioning   Pressure redistribution mattress  X          Low Airloss          Bariatric foam      Bariatric ALBERT     Waffle cushion        Waffle Overlay    X      Reposition q 2 hours X     TAPs Turning system     Z Jorgito Pillow     Offloading/Redistribution   Sacral Mepilex (Silicone dressing)   MALVIN  Heel Mepilex (Silicone dressing)         Heel float boots (Prevalon boot)             Float Heels off Bed with Pillows           Respiratory RA  Silicone O2 tubing         Gray Foam Ear protectors     Cannula fixation Device (Tender )          High flow offloading Clip    Elastic head band offloading device      Anchorfast                                                         Trach with Optifoam split foam             Containment/Moisture Prevention     Rectal tube or BMS    Purwick/Condom Cath        Corea Catheter  X  Barrier wipes           Barrier paste       Antifungal tx      Interdry        Mobilization       Up to chair        Ambulate      PT/OT      Nutrition       Dietician        Diabetes Education      PO  X   TF     TPN     NPO   # days     Other        Anticipated discharge plans:   LTACH:        SNF/Rehab:                  Home Health Care:   X        Outpatient Wound Center:            Self/Family Care:        Other:                  Vac Discharge Needs:   Not Applicable Pt not on a wound vac:     X  Regular Vac while inpatient, alternative dressing at DC:        Regular Vac in use and continued at DC:            Reg. Vac w/ Skin Sub/Biologic in use. Will need to be changed 2x wkly:      Veraflo Vac while inpatient, ok to transition to Regular Vac on Discharge:           Veraflo Vac while inpatient, will need  to remain on Veraflo Vac upon discharge:

## 2021-11-15 NOTE — THERAPY
Physical Therapy   Daily Treatment     Patient Name: Nils Alfonso  Age:  66 y.o., Sex:  female  Medical Record #: [id number]  Today's Date: 11/15/2021     Precautions: Fall Risk  Comments: abd MARTHA x2, ostomy bag x2    Assessment  Pt seen for PT treatment session. Pt reports she has abd pain, however she was willing to mobilize with therapy following encouragement and pt education on benefits. SPV for bed mobility tasks with HOB raised. SPV for STS x5 with FWW, VC's for sequencing. Pt declining to ambulate, stating that her abd pain in standing was too much. Pt requested to amb tomorrow and appears motivated to work with therapy. PT recommendation has been for postacute placement, however pt has shown progress with functional mobility and may transition to only needing HHPT. Definitive rec depends on how pt is able to ambulate and negotiate stairs tomorrow as well as spouse ability to assist at home. Will clarify spouse details with pt tomorrow. Will follow.     Plan  Continue current treatment plan.  DC Equipment Recommendations: Unable to determine at this time  Discharge Recommendations: Other - (see above)         11/15/21 1346   Vitals   O2 Delivery Device None - Room Air   Pain 0 - 10 Group   Location Abdomen   Location Orientation Lower;Right   Pain Rating Scale (NPRS) not quantified   Description Aching   Therapist Pain Assessment During Activity   Cognition    Cognition / Consciousness WDL   Level of Consciousness Alert   Ability To Follow Commands 2 Step   Comments pleasant and participatory, requested to ambulate at next session d/t pain today, however she appears motivated to work with therapy   Balance   Sitting Balance (Static) Fair +   Sitting Balance (Dynamic) Fair +   Standing Balance (Static) Fair   Standing Balance (Dynamic) Fair -   Weight Shift Sitting Good   Weight Shift Standing Fair   Skilled Intervention Sequencing;Tactile Cuing;Verbal Cuing   Comments FWW in standing   Gait Analysis   Gait  Level Of Assist Refused   Weight Bearing Status no restrictions   Comments pt refused d/t incr abd pain   Bed Mobility    Supine to Sit Supervised   Sit to Supine Supervised   Scooting Supervised   Rolling Supervised   Skilled Intervention Verbal Cuing   Comments HOB slightly elevated, used rails   Functional Mobility   Sit to Stand Supervised   Skilled Intervention Verbal Cuing;Sequencing   Comments Practice STS x5 w/ FWW, holding ~10s in standing each rep, VC's for hand positioning   Short Term Goals    Short Term Goal # 1 pt will move supine<>EOB via log roll with HOB flat, no features, and SPV within 6 visits to facilitate getting in/out of bed.   Goal Outcome # 1 Progressing as expected   Short Term Goal # 2 pt will complete all transfers with FWW and SPV within 6 weeks to increase functional mobility.   Goal Outcome # 2 Progressing as expected  (want to SPT and stand step prior to completing goal)   Short Term Goal # 3 pt will amb 150' with FWW and SPV within 6 visits for household mobility.   Goal Outcome # 3 Goal not met   Short Term Goal # 4 Pt will ascend and descend step x 2 with SPV to access her home by the 6th tx   Goal Outcome # 4 Goal not met

## 2021-11-15 NOTE — DISCHARGE PLANNING
Follow up for post acute services Monitoring tolerance to therapy intervention and medical clearance.

## 2021-11-15 NOTE — PROGRESS NOTES
Received report of patient at start of shift. Patient is AOx4, signficant other at bedside. Assessment complete, patient on room air. Drains x4 to right abdomen, output brown/green, thinner consistency compared to previous shifts. Purewick in use. CT scan scheduled for 1100, oral contrast started at 0900. Patient updated on plan of care, encouraged to use call light for any needs/assistance. Bed alarm on, safety education provided.

## 2021-11-15 NOTE — CARE PLAN
The patient is Stable - Low risk of patient condition declining or worsening    Progress made toward(s) clinical / shift goals:      Problem: Pain - Standard  Goal: Alleviation of pain or a reduction in pain to the patient’s comfort goal  Outcome: Progressing  Note: Pain level frequently assessed. PRN oxycodone administered per MAR.      Problem: Infection - Standard  Goal: Patient will remain free from infection  Outcome: Progressing  Note: Antibiotics administered per MAR.  Handwashing/foam performed before and after patient care.

## 2021-11-15 NOTE — CARE PLAN
The patient is Watcher - Medium risk of patient condition declining or worsening    Shift Goals  Clinical Goals: Pain control  Patient Goals: Rest  Family Goals: n/a    Progress made toward(s) clinical / shift goals:  pt resting in bed    Patient is not progressing towards the following goals:          Problem: Fall Risk  Goal: Patient will remain free from falls  Outcome: Progressing     Problem: Psychosocial  Goal: Patient's level of anxiety will decrease  Outcome: Progressing     Problem: Communication  Goal: The ability to communicate needs accurately and effectively will improve  Outcome: Progressing

## 2021-11-15 NOTE — PROGRESS NOTES
Hospital Medicine Daily Progress Note    Date of Service  11/15/2021    Chief Complaint  Nils Alfonso is a 66 y.o. female admitted 10/15/2021 with abdominal pain    Hospital Course  Initially admitted to [email or web address] for evaluation of abdominal pain after recent ERCP with polypectomy and sphincterotomy and noted to have large intra-abdominal fluid collection. Multiple IR drains had been placed, but her infection worsened.  She was transferred to [facility] for escalation of her surgical needs. ID has been following. She underwent ex lap w I/D of multiple loculated abscesses and debridement of nec fasciitis w Dr. ST 11/5/21. Prev cx w Stenotrophomonas maltophilia, mixed yeast, E faecalis, E coli and Chryseobacterium in the blood.    Interval Problem Update  11/9: Continues to have significant drainage from prior right upper quadrant drain site.  Surgery team recommending ostomy bag placement for skin protection and strict monitoring of output.  Also recommending n.p.o. status and TPN for 3 to 5 days.  Patient otherwise remained afebrile with stable vital signs.  She does request removal of her Corea catheter secondary to discomfort.  She is also experiencing uncontrolled pain.  Medications have been adjusted.  11/10: Pain is better controlled today on current regimen.  Output is decreased with n.p.o. status.  Continue TPN.  Updated infectious disease team on recent postop cultures.  She remains afebrile with stable vital signs.  11/11:  Uncontrolled pain overnight to drain site.  Improved this AM.  She does have persistent nausea, which she suspects is secondary to not eating.  Will discuss minimal oral intake with surgery.  She remains afebrile with stable vital signs.   11/12:  Cx from drain site pending.  Pain is better controlled on current regimen.  Nausea also improved.  Her only complaint is dry mouth and is requesting to be able to have minimal amounts of ginger ale.    11/13:  Patient with increased output after being  allowed minimal fluids.  Reinforced sips of liquids, which she understands.  She is motivated to work with therapy. Pain is controlled.   Patient is medically clear to transition to rehab facility.   11/14: Patient reports uncontrolled pain overnight after decreasing frequency and availability of narcotics.  Her medications were being reduced secondary to mild confusion and excessive tiredness while on narcotics.  Will increase frequency of as needed's to help control pain.  Continue to hold OxyContin.  She is encouraged to take compliance with therapy.  She does report being motivated to do this.  Decreased output from drain overnight.  Plan for repeat CT tomorrow.  11/15: Pain better controlled this morning.  Patient also appears more awake and alert.  Her overall weakness is improving and she has been able to transition to chair with minimal assistance.  Discussed rehab versus home with home health.  Will also need formal PT/OT eval's prior to determining discharge disposition.  Repeat CT scheduled for today.    Consultants/Specialty  general surgery, GI, infectious disease and interventional radiology    Code Status  Full Code    Disposition  Patient is not medically cleared.   Anticipate discharge to rehab vs home with home health.  Rehab evaluating. Pending PT/OT eval.   I have placed the appropriate orders for post-discharge needs.    Review of Systems  Review of Systems   Constitutional: Negative for chills, fever and malaise/fatigue.   HENT: Negative for congestion and sore throat.    Eyes: Negative for blurred vision and double vision.   Respiratory: Negative for cough and shortness of breath.    Cardiovascular: Negative for chest pain and leg swelling.   Gastrointestinal: Positive for abdominal pain (improving). Negative for constipation, diarrhea, nausea and vomiting.   Genitourinary: Negative for dysuria.   Musculoskeletal: Positive for myalgias (improving).   Skin: Negative for itching and rash.    Neurological: Positive for weakness. Negative for dizziness and seizures.   All other systems reviewed and are negative.       Physical Exam  Temp:  [36.5 °C (97.7 °F)-37.5 °C (99.5 °F)] 36.7 °C (98 °F)  Pulse:  [64-77] 64  Resp:  [16-19] 18  BP: (129-135)/(69-75) 134/71  SpO2:  [91 %-95 %] 95 %    Physical Exam  Vitals and nursing note reviewed.   Constitutional:       General: She is not in acute distress.     Appearance: She is ill-appearing. She is not toxic-appearing.   HENT:      Head: Normocephalic and atraumatic.      Nose: Nose normal. No congestion.      Mouth/Throat:      Mouth: Mucous membranes are dry.      Pharynx: Oropharynx is clear. No oropharyngeal exudate or posterior oropharyngeal erythema.   Eyes:      General:         Right eye: No discharge.         Left eye: No discharge.      Pupils: Pupils are equal, round, and reactive to light.   Cardiovascular:      Rate and Rhythm: Normal rate and regular rhythm.      Heart sounds: Normal heart sounds.   Pulmonary:      Effort: Pulmonary effort is normal. No respiratory distress.      Breath sounds: Normal breath sounds. No wheezing or rales.   Abdominal:      General: There is no distension.      Palpations: Abdomen is soft.      Tenderness: There is abdominal tenderness. There is no guarding.          Comments: Midline Prevena CDI; prior drain site RUQ with further cola/bilious discharge; saturating through dressing   Musculoskeletal:         General: No tenderness.      Cervical back: Normal range of motion and neck supple. No rigidity.      Right lower leg: No edema.      Left lower leg: No edema.      Comments: Generalized weakness   Skin:     General: Skin is warm and dry.      Coloration: Skin is not jaundiced or pale.   Neurological:      Mental Status: She is alert and oriented to person, place, and time.   Psychiatric:         Mood and Affect: Mood normal.       Fluids    Intake/Output Summary (Last 24 hours) at 11/15/2021 1202  Last data  filed at 11/15/2021 0839  Gross per 24 hour   Intake 0 ml   Output 860 ml   Net -860 ml       Laboratory  Recent Labs     11/13/21  0303 11/15/21  0150   WBC 4.9 4.5*   RBC 2.58* 2.68*   HEMOGLOBIN 7.8* 7.5*   HEMATOCRIT 23.9* 23.6*   MCV 92.6 88.1   MCH 30.2 28.0   MCHC 32.6* 31.8*   RDW 48.7 46.7   PLATELETCT 205 190   MPV 10.2 10.3     Recent Labs     11/13/21  0401 11/14/21  1005 11/15/21  0150   SODIUM 131* 133* 132*   POTASSIUM 4.2 4.3 4.1   CHLORIDE 98 100 100   CO2 26 26 24   GLUCOSE 137* 130* 145*   BUN 16 19 19   CREATININE 0.52 0.51 0.54   CALCIUM 7.8* 7.6* 7.7*                   Imaging  CT-ABDOMEN-PELVIS WITH   Final Result         1. The components in the gallbladder fossa and right flank of the complex fluid collection are mostly resolved. There is still a small residual fluid collection in the perinephric space surrounding the inferior right kidney. Right lower quadrant MARTHA drain    and right upper quadrant catheter are outside of this residual collection.   2. Air-fluid levels throughout the colon could relate to ileus.   3. Bilateral pleural effusions with bibasilar atelectasis.   4. Pneumobilia.      CT-ABDOMEN-PELVIS WITH   Final Result      1.  Slight interval decrease in size of the large RIGHT peritoneal abscess which now contains 3 drains   2.  Decreased atelectasis with persistent opacity in the RIGHT lung base likely atelectasis rather than pneumonia   3.  Small LEFT renal cyst   4.  Prior cholecystectomy with ongoing pneumobilia      CT-DRAINAGE-CATH EXCHANGE   Final Result         1. Organized pancreatic pseudocyst Drainage with CT guidance.      CT-ABDOMEN-PELVIS WITH   Final Result      1.  There has been interval placement of an additional inferior lateral pigtail catheter within the complex right abdominal abscess. The other 2 pigtail catheters are stable in appearance.   2.  There has been no significant interval decrease in size of the large complex loculated abscess collection in  the right abdomen.   3.  There is no new fluid collection.   4.  Gallbladder is surgically absent with continued pneumobilia.   5.  Interval decrease in the dependent pleural effusion with minimal airspace disease, likely atelectasis.      IR-DRAINAGE-CATH EXCHANGE   Final Result      1.  Successful left-sided drainage catheter removal.   2.  Successful image guided superior right drainage catheter replacement.      Plan: Suction drainage. Monitor outputs. Please contact interventional radiology if there is any concern for tube dysfunction. Suggest routine tube maintenance at 3 months intervals if the tube remains in place.         CT-DRAIN-PERITONEAL   Final Result      1.  CT GUIDED PERITONEAL RLQ abdominal CATHETER DRAINAGE.   2.  THE CURRENT PLAN IS TO MONITOR DRAINAGE OUTPUT AND OBTAIN A FOLLOWUP CT SCAN IN 5-7 DAYS IF CLINICALLY INDICATED.      CT-ABDOMEN-PELVIS WITH   Final Result         1. Grossly unchanged irregular fluid collection occupying the right abdomen surrounding the right kidney and right colon extending to midline. Additional pigtail catheter in the component about midline anterior abdomen. Both pigtail catheters seem to be    within the collection.      2. Small right pleural effusion with right basilar atelectasis.               DX-CHEST-LIMITED (1 VIEW)   Final Result      1.  Right basilar atelectasis or consolidation. Small right pleural effusion not excluded.   2.  Atherosclerotic plaque.      CT-DRAIN-PERITONEAL   Final Result      1.  CT guided pancreatic pseudocyst catheter drainage.   2.  The current plan is to obtain a follow-up CT in 5-7 days..      IR-PICC LINE PLACEMENT W/ GUIDANCE > AGE 5   Final Result                  Ultrasound-guided PICC placement performed by qualified nursing staff as    above.          CT-ABDOMEN-PELVIS WITH    (Results Pending)   CT-ABDOMEN-PELVIS WITH    (Results Pending)        Assessment/Plan  * Bacteremia due to Gram-negative bacteria and fungemia-  (present on admission)  Assessment & Plan  KALANI negative for signs of endocarditis  Cont bactrim, Zosyn, and Micafungin per ID for 4-week course with stop date of 11/24.  Repeat imaging prior to discontinuation.   Repeat Bld Cx neg      Generalized weakness  Assessment & Plan  PT/OT following  Recommending post-acute placement.  Acute rehab evaluating patient for admission.   11/15:  Overall improving.  Likely home with HH vs rehab at d/c.    Low magnesium level  Assessment & Plan  11/8 IV replacement    Hypotension  Assessment & Plan  11/6: Stop lasix; Stop ACEI  11/7 started midodrine    Hypophosphatemia- (present on admission)  Assessment & Plan  Resolved with TPN  Monitoring per RD  11/6 Kphos; serum goal >3    Hypokalemia- (present on admission)  Assessment & Plan  Periodic monitoring  11/6 K bicarb x 1  Serum goal >4.0        Acute respiratory failure with hypoxia (HCC)- (present on admission)  Assessment & Plan  Resolved with diuresis  Likely volume overload with lower extremity edema present    Antibiotic-associated diarrhea  Assessment & Plan  Cdiff PCR negative  Loperamide PRN    Postprocedural retroperitoneal abscess- (present on admission)  Assessment & Plan  S/P Ex lap, I/D, debridement nec fasc 11/5/2021  Abx per ID  Follow cx  Pain control  Diet per sx  Antiemetics PRN  11/8 Repeat CT. Updated surgical team re: perinephric fluid collection. Further CT may be considered with IV and Oral contrast if needed if increased drainage or clinically deteriorates  11/9:  Strict monitoring of output from drain.  Ostomy bag placed on prior drain site for further output monitoring. Initiate NPO status and TPN for 3-5 days.   11/10:  Output improving with decreased oral intake.   11/11:  Continue to treat pain. Discussed recent OR cx with ID and recommend continuing current regimen.   11/12:  Follow cx from drainage. Pain control.   11/13:  Allowed to have limited sips of liquids.  Monitor output closely.  Anticipate  several weeks of TPN and limited oral intake.   11/14:  Output much improved.  Plan for repeat CT tomorrow. Continue pain control.   11/15:  CT abdomen today.     Duodenal perforation (HCC)- (present on admission)  Assessment & Plan  After ERCP with retroperitoneal abscess and bacteremia  Uncertain etiology - ddx includes pancreatitis, bile leak, iatrogenic  Pepcid BID  11/9: Concern perforation may have reopened.  Surgery team recommending NPO status and TPN  11/10:  Start octreotide.     Hyponatremia- (present on admission)  Assessment & Plan  Recurrent, mild  Diuresis as tolerated    Normocytic anemia- (present on admission)  Assessment & Plan  Transfuse for Hgb <7  Avoid regular phlebotomy  Supplements per dietary: 11/6 added thiamine, 6 sm meals/d  11/14:  Iron deficiency noted.  Replacement ordered.     Sepsis due to intraabdominal fluid collection/abscess, bacteremia and fungemia (HCC)- (present on admission)  Assessment & Plan  Sepsis resolved  Source intra-abdominal  Zosyn/Mycafungin/Bactrim DS and follow cx  ID following  Re-imaging per ID, IR, and Surgery  S/P Ex lap w I/D and debridement nec fasc 11/5/2021 11/8 Repeat CT. Updated surgical team re: perinephric fluid collection. Further CT may be considered with IV and Oral contrast if needed.  11/9:  She has remained afebrile over last 48 hours.  VSS.  Does not appear to be septic at this time.  Continue antibx.     Hyperlipidemia- (present on admission)  Assessment & Plan  Continue ezetimibe    Primary hypertension- (present on admission)  Assessment & Plan  Hypotensive 11/6 - stop lisinopril         VTE prophylaxis: enoxaparin ppx    I have performed a physical exam and reviewed and updated ROS and Plan today (11/15/2021). In review of yesterday's note (11/14/2021), there are no changes except as documented above.

## 2021-11-15 NOTE — CARE PLAN
The patient is Watcher - Medium risk of patient condition declining or worsening    Progress made toward(s) clinical / shift goals:      Problem: Pain - Standard  Goal: Alleviation of pain or a reduction in pain to the patient’s comfort goal  Outcome: Progressing  Note: Pain level frequently assessed. PRN medications administered per MAR.     Problem: Infection - Standard  Goal: Patient will remain free from infection  Outcome: Progressing  Note: Antibiotics administered per MAR.  Handwashing/foam performed before and after patient care.

## 2021-11-15 NOTE — PROGRESS NOTES
Pharmacy TPN Day # 7       11/15/2021    Dosing Weight   60 kg  (based on admit weight 71 kg)        TPN currently providing 100% of goal      TPN goal: 7290-5953 kcal/day including 1.5 gm/kg/day Protein      TPN indication: Perforated duodenum with high drain output    Pertinent PMH: Admitted on 10/15/2021 for abdominal pain following polypectomy and a sphincterotomy on 10/01/2021. CT completed on 10/08/202, found to have a large fluid collection and thickening of wall around the duodenum with retroperitoneal fluid collection for which patient underwent IR drain placement. Follow-up CT on 10/09/2021 with a significant amount of retroperitoneal air and fluid.  Pt was transferred to [facility] 10/14/21 and required placement of 2 drains for fluid collections surrounding the R kidney and colon. Pt was on TPN from 10/14 - 10/24 to allow bowel rest. She ultimately required ex-lap on  with drainage of intra-abdominal and retroperitoneal abscesses new drain placement. ON , concerns developed that her duodenal perforation may have reopened, thus, surgery has recommended patient remain NPO and resume TPN. ID is consulting.     Temp (24hrs), Av.9 °C (98.4 °F), Min:36.5 °C (97.7 °F), Max:37.5 °C (99.5 °F)    Recent Labs     21  0401 21  1005 11/15/21  0150   SODIUM 131* 133* 132*   POTASSIUM 4.2 4.3 4.1   CHLORIDE 98 100 100   CO2 26 26 24   BUN 16 19 19   CREATININE 0.52 0.51 0.54   GLUCOSE 137* 130* 145*   CALCIUM 7.8* 7.6* 7.7*   ASTSGOT 36 34 30   ALTSGPT 24 23 23   ALBUMIN 2.3* 2.4* 2.2*   TBILIRUBIN <0.2 0.2 0.2   PHOSPHORUS 2.8 3.0 3.1   MAGNESIUM 2.1 2.1 2.1   PREALBUMIN  --   --  11.9*     Accu-Checks  No results for input(s): POCGLUCOSE in the last 72 hours.    Vitals:    21 2040 11/15/21 0455 11/15/21 0839 11/15/21 1103   BP: 130/69 135/75 129/70 134/71   Weight:       Height:           Intake/Output Summary (Last 24 hours) at 11/15/2021 1320  Last data filed at 11/15/2021  0839  Gross per 24 hour   Intake 0 ml   Output 860 ml   Net -860 ml       Orders Placed This Encounter   Procedures   • Diet NPO Restrict to: Ice Chips (ok for minimal sips and ice chips for comfort)     Standing Status:   Standing     Number of Occurrences:   1     Order Specific Question:   Diet NPO Restrict to:     Answer:   Ice Chips [2]     Comments:   ok for minimal sips and ice chips for comfort         TPN for past 72 hours (Show up to 3 orders; newest on the left. Changes between the two most recent orders are indicated.)     Start date and time   11/12/2021 2000 11/11/2021 2000 11/09/2021 2000      TPN Central Line Formulation [[id number]] TPN Central Line Formulation [[id number]] TPN Adult Central Line [[id number]]    Order Status  Active Completed Completed    Last Admin  New Bag at 11/14/2021 1950 by Jim Franco R.N. New Bag at 11/11/2021 1955 by Mckenna Barney R.N. New Bag at 11/10/2021 1952 by Mckenna Barney R.N.       Base    Clinisol 15%  90 g 90 g 90 g    dextrose 70%  240 g 240 g 240 g    fat emulsion (soy) 20%  50 g 50 g 50 g       Additives    potassium phosphate  36 mmol 30 mmol 20 mmol    potassium chloride  -- -- 10 mEq    sodium acetate  111 mEq 145 mEq 145 mEq    sodium chloride  111 mEq 77 mEq 77 mEq    magnesium sulfate  12 mEq 12 mEq 8 mEq    calcium GLUConate  9.3 mEq 9.3 mEq 9.3 mEq    M.T.E.-4  1 mL 1 mL 1 mL    M.V.I. Adult  10 mL 10 mL 10 mL    famotidine  40 mg 40 mg 40 mg       QS Base    sterile water  113.89 mL 107.39 mL 356.71 mL       Energy Contribution    Proteins  -- -- --    Dextrose  816 kcal 816 kcal 816 kcal    Lipids  500 kcal 500 kcal 500 kcal    Total  1,316 kcal 1,316 kcal 1,316 kcal       Electrolyte Ion Calculated Amount    Sodium  222 mEq 222 mEq 222 mEq    Potassium  52.8 mEq 44 mEq 39.33 mEq    Calcium  9.3 mEq 9.3 mEq 9.3 mEq    Magnesium  12 mEq 12 mEq 8 mEq    Aluminum  -- -- --    Phosphate  36 mmol 30 mmol 20 mmol    Chloride  111 mEq 77  mEq 87 mEq    Acetate  187.2 mEq 221.2 mEq 221.2 mEq       Other    Total Protein  90 g 90 g 90 g    Total Protein/kg  1.29 g/kg 1.29 g/kg 1.36 g/kg    Total Amino Acid  -- -- --    Total Amino Acid/kg  -- -- --    Glucose Infusion Rate  2.39 mg/kg/min 2.39 mg/kg/min 2.39 mg/kg/min    Osmolarity (Estimated)  -- -- --    Volume  1,440 mL 1,440 mL 1,440 mL    Rate  60 mL/hr 60 mL/hr 60 mL/hr    Dosing Weight  69.6 kg 69.6 kg 66.4 kg    Infusion Site  Central Central Central            This formula provides:  % kcal as lipids = 30  Grams protein/kg = 1.5  Non-protein calories = 1316  Kcals/kg = 28  Total daily calories = 1676    Comments:  1. Patient remains stable overall, pending repeat CT today. Drain output continues to decline.  2. Micronutrients/Electrolytes: Patient's lytes remain stable on same TPN supplementation formula since 11/12. No additional repletion indicated. Hyponatremia remains stable with sodium in TPN at 0.9% NS equivalent.  3. Macronutrients/glycemic control: Q8hr fingerstick data not available but BG at goal on BMP (<180 mg/dL). Prealbumin level obtained today - remains low but improved compared to values from ~1 month ago.   4. Will continue TPN at 100% of estimated nutritional requirements and follow plans for restart of enteral nutrition to guide adjustments as indicated.    Pharmacy will continue to follow.     Claudia Marion, PharmD, BCCCP

## 2021-11-15 NOTE — PROGRESS NOTES
Received report of patient at start of shift. Patient AOx4 at time of morning assessment, intermittent confusion noted through afternoon. PRN medications administered for complaints of severe pain. Assessment complete, patient on 1L O2 via NC. Patient up to chair previously this morning, resting in bed at this time. Drains x3 to right abdomen, output thick/brown. Purewick in use, linens changed for urinary incontinence. Patient encouraged to use call light for assistance. Plan of care discussed, bed alarm on, safety education provided.

## 2021-11-16 ENCOUNTER — APPOINTMENT (OUTPATIENT)
Dept: RADIOLOGY | Facility: MEDICAL CENTER | Age: 66
DRG: 856 | End: 2021-11-16
Attending: SURGERY
Payer: MEDICARE

## 2021-11-16 LAB
SARS-COV+SARS-COV-2 AG RESP QL IA.RAPID: NOTDETECTED
SPECIMEN SOURCE: NORMAL

## 2021-11-16 PROCEDURE — 700102 HCHG RX REV CODE 250 W/ 637 OVERRIDE(OP): Performed by: FAMILY MEDICINE

## 2021-11-16 PROCEDURE — 700102 HCHG RX REV CODE 250 W/ 637 OVERRIDE(OP): Performed by: NURSE PRACTITIONER

## 2021-11-16 PROCEDURE — A9270 NON-COVERED ITEM OR SERVICE: HCPCS | Performed by: INTERNAL MEDICINE

## 2021-11-16 PROCEDURE — 97530 THERAPEUTIC ACTIVITIES: CPT

## 2021-11-16 PROCEDURE — 700105 HCHG RX REV CODE 258: Performed by: INTERNAL MEDICINE

## 2021-11-16 PROCEDURE — 700111 HCHG RX REV CODE 636 W/ 250 OVERRIDE (IP): Performed by: NURSE PRACTITIONER

## 2021-11-16 PROCEDURE — 700102 HCHG RX REV CODE 250 W/ 637 OVERRIDE(OP): Performed by: INTERNAL MEDICINE

## 2021-11-16 PROCEDURE — 99233 SBSQ HOSP IP/OBS HIGH 50: CPT | Performed by: NURSE PRACTITIONER

## 2021-11-16 PROCEDURE — 700101 HCHG RX REV CODE 250: Performed by: NURSE PRACTITIONER

## 2021-11-16 PROCEDURE — 700105 HCHG RX REV CODE 258: Performed by: NURSE PRACTITIONER

## 2021-11-16 PROCEDURE — 87426 SARSCOV CORONAVIRUS AG IA: CPT

## 2021-11-16 PROCEDURE — 97535 SELF CARE MNGMENT TRAINING: CPT

## 2021-11-16 PROCEDURE — 700111 HCHG RX REV CODE 636 W/ 250 OVERRIDE (IP): Performed by: INTERNAL MEDICINE

## 2021-11-16 PROCEDURE — A9270 NON-COVERED ITEM OR SERVICE: HCPCS | Performed by: NURSE PRACTITIONER

## 2021-11-16 PROCEDURE — 700111 HCHG RX REV CODE 636 W/ 250 OVERRIDE (IP): Performed by: ANESTHESIOLOGY

## 2021-11-16 PROCEDURE — 700111 HCHG RX REV CODE 636 W/ 250 OVERRIDE (IP): Mod: JB | Performed by: SURGERY

## 2021-11-16 PROCEDURE — 770001 HCHG ROOM/CARE - MED/SURG/GYN PRIV*

## 2021-11-16 PROCEDURE — 700111 HCHG RX REV CODE 636 W/ 250 OVERRIDE (IP): Mod: JG | Performed by: INTERNAL MEDICINE

## 2021-11-16 PROCEDURE — A9270 NON-COVERED ITEM OR SERVICE: HCPCS | Performed by: FAMILY MEDICINE

## 2021-11-16 RX ADMIN — PIPERACILLIN SODIUM AND TAZOBACTAM SODIUM 3.38 G: 3; .375 INJECTION, POWDER, FOR SOLUTION INTRAVENOUS at 14:15

## 2021-11-16 RX ADMIN — PIPERACILLIN SODIUM AND TAZOBACTAM SODIUM 3.38 G: 3; .375 INJECTION, POWDER, FOR SOLUTION INTRAVENOUS at 23:07

## 2021-11-16 RX ADMIN — MICAFUNGIN SODIUM 100 MG: 100 INJECTION, POWDER, LYOPHILIZED, FOR SOLUTION INTRAVENOUS at 10:47

## 2021-11-16 RX ADMIN — ONDANSETRON 4 MG: 2 INJECTION INTRAMUSCULAR; INTRAVENOUS at 15:04

## 2021-11-16 RX ADMIN — PIPERACILLIN SODIUM AND TAZOBACTAM SODIUM 3.38 G: 3; .375 INJECTION, POWDER, FOR SOLUTION INTRAVENOUS at 05:06

## 2021-11-16 RX ADMIN — Medication 1000 UNITS: at 05:07

## 2021-11-16 RX ADMIN — CALCIUM GLUCONATE: 98 INJECTION, SOLUTION INTRAVENOUS at 20:39

## 2021-11-16 RX ADMIN — SULFAMETHOXAZOLE AND TRIMETHOPRIM 1 TABLET: 800; 160 TABLET ORAL at 05:06

## 2021-11-16 RX ADMIN — SULFAMETHOXAZOLE AND TRIMETHOPRIM 1 TABLET: 800; 160 TABLET ORAL at 17:04

## 2021-11-16 RX ADMIN — ONDANSETRON 4 MG: 2 INJECTION INTRAMUSCULAR; INTRAVENOUS at 10:42

## 2021-11-16 RX ADMIN — OCTREOTIDE ACETATE 100 MCG: 100 INJECTION, SOLUTION INTRAVENOUS; SUBCUTANEOUS at 20:57

## 2021-11-16 RX ADMIN — OXYCODONE HYDROCHLORIDE 10 MG: 10 TABLET ORAL at 21:07

## 2021-11-16 RX ADMIN — OCTREOTIDE ACETATE 100 MCG: 100 INJECTION, SOLUTION INTRAVENOUS; SUBCUTANEOUS at 09:03

## 2021-11-16 RX ADMIN — ONDANSETRON 4 MG: 2 INJECTION INTRAMUSCULAR; INTRAVENOUS at 06:32

## 2021-11-16 RX ADMIN — SODIUM CHLORIDE 250 MG: 9 INJECTION, SOLUTION INTRAVENOUS at 17:04

## 2021-11-16 RX ADMIN — GABAPENTIN 600 MG: 300 CAPSULE ORAL at 17:04

## 2021-11-16 RX ADMIN — ONDANSETRON 4 MG: 2 INJECTION INTRAMUSCULAR; INTRAVENOUS at 21:08

## 2021-11-16 RX ADMIN — OXYCODONE HYDROCHLORIDE 10 MG: 10 TABLET ORAL at 06:32

## 2021-11-16 RX ADMIN — EZETIMIBE 10 MG: 10 TABLET ORAL at 17:04

## 2021-11-16 RX ADMIN — Medication 100 MG: at 05:06

## 2021-11-16 RX ADMIN — GABAPENTIN 600 MG: 300 CAPSULE ORAL at 05:06

## 2021-11-16 RX ADMIN — OXYCODONE HYDROCHLORIDE 10 MG: 10 TABLET ORAL at 10:41

## 2021-11-16 ASSESSMENT — ENCOUNTER SYMPTOMS
HEADACHES: 0
SENSORY CHANGE: 0
DIZZINESS: 0
SORE THROAT: 0
CHILLS: 0
BACK PAIN: 1
FEVER: 0
NERVOUS/ANXIOUS: 0
DIARRHEA: 0
DEPRESSION: 0
WEAKNESS: 1
ABDOMINAL PAIN: 0
FALLS: 0
COUGH: 0
SHORTNESS OF BREATH: 0
PALPITATIONS: 0
VOMITING: 0
NAUSEA: 0

## 2021-11-16 ASSESSMENT — PAIN DESCRIPTION - PAIN TYPE
TYPE: ACUTE PAIN
TYPE: ACUTE PAIN;SURGICAL PAIN

## 2021-11-16 ASSESSMENT — COGNITIVE AND FUNCTIONAL STATUS - GENERAL
DRESSING REGULAR LOWER BODY CLOTHING: A LITTLE
HELP NEEDED FOR BATHING: A LITTLE
DRESSING REGULAR UPPER BODY CLOTHING: A LITTLE
DAILY ACTIVITIY SCORE: 20
SUGGESTED CMS G CODE MODIFIER DAILY ACTIVITY: CJ
TOILETING: A LITTLE

## 2021-11-16 ASSESSMENT — FIBROSIS 4 INDEX: FIB4 SCORE: 2.17

## 2021-11-16 NOTE — THERAPY
Missed Therapy     Patient Name: Nils Alfonso  Age:  66 y.o., Sex:  female  Medical Record #: [id number]  Today's Date: 11/16/2021    Attempted PT treatment session x2. In the AM, pt reporting incr fatigue following working with OT. This PM, pt off the floor for procedure. PT will re-attempt tomorrow as able/appropriate.

## 2021-11-16 NOTE — PROGRESS NOTES
Hospital Medicine Daily Progress Note    Date of Service  11/16/2021    Chief Complaint  Nils Alfonso is a 66 y.o. female admitted 10/15/2021 with abdominal pain    Hospital Course  Initially admitted to [facility] for evaluation of abdominal pain after recent ERCP with polypectomy and sphincterotomy and noted to have large intra-abdominal fluid collection. Multiple IR drains had been placed, but her infection worsened.  She was transferred to [facility] for escalation of her surgical needs. She underwent ex lap w I/D of multiple loculated abscesses and debridement of nec fasciitis w Dr. ST 11/5/21. Prev cx w Stenotrophomonas maltophilia, mixed yeast, E faecalis, E coli and Chryseobacterium in the blood.  ID was consulted and placed patient on Bactrim twice daily, Zosyn, micafungin end date 11/24/2021.  Patient had decreased output from drains.  Repeat CT on 11/15/2021 showed increase as a fluid collection in the right mid abdomen and slight increase in trace bilateral pleural effusions.  Patient to return to IR.     Interval Problem Update  11/16/2021: No acute overnight events.  Patient pending IR for procedural drainage today.     I have personally seen and examined the patient at bedside. I discussed the plan of care with patient, family, bedside RN and .    Consultants/Specialty  general surgery    Code Status  Full Code    Disposition  Patient is not medically cleared.   Anticipate discharge to rehab versus home with home health.      Review of Systems  Review of Systems   Constitutional: Negative for chills and fever.   HENT: Negative for congestion and sore throat.    Respiratory: Negative for cough and shortness of breath.    Cardiovascular: Negative for chest pain, palpitations and leg swelling.   Gastrointestinal: Negative for abdominal pain, diarrhea, nausea and vomiting.   Genitourinary: Negative for dysuria and urgency.   Musculoskeletal: Positive for back pain. Negative for falls.        Right-sided  midthoracic back pain   Skin: Negative for itching and rash.   Neurological: Positive for weakness (improving per patient). Negative for dizziness, sensory change and headaches.   Psychiatric/Behavioral: Negative for depression. The patient is not nervous/anxious.         Physical Exam  Temp:  [36.4 °C (97.6 °F)-37.3 °C (99.1 °F)] 37.1 °C (98.8 °F)  Pulse:  [63-78] 73  Resp:  [17-18] 17  BP: (129-140)/(67-83) 129/67  SpO2:  [93 %-96 %] 93 %    Physical Exam  Constitutional:       General: She is not in acute distress.     Appearance: Normal appearance. She is not toxic-appearing.   HENT:      Head: Normocephalic and atraumatic.      Mouth/Throat:      Mouth: Mucous membranes are moist.      Pharynx: Oropharynx is clear.   Eyes:      General: No scleral icterus.     Extraocular Movements: Extraocular movements intact.      Conjunctiva/sclera: Conjunctivae normal.   Cardiovascular:      Rate and Rhythm: Normal rate and regular rhythm.      Pulses: Normal pulses.      Heart sounds: Normal heart sounds. No murmur heard.      Pulmonary:      Effort: Pulmonary effort is normal. No respiratory distress.      Breath sounds: Normal breath sounds.   Abdominal:      General: Abdomen is flat. Bowel sounds are normal. There is no distension.      Palpations: Abdomen is soft.      Tenderness: There is no abdominal tenderness.      Comments: Midline abdominal incision well approximated with staples.  Clean dry and intact without erythema, edema, drainage.    MARTHA drains to right upper and right lower quadrant with dark green/brown drainage.   Musculoskeletal:         General: Normal range of motion.      Cervical back: Normal range of motion and neck supple.      Right lower leg: Edema present.      Left lower leg: Edema present.   Skin:     General: Skin is warm and dry.      Capillary Refill: Capillary refill takes less than 2 seconds.      Coloration: Skin is not jaundiced or pale.      Findings: No bruising.   Neurological:       General: No focal deficit present.      Mental Status: She is alert and oriented to person, place, and time. Mental status is at baseline.   Psychiatric:         Mood and Affect: Mood normal.         Thought Content: Thought content normal.         Judgment: Judgment normal.         Fluids    Intake/Output Summary (Last 24 hours) at 11/16/2021 1010  Last data filed at 11/16/2021 0710  Gross per 24 hour   Intake 0 ml   Output 1615 ml   Net -1615 ml       Laboratory  Recent Labs     11/15/21  0150   WBC 4.5*   RBC 2.68*   HEMOGLOBIN 7.5*   HEMATOCRIT 23.6*   MCV 88.1   MCH 28.0   MCHC 31.8*   RDW 46.7   PLATELETCT 190   MPV 10.3     Recent Labs     11/14/21  1005 11/15/21  0150   SODIUM 133* 132*   POTASSIUM 4.3 4.1   CHLORIDE 100 100   CO2 26 24   GLUCOSE 130* 145*   BUN 19 19   CREATININE 0.51 0.54   CALCIUM 7.6* 7.7*             Recent Labs     11/15/21  0150   TRIGLYCERIDE 191*       Imaging  CT-ABDOMEN-PELVIS WITH   Final Result      1.  Slight interval increase in size in fluid collection the right midabdomen with drain in place.      2.  Slight interval increase in size in trace bilateral pleural effusions, right greater than left with bibasilar atelectasis.      3.  Pneumobilia.      CT-ABDOMEN-PELVIS WITH   Final Result         1. The components in the gallbladder fossa and right flank of the complex fluid collection are mostly resolved. There is still a small residual fluid collection in the perinephric space surrounding the inferior right kidney. Right lower quadrant MARTHA drain    and right upper quadrant catheter are outside of this residual collection.   2. Air-fluid levels throughout the colon could relate to ileus.   3. Bilateral pleural effusions with bibasilar atelectasis.   4. Pneumobilia.      CT-ABDOMEN-PELVIS WITH   Final Result      1.  Slight interval decrease in size of the large RIGHT peritoneal abscess which now contains 3 drains   2.  Decreased atelectasis with persistent opacity in the RIGHT  lung base likely atelectasis rather than pneumonia   3.  Small LEFT renal cyst   4.  Prior cholecystectomy with ongoing pneumobilia      CT-DRAINAGE-CATH EXCHANGE   Final Result         1. Organized pancreatic pseudocyst Drainage with CT guidance.      CT-ABDOMEN-PELVIS WITH   Final Result      1.  There has been interval placement of an additional inferior lateral pigtail catheter within the complex right abdominal abscess. The other 2 pigtail catheters are stable in appearance.   2.  There has been no significant interval decrease in size of the large complex loculated abscess collection in the right abdomen.   3.  There is no new fluid collection.   4.  Gallbladder is surgically absent with continued pneumobilia.   5.  Interval decrease in the dependent pleural effusion with minimal airspace disease, likely atelectasis.      IR-DRAINAGE-CATH EXCHANGE   Final Result      1.  Successful left-sided drainage catheter removal.   2.  Successful image guided superior right drainage catheter replacement.      Plan: Suction drainage. Monitor outputs. Please contact interventional radiology if there is any concern for tube dysfunction. Suggest routine tube maintenance at 3 months intervals if the tube remains in place.         CT-DRAIN-PERITONEAL   Final Result      1.  CT GUIDED PERITONEAL RLQ abdominal CATHETER DRAINAGE.   2.  THE CURRENT PLAN IS TO MONITOR DRAINAGE OUTPUT AND OBTAIN A FOLLOWUP CT SCAN IN 5-7 DAYS IF CLINICALLY INDICATED.      CT-ABDOMEN-PELVIS WITH   Final Result         1. Grossly unchanged irregular fluid collection occupying the right abdomen surrounding the right kidney and right colon extending to midline. Additional pigtail catheter in the component about midline anterior abdomen. Both pigtail catheters seem to be    within the collection.      2. Small right pleural effusion with right basilar atelectasis.               DX-CHEST-LIMITED (1 VIEW)   Final Result      1.  Right basilar atelectasis  or consolidation. Small right pleural effusion not excluded.   2.  Atherosclerotic plaque.      CT-DRAIN-PERITONEAL   Final Result      1.  CT guided pancreatic pseudocyst catheter drainage.   2.  The current plan is to obtain a follow-up CT in 5-7 days..      IR-PICC LINE PLACEMENT W/ GUIDANCE > AGE 5   Final Result                  Ultrasound-guided PICC placement performed by qualified nursing staff as    above.          CT-ABDOMEN-PELVIS WITH    (Results Pending)   IR-DRAIN-RETROPERITONEAL    (Results Pending)        Assessment/Plan  * Bacteremia due to Gram-negative bacteria and fungemia- (present on admission)  Assessment & Plan  KALANI negative for signs of endocarditis  Cont bactrim, Zosyn, and Micafungin per ID for 4-week course with stop date of 11/24.  Repeat imaging prior to discontinuation.   Repeat Bld Cx neg      Generalized weakness  Assessment & Plan  PT/OT following  Recommending post-acute placement.  Acute rehab evaluating patient for admission.   11/15:  Overall improving.  Likely home with  vs rehab at d/c.    Low magnesium level  Assessment & Plan  11/8 IV replacement  Resolved, continue to monitor    Hypotension  Assessment & Plan  11/6: Stop lasix; Stop ACEI  11/7 started midodrine  11/16-resolved continue to monitor    Hypophosphatemia- (present on admission)  Assessment & Plan  Resolved with TPN  Monitoring per RD  11/6 Kphos; serum goal >3  11/15: 3.1    Hypokalemia- (present on admission)  Assessment & Plan  Periodic monitoring  11/6 K bicarb x 1  Serum goal >4.0  11/15: 4.1      Acute respiratory failure with hypoxia (HCC)- (present on admission)  Assessment & Plan  Resolved with diuresis  Likely volume overload with lower extremity edema present    Antibiotic-associated diarrhea  Assessment & Plan  Cdiff PCR negative  Loperamide PRN    Postprocedural retroperitoneal abscess- (present on admission)  Assessment & Plan  S/P Ex lap, I/D, debridement nec fasc 11/5/2021  Abx per ID  Follow  cx  Pain control  N.p.o. with TPN  Antiemetics PRN  11/16/2021: CT abdomen 11/15/2021 showed increase fluid collection in the right mid abdomen and slight increase in trace bilateral pleural effusions.   Patient go to IR for procedural drainage today      Duodenal perforation (HCC)- (present on admission)  Assessment & Plan  After ERCP with retroperitoneal abscess and bacteremia  Uncertain etiology - ddx includes pancreatitis, bile leak, iatrogenic  Pepcid BID  11/9: Concern perforation may have reopened.  Surgery team recommending NPO status and TPN  11/10:  Start octreotide.     Hyponatremia- (present on admission)  Assessment & Plan  Recurrent, mild  Diuresis as tolerated    Normocytic anemia- (present on admission)  Assessment & Plan  Transfuse for Hgb <7  Avoid regular phlebotomy  Supplements per dietary: 11/6 added thiamine, 6 sm meals/d  11/14:  Iron deficiency noted.  Replacement ordered.     Sepsis due to intraabdominal fluid collection/abscess, bacteremia and fungemia (HCC)- (present on admission)  Assessment & Plan  Sepsis resolved  Source intra-abdominal  Zosyn/Mycafungin/Bactrim DS per infectious disease end date 11/24/2021  ID signed off  Re-imaging per ID, IR, and Surgery  S/P Ex lap w I/D and debridement nec fasc 11/5/2021 11/8 Repeat CT. Updated surgical team re: perinephric fluid collection. Further CT may be considered with IV and Oral contrast if needed.  11/9:  She has remained afebrile over last 48 hours.  VSS.  Does not appear to be septic at this time.  Continue antibx.     Hyperlipidemia- (present on admission)  Assessment & Plan  Continue ezetimibe    Primary hypertension- (present on admission)  Assessment & Plan  Hypotensive 11/6 - stop lisinopril         VTE prophylaxis: enoxaparin ppx    I have performed a physical exam and reviewed and updated ROS and Plan today (11/16/2021). In review of yesterday's note (11/15/2021), there are no changes except as documented above.

## 2021-11-16 NOTE — DIETARY
Nutrition support weekly update:  Day 32 of admit.  Nils Alfonso is a 66 y.o. female with admitting DX of bile duct leak, postprocedural intra-abdominal abscess, intra-abdominal abscess. TPN initiated on 11/9. Current TPN @ goal and meeting 100% of estimated needs with 1676 kcal and 90g protein.    Assessment:  Weight 72 kg (11/16/21)- via bed scale- Weights variable since admit, weight is up 1 kg since admit    Evaluation:   1. TPN currently at 100% goal rate per pharmacy note 11/16  2. Surgery recommends NPO and TPN d/t concerns for duodenal perforation on 11/9. Pt pending procedure for IR drain today per APRN note.  3. Labs: sodium 132, glucose 145, albumin 2.2, triglycerides 191  4. Meds: ferric gluconate complex, thiamine, vitamin D3, imodium, Zofran, bowel regimen, octreotide  5. GI: 200 ml output 11/16 per I/O's  6. Skin: no new staged pressure related injuries, noted 1+ edema LUE, LLE, RUE, RLE and generalized edema  abdominal and flank  7. Current feeding via TPN remains appropriate at this time.     Malnutrition risk: Does not meet criteria per ASPEN guidelines at this time. (per previous RD note)    Recommendations/Plan:  1. TPN per pharmacy  2. Fluids per MD  3. Monitor Weights    RD following.

## 2021-11-16 NOTE — DISCHARGE PLANNING
Care Transition Team Assessment    Information Source  Orientation Level: Oriented X4    Readmission Evaluation  Is this a readmission?: No    Elopement Risk  Legal Hold: No  Ambulatory or Self Mobile in Wheelchair: Yes  Disoriented: No  Psychiatric Symptoms: None  History of Wandering: No  Elopement this Admit: No  Vocalizing Wanting to Leave: No  Displays Behaviors, Body Language Wanting to Leave: No-Not at Risk for Elopement  Elopement Risk: Not at Risk for Elopement    Interdisciplinary Discharge Planning  Lives with - Patient's Self Care Capacity: Spouse  Patient or legal guardian wants to designate a caregiver: No  Housing / Facility: [address] [facility]  Prior Services: None    Discharge Preparedness  What is your plan after discharge?: Uncertain - pending medical team collaboration,Home with help,Home health care,Other (comment)  What are your discharge supports?: Spouse  Prior Functional Level: Independent with Activities of Daily Living    Functional Assesment  Prior Functional Level: Independent with Activities of Daily Living    Finances  Financial Barriers to Discharge: No  Prescription Coverage: Yes    Vision / Hearing Impairment  Right Eye Vision: Impaired,Wears Glasses  Left Eye Vision: Impaired,Wears Glasses         Advance Directive  Advance Directive?: None  Advance Directive offered?: AD Booklet refused    Domestic Abuse  Have you ever been the victim of abuse or violence?: No  Physical Abuse or Sexual Abuse: No  Verbal Abuse or Emotional Abuse: No  Possible Abuse/Neglect Reported to:: Not Applicable         Discharge Risks or Barriers  Discharge risks or barriers?: Complex medical needs  Patient risk factors: Complex medical needs    Anticipated Discharge Information  Discharge Disposition: Discharged to home/self care (01)

## 2021-11-16 NOTE — THERAPY
"   11/15/21 1537   Interdisciplinary Plan of Care Collaboration   Collaboration Comments Pt again seen for education only due to declining OOB activity. Pt reports improved pain control this session but had mobilized recently with PT and felt she \"just needs to rest\". Ongoing education on pathology of bed rest, likelihood that prolonged time in bed is contibuting to back pain. Educated pt on reacher and sock-aid for use as needed for compensatory LB dressing. Pt expresses interest in post-acute Rehab. Educated on need to demo tolerance for 3 hours activity to qualify for Acute Rehab. Pt states she will participate in OOB ADL tomorrow. Made plan for 11:00 OT session tomorrow.  Will request pt be pre-medicated for optimal pain control to facilitate activity tolerance.      "

## 2021-11-16 NOTE — PROGRESS NOTES
Pharmacy TPN Day # 8       2021    Dosing Weight   60 kg  (based on admission weight of 71 kg)        TPN currently providing 100% of goal      TPN goal: 1244-7279 kcal/day including 1.5 gm/kg/day Protein      TPN indication: Perforated duodenum with high drain output    Pertinent PMH: Admitted on 10/15/2021 for abdominal pain following polypectomy and a sphincterotomy on 10/01/2021. CT completed on 10/08/202, found to have a large fluid collection and thickening of wall around the duodenum with retroperitoneal fluid collection for which patient underwent IR drain placement. Follow-up CT on 10/09/2021 with a significant amount of retroperitoneal air and fluid.  Pt was transferred to [facility] 10/14/21 and required placement of 2 drains for fluid collections surrounding the R kidney and colon. Pt was on TPN from 10/14 - 10/24 to allow bowel rest. She ultimately required ex-lap on  with drainage of intra-abdominal and retroperitoneal abscesses new drain placement. ON , concerns developed that her duodenal perforation may have reopened, thus, surgery has recommended patient remain NPO and resume TPN. ID is consulting.    Temp (24hrs), Av.9 °C (98.4 °F), Min:36.4 °C (97.6 °F), Max:37.3 °C (99.1 °F)    Recent Labs     21  1005 11/15/21  0150   SODIUM 133* 132*   POTASSIUM 4.3 4.1   CHLORIDE 100 100   CO2 26 24   BUN 19 19   CREATININE 0.51 0.54   GLUCOSE 130* 145*   CALCIUM 7.6* 7.7*   ASTSGOT 34 30   ALTSGPT 23 23   ALBUMIN 2.4* 2.2*   TBILIRUBIN 0.2 0.2   PHOSPHORUS 3.0 3.1   MAGNESIUM 2.1 2.1   PREALBUMIN  --  11.9*     Accu-Checks  No results for input(s): POCGLUCOSE in the last 72 hours.    Vitals:    11/15/21 1514 11/15/21 1914 21 0306 21 0710   BP: 135/73 140/83 135/71 129/67   Weight:       Height:           Intake/Output Summary (Last 24 hours) at 2021 1230  Last data filed at 2021 0900  Gross per 24 hour   Intake 0 ml   Output 1615 ml   Net -1615 ml        Orders Placed This Encounter   Procedures   • Diet NPO Restrict to: Ice Chips (ok for minimal sips and ice chips for comfort)     Standing Status:   Standing     Number of Occurrences:   1     Order Specific Question:   Diet NPO Restrict to:     Answer:   Ice Chips [2]     Comments:   ok for minimal sips and ice chips for comfort         TPN for past 72 hours (Show up to 3 orders; newest on the left. Changes between the two most recent orders are indicated.)     Start date and time   11/12/2021 2000 11/11/2021 2000 11/09/2021 2000      TPN Central Line Formulation [[id number]] TPN Central Line Formulation [[id number]] TPN Adult Central Line [[id number]]    Order Status  Active Completed Completed    Last Admin  New Bag at 11/15/2021 2035 by Lyla Tiwari R.N. New Bag at 11/11/2021 1955 by Mckenna Barney R.N. New Bag at 11/10/2021 1952 by Mckenna Barney R.N.       Base    Clinisol 15%  90 g 90 g 90 g    dextrose 70%  240 g 240 g 240 g    fat emulsion (soy) 20%  50 g 50 g 50 g       Additives    potassium phosphate  36 mmol 30 mmol 20 mmol    potassium chloride  -- -- 10 mEq    sodium acetate  111 mEq 145 mEq 145 mEq    sodium chloride  111 mEq 77 mEq 77 mEq    magnesium sulfate  12 mEq 12 mEq 8 mEq    calcium GLUConate  9.3 mEq 9.3 mEq 9.3 mEq    M.T.E.-4  1 mL 1 mL 1 mL    M.V.I. Adult  10 mL 10 mL 10 mL    famotidine  40 mg 40 mg 40 mg       QS Base    sterile water  113.89 mL 107.39 mL 356.71 mL       Energy Contribution    Proteins  -- -- --    Dextrose  816 kcal 816 kcal 816 kcal    Lipids  500 kcal 500 kcal 500 kcal    Total  1,316 kcal 1,316 kcal 1,316 kcal       Electrolyte Ion Calculated Amount    Sodium  222 mEq 222 mEq 222 mEq    Potassium  52.8 mEq 44 mEq 39.33 mEq    Calcium  9.3 mEq 9.3 mEq 9.3 mEq    Magnesium  12 mEq 12 mEq 8 mEq    Aluminum  -- -- --    Phosphate  36 mmol 30 mmol 20 mmol    Chloride  111 mEq 77 mEq 87 mEq    Acetate  187.2 mEq 221.2 mEq 221.2 mEq       Other     Total Protein  90 g 90 g 90 g    Total Protein/kg  1.29 g/kg 1.29 g/kg 1.36 g/kg    Total Amino Acid  -- -- --    Total Amino Acid/kg  -- -- --    Glucose Infusion Rate  2.39 mg/kg/min 2.39 mg/kg/min 2.39 mg/kg/min    Osmolarity (Estimated)  -- -- --    Volume  1,440 mL 1,440 mL 1,440 mL    Rate  60 mL/hr 60 mL/hr 60 mL/hr    Dosing Weight  69.6 kg 69.6 kg 66.4 kg    Infusion Site  Central Central Central            This formula provides:  % kcal as lipids = 30  Grams protein/kg = 1.5  Non-protein calories = 1316  Kcals/kg = 28  Total daily calories = 1676    Comments:  1. Patient stable on TPN, follow-up CT on 11/15 with increase in fluid collection and she is pending repeat IR drainage of the collection. She remains on antibiotics per ID, scheduled octreotide injections and will be completing a course of IV iron supplementation on 11/18. Documented drain output noted increased overnight compared to 11/15.  2. Micronutrients/Electrolytes: NNL today, patient previously stable on TPN. Sodium in TPN remains ~0.9% NS equivalent & sodium continues to normalize.  3. Macronutrients/glycemic control: Q8hr fingerstick data not available but BG at goal on prior BMPs (<180 mg/dL). Macros stable - no change to TPN goals at this time.  4. Will continue TPN at 100% of estimated nutritional requirements and follow post-procedure plans for restart of enteral nutrition for adjustments as appropriate.    Pharmacy will continue to follow.     Claudia Marion, PharmD, BCCCP

## 2021-11-16 NOTE — CARE PLAN
The patient is Watcher - Medium risk of patient condition declining or worsening    Shift Goals  Clinical Goals: TPN, rest  Patient Goals: Rest  Family Goals: n/a    Progress made toward(s) clinical / shift goals:    Problem: Respiratory  Goal: Patient will achieve/maintain optimum respiratory ventilation and gas exchange  Outcome: Progressing     Problem: Pain - Standard  Goal: Alleviation of pain or a reduction in pain to the patient’s comfort goal  Outcome: Progressing     Problem: Skin Integrity  Goal: Skin integrity is maintained or improved  Outcome: Progressing     Problem: Fall Risk  Goal: Patient will remain free from falls  Outcome: Progressing     Problem: Communication  Goal: The ability to communicate needs accurately and effectively will improve  Outcome: Progressing       Patient is not progressing towards the following goals:

## 2021-11-16 NOTE — PROGRESS NOTES
Patient arrived down to CT for Retroperitoneal drain placement. Consent signed. Patient placed on the table and scanned and per MD no procedure to be done at this time. Report called to TALITA Duarte. Patient taken back up to T435-2.

## 2021-11-16 NOTE — DISCHARGE PLANNING
Anticipated Discharge Disposition: Home with Home Infusion and HHC VS Acute Rehab.    Action: Discussed this case during IDT Rounds. Per MD, patient is not medically cleared, IR Drain placement is scheduled for today.    LSW spoke with River, covering for Marsha at [facility]. LSW explained patient is evaluating returning home with home infusion vs transitioning to [facility].    Barriers to Discharge: Medical Clearance.    Plan: As Above. Awaiting recommendations from the interdisciplinary team.

## 2021-11-16 NOTE — DISCHARGE PLANNING
Follow up for post acute service current documentation indicates limited tolerance to therapy intervention. Current documentation does not support tolerance to IRF level of care. Anticipate skilled nursing if unable to return to community with outpatient support when medically cleared. In the event of interval improvement prior to being medically cleared. Please re consult PMR.

## 2021-11-16 NOTE — PROGRESS NOTES
COVID 19 surge in effect.  Received report from previous shift RN  Assessment complete.  A&O x 4. Patient calls appropriately.  Patient ambulates with x1 assist and FWW. Bed alarm on.   Patient has 3/10 pain. Pain managed with prescribed medications.  Denies N&V. Tolerating TPN @60 mL/hr. Pt NPO at this time.  RUQ ostomy with brown output.  JPs to RUQ and RLQ with brown output.  RLQ IR drain with brown output.  + void via Purewick, + flatus, + BM.  Patient denies SOB.  Double lumen PICC to KWASI. Pt refusing CHG bath despite education at this time.     Review plan with of care with patient. Call light and personal belongings with in reach. Hourly rounding in place. All needs met at this time.

## 2021-11-16 NOTE — PROGRESS NOTES
Patient back on unit from IR, MARTHA drain found to be removed at some point, and laying in the bed.  Hospitalist ADRY notified, advised to contact surgeon.  Dr ST paged.  Dr ST notified, will keep urostomy pouch in place to continue to monitor any output.

## 2021-11-16 NOTE — PROGRESS NOTES
Bedside report received, assumed full care of patient at 06:45.   Assessment complete.     Alert & Oriented x4. Patient denies chest pain/shortness of breath.   Patient reports 0/10 pain. Medication intervention not required at this time.   Patient education given regarding pharmacologic and non-pharmacologic modalities of pain management.     Skin/line assessments as per flowsheets.    Patient currently NPO awaiting transfer to IR for new drain placement, currently denying nausea and/or vomiting.     Patient ambulating edge of bed and up to chair with the assistance of one.     All patient needs have been met at this time, call light within reach. Patient calls appropriately. Bed in low and locked position, non-skid socks in situ.   Hourly rounding in place.

## 2021-11-17 ENCOUNTER — APPOINTMENT (OUTPATIENT)
Dept: RADIOLOGY | Facility: MEDICAL CENTER | Age: 66
DRG: 856 | End: 2021-11-17
Attending: SURGERY
Payer: MEDICARE

## 2021-11-17 LAB
ALBUMIN SERPL BCP-MCNC: 2.4 G/DL (ref 3.2–4.9)
ALBUMIN/GLOB SERPL: 1.1 G/DL
ALP SERPL-CCNC: 82 U/L (ref 30–99)
ALT SERPL-CCNC: 28 U/L (ref 2–50)
ANION GAP SERPL CALC-SCNC: 7 MMOL/L (ref 7–16)
AST SERPL-CCNC: 34 U/L (ref 12–45)
BASOPHILS # BLD AUTO: 0.8 % (ref 0–1.8)
BASOPHILS # BLD: 0.03 K/UL (ref 0–0.12)
BILIRUB SERPL-MCNC: 0.2 MG/DL (ref 0.1–1.5)
BUN SERPL-MCNC: 19 MG/DL (ref 8–22)
CALCIUM SERPL-MCNC: 7.8 MG/DL (ref 8.5–10.5)
CHLORIDE SERPL-SCNC: 102 MMOL/L (ref 96–112)
CO2 SERPL-SCNC: 24 MMOL/L (ref 20–33)
CREAT SERPL-MCNC: 0.51 MG/DL (ref 0.5–1.4)
EOSINOPHIL # BLD AUTO: 0.02 K/UL (ref 0–0.51)
EOSINOPHIL NFR BLD: 0.5 % (ref 0–6.9)
ERYTHROCYTE [DISTWIDTH] IN BLOOD BY AUTOMATED COUNT: 46.5 FL (ref 35.9–50)
GLOBULIN SER CALC-MCNC: 2.2 G/DL (ref 1.9–3.5)
GLUCOSE SERPL-MCNC: 128 MG/DL (ref 65–99)
HCT VFR BLD AUTO: 28.3 % (ref 37–47)
HGB BLD-MCNC: 9 G/DL (ref 12–16)
IMM GRANULOCYTES # BLD AUTO: 0.14 K/UL (ref 0–0.11)
IMM GRANULOCYTES NFR BLD AUTO: 3.8 % (ref 0–0.9)
LYMPHOCYTES # BLD AUTO: 0.44 K/UL (ref 1–4.8)
LYMPHOCYTES NFR BLD: 12 % (ref 22–41)
MCH RBC QN AUTO: 28.3 PG (ref 27–33)
MCHC RBC AUTO-ENTMCNC: 31.8 G/DL (ref 33.6–35)
MCV RBC AUTO: 89 FL (ref 81.4–97.8)
MONOCYTES # BLD AUTO: 0.15 K/UL (ref 0–0.85)
MONOCYTES NFR BLD AUTO: 4.1 % (ref 0–13.4)
NEUTROPHILS # BLD AUTO: 2.9 K/UL (ref 2–7.15)
NEUTROPHILS NFR BLD: 78.8 % (ref 44–72)
NRBC # BLD AUTO: 0 K/UL
NRBC BLD-RTO: 0 /100 WBC
PLATELET # BLD AUTO: 186 K/UL (ref 164–446)
PMV BLD AUTO: 10.6 FL (ref 9–12.9)
POTASSIUM SERPL-SCNC: 4 MMOL/L (ref 3.6–5.5)
PROT SERPL-MCNC: 4.6 G/DL (ref 6–8.2)
RBC # BLD AUTO: 3.18 M/UL (ref 4.2–5.4)
SODIUM SERPL-SCNC: 133 MMOL/L (ref 135–145)
WBC # BLD AUTO: 3.7 K/UL (ref 4.8–10.8)

## 2021-11-17 PROCEDURE — 700105 HCHG RX REV CODE 258: Performed by: NURSE PRACTITIONER

## 2021-11-17 PROCEDURE — 700102 HCHG RX REV CODE 250 W/ 637 OVERRIDE(OP): Performed by: NURSE PRACTITIONER

## 2021-11-17 PROCEDURE — 99232 SBSQ HOSP IP/OBS MODERATE 35: CPT | Performed by: NURSE PRACTITIONER

## 2021-11-17 PROCEDURE — A9270 NON-COVERED ITEM OR SERVICE: HCPCS | Performed by: NURSE PRACTITIONER

## 2021-11-17 PROCEDURE — 99024 POSTOP FOLLOW-UP VISIT: CPT | Performed by: SURGERY

## 2021-11-17 PROCEDURE — 700111 HCHG RX REV CODE 636 W/ 250 OVERRIDE (IP): Mod: JG | Performed by: INTERNAL MEDICINE

## 2021-11-17 PROCEDURE — 85025 COMPLETE CBC W/AUTO DIFF WBC: CPT

## 2021-11-17 PROCEDURE — 700105 HCHG RX REV CODE 258: Performed by: INTERNAL MEDICINE

## 2021-11-17 PROCEDURE — 700111 HCHG RX REV CODE 636 W/ 250 OVERRIDE (IP): Performed by: SURGERY

## 2021-11-17 PROCEDURE — 770001 HCHG ROOM/CARE - MED/SURG/GYN PRIV*

## 2021-11-17 PROCEDURE — 80053 COMPREHEN METABOLIC PANEL: CPT

## 2021-11-17 PROCEDURE — 700111 HCHG RX REV CODE 636 W/ 250 OVERRIDE (IP): Performed by: ANESTHESIOLOGY

## 2021-11-17 PROCEDURE — 700101 HCHG RX REV CODE 250: Performed by: NURSE PRACTITIONER

## 2021-11-17 PROCEDURE — A9270 NON-COVERED ITEM OR SERVICE: HCPCS | Performed by: INTERNAL MEDICINE

## 2021-11-17 PROCEDURE — 700117 HCHG RX CONTRAST REV CODE 255: Performed by: SURGERY

## 2021-11-17 PROCEDURE — 700111 HCHG RX REV CODE 636 W/ 250 OVERRIDE (IP): Performed by: INTERNAL MEDICINE

## 2021-11-17 PROCEDURE — 74240 X-RAY XM UPR GI TRC 1CNTRST: CPT

## 2021-11-17 PROCEDURE — 700102 HCHG RX REV CODE 250 W/ 637 OVERRIDE(OP): Performed by: INTERNAL MEDICINE

## 2021-11-17 PROCEDURE — 700111 HCHG RX REV CODE 636 W/ 250 OVERRIDE (IP): Performed by: NURSE PRACTITIONER

## 2021-11-17 PROCEDURE — 700102 HCHG RX REV CODE 250 W/ 637 OVERRIDE(OP): Performed by: FAMILY MEDICINE

## 2021-11-17 PROCEDURE — 700111 HCHG RX REV CODE 636 W/ 250 OVERRIDE (IP): Performed by: FAMILY MEDICINE

## 2021-11-17 PROCEDURE — A9270 NON-COVERED ITEM OR SERVICE: HCPCS | Performed by: FAMILY MEDICINE

## 2021-11-17 RX ADMIN — IOHEXOL 100 ML: 350 INJECTION, SOLUTION INTRAVENOUS at 11:00

## 2021-11-17 RX ADMIN — CALCIUM GLUCONATE: 98 INJECTION, SOLUTION INTRAVENOUS at 20:06

## 2021-11-17 RX ADMIN — SODIUM CHLORIDE 250 MG: 9 INJECTION, SOLUTION INTRAVENOUS at 21:20

## 2021-11-17 RX ADMIN — SULFAMETHOXAZOLE AND TRIMETHOPRIM 1 TABLET: 800; 160 TABLET ORAL at 04:33

## 2021-11-17 RX ADMIN — OXYCODONE HYDROCHLORIDE 10 MG: 10 TABLET ORAL at 04:34

## 2021-11-17 RX ADMIN — MICAFUNGIN SODIUM 100 MG: 100 INJECTION, POWDER, LYOPHILIZED, FOR SOLUTION INTRAVENOUS at 08:33

## 2021-11-17 RX ADMIN — EZETIMIBE 10 MG: 10 TABLET ORAL at 17:55

## 2021-11-17 RX ADMIN — GABAPENTIN 600 MG: 300 CAPSULE ORAL at 04:33

## 2021-11-17 RX ADMIN — PIPERACILLIN SODIUM AND TAZOBACTAM SODIUM 3.38 G: 3; .375 INJECTION, POWDER, FOR SOLUTION INTRAVENOUS at 16:10

## 2021-11-17 RX ADMIN — OXYCODONE HYDROCHLORIDE 10 MG: 10 TABLET ORAL at 16:12

## 2021-11-17 RX ADMIN — OXYCODONE HYDROCHLORIDE 10 MG: 10 TABLET ORAL at 10:35

## 2021-11-17 RX ADMIN — PIPERACILLIN SODIUM AND TAZOBACTAM SODIUM 3.38 G: 3; .375 INJECTION, POWDER, FOR SOLUTION INTRAVENOUS at 23:24

## 2021-11-17 RX ADMIN — Medication 100 MG: at 04:33

## 2021-11-17 RX ADMIN — GABAPENTIN 600 MG: 300 CAPSULE ORAL at 17:55

## 2021-11-17 RX ADMIN — PIPERACILLIN SODIUM AND TAZOBACTAM SODIUM 3.38 G: 3; .375 INJECTION, POWDER, FOR SOLUTION INTRAVENOUS at 04:33

## 2021-11-17 RX ADMIN — ENOXAPARIN SODIUM 40 MG: 40 INJECTION SUBCUTANEOUS at 08:33

## 2021-11-17 RX ADMIN — Medication 1000 UNITS: at 04:33

## 2021-11-17 RX ADMIN — OCTREOTIDE ACETATE 100 MCG: 100 INJECTION, SOLUTION INTRAVENOUS; SUBCUTANEOUS at 08:34

## 2021-11-17 RX ADMIN — ONDANSETRON 4 MG: 2 INJECTION INTRAMUSCULAR; INTRAVENOUS at 10:38

## 2021-11-17 RX ADMIN — SULFAMETHOXAZOLE AND TRIMETHOPRIM 1 TABLET: 800; 160 TABLET ORAL at 17:55

## 2021-11-17 RX ADMIN — OCTREOTIDE ACETATE 100 MCG: 100 INJECTION, SOLUTION INTRAVENOUS; SUBCUTANEOUS at 20:08

## 2021-11-17 ASSESSMENT — ENCOUNTER SYMPTOMS
BACK PAIN: 1
BLOOD IN STOOL: 0
VOMITING: 0
ABDOMINAL PAIN: 0
HEADACHES: 0
DEPRESSION: 1
NAUSEA: 0
DIARRHEA: 0
CHILLS: 0
SENSORY CHANGE: 0
FALLS: 0
ORTHOPNEA: 0
NERVOUS/ANXIOUS: 0
DIZZINESS: 0
FEVER: 0
SORE THROAT: 0
SPUTUM PRODUCTION: 0
ABDOMINAL PAIN: 1
FLANK PAIN: 0
WEAKNESS: 1
FOCAL WEAKNESS: 0
SHORTNESS OF BREATH: 0
COUGH: 0

## 2021-11-17 ASSESSMENT — PAIN DESCRIPTION - PAIN TYPE
TYPE: ACUTE PAIN

## 2021-11-17 NOTE — PROGRESS NOTES
Hospital Medicine Daily Progress Note    Date of Service  11/17/2021    Chief Complaint  Nils Alfonso is a 66 y.o. female admitted 10/15/2021 with abdominal pain    Hospital Course  Initially admitted to [facility] for evaluation of abdominal pain after recent ERCP with polypectomy and sphincterotomy and noted to have large intra-abdominal fluid collection. Multiple IR drains had been placed, but her infection worsened.  She was transferred to [facility] for escalation of her surgical needs. She underwent ex lap w I/D of multiple loculated abscesses and debridement of nec fasciitis w Dr. ST 11/5/21. Prev cx w Stenotrophomonas maltophilia, mixed yeast, E faecalis, E coli and Chryseobacterium in the blood.  ID was consulted and placed patient on Bactrim twice daily, Zosyn, micafungin end date 11/24/2021.  Patient had decreased output from drains.  Repeat CT on 11/15/2021 showed increase as a fluid collection in the right mid abdomen and slight increase in trace bilateral pleural effusions.  Patient to return to IR.     Interval Problem Update  11/16/2021: No acute overnight events.  Patient pending IR for procedural drainage today.   11/17/2021: No acute overnight events.  Large drain in RLQ had fallen out, as seen in IR.  IR saw that abdominal fluid collection is smaller and noted drain was indicated.  Patient seen by Dr. Cook this morning.  Plan for upper GI today.    I have personally seen and examined the patient at bedside. I discussed the plan of care with patient, bedside RN,  and general surgery.    Consultants/Specialty  general surgery    Code Status  Full Code    Disposition  Patient is not medically cleared.   Anticipate discharge to rehab versus home with home health.      Review of Systems  Review of Systems   Constitutional: Negative for chills and fever.   HENT: Negative for congestion and sore throat.    Respiratory: Negative for cough, sputum production and shortness of breath.     Cardiovascular: Negative for chest pain, orthopnea and leg swelling.   Gastrointestinal: Negative for abdominal pain, blood in stool, diarrhea, nausea and vomiting.   Genitourinary: Negative for dysuria, flank pain and urgency.   Musculoskeletal: Positive for back pain. Negative for falls.        Right-sided midthoracic back pain   Skin: Negative for itching and rash.   Neurological: Positive for weakness. Negative for dizziness, sensory change, focal weakness and headaches.   Psychiatric/Behavioral: Positive for depression. The patient is not nervous/anxious.         Physical Exam  Temp:  [36.2 °C (97.2 °F)-37.3 °C (99.1 °F)] 37.3 °C (99.1 °F)  Pulse:  [64-84] 70  Resp:  [15-18] 16  BP: (124-162)/(61-87) 124/72  SpO2:  [94 %-99 %] 95 %    Physical Exam  Constitutional:       General: She is not in acute distress.     Appearance: Normal appearance. She is not toxic-appearing or diaphoretic.   HENT:      Head: Normocephalic and atraumatic.      Mouth/Throat:      Mouth: Mucous membranes are moist.      Pharynx: Oropharynx is clear.   Eyes:      General: No scleral icterus.     Extraocular Movements: Extraocular movements intact.      Conjunctiva/sclera: Conjunctivae normal.   Cardiovascular:      Rate and Rhythm: Normal rate and regular rhythm.      Pulses: Normal pulses.      Heart sounds: Normal heart sounds. No murmur heard.  No gallop.    Pulmonary:      Effort: Pulmonary effort is normal. No respiratory distress.      Breath sounds: Normal breath sounds. No rhonchi.   Abdominal:      General: Abdomen is flat. Bowel sounds are normal. There is no distension.      Palpations: Abdomen is soft.      Tenderness: There is no abdominal tenderness.      Comments: Midline abdominal incision well approximated with staples.    Clean dry and intact without erythema, edema, drainage.    MARTHA drain RUQ.   Collection bag to previous RLQ drain site.  Dark green/brown drainage in collection bag.    Musculoskeletal:          General: Normal range of motion.      Cervical back: Normal range of motion and neck supple.      Right lower leg: Edema present.      Left lower leg: Edema present.   Skin:     General: Skin is warm and dry.      Capillary Refill: Capillary refill takes less than 2 seconds.      Coloration: Skin is not jaundiced.      Findings: No bruising.   Neurological:      General: No focal deficit present.      Mental Status: She is alert and oriented to person, place, and time. Mental status is at baseline.   Psychiatric:         Mood and Affect: Mood normal.         Thought Content: Thought content normal.         Judgment: Judgment normal.      Comments: Tearful, eager to return home         Fluids    Intake/Output Summary (Last 24 hours) at 11/17/2021 0928  Last data filed at 11/17/2021 0800  Gross per 24 hour   Intake 2360 ml   Output 1990 ml   Net 370 ml       Laboratory  Recent Labs     11/15/21  0150 11/17/21  0435   WBC 4.5* 3.7*   RBC 2.68* 3.18*   HEMOGLOBIN 7.5* 9.0*   HEMATOCRIT 23.6* 28.3*   MCV 88.1 89.0   MCH 28.0 28.3   MCHC 31.8* 31.8*   RDW 46.7 46.5   PLATELETCT 190 186   MPV 10.3 10.6     Recent Labs     11/14/21  1005 11/15/21  0150 11/17/21  0435   SODIUM 133* 132* 133*   POTASSIUM 4.3 4.1 4.0   CHLORIDE 100 100 102   CO2 26 24 24   GLUCOSE 130* 145* 128*   BUN 19 19 19   CREATININE 0.51 0.54 0.51   CALCIUM 7.6* 7.7* 7.8*             Recent Labs     11/15/21  0150   TRIGLYCERIDE 191*       Imaging  CT-ABORTED CT PROCEDURE   Final Result      Interval decrease in size of the RIGHT retroperitoneal fluid collection which contains a surgical drain. Because from bowel is possible. No additional drain was placed.            CT-ABDOMEN-PELVIS WITH   Final Result      1.  Slight interval increase in size in fluid collection the right midabdomen with drain in place.      2.  Slight interval increase in size in trace bilateral pleural effusions, right greater than left with bibasilar atelectasis.      3.   Pneumobilia.      CT-ABDOMEN-PELVIS WITH   Final Result         1. The components in the gallbladder fossa and right flank of the complex fluid collection are mostly resolved. There is still a small residual fluid collection in the perinephric space surrounding the inferior right kidney. Right lower quadrant MARTHA drain    and right upper quadrant catheter are outside of this residual collection.   2. Air-fluid levels throughout the colon could relate to ileus.   3. Bilateral pleural effusions with bibasilar atelectasis.   4. Pneumobilia.      CT-ABDOMEN-PELVIS WITH   Final Result      1.  Slight interval decrease in size of the large RIGHT peritoneal abscess which now contains 3 drains   2.  Decreased atelectasis with persistent opacity in the RIGHT lung base likely atelectasis rather than pneumonia   3.  Small LEFT renal cyst   4.  Prior cholecystectomy with ongoing pneumobilia      CT-DRAINAGE-CATH EXCHANGE   Final Result         1. Organized pancreatic pseudocyst Drainage with CT guidance.      CT-ABDOMEN-PELVIS WITH   Final Result      1.  There has been interval placement of an additional inferior lateral pigtail catheter within the complex right abdominal abscess. The other 2 pigtail catheters are stable in appearance.   2.  There has been no significant interval decrease in size of the large complex loculated abscess collection in the right abdomen.   3.  There is no new fluid collection.   4.  Gallbladder is surgically absent with continued pneumobilia.   5.  Interval decrease in the dependent pleural effusion with minimal airspace disease, likely atelectasis.      IR-DRAINAGE-CATH EXCHANGE   Final Result      1.  Successful left-sided drainage catheter removal.   2.  Successful image guided superior right drainage catheter replacement.      Plan: Suction drainage. Monitor outputs. Please contact interventional radiology if there is any concern for tube dysfunction. Suggest routine tube maintenance at 3 months  intervals if the tube remains in place.         CT-DRAIN-PERITONEAL   Final Result      1.  CT GUIDED PERITONEAL RLQ abdominal CATHETER DRAINAGE.   2.  THE CURRENT PLAN IS TO MONITOR DRAINAGE OUTPUT AND OBTAIN A FOLLOWUP CT SCAN IN 5-7 DAYS IF CLINICALLY INDICATED.      CT-ABDOMEN-PELVIS WITH   Final Result         1. Grossly unchanged irregular fluid collection occupying the right abdomen surrounding the right kidney and right colon extending to midline. Additional pigtail catheter in the component about midline anterior abdomen. Both pigtail catheters seem to be    within the collection.      2. Small right pleural effusion with right basilar atelectasis.               DX-CHEST-LIMITED (1 VIEW)   Final Result      1.  Right basilar atelectasis or consolidation. Small right pleural effusion not excluded.   2.  Atherosclerotic plaque.      CT-DRAIN-PERITONEAL   Final Result      1.  CT guided pancreatic pseudocyst catheter drainage.   2.  The current plan is to obtain a follow-up CT in 5-7 days..      IR-PICC LINE PLACEMENT W/ GUIDANCE > AGE 5   Final Result                  Ultrasound-guided PICC placement performed by qualified nursing staff as    above.          CT-ABDOMEN-PELVIS WITH    (Results Pending)   DX-UPPER GI-SERIES WITH KUB    (Results Pending)        Assessment/Plan  * Bacteremia due to Gram-negative bacteria and fungemia- (present on admission)  Assessment & Plan  KALANI negative for signs of endocarditis  Cont bactrim, Zosyn, and Micafungin per ID for 4-week course with stop date of 11/24.  Repeat imaging prior to discontinuation.   Repeat Bld Cx neg      Generalized weakness  Assessment & Plan  PT/OT following  Recommending post-acute placement.  Acute rehab evaluating patient for admission.   11/15:  Overall improving.  Likely home with  vs rehab at d/c.    Low magnesium level  Assessment & Plan  11/8 IV replacement  Resolved, continue to monitor    Hypotension  Assessment & Plan  11/6: Stop lasix;  Stop ACEI  11/7 started midodrine  11/16-resolved continue to monitor    Hypophosphatemia- (present on admission)  Assessment & Plan  Resolved with TPN  Monitoring per RD  11/6 Kphos; serum goal >3  Will continue to monitor    Hypokalemia- (present on admission)  Assessment & Plan  Periodic monitoring  11/6 K bicarb x 1  Serum goal >4.0  Will continue to monitor      Acute respiratory failure with hypoxia (HCC)- (present on admission)  Assessment & Plan  Resolved with diuresis  Likely volume overload with lower extremity edema present    Antibiotic-associated diarrhea  Assessment & Plan  Cdiff PCR negative  Loperamide PRN    Postprocedural retroperitoneal abscess- (present on admission)  Assessment & Plan  S/P Ex lap, I/D, debridement nec fasc 11/5/2021  Abx per ID  Follow cx  Pain control  N.p.o. with TPN  Antiemetics PRN  11/16/2021: CT abdomen 11/15/2021 showed increase fluid collection in the right mid abdomen and slight increase in trace bilateral pleural effusions.   Patient go to IR for procedural drainage today  11/17: RLQ drain was found to have fallen out in IR yesterday.  Fluid collection is smaller and no new drain was indicated.  Will continue to monitor      Duodenal perforation (HCC)- (present on admission)  Assessment & Plan  After ERCP with retroperitoneal abscess and bacteremia  Uncertain etiology - ddx includes pancreatitis, bile leak, iatrogenic  Pepcid BID  11/9: Concern perforation may have reopened.  Surgery team recommending NPO status and TPN  11/10:  Start octreotide.   11/17: Patient to go for upper GI today to see if leak at perforated site near ampulla.  If leaking, general surgeon to discuss with GI regarding placing a wall duodenal stent/esophageal stent    Hyponatremia- (present on admission)  Assessment & Plan  Recurrent, mild  Diuresis as tolerated    Normocytic anemia- (present on admission)  Assessment & Plan  Transfuse for Hgb <7  Avoid regular phlebotomy  Supplements per  dietary: 11/6 added thiamine, 6 sm meals/d  11/14:  Iron deficiency noted.  Replacement ordered.     Sepsis due to intraabdominal fluid collection/abscess, bacteremia and fungemia (HCC)- (present on admission)  Assessment & Plan  Sepsis resolved  Source intra-abdominal  Zosyn/Mycafungin/Bactrim DS per infectious disease end date 11/24/2021  ID signed off  Re-imaging per ID, IR, and Surgery  S/P Ex lap w I/D and debridement nec fasc 11/5/2021 11/8 Repeat CT. Updated surgical team re: perinephric fluid collection. Further CT may be considered with IV and Oral contrast if needed.  11/9:  She has remained afebrile over last 48 hours.  VSS.  Does not appear to be septic at this time.  Continue antibx.     Hyperlipidemia- (present on admission)  Assessment & Plan  Continue ezetimibe    Primary hypertension- (present on admission)  Assessment & Plan  Hypotensive 11/6 - stop lisinopril         VTE prophylaxis: enoxaparin ppx    I have performed a physical exam and reviewed and updated ROS and Plan today (11/17/2021). In review of yesterday's note (11/16/2021), there are no changes except as documented above.

## 2021-11-17 NOTE — CARE PLAN
The patient is Watcher - Medium risk of patient condition declining or worsening    Shift Goals  Clinical Goals: Pain mangement, rest   Patient Goals: Pain Management, rest   Family Goals: Unable to assess, no family bedside at present    Progress made toward(s) clinical / shift goals:       Problem: Pain - Standard  Goal: Alleviation of pain or a reduction in pain to the patient’s comfort goal  Outcome: Progressing  Note: Pain managed with prescribed medications      Problem: Fall Risk  Goal: Patient will remain free from falls  Outcome: Progressing  Note: Appropriate safety interventions and fall precautions in place        Patient is not progressing towards the following goals:

## 2021-11-17 NOTE — THERAPY
"Occupational Therapy  Daily Treatment     Patient Name: Nils Alfonso  Age:  66 y.o., Sex:  female  Medical Record #: [id number]  Today's Date: 11/16/2021     Precautions: Fall Risk  Comments: abdominal MARTHA x 2, ostomy bag x 2    Assessment    Pt seen for OT tx to include: bed mobility, transfer training, sponge bath with shampoo cap, FB dressing, seated grooming. Pt reports she has adjustable bed at home and spouse is obtaining bed rail. Pt able to stand from EOB with supv, but required mod A to stand from lower armchair. Towards end of session pt requested return to EOB (versus remaining up to chair) due to increased fatigue. Pt did participate in OOB activity this session (increased participation compared to prior attempts), however activity tolerance is limited. Will benefit from ongoing acute OT to maximize functional independence and safety.     Plan    Continue current treatment plan.    DC Equipment Recommendations: Unable to determine at this time  Discharge Recommendations: Recommend post-acute placement for additional occupational therapy services prior to discharge home    Subjective    \"I'm sad today because it's my son's birthday.\"   \"I'm so tired.\" (at end of session)     Objective       11/16/21 1150   Balance   Sitting Balance (Static) Fair +   Sitting Balance (Dynamic) Fair +   Standing Balance (Static) Fair -   Standing Balance (Dynamic) Fair -   Weight Shift Sitting Fair   Weight Shift Standing Fair   Skilled Intervention Compensatory Strategies;Postural Facilitation;Verbal Cuing   Comments no AD for standing    Bed Mobility    Supine to Sit Supervised  (HOB elevated, used rail )   Scooting Supervised  (seated)   Rolling Supervised   Activities of Daily Living   Grooming Supervision;Seated  (hair combing )   Bathing Minimal Assist  (sponge bath, shampoo cap seated )   Upper Body Dressing Minimal Assist  (gown change (assist with snaps))   Lower Body Dressing Minimal Assist  (doff/don B socks in " tailor sit )   Toileting   (NT; declined need )   Functional Mobility   Sit to Stand Moderate Assist  (mod A from armchair, supv from EOB)   Bed, Chair, Wheelchair Transfer Minimal Assist  (no AD, CGA)   Transfer Method Stand Step  (no AD)   Mobility Supine > EOB, pivot transfers    Comments verbal cues to sequence stand from bedside chair   Activity Tolerance   Sitting in Chair 23 min    Sitting Edge of Bed 8 min, EOB post    Standing 2 min total    Comments limited by weakness, fatigue    Short Term Goals   Short Term Goal # 1 Patient will complete LB dressing supv with AE PRN   Goal Outcome # 1 Progressing slower than expected   Short Term Goal # 2 Patient will complete toilet xfer supv   Goal Outcome # 2 Progressing slower than expected   Short Term Goal # 3 Patient will tolerate 8 mins of standing G/H supv   Goal Outcome # 3 Progressing slower than expected

## 2021-11-17 NOTE — PROGRESS NOTES
Surgical Progress Note    Author: Jaden Cook M.D. Date & Time created: 2021   8:52 AM     Interval Events:  Discussed need for UGI and possible walled stent placement if continued leak  Comfortable  hungry  Large drain in RLQ has fallen out last night  IR states collection smaller and new drain not indicated    Review of Systems   Constitutional: Positive for malaise/fatigue.   Gastrointestinal: Positive for abdominal pain.   All other systems reviewed and are negative.    Hemodynamics:  Temp (24hrs), Av.7 °C (98 °F), Min:36.2 °C (97.2 °F), Max:37.3 °C (99.1 °F)  Temperature: 37.3 °C (99.1 °F)  Pulse  Av.3  Min: 53  Max: 93   Blood Pressure : 124/72     Respiratory:    Respiration: 16, Pulse Oximetry: 95 %        RUL Breath Sounds: Clear, RML Breath Sounds: Clear, RLL Breath Sounds: Diminished, TRISTAN Breath Sounds: Clear, LLL Breath Sounds: Diminished  Neuro:  GCS       Fluids:    Intake/Output Summary (Last 24 hours) at 2021 0852  Last data filed at 2021 0800  Gross per 24 hour   Intake 2360 ml   Output 1990 ml   Net 370 ml     Weight: 72 kg (158 lb 11.7 oz)  Current Diet Order   Procedures   • Diet NPO Restrict to: Ice Chips (ok for minimal sips and ice chips for comfort)     Physical Exam  Vitals and nursing note reviewed.   Cardiovascular:      Rate and Rhythm: Normal rate and regular rhythm.      Pulses: Normal pulses.      Heart sounds: Normal heart sounds.   Pulmonary:      Effort: Pulmonary effort is normal.      Breath sounds: Normal breath sounds.   Abdominal:      General: Abdomen is flat. Bowel sounds are normal.      Palpations: Abdomen is soft.      Tenderness: There is abdominal tenderness.      Comments: Mild pain, large drain fell out yesterday but fluid collection smaller per radology  Still mild purulence and bile stained   Psychiatric:         Mood and Affect: Mood normal.         Behavior: Behavior normal.         Thought Content: Thought content normal.          Judgment: Judgment normal.      Comments: A bit depressed       Labs:  Recent Results (from the past 24 hour(s))   CBC WITH DIFFERENTIAL    Collection Time: 11/17/21  4:35 AM   Result Value Ref Range    WBC 3.7 (L) 4.8 - 10.8 K/uL    RBC 3.18 (L) 4.20 - 5.40 M/uL    Hemoglobin 9.0 (L) 12.0 - 16.0 g/dL    Hematocrit 28.3 (L) 37.0 - 47.0 %    MCV 89.0 81.4 - 97.8 fL    MCH 28.3 27.0 - 33.0 pg    MCHC 31.8 (L) 33.6 - 35.0 g/dL    RDW 46.5 35.9 - 50.0 fL    Platelet Count 186 164 - 446 K/uL    MPV 10.6 9.0 - 12.9 fL    Neutrophils-Polys 78.80 (H) 44.00 - 72.00 %    Lymphocytes 12.00 (L) 22.00 - 41.00 %    Monocytes 4.10 0.00 - 13.40 %    Eosinophils 0.50 0.00 - 6.90 %    Basophils 0.80 0.00 - 1.80 %    Immature Granulocytes 3.80 (H) 0.00 - 0.90 %    Nucleated RBC 0.00 /100 WBC    Neutrophils (Absolute) 2.90 2.00 - 7.15 K/uL    Lymphs (Absolute) 0.44 (L) 1.00 - 4.80 K/uL    Monos (Absolute) 0.15 0.00 - 0.85 K/uL    Eos (Absolute) 0.02 0.00 - 0.51 K/uL    Baso (Absolute) 0.03 0.00 - 0.12 K/uL    Immature Granulocytes (abs) 0.14 (H) 0.00 - 0.11 K/uL    NRBC (Absolute) 0.00 K/uL   Comp Metabolic Panel    Collection Time: 11/17/21  4:35 AM   Result Value Ref Range    Sodium 133 (L) 135 - 145 mmol/L    Potassium 4.0 3.6 - 5.5 mmol/L    Chloride 102 96 - 112 mmol/L    Co2 24 20 - 33 mmol/L    Anion Gap 7.0 7.0 - 16.0    Glucose 128 (H) 65 - 99 mg/dL    Bun 19 8 - 22 mg/dL    Creatinine 0.51 0.50 - 1.40 mg/dL    Calcium 7.8 (L) 8.5 - 10.5 mg/dL    AST(SGOT) 34 12 - 45 U/L    ALT(SGPT) 28 2 - 50 U/L    Alkaline Phosphatase 82 30 - 99 U/L    Total Bilirubin 0.2 0.1 - 1.5 mg/dL    Albumin 2.4 (L) 3.2 - 4.9 g/dL    Total Protein 4.6 (L) 6.0 - 8.2 g/dL    Globulin 2.2 1.9 - 3.5 g/dL    A-G Ratio 1.1 g/dL   ESTIMATED GFR    Collection Time: 11/17/21  4:35 AM   Result Value Ref Range    GFR If African American >60 >60 mL/min/1.73 m 2    GFR If Non African American >60 >60 mL/min/1.73 m 2     Medical Decision Making, by  Problem:  Active Hospital Problems    Diagnosis    • Duodenal perforation (HCC) [K63.1]      Priority: High   • Postprocedural retroperitoneal abscess [K68.11]      Priority: High   • Bacteremia due to Gram-negative bacteria and fungemia [R78.81]      Priority: High   • Sepsis due to intraabdominal fluid collection/abscess, bacteremia and fungemia (HCC) [A41.9]      Priority: High   • Generalized weakness [R53.1]    • Low magnesium level [R79.0]    • Hypotension [I95.9]    • Hypophosphatemia [E83.39]    • Hypokalemia [E87.6]    • Acute respiratory failure with hypoxia (HCC) [J96.01]    • Antibiotic-associated diarrhea [K52.1, T36.95XA]    • Hyponatremia [E87.1]    • Normocytic anemia [D64.9]    • Hyperlipidemia [E78.5]    • Primary hypertension [I10]      Plan:  UGI to see if leak at perf site near ampulla, if leaking then will discuss with GI placing a walled duodenal stent/esophageal stent  Stay NPO except Ice chips  Cont TPN    Quality Measures:  Quality-Core Measures    Discussed patient condition with Family and RN

## 2021-11-17 NOTE — PROGRESS NOTES
Pt A&Ox4  Patient answers all questions appropriately and demonstrates proper use of call light    Pain rated at 8/10 upon assessment, medicated per MAR     Reporting intermittent nausea, managed with medications. +bowel sounds, + flatus, LBM 11/16  Ostomy in place to RLQ to downdrain bag   RLQ previous IR drain site covered with ostomy bag, brown drainage   RUQ MARTHA drain     Saturating >90% on room air   Denies SOB     Pt ambulates with x1 assistance   Bed alarm In place for patient safety   Updated on plan of care. Safety education provided. Bed locked in low. Call light within reach. Rounding in place.

## 2021-11-17 NOTE — THERAPY
"Missed Therapy     Patient Name: Nils Alfonso  Age:  66 y.o., Sex:  female  Medical Record #: [id number]  Today's Date: 11/17/2021    Attempted PT session, spouse present. Pt reported she does not want to work with PT today until she has more details relating to UGI and possible sx. Pt expressed being very sad/depressed and does not want to move until she \"has answers\". Will re-attempt as able.   "

## 2021-11-17 NOTE — CARE PLAN
The patient is Watcher - Medium risk of patient condition declining or worsening    Shift Goals  Clinical Goals: Monitor TPN, pain management, drain management  Patient Goals: Rest  Family Goals: Unable to assess, no family bedside at present    Progress made toward(s) clinical / shift goals:    Problem: Pain - Standard  Goal: Alleviation of pain or a reduction in pain to the patient’s comfort goal  Outcome: Progressing     Problem: Communication  Goal: The ability to communicate needs accurately and effectively will improve  Outcome: Progressing     Problem: Self Care  Goal: Patient will have the ability to perform ADLs independently or with assistance (bathe, groom, dress, toilet and feed)  Outcome: Progressing     Problem: Wound/ / Incision Healing  Goal: Patient's wound/surgical incision will decrease in size and heals properly  Outcome: Progressing

## 2021-11-18 ENCOUNTER — ANESTHESIA EVENT (OUTPATIENT)
Dept: SURGERY | Facility: MEDICAL CENTER | Age: 66
DRG: 856 | End: 2021-11-18
Payer: MEDICARE

## 2021-11-18 ENCOUNTER — APPOINTMENT (OUTPATIENT)
Dept: RADIOLOGY | Facility: MEDICAL CENTER | Age: 66
DRG: 856 | End: 2021-11-18
Attending: INTERNAL MEDICINE
Payer: MEDICARE

## 2021-11-18 ENCOUNTER — ANESTHESIA (OUTPATIENT)
Dept: SURGERY | Facility: MEDICAL CENTER | Age: 66
DRG: 856 | End: 2021-11-18
Payer: MEDICARE

## 2021-11-18 LAB
ALBUMIN SERPL BCP-MCNC: 2.5 G/DL (ref 3.2–4.9)
ALBUMIN/GLOB SERPL: 1.1 G/DL
ALP SERPL-CCNC: 88 U/L (ref 30–99)
ALT SERPL-CCNC: 32 U/L (ref 2–50)
ANION GAP SERPL CALC-SCNC: 10 MMOL/L (ref 7–16)
AST SERPL-CCNC: 35 U/L (ref 12–45)
BILIRUB SERPL-MCNC: 0.2 MG/DL (ref 0.1–1.5)
BUN SERPL-MCNC: 16 MG/DL (ref 8–22)
CALCIUM SERPL-MCNC: 7.8 MG/DL (ref 8.5–10.5)
CHLORIDE SERPL-SCNC: 101 MMOL/L (ref 96–112)
CO2 SERPL-SCNC: 23 MMOL/L (ref 20–33)
CREAT SERPL-MCNC: 0.53 MG/DL (ref 0.5–1.4)
GLOBULIN SER CALC-MCNC: 2.3 G/DL (ref 1.9–3.5)
GLUCOSE SERPL-MCNC: 122 MG/DL (ref 65–99)
MAGNESIUM SERPL-MCNC: 2 MG/DL (ref 1.5–2.5)
PHOSPHATE SERPL-MCNC: 2.7 MG/DL (ref 2.5–4.5)
POTASSIUM SERPL-SCNC: 4.1 MMOL/L (ref 3.6–5.5)
PROT SERPL-MCNC: 4.8 G/DL (ref 6–8.2)
SODIUM SERPL-SCNC: 134 MMOL/L (ref 135–145)

## 2021-11-18 PROCEDURE — 160002 HCHG RECOVERY MINUTES (STAT): Performed by: INTERNAL MEDICINE

## 2021-11-18 PROCEDURE — 770001 HCHG ROOM/CARE - MED/SURG/GYN PRIV*

## 2021-11-18 PROCEDURE — 700111 HCHG RX REV CODE 636 W/ 250 OVERRIDE (IP): Performed by: ANESTHESIOLOGY

## 2021-11-18 PROCEDURE — 0DH68UZ INSERTION OF FEEDING DEVICE INTO STOMACH, VIA NATURAL OR ARTIFICIAL OPENING ENDOSCOPIC: ICD-10-PCS | Performed by: INTERNAL MEDICINE

## 2021-11-18 PROCEDURE — 700111 HCHG RX REV CODE 636 W/ 250 OVERRIDE (IP): Mod: JG | Performed by: INTERNAL MEDICINE

## 2021-11-18 PROCEDURE — 0F798DZ DILATION OF COMMON BILE DUCT WITH INTRALUMINAL DEVICE, VIA NATURAL OR ARTIFICIAL OPENING ENDOSCOPIC: ICD-10-PCS | Performed by: INTERNAL MEDICINE

## 2021-11-18 PROCEDURE — 700102 HCHG RX REV CODE 250 W/ 637 OVERRIDE(OP): Performed by: FAMILY MEDICINE

## 2021-11-18 PROCEDURE — 700102 HCHG RX REV CODE 250 W/ 637 OVERRIDE(OP): Performed by: STUDENT IN AN ORGANIZED HEALTH CARE EDUCATION/TRAINING PROGRAM

## 2021-11-18 PROCEDURE — A9270 NON-COVERED ITEM OR SERVICE: HCPCS | Performed by: NURSE PRACTITIONER

## 2021-11-18 PROCEDURE — 84100 ASSAY OF PHOSPHORUS: CPT

## 2021-11-18 PROCEDURE — 700111 HCHG RX REV CODE 636 W/ 250 OVERRIDE (IP): Performed by: NURSE PRACTITIONER

## 2021-11-18 PROCEDURE — 160035 HCHG PACU - 1ST 60 MINS PHASE I: Performed by: INTERNAL MEDICINE

## 2021-11-18 PROCEDURE — 700105 HCHG RX REV CODE 258: Performed by: INTERNAL MEDICINE

## 2021-11-18 PROCEDURE — 700102 HCHG RX REV CODE 250 W/ 637 OVERRIDE(OP): Performed by: NURSE PRACTITIONER

## 2021-11-18 PROCEDURE — 700105 HCHG RX REV CODE 258: Performed by: NURSE PRACTITIONER

## 2021-11-18 PROCEDURE — 700117 HCHG RX CONTRAST REV CODE 255: Performed by: INTERNAL MEDICINE

## 2021-11-18 PROCEDURE — A9270 NON-COVERED ITEM OR SERVICE: HCPCS | Performed by: STUDENT IN AN ORGANIZED HEALTH CARE EDUCATION/TRAINING PROGRAM

## 2021-11-18 PROCEDURE — 74328 X-RAY BILE DUCT ENDOSCOPY: CPT

## 2021-11-18 PROCEDURE — 501631 HCHG TUBE, NG FEEDING 8FR 15: Performed by: INTERNAL MEDICINE

## 2021-11-18 PROCEDURE — 700105 HCHG RX REV CODE 258: Performed by: ANESTHESIOLOGY

## 2021-11-18 PROCEDURE — 160009 HCHG ANES TIME/MIN: Performed by: INTERNAL MEDICINE

## 2021-11-18 PROCEDURE — 700102 HCHG RX REV CODE 250 W/ 637 OVERRIDE(OP): Performed by: INTERNAL MEDICINE

## 2021-11-18 PROCEDURE — 160036 HCHG PACU - EA ADDL 30 MINS PHASE I: Performed by: INTERNAL MEDICINE

## 2021-11-18 PROCEDURE — 83735 ASSAY OF MAGNESIUM: CPT

## 2021-11-18 PROCEDURE — 160041 HCHG SURGERY MINUTES - EA ADDL 1 MIN LEVEL 4: Performed by: INTERNAL MEDICINE

## 2021-11-18 PROCEDURE — 160029 HCHG SURGERY MINUTES - 1ST 30 MINS LEVEL 4: Performed by: INTERNAL MEDICINE

## 2021-11-18 PROCEDURE — 160048 HCHG OR STATISTICAL LEVEL 1-5: Performed by: INTERNAL MEDICINE

## 2021-11-18 PROCEDURE — 700101 HCHG RX REV CODE 250: Performed by: ANESTHESIOLOGY

## 2021-11-18 PROCEDURE — BD12ZZZ FLUOROSCOPY OF STOMACH: ICD-10-PCS | Performed by: INTERNAL MEDICINE

## 2021-11-18 PROCEDURE — A9270 NON-COVERED ITEM OR SERVICE: HCPCS | Performed by: FAMILY MEDICINE

## 2021-11-18 PROCEDURE — 110371 HCHG SHELL REV 272: Performed by: INTERNAL MEDICINE

## 2021-11-18 PROCEDURE — C1876 STENT, NON-COA/NON-COV W/DEL: HCPCS | Performed by: INTERNAL MEDICINE

## 2021-11-18 PROCEDURE — 99232 SBSQ HOSP IP/OBS MODERATE 35: CPT | Performed by: NURSE PRACTITIONER

## 2021-11-18 PROCEDURE — 700111 HCHG RX REV CODE 636 W/ 250 OVERRIDE (IP): Mod: JB | Performed by: SURGERY

## 2021-11-18 PROCEDURE — BF14YZZ FLUOROSCOPY OF GALLBLADDER, BILE DUCTS AND PANCREATIC DUCTS USING OTHER CONTRAST: ICD-10-PCS | Performed by: INTERNAL MEDICINE

## 2021-11-18 PROCEDURE — 80053 COMPREHEN METABOLIC PANEL: CPT

## 2021-11-18 PROCEDURE — 700101 HCHG RX REV CODE 250: Performed by: NURSE PRACTITIONER

## 2021-11-18 PROCEDURE — 700111 HCHG RX REV CODE 636 W/ 250 OVERRIDE (IP): Performed by: INTERNAL MEDICINE

## 2021-11-18 PROCEDURE — A9270 NON-COVERED ITEM OR SERVICE: HCPCS | Performed by: INTERNAL MEDICINE

## 2021-11-18 DEVICE — STENT WALLFLEX BILIARY RX FC RMV US 10X80: Type: IMPLANTABLE DEVICE | Site: ESOPHAGUS | Status: FUNCTIONAL

## 2021-11-18 RX ORDER — HALOPERIDOL 5 MG/ML
1 INJECTION INTRAMUSCULAR
Status: DISCONTINUED | OUTPATIENT
Start: 2021-11-18 | End: 2021-11-18 | Stop reason: HOSPADM

## 2021-11-18 RX ORDER — LIDOCAINE HYDROCHLORIDE 20 MG/ML
INJECTION, SOLUTION EPIDURAL; INFILTRATION; INTRACAUDAL; PERINEURAL PRN
Status: DISCONTINUED | OUTPATIENT
Start: 2021-11-18 | End: 2021-11-18 | Stop reason: SURG

## 2021-11-18 RX ORDER — ONDANSETRON 2 MG/ML
4 INJECTION INTRAMUSCULAR; INTRAVENOUS
Status: DISCONTINUED | OUTPATIENT
Start: 2021-11-18 | End: 2021-11-18 | Stop reason: HOSPADM

## 2021-11-18 RX ORDER — HYDRALAZINE HYDROCHLORIDE 20 MG/ML
5 INJECTION INTRAMUSCULAR; INTRAVENOUS
Status: DISCONTINUED | OUTPATIENT
Start: 2021-11-18 | End: 2021-11-18 | Stop reason: HOSPADM

## 2021-11-18 RX ORDER — HYDROMORPHONE HYDROCHLORIDE 1 MG/ML
0.1 INJECTION, SOLUTION INTRAMUSCULAR; INTRAVENOUS; SUBCUTANEOUS
Status: DISCONTINUED | OUTPATIENT
Start: 2021-11-18 | End: 2021-11-18 | Stop reason: HOSPADM

## 2021-11-18 RX ORDER — ONDANSETRON 2 MG/ML
INJECTION INTRAMUSCULAR; INTRAVENOUS PRN
Status: DISCONTINUED | OUTPATIENT
Start: 2021-11-18 | End: 2021-11-18 | Stop reason: SURG

## 2021-11-18 RX ORDER — HYDROMORPHONE HYDROCHLORIDE 1 MG/ML
0.4 INJECTION, SOLUTION INTRAMUSCULAR; INTRAVENOUS; SUBCUTANEOUS
Status: DISCONTINUED | OUTPATIENT
Start: 2021-11-18 | End: 2021-11-18 | Stop reason: HOSPADM

## 2021-11-18 RX ORDER — LABETALOL HYDROCHLORIDE 5 MG/ML
5 INJECTION, SOLUTION INTRAVENOUS
Status: DISCONTINUED | OUTPATIENT
Start: 2021-11-18 | End: 2021-11-18 | Stop reason: HOSPADM

## 2021-11-18 RX ORDER — MIDAZOLAM HYDROCHLORIDE 1 MG/ML
INJECTION INTRAMUSCULAR; INTRAVENOUS PRN
Status: DISCONTINUED | OUTPATIENT
Start: 2021-11-18 | End: 2021-11-18 | Stop reason: SURG

## 2021-11-18 RX ORDER — HYDROMORPHONE HYDROCHLORIDE 1 MG/ML
0.2 INJECTION, SOLUTION INTRAMUSCULAR; INTRAVENOUS; SUBCUTANEOUS
Status: DISCONTINUED | OUTPATIENT
Start: 2021-11-18 | End: 2021-11-18 | Stop reason: HOSPADM

## 2021-11-18 RX ORDER — SODIUM CHLORIDE, SODIUM LACTATE, POTASSIUM CHLORIDE, CALCIUM CHLORIDE 600; 310; 30; 20 MG/100ML; MG/100ML; MG/100ML; MG/100ML
INJECTION, SOLUTION INTRAVENOUS CONTINUOUS
Status: DISCONTINUED | OUTPATIENT
Start: 2021-11-18 | End: 2021-11-18 | Stop reason: HOSPADM

## 2021-11-18 RX ORDER — DEXAMETHASONE SODIUM PHOSPHATE 4 MG/ML
INJECTION, SOLUTION INTRA-ARTICULAR; INTRALESIONAL; INTRAMUSCULAR; INTRAVENOUS; SOFT TISSUE PRN
Status: DISCONTINUED | OUTPATIENT
Start: 2021-11-18 | End: 2021-11-18 | Stop reason: SURG

## 2021-11-18 RX ORDER — PHENYLEPHRINE HCL IN 0.9% NACL 0.5 MG/5ML
SYRINGE (ML) INTRAVENOUS PRN
Status: DISCONTINUED | OUTPATIENT
Start: 2021-11-18 | End: 2021-11-18 | Stop reason: SURG

## 2021-11-18 RX ORDER — DIPHENHYDRAMINE HYDROCHLORIDE 50 MG/ML
12.5 INJECTION INTRAMUSCULAR; INTRAVENOUS
Status: DISCONTINUED | OUTPATIENT
Start: 2021-11-18 | End: 2021-11-18 | Stop reason: HOSPADM

## 2021-11-18 RX ORDER — SODIUM CHLORIDE, SODIUM LACTATE, POTASSIUM CHLORIDE, CALCIUM CHLORIDE 600; 310; 30; 20 MG/100ML; MG/100ML; MG/100ML; MG/100ML
INJECTION, SOLUTION INTRAVENOUS
Status: DISCONTINUED | OUTPATIENT
Start: 2021-11-18 | End: 2021-11-18 | Stop reason: SURG

## 2021-11-18 RX ADMIN — FENTANYL CITRATE 50 MCG: 50 INJECTION, SOLUTION INTRAMUSCULAR; INTRAVENOUS at 14:45

## 2021-11-18 RX ADMIN — MIDAZOLAM HYDROCHLORIDE 2 MG: 1 INJECTION, SOLUTION INTRAMUSCULAR; INTRAVENOUS at 14:28

## 2021-11-18 RX ADMIN — OXYCODONE HYDROCHLORIDE 10 MG: 10 TABLET ORAL at 22:51

## 2021-11-18 RX ADMIN — PIPERACILLIN SODIUM AND TAZOBACTAM SODIUM 3.38 G: 3; .375 INJECTION, POWDER, FOR SOLUTION INTRAVENOUS at 05:01

## 2021-11-18 RX ADMIN — Medication 100 MCG: at 15:41

## 2021-11-18 RX ADMIN — PROPOFOL 150 MG: 10 INJECTION, EMULSION INTRAVENOUS at 14:33

## 2021-11-18 RX ADMIN — Medication 1000 UNITS: at 04:23

## 2021-11-18 RX ADMIN — OXYCODONE 5 MG: 5 TABLET ORAL at 09:49

## 2021-11-18 RX ADMIN — ONDANSETRON 4 MG: 2 INJECTION INTRAMUSCULAR; INTRAVENOUS at 04:26

## 2021-11-18 RX ADMIN — LOPERAMIDE HYDROCHLORIDE 2 MG: 2 CAPSULE ORAL at 04:23

## 2021-11-18 RX ADMIN — GABAPENTIN 600 MG: 300 CAPSULE ORAL at 04:22

## 2021-11-18 RX ADMIN — OXYCODONE HYDROCHLORIDE 10 MG: 10 TABLET ORAL at 18:24

## 2021-11-18 RX ADMIN — ROCURONIUM BROMIDE 25 MG: 10 INJECTION, SOLUTION INTRAVENOUS at 15:14

## 2021-11-18 RX ADMIN — SODIUM CHLORIDE, POTASSIUM CHLORIDE, SODIUM LACTATE AND CALCIUM CHLORIDE: 600; 310; 30; 20 INJECTION, SOLUTION INTRAVENOUS at 14:28

## 2021-11-18 RX ADMIN — ROCURONIUM BROMIDE 50 MG: 10 INJECTION, SOLUTION INTRAVENOUS at 14:33

## 2021-11-18 RX ADMIN — SULFAMETHOXAZOLE AND TRIMETHOPRIM 1 TABLET: 800; 160 TABLET ORAL at 04:23

## 2021-11-18 RX ADMIN — CALCIUM GLUCONATE: 98 INJECTION, SOLUTION INTRAVENOUS at 20:55

## 2021-11-18 RX ADMIN — FENTANYL CITRATE 50 MCG: 50 INJECTION, SOLUTION INTRAMUSCULAR; INTRAVENOUS at 16:14

## 2021-11-18 RX ADMIN — OXYCODONE HYDROCHLORIDE 10 MG: 10 TABLET ORAL at 04:23

## 2021-11-18 RX ADMIN — DEXAMETHASONE SODIUM PHOSPHATE 4 MG: 4 INJECTION, SOLUTION INTRA-ARTICULAR; INTRALESIONAL; INTRAMUSCULAR; INTRAVENOUS; SOFT TISSUE at 14:42

## 2021-11-18 RX ADMIN — LOPERAMIDE HYDROCHLORIDE 2 MG: 2 CAPSULE ORAL at 21:35

## 2021-11-18 RX ADMIN — FENTANYL CITRATE 50 MCG: 50 INJECTION, SOLUTION INTRAMUSCULAR; INTRAVENOUS at 14:58

## 2021-11-18 RX ADMIN — ONDANSETRON 4 MG: 2 INJECTION INTRAMUSCULAR; INTRAVENOUS at 18:25

## 2021-11-18 RX ADMIN — GABAPENTIN 300 MG: 300 CAPSULE ORAL at 18:24

## 2021-11-18 RX ADMIN — EZETIMIBE 10 MG: 10 TABLET ORAL at 18:23

## 2021-11-18 RX ADMIN — ENOXAPARIN SODIUM 40 MG: 40 INJECTION SUBCUTANEOUS at 10:06

## 2021-11-18 RX ADMIN — PIPERACILLIN SODIUM AND TAZOBACTAM SODIUM 3.38 G: 3; .375 INJECTION, POWDER, FOR SOLUTION INTRAVENOUS at 21:02

## 2021-11-18 RX ADMIN — OCTREOTIDE ACETATE 100 MCG: 100 INJECTION, SOLUTION INTRAVENOUS; SUBCUTANEOUS at 20:53

## 2021-11-18 RX ADMIN — LIDOCAINE HYDROCHLORIDE 50 MG: 20 INJECTION, SOLUTION EPIDURAL; INFILTRATION; INTRACAUDAL at 14:33

## 2021-11-18 RX ADMIN — SUGAMMADEX 200 MG: 100 INJECTION, SOLUTION INTRAVENOUS at 15:30

## 2021-11-18 RX ADMIN — SODIUM CHLORIDE 250 MG: 9 INJECTION, SOLUTION INTRAVENOUS at 18:22

## 2021-11-18 RX ADMIN — OCTREOTIDE ACETATE 100 MCG: 100 INJECTION, SOLUTION INTRAVENOUS; SUBCUTANEOUS at 10:18

## 2021-11-18 RX ADMIN — ONDANSETRON 4 MG: 2 INJECTION INTRAMUSCULAR; INTRAVENOUS at 15:19

## 2021-11-18 RX ADMIN — ONDANSETRON 4 MG: 2 INJECTION INTRAMUSCULAR; INTRAVENOUS at 10:06

## 2021-11-18 RX ADMIN — Medication 100 MG: at 04:23

## 2021-11-18 RX ADMIN — MICAFUNGIN SODIUM 100 MG: 100 INJECTION, POWDER, LYOPHILIZED, FOR SOLUTION INTRAVENOUS at 10:06

## 2021-11-18 RX ADMIN — FENTANYL CITRATE 25 MCG: 50 INJECTION, SOLUTION INTRAMUSCULAR; INTRAVENOUS at 16:30

## 2021-11-18 RX ADMIN — Medication 100 MCG: at 15:29

## 2021-11-18 ASSESSMENT — ENCOUNTER SYMPTOMS
ABDOMINAL PAIN: 1
NAUSEA: 1
ORTHOPNEA: 0
BLOOD IN STOOL: 0
FLANK PAIN: 0
DIARRHEA: 0
DEPRESSION: 1
CONSTIPATION: 0
SPUTUM PRODUCTION: 0
NAUSEA: 0
MEMORY LOSS: 0
CHILLS: 0
COUGH: 0
SHORTNESS OF BREATH: 0
HEADACHES: 0
WEAKNESS: 1
NERVOUS/ANXIOUS: 1
FALLS: 0
BACK PAIN: 1
DIZZINESS: 0
SORE THROAT: 0
FOCAL WEAKNESS: 0
SENSORY CHANGE: 0
VOMITING: 0
FEVER: 0
WHEEZING: 0
MYALGIAS: 0

## 2021-11-18 ASSESSMENT — PAIN DESCRIPTION - PAIN TYPE
TYPE: SURGICAL PAIN
TYPE: SURGICAL PAIN;ACUTE PAIN
TYPE: SURGICAL PAIN

## 2021-11-18 ASSESSMENT — LIFESTYLE VARIABLES: SUBSTANCE_ABUSE: 0

## 2021-11-18 NOTE — PROGRESS NOTES
Pt A&Ox4  Patient answers all questions appropriately and demonstrates proper use of call light     Pain managed with prescribed medications      Reporting intermittent nausea, managed with medications. +bowel sounds, + flatus, LBM 11/17  Ostomy in place to RLQ to downdrain bag   RLQ previous IR drain site covered with ostomy bag, brown drainage   RUQ MARTHA drain      Saturating >90% on room air   Denies SOB      Pt ambulates with x1 assistance   Bed alarm In place for patient safety   Updated on plan of care. Safety education provided. Bed locked in low. Call light within reach. Rounding in place.

## 2021-11-18 NOTE — CARE PLAN
The patient is Stable - Low risk of patient condition declining or worsening    Shift Goals  Clinical Goals: rest  Patient Goals: pain management, rest, discharge  Family Goals: Unable to assess, no family bedside at present    Progress made toward(s) clinical / shift goals:  patient plan of care updated with Dr. HOGAN Patient feeling hopeful for surgery and a big step towards discharging home.    Patient is not progressing towards the following goals:

## 2021-11-18 NOTE — ANESTHESIA PROCEDURE NOTES
Airway    Date/Time: 11/18/2021 2:34 PM  Performed by: Skip Barrios M.D.  Authorized by: Skip Barrios M.D.     Location:  OR  Urgency:  Elective  Indications for Airway Management:  Anesthesia      Spontaneous Ventilation: absent    Sedation Level:  Deep  Preoxygenated: Yes    Patient Position:  Sniffing  Final Airway Type:  Endotracheal airway  Final Endotracheal Airway:  ETT  Cuffed: Yes    Technique Used for Successful ETT Placement:  Direct laryngoscopy    Insertion Site:  Oral  Blade Type:  Cahnel  Laryngoscope Blade/Videolaryngoscope Blade Size:  3  ETT Size (mm):  7.0  Measured from:  Teeth  ETT to Teeth (cm):  21  Placement Verified by: auscultation and capnometry    Cormack-Lehane Classification:  Grade I - full view of glottis  Number of Attempts at Approach:  1

## 2021-11-18 NOTE — ANESTHESIA PREPROCEDURE EVALUATION
Case: [id number] Date/Time: 11/18/21 1345    Procedure: GASTROSCOPY with stent placement (N/A Esophagus)    Anesthesia type: General    Pre-op diagnosis: Abdominal pain    Location: [facility] ROOM 26 / SURGERY SAME DAY [facility]    Surgeons: Migel Lawrence M.D.          Relevant Problems   ANESTHESIA (within normal limits)      PULMONARY (within normal limits)      NEURO   (positive) History of morbid obesity   (positive) History of pancreatitis      CARDIAC   (positive) Primary hypertension      GI   (positive) Esophageal reflux         (positive) MOLINA (acute kidney injury) (HCC)      ENDO (within normal limits)      OB (within normal limits)       Physical Exam    Airway   Mallampati: II  TM distance: >3 FB  Neck ROM: full       Cardiovascular - normal exam  Rhythm: regular  Rate: normal  (-) murmur     Dental - normal exam           Pulmonary - normal exam  Breath sounds clear to auscultation     Abdominal    Neurological - normal exam                 Anesthesia Plan    ASA 2       Plan - general       Airway plan will be ETT          Induction: intravenous    Postoperative Plan: Postoperative administration of opioids is intended.    Pertinent diagnostic labs and testing reviewed    Informed Consent:    Anesthetic plan and risks discussed with patient.    Use of blood products discussed with: patient whom consented to blood products.

## 2021-11-18 NOTE — PROGRESS NOTES
Gastroenterology Progress Note               Author:  Garry Benson MD Date & Time Created: 11/18/2021 12:24 PM       Patient ID:  Name:             Nils Alfonso  YOB: 1955  Age:                 66 y.o.  female  MRN:               [id number]      Referring Provider:  Ana Luisa Shearer MD      Presenting Chief Complaint:  Abdominal pain      History of Present Illness:    10/10 HPI  66-year-old female PMH recurrent idiopathic pancreatitis s/p ERCP with sphincterotomy October 1, 2021 complicated by ERCP pancreatitis, sleeve gastrectomy, dyslipidemia, hypertension, osteoarthritis of the spine status post lumbar fusion who was admitted to the hospital 10/8/2021 with worsening right lower quadrant pain over the past week.  Ever since her ERCP October 1, 2021, she has been experiencing pain, intermittent subjective fevers, nausea.  In the emergency room-labs: CBC: WBC 17.8..  Sodium 130.  LFTs-WNL.  CT scan abdomen/pelvis-large irregular fluid collection with thick wall occupying most of the right abdomen surrounding the right kidney and colon.  Severe wall thickening of the descending, transverse colon, second portion of the duodenum likely reactive.  Pneumobilia with gas in the CBD.  Drain placement by IR was performed.  Currently, the abdominal pain has improved.  No vomiting.  Started on ceftriaxone and metronidazole IV.     ERCP October 1, 2021-common bile duct-dilated down to the level of the ampulla.  4 mm polyp at the distal duct seen after sphincterotomy.  8 mm sphincterotomy.  Negative for stone.  Bile duct polyp-benign with inflammatory and hyperplastic changes.  No evidence of epithelial dysplasia/neoplasia.       Interval History:  10/11: interval HIDA scan showed no bile leak.  This morning she feels more comfortable, describing minimal abdominal pain but denying nausea vomiting and fevers.  She has been able to eat a little bit more and has full liquids beside her bed.  She expresses concern  about being on losartan which she says was prescribed outpatient as as needed for high blood pressures.  She also states she has not passed a bowel movement in the week which she describes not eating much.     10/12/2021 - No events overnight.  Tolerating diet without nausea or vomiting.  Had spontaneous, formed, brown bowel movement this morning.  Abdominal pain improving.  Feeling weak, but better each day.  Leukocytosis improving.  States that she is on hydrocodone at home, managed by pain management, and asks that her current hospital pain meds be changed.     10/13/2021: Stable, no new events.  Drain output is minimal, but according to patient bedside nursing is not flushing the drain.  Patient is n.p.o. for planned CT today to reevaluate fluid collection.  Leukocytosis continues to improve.  Pain has improved greatly and patient has not taken the pain medication in the last 24 hours according to her recollection.  She is having regular bowel movements without assistance of laxatives.  Patient is very hungry.     10/14/2021 - No events overnight.  Abdominal pain controlled now.  Had nausea and vomiting yesterday with solid food, but tolerating bland diet.  Blood cultures from 10/8 positive for Chryseobacterium and Candida glabrata - Pharmacy and ID aware.  Micafungin started.  CT 10/13 showing extensive multiloculated rim-enhancing air and fluid collection/abscess within the right abdomen is not significantly changed in size from the prior study. The percutaneous pigtail drainage catheter appears well-positioned within the collection.    10/15/2021: Patient transferred to [facility] overnight per surgery recommendations    10/16/2021: Drain output clearing up slightly. Dr. Dumont with surgery has recommended second IR drain to be placed. Initially interventional radiology did not think that this would be helpful for the patient, but after further discussion this is the plan going  forward. Patient is n.p.o. with plans for TPN. WBCs normalized.    10/21/21: Abdominal pain slowly improving. Pain is worse with movement. MARTHA drains purulent. No vomiting. Plan for CT scan to re-evaluate fluid collection. Tolerating FLD.     10/25/2021 Abd pian controlled. Getting IR drain today. No questions.    11/18/2021: Called back by Dr. Dumont due to persistent duodenal leak on Upper GI series. Since we last saw her she attempted to be managed conservatively with IR drains without improvement, then underwent ex lap with debridement of abscess and necrotizing fascitis. Then placement of IR drain on 11/16 due to persistent fluid collection on CT. Upper GI series with findings of perforation at junction of 2nd and 3rd portion of the duodenum.      Review of Systems:  Review of Systems   Constitutional: Positive for malaise/fatigue. Negative for chills and fever.   Respiratory: Negative for cough, shortness of breath and wheezing.    Cardiovascular: Negative for chest pain and leg swelling.   Gastrointestinal: Positive for abdominal pain. Negative for constipation, melena, nausea and vomiting.   Genitourinary: Negative for dysuria and urgency.   Musculoskeletal: Negative for falls and myalgias.   Skin: Negative for itching and rash.   Neurological: Positive for weakness. Negative for focal weakness and headaches.   Psychiatric/Behavioral: Negative for memory loss and substance abuse.             Past Medical History:  Past Medical History:   Diagnosis Date   • Allergy, unspecified not elsewhere classified    • Anemia     not currently   • Anesthesia     PONV (Demerol/ Dilaudid)   • Arthritis     bilateral shoulders,sacrum, osteo   • Backpain     coccyx and sacrum   • Bronchitis 2010   • Cataract     bilateral IOLI   • Degeneration of cervical intervertebral disc     C5-6,C6-7   • Dental disorder     partial upper and lower   • Heart burn    • Hiatus hernia syndrome     not a problem after gastric sleeve   •  High cholesterol     resolved after gastric surgery   • Hyperlipidemia    • Hypertension     off all medication, reveresed after gastric sleeve   • Muscle disorder    • Pain 05/16/2018    low back and SI joints and sacrum   • Reactive airway disease     rescue inhaler not needed unless with bronchitis     Active Hospital Problems    Diagnosis    • Generalized weakness [R53.1]    • Low magnesium level [R79.0]    • Hypotension [I95.9]    • Hypophosphatemia [E83.39]    • Hypokalemia [E87.6]    • Acute respiratory failure with hypoxia (HCC) [J96.01]    • Duodenal perforation (HCC) [K63.1]    • Postprocedural retroperitoneal abscess [K68.11]    • Antibiotic-associated diarrhea [K52.1, T36.95XA]    • Bacteremia due to Gram-negative bacteria and fungemia [R78.81]    • Hyponatremia [E87.1]    • Normocytic anemia [D64.9]    • Sepsis due to intraabdominal fluid collection/abscess, bacteremia and fungemia (HCC) [A41.9]    • Hyperlipidemia [E78.5]    • Primary hypertension [I10]          Past Surgical History:  Past Surgical History:   Procedure Laterality Date   • PB EXPLORATORY OF ABDOMEN  11/5/2021    Procedure: LAPAROTOMY, EXPLORATORY;  Surgeon: Jaden Cook M.D.;  Location: [facility];  Service: General   • PB ULTRASONIC GUIDANCE, INTRAOPERATIVE  11/5/2021    Procedure: ULTRASOUND GUIDANCE;  Surgeon: Jaden Cook M.D.;  Location: [facility];  Service: General   • ABDOMINAL ABSCESS DRAINAGE  11/5/2021    Procedure: DRAINAGE, ABSCESS, ABDOMEN - PERITONEAL AND RETROPERITONEAL;  Surgeon: Jaden Cook M.D.;  Location: [facility];  Service: General   • PB ERCP,DIAGNOSTIC  10/1/2021    Procedure: ERCP (ENDOSCOPIC RETROGRADE CHOLANGIOPANCREATOGRAPHY);  Surgeon: Fausto Casas M.D.;  Location: [facility];  Service: Gastroenterology   • COLONOSCOPY  8/8/2018    Procedure: COLONOSCOPY;  Surgeon: Shukri Condon M.D.;  Location: [facility];   Service: General   • GASTROSCOPY  5/23/2018    Procedure: GASTROSCOPY;  Surgeon: Fausto Casas M.D.;  Location: SURGERY [facility];  Service: Gastroenterology   • EGD W/ENDOSCOPIC ULTRASOUND  5/23/2018    Procedure: EGD W/ENDOSCOPIC ULTRASOUND- RADIAL UPPER;  Surgeon: Fausto Casas M.D.;  Location: SURGERY [facility];  Service: Gastroenterology   • CATARACT PHACO WITH IOL Left 4/18/2017    Procedure: CATARACT PHACO WITH IOL;  Surgeon: Stuart Estevez M.D.;  Location: SURGERY SAME DAY [facility];  Service:    • CATARACT PHACO WITH IOL Right 4/4/2017    Procedure: CATARACT PHACO WITH IOL;  Surgeon: Stuart Estevez M.D.;  Location: SURGERY [facility];  Service:    • GASTRIC SLEEVE LAPAROSCOPY  10/19/2016    Procedure: GASTRIC SLEEVE LAPAROSCOPY, HIATAL HERNIA;  Surgeon: Shukri Condon M.D.;  Location: SURGERY [facility];  Service:    • LIVER BIOPSY LAPAROSCOPIC  10/19/2016    Procedure: LIVER BIOPSY LAPAROSCOPIC;  Surgeon: Shukri Condon M.D.;  Location: [facility];  Service:    • GASTROSCOPY N/A 8/26/2016    Procedure: GASTROSCOPY;  Surgeon: Shukri Condon M.D.;  Location: [facility];  Service:    • ROTATOR CUFF REPAIR Right 7/7/2016   • ROTATOR CUFF REPAIR Left 5/2015   • HYSTERECTOMY ROBOTIC  1/2/2009    Performed by MEG VILLEGAS at [facility]   • LUMBAR FUSION ANTERIOR  2007    Dr Hillman, L3-S1 fusion   • CHOLECYSTECTOMY  1996    laparoscopic   • TUBAL LIGATION  1985   • GASTRIC RESECTION  1982    gastric stapling   • TONSILLECTOMY AND ADENOIDECTOMY  1967   • PRIMARY C SECTION  1976, 1979, 1985    x3           Hospital Medications:  Current Facility-Administered Medications   Medication Dose Frequency Provider Last Rate Last Admin   • oxyCODONE immediate-release (ROXICODONE) tablet 5 mg  5 mg Q4HRS PRN Mark Johnson, A.P.R.N.   5 mg at 11/18/21 0949    Or   • oxyCODONE immediate release (ROXICODONE) tablet 10 mg  10 mg Q4HRS PRN  Mark Johnson, A.P.R.N.   10 mg at 11/18/21 0423   • ferric gluconate complex (FERRLECIT) 250 mg in  mL IVPB  250 mg Q24HR Mark Johnson A.P.R.N.   Stopped at 11/17/21 2220   • acetaminophen (TYLENOL) tablet 1,000 mg  1,000 mg TID Hair Guevara M.D.   1,000 mg at 11/13/21 2027   • TPN Central Line Formulation   TPN DAILY Mark Johnson A.P.R.N. 60 mL/hr at 11/17/21 2006 New Bag at 11/17/21 2006   • [Held by provider] oxyCODONE CR (OXYCONTIN) tablet 10 mg  10 mg Q12HRS Wil Jackson D.O.   10 mg at 11/13/21 1750   • octreotide (SANDOSTATIN) injection 100 mcg  100 mcg BID Jaden Cook M.D.   100 mcg at 11/18/21 1018   • MD Alert...TPN per Pharmacy   PHARMACY TO DOSE Jaden Cook M.D.       • sulfamethoxazole-trimethoprim (BACTRIM DS) 800-160 MG tablet 1 Tablet  1 Tablet Q12HRS Tamra Mcfarland M.D.   1 Tablet at 11/18/21 0423   • thiamine (Vitamin B-1) tablet 100 mg  100 mg DAILY Lupillo Beltre, A.P.N.   100 mg at 11/18/21 0423   • ondansetron (ZOFRAN) syringe/vial injection 4 mg  4 mg Q4HRS PRN Tomi Ledezma M.D.   4 mg at 11/18/21 1006   • enoxaparin (LOVENOX) inj 40 mg  40 mg DAILY Jaden Cook M.D.   40 mg at 11/18/21 1006   • [Held by provider] HYDROmorphone (Dilaudid) injection 0.5 mg  0.5 mg Q4HRS PRN Tomi Ledezma M.D.   0.5 mg at 11/13/21 0544   • loperamide (IMODIUM) capsule 2 mg  2 mg 4X/DAY PRN Jerod Duffy M.D.   2 mg at 11/18/21 0423   • piperacillin-tazobactam (ZOSYN) 3.375 g in  mL IVPB  3.375 g Q8HRS Tamra Mcfarland M.D.   Stopped at 11/18/21 0901   • ondansetron (ZOFRAN ODT) dispertab 4 mg  4 mg Q4HRS PRN Elizabeth Segundo M.D.   4 mg at 11/12/21 0438   • polyethylene glycol/lytes (MIRALAX) PACKET 1 Packet  1 Packet QDAY PRN Elizabeth Segundo M.D.       • ezetimibe (ZETIA) tablet 10 mg  10 mg Q EVENING Elizabeth Segundo M.D.   10 mg at 11/17/21 1755   • gabapentin (NEURONTIN) capsule 600 mg  600 mg BID Elizabeth Segundo M.D.   600 mg at 11/18/21  "0422   • micafungin (MYCAMINE) 100 mg in  mL ivpb  100 mg Q24HRS Tamra Mcfarland M.D.   Stopped at 11/18/21 1106   • vitamin D3 (cholecalciferol) tablet 1,000 Units  1,000 Units DAILY Elizabeth Segundo M.D.   1,000 Units at 11/18/21 0423   Last reviewed on 11/5/2021  8:26 AM by Cuca Mejia R.N.        Current Outpatient Medications:  Medications Prior to Admission   Medication Sig Dispense Refill Last Dose   • cyclobenzaprine (FLEXERIL) 10 mg Tab Take 10 mg by mouth every evening.   9/30/2021 at K   • ezetimibe (ZETIA) 10 MG Tab Take 10 mg by mouth every evening.   9/30/2021 at K   • Magnesium 400 MG Tab Take 400 mg by mouth every evening.   9/30/2021 at K   • gabapentin (NEURONTIN) 300 MG Cap Take 600 mg by mouth 2 Times a Day.   9/30/2021 at K   • BIOTIN PO Take 1 Tablet by mouth every day.   9/30/2021 at K   • HYDROcodone/acetaminophen (NORCO)  MG Tab Take 0.5 Tablets by mouth every 6 hours as needed for Moderate Pain.   10/8/2021 at PRN   • Cholecalciferol (VITAMIN D3 PO) Take 1 Each by mouth every day.   9/30/2021 at UNK   • ondansetron (ZOFRAN ODT) 4 MG TABLET DISPERSIBLE Take 4 mg by mouth every 6 hours as needed for Nausea.   10/8/2021 at PRN         Medication Allergies:  Allergies   Allergen Reactions   • Ampicillin Rash     Rash  Tolerates Zosyn 10/21   • Cephalexin Diarrhea, Vomiting and Nausea     .   • Clindamycin Vomiting     \"cleocin\"   • Codeine      Severe stomach pain, cramps, spasms   • Demerol Vomiting   • Levofloxacin Unspecified     Numbness     • Tetracyclines Rash     .   • Tizanidine Itching   • Hydromorphone Vomiting and Nausea   • Morphine Vomiting   • Pcn [Penicillins] Itching     Tolerates Zosyn 10/21   • Sulfa Drugs Itching         Family Medical History:  Family History   Problem Relation Age of Onset   • Stroke Mother    • Cancer Father         larynx   • Diabetes Brother          Social History:  Social History     Socioeconomic History   • Marital " status:      Spouse name: Not on file   • Number of children: Not on file   • Years of education: Not on file   • Highest education level: Not on file   Occupational History   • Not on file   Tobacco Use   • Smoking status: Former Smoker     Packs/day: 0.25     Years: 0.10     Pack years: 0.02     Types: Cigarettes     Quit date: 1973     Years since quittin.9   • Smokeless tobacco: Never Used   Vaping Use   • Vaping Use: Never used   Substance and Sexual Activity   • Alcohol use: No   • Drug use: No   • Sexual activity: Not on file     Comment:    Other Topics Concern   • Not on file   Social History Narrative   • Not on file     Social Determinants of Health     Financial Resource Strain:    • Difficulty of Paying Living Expenses: Not on file   Food Insecurity:    • Worried About Running Out of Food in the Last Year: Not on file   • Ran Out of Food in the Last Year: Not on file   Transportation Needs:    • Lack of Transportation (Medical): Not on file   • Lack of Transportation (Non-Medical): Not on file   Physical Activity:    • Days of Exercise per Week: Not on file   • Minutes of Exercise per Session: Not on file   Stress:    • Feeling of Stress : Not on file   Social Connections:    • Frequency of Communication with Friends and Family: Not on file   • Frequency of Social Gatherings with Friends and Family: Not on file   • Attends [employer] Services: Not on file   • Active Member of Clubs or Organizations: Not on file   • Attends Club or Organization Meetings: Not on file   • Marital Status: Not on file   Intimate Partner Violence:    • Fear of Current or Ex-Partner: Not on file   • Emotionally Abused: Not on file   • Physically Abused: Not on file   • Sexually Abused: Not on file   Housing Stability:    • Unable to Pay for Housing in the Last Year: Not on file   • Number of Places Lived in the Last Year: Not on file   • Unstable Housing in the Last Year: Not on file         Vital  "signs:  Weight/BMI: Body mass index is 28.13 kg/m².  /68   Pulse 65   Temp 36.8 °C (98.3 °F) (Temporal)   Resp 17   Ht 1.6 m (5' 2.99\")   Wt 72 kg (158 lb 11.7 oz)   SpO2 93%   Vitals:    11/17/21 1931 11/18/21 0434 11/18/21 0730 11/18/21 1120   BP: 153/78 152/76 133/78 128/68   Pulse: 67 71 68 65   Resp: 18 18 17 17   Temp: 37.2 °C (99 °F) 36.9 °C (98.5 °F) 36.6 °C (97.9 °F) 36.8 °C (98.3 °F)   TempSrc: Temporal Temporal Temporal Temporal   SpO2: 95% 95% 93% 93%   Weight:       Height:         Oxygen Therapy:  Pulse Oximetry: 93 %, O2 (LPM): 0, O2 Delivery Device: None - Room Air    Intake/Output Summary (Last 24 hours) at 11/18/2021 1224  Last data filed at 11/18/2021 1120  Gross per 24 hour   Intake 875 ml   Output 1910 ml   Net -1035 ml         Physical Exam:  Physical Exam  Vitals and nursing note reviewed.   Constitutional:       Appearance: She is ill-appearing.   HENT:      Head: Normocephalic and atraumatic.      Right Ear: External ear normal.      Left Ear: External ear normal.      Nose: Nose normal.      Mouth/Throat:      Mouth: Mucous membranes are dry.   Cardiovascular:      Rate and Rhythm: Normal rate and regular rhythm.      Pulses: Normal pulses.      Heart sounds: Normal heart sounds.   Pulmonary:      Effort: Pulmonary effort is normal.      Breath sounds: Normal breath sounds.      Comments: Decreased breath sounds in bases  Abdominal:      Tenderness: There is abdominal tenderness (RUQ).      Comments: Right sided drain and ostomy appliances to skin over sites that are draining dark brown liquid   Neurological:      General: No focal deficit present.      Mental Status: She is alert and oriented to person, place, and time.   Psychiatric:         Thought Content: Thought content normal.         Judgment: Judgment normal.             Labs:  Recent Labs     11/17/21  0435 11/18/21  0440   SODIUM 133* 134*   POTASSIUM 4.0 4.1   CHLORIDE 102 101   CO2 24 23   BUN 19 16   CREATININE " 0.51 0.53   MAGNESIUM  --  2.0   PHOSPHORUS  --  2.7   CALCIUM 7.8* 7.8*     Recent Labs     11/17/21  0435 11/18/21 0440   ALTSGPT 28 32   ASTSGOT 34 35   ALKPHOSPHAT 82 88   TBILIRUBIN 0.2 0.2   GLUCOSE 128* 122*     Recent Labs     11/17/21  0435 11/18/21 0440   WBC 3.7*  --    NEUTSPOLYS 78.80*  --    LYMPHOCYTES 12.00*  --    MONOCYTES 4.10  --    EOSINOPHILS 0.50  --    BASOPHILS 0.80  --    ASTSGOT 34 35   ALTSGPT 28 32   ALKPHOSPHAT 82 88   TBILIRUBIN 0.2 0.2     Recent Labs     11/17/21 0435   RBC 3.18*   HEMOGLOBIN 9.0*   HEMATOCRIT 28.3*   PLATELETCT 186     Recent Results (from the past 24 hour(s))   Comp Metabolic Panel    Collection Time: 11/18/21  4:40 AM   Result Value Ref Range    Sodium 134 (L) 135 - 145 mmol/L    Potassium 4.1 3.6 - 5.5 mmol/L    Chloride 101 96 - 112 mmol/L    Co2 23 20 - 33 mmol/L    Anion Gap 10.0 7.0 - 16.0    Glucose 122 (H) 65 - 99 mg/dL    Bun 16 8 - 22 mg/dL    Creatinine 0.53 0.50 - 1.40 mg/dL    Calcium 7.8 (L) 8.5 - 10.5 mg/dL    AST(SGOT) 35 12 - 45 U/L    ALT(SGPT) 32 2 - 50 U/L    Alkaline Phosphatase 88 30 - 99 U/L    Total Bilirubin 0.2 0.1 - 1.5 mg/dL    Albumin 2.5 (L) 3.2 - 4.9 g/dL    Total Protein 4.8 (L) 6.0 - 8.2 g/dL    Globulin 2.3 1.9 - 3.5 g/dL    A-G Ratio 1.1 g/dL   Magnesium    Collection Time: 11/18/21  4:40 AM   Result Value Ref Range    Magnesium 2.0 1.5 - 2.5 mg/dL   Phosphorus    Collection Time: 11/18/21  4:40 AM   Result Value Ref Range    Phosphorus 2.7 2.5 - 4.5 mg/dL   ESTIMATED GFR    Collection Time: 11/18/21  4:40 AM   Result Value Ref Range    GFR If African American >60 >60 mL/min/1.73 m 2    GFR If Non African American >60 >60 mL/min/1.73 m 2         Radiology Review:  DX-UPPER GI-SERIES WITH KUB   Final Result      Duodenal perforation at the junction of the second and third portions of duodenum as noted on key images.      CT-ABORTED CT PROCEDURE   Final Result      Interval decrease in size of the RIGHT retroperitoneal fluid  collection which contains a surgical drain. Because from bowel is possible. No additional drain was placed.            CT-ABDOMEN-PELVIS WITH   Final Result      1.  Slight interval increase in size in fluid collection the right midabdomen with drain in place.      2.  Slight interval increase in size in trace bilateral pleural effusions, right greater than left with bibasilar atelectasis.      3.  Pneumobilia.      CT-ABDOMEN-PELVIS WITH   Final Result         1. The components in the gallbladder fossa and right flank of the complex fluid collection are mostly resolved. There is still a small residual fluid collection in the perinephric space surrounding the inferior right kidney. Right lower quadrant MARTHA drain    and right upper quadrant catheter are outside of this residual collection.   2. Air-fluid levels throughout the colon could relate to ileus.   3. Bilateral pleural effusions with bibasilar atelectasis.   4. Pneumobilia.      CT-ABDOMEN-PELVIS WITH   Final Result      1.  Slight interval decrease in size of the large RIGHT peritoneal abscess which now contains 3 drains   2.  Decreased atelectasis with persistent opacity in the RIGHT lung base likely atelectasis rather than pneumonia   3.  Small LEFT renal cyst   4.  Prior cholecystectomy with ongoing pneumobilia      CT-DRAINAGE-CATH EXCHANGE   Final Result         1. Organized pancreatic pseudocyst Drainage with CT guidance.      CT-ABDOMEN-PELVIS WITH   Final Result      1.  There has been interval placement of an additional inferior lateral pigtail catheter within the complex right abdominal abscess. The other 2 pigtail catheters are stable in appearance.   2.  There has been no significant interval decrease in size of the large complex loculated abscess collection in the right abdomen.   3.  There is no new fluid collection.   4.  Gallbladder is surgically absent with continued pneumobilia.   5.  Interval decrease in the dependent pleural effusion with  minimal airspace disease, likely atelectasis.      IR-DRAINAGE-CATH EXCHANGE   Final Result      1.  Successful left-sided drainage catheter removal.   2.  Successful image guided superior right drainage catheter replacement.      Plan: Suction drainage. Monitor outputs. Please contact interventional radiology if there is any concern for tube dysfunction. Suggest routine tube maintenance at 3 months intervals if the tube remains in place.         CT-DRAIN-PERITONEAL   Final Result      1.  CT GUIDED PERITONEAL RLQ abdominal CATHETER DRAINAGE.   2.  THE CURRENT PLAN IS TO MONITOR DRAINAGE OUTPUT AND OBTAIN A FOLLOWUP CT SCAN IN 5-7 DAYS IF CLINICALLY INDICATED.      CT-ABDOMEN-PELVIS WITH   Final Result         1. Grossly unchanged irregular fluid collection occupying the right abdomen surrounding the right kidney and right colon extending to midline. Additional pigtail catheter in the component about midline anterior abdomen. Both pigtail catheters seem to be    within the collection.      2. Small right pleural effusion with right basilar atelectasis.               DX-CHEST-LIMITED (1 VIEW)   Final Result      1.  Right basilar atelectasis or consolidation. Small right pleural effusion not excluded.   2.  Atherosclerotic plaque.      CT-DRAIN-PERITONEAL   Final Result      1.  CT guided pancreatic pseudocyst catheter drainage.   2.  The current plan is to obtain a follow-up CT in 5-7 days..      IR-PICC LINE PLACEMENT W/ GUIDANCE > AGE 5   Final Result                  Ultrasound-guided PICC placement performed by qualified nursing staff as    above.          CT-ABDOMEN-PELVIS WITH    (Results Pending)   [id number] DUCTS    (Results Pending)         MDM (Data Review):   -Records reviewed and summarized in current documentation  -I personally reviewed and interpreted the laboratory results  -I personally reviewed the radiology images        Medical Decision Making, by Problem:  Active Hospital Problems     Diagnosis    • Bacteremia due to Gram-negative bacteria and fungemia [R78.81]    • Sepsis due to intraabdominal fluid collection/abscess (HCC) [A41.9]    • Hyperlipidemia [E78.5]    • Hypertension [I10]      66-year-old female with a history of recurrent idiopathic pancreatitis s/p ERCP with biliary sphincterotomy and biopsy of a distal BD polyp on October 1, 2021.  Postop complications-pancreatitis.  Ever since then, complaints of subjective fevers, progressive abdominal pain, nausea/vomiting.  Began to have worsening right lower quadrant pain over the past 5 days.  CT scan abdomen/pelvis large irregular fluid collection with thick wall occupying most of the right abdomen surrounding right kidney and right colon.  Additional fluid and gas collection in the midline abdomen anterior to the pancreas concerning for abscess.  Severe wall thickening of the descending and transverse colon, second portion of duodenum likely reactive.  Pneumobilia with gas in the CBD, intrahepatic bile ducts as well as pancreatic ducts (likely secondary to recent ERCP with sphincterotomy).  IR placed a drain with improvement in abdominal pain initially, but then drain output slowed to no output. Nursing was discovered to have have flushed the drain for at least 2 days. After flush by bedside nurse, reportedly 300 cc of fluid came out. Fluid bilirubin 0.6. Fluid amylase still pending. Blood cultures from 10/8 positive for Chryseobacterium and Candida glabrata. ID following. Repeat CT with no change in fluid collection and new pelvic fluid collection/abscess. Drain placement ineffective. Patient underwent exlap with debridement of retroperitoneal abscess and necrotizing fascitis on 11/5 by Dr. Dumont. Repeat IR drain on 11/16 and now with recurrent duodenal leak.         Assessment/Recommendations:  ASSESSMENT:  1.  Sepsis due to intra-abdominal fluid collection/abscess-Duodenal perforation near ampulla from previous ERCP.   2.  History  of pancreatitis  3.  Intra-abdominal abscess and necrotizing fascitis, s/p debridement 11/5  4.  History of morbid obesity s/p laparoscopic sleeve  5.  Euvolemic hyponatremia  6.  TPN     PLAN:  1.  Had a long discussion with the patient as well as Dr. Casas who spoke with Dr. Dumont and Dr. Lawrence.  Plan for esophagogastroduodenoscopy with possible defect closure, covered duodenal or off label esophageal stent placement in the duodenum with possible ERCP and metal stent, and nasogastric feeding tube placement with Dr. Lawrence at 130 today. Patient seen and examined before proceeding.    Risks, benefits, and alternatives of aforementioned procedures were discussed with patient .  Consenting person was given opportunities to ask questions and discuss other options.  Risks including but not limited to perforation, infection, bleeding, missed lesion(s), possible need for surgery(ies) and/or interventional radiology, possible need for repeat procedure(s) and/or additional testing, hospitalization possibly prolonged, cardiac and/or pulmonary event, aspiration, hypoxia, stroke, medication and/or anesthesia reaction, indefinite diagnosis, discomfort, unsuccessful and/or incomplete procedure, ineffective therapy and/or persistent symptoms, damage to adjacent organs and/or vascular structures, and other adverse events possibly life-threatening.  Interactive discussion was undertaken with Layman's terms. I answered questions in full and to satisfaction.  Consenting person(s) stated understanding and acceptance of these risks, and wished to proceed.  Informed consent was given in clear state of mind.    2.  N.p.o.    3.  Consent patient for procedure    4. Appreciate surgery and ID recommendations    Core Quality Measures   Reviewed items:  Labs, Medications and Radiology reports reviewed

## 2021-11-18 NOTE — PROGRESS NOTES
The patient was seen today along with the hospitalist and the nursing staff.  Fortunately her  was present as well.  I had a long discussion with the patient regarding her leak.  The ostomy sites the old drain sites and the new drain sites are still draining and had approximate 500 overnight unclear whether the patient is drinking fluids or not even though she is n.p.o. with TPN.  Had a long discussion with her regarding possible solution which would include a duodenal Wallstent, and placement of a core track tube during endoscopy past the stent that we could treat her got and get her off the TPN.  If the stent does wall off the opening then there is possibility of p.o. intake next week.  We will see how she does and study her next week.  The patient and her  clearly understood the game plan and have agreed to the above plan.  I have spoken to her gastroenterologist about this plan and he has agreed with it as well.

## 2021-11-18 NOTE — PROGRESS NOTES
Hospital Medicine Daily Progress Note    Date of Service  11/18/2021    Chief Complaint  Nils Alfonso is a 66 y.o. female admitted 10/15/2021 with abdominal pain    Hospital Course  Initially admitted to [email or web address] for evaluation of abdominal pain after recent ERCP with polypectomy and sphincterotomy and noted to have large intra-abdominal fluid collection. Multiple IR drains had been placed, but her infection worsened.  She was transferred to [facility] for escalation of her surgical needs. She underwent ex lap w I/D of multiple loculated abscesses and debridement of nec fasciitis w Dr. ST 11/5/21. Prev cx w Stenotrophomonas maltophilia, mixed yeast, E faecalis, E coli and Chryseobacterium in the blood.  ID was consulted and placed patient on Bactrim twice daily, Zosyn, micafungin end date 11/24/2021.  Patient had decreased output from drains.  Repeat CT on 11/15/2021 showed increase as a fluid collection in the right mid abdomen and slight increase in trace bilateral pleural effusions.  Patient to return to IR.     Interval Problem Update  11/16/2021: No acute overnight events.  Patient pending IR for procedural drainage today.   11/17/2021: No acute overnight events.  Large drain in RLQ had fallen out, as seen in IR.  IR saw that abdominal fluid collection is smaller and noted drain was indicated.  Patient seen by Dr. Cook this morning.  Plan for upper GI today.  11/18: No overnight events. UGI showed leak at perforation in 2-3 portion of duodenum. Seen by General Surgery. Plan for duodenal stent placement tomorrow.     I have personally seen and examined the patient at bedside. I discussed the plan of care with patient, family, bedside RN,  and general surgery.    Consultants/Specialty  general surgery    Code Status  Full Code    Disposition  Patient is not medically cleared.   Anticipate discharge to rehab versus home with home health.      Review of Systems  Review of Systems   Constitutional:  Negative for chills and fever.   HENT: Negative for congestion and sore throat.    Respiratory: Negative for cough, sputum production and shortness of breath.    Cardiovascular: Negative for chest pain, orthopnea and leg swelling.   Gastrointestinal: Positive for abdominal pain and nausea. Negative for blood in stool, diarrhea and vomiting.   Genitourinary: Negative for dysuria, flank pain and urgency.   Musculoskeletal: Positive for back pain. Negative for falls.        Right-sided midthoracic back pain   Skin: Negative for itching and rash.   Neurological: Positive for weakness. Negative for dizziness, sensory change, focal weakness and headaches.   Psychiatric/Behavioral: Positive for depression. The patient is nervous/anxious.         Physical Exam  Temp:  [36.6 °C (97.9 °F)-37.2 °C (99 °F)] 36.6 °C (97.9 °F)  Pulse:  [67-73] 68  Resp:  [17-18] 17  BP: (133-153)/(76-78) 133/78  SpO2:  [93 %-95 %] 93 %    Physical Exam  Vitals and nursing note reviewed.   Constitutional:       General: She is not in acute distress.     Appearance: Normal appearance. She is not toxic-appearing or diaphoretic.   HENT:      Head: Normocephalic and atraumatic.      Mouth/Throat:      Mouth: Mucous membranes are moist.      Pharynx: Oropharynx is clear.   Eyes:      General: No scleral icterus.     Extraocular Movements: Extraocular movements intact.      Conjunctiva/sclera: Conjunctivae normal.   Cardiovascular:      Rate and Rhythm: Normal rate and regular rhythm.      Pulses: Normal pulses.      Heart sounds: Normal heart sounds. No murmur heard.  No gallop.    Pulmonary:      Effort: Pulmonary effort is normal. No respiratory distress.      Breath sounds: Normal breath sounds. No rhonchi.   Abdominal:      General: Abdomen is flat. Bowel sounds are normal. There is no distension.      Palpations: Abdomen is soft.      Tenderness: There is no abdominal tenderness.      Comments: Midline abdominal incision approximated with staples.   Clean dry and intact without erythema, edema, drainage.    MARTHA drain RUQ.   Collection bag to previous RLQ drain site.  Dark green/brown drainage in collection bag and MARTHA drain.    Musculoskeletal:         General: Normal range of motion.      Cervical back: Normal range of motion and neck supple.      Right lower leg: Edema present.      Left lower leg: Edema present.   Skin:     General: Skin is warm and dry.      Capillary Refill: Capillary refill takes less than 2 seconds.      Coloration: Skin is not jaundiced.      Findings: No bruising.   Neurological:      General: No focal deficit present.      Mental Status: She is alert and oriented to person, place, and time. Mental status is at baseline.   Psychiatric:         Mood and Affect: Mood normal.         Thought Content: Thought content normal.         Judgment: Judgment normal.      Comments: Tearful/anxious         Fluids    Intake/Output Summary (Last 24 hours) at 11/18/2021 0947  Last data filed at 11/18/2021 0730  Gross per 24 hour   Intake 875 ml   Output 2190 ml   Net -1315 ml       Laboratory  Recent Labs     11/17/21  0435   WBC 3.7*   RBC 3.18*   HEMOGLOBIN 9.0*   HEMATOCRIT 28.3*   MCV 89.0   MCH 28.3   MCHC 31.8*   RDW 46.5   PLATELETCT 186   MPV 10.6     Recent Labs     11/17/21  0435 11/18/21  0440   SODIUM 133* 134*   POTASSIUM 4.0 4.1   CHLORIDE 102 101   CO2 24 23   GLUCOSE 128* 122*   BUN 19 16   CREATININE 0.51 0.53   CALCIUM 7.8* 7.8*                   Imaging  DX-UPPER GI-SERIES WITH KUB   Final Result      Duodenal perforation at the junction of the second and third portions of duodenum as noted on key images.      CT-ABORTED CT PROCEDURE   Final Result      Interval decrease in size of the RIGHT retroperitoneal fluid collection which contains a surgical drain. Because from bowel is possible. No additional drain was placed.            CT-ABDOMEN-PELVIS WITH   Final Result      1.  Slight interval increase in size in fluid collection the  right midabdomen with drain in place.      2.  Slight interval increase in size in trace bilateral pleural effusions, right greater than left with bibasilar atelectasis.      3.  Pneumobilia.      CT-ABDOMEN-PELVIS WITH   Final Result         1. The components in the gallbladder fossa and right flank of the complex fluid collection are mostly resolved. There is still a small residual fluid collection in the perinephric space surrounding the inferior right kidney. Right lower quadrant MARTHA drain    and right upper quadrant catheter are outside of this residual collection.   2. Air-fluid levels throughout the colon could relate to ileus.   3. Bilateral pleural effusions with bibasilar atelectasis.   4. Pneumobilia.      CT-ABDOMEN-PELVIS WITH   Final Result      1.  Slight interval decrease in size of the large RIGHT peritoneal abscess which now contains 3 drains   2.  Decreased atelectasis with persistent opacity in the RIGHT lung base likely atelectasis rather than pneumonia   3.  Small LEFT renal cyst   4.  Prior cholecystectomy with ongoing pneumobilia      CT-DRAINAGE-CATH EXCHANGE   Final Result         1. Organized pancreatic pseudocyst Drainage with CT guidance.      CT-ABDOMEN-PELVIS WITH   Final Result      1.  There has been interval placement of an additional inferior lateral pigtail catheter within the complex right abdominal abscess. The other 2 pigtail catheters are stable in appearance.   2.  There has been no significant interval decrease in size of the large complex loculated abscess collection in the right abdomen.   3.  There is no new fluid collection.   4.  Gallbladder is surgically absent with continued pneumobilia.   5.  Interval decrease in the dependent pleural effusion with minimal airspace disease, likely atelectasis.      IR-DRAINAGE-CATH EXCHANGE   Final Result      1.  Successful left-sided drainage catheter removal.   2.  Successful image guided superior right drainage catheter  replacement.      Plan: Suction drainage. Monitor outputs. Please contact interventional radiology if there is any concern for tube dysfunction. Suggest routine tube maintenance at 3 months intervals if the tube remains in place.         CT-DRAIN-PERITONEAL   Final Result      1.  CT GUIDED PERITONEAL RLQ abdominal CATHETER DRAINAGE.   2.  THE CURRENT PLAN IS TO MONITOR DRAINAGE OUTPUT AND OBTAIN A FOLLOWUP CT SCAN IN 5-7 DAYS IF CLINICALLY INDICATED.      CT-ABDOMEN-PELVIS WITH   Final Result         1. Grossly unchanged irregular fluid collection occupying the right abdomen surrounding the right kidney and right colon extending to midline. Additional pigtail catheter in the component about midline anterior abdomen. Both pigtail catheters seem to be    within the collection.      2. Small right pleural effusion with right basilar atelectasis.               DX-CHEST-LIMITED (1 VIEW)   Final Result      1.  Right basilar atelectasis or consolidation. Small right pleural effusion not excluded.   2.  Atherosclerotic plaque.      CT-DRAIN-PERITONEAL   Final Result      1.  CT guided pancreatic pseudocyst catheter drainage.   2.  The current plan is to obtain a follow-up CT in 5-7 days..      IR-PICC LINE PLACEMENT W/ GUIDANCE > AGE 5   Final Result                  Ultrasound-guided PICC placement performed by qualified nursing staff as    above.          CT-ABDOMEN-PELVIS WITH    (Results Pending)        Assessment/Plan  * Bacteremia due to Gram-negative bacteria and fungemia- (present on admission)  Assessment & Plan  KALANI negative for signs of endocarditis  Cont bactrim, Zosyn, and Micafungin per ID for 4-week course with stop date of 11/24.  Repeat imaging prior to discontinuation.   Repeat Bld Cx neg      Generalized weakness  Assessment & Plan  PT/OT following  Recommending post-acute placement.  Acute rehab evaluating patient for admission.   11/15:  Overall improving.  Likely home with  vs rehab at d/c.    Low  magnesium level  Assessment & Plan  11/8 IV replacement  Resolved,  11/18: 2. continue to monitor    Hypotension  Assessment & Plan  11/6: Stop lasix; Stop ACEI  11/7 started midodrine  11/16-resolved continue to monitor    Hypophosphatemia- (present on admission)  Assessment & Plan  Resolved with TPN  Monitoring per RD  11/6 Kphos; serum goal >3  11/18: 2.7  Will continue to monitor    Hypokalemia- (present on admission)  Assessment & Plan  Periodic monitoring  11/6 K bicarb x 1  Serum goal >4.0  Will continue to monitor      Acute respiratory failure with hypoxia (HCC)- (present on admission)  Assessment & Plan  Resolved with diuresis  Likely volume overload with lower extremity edema present    Antibiotic-associated diarrhea  Assessment & Plan  Cdiff PCR negative  Loperamide PRN    Postprocedural retroperitoneal abscess- (present on admission)  Assessment & Plan  S/P Ex lap, I/D, debridement nec fasc 11/5/2021  Abx per ID  Follow cx  Pain control  N.p.o. with TPN  Antiemetics PRN  11/16/2021: CT abdomen 11/15/2021 showed increase fluid collection in the right mid abdomen and slight increase in trace bilateral pleural effusions.   Patient go to IR for procedural drainage today  11/17: RLQ drain was found to have fallen out in IR yesterday.  Fluid collection is smaller and no new drain was indicated.  Will continue to monitor      Duodenal perforation (HCC)- (present on admission)  Assessment & Plan  After ERCP with retroperitoneal abscess and bacteremia  Uncertain etiology - ddx includes pancreatitis, bile leak, iatrogenic  Pepcid BID  11/9: Concern perforation may have reopened.  Surgery team recommending NPO status and TPN  11/10:  Start octreotide.   11/17: Patient to go for upper GI today to see if leak at perforated site near ampulla.  If leaking, general surgeon to discuss with GI regarding placing a wall duodenal stent/esophageal stent  11/18: Upper GI showed duodenal leak. Plan for stent placement  tomorrow.     Hyponatremia- (present on admission)  Assessment & Plan  Recurrent, mild  Diuresis as tolerated    Normocytic anemia- (present on admission)  Assessment & Plan  Transfuse for Hgb <7  Avoid regular phlebotomy  Supplements per dietary: 11/6 added thiamine, 6 sm meals/d  11/14:  Iron deficiency noted.  Replacement ordered.     Sepsis due to intraabdominal fluid collection/abscess, bacteremia and fungemia (HCC)- (present on admission)  Assessment & Plan  Sepsis resolved  Source intra-abdominal  Zosyn/Mycafungin/Bactrim DS per infectious disease end date 11/24/2021  ID signed off  Re-imaging per ID, IR, and Surgery  S/P Ex lap w I/D and debridement nec fasc 11/5/2021 11/8 Repeat CT. Updated surgical team re: perinephric fluid collection. Further CT may be considered with IV and Oral contrast if needed.  11/9:  She has remained afebrile over last 48 hours.  VSS.  Does not appear to be septic at this time.  Continue antibx.     Hyperlipidemia- (present on admission)  Assessment & Plan  Continue ezetimibe    Primary hypertension- (present on admission)  Assessment & Plan  Hypotensive 11/6 - stop lisinopril         VTE prophylaxis: enoxaparin ppx    I have performed a physical exam and reviewed and updated ROS and Plan today (11/18/2021). In review of yesterday's note (11/17/2021), there are no changes except as documented above.

## 2021-11-18 NOTE — OR NURSING
1555 from OR to PACU 9. Connected to monitor. Report received from anesthesia & RN. VSS. 02 8L  via mask with oral airway in place. Breaths calm, even, unlabored.  Ford present to R nare, approx 96 cm @ nare. MARTHA drain to R upper abdomen. Upper and lower R abdomen drains in place with ostomy dressing and drain bags. TPN infusing via pump to PICC line.     1613 oral airway dc'd, 02 decreased to 6L. Pt moaning, reports pain. Medicated per MAR.     1616 Report given to TALITA Seaman.

## 2021-11-18 NOTE — ANESTHESIA TIME REPORT
Anesthesia Start and Stop Event Times     Date Time Event    11/18/2021 1407 Ready for Procedure     1428 Anesthesia Start     1556 Anesthesia Stop        Responsible Staff  11/18/21    Name Role Begin End    Skip Barrios M.D. Anesth 1428 1556        Preop Diagnosis (Free Text):  Pre-op Diagnosis     Abdominal pain, Duodenal Perforation        Preop Diagnosis (Codes):    Premium Reason  K. Alert    Comments:

## 2021-11-18 NOTE — PROGRESS NOTES
Received report from previous shift RN  Assessment complete.  A&O x 4. Patient calls appropriately.  Patient ambulates with x2 assist. Bed alarm off, patient appropriate.   Patient has 7/10 pain. Pain managed with prescribed medications.  Denies N&V. Tolerating NPO, ice chips diet.  Surgical sites clean, dry, intact. Dressing changes N/A at this time.  + void via purewick, + flatus, last BM 11/18.  Patient denies SOB.  SCD's off.  Patient family at bedside. Dr. ST updated pt on plan of care.  Review plan with of care with patient. Call light and personal belongings with in reach. Hourly rounding in place. All needs met at this time.

## 2021-11-18 NOTE — PROGRESS NOTES
Pharmacy TPN Day # 10       2021    Dosing Weight   60 kg  (based on admission weight of 71 kg)     TPN currently providing 100% of goal  TPN goal: 1706-1498 kcal/day including 1.5 gm/kg/day Protein  TPN indication: Perforated duodenum with high drain output     Pertinent PMH: Admitted on 10/15/2021 for abdominal pain following polypectomy and a sphincterotomy on 10/01/2021. CT completed on 10/08/202, found to have a large fluid collection and thickening of wall around the duodenum with retroperitoneal fluid collection for which patient underwent IR drain placement. Follow-up CT on 10/09/2021 with a significant amount of retroperitoneal air and fluid.  Pt was transferred to [facility] 10/14/21 and required placement of 2 drains for fluid collections surrounding the R kidney and colon. Pt was on TPN from 10/14 - 10/24 to allow bowel rest. She ultimately required ex-lap on  with drainage of intra-abdominal and retroperitoneal abscesses new drain placement. ON , concerns developed that her duodenal perforation may have reopened, thus, surgery has recommended patient remain NPO and resume TPN. ID is consulting.    Temp (24hrs), Av.9 °C (98.5 °F), Min:36.6 °C (97.9 °F), Max:37.2 °C (99 °F)  .  Recent Labs     21  0435 21  0440   SODIUM 133* 134*   POTASSIUM 4.0 4.1   CHLORIDE 102 101   CO2 24 23   BUN 19 16   CREATININE 0.51 0.53   GLUCOSE 128* 122*   CALCIUM 7.8* 7.8*   ASTSGOT 34 35   ALTSGPT 28 32   ALBUMIN 2.4* 2.5*   TBILIRUBIN 0.2 0.2   PHOSPHORUS  --  2.7   MAGNESIUM  --  2.0     Accu-Checks  No results for input(s): POCGLUCOSE in the last 72 hours.    Vitals:    21 1931 21 0434 21 0730 21 1120   BP: 153/78 152/76 133/78 128/68   Weight:       Height:           Intake/Output Summary (Last 24 hours) at 2021 1335  Last data filed at 2021 1120  Gross per 24 hour   Intake 875 ml   Output 1910 ml   Net -1035 ml       Orders Placed This Encounter    Procedures   • Diet NPO Restrict to: Ice Chips (ok for minimal sips and ice chips for comfort)     Standing Status:   Standing     Number of Occurrences:   1     Order Specific Question:   Diet NPO Restrict to:     Answer:   Ice Chips [2]     Comments:   ok for minimal sips and ice chips for comfort         TPN for past 72 hours (Show up to 3 orders; newest on the left. Changes between the two most recent orders are indicated.)     Start date and time   11/12/2021 2000 11/11/2021 2000 11/09/2021 2000      TPN Central Line Formulation [[id number]] TPN Central Line Formulation [[id number]] TPN Adult Central Line [[id number]]    Order Status  Active Completed Completed    Last Admin  New Bag at 11/17/2021 2006 by Diana Kowalski R.N. New Bag at 11/11/2021 1955 by Mckenna Barney R.N. New Bag at 11/10/2021 1952 by Mckenna Barney R.N.       Base    Clinisol 15%  90 g 90 g 90 g    dextrose 70%  240 g 240 g 240 g    fat emulsion (soy) 20%  50 g 50 g 50 g       Additives    potassium phosphate  36 mmol 30 mmol 20 mmol    potassium chloride  -- -- 10 mEq    sodium acetate  111 mEq 145 mEq 145 mEq    sodium chloride  111 mEq 77 mEq 77 mEq    magnesium sulfate  12 mEq 12 mEq 8 mEq    calcium GLUConate  9.3 mEq 9.3 mEq 9.3 mEq    M.T.E.-4  1 mL 1 mL 1 mL    M.V.I. Adult  10 mL 10 mL 10 mL    famotidine  40 mg 40 mg 40 mg       QS Base    sterile water  113.89 mL 107.39 mL 356.71 mL       Energy Contribution    Proteins  -- -- --    Dextrose  816 kcal 816 kcal 816 kcal    Lipids  500 kcal 500 kcal 500 kcal    Total  1,316 kcal 1,316 kcal 1,316 kcal       Electrolyte Ion Calculated Amount    Sodium  222 mEq 222 mEq 222 mEq    Potassium  52.8 mEq 44 mEq 39.33 mEq    Calcium  9.3 mEq 9.3 mEq 9.3 mEq    Magnesium  12 mEq 12 mEq 8 mEq    Aluminum  -- -- --    Phosphate  36 mmol 30 mmol 20 mmol    Chloride  111 mEq 77 mEq 87 mEq    Acetate  187.2 mEq 221.2 mEq 221.2 mEq       Other    Total Protein  90 g 90 g 90 g     Total Protein/kg  1.29 g/kg 1.29 g/kg 1.36 g/kg    Total Amino Acid  -- -- --    Total Amino Acid/kg  -- -- --    Glucose Infusion Rate  2.39 mg/kg/min 2.39 mg/kg/min 2.39 mg/kg/min    Osmolarity (Estimated)  -- -- --    Volume  1,440 mL 1,440 mL 1,440 mL    Rate  60 mL/hr 60 mL/hr 60 mL/hr    Dosing Weight  69.6 kg 69.6 kg 66.4 kg    Infusion Site  Central Central Central            This formula provides:  % kcal as lipids = 30  Grams protein/kg = 1.5  Non-protein calories = 1316  Kcals/kg = 28  Total daily calories = 1676    Comments:  1) Electrolytes WNL although Na+ still slightly low. TPN remains at NS equivalent  2) No FSBS, Glucose < 180  3) Pepcid is in the TPN formulation  4) Per Surgery note today: plan for duodenal stent placement tomorrow and placement of a core track tube during endoscopy past the stent that we could treat her got and get her off the TPN  5) will continue current TPN formulation      Jordan Villar, PharmD

## 2021-11-19 ENCOUNTER — APPOINTMENT (OUTPATIENT)
Dept: RADIOLOGY | Facility: MEDICAL CENTER | Age: 66
DRG: 856 | End: 2021-11-19
Attending: PEDIATRICS
Payer: MEDICARE

## 2021-11-19 LAB — GLUCOSE BLD-MCNC: 98 MG/DL (ref 65–99)

## 2021-11-19 PROCEDURE — A9270 NON-COVERED ITEM OR SERVICE: HCPCS | Performed by: STUDENT IN AN ORGANIZED HEALTH CARE EDUCATION/TRAINING PROGRAM

## 2021-11-19 PROCEDURE — 97535 SELF CARE MNGMENT TRAINING: CPT

## 2021-11-19 PROCEDURE — 700102 HCHG RX REV CODE 250 W/ 637 OVERRIDE(OP): Performed by: NURSE PRACTITIONER

## 2021-11-19 PROCEDURE — 700102 HCHG RX REV CODE 250 W/ 637 OVERRIDE(OP): Performed by: INTERNAL MEDICINE

## 2021-11-19 PROCEDURE — A9270 NON-COVERED ITEM OR SERVICE: HCPCS | Performed by: FAMILY MEDICINE

## 2021-11-19 PROCEDURE — 700111 HCHG RX REV CODE 636 W/ 250 OVERRIDE (IP): Performed by: ANESTHESIOLOGY

## 2021-11-19 PROCEDURE — 82962 GLUCOSE BLOOD TEST: CPT

## 2021-11-19 PROCEDURE — 700102 HCHG RX REV CODE 250 W/ 637 OVERRIDE(OP): Performed by: FAMILY MEDICINE

## 2021-11-19 PROCEDURE — A9270 NON-COVERED ITEM OR SERVICE: HCPCS | Performed by: INTERNAL MEDICINE

## 2021-11-19 PROCEDURE — 700102 HCHG RX REV CODE 250 W/ 637 OVERRIDE(OP): Performed by: STUDENT IN AN ORGANIZED HEALTH CARE EDUCATION/TRAINING PROGRAM

## 2021-11-19 PROCEDURE — A9270 NON-COVERED ITEM OR SERVICE: HCPCS | Performed by: NURSE PRACTITIONER

## 2021-11-19 PROCEDURE — 700111 HCHG RX REV CODE 636 W/ 250 OVERRIDE (IP): Mod: JG | Performed by: INTERNAL MEDICINE

## 2021-11-19 PROCEDURE — 700105 HCHG RX REV CODE 258: Performed by: INTERNAL MEDICINE

## 2021-11-19 PROCEDURE — 99233 SBSQ HOSP IP/OBS HIGH 50: CPT | Performed by: NURSE PRACTITIONER

## 2021-11-19 PROCEDURE — 700101 HCHG RX REV CODE 250: Performed by: NURSE PRACTITIONER

## 2021-11-19 PROCEDURE — 700111 HCHG RX REV CODE 636 W/ 250 OVERRIDE (IP): Performed by: INTERNAL MEDICINE

## 2021-11-19 PROCEDURE — 97530 THERAPEUTIC ACTIVITIES: CPT

## 2021-11-19 PROCEDURE — 99024 POSTOP FOLLOW-UP VISIT: CPT | Performed by: SURGERY

## 2021-11-19 PROCEDURE — 700111 HCHG RX REV CODE 636 W/ 250 OVERRIDE (IP): Mod: JB | Performed by: SURGERY

## 2021-11-19 PROCEDURE — 770001 HCHG ROOM/CARE - MED/SURG/GYN PRIV*

## 2021-11-19 PROCEDURE — 700105 HCHG RX REV CODE 258: Performed by: NURSE PRACTITIONER

## 2021-11-19 RX ORDER — ONDANSETRON 4 MG/1
4 TABLET, ORALLY DISINTEGRATING ORAL EVERY 4 HOURS PRN
Status: DISCONTINUED | OUTPATIENT
Start: 2021-11-19 | End: 2021-11-28 | Stop reason: HOSPADM

## 2021-11-19 RX ADMIN — ONDANSETRON 4 MG: 2 INJECTION INTRAMUSCULAR; INTRAVENOUS at 05:03

## 2021-11-19 RX ADMIN — ONDANSETRON 4 MG: 2 INJECTION INTRAMUSCULAR; INTRAVENOUS at 10:05

## 2021-11-19 RX ADMIN — MICAFUNGIN SODIUM 100 MG: 100 INJECTION, POWDER, LYOPHILIZED, FOR SOLUTION INTRAVENOUS at 10:05

## 2021-11-19 RX ADMIN — HYDROMORPHONE HYDROCHLORIDE 0.5 MG: 1 INJECTION, SOLUTION INTRAMUSCULAR; INTRAVENOUS; SUBCUTANEOUS at 16:40

## 2021-11-19 RX ADMIN — SULFAMETHOXAZOLE AND TRIMETHOPRIM 160 MG OF TRIMETHOPRIM: 80; 16 INJECTION, SOLUTION, CONCENTRATE INTRAVENOUS at 12:03

## 2021-11-19 RX ADMIN — PIPERACILLIN SODIUM AND TAZOBACTAM SODIUM 3.38 G: 3; .375 INJECTION, POWDER, FOR SOLUTION INTRAVENOUS at 05:03

## 2021-11-19 RX ADMIN — Medication 1000 UNITS: at 05:03

## 2021-11-19 RX ADMIN — PIPERACILLIN SODIUM AND TAZOBACTAM SODIUM 3.38 G: 3; .375 INJECTION, POWDER, FOR SOLUTION INTRAVENOUS at 20:27

## 2021-11-19 RX ADMIN — ENOXAPARIN SODIUM 40 MG: 40 INJECTION SUBCUTANEOUS at 10:05

## 2021-11-19 RX ADMIN — GABAPENTIN 600 MG: 300 CAPSULE ORAL at 05:03

## 2021-11-19 RX ADMIN — PIPERACILLIN SODIUM AND TAZOBACTAM SODIUM 3.38 G: 3; .375 INJECTION, POWDER, FOR SOLUTION INTRAVENOUS at 15:01

## 2021-11-19 RX ADMIN — HYDROMORPHONE HYDROCHLORIDE 0.5 MG: 1 INJECTION, SOLUTION INTRAMUSCULAR; INTRAVENOUS; SUBCUTANEOUS at 10:03

## 2021-11-19 RX ADMIN — OCTREOTIDE ACETATE 100 MCG: 100 INJECTION, SOLUTION INTRAVENOUS; SUBCUTANEOUS at 10:31

## 2021-11-19 RX ADMIN — OCTREOTIDE ACETATE 100 MCG: 100 INJECTION, SOLUTION INTRAVENOUS; SUBCUTANEOUS at 20:27

## 2021-11-19 RX ADMIN — OXYCODONE HYDROCHLORIDE 10 MG: 10 TABLET ORAL at 05:06

## 2021-11-19 RX ADMIN — LOPERAMIDE HYDROCHLORIDE 2 MG: 2 CAPSULE ORAL at 05:04

## 2021-11-19 RX ADMIN — HYDROMORPHONE HYDROCHLORIDE 0.5 MG: 1 INJECTION, SOLUTION INTRAMUSCULAR; INTRAVENOUS; SUBCUTANEOUS at 21:55

## 2021-11-19 RX ADMIN — ONDANSETRON 4 MG: 2 INJECTION INTRAMUSCULAR; INTRAVENOUS at 21:55

## 2021-11-19 RX ADMIN — Medication 100 MG: at 05:03

## 2021-11-19 RX ADMIN — SULFAMETHOXAZOLE AND TRIMETHOPRIM 1 TABLET: 800; 160 TABLET ORAL at 05:03

## 2021-11-19 ASSESSMENT — ENCOUNTER SYMPTOMS
PALPITATIONS: 0
FEVER: 0
BLOOD IN STOOL: 0
SHORTNESS OF BREATH: 0
DEPRESSION: 1
NAUSEA: 1
NERVOUS/ANXIOUS: 1
DIZZINESS: 0
BACK PAIN: 1
ABDOMINAL PAIN: 1
DIARRHEA: 0
MYALGIAS: 0
ORTHOPNEA: 0
SORE THROAT: 0
CONSTIPATION: 0
MEMORY LOSS: 0
COUGH: 0
HEADACHES: 0
WHEEZING: 0
VOMITING: 0
CHILLS: 0
SENSORY CHANGE: 0
FALLS: 0
FLANK PAIN: 0
WEAKNESS: 1
SPUTUM PRODUCTION: 0
FOCAL WEAKNESS: 0
NAUSEA: 0

## 2021-11-19 ASSESSMENT — COGNITIVE AND FUNCTIONAL STATUS - GENERAL
TOILETING: A LITTLE
DRESSING REGULAR LOWER BODY CLOTHING: A LITTLE
SUGGESTED CMS G CODE MODIFIER DAILY ACTIVITY: CJ
DRESSING REGULAR UPPER BODY CLOTHING: A LITTLE
DAILY ACTIVITIY SCORE: 20
HELP NEEDED FOR BATHING: A LITTLE

## 2021-11-19 ASSESSMENT — LIFESTYLE VARIABLES: SUBSTANCE_ABUSE: 0

## 2021-11-19 ASSESSMENT — PAIN SCALES - GENERAL: PAIN_LEVEL: 5

## 2021-11-19 ASSESSMENT — PATIENT HEALTH QUESTIONNAIRE - PHQ9
SUM OF ALL RESPONSES TO PHQ9 QUESTIONS 1 AND 2: 0
2. FEELING DOWN, DEPRESSED, IRRITABLE, OR HOPELESS: NOT AT ALL
1. LITTLE INTEREST OR PLEASURE IN DOING THINGS: NOT AT ALL

## 2021-11-19 ASSESSMENT — PAIN DESCRIPTION - PAIN TYPE
TYPE: ACUTE PAIN
TYPE: ACUTE PAIN;SURGICAL PAIN
TYPE: SURGICAL PAIN

## 2021-11-19 NOTE — CARE PLAN
The patient is Watcher - Medium risk of patient condition declining or worsening    Shift Goals  Clinical Goals: pain control, movement  Patient Goals: pain control, sleep  Family Goals: rest    Progress made toward(s) clinical / shift goals:  patient requested less pain medication and was able to rest more today    Patient is not progressing towards the following goals: pain refused PT/OT/any ambulation      Problem: Mobility  Goal: Patient's capacity to carry out activities will improve  Outcome: Not Progressing     Problem: Self Care  Goal: Patient will have the ability to perform ADLs independently or with assistance (bathe, groom, dress, toilet and feed)  Outcome: Not Progressing

## 2021-11-19 NOTE — PROGRESS NOTES
Hospital Medicine Daily Progress Note    Date of Service  11/19/2021    Chief Complaint  Nils Alfonso is a 66 y.o. female admitted 10/15/2021 with abdominal pain    Hospital Course  Initially admitted to [facility] for evaluation of abdominal pain after recent ERCP with polypectomy and sphincterotomy and noted to have large intra-abdominal fluid collection. Multiple IR drains had been placed, but her infection worsened.  She was transferred to [facility] for escalation of her surgical needs. She underwent ex lap w I/D of multiple loculated abscesses and debridement of nec fasciitis w Dr. TS 11/5/21. Prev cx w Stenotrophomonas maltophilia, mixed yeast, E faecalis, E coli and Chryseobacterium in the blood.  ID was consulted and placed patient on Bactrim twice daily, Zosyn, micafungin end date 11/24/2021.  Patient had decreased output from drains.  Repeat CT on 11/15/2021 showed increase as a fluid collection in the right mid abdomen and slight increase in trace bilateral pleural effusions.  Patient to return to IR.     Interval Problem Update  11/16/2021: No acute overnight events.  Patient pending IR for procedural drainage today.   11/17/2021: No acute overnight events.  Large drain in RLQ had fallen out, as seen in IR.  IR saw that abdominal fluid collection is smaller and noted drain was indicated.  Patient seen by Dr. Cook this morning.  Plan for upper GI today.  11/18: No overnight events. UGI showed leak at perforation in 2-3 portion of duodenum. Seen by General Surgery. Plan for duodenal stent placement tomorrow.   11/19: Endoscope/ERCP, biliary stent, cortrak placement done yesterday with Dr. Lawrence. KUB confirmed NGT placement. Seen by Dr. Cook this morning. Per General surgery, holding PO meds/changed to IV and plan for repeat upper GI next week.     I have personally seen and examined the patient at bedside. I discussed the plan of care with patient, family, bedside RN,  and general  surgery.    Consultants/Specialty  general surgery    Code Status  Full Code    Disposition  Patient is not medically cleared.   Anticipate discharge to rehab versus home with home health.      Review of Systems  Review of Systems   Constitutional: Positive for malaise/fatigue. Negative for chills and fever.   HENT: Negative for congestion and sore throat.    Respiratory: Negative for cough, sputum production and shortness of breath.    Cardiovascular: Positive for leg swelling. Negative for chest pain, palpitations and orthopnea.   Gastrointestinal: Positive for abdominal pain and nausea. Negative for blood in stool, diarrhea and vomiting.   Genitourinary: Negative for dysuria, flank pain and urgency.   Musculoskeletal: Positive for back pain (occassional). Negative for falls.   Skin: Negative for itching and rash.   Neurological: Positive for weakness. Negative for dizziness, sensory change, focal weakness and headaches.   Psychiatric/Behavioral: Positive for depression. The patient is nervous/anxious.         Physical Exam  Temp:  [36.2 °C (97.1 °F)-36.8 °C (98.3 °F)] 36.6 °C (97.9 °F)  Pulse:  [62-73] 64  Resp:  [16-18] 18  BP: ()/(57-88) 150/72  SpO2:  [90 %-100 %] 96 %    Physical Exam  Vitals and nursing note reviewed.   Constitutional:       General: She is not in acute distress.     Appearance: Normal appearance. She is ill-appearing. She is not toxic-appearing or diaphoretic.   HENT:      Head: Normocephalic and atraumatic.      Mouth/Throat:      Mouth: Mucous membranes are moist.      Pharynx: Oropharynx is clear.   Eyes:      General: No scleral icterus.     Extraocular Movements: Extraocular movements intact.      Conjunctiva/sclera: Conjunctivae normal.   Cardiovascular:      Rate and Rhythm: Normal rate and regular rhythm.      Pulses: Normal pulses.      Heart sounds: Normal heart sounds. No murmur heard.      Pulmonary:      Effort: Pulmonary effort is normal. No respiratory distress.       Breath sounds: Normal breath sounds. No rhonchi.   Chest:      Chest wall: No tenderness.   Abdominal:      General: Abdomen is flat. Bowel sounds are normal. There is no distension.      Palpations: Abdomen is soft.      Tenderness: There is no abdominal tenderness.      Comments: Midline abdominal incision well approximated with staples.  Clean dry and intact. No erythema, edema, or drainage.    MARTHA drain with collection bag to RUQ  Collection bag to previous RLQ drain site.  Small amount of dark green/brown drainage in collection bag and MARTHA drain.    Musculoskeletal:         General: Normal range of motion.      Cervical back: Normal range of motion and neck supple.      Right lower leg: Edema present.      Left lower leg: Edema present.   Skin:     General: Skin is warm and dry.      Capillary Refill: Capillary refill takes less than 2 seconds.      Coloration: Skin is not jaundiced.      Findings: No bruising.   Neurological:      General: No focal deficit present.      Mental Status: She is alert and oriented to person, place, and time. Mental status is at baseline.   Psychiatric:         Behavior: Behavior normal.         Thought Content: Thought content normal.         Judgment: Judgment normal.      Comments: Tearful/anxious         Fluids    Intake/Output Summary (Last 24 hours) at 11/19/2021 0729  Last data filed at 11/19/2021 0458  Gross per 24 hour   Intake 500 ml   Output 1670 ml   Net -1170 ml       Laboratory  Recent Labs     11/17/21  0435   WBC 3.7*   RBC 3.18*   HEMOGLOBIN 9.0*   HEMATOCRIT 28.3*   MCV 89.0   MCH 28.3   MCHC 31.8*   RDW 46.5   PLATELETCT 186   MPV 10.6     Recent Labs     11/17/21  0435 11/18/21  0440   SODIUM 133* 134*   POTASSIUM 4.0 4.1   CHLORIDE 102 101   CO2 24 23   GLUCOSE 128* 122*   BUN 19 16   CREATININE 0.51 0.53   CALCIUM 7.8* 7.8*                   Imaging  DX-ABDOMEN FOR TUBE PLACEMENT   Final Result         1.  Nonspecific bowel gas pattern.   2.  Dobbhoff tube with  tip terminating overlying the expected location of the third or fourth duodenal segment.   3.  Hazy right lower lobe infiltrates.      DX-ABDOMEN FOR TUBE PLACEMENT   Final Result      Feeding tube extends to the region of the stomach and duodenum with tip at the level of the proximal end of the jejunum.      DX-ABDOMEN FOR TUBE PLACEMENT   Final Result      Enteric tube terminates over the duodenal jejunal junction      Covered common bile duct stent      No contrast extravasation is seen      [id number] STENT - TUBE   Final Result      Portable fluoroscopic images obtained during ERCP biliary stent placement for localization purposes.      DX-UPPER GI-SERIES WITH KUB   Final Result      Duodenal perforation at the junction of the second and third portions of duodenum as noted on key images.      CT-ABORTED CT PROCEDURE   Final Result      Interval decrease in size of the RIGHT retroperitoneal fluid collection which contains a surgical drain. Because from bowel is possible. No additional drain was placed.            CT-ABDOMEN-PELVIS WITH   Final Result      1.  Slight interval increase in size in fluid collection the right midabdomen with drain in place.      2.  Slight interval increase in size in trace bilateral pleural effusions, right greater than left with bibasilar atelectasis.      3.  Pneumobilia.      CT-ABDOMEN-PELVIS WITH   Final Result         1. The components in the gallbladder fossa and right flank of the complex fluid collection are mostly resolved. There is still a small residual fluid collection in the perinephric space surrounding the inferior right kidney. Right lower quadrant MARTHA drain    and right upper quadrant catheter are outside of this residual collection.   2. Air-fluid levels throughout the colon could relate to ileus.   3. Bilateral pleural effusions with bibasilar atelectasis.   4. Pneumobilia.      CT-ABDOMEN-PELVIS WITH   Final Result      1.  Slight interval decrease in size  of the large RIGHT peritoneal abscess which now contains 3 drains   2.  Decreased atelectasis with persistent opacity in the RIGHT lung base likely atelectasis rather than pneumonia   3.  Small LEFT renal cyst   4.  Prior cholecystectomy with ongoing pneumobilia      CT-DRAINAGE-CATH EXCHANGE   Final Result         1. Organized pancreatic pseudocyst Drainage with CT guidance.      CT-ABDOMEN-PELVIS WITH   Final Result      1.  There has been interval placement of an additional inferior lateral pigtail catheter within the complex right abdominal abscess. The other 2 pigtail catheters are stable in appearance.   2.  There has been no significant interval decrease in size of the large complex loculated abscess collection in the right abdomen.   3.  There is no new fluid collection.   4.  Gallbladder is surgically absent with continued pneumobilia.   5.  Interval decrease in the dependent pleural effusion with minimal airspace disease, likely atelectasis.      IR-DRAINAGE-CATH EXCHANGE   Final Result      1.  Successful left-sided drainage catheter removal.   2.  Successful image guided superior right drainage catheter replacement.      Plan: Suction drainage. Monitor outputs. Please contact interventional radiology if there is any concern for tube dysfunction. Suggest routine tube maintenance at 3 months intervals if the tube remains in place.         CT-DRAIN-PERITONEAL   Final Result      1.  CT GUIDED PERITONEAL RLQ abdominal CATHETER DRAINAGE.   2.  THE CURRENT PLAN IS TO MONITOR DRAINAGE OUTPUT AND OBTAIN A FOLLOWUP CT SCAN IN 5-7 DAYS IF CLINICALLY INDICATED.      CT-ABDOMEN-PELVIS WITH   Final Result         1. Grossly unchanged irregular fluid collection occupying the right abdomen surrounding the right kidney and right colon extending to midline. Additional pigtail catheter in the component about midline anterior abdomen. Both pigtail catheters seem to be    within the collection.      2. Small right pleural  effusion with right basilar atelectasis.               DX-CHEST-LIMITED (1 VIEW)   Final Result      1.  Right basilar atelectasis or consolidation. Small right pleural effusion not excluded.   2.  Atherosclerotic plaque.      CT-DRAIN-PERITONEAL   Final Result      1.  CT guided pancreatic pseudocyst catheter drainage.   2.  The current plan is to obtain a follow-up CT in 5-7 days..      IR-PICC LINE PLACEMENT W/ GUIDANCE > AGE 5   Final Result                  Ultrasound-guided PICC placement performed by qualified nursing staff as    above.          CT-ABDOMEN-PELVIS WITH    (Results Pending)        Assessment/Plan  * Bacteremia due to Gram-negative bacteria and fungemia- (present on admission)  Assessment & Plan  KALANI negative for signs of endocarditis  Cont bactrim, Zosyn, and Micafungin per ID for 4-week course with stop date of 11/24.  Repeat imaging prior to discontinuation.   Repeat Bld Cx neg      Duodenal perforation (HCC)- (present on admission)  Assessment & Plan  After ERCP with retroperitoneal abscess and bacteremia  Uncertain etiology - ddx includes pancreatitis, bile leak, iatrogenic  Pepcid BID  11/9: Concern perforation may have reopened.  Surgery team recommending NPO status and TPN  11/10:  Start octreotide.   11/17: Patient to go for upper GI today to see if leak at perforated site near ampulla.  If leaking, general surgeon to discuss with GI regarding placing a wall duodenal stent/esophageal stent  11/18: Upper GI showed duodenal leak. Plan for stent placement tomorrow.   11/19: Endoscope/ERCP, biliary stent, and cortrak placement done yesterday with Dr. Lawrence.    Continue abx, trend drain OP, KUB confirmed NTG placement- enteral feedings to be started.    Per General Surgery- hold oral medications/change to PO medications to IV at this time.     Generalized weakness  Assessment & Plan  PT/OT following  Recommending post-acute placement.  Acute rehab evaluating patient for admission.   11/15:   Overall improving.  Likely home with HH vs rehab at d/c.    Low magnesium level  Assessment & Plan  11/8 IV replacement  Resolved,  11/18: 2. continue to monitor    Hypotension  Assessment & Plan  11/6: Stop lasix; Stop ACEI  11/7 started midodrine  11/16-resolved continue to monitor    Hypophosphatemia- (present on admission)  Assessment & Plan  Resolved with TPN  Monitoring per RD  11/6 Kphos; serum goal >3  11/18: 2.7  Will continue to monitor    Hypokalemia- (present on admission)  Assessment & Plan  Periodic monitoring  11/6 K bicarb x 1  Serum goal >4.0  Will continue to monitor      Acute respiratory failure with hypoxia (HCC)- (present on admission)  Assessment & Plan  Resolved with diuresis  Likely volume overload with lower extremity edema present    Antibiotic-associated diarrhea  Assessment & Plan  Cdiff PCR negative  Loperamide PRN    Postprocedural retroperitoneal abscess- (present on admission)  Assessment & Plan  S/P Ex lap, I/D, debridement nec fasc 11/5/2021  Abx per ID  Follow cx  Pain control  N.p.o. with TPN  Antiemetics PRN  11/16/2021: CT abdomen 11/15/2021 showed increase fluid collection in the right mid abdomen and slight increase in trace bilateral pleural effusions.   Patient go to IR for procedural drainage today  11/17: RLQ drain was found to have fallen out in IR yesterday.  Fluid collection is smaller and no new drain was indicated.  Will continue to monitor      Hyponatremia- (present on admission)  Assessment & Plan  Recurrent, mild  Diuresis as tolerated    Normocytic anemia- (present on admission)  Assessment & Plan  Transfuse for Hgb <7  Avoid regular phlebotomy  Supplements per dietary: 11/6 added thiamine, 6 sm meals/d  11/14:  Iron deficiency noted.  Replacement ordered.     Sepsis due to intraabdominal fluid collection/abscess, bacteremia and fungemia (HCC)- (present on admission)  Assessment & Plan  Sepsis resolved  Source intra-abdominal  Zosyn/Mycafungin/Bactrim DS per  infectious disease end date 11/24/2021  ID signed off  Re-imaging per ID, IR, and Surgery  S/P Ex lap w I/D and debridement nec fasc 11/5/2021 11/8 Repeat CT. Updated surgical team re: perinephric fluid collection. Further CT may be considered with IV and Oral contrast if needed.  11/9:  She has remained afebrile over last 48 hours.  VSS.  Does not appear to be septic at this time.  Continue antibx.   11/19/2021:  POD#1 sp biliary stent placement. Hold PO meds/change to IV to general surgery. Bactrim changed to IV.     Hyperlipidemia- (present on admission)  Assessment & Plan  Continue ezetimibe    Primary hypertension- (present on admission)  Assessment & Plan  Hypotensive 11/6 - stop lisinopril         VTE prophylaxis: enoxaparin ppx    I have performed a physical exam and reviewed and updated ROS and Plan today (11/19/2021). In review of yesterday's note (11/18/2021), there are no changes except as documented above.

## 2021-11-19 NOTE — PROCEDURES
Endoscope/Endoscopic Retrograde Cholangiopancreatography    Date of Procedure:  11/18/2021    Attending Physician: Migel Lawrence MD  Indications: Possible duodenal perforation      Instrument: Olympus Flexible Sideviewing Endoscope  Sedation:   Surgeon(s):  Migel Lawrence M.D.    Anesthesiologist/Type of Anesthesia:  Anesthesiologist: Skip Barrios M.D./General    Surgical Staff:  Endoscopy Technician: Zaheer Underwood; Katelin King  Radiology Technologist: Danielito Shay  Endoscopy Nurse: Mariella Solorio R.N.; Marcos Escobar R.N.    Pre-Anesthesia Assessment:  Prior to the procedure, a History and Physical was performed, and patient medications and allergies were reviewed. The patient’s tolerance of previous anesthesia was also reviewed. The risks and benefits of the procedure and the sedation options and risks were discussed with the patient including but not limited to infection, bleeding, aspiration, perforation, adverse medication reaction, missed diagnosis, missed lesions, and pancreatitis. The patient verbalized understanding. All questions were answered, and informed consent was obtained      After I obtained informed consent from the patient, the patient was placed in the prone/swimmer position. Appropriate time-out protocol was followed: the correct patient, the correct procedure, and the correct equipment in the room were confirmed. Throughout the procedure, the patient’s blood pressure, pulse, and oxygen saturations were monitored continuously. The Olymus flexible sideviewing duodenoscope was inserted through the oropharynx, esophagus intubated, then advanced to the gastrointestinal tract to the major papilla. The duct(s) were cannulated and contrast was injected I personally interpreted the ductal images.  Findings and interventions were performed and documented below. Air was then withdrawn and the duodenoscope was removed. The patient tolerated the procedure well. There were no immediate postoperative  complications    Findings:     film demonstrated right upper quadrant drains.  Forward-viewing therapeutic scope was inserted to the second portion of duodenum without difficulty.  Gastric anatomy appears gastric sleeve with tortuosity.  Scope was inserted to release third portion of duodenum with slow withdraw examining forward view of mucosa without mucosal rent or tear noted.  Comparison film of upper GI series was conducted and close examination at the second and third portion of duodenum was performed.  This appears to be location of the ampulla.  Visualization ampulla was difficult with the forward-viewing endoscope.  There appears to be slight mucosal edema in this area with redundant fold.  Scope was then withdrawn.  Utilizing a side-viewing duodenoscope,  scope was inserted to the level of the ampulla.  This was the same location compared to the upper GI series.  Utilizing a sphincterotome the edematous fold were moved aside.  An orifice was noted.  Cannulation with a 0.025 wire sphincterotome into this orifice and wire appears to track in the direction expected of the biliary tree.  Wire was advanced into the intrahepatic duct.  Test cholangiogram demonstrated a dilated duct with no obvious extravasation contrast.  There was reflux of CO2 into the cholangiogram.  Balloon sweeps were performed as well as occlusion cholangiogram demonstrated no obvious leak that can be appreciated.  Equipment was then removed.  There appears to be an orifice at the 2 o'clock position facing the biliary orifice.  Unclear if this is the pancreatic duct.  Attempt to cannulate this area was unsuccessful.  Please note during the cholangiogram there appears to be filling of the pancreatic duct as well this was rapidly drained by itself.  Due to concern for bile duct trauma/perforation as a cause of patient's leak a fully cover [employer] 10mm x 8cm metal stent was placed into the biliary duct with a least 3 interstices  into the duodenum lumen.  Due to no obvious defect noted and off label esophageal stent was not placed.  Subsequently a nasal intestinal feeding tube was placed into the right naris and passed to the fourth portion of duodenum and under fluoroscopic guidance confirmed to be in the appropriate location.     Impressions:   1.  Etiology of upper GI series showing perforation is unclear  2.  Generous and patent biliary orifice from sphincterotomy within a duodenal diverticulum without obvious cristóbal contrast extravasation; bile duct stented with 10mm x 8cm fully covered metal biliary stent  3.  Orifice adjacent to the biliary orifice unclear if this is the pancreatic duct opening. Difficult to visualize  4. Placement of 10F x 140cm  nasoenteric tube       Recommendations:   1.  Continue management on the floor with broad-spectrum antibiotics  2.  Trend drain output  3. Can use Nasal enteric tube for feeding once confirmed by KUB tomorrow  4.  Monitor for postprocedure complication including perforation bleeding pancreatitis        NOTE: Radiologic interpretation of dynamic and static fluoroscopic imaging by myself.  At no time was/were a Radiologist present.     This note was generated using voice recognition software which has a small chance of producing errors of grammar and possibly content. I have made every reasonable attempt to find and correct any obvious errors, but expect that some may not be found prior to finalization of this note

## 2021-11-19 NOTE — PROGRESS NOTES
A&ox 4. O2 on RA.  Stress incontinence; purewick in place   Ostomy in place +output  + BM, + void.  Denies pain/SOB/numbness or tingling/N/V.  NPO.  Midline incision along with 3 drain bags and 1 MARTHA drain.  SCDs on to BLE.

## 2021-11-19 NOTE — OR NURSING
1620 Report received from Jada SANON. Patient sleeping.      1625 MD at bedside to discuss procedure with patient.     1627 Subsequent dose of fentanyl giver per MAR.     1635 Xray at bedside.     1640 Report given to Alona SANON. Pt transferred to [facility] via [facility].     1655-Pt gagging. Cortrek appears to have moved. Dr. Lawrence notified. Per MD, RN to pull back on cortrek and order repeat Xray for tube placement. Cortrek pulled to 85 at the right nostril. Xray order placed.    1730 Voided x1 in bedpan     1734 Rn updated  Nina SANON on floor.     1745 Pt transferred to [facility] via [facility] in stable condition.

## 2021-11-19 NOTE — PROGRESS NOTES
Received report from previous shift RN  Assessment complete.  A&O x 4. Patient calls appropriately.  Patient ambulates with max assist. Bed alarm off.   Patient has 7/10 pain. Pain managed with prescribed medications.  - Emesis, + nausea. Tolerating NPO diet, tube feed via cortrak to begin tonight.  Surgical sites clean, dry, intact. Wound team and RN changed all wound appliances.  + void, + flatus, + BM.  Patient denies SOB.  SCD's on.  Patient  at bedside.  Review plan with of care with patient. Call light and personal belongings with in reach. Hourly rounding in place. All needs met at this time.

## 2021-11-19 NOTE — THERAPY
"Missed Therapy     Patient Name: Nils Alfonso  Age:  66 y.o., Sex:  female  Medical Record #: [id number]  Today's Date: 11/19/2021    Discussed missed therapy with RN.       11/19/21 1520   Interdisciplinary Plan of Care Collaboration   Collaboration Comments Attempted OT tx. Pt refused stating needing to rest following yesterday's surgery. Ongoing education on pathology of bedrest, importance of mobilization. Pt states \"\"My  is coming with my robe. Then I'll get up and walk\". Agreed to attempt later same day once robe present.      "

## 2021-11-19 NOTE — ANESTHESIA POSTPROCEDURE EVALUATION
Patient: Nils Alfonso    Procedure Summary     Date: 11/18/21 Room / Location: [facility] ROOM 26 / SURGERY SAME DAY [facility]    Anesthesia Start: 1428 Anesthesia Stop: 1556    Procedures:       GASTROSCOPY with stent placement (N/A Esophagus)      ERCP (ENDOSCOPIC RETROGRADE CHOLANGIOPANCREATOGRAPHY) (N/A Esophagus)      GASTROSCOPY, WITH FEEDING TUBE INSERTION (N/A Esophagus)      INSERTION, STENT, BILE DUCT (N/A Esophagus) Diagnosis: (Duodenal Perforation)    Surgeons: Migel Lawrence M.D. Responsible Provider: Skip Barrios M.D.    Anesthesia Type: general ASA Status: 2          Final Anesthesia Type: general  Last vitals  BP   Blood Pressure : 138/77    Temp   37.7 °C (99.9 °F)    Pulse   63   Resp   18    SpO2   92 %      Anesthesia Post Evaluation    Patient location during evaluation: PACU  Patient participation: complete - patient participated  Level of consciousness: awake and alert  Pain score: 5    Airway patency: patent  Anesthetic complications: no  Cardiovascular status: hemodynamically stable  Respiratory status: acceptable  Hydration status: euvolemic    PONV: none          No complications documented.

## 2021-11-19 NOTE — PROGRESS NOTES
Gastroenterology Progress Note               Author:  ADRY Jones Date & Time Created: 11/19/2021 9:23 AM       Patient ID:  Name:             Nils Aflonso  YOB: 1955  Age:                 66 y.o.  female  MRN:               [id number]      Referring Provider:  Ana Luisa Shearer MD      Presenting Chief Complaint:  Abdominal pain      History of Present Illness:    10/10/2021 HPI  66-year-old female PMH recurrent idiopathic pancreatitis s/p ERCP with sphincterotomy October 1, 2021 complicated by ERCP pancreatitis, sleeve gastrectomy, dyslipidemia, hypertension, osteoarthritis of the spine status post lumbar fusion who was admitted to the hospital 10/8/2021 with worsening right lower quadrant pain over the past week.  Ever since her ERCP October 1, 2021, she has been experiencing pain, intermittent subjective fevers, nausea.  In the emergency room-labs: CBC: WBC 17.8..  Sodium 130.  LFTs-WNL.  CT scan abdomen/pelvis-large irregular fluid collection with thick wall occupying most of the right abdomen surrounding the right kidney and colon.  Severe wall thickening of the descending, transverse colon, second portion of the duodenum likely reactive.  Pneumobilia with gas in the CBD.  Drain placement by IR was performed.  Currently, the abdominal pain has improved.  No vomiting.  Started on ceftriaxone and metronidazole IV.     ERCP October 1, 2021-common bile duct-dilated down to the level of the ampulla.  4 mm polyp at the distal duct seen after sphincterotomy.  8 mm sphincterotomy.  Negative for stone.  Bile duct polyp-benign with inflammatory and hyperplastic changes.  No evidence of epithelial dysplasia/neoplasia.       Interval History:  10/11/2021: interval HIDA scan showed no bile leak.  This morning she feels more comfortable, describing minimal abdominal pain but denying nausea vomiting and fevers.  She has been able to eat a little bit more and has full liquids beside her bed.  She  expresses concern about being on losartan which she says was prescribed outpatient as as needed for high blood pressures.  She also states she has not passed a bowel movement in the week which she describes not eating much.     10/12/2021 - No events overnight.  Tolerating diet without nausea or vomiting.  Had spontaneous, formed, brown bowel movement this morning.  Abdominal pain improving.  Feeling weak, but better each day.  Leukocytosis improving.  States that she is on hydrocodone at home, managed by pain management, and asks that her current hospital pain meds be changed.     10/13/2021: Stable, no new events.  Drain output is minimal, but according to patient bedside nursing is not flushing the drain.  Patient is n.p.o. for planned CT today to reevaluate fluid collection.  Leukocytosis continues to improve.  Pain has improved greatly and patient has not taken the pain medication in the last 24 hours according to her recollection.  She is having regular bowel movements without assistance of laxatives.  Patient is very hungry.     10/14/2021 - No events overnight.  Abdominal pain controlled now.  Had nausea and vomiting yesterday with solid food, but tolerating bland diet.  Blood cultures from 10/8 positive for Chryseobacterium and Candida glabrata - Pharmacy and ID aware.  Micafungin started.  CT 10/13 showing extensive multiloculated rim-enhancing air and fluid collection/abscess within the right abdomen is not significantly changed in size from the prior study. The percutaneous pigtail drainage catheter appears well-positioned within the collection.    10/15/2021: Patient transferred to [facility] overnight per surgery recommendations    10/16/2021: Drain output clearing up slightly. Dr. Dumont with surgery has recommended second IR drain to be placed. Initially interventional radiology did not think that this would be helpful for the patient, but after further discussion this is  the plan going forward. Patient is n.p.o. with plans for TPN. WBCs normalized.    10/21/21: Abdominal pain slowly improving. Pain is worse with movement. MARTHA drains purulent. No vomiting. Plan for CT scan to re-evaluate fluid collection. Tolerating FLD.     10/25/2021 Abd pian controlled. Getting IR drain today. No questions.    11/18/2021: Called back by Dr. Dumont due to persistent duodenal leak on Upper GI series. Since we last saw her she attempted to be managed conservatively with IR drains without improvement, then underwent ex lap with debridement of abscess and necrotizing fascitis. Then placement of IR drain on 11/16 due to persistent fluid collection on CT. Upper GI series with findings of perforation at junction of 2nd and 3rd portion of the duodenum.    11/18/2021: EGD/ERCP/Cortrack placement   Impressions:   1.  Etiology of upper GI series showing perforation is unclear  2.  Generous and patent biliary orifice from sphincterotomy within a duodenal diverticulum without obvious cristóbal contrast extravasation; bile duct stented with 10mm x 8cm fully covered metal biliary stent  3.  Orifice adjacent to the biliary orifice unclear if this is the pancreatic duct opening. Difficult to visualize  4. Placement of 10F x 140cm  nasoenteric tube     11/19/2021: There were no acute events overnight.  The patient has remained afebrile and hemodynamically stable.  She continues to have diffuse abdominal pain which is unchanged from prior to her procedure yesterday.  She continues to have drainage from the 2 abdominal opening/wounds.  She denies nausea/vomiting but continues to have fatigue and weakness.      Review of Systems:  Review of Systems   Constitutional: Positive for malaise/fatigue. Negative for chills and fever.   Respiratory: Negative for cough, shortness of breath and wheezing.    Cardiovascular: Negative for chest pain and leg swelling.   Gastrointestinal: Positive for abdominal pain. Negative for  constipation, melena, nausea and vomiting.   Genitourinary: Negative for dysuria and urgency.   Musculoskeletal: Negative for falls and myalgias.   Skin: Negative for itching and rash.   Neurological: Positive for weakness. Negative for focal weakness and headaches.   Psychiatric/Behavioral: Negative for memory loss and substance abuse.             Past Medical History:  Past Medical History:   Diagnosis Date   • Allergy, unspecified not elsewhere classified    • Anemia     not currently   • Anesthesia     PONV (Demerol/ Dilaudid)   • Arthritis     bilateral shoulders,sacrum, osteo   • Backpain     coccyx and sacrum   • Bronchitis 2010   • Cataract     bilateral IOLI   • Degeneration of cervical intervertebral disc     C5-6,C6-7   • Dental disorder     partial upper and lower   • Heart burn    • Hiatus hernia syndrome     not a problem after gastric sleeve   • High cholesterol     resolved after gastric surgery   • Hyperlipidemia    • Hypertension     off all medication, reveresed after gastric sleeve   • Muscle disorder    • Pain 05/16/2018    low back and SI joints and sacrum   • Reactive airway disease     rescue inhaler not needed unless with bronchitis     Active Hospital Problems    Diagnosis    • Generalized weakness [R53.1]    • Low magnesium level [R79.0]    • Hypotension [I95.9]    • Hypophosphatemia [E83.39]    • Hypokalemia [E87.6]    • Acute respiratory failure with hypoxia (HCC) [J96.01]    • Duodenal perforation (HCC) [K63.1]    • Postprocedural retroperitoneal abscess [K68.11]    • Antibiotic-associated diarrhea [K52.1, T36.95XA]    • Bacteremia due to Gram-negative bacteria and fungemia [R78.81]    • Hyponatremia [E87.1]    • Normocytic anemia [D64.9]    • Sepsis due to intraabdominal fluid collection/abscess, bacteremia and fungemia (HCC) [A41.9]    • Hyperlipidemia [E78.5]    • Primary hypertension [I10]          Past Surgical History:  Past Surgical History:   Procedure Laterality Date   • PB  UPPER GI ENDOSCOPY,DIAGNOSIS N/A 11/18/2021    Procedure: GASTROSCOPY with stent placement;  Surgeon: Migel Lawrence M.D.;  Location: SURGERY SAME DAY [facility];  Service: Gastroenterology   • PB ERCP,DIAGNOSTIC N/A 11/18/2021    Procedure: ERCP (ENDOSCOPIC RETROGRADE CHOLANGIOPANCREATOGRAPHY);  Surgeon: Migel Lawrence M.D.;  Location: SURGERY SAME DAY [facility];  Service: Gastroenterology   • PB PLACE PERCUT GASTROSTOMY TUBE N/A 11/18/2021    Procedure: GASTROSCOPY, WITH FEEDING TUBE INSERTION;  Surgeon: Migel Lawrence M.D.;  Location: SURGERY SAME DAY [facility];  Service: Gastroenterology   • BILIARY STENT PLACEMENT N/A 11/18/2021    Procedure: INSERTION, STENT, BILE DUCT;  Surgeon: Migel Lawrence M.D.;  Location: SURGERY SAME DAY [facility];  Service: Gastroenterology   • PB EXPLORATORY OF ABDOMEN  11/5/2021    Procedure: LAPAROTOMY, EXPLORATORY;  Surgeon: Jaden Cook M.D.;  Location: [facility];  Service: General   • PB ULTRASONIC GUIDANCE, INTRAOPERATIVE  11/5/2021    Procedure: ULTRASOUND GUIDANCE;  Surgeon: Jaden Cook M.D.;  Location: [facility];  Service: General   • ABDOMINAL ABSCESS DRAINAGE  11/5/2021    Procedure: DRAINAGE, ABSCESS, ABDOMEN - PERITONEAL AND RETROPERITONEAL;  Surgeon: Jaden Cook M.D.;  Location: [facility];  Service: General   • PB ERCP,DIAGNOSTIC  10/1/2021    Procedure: ERCP (ENDOSCOPIC RETROGRADE CHOLANGIOPANCREATOGRAPHY);  Surgeon: Fausto Casas M.D.;  Location: [facility];  Service: Gastroenterology   • COLONOSCOPY  8/8/2018    Procedure: COLONOSCOPY;  Surgeon: Shukri Condon M.D.;  Location: [facility];  Service: General   • GASTROSCOPY  5/23/2018    Procedure: GASTROSCOPY;  Surgeon: Fausto Casas M.D.;  Location: [facility];  Service: Gastroenterology   • EGD W/ENDOSCOPIC ULTRASOUND  5/23/2018    Procedure: EGD W/ENDOSCOPIC ULTRASOUND- RADIAL UPPER;  Surgeon: Fausto MARTINES  SHARATH Casas;  Location: SURGERY [facility];  Service: Gastroenterology   • CATARACT PHACO WITH IOL Left 4/18/2017    Procedure: CATARACT PHACO WITH IOL;  Surgeon: Stuart Estevez M.D.;  Location: SURGERY SAME DAY [facility];  Service:    • CATARACT PHACO WITH IOL Right 4/4/2017    Procedure: CATARACT PHACO WITH IOL;  Surgeon: Stuart Estevez M.D.;  Location: SURGERY [facility];  Service:    • GASTRIC SLEEVE LAPAROSCOPY  10/19/2016    Procedure: GASTRIC SLEEVE LAPAROSCOPY, HIATAL HERNIA;  Surgeon: Shukri Condon M.D.;  Location: SURGERY [facility];  Service:    • LIVER BIOPSY LAPAROSCOPIC  10/19/2016    Procedure: LIVER BIOPSY LAPAROSCOPIC;  Surgeon: Shukri Condon M.D.;  Location: SURGERY [facility];  Service:    • GASTROSCOPY N/A 8/26/2016    Procedure: GASTROSCOPY;  Surgeon: Shukri Condon M.D.;  Location: SURGERY [facility];  Service:    • ROTATOR CUFF REPAIR Right 7/7/2016   • ROTATOR CUFF REPAIR Left 5/2015   • HYSTERECTOMY ROBOTIC  1/2/2009    Performed by MEG VILLEGAS at SURGERY [facility]   • LUMBAR FUSION ANTERIOR  2007    Dr Hillman, L3-S1 fusion   • CHOLECYSTECTOMY  1996    laparoscopic   • TUBAL LIGATION  1985   • GASTRIC RESECTION  1982    gastric stapling   • TONSILLECTOMY AND ADENOIDECTOMY  1967   • PRIMARY C SECTION  1976, 1979, 1985    x3           Hospital Medications:  Current Facility-Administered Medications   Medication Dose Frequency Provider Last Rate Last Admin   • sulfamethoxazole-trimethoprim (SEPTRA) 160 mg of trimethoprim in dextrose 5% 250 mL IVPB  160 mg of trimethoprim Q12HRS Urszula Mary, A.P.R.N.       • TPN Central Line Formulation   TPN DAILY Mark Johnson A.P.R.N. 60 mL/hr at 11/18/21 2055 New Bag at 11/18/21 2055   • [Held by provider] oxyCODONE CR (OXYCONTIN) tablet 10 mg  10 mg Q12HRS HERBERT LaraOUriel   10 mg at 11/13/21 1750   • octreotide (SANDOSTATIN) injection 100 mcg  100 mcg BID Jaden Cook M.D.   100 mcg at  11/18/21 2053   • MD Alert...TPN per Pharmacy   PHARMACY TO DOSE Jaden Cook M.D.       • [Held by provider] thiamine (Vitamin B-1) tablet 100 mg  100 mg DAILY Lupillo Beltre A.P.N.   100 mg at 11/19/21 0503   • ondansetron (ZOFRAN) syringe/vial injection 4 mg  4 mg Q4HRS PRN Tomi Ledezma M.D.   4 mg at 11/19/21 0503   • enoxaparin (LOVENOX) inj 40 mg  40 mg DAILY Jaden Cook M.D.   40 mg at 11/18/21 1006   • HYDROmorphone (Dilaudid) injection 0.5 mg  0.5 mg Q4HRS PRN Tomi Ledezma M.D.   0.5 mg at 11/13/21 0544   • piperacillin-tazobactam (ZOSYN) 3.375 g in  mL IVPB  3.375 g Q8HRS Tamra Mcfarland M.D. 25 mL/hr at 11/19/21 0503 3.375 g at 11/19/21 0503   • ondansetron (ZOFRAN ODT) dispertab 4 mg  4 mg Q4HRS PRN Elizabeth Segundo M.D.   4 mg at 11/12/21 0438   • [Held by provider] ezetimibe (ZETIA) tablet 10 mg  10 mg Q EVENING Elizabeth Segundo M.D.   10 mg at 11/18/21 1823   • [Held by provider] gabapentin (NEURONTIN) capsule 600 mg  600 mg BID Elizabeth Segundo M.D.   600 mg at 11/19/21 0503   • micafungin (MYCAMINE) 100 mg in  mL ivpb  100 mg Q24HRS Tamra Mcfarland M.D.   Stopped at 11/18/21 1106   • [Held by provider] vitamin D3 (cholecalciferol) tablet 1,000 Units  1,000 Units DAILY Elizabeth Segundo M.D.   1,000 Units at 11/19/21 0503   Last reviewed on 11/5/2021  8:26 AM by Cuca L. Bagshaw, R.N.        Current Outpatient Medications:  Medications Prior to Admission   Medication Sig Dispense Refill Last Dose   • cyclobenzaprine (FLEXERIL) 10 mg Tab Take 10 mg by mouth every evening.   9/30/2021 at [facility]   • ezetimibe (ZETIA) 10 MG Tab Take 10 mg by mouth every evening.   9/30/2021 at [facility]   • Magnesium 400 MG Tab Take 400 mg by mouth every evening.   9/30/2021 at [facility]   • gabapentin (NEURONTIN) 300 MG Cap Take 600 mg by mouth 2 Times a Day.   9/30/2021 at [facility]   • BIOTIN PO Take 1 Tablet by mouth every day.   9/30/2021 at [facility]   • HYDROcodone/acetaminophen (NORCO)  MG Tab  "Take 0.5 Tablets by mouth every 6 hours as needed for Moderate Pain.   10/8/2021 at [facility]   • Cholecalciferol (VITAMIN D3 PO) Take 1 Each by mouth every day.   2021 at [facility]   • ondansetron (ZOFRAN ODT) 4 MG TABLET DISPERSIBLE Take 4 mg by mouth every 6 hours as needed for Nausea.   10/8/2021 at [facility]         Medication Allergies:  Allergies   Allergen Reactions   • Ampicillin Rash     Rash  Tolerates Zosyn 10/21   • Cephalexin Diarrhea, Vomiting and Nausea     .   • Clindamycin Vomiting     \"cleocin\"   • Codeine      Severe stomach pain, cramps, spasms   • Demerol Vomiting   • Levofloxacin Unspecified     Numbness     • Tetracyclines Rash     .   • Tizanidine Itching   • Morphine Vomiting   • Pcn [Penicillins] Itching     Tolerates Zosyn 10/21   • Sulfa Drugs Itching         Family Medical History:  Family History   Problem Relation Age of Onset   • Stroke Mother    • Cancer Father         larynx   • Diabetes Brother          Social History:  Social History     Socioeconomic History   • Marital status:      Spouse name: Not on file   • Number of children: Not on file   • Years of education: Not on file   • Highest education level: Not on file   Occupational History   • Not on file   Tobacco Use   • Smoking status: Former Smoker     Packs/day: 0.25     Years: 0.10     Pack years: 0.02     Types: Cigarettes     Quit date: 1973     Years since quittin.9   • Smokeless tobacco: Never Used   Vaping Use   • Vaping Use: Never used   Substance and Sexual Activity   • Alcohol use: No   • Drug use: No   • Sexual activity: Not on file     Comment:    Other Topics Concern   • Not on file   Social History Narrative   • Not on file     Social Determinants of Health     Financial Resource Strain:    • Difficulty of Paying Living Expenses: Not on file   Food Insecurity:    • Worried About Running Out of Food in the Last Year: Not on file   • Ran Out of Food in the Last Year: Not on file   Transportation Needs: " "   • Lack of Transportation (Medical): Not on file   • Lack of Transportation (Non-Medical): Not on file   Physical Activity:    • Days of Exercise per Week: Not on file   • Minutes of Exercise per Session: Not on file   Stress:    • Feeling of Stress : Not on file   Social Connections:    • Frequency of Communication with Friends and Family: Not on file   • Frequency of Social Gatherings with Friends and Family: Not on file   • Attends [employer] Services: Not on file   • Active Member of Clubs or Organizations: Not on file   • Attends Club or Organization Meetings: Not on file   • Marital Status: Not on file   Intimate Partner Violence:    • Fear of Current or Ex-Partner: Not on file   • Emotionally Abused: Not on file   • Physically Abused: Not on file   • Sexually Abused: Not on file   Housing Stability:    • Unable to Pay for Housing in the Last Year: Not on file   • Number of Places Lived in the Last Year: Not on file   • Unstable Housing in the Last Year: Not on file         Vital signs:  Weight/BMI: Body mass index is 28.13 kg/m².  /73   Pulse 67   Temp 36.1 °C (96.9 °F) (Temporal)   Resp 17   Ht 1.6 m (5' 2.99\")   Wt 72 kg (158 lb 11.7 oz)   SpO2 94%   Vitals:    11/18/21 1935 11/18/21 2320 11/19/21 0458 11/19/21 0755   BP: 126/70 110/67 150/72 141/73   Pulse: 65 62 64 67   Resp: 18 18 18 17   Temp: 36.7 °C (98.1 °F) 36.2 °C (97.1 °F) 36.6 °C (97.9 °F) 36.1 °C (96.9 °F)   TempSrc: Temporal Temporal Temporal Temporal   SpO2: 95% 94% 96% 94%   Weight:       Height:         Oxygen Therapy:  Pulse Oximetry: 94 %, O2 (LPM): 0, O2 Delivery Device: None - Room Air    Intake/Output Summary (Last 24 hours) at 11/19/2021 0923  Last data filed at 11/19/2021 0755  Gross per 24 hour   Intake 500 ml   Output 1500 ml   Net -1000 ml         Physical Exam:  Physical Exam  Vitals and nursing note reviewed.   Constitutional:       Appearance: She is ill-appearing.   HENT:      Head: Normocephalic and atraumatic.    "   Right Ear: External ear normal.      Left Ear: External ear normal.      Nose: Nose normal.      Mouth/Throat:      Mouth: Mucous membranes are dry.   Cardiovascular:      Rate and Rhythm: Normal rate and regular rhythm.      Pulses: Normal pulses.      Heart sounds: Normal heart sounds.   Pulmonary:      Effort: Pulmonary effort is normal.      Breath sounds: Normal breath sounds.      Comments: Decreased breath sounds in bases  Abdominal:      Tenderness: There is abdominal tenderness (RUQ).      Comments: Right sided drain and ostomy appliances to skin over sites that are draining dark brown liquid   Neurological:      General: No focal deficit present.      Mental Status: She is alert and oriented to person, place, and time.   Psychiatric:         Thought Content: Thought content normal.         Judgment: Judgment normal.             Labs:  Recent Labs     11/17/21 0435 11/18/21 0440   SODIUM 133* 134*   POTASSIUM 4.0 4.1   CHLORIDE 102 101   CO2 24 23   BUN 19 16   CREATININE 0.51 0.53   MAGNESIUM  --  2.0   PHOSPHORUS  --  2.7   CALCIUM 7.8* 7.8*     Recent Labs     11/17/21 0435 11/18/21 0440   ALTSGPT 28 32   ASTSGOT 34 35   ALKPHOSPHAT 82 88   TBILIRUBIN 0.2 0.2   GLUCOSE 128* 122*     Recent Labs     11/17/21 0435 11/18/21 0440   WBC 3.7*  --    NEUTSPOLYS 78.80*  --    LYMPHOCYTES 12.00*  --    MONOCYTES 4.10  --    EOSINOPHILS 0.50  --    BASOPHILS 0.80  --    ASTSGOT 34 35   ALTSGPT 28 32   ALKPHOSPHAT 82 88   TBILIRUBIN 0.2 0.2     Recent Labs     11/17/21 0435   RBC 3.18*   HEMOGLOBIN 9.0*   HEMATOCRIT 28.3*   PLATELETCT 186     No results found for this or any previous visit (from the past 24 hour(s)).      Radiology Review:  DX-ABDOMEN FOR TUBE PLACEMENT   Final Result         1.  Nonspecific bowel gas pattern.   2.  Dobbhoff tube with tip terminating overlying the expected location of the third or fourth duodenal segment.   3.  Hazy right lower lobe infiltrates.      DX-ABDOMEN FOR TUBE  PLACEMENT   Final Result      Feeding tube extends to the region of the stomach and duodenum with tip at the level of the proximal end of the jejunum.      DX-ABDOMEN FOR TUBE PLACEMENT   Final Result      Enteric tube terminates over the duodenal jejunal junction      Covered common bile duct stent      No contrast extravasation is seen      [id number] STENT - TUBE   Final Result      Portable fluoroscopic images obtained during ERCP biliary stent placement for localization purposes.      DX-UPPER GI-SERIES WITH KUB   Final Result      Duodenal perforation at the junction of the second and third portions of duodenum as noted on key images.      CT-ABORTED CT PROCEDURE   Final Result      Interval decrease in size of the RIGHT retroperitoneal fluid collection which contains a surgical drain. Because from bowel is possible. No additional drain was placed.            CT-ABDOMEN-PELVIS WITH   Final Result      1.  Slight interval increase in size in fluid collection the right midabdomen with drain in place.      2.  Slight interval increase in size in trace bilateral pleural effusions, right greater than left with bibasilar atelectasis.      3.  Pneumobilia.      CT-ABDOMEN-PELVIS WITH   Final Result         1. The components in the gallbladder fossa and right flank of the complex fluid collection are mostly resolved. There is still a small residual fluid collection in the perinephric space surrounding the inferior right kidney. Right lower quadrant MARTHA drain    and right upper quadrant catheter are outside of this residual collection.   2. Air-fluid levels throughout the colon could relate to ileus.   3. Bilateral pleural effusions with bibasilar atelectasis.   4. Pneumobilia.      CT-ABDOMEN-PELVIS WITH   Final Result      1.  Slight interval decrease in size of the large RIGHT peritoneal abscess which now contains 3 drains   2.  Decreased atelectasis with persistent opacity in the RIGHT lung base likely  atelectasis rather than pneumonia   3.  Small LEFT renal cyst   4.  Prior cholecystectomy with ongoing pneumobilia      CT-DRAINAGE-CATH EXCHANGE   Final Result         1. Organized pancreatic pseudocyst Drainage with CT guidance.      CT-ABDOMEN-PELVIS WITH   Final Result      1.  There has been interval placement of an additional inferior lateral pigtail catheter within the complex right abdominal abscess. The other 2 pigtail catheters are stable in appearance.   2.  There has been no significant interval decrease in size of the large complex loculated abscess collection in the right abdomen.   3.  There is no new fluid collection.   4.  Gallbladder is surgically absent with continued pneumobilia.   5.  Interval decrease in the dependent pleural effusion with minimal airspace disease, likely atelectasis.      IR-DRAINAGE-CATH EXCHANGE   Final Result      1.  Successful left-sided drainage catheter removal.   2.  Successful image guided superior right drainage catheter replacement.      Plan: Suction drainage. Monitor outputs. Please contact interventional radiology if there is any concern for tube dysfunction. Suggest routine tube maintenance at 3 months intervals if the tube remains in place.         CT-DRAIN-PERITONEAL   Final Result      1.  CT GUIDED PERITONEAL RLQ abdominal CATHETER DRAINAGE.   2.  THE CURRENT PLAN IS TO MONITOR DRAINAGE OUTPUT AND OBTAIN A FOLLOWUP CT SCAN IN 5-7 DAYS IF CLINICALLY INDICATED.      CT-ABDOMEN-PELVIS WITH   Final Result         1. Grossly unchanged irregular fluid collection occupying the right abdomen surrounding the right kidney and right colon extending to midline. Additional pigtail catheter in the component about midline anterior abdomen. Both pigtail catheters seem to be    within the collection.      2. Small right pleural effusion with right basilar atelectasis.               DX-CHEST-LIMITED (1 VIEW)   Final Result      1.  Right basilar atelectasis or consolidation.  Small right pleural effusion not excluded.   2.  Atherosclerotic plaque.      CT-DRAIN-PERITONEAL   Final Result      1.  CT guided pancreatic pseudocyst catheter drainage.   2.  The current plan is to obtain a follow-up CT in 5-7 days..      IR-PICC LINE PLACEMENT W/ GUIDANCE > AGE 5   Final Result                  Ultrasound-guided PICC placement performed by qualified nursing staff as    above.          CT-ABDOMEN-PELVIS WITH    (Results Pending)         MDM (Data Review):   -Records reviewed and summarized in current documentation  -I personally reviewed and interpreted the laboratory results  -I personally reviewed the radiology images        Medical Decision Making, by Problem:  Active Hospital Problems    Diagnosis    • Bacteremia due to Gram-negative bacteria and fungemia [R78.81]    • Sepsis due to intraabdominal fluid collection/abscess (HCC) [A41.9]    • Hyperlipidemia [E78.5]    • Hypertension [I10]        66-year-old female with a history of recurrent idiopathic pancreatitis s/p ERCP with biliary sphincterotomy and biopsy of a distal BD polyp on October 1, 2021.  Postop complications-pancreatitis.  Ever since then, complaints of subjective fevers, progressive abdominal pain, nausea/vomiting.  Began to have worsening right lower quadrant pain over the past 5 days.  CT scan abdomen/pelvis large irregular fluid collection with thick wall occupying most of the right abdomen surrounding right kidney and right colon.  Additional fluid and gas collection in the midline abdomen anterior to the pancreas concerning for abscess.  Severe wall thickening of the descending and transverse colon, second portion of duodenum likely reactive.  Pneumobilia with gas in the CBD, intrahepatic bile ducts as well as pancreatic ducts (likely secondary to recent ERCP with sphincterotomy).  IR placed a drain with improvement in abdominal pain initially, but then drain output slowed to no output. Nursing was discovered to have have  flushed the drain for at least 2 days. After flush by bedside nurse, reportedly 300 cc of fluid came out. Fluid bilirubin 0.6. Fluid amylase >7500. Blood cultures from 10/8 positive for Chryseobacterium and Candida glabrata. ID following. Repeat CT with no change in fluid collection and new pelvic fluid collection/abscess. Drain placement ineffective. Patient underwent exlap with debridement of retroperitoneal abscess and necrotizing fascitis on 11/5 by Dr. Dumont. Repeat IR drain on 11/16 and now with recurrent duodenal leak.         Assessment/Recommendations:  ASSESSMENT:  1.  Sepsis due to intra-abdominal fluid collection/abscess-Duodenal perforation near ampulla from previous ERCP. S/P ERCP with placement of 10mm x 8cm fully covered metal stent in the bile duct  2.  History of pancreatitis  3.  Intra-abdominal abscess and necrotizing fascitis, s/p debridement 11/5  4.  History of morbid obesity s/p laparoscopic sleeve  5.  Euvolemic hyponatremia       PLAN:  -Monitor for post procedure complications  -Repeat gastrografin Upper GI series and CT scan in 3 days  -Appreciate surgery recommendations  -Appreciate Infectious Disease recommendations  -Ok to use nasoenteric tube for tube feed, and discontinue TPN  -Strict NPO      Gi to peripherally follow. Please call for questions, concerns, changes in clinical status       Core Quality Measures   Reviewed items:  Labs, Medications and Radiology reports reviewed      GI attending attestation:  Myrna Pimentel DO, YONATAN    I saw and evaluated the patient.  I agree with the assessment and recommendations above.        Thank you for inviting me to participate in the care of this patient. Please do not hesitate to call GI consultants with additional questions/concerns or changes in the patient's clinical status at [phone].

## 2021-11-19 NOTE — DIETARY
"Nutrition Support Update:   Day 35 of admit.  Nils Alfonso is a 66 y.o. female with admitting DX of of bile duct leak, postprocedural intra-abdominal abscess, intra-abdominal abscess. TPN initiated on . Current TPN @ goal and meeting 100% of estimated needs with 1676 kcal and 90g protein.     Assessment:  Estimated Nutritional Needs: based on:   Height: 160 cm (5' 2.99\")  Weight: 72 kg (158 lb 11.7 oz)  Weight to Use in Calculations: 72 kg (158 lb 11.7 oz)- most recent weight on . Weight trending up since admit. -9.9 Fluid output since admit per I/O's. Generalized edema noted abdominal, flank, RUE, LUE and 1+ pitting edema noted RLE, LLE.   Ideal Body Weight: 52.2 kg (115 lb)  Percent Ideal body Weight: 138 %  Body mass index is 28.13 kg/m²., BMI classification: overweight    Calculation/Equation: [place] x1.2 AF = 1476  Total Calories / day: 1512 - 1728 (Calories / k - 24)  Total Grams Protein / day: 87 - 101 (Grams Protein / k.2 - 1.4)     Evaluation:   1. Consult for tube feeding. Cortrak placed on . TF to begin today after completion of current TPN bag per surgery.   2. Duodenal leak noted on  per MD note, s/p Endoscope/ERCP and biliary stent placement   3. Labs: glucose 122, albumin 2.5  4. Meds: octreotide, Ferrlecit, thiamine, vitamin D3, Zofran  5. Skin: no new pressure related wounds  6. GI: last BM - ostomy  7. Fiber-free, semi elemental formula recommended to promote GI tolerance as pt has been on TPN for the past 10 days.      Malnutrition Risk: Does not meet ASPEN guidelines at this time. (per previous RD note)      Recommendations/Plan:  1. Once TPN is completed, initiate TF Impact Peptide 1.5 @ 10 ml/hr. Advance by 10 ml every 12 hrs until reaching goal rate @ 45 ml/hr. This will provide 1620 kcal, 102g protein, 832 ml free water daily.   2. Fluids per MD  3. Monitor Weights    RD following.   "

## 2021-11-19 NOTE — PROGRESS NOTES
Pharmacy TPN note       11/19/21    Per discussion with Alem Lopez, Hospitalist ALEX, this pt's TPN will be stopped after the current bag is done as per Surgery request.    D/W pt's RN at 16:00 today the TPN rate will be decreased from 60 ml/hr to 30 ml/hr x 4 hours then stop TPN (discard any remaining TPN). Check a FSBS 1 hour after the TPN is stopped to check for hypoglycemia or hyperglycemia. D/W pt's RN Alona.    Jordan Villar, PharmD

## 2021-11-19 NOTE — PROGRESS NOTES
Biliary stent placed, feels very tired.  Cortrack placed.  Will end TPN after this bag and start TF via cortrack.  Need strict NPO-mucha change po meds to IV  OK 1 cup ice chips po q 6 hrs.

## 2021-11-19 NOTE — DISCHARGE PLANNING
Anticipated Discharge Disposition: SNF VS Acute Inpatient Rehab VS Home with HHC.    Action: Discussed this case during IDT Rounds. Per MD, patient is not medically clear. Per MD, patient will be assessed for a cortrak.     Barriers to Discharge: Medical Clearance.    Plan: SNF VS Home with HHC VS Acute Rehab, pending recommendations from the interdisciplinary team.

## 2021-11-19 NOTE — THERAPY
"Missed Therapy     Patient Name: Nils Alfonso  Age:  66 y.o., Sex:  female  Medical Record #: [id number]  Today's Date: 11/19/2021    Discussed missed therapy with RN       11/19/21 1000   Interdisciplinary Plan of Care Collaboration   IDT Collaboration with  Nursing;Occupational Therapist   Patient Position at End of Therapy In Bed;Family / Friend in Room   Collaboration Comments Planned for PT timing to coincide with with pain meds.  Despite this, patient refusing to work with PT.  This PT spent extensive amount of time with active listening and followed-up with education about the importance of return to activity the risks of laying in bed during prolonged hospital stay.  Patient expresses feelings of depression and is tearful when discussing her current medical status. She repeatedly says \"Once they give me a day that I can go home, I will start moving and I will be fine. Until then, I'm not moving from this bed.\"  Patient appears to lack insight into her current deficits and is not ready to learn when it comes to the importance of mobility.  Patient is at risk to PNA and deconditioning 2/2 repeated refusals to mobilize and work with therapy.  May be beneficial for MD to have a candid conversation with patient about the importance of mobility as part of the healing process. Will continue to follow, but may DC services if patient continues to refuse.        Beverly Giang, PT, DPT, GCS  "

## 2021-11-19 NOTE — CARE PLAN
Problem: Nutritional:  Goal: Nutrition support tolerated and meeting greater than 85% of estimated needs  Outcome: Progressing

## 2021-11-19 NOTE — WOUND TEAM
[facility]  Inpatient Services  Wound and Skin Care Evaluation    Admission Date: 10/15/2021     Last order of IP CONSULT TO WOUND CARE was found on 11/9/2021 from Hospital Encounter on 10/14/2021     HPI, PMH, SH: Reviewed    Past Surgical History:   Procedure Laterality Date   • PB UPPER GI ENDOSCOPY,DIAGNOSIS N/A 11/18/2021    Procedure: GASTROSCOPY with stent placement;  Surgeon: Migel Lawrence M.D.;  Location: SURGERY SAME DAY [facility];  Service: Gastroenterology   • PB ERCP,DIAGNOSTIC N/A 11/18/2021    Procedure: ERCP (ENDOSCOPIC RETROGRADE CHOLANGIOPANCREATOGRAPHY);  Surgeon: Migel Lawrence M.D.;  Location: SURGERY SAME DAY [facility];  Service: Gastroenterology   • PB PLACE PERCUT GASTROSTOMY TUBE N/A 11/18/2021    Procedure: GASTROSCOPY, WITH FEEDING TUBE INSERTION;  Surgeon: Migel Lawrence M.D.;  Location: SURGERY SAME DAY [facility];  Service: Gastroenterology   • BILIARY STENT PLACEMENT N/A 11/18/2021    Procedure: INSERTION, STENT, BILE DUCT;  Surgeon: Migel Lawrence M.D.;  Location: SURGERY SAME DAY [facility];  Service: Gastroenterology   • PB EXPLORATORY OF ABDOMEN  11/5/2021    Procedure: LAPAROTOMY, EXPLORATORY;  Surgeon: Jaden Cook M.D.;  Location: [facility];  Service: General   • PB ULTRASONIC GUIDANCE, INTRAOPERATIVE  11/5/2021    Procedure: ULTRASOUND GUIDANCE;  Surgeon: Jaden Cook M.D.;  Location: [facility];  Service: General   • ABDOMINAL ABSCESS DRAINAGE  11/5/2021    Procedure: DRAINAGE, ABSCESS, ABDOMEN - PERITONEAL AND RETROPERITONEAL;  Surgeon: Jaden Cook M.D.;  Location: [facility];  Service: General   • PB ERCP,DIAGNOSTIC  10/1/2021    Procedure: ERCP (ENDOSCOPIC RETROGRADE CHOLANGIOPANCREATOGRAPHY);  Surgeon: Fausto Casas M.D.;  Location: [facility];  Service: Gastroenterology   • COLONOSCOPY  8/8/2018    Procedure: COLONOSCOPY;  Surgeon: Shukri Condon M.D.;  Location: [facility];   Service: General   • GASTROSCOPY  2018    Procedure: GASTROSCOPY;  Surgeon: Fausto Casas M.D.;  Location: SURGERY [facility];  Service: Gastroenterology   • EGD W/ENDOSCOPIC ULTRASOUND  2018    Procedure: EGD W/ENDOSCOPIC ULTRASOUND- RADIAL UPPER;  Surgeon: Fausto Casas M.D.;  Location: SURGERY [facility];  Service: Gastroenterology   • CATARACT PHACO WITH IOL Left 2017    Procedure: CATARACT PHACO WITH IOL;  Surgeon: Stuart Estevez M.D.;  Location: SURGERY SAME DAY [facility];  Service:    • CATARACT PHACO WITH IOL Right 2017    Procedure: CATARACT PHACO WITH IOL;  Surgeon: Stuart Estevez M.D.;  Location: SURGERY [facility];  Service:    • GASTRIC SLEEVE LAPAROSCOPY  10/19/2016    Procedure: GASTRIC SLEEVE LAPAROSCOPY, HIATAL HERNIA;  Surgeon: Shukri Condon M.D.;  Location: SURGERY [facility];  Service:    • LIVER BIOPSY LAPAROSCOPIC  10/19/2016    Procedure: LIVER BIOPSY LAPAROSCOPIC;  Surgeon: Shukri Condon M.D.;  Location: [facility];  Service:    • GASTROSCOPY N/A 2016    Procedure: GASTROSCOPY;  Surgeon: Shukri Condon M.D.;  Location: [facility];  Service:    • ROTATOR CUFF REPAIR Right 2016   • ROTATOR CUFF REPAIR Left 2015   • HYSTERECTOMY ROBOTIC  2009    Performed by MEG VILLEGAS at [facility]   • LUMBAR FUSION ANTERIOR      Dr Hillman, L3-S1 fusion   • CHOLECYSTECTOMY      laparoscopic   • TUBAL LIGATION     • GASTRIC RESECTION      gastric stapling   • TONSILLECTOMY AND ADENOIDECTOMY     • PRIMARY C SECTION  , , 1985    x3     Social History     Tobacco Use   • Smoking status: Former Smoker     Packs/day: 0.25     Years: 0.10     Pack years: 0.02     Types: Cigarettes     Quit date: 1973     Years since quittin.9   • Smokeless tobacco: Never Used   Substance Use Topics   • Alcohol use: No     No chief complaint on file.    Diagnosis: Bile  duct leak [K83.8]  Postprocedural intraabdominal abscess [T81.43XA]  Intra-abdominal abscess (HCC) [K65.1]    Unit where seen by Wound Team: T432/02     WOUND CONSULT/FOLLOW UP RELATED TO:  Right upper abdomen x3    WOUND HISTORY:  Pt is an older woman with history of idiopathic pancreatitis who presented on 11/8 with complaints of abdominal pain. Pt has had ERCP with sphincterotomy by dr. Ramires on 10/1/21. Upon admission to [facility] pt had CT which revealed large irregular fluid collection with thick wall occupying most of the right abdomen, IR drains were placed. Unfortunately there was a lack of improvement in intraabdominal fluid and therefore Dr. Dumont was consulted. Pt underwent surgery with Dr. ST on 11/5 and was found to have a large retroperitoneal abscess containing necrotizing fascititis as well as an anterior peritoneal abscess and necrotizing soft tissue infection. IR placed drains were removed and new surgical drains were placed. Pt began having drainage from her old right upper quad IR drain hole and therefore wound team was requested to evaluate and treat/manage the drainage.     WOUND ASSESSMENT/LDA            Wound 10/16/21  Abdomen Lateral;Lower Right IR Drain insertion (MARTHA bulb going through pouch) (Active)   Wound Image      Site Assessment Clean;Dry;Intact    Periwound Assessment Clean;Dry;Intact    Margins MALVIN    Closure Adhesive bandage    Drainage Amount Small    Drainage Description MALVIN    Treatments Cleansed    Wound Cleansing Foam Cleanser/Washcloth    Periwound Protectant Skin Protectant Wipes to Periwound    Dressing Cleansing/Solutions Not Applicable    Dressing Options Other (Comments)    Dressing Changed Changed    Dressing Status Clean;Dry;Intact    Dressing Change/Treatment Frequency Every 72 hrs, and As Needed    NEXT Dressing Change/Treatment Date 11/22/21    NEXT Weekly Photo (Inpatient Only) 11/22/21    Non-staged Wound Description Full thickness    Shape Oval    Wound  Odor Mild    Pulses N/A    Exposed Structures None    WOUND NURSE ONLY - Time Spent with Patient (mins) 60        Wound 10/25/21 Abdomen;Flank Lateral;Upper Right IR drain insertion (Active)   Wound Image       Site Assessment Clean;Dry;Intact    Periwound Assessment Intact;Dry;Clean    Margins Attached edges;Defined edges    Closure Adhesive bandage    Drainage Amount Small    Drainage Description Brown    Treatments Cleansed    Wound Cleansing Foam Cleanser/Washcloth    Periwound Protectant Stoma Powder;Skin Protectant Wipes to Periwound    Dressing Cleansing/Solutions Not Applicable    Dressing Options Open to Air    Dressing Changed Changed    Dressing Status Clean;Dry;Intact    Dressing Change/Treatment Frequency Every 72 hrs, and As Needed    NEXT Dressing Change/Treatment Date 11/22/21    NEXT Weekly Photo (Inpatient Only) 11/22/21    Non-staged Wound Description Full thickness    Shape Circular    Wound Odor None    Pulses N/A    Exposed Structures MALVIN    WOUND NURSE ONLY - Time Spent with Patient (mins) 15    Wound 11/09/21 Full Thickness Wound Abdomen Lateral;Upper;Quadrant Right Drain #2 (Active)   Wound Image       Site Assessment Clean;Dry;Intact    Periwound Assessment Clean;Dry;Intact    Margins MALVIN    Closure Adhesive bandage    Drainage Amount Small    Drainage Description Brown    Treatments Cleansed;Site care;Tape change    Wound Cleansing Foam Cleanser/Washcloth    Periwound Protectant Skin Protectant Wipes to Periwound;Paste Ring    Dressing Cleansing/Solutions Not Applicable    Dressing Options Other (Comments)    Dressing Changed Changed    Dressing Status Clean;Dry;Intact    Dressing Change/Treatment Frequency Every 72 hrs, and As Needed    NEXT Dressing Change/Treatment Date 11/22/21    NEXT Weekly Photo (Inpatient Only) 11/22/21    Non-staged Wound Description Full thickness    Wound Length (cm) 0.4 cm    Wound Width (cm) 0.8 cm    Wound Surface Area (cm^2) 0.32 cm^2    Shape Oval    Wound  Odor None    Exposed Structures MALVIN    WOUND NURSE ONLY - Time Spent with Patient (mins) 60            Vascular:    CHARBEL:   No results found.    Lab Values:    Lab Results   Component Value Date/Time    WBC 3.7 (L) 11/17/2021 04:35 AM    RBC 3.18 (L) 11/17/2021 04:35 AM    HEMOGLOBIN 9.0 (L) 11/17/2021 04:35 AM    HEMATOCRIT 28.3 (L) 11/17/2021 04:35 AM    CREACTPROT 0.29 12/02/2015 07:48 AM    SEDRATEWES 10 12/02/2015 07:48 AM    HBA1C 5.7 (H) 09/13/2016 10:40 AM      Culture Results show:  Recent Results (from the past 720 hour(s))   CULTURE WOUND W/ GRAM STAIN    Collection Time: 11/11/21  4:52 PM    Specimen: Wound   Result Value Ref Range    Significant Indicator POS (POS)     Source WND     Site retroperitoneal abscess drain     Culture Result (A)      Growth noted after further incubation, see below for  organism identification.      Gram Stain Result       Rare WBCs.  Many Gram positive cocci.  Few Yeast.      Culture Result Enterococcus faecium  Heavy growth   (A)     Culture Result Candida albicans  Moderate growth   (A)     Culture Result Stenotrophomonas maltophilia  Light growth   (A)        Susceptibility    Enterococcus faecium - MU     Daptomycin 4 Sensitive mcg/mL     Gent Synergy <=500 Sensitive mcg/mL     Ampicillin >8 Resistant mcg/mL     Vancomycin 1 Sensitive mcg/mL    Stenotrophomonas maltophilia - MU     Trimeth/Sulfa <=0.5/9.5 Sensitive mcg/mL     Pain Level/Medicated:  No pain med.     INTERVENTIONS BY WOUND TEAM:  Chart and images reviewed. Discussed with bedside RN. All areas of concern (based on picture review, LDA review and discussion with bedside RN) have been thoroughly assessed. Documentation of areas based on significant findings. This RN in to assess patient. Performed standard wound care which includes appropriate positioning, dressing removal and non-selective debridement. Pictures and measurements obtained weekly if/when required.  Preparation for Dressing removal: NA  "saturated dressing removed without difficulty  Non-selectively Debrided with:  moist warm washcloth and no rinse foam soap  Sharp debridement: NA  Ngozi wound: Cleansed with moist warm washcloth, Prepped with stoma powder and no sting  Primary Dressing: A 2 1/4\" barrier was cut larger than wound and MARTHA Lap stab. Paste ring was then stretched to fit openings of both barriers. Barriers were applied down to skin around (both) Old MARTHA site and MARTHA Drain and adhered with friction. High output pouch was attached to both barriers and RUQ pouch attached to down drain. RLQ pouch end closed. Hole was cut in pouch at the top and MARTHA tube threaded through hole. Pink tape to hole. MARTHA Bulb reattached.    R flank noted to be old drain site and also draining: cleansed and crusted, pediatric urostomy pouch cut to just under 1\" opening, half paste ring rolled and applied to barrier, applied to skin and closed end.          Secondary (Outer) Dressing: NA    Interdisciplinary consultation: Patient, Bedside RN, Wound RN (Regi)    EVALUATION / RATIONALE FOR TREATMENT:  Most Recent Date:  11/19/21: no leakage noted, labeled each bag to make easier for bedside nursing. Continue with plan of care. (could not change dressings 11/18/21 due to patient being in OR)    11/14/21:  Noted to be leaking today.  Patient still with large amounts of drainage from around drains and flank area.     11/13/21: both appliances lasted with no leaks, the pouches seem to be managing the drainage better than any other dressing. Still high amounts of brown/green drainage present. Will continue to follow.   11/10/21: Pouch around RUQ old MARTHA site appears to be working well. Pt noted to have significant leaking around MARTHA drain to RLQ. Pouched around MARTHA drain to prevent skin deterioration. Will continue to follow.   11/9/21: Pt with large amount of liquid brown drainage noted from right upper quadrant old MARTHA Site. Wound team requested to evaluate and pouch. Wound is " small however large amount of drainage noted. High output pouch applied to down drain to better manage output. Pt also having some drainage noted from around surgically placed drainage. Fenestrated gauze placed to better control output.     Goals: Steady decrease in wound area and depth weekly.    WOUND TEAM PLAN OF CARE ([X] for frequency of wound follow up,):   Nursing to follow orders written for wound care. Contact wound team if area fails to progress, deteriorates or with any questions/concerns  Dressing changes by wound team:    X               Follow up 3 times weekly:                NPWT change 3 times weekly:     Follow up 1-2 times weekly:      Follow up Bi-Monthly:                   Follow up as needed:     Other (explain): X    NURSING PLAN OF CARE ORDERS (X):  Dressing changes: See Dressing Care orders:   Skin care: See Skin Care orders:   RN Prevention Protocol:   Rectal tube care: See Rectal Tube Care orders:   Other orders:    RSKIN:   CURRENTLY IN PLACE (X), APPLIED THIS VISIT (A), ORDERED (O):   Q shift Anant:  X  Q shift pressure point assessments:  X    Surface/Positioning   Pressure redistribution mattress  X          Low Airloss          Bariatric foam      Bariatric ALBERT     Waffle cushion        Waffle Overlay    X      Reposition q 2 hours X     TAPs Turning system     Z Jorgito Pillow     Offloading/Redistribution   Sacral Mepilex (Silicone dressing)   MALVIN  Heel Mepilex (Silicone dressing)         Heel float boots (Prevalon boot)             Float Heels off Bed with Pillows           Respiratory RA  Silicone O2 tubing         Gray Foam Ear protectors     Cannula fixation Device (Tender )          High flow offloading Clip    Elastic head band offloading device      Anchorfast                                                         Trach with Optifoam split foam             Containment/Moisture Prevention     Rectal tube or BMS    Purwick/Condom Cath        Corea Catheter  X  Barrier wipes            Barrier paste       Antifungal tx      Interdry        Mobilization       Up to chair        Ambulate      PT/OT      Nutrition       Dietician        Diabetes Education      PO  X   TF     TPN     NPO   # days     Other        Anticipated discharge plans:   LTACH:        SNF/Rehab:                  Home Health Care:   X        Outpatient Wound Center:            Self/Family Care:        Other:                  Vac Discharge Needs:   Not Applicable Pt not on a wound vac:     X  Regular Vac while inpatient, alternative dressing at DC:        Regular Vac in use and continued at DC:            Reg. Vac w/ Skin Sub/Biologic in use. Will need to be changed 2x wkly:      Veraflo Vac while inpatient, ok to transition to Regular Vac on Discharge:           Veraflo Vac while inpatient, will need to remain on Veraflo Vac upon discharge:

## 2021-11-20 PROBLEM — Z71.89 ADVANCED CARE PLANNING/COUNSELING DISCUSSION: Status: ACTIVE | Noted: 2021-11-20

## 2021-11-20 LAB
GLUCOSE BLD-MCNC: 89 MG/DL (ref 65–99)
GLUCOSE BLD-MCNC: 92 MG/DL (ref 65–99)
PREALB SERPL-MCNC: 18.3 MG/DL (ref 18–38)

## 2021-11-20 PROCEDURE — 84134 ASSAY OF PREALBUMIN: CPT

## 2021-11-20 PROCEDURE — 82962 GLUCOSE BLOOD TEST: CPT | Mod: 91

## 2021-11-20 PROCEDURE — 700111 HCHG RX REV CODE 636 W/ 250 OVERRIDE (IP): Performed by: ANESTHESIOLOGY

## 2021-11-20 PROCEDURE — 700105 HCHG RX REV CODE 258: Performed by: NURSE PRACTITIONER

## 2021-11-20 PROCEDURE — 700111 HCHG RX REV CODE 636 W/ 250 OVERRIDE (IP): Performed by: NURSE PRACTITIONER

## 2021-11-20 PROCEDURE — 700111 HCHG RX REV CODE 636 W/ 250 OVERRIDE (IP): Performed by: INTERNAL MEDICINE

## 2021-11-20 PROCEDURE — 700105 HCHG RX REV CODE 258: Performed by: INTERNAL MEDICINE

## 2021-11-20 PROCEDURE — 700102 HCHG RX REV CODE 250 W/ 637 OVERRIDE(OP): Performed by: NURSE PRACTITIONER

## 2021-11-20 PROCEDURE — 700101 HCHG RX REV CODE 250: Performed by: NURSE PRACTITIONER

## 2021-11-20 PROCEDURE — 700111 HCHG RX REV CODE 636 W/ 250 OVERRIDE (IP): Mod: JB | Performed by: SURGERY

## 2021-11-20 PROCEDURE — A9270 NON-COVERED ITEM OR SERVICE: HCPCS | Performed by: NURSE PRACTITIONER

## 2021-11-20 PROCEDURE — 700111 HCHG RX REV CODE 636 W/ 250 OVERRIDE (IP): Mod: JG | Performed by: INTERNAL MEDICINE

## 2021-11-20 PROCEDURE — 770001 HCHG ROOM/CARE - MED/SURG/GYN PRIV*

## 2021-11-20 PROCEDURE — 99233 SBSQ HOSP IP/OBS HIGH 50: CPT | Performed by: NURSE PRACTITIONER

## 2021-11-20 RX ORDER — HYDROMORPHONE HYDROCHLORIDE 1 MG/ML
0.5 INJECTION, SOLUTION INTRAMUSCULAR; INTRAVENOUS; SUBCUTANEOUS ONCE
Status: COMPLETED | OUTPATIENT
Start: 2021-11-20 | End: 2021-11-20

## 2021-11-20 RX ADMIN — PHENOL 1 SPRAY: 1.5 LIQUID ORAL at 11:47

## 2021-11-20 RX ADMIN — PIPERACILLIN SODIUM AND TAZOBACTAM SODIUM 3.38 G: 3; .375 INJECTION, POWDER, FOR SOLUTION INTRAVENOUS at 14:26

## 2021-11-20 RX ADMIN — PIPERACILLIN SODIUM AND TAZOBACTAM SODIUM 3.38 G: 3; .375 INJECTION, POWDER, FOR SOLUTION INTRAVENOUS at 04:01

## 2021-11-20 RX ADMIN — HYDROMORPHONE HYDROCHLORIDE 0.5 MG: 1 INJECTION, SOLUTION INTRAMUSCULAR; INTRAVENOUS; SUBCUTANEOUS at 21:03

## 2021-11-20 RX ADMIN — HYDROMORPHONE HYDROCHLORIDE 0.5 MG: 1 INJECTION, SOLUTION INTRAMUSCULAR; INTRAVENOUS; SUBCUTANEOUS at 09:04

## 2021-11-20 RX ADMIN — PIPERACILLIN SODIUM AND TAZOBACTAM SODIUM 3.38 G: 3; .375 INJECTION, POWDER, FOR SOLUTION INTRAVENOUS at 21:04

## 2021-11-20 RX ADMIN — HYDROMORPHONE HYDROCHLORIDE 0.5 MG: 1 INJECTION, SOLUTION INTRAMUSCULAR; INTRAVENOUS; SUBCUTANEOUS at 03:07

## 2021-11-20 RX ADMIN — ONDANSETRON 4 MG: 2 INJECTION INTRAMUSCULAR; INTRAVENOUS at 08:24

## 2021-11-20 RX ADMIN — OCTREOTIDE ACETATE 100 MCG: 100 INJECTION, SOLUTION INTRAVENOUS; SUBCUTANEOUS at 08:24

## 2021-11-20 RX ADMIN — SULFAMETHOXAZOLE AND TRIMETHOPRIM 160 MG OF TRIMETHOPRIM: 80; 16 INJECTION, SOLUTION, CONCENTRATE INTRAVENOUS at 00:52

## 2021-11-20 RX ADMIN — MICAFUNGIN SODIUM 100 MG: 100 INJECTION, POWDER, LYOPHILIZED, FOR SOLUTION INTRAVENOUS at 08:59

## 2021-11-20 RX ADMIN — PHENOL 1 SPRAY: 1.5 LIQUID ORAL at 17:57

## 2021-11-20 RX ADMIN — SULFAMETHOXAZOLE AND TRIMETHOPRIM 160 MG OF TRIMETHOPRIM: 80; 16 INJECTION, SOLUTION, CONCENTRATE INTRAVENOUS at 11:44

## 2021-11-20 RX ADMIN — HYDROMORPHONE HYDROCHLORIDE 0.5 MG: 1 INJECTION, SOLUTION INTRAMUSCULAR; INTRAVENOUS; SUBCUTANEOUS at 10:57

## 2021-11-20 RX ADMIN — ONDANSETRON 4 MG: 2 INJECTION INTRAMUSCULAR; INTRAVENOUS at 03:07

## 2021-11-20 RX ADMIN — HYDROMORPHONE HYDROCHLORIDE 0.5 MG: 1 INJECTION, SOLUTION INTRAMUSCULAR; INTRAVENOUS; SUBCUTANEOUS at 16:35

## 2021-11-20 RX ADMIN — ENOXAPARIN SODIUM 40 MG: 40 INJECTION SUBCUTANEOUS at 08:24

## 2021-11-20 RX ADMIN — OCTREOTIDE ACETATE 100 MCG: 100 INJECTION, SOLUTION INTRAVENOUS; SUBCUTANEOUS at 21:03

## 2021-11-20 ASSESSMENT — ENCOUNTER SYMPTOMS
DIARRHEA: 0
WEAKNESS: 1
ORTHOPNEA: 0
DIZZINESS: 0
FOCAL WEAKNESS: 0
DEPRESSION: 1
ABDOMINAL PAIN: 1
PALPITATIONS: 0
SHORTNESS OF BREATH: 0
NERVOUS/ANXIOUS: 1
BLOOD IN STOOL: 0
NAUSEA: 0
CHILLS: 0
SPUTUM PRODUCTION: 0
FEVER: 0
VOMITING: 0
FALLS: 0
COUGH: 0
HEADACHES: 0
SENSORY CHANGE: 0
FLANK PAIN: 0
BACK PAIN: 1
SORE THROAT: 1

## 2021-11-20 ASSESSMENT — PAIN DESCRIPTION - PAIN TYPE
TYPE: ACUTE PAIN
TYPE: SURGICAL PAIN
TYPE: ACUTE PAIN;SURGICAL PAIN
TYPE: ACUTE PAIN
TYPE: SURGICAL PAIN
TYPE: ACUTE PAIN
TYPE: ACUTE PAIN

## 2021-11-20 ASSESSMENT — PATIENT HEALTH QUESTIONNAIRE - PHQ9
SUM OF ALL RESPONSES TO PHQ9 QUESTIONS 1 AND 2: 0
1. LITTLE INTEREST OR PLEASURE IN DOING THINGS: NOT AT ALL
2. FEELING DOWN, DEPRESSED, IRRITABLE, OR HOPELESS: NOT AT ALL

## 2021-11-20 NOTE — PROGRESS NOTES
fsbs 98.  Tube feeding started at this time at 10ml/hr per order. Cortrak flushed prior to starting feed.

## 2021-11-20 NOTE — PROGRESS NOTES
"Pt transferred from chair to bed with CNA and this RN. OT urged client to get up to commode from now onwards but patient stated, \"No, I'm going to bed for the night, I'm just too tired,\" following transfer. Purewick in place. Patient in bed, call light in reach, all needs met at this time.  "

## 2021-11-20 NOTE — THERAPY
"Occupational Therapy  Daily Treatment     Patient Name: Nils Alfonso  Age:  66 y.o., Sex:  female  Medical Record #: [id number]  Today's Date: 11/19/2021     Precautions  Precautions: Fall Risk,Nasogastric Tube  Comments: abdominal MARTHA x 2, ostomy bag x 2    Assessment    Pt presents to occupational therapy today motivated to sit in a chair. Pt was able to get to EOB with Elen and complete stand pivot transfer from bed to chair with SBA. Pt completed seated sponge bath for pericare with supervision. Pt had one desat to mid 80 after transfer to chair but was able to return to spO2 in high 90s following deep breathing exercise. Pt has poor insight into deficits and progression of illness as she feels she can walk to the bathroom and wants to be home by Thankgiving. Pt educated on importance of getting up to chair for at least 30 min 3x/day and to use the BSC. Pt would benefit from continued acute occupational therapy to address ADL independence, ADL transfers, and activity tolerance.    Plan    Continue current treatment plan.    DC Equipment Recommendations: Unable to determine at this time  Discharge Recommendations: Recommend post-acute placement for additional occupational therapy services prior to discharge home    Subjective    Pt is pleasant and cooperative. Pt states, \"I want to walk to the bathroom.\"     Objective       11/19/21 1715   Vitals   O2 Delivery Device None - Room Air   Vitals Comments one desat to mid 80s but able to recover to high 90s with deep breathing   Pain   Pain Scales 0 to 10 Scale    Pain 0 - 10 Group   Therapist Pain Assessment During Activity;Post Activity Pain Same as Prior to Activity  (c/o pain at site of drains, not rated)   Cognition    Cognition / Consciousness WDL   Level of Consciousness Alert   Comments pleasant and cooperative, seems to have poor insight into deficits/progression of illness   Other Treatments   Other Treatments Provided educated pt on deep breathing " techniques; pt educated on importance of getting up to chair several times a day, pt stated she was agreeable to sitting in chair for 30 min 3x/day   Balance   Sitting Balance (Static) Fair +   Sitting Balance (Dynamic) Fair +   Standing Balance (Static) Fair -   Standing Balance (Dynamic) Fair -   Weight Shift Sitting Fair   Weight Shift Standing Fair   Skilled Intervention Verbal Cuing;Compensatory Strategies   Comments w/ FWW, no LOB   Bed Mobility    Supine to Sit Minimal Assist   Sit to Supine   (up in chair post session)   Scooting Supervised   Rolling Minimal Assist to Rt.   Skilled Intervention Verbal Cuing;Tactile Cuing;Compensatory Strategies   Comments HOB slightly elevated   Activities of Daily Living   Bathing Supervision  (seated, pericare)   Upper Body Dressing Minimal Assist  (gown, robe)   Lower Body Dressing   (declined)   Toileting   (declined need)   Skilled Intervention Verbal Cuing   How much help from another person does the patient currently need...   Putting on and taking off regular lower body clothing? 3   Bathing (including washing, rinsing, and drying)? 3   Toileting, which includes using a toilet, bedpan, or urinal? 3   Putting on and taking off regular upper body clothing? 3   Taking care of personal grooming such as brushing teeth? 4   Eating meals? 4   6 Clicks Daily Activity Score 20   Functional Mobility   Sit to Stand   (SBA)   Bed, Chair, Wheelchair Transfer   (SBA)   Transfer Method Stand Pivot   Mobility sit to stand x1, EOB to chair w/ FWW, no LOB   Skilled Intervention Verbal Cuing;Compensatory Strategies   Comments verbal cues for safe use of walker   Activity Tolerance   Sitting in Chair 10 min   Sitting Edge of Bed 6 min   Standing 2 min   Comments limited by weakness and fatigue   Patient / Family Goals   Patient / Family Goal #1 to return home   Goal #1 Outcome Goal not met   Short Term Goals   Short Term Goal # 1 Patient will complete LB dressing supv with AE PRN   Goal  Outcome # 1 Progressing slower than expected   Short Term Goal # 2 Patient will complete toilet xfer supv   Goal Outcome # 2 Progressing slower than expected   Short Term Goal # 3 Patient will tolerate 8 mins of standing G/H supv   Goal Outcome # 3 Progressing slower than expected   Education Group   Education Provided Role of Occupational Therapist;Transfers;Pathology of bedrest   Role of Occupational Therapist Patient Response Patient;Acceptance;Explanation;Verbal Demonstration   Transfers Patient Response Patient;Acceptance;Explanation;Verbal Demonstration;Action Demonstration;Demonstration   Pathology of Bedrest Patient Response Patient;Acceptance;Explanation;Verbal Demonstration;Reinforcement Needed

## 2021-11-20 NOTE — PROGRESS NOTES
Hospital Medicine Daily Progress Note    Date of Service  11/20/2021    Chief Complaint  Nils Alfonso is a 66 y.o. female admitted 10/15/2021 with abdominal pain    Hospital Course  Initially admitted to [facility] for evaluation of abdominal pain after recent ERCP with polypectomy and sphincterotomy and noted to have large intra-abdominal fluid collection. Multiple IR drains had been placed, but her infection worsened.  She was transferred to [facility] for escalation of her surgical needs. She underwent ex lap w I/D of multiple loculated abscesses and debridement of nec fasciitis w Dr. ST 11/5/21. Prev cx w Stenotrophomonas maltophilia, mixed yeast, E faecalis, E coli and Chryseobacterium in the blood.  ID was consulted and placed patient on Bactrim twice daily, Zosyn, micafungin end date 11/24/2021.  Patient had decreased output from drains.  Repeat CT on 11/15/2021 showed increase as a fluid collection in the right mid abdomen and slight increase in trace bilateral pleural effusions.  Patient to return to IR.     Interval Problem Update  11/16/2021: No acute overnight events.  Patient pending IR for procedural drainage today.   11/17/2021: No acute overnight events.  Large drain in RLQ had fallen out, as seen in IR.  IR saw that abdominal fluid collection is smaller and noted drain was indicated.  Patient seen by Dr. Cook this morning.  Plan for upper GI today.  11/18: No overnight events. UGI showed leak at perforation in 2-3 portion of duodenum. Seen by General Surgery. Plan for duodenal stent placement tomorrow.   11/19: Endoscope/ERCP, biliary stent, cortrak placement done yesterday with Dr. Lawrence. KUB confirmed NGT placement. Seen by Dr. Cook this morning. Per General surgery, holding PO meds/changed to IV and plan for repeat upper GI next week.   11/20:  at bedside. Tube feedings started last night, increasing as tolerated. Dark green OP noted in collection bag/drain. Plan for upper GI early  this week. Patient tearful regarding pain and drain sites- palliative care consult placed.     I have personally seen and examined the patient at bedside. I discussed the plan of care with patient, family and bedside RN.    Consultants/Specialty  general surgery and palliative care    Code Status  Full Code    Disposition  Patient is not medically cleared.   Anticipate discharge to rehab versus home with home health.      Review of Systems  Review of Systems   Constitutional: Positive for malaise/fatigue. Negative for chills and fever.   HENT: Positive for sore throat (post-procedurally). Negative for congestion.    Respiratory: Negative for cough, sputum production and shortness of breath.    Cardiovascular: Positive for leg swelling. Negative for chest pain, palpitations and orthopnea.   Gastrointestinal: Positive for abdominal pain. Negative for blood in stool, diarrhea, nausea and vomiting.   Genitourinary: Negative for dysuria, flank pain and urgency.   Musculoskeletal: Positive for back pain (occassional) and joint pain (hip pain). Negative for falls.   Skin: Negative for itching and rash.   Neurological: Positive for weakness. Negative for dizziness, sensory change, focal weakness and headaches.   Psychiatric/Behavioral: Positive for depression. The patient is nervous/anxious.         Physical Exam  Temp:  [36.4 °C (97.6 °F)-37.7 °C (99.9 °F)] 36.9 °C (98.4 °F)  Pulse:  [63-89] 64  Resp:  [16-18] 16  BP: (138-155)/(68-93) 142/76  SpO2:  [92 %-96 %] 94 %    Physical Exam  Vitals and nursing note reviewed.   Constitutional:       General: She is not in acute distress.     Appearance: Normal appearance. She is ill-appearing. She is not toxic-appearing or diaphoretic.   HENT:      Head: Normocephalic and atraumatic.      Nose:      Comments: Cortrak in place to right nare.      Mouth/Throat:      Mouth: Mucous membranes are moist.      Pharynx: Oropharynx is clear.   Eyes:      General: No scleral icterus.      Extraocular Movements: Extraocular movements intact.      Conjunctiva/sclera: Conjunctivae normal.   Cardiovascular:      Rate and Rhythm: Normal rate and regular rhythm.      Pulses: Normal pulses.      Heart sounds: Normal heart sounds. No murmur heard.      Pulmonary:      Effort: Pulmonary effort is normal. No respiratory distress.      Breath sounds: Normal breath sounds. No rhonchi.   Chest:      Chest wall: No tenderness.   Abdominal:      General: Abdomen is flat. Bowel sounds are normal. There is no distension.      Palpations: Abdomen is soft.      Tenderness: There is no abdominal tenderness (RUQ/RLQ > left side).      Comments: Midline abdominal incision. Well approximated with staples.  Clean dry and intact. No erythema, edema, or drainage.    MARTHA drain and collection bag to RUQ  Collection bag to previous RLQ drain site.  Small amount of dark green drainage in collection bag and MARTHA drain.    Musculoskeletal:         General: Normal range of motion.      Cervical back: Normal range of motion and neck supple.      Right lower leg: Edema present.      Left lower leg: Edema present.   Skin:     General: Skin is warm and dry.      Capillary Refill: Capillary refill takes less than 2 seconds.      Coloration: Skin is not jaundiced.      Findings: No bruising.   Neurological:      General: No focal deficit present.      Mental Status: She is alert and oriented to person, place, and time. Mental status is at baseline.   Psychiatric:         Thought Content: Thought content normal.         Judgment: Judgment normal.      Comments: Tearful         Fluids    Intake/Output Summary (Last 24 hours) at 11/20/2021 1000  Last data filed at 11/20/2021 0401  Gross per 24 hour   Intake 120 ml   Output 1720 ml   Net -1600 ml       Laboratory      Recent Labs     11/18/21  0440   SODIUM 134*   POTASSIUM 4.1   CHLORIDE 101   CO2 23   GLUCOSE 122*   BUN 16   CREATININE 0.53   CALCIUM 7.8*                   Imaging  DX-ABDOMEN  FOR TUBE PLACEMENT   Final Result         1.  Nonspecific bowel gas pattern.   2.  Dobbhoff tube with tip terminating overlying the expected location of the third or fourth duodenal segment.   3.  Hazy right lower lobe infiltrates.      DX-ABDOMEN FOR TUBE PLACEMENT   Final Result      Feeding tube extends to the region of the stomach and duodenum with tip at the level of the proximal end of the jejunum.      DX-ABDOMEN FOR TUBE PLACEMENT   Final Result      Enteric tube terminates over the duodenal jejunal junction      Covered common bile duct stent      No contrast extravasation is seen      LM-VHEV-DEHEKXU STENT - TUBE   Final Result      Portable fluoroscopic images obtained during ERCP biliary stent placement for localization purposes.      DX-UPPER GI-SERIES WITH KUB   Final Result      Duodenal perforation at the junction of the second and third portions of duodenum as noted on key images.      CT-ABORTED CT PROCEDURE   Final Result      Interval decrease in size of the RIGHT retroperitoneal fluid collection which contains a surgical drain. Because from bowel is possible. No additional drain was placed.            CT-ABDOMEN-PELVIS WITH   Final Result      1.  Slight interval increase in size in fluid collection the right midabdomen with drain in place.      2.  Slight interval increase in size in trace bilateral pleural effusions, right greater than left with bibasilar atelectasis.      3.  Pneumobilia.      CT-ABDOMEN-PELVIS WITH   Final Result         1. The components in the gallbladder fossa and right flank of the complex fluid collection are mostly resolved. There is still a small residual fluid collection in the perinephric space surrounding the inferior right kidney. Right lower quadrant MARTHA drain    and right upper quadrant catheter are outside of this residual collection.   2. Air-fluid levels throughout the colon could relate to ileus.   3. Bilateral pleural effusions with bibasilar atelectasis.    4. Pneumobilia.      CT-ABDOMEN-PELVIS WITH   Final Result      1.  Slight interval decrease in size of the large RIGHT peritoneal abscess which now contains 3 drains   2.  Decreased atelectasis with persistent opacity in the RIGHT lung base likely atelectasis rather than pneumonia   3.  Small LEFT renal cyst   4.  Prior cholecystectomy with ongoing pneumobilia      CT-DRAINAGE-CATH EXCHANGE   Final Result         1. Organized pancreatic pseudocyst Drainage with CT guidance.      CT-ABDOMEN-PELVIS WITH   Final Result      1.  There has been interval placement of an additional inferior lateral pigtail catheter within the complex right abdominal abscess. The other 2 pigtail catheters are stable in appearance.   2.  There has been no significant interval decrease in size of the large complex loculated abscess collection in the right abdomen.   3.  There is no new fluid collection.   4.  Gallbladder is surgically absent with continued pneumobilia.   5.  Interval decrease in the dependent pleural effusion with minimal airspace disease, likely atelectasis.      IR-DRAINAGE-CATH EXCHANGE   Final Result      1.  Successful left-sided drainage catheter removal.   2.  Successful image guided superior right drainage catheter replacement.      Plan: Suction drainage. Monitor outputs. Please contact interventional radiology if there is any concern for tube dysfunction. Suggest routine tube maintenance at 3 months intervals if the tube remains in place.         CT-DRAIN-PERITONEAL   Final Result      1.  CT GUIDED PERITONEAL RLQ abdominal CATHETER DRAINAGE.   2.  THE CURRENT PLAN IS TO MONITOR DRAINAGE OUTPUT AND OBTAIN A FOLLOWUP CT SCAN IN 5-7 DAYS IF CLINICALLY INDICATED.      CT-ABDOMEN-PELVIS WITH   Final Result         1. Grossly unchanged irregular fluid collection occupying the right abdomen surrounding the right kidney and right colon extending to midline. Additional pigtail catheter in the component about midline  anterior abdomen. Both pigtail catheters seem to be    within the collection.      2. Small right pleural effusion with right basilar atelectasis.               DX-CHEST-LIMITED (1 VIEW)   Final Result      1.  Right basilar atelectasis or consolidation. Small right pleural effusion not excluded.   2.  Atherosclerotic plaque.      CT-DRAIN-PERITONEAL   Final Result      1.  CT guided pancreatic pseudocyst catheter drainage.   2.  The current plan is to obtain a follow-up CT in 5-7 days..      IR-PICC LINE PLACEMENT W/ GUIDANCE > AGE 5   Final Result                  Ultrasound-guided PICC placement performed by qualified nursing staff as    above.          CT-ABDOMEN-PELVIS WITH    (Results Pending)        Assessment/Plan  * Bacteremia due to Gram-negative bacteria and fungemia- (present on admission)  Assessment & Plan  KALANI negative for signs of endocarditis  Cont bactrim, Zosyn, and Micafungin per ID for 4-week course with stop date of 11/24.  Repeat imaging prior to discontinuation.   Repeat Bld Cx neg      Duodenal perforation (HCC)- (present on admission)  Assessment & Plan  After ERCP with retroperitoneal abscess and bacteremia  Uncertain etiology - ddx includes pancreatitis, bile leak, iatrogenic  Pepcid BID  11/9: Concern perforation may have reopened.  Surgery team recommending NPO status and TPN  11/10:  Start octreotide.   11/17: Patient to go for upper GI today to see if leak at perforated site near ampulla.  If leaking, general surgeon to discuss with GI regarding placing a wall duodenal stent/esophageal stent  11/18: Upper GI showed duodenal leak. Plan for stent placement tomorrow.   11/19: Endoscope/ERCP, biliary stent, and cortrak placement done yesterday with Dr. Lawrence.    Continue abx, trend drain OP, KUB confirmed NTG placement- enteral feedings to be started.    Per General Surgery- hold oral medications/change to PO medications to IV at this time.   11/20: Tolerating tube feedings, increase as  tolerated. Continue NPO other than enteral feedings. Plan for repeat upper GI this week per general surgery.    Advanced care planning/counseling discussion  Assessment & Plan  Tearful regarding medical course and progress.   Palliative consult placed.     Generalized weakness  Assessment & Plan  PT/OT following  Recommending post-acute placement.  Acute rehab evaluating patient for admission.   11/15:  Overall improving.  Likely home with  vs rehab at d/c.    Low magnesium level  Assessment & Plan  11/8 IV replacement  Resolved,  11/18: 2. continue to monitor    Hypotension  Assessment & Plan  11/6: Stop lasix; Stop ACEI  11/7 started midodrine  11/16-resolved continue to monitor    Hypophosphatemia- (present on admission)  Assessment & Plan  Resolved with TPN  Monitoring per RD  11/6 Kphos; serum goal >3  11/18: 2.7  Will continue to monitor    Hypokalemia- (present on admission)  Assessment & Plan  Periodic monitoring  11/6 K bicarb x 1  Serum goal >4.0  Will continue to monitor      Acute respiratory failure with hypoxia (HCC)- (present on admission)  Assessment & Plan  Resolved with diuresis  Likely volume overload with lower extremity edema present    Antibiotic-associated diarrhea  Assessment & Plan  Cdiff PCR negative  Loperamide PRN    Postprocedural retroperitoneal abscess- (present on admission)  Assessment & Plan  S/P Ex lap, I/D, debridement nec fasc 11/5/2021  Abx per ID  Follow cx  Pain control  N.p.o. with TPN  Antiemetics PRN  11/16/2021: CT abdomen 11/15/2021 showed increase fluid collection in the right mid abdomen and slight increase in trace bilateral pleural effusions.   Patient go to IR for procedural drainage today  11/17: RLQ drain was found to have fallen out in IR yesterday.  Fluid collection is smaller and no new drain was indicated.  Will continue to monitor      Hyponatremia- (present on admission)  Assessment & Plan  Recurrent, mild  Diuresis as tolerated    Normocytic anemia-  (present on admission)  Assessment & Plan  Transfuse for Hgb <7  Avoid regular phlebotomy  Supplements per dietary: 11/6 added thiamine, 6 sm meals/d  11/14:  Iron deficiency noted.  Replacement ordered.     Sepsis due to intraabdominal fluid collection/abscess, bacteremia and fungemia (HCC)- (present on admission)  Assessment & Plan  Sepsis resolved  Source intra-abdominal  Zosyn/Mycafungin/Bactrim DS per infectious disease end date 11/24/2021  ID signed off  Re-imaging per ID, IR, and Surgery  S/P Ex lap w I/D and debridement nec fasc 11/5/2021 11/8 Repeat CT. Updated surgical team re: perinephric fluid collection. Further CT may be considered with IV and Oral contrast if needed.  11/9:  She has remained afebrile over last 48 hours.  VSS.  Does not appear to be septic at this time.  Continue antibx.   11/19/2021:  POD#1 sp biliary stent placement. Hold PO meds/change to IV to general surgery. Bactrim changed to IV.     Hyperlipidemia- (present on admission)  Assessment & Plan  Continue ezetimibe    Primary hypertension- (present on admission)  Assessment & Plan  Hypotensive 11/6 - stop lisinopril         VTE prophylaxis: enoxaparin ppx    I have performed a physical exam and reviewed and updated ROS and Plan today (11/20/2021). In review of yesterday's note (11/19/2021), there are no changes except as documented above.

## 2021-11-20 NOTE — PROGRESS NOTES
Received report from previous shift RN  Assessment complete.  A&O x 4. Patient calls appropriately.  Patient ambulates with x2 assist. Bed alarm on.   Patient has 7/10 pain. Pain managed with prescribed medications.  Denies N&V. Tolerating NPO diet, tube feed at 10 mL to be increased by 10 mL this AM.  Wounds and bags intact, clean, no changes necessary.  + void, + flatus, + BM.  Patient denies SOB.  SCD's refused.  Patient resting in bed.  Review plan with of care with patient. Call light and personal belongings with in reach. Hourly rounding in place. All needs met at this time.

## 2021-11-20 NOTE — CARE PLAN
The patient is Watcher - Medium risk of patient condition declining or worsening    Shift Goals  Clinical Goals: pain control, decrease nausea, rest, sit in chair  Patient Goals: pain control, sleep  Family Goals: rest    Progress made toward(s) clinical / shift goals:  patient having a better day today in regards to pain and nausea control    Patient is not progressing towards the following goals:

## 2021-11-20 NOTE — CARE PLAN
Problem: Mobility  Goal: Patient's capacity to carry out activities will improve  Outcome: Progressing    Problem: Pain - Standard  Goal: Alleviation of pain or a reduction in pain to the patient’s comfort goal  Outcome: Progressing     Problem: Skin Integrity  Goal: Skin integrity is maintained or improved  Outcome: Progressing   The patient is Watcher - Medium risk of patient condition declining or worsening    Shift Goals  Clinical Goals: pain control; prevent N/V; rest  Patient Goals: pain control; nutrition; sleep  Family Goals: rest    Progress made toward(s) clinical / shift goals:  yes    Pt got OOB with OT on 11/19. Encourage being OOB during the day. PRN pain med in use.

## 2021-11-21 PROBLEM — E87.8 ELECTROLYTE ABNORMALITY: Status: ACTIVE | Noted: 2021-11-21

## 2021-11-21 PROBLEM — R79.0 LOW MAGNESIUM LEVEL: Status: RESOLVED | Noted: 2021-11-08 | Resolved: 2021-11-21

## 2021-11-21 PROBLEM — E87.6 HYPOKALEMIA: Status: RESOLVED | Noted: 2021-10-21 | Resolved: 2021-11-21

## 2021-11-21 PROBLEM — E87.1 HYPONATREMIA: Status: RESOLVED | Noted: 2021-10-12 | Resolved: 2021-11-21

## 2021-11-21 PROBLEM — E83.39 HYPOPHOSPHATEMIA: Status: RESOLVED | Noted: 2021-10-22 | Resolved: 2021-11-21

## 2021-11-21 LAB
ALBUMIN SERPL BCP-MCNC: 2.6 G/DL (ref 3.2–4.9)
ALBUMIN/GLOB SERPL: 1.3 G/DL
ALP SERPL-CCNC: 88 U/L (ref 30–99)
ALT SERPL-CCNC: 30 U/L (ref 2–50)
ANION GAP SERPL CALC-SCNC: 7 MMOL/L (ref 7–16)
AST SERPL-CCNC: 33 U/L (ref 12–45)
BILIRUB SERPL-MCNC: 0.2 MG/DL (ref 0.1–1.5)
BUN SERPL-MCNC: 16 MG/DL (ref 8–22)
CALCIUM SERPL-MCNC: 8 MG/DL (ref 8.5–10.5)
CHLORIDE SERPL-SCNC: 98 MMOL/L (ref 96–112)
CO2 SERPL-SCNC: 22 MMOL/L (ref 20–33)
CREAT SERPL-MCNC: 0.57 MG/DL (ref 0.5–1.4)
ERYTHROCYTE [DISTWIDTH] IN BLOOD BY AUTOMATED COUNT: 49.1 FL (ref 35.9–50)
GLOBULIN SER CALC-MCNC: 2 G/DL (ref 1.9–3.5)
GLUCOSE SERPL-MCNC: 106 MG/DL (ref 65–99)
HCT VFR BLD AUTO: 26.3 % (ref 37–47)
HGB BLD-MCNC: 8.6 G/DL (ref 12–16)
MAGNESIUM SERPL-MCNC: 1.9 MG/DL (ref 1.5–2.5)
MCH RBC QN AUTO: 29.2 PG (ref 27–33)
MCHC RBC AUTO-ENTMCNC: 32.7 G/DL (ref 33.6–35)
MCV RBC AUTO: 89.2 FL (ref 81.4–97.8)
PHOSPHATE SERPL-MCNC: 2.4 MG/DL (ref 2.5–4.5)
PLATELET # BLD AUTO: 234 K/UL (ref 164–446)
PMV BLD AUTO: 9.8 FL (ref 9–12.9)
POTASSIUM SERPL-SCNC: 4 MMOL/L (ref 3.6–5.5)
PROT SERPL-MCNC: 4.6 G/DL (ref 6–8.2)
RBC # BLD AUTO: 2.95 M/UL (ref 4.2–5.4)
SODIUM SERPL-SCNC: 127 MMOL/L (ref 135–145)
WBC # BLD AUTO: 5.2 K/UL (ref 4.8–10.8)

## 2021-11-21 PROCEDURE — 770001 HCHG ROOM/CARE - MED/SURG/GYN PRIV*

## 2021-11-21 PROCEDURE — 700111 HCHG RX REV CODE 636 W/ 250 OVERRIDE (IP): Mod: JG | Performed by: INTERNAL MEDICINE

## 2021-11-21 PROCEDURE — A9270 NON-COVERED ITEM OR SERVICE: HCPCS | Performed by: NURSE PRACTITIONER

## 2021-11-21 PROCEDURE — 80053 COMPREHEN METABOLIC PANEL: CPT

## 2021-11-21 PROCEDURE — 700111 HCHG RX REV CODE 636 W/ 250 OVERRIDE (IP): Performed by: INTERNAL MEDICINE

## 2021-11-21 PROCEDURE — 700102 HCHG RX REV CODE 250 W/ 637 OVERRIDE(OP): Performed by: NURSE PRACTITIONER

## 2021-11-21 PROCEDURE — 700111 HCHG RX REV CODE 636 W/ 250 OVERRIDE (IP): Performed by: SURGERY

## 2021-11-21 PROCEDURE — 700105 HCHG RX REV CODE 258: Performed by: INTERNAL MEDICINE

## 2021-11-21 PROCEDURE — 99232 SBSQ HOSP IP/OBS MODERATE 35: CPT | Performed by: NURSE PRACTITIONER

## 2021-11-21 PROCEDURE — 700111 HCHG RX REV CODE 636 W/ 250 OVERRIDE (IP): Performed by: STUDENT IN AN ORGANIZED HEALTH CARE EDUCATION/TRAINING PROGRAM

## 2021-11-21 PROCEDURE — 700101 HCHG RX REV CODE 250: Performed by: NURSE PRACTITIONER

## 2021-11-21 PROCEDURE — 84100 ASSAY OF PHOSPHORUS: CPT

## 2021-11-21 PROCEDURE — 700111 HCHG RX REV CODE 636 W/ 250 OVERRIDE (IP): Performed by: ANESTHESIOLOGY

## 2021-11-21 PROCEDURE — 83735 ASSAY OF MAGNESIUM: CPT

## 2021-11-21 PROCEDURE — 700105 HCHG RX REV CODE 258: Performed by: NURSE PRACTITIONER

## 2021-11-21 PROCEDURE — 85027 COMPLETE CBC AUTOMATED: CPT

## 2021-11-21 RX ORDER — SODIUM CHLORIDE 9 MG/ML
INJECTION, SOLUTION INTRAVENOUS CONTINUOUS
Status: DISCONTINUED | OUTPATIENT
Start: 2021-11-21 | End: 2021-11-25

## 2021-11-21 RX ORDER — HYDROMORPHONE HYDROCHLORIDE 1 MG/ML
1 INJECTION, SOLUTION INTRAMUSCULAR; INTRAVENOUS; SUBCUTANEOUS EVERY 4 HOURS PRN
Status: DISCONTINUED | OUTPATIENT
Start: 2021-11-21 | End: 2021-11-24

## 2021-11-21 RX ADMIN — PIPERACILLIN SODIUM AND TAZOBACTAM SODIUM 3.38 G: 3; .375 INJECTION, POWDER, FOR SOLUTION INTRAVENOUS at 13:54

## 2021-11-21 RX ADMIN — ONDANSETRON 4 MG: 2 INJECTION INTRAMUSCULAR; INTRAVENOUS at 09:44

## 2021-11-21 RX ADMIN — SODIUM CHLORIDE: 9 INJECTION, SOLUTION INTRAVENOUS at 09:10

## 2021-11-21 RX ADMIN — ONDANSETRON 4 MG: 2 INJECTION INTRAMUSCULAR; INTRAVENOUS at 01:13

## 2021-11-21 RX ADMIN — HYDROMORPHONE HYDROCHLORIDE 1 MG: 1 INJECTION, SOLUTION INTRAMUSCULAR; INTRAVENOUS; SUBCUTANEOUS at 20:45

## 2021-11-21 RX ADMIN — ENOXAPARIN SODIUM 40 MG: 40 INJECTION SUBCUTANEOUS at 09:59

## 2021-11-21 RX ADMIN — SULFAMETHOXAZOLE AND TRIMETHOPRIM 160 MG OF TRIMETHOPRIM: 80; 16 INJECTION, SOLUTION, CONCENTRATE INTRAVENOUS at 00:11

## 2021-11-21 RX ADMIN — HYDROMORPHONE HYDROCHLORIDE 0.5 MG: 1 INJECTION, SOLUTION INTRAMUSCULAR; INTRAVENOUS; SUBCUTANEOUS at 17:54

## 2021-11-21 RX ADMIN — ONDANSETRON 4 MG: 2 INJECTION INTRAMUSCULAR; INTRAVENOUS at 05:30

## 2021-11-21 RX ADMIN — OCTREOTIDE ACETATE 100 MCG: 100 INJECTION, SOLUTION INTRAVENOUS; SUBCUTANEOUS at 21:35

## 2021-11-21 RX ADMIN — ONDANSETRON 4 MG: 2 INJECTION INTRAMUSCULAR; INTRAVENOUS at 13:54

## 2021-11-21 RX ADMIN — PHENOL 1 SPRAY: 1.5 LIQUID ORAL at 17:55

## 2021-11-21 RX ADMIN — OCTREOTIDE ACETATE 100 MCG: 100 INJECTION, SOLUTION INTRAVENOUS; SUBCUTANEOUS at 09:59

## 2021-11-21 RX ADMIN — ONDANSETRON 4 MG: 2 INJECTION INTRAMUSCULAR; INTRAVENOUS at 17:54

## 2021-11-21 RX ADMIN — SULFAMETHOXAZOLE AND TRIMETHOPRIM 160 MG OF TRIMETHOPRIM: 80; 16 INJECTION, SOLUTION, CONCENTRATE INTRAVENOUS at 23:40

## 2021-11-21 RX ADMIN — HYDROMORPHONE HYDROCHLORIDE 0.5 MG: 1 INJECTION, SOLUTION INTRAMUSCULAR; INTRAVENOUS; SUBCUTANEOUS at 01:13

## 2021-11-21 RX ADMIN — HYDROMORPHONE HYDROCHLORIDE 0.5 MG: 1 INJECTION, SOLUTION INTRAMUSCULAR; INTRAVENOUS; SUBCUTANEOUS at 09:39

## 2021-11-21 RX ADMIN — SULFAMETHOXAZOLE AND TRIMETHOPRIM 160 MG OF TRIMETHOPRIM: 80; 16 INJECTION, SOLUTION, CONCENTRATE INTRAVENOUS at 12:29

## 2021-11-21 RX ADMIN — MICAFUNGIN SODIUM 100 MG: 100 INJECTION, POWDER, LYOPHILIZED, FOR SOLUTION INTRAVENOUS at 09:10

## 2021-11-21 RX ADMIN — HYDROMORPHONE HYDROCHLORIDE 0.5 MG: 1 INJECTION, SOLUTION INTRAMUSCULAR; INTRAVENOUS; SUBCUTANEOUS at 05:30

## 2021-11-21 RX ADMIN — PIPERACILLIN SODIUM AND TAZOBACTAM SODIUM 3.38 G: 3; .375 INJECTION, POWDER, FOR SOLUTION INTRAVENOUS at 21:36

## 2021-11-21 RX ADMIN — PHENOL 1 SPRAY: 1.5 LIQUID ORAL at 21:36

## 2021-11-21 RX ADMIN — ONDANSETRON 4 MG: 2 INJECTION INTRAMUSCULAR; INTRAVENOUS at 20:45

## 2021-11-21 RX ADMIN — HYDROMORPHONE HYDROCHLORIDE 0.5 MG: 1 INJECTION, SOLUTION INTRAMUSCULAR; INTRAVENOUS; SUBCUTANEOUS at 13:53

## 2021-11-21 RX ADMIN — PIPERACILLIN SODIUM AND TAZOBACTAM SODIUM 3.38 G: 3; .375 INJECTION, POWDER, FOR SOLUTION INTRAVENOUS at 05:30

## 2021-11-21 ASSESSMENT — ENCOUNTER SYMPTOMS
WEAKNESS: 1
COUGH: 0
BACK PAIN: 0
NERVOUS/ANXIOUS: 0
SORE THROAT: 0
ORTHOPNEA: 0
FOCAL WEAKNESS: 0
FALLS: 0
BLOOD IN STOOL: 0
DIARRHEA: 0
DEPRESSION: 1
CHILLS: 0
SHORTNESS OF BREATH: 0
NAUSEA: 0
FLANK PAIN: 0
VOMITING: 0
PALPITATIONS: 0
HEADACHES: 0
FEVER: 0
DIZZINESS: 0
SPUTUM PRODUCTION: 0
ABDOMINAL PAIN: 0
SENSORY CHANGE: 0

## 2021-11-21 ASSESSMENT — PAIN DESCRIPTION - PAIN TYPE
TYPE: ACUTE PAIN
TYPE: ACUTE PAIN;SURGICAL PAIN
TYPE: SURGICAL PAIN;ACUTE PAIN
TYPE: ACUTE PAIN;SURGICAL PAIN
TYPE: SURGICAL PAIN

## 2021-11-21 NOTE — PROGRESS NOTES
Hospital Medicine Daily Progress Note    Date of Service  11/21/2021    Chief Complaint  Nils Alfonso is a 66 y.o. female admitted 10/15/2021 with abdominal pain    Hospital Course  Initially admitted to [facility] for evaluation of abdominal pain after recent ERCP with polypectomy and sphincterotomy and noted to have large intra-abdominal fluid collection. Multiple IR drains had been placed, but her infection worsened.  She was transferred to [facility] for escalation of her surgical needs. She underwent ex lap w I/D of multiple loculated abscesses and debridement of nec fasciitis w Dr. ST 11/5/21. Prev cx w Stenotrophomonas maltophilia, mixed yeast, E faecalis, E coli and Chryseobacterium in the blood.  ID was consulted and placed patient on Bactrim twice daily, Zosyn, micafungin end date 11/24/2021.  Patient had decreased output from drains.  Repeat CT on 11/15/2021 showed increase as a fluid collection in the right mid abdomen and slight increase in trace bilateral pleural effusions.  Patient to return to IR.     Interval Problem Update  11/16/2021: No acute overnight events.  Patient pending IR for procedural drainage today.   11/17/2021: No acute overnight events.  Large drain in RLQ had fallen out, as seen in IR.  IR saw that abdominal fluid collection is smaller and noted drain was indicated.  Patient seen by Dr. Cook this morning.  Plan for upper GI today.  11/18: No overnight events. UGI showed leak at perforation in 2-3 portion of duodenum. Seen by General Surgery. Plan for duodenal stent placement tomorrow.   11/19: Endoscope/ERCP, biliary stent, cortrak placement done yesterday with Dr. Lawrence. KUB confirmed NGT placement. Seen by Dr. Cook this morning. Per General surgery, holding PO meds/changed to IV and plan for repeat upper GI next week.   11/20:  at bedside. Tube feedings started last night, increasing as tolerated. Dark green OP noted in collection bag/drain. Plan for upper GI early  this week. Patient tearful regarding pain and drain sites- palliative care consult placed.   11/21:  at bedside. Patient reports pain and nausea well controlled and appears in good spirits. Tolerating TF at this time. Incontinent of stool this morning. Decreased amount of output in collection bags. Aware of plan for repeat upper GI in the upcoming days.     I have personally seen and examined the patient at bedside. I discussed the plan of care with patient, family and bedside RN.    Consultants/Specialty  general surgery and palliative care    Code Status  Full Code    Disposition  Patient is not medically cleared.   Anticipate discharge to rehab versus home with home health.      Review of Systems  Review of Systems   Constitutional: Positive for malaise/fatigue. Negative for chills and fever.   HENT: Negative for congestion and sore throat.    Respiratory: Negative for cough, sputum production and shortness of breath.    Cardiovascular: Positive for leg swelling. Negative for chest pain, palpitations and orthopnea.   Gastrointestinal: Negative for abdominal pain, blood in stool, diarrhea, nausea and vomiting.   Genitourinary: Negative for dysuria, flank pain and urgency.   Musculoskeletal: Negative for back pain (occassional), falls and joint pain (hip pain).   Skin: Negative for itching and rash.   Neurological: Positive for weakness. Negative for dizziness, sensory change, focal weakness and headaches.   Psychiatric/Behavioral: Positive for depression. The patient is not nervous/anxious.         Physical Exam  Temp:  [36.1 °C (97 °F)-37.3 °C (99.1 °F)] 37.2 °C (98.9 °F)  Pulse:  [61-74] 65  Resp:  [16-18] 18  BP: (113-160)/() 113/66  SpO2:  [92 %-96 %] 93 %    Physical Exam  Vitals and nursing note reviewed.   Constitutional:       General: She is not in acute distress.     Appearance: Normal appearance. She is not ill-appearing or toxic-appearing.   HENT:      Head: Normocephalic and atraumatic.       Nose:      Comments: Cortrak in place to right nare.      Mouth/Throat:      Mouth: Mucous membranes are moist.      Pharynx: Oropharynx is clear.   Eyes:      General: No scleral icterus.     Extraocular Movements: Extraocular movements intact.      Conjunctiva/sclera: Conjunctivae normal.      Pupils: Pupils are equal, round, and reactive to light.   Cardiovascular:      Rate and Rhythm: Normal rate and regular rhythm.      Pulses: Normal pulses.      Heart sounds: Normal heart sounds. No murmur heard.  No gallop.    Pulmonary:      Effort: Pulmonary effort is normal. No respiratory distress.      Breath sounds: Normal breath sounds.   Chest:      Chest wall: No tenderness.   Abdominal:      General: Abdomen is flat. Bowel sounds are normal. There is no distension.      Palpations: Abdomen is soft.      Tenderness: There is no abdominal tenderness (RUQ/RLQ > left side).      Comments: Midline abdominal incision. Well approximated with staples.  Appears CDI without surrounding erythema, edema, or drainage.    MARTHA drain and collection bag to RUQ  Collection bag to previous RLQ drain site.  Small/decreased amount of amount of dark green drainage in collection bags and MARTHA drain.    Musculoskeletal:         General: Normal range of motion.      Cervical back: Normal range of motion and neck supple.      Right lower leg: Edema present.      Left lower leg: Edema present.   Skin:     General: Skin is warm and dry.      Capillary Refill: Capillary refill takes less than 2 seconds.      Coloration: Skin is not jaundiced.   Neurological:      General: No focal deficit present.      Mental Status: She is alert and oriented to person, place, and time. Mental status is at baseline.   Psychiatric:         Mood and Affect: Mood normal.         Thought Content: Thought content normal.         Judgment: Judgment normal.         Fluids    Intake/Output Summary (Last 24 hours) at 11/21/2021 0804  Last data filed at 11/21/2021  0522  Gross per 24 hour   Intake 250 ml   Output 3030 ml   Net -2780 ml       Laboratory  Recent Labs     11/21/21  0030   WBC 5.2   RBC 2.95*   HEMOGLOBIN 8.6*   HEMATOCRIT 26.3*   MCV 89.2   MCH 29.2   MCHC 32.7*   RDW 49.1   PLATELETCT 234   MPV 9.8     Recent Labs     11/21/21  0030   SODIUM 127*   POTASSIUM 4.0   CHLORIDE 98   CO2 22   GLUCOSE 106*   BUN 16   CREATININE 0.57   CALCIUM 8.0*                   Imaging  DX-ABDOMEN FOR TUBE PLACEMENT   Final Result         1.  Nonspecific bowel gas pattern.   2.  Dobbhoff tube with tip terminating overlying the expected location of the third or fourth duodenal segment.   3.  Hazy right lower lobe infiltrates.      DX-ABDOMEN FOR TUBE PLACEMENT   Final Result      Feeding tube extends to the region of the stomach and duodenum with tip at the level of the proximal end of the jejunum.      DX-ABDOMEN FOR TUBE PLACEMENT   Final Result      Enteric tube terminates over the duodenal jejunal junction      Covered common bile duct stent      No contrast extravasation is seen      RE-HLYB-FILTLDZ STENT - TUBE   Final Result      Portable fluoroscopic images obtained during ERCP biliary stent placement for localization purposes.      DX-UPPER GI-SERIES WITH KUB   Final Result      Duodenal perforation at the junction of the second and third portions of duodenum as noted on key images.      CT-ABORTED CT PROCEDURE   Final Result      Interval decrease in size of the RIGHT retroperitoneal fluid collection which contains a surgical drain. Because from bowel is possible. No additional drain was placed.            CT-ABDOMEN-PELVIS WITH   Final Result      1.  Slight interval increase in size in fluid collection the right midabdomen with drain in place.      2.  Slight interval increase in size in trace bilateral pleural effusions, right greater than left with bibasilar atelectasis.      3.  Pneumobilia.      CT-ABDOMEN-PELVIS WITH   Final Result         1. The components in the  gallbladder fossa and right flank of the complex fluid collection are mostly resolved. There is still a small residual fluid collection in the perinephric space surrounding the inferior right kidney. Right lower quadrant MARTHA drain    and right upper quadrant catheter are outside of this residual collection.   2. Air-fluid levels throughout the colon could relate to ileus.   3. Bilateral pleural effusions with bibasilar atelectasis.   4. Pneumobilia.      CT-ABDOMEN-PELVIS WITH   Final Result      1.  Slight interval decrease in size of the large RIGHT peritoneal abscess which now contains 3 drains   2.  Decreased atelectasis with persistent opacity in the RIGHT lung base likely atelectasis rather than pneumonia   3.  Small LEFT renal cyst   4.  Prior cholecystectomy with ongoing pneumobilia      CT-DRAINAGE-CATH EXCHANGE   Final Result         1. Organized pancreatic pseudocyst Drainage with CT guidance.      CT-ABDOMEN-PELVIS WITH   Final Result      1.  There has been interval placement of an additional inferior lateral pigtail catheter within the complex right abdominal abscess. The other 2 pigtail catheters are stable in appearance.   2.  There has been no significant interval decrease in size of the large complex loculated abscess collection in the right abdomen.   3.  There is no new fluid collection.   4.  Gallbladder is surgically absent with continued pneumobilia.   5.  Interval decrease in the dependent pleural effusion with minimal airspace disease, likely atelectasis.      IR-DRAINAGE-CATH EXCHANGE   Final Result      1.  Successful left-sided drainage catheter removal.   2.  Successful image guided superior right drainage catheter replacement.      Plan: Suction drainage. Monitor outputs. Please contact interventional radiology if there is any concern for tube dysfunction. Suggest routine tube maintenance at 3 months intervals if the tube remains in place.         CT-DRAIN-PERITONEAL   Final Result       1.  CT GUIDED PERITONEAL RLQ abdominal CATHETER DRAINAGE.   2.  THE CURRENT PLAN IS TO MONITOR DRAINAGE OUTPUT AND OBTAIN A FOLLOWUP CT SCAN IN 5-7 DAYS IF CLINICALLY INDICATED.      CT-ABDOMEN-PELVIS WITH   Final Result         1. Grossly unchanged irregular fluid collection occupying the right abdomen surrounding the right kidney and right colon extending to midline. Additional pigtail catheter in the component about midline anterior abdomen. Both pigtail catheters seem to be    within the collection.      2. Small right pleural effusion with right basilar atelectasis.               DX-CHEST-LIMITED (1 VIEW)   Final Result      1.  Right basilar atelectasis or consolidation. Small right pleural effusion not excluded.   2.  Atherosclerotic plaque.      CT-DRAIN-PERITONEAL   Final Result      1.  CT guided pancreatic pseudocyst catheter drainage.   2.  The current plan is to obtain a follow-up CT in 5-7 days..      IR-PICC LINE PLACEMENT W/ GUIDANCE > AGE 5   Final Result                  Ultrasound-guided PICC placement performed by qualified nursing staff as    above.          CT-ABDOMEN-PELVIS WITH    (Results Pending)        Assessment/Plan  * Bacteremia due to Gram-negative bacteria and fungemia- (present on admission)  Assessment & Plan  KALANI negative for signs of endocarditis  Cont bactrim, Zosyn, and Micafungin per ID for 4-week course with stop date of 11/24.  Repeat imaging prior to discontinuation.   Repeat Bld Cx neg      Duodenal perforation (HCC)- (present on admission)  Assessment & Plan  After ERCP with retroperitoneal abscess and bacteremia  Uncertain etiology - ddx includes pancreatitis, bile leak, iatrogenic  Pepcid BID  11/9: Concern perforation may have reopened.  Surgery team recommending NPO status and TPN  11/10:  Start octreotide.   11/17: Patient to go for upper GI today to see if leak at perforated site near ampulla.  If leaking, general surgeon to discuss with GI regarding placing a wall  duodenal stent/esophageal stent  11/18: Upper GI showed duodenal leak. Plan for stent placement tomorrow.   11/19: Endoscope/ERCP, biliary stent, and cortrak placement done yesterday with Dr. Lawrence.    Continue abx, trend drain OP, KUB confirmed NTG placement- enteral feedings to be started.    Per General Surgery- hold oral medications/change to PO medications to IV at this time.   11/20: Tolerating tube feedings, increase as tolerated. Continue NPO other than enteral feedings. Plan for repeat upper GI this week per general surgery.  11/21: Tolerating TF. Decreased output to collection bags. Remain NPO beside TF. Plan for repeat upper GI in coming days.     Electrolyte abnormality  Assessment & Plan  Moderate hyponatremia 127  Likely due to dehydration secondary to slowly titrating TF.   NS 75/hr.     Hypokalemia and phosphatemia during hospitalization, resolved.   Continue to monitor.     Advanced care planning/counseling discussion  Assessment & Plan  Tearful regarding medical course and progress.   Palliative consult placed.     Generalized weakness  Assessment & Plan  PT/OT following  Recommending post-acute placement.  Acute rehab evaluating patient for admission.   11/21: Likely home with  vs rehab at d/c.    Hypotension  Assessment & Plan  11/6: Stop lasix; Stop ACEI  11/7 started midodrine  11/16-resolved continue to monitor    Acute respiratory failure with hypoxia (HCC)- (present on admission)  Assessment & Plan  Resolved with diuresis  Likely volume overload with lower extremity edema present    Antibiotic-associated diarrhea  Assessment & Plan  Cdiff PCR negative  Loperamide PRN    Postprocedural retroperitoneal abscess- (present on admission)  Assessment & Plan  S/P Ex lap, I/D, debridement nec fasc 11/5/2021  Abx per ID  Follow cx  Pain control  N.p.o. with TPN  Antiemetics PRN  11/16/2021: CT abdomen 11/15/2021 showed increase fluid collection in the right mid abdomen and slight increase in trace  bilateral pleural effusions.   Patient go to IR for procedural drainage today  11/17: RLQ drain was found to have fallen out in IR yesterday.  Fluid collection is smaller and no new drain was indicated.    Will continue to monitor      Normocytic anemia- (present on admission)  Assessment & Plan  Transfuse for Hgb <7  Avoid regular phlebotomy  Supplements per dietary: 11/6 added thiamine, 6 sm meals/d  11/21: Thiamine PO held at this time. Will resume when cleared to have PO meds.     Sepsis due to intraabdominal fluid collection/abscess, bacteremia and fungemia (HCC)- (present on admission)  Assessment & Plan  Sepsis resolved  Source intra-abdominal  Zosyn/Mycafungin/Bactrim DS per infectious disease end date 11/24/2021  ID signed off  Re-imaging per ID, IR, and Surgery  S/P Ex lap w I/D and debridement nec fasc 11/5/2021 11/8 Repeat CT. Updated surgical team re: perinephric fluid collection. Further CT may be considered with IV and Oral contrast if needed.  11/9:  She has remained afebrile over last 48 hours.  VSS.  Does not appear to be septic at this time.  Continue antibx.   11/19/2021:  POD#1 sp biliary stent placement. Hold PO meds/change to IV to general surgery. Bactrim changed to IV.     Hyperlipidemia- (present on admission)  Assessment & Plan  Previously on ezetimibe.  PO meds held per general surgery,   Will resume when cleared to have PO medications    Primary hypertension- (present on admission)  Assessment & Plan  Hypotensive 11/6 - stop lisinopril         VTE prophylaxis: enoxaparin ppx    I have performed a physical exam and reviewed and updated ROS and Plan today (11/21/2021). In review of yesterday's note (11/20/2021), there are no changes except as documented above.

## 2021-11-21 NOTE — PROGRESS NOTES
Bedside shift report received from night shift RN. Patient is A+Ox4 and is resting comfortably. Patient states 7/10 pain and medicated per MAR. Patient updated on current plan of care and shift goals established. No further needs at this time. Bed locked in lowest position, upper bed rails up, call light and belongings within reach. Hourly rounding in place.

## 2021-11-21 NOTE — ASSESSMENT & PLAN NOTE
-Moderate hyponatremia 127  -Likely due to dehydration secondary to slowly titrating TF.   -11/22 NS increased to 83ml/hr  -11/22 Phos 1.6- replenish and monitor.   -Hypokalemia during hospitalization,  resolved.   -Continue to monitor.

## 2021-11-21 NOTE — PROGRESS NOTES
A&Ox 4.  On room air.  PRN IV pain med in use.  Stress incontinence; purewick in place   + BM, + void.  Denies pain/SOB/numbness or tingling/N/V.  NPO. Tube feeding in use via cortrak.  Midline incision along with 3 ostomy/drain bags and 1 MARTHA .  SCDs on to BLE. Lovenox in use.

## 2021-11-22 ENCOUNTER — APPOINTMENT (OUTPATIENT)
Dept: RADIOLOGY | Facility: MEDICAL CENTER | Age: 66
DRG: 856 | End: 2021-11-22
Attending: INTERNAL MEDICINE
Payer: MEDICARE

## 2021-11-22 LAB
ALBUMIN SERPL BCP-MCNC: 2.4 G/DL (ref 3.2–4.9)
ALBUMIN/GLOB SERPL: 1.3 G/DL
ALP SERPL-CCNC: 79 U/L (ref 30–99)
ALT SERPL-CCNC: 28 U/L (ref 2–50)
ANION GAP SERPL CALC-SCNC: 9 MMOL/L (ref 7–16)
AST SERPL-CCNC: 25 U/L (ref 12–45)
BILIRUB SERPL-MCNC: 0.2 MG/DL (ref 0.1–1.5)
BUN SERPL-MCNC: 15 MG/DL (ref 8–22)
CALCIUM SERPL-MCNC: 7.4 MG/DL (ref 8.5–10.5)
CHLORIDE SERPL-SCNC: 98 MMOL/L (ref 96–112)
CO2 SERPL-SCNC: 20 MMOL/L (ref 20–33)
CREAT SERPL-MCNC: 0.44 MG/DL (ref 0.5–1.4)
CRP SERPL HS-MCNC: 3.77 MG/DL (ref 0–0.75)
CRP SERPL HS-MCNC: 4.41 MG/DL (ref 0–0.75)
GLOBULIN SER CALC-MCNC: 1.9 G/DL (ref 1.9–3.5)
GLUCOSE BLD-MCNC: 130 MG/DL (ref 65–99)
GLUCOSE SERPL-MCNC: 165 MG/DL (ref 65–99)
MAGNESIUM SERPL-MCNC: 1.7 MG/DL (ref 1.5–2.5)
PHOSPHATE SERPL-MCNC: 1.6 MG/DL (ref 2.5–4.5)
POTASSIUM SERPL-SCNC: 3.7 MMOL/L (ref 3.6–5.5)
PREALB SERPL-MCNC: 14.7 MG/DL (ref 18–38)
PROT SERPL-MCNC: 4.3 G/DL (ref 6–8.2)
SODIUM SERPL-SCNC: 127 MMOL/L (ref 135–145)

## 2021-11-22 PROCEDURE — 84100 ASSAY OF PHOSPHORUS: CPT

## 2021-11-22 PROCEDURE — 99233 SBSQ HOSP IP/OBS HIGH 50: CPT | Performed by: NURSE PRACTITIONER

## 2021-11-22 PROCEDURE — 84134 ASSAY OF PREALBUMIN: CPT

## 2021-11-22 PROCEDURE — 700105 HCHG RX REV CODE 258: Performed by: INTERNAL MEDICINE

## 2021-11-22 PROCEDURE — 700117 HCHG RX CONTRAST REV CODE 255: Performed by: INTERNAL MEDICINE

## 2021-11-22 PROCEDURE — 700111 HCHG RX REV CODE 636 W/ 250 OVERRIDE (IP): Performed by: ANESTHESIOLOGY

## 2021-11-22 PROCEDURE — 86140 C-REACTIVE PROTEIN: CPT

## 2021-11-22 PROCEDURE — 700102 HCHG RX REV CODE 250 W/ 637 OVERRIDE(OP): Performed by: NURSE PRACTITIONER

## 2021-11-22 PROCEDURE — 700101 HCHG RX REV CODE 250: Performed by: NURSE PRACTITIONER

## 2021-11-22 PROCEDURE — 700111 HCHG RX REV CODE 636 W/ 250 OVERRIDE (IP): Performed by: INTERNAL MEDICINE

## 2021-11-22 PROCEDURE — 700111 HCHG RX REV CODE 636 W/ 250 OVERRIDE (IP): Mod: JG | Performed by: INTERNAL MEDICINE

## 2021-11-22 PROCEDURE — 700105 HCHG RX REV CODE 258: Performed by: NURSE PRACTITIONER

## 2021-11-22 PROCEDURE — 700111 HCHG RX REV CODE 636 W/ 250 OVERRIDE (IP): Performed by: STUDENT IN AN ORGANIZED HEALTH CARE EDUCATION/TRAINING PROGRAM

## 2021-11-22 PROCEDURE — 700111 HCHG RX REV CODE 636 W/ 250 OVERRIDE (IP): Performed by: SURGERY

## 2021-11-22 PROCEDURE — 80053 COMPREHEN METABOLIC PANEL: CPT

## 2021-11-22 PROCEDURE — 97535 SELF CARE MNGMENT TRAINING: CPT

## 2021-11-22 PROCEDURE — 770001 HCHG ROOM/CARE - MED/SURG/GYN PRIV*

## 2021-11-22 PROCEDURE — A9270 NON-COVERED ITEM OR SERVICE: HCPCS | Performed by: NURSE PRACTITIONER

## 2021-11-22 PROCEDURE — 83735 ASSAY OF MAGNESIUM: CPT

## 2021-11-22 PROCEDURE — 302113 2 1/4 HIGH OUTPUT POUCH": Performed by: NURSE PRACTITIONER

## 2021-11-22 PROCEDURE — 74240 X-RAY XM UPR GI TRC 1CNTRST: CPT

## 2021-11-22 RX ADMIN — IOHEXOL 75 ML: 300 INJECTION, SOLUTION INTRAVENOUS at 11:30

## 2021-11-22 RX ADMIN — HYDROMORPHONE HYDROCHLORIDE 1 MG: 1 INJECTION, SOLUTION INTRAMUSCULAR; INTRAVENOUS; SUBCUTANEOUS at 13:38

## 2021-11-22 RX ADMIN — ONDANSETRON 4 MG: 2 INJECTION INTRAMUSCULAR; INTRAVENOUS at 22:27

## 2021-11-22 RX ADMIN — HYDROMORPHONE HYDROCHLORIDE 1 MG: 1 INJECTION, SOLUTION INTRAMUSCULAR; INTRAVENOUS; SUBCUTANEOUS at 05:33

## 2021-11-22 RX ADMIN — PIPERACILLIN SODIUM AND TAZOBACTAM SODIUM 3.38 G: 3; .375 INJECTION, POWDER, FOR SOLUTION INTRAVENOUS at 05:32

## 2021-11-22 RX ADMIN — ENOXAPARIN SODIUM 40 MG: 40 INJECTION SUBCUTANEOUS at 09:36

## 2021-11-22 RX ADMIN — MICAFUNGIN SODIUM 100 MG: 100 INJECTION, POWDER, LYOPHILIZED, FOR SOLUTION INTRAVENOUS at 09:36

## 2021-11-22 RX ADMIN — PIPERACILLIN SODIUM AND TAZOBACTAM SODIUM 3.38 G: 3; .375 INJECTION, POWDER, FOR SOLUTION INTRAVENOUS at 13:39

## 2021-11-22 RX ADMIN — HYDROMORPHONE HYDROCHLORIDE 1 MG: 1 INJECTION, SOLUTION INTRAMUSCULAR; INTRAVENOUS; SUBCUTANEOUS at 22:24

## 2021-11-22 RX ADMIN — HYDROMORPHONE HYDROCHLORIDE 1 MG: 1 INJECTION, SOLUTION INTRAMUSCULAR; INTRAVENOUS; SUBCUTANEOUS at 00:40

## 2021-11-22 RX ADMIN — PHENOL 1 SPRAY: 1.5 LIQUID ORAL at 16:10

## 2021-11-22 RX ADMIN — OCTREOTIDE ACETATE 100 MCG: 100 INJECTION, SOLUTION INTRAVENOUS; SUBCUTANEOUS at 09:35

## 2021-11-22 RX ADMIN — HYDROMORPHONE HYDROCHLORIDE 1 MG: 1 INJECTION, SOLUTION INTRAMUSCULAR; INTRAVENOUS; SUBCUTANEOUS at 18:08

## 2021-11-22 RX ADMIN — SULFAMETHOXAZOLE AND TRIMETHOPRIM 160 MG OF TRIMETHOPRIM: 80; 16 INJECTION, SOLUTION, CONCENTRATE INTRAVENOUS at 13:39

## 2021-11-22 RX ADMIN — POTASSIUM PHOSPHATE, MONOBASIC POTASSIUM PHOSPHATE, DIBASIC 15 MMOL: 224; 236 INJECTION, SOLUTION, CONCENTRATE INTRAVENOUS at 09:36

## 2021-11-22 RX ADMIN — OCTREOTIDE ACETATE 100 MCG: 100 INJECTION, SOLUTION INTRAVENOUS; SUBCUTANEOUS at 22:24

## 2021-11-22 RX ADMIN — HYDROMORPHONE HYDROCHLORIDE 1 MG: 1 INJECTION, SOLUTION INTRAMUSCULAR; INTRAVENOUS; SUBCUTANEOUS at 09:36

## 2021-11-22 RX ADMIN — PIPERACILLIN SODIUM AND TAZOBACTAM SODIUM 3.38 G: 3; .375 INJECTION, POWDER, FOR SOLUTION INTRAVENOUS at 22:24

## 2021-11-22 ASSESSMENT — PAIN DESCRIPTION - PAIN TYPE
TYPE: ACUTE PAIN
TYPE: SURGICAL PAIN

## 2021-11-22 ASSESSMENT — ENCOUNTER SYMPTOMS
FLANK PAIN: 0
PALPITATIONS: 0
ABDOMINAL PAIN: 1
HEADACHES: 0
VOMITING: 0
FEVER: 0
SPUTUM PRODUCTION: 0
FOCAL WEAKNESS: 0
COUGH: 0
MYALGIAS: 0
ABDOMINAL PAIN: 0
DEPRESSION: 1
SENSORY CHANGE: 0
SHORTNESS OF BREATH: 0
ORTHOPNEA: 0
DIARRHEA: 0
BACK PAIN: 0
BLOOD IN STOOL: 0
NERVOUS/ANXIOUS: 0
MEMORY LOSS: 0
WHEEZING: 0
CONSTIPATION: 0
FALLS: 0
CHILLS: 0
NAUSEA: 0
DIZZINESS: 0
WEAKNESS: 1
SORE THROAT: 0

## 2021-11-22 ASSESSMENT — LIFESTYLE VARIABLES: SUBSTANCE_ABUSE: 0

## 2021-11-22 NOTE — PROGRESS NOTES
Pt endorses 9/10 pain, uncontrolled with .5mg IV dilauded q4h prn. Notified on-call hospitalist, Dr. Riojas. New orders: dilauded changed to 1mg q4h IV. Pt endorsed better pain control with increased dosage.

## 2021-11-22 NOTE — CARE PLAN
Problem: Nutritional:  Goal: Nutrition support tolerated and meeting greater than 85% of estimated needs  Outcome: Met   Impact Peptide 1.5 @ goal rate of 45 mL/hr.

## 2021-11-22 NOTE — PROGRESS NOTES
Gastroenterology Progress Note               Author:  Fausto Casas M.D. Date & Time Created: 11/22/2021 8:40 AM       Patient ID:  Name:             Nils Alfonso  YOB: 1955  Age:                 66 y.o.  female  MRN:               [id number]      Referring Provider:  Ana Luisa Shearer MD      Presenting Chief Complaint:  Abdominal pain      History of Present Illness:    10/10/2021 HPI  66-year-old female PMH recurrent idiopathic pancreatitis s/p ERCP with sphincterotomy October 1, 2021 complicated by ERCP pancreatitis, sleeve gastrectomy, dyslipidemia, hypertension, osteoarthritis of the spine status post lumbar fusion who was admitted to the hospital 10/8/2021 with worsening right lower quadrant pain over the past week.  Ever since her ERCP October 1, 2021, she has been experiencing pain, intermittent subjective fevers, nausea.  In the emergency room-labs: CBC: WBC 17.8..  Sodium 130.  LFTs-WNL.  CT scan abdomen/pelvis-large irregular fluid collection with thick wall occupying most of the right abdomen surrounding the right kidney and colon.  Severe wall thickening of the descending, transverse colon, second portion of the duodenum likely reactive.  Pneumobilia with gas in the CBD.  Drain placement by IR was performed.  Currently, the abdominal pain has improved.  No vomiting.  Started on ceftriaxone and metronidazole IV.     ERCP October 1, 2021-common bile duct-dilated down to the level of the ampulla.  4 mm polyp at the distal duct seen after sphincterotomy.  8 mm sphincterotomy.  Negative for stone.  Bile duct polyp-benign with inflammatory and hyperplastic changes.  No evidence of epithelial dysplasia/neoplasia.       Interval History:  10/11/2021: interval HIDA scan showed no bile leak.  This morning she feels more comfortable, describing minimal abdominal pain but denying nausea vomiting and fevers.  She has been able to eat a little bit more and has full liquids beside her bed.  She  expresses concern about being on losartan which she says was prescribed outpatient as as needed for high blood pressures.  She also states she has not passed a bowel movement in the week which she describes not eating much.     10/12/2021 - No events overnight.  Tolerating diet without nausea or vomiting.  Had spontaneous, formed, brown bowel movement this morning.  Abdominal pain improving.  Feeling weak, but better each day.  Leukocytosis improving.  States that she is on hydrocodone at home, managed by pain management, and asks that her current hospital pain meds be changed.     10/13/2021: Stable, no new events.  Drain output is minimal, but according to patient bedside nursing is not flushing the drain.  Patient is n.p.o. for planned CT today to reevaluate fluid collection.  Leukocytosis continues to improve.  Pain has improved greatly and patient has not taken the pain medication in the last 24 hours according to her recollection.  She is having regular bowel movements without assistance of laxatives.  Patient is very hungry.     10/14/2021 - No events overnight.  Abdominal pain controlled now.  Had nausea and vomiting yesterday with solid food, but tolerating bland diet.  Blood cultures from 10/8 positive for Chryseobacterium and Candida glabrata - Pharmacy and ID aware.  Micafungin started.  CT 10/13 showing extensive multiloculated rim-enhancing air and fluid collection/abscess within the right abdomen is not significantly changed in size from the prior study. The percutaneous pigtail drainage catheter appears well-positioned within the collection.    10/15/2021: Patient transferred to [facility] overnight per surgery recommendations    10/16/2021: Drain output clearing up slightly. Dr. Dumont with surgery has recommended second IR drain to be placed. Initially interventional radiology did not think that this would be helpful for the patient, but after further discussion this is  the plan going forward. Patient is n.p.o. with plans for TPN. WBCs normalized.    10/21/21: Abdominal pain slowly improving. Pain is worse with movement. MARTHA drains purulent. No vomiting. Plan for CT scan to re-evaluate fluid collection. Tolerating FLD.     10/25/2021 Abd pian controlled. Getting IR drain today. No questions.    11/18/2021: Called back by Dr. Dumont due to persistent duodenal leak on Upper GI series. Since we last saw her she attempted to be managed conservatively with IR drains without improvement, then underwent ex lap with debridement of abscess and necrotizing fascitis. Then placement of IR drain on 11/16 due to persistent fluid collection on CT. Upper GI series with findings of perforation at junction of 2nd and 3rd portion of the duodenum.    11/18/2021: EGD/ERCP/Cortrack placement    Impressions:    1.  Etiology of upper GI series showing perforation is unclear   2.  Generous and patent biliary orifice from sphincterotomy within a duodenal diverticulum without obvious  cristóbal contrast extravasation; bile duct stented with 10mm x 8cm fully covered metal biliary stent   3.  Orifice adjacent to the biliary orifice unclear if this is the pancreatic duct opening. Difficult to visualize   4. Placement of 10F x 140cm  nasoenteric tube     11/19/2021: There were no acute events overnight.  The patient has remained afebrile and hemodynamically stable.  She continues to have diffuse abdominal pain which is unchanged from prior to her procedure yesterday.  She continues to have drainage from the 2 abdominal opening/wounds.  She denies nausea/vomiting but continues to have fatigue and weakness.    11/22/2021 - No events over the weekend.  Patient reports significantly less output from drains.  Tolerating TFs well.  Complaining of waves of abdominal pain and nausea.  Eager for UGI reevaluation of duodenal perforation.      Review of Systems:  Review of Systems   Constitutional: Positive for  malaise/fatigue. Negative for chills and fever.   Respiratory: Negative for cough, shortness of breath and wheezing.    Cardiovascular: Negative for chest pain and leg swelling.   Gastrointestinal: Positive for abdominal pain. Negative for constipation, melena, nausea and vomiting.   Genitourinary: Negative for dysuria and urgency.   Musculoskeletal: Negative for falls and myalgias.   Skin: Negative for itching and rash.   Neurological: Positive for weakness. Negative for focal weakness and headaches.   Psychiatric/Behavioral: Negative for memory loss and substance abuse.             Past Medical History:  Past Medical History:   Diagnosis Date   • Allergy, unspecified not elsewhere classified    • Anemia     not currently   • Anesthesia     PONV (Demerol/ Dilaudid)   • Arthritis     bilateral shoulders,sacrum, osteo   • Backpain     coccyx and sacrum   • Bronchitis 2010   • Cataract     bilateral IOLI   • Degeneration of cervical intervertebral disc     C5-6,C6-7   • Dental disorder     partial upper and lower   • Heart burn    • Hiatus hernia syndrome     not a problem after gastric sleeve   • High cholesterol     resolved after gastric surgery   • Hyperlipidemia    • Hypertension     off all medication, reveresed after gastric sleeve   • Muscle disorder    • Pain 05/16/2018    low back and SI joints and sacrum   • Reactive airway disease     rescue inhaler not needed unless with bronchitis     Active Hospital Problems    Diagnosis    • Electrolyte abnormality [E87.8]    • Advanced care planning/counseling discussion [Z71.89]    • Generalized weakness [R53.1]    • Hypotension [I95.9]    • Acute respiratory failure with hypoxia (HCC) [J96.01]    • Duodenal perforation (HCC) [K63.1]    • Postprocedural retroperitoneal abscess [K68.11]    • Antibiotic-associated diarrhea [K52.1, T36.95XA]    • Bacteremia due to Gram-negative bacteria and fungemia [R78.81]    • Normocytic anemia [D64.9]    • Sepsis due to  intraabdominal fluid collection/abscess, bacteremia and fungemia (HCC) [A41.9]    • Hyperlipidemia [E78.5]    • Primary hypertension [I10]          Past Surgical History:  Past Surgical History:   Procedure Laterality Date   • PB UPPER GI ENDOSCOPY,DIAGNOSIS N/A 11/18/2021    Procedure: GASTROSCOPY with stent placement;  Surgeon: Migel Lawrence M.D.;  Location: SURGERY SAME DAY [facility];  Service: Gastroenterology   • PB ERCP,DIAGNOSTIC N/A 11/18/2021    Procedure: ERCP (ENDOSCOPIC RETROGRADE CHOLANGIOPANCREATOGRAPHY);  Surgeon: Migel Lawrence M.D.;  Location: SURGERY SAME DAY [facility];  Service: Gastroenterology   • PB PLACE PERCUT GASTROSTOMY TUBE N/A 11/18/2021    Procedure: GASTROSCOPY, WITH FEEDING TUBE INSERTION;  Surgeon: Migel Lawrence M.D.;  Location: SURGERY SAME DAY [facility];  Service: Gastroenterology   • BILIARY STENT PLACEMENT N/A 11/18/2021    Procedure: INSERTION, STENT, BILE DUCT;  Surgeon: Migel Lawrence M.D.;  Location: SURGERY SAME DAY [facility];  Service: Gastroenterology   • PB EXPLORATORY OF ABDOMEN  11/5/2021    Procedure: LAPAROTOMY, EXPLORATORY;  Surgeon: Jaden Cook M.D.;  Location: [facility];  Service: General   • PB ULTRASONIC GUIDANCE, INTRAOPERATIVE  11/5/2021    Procedure: ULTRASOUND GUIDANCE;  Surgeon: Jaden Cook M.D.;  Location: [facility];  Service: General   • ABDOMINAL ABSCESS DRAINAGE  11/5/2021    Procedure: DRAINAGE, ABSCESS, ABDOMEN - PERITONEAL AND RETROPERITONEAL;  Surgeon: Jaden Cook M.D.;  Location: [facility];  Service: General   • PB ERCP,DIAGNOSTIC  10/1/2021    Procedure: ERCP (ENDOSCOPIC RETROGRADE CHOLANGIOPANCREATOGRAPHY);  Surgeon: Fausto Casas M.D.;  Location: [facility];  Service: Gastroenterology   • COLONOSCOPY  8/8/2018    Procedure: COLONOSCOPY;  Surgeon: Shukri Condon M.D.;  Location: [facility];  Service: General   • GASTROSCOPY  5/23/2018    Procedure: GASTROSCOPY;   Surgeon: Fausto Casas M.D.;  Location: SURGERY [facility];  Service: Gastroenterology   • EGD W/ENDOSCOPIC ULTRASOUND  5/23/2018    Procedure: EGD W/ENDOSCOPIC ULTRASOUND- RADIAL UPPER;  Surgeon: Fausto Casas M.D.;  Location: SURGERY [facility];  Service: Gastroenterology   • CATARACT PHACO WITH IOL Left 4/18/2017    Procedure: CATARACT PHACO WITH IOL;  Surgeon: Stuart Estevez M.D.;  Location: SURGERY SAME DAY [facility];  Service:    • CATARACT PHACO WITH IOL Right 4/4/2017    Procedure: CATARACT PHACO WITH IOL;  Surgeon: Stuart Estevez M.D.;  Location: SURGERY [facility];  Service:    • GASTRIC SLEEVE LAPAROSCOPY  10/19/2016    Procedure: GASTRIC SLEEVE LAPAROSCOPY, HIATAL HERNIA;  Surgeon: Shukri Condon M.D.;  Location: SURGERY [facility];  Service:    • LIVER BIOPSY LAPAROSCOPIC  10/19/2016    Procedure: LIVER BIOPSY LAPAROSCOPIC;  Surgeon: Shukri Condon M.D.;  Location: [facility];  Service:    • GASTROSCOPY N/A 8/26/2016    Procedure: GASTROSCOPY;  Surgeon: Shukri Condon M.D.;  Location: SURGERY [facility];  Service:    • ROTATOR CUFF REPAIR Right 7/7/2016   • ROTATOR CUFF REPAIR Left 5/2015   • HYSTERECTOMY ROBOTIC  1/2/2009    Performed by MEG VILLEGAS at [facility]   • LUMBAR FUSION ANTERIOR  2007    Dr Hillman, L3-S1 fusion   • CHOLECYSTECTOMY  1996    laparoscopic   • TUBAL LIGATION  1985   • GASTRIC RESECTION  1982    gastric stapling   • TONSILLECTOMY AND ADENOIDECTOMY  1967   • PRIMARY C SECTION  1976, 1979, 1985    x3           Hospital Medications:  Current Facility-Administered Medications   Medication Dose Frequency Provider Last Rate Last Admin   • NS infusion   Continuous Urszula Mary, A.P.R.N. 75 mL/hr at 11/21/21 0910 New Bag at 11/21/21 0910   • HYDROmorphone (Dilaudid) injection 1 mg  1 mg Q4HRS PRN Robin Riojas DUrielO.   1 mg at 11/22/21 0554   • sore throat spray (CHLORASEPTIC) 1 Spray  1 Spray Q2HRS  PRN ELENA LouPrUielRANAM   1 [place] at 11/21/21 2136   • sulfamethoxazole-trimethoprim (SEPTRA) 160 mg of trimethoprim in dextrose 5% 250 mL IVPB  160 mg of trimethoprim Q12HRS GAMALIEL FletcherRANAM   Stopped at 11/22/21 0040   • Pharmacy Consult: Enteral tube insertion - review meds/change route/product selection  1 Each PHARMACY TO DOSE GAMALIEL FletcherRANAM       • ondansetron (ZOFRAN ODT) dispertab 4 mg  4 mg Q4HRS PRN ELENA LouPUrielRANAM       • octreotide (SANDOSTATIN) injection 100 mcg  100 mcg BID Jaden Cook M.D.   100 mcg at 11/21/21 2135   • ondansetron (ZOFRAN) syringe/vial injection 4 mg  4 mg Q4HRS PRN Tomi Ledezma M.D.   4 mg at 11/21/21 2045   • enoxaparin (LOVENOX) inj 40 mg  40 mg DAILY Jaden Cook M.D.   40 mg at 11/21/21 0959   • piperacillin-tazobactam (ZOSYN) 3.375 g in  mL IVPB  3.375 g Q8HRS Tamra Mcfarland M.D.   Stopped at 11/22/21 0932   • micafungin (MYCAMINE) 100 mg in  mL ivpb  100 mg Q24HRS Tamra Mcfarland M.D.   Stopped at 11/21/21 1010   Last reviewed on 11/5/2021  8:26 AM by Cuca Mejia R.N.        Current Outpatient Medications:  Medications Prior to Admission   Medication Sig Dispense Refill Last Dose   • cyclobenzaprine (FLEXERIL) 10 mg Tab Take 10 mg by mouth every evening.   9/30/2021 at [facility]   • ezetimibe (ZETIA) 10 MG Tab Take 10 mg by mouth every evening.   9/30/2021 at [facility]   • Magnesium 400 MG Tab Take 400 mg by mouth every evening.   9/30/2021 at [facility]   • gabapentin (NEURONTIN) 300 MG Cap Take 600 mg by mouth 2 Times a Day.   9/30/2021 at [facility]   • BIOTIN PO Take 1 Tablet by mouth every day.   9/30/2021 at [facility]   • HYDROcodone/acetaminophen (NORCO)  MG Tab Take 0.5 Tablets by mouth every 6 hours as needed for Moderate Pain.   10/8/2021 at [facility]   • Cholecalciferol (VITAMIN D3 PO) Take 1 Each by mouth every day.   9/30/2021 at [facility]   • ondansetron (ZOFRAN ODT) 4 MG TABLET DISPERSIBLE Take 4 mg by mouth  "every 6 hours as needed for Nausea.   10/8/2021 at [facility]         Medication Allergies:  Allergies   Allergen Reactions   • Ampicillin Rash     Rash  Tolerates Zosyn 10/21   • Cephalexin Diarrhea, Vomiting and Nausea     .   • Clindamycin Vomiting     \"cleocin\"   • Codeine      Severe stomach pain, cramps, spasms   • Demerol Vomiting   • Levofloxacin Unspecified     Numbness     • Tetracyclines Rash     .   • Tizanidine Itching   • Morphine Vomiting   • Pcn [Penicillins] Itching     Tolerates Zosyn 10/21   • Sulfa Drugs Itching         Family Medical History:  Family History   Problem Relation Age of Onset   • Stroke Mother    • Cancer Father         larynx   • Diabetes Brother          Social History:  Social History     Socioeconomic History   • Marital status:      Spouse name: Not on file   • Number of children: Not on file   • Years of education: Not on file   • Highest education level: Not on file   Occupational History   • Not on file   Tobacco Use   • Smoking status: Former Smoker     Packs/day: 0.25     Years: 0.10     Pack years: 0.02     Types: Cigarettes     Quit date: 1973     Years since quittin.9   • Smokeless tobacco: Never Used   Vaping Use   • Vaping Use: Never used   Substance and Sexual Activity   • Alcohol use: No   • Drug use: No   • Sexual activity: Not on file     Comment:    Other Topics Concern   • Not on file   Social History Narrative   • Not on file     Social Determinants of Health     Financial Resource Strain:    • Difficulty of Paying Living Expenses: Not on file   Food Insecurity:    • Worried About Running Out of Food in the Last Year: Not on file   • Ran Out of Food in the Last Year: Not on file   Transportation Needs:    • Lack of Transportation (Medical): Not on file   • Lack of Transportation (Non-Medical): Not on file   Physical Activity:    • Days of Exercise per Week: Not on file   • Minutes of Exercise per Session: Not on file   Stress:    • Feeling of " "Stress : Not on file   Social Connections:    • Frequency of Communication with Friends and Family: Not on file   • Frequency of Social Gatherings with Friends and Family: Not on file   • Attends [employer] Services: Not on file   • Active Member of Clubs or Organizations: Not on file   • Attends Club or Organization Meetings: Not on file   • Marital Status: Not on file   Intimate Partner Violence:    • Fear of Current or Ex-Partner: Not on file   • Emotionally Abused: Not on file   • Physically Abused: Not on file   • Sexually Abused: Not on file   Housing Stability:    • Unable to Pay for Housing in the Last Year: Not on file   • Number of Places Lived in the Last Year: Not on file   • Unstable Housing in the Last Year: Not on file         Vital signs:  Weight/BMI: Body mass index is 28.13 kg/m².  /63   Pulse 72   Temp 36.7 °C (98.1 °F) (Temporal)   Resp 16   Ht 1.6 m (5' 2.99\")   Wt 72 kg (158 lb 11.7 oz)   SpO2 94%   Vitals:    11/21/21 1517 11/21/21 1945 11/22/21 0330 11/22/21 0824   BP: 121/64 127/67 118/76 129/63   Pulse: 65 65 84 72   Resp: 18 18 18 16   Temp: 37.2 °C (99 °F) 36.5 °C (97.7 °F) 36.1 °C (97 °F) 36.7 °C (98.1 °F)   TempSrc: Temporal Temporal Temporal Temporal   SpO2: 92% 94% 96% 94%   Weight:       Height:         Oxygen Therapy:  Pulse Oximetry: 94 %, O2 (LPM): 0, O2 Delivery Device: None - Room Air    Intake/Output Summary (Last 24 hours) at 11/22/2021 0840  Last data filed at 11/22/2021 0330  Gross per 24 hour   Intake 740 ml   Output 1750 ml   Net -1010 ml         Physical Exam:  Physical Exam  Vitals and nursing note reviewed.   Constitutional:       Appearance: She is ill-appearing.   HENT:      Head: Normocephalic and atraumatic.      Right Ear: External ear normal.      Left Ear: External ear normal.      Nose: Nose normal.      Mouth/Throat:      Mouth: Mucous membranes are dry.   Cardiovascular:      Rate and Rhythm: Normal rate and regular rhythm.      Pulses: Normal " pulses.      Heart sounds: Normal heart sounds.   Pulmonary:      Effort: Pulmonary effort is normal.      Breath sounds: Normal breath sounds.      Comments: Decreased breath sounds in bases  Abdominal:      Tenderness: There is abdominal tenderness (RUQ).      Comments: Right sided drain and ostomy appliances to skin over sites that are draining dark brown liquid   Neurological:      General: No focal deficit present.      Mental Status: She is alert and oriented to person, place, and time.   Psychiatric:         Thought Content: Thought content normal.         Judgment: Judgment normal.             Labs:  Recent Labs     11/21/21 0030 11/22/21 0035   SODIUM 127* 127*   POTASSIUM 4.0 3.7   CHLORIDE 98 98   CO2 22 20   BUN 16 15   CREATININE 0.57 0.44*   MAGNESIUM 1.9 1.7   PHOSPHORUS 2.4* 1.6*   CALCIUM 8.0* 7.4*     Recent Labs     11/20/21  0320 11/21/21 0030 11/22/21 0035   ALTSGPT  --  30 28   ASTSGOT  --  33 25   ALKPHOSPHAT  --  88 79   TBILIRUBIN  --  0.2 0.2   PREALBUMIN 18.3  --   --    GLUCOSE  --  106* 165*     Recent Labs     11/21/21 0030 11/22/21 0035   WBC 5.2  --    ASTSGOT 33 25   ALTSGPT 30 28   ALKPHOSPHAT 88 79   TBILIRUBIN 0.2 0.2     Recent Labs     11/21/21 0030   RBC 2.95*   HEMOGLOBIN 8.6*   HEMATOCRIT 26.3*   PLATELETCT 234     Recent Results (from the past 24 hour(s))   Comp Metabolic Panel    Collection Time: 11/22/21 12:35 AM   Result Value Ref Range    Sodium 127 (L) 135 - 145 mmol/L    Potassium 3.7 3.6 - 5.5 mmol/L    Chloride 98 96 - 112 mmol/L    Co2 20 20 - 33 mmol/L    Anion Gap 9.0 7.0 - 16.0    Glucose 165 (H) 65 - 99 mg/dL    Bun 15 8 - 22 mg/dL    Creatinine 0.44 (L) 0.50 - 1.40 mg/dL    Calcium 7.4 (L) 8.5 - 10.5 mg/dL    AST(SGOT) 25 12 - 45 U/L    ALT(SGPT) 28 2 - 50 U/L    Alkaline Phosphatase 79 30 - 99 U/L    Total Bilirubin 0.2 0.1 - 1.5 mg/dL    Albumin 2.4 (L) 3.2 - 4.9 g/dL    Total Protein 4.3 (L) 6.0 - 8.2 g/dL    Globulin 1.9 1.9 - 3.5 g/dL    A-G Ratio  1.3 g/dL   Phosphorus    Collection Time: 11/22/21 12:35 AM   Result Value Ref Range    Phosphorus 1.6 (L) 2.5 - 4.5 mg/dL   CRP Quantitive (Non-Cardiac)    Collection Time: 11/22/21 12:35 AM   Result Value Ref Range    Stat C-Reactive Protein 4.41 (H) 0.00 - 0.75 mg/dL   Magnesium    Collection Time: 11/22/21 12:35 AM   Result Value Ref Range    Magnesium 1.7 1.5 - 2.5 mg/dL   ESTIMATED GFR    Collection Time: 11/22/21 12:35 AM   Result Value Ref Range    GFR If African American >60 >60 mL/min/1.73 m 2    GFR If Non African American >60 >60 mL/min/1.73 m 2         Radiology Review:  DX-ABDOMEN FOR TUBE PLACEMENT   Final Result         1.  Nonspecific bowel gas pattern.   2.  Dobbhoff tube with tip terminating overlying the expected location of the third or fourth duodenal segment.   3.  Hazy right lower lobe infiltrates.      DX-ABDOMEN FOR TUBE PLACEMENT   Final Result      Feeding tube extends to the region of the stomach and duodenum with tip at the level of the proximal end of the jejunum.      DX-ABDOMEN FOR TUBE PLACEMENT   Final Result      Enteric tube terminates over the duodenal jejunal junction      Covered common bile duct stent      No contrast extravasation is seen      [id number] STENT - TUBE   Final Result      Portable fluoroscopic images obtained during ERCP biliary stent placement for localization purposes.      DX-UPPER GI-SERIES WITH KUB   Final Result      Duodenal perforation at the junction of the second and third portions of duodenum as noted on key images.      CT-ABORTED CT PROCEDURE   Final Result      Interval decrease in size of the RIGHT retroperitoneal fluid collection which contains a surgical drain. Because from bowel is possible. No additional drain was placed.            CT-ABDOMEN-PELVIS WITH   Final Result      1.  Slight interval increase in size in fluid collection the right midabdomen with drain in place.      2.  Slight interval increase in size in trace bilateral  pleural effusions, right greater than left with bibasilar atelectasis.      3.  Pneumobilia.      CT-ABDOMEN-PELVIS WITH   Final Result         1. The components in the gallbladder fossa and right flank of the complex fluid collection are mostly resolved. There is still a small residual fluid collection in the perinephric space surrounding the inferior right kidney. Right lower quadrant MARTHA drain    and right upper quadrant catheter are outside of this residual collection.   2. Air-fluid levels throughout the colon could relate to ileus.   3. Bilateral pleural effusions with bibasilar atelectasis.   4. Pneumobilia.      CT-ABDOMEN-PELVIS WITH   Final Result      1.  Slight interval decrease in size of the large RIGHT peritoneal abscess which now contains 3 drains   2.  Decreased atelectasis with persistent opacity in the RIGHT lung base likely atelectasis rather than pneumonia   3.  Small LEFT renal cyst   4.  Prior cholecystectomy with ongoing pneumobilia      CT-DRAINAGE-CATH EXCHANGE   Final Result         1. Organized pancreatic pseudocyst Drainage with CT guidance.      CT-ABDOMEN-PELVIS WITH   Final Result      1.  There has been interval placement of an additional inferior lateral pigtail catheter within the complex right abdominal abscess. The other 2 pigtail catheters are stable in appearance.   2.  There has been no significant interval decrease in size of the large complex loculated abscess collection in the right abdomen.   3.  There is no new fluid collection.   4.  Gallbladder is surgically absent with continued pneumobilia.   5.  Interval decrease in the dependent pleural effusion with minimal airspace disease, likely atelectasis.      IR-DRAINAGE-CATH EXCHANGE   Final Result      1.  Successful left-sided drainage catheter removal.   2.  Successful image guided superior right drainage catheter replacement.      Plan: Suction drainage. Monitor outputs. Please contact interventional radiology if there  is any concern for tube dysfunction. Suggest routine tube maintenance at 3 months intervals if the tube remains in place.         CT-DRAIN-PERITONEAL   Final Result      1.  CT GUIDED PERITONEAL RLQ abdominal CATHETER DRAINAGE.   2.  THE CURRENT PLAN IS TO MONITOR DRAINAGE OUTPUT AND OBTAIN A FOLLOWUP CT SCAN IN 5-7 DAYS IF CLINICALLY INDICATED.      CT-ABDOMEN-PELVIS WITH   Final Result         1. Grossly unchanged irregular fluid collection occupying the right abdomen surrounding the right kidney and right colon extending to midline. Additional pigtail catheter in the component about midline anterior abdomen. Both pigtail catheters seem to be    within the collection.      2. Small right pleural effusion with right basilar atelectasis.               DX-CHEST-LIMITED (1 VIEW)   Final Result      1.  Right basilar atelectasis or consolidation. Small right pleural effusion not excluded.   2.  Atherosclerotic plaque.      CT-DRAIN-PERITONEAL   Final Result      1.  CT guided pancreatic pseudocyst catheter drainage.   2.  The current plan is to obtain a follow-up CT in 5-7 days..      IR-PICC LINE PLACEMENT W/ GUIDANCE > AGE 5   Final Result                  Ultrasound-guided PICC placement performed by qualified nursing staff as    above.          CT-ABDOMEN-PELVIS WITH    (Results Pending)   DX-UPPER GI-SERIES WITH KUB    (Results Pending)         MDM (Data Review):   -Records reviewed and summarized in current documentation  -I personally reviewed and interpreted the laboratory results  -I personally reviewed the radiology images        Medical Decision Making, by Problem:  Active Hospital Problems    Diagnosis    • Bacteremia due to Gram-negative bacteria and fungemia [R78.81]    • Sepsis due to intraabdominal fluid collection/abscess (HCC) [A41.9]    • Hyperlipidemia [E78.5]    • Hypertension [I10]        66-year-old female with a history of recurrent idiopathic pancreatitis s/p ERCP with biliary sphincterotomy  and biopsy of a distal BD polyp on October 1, 2021.  Postop complications-pancreatitis.  Ever since then, complaints of subjective fevers, progressive abdominal pain, nausea/vomiting.  Began to have worsening right lower quadrant pain over the past 5 days.  CT scan abdomen/pelvis large irregular fluid collection with thick wall occupying most of the right abdomen surrounding right kidney and right colon.  Additional fluid and gas collection in the midline abdomen anterior to the pancreas concerning for abscess.  Severe wall thickening of the descending and transverse colon, second portion of duodenum likely reactive.  Pneumobilia with gas in the CBD, intrahepatic bile ducts as well as pancreatic ducts (likely secondary to recent ERCP with sphincterotomy).  IR placed a drain with improvement in abdominal pain initially, but then drain output slowed to no output. Nursing was discovered to have have flushed the drain for at least 2 days. After flush by bedside nurse, reportedly 300 cc of fluid came out. Fluid bilirubin 0.6. Fluid amylase >7500. Blood cultures from 10/8 positive for Chryseobacterium and Candida glabrata. ID following. Repeat CT with no change in fluid collection and new pelvic fluid collection/abscess. Drain placement ineffective. Patient underwent exlap with debridement of retroperitoneal abscess and necrotizing fascitis on 11/5 by Dr. Dumont. Repeat IR drain on 11/16 and now with recurrent duodenal leak.  ERCP last week with Fully covered metal stent placed.         Assessment/Recommendations:  ASSESSMENT:  1.  Sepsis due to intra-abdominal fluid collection/abscess-Duodenal perforation near ampulla from previous ERCP. S/P ERCP with placement of 10mm x 8cm fully covered metal stent in the bile duct  2.  History of pancreatitis  3.  Intra-abdominal abscess and necrotizing fascitis, s/p debridement 11/5  4.  History of morbid obesity s/p laparoscopic sleeve  5.  Euvolemic hyponatremia        PLAN:  -Monitor for post procedure complications  -Repeat gastrografin Upper GI series today and CT scan  -Appreciate surgery recommendations  -Appreciate Infectious Disease recommendations  -Continue to use nasoenteric tube for tube feed, and discontinue TPN  -Strict NPO otherwise      Fausto Casas M.D.    Thank you for inviting me to participate in the care of this patient. Please do not hesitate to call GI consultants with additional questions/concerns or changes in the patient's clinical status at [phone].

## 2021-11-22 NOTE — CARE PLAN
The patient is Stable - Low risk of patient condition declining or worsening    Shift Goals  Clinical Goals: Pain control, nausea control, rest  Patient Goals: Pain control, nausea control, rest  Family Goals: Comfort and rest      Problem: Pain - Standard  Goal: Alleviation of pain or a reduction in pain to the patient’s comfort goal  Outcome: Progressing     Problem: Fall Risk  Goal: Patient will remain free from falls  Outcome: Progressing     Problem: Communication  Goal: The ability to communicate needs accurately and effectively will improve  Outcome: Progressing       Progress made toward(s) clinical / shift goals:      Patient is able to effectively communicate needs.  Patient's pain has been difficult to control today.   Fall education provided and patient verbalizes understanding.       Patient is not progressing towards the following goals:

## 2021-11-22 NOTE — PROGRESS NOTES
Hospital Medicine Daily Progress Note    Date of Service  11/22/2021    Chief Complaint  Nils Alfonso is a 66 y.o. female admitted 10/15/2021 with abdominal pain    Hospital Course  Initially admitted to [facility] for evaluation of abdominal pain after recent ERCP with polypectomy and sphincterotomy and noted to have large intra-abdominal fluid collection. Multiple IR drains had been placed, but her infection worsened.  She was transferred to [facility] for escalation of her surgical needs. She underwent ex lap w I/D of multiple loculated abscesses and debridement of nec fasciitis w Dr. ST 11/5/21. Prev cx w Stenotrophomonas maltophilia, mixed yeast, E faecalis, E coli and Chryseobacterium in the blood.  ID was consulted and placed patient on Bactrim twice daily, Zosyn, micafungin end date 11/24/2021.  Patient had decreased output from drains.  Repeat CT on 11/15/2021 showed increase as a fluid collection in the right mid abdomen and slight increase in trace bilateral pleural effusions.  Patient to return to IR.     Interval Problem Update  11/16/2021: No acute overnight events.  Patient pending IR for procedural drainage today.   11/17/2021: No acute overnight events.  Large drain in RLQ had fallen out, as seen in IR.  IR saw that abdominal fluid collection is smaller and noted drain was indicated.  Patient seen by Dr. Cook this morning.  Plan for upper GI today.  11/18: No overnight events. UGI showed leak at perforation in 2-3 portion of duodenum. Seen by General Surgery. Plan for duodenal stent placement tomorrow.   11/19: Endoscope/ERCP, biliary stent, cortrak placement done yesterday with Dr. Lawrence. KUB confirmed NGT placement. Seen by Dr. Cook this morning. Per General surgery, holding PO meds/changed to IV and plan for repeat upper GI next week.   11/20:  at bedside. Tube feedings started last night, increasing as tolerated. Dark green OP noted in collection bag/drain. Plan for upper GI early  this week. Patient tearful regarding pain and drain sites- palliative care consult placed.   11/21:  at bedside. Patient reports pain and nausea well controlled and appears in good spirits. Tolerating TF at this time. Incontinent of stool this morning. Decreased amount of output in collection bags. Aware of plan for repeat upper GI in the upcoming days.   11/22:  Seen by GI. Repeat UGI done today. No evidence of duodenal leak.     I have personally seen and examined the patient at bedside. I discussed the plan of care with patient, family and bedside RN.    Consultants/Specialty  general surgery, GI and palliative care    Code Status  Full Code    Disposition  Patient is not medically cleared.   Anticipate discharge to rehab versus home with home health.      Review of Systems  Review of Systems   Constitutional: Negative for chills, fever and malaise/fatigue.   HENT: Negative for congestion and sore throat.    Respiratory: Negative for cough, sputum production and shortness of breath.    Cardiovascular: Positive for leg swelling (baseline per patient). Negative for chest pain, palpitations and orthopnea.   Gastrointestinal: Negative for abdominal pain, blood in stool, diarrhea, nausea and vomiting.   Genitourinary: Negative for dysuria, flank pain and urgency.   Musculoskeletal: Negative for back pain (occassional), falls and joint pain (hip pain).   Skin: Negative for itching and rash.   Neurological: Positive for weakness. Negative for dizziness, sensory change, focal weakness and headaches.   Psychiatric/Behavioral: Positive for depression. The patient is not nervous/anxious.         Physical Exam  Temp:  [36.1 °C (97 °F)-37.2 °C (99 °F)] 36.1 °C (97 °F)  Pulse:  [65-84] 84  Resp:  [18] 18  BP: (118-127)/(64-76) 118/76  SpO2:  [92 %-96 %] 96 %    Physical Exam  Vitals and nursing note reviewed.   Constitutional:       General: She is not in acute distress.     Appearance: Normal appearance. She is not  ill-appearing or toxic-appearing.   HENT:      Head: Normocephalic and atraumatic.      Nose: Nose normal.      Comments: Cortrak in place to right nare.      Mouth/Throat:      Mouth: Mucous membranes are moist.      Pharynx: Oropharynx is clear.   Eyes:      General: No scleral icterus.     Extraocular Movements: Extraocular movements intact.      Pupils: Pupils are equal, round, and reactive to light.   Cardiovascular:      Rate and Rhythm: Normal rate and regular rhythm.      Pulses: Normal pulses.      Heart sounds: Normal heart sounds. No murmur heard.      Pulmonary:      Effort: Pulmonary effort is normal. No respiratory distress.      Breath sounds: No wheezing or rhonchi.      Comments: Diminished bilateral lower lobe sounds.   Chest:      Chest wall: No tenderness.   Abdominal:      General: Abdomen is flat. Bowel sounds are normal. There is no distension.      Palpations: Abdomen is soft.      Tenderness: There is no abdominal tenderness (RUQ/RLQ > left side).      Comments: Midline abdominal incision. Well approximated with staples- C/D/I without surrounding erythema or edema.    MARTHA drain and collection bag to RUQ  Collection bag to previous RLQ drain site.  Small/decreased amount of amount of dark green/brown drainage in collection bags and MARTHA drain.    Musculoskeletal:         General: Normal range of motion.      Cervical back: Normal range of motion and neck supple.      Right lower leg: Edema present.      Left lower leg: Edema present.   Skin:     General: Skin is warm and dry.      Capillary Refill: Capillary refill takes less than 2 seconds.      Coloration: Skin is not jaundiced.   Neurological:      General: No focal deficit present.      Mental Status: She is alert and oriented to person, place, and time. Mental status is at baseline.   Psychiatric:         Behavior: Behavior normal.         Thought Content: Thought content normal.         Judgment: Judgment normal.          Fluids    Intake/Output Summary (Last 24 hours) at 11/22/2021 0821  Last data filed at 11/22/2021 0330  Gross per 24 hour   Intake 740 ml   Output 1750 ml   Net -1010 ml       Laboratory  Recent Labs     11/21/21  0030   WBC 5.2   RBC 2.95*   HEMOGLOBIN 8.6*   HEMATOCRIT 26.3*   MCV 89.2   MCH 29.2   MCHC 32.7*   RDW 49.1   PLATELETCT 234   MPV 9.8     Recent Labs     11/21/21  0030 11/22/21  0035   SODIUM 127* 127*   POTASSIUM 4.0 3.7   CHLORIDE 98 98   CO2 22 20   GLUCOSE 106* 165*   BUN 16 15   CREATININE 0.57 0.44*   CALCIUM 8.0* 7.4*                   Imaging  DX-UPPER GI-SERIES WITH KUB   Final Result      1.  No evidence for duodenal leak.   2.  Reflux of contrast seen into the distal common bile duct at the site of biliary stent, consistent with prior sphincterotomy.      DX-ABDOMEN FOR TUBE PLACEMENT   Final Result         1.  Nonspecific bowel gas pattern.   2.  Dobbhoff tube with tip terminating overlying the expected location of the third or fourth duodenal segment.   3.  Hazy right lower lobe infiltrates.      DX-ABDOMEN FOR TUBE PLACEMENT   Final Result      Feeding tube extends to the region of the stomach and duodenum with tip at the level of the proximal end of the jejunum.      DX-ABDOMEN FOR TUBE PLACEMENT   Final Result      Enteric tube terminates over the duodenal jejunal junction      Covered common bile duct stent      No contrast extravasation is seen      [id number] STENT - TUBE   Final Result      Portable fluoroscopic images obtained during ERCP biliary stent placement for localization purposes.      DX-UPPER GI-SERIES WITH KUB   Final Result      Duodenal perforation at the junction of the second and third portions of duodenum as noted on key images.      CT-ABORTED CT PROCEDURE   Final Result      Interval decrease in size of the RIGHT retroperitoneal fluid collection which contains a surgical drain. Because from bowel is possible. No additional drain was placed.             CT-ABDOMEN-PELVIS WITH   Final Result      1.  Slight interval increase in size in fluid collection the right midabdomen with drain in place.      2.  Slight interval increase in size in trace bilateral pleural effusions, right greater than left with bibasilar atelectasis.      3.  Pneumobilia.      CT-ABDOMEN-PELVIS WITH   Final Result         1. The components in the gallbladder fossa and right flank of the complex fluid collection are mostly resolved. There is still a small residual fluid collection in the perinephric space surrounding the inferior right kidney. Right lower quadrant MARTHA drain    and right upper quadrant catheter are outside of this residual collection.   2. Air-fluid levels throughout the colon could relate to ileus.   3. Bilateral pleural effusions with bibasilar atelectasis.   4. Pneumobilia.      CT-ABDOMEN-PELVIS WITH   Final Result      1.  Slight interval decrease in size of the large RIGHT peritoneal abscess which now contains 3 drains   2.  Decreased atelectasis with persistent opacity in the RIGHT lung base likely atelectasis rather than pneumonia   3.  Small LEFT renal cyst   4.  Prior cholecystectomy with ongoing pneumobilia      CT-DRAINAGE-CATH EXCHANGE   Final Result         1. Organized pancreatic pseudocyst Drainage with CT guidance.      CT-ABDOMEN-PELVIS WITH   Final Result      1.  There has been interval placement of an additional inferior lateral pigtail catheter within the complex right abdominal abscess. The other 2 pigtail catheters are stable in appearance.   2.  There has been no significant interval decrease in size of the large complex loculated abscess collection in the right abdomen.   3.  There is no new fluid collection.   4.  Gallbladder is surgically absent with continued pneumobilia.   5.  Interval decrease in the dependent pleural effusion with minimal airspace disease, likely atelectasis.      IR-DRAINAGE-CATH EXCHANGE   Final Result      1.  Successful  left-sided drainage catheter removal.   2.  Successful image guided superior right drainage catheter replacement.      Plan: Suction drainage. Monitor outputs. Please contact interventional radiology if there is any concern for tube dysfunction. Suggest routine tube maintenance at 3 months intervals if the tube remains in place.         CT-DRAIN-PERITONEAL   Final Result      1.  CT GUIDED PERITONEAL RLQ abdominal CATHETER DRAINAGE.   2.  THE CURRENT PLAN IS TO MONITOR DRAINAGE OUTPUT AND OBTAIN A FOLLOWUP CT SCAN IN 5-7 DAYS IF CLINICALLY INDICATED.      CT-ABDOMEN-PELVIS WITH   Final Result         1. Grossly unchanged irregular fluid collection occupying the right abdomen surrounding the right kidney and right colon extending to midline. Additional pigtail catheter in the component about midline anterior abdomen. Both pigtail catheters seem to be    within the collection.      2. Small right pleural effusion with right basilar atelectasis.               DX-CHEST-LIMITED (1 VIEW)   Final Result      1.  Right basilar atelectasis or consolidation. Small right pleural effusion not excluded.   2.  Atherosclerotic plaque.      CT-DRAIN-PERITONEAL   Final Result      1.  CT guided pancreatic pseudocyst catheter drainage.   2.  The current plan is to obtain a follow-up CT in 5-7 days..      IR-PICC LINE PLACEMENT W/ GUIDANCE > AGE 5   Final Result                  Ultrasound-guided PICC placement performed by qualified nursing staff as    above.          CT-ABDOMEN-PELVIS WITH    (Results Pending)        Assessment/Plan  * Duodenal perforation (HCC)- (present on admission)  Assessment & Plan  After ERCP with retroperitoneal abscess and bacteremia  Uncertain etiology - ddx includes pancreatitis, bile leak, iatrogenic  Pepcid BID  11/9: Concern perforation may have reopened.  Surgery team recommending NPO status and TPN  11/10:  Start octreotide.   11/17: Patient to go for upper GI today to see if leak at perforated site  near ampulla.  If leaking, general surgeon to discuss with GI regarding placing a wall duodenal stent/esophageal stent  11/18: Upper GI showed duodenal leak. Plan for stent placement tomorrow.   11/19: Endoscope/ERCP, biliary stent, and cortrak placement done yesterday with Dr. Lawrence.    Continue abx, trend drain OP, KUB confirmed NTG placement- enteral feedings to be started.    Per General Surgery- hold oral medications/change to PO medications to IV at this time.   11/20: Tolerating tube feedings, increase as tolerated. Continue NPO other than enteral feedings. Plan for repeat upper GI this week per general surgery.  11/21: Tolerating TF. Decreased output to collection bags. Remain NPO beside TF. Plan for repeat upper GI in coming days.   11/22: Seen by GI. Repeat UGI done today- no duodenal leaks seen. Continue TF, advance diet per general surgery.   Pain control with dilaudid IV.   Antiemetics PRN.       Postprocedural retroperitoneal abscess- (present on admission)  Assessment & Plan  S/P Ex lap, I/D, debridement nec fasc 11/5/2021  Abx per ID end date 11/24/2021 11/16/2021: CT abdomen 11/15/2021 showed increase fluid collection in the right mid abdomen and slight increase in trace bilateral pleural effusions.   Patient go to IR for procedural drainage today  11/17: RLQ drain was found to have fallen out in IR yesterday.  Fluid collection is smaller and no new drain was indicated.    11/22: MARTHA drain in place to RUQ, decrease output seen. UGI today showed no duodenal leaks.  Pain control with dilaudid IV  Antiemetics PRN  Will continue to monitor      Electrolyte abnormality  Assessment & Plan  Moderate hyponatremia 127  Likely due to dehydration secondary to slowly titrating TF.   11/22 NS increased to 83ml/hr    11/22 Phos 1.6- replenish and monitor.     Hypokalemia during hospitalization,  resolved.   Continue to monitor.     Advanced care planning/counseling discussion  Assessment & Plan  Tearful regarding  medical course and progress.   Seen by palliative care    Generalized weakness  Assessment & Plan  PT/OT following  Recommending post-acute placement.  Acute rehab evaluating patient for admission.   Likely home with  vs rehab at d/c pending patient's progress    Hypotension  Assessment & Plan  11/6: Stop lasix; Stop ACEI  11/7 started midodrine  11/16-resolved continue to monitor    Acute respiratory failure with hypoxia (HCC)- (present on admission)  Assessment & Plan  Resolved with diuresis  Likely volume overload with lower extremity edema present    Antibiotic-associated diarrhea  Assessment & Plan  Cdiff PCR negative  Loperamide PRN    Bacteremia due to Gram-negative bacteria and fungemia- (present on admission)  Assessment & Plan  KALANI negative for signs of endocarditis  Cont bactrim, Zosyn, and Micafungin per ID for 4-week course with stop date of 11/24.  Repeat imaging prior to discontinuation.   Repeat Bld Cx neg      Normocytic anemia- (present on admission)  Assessment & Plan  Transfuse for Hgb <7  Avoid regular phlebotomy  Supplements per dietary: 11/6 added thiamine, 6 sm meals/d  11/21: Thiamine PO held at this time. Will resume when cleared to have PO meds.     Sepsis due to intraabdominal fluid collection/abscess, bacteremia and fungemia (HCC)- (present on admission)  Assessment & Plan  Sepsis resolved  Source intra-abdominal  Zosyn/Mycafungin/Bactrim DS per infectious disease end date 11/24/2021  ID signed off  Re-imaging per ID, IR, and Surgery  S/P Ex lap w I/D and debridement nec fasc 11/5/2021 11/8 Repeat CT. Updated surgical team re: perinephric fluid collection. Further CT may be considered with IV and Oral contrast if needed.  11/9:  She has remained afebrile over last 48 hours.  VSS.  Does not appear to be septic at this time.  Continue antibx.   11/19/2021:  POD#1 sp biliary stent placement. Hold PO meds/change to IV to general surgery. Bactrim changed to IV.     Hyperlipidemia- (present  on admission)  Assessment & Plan  Previously on ezetimibe.  PO meds held per general surgery,   Will resume when cleared to have PO medications    Primary hypertension- (present on admission)  Assessment & Plan  Hypotensive 11/6 - stop lisinopril         VTE prophylaxis: enoxaparin ppx    I have performed a physical exam and reviewed and updated ROS and Plan today (11/22/2021). In review of yesterday's note (11/21/2021), there are no changes except as documented above.

## 2021-11-22 NOTE — CARE PLAN
The patient is Stable - Low risk of patient condition declining or worsening    Shift Goals  Clinical Goals: pain control   Patient Goals: Pain control, nausea control, rest  Family Goals: Comfort and rest    Progress made toward(s) clinical / shift goals:  pt endorsing severe pain, relieved by previous .5mg dilauded dose. Dose increased to 1mg, pt reporting better pain relief.     Problem: Pain - Standard  Goal: Alleviation of pain or a reduction in pain to the patient’s comfort goal  Outcome: Progressing       Patient is not progressing towards the following goals:

## 2021-11-22 NOTE — CARE PLAN
The patient is Stable - Low risk of patient condition declining or worsening    Shift Goals  Clinical Goals: Pain Mgt  Patient Goals: Discharge home  Family Goals: Comfort and rest    Progress made toward(s) clinical / shift goals:  Fall education provided at bedside, pt verbalized understanding. No additional questions at this time. Personal items ;and call light within reach.       Problem: Hemodynamics  Goal: Patient's hemodynamics, fluid balance and neurologic status will be stable or improve  Outcome: Progressing     Problem: Fluid Volume  Goal: Fluid volume balance will be maintained  Outcome: Progressing     Problem: Urinary - Renal Perfusion  Goal: Ability to achieve and maintain adequate renal perfusion and functioning will improve  Outcome: Progressing     Problem: Respiratory  Goal: Patient will achieve/maintain optimum respiratory ventilation and gas exchange  Outcome: Progressing

## 2021-11-22 NOTE — PROGRESS NOTES
Bedside report received, assessment completed    A&O x  4, pt calls appropriately  Mobility: Up with x1-2 assist, weak  Fall Risk Assessment: moderate, bed alarm refused, education provided  Pain Assessment: 8/10, medication provided per MAR  Diet: NPO, Impact Peptide @ 45mL/hr via R-Nare Cortark  LDA:   IV Access: PICC, CDI/ flushed/ Infusing Abx  MARTHA x1 RUQ, x3 additional drain sites w/ ostomy bags CDI  purewick void, + flatus, + 11/22, frequent loose BM  DVT Prophylaxis: Lovenox +, SCD +    Procedures:    - 11/5 X lap   - Gastroscopy w/ stent placement 11/18   - Upper GI series 11/22  D/C Plan: pending medical clearance     Reviewed plan of care with patient, bed in lowest position and locked, pt resting comfortably now, call light within reach, all needs met at this time. Interventions will be executed per plan of care

## 2021-11-22 NOTE — THERAPY
Missed Therapy     Patient Name: Nils Alfonso  Age:  66 y.o., Sex:  female  Medical Record #: [id number]  Today's Date: 11/22/2021    Attempted PT treatment mickie. Pt declining to participate and continues to state does not want to work with PT until it is time to go home. Provided education regarding role of PT in acute setting, importance and benefits of mobility, risks associated with immobility, and realistic functional status for return home. Pt states she may be interested in stair training prior to return home, but continues to refuse participation at this time. This is patient's fourth consecuative refusal to participate in PT, and has had multiple days of not participating with both PT and OT. Discussed that PT will DC pt from service, and pt agreeable. Instructed pt to request PT return should needs arise. Please re-consult if indicated/pt willing.

## 2021-11-22 NOTE — HOSPITAL COURSE
Ms. Alfonso is a 66-year-old female with a PMHx of recurrent idiopathic pancreatitis s/p ERCP with sphincterotomy on 10/1/121, sleeve gastrectomy, dyslipidemia, HTN, osteoarthritis of the spine s/p lumbar fusion who was admitted on 10/8/2021 with worsening right lower quadrant pain over the past week.      Since her ERCP on 10/1/2022 1, patient has been experiencing intermittent pain, fevers, and nausea.  Patient initially presented to [facility] for evaluation of the abdominal pain.  Imaging and RSM noted large intra-abdominal fluid collection of which multiple IR drains have been placed.  Also, her infection has also worsened as noted in her WBC count, intermittent fever, and lactic acid.  Patient was transferred to [facility] for escalation of surgical needs.    Patient underwent an ex lap with I&D of multiple loculated abscesses and debridement of necrotizing fasciitis with Dr. ST on 11/5/2021.  Previous blood cultures were positive for stenotrophomonas, maltophilia, mixed yeast, facialis, E. coli, and chryseobacterium in the blood.  Infectious disease was consulted and was placed on Bactrim twice daily, Zosyn, micafungin with end date of 11/24/2021.  Patient had since decreased output from the drains.  Repeat CT of the abdomen on 11/15/2021 noted increase fluid collection in the right mid abdomen and slight increase in trace bilateral pleural effusions.  Interventional radiology was consulted on 11/16/2020 121 for a repeat peritoneal drainage.  RLQ MARTHA drain had fallen out and was not replaced due to the abdominal fluid collection being noted to be smaller.    Upper GI series on 11/17/2021 showed a leak at the perforation in 2/3 portion of the duodenum.  Patient underwent an endoscopy/ERCP, biliary stent, core track placement on 11/18/2021 with Dr. Lawrence [facility] and was started on enteral feedings on 11/19/2021 with monitoring of MARTHA drain output.    Patient with hospital services for management of care.

## 2021-11-23 LAB
ALBUMIN SERPL BCP-MCNC: 2.2 G/DL (ref 3.2–4.9)
ANION GAP SERPL CALC-SCNC: 6 MMOL/L (ref 7–16)
BUN SERPL-MCNC: 13 MG/DL (ref 8–22)
CALCIUM SERPL-MCNC: 6.7 MG/DL (ref 8.5–10.5)
CHLORIDE SERPL-SCNC: 103 MMOL/L (ref 96–112)
CO2 SERPL-SCNC: 20 MMOL/L (ref 20–33)
CREAT SERPL-MCNC: 0.28 MG/DL (ref 0.5–1.4)
GLUCOSE SERPL-MCNC: 114 MG/DL (ref 65–99)
PHOSPHATE SERPL-MCNC: 1.4 MG/DL (ref 2.5–4.5)
POTASSIUM SERPL-SCNC: 3.6 MMOL/L (ref 3.6–5.5)
PREALB SERPL-MCNC: 15.9 MG/DL (ref 18–38)
SODIUM SERPL-SCNC: 129 MMOL/L (ref 135–145)
TRIGL SERPL-MCNC: 174 MG/DL (ref 0–149)

## 2021-11-23 PROCEDURE — 84478 ASSAY OF TRIGLYCERIDES: CPT

## 2021-11-23 PROCEDURE — 700111 HCHG RX REV CODE 636 W/ 250 OVERRIDE (IP): Performed by: INTERNAL MEDICINE

## 2021-11-23 PROCEDURE — 84100 ASSAY OF PHOSPHORUS: CPT

## 2021-11-23 PROCEDURE — 302146: Performed by: NURSE PRACTITIONER

## 2021-11-23 PROCEDURE — 700105 HCHG RX REV CODE 258: Performed by: INTERNAL MEDICINE

## 2021-11-23 PROCEDURE — 700101 HCHG RX REV CODE 250: Performed by: NURSE PRACTITIONER

## 2021-11-23 PROCEDURE — 84134 ASSAY OF PREALBUMIN: CPT

## 2021-11-23 PROCEDURE — 700102 HCHG RX REV CODE 250 W/ 637 OVERRIDE(OP): Performed by: NURSE PRACTITIONER

## 2021-11-23 PROCEDURE — 99233 SBSQ HOSP IP/OBS HIGH 50: CPT | Performed by: NURSE PRACTITIONER

## 2021-11-23 PROCEDURE — 700111 HCHG RX REV CODE 636 W/ 250 OVERRIDE (IP): Mod: JB | Performed by: SURGERY

## 2021-11-23 PROCEDURE — 700111 HCHG RX REV CODE 636 W/ 250 OVERRIDE (IP): Performed by: STUDENT IN AN ORGANIZED HEALTH CARE EDUCATION/TRAINING PROGRAM

## 2021-11-23 PROCEDURE — A9270 NON-COVERED ITEM OR SERVICE: HCPCS | Performed by: NURSE PRACTITIONER

## 2021-11-23 PROCEDURE — 700105 HCHG RX REV CODE 258: Performed by: NURSE PRACTITIONER

## 2021-11-23 PROCEDURE — 80048 BASIC METABOLIC PNL TOTAL CA: CPT

## 2021-11-23 PROCEDURE — 700111 HCHG RX REV CODE 636 W/ 250 OVERRIDE (IP): Performed by: NURSE PRACTITIONER

## 2021-11-23 PROCEDURE — 97602 WOUND(S) CARE NON-SELECTIVE: CPT

## 2021-11-23 PROCEDURE — 770001 HCHG ROOM/CARE - MED/SURG/GYN PRIV*

## 2021-11-23 PROCEDURE — 82040 ASSAY OF SERUM ALBUMIN: CPT

## 2021-11-23 PROCEDURE — 700111 HCHG RX REV CODE 636 W/ 250 OVERRIDE (IP): Performed by: ANESTHESIOLOGY

## 2021-11-23 PROCEDURE — 700111 HCHG RX REV CODE 636 W/ 250 OVERRIDE (IP): Mod: JG | Performed by: INTERNAL MEDICINE

## 2021-11-23 RX ORDER — CALCIUM GLUCONATE 20 MG/ML
2 INJECTION, SOLUTION INTRAVENOUS ONCE
Status: COMPLETED | OUTPATIENT
Start: 2021-11-23 | End: 2021-11-23

## 2021-11-23 RX ORDER — MICONAZOLE NITRATE 20 MG/G
CREAM TOPICAL 2 TIMES DAILY
Status: DISCONTINUED | OUTPATIENT
Start: 2021-11-23 | End: 2021-11-28 | Stop reason: HOSPADM

## 2021-11-23 RX ADMIN — ONDANSETRON 4 MG: 2 INJECTION INTRAMUSCULAR; INTRAVENOUS at 09:53

## 2021-11-23 RX ADMIN — CALCIUM GLUCONATE 2 G: 20 INJECTION, SOLUTION INTRAVENOUS at 09:50

## 2021-11-23 RX ADMIN — ENOXAPARIN SODIUM 40 MG: 40 INJECTION SUBCUTANEOUS at 09:50

## 2021-11-23 RX ADMIN — OCTREOTIDE ACETATE 100 MCG: 100 INJECTION, SOLUTION INTRAVENOUS; SUBCUTANEOUS at 10:03

## 2021-11-23 RX ADMIN — PHENOL 1 SPRAY: 1.5 LIQUID ORAL at 18:31

## 2021-11-23 RX ADMIN — SULFAMETHOXAZOLE AND TRIMETHOPRIM 160 MG OF TRIMETHOPRIM: 80; 16 INJECTION, SOLUTION, CONCENTRATE INTRAVENOUS at 00:09

## 2021-11-23 RX ADMIN — HYDROMORPHONE HYDROCHLORIDE 1 MG: 1 INJECTION, SOLUTION INTRAMUSCULAR; INTRAVENOUS; SUBCUTANEOUS at 09:46

## 2021-11-23 RX ADMIN — PHENOL 1 SPRAY: 1.5 LIQUID ORAL at 09:51

## 2021-11-23 RX ADMIN — PIPERACILLIN SODIUM AND TAZOBACTAM SODIUM 3.38 G: 3; .375 INJECTION, POWDER, FOR SOLUTION INTRAVENOUS at 22:19

## 2021-11-23 RX ADMIN — PHENOL 1 SPRAY: 1.5 LIQUID ORAL at 22:19

## 2021-11-23 RX ADMIN — SODIUM CHLORIDE: 9 INJECTION, SOLUTION INTRAVENOUS at 22:31

## 2021-11-23 RX ADMIN — SULFAMETHOXAZOLE AND TRIMETHOPRIM 160 MG OF TRIMETHOPRIM: 80; 16 INJECTION, SOLUTION, CONCENTRATE INTRAVENOUS at 12:21

## 2021-11-23 RX ADMIN — MICONAZOLE NITRATE: 20 CREAM TOPICAL at 17:57

## 2021-11-23 RX ADMIN — HYDROMORPHONE HYDROCHLORIDE 1 MG: 1 INJECTION, SOLUTION INTRAMUSCULAR; INTRAVENOUS; SUBCUTANEOUS at 14:02

## 2021-11-23 RX ADMIN — ONDANSETRON 4 MG: 2 INJECTION INTRAMUSCULAR; INTRAVENOUS at 17:57

## 2021-11-23 RX ADMIN — PIPERACILLIN SODIUM AND TAZOBACTAM SODIUM 3.38 G: 3; .375 INJECTION, POWDER, FOR SOLUTION INTRAVENOUS at 05:51

## 2021-11-23 RX ADMIN — ONDANSETRON 4 MG: 2 INJECTION INTRAMUSCULAR; INTRAVENOUS at 14:02

## 2021-11-23 RX ADMIN — HYDROMORPHONE HYDROCHLORIDE 1 MG: 1 INJECTION, SOLUTION INTRAMUSCULAR; INTRAVENOUS; SUBCUTANEOUS at 17:57

## 2021-11-23 RX ADMIN — MICAFUNGIN SODIUM 100 MG: 100 INJECTION, POWDER, LYOPHILIZED, FOR SOLUTION INTRAVENOUS at 09:49

## 2021-11-23 RX ADMIN — HYDROMORPHONE HYDROCHLORIDE 1 MG: 1 INJECTION, SOLUTION INTRAMUSCULAR; INTRAVENOUS; SUBCUTANEOUS at 22:19

## 2021-11-23 RX ADMIN — PIPERACILLIN SODIUM AND TAZOBACTAM SODIUM 3.38 G: 3; .375 INJECTION, POWDER, FOR SOLUTION INTRAVENOUS at 14:02

## 2021-11-23 RX ADMIN — HYDROMORPHONE HYDROCHLORIDE 1 MG: 1 INJECTION, SOLUTION INTRAMUSCULAR; INTRAVENOUS; SUBCUTANEOUS at 05:50

## 2021-11-23 RX ADMIN — ONDANSETRON 4 MG: 2 INJECTION INTRAMUSCULAR; INTRAVENOUS at 05:51

## 2021-11-23 RX ADMIN — MICONAZOLE NITRATE: 20 CREAM TOPICAL at 14:03

## 2021-11-23 RX ADMIN — ONDANSETRON 4 MG: 2 INJECTION INTRAMUSCULAR; INTRAVENOUS at 22:19

## 2021-11-23 RX ADMIN — OCTREOTIDE ACETATE 100 MCG: 100 INJECTION, SOLUTION INTRAVENOUS; SUBCUTANEOUS at 22:19

## 2021-11-23 RX ADMIN — HYDROMORPHONE HYDROCHLORIDE 1 MG: 1 INJECTION, SOLUTION INTRAMUSCULAR; INTRAVENOUS; SUBCUTANEOUS at 01:51

## 2021-11-23 ASSESSMENT — ENCOUNTER SYMPTOMS
WEAKNESS: 1
CONSTITUTIONAL NEGATIVE: 1
EYES NEGATIVE: 1
CARDIOVASCULAR NEGATIVE: 1
MYALGIAS: 0
FEVER: 0
FALLS: 0
MYALGIAS: 1
NAUSEA: 1
CHILLS: 0
RESPIRATORY NEGATIVE: 1
COUGH: 0
WHEEZING: 0
SHORTNESS OF BREATH: 0
PSYCHIATRIC NEGATIVE: 1
ABDOMINAL PAIN: 1
FOCAL WEAKNESS: 0
CONSTIPATION: 0
NAUSEA: 0
MEMORY LOSS: 0
HEADACHES: 0
VOMITING: 0

## 2021-11-23 ASSESSMENT — PAIN DESCRIPTION - PAIN TYPE
TYPE: ACUTE PAIN

## 2021-11-23 ASSESSMENT — LIFESTYLE VARIABLES: SUBSTANCE_ABUSE: 0

## 2021-11-23 NOTE — PROGRESS NOTES
Gastroenterology Progress Note               Author: Fausto Casas M.D.  with ADRY Jones Date & Time Created: 11/23/2021 9:07 AM       Patient ID:  Name:             Nils Alfonso  YOB: 1955  Age:                 66 y.o.  female  MRN:               [id number]      Referring Provider:  Ana Luisa Shearer MD      Presenting Chief Complaint:  Abdominal pain      History of Present Illness:    10/10/2021 HPI  66-year-old female PMH recurrent idiopathic pancreatitis s/p ERCP with sphincterotomy October 1, 2021 complicated by ERCP pancreatitis, sleeve gastrectomy, dyslipidemia, hypertension, osteoarthritis of the spine status post lumbar fusion who was admitted to the hospital 10/8/2021 with worsening right lower quadrant pain over the past week.  Ever since her ERCP October 1, 2021, she has been experiencing pain, intermittent subjective fevers, nausea.  In the emergency room-labs: CBC: WBC 17.8..  Sodium 130.  LFTs-WNL.  CT scan abdomen/pelvis-large irregular fluid collection with thick wall occupying most of the right abdomen surrounding the right kidney and colon.  Severe wall thickening of the descending, transverse colon, second portion of the duodenum likely reactive.  Pneumobilia with gas in the CBD.  Drain placement by IR was performed.  Currently, the abdominal pain has improved.  No vomiting.  Started on ceftriaxone and metronidazole IV.     ERCP October 1, 2021-common bile duct-dilated down to the level of the ampulla.  4 mm polyp at the distal duct seen after sphincterotomy.  8 mm sphincterotomy.  Negative for stone.  Bile duct polyp-benign with inflammatory and hyperplastic changes.  No evidence of epithelial dysplasia/neoplasia.       Interval History:  10/11/2021: interval HIDA scan showed no bile leak.  This morning she feels more comfortable, describing minimal abdominal pain but denying nausea vomiting and fevers.  She has been able to eat a little bit more and has full liquids  beside her bed.  She expresses concern about being on losartan which she says was prescribed outpatient as as needed for high blood pressures.  She also states she has not passed a bowel movement in the week which she describes not eating much.     10/12/2021 - No events overnight.  Tolerating diet without nausea or vomiting.  Had spontaneous, formed, brown bowel movement this morning.  Abdominal pain improving.  Feeling weak, but better each day.  Leukocytosis improving.  States that she is on hydrocodone at home, managed by pain management, and asks that her current hospital pain meds be changed.     10/13/2021: Stable, no new events.  Drain output is minimal, but according to patient bedside nursing is not flushing the drain.  Patient is n.p.o. for planned CT today to reevaluate fluid collection.  Leukocytosis continues to improve.  Pain has improved greatly and patient has not taken the pain medication in the last 24 hours according to her recollection.  She is having regular bowel movements without assistance of laxatives.  Patient is very hungry.     10/14/2021 - No events overnight.  Abdominal pain controlled now.  Had nausea and vomiting yesterday with solid food, but tolerating bland diet.  Blood cultures from 10/8 positive for Chryseobacterium and Candida glabrata - Pharmacy and ID aware.  Micafungin started.  CT 10/13 showing extensive multiloculated rim-enhancing air and fluid collection/abscess within the right abdomen is not significantly changed in size from the prior study. The percutaneous pigtail drainage catheter appears well-positioned within the collection.    10/15/2021: Patient transferred to [facility] overnight per surgery recommendations    10/16/2021: Drain output clearing up slightly. Dr. Dumont with surgery has recommended second IR drain to be placed. Initially interventional radiology did not think that this would be helpful for the patient, but after further  discussion this is the plan going forward. Patient is n.p.o. with plans for TPN. WBCs normalized.    10/21/21: Abdominal pain slowly improving. Pain is worse with movement. MARTHA drains purulent. No vomiting. Plan for CT scan to re-evaluate fluid collection. Tolerating FLD.     10/25/2021 Abd pian controlled. Getting IR drain today. No questions.    11/18/2021: Called back by Dr. Dumont due to persistent duodenal leak on Upper GI series. Since we last saw her she attempted to be managed conservatively with IR drains without improvement, then underwent ex lap with debridement of abscess and necrotizing fascitis. Then placement of IR drain on 11/16 due to persistent fluid collection on CT. Upper GI series with findings of perforation at junction of 2nd and 3rd portion of the duodenum.    11/18/2021: EGD/ERCP/Cortrack placement    Impressions:    1.  Etiology of upper GI series showing perforation is unclear   2.  Generous and patent biliary orifice from sphincterotomy within a duodenal diverticulum without obvious  cristóbal contrast extravasation; bile duct stented with 10mm x 8cm fully covered metal biliary stent   3.  Orifice adjacent to the biliary orifice unclear if this is the pancreatic duct opening. Difficult to visualize   4. Placement of 10F x 140cm  nasoenteric tube     11/19/2021: There were no acute events overnight.  The patient has remained afebrile and hemodynamically stable.  She continues to have diffuse abdominal pain which is unchanged from prior to her procedure yesterday.  She continues to have drainage from the 2 abdominal opening/wounds.  She denies nausea/vomiting but continues to have fatigue and weakness.    11/22/2021 - No events over the weekend.  Patient reports significantly less output from drains.  Tolerating TFs well.  Complaining of waves of abdominal pain and nausea.  Eager for UGI reevaluation of duodenal perforation.    11/23/2021: UGI negative for leak yesterday. Some contrast  reflux into bile duct. MARTHA drain output decreased.  Complaining of breakthrough pain.      Review of Systems:  Review of Systems   Constitutional: Positive for malaise/fatigue. Negative for chills and fever.   Respiratory: Negative for cough, shortness of breath and wheezing.    Cardiovascular: Negative for chest pain and leg swelling.   Gastrointestinal: Positive for abdominal pain. Negative for constipation, melena, nausea and vomiting.   Genitourinary: Negative for dysuria and urgency.   Musculoskeletal: Negative for falls and myalgias.   Skin: Negative for itching and rash.   Neurological: Positive for weakness. Negative for focal weakness and headaches.   Psychiatric/Behavioral: Negative for memory loss and substance abuse.             Past Medical History:  Past Medical History:   Diagnosis Date    Allergy, unspecified not elsewhere classified     Anemia     not currently    Anesthesia     PONV (Demerol/ Dilaudid)    Arthritis     bilateral shoulders,sacrum, osteo    Backpain     coccyx and sacrum    Bronchitis 2010    Cataract     bilateral IOLI    Degeneration of cervical intervertebral disc     C5-6,C6-7    Dental disorder     partial upper and lower    Heart burn     Hiatus hernia syndrome     not a problem after gastric sleeve    High cholesterol     resolved after gastric surgery    Hyperlipidemia     Hypertension     off all medication, reveresed after gastric sleeve    Muscle disorder     Pain 05/16/2018    low back and SI joints and sacrum    Reactive airway disease     rescue inhaler not needed unless with bronchitis     Active Hospital Problems    Diagnosis     Electrolyte abnormality [E87.8]     Advanced care planning/counseling discussion [Z71.89]     Generalized weakness [R53.1]     Hypotension [I95.9]     Acute respiratory failure with hypoxia (HCC) [J96.01]     Duodenal perforation (HCC) [K63.1]     Postprocedural retroperitoneal abscess [K68.11]     Antibiotic-associated diarrhea [K52.1,  T36.95XA]     Bacteremia due to Gram-negative bacteria and fungemia [R78.81]     Normocytic anemia [D64.9]     Sepsis due to intraabdominal fluid collection/abscess, bacteremia and fungemia (HCC) [A41.9]     Hyperlipidemia [E78.5]     Primary hypertension [I10]          Past Surgical History:  Past Surgical History:   Procedure Laterality Date    PB UPPER GI ENDOSCOPY,DIAGNOSIS N/A 11/18/2021    Procedure: GASTROSCOPY with stent placement;  Surgeon: Migel Lawrence M.D.;  Location: SURGERY SAME DAY [facility];  Service: Gastroenterology    PB ERCP,DIAGNOSTIC N/A 11/18/2021    Procedure: ERCP (ENDOSCOPIC RETROGRADE CHOLANGIOPANCREATOGRAPHY);  Surgeon: Migel Lawrence M.D.;  Location: SURGERY SAME DAY [facility];  Service: Gastroenterology    PB PLACE PERCUT GASTROSTOMY TUBE N/A 11/18/2021    Procedure: GASTROSCOPY, WITH FEEDING TUBE INSERTION;  Surgeon: Migel Lawrence M.D.;  Location: SURGERY SAME DAY [facility];  Service: Gastroenterology    BILIARY STENT PLACEMENT N/A 11/18/2021    Procedure: INSERTION, STENT, BILE DUCT;  Surgeon: Migel Lawrence M.D.;  Location: SURGERY SAME DAY [facility];  Service: Gastroenterology    PB EXPLORATORY OF ABDOMEN  11/5/2021    Procedure: LAPAROTOMY, EXPLORATORY;  Surgeon: Jaden Cook M.D.;  Location: [facility];  Service: General    PB ULTRASONIC GUIDANCE, INTRAOPERATIVE  11/5/2021    Procedure: ULTRASOUND GUIDANCE;  Surgeon: Jaden Cook M.D.;  Location: [facility];  Service: General    ABDOMINAL ABSCESS DRAINAGE  11/5/2021    Procedure: DRAINAGE, ABSCESS, ABDOMEN - PERITONEAL AND RETROPERITONEAL;  Surgeon: Jaden Cook M.D.;  Location: [facility];  Service: General    PB ERCP,DIAGNOSTIC  10/1/2021    Procedure: ERCP (ENDOSCOPIC RETROGRADE CHOLANGIOPANCREATOGRAPHY);  Surgeon: Fausto Casas M.D.;  Location: [facility];  Service: Gastroenterology    COLONOSCOPY  8/8/2018    Procedure: COLONOSCOPY;  Surgeon: Shukri Condon  M.D.;  Location: [facility];  Service: General    GASTROSCOPY  5/23/2018    Procedure: GASTROSCOPY;  Surgeon: Fausto Casas M.D.;  Location: [facility];  Service: Gastroenterology    EGD W/ENDOSCOPIC ULTRASOUND  5/23/2018    Procedure: EGD W/ENDOSCOPIC ULTRASOUND- RADIAL UPPER;  Surgeon: Fausto Casas M.D.;  Location: SURGERY [facility];  Service: Gastroenterology    CATARACT PHACO WITH IOL Left 4/18/2017    Procedure: CATARACT PHACO WITH IOL;  Surgeon: Stuart Estevez M.D.;  Location: SURGERY SAME DAY [facility];  Service:     CATARACT PHACO WITH IOL Right 4/4/2017    Procedure: CATARACT PHACO WITH IOL;  Surgeon: Stuart Estevez M.D.;  Location: [facility];  Service:     GASTRIC SLEEVE LAPAROSCOPY  10/19/2016    Procedure: GASTRIC SLEEVE LAPAROSCOPY, HIATAL HERNIA;  Surgeon: Shukri Condon M.D.;  Location: SURGERY [facility];  Service:     LIVER BIOPSY LAPAROSCOPIC  10/19/2016    Procedure: LIVER BIOPSY LAPAROSCOPIC;  Surgeon: Shukri Condon M.D.;  Location: [facility];  Service:     GASTROSCOPY N/A 8/26/2016    Procedure: GASTROSCOPY;  Surgeon: Shukri Condon M.D.;  Location: [facility];  Service:     ROTATOR CUFF REPAIR Right 7/7/2016    ROTATOR CUFF REPAIR Left 5/2015    HYSTERECTOMY ROBOTIC  1/2/2009    Performed by MEG VILLEGAS at [facility]    LUMBAR FUSION ANTERIOR  2007    Dr Hillman, L3-S1 fusion    CHOLECYSTECTOMY  1996    laparoscopic    TUBAL LIGATION  1985    GASTRIC RESECTION  1982    gastric stapling    TONSILLECTOMY AND ADENOIDECTOMY  1967    PRIMARY C SECTION  1976, 1979, 1985    x3           Hospital Medications:  Current Facility-Administered Medications   Medication Dose Frequency Provider Last Rate Last Admin    NS infusion   Continuous ELENA FletcherP.R.N. 83 mL/hr at 11/22/21 0900 Rate Change at 11/22/21 0900    HYDROmorphone (Dilaudid) injection 1 mg  1 mg Q4HRS PRN Robin YANG  SYD Riojas   1 mg at 11/23/21 0550    sore throat spray (CHLORASEPTIC) 1 Spray  1 Spray Q2HRS PRN AYE Lou   1 Lynx at 11/22/21 1610    sulfamethoxazole-trimethoprim (SEPTRA) 160 mg of trimethoprim in dextrose 5% 250 mL IVPB  160 mg of trimethoprim Q12HRS AYE Fletcher   Stopped at 11/23/21 0109    Pharmacy Consult: Enteral tube insertion - review meds/change route/product selection  1 Each PHARMACY TO DOSE AYE Fletcher        ondansetron (ZOFRAN ODT) dispertab 4 mg  4 mg Q4HRS PRN AYE Lou        octreotide (SANDOSTATIN) injection 100 mcg  100 mcg BID Jaden Cook M.D.   100 mcg at 11/22/21 2224    ondansetron (ZOFRAN) syringe/vial injection 4 mg  4 mg Q4HRS PRN Tomi Ledezma M.D.   4 mg at 11/23/21 0551    enoxaparin (LOVENOX) inj 40 mg  40 mg DAILY Jaden Cook M.D.   40 mg at 11/22/21 0936    piperacillin-tazobactam (ZOSYN) 3.375 g in  mL IVPB  3.375 g Q8HRS Tamra Mcfarland M.D. 25 mL/hr at 11/23/21 0551 3.375 g at 11/23/21 0551    micafungin (MYCAMINE) 100 mg in  mL ivpb  100 mg Q24HRS Tamra Mcfarland M.D.   Stopped at 11/22/21 1036   Last reviewed on 11/5/2021  8:26 AM by Cuca Mejia R.N.        Current Outpatient Medications:  Medications Prior to Admission   Medication Sig Dispense Refill Last Dose    cyclobenzaprine (FLEXERIL) 10 mg Tab Take 10 mg by mouth every evening.   9/30/2021 at [facility]    ezetimibe (ZETIA) 10 MG Tab Take 10 mg by mouth every evening.   9/30/2021 at [facility]    Magnesium 400 MG Tab Take 400 mg by mouth every evening.   9/30/2021 at [facility]    gabapentin (NEURONTIN) 300 MG Cap Take 600 mg by mouth 2 Times a Day.   9/30/2021 at [facility]    BIOTIN PO Take 1 Tablet by mouth every day.   9/30/2021 at [facility]    HYDROcodone/acetaminophen (NORCO)  MG Tab Take 0.5 Tablets by mouth every 6 hours as needed for Moderate Pain.   10/8/2021 at [facility]    Cholecalciferol (VITAMIN D3 PO) Take 1 Each by  "mouth every day.   2021 at [facility]    ondansetron (ZOFRAN ODT) 4 MG TABLET DISPERSIBLE Take 4 mg by mouth every 6 hours as needed for Nausea.   10/8/2021 at [facility]         Medication Allergies:  Allergies   Allergen Reactions    Ampicillin Rash     Rash  Tolerates Zosyn 10/21    Cephalexin Diarrhea, Vomiting and Nausea     .    Clindamycin Vomiting     \"cleocin\"    Codeine      Severe stomach pain, cramps, spasms    Demerol Vomiting    Levofloxacin Unspecified     Numbness      Tetracyclines Rash     .    Tizanidine Itching    Morphine Vomiting    Pcn [Penicillins] Itching     Tolerates Zosyn 10/21    Sulfa Drugs Itching         Family Medical History:  Family History   Problem Relation Age of Onset    Stroke Mother     Cancer Father         larynx    Diabetes Brother          Social History:  Social History     Socioeconomic History    Marital status:      Spouse name: Not on file    Number of children: Not on file    Years of education: Not on file    Highest education level: Not on file   Occupational History    Not on file   Tobacco Use    Smoking status: Former Smoker     Packs/day: 0.25     Years: 0.10     Pack years: 0.02     Types: Cigarettes     Quit date: 1973     Years since quittin.9    Smokeless tobacco: Never Used   Vaping Use    Vaping Use: Never used   Substance and Sexual Activity    Alcohol use: No    Drug use: No    Sexual activity: Not on file     Comment:    Other Topics Concern    Not on file   Social History Narrative    Not on file     Social Determinants of Health     Financial Resource Strain:     Difficulty of Paying Living Expenses: Not on file   Food Insecurity:     Worried About Running Out of Food in the Last Year: Not on file    Ran Out of Food in the Last Year: Not on file   Transportation Needs:     Lack of Transportation (Medical): Not on file    Lack of Transportation (Non-Medical): Not on file   Physical Activity:     Days of Exercise per Week: Not on file    " "Minutes of Exercise per Session: Not on file   Stress:     Feeling of Stress : Not on file   Social Connections:     Frequency of Communication with Friends and Family: Not on file    Frequency of Social Gatherings with Friends and Family: Not on file    Attends [employer] Services: Not on file    Active Member of Clubs or Organizations: Not on file    Attends Club or Organization Meetings: Not on file    Marital Status: Not on file   Intimate Partner Violence:     Fear of Current or Ex-Partner: Not on file    Emotionally Abused: Not on file    Physically Abused: Not on file    Sexually Abused: Not on file   Housing Stability:     Unable to Pay for Housing in the Last Year: Not on file    Number of Places Lived in the Last Year: Not on file    Unstable Housing in the Last Year: Not on file         Vital signs:  Weight/BMI: Body mass index is 28.13 kg/m².  /75   Pulse 80   Temp 36.5 °C (97.7 °F) (Temporal)   Resp 18   Ht 1.6 m (5' 2.99\")   Wt 72 kg (158 lb 11.7 oz)   SpO2 95%   Vitals:    11/22/21 1600 11/22/21 1938 11/23/21 0416 11/23/21 0703   BP: 125/73 118/72 127/76 121/75   Pulse: 81 84 84 80   Resp: 16 17 18 18   Temp: 37.3 °C (99.1 °F) 37 °C (98.6 °F) 36.4 °C (97.5 °F) 36.5 °C (97.7 °F)   TempSrc: Temporal Temporal Temporal Temporal   SpO2: 94% 94% 95% 95%   Weight:       Height:         Oxygen Therapy:  Pulse Oximetry: 95 %, O2 (LPM): 0, O2 Delivery Device: None - Room Air    Intake/Output Summary (Last 24 hours) at 11/23/2021 0907  Last data filed at 11/23/2021 0554  Gross per 24 hour   Intake 332 ml   Output 2300 ml   Net -1968 ml         Physical Exam:  Physical Exam  Vitals and nursing note reviewed.   Constitutional:       Appearance: She is ill-appearing.   HENT:      Head: Normocephalic and atraumatic.      Right Ear: External ear normal.      Left Ear: External ear normal.      Nose: Nose normal.      Mouth/Throat:      Mouth: Mucous membranes are dry.   Cardiovascular:      Rate and " Rhythm: Normal rate and regular rhythm.      Pulses: Normal pulses.      Heart sounds: Normal heart sounds.   Pulmonary:      Effort: Pulmonary effort is normal.      Breath sounds: Normal breath sounds.      Comments: Decreased breath sounds in bases  Abdominal:      Tenderness: There is abdominal tenderness (RUQ).      Comments: Right sided drain and ostomy appliances to skin over sites that are draining dark brown liquid   Neurological:      General: No focal deficit present.      Mental Status: She is alert and oriented to person, place, and time.   Psychiatric:         Thought Content: Thought content normal.         Judgment: Judgment normal.             Labs:  Recent Labs     11/21/21  0030 11/22/21  0035 11/23/21  0010   SODIUM 127* 127* 129*   POTASSIUM 4.0 3.7 3.6   CHLORIDE 98 98 103   CO2 22 20 20   BUN 16 15 13   CREATININE 0.57 0.44* 0.28*   MAGNESIUM 1.9 1.7  --    PHOSPHORUS 2.4* 1.6* 1.4*   CALCIUM 8.0* 7.4* 6.7*     Recent Labs     11/21/21  0030 11/22/21  0035 11/22/21  1137 11/23/21  0010   ALTSGPT 30 28  --   --    ASTSGOT 33 25  --   --    ALKPHOSPHAT 88 79  --   --    TBILIRUBIN 0.2 0.2  --   --    PREALBUMIN  --   --  14.7* 15.9*   GLUCOSE 106* 165*  --  114*     Recent Labs     11/21/21  0030 11/22/21  0035   WBC 5.2  --    ASTSGOT 33 25   ALTSGPT 30 28   ALKPHOSPHAT 88 79   TBILIRUBIN 0.2 0.2     Recent Labs     11/21/21  0030   RBC 2.95*   HEMOGLOBIN 8.6*   HEMATOCRIT 26.3*   PLATELETCT 234     Recent Results (from the past 24 hour(s))   CRP Quantitive (Non-Cardiac)    Collection Time: 11/22/21 11:37 AM   Result Value Ref Range    Stat C-Reactive Protein 3.77 (H) 0.00 - 0.75 mg/dL   Prealbumin    Collection Time: 11/22/21 11:37 AM   Result Value Ref Range    Pre-Albumin 14.7 (L) 18.0 - 38.0 mg/dL   Prealbumin    Collection Time: 11/23/21 12:10 AM   Result Value Ref Range    Pre-Albumin 15.9 (L) 18.0 - 38.0 mg/dL   Triglyceride    Collection Time: 11/23/21 12:10 AM   Result Value Ref Range     Triglycerides 174 (H) 0 - 149 mg/dL   Basic Metabolic Panel    Collection Time: 11/23/21 12:10 AM   Result Value Ref Range    Sodium 129 (L) 135 - 145 mmol/L    Potassium 3.6 3.6 - 5.5 mmol/L    Chloride 103 96 - 112 mmol/L    Co2 20 20 - 33 mmol/L    Glucose 114 (H) 65 - 99 mg/dL    Bun 13 8 - 22 mg/dL    Creatinine 0.28 (L) 0.50 - 1.40 mg/dL    Calcium 6.7 (LL) 8.5 - 10.5 mg/dL    Anion Gap 6.0 (L) 7.0 - 16.0   PHOSPHORUS    Collection Time: 11/23/21 12:10 AM   Result Value Ref Range    Phosphorus 1.4 (L) 2.5 - 4.5 mg/dL   ESTIMATED GFR    Collection Time: 11/23/21 12:10 AM   Result Value Ref Range    GFR If African American >60 >60 mL/min/1.73 m 2    GFR If Non African American >60 >60 mL/min/1.73 m 2   ALBUMIN    Collection Time: 11/23/21 12:10 AM   Result Value Ref Range    Albumin 2.2 (L) 3.2 - 4.9 g/dL         Radiology Review:  DX-UPPER GI-SERIES WITH KUB   Final Result      1.  No evidence for duodenal leak.   2.  Reflux of contrast seen into the distal common bile duct at the site of biliary stent, consistent with prior sphincterotomy.      DX-ABDOMEN FOR TUBE PLACEMENT   Final Result         1.  Nonspecific bowel gas pattern.   2.  Dobbhoff tube with tip terminating overlying the expected location of the third or fourth duodenal segment.   3.  Hazy right lower lobe infiltrates.      DX-ABDOMEN FOR TUBE PLACEMENT   Final Result      Feeding tube extends to the region of the stomach and duodenum with tip at the level of the proximal end of the jejunum.      DX-ABDOMEN FOR TUBE PLACEMENT   Final Result      Enteric tube terminates over the duodenal jejunal junction      Covered common bile duct stent      No contrast extravasation is seen      NY-ELVD-SVOYCRV STENT - TUBE   Final Result      Portable fluoroscopic images obtained during ERCP biliary stent placement for localization purposes.      DX-UPPER GI-SERIES WITH KUB   Final Result      Duodenal perforation at the junction of the second and third  portions of duodenum as noted on key images.      CT-ABORTED CT PROCEDURE   Final Result      Interval decrease in size of the RIGHT retroperitoneal fluid collection which contains a surgical drain. Because from bowel is possible. No additional drain was placed.            CT-ABDOMEN-PELVIS WITH   Final Result      1.  Slight interval increase in size in fluid collection the right midabdomen with drain in place.      2.  Slight interval increase in size in trace bilateral pleural effusions, right greater than left with bibasilar atelectasis.      3.  Pneumobilia.      CT-ABDOMEN-PELVIS WITH   Final Result         1. The components in the gallbladder fossa and right flank of the complex fluid collection are mostly resolved. There is still a small residual fluid collection in the perinephric space surrounding the inferior right kidney. Right lower quadrant MARTHA drain    and right upper quadrant catheter are outside of this residual collection.   2. Air-fluid levels throughout the colon could relate to ileus.   3. Bilateral pleural effusions with bibasilar atelectasis.   4. Pneumobilia.      CT-ABDOMEN-PELVIS WITH   Final Result      1.  Slight interval decrease in size of the large RIGHT peritoneal abscess which now contains 3 drains   2.  Decreased atelectasis with persistent opacity in the RIGHT lung base likely atelectasis rather than pneumonia   3.  Small LEFT renal cyst   4.  Prior cholecystectomy with ongoing pneumobilia      CT-DRAINAGE-CATH EXCHANGE   Final Result         1. Organized pancreatic pseudocyst Drainage with CT guidance.      CT-ABDOMEN-PELVIS WITH   Final Result      1.  There has been interval placement of an additional inferior lateral pigtail catheter within the complex right abdominal abscess. The other 2 pigtail catheters are stable in appearance.   2.  There has been no significant interval decrease in size of the large complex loculated abscess collection in the right abdomen.   3.  There is  no new fluid collection.   4.  Gallbladder is surgically absent with continued pneumobilia.   5.  Interval decrease in the dependent pleural effusion with minimal airspace disease, likely atelectasis.      IR-DRAINAGE-CATH EXCHANGE   Final Result      1.  Successful left-sided drainage catheter removal.   2.  Successful image guided superior right drainage catheter replacement.      Plan: Suction drainage. Monitor outputs. Please contact interventional radiology if there is any concern for tube dysfunction. Suggest routine tube maintenance at 3 months intervals if the tube remains in place.         CT-DRAIN-PERITONEAL   Final Result      1.  CT GUIDED PERITONEAL RLQ abdominal CATHETER DRAINAGE.   2.  THE CURRENT PLAN IS TO MONITOR DRAINAGE OUTPUT AND OBTAIN A FOLLOWUP CT SCAN IN 5-7 DAYS IF CLINICALLY INDICATED.      CT-ABDOMEN-PELVIS WITH   Final Result         1. Grossly unchanged irregular fluid collection occupying the right abdomen surrounding the right kidney and right colon extending to midline. Additional pigtail catheter in the component about midline anterior abdomen. Both pigtail catheters seem to be    within the collection.      2. Small right pleural effusion with right basilar atelectasis.               DX-CHEST-LIMITED (1 VIEW)   Final Result      1.  Right basilar atelectasis or consolidation. Small right pleural effusion not excluded.   2.  Atherosclerotic plaque.      CT-DRAIN-PERITONEAL   Final Result      1.  CT guided pancreatic pseudocyst catheter drainage.   2.  The current plan is to obtain a follow-up CT in 5-7 days..      IR-PICC LINE PLACEMENT W/ GUIDANCE > AGE 5   Final Result                  Ultrasound-guided PICC placement performed by qualified nursing staff as    above.          CT-ABDOMEN-PELVIS WITH    (Results Pending)         MDM (Data Review):   -Records reviewed and summarized in current documentation  -I personally reviewed and interpreted the laboratory results  -I  personally reviewed the radiology images        Medical Decision Making, by Problem:  Active Hospital Problems    Diagnosis     Bacteremia due to Gram-negative bacteria and fungemia [R78.81]     Sepsis due to intraabdominal fluid collection/abscess (HCC) [A41.9]     Hyperlipidemia [E78.5]     Hypertension [I10]        66-year-old female with a history of recurrent idiopathic pancreatitis s/p ERCP with biliary sphincterotomy and biopsy of a distal BD polyp on October 1, 2021.  Postop complications-pancreatitis.  Ever since then, complaints of subjective fevers, progressive abdominal pain, nausea/vomiting.  Began to have worsening right lower quadrant pain over the past 5 days.  CT scan abdomen/pelvis large irregular fluid collection with thick wall occupying most of the right abdomen surrounding right kidney and right colon.  Additional fluid and gas collection in the midline abdomen anterior to the pancreas concerning for abscess.  Severe wall thickening of the descending and transverse colon, second portion of duodenum likely reactive.  Pneumobilia with gas in the CBD, intrahepatic bile ducts as well as pancreatic ducts (likely secondary to recent ERCP with sphincterotomy).  IR placed a drain with improvement in abdominal pain initially, but then drain output slowed to no output. Nursing was discovered to have have flushed the drain for at least 2 days. After flush by bedside nurse, reportedly 300 cc of fluid came out. Fluid bilirubin 0.6. Fluid amylase >7500. Blood cultures from 10/8 positive for Chryseobacterium and Candida glabrata. ID following. Repeat CT with no change in fluid collection and new pelvic fluid collection/abscess. Drain placement ineffective. Patient underwent exlap with debridement of retroperitoneal abscess and necrotizing fascitis on 11/5 by Dr. Dumont. Repeat IR drain on 11/16 and now with recurrent duodenal leak.  ERCP last week with Fully covered metal stent placed.          Assessment/Recommendations:  ASSESSMENT:  1.  Sepsis due to intra-abdominal fluid collection/abscess-Duodenal perforation near ampulla from previous ERCP. S/P ERCP with placement of 10mm x 8cm fully covered metal stent in the bile duct  2.  History of pancreatitis  3.  Intra-abdominal abscess and necrotizing fascitis, s/p debridement 11/5  4.  History of morbid obesity s/p laparoscopic sleeve  5.  Euvolemic hyponatremia       PLAN:  -Recommend repeat CT abdomen/pelvis to evaluate progression of fluid collections  - Appreciate surgery recommendations. Need to discuss starting diet timing with surgery. Ok from GI standpoint.  - Patient complaining of significant breakthrough pain between pain medication doses.  Please consider decreased intervals    GI Attending -    Patient seen, examined, chart reviewed and case discussed and plan arrived at with A.P.R.N.  Agree with this  note.    Fausto Casas M.D.    ADRY Jones    Thank you for inviting me to participate in the care of this patient. Please do not hesitate to call GI consultants with additional questions/concerns or changes in the patient's clinical status at [phone].

## 2021-11-23 NOTE — PROGRESS NOTES
Hospital Medicine Daily Progress Note    Date of Service  11/23/2021    Chief Complaint  Nils Alfonso is a 66 y.o. female admitted 10/15/2021 with abdominal pain    Hospital Course  Ms. Alfonso is a 66-year-old female with a PMHx of recurrent idiopathic pancreatitis s/p ERCP with sphincterotomy on 10/1/121, sleeve gastrectomy, dyslipidemia, HTN, osteoarthritis of the spine s/p lumbar fusion who was admitted on 10/8/2021 with worsening right lower quadrant pain over the past week.      Since her ERCP on 10/1/2022 1, patient has been experiencing intermittent pain, fevers, and nausea.  Patient initially presented to [facility] for evaluation of the abdominal pain.  Imaging and RSM noted large intra-abdominal fluid collection of which multiple IR drains have been placed.  Also, her infection has also worsened as noted in her WBC count, intermittent fever, and lactic acid.  Patient was transferred to [facility] for escalation of surgical needs.    Patient underwent an ex lap with I&D of multiple loculated abscesses and debridement of necrotizing fasciitis with Dr. ST on 11/5/2021.  Previous blood cultures were positive for stenotrophomonas, maltophilia, mixed yeast, facialis, E. coli, and chryseobacterium in the blood.  Infectious disease was consulted and was placed on Bactrim twice daily, Zosyn, micafungin with end date of 11/24/2021.  Patient had since decreased output from the drains.  Repeat CT of the abdomen on 11/15/2021 noted increase fluid collection in the right mid abdomen and slight increase in trace bilateral pleural effusions.  Interventional radiology was consulted on 11/16/2020 121 for a repeat peritoneal drainage.  RLQ MRATHA drain had fallen out and was not replaced due to the abdominal fluid collection being noted to be smaller.    Upper GI series on 11/17/2021 showed a leak at the perforation in 2/3 portion of the duodenum.  Patient underwent an endoscopy/ERCP, biliary stent, core track placement on 11/18/2021 with  Dr. Lawrence from [facility] and was started on enteral feedings on 11/19/2021 with monitoring of MARTHA drain output.    Patient with hospital services for management of care.          Interval Problem Update  -Patient seen and examined.  Patient noted to have a mid abdomen wound VAC, 2 ostomy sites on the right mid to lower quadrant, and one MARTHA drain.  Noted minimal to moderate output.  Patient reports severe abdominal pain.  Patient has also been refusing physical therapy and patient was counseled in regards to evaluation from PT for postacute placement.  Patient and  updated in plan of care.  -Plan of care: Continue supportive therapy; pain management; monitor output in ostomy bag and MARTHA drain along with wound VAC; continue ABX therapy until 11/24; wound team; will appreciate PT/OT reevaluation and input for postacute placement versus home with home health needs  -Disposition: Pending evaluation from PT/OT -will likely need SNF  -Lab work: Reviewed; Na 129 - IVF; Ca2+  - 2g IVPB  -VSS at this time    I have personally seen and examined the patient at bedside. I discussed the plan of care with patient, family and bedside RN.    Consultants/Specialty  GI    Code Status  Full Code    Disposition  Patient is not medically cleared.   Anticipate discharge to to skilled nursing facility.  I have placed the appropriate orders for post-discharge needs.    Review of Systems  Review of Systems   Constitutional: Negative.  Negative for chills and fever.   HENT: Negative.    Eyes: Negative.    Respiratory: Negative.    Cardiovascular: Negative.    Gastrointestinal: Positive for abdominal pain and nausea.   Genitourinary: Negative.    Musculoskeletal: Positive for myalgias.   Skin: Negative.    Neurological: Positive for weakness.   Endo/Heme/Allergies: Negative.    Psychiatric/Behavioral: Negative.         Physical Exam  Temp:  [36.4 °C (97.5 °F)-37.3 °C (99.1 °F)] 36.5 °C (97.7 °F)  Pulse:  [80-84] 80  Resp:  [16-18]  18  BP: (118-127)/(72-76) 121/75  SpO2:  [94 %-95 %] 95 %    Physical Exam  Vitals and nursing note reviewed.   HENT:      Head: Normocephalic.      Nose: Nose normal.      Mouth/Throat:      Mouth: Mucous membranes are moist.      Pharynx: Oropharynx is clear.   Eyes:      Pupils: Pupils are equal, round, and reactive to light.   Cardiovascular:      Rate and Rhythm: Normal rate and regular rhythm.      Pulses: Normal pulses.      Heart sounds: Normal heart sounds.   Pulmonary:      Effort: Pulmonary effort is normal.      Breath sounds: Normal breath sounds.   Abdominal:      General: Bowel sounds are decreased.      Tenderness: There is generalized abdominal tenderness.       Musculoskeletal:         General: Tenderness present.      Cervical back: Normal range of motion and neck supple.   Skin:     General: Skin is dry.      Capillary Refill: Capillary refill takes 2 to 3 seconds.   Neurological:      Mental Status: She is alert. Mental status is at baseline.         Fluids    Intake/Output Summary (Last 24 hours) at 11/23/2021 1219  Last data filed at 11/23/2021 0554  Gross per 24 hour   Intake 332 ml   Output 2245 ml   Net -1913 ml       Laboratory  Recent Labs     11/21/21  0030   WBC 5.2   RBC 2.95*   HEMOGLOBIN 8.6*   HEMATOCRIT 26.3*   MCV 89.2   MCH 29.2   MCHC 32.7*   RDW 49.1   PLATELETCT 234   MPV 9.8     Recent Labs     11/21/21  0030 11/22/21  0035 11/23/21  0010   SODIUM 127* 127* 129*   POTASSIUM 4.0 3.7 3.6   CHLORIDE 98 98 103   CO2 22 20 20   GLUCOSE 106* 165* 114*   BUN 16 15 13   CREATININE 0.57 0.44* 0.28*   CALCIUM 8.0* 7.4* 6.7*             Recent Labs     11/23/21  0010   TRIGLYCERIDE 174*       Imaging  DX-UPPER GI-SERIES WITH KUB   Final Result      1.  No evidence for duodenal leak.   2.  Reflux of contrast seen into the distal common bile duct at the site of biliary stent, consistent with prior sphincterotomy.      DX-ABDOMEN FOR TUBE PLACEMENT   Final Result         1.  Nonspecific  bowel gas pattern.   2.  Dobbhoff tube with tip terminating overlying the expected location of the third or fourth duodenal segment.   3.  Hazy right lower lobe infiltrates.      DX-ABDOMEN FOR TUBE PLACEMENT   Final Result      Feeding tube extends to the region of the stomach and duodenum with tip at the level of the proximal end of the jejunum.      DX-ABDOMEN FOR TUBE PLACEMENT   Final Result      Enteric tube terminates over the duodenal jejunal junction      Covered common bile duct stent      No contrast extravasation is seen      [id number] STENT - TUBE   Final Result      Portable fluoroscopic images obtained during ERCP biliary stent placement for localization purposes.      DX-UPPER GI-SERIES WITH KUB   Final Result      Duodenal perforation at the junction of the second and third portions of duodenum as noted on key images.      CT-ABORTED CT PROCEDURE   Final Result      Interval decrease in size of the RIGHT retroperitoneal fluid collection which contains a surgical drain. Because from bowel is possible. No additional drain was placed.            CT-ABDOMEN-PELVIS WITH   Final Result      1.  Slight interval increase in size in fluid collection the right midabdomen with drain in place.      2.  Slight interval increase in size in trace bilateral pleural effusions, right greater than left with bibasilar atelectasis.      3.  Pneumobilia.      CT-ABDOMEN-PELVIS WITH   Final Result         1. The components in the gallbladder fossa and right flank of the complex fluid collection are mostly resolved. There is still a small residual fluid collection in the perinephric space surrounding the inferior right kidney. Right lower quadrant MARTHA drain    and right upper quadrant catheter are outside of this residual collection.   2. Air-fluid levels throughout the colon could relate to ileus.   3. Bilateral pleural effusions with bibasilar atelectasis.   4. Pneumobilia.      CT-ABDOMEN-PELVIS WITH   Final Result       1.  Slight interval decrease in size of the large RIGHT peritoneal abscess which now contains 3 drains   2.  Decreased atelectasis with persistent opacity in the RIGHT lung base likely atelectasis rather than pneumonia   3.  Small LEFT renal cyst   4.  Prior cholecystectomy with ongoing pneumobilia      CT-DRAINAGE-CATH EXCHANGE   Final Result         1. Organized pancreatic pseudocyst Drainage with CT guidance.      CT-ABDOMEN-PELVIS WITH   Final Result      1.  There has been interval placement of an additional inferior lateral pigtail catheter within the complex right abdominal abscess. The other 2 pigtail catheters are stable in appearance.   2.  There has been no significant interval decrease in size of the large complex loculated abscess collection in the right abdomen.   3.  There is no new fluid collection.   4.  Gallbladder is surgically absent with continued pneumobilia.   5.  Interval decrease in the dependent pleural effusion with minimal airspace disease, likely atelectasis.      IR-DRAINAGE-CATH EXCHANGE   Final Result      1.  Successful left-sided drainage catheter removal.   2.  Successful image guided superior right drainage catheter replacement.      Plan: Suction drainage. Monitor outputs. Please contact interventional radiology if there is any concern for tube dysfunction. Suggest routine tube maintenance at 3 months intervals if the tube remains in place.         CT-DRAIN-PERITONEAL   Final Result      1.  CT GUIDED PERITONEAL RLQ abdominal CATHETER DRAINAGE.   2.  THE CURRENT PLAN IS TO MONITOR DRAINAGE OUTPUT AND OBTAIN A FOLLOWUP CT SCAN IN 5-7 DAYS IF CLINICALLY INDICATED.      CT-ABDOMEN-PELVIS WITH   Final Result         1. Grossly unchanged irregular fluid collection occupying the right abdomen surrounding the right kidney and right colon extending to midline. Additional pigtail catheter in the component about midline anterior abdomen. Both pigtail catheters seem to be    within the  collection.      2. Small right pleural effusion with right basilar atelectasis.               DX-CHEST-LIMITED (1 VIEW)   Final Result      1.  Right basilar atelectasis or consolidation. Small right pleural effusion not excluded.   2.  Atherosclerotic plaque.      CT-DRAIN-PERITONEAL   Final Result      1.  CT guided pancreatic pseudocyst catheter drainage.   2.  The current plan is to obtain a follow-up CT in 5-7 days..      IR-PICC LINE PLACEMENT W/ GUIDANCE > AGE 5   Final Result                  Ultrasound-guided PICC placement performed by qualified nursing staff as    above.          CT-ABDOMEN-PELVIS WITH    (Results Pending)        Assessment/Plan  * Duodenal perforation (HCC)- (present on admission)  Assessment & Plan  -After ERCP with retroperitoneal abscess and bacteremia  -Uncertain etiology - ddx includes pancreatitis, bile leak, iatrogenic  -Pepcid BID  -11/9: Concern perforation may have reopened.  Surgery team recommending NPO status and TPN  -11/10:  Start octreotide.   -11/17: Patient to go for upper GI today to see if leak at perforated site near ampulla.  If leaking, general surgeon to discuss with GI regarding placing a wall duodenal stent/esophageal stent  -11/18: Upper GI showed duodenal leak. Plan for stent placement tomorrow.   -11/19: Endoscope/ERCP, biliary stent, and cortrak placement done yesterday with Dr. Lawrence.    Continue abx, trend drain OP, KUB confirmed NTG placement- enteral feedings to be started.    Per General Surgery- hold oral medications/change to PO medications to IV at this time.   -11/20: Tolerating tube feedings, increase as tolerated. Continue NPO other than enteral feedings. Plan for repeat upper GI this week per general surgery.  -11/21: Tolerating TF. Decreased output to collection bags. Remain NPO beside TF. Plan for repeat upper GI in coming days.   -11/22: Seen by GI. Repeat UGI done today- no duodenal leaks seen. Continue TF, advance diet per general surgery.    -Pain control with dilaudid IV.   -Antiemetics PRN.       Electrolyte abnormality  Assessment & Plan  -Moderate hyponatremia 127  -Likely due to dehydration secondary to slowly titrating TF.   -11/22 NS increased to 83ml/hr  -11/22 Phos 1.6- replenish and monitor.   -Hypokalemia during hospitalization,  resolved.   -Continue to monitor.     Advanced care planning/counseling discussion  Assessment & Plan  -Tearful regarding medical course and progress.   -Seen by palliative care    Generalized weakness  Assessment & Plan  -PT/OT following  -Recommending post-acute placement.  -Acute rehab evaluating patient for admission.   -Likely home with  vs rehab at d/c pending patient's progress    Hypotension  Assessment & Plan  -11/6: Stop lasix; Stop ACEI  -11/7 started midodrine  -11/16-resolved continue to monitor    Acute respiratory failure with hypoxia (HCC)- (present on admission)  Assessment & Plan  -Resolved with diuresis  -Likely volume overload with lower extremity edema present    Antibiotic-associated diarrhea  Assessment & Plan  -Cdiff PCR negative  -Loperamide PRN    Postprocedural retroperitoneal abscess- (present on admission)  Assessment & Plan  -S/P Ex lap, I/D, debridement nec fasc 11/5/2021  -Abx per ID end date 11/24/2021  -11/16/2021: CT abdomen 11/15/2021 showed increase fluid collection in the right mid abdomen and slight increase in trace bilateral pleural effusions.   -Patient go to IR for procedural drainage today  -11/17: RLQ drain was found to have fallen out in IR yesterday.  Fluid collection is smaller and no new drain was indicated.    -11/22: MARTHA drain in place to RUQ, decrease output seen. UGI today showed no duodenal leaks.  -Pain control with dilaudid IV  -Antiemetics PRN  -Will continue to monitor      Bacteremia due to Gram-negative bacteria and fungemia- (present on admission)  Assessment & Plan  -KALANI negative for signs of endocarditis  -Cont bactrim, Zosyn, and Micafungin per ID for  4-week course with stop date of 11/24.  Repeat imaging prior to discontinuation.   -Repeat Bld Cx neg      Normocytic anemia- (present on admission)  Assessment & Plan  -Transfuse for Hgb <7  -Avoid regular phlebotomy  -Supplements per dietary: 11/6 added thiamine, 6 sm meals/d  -11/21: Thiamine PO held at this time. Will resume when cleared to have PO meds.     Sepsis due to intraabdominal fluid collection/abscess, bacteremia and fungemia (HCC)- (present on admission)  Assessment & Plan  -Sepsis resolved  -Source intra-abdominal  -Zosyn/Mycafungin/Bactrim DS per infectious disease end date 11/24/2021  -ID signed off  -Re-imaging per ID, IR, and Surgery  -S/P Ex lap w I/D and debridement nec fasc 11/5/2021  -11/8 Repeat CT. Updated surgical team re: perinephric fluid collection. Further CT may be considered with IV and Oral contrast if needed.  -11/9:  She has remained afebrile over last 48 hours.  VSS.  Does not appear to be septic at this time.  Continue antibx.   -11/19/2021: sp biliary stent placement. Hold PO meds/change to IV to general surgery. Bactrim changed to IV.     Hyperlipidemia- (present on admission)  Assessment & Plan  -Previously on ezetimibe.  -PO meds held per general surgery,   -Will resume when cleared to have PO medications    Primary hypertension- (present on admission)  Assessment & Plan  -Hypotensive 11/6 - stop lisinopril         VTE prophylaxis: enoxaparin ppx    I have performed a physical exam and reviewed and updated ROS and Plan today (11/23/2021). In review of yesterday's note (11/22/2021), there are no changes except as documented above.    ========================================================================================================  Please note that this dictation was created using voice recognition software. I have made every reasonable attempt to correct obvious errors, but there may be errors of grammar and possibly content that I did not discover before finalizing  the note.    Electronically signed by:  EDISON Waters, MSN, APRN, FNP-C  Hospitalist Services  [facility]  [phone]  [email or web address]  11/23/21     1428

## 2021-11-23 NOTE — WOUND TEAM
[facility]  Inpatient Services  Wound and Skin Care Evaluation    Admission Date: 10/15/2021     Last order of IP CONSULT TO WOUND CARE was found on 11/9/2021 from Hospital Encounter on 10/14/2021     HPI, PMH, SH: Reviewed    Past Surgical History:   Procedure Laterality Date   • PB UPPER GI ENDOSCOPY,DIAGNOSIS N/A 11/18/2021    Procedure: GASTROSCOPY with stent placement;  Surgeon: Migel Lawrence M.D.;  Location: SURGERY SAME DAY [facility];  Service: Gastroenterology   • PB ERCP,DIAGNOSTIC N/A 11/18/2021    Procedure: ERCP (ENDOSCOPIC RETROGRADE CHOLANGIOPANCREATOGRAPHY);  Surgeon: Migel Lawrence M.D.;  Location: SURGERY SAME DAY [facility];  Service: Gastroenterology   • PB PLACE PERCUT GASTROSTOMY TUBE N/A 11/18/2021    Procedure: GASTROSCOPY, WITH FEEDING TUBE INSERTION;  Surgeon: Migel Lawrence M.D.;  Location: SURGERY SAME DAY [facility];  Service: Gastroenterology   • BILIARY STENT PLACEMENT N/A 11/18/2021    Procedure: INSERTION, STENT, BILE DUCT;  Surgeon: Migel Lawrence M.D.;  Location: SURGERY SAME DAY [facility];  Service: Gastroenterology   • PB EXPLORATORY OF ABDOMEN  11/5/2021    Procedure: LAPAROTOMY, EXPLORATORY;  Surgeon: Jaden Cook M.D.;  Location: [facility];  Service: General   • PB ULTRASONIC GUIDANCE, INTRAOPERATIVE  11/5/2021    Procedure: ULTRASOUND GUIDANCE;  Surgeon: Jaden Cook M.D.;  Location: [facility];  Service: General   • ABDOMINAL ABSCESS DRAINAGE  11/5/2021    Procedure: DRAINAGE, ABSCESS, ABDOMEN - PERITONEAL AND RETROPERITONEAL;  Surgeon: Jaden Cook M.D.;  Location: [facility];  Service: General   • PB ERCP,DIAGNOSTIC  10/1/2021    Procedure: ERCP (ENDOSCOPIC RETROGRADE CHOLANGIOPANCREATOGRAPHY);  Surgeon: Fausto Casas M.D.;  Location: [facility];  Service: Gastroenterology   • COLONOSCOPY  8/8/2018    Procedure: COLONOSCOPY;  Surgeon: Shukri Condon M.D.;  Location: [facility];   Service: General   • GASTROSCOPY  2018    Procedure: GASTROSCOPY;  Surgeon: Fausto Casas M.D.;  Location: SURGERY [facility];  Service: Gastroenterology   • EGD W/ENDOSCOPIC ULTRASOUND  2018    Procedure: EGD W/ENDOSCOPIC ULTRASOUND- RADIAL UPPER;  Surgeon: Fausto Casas M.D.;  Location: SURGERY [facility];  Service: Gastroenterology   • CATARACT PHACO WITH IOL Left 2017    Procedure: CATARACT PHACO WITH IOL;  Surgeon: Stuart Estevez M.D.;  Location: SURGERY SAME DAY [facility];  Service:    • CATARACT PHACO WITH IOL Right 2017    Procedure: CATARACT PHACO WITH IOL;  Surgeon: Stuart Estevez M.D.;  Location: SURGERY [facility];  Service:    • GASTRIC SLEEVE LAPAROSCOPY  10/19/2016    Procedure: GASTRIC SLEEVE LAPAROSCOPY, HIATAL HERNIA;  Surgeon: Shukri Condon M.D.;  Location: SURGERY [facility];  Service:    • LIVER BIOPSY LAPAROSCOPIC  10/19/2016    Procedure: LIVER BIOPSY LAPAROSCOPIC;  Surgeon: Shukri Condon M.D.;  Location: [facility];  Service:    • GASTROSCOPY N/A 2016    Procedure: GASTROSCOPY;  Surgeon: Shukri Condon M.D.;  Location: [facility];  Service:    • ROTATOR CUFF REPAIR Right 2016   • ROTATOR CUFF REPAIR Left 2015   • HYSTERECTOMY ROBOTIC  2009    Performed by MEG VILLEGAS at [facility]   • LUMBAR FUSION ANTERIOR      Dr Hillman, L3-S1 fusion   • CHOLECYSTECTOMY      laparoscopic   • TUBAL LIGATION     • GASTRIC RESECTION      gastric stapling   • TONSILLECTOMY AND ADENOIDECTOMY     • PRIMARY C SECTION  , , 1985    x3     Social History     Tobacco Use   • Smoking status: Former Smoker     Packs/day: 0.25     Years: 0.10     Pack years: 0.02     Types: Cigarettes     Quit date: 1973     Years since quittin.9   • Smokeless tobacco: Never Used   Substance Use Topics   • Alcohol use: No     No chief complaint on file.    Diagnosis: Bile  duct leak [K83.8]  Postprocedural intraabdominal abscess [T81.43XA]  Intra-abdominal abscess (HCC) [K65.1]    Unit where seen by Wound Team: T435/02     WOUND CONSULT/FOLLOW UP RELATED TO:  Right abdomen/flank x3    WOUND HISTORY:  Pt is an older woman with history of idiopathic pancreatitis who presented on 11/8 with complaints of abdominal pain. Pt has had ERCP with sphincterotomy by dr. Ramires on 10/1/21. [facility] pt had CT which revealed large irregular fluid collection with thick wall occupying most of the right abdomen, IR drains were placed. Unfortunately there was a lack of improvement in intraabdominal fluid and therefore Dr. Dumont was consulted. Pt underwent surgery with Dr. ST on 11/5 and was found to have a large retroperitoneal abscess containing necrotizing fascititis as well as an anterior peritoneal abscess and necrotizing soft tissue infection. IR placed drains were removed and new surgical drains were placed. Pt began having drainage from her old right upper quad IR drain hole and therefore wound team was requested to evaluate and treat/manage the drainage.     WOUND ASSESSMENT/LDA       Wound 10/25/21 Abdomen;Flank Lateral;Upper Right IR drain insertion site #3 (Active)   Wound Image    11/23/21 1100   Site Assessment Pink    Periwound Assessment Intact;Pink    Margins Attached edges    Closure Secondary intention    Drainage Amount Scant    Drainage Description Serosanguineous    Treatments Cleansed;Offloading;Site care    Wound Cleansing Approved Wound Cleanser    Periwound Protectant Skin Protectant Wipes to Periwound    Dressing Cleansing/Solutions Not Applicable    Dressing Options Hydrofiber Silver;Dry Gauze;Hypafix Tape    Dressing Changed New    Dressing Status Clean;Dry;Intact    Dressing Change/Treatment Frequency Every 72 hrs, and As Needed    NEXT Dressing Change/Treatment Date 11/26/21    NEXT Weekly Photo (Inpatient Only) 11/30/21    Non-staged Wound  Description Full thickness    Wound Length (cm) 0.2 cm    Wound Width (cm) 0.3 cm    Wound Surface Area (cm^2) 0.06 cm^2    Shape Circular    Wound Odor None    Pulses N/A    Exposed Structures MALVIN    WOUND NURSE ONLY - Time Spent with Patient (mins) 20    Wound 11/09/21 Full Thickness Wound Abdomen Lateral;Upper;Quadrant Right Drain #2 (Active)   Wound Image    11/23/21 1100   Site Assessment Red;Pink    Periwound Assessment Red;Pink    Margins Attached edges    Closure Secondary intention    Drainage Amount Moderate    Drainage Description Brown    Treatments Cleansed;Site care    Wound Cleansing Approved Wound Cleanser    Periwound Protectant Skin Protectant Wipes to Periwound    Dressing Cleansing/Solutions Not Applicable    Dressing Options Other (Comments)    Dressing Changed Changed    Dressing Status Clean;Dry;Intact    Dressing Change/Treatment Frequency Every 72 hrs, and As Needed    NEXT Dressing Change/Treatment Date 11/26/21    NEXT Weekly Photo (Inpatient Only) 11/30/21    Non-staged Wound Description Full thickness    Wound Length (cm) 0.3 cm    Wound Width (cm) 0.6 cm    Wound Surface Area (cm^2) 0.18 cm^2    Shape Oval    Wound Odor None    Exposed Structures MALVIN    WOUND NURSE ONLY - Time Spent with Patient (mins) 20        Wound 11/19/21 Full Thickness Wound Hip Anterior Right RLQ DRAIN #4 (Active)   Wound Image    11/23/21 1100   Site Assessment Red;Pink    Periwound Assessment Pink    Margins Unattached edges    Closure Secondary intention    Drainage Amount Moderate    Drainage Description Brown    Treatments Site care;Cleansed    Wound Cleansing Approved Wound Cleanser    Periwound Protectant Skin Protectant Wipes to Periwound;Paste Ring    Dressing Changed Changed    Dressing Status Clean;Dry;Intact    Dressing Change/Treatment Frequency Every 72 hrs, and As Needed    NEXT Dressing Change/Treatment Date 11/26/21    NEXT Weekly Photo (Inpatient Only) 11/30/21    Non-staged Wound Description  Full thickness    Wound Length (cm) 0.7 cm    Wound Width (cm) 2 cm    Wound Surface Area (cm^2) 1.4 cm^2    WOUND NURSE ONLY - Time Spent with Patient (mins) 20      Moisture Associated Skin Damage 11/14/21 (Active)   Wound Image   11/23/21 1100   NEXT Weekly Photo (Inpatient Only) 11/30/21    Drainage Amount None    Periwound Assessment Red    IAD Cleansing Foam Cleanser/Washcloth;Dimethecone Wipes    Periwound Protectant Barrier Paste;Antifungal Therapy    IAD Containment Device Purewick    WOUND NURSE ONLY - Time Spent with Patient (mins) 20      Vascular:    CHARBEL:   No results found.    Lab Values:    Lab Results   Component Value Date/Time    WBC 5.2 11/21/2021 12:30 AM    RBC 2.95 (L) 11/21/2021 12:30 AM    HEMOGLOBIN 8.6 (L) 11/21/2021 12:30 AM    HEMATOCRIT 26.3 (L) 11/21/2021 12:30 AM    CREACTPROT 3.77 (H) 11/22/2021 11:37 AM    SEDRATEWES 10 12/02/2015 07:48 AM    HBA1C 5.7 (H) 09/13/2016 10:40 AM      Culture Results show:  Recent Results (from the past 720 hour(s))   CULTURE WOUND W/ GRAM STAIN    Collection Time: 11/11/21  4:52 PM    Specimen: Wound   Result Value Ref Range    Significant Indicator POS (POS)     Source WND     Site retroperitoneal abscess drain     Culture Result (A)      Growth noted after further incubation, see below for  organism identification.      Gram Stain Result       Rare WBCs.  Many Gram positive cocci.  Few Yeast.      Culture Result Enterococcus faecium  Heavy growth   (A)     Culture Result Candida albicans  Moderate growth   (A)     Culture Result Stenotrophomonas maltophilia  Light growth   (A)        Susceptibility    Enterococcus faecium - MU     Daptomycin 4 Sensitive mcg/mL     Gent Synergy <=500 Sensitive mcg/mL     Ampicillin >8 Resistant mcg/mL     Vancomycin 1 Sensitive mcg/mL    Stenotrophomonas maltophilia - MU     Trimeth/Sulfa <=0.5/9.5 Sensitive mcg/mL     Pain Level/Medicated:  No pain med.     INTERVENTIONS BY WOUND TEAM:  Chart and images reviewed.  "Discussed with bedside RN. All areas of concern (based on picture review, LDA review and discussion with bedside RN) have been thoroughly assessed. Documentation of areas based on significant findings. This RN in to assess patient. Performed standard wound care which includes appropriate positioning, dressing removal and non-selective debridement. Pictures and measurements obtained weekly if/when required.  Preparation for Dressing removal: removed without difficulty   Non-selectively Debrided with:  moist warm washcloth and no rinse foam soap, open areas cleansed with wound cleanser.    Sharp debridement: NA  Ngozi wound: Cleansed with moist warm washcloth, Prepped with no sting   Primary Dressing: A 2 1/4\" barrier was cut larger than wound and MARTHA Lap stab. 1/2 Paste ring was then stretched to fit openings of both barriers. Barriers were applied down to skin.  High output pouch was attached to both barriers and closed.  Right flank drain site cleansed, covered with aquacel AG, secured with half mepilex.   Secondary (Outer) Dressing: NA    Buttocks cleansed of loose stool and noted to have apparent yeast.  SLADE miconazole ordered.     Interdisciplinary consultation: Patient, Bedside RN, Wound RN (Sajan)    EVALUATION / RATIONALE FOR TREATMENT:  Most Recent Date:  11/23/21:  Drainage lessoned from flank area.  R ABD wounds still with large amount of drainage.  No leakage from previous pouches.   Continue with 2/weekly changes.     11/19/21: no leakage noted, labeled each bag to make easier for bedside nursing. Continue with plan of care. (could not change dressings 11/18/21 due to patient being in OR)  11/14/21:  Noted to be leaking today.  Patient still with large amounts of drainage from around drains and flank area.     11/13/21: both appliances lasted with no leaks, the pouches seem to be managing the drainage better than any other dressing. Still high amounts of brown/green drainage present. Will continue to " follow.   11/10/21: Pouch around RUQ old MARTHA site appears to be working well. Pt noted to have significant leaking around MARTHA drain to RLQ. Pouched around MARTHA drain to prevent skin deterioration. Will continue to follow.   11/9/21: Pt with large amount of liquid brown drainage noted from right upper quadrant old MARTHA Site. Wound team requested to evaluate and pouch. Wound is small however large amount of drainage noted. High output pouch applied to down drain to better manage output. Pt also having some drainage noted from around surgically placed drainage. Fenestrated gauze placed to better control output.     Goals: Steady decrease in wound area and depth weekly.    WOUND TEAM PLAN OF CARE ([X] for frequency of wound follow up,):   Nursing to follow orders written for wound care. Contact wound team if area fails to progress, deteriorates or with any questions/concerns  Dressing changes by wound team:    X               Follow up 3 times weekly:                NPWT change 3 times weekly:     Follow up 1-2 times weekly:      Follow up Bi-Monthly:                   Follow up as needed:     Other (explain): X    NURSING PLAN OF CARE ORDERS (X):  Dressing changes: See Dressing Care orders:   Skin care: See Skin Care orders:   RN Prevention Protocol:   Rectal tube care: See Rectal Tube Care orders:   Other orders:    RSKIN:   CURRENTLY IN PLACE (X), APPLIED THIS VISIT (A), ORDERED (O):   Q shift Anant:  X  Q shift pressure point assessments:  X    Surface/Positioning   Pressure redistribution mattress  X          Low Airloss        O 11/23/21  Bariatric foam      Bariatric ALBERT     Waffle cushion        Waffle Overlay    X      Reposition q 2 hours X     TAPs Turning system     Z Jorgito Pillow     Offloading/Redistribution   Sacral Mepilex (Silicone dressing)     Heel Mepilex (Silicone dressing)         Heel float boots (Prevalon boot)             Float Heels off Bed with Pillows           Respiratory RA  Silicone O2 tubing          Gray Foam Ear protectors     Cannula fixation Device (Tender )          High flow offloading Clip    Elastic head band offloading device      Anchorfast                                                         Trach with Optifoam split foam             Containment/Moisture Prevention     Rectal tube or BMS    Purwick/Condom Cath        Corea Catheter  X  Barrier wipes           Barrier paste     X  Antifungal tx    11/23  Interdry        Mobilization       Up to chair        Ambulate      PT/OT      Nutrition       Dietician        Diabetes Education      PO  X   TF     TPN     NPO   # days     Other        Anticipated discharge plans:   LTACH:        SNF/Rehab:                  Home Health Care:   X        Outpatient Wound Center:            Self/Family Care:        Other:                  Vac Discharge Needs:   Not Applicable Pt not on a wound vac:     X  Regular Vac while inpatient, alternative dressing at DC:        Regular Vac in use and continued at DC:            Reg. Vac w/ Skin Sub/Biologic in use. Will need to be changed 2x wkly:      Veraflo Vac while inpatient, ok to transition to Regular Vac on Discharge:           Veraflo Vac while inpatient, will need to remain on Veraflo Vac upon discharge:

## 2021-11-24 ENCOUNTER — APPOINTMENT (OUTPATIENT)
Dept: RADIOLOGY | Facility: MEDICAL CENTER | Age: 66
DRG: 856 | End: 2021-11-24
Attending: SURGERY
Payer: MEDICARE

## 2021-11-24 LAB
ANION GAP SERPL CALC-SCNC: 8 MMOL/L (ref 7–16)
BUN SERPL-MCNC: 17 MG/DL (ref 8–22)
CALCIUM SERPL-MCNC: 7.7 MG/DL (ref 8.5–10.5)
CHLORIDE SERPL-SCNC: 97 MMOL/L (ref 96–112)
CO2 SERPL-SCNC: 22 MMOL/L (ref 20–33)
CREAT SERPL-MCNC: 0.29 MG/DL (ref 0.5–1.4)
GLUCOSE SERPL-MCNC: 122 MG/DL (ref 65–99)
POTASSIUM SERPL-SCNC: 4 MMOL/L (ref 3.6–5.5)
SODIUM SERPL-SCNC: 127 MMOL/L (ref 135–145)

## 2021-11-24 PROCEDURE — 700111 HCHG RX REV CODE 636 W/ 250 OVERRIDE (IP): Performed by: INTERNAL MEDICINE

## 2021-11-24 PROCEDURE — 99024 POSTOP FOLLOW-UP VISIT: CPT | Performed by: SURGERY

## 2021-11-24 PROCEDURE — 700111 HCHG RX REV CODE 636 W/ 250 OVERRIDE (IP): Mod: JB | Performed by: SURGERY

## 2021-11-24 PROCEDURE — 700111 HCHG RX REV CODE 636 W/ 250 OVERRIDE (IP): Performed by: STUDENT IN AN ORGANIZED HEALTH CARE EDUCATION/TRAINING PROGRAM

## 2021-11-24 PROCEDURE — 700102 HCHG RX REV CODE 250 W/ 637 OVERRIDE(OP): Performed by: NURSE PRACTITIONER

## 2021-11-24 PROCEDURE — 700101 HCHG RX REV CODE 250: Performed by: NURSE PRACTITIONER

## 2021-11-24 PROCEDURE — 99233 SBSQ HOSP IP/OBS HIGH 50: CPT | Performed by: NURSE PRACTITIONER

## 2021-11-24 PROCEDURE — 74177 CT ABD & PELVIS W/CONTRAST: CPT | Mod: ME

## 2021-11-24 PROCEDURE — 700111 HCHG RX REV CODE 636 W/ 250 OVERRIDE (IP): Performed by: ANESTHESIOLOGY

## 2021-11-24 PROCEDURE — 700105 HCHG RX REV CODE 258: Performed by: INTERNAL MEDICINE

## 2021-11-24 PROCEDURE — 700111 HCHG RX REV CODE 636 W/ 250 OVERRIDE (IP): Mod: JG | Performed by: INTERNAL MEDICINE

## 2021-11-24 PROCEDURE — 80048 BASIC METABOLIC PNL TOTAL CA: CPT

## 2021-11-24 PROCEDURE — 770001 HCHG ROOM/CARE - MED/SURG/GYN PRIV*

## 2021-11-24 PROCEDURE — 700105 HCHG RX REV CODE 258: Performed by: NURSE PRACTITIONER

## 2021-11-24 PROCEDURE — 97161 PT EVAL LOW COMPLEX 20 MIN: CPT

## 2021-11-24 PROCEDURE — A9270 NON-COVERED ITEM OR SERVICE: HCPCS | Performed by: NURSE PRACTITIONER

## 2021-11-24 RX ORDER — HYDROMORPHONE HYDROCHLORIDE 1 MG/ML
1 INJECTION, SOLUTION INTRAMUSCULAR; INTRAVENOUS; SUBCUTANEOUS
Status: DISCONTINUED | OUTPATIENT
Start: 2021-11-24 | End: 2021-11-27

## 2021-11-24 RX ADMIN — SULFAMETHOXAZOLE AND TRIMETHOPRIM 160 MG OF TRIMETHOPRIM: 80; 16 INJECTION, SOLUTION, CONCENTRATE INTRAVENOUS at 23:13

## 2021-11-24 RX ADMIN — OCTREOTIDE ACETATE 100 MCG: 100 INJECTION, SOLUTION INTRAVENOUS; SUBCUTANEOUS at 23:12

## 2021-11-24 RX ADMIN — MICAFUNGIN SODIUM 100 MG: 100 INJECTION, POWDER, LYOPHILIZED, FOR SOLUTION INTRAVENOUS at 09:20

## 2021-11-24 RX ADMIN — SULFAMETHOXAZOLE AND TRIMETHOPRIM 160 MG OF TRIMETHOPRIM: 80; 16 INJECTION, SOLUTION, CONCENTRATE INTRAVENOUS at 13:33

## 2021-11-24 RX ADMIN — MICONAZOLE NITRATE: 20 CREAM TOPICAL at 18:17

## 2021-11-24 RX ADMIN — PIPERACILLIN SODIUM AND TAZOBACTAM SODIUM 3.38 G: 3; .375 INJECTION, POWDER, FOR SOLUTION INTRAVENOUS at 23:13

## 2021-11-24 RX ADMIN — ONDANSETRON 4 MG: 2 INJECTION INTRAMUSCULAR; INTRAVENOUS at 06:14

## 2021-11-24 RX ADMIN — PHENOL 1 SPRAY: 1.5 LIQUID ORAL at 16:39

## 2021-11-24 RX ADMIN — SULFAMETHOXAZOLE AND TRIMETHOPRIM 160 MG OF TRIMETHOPRIM: 80; 16 INJECTION, SOLUTION, CONCENTRATE INTRAVENOUS at 01:02

## 2021-11-24 RX ADMIN — HYDROMORPHONE HYDROCHLORIDE 1 MG: 1 INJECTION, SOLUTION INTRAMUSCULAR; INTRAVENOUS; SUBCUTANEOUS at 02:08

## 2021-11-24 RX ADMIN — PHENOL 1 SPRAY: 1.5 LIQUID ORAL at 13:41

## 2021-11-24 RX ADMIN — ONDANSETRON 4 MG: 2 INJECTION INTRAMUSCULAR; INTRAVENOUS at 02:07

## 2021-11-24 RX ADMIN — MICONAZOLE NITRATE: 20 CREAM TOPICAL at 06:15

## 2021-11-24 RX ADMIN — HYDROMORPHONE HYDROCHLORIDE 1 MG: 1 INJECTION, SOLUTION INTRAMUSCULAR; INTRAVENOUS; SUBCUTANEOUS at 09:20

## 2021-11-24 RX ADMIN — PHENOL 1 SPRAY: 1.5 LIQUID ORAL at 09:02

## 2021-11-24 RX ADMIN — HYDROMORPHONE HYDROCHLORIDE 1 MG: 1 INJECTION, SOLUTION INTRAMUSCULAR; INTRAVENOUS; SUBCUTANEOUS at 16:39

## 2021-11-24 RX ADMIN — ONDANSETRON 4 MG: 2 INJECTION INTRAMUSCULAR; INTRAVENOUS at 13:33

## 2021-11-24 RX ADMIN — PIPERACILLIN SODIUM AND TAZOBACTAM SODIUM 3.38 G: 3; .375 INJECTION, POWDER, FOR SOLUTION INTRAVENOUS at 06:14

## 2021-11-24 RX ADMIN — ENOXAPARIN SODIUM 40 MG: 40 INJECTION SUBCUTANEOUS at 09:19

## 2021-11-24 RX ADMIN — HYDROMORPHONE HYDROCHLORIDE 1 MG: 1 INJECTION, SOLUTION INTRAMUSCULAR; INTRAVENOUS; SUBCUTANEOUS at 06:14

## 2021-11-24 RX ADMIN — PIPERACILLIN SODIUM AND TAZOBACTAM SODIUM 3.38 G: 3; .375 INJECTION, POWDER, FOR SOLUTION INTRAVENOUS at 14:45

## 2021-11-24 RX ADMIN — OCTREOTIDE ACETATE 100 MCG: 100 INJECTION, SOLUTION INTRAVENOUS; SUBCUTANEOUS at 10:02

## 2021-11-24 RX ADMIN — HYDROMORPHONE HYDROCHLORIDE 1 MG: 1 INJECTION, SOLUTION INTRAMUSCULAR; INTRAVENOUS; SUBCUTANEOUS at 13:33

## 2021-11-24 RX ADMIN — HYDROMORPHONE HYDROCHLORIDE 1 MG: 1 INJECTION, SOLUTION INTRAMUSCULAR; INTRAVENOUS; SUBCUTANEOUS at 19:48

## 2021-11-24 RX ADMIN — ONDANSETRON 4 MG: 2 INJECTION INTRAMUSCULAR; INTRAVENOUS at 19:49

## 2021-11-24 ASSESSMENT — ENCOUNTER SYMPTOMS
WEAKNESS: 1
FOCAL WEAKNESS: 0
SHORTNESS OF BREATH: 0
FEVER: 0
CONSTITUTIONAL NEGATIVE: 1
ABDOMINAL PAIN: 1
FALLS: 0
MEMORY LOSS: 0
EYES NEGATIVE: 1
CARDIOVASCULAR NEGATIVE: 1
HEADACHES: 0
WHEEZING: 0
CONSTIPATION: 0
NAUSEA: 0
VOMITING: 0
COUGH: 0
PSYCHIATRIC NEGATIVE: 1
CHILLS: 0
MYALGIAS: 1
RESPIRATORY NEGATIVE: 1
NAUSEA: 1
MYALGIAS: 0

## 2021-11-24 ASSESSMENT — COGNITIVE AND FUNCTIONAL STATUS - GENERAL
STANDING UP FROM CHAIR USING ARMS: A LITTLE
SUGGESTED CMS G CODE MODIFIER MOBILITY: CL
CLIMB 3 TO 5 STEPS WITH RAILING: A LOT
WALKING IN HOSPITAL ROOM: A LITTLE
MOVING TO AND FROM BED TO CHAIR: UNABLE
TURNING FROM BACK TO SIDE WHILE IN FLAT BAD: UNABLE
MOBILITY SCORE: 14

## 2021-11-24 ASSESSMENT — PAIN DESCRIPTION - PAIN TYPE: TYPE: ACUTE PAIN

## 2021-11-24 ASSESSMENT — GAIT ASSESSMENTS
ASSISTIVE DEVICE: FRONT WHEEL WALKER
DEVIATION: BRADYKINETIC;ANTALGIC
DISTANCE (FEET): 40
GAIT LEVEL OF ASSIST: MINIMAL ASSIST

## 2021-11-24 ASSESSMENT — LIFESTYLE VARIABLES: SUBSTANCE_ABUSE: 0

## 2021-11-24 NOTE — CARE PLAN
The patient is Watcher - Medium risk of patient condition declining or worsening    Shift Goals  Clinical Goals: pain management, work with therapy, hygiene  Patient Goals: bath, rest, pain medication  Family Goals: Comfort and rest    Progress made toward(s) clinical / shift goals:  VSS, tube feeds, voiding, on room air     Patient is not progressing towards the following goals: refusing OOB, pain not controlled, incontinent of B+B, skin breakdown in yessenia area      Problem: Hemodynamics  Goal: Patient's hemodynamics, fluid balance and neurologic status will be stable or improve  Outcome: Progressing     Problem: Fluid Volume  Goal: Fluid volume balance will be maintained  Outcome: Progressing     Problem: Urinary - Renal Perfusion  Goal: Ability to achieve and maintain adequate renal perfusion and functioning will improve  Outcome: Progressing     Problem: Respiratory  Goal: Patient will achieve/maintain optimum respiratory ventilation and gas exchange  Outcome: Progressing     Problem: Physical Regulation  Goal: Diagnostic test results will improve  Outcome: Progressing  Goal: Signs and symptoms of infection will decrease  Outcome: Progressing     Problem: Pain - Standard  Goal: Alleviation of pain or a reduction in pain to the patient’s comfort goal  Outcome: Progressing

## 2021-11-24 NOTE — PROGRESS NOTES
The patient was seen this morning, comfortable and actually recently underwent walking throughout the hallway.  She is tolerating the tube feeds well and is at goal at 45 cc an hour  Drainage from all her drainage sites has decreased to 110 cc over 24 hours.  Very thirsty but feeling better.    Main problem is sore throat related to the core track.    CT scan ordered this morning with IV contrast but I added p.o. contrast to confirm there is no leak for second time, also to assess the small bowel make sure there is no other fistulas.    Assessment and plan: Okay to start 1 cup of ice chips every 4 hours and sips of ginger ale.   will bring throat lozenges flavored for the patient to use as needed.  She will keep the bag at the bedside.      No surgical indications at this time.  We will continue to follow

## 2021-11-24 NOTE — DIETARY
Nutrition support weekly update:  Day 40 of admit.  Nils Alfonso is a 66 y.o. female with admitting DX of bile duct leak and postprocedural intraabdominal abscess.  Tube feeding initiated on 11/9. Current TF via small bowel is Impact Peptide 1.5 @ 45 mL/hr, providing 1620 kcal, 102 gm protein, and 832 mL of free water per day.     Assessment:  Weight from 11/16 was 72 kg.  No new wt this week - continue to obtain at least a weekly wt in order to monitor wt trends and nutritional status.     Evaluation:   1. Pt tolerating TF @ goal.  2. Wound team note from 11/23 reviewed.  Generalized edema to abdomen, flank, BUE and 1+ pitting edema to BLE.   3. Current clinical picture and MD progress notes reviewed including GI note.  Still c/o breakthrough pain, nausea under control.  Working w/ PT.  Decreased MARTHA drain output.  4. Both labs and meds reviewed at this time.   5. LBM: 11/23.    6. Still pending medical clearance.  Antibiotics to continue until 11/24.    7. Current feeding remains appropriate at this time.     Malnutrition risk: No new criteria at this time.     Recommendations/Plan:  1. Continue current TF formula and goal rate.   2. Fluids per MD.  3. Continue to monitor wt - obtain new measured wt.  4. Diet upgrades per MD as appropriate.     RD follows.

## 2021-11-24 NOTE — THERAPY
Missed Therapy     Patient Name: Nils Alfonso  Age:  66 y.o., Sex:  female  Medical Record #: [id number]  Today's Date: 11/24/2021    Discussed missed therapy with RN       11/24/21 8519   Interdisciplinary Plan of Care Collaboration   Collaboration Comments Attempted to work with patient today. She reports already getting up with PT and spending some time in the chair. She is now back in bed and fatigued. She expresses high desire to return home and is motivated to participate in therapy. Requesting return later today or tomorrow.

## 2021-11-24 NOTE — CARE PLAN
The patient is Stable - Low risk of patient condition declining or worsening    Shift Goals  Clinical Goals: pain management, rest  Patient Goals: bath, rest, pain medication  Family Goals: Comfort and rest    Progress made toward(s) clinical / shift goals:  Pain controled with prn meds.    Problem: Pain - Standard  Goal: Alleviation of pain or a reduction in pain to the patient’s comfort goal  Outcome: Progressing       Patient is not progressing towards the following goals:

## 2021-11-24 NOTE — PROGRESS NOTES
"Patient lying in bed, moaning in pain. States her pain medication is \"not helping\" because it wears off too quickly. Requesting more frequent pain medication. On room air, VSS. Tolerating tube feeds, although she does complain of nausea. Incontinent of bowel and bladder, using purewick. Full bed bath and bed change. Patient reports she wants to go home when she is medically cleared, however, she \"fired\" (her words) PT yesterday and does not feel up to getting out of bed due to pain and nausea. This RN educated patient that continuing to work with therapy would improve her strength and stamina, and increase her chances of going home independently at discharge. Patient agreed to work with therapy again, as she feels she is weak.  PT reconsulted at discretion of Kiya RIDER. Wound team following, groin and yessenia area are red. Bed alarm on, call bell in hand, patient calls approp for needs, patient updated on plan of care, patients  at bedside as well   "

## 2021-11-24 NOTE — PROGRESS NOTES
Gastroenterology Progress Note               Author: Fausto Casas M.D.  with ADRY Jones Date & Time Created: 11/24/2021 8:14 AM       Patient ID:  Name:             Nils Alfonso  YOB: 1955  Age:                 66 y.o.  female  MRN:               [id number]      Referring Provider:  Ana Luisa Shearer MD      Presenting Chief Complaint:  Abdominal pain      History of Present Illness:    10/10/2021 HPI  66-year-old female PMH recurrent idiopathic pancreatitis s/p ERCP with sphincterotomy October 1, 2021 complicated by ERCP pancreatitis, sleeve gastrectomy, dyslipidemia, hypertension, osteoarthritis of the spine status post lumbar fusion who was admitted to the hospital 10/8/2021 with worsening right lower quadrant pain over the past week.  Ever since her ERCP October 1, 2021, she has been experiencing pain, intermittent subjective fevers, nausea.  In the emergency room-labs: CBC: WBC 17.8..  Sodium 130.  LFTs-WNL.  CT scan abdomen/pelvis-large irregular fluid collection with thick wall occupying most of the right abdomen surrounding the right kidney and colon.  Severe wall thickening of the descending, transverse colon, second portion of the duodenum likely reactive.  Pneumobilia with gas in the CBD.  Drain placement by IR was performed.  Currently, the abdominal pain has improved.  No vomiting.  Started on ceftriaxone and metronidazole IV.     ERCP October 1, 2021-common bile duct-dilated down to the level of the ampulla.  4 mm polyp at the distal duct seen after sphincterotomy.  8 mm sphincterotomy.  Negative for stone.  Bile duct polyp-benign with inflammatory and hyperplastic changes.  No evidence of epithelial dysplasia/neoplasia.       Interval History:  10/11/2021: interval HIDA scan showed no bile leak.  This morning she feels more comfortable, describing minimal abdominal pain but denying nausea vomiting and fevers.  She has been able to eat a little bit more and has full liquids  beside her bed.  She expresses concern about being on losartan which she says was prescribed outpatient as as needed for high blood pressures.  She also states she has not passed a bowel movement in the week which she describes not eating much.     10/12/2021 - No events overnight.  Tolerating diet without nausea or vomiting.  Had spontaneous, formed, brown bowel movement this morning.  Abdominal pain improving.  Feeling weak, but better each day.  Leukocytosis improving.  States that she is on hydrocodone at home, managed by pain management, and asks that her current hospital pain meds be changed.     10/13/2021: Stable, no new events.  Drain output is minimal, but according to patient bedside nursing is not flushing the drain.  Patient is n.p.o. for planned CT today to reevaluate fluid collection.  Leukocytosis continues to improve.  Pain has improved greatly and patient has not taken the pain medication in the last 24 hours according to her recollection.  She is having regular bowel movements without assistance of laxatives.  Patient is very hungry.     10/14/2021 - No events overnight.  Abdominal pain controlled now.  Had nausea and vomiting yesterday with solid food, but tolerating bland diet.  Blood cultures from 10/8 positive for Chryseobacterium and Candida glabrata - Pharmacy and ID aware.  Micafungin started.  CT 10/13 showing extensive multiloculated rim-enhancing air and fluid collection/abscess within the right abdomen is not significantly changed in size from the prior study. The percutaneous pigtail drainage catheter appears well-positioned within the collection.    10/15/2021: Patient transferred to [facility] overnight per surgery recommendations    10/16/2021: Drain output clearing up slightly. Dr. Dumont with surgery has recommended second IR drain to be placed. Initially interventional radiology did not think that this would be helpful for the patient, but after further  discussion this is the plan going forward. Patient is n.p.o. with plans for TPN. WBCs normalized.    10/21/21: Abdominal pain slowly improving. Pain is worse with movement. MARTHA drains purulent. No vomiting. Plan for CT scan to re-evaluate fluid collection. Tolerating FLD.     10/25/2021 Abd pian controlled. Getting IR drain today. No questions.    11/18/2021: Called back by Dr. Dumont due to persistent duodenal leak on Upper GI series. Since we last saw her she attempted to be managed conservatively with IR drains without improvement, then underwent ex lap with debridement of abscess and necrotizing fascitis. Then placement of IR drain on 11/16 due to persistent fluid collection on CT. Upper GI series with findings of perforation at junction of 2nd and 3rd portion of the duodenum.    11/18/2021: EGD/ERCP/Cortrack placement    Impressions:    1.  Etiology of upper GI series showing perforation is unclear   2.  Generous and patent biliary orifice from sphincterotomy within a duodenal diverticulum without obvious  cristóbal contrast extravasation; bile duct stented with 10mm x 8cm fully covered metal biliary stent   3.  Orifice adjacent to the biliary orifice unclear if this is the pancreatic duct opening. Difficult to visualize   4. Placement of 10F x 140cm  nasoenteric tube     11/19/2021: There were no acute events overnight.  The patient has remained afebrile and hemodynamically stable.  She continues to have diffuse abdominal pain which is unchanged from prior to her procedure yesterday.  She continues to have drainage from the 2 abdominal opening/wounds.  She denies nausea/vomiting but continues to have fatigue and weakness.    11/22/2021 - No events over the weekend.  Patient reports significantly less output from drains.  Tolerating TFs well.  Complaining of waves of abdominal pain and nausea.  Eager for UGI reevaluation of duodenal perforation.    11/23/2021: UGI negative for leak yesterday. Some contrast  reflux into bile duct. MARTHA drain output decreased.  Complaining of breakthrough pain.    11/24/2021 - No events overnight.  Still complaining of breakthrough pain.  Patient reports continued decreased MARTHA drain output.  Nausea under control.  Patient has been up and walking around room and has seen physical therapy.      Review of Systems:  Review of Systems   Constitutional: Positive for malaise/fatigue. Negative for chills and fever.   Respiratory: Negative for cough, shortness of breath and wheezing.    Cardiovascular: Negative for chest pain and leg swelling.   Gastrointestinal: Positive for abdominal pain. Negative for constipation, melena, nausea and vomiting.   Genitourinary: Negative for dysuria and urgency.   Musculoskeletal: Negative for falls and myalgias.   Skin: Negative for itching and rash.   Neurological: Positive for weakness. Negative for focal weakness and headaches.   Psychiatric/Behavioral: Negative for memory loss and substance abuse.             Past Medical History:  Past Medical History:   Diagnosis Date   • Allergy, unspecified not elsewhere classified    • Anemia     not currently   • Anesthesia     PONV (Demerol/ Dilaudid)   • Arthritis     bilateral shoulders,sacrum, osteo   • Backpain     coccyx and sacrum   • Bronchitis 2010   • Cataract     bilateral IOLI   • Degeneration of cervical intervertebral disc     C5-6,C6-7   • Dental disorder     partial upper and lower   • Heart burn    • Hiatus hernia syndrome     not a problem after gastric sleeve   • High cholesterol     resolved after gastric surgery   • Hyperlipidemia    • Hypertension     off all medication, reveresed after gastric sleeve   • Muscle disorder    • Pain 05/16/2018    low back and SI joints and sacrum   • Reactive airway disease     rescue inhaler not needed unless with bronchitis     Active Hospital Problems    Diagnosis    • Electrolyte abnormality [E87.8]    • Advanced care planning/counseling discussion [Z71.89]    •  Generalized weakness [R53.1]    • Hypotension [I95.9]    • Acute respiratory failure with hypoxia (HCC) [J96.01]    • Duodenal perforation (HCC) [K63.1]    • Postprocedural retroperitoneal abscess [K68.11]    • Antibiotic-associated diarrhea [K52.1, T36.95XA]    • Bacteremia due to Gram-negative bacteria and fungemia [R78.81]    • Normocytic anemia [D64.9]    • Sepsis due to intraabdominal fluid collection/abscess, bacteremia and fungemia (HCC) [A41.9]    • Hyperlipidemia [E78.5]    • Primary hypertension [I10]          Past Surgical History:  Past Surgical History:   Procedure Laterality Date   • PB UPPER GI ENDOSCOPY,DIAGNOSIS N/A 11/18/2021    Procedure: GASTROSCOPY with stent placement;  Surgeon: Migel Lawrence M.D.;  Location: SURGERY SAME DAY [facility];  Service: Gastroenterology   • PB ERCP,DIAGNOSTIC N/A 11/18/2021    Procedure: ERCP (ENDOSCOPIC RETROGRADE CHOLANGIOPANCREATOGRAPHY);  Surgeon: Migel Lawrence M.D.;  Location: SURGERY SAME DAY [facility];  Service: Gastroenterology   • PB PLACE PERCUT GASTROSTOMY TUBE N/A 11/18/2021    Procedure: GASTROSCOPY, WITH FEEDING TUBE INSERTION;  Surgeon: Migel Lawrence M.D.;  Location: SURGERY SAME DAY [facility];  Service: Gastroenterology   • BILIARY STENT PLACEMENT N/A 11/18/2021    Procedure: INSERTION, STENT, BILE DUCT;  Surgeon: Migel Lawrence M.D.;  Location: SURGERY SAME DAY [facility];  Service: Gastroenterology   • PB EXPLORATORY OF ABDOMEN  11/5/2021    Procedure: LAPAROTOMY, EXPLORATORY;  Surgeon: Jaden Cook M.D.;  Location: SURGERY [facility];  Service: General   • PB ULTRASONIC GUIDANCE, INTRAOPERATIVE  11/5/2021    Procedure: ULTRASOUND GUIDANCE;  Surgeon: Jaden Cook M.D.;  Location: SURGERY [facility];  Service: General   • ABDOMINAL ABSCESS DRAINAGE  11/5/2021    Procedure: DRAINAGE, ABSCESS, ABDOMEN - PERITONEAL AND RETROPERITONEAL;  Surgeon: Jaden Cook M.D.;  Location: SURGERY [facility];  Service: General   • PB  ERCP,DIAGNOSTIC  10/1/2021    Procedure: ERCP (ENDOSCOPIC RETROGRADE CHOLANGIOPANCREATOGRAPHY);  Surgeon: Fausto Casas M.D.;  Location: [facility];  Service: Gastroenterology   • COLONOSCOPY  8/8/2018    Procedure: COLONOSCOPY;  Surgeon: Shukri Condon M.D.;  Location: [facility];  Service: General   • GASTROSCOPY  5/23/2018    Procedure: GASTROSCOPY;  Surgeon: Fausto Casas M.D.;  Location: [facility];  Service: Gastroenterology   • EGD W/ENDOSCOPIC ULTRASOUND  5/23/2018    Procedure: EGD W/ENDOSCOPIC ULTRASOUND- RADIAL UPPER;  Surgeon: Fausto Casas M.D.;  Location: [facility];  Service: Gastroenterology   • CATARACT PHACO WITH IOL Left 4/18/2017    Procedure: CATARACT PHACO WITH IOL;  Surgeon: Stuart Estevez M.D.;  Location: SURGERY SAME DAY [facility];  Service:    • CATARACT PHACO WITH IOL Right 4/4/2017    Procedure: CATARACT PHACO WITH IOL;  Surgeon: Stuart Estevez M.D.;  Location: [facility];  Service:    • GASTRIC SLEEVE LAPAROSCOPY  10/19/2016    Procedure: GASTRIC SLEEVE LAPAROSCOPY, HIATAL HERNIA;  Surgeon: Shukri Condon M.D.;  Location: [facility];  Service:    • LIVER BIOPSY LAPAROSCOPIC  10/19/2016    Procedure: LIVER BIOPSY LAPAROSCOPIC;  Surgeon: Shukri Condon M.D.;  Location: [facility];  Service:    • GASTROSCOPY N/A 8/26/2016    Procedure: GASTROSCOPY;  Surgeon: Shukri Condon M.D.;  Location: [facility];  Service:    • ROTATOR CUFF REPAIR Right 7/7/2016   • ROTATOR CUFF REPAIR Left 5/2015   • HYSTERECTOMY ROBOTIC  1/2/2009    Performed by MEG VILLEGAS at [facility]   • LUMBAR FUSION ANTERIOR  2007    Dr Hillman, L3-S1 fusion   • CHOLECYSTECTOMY  1996    laparoscopic   • TUBAL LIGATION  1985   • GASTRIC RESECTION  1982    gastric stapling   • TONSILLECTOMY AND ADENOIDECTOMY  1967   • PRIMARY C SECTION  1976, 1979, 1985    x3           Hospital  Medications:  Current Facility-Administered Medications   Medication Dose Frequency Provider Last Rate Last Admin   • HYDROmorphone (Dilaudid) injection 1 mg  1 mg Q3HRS PRN Fausto Casas M.D.       • miconazole 2%-zinc oxide (Denise) topical cream   BID Urszula Mary A.P.R.N.   Given at 11/24/21 0615   • NS infusion   Continuous Urszula Mary A.P.R.N. 83 mL/hr at 11/23/21 2231 New Bag at 11/23/21 2231   • sore throat spray (CHLORASEPTIC) 1 Spray  1 Spray Q2HRS PRN Alem Lopez A.P.R.N.   1 [place] at 11/23/21 2219   • sulfamethoxazole-trimethoprim (SEPTRA) 160 mg of trimethoprim in dextrose 5% 250 mL IVPB  160 mg of trimethoprim Q12HRS Urszula Mary A.P.R.N.   Stopped at 11/24/21 0202   • Pharmacy Consult: Enteral tube insertion - review meds/change route/product selection  1 Each PHARMACY TO DOSE Urszula Mary A.P.R.N.       • ondansetron (ZOFRAN ODT) dispertab 4 mg  4 mg Q4HRS PRN Alem Lopez A.P.R.N.       • octreotide (SANDOSTATIN) injection 100 mcg  100 mcg BID Jaden Cook M.D.   100 mcg at 11/23/21 2219   • ondansetron (ZOFRAN) syringe/vial injection 4 mg  4 mg Q4HRS PRN Tomi Ledezma M.D.   4 mg at 11/24/21 0614   • enoxaparin (LOVENOX) inj 40 mg  40 mg DAILY Jaden Cook M.D.   40 mg at 11/23/21 0950   • piperacillin-tazobactam (ZOSYN) 3.375 g in  mL IVPB  3.375 g Q8HRS Tamra Mcfarland M.D. 25 mL/hr at 11/24/21 0614 3.375 g at 11/24/21 0614   • micafungin (MYCAMINE) 100 mg in  mL ivpb  100 mg Q24HRS Tamra Mcfarland M.D.   Stopped at 11/23/21 1049   Last reviewed on 11/5/2021  8:26 AM by Cuca Mejia R.N.        Current Outpatient Medications:  Medications Prior to Admission   Medication Sig Dispense Refill Last Dose   • cyclobenzaprine (FLEXERIL) 10 mg Tab Take 10 mg by mouth every evening.   9/30/2021 at [facility]   • ezetimibe (ZETIA) 10 MG Tab Take 10 mg by mouth every evening.   9/30/2021 at [facility]   • Magnesium 400 MG Tab Take 400 mg by  "mouth every evening.   2021 at [facility]   • gabapentin (NEURONTIN) 300 MG Cap Take 600 mg by mouth 2 Times a Day.   2021 at [facility]   • BIOTIN PO Take 1 Tablet by mouth every day.   2021 at [facility]   • HYDROcodone/acetaminophen (NORCO)  MG Tab Take 0.5 Tablets by mouth every 6 hours as needed for Moderate Pain.   10/8/2021 at [facility]   • Cholecalciferol (VITAMIN D3 PO) Take 1 Each by mouth every day.   2021 at [facility]   • ondansetron (ZOFRAN ODT) 4 MG TABLET DISPERSIBLE Take 4 mg by mouth every 6 hours as needed for Nausea.   10/8/2021 at [facility]         Medication Allergies:  Allergies   Allergen Reactions   • Ampicillin Rash     Rash  Tolerates Zosyn 10/21   • Cephalexin Diarrhea, Vomiting and Nausea     .   • Clindamycin Vomiting     \"cleocin\"   • Codeine      Severe stomach pain, cramps, spasms   • Demerol Vomiting   • Levofloxacin Unspecified     Numbness     • Tetracyclines Rash     .   • Tizanidine Itching   • Morphine Vomiting   • Pcn [Penicillins] Itching     Tolerates Zosyn 10/21   • Sulfa Drugs Itching         Family Medical History:  Family History   Problem Relation Age of Onset   • Stroke Mother    • Cancer Father         larynx   • Diabetes Brother          Social History:  Social History     Socioeconomic History   • Marital status:      Spouse name: Not on file   • Number of children: Not on file   • Years of education: Not on file   • Highest education level: Not on file   Occupational History   • Not on file   Tobacco Use   • Smoking status: Former Smoker     Packs/day: 0.25     Years: 0.10     Pack years: 0.02     Types: Cigarettes     Quit date: 1973     Years since quittin.9   • Smokeless tobacco: Never Used   Vaping Use   • Vaping Use: Never used   Substance and Sexual Activity   • Alcohol use: No   • Drug use: No   • Sexual activity: Not on file     Comment:    Other Topics Concern   • Not on file   Social History Narrative   • Not on file     Social Determinants of " "Health     Financial Resource Strain:    • Difficulty of Paying Living Expenses: Not on file   Food Insecurity:    • Worried About Running Out of Food in the Last Year: Not on file   • Ran Out of Food in the Last Year: Not on file   Transportation Needs:    • Lack of Transportation (Medical): Not on file   • Lack of Transportation (Non-Medical): Not on file   Physical Activity:    • Days of Exercise per Week: Not on file   • Minutes of Exercise per Session: Not on file   Stress:    • Feeling of Stress : Not on file   Social Connections:    • Frequency of Communication with Friends and Family: Not on file   • Frequency of Social Gatherings with Friends and Family: Not on file   • Attends Yarsani Services: Not on file   • Active Member of Clubs or Organizations: Not on file   • Attends Club or Organization Meetings: Not on file   • Marital Status: Not on file   Intimate Partner Violence:    • Fear of Current or Ex-Partner: Not on file   • Emotionally Abused: Not on file   • Physically Abused: Not on file   • Sexually Abused: Not on file   Housing Stability:    • Unable to Pay for Housing in the Last Year: Not on file   • Number of Places Lived in the Last Year: Not on file   • Unstable Housing in the Last Year: Not on file         Vital signs:  Weight/BMI: Body mass index is 28.13 kg/m².  /71   Pulse 81   Temp 36.1 °C (97 °F) (Temporal)   Resp 16   Ht 1.6 m (5' 2.99\")   Wt 72 kg (158 lb 11.7 oz)   SpO2 97%   Vitals:    11/23/21 1529 11/23/21 1949 11/24/21 0358 11/24/21 0745   BP: 120/73 135/74 110/70 119/71   Pulse: 86 84 81 81   Resp: 18 18 18 16   Temp: 37.2 °C (98.9 °F) 36.6 °C (97.8 °F) 36 °C (96.8 °F) 36.1 °C (97 °F)   TempSrc: Temporal Temporal Temporal Temporal   SpO2: 93% 95% 94% 97%   Weight:       Height:         Oxygen Therapy:  Pulse Oximetry: 97 %, O2 (LPM): 0, O2 Delivery Device: None - Room Air    Intake/Output Summary (Last 24 hours) at 11/24/2021 0814  Last data filed at 11/24/2021 " 0614  Gross per 24 hour   Intake 1242 ml   Output 1800 ml   Net -558 ml         Physical Exam:  Physical Exam  Vitals and nursing note reviewed.   Constitutional:       Appearance: She is ill-appearing.   HENT:      Head: Normocephalic and atraumatic.      Right Ear: External ear normal.      Left Ear: External ear normal.      Nose: Nose normal.      Mouth/Throat:      Mouth: Mucous membranes are dry.   Cardiovascular:      Rate and Rhythm: Normal rate and regular rhythm.      Pulses: Normal pulses.      Heart sounds: Normal heart sounds.   Pulmonary:      Effort: Pulmonary effort is normal.      Breath sounds: Normal breath sounds.      Comments: Decreased breath sounds in bases  Abdominal:      Tenderness: There is abdominal tenderness (RUQ).      Comments: Right sided drain and ostomy appliances to skin over sites that are draining dark brown liquid   Neurological:      General: No focal deficit present.      Mental Status: She is alert and oriented to person, place, and time.   Psychiatric:         Thought Content: Thought content normal.         Judgment: Judgment normal.             Labs:  Recent Labs     11/22/21 0035 11/23/21  0010 11/24/21  0620   SODIUM 127* 129* 127*   POTASSIUM 3.7 3.6 4.0   CHLORIDE 98 103 97   CO2 20 20 22   BUN 15 13 17   CREATININE 0.44* 0.28* 0.29*   MAGNESIUM 1.7  --   --    PHOSPHORUS 1.6* 1.4*  --    CALCIUM 7.4* 6.7* 7.7*     Recent Labs     11/22/21  0035 11/22/21  1137 11/23/21  0010 11/24/21  0620   ALTSGPT 28  --   --   --    ASTSGOT 25  --   --   --    ALKPHOSPHAT 79  --   --   --    TBILIRUBIN 0.2  --   --   --    PREALBUMIN  --  14.7* 15.9*  --    GLUCOSE 165*  --  114* 122*     Recent Labs     11/22/21 0035   ASTSGOT 25   ALTSGPT 28   ALKPHOSPHAT 79   TBILIRUBIN 0.2     No results for input(s): RBC, HEMOGLOBIN, HEMATOCRIT, PLATELETCT, PROTHROMBTM, APTT, INR, IRON, FERRITIN, TOTIRONBC in the last 72 hours.  Recent Results (from the past 24 hour(s))   Basic Metabolic  Panel    Collection Time: 11/24/21  6:20 AM   Result Value Ref Range    Sodium 127 (L) 135 - 145 mmol/L    Potassium 4.0 3.6 - 5.5 mmol/L    Chloride 97 96 - 112 mmol/L    Co2 22 20 - 33 mmol/L    Glucose 122 (H) 65 - 99 mg/dL    Bun 17 8 - 22 mg/dL    Creatinine 0.29 (L) 0.50 - 1.40 mg/dL    Calcium 7.7 (L) 8.5 - 10.5 mg/dL    Anion Gap 8.0 7.0 - 16.0   ESTIMATED GFR    Collection Time: 11/24/21  6:20 AM   Result Value Ref Range    GFR If African American >60 >60 mL/min/1.73 m 2    GFR If Non African American >60 >60 mL/min/1.73 m 2         Radiology Review:  DX-UPPER GI-SERIES WITH KUB   Final Result      1.  No evidence for duodenal leak.   2.  Reflux of contrast seen into the distal common bile duct at the site of biliary stent, consistent with prior sphincterotomy.      DX-ABDOMEN FOR TUBE PLACEMENT   Final Result         1.  Nonspecific bowel gas pattern.   2.  Dobbhoff tube with tip terminating overlying the expected location of the third or fourth duodenal segment.   3.  Hazy right lower lobe infiltrates.      DX-ABDOMEN FOR TUBE PLACEMENT   Final Result      Feeding tube extends to the region of the stomach and duodenum with tip at the level of the proximal end of the jejunum.      DX-ABDOMEN FOR TUBE PLACEMENT   Final Result      Enteric tube terminates over the duodenal jejunal junction      Covered common bile duct stent      No contrast extravasation is seen      [id number] STENT - TUBE   Final Result      Portable fluoroscopic images obtained during ERCP biliary stent placement for localization purposes.      DX-UPPER GI-SERIES WITH KUB   Final Result      Duodenal perforation at the junction of the second and third portions of duodenum as noted on key images.      CT-ABORTED CT PROCEDURE   Final Result      Interval decrease in size of the RIGHT retroperitoneal fluid collection which contains a surgical drain. Because from bowel is possible. No additional drain was placed.             CT-ABDOMEN-PELVIS WITH   Final Result      1.  Slight interval increase in size in fluid collection the right midabdomen with drain in place.      2.  Slight interval increase in size in trace bilateral pleural effusions, right greater than left with bibasilar atelectasis.      3.  Pneumobilia.      CT-ABDOMEN-PELVIS WITH   Final Result         1. The components in the gallbladder fossa and right flank of the complex fluid collection are mostly resolved. There is still a small residual fluid collection in the perinephric space surrounding the inferior right kidney. Right lower quadrant MARTHA drain    and right upper quadrant catheter are outside of this residual collection.   2. Air-fluid levels throughout the colon could relate to ileus.   3. Bilateral pleural effusions with bibasilar atelectasis.   4. Pneumobilia.      CT-ABDOMEN-PELVIS WITH   Final Result      1.  Slight interval decrease in size of the large RIGHT peritoneal abscess which now contains 3 drains   2.  Decreased atelectasis with persistent opacity in the RIGHT lung base likely atelectasis rather than pneumonia   3.  Small LEFT renal cyst   4.  Prior cholecystectomy with ongoing pneumobilia      CT-DRAINAGE-CATH EXCHANGE   Final Result         1. Organized pancreatic pseudocyst Drainage with CT guidance.      CT-ABDOMEN-PELVIS WITH   Final Result      1.  There has been interval placement of an additional inferior lateral pigtail catheter within the complex right abdominal abscess. The other 2 pigtail catheters are stable in appearance.   2.  There has been no significant interval decrease in size of the large complex loculated abscess collection in the right abdomen.   3.  There is no new fluid collection.   4.  Gallbladder is surgically absent with continued pneumobilia.   5.  Interval decrease in the dependent pleural effusion with minimal airspace disease, likely atelectasis.      IR-DRAINAGE-CATH EXCHANGE   Final Result      1.  Successful  left-sided drainage catheter removal.   2.  Successful image guided superior right drainage catheter replacement.      Plan: Suction drainage. Monitor outputs. Please contact interventional radiology if there is any concern for tube dysfunction. Suggest routine tube maintenance at 3 months intervals if the tube remains in place.         CT-DRAIN-PERITONEAL   Final Result      1.  CT GUIDED PERITONEAL RLQ abdominal CATHETER DRAINAGE.   2.  THE CURRENT PLAN IS TO MONITOR DRAINAGE OUTPUT AND OBTAIN A FOLLOWUP CT SCAN IN 5-7 DAYS IF CLINICALLY INDICATED.      CT-ABDOMEN-PELVIS WITH   Final Result         1. Grossly unchanged irregular fluid collection occupying the right abdomen surrounding the right kidney and right colon extending to midline. Additional pigtail catheter in the component about midline anterior abdomen. Both pigtail catheters seem to be    within the collection.      2. Small right pleural effusion with right basilar atelectasis.               DX-CHEST-LIMITED (1 VIEW)   Final Result      1.  Right basilar atelectasis or consolidation. Small right pleural effusion not excluded.   2.  Atherosclerotic plaque.      CT-DRAIN-PERITONEAL   Final Result      1.  CT guided pancreatic pseudocyst catheter drainage.   2.  The current plan is to obtain a follow-up CT in 5-7 days..      IR-PICC LINE PLACEMENT W/ GUIDANCE > AGE 5   Final Result                  Ultrasound-guided PICC placement performed by qualified nursing staff as    above.          CT-ABDOMEN-PELVIS WITH    (Results Pending)         MDM (Data Review):   -Records reviewed and summarized in current documentation  -I personally reviewed and interpreted the laboratory results  -I personally reviewed the radiology images        Medical Decision Making, by Problem:  Active Hospital Problems    Diagnosis    • Bacteremia due to Gram-negative bacteria and fungemia [R78.81]    • Sepsis due to intraabdominal fluid collection/abscess (HCC) [A41.9]    •  Hyperlipidemia [E78.5]    • Hypertension [I10]        66-year-old female with a history of recurrent idiopathic pancreatitis s/p ERCP with biliary sphincterotomy and biopsy of a distal BD polyp on October 1, 2021.  Postop complications-pancreatitis.  Ever since then, complaints of subjective fevers, progressive abdominal pain, nausea/vomiting.  Began to have worsening right lower quadrant pain over the past 5 days.  CT scan abdomen/pelvis large irregular fluid collection with thick wall occupying most of the right abdomen surrounding right kidney and right colon.  Additional fluid and gas collection in the midline abdomen anterior to the pancreas concerning for abscess.  Severe wall thickening of the descending and transverse colon, second portion of duodenum likely reactive.  Pneumobilia with gas in the CBD, intrahepatic bile ducts as well as pancreatic ducts (likely secondary to recent ERCP with sphincterotomy).  IR placed a drain with improvement in abdominal pain initially, but then drain output slowed to no output. Nursing was discovered to have have flushed the drain for at least 2 days. After flush by bedside nurse, reportedly 300 cc of fluid came out. Fluid bilirubin 0.6. Fluid amylase >7500. Blood cultures from 10/8 positive for Chryseobacterium and Candida glabrata. ID following. Repeat CT with no change in fluid collection and new pelvic fluid collection/abscess. Drain placement ineffective. Patient underwent exlap with debridement of retroperitoneal abscess and necrotizing fascitis on 11/5 by Dr. Dumont. Repeat IR drain on 11/16 and now with recurrent duodenal leak.  ERCP last week with Fully covered metal stent placed.         Assessment/Recommendations:  ASSESSMENT:  1.  Sepsis due to intra-abdominal fluid collection/abscess-Duodenal perforation near ampulla from previous ERCP. S/P ERCP with placement of 10mm x 8cm fully covered metal stent in the bile duct  2.  History of pancreatitis  3.   Intra-abdominal abscess and necrotizing fascitis, s/p debridement 11/5  4.  History of morbid obesity s/p laparoscopic sleeve  5.  Euvolemic hyponatremia  6.  Continued pain medication requirements       PLAN:  - Recommend repeat CT abdomen/pelvis to evaluate progression of fluid collections  - Appreciate surgery recommendations. Need to discuss starting diet timing with surgery. Ok from GI standpoint.  - Patient complaining of significant breakthrough pain between pain medication doses.  - I have decreased intervals of pain medication to every 3 hours as needed instead of every 4 hours as needed    Thank you for inviting me to participate in the care of this patient. Please do not hesitate to call GI consultants with additional questions/concerns or changes in the patient's clinical status at [phone].

## 2021-11-24 NOTE — DISCHARGE PLANNING
Anticipated Discharge Disposition: Acute Rehab VS SNF VS Home with Enteral TF and HHC.    Action: Discussed this case during IDT Rounds. Per MD, patient is not medically clear. MD indicates patient is expressing his desire to return home; however, based on patient's current needs, the recommendation is placement.    LSW informed Marsha BLAIR, RN with [facility] requested orders for Enteral Tube Feeds, if patient is discharged home.    Barriers to Discharge: Medical Clearance.    Plan: As Above. Awaiting medical clearance and recommendations from the interdisciplinary team.

## 2021-11-24 NOTE — PROGRESS NOTES
Bedside report received.  Assessment complete.  A&O x 4. Patient calls appropriately.  Patient up with max assist..   Patient has 8/10 pain. Pain managed with prescribed medications.  Denies N&V. NPO at this time. Right nare cortak at 85, running impact peptide at 45 (goal)  MLI with staples, approximated, BRYAN. RUQ MARTHA drain, BRYAN. Fistulas with ostomy appliances to RLQ x 2, CDI. Dressing to right flank over drain site, CDI.   + void, + flatus, - BM.  Patient denies SOB.  SCD's off.    Review plan with of care with patient. Call light and personal belongings within reach. Hourly rounding in place. All needs met at this time.

## 2021-11-24 NOTE — PROGRESS NOTES
Hospital Medicine Daily Progress Note    Date of Service  11/24/2021    Chief Complaint  Nils Alfonso is a 66 y.o. female admitted 10/15/2021 with abdominal pain    Hospital Course  Ms. Alfonso is a 66-year-old female with a PMHx of recurrent idiopathic pancreatitis s/p ERCP with sphincterotomy on 10/1/121, sleeve gastrectomy, dyslipidemia, HTN, osteoarthritis of the spine s/p lumbar fusion who was admitted on 10/8/2021 with worsening right lower quadrant pain over the past week.      Since her ERCP on 10/1/2022 1, patient has been experiencing intermittent pain, fevers, and nausea.  Patient initially presented to [facility] for evaluation of the abdominal pain.  Imaging and RSM noted large intra-abdominal fluid collection of which multiple IR drains have been placed.  Also, her infection has also worsened as noted in her WBC count, intermittent fever, and lactic acid.  Patient was transferred to [facility] for escalation of surgical needs.    Patient underwent an ex lap with I&D of multiple loculated abscesses and debridement of necrotizing fasciitis with Dr. ST on 11/5/2021.  Previous blood cultures were positive for stenotrophomonas, maltophilia, mixed yeast, facialis, E. coli, and chryseobacterium in the blood.  Infectious disease was consulted and was placed on Bactrim twice daily, Zosyn, micafungin with end date of 11/24/2021.  Patient had since decreased output from the drains.  Repeat CT of the abdomen on 11/15/2021 noted increase fluid collection in the right mid abdomen and slight increase in trace bilateral pleural effusions.  Interventional radiology was consulted on 11/16/2020 121 for a repeat peritoneal drainage.  RLQ MARTHA drain had fallen out and was not replaced due to the abdominal fluid collection being noted to be smaller.    Upper GI series on 11/17/2021 showed a leak at the perforation in 2/3 portion of the duodenum.  Patient underwent an endoscopy/ERCP, biliary stent, core track placement on 11/18/2021 with  Dr. Lawrence from [facility] and was started on enteral feedings on 11/19/2021 with monitoring of MARTHA drain output.    Patient with hospital services for management of care.          Interval Problem Update  11/23/2021  -Patient seen and examined.  Patient noted to have a mid abdomen wound VAC, 2 ostomy sites on the right mid to lower quadrant, and one MARTHA drain.  Noted minimal to moderate output.  Patient reports severe abdominal pain.  Patient has also been refusing physical therapy and patient was counseled in regards to evaluation from PT for postacute placement.  Patient and  updated in plan of care.  -Plan of care: Continue supportive therapy; pain management; monitor output in ostomy bag and MARTHA drain along with wound VAC; continue ABX therapy until 11/24; wound team; will appreciate PT/OT reevaluation and input for postacute placement versus home with home health needs  -Disposition: Pending evaluation from PT/OT -will likely need SNF  -Lab work: Reviewed; Na 129 - IVF; Ca2+  - 2g IVPB  -VSS at this time    11/24/2021  -Patient seen and examined. Patient spouse at bedside. Patient reports better control and pain management. Patient also reports working with physical therapy and able to ambulate with what is recommended. Discussed with patient plan of care today.  -Plan of care: Continue supportive care; pain management; ordered CT of the abdomen/pelvis with contrast -pending results; will DC drain if decreased to 110 cc over 24 hours according to surgery; diet to be discussed with surgery; GI team signed off today with recommendations of CT of the abdomen/pelvis, diet to be directed by surgery, and adjusted pain management.  -Disposition: Pending clearance from surgery team; SNF versus home with home health  -Lab work: Reviewed; Na at 127 -continue IVF  -VSS at this time  -There are no significant changes from a previous ROS/PE, please see my previous notes for further details.    I have personally seen  and examined the patient at bedside. I discussed the plan of care with patient, family and bedside RN.    Consultants/Specialty  GI    Code Status  Full Code    Disposition  Patient is not medically cleared.   Anticipate discharge to to skilled nursing facility.  I have placed the appropriate orders for post-discharge needs.    Review of Systems  Review of Systems   Constitutional: Negative.  Negative for chills and fever.   HENT: Negative.    Eyes: Negative.    Respiratory: Negative.    Cardiovascular: Negative.    Gastrointestinal: Positive for abdominal pain and nausea.   Genitourinary: Negative.    Musculoskeletal: Positive for myalgias.   Skin: Negative.    Neurological: Positive for weakness.   Endo/Heme/Allergies: Negative.    Psychiatric/Behavioral: Negative.         Physical Exam  Temp:  [36 °C (96.8 °F)-37.2 °C (98.9 °F)] 36.1 °C (97 °F)  Pulse:  [81-86] 81  Resp:  [16-18] 16  BP: (110-135)/(70-74) 119/71  SpO2:  [93 %-97 %] 97 %    Physical Exam  Vitals and nursing note reviewed.   HENT:      Head: Normocephalic.      Nose: Nose normal.      Mouth/Throat:      Mouth: Mucous membranes are moist.      Pharynx: Oropharynx is clear.   Eyes:      Pupils: Pupils are equal, round, and reactive to light.   Cardiovascular:      Rate and Rhythm: Normal rate and regular rhythm.      Pulses: Normal pulses.      Heart sounds: Normal heart sounds.   Pulmonary:      Effort: Pulmonary effort is normal.      Breath sounds: Normal breath sounds.   Abdominal:      General: Bowel sounds are decreased.      Tenderness: There is generalized abdominal tenderness.       Musculoskeletal:         General: Tenderness present.      Cervical back: Normal range of motion and neck supple.   Skin:     General: Skin is dry.      Capillary Refill: Capillary refill takes 2 to 3 seconds.   Neurological:      Mental Status: She is alert. Mental status is at baseline.         Fluids    Intake/Output Summary (Last 24 hours) at 11/24/2021  1059  Last data filed at 11/24/2021 0800  Gross per 24 hour   Intake 1242 ml   Output 1910 ml   Net -668 ml       Laboratory      Recent Labs     11/22/21  0035 11/23/21  0010 11/24/21  0620   SODIUM 127* 129* 127*   POTASSIUM 3.7 3.6 4.0   CHLORIDE 98 103 97   CO2 20 20 22   GLUCOSE 165* 114* 122*   BUN 15 13 17   CREATININE 0.44* 0.28* 0.29*   CALCIUM 7.4* 6.7* 7.7*             Recent Labs     11/23/21  0010   TRIGLYCERIDE 174*       Imaging  DX-UPPER GI-SERIES WITH KUB   Final Result      1.  No evidence for duodenal leak.   2.  Reflux of contrast seen into the distal common bile duct at the site of biliary stent, consistent with prior sphincterotomy.      DX-ABDOMEN FOR TUBE PLACEMENT   Final Result         1.  Nonspecific bowel gas pattern.   2.  Dobbhoff tube with tip terminating overlying the expected location of the third or fourth duodenal segment.   3.  Hazy right lower lobe infiltrates.      DX-ABDOMEN FOR TUBE PLACEMENT   Final Result      Feeding tube extends to the region of the stomach and duodenum with tip at the level of the proximal end of the jejunum.      DX-ABDOMEN FOR TUBE PLACEMENT   Final Result      Enteric tube terminates over the duodenal jejunal junction      Covered common bile duct stent      No contrast extravasation is seen      [id number] STENT - TUBE   Final Result      Portable fluoroscopic images obtained during ERCP biliary stent placement for localization purposes.      DX-UPPER GI-SERIES WITH KUB   Final Result      Duodenal perforation at the junction of the second and third portions of duodenum as noted on key images.      CT-ABORTED CT PROCEDURE   Final Result      Interval decrease in size of the RIGHT retroperitoneal fluid collection which contains a surgical drain. Because from bowel is possible. No additional drain was placed.            CT-ABDOMEN-PELVIS WITH   Final Result      1.  Slight interval increase in size in fluid collection the right midabdomen with  drain in place.      2.  Slight interval increase in size in trace bilateral pleural effusions, right greater than left with bibasilar atelectasis.      3.  Pneumobilia.      CT-ABDOMEN-PELVIS WITH   Final Result         1. The components in the gallbladder fossa and right flank of the complex fluid collection are mostly resolved. There is still a small residual fluid collection in the perinephric space surrounding the inferior right kidney. Right lower quadrant MARTHA drain    and right upper quadrant catheter are outside of this residual collection.   2. Air-fluid levels throughout the colon could relate to ileus.   3. Bilateral pleural effusions with bibasilar atelectasis.   4. Pneumobilia.      CT-ABDOMEN-PELVIS WITH   Final Result      1.  Slight interval decrease in size of the large RIGHT peritoneal abscess which now contains 3 drains   2.  Decreased atelectasis with persistent opacity in the RIGHT lung base likely atelectasis rather than pneumonia   3.  Small LEFT renal cyst   4.  Prior cholecystectomy with ongoing pneumobilia      CT-DRAINAGE-CATH EXCHANGE   Final Result         1. Organized pancreatic pseudocyst Drainage with CT guidance.      CT-ABDOMEN-PELVIS WITH   Final Result      1.  There has been interval placement of an additional inferior lateral pigtail catheter within the complex right abdominal abscess. The other 2 pigtail catheters are stable in appearance.   2.  There has been no significant interval decrease in size of the large complex loculated abscess collection in the right abdomen.   3.  There is no new fluid collection.   4.  Gallbladder is surgically absent with continued pneumobilia.   5.  Interval decrease in the dependent pleural effusion with minimal airspace disease, likely atelectasis.      IR-DRAINAGE-CATH EXCHANGE   Final Result      1.  Successful left-sided drainage catheter removal.   2.  Successful image guided superior right drainage catheter replacement.      Plan: Suction  drainage. Monitor outputs. Please contact interventional radiology if there is any concern for tube dysfunction. Suggest routine tube maintenance at 3 months intervals if the tube remains in place.         CT-DRAIN-PERITONEAL   Final Result      1.  CT GUIDED PERITONEAL RLQ abdominal CATHETER DRAINAGE.   2.  THE CURRENT PLAN IS TO MONITOR DRAINAGE OUTPUT AND OBTAIN A FOLLOWUP CT SCAN IN 5-7 DAYS IF CLINICALLY INDICATED.      CT-ABDOMEN-PELVIS WITH   Final Result         1. Grossly unchanged irregular fluid collection occupying the right abdomen surrounding the right kidney and right colon extending to midline. Additional pigtail catheter in the component about midline anterior abdomen. Both pigtail catheters seem to be    within the collection.      2. Small right pleural effusion with right basilar atelectasis.               DX-CHEST-LIMITED (1 VIEW)   Final Result      1.  Right basilar atelectasis or consolidation. Small right pleural effusion not excluded.   2.  Atherosclerotic plaque.      CT-DRAIN-PERITONEAL   Final Result      1.  CT guided pancreatic pseudocyst catheter drainage.   2.  The current plan is to obtain a follow-up CT in 5-7 days..      IR-PICC LINE PLACEMENT W/ GUIDANCE > AGE 5   Final Result                  Ultrasound-guided PICC placement performed by qualified nursing staff as    above.          CT-ABDOMEN-PELVIS WITH    (Results Pending)   CT-ABDOMEN-PELVIS WITH    (Results Pending)        Assessment/Plan  * Duodenal perforation (HCC)- (present on admission)  Assessment & Plan  -After ERCP with retroperitoneal abscess and bacteremia  -Uncertain etiology - ddx includes pancreatitis, bile leak, iatrogenic  -Pepcid BID  -11/9: Concern perforation may have reopened.  Surgery team recommending NPO status and TPN  -11/10:  Start octreotide.   -11/17: Patient to go for upper GI today to see if leak at perforated site near ampulla.  If leaking, general surgeon to discuss with GI regarding placing a  wall duodenal stent/esophageal stent  -11/18: Upper GI showed duodenal leak. Plan for stent placement tomorrow.   -11/19: Endoscope/ERCP, biliary stent, and cortrak placement done yesterday with Dr. Lawrence.    Continue abx, trend drain OP, KUB confirmed NTG placement- enteral feedings to be started.    Per General Surgery- hold oral medications/change to PO medications to IV at this time.   -11/20: Tolerating tube feedings, increase as tolerated. Continue NPO other than enteral feedings. Plan for repeat upper GI this week per general surgery.  -11/21: Tolerating TF. Decreased output to collection bags. Remain NPO beside TF. Plan for repeat upper GI in coming days.   -11/22: Seen by GI. Repeat UGI done today- no duodenal leaks seen. Continue TF, advance diet per general surgery.   -Pain control with dilaudid IV.   -Antiemetics PRN.       Electrolyte abnormality  Assessment & Plan  -Moderate hyponatremia 127  -Likely due to dehydration secondary to slowly titrating TF.   -11/22 NS increased to 83ml/hr  -11/22 Phos 1.6- replenish and monitor.   -Hypokalemia during hospitalization,  resolved.   -Continue to monitor.     Advanced care planning/counseling discussion  Assessment & Plan  -Tearful regarding medical course and progress.   -Seen by palliative care    Generalized weakness  Assessment & Plan  -PT/OT following  -Recommending post-acute placement.  -Acute rehab evaluating patient for admission.   -Likely home with  vs rehab at d/c pending patient's progress    Hypotension  Assessment & Plan  -11/6: Stop lasix; Stop ACEI  -11/7 started midodrine  -11/16-resolved continue to monitor    Acute respiratory failure with hypoxia (HCC)- (present on admission)  Assessment & Plan  -Resolved with diuresis  -Likely volume overload with lower extremity edema present    Antibiotic-associated diarrhea  Assessment & Plan  -Cdiff PCR negative  -Loperamide PRN    Postprocedural retroperitoneal abscess- (present on  admission)  Assessment & Plan  -S/P Ex lap, I/D, debridement nec fasc 11/5/2021  -Abx per ID end date 11/24/2021  -11/16/2021: CT abdomen 11/15/2021 showed increase fluid collection in the right mid abdomen and slight increase in trace bilateral pleural effusions.   -Patient go to IR for procedural drainage today  -11/17: RLQ drain was found to have fallen out in IR yesterday.  Fluid collection is smaller and no new drain was indicated.    -11/22: MARTHA drain in place to RUQ, decrease output seen. UGI today showed no duodenal leaks.  -Pain control with dilaudid IV  -Antiemetics PRN  -Will continue to monitor      Bacteremia due to Gram-negative bacteria and fungemia- (present on admission)  Assessment & Plan  -KALANI negative for signs of endocarditis  -Cont bactrim, Zosyn, and Micafungin per ID for 4-week course with stop date of 11/24.  Repeat imaging prior to discontinuation.   -Repeat Bld Cx neg      Normocytic anemia- (present on admission)  Assessment & Plan  -Transfuse for Hgb <7  -Avoid regular phlebotomy  -Supplements per dietary: 11/6 added thiamine, 6 sm meals/d  -11/21: Thiamine PO held at this time. Will resume when cleared to have PO meds.     Sepsis due to intraabdominal fluid collection/abscess, bacteremia and fungemia (HCC)- (present on admission)  Assessment & Plan  -Sepsis resolved  -Source intra-abdominal  -Zosyn/Mycafungin/Bactrim DS per infectious disease end date 11/24/2021  -ID signed off  -Re-imaging per ID, IR, and Surgery  -S/P Ex lap w I/D and debridement nec fasc 11/5/2021  -11/8 Repeat CT. Updated surgical team re: perinephric fluid collection. Further CT may be considered with IV and Oral contrast if needed.  -11/9:  She has remained afebrile over last 48 hours.  VSS.  Does not appear to be septic at this time.  Continue antibx.   -11/19/2021: sp biliary stent placement. Hold PO meds/change to IV to general surgery. Bactrim changed to IV.     Hyperlipidemia- (present on  admission)  Assessment & Plan  -Previously on ezetimibe.  -PO meds held per general surgery,   -Will resume when cleared to have PO medications    Primary hypertension- (present on admission)  Assessment & Plan  -Hypotensive 11/6 - stop lisinopril         VTE prophylaxis: enoxaparin ppx    I have performed a physical exam and reviewed and updated ROS and Plan today (11/24/2021). In review of yesterday's note (11/23/2021), there are no changes except as documented above.    ========================================================================================================  Please note that this dictation was created using voice recognition software. I have made every reasonable attempt to correct obvious errors, but there may be errors of grammar and possibly content that I did not discover before finalizing the note.    Electronically signed by:  EDISON Waters, MSN, APRN, FNP-C  Hospitalist Services  [facility]  [phone]  [email or web address]  11/24/21    7517

## 2021-11-24 NOTE — THERAPY
Physical Therapy   Daily Treatment     Patient Name: Nils Alfonso  Age:  66 y.o., Sex:  female  Medical Record #: [id number]  Today's Date: 11/24/2021     Precautions  Precautions: Fall Risk    Assessment    Pt has had recently been refusing to work w/ PT and was subsequently taken off service.  Today, however she is much more motivated and optimistic about returning home.  She needs min assist to sit eob.  She is able to maintain her balance w/o assist.  Able to stand w/ spv and ambulate a short distance in her room.  She does fatigue quickly.  Anticipate with more consistent participation, pt may be able to d/c home w/ assist from her spouse.  Recommend eob/oob all meal times.  Recommend toilet vs commode when able.    Plan    Continue current treatment plan.    DC Equipment Recommendations: (P) Unable to determine at this time  Discharge Recommendations: (P) Recommend home health for continued physical therapy services         Objective       11/24/21 0728   Balance   Sitting Balance (Static) Fair +   Sitting Balance (Dynamic) Fair +   Standing Balance (Static) Fair   Standing Balance (Dynamic) Fair   Weight Shift Sitting Fair   Weight Shift Standing Fair   Skilled Intervention Verbal Cuing   Comments w/ fww   Gait Analysis   Gait Level Of Assist Minimal Assist   Assistive Device Front Wheel Walker   Distance (Feet) 40   Deviation Bradykinetic;Antalgic   Skilled Intervention Verbal Cuing   Bed Mobility    Supine to Sit Minimal Assist   Skilled Intervention Verbal Cuing;Tactile Cuing   Functional Mobility   Sit to Stand Supervised   Bed, Chair, Wheelchair Transfer Minimal Assist   Skilled Intervention Verbal Cuing;Tactile Cuing   Short Term Goals    Short Term Goal # 1 pt will move supine<>EOB via log roll with HOB flat, no features, and SPV within 6 visits to facilitate getting in/out of bed.   Goal Outcome # 1 goal not met   Short Term Goal # 2 pt will complete all transfers with FWW and SPV within 6 weeks to  increase functional mobility.   Goal Outcome # 2 Goal met   Short Term Goal # 3 pt will amb 150' with FWW and SPV within 6 visits for household mobility.   Goal Outcome # 3 Goal not met   Short Term Goal # 4 Pt will ascend and descend step x 2 with SPV to access her home by the 6th tx   Goal Outcome # 4 Goal not met   Anticipated Discharge Equipment and Recommendations   DC Equipment Recommendations Unable to determine at this time   Discharge Recommendations Recommend home health for continued physical therapy services

## 2021-11-25 LAB
ALBUMIN SERPL BCP-MCNC: 2.5 G/DL (ref 3.2–4.9)
ALBUMIN/GLOB SERPL: 1.3 G/DL
ALP SERPL-CCNC: 85 U/L (ref 30–99)
ALT SERPL-CCNC: 27 U/L (ref 2–50)
ANION GAP SERPL CALC-SCNC: 8 MMOL/L (ref 7–16)
AST SERPL-CCNC: 22 U/L (ref 12–45)
BASOPHILS # BLD AUTO: 0.4 % (ref 0–1.8)
BASOPHILS # BLD: 0.02 K/UL (ref 0–0.12)
BILIRUB SERPL-MCNC: <0.2 MG/DL (ref 0.1–1.5)
BUN SERPL-MCNC: 16 MG/DL (ref 8–22)
CALCIUM SERPL-MCNC: 7.5 MG/DL (ref 8.5–10.5)
CHLORIDE SERPL-SCNC: 98 MMOL/L (ref 96–112)
CO2 SERPL-SCNC: 21 MMOL/L (ref 20–33)
CREAT SERPL-MCNC: 0.32 MG/DL (ref 0.5–1.4)
EOSINOPHIL # BLD AUTO: 0.02 K/UL (ref 0–0.51)
EOSINOPHIL NFR BLD: 0.4 % (ref 0–6.9)
ERYTHROCYTE [DISTWIDTH] IN BLOOD BY AUTOMATED COUNT: 55.8 FL (ref 35.9–50)
GLOBULIN SER CALC-MCNC: 1.9 G/DL (ref 1.9–3.5)
GLUCOSE SERPL-MCNC: 128 MG/DL (ref 65–99)
HCT VFR BLD AUTO: 27.5 % (ref 37–47)
HGB BLD-MCNC: 9.2 G/DL (ref 12–16)
IMM GRANULOCYTES # BLD AUTO: 0.07 K/UL (ref 0–0.11)
IMM GRANULOCYTES NFR BLD AUTO: 1.5 % (ref 0–0.9)
LYMPHOCYTES # BLD AUTO: 0.61 K/UL (ref 1–4.8)
LYMPHOCYTES NFR BLD: 13.3 % (ref 22–41)
MAGNESIUM SERPL-MCNC: 1.8 MG/DL (ref 1.5–2.5)
MCH RBC QN AUTO: 29.9 PG (ref 27–33)
MCHC RBC AUTO-ENTMCNC: 33.5 G/DL (ref 33.6–35)
MCV RBC AUTO: 89.3 FL (ref 81.4–97.8)
MONOCYTES # BLD AUTO: 0.21 K/UL (ref 0–0.85)
MONOCYTES NFR BLD AUTO: 4.6 % (ref 0–13.4)
NEUTROPHILS # BLD AUTO: 3.65 K/UL (ref 2–7.15)
NEUTROPHILS NFR BLD: 79.8 % (ref 44–72)
NRBC # BLD AUTO: 0 K/UL
NRBC BLD-RTO: 0 /100 WBC
PHOSPHATE SERPL-MCNC: 1.6 MG/DL (ref 2.5–4.5)
PLATELET # BLD AUTO: 238 K/UL (ref 164–446)
PMV BLD AUTO: 9 FL (ref 9–12.9)
POTASSIUM SERPL-SCNC: 3.9 MMOL/L (ref 3.6–5.5)
PROT SERPL-MCNC: 4.4 G/DL (ref 6–8.2)
RBC # BLD AUTO: 3.08 M/UL (ref 4.2–5.4)
SODIUM SERPL-SCNC: 127 MMOL/L (ref 135–145)
WBC # BLD AUTO: 4.6 K/UL (ref 4.8–10.8)

## 2021-11-25 PROCEDURE — 80053 COMPREHEN METABOLIC PANEL: CPT

## 2021-11-25 PROCEDURE — 700111 HCHG RX REV CODE 636 W/ 250 OVERRIDE (IP): Mod: JB | Performed by: SURGERY

## 2021-11-25 PROCEDURE — 700102 HCHG RX REV CODE 250 W/ 637 OVERRIDE(OP): Performed by: NURSE PRACTITIONER

## 2021-11-25 PROCEDURE — 700111 HCHG RX REV CODE 636 W/ 250 OVERRIDE (IP): Performed by: INTERNAL MEDICINE

## 2021-11-25 PROCEDURE — 700101 HCHG RX REV CODE 250: Performed by: FAMILY MEDICINE

## 2021-11-25 PROCEDURE — A9270 NON-COVERED ITEM OR SERVICE: HCPCS | Performed by: NURSE PRACTITIONER

## 2021-11-25 PROCEDURE — 99024 POSTOP FOLLOW-UP VISIT: CPT | Performed by: SURGERY

## 2021-11-25 PROCEDURE — 700111 HCHG RX REV CODE 636 W/ 250 OVERRIDE (IP): Performed by: ANESTHESIOLOGY

## 2021-11-25 PROCEDURE — 700105 HCHG RX REV CODE 258: Performed by: NURSE PRACTITIONER

## 2021-11-25 PROCEDURE — 84100 ASSAY OF PHOSPHORUS: CPT

## 2021-11-25 PROCEDURE — 85025 COMPLETE CBC W/AUTO DIFF WBC: CPT

## 2021-11-25 PROCEDURE — 700111 HCHG RX REV CODE 636 W/ 250 OVERRIDE (IP): Performed by: FAMILY MEDICINE

## 2021-11-25 PROCEDURE — 770001 HCHG ROOM/CARE - MED/SURG/GYN PRIV*

## 2021-11-25 PROCEDURE — 83735 ASSAY OF MAGNESIUM: CPT

## 2021-11-25 RX ORDER — MAGNESIUM SULFATE HEPTAHYDRATE 40 MG/ML
2 INJECTION, SOLUTION INTRAVENOUS ONCE
Status: COMPLETED | OUTPATIENT
Start: 2021-11-25 | End: 2021-11-25

## 2021-11-25 RX ADMIN — ONDANSETRON 4 MG: 2 INJECTION INTRAMUSCULAR; INTRAVENOUS at 07:12

## 2021-11-25 RX ADMIN — HYDROMORPHONE HYDROCHLORIDE 1 MG: 1 INJECTION, SOLUTION INTRAMUSCULAR; INTRAVENOUS; SUBCUTANEOUS at 04:17

## 2021-11-25 RX ADMIN — ONDANSETRON 4 MG: 2 INJECTION INTRAMUSCULAR; INTRAVENOUS at 21:01

## 2021-11-25 RX ADMIN — MAGNESIUM SULFATE HEPTAHYDRATE 2 G: 40 INJECTION, SOLUTION INTRAVENOUS at 13:30

## 2021-11-25 RX ADMIN — HYDROMORPHONE HYDROCHLORIDE 1 MG: 1 INJECTION, SOLUTION INTRAMUSCULAR; INTRAVENOUS; SUBCUTANEOUS at 13:42

## 2021-11-25 RX ADMIN — SODIUM CHLORIDE: 9 INJECTION, SOLUTION INTRAVENOUS at 06:20

## 2021-11-25 RX ADMIN — HYDROMORPHONE HYDROCHLORIDE 1 MG: 1 INJECTION, SOLUTION INTRAMUSCULAR; INTRAVENOUS; SUBCUTANEOUS at 01:12

## 2021-11-25 RX ADMIN — HYDROMORPHONE HYDROCHLORIDE 1 MG: 1 INJECTION, SOLUTION INTRAMUSCULAR; INTRAVENOUS; SUBCUTANEOUS at 18:10

## 2021-11-25 RX ADMIN — HYDROMORPHONE HYDROCHLORIDE 1 MG: 1 INJECTION, SOLUTION INTRAMUSCULAR; INTRAVENOUS; SUBCUTANEOUS at 21:02

## 2021-11-25 RX ADMIN — ENOXAPARIN SODIUM 40 MG: 40 INJECTION SUBCUTANEOUS at 09:30

## 2021-11-25 RX ADMIN — PHENOL 1 SPRAY: 1.5 LIQUID ORAL at 08:13

## 2021-11-25 RX ADMIN — ONDANSETRON 4 MG: 2 INJECTION INTRAMUSCULAR; INTRAVENOUS at 13:43

## 2021-11-25 RX ADMIN — HYDROMORPHONE HYDROCHLORIDE 1 MG: 1 INJECTION, SOLUTION INTRAMUSCULAR; INTRAVENOUS; SUBCUTANEOUS at 10:38

## 2021-11-25 RX ADMIN — ONDANSETRON 4 MG: 2 INJECTION INTRAMUSCULAR; INTRAVENOUS at 18:10

## 2021-11-25 RX ADMIN — MICONAZOLE NITRATE: 20 CREAM TOPICAL at 04:18

## 2021-11-25 RX ADMIN — HYDROMORPHONE HYDROCHLORIDE 1 MG: 1 INJECTION, SOLUTION INTRAMUSCULAR; INTRAVENOUS; SUBCUTANEOUS at 07:12

## 2021-11-25 RX ADMIN — OCTREOTIDE ACETATE 100 MCG: 100 INJECTION, SOLUTION INTRAVENOUS; SUBCUTANEOUS at 09:30

## 2021-11-25 RX ADMIN — POTASSIUM PHOSPHATE, MONOBASIC AND POTASSIUM PHOSPHATE, DIBASIC 30 MMOL: 224; 236 INJECTION, SOLUTION, CONCENTRATE INTRAVENOUS at 13:30

## 2021-11-25 RX ADMIN — ONDANSETRON 4 MG: 2 INJECTION INTRAMUSCULAR; INTRAVENOUS at 01:12

## 2021-11-25 ASSESSMENT — ENCOUNTER SYMPTOMS
ABDOMINAL PAIN: 1
VOMITING: 0
CHILLS: 0
FEVER: 0
CARDIOVASCULAR NEGATIVE: 1
NAUSEA: 0
WHEEZING: 0
WEAKNESS: 1
SHORTNESS OF BREATH: 0
NAUSEA: 1
EYES NEGATIVE: 1
CONSTIPATION: 0
HEADACHES: 0
MYALGIAS: 1
PSYCHIATRIC NEGATIVE: 1
RESPIRATORY NEGATIVE: 1
FALLS: 0
COUGH: 0
MEMORY LOSS: 0
MYALGIAS: 0
FOCAL WEAKNESS: 0
CONSTITUTIONAL NEGATIVE: 1

## 2021-11-25 ASSESSMENT — PAIN DESCRIPTION - PAIN TYPE
TYPE: ACUTE PAIN

## 2021-11-25 ASSESSMENT — LIFESTYLE VARIABLES: SUBSTANCE_ABUSE: 0

## 2021-11-25 NOTE — PROGRESS NOTES
Bedside report received.  Assessment complete.  A&O x 4. Patient calls appropriately.  Patient ambulates with x1 assist with FWW.    Patient has 6/10 pain. Pain managed with prescribed medications.  Denies N&V. Right nare cortrak at 85, running impact peptide at 45 (goal)  MLI with staples, approximated, BRYAN. RUQ MARTHA drain, BRYAN. Fistulas with ostomy appliances to RLQ x 2, CDI. Dressing to right flank over drain site, CDI.   + void, - flatus, - BM.  Patient denies SOB.  SCD's off.    Review plan with of care with patient. Call light and personal belongings within reach. Hourly rounding in place. All needs met at this time.

## 2021-11-25 NOTE — CARE PLAN
The patient is Stable - Low risk of patient condition declining or worsening    Shift Goals  Clinical Goals: pain control, rest  Patient Goals: bath, rest, pain medication  Family Goals: Comfort and rest    Progress made toward(s) clinical / shift goals:  Pain managed with prn pain meds      Problem: Pain - Standard  Goal: Alleviation of pain or a reduction in pain to the patient’s comfort goal  Outcome: Progressing     Problem: Skin Integrity  Goal: Skin integrity is maintained or improved  Outcome: Progressing       Patient is not progressing towards the following goals:

## 2021-11-25 NOTE — PROGRESS NOTES
The patient was seen today with the hospitalist and her  and nursing staff.  She is becoming more more frustrated with the core track tube which causes her to gag, and at night feel like she is drowning.  CT scan yesterday showed contrast making it through to the colon with no obvious leak.  There is some contrast in the right flank and most likely this very light wispy contrast is related to the previous upper GI 1 we had diagnosed her leak.  I do not think there is an active leak at this time.  I am concerned about having had a significant leak in the past into the retroperitoneum with what presently looks like bile-stained fluid coming from her old drain sites into the ostomy bags.  Fortunately, her output was 170 cc over 24 hours yesterday which is a big drop.  She has had a slow drop in the volume since the biliary stent was placed.    Plan to improve quality life is to remove the core track due to its significant symptoms on her especially when sleeping.  Second is to remove the MARTHA drain since its been in place for over 3 weeks this will have caused a fistula formation if there is a leak still present.  Thirdly, I am recommending she start full liquid diet since she was tolerating tube feeds without signs of leak or if increased outputs.  I also recommend stopping antibiotics to see what happens and let her declare herself.    We will continue to follow outputs to see if they increase during p.o. full liquids.  We will also monitor her white count and temp to see if she declares herself or there is true improvement.    Thank you

## 2021-11-25 NOTE — CARE PLAN
The patient is Watcher - Medium risk of patient condition declining or worsening    Shift Goals  Clinical Goals: pain management, increase mobilization  Patient Goals:  Family Goals:     Progress made toward(s) clinical / shift goals:      Patient is not progressing towards the following goals: Patient reports inadequate pain control despite Q3 IV dilaudid; patient up with PT this AM but refusing to mobilize further with nursing or OT due to pain      Problem: Pain - Standard  Goal: Alleviation of pain or a reduction in pain to the patient’s comfort goal  Outcome: Not Progressing     Problem: Mobility  Goal: Patient's capacity to carry out activities will improve  Outcome: Not Progressing

## 2021-11-25 NOTE — PROGRESS NOTES
Gastroenterology Progress Note               Author: Fausto Casas M.D. Date & Time Created: 11/25/2021 9:56 AM       Patient ID:  Name:             Nils Alfonso  YOB: 1955  Age:                 66 y.o.  female  MRN:               [id number]      Referring Provider:  Ana Luisa Shearer MD      Presenting Chief Complaint:  Abdominal pain, duodenal perforation with abscess      History of Present Illness:    10/10/2021 HPI  66-year-old female PMH recurrent idiopathic pancreatitis s/p ERCP with sphincterotomy October 1, 2021 complicated by ERCP pancreatitis, sleeve gastrectomy, dyslipidemia, hypertension, osteoarthritis of the spine status post lumbar fusion who was admitted to the hospital 10/8/2021 with worsening right lower quadrant pain over the past week.  Ever since her ERCP October 1, 2021, she has been experiencing pain, intermittent subjective fevers, nausea.  In the emergency room-labs: CBC: WBC 17.8..  Sodium 130.  LFTs-WNL.  CT scan abdomen/pelvis-large irregular fluid collection with thick wall occupying most of the right abdomen surrounding the right kidney and colon.  Severe wall thickening of the descending, transverse colon, second portion of the duodenum likely reactive.  Pneumobilia with gas in the CBD.  Drain placement by IR was performed.  Currently, the abdominal pain has improved.  No vomiting.  Started on ceftriaxone and metronidazole IV.     ERCP October 1, 2021-common bile duct-dilated down to the level of the ampulla.  4 mm polyp at the distal duct seen after sphincterotomy.  8 mm sphincterotomy.  Negative for stone.  Bile duct polyp-benign with inflammatory and hyperplastic changes.  No evidence of epithelial dysplasia/neoplasia.       Interval History:  10/11/2021: interval HIDA scan showed no bile leak.  This morning she feels more comfortable, describing minimal abdominal pain but denying nausea vomiting and fevers.  She has been able to eat a little bit more and has  full liquids beside her bed.  She expresses concern about being on losartan which she says was prescribed outpatient as as needed for high blood pressures.  She also states she has not passed a bowel movement in the week which she describes not eating much.     10/12/2021 - No events overnight.  Tolerating diet without nausea or vomiting.  Had spontaneous, formed, brown bowel movement this morning.  Abdominal pain improving.  Feeling weak, but better each day.  Leukocytosis improving.  States that she is on hydrocodone at home, managed by pain management, and asks that her current hospital pain meds be changed.     10/13/2021: Stable, no new events.  Drain output is minimal, but according to patient bedside nursing is not flushing the drain.  Patient is n.p.o. for planned CT today to reevaluate fluid collection.  Leukocytosis continues to improve.  Pain has improved greatly and patient has not taken the pain medication in the last 24 hours according to her recollection.  She is having regular bowel movements without assistance of laxatives.  Patient is very hungry.     10/14/2021 - No events overnight.  Abdominal pain controlled now.  Had nausea and vomiting yesterday with solid food, but tolerating bland diet.  Blood cultures from 10/8 positive for Chryseobacterium and Candida glabrata - Pharmacy and ID aware.  Micafungin started.  CT 10/13 showing extensive multiloculated rim-enhancing air and fluid collection/abscess within the right abdomen is not significantly changed in size from the prior study. The percutaneous pigtail drainage catheter appears well-positioned within the collection.    10/15/2021: Patient transferred to [facility] overnight per surgery recommendations    10/16/2021: Drain output clearing up slightly. Dr. Dumont with surgery has recommended second IR drain to be placed. Initially interventional radiology did not think that this would be helpful for the patient, but  after further discussion this is the plan going forward. Patient is n.p.o. with plans for TPN. WBCs normalized.    10/21/21: Abdominal pain slowly improving. Pain is worse with movement. MARTHA drains purulent. No vomiting. Plan for CT scan to re-evaluate fluid collection. Tolerating FLD.     10/25/2021 Abd pian controlled. Getting IR drain today. No questions.    11/18/2021: Called back by Dr. Dumont due to persistent duodenal leak on Upper GI series. Since we last saw her she attempted to be managed conservatively with IR drains without improvement, then underwent ex lap with debridement of abscess and necrotizing fascitis. Then placement of IR drain on 11/16 due to persistent fluid collection on CT. Upper GI series with findings of perforation at junction of 2nd and 3rd portion of the duodenum.    11/18/2021: EGD/ERCP/Cortrack placement    Impressions:    1.  Etiology of upper GI series showing perforation is unclear   2.  Generous and patent biliary orifice from sphincterotomy within a duodenal diverticulum without obvious  cristóbal contrast extravasation; bile duct stented with 10mm x 8cm fully covered metal biliary stent   3.  Orifice adjacent to the biliary orifice unclear if this is the pancreatic duct opening. Difficult to visualize   4. Placement of 10F x 140cm  nasoenteric tube     11/19/2021: There were no acute events overnight.  The patient has remained afebrile and hemodynamically stable.  She continues to have diffuse abdominal pain which is unchanged from prior to her procedure yesterday.  She continues to have drainage from the 2 abdominal opening/wounds.  She denies nausea/vomiting but continues to have fatigue and weakness.    11/22/2021 - No events over the weekend.  Patient reports significantly less output from drains.  Tolerating TFs well.  Complaining of waves of abdominal pain and nausea.  Eager for UGI reevaluation of duodenal perforation.    11/23/2021: UGI negative for leak yesterday.  Some contrast reflux into bile duct. MARTHA drain output decreased.  Complaining of breakthrough pain.    11/24/2021 - No events overnight.  Still complaining of breakthrough pain.  Patient reports continued decreased MARTHA drain output.  Nausea under control.  Patient has been up and walking around room and has seen physical therapy.    11/25/2021 - Abdominal pain better controlled with Q3hr meds vs Q4hr.  Miserable from nasoenteric tube and really wants it out.  CT showed decrease size of RLQ abscess, but question of possible fistula in area.  No evidence of persistent duodenal leak.      Review of Systems:  Review of Systems   Constitutional: Positive for malaise/fatigue. Negative for chills and fever.   Respiratory: Negative for cough, shortness of breath and wheezing.    Cardiovascular: Negative for chest pain and leg swelling.   Gastrointestinal: Positive for abdominal pain. Negative for constipation, melena, nausea and vomiting.   Genitourinary: Negative for dysuria and urgency.   Musculoskeletal: Negative for falls and myalgias.   Skin: Negative for itching and rash.   Neurological: Positive for weakness. Negative for focal weakness and headaches.   Psychiatric/Behavioral: Negative for memory loss and substance abuse.             Past Medical History:  Past Medical History:   Diagnosis Date   • Allergy, unspecified not elsewhere classified    • Anemia     not currently   • Anesthesia     PONV (Demerol/ Dilaudid)   • Arthritis     bilateral shoulders,sacrum, osteo   • Backpain     coccyx and sacrum   • Bronchitis 2010   • Cataract     bilateral IOLI   • Degeneration of cervical intervertebral disc     C5-6,C6-7   • Dental disorder     partial upper and lower   • Heart burn    • Hiatus hernia syndrome     not a problem after gastric sleeve   • High cholesterol     resolved after gastric surgery   • Hyperlipidemia    • Hypertension     off all medication, reveresed after gastric sleeve   • Muscle disorder    • Pain  05/16/2018    low back and SI joints and sacrum   • Reactive airway disease     rescue inhaler not needed unless with bronchitis     Active Hospital Problems    Diagnosis    • Electrolyte abnormality [E87.8]    • Advanced care planning/counseling discussion [Z71.89]    • Generalized weakness [R53.1]    • Hypotension [I95.9]    • Acute respiratory failure with hypoxia (HCC) [J96.01]    • Duodenal perforation (HCC) [K63.1]    • Postprocedural retroperitoneal abscess [K68.11]    • Antibiotic-associated diarrhea [K52.1, T36.95XA]    • Bacteremia due to Gram-negative bacteria and fungemia [R78.81]    • Normocytic anemia [D64.9]    • Sepsis due to intraabdominal fluid collection/abscess, bacteremia and fungemia (HCC) [A41.9]    • Hyperlipidemia [E78.5]    • Primary hypertension [I10]          Past Surgical History:  Past Surgical History:   Procedure Laterality Date   • PB UPPER GI ENDOSCOPY,DIAGNOSIS N/A 11/18/2021    Procedure: GASTROSCOPY with stent placement;  Surgeon: Migel Lawrence M.D.;  Location: SURGERY SAME DAY [facility];  Service: Gastroenterology   • PB ERCP,DIAGNOSTIC N/A 11/18/2021    Procedure: ERCP (ENDOSCOPIC RETROGRADE CHOLANGIOPANCREATOGRAPHY);  Surgeon: Migel Lawrence M.D.;  Location: SURGERY SAME DAY [facility];  Service: Gastroenterology   • PB PLACE PERCUT GASTROSTOMY TUBE N/A 11/18/2021    Procedure: GASTROSCOPY, WITH FEEDING TUBE INSERTION;  Surgeon: Migel Lawrence M.D.;  Location: SURGERY SAME DAY [facility];  Service: Gastroenterology   • BILIARY STENT PLACEMENT N/A 11/18/2021    Procedure: INSERTION, STENT, BILE DUCT;  Surgeon: Migel Lawrence M.D.;  Location: SURGERY SAME DAY [facility];  Service: Gastroenterology   • PB EXPLORATORY OF ABDOMEN  11/5/2021    Procedure: LAPAROTOMY, EXPLORATORY;  Surgeon: Jaden Cook M.D.;  Location: SURGERY [facility];  Service: General   • PB ULTRASONIC GUIDANCE, INTRAOPERATIVE  11/5/2021    Procedure: ULTRASOUND GUIDANCE;  Surgeon: Jaden Cook M.D.;   Location: [facility];  Service: General   • ABDOMINAL ABSCESS DRAINAGE  11/5/2021    Procedure: DRAINAGE, ABSCESS, ABDOMEN - PERITONEAL AND RETROPERITONEAL;  Surgeon: Jaden Cook M.D.;  Location: [facility];  Service: General   • PB ERCP,DIAGNOSTIC  10/1/2021    Procedure: ERCP (ENDOSCOPIC RETROGRADE CHOLANGIOPANCREATOGRAPHY);  Surgeon: Fausto Casas M.D.;  Location: [facility];  Service: Gastroenterology   • COLONOSCOPY  8/8/2018    Procedure: COLONOSCOPY;  Surgeon: Shukri Condon M.D.;  Location: [facility];  Service: General   • GASTROSCOPY  5/23/2018    Procedure: GASTROSCOPY;  Surgeon: Fausto Casas M.D.;  Location: [facility];  Service: Gastroenterology   • EGD W/ENDOSCOPIC ULTRASOUND  5/23/2018    Procedure: EGD W/ENDOSCOPIC ULTRASOUND- RADIAL UPPER;  Surgeon: Fausto Casas M.D.;  Location: [facility];  Service: Gastroenterology   • CATARACT PHACO WITH IOL Left 4/18/2017    Procedure: CATARACT PHACO WITH IOL;  Surgeon: Stuart Estevez M.D.;  Location: [facility] SAME DAY [facility];  Service:    • CATARACT PHACO WITH IOL Right 4/4/2017    Procedure: CATARACT PHACO WITH IOL;  Surgeon: Stuart Estevez M.D.;  Location: [facility];  Service:    • GASTRIC SLEEVE LAPAROSCOPY  10/19/2016    Procedure: GASTRIC SLEEVE LAPAROSCOPY, HIATAL HERNIA;  Surgeon: Shukri Condon M.D.;  Location: [facility];  Service:    • LIVER BIOPSY LAPAROSCOPIC  10/19/2016    Procedure: LIVER BIOPSY LAPAROSCOPIC;  Surgeon: Shukri Condon M.D.;  Location: [facility];  Service:    • GASTROSCOPY N/A 8/26/2016    Procedure: GASTROSCOPY;  Surgeon: Shukri Condon M.D.;  Location: [facility];  Service:    • ROTATOR CUFF REPAIR Right 7/7/2016   • ROTATOR CUFF REPAIR Left 5/2015   • HYSTERECTOMY ROBOTIC  1/2/2009    Performed by MEG VILLEGAS at [facility]   • LUMBAR  FUSION ANTERIOR  2007    Dr Hillman, L3-S1 fusion   • CHOLECYSTECTOMY  1996    laparoscopic   • TUBAL LIGATION  1985   • GASTRIC RESECTION  1982    gastric stapling   • TONSILLECTOMY AND ADENOIDECTOMY  1967   • PRIMARY C SECTION  1976, 1979, 1985    x3           Hospital Medications:  Current Facility-Administered Medications   Medication Dose Frequency Provider Last Rate Last Admin   • HYDROmorphone (Dilaudid) injection 1 mg  1 mg Q3HRS PRN Fausto Casas M.D.   1 mg at 11/25/21 0712   • miconazole 2%-zinc oxide (Denise) topical cream   BID Urszula Mary A.P.R.N.   Given at 11/25/21 0418   • sore throat spray (CHLORASEPTIC) 1 Spray  1 Spray Q2HRS PRN Alem Lopez A.P.R.N.   1 [place] at 11/25/21 0813   • Pharmacy Consult: Enteral tube insertion - review meds/change route/product selection  1 Each PHARMACY TO DOSE Urszula Mary A.P.R.N.       • ondansetron (ZOFRAN ODT) dispertab 4 mg  4 mg Q4HRS PRN Alem Lopez A.P.R.N.       • octreotide (SANDOSTATIN) injection 100 mcg  100 mcg BID Jaden Cook M.D.   100 mcg at 11/25/21 0930   • ondansetron (ZOFRAN) syringe/vial injection 4 mg  4 mg Q4HRS PRN Tomi Ledezma M.D.   4 mg at 11/25/21 0712   • enoxaparin (LOVENOX) inj 40 mg  40 mg DAILY Jaden Cook M.D.   40 mg at 11/25/21 0930   Last reviewed on 11/5/2021  8:26 AM by Cuca Mejia R.N.        Current Outpatient Medications:  Medications Prior to Admission   Medication Sig Dispense Refill Last Dose   • cyclobenzaprine (FLEXERIL) 10 mg Tab Take 10 mg by mouth every evening.   9/30/2021 at [facility]   • ezetimibe (ZETIA) 10 MG Tab Take 10 mg by mouth every evening.   9/30/2021 at [facility]   • Magnesium 400 MG Tab Take 400 mg by mouth every evening.   9/30/2021 at [facility]   • gabapentin (NEURONTIN) 300 MG Cap Take 600 mg by mouth 2 Times a Day.   9/30/2021 at [facility]   • BIOTIN PO Take 1 Tablet by mouth every day.   9/30/2021 at [facility]   • HYDROcodone/acetaminophen (NORCO)  MG Tab Take  "0.5 Tablets by mouth every 6 hours as needed for Moderate Pain.   10/8/2021 at [facility]   • Cholecalciferol (VITAMIN D3 PO) Take 1 Each by mouth every day.   2021 at [facility]   • ondansetron (ZOFRAN ODT) 4 MG TABLET DISPERSIBLE Take 4 mg by mouth every 6 hours as needed for Nausea.   10/8/2021 at [facility]         Medication Allergies:  Allergies   Allergen Reactions   • Ampicillin Rash     Rash  Tolerates Zosyn 10/21   • Cephalexin Diarrhea, Vomiting and Nausea     .   • Clindamycin Vomiting     \"cleocin\"   • Codeine      Severe stomach pain, cramps, spasms   • Demerol Vomiting   • Levofloxacin Unspecified     Numbness     • Tetracyclines Rash     .   • Tizanidine Itching   • Morphine Vomiting   • Pcn [Penicillins] Itching     Tolerates Zosyn 10/21   • Sulfa Drugs Itching         Family Medical History:  Family History   Problem Relation Age of Onset   • Stroke Mother    • Cancer Father         larynx   • Diabetes Brother          Social History:  Social History     Socioeconomic History   • Marital status:      Spouse name: Not on file   • Number of children: Not on file   • Years of education: Not on file   • Highest education level: Not on file   Occupational History   • Not on file   Tobacco Use   • Smoking status: Former Smoker     Packs/day: 0.25     Years: 0.10     Pack years: 0.02     Types: Cigarettes     Quit date: 1973     Years since quittin.9   • Smokeless tobacco: Never Used   Vaping Use   • Vaping Use: Never used   Substance and Sexual Activity   • Alcohol use: No   • Drug use: No   • Sexual activity: Not on file     Comment:    Other Topics Concern   • Not on file   Social History Narrative   • Not on file     Social Determinants of Health     Financial Resource Strain:    • Difficulty of Paying Living Expenses: Not on file   Food Insecurity:    • Worried About Running Out of Food in the Last Year: Not on file   • Ran Out of Food in the Last Year: Not on file   Transportation Needs:    • " "Lack of Transportation (Medical): Not on file   • Lack of Transportation (Non-Medical): Not on file   Physical Activity:    • Days of Exercise per Week: Not on file   • Minutes of Exercise per Session: Not on file   Stress:    • Feeling of Stress : Not on file   Social Connections:    • Frequency of Communication with Friends and Family: Not on file   • Frequency of Social Gatherings with Friends and Family: Not on file   • Attends Restorationist Services: Not on file   • Active Member of Clubs or Organizations: Not on file   • Attends Club or Organization Meetings: Not on file   • Marital Status: Not on file   Intimate Partner Violence:    • Fear of Current or Ex-Partner: Not on file   • Emotionally Abused: Not on file   • Physically Abused: Not on file   • Sexually Abused: Not on file   Housing Stability:    • Unable to Pay for Housing in the Last Year: Not on file   • Number of Places Lived in the Last Year: Not on file   • Unstable Housing in the Last Year: Not on file         Vital signs:  Weight/BMI: Body mass index is 28.13 kg/m².  /76   Pulse 88   Temp 36.2 °C (97.2 °F) (Temporal)   Resp 18   Ht 1.6 m (5' 2.99\")   Wt 72 kg (158 lb 11.7 oz)   SpO2 96%   Vitals:    11/24/21 1510 11/24/21 1936 11/25/21 0313 11/25/21 0706   BP: 127/63 132/74 118/70 141/76   Pulse: 85 87 83 88   Resp: 18 18 18 18   Temp: 36.8 °C (98.3 °F) 36.5 °C (97.7 °F) 36.4 °C (97.5 °F) 36.2 °C (97.2 °F)   TempSrc: Temporal Temporal Temporal Temporal   SpO2: 97% 97% 94% 96%   Weight:       Height:         Oxygen Therapy:  Pulse Oximetry: 96 %, O2 (LPM): 0, O2 Delivery Device: None - Room Air    Intake/Output Summary (Last 24 hours) at 11/25/2021 0956  Last data filed at 11/25/2021 0800  Gross per 24 hour   Intake 1922.6 ml   Output 2040 ml   Net -117.4 ml         Physical Exam:  Physical Exam  Vitals and nursing note reviewed.   Constitutional:       Appearance: She is ill-appearing.   HENT:      Head: Normocephalic and atraumatic.     "  Right Ear: External ear normal.      Left Ear: External ear normal.      Nose: Nose normal.      Mouth/Throat:      Mouth: Mucous membranes are dry.   Cardiovascular:      Rate and Rhythm: Normal rate and regular rhythm.      Pulses: Normal pulses.      Heart sounds: Normal heart sounds.   Pulmonary:      Effort: Pulmonary effort is normal.      Breath sounds: Normal breath sounds.      Comments: Decreased breath sounds in bases  Abdominal:      Tenderness: There is abdominal tenderness (RUQ).      Comments: Right sided drain and ostomy appliances to skin over sites that are draining dark brown liquid   Neurological:      General: No focal deficit present.      Mental Status: She is alert and oriented to person, place, and time.   Psychiatric:         Thought Content: Thought content normal.         Judgment: Judgment normal.             Labs:  Recent Labs     11/23/21  0010 11/24/21 0620 11/25/21 0345   SODIUM 129* 127* 127*   POTASSIUM 3.6 4.0 3.9   CHLORIDE 103 97 98   CO2 20 22 21   BUN 13 17 16   CREATININE 0.28* 0.29* 0.32*   MAGNESIUM  --   --  1.8   PHOSPHORUS 1.4*  --  1.6*   CALCIUM 6.7* 7.7* 7.5*     Recent Labs     11/22/21  1137 11/23/21  0010 11/24/21 0620 11/25/21 0345   ALTSGPT  --   --   --  27   ASTSGOT  --   --   --  22   ALKPHOSPHAT  --   --   --  85   TBILIRUBIN  --   --   --  <0.2   PREALBUMIN 14.7* 15.9*  --   --    GLUCOSE  --  114* 122* 128*     Recent Labs     11/25/21 0345   WBC 4.6*   NEUTSPOLYS 79.80*   LYMPHOCYTES 13.30*   MONOCYTES 4.60   EOSINOPHILS 0.40   BASOPHILS 0.40   ASTSGOT 22   ALTSGPT 27   ALKPHOSPHAT 85   TBILIRUBIN <0.2     Recent Labs     11/25/21  0345   RBC 3.08*   HEMOGLOBIN 9.2*   HEMATOCRIT 27.5*   PLATELETCT 238     Recent Results (from the past 24 hour(s))   Comp Metabolic Panel    Collection Time: 11/25/21  3:45 AM   Result Value Ref Range    Sodium 127 (L) 135 - 145 mmol/L    Potassium 3.9 3.6 - 5.5 mmol/L    Chloride 98 96 - 112 mmol/L    Co2 21 20 - 33  mmol/L    Anion Gap 8.0 7.0 - 16.0    Glucose 128 (H) 65 - 99 mg/dL    Bun 16 8 - 22 mg/dL    Creatinine 0.32 (L) 0.50 - 1.40 mg/dL    Calcium 7.5 (L) 8.5 - 10.5 mg/dL    AST(SGOT) 22 12 - 45 U/L    ALT(SGPT) 27 2 - 50 U/L    Alkaline Phosphatase 85 30 - 99 U/L    Total Bilirubin <0.2 0.1 - 1.5 mg/dL    Albumin 2.5 (L) 3.2 - 4.9 g/dL    Total Protein 4.4 (L) 6.0 - 8.2 g/dL    Globulin 1.9 1.9 - 3.5 g/dL    A-G Ratio 1.3 g/dL   Phosphorus    Collection Time: 11/25/21  3:45 AM   Result Value Ref Range    Phosphorus 1.6 (L) 2.5 - 4.5 mg/dL   Magnesium    Collection Time: 11/25/21  3:45 AM   Result Value Ref Range    Magnesium 1.8 1.5 - 2.5 mg/dL   CBC WITH DIFFERENTIAL    Collection Time: 11/25/21  3:45 AM   Result Value Ref Range    WBC 4.6 (L) 4.8 - 10.8 K/uL    RBC 3.08 (L) 4.20 - 5.40 M/uL    Hemoglobin 9.2 (L) 12.0 - 16.0 g/dL    Hematocrit 27.5 (L) 37.0 - 47.0 %    MCV 89.3 81.4 - 97.8 fL    MCH 29.9 27.0 - 33.0 pg    MCHC 33.5 (L) 33.6 - 35.0 g/dL    RDW 55.8 (H) 35.9 - 50.0 fL    Platelet Count 238 164 - 446 K/uL    MPV 9.0 9.0 - 12.9 fL    Neutrophils-Polys 79.80 (H) 44.00 - 72.00 %    Lymphocytes 13.30 (L) 22.00 - 41.00 %    Monocytes 4.60 0.00 - 13.40 %    Eosinophils 0.40 0.00 - 6.90 %    Basophils 0.40 0.00 - 1.80 %    Immature Granulocytes 1.50 (H) 0.00 - 0.90 %    Nucleated RBC 0.00 /100 WBC    Neutrophils (Absolute) 3.65 2.00 - 7.15 K/uL    Lymphs (Absolute) 0.61 (L) 1.00 - 4.80 K/uL    Monos (Absolute) 0.21 0.00 - 0.85 K/uL    Eos (Absolute) 0.02 0.00 - 0.51 K/uL    Baso (Absolute) 0.02 0.00 - 0.12 K/uL    Immature Granulocytes (abs) 0.07 0.00 - 0.11 K/uL    NRBC (Absolute) 0.00 K/uL   ESTIMATED GFR    Collection Time: 11/25/21  3:45 AM   Result Value Ref Range    GFR If African American >60 >60 mL/min/1.73 m 2    GFR If Non African American >60 >60 mL/min/1.73 m 2         Radiology Review:  CT-ABDOMEN-PELVIS WITH   Final Result      1.  RIGHT lateral no abnormal abscess again seen, decreased in size  from prior exam with drain in place, however contents now appears hyperdense potential indicating bowel contrast, concerning for bowel perforation or fistula, although source of contrast    is uncertain.  No cristóbal pneumoperitoneum.   2.  Pneumobilia and biliary stent present.   3.  Interval removal of RIGHT lower quadrant drain.         DX-UPPER GI-SERIES WITH KUB   Final Result      1.  No evidence for duodenal leak.   2.  Reflux of contrast seen into the distal common bile duct at the site of biliary stent, consistent with prior sphincterotomy.      DX-ABDOMEN FOR TUBE PLACEMENT   Final Result         1.  Nonspecific bowel gas pattern.   2.  Dobbhoff tube with tip terminating overlying the expected location of the third or fourth duodenal segment.   3.  Hazy right lower lobe infiltrates.      DX-ABDOMEN FOR TUBE PLACEMENT   Final Result      Feeding tube extends to the region of the stomach and duodenum with tip at the level of the proximal end of the jejunum.      DX-ABDOMEN FOR TUBE PLACEMENT   Final Result      Enteric tube terminates over the duodenal jejunal junction      Covered common bile duct stent      No contrast extravasation is seen      [id number] STENT - TUBE   Final Result      Portable fluoroscopic images obtained during ERCP biliary stent placement for localization purposes.      DX-UPPER GI-SERIES WITH KUB   Final Result      Duodenal perforation at the junction of the second and third portions of duodenum as noted on key images.      CT-ABORTED CT PROCEDURE   Final Result      Interval decrease in size of the RIGHT retroperitoneal fluid collection which contains a surgical drain. Because from bowel is possible. No additional drain was placed.            CT-ABDOMEN-PELVIS WITH   Final Result      1.  Slight interval increase in size in fluid collection the right midabdomen with drain in place.      2.  Slight interval increase in size in trace bilateral pleural effusions, right greater than  left with bibasilar atelectasis.      3.  Pneumobilia.      CT-ABDOMEN-PELVIS WITH   Final Result         1. The components in the gallbladder fossa and right flank of the complex fluid collection are mostly resolved. There is still a small residual fluid collection in the perinephric space surrounding the inferior right kidney. Right lower quadrant MARTHA drain    and right upper quadrant catheter are outside of this residual collection.   2. Air-fluid levels throughout the colon could relate to ileus.   3. Bilateral pleural effusions with bibasilar atelectasis.   4. Pneumobilia.      CT-ABDOMEN-PELVIS WITH   Final Result      1.  Slight interval decrease in size of the large RIGHT peritoneal abscess which now contains 3 drains   2.  Decreased atelectasis with persistent opacity in the RIGHT lung base likely atelectasis rather than pneumonia   3.  Small LEFT renal cyst   4.  Prior cholecystectomy with ongoing pneumobilia      CT-DRAINAGE-CATH EXCHANGE   Final Result         1. Organized pancreatic pseudocyst Drainage with CT guidance.      CT-ABDOMEN-PELVIS WITH   Final Result      1.  There has been interval placement of an additional inferior lateral pigtail catheter within the complex right abdominal abscess. The other 2 pigtail catheters are stable in appearance.   2.  There has been no significant interval decrease in size of the large complex loculated abscess collection in the right abdomen.   3.  There is no new fluid collection.   4.  Gallbladder is surgically absent with continued pneumobilia.   5.  Interval decrease in the dependent pleural effusion with minimal airspace disease, likely atelectasis.      IR-DRAINAGE-CATH EXCHANGE   Final Result      1.  Successful left-sided drainage catheter removal.   2.  Successful image guided superior right drainage catheter replacement.      Plan: Suction drainage. Monitor outputs. Please contact interventional radiology if there is any concern for tube dysfunction.  Suggest routine tube maintenance at 3 months intervals if the tube remains in place.         CT-DRAIN-PERITONEAL   Final Result      1.  CT GUIDED PERITONEAL RLQ abdominal CATHETER DRAINAGE.   2.  THE CURRENT PLAN IS TO MONITOR DRAINAGE OUTPUT AND OBTAIN A FOLLOWUP CT SCAN IN 5-7 DAYS IF CLINICALLY INDICATED.      CT-ABDOMEN-PELVIS WITH   Final Result         1. Grossly unchanged irregular fluid collection occupying the right abdomen surrounding the right kidney and right colon extending to midline. Additional pigtail catheter in the component about midline anterior abdomen. Both pigtail catheters seem to be    within the collection.      2. Small right pleural effusion with right basilar atelectasis.               DX-CHEST-LIMITED (1 VIEW)   Final Result      1.  Right basilar atelectasis or consolidation. Small right pleural effusion not excluded.   2.  Atherosclerotic plaque.      CT-DRAIN-PERITONEAL   Final Result      1.  CT guided pancreatic pseudocyst catheter drainage.   2.  The current plan is to obtain a follow-up CT in 5-7 days..      IR-PICC LINE PLACEMENT W/ GUIDANCE > AGE 5   Final Result                  Ultrasound-guided PICC placement performed by qualified nursing staff as    above.          CT-ABDOMEN-PELVIS WITH    (Results Pending)         MDM (Data Review):   -Records reviewed and summarized in current documentation  -I personally reviewed and interpreted the laboratory results  -I personally reviewed the radiology images        Medical Decision Making, by Problem:  Active Hospital Problems    Diagnosis    • Bacteremia due to Gram-negative bacteria and fungemia [R78.81]    • Sepsis due to intraabdominal fluid collection/abscess (HCC) [A41.9]    • Hyperlipidemia [E78.5]    • Hypertension [I10]        66-year-old female with a history of recurrent idiopathic pancreatitis s/p ERCP with biliary sphincterotomy and biopsy of a distal BD polyp on October 1, 2021.  Postop complications-pancreatitis.   Ever since then, complaints of subjective fevers, progressive abdominal pain, nausea/vomiting.  Began to have worsening right lower quadrant pain over the past 5 days.  CT scan abdomen/pelvis large irregular fluid collection with thick wall occupying most of the right abdomen surrounding right kidney and right colon.  Additional fluid and gas collection in the midline abdomen anterior to the pancreas concerning for abscess.  Severe wall thickening of the descending and transverse colon, second portion of duodenum likely reactive.  Pneumobilia with gas in the CBD, intrahepatic bile ducts as well as pancreatic ducts (likely secondary to recent ERCP with sphincterotomy).  IR placed a drain with improvement in abdominal pain initially, but then drain output slowed to no output. Nursing was discovered to have have flushed the drain for at least 2 days. After flush by bedside nurse, reportedly 300 cc of fluid came out. Fluid bilirubin 0.6. Fluid amylase >7500. Blood cultures from 10/8 positive for Chryseobacterium and Candida glabrata. ID following. Repeat CT with no change in fluid collection and new pelvic fluid collection/abscess. Drain placement ineffective. Patient underwent exlap with debridement of retroperitoneal abscess and necrotizing fascitis on 11/5 by Dr. Dumont. Repeat IR drain on 11/16 and now with recurrent duodenal leak.  ERCP last week with Fully covered metal stent placed.         Assessment/Recommendations:  ASSESSMENT:  1.  Sepsis due to intra-abdominal fluid collection/abscess-Duodenal perforation near ampulla from previous ERCP. S/P ERCP with placement of 10mm x 8cm fully covered metal stent in the bile duct - no persistent leak identified  2.  History of pancreatitis  3.  Intra-abdominal abscess and necrotizing fascitis, s/p debridement 11/5  4.  History of morbid obesity s/p laparoscopic sleeve  5.  Euvolemic hyponatremia  6.  Continued pain medication requirements       PLAN:  - Continue  TFs for now, but diet will be addressed by Dr. Cook  - Continued increased frequency of prn pain medication of every 3 hours as needed    Thank you for inviting me to participate in the care of this patient. Please do not hesitate to call GI consultants with additional questions/concerns or changes in the patient's clinical status at [phone].

## 2021-11-25 NOTE — PROGRESS NOTES
Hospital Medicine Daily Progress Note    Date of Service  11/25/2021    Chief Complaint  Nils Alfonso is a 66 y.o. female admitted 10/15/2021 with abdominal pain    Hospital Course  Ms. Alfonso is a 66-year-old female with a PMHx of recurrent idiopathic pancreatitis s/p ERCP with sphincterotomy on 10/1/121, sleeve gastrectomy, dyslipidemia, HTN, osteoarthritis of the spine s/p lumbar fusion who was admitted on 10/8/2021 with worsening right lower quadrant pain over the past week.      Since her ERCP on 10/1/2022 1, patient has been experiencing intermittent pain, fevers, and nausea.  Patient initially presented to [facility] for evaluation of the abdominal pain.  Imaging and RSM noted large intra-abdominal fluid collection of which multiple IR drains have been placed.  Also, her infection has also worsened as noted in her WBC count, intermittent fever, and lactic acid.  Patient was transferred to [facility] for escalation of surgical needs.    Patient underwent an ex lap with I&D of multiple loculated abscesses and debridement of necrotizing fasciitis with Dr. ST on 11/5/2021.  Previous blood cultures were positive for stenotrophomonas, maltophilia, mixed yeast, facialis, E. coli, and chryseobacterium in the blood.  Infectious disease was consulted and was placed on Bactrim twice daily, Zosyn, micafungin with end date of 11/24/2021.  Patient had since decreased output from the drains.  Repeat CT of the abdomen on 11/15/2021 noted increase fluid collection in the right mid abdomen and slight increase in trace bilateral pleural effusions.  Interventional radiology was consulted on 11/16/2020 121 for a repeat peritoneal drainage.  RLQ MARTHA drain had fallen out and was not replaced due to the abdominal fluid collection being noted to be smaller.    Upper GI series on 11/17/2021 showed a leak at the perforation in 2/3 portion of the duodenum.  Patient underwent an endoscopy/ERCP, biliary stent, core track placement on 11/18/2021 with  Dr. [facility] and was started on enteral feedings on 11/19/2021 with monitoring of MARTHA drain output.    Patient with hospital services for management of care.          Interval Problem Update  11/23/2021  -Patient seen and examined.  Patient noted to have a mid abdomen wound VAC, 2 ostomy sites on the right mid to lower quadrant, and one MARTHA drain.  Noted minimal to moderate output.  Patient reports severe abdominal pain.  Patient has also been refusing physical therapy and patient was counseled in regards to evaluation from PT for postacute placement.  Patient and  updated in plan of care.  -Plan of care: Continue supportive therapy; pain management; monitor output in ostomy bag and MARTHA drain along with wound VAC; continue ABX therapy until 11/24; wound team; will appreciate PT/OT reevaluation and input for postacute placement versus home with home health needs  -Disposition: Pending evaluation from PT/OT -will likely need SNF  -Lab work: Reviewed; Na 129 - IVF; Ca2+  - 2g IVPB  -VSS at this time    11/24/2021  -Patient seen and examined. Patient spouse at bedside. Patient reports better control and pain management. Patient also reports working with physical therapy and able to ambulate with what is recommended. Discussed with patient plan of care today.  -Plan of care: Continue supportive care; pain management; ordered CT of the abdomen/pelvis with contrast -pending results; will DC drain if decreased to 110 cc over 24 hours according to surgery; diet to be discussed with surgery; GI team signed off today with recommendations of CT of the abdomen/pelvis, diet to be directed by surgery, and adjusted pain management.  -Disposition: Pending clearance from surgery team; SNF versus home with home health  -Lab work: Reviewed; Na at 127 -continue IVF  -VSS at this time  -There are no significant changes from a previous ROS/PE, please see my previous notes for further details.    11/25/2021  -Patient  seen and examined. Patient at bedside. Discussed plan of care with patient alongside with the surgery, Dr. ST. Discussed with patient most recent CT abdomen pelvis results. Also discussed with patient and family in regards to discharge planning. At this time, patient is refusing to go to a skilled facility and would rather go home with home health. PT noted patient okay to go home with home health as long as therapy is continued. At this time, patient is still needing some help getting out of bed and needing help getting started ambulation. Patient and family updated in plan of care  -Plan of care: Continue pain management; discontinue core track and start patient on clear liquid diet; remove staples for recommendations of surgery; monitor MARTHA output, if output is equal or less than 50 mL within 24 hours, surgery will discontinue; continue PT/OT; patient to ambulate with walker to bathroom and up for all meals.  -Disposition: Pending clearance from surgery, Dr. ST; SNF versus home with HH  -Lab work: Reviewed; Na 127 -continue IVF  -VSS at this time  -There are no significant changes from my previous ROS/PE, please see my previous notes for further details.    11/26/2021  -Patient seen and examined today.  Patient is very excited and motivated today.  Patient's MARTHA drain removed by surgery, Dr. ST.  Encourage patient to work with physical therapy today.  Patient aware and plan of care.  -Plan of care: Monitor ostomy output; pain management as needed; encourage aggressive physical therapy today for goal of home with home health  -Disposition: Clearance from surgery, Dr. ST; PT recommendations; anticipated to discharge this weekend if patient improves with physical therapy  -Lab work: Reviewed; expected  -VSS at this time  -There are no significant changes to my previous ROS/PE, please see my previous notes for further details.      I have personally seen and examined the patient at bedside. I discussed the plan of care  with patient, family and bedside RN.    Consultants/Specialty  general surgery and GI    Code Status  Full Code    Disposition  Patient is not medically cleared.   Anticipate discharge to to skilled nursing facility.  I have placed the appropriate orders for post-discharge needs.    Review of Systems  Review of Systems   Constitutional: Negative.  Negative for chills and fever.   HENT: Negative.    Eyes: Negative.    Respiratory: Negative.    Cardiovascular: Negative.    Gastrointestinal: Positive for abdominal pain and nausea.   Genitourinary: Negative.    Musculoskeletal: Positive for myalgias.   Skin: Negative.    Neurological: Positive for weakness.   Endo/Heme/Allergies: Negative.    Psychiatric/Behavioral: Negative.         Physical Exam  Temp:  [36.2 °C (97.2 °F)-36.8 °C (98.3 °F)] 36.2 °C (97.2 °F)  Pulse:  [83-88] 88  Resp:  [18] 18  BP: (118-141)/(63-76) 141/76  SpO2:  [94 %-97 %] 96 %    Physical Exam  Vitals and nursing note reviewed.   HENT:      Head: Normocephalic.      Nose: Nose normal.      Mouth/Throat:      Mouth: Mucous membranes are moist.      Pharynx: Oropharynx is clear.   Eyes:      Pupils: Pupils are equal, round, and reactive to light.   Cardiovascular:      Rate and Rhythm: Normal rate and regular rhythm.      Pulses: Normal pulses.      Heart sounds: Normal heart sounds.   Pulmonary:      Effort: Pulmonary effort is normal.      Breath sounds: Normal breath sounds.   Abdominal:      General: Bowel sounds are decreased.      Tenderness: There is generalized abdominal tenderness.       Musculoskeletal:         General: Tenderness present.      Cervical back: Normal range of motion and neck supple.   Skin:     General: Skin is dry.      Capillary Refill: Capillary refill takes 2 to 3 seconds.   Neurological:      Mental Status: She is alert. Mental status is at baseline.         Fluids    Intake/Output Summary (Last 24 hours) at 11/25/2021 1027  Last data filed at 11/25/2021 0800  Gross  per 24 hour   Intake 1922.6 ml   Output 2040 ml   Net -117.4 ml       Laboratory  Recent Labs     11/25/21  0345   WBC 4.6*   RBC 3.08*   HEMOGLOBIN 9.2*   HEMATOCRIT 27.5*   MCV 89.3   MCH 29.9   MCHC 33.5*   RDW 55.8*   PLATELETCT 238   MPV 9.0     Recent Labs     11/23/21  0010 11/24/21  0620 11/25/21  0345   SODIUM 129* 127* 127*   POTASSIUM 3.6 4.0 3.9   CHLORIDE 103 97 98   CO2 20 22 21   GLUCOSE 114* 122* 128*   BUN 13 17 16   CREATININE 0.28* 0.29* 0.32*   CALCIUM 6.7* 7.7* 7.5*             Recent Labs     11/23/21  0010   TRIGLYCERIDE 174*       Imaging  CT-ABDOMEN-PELVIS WITH   Final Result      1.  RIGHT lateral no abnormal abscess again seen, decreased in size from prior exam with drain in place, however contents now appears hyperdense potential indicating bowel contrast, concerning for bowel perforation or fistula, although source of contrast    is uncertain.  No cristóbal pneumoperitoneum.   2.  Pneumobilia and biliary stent present.   3.  Interval removal of RIGHT lower quadrant drain.         DX-UPPER GI-SERIES WITH KUB   Final Result      1.  No evidence for duodenal leak.   2.  Reflux of contrast seen into the distal common bile duct at the site of biliary stent, consistent with prior sphincterotomy.      DX-ABDOMEN FOR TUBE PLACEMENT   Final Result         1.  Nonspecific bowel gas pattern.   2.  Dobbhoff tube with tip terminating overlying the expected location of the third or fourth duodenal segment.   3.  Hazy right lower lobe infiltrates.      DX-ABDOMEN FOR TUBE PLACEMENT   Final Result      Feeding tube extends to the region of the stomach and duodenum with tip at the level of the proximal end of the jejunum.      DX-ABDOMEN FOR TUBE PLACEMENT   Final Result      Enteric tube terminates over the duodenal jejunal junction      Covered common bile duct stent      No contrast extravasation is seen      SE-JGEA-NOADMLX STENT - TUBE   Final Result      Portable fluoroscopic images obtained during  ERCP biliary stent placement for localization purposes.      DX-UPPER GI-SERIES WITH KUB   Final Result      Duodenal perforation at the junction of the second and third portions of duodenum as noted on key images.      CT-ABORTED CT PROCEDURE   Final Result      Interval decrease in size of the RIGHT retroperitoneal fluid collection which contains a surgical drain. Because from bowel is possible. No additional drain was placed.            CT-ABDOMEN-PELVIS WITH   Final Result      1.  Slight interval increase in size in fluid collection the right midabdomen with drain in place.      2.  Slight interval increase in size in trace bilateral pleural effusions, right greater than left with bibasilar atelectasis.      3.  Pneumobilia.      CT-ABDOMEN-PELVIS WITH   Final Result         1. The components in the gallbladder fossa and right flank of the complex fluid collection are mostly resolved. There is still a small residual fluid collection in the perinephric space surrounding the inferior right kidney. Right lower quadrant MARTHA drain    and right upper quadrant catheter are outside of this residual collection.   2. Air-fluid levels throughout the colon could relate to ileus.   3. Bilateral pleural effusions with bibasilar atelectasis.   4. Pneumobilia.      CT-ABDOMEN-PELVIS WITH   Final Result      1.  Slight interval decrease in size of the large RIGHT peritoneal abscess which now contains 3 drains   2.  Decreased atelectasis with persistent opacity in the RIGHT lung base likely atelectasis rather than pneumonia   3.  Small LEFT renal cyst   4.  Prior cholecystectomy with ongoing pneumobilia      CT-DRAINAGE-CATH EXCHANGE   Final Result         1. Organized pancreatic pseudocyst Drainage with CT guidance.      CT-ABDOMEN-PELVIS WITH   Final Result      1.  There has been interval placement of an additional inferior lateral pigtail catheter within the complex right abdominal abscess. The other 2 pigtail catheters are  stable in appearance.   2.  There has been no significant interval decrease in size of the large complex loculated abscess collection in the right abdomen.   3.  There is no new fluid collection.   4.  Gallbladder is surgically absent with continued pneumobilia.   5.  Interval decrease in the dependent pleural effusion with minimal airspace disease, likely atelectasis.      IR-DRAINAGE-CATH EXCHANGE   Final Result      1.  Successful left-sided drainage catheter removal.   2.  Successful image guided superior right drainage catheter replacement.      Plan: Suction drainage. Monitor outputs. Please contact interventional radiology if there is any concern for tube dysfunction. Suggest routine tube maintenance at 3 months intervals if the tube remains in place.         CT-DRAIN-PERITONEAL   Final Result      1.  CT GUIDED PERITONEAL RLQ abdominal CATHETER DRAINAGE.   2.  THE CURRENT PLAN IS TO MONITOR DRAINAGE OUTPUT AND OBTAIN A FOLLOWUP CT SCAN IN 5-7 DAYS IF CLINICALLY INDICATED.      CT-ABDOMEN-PELVIS WITH   Final Result         1. Grossly unchanged irregular fluid collection occupying the right abdomen surrounding the right kidney and right colon extending to midline. Additional pigtail catheter in the component about midline anterior abdomen. Both pigtail catheters seem to be    within the collection.      2. Small right pleural effusion with right basilar atelectasis.               DX-CHEST-LIMITED (1 VIEW)   Final Result      1.  Right basilar atelectasis or consolidation. Small right pleural effusion not excluded.   2.  Atherosclerotic plaque.      CT-DRAIN-PERITONEAL   Final Result      1.  CT guided pancreatic pseudocyst catheter drainage.   2.  The current plan is to obtain a follow-up CT in 5-7 days..      IR-PICC LINE PLACEMENT W/ GUIDANCE > AGE 5   Final Result                  Ultrasound-guided PICC placement performed by qualified nursing staff as    above.          CT-ABDOMEN-PELVIS WITH    (Results  Pending)        Assessment/Plan  * Duodenal perforation (HCC)- (present on admission)  Assessment & Plan  -After ERCP with retroperitoneal abscess and bacteremia  -Uncertain etiology - ddx includes pancreatitis, bile leak, iatrogenic  -Pepcid BID  -11/9: Concern perforation may have reopened.  Surgery team recommending NPO status and TPN  -11/10:  Start octreotide.   -11/17: Patient to go for upper GI today to see if leak at perforated site near ampulla.  If leaking, general surgeon to discuss with GI regarding placing a wall duodenal stent/esophageal stent  -11/18: Upper GI showed duodenal leak. Plan for stent placement tomorrow.   -11/19: Endoscope/ERCP, biliary stent, and cortrak placement done yesterday with Dr. Lawrence.    Continue abx, trend drain OP, KUB confirmed NTG placement- enteral feedings to be started.    Per General Surgery- hold oral medications/change to PO medications to IV at this time.   -11/20: Tolerating tube feedings, increase as tolerated. Continue NPO other than enteral feedings. Plan for repeat upper GI this week per general surgery.  -11/21: Tolerating TF. Decreased output to collection bags. Remain NPO beside TF. Plan for repeat upper GI in coming days.   -11/22: Seen by GI. Repeat UGI done today- no duodenal leaks seen. Continue TF, advance diet per general surgery.   -11/24: Repeat CT indicating improvement in abscess, no leak detected; CLD initiated today, core track discontinued  -11/26: REMOVED MARTHA drain by Dr. ST  -Pain control with dilaudid IV.   -Antiemetics PRN.       Electrolyte abnormality  Assessment & Plan  -Moderate hyponatremia 127  -Likely due to dehydration secondary to slowly titrating TF.   -11/22 NS increased to 83ml/hr  -11/22 Phos 1.6- replenish and monitor.   -Hypokalemia during hospitalization,  resolved.   -Continue to monitor.     Advanced care planning/counseling discussion  Assessment & Plan  -Tearful regarding medical course and progress.   -Seen by palliative  care    Generalized weakness  Assessment & Plan  -PT/OT following  -Recommending post-acute placement.  -Acute rehab evaluating patient for admission.   -Likely home with  vs rehab at d/c pending patient's progress    Hypotension  Assessment & Plan  -11/6: Stop lasix; Stop ACEI  -11/7 started midodrine  -11/16-resolved continue to monitor    Acute respiratory failure with hypoxia (HCC)- (present on admission)  Assessment & Plan  -Resolved with diuresis  -Likely volume overload with lower extremity edema present    Antibiotic-associated diarrhea  Assessment & Plan  -Cdiff PCR negative  -Loperamide PRN    Postprocedural retroperitoneal abscess- (present on admission)  Assessment & Plan  -S/P Ex lap, I/D, debridement nec fasc 11/5/2021  -Abx per ID end date 11/24/2021  -11/16/2021: CT abdomen 11/15/2021 showed increase fluid collection in the right mid abdomen and slight increase in trace bilateral pleural effusions.   -Patient go to IR for procedural drainage today  -11/17: RLQ drain was found to have fallen out in IR yesterday.  Fluid collection is smaller and no new drain was indicated.    -11/22: MARTHA drain in place to RUQ, decrease output seen. UGI today showed no duodenal leaks.  -11/24: Updated CT of the abdomen/pelvis, no leaks detected, improving size of abscess; goal is 50 male or less within 24 hours output for drain to be DC'd  -Pain control with dilaudid IV  -Antiemetics PRN  -Will continue to monitor      Bacteremia due to Gram-negative bacteria and fungemia- (present on admission)  Assessment & Plan  -KALANI negative for signs of endocarditis  -Cont bactrim, Zosyn, and Micafungin per ID for 4-week course with stop date of 11/24.  Repeat imaging prior to discontinuation.   -Repeat Bld Cx neg      Normocytic anemia- (present on admission)  Assessment & Plan  -Transfuse for Hgb <7  -Avoid regular phlebotomy  -Supplements per dietary: 11/6 added thiamine, 6 sm meals/d  -11/21: Thiamine PO held at this time.  Will resume when cleared to have PO meds.     Sepsis due to intraabdominal fluid collection/abscess, bacteremia and fungemia (HCC)- (present on admission)  Assessment & Plan  -Sepsis resolved  -Source intra-abdominal  -Zosyn/Mycafungin/Bactrim DS per infectious disease end date 11/24/2021  -ID signed off  -Re-imaging per ID, IR, and Surgery  -S/P Ex lap w I/D and debridement nec fasc 11/5/2021  -11/8 Repeat CT. Updated surgical team re: perinephric fluid collection. Further CT may be considered with IV and Oral contrast if needed.  -11/9:  She has remained afebrile over last 48 hours.  VSS.  Does not appear to be septic at this time.  Continue antibx.   -11/19/2021: sp biliary stent placement. Hold PO meds/change to IV to general surgery. Bactrim changed to IV.     Hyperlipidemia- (present on admission)  Assessment & Plan  -Previously on ezetimibe.  -PO meds held per general surgery,   -Will resume when cleared to have PO medications    Primary hypertension- (present on admission)  Assessment & Plan  -Hypotensive 11/6 - stop lisinopril         VTE prophylaxis: enoxaparin ppx    I have performed a physical exam and reviewed and updated ROS and Plan today (11/25/2021). In review of yesterday's note (11/24/2021), there are no changes except as documented above.    ========================================================================================================  Please note that this dictation was created using voice recognition software. I have made every reasonable attempt to correct obvious errors, but there may be errors of grammar and possibly content that I did not discover before finalizing the note.    Electronically signed by:  EDISON Waters, MSN, APRN, FNP-C  Hospitalist Services  [facility]  [phone]  [email or web address]  11/26/21    0333

## 2021-11-26 LAB
BASOPHILS # BLD AUTO: 0.4 % (ref 0–1.8)
BASOPHILS # BLD: 0.02 K/UL (ref 0–0.12)
EOSINOPHIL # BLD AUTO: 0.02 K/UL (ref 0–0.51)
EOSINOPHIL NFR BLD: 0.4 % (ref 0–6.9)
ERYTHROCYTE [DISTWIDTH] IN BLOOD BY AUTOMATED COUNT: 56.3 FL (ref 35.9–50)
HCT VFR BLD AUTO: 28.4 % (ref 37–47)
HGB BLD-MCNC: 9.5 G/DL (ref 12–16)
IMM GRANULOCYTES # BLD AUTO: 0.05 K/UL (ref 0–0.11)
IMM GRANULOCYTES NFR BLD AUTO: 1 % (ref 0–0.9)
LYMPHOCYTES # BLD AUTO: 0.75 K/UL (ref 1–4.8)
LYMPHOCYTES NFR BLD: 15.4 % (ref 22–41)
MAGNESIUM SERPL-MCNC: 2.1 MG/DL (ref 1.5–2.5)
MCH RBC QN AUTO: 29.8 PG (ref 27–33)
MCHC RBC AUTO-ENTMCNC: 33.5 G/DL (ref 33.6–35)
MCV RBC AUTO: 89 FL (ref 81.4–97.8)
MONOCYTES # BLD AUTO: 0.23 K/UL (ref 0–0.85)
MONOCYTES NFR BLD AUTO: 4.7 % (ref 0–13.4)
NEUTROPHILS # BLD AUTO: 3.79 K/UL (ref 2–7.15)
NEUTROPHILS NFR BLD: 78.1 % (ref 44–72)
NRBC # BLD AUTO: 0 K/UL
NRBC BLD-RTO: 0 /100 WBC
PHOSPHATE SERPL-MCNC: 2.5 MG/DL (ref 2.5–4.5)
PLATELET # BLD AUTO: 238 K/UL (ref 164–446)
PMV BLD AUTO: 8.6 FL (ref 9–12.9)
RBC # BLD AUTO: 3.19 M/UL (ref 4.2–5.4)
WBC # BLD AUTO: 4.9 K/UL (ref 4.8–10.8)

## 2021-11-26 PROCEDURE — 700111 HCHG RX REV CODE 636 W/ 250 OVERRIDE (IP): Performed by: INTERNAL MEDICINE

## 2021-11-26 PROCEDURE — 85025 COMPLETE CBC W/AUTO DIFF WBC: CPT

## 2021-11-26 PROCEDURE — 84100 ASSAY OF PHOSPHORUS: CPT

## 2021-11-26 PROCEDURE — 83735 ASSAY OF MAGNESIUM: CPT

## 2021-11-26 PROCEDURE — 700111 HCHG RX REV CODE 636 W/ 250 OVERRIDE (IP): Performed by: ANESTHESIOLOGY

## 2021-11-26 PROCEDURE — 99024 POSTOP FOLLOW-UP VISIT: CPT | Performed by: SURGERY

## 2021-11-26 PROCEDURE — 700111 HCHG RX REV CODE 636 W/ 250 OVERRIDE (IP): Performed by: SURGERY

## 2021-11-26 PROCEDURE — 770001 HCHG ROOM/CARE - MED/SURG/GYN PRIV*

## 2021-11-26 PROCEDURE — 302098 PASTE RING (FLAT): Performed by: NURSE PRACTITIONER

## 2021-11-26 PROCEDURE — 99233 SBSQ HOSP IP/OBS HIGH 50: CPT | Performed by: NURSE PRACTITIONER

## 2021-11-26 RX ADMIN — HYDROMORPHONE HYDROCHLORIDE 1 MG: 1 INJECTION, SOLUTION INTRAMUSCULAR; INTRAVENOUS; SUBCUTANEOUS at 22:38

## 2021-11-26 RX ADMIN — HYDROMORPHONE HYDROCHLORIDE 1 MG: 1 INJECTION, SOLUTION INTRAMUSCULAR; INTRAVENOUS; SUBCUTANEOUS at 06:12

## 2021-11-26 RX ADMIN — ONDANSETRON 4 MG: 2 INJECTION INTRAMUSCULAR; INTRAVENOUS at 11:26

## 2021-11-26 RX ADMIN — HYDROMORPHONE HYDROCHLORIDE 1 MG: 1 INJECTION, SOLUTION INTRAMUSCULAR; INTRAVENOUS; SUBCUTANEOUS at 17:32

## 2021-11-26 RX ADMIN — HYDROMORPHONE HYDROCHLORIDE 1 MG: 1 INJECTION, SOLUTION INTRAMUSCULAR; INTRAVENOUS; SUBCUTANEOUS at 09:19

## 2021-11-26 RX ADMIN — ONDANSETRON 4 MG: 2 INJECTION INTRAMUSCULAR; INTRAVENOUS at 22:39

## 2021-11-26 RX ADMIN — ONDANSETRON 4 MG: 2 INJECTION INTRAMUSCULAR; INTRAVENOUS at 02:07

## 2021-11-26 RX ADMIN — HYDROMORPHONE HYDROCHLORIDE 1 MG: 1 INJECTION, SOLUTION INTRAMUSCULAR; INTRAVENOUS; SUBCUTANEOUS at 14:22

## 2021-11-26 RX ADMIN — HYDROMORPHONE HYDROCHLORIDE 1 MG: 1 INJECTION, SOLUTION INTRAMUSCULAR; INTRAVENOUS; SUBCUTANEOUS at 02:05

## 2021-11-26 RX ADMIN — ENOXAPARIN SODIUM 40 MG: 40 INJECTION SUBCUTANEOUS at 09:19

## 2021-11-26 RX ADMIN — MICONAZOLE NITRATE: 20 CREAM TOPICAL at 05:10

## 2021-11-26 RX ADMIN — ONDANSETRON 4 MG: 2 INJECTION INTRAMUSCULAR; INTRAVENOUS at 06:12

## 2021-11-26 ASSESSMENT — ENCOUNTER SYMPTOMS
WEAKNESS: 1
MYALGIAS: 0
MEMORY LOSS: 0
CONSTIPATION: 0
CHILLS: 0
ABDOMINAL PAIN: 1
COUGH: 0
NAUSEA: 0
VOMITING: 0
FALLS: 0
FEVER: 0
HEADACHES: 0
WHEEZING: 0
SHORTNESS OF BREATH: 0
FOCAL WEAKNESS: 0

## 2021-11-26 ASSESSMENT — PAIN DESCRIPTION - PAIN TYPE
TYPE: ACUTE PAIN;SURGICAL PAIN
TYPE: ACUTE PAIN
TYPE: ACUTE PAIN

## 2021-11-26 ASSESSMENT — LIFESTYLE VARIABLES: SUBSTANCE_ABUSE: 0

## 2021-11-26 NOTE — PROGRESS NOTES
Assumed care of patient at 0645. Bedside report received. Assessment complete.  AA&Ox4. Denies CP/SOB.  Reporting 7/10 pain. Medicated per MAR.   Educated patient regarding pharmacologic and non pharmacologic modalities for pain management.  Skin per flowsheets  Started on full liquid diet. Pt complaining of nausea.  + void. + BM. Output to Ostomy   Pt ambulates x1 Assist w FWW  All needs met at this time. Call light within reach. Pt calls appropriately. Bed low and locked, non skid socks in place. Hourly rounding in place.

## 2021-11-26 NOTE — CARE PLAN
The patient is Stable - Low risk of patient condition declining or worsening    Shift Goals  Clinical Goals: pain control   Patient Goals: remove cortrak  Family Goals: Comfort and rest    Progress made toward(s) clinical / shift goals:  pain control     Patient is not progressing towards the following goals:

## 2021-11-26 NOTE — PROGRESS NOTES
Gastroenterology Progress Note               Author: Fausto Casas M.D. with ADRY Jones Date & Time Created: 11/26/2021 10:02 AM       Patient ID:  Name:             Nils Alfonso  YOB: 1955  Age:                 66 y.o.  female  MRN:               [id number]      Referring Provider:  Ana Luisa Shearer MD      Presenting Chief Complaint:  Abdominal pain, duodenal perforation with abscess      History of Present Illness:    10/10/2021 HPI  66-year-old female PMH recurrent idiopathic pancreatitis s/p ERCP with sphincterotomy October 1, 2021 complicated by ERCP pancreatitis, sleeve gastrectomy, dyslipidemia, hypertension, osteoarthritis of the spine status post lumbar fusion who was admitted to the hospital 10/8/2021 with worsening right lower quadrant pain over the past week.  Ever since her ERCP October 1, 2021, she has been experiencing pain, intermittent subjective fevers, nausea.  In the emergency room-labs: CBC: WBC 17.8..  Sodium 130.  LFTs-WNL.  CT scan abdomen/pelvis-large irregular fluid collection with thick wall occupying most of the right abdomen surrounding the right kidney and colon.  Severe wall thickening of the descending, transverse colon, second portion of the duodenum likely reactive.  Pneumobilia with gas in the CBD.  Drain placement by IR was performed.  Currently, the abdominal pain has improved.  No vomiting.  Started on ceftriaxone and metronidazole IV.     ERCP October 1, 2021-common bile duct-dilated down to the level of the ampulla.  4 mm polyp at the distal duct seen after sphincterotomy.  8 mm sphincterotomy.  Negative for stone.  Bile duct polyp-benign with inflammatory and hyperplastic changes.  No evidence of epithelial dysplasia/neoplasia.       Interval History:  10/11/2021: interval HIDA scan showed no bile leak.  This morning she feels more comfortable, describing minimal abdominal pain but denying nausea vomiting and fevers.  She has been able to eat a  little bit more and has full liquids beside her bed.  She expresses concern about being on losartan which she says was prescribed outpatient as as needed for high blood pressures.  She also states she has not passed a bowel movement in the week which she describes not eating much.     10/12/2021 - No events overnight.  Tolerating diet without nausea or vomiting.  Had spontaneous, formed, brown bowel movement this morning.  Abdominal pain improving.  Feeling weak, but better each day.  Leukocytosis improving.  States that she is on hydrocodone at home, managed by pain management, and asks that her current hospital pain meds be changed.     10/13/2021: Stable, no new events.  Drain output is minimal, but according to patient bedside nursing is not flushing the drain.  Patient is n.p.o. for planned CT today to reevaluate fluid collection.  Leukocytosis continues to improve.  Pain has improved greatly and patient has not taken the pain medication in the last 24 hours according to her recollection.  She is having regular bowel movements without assistance of laxatives.  Patient is very hungry.     10/14/2021 - No events overnight.  Abdominal pain controlled now.  Had nausea and vomiting yesterday with solid food, but tolerating bland diet.  Blood cultures from 10/8 positive for Chryseobacterium and Candida glabrata - Pharmacy and ID aware.  Micafungin started.  CT 10/13 showing extensive multiloculated rim-enhancing air and fluid collection/abscess within the right abdomen is not significantly changed in size from the prior study. The percutaneous pigtail drainage catheter appears well-positioned within the collection.    10/15/2021: Patient transferred to [facility] overnight per surgery recommendations    10/16/2021: Drain output clearing up slightly. Dr. Dumont with surgery has recommended second IR drain to be placed. Initially interventional radiology did not think that this would be  helpful for the patient, but after further discussion this is the plan going forward. Patient is n.p.o. with plans for TPN. WBCs normalized.    10/21/21: Abdominal pain slowly improving. Pain is worse with movement. MARTHA drains purulent. No vomiting. Plan for CT scan to re-evaluate fluid collection. Tolerating FLD.     10/25/2021 Abd pian controlled. Getting IR drain today. No questions.    11/18/2021: Called back by Dr. Dumont due to persistent duodenal leak on Upper GI series. Since we last saw her she attempted to be managed conservatively with IR drains without improvement, then underwent ex lap with debridement of abscess and necrotizing fascitis. Then placement of IR drain on 11/16 due to persistent fluid collection on CT. Upper GI series with findings of perforation at junction of 2nd and 3rd portion of the duodenum.    11/18/2021: EGD/ERCP/Cortrack placement    Impressions:    1.  Etiology of upper GI series showing perforation is unclear   2.  Generous and patent biliary orifice from sphincterotomy within a duodenal diverticulum without obvious  cristóbal contrast extravasation; bile duct stented with 10mm x 8cm fully covered metal biliary stent   3.  Orifice adjacent to the biliary orifice unclear if this is the pancreatic duct opening. Difficult to visualize   4. Placement of 10F x 140cm  nasoenteric tube     11/19/2021: There were no acute events overnight.  The patient has remained afebrile and hemodynamically stable.  She continues to have diffuse abdominal pain which is unchanged from prior to her procedure yesterday.  She continues to have drainage from the 2 abdominal opening/wounds.  She denies nausea/vomiting but continues to have fatigue and weakness.    11/22/2021 - No events over the weekend.  Patient reports significantly less output from drains.  Tolerating TFs well.  Complaining of waves of abdominal pain and nausea.  Eager for UGI reevaluation of duodenal perforation.    11/23/2021: UGI  negative for leak yesterday. Some contrast reflux into bile duct. MARTHA drain output decreased.  Complaining of breakthrough pain.    11/24/2021 - No events overnight.  Still complaining of breakthrough pain.  Patient reports continued decreased MARTHA drain output.  Nausea under control.  Patient has been up and walking around room and has seen physical therapy.    11/25/2021 - Abdominal pain better controlled with Q3hr meds vs Q4hr.  Miserable from nasoenteric tube and really wants it out.  CT showed decrease size of RLQ abscess, but question of possible fistula in area.  No evidence of persistent duodenal leak.    11/26/2021: Stable. Nasoenteric tube has been removed and she is tolerating a liquid diet. Pain has improved and no nausea or vomiting. Her drain was pulled for question of fistula. Scant output from her other drainage sites.  Pain is much better and pain meds only needed prior to ambulation.      Review of Systems:  Review of Systems   Constitutional: Positive for malaise/fatigue. Negative for chills and fever.   Respiratory: Negative for cough, shortness of breath and wheezing.    Cardiovascular: Negative for chest pain and leg swelling.   Gastrointestinal: Positive for abdominal pain. Negative for constipation, melena, nausea and vomiting.   Genitourinary: Negative for dysuria and urgency.   Musculoskeletal: Negative for falls and myalgias.   Skin: Negative for itching and rash.   Neurological: Positive for weakness. Negative for focal weakness and headaches.   Psychiatric/Behavioral: Negative for memory loss and substance abuse.             Past Medical History:  Past Medical History:   Diagnosis Date    Allergy, unspecified not elsewhere classified     Anemia     not currently    Anesthesia     PONV (Demerol/ Dilaudid)    Arthritis     bilateral shoulders,sacrum, osteo    Backpain     coccyx and sacrum    Bronchitis 2010    Cataract     bilateral IOLI    Degeneration of cervical intervertebral disc      C5-6,C6-7    Dental disorder     partial upper and lower    Heart burn     Hiatus hernia syndrome     not a problem after gastric sleeve    High cholesterol     resolved after gastric surgery    Hyperlipidemia     Hypertension     off all medication, reveresed after gastric sleeve    Muscle disorder     Pain 05/16/2018    low back and SI joints and sacrum    Reactive airway disease     rescue inhaler not needed unless with bronchitis     Active Hospital Problems    Diagnosis     Electrolyte abnormality [E87.8]     Advanced care planning/counseling discussion [Z71.89]     Generalized weakness [R53.1]     Hypotension [I95.9]     Acute respiratory failure with hypoxia (HCC) [J96.01]     Duodenal perforation (HCC) [K63.1]     Postprocedural retroperitoneal abscess [K68.11]     Antibiotic-associated diarrhea [K52.1, T36.95XA]     Bacteremia due to Gram-negative bacteria and fungemia [R78.81]     Normocytic anemia [D64.9]     Sepsis due to intraabdominal fluid collection/abscess, bacteremia and fungemia (HCC) [A41.9]     Hyperlipidemia [E78.5]     Primary hypertension [I10]          Past Surgical History:  Past Surgical History:   Procedure Laterality Date    PB UPPER GI ENDOSCOPY,DIAGNOSIS N/A 11/18/2021    Procedure: GASTROSCOPY with stent placement;  Surgeon: Migel Lawrence M.D.;  Location: SURGERY SAME DAY [facility];  Service: Gastroenterology    PB ERCP,DIAGNOSTIC N/A 11/18/2021    Procedure: ERCP (ENDOSCOPIC RETROGRADE CHOLANGIOPANCREATOGRAPHY);  Surgeon: Migel Lawrence M.D.;  Location: SURGERY SAME DAY [facility];  Service: Gastroenterology    PB PLACE PERCUT GASTROSTOMY TUBE N/A 11/18/2021    Procedure: GASTROSCOPY, WITH FEEDING TUBE INSERTION;  Surgeon: Migel Lawrence M.D.;  Location: SURGERY SAME DAY [facility];  Service: Gastroenterology    BILIARY STENT PLACEMENT N/A 11/18/2021    Procedure: INSERTION, STENT, BILE DUCT;  Surgeon: Migel Lawrence M.D.;  Location: SURGERY SAME DAY [facility];  Service: Gastroenterology    PB  EXPLORATORY OF ABDOMEN  11/5/2021    Procedure: LAPAROTOMY, EXPLORATORY;  Surgeon: Jaden Cook M.D.;  Location: [facility];  Service: General    PB ULTRASONIC GUIDANCE, INTRAOPERATIVE  11/5/2021    Procedure: ULTRASOUND GUIDANCE;  Surgeon: Jaden Cook M.D.;  Location: [facility];  Service: General    ABDOMINAL ABSCESS DRAINAGE  11/5/2021    Procedure: DRAINAGE, ABSCESS, ABDOMEN - PERITONEAL AND RETROPERITONEAL;  Surgeon: Jaden Cook M.D.;  Location: [facility];  Service: General    PB ERCP,DIAGNOSTIC  10/1/2021    Procedure: ERCP (ENDOSCOPIC RETROGRADE CHOLANGIOPANCREATOGRAPHY);  Surgeon: Fausto Casas M.D.;  Location: [facility];  Service: Gastroenterology    COLONOSCOPY  8/8/2018    Procedure: COLONOSCOPY;  Surgeon: Shukri Condon M.D.;  Location: [facility];  Service: General    GASTROSCOPY  5/23/2018    Procedure: GASTROSCOPY;  Surgeon: Fausto Casas M.D.;  Location: [facility];  Service: Gastroenterology    EGD W/ENDOSCOPIC ULTRASOUND  5/23/2018    Procedure: EGD W/ENDOSCOPIC ULTRASOUND- RADIAL UPPER;  Surgeon: Fausto Casas M.D.;  Location: [facility];  Service: Gastroenterology    CATARACT PHACO WITH IOL Left 4/18/2017    Procedure: CATARACT PHACO WITH IOL;  Surgeon: Stuart Estevez M.D.;  Location: SURGERY SAME DAY [facility];  Service:     CATARACT PHACO WITH IOL Right 4/4/2017    Procedure: CATARACT PHACO WITH IOL;  Surgeon: Stuart Estevez M.D.;  Location: [facility];  Service:     GASTRIC SLEEVE LAPAROSCOPY  10/19/2016    Procedure: GASTRIC SLEEVE LAPAROSCOPY, HIATAL HERNIA;  Surgeon: Shukri Condon M.D.;  Location: [facility];  Service:     LIVER BIOPSY LAPAROSCOPIC  10/19/2016    Procedure: LIVER BIOPSY LAPAROSCOPIC;  Surgeon: Shukri Condon M.D.;  Location: [facility];  Service:     GASTROSCOPY N/A 8/26/2016     Procedure: GASTROSCOPY;  Surgeon: Shukri Condon M.D.;  Location: SURGERY [facility];  Service:     ROTATOR CUFF REPAIR Right 7/7/2016    ROTATOR CUFF REPAIR Left 5/2015    HYSTERECTOMY ROBOTIC  1/2/2009    Performed by MEG VILLEGAS at SURGERY [facility]    LUMBAR FUSION ANTERIOR  2007    Dr Hillman, L3-S1 fusion    CHOLECYSTECTOMY  1996    laparoscopic    TUBAL LIGATION  1985    GASTRIC RESECTION  1982    gastric stapling    TONSILLECTOMY AND ADENOIDECTOMY  1967    PRIMARY C SECTION  1976, 1979, 1985    x3           Hospital Medications:  Current Facility-Administered Medications   Medication Dose Frequency Provider Last Rate Last Admin    HYDROmorphone (Dilaudid) injection 1 mg  1 mg Q3HRS PRN Fausto Casas M.D.   1 mg at 11/26/21 0919    miconazole 2%-zinc oxide (Denise) topical cream   BID ELENA FletcherP.R.N.   Given at 11/26/21 0510    sore throat spray (CHLORASEPTIC) 1 Spray  1 Spray Q2HRS PRN ELENA LouP.R.NUriel   1 Bryant at 11/25/21 0813    Pharmacy Consult: Enteral tube insertion - review meds/change route/product selection  1 Each PHARMACY TO DOSE ELENA FletcherP.RJULIAN.        ondansetron (ZOFRAN ODT) dispertab 4 mg  4 mg Q4HRS PRN ELENA LouP.R.NUriel        ondansetron (ZOFRAN) syringe/vial injection 4 mg  4 mg Q4HRS PRN Tomi Ledezma M.D.   4 mg at 11/26/21 0612    enoxaparin (LOVENOX) inj 40 mg  40 mg DAILY Jaden Cook M.D.   40 mg at 11/26/21 0919   Last reviewed on 11/5/2021  8:26 AM by Cuca Mejia R.N.        Current Outpatient Medications:  Medications Prior to Admission   Medication Sig Dispense Refill Last Dose    cyclobenzaprine (FLEXERIL) 10 mg Tab Take 10 mg by mouth every evening.   9/30/2021 at [facility]    ezetimibe (ZETIA) 10 MG Tab Take 10 mg by mouth every evening.   9/30/2021 at [facility]    Magnesium 400 MG Tab Take 400 mg by mouth every evening.   9/30/2021 at [facility]    gabapentin (NEURONTIN) 300 MG Cap Take 600 mg by mouth 2 Times a  "Day.   2021 at [facility]    BIOTIN PO Take 1 Tablet by mouth every day.   2021 at [facility]    HYDROcodone/acetaminophen (NORCO)  MG Tab Take 0.5 Tablets by mouth every 6 hours as needed for Moderate Pain.   10/8/2021 at [facility]    Cholecalciferol (VITAMIN D3 PO) Take 1 Each by mouth every day.   2021 at [facility]    ondansetron (ZOFRAN ODT) 4 MG TABLET DISPERSIBLE Take 4 mg by mouth every 6 hours as needed for Nausea.   10/8/2021 at [facility]         Medication Allergies:  Allergies   Allergen Reactions    Ampicillin Rash     Rash  Tolerates Zosyn 10/21    Cephalexin Diarrhea, Vomiting and Nausea     .    Clindamycin Vomiting     \"cleocin\"    Codeine      Severe stomach pain, cramps, spasms    Demerol Vomiting    Levofloxacin Unspecified     Numbness      Tetracyclines Rash     .    Tizanidine Itching    Morphine Vomiting    Pcn [Penicillins] Itching     Tolerates Zosyn 10/21    Sulfa Drugs Itching         Family Medical History:  Family History   Problem Relation Age of Onset    Stroke Mother     Cancer Father         larynx    Diabetes Brother          Social History:  Social History     Socioeconomic History    Marital status:      Spouse name: Not on file    Number of children: Not on file    Years of education: Not on file    Highest education level: Not on file   Occupational History    Not on file   Tobacco Use    Smoking status: Former Smoker     Packs/day: 0.25     Years: 0.10     Pack years: 0.02     Types: Cigarettes     Quit date: 1973     Years since quittin.9    Smokeless tobacco: Never Used   Vaping Use    Vaping Use: Never used   Substance and Sexual Activity    Alcohol use: No    Drug use: No    Sexual activity: Not on file     Comment:    Other Topics Concern    Not on file   Social History Narrative    Not on file     Social Determinants of Health     Financial Resource Strain:     Difficulty of Paying Living Expenses: Not on file   Food Insecurity:     Worried About Running Out " "of Food in the Last Year: Not on file    Ran Out of Food in the Last Year: Not on file   Transportation Needs:     Lack of Transportation (Medical): Not on file    Lack of Transportation (Non-Medical): Not on file   Physical Activity:     Days of Exercise per Week: Not on file    Minutes of Exercise per Session: Not on file   Stress:     Feeling of Stress : Not on file   Social Connections:     Frequency of Communication with Friends and Family: Not on file    Frequency of Social Gatherings with Friends and Family: Not on file    Attends L[employer] Services: Not on file    Active Member of Clubs or Organizations: Not on file    Attends Club or Organization Meetings: Not on file    Marital Status: Not on file   Intimate Partner Violence:     Fear of Current or Ex-Partner: Not on file    Emotionally Abused: Not on file    Physically Abused: Not on file    Sexually Abused: Not on file   Housing Stability:     Unable to Pay for Housing in the Last Year: Not on file    Number of Places Lived in the Last Year: Not on file    Unstable Housing in the Last Year: Not on file         Vital signs:  Weight/BMI: Body mass index is 28.13 kg/m².  /71   Pulse 63   Temp 37.4 °C (99.3 °F) (Temporal)   Resp 18   Ht 1.6 m (5' 2.99\")   Wt 72 kg (158 lb 11.7 oz)   SpO2 96%   Vitals:    11/25/21 0706 11/25/21 1924 11/26/21 0345 11/26/21 0740   BP: 141/76 126/67 121/68 130/71   Pulse: 88 72 68 63   Resp: 18 18 18 18   Temp: 36.2 °C (97.2 °F) 36.7 °C (98.1 °F) 36.7 °C (98 °F) 37.4 °C (99.3 °F)   TempSrc: Temporal Temporal Temporal Temporal   SpO2: 96% 96% 95% 96%   Weight:       Height:         Oxygen Therapy:  Pulse Oximetry: 96 %, O2 (LPM): 0, O2 Delivery Device: None - Room Air    Intake/Output Summary (Last 24 hours) at 11/26/2021 1002  Last data filed at 11/26/2021 0740  Gross per 24 hour   Intake 1915.87 ml   Output 1010 ml   Net 905.87 ml         Physical Exam:  Physical Exam  Vitals and nursing note reviewed. "   Constitutional:       Appearance: She is ill-appearing.   HENT:      Head: Normocephalic and atraumatic.      Right Ear: External ear normal.      Left Ear: External ear normal.      Nose: Nose normal.      Mouth/Throat:      Mouth: Mucous membranes are dry.   Cardiovascular:      Rate and Rhythm: Normal rate and regular rhythm.      Pulses: Normal pulses.      Heart sounds: Normal heart sounds.   Pulmonary:      Effort: Pulmonary effort is normal.      Breath sounds: Normal breath sounds.      Comments: Decreased breath sounds in bases  Abdominal:      Tenderness: There is abdominal tenderness (RUQ, RLQ).      Comments: Right sided ostomy appliances to skin over sites that are draining dark brown liquid, small amount   Skin:     General: Skin is warm and dry.   Neurological:      General: No focal deficit present.      Mental Status: She is alert and oriented to person, place, and time.   Psychiatric:         Thought Content: Thought content normal.         Judgment: Judgment normal.             Labs:  Recent Labs     11/24/21 0620 11/25/21  0345   SODIUM 127* 127*   POTASSIUM 4.0 3.9   CHLORIDE 97 98   CO2 22 21   BUN 17 16   CREATININE 0.29* 0.32*   MAGNESIUM  --  1.8   PHOSPHORUS  --  1.6*   CALCIUM 7.7* 7.5*     Recent Labs     11/24/21  0620 11/25/21  0345   ALTSGPT  --  27   ASTSGOT  --  22   ALKPHOSPHAT  --  85   TBILIRUBIN  --  <0.2   GLUCOSE 122* 128*     Recent Labs     11/25/21  0345   WBC 4.6*   NEUTSPOLYS 79.80*   LYMPHOCYTES 13.30*   MONOCYTES 4.60   EOSINOPHILS 0.40   BASOPHILS 0.40   ASTSGOT 22   ALTSGPT 27   ALKPHOSPHAT 85   TBILIRUBIN <0.2     Recent Labs     11/25/21  0345   RBC 3.08*   HEMOGLOBIN 9.2*   HEMATOCRIT 27.5*   PLATELETCT 238     No results found for this or any previous visit (from the past 24 hour(s)).      Radiology Review:  CT-ABDOMEN-PELVIS WITH   Final Result      1.  RIGHT lateral no abnormal abscess again seen, decreased in size from prior exam with drain in place, however  contents now appears hyperdense potential indicating bowel contrast, concerning for bowel perforation or fistula, although source of contrast    is uncertain.  No cristóbal pneumoperitoneum.   2.  Pneumobilia and biliary stent present.   3.  Interval removal of RIGHT lower quadrant drain.         DX-UPPER GI-SERIES WITH KUB   Final Result      1.  No evidence for duodenal leak.   2.  Reflux of contrast seen into the distal common bile duct at the site of biliary stent, consistent with prior sphincterotomy.      DX-ABDOMEN FOR TUBE PLACEMENT   Final Result         1.  Nonspecific bowel gas pattern.   2.  Dobbhoff tube with tip terminating overlying the expected location of the third or fourth duodenal segment.   3.  Hazy right lower lobe infiltrates.      DX-ABDOMEN FOR TUBE PLACEMENT   Final Result      Feeding tube extends to the region of the stomach and duodenum with tip at the level of the proximal end of the jejunum.      DX-ABDOMEN FOR TUBE PLACEMENT   Final Result      Enteric tube terminates over the duodenal jejunal junction      Covered common bile duct stent      No contrast extravasation is seen      [id number] STENT - TUBE   Final Result      Portable fluoroscopic images obtained during ERCP biliary stent placement for localization purposes.      DX-UPPER GI-SERIES WITH KUB   Final Result      Duodenal perforation at the junction of the second and third portions of duodenum as noted on key images.      CT-ABORTED CT PROCEDURE   Final Result      Interval decrease in size of the RIGHT retroperitoneal fluid collection which contains a surgical drain. Because from bowel is possible. No additional drain was placed.            CT-ABDOMEN-PELVIS WITH   Final Result      1.  Slight interval increase in size in fluid collection the right midabdomen with drain in place.      2.  Slight interval increase in size in trace bilateral pleural effusions, right greater than left with bibasilar atelectasis.      3.   Pneumobilia.      CT-ABDOMEN-PELVIS WITH   Final Result         1. The components in the gallbladder fossa and right flank of the complex fluid collection are mostly resolved. There is still a small residual fluid collection in the perinephric space surrounding the inferior right kidney. Right lower quadrant MARTHA drain    and right upper quadrant catheter are outside of this residual collection.   2. Air-fluid levels throughout the colon could relate to ileus.   3. Bilateral pleural effusions with bibasilar atelectasis.   4. Pneumobilia.      CT-ABDOMEN-PELVIS WITH   Final Result      1.  Slight interval decrease in size of the large RIGHT peritoneal abscess which now contains 3 drains   2.  Decreased atelectasis with persistent opacity in the RIGHT lung base likely atelectasis rather than pneumonia   3.  Small LEFT renal cyst   4.  Prior cholecystectomy with ongoing pneumobilia      CT-DRAINAGE-CATH EXCHANGE   Final Result         1. Organized pancreatic pseudocyst Drainage with CT guidance.      CT-ABDOMEN-PELVIS WITH   Final Result      1.  There has been interval placement of an additional inferior lateral pigtail catheter within the complex right abdominal abscess. The other 2 pigtail catheters are stable in appearance.   2.  There has been no significant interval decrease in size of the large complex loculated abscess collection in the right abdomen.   3.  There is no new fluid collection.   4.  Gallbladder is surgically absent with continued pneumobilia.   5.  Interval decrease in the dependent pleural effusion with minimal airspace disease, likely atelectasis.      IR-DRAINAGE-CATH EXCHANGE   Final Result      1.  Successful left-sided drainage catheter removal.   2.  Successful image guided superior right drainage catheter replacement.      Plan: Suction drainage. Monitor outputs. Please contact interventional radiology if there is any concern for tube dysfunction. Suggest routine tube maintenance at 3 months  intervals if the tube remains in place.         CT-DRAIN-PERITONEAL   Final Result      1.  CT GUIDED PERITONEAL RLQ abdominal CATHETER DRAINAGE.   2.  THE CURRENT PLAN IS TO MONITOR DRAINAGE OUTPUT AND OBTAIN A FOLLOWUP CT SCAN IN 5-7 DAYS IF CLINICALLY INDICATED.      CT-ABDOMEN-PELVIS WITH   Final Result         1. Grossly unchanged irregular fluid collection occupying the right abdomen surrounding the right kidney and right colon extending to midline. Additional pigtail catheter in the component about midline anterior abdomen. Both pigtail catheters seem to be    within the collection.      2. Small right pleural effusion with right basilar atelectasis.               DX-CHEST-LIMITED (1 VIEW)   Final Result      1.  Right basilar atelectasis or consolidation. Small right pleural effusion not excluded.   2.  Atherosclerotic plaque.      CT-DRAIN-PERITONEAL   Final Result      1.  CT guided pancreatic pseudocyst catheter drainage.   2.  The current plan is to obtain a follow-up CT in 5-7 days..      IR-PICC LINE PLACEMENT W/ GUIDANCE > AGE 5   Final Result                  Ultrasound-guided PICC placement performed by qualified nursing staff as    above.          CT-ABDOMEN-PELVIS WITH    (Results Pending)         MDM (Data Review):   -Records reviewed and summarized in current documentation  -I personally reviewed and interpreted the laboratory results  -I personally reviewed the radiology images        Medical Decision Making, by Problem:  Active Hospital Problems    Diagnosis     Bacteremia due to Gram-negative bacteria and fungemia [R78.81]     Sepsis due to intraabdominal fluid collection/abscess (HCC) [A41.9]     Hyperlipidemia [E78.5]     Hypertension [I10]        66-year-old female with a history of recurrent idiopathic pancreatitis s/p ERCP with biliary sphincterotomy and biopsy of a distal BD polyp on October 1, 2021.  Postop complications-pancreatitis.  Ever since then, complaints of subjective fevers,  progressive abdominal pain, nausea/vomiting.  Began to have worsening right lower quadrant pain over the past 5 days.  CT scan abdomen/pelvis large irregular fluid collection with thick wall occupying most of the right abdomen surrounding right kidney and right colon.  Additional fluid and gas collection in the midline abdomen anterior to the pancreas concerning for abscess.  Severe wall thickening of the descending and transverse colon, second portion of duodenum likely reactive.  Pneumobilia with gas in the CBD, intrahepatic bile ducts as well as pancreatic ducts (likely secondary to recent ERCP with sphincterotomy).  IR placed a drain with improvement in abdominal pain initially, but then drain output slowed to no output. Nursing was discovered to have have flushed the drain for at least 2 days. After flush by bedside nurse, reportedly 300 cc of fluid came out. Fluid bilirubin 0.6. Fluid amylase >7500. Blood cultures from 10/8 positive for Chryseobacterium and Candida glabrata. ID following. Repeat CT with no change in fluid collection and new pelvic fluid collection/abscess. Drain placement ineffective. Patient underwent exlap with debridement of retroperitoneal abscess and necrotizing fascitis on 11/5 by Dr. Dumont. Repeat IR drain on 11/16 and now with recurrent duodenal leak.  ERCP 11/18 with Fully covered metal stent placed.         Assessment/Recommendations:  ASSESSMENT:  1.  Sepsis due to intra-abdominal fluid collection/abscess- Suspect Duodenal perforation near ampulla from previous ERCP. S/P ERCP with placement of 10mm x 8cm fully covered metal stent in the bile duct - no persistent leak identified  2.  History of pancreatitis  3.  Intra-abdominal abscess and necrotizing fascitis, s/p debridement 11/5. CT with improving size  4.  History of morbid obesity s/p laparoscopic sleeve  5.  Euvolemic hyponatremia  6.  Continued pain medication requirements       PLAN:  - Continue diet and advance per  surgery  - Repeat imaging per surgery recommendations  - Follow up in outpatient GI clinic in 8-10 wks with Dr. Casas. Patient will need repeat ERCP and stent removal at appropriate timing. The discussion for when this may occur will be at follow up.      **  GI WILL SIGN OFF / STAND BY - PLEASE CALL IF ANY CONCERNS  **     GI Attending -    Patient seen, examined, chart reviewed and case discussed and plan arrived at with A.P.R.N.  Agree with this  note.    Fausto Casas M.D.      Thank you for inviting me to participate in the care of this patient. Please do not hesitate to call GI consultants with additional questions/concerns or changes in the patient's clinical status at [phone].

## 2021-11-26 NOTE — DISCHARGE PLANNING
Anticipated Discharge Disposition: Home     Action: Pt discussed during IDT rounds. Per Heladio RIDER pt progressing. Pt anticipated to d/c with HH and no tube feeds. Pt anticipated to potentially d/c this weekend. Heladio to provide update to HCM staff if pt appropriate to d/c with HH.     Barriers to Discharge: None     Plan: [facility] to continue to follow and assist as needed

## 2021-11-26 NOTE — PROGRESS NOTES
The patient was seen this morning.  Appears to be doing extremely well.  Incredibly high spirits.  Tolerating a full liquid diet.  No nausea or vomiting.  No labs this morning.  Outputs through all the ostomy bags now that the drain has been pulled has essentially been scant.  Drain was removed for fear of keeping a fistula tract open.  She is in good spirits and has no pain.  She will start walking more today with her .  Physical therapy to start working much more aggressively with patient.    Continue present management.  We will continue to follow remotely

## 2021-11-26 NOTE — CARE PLAN
Problem: Hemodynamics  Goal: Patient's hemodynamics, fluid balance and neurologic status will be stable or improve  Outcome: Progressing   Pt AnOx4, BP WDL   Problem: Fluid Volume  Goal: Fluid volume balance will be maintained  Outcome: Progressing   Pt voiding   Problem: Urinary - Renal Perfusion  Goal: Ability to achieve and maintain adequate renal perfusion and functioning will improve  Outcome: Progressing   Pt voiding in bathroom  The patient is Stable - Low risk of patient condition declining or worsening    Shift Goals  Clinical Goals: (P) Ambulate/ Pain Control   Patient Goals: (P) Ambulate  Family Goals: (P) N/A    Progress made toward(s) clinical / shift goals:  Pt ambulated x1 with family this shift     Patient is not progressing towards the following goals:

## 2021-11-26 NOTE — CARE PLAN
The patient is Watcher - Medium risk of patient condition declining or worsening    Shift Goals  Clinical Goals: pain control   Patient Goals: remove cortrak  Family Goals: Comfort and rest    Progress made toward(s) clinical / shift goals: patient reporting increased comfort with more frequent availability of PRN's and cortrak removal; patient tolerating small amount of full liquid diet after cortrak removal     Patient is not progressing towards the following goals:      Problem: Pain - Standard  Goal: Alleviation of pain or a reduction in pain to the patient’s comfort goal  Outcome: Progressing     Problem: Mobility  Goal: Patient's capacity to carry out activities will improve  Outcome: Progressing

## 2021-11-27 LAB
ANION GAP SERPL CALC-SCNC: 8 MMOL/L (ref 7–16)
BASOPHILS # BLD AUTO: 0.4 % (ref 0–1.8)
BASOPHILS # BLD: 0.02 K/UL (ref 0–0.12)
BUN SERPL-MCNC: 9 MG/DL (ref 8–22)
CALCIUM SERPL-MCNC: 8.1 MG/DL (ref 8.5–10.5)
CHLORIDE SERPL-SCNC: 96 MMOL/L (ref 96–112)
CO2 SERPL-SCNC: 23 MMOL/L (ref 20–33)
CREAT SERPL-MCNC: 0.33 MG/DL (ref 0.5–1.4)
EOSINOPHIL # BLD AUTO: 0.01 K/UL (ref 0–0.51)
EOSINOPHIL NFR BLD: 0.2 % (ref 0–6.9)
ERYTHROCYTE [DISTWIDTH] IN BLOOD BY AUTOMATED COUNT: 57.7 FL (ref 35.9–50)
GLUCOSE BLD-MCNC: 81 MG/DL (ref 65–99)
GLUCOSE SERPL-MCNC: 80 MG/DL (ref 65–99)
HCT VFR BLD AUTO: 30.3 % (ref 37–47)
HGB BLD-MCNC: 9.8 G/DL (ref 12–16)
IMM GRANULOCYTES # BLD AUTO: 0.05 K/UL (ref 0–0.11)
IMM GRANULOCYTES NFR BLD AUTO: 1.1 % (ref 0–0.9)
LYMPHOCYTES # BLD AUTO: 0.78 K/UL (ref 1–4.8)
LYMPHOCYTES NFR BLD: 16.4 % (ref 22–41)
MAGNESIUM SERPL-MCNC: 2 MG/DL (ref 1.5–2.5)
MCH RBC QN AUTO: 29.3 PG (ref 27–33)
MCHC RBC AUTO-ENTMCNC: 32.3 G/DL (ref 33.6–35)
MCV RBC AUTO: 90.7 FL (ref 81.4–97.8)
MONOCYTES # BLD AUTO: 0.28 K/UL (ref 0–0.85)
MONOCYTES NFR BLD AUTO: 5.9 % (ref 0–13.4)
NEUTROPHILS # BLD AUTO: 3.62 K/UL (ref 2–7.15)
NEUTROPHILS NFR BLD: 76 % (ref 44–72)
NRBC # BLD AUTO: 0 K/UL
NRBC BLD-RTO: 0 /100 WBC
PHOSPHATE SERPL-MCNC: 2.7 MG/DL (ref 2.5–4.5)
PLATELET # BLD AUTO: 247 K/UL (ref 164–446)
PMV BLD AUTO: 8.8 FL (ref 9–12.9)
POTASSIUM SERPL-SCNC: 4 MMOL/L (ref 3.6–5.5)
RBC # BLD AUTO: 3.34 M/UL (ref 4.2–5.4)
SODIUM SERPL-SCNC: 127 MMOL/L (ref 135–145)
WBC # BLD AUTO: 4.8 K/UL (ref 4.8–10.8)

## 2021-11-27 PROCEDURE — A9270 NON-COVERED ITEM OR SERVICE: HCPCS | Performed by: NURSE PRACTITIONER

## 2021-11-27 PROCEDURE — 97602 WOUND(S) CARE NON-SELECTIVE: CPT

## 2021-11-27 PROCEDURE — 700111 HCHG RX REV CODE 636 W/ 250 OVERRIDE (IP): Mod: JG | Performed by: STUDENT IN AN ORGANIZED HEALTH CARE EDUCATION/TRAINING PROGRAM

## 2021-11-27 PROCEDURE — 82962 GLUCOSE BLOOD TEST: CPT

## 2021-11-27 PROCEDURE — 80048 BASIC METABOLIC PNL TOTAL CA: CPT

## 2021-11-27 PROCEDURE — 700111 HCHG RX REV CODE 636 W/ 250 OVERRIDE (IP): Performed by: SURGERY

## 2021-11-27 PROCEDURE — A9270 NON-COVERED ITEM OR SERVICE: HCPCS | Performed by: SURGERY

## 2021-11-27 PROCEDURE — 770001 HCHG ROOM/CARE - MED/SURG/GYN PRIV*

## 2021-11-27 PROCEDURE — 700102 HCHG RX REV CODE 250 W/ 637 OVERRIDE(OP): Performed by: SURGERY

## 2021-11-27 PROCEDURE — 99232 SBSQ HOSP IP/OBS MODERATE 35: CPT | Performed by: NURSE PRACTITIONER

## 2021-11-27 PROCEDURE — 700102 HCHG RX REV CODE 250 W/ 637 OVERRIDE(OP): Performed by: NURSE PRACTITIONER

## 2021-11-27 PROCEDURE — 36415 COLL VENOUS BLD VENIPUNCTURE: CPT

## 2021-11-27 PROCEDURE — 85025 COMPLETE CBC W/AUTO DIFF WBC: CPT

## 2021-11-27 PROCEDURE — 83735 ASSAY OF MAGNESIUM: CPT

## 2021-11-27 PROCEDURE — 700111 HCHG RX REV CODE 636 W/ 250 OVERRIDE (IP): Performed by: INTERNAL MEDICINE

## 2021-11-27 PROCEDURE — 84100 ASSAY OF PHOSPHORUS: CPT

## 2021-11-27 PROCEDURE — 700111 HCHG RX REV CODE 636 W/ 250 OVERRIDE (IP): Performed by: NURSE PRACTITIONER

## 2021-11-27 RX ORDER — OXYCODONE HYDROCHLORIDE 10 MG/1
10 TABLET ORAL EVERY 6 HOURS PRN
Status: DISCONTINUED | OUTPATIENT
Start: 2021-11-27 | End: 2021-11-28 | Stop reason: HOSPADM

## 2021-11-27 RX ORDER — OXYCODONE HYDROCHLORIDE 5 MG/1
5 TABLET ORAL
Status: DISCONTINUED | OUTPATIENT
Start: 2021-11-27 | End: 2021-11-28 | Stop reason: HOSPADM

## 2021-11-27 RX ORDER — OXYCODONE HYDROCHLORIDE AND ACETAMINOPHEN 5; 325 MG/1; MG/1
1 TABLET ORAL EVERY 4 HOURS PRN
Status: DISCONTINUED | OUTPATIENT
Start: 2021-11-27 | End: 2021-11-27

## 2021-11-27 RX ORDER — CELECOXIB 200 MG/1
200 CAPSULE ORAL 2 TIMES DAILY
Status: DISCONTINUED | OUTPATIENT
Start: 2021-11-27 | End: 2021-11-28 | Stop reason: HOSPADM

## 2021-11-27 RX ORDER — HYDROMORPHONE HYDROCHLORIDE 1 MG/ML
1 INJECTION, SOLUTION INTRAMUSCULAR; INTRAVENOUS; SUBCUTANEOUS
Status: DISCONTINUED | OUTPATIENT
Start: 2021-11-27 | End: 2021-11-27

## 2021-11-27 RX ADMIN — HYDROMORPHONE HYDROCHLORIDE 1 MG: 1 INJECTION, SOLUTION INTRAMUSCULAR; INTRAVENOUS; SUBCUTANEOUS at 02:36

## 2021-11-27 RX ADMIN — OXYCODONE HYDROCHLORIDE 10 MG: 10 TABLET ORAL at 23:08

## 2021-11-27 RX ADMIN — OXYCODONE HYDROCHLORIDE AND ACETAMINOPHEN 1 TABLET: 5; 325 TABLET ORAL at 11:20

## 2021-11-27 RX ADMIN — CELECOXIB 200 MG: 200 CAPSULE ORAL at 11:20

## 2021-11-27 RX ADMIN — ENOXAPARIN SODIUM 40 MG: 40 INJECTION SUBCUTANEOUS at 09:02

## 2021-11-27 RX ADMIN — HYDROMORPHONE HYDROCHLORIDE 1 MG: 1 INJECTION, SOLUTION INTRAMUSCULAR; INTRAVENOUS; SUBCUTANEOUS at 06:08

## 2021-11-27 RX ADMIN — ONDANSETRON 4 MG: 4 TABLET, ORALLY DISINTEGRATING ORAL at 18:48

## 2021-11-27 RX ADMIN — ALTEPLASE 2 MG: 2.2 INJECTION, POWDER, LYOPHILIZED, FOR SOLUTION INTRAVENOUS at 12:43

## 2021-11-27 RX ADMIN — OXYCODONE 5 MG: 5 TABLET ORAL at 18:48

## 2021-11-27 RX ADMIN — MICONAZOLE NITRATE: 20 CREAM TOPICAL at 18:00

## 2021-11-27 RX ADMIN — ONDANSETRON 4 MG: 4 TABLET, ORALLY DISINTEGRATING ORAL at 13:34

## 2021-11-27 ASSESSMENT — ENCOUNTER SYMPTOMS
WEAKNESS: 1
ABDOMINAL PAIN: 1
RESPIRATORY NEGATIVE: 1
CARDIOVASCULAR NEGATIVE: 1
NAUSEA: 1
PSYCHIATRIC NEGATIVE: 1
CHILLS: 0
MYALGIAS: 1
FEVER: 0
EYES NEGATIVE: 1
CONSTITUTIONAL NEGATIVE: 1

## 2021-11-27 NOTE — PROGRESS NOTES
Hospital Medicine Daily Progress Note    Date of Service  11/27/2021    Chief Complaint  Nils Alfonso is a 66 y.o. female admitted 10/15/2021 with abdominal pain    Hospital Course  Ms. Alfonso is a 66-year-old female with a PMHx of recurrent idiopathic pancreatitis s/p ERCP with sphincterotomy on 10/1/121, sleeve gastrectomy, dyslipidemia, HTN, osteoarthritis of the spine s/p lumbar fusion who was admitted on 10/8/2021 with worsening right lower quadrant pain over the past week.      Since her ERCP on 10/1/2022 1, patient has been experiencing intermittent pain, fevers, and nausea.  Patient initially presented to [facility] for evaluation of the abdominal pain.  Imaging and RSM noted large intra-abdominal fluid collection of which multiple IR drains have been placed.  Also, her infection has also worsened as noted in her WBC count, intermittent fever, and lactic acid.  Patient was transferred to [facility] for escalation of surgical needs.    Patient underwent an ex lap with I&D of multiple loculated abscesses and debridement of necrotizing fasciitis with Dr. ST on 11/5/2021.  Previous blood cultures were positive for stenotrophomonas, maltophilia, mixed yeast, facialis, E. coli, and chryseobacterium in the blood.  Infectious disease was consulted and was placed on Bactrim twice daily, Zosyn, micafungin with end date of 11/24/2021.  Patient had since decreased output from the drains.  Repeat CT of the abdomen on 11/15/2021 noted increase fluid collection in the right mid abdomen and slight increase in trace bilateral pleural effusions.  Interventional radiology was consulted on 11/16/2020 121 for a repeat peritoneal drainage.  RLQ MARTHA drain had fallen out and was not replaced due to the abdominal fluid collection being noted to be smaller.    Upper GI series on 11/17/2021 showed a leak at the perforation in 2/3 portion of the duodenum.  Patient underwent an endoscopy/ERCP, biliary stent, core track placement on 11/18/2021 with  Dr. [facility] and was started on enteral feedings on 11/19/2021 with monitoring of MARTHA drain output.    Patient with hospital services for management of care.          Interval Problem Update  11/23/2021  -Patient seen and examined.  Patient noted to have a mid abdomen wound VAC, 2 ostomy sites on the right mid to lower quadrant, and one MARTHA drain.  Noted minimal to moderate output.  Patient reports severe abdominal pain.  Patient has also been refusing physical therapy and patient was counseled in regards to evaluation from PT for postacute placement.  Patient and  updated in plan of care.  -Plan of care: Continue supportive therapy; pain management; monitor output in ostomy bag and MARTHA drain along with wound VAC; continue ABX therapy until 11/24; wound team; will appreciate PT/OT reevaluation and input for postacute placement versus home with home health needs  -Disposition: Pending evaluation from PT/OT -will likely need SNF  -Lab work: Reviewed; Na 129 - IVF; Ca2+  - 2g IVPB  -VSS at this time    11/24/2021  -Patient seen and examined. Patient spouse at bedside. Patient reports better control and pain management. Patient also reports working with physical therapy and able to ambulate with what is recommended. Discussed with patient plan of care today.  -Plan of care: Continue supportive care; pain management; ordered CT of the abdomen/pelvis with contrast -pending results; will DC drain if decreased to 110 cc over 24 hours according to surgery; diet to be discussed with surgery; GI team signed off today with recommendations of CT of the abdomen/pelvis, diet to be directed by surgery, and adjusted pain management.  -Disposition: Pending clearance from surgery team; SNF versus home with home health  -Lab work: Reviewed; Na at 127 -continue IVF  -VSS at this time  -There are no significant changes from a previous ROS/PE, please see my previous notes for further details.    11/25/2021  -Patient  seen and examined. Patient at bedside. Discussed plan of care with patient alongside with the surgery, Dr. ST. Discussed with patient most recent CT abdomen pelvis results. Also discussed with patient and family in regards to discharge planning. At this time, patient is refusing to go to a skilled facility and would rather go home with home health. PT noted patient okay to go home with home health as long as therapy is continued. At this time, patient is still needing some help getting out of bed and needing help getting started ambulation. Patient and family updated in plan of care  -Plan of care: Continue pain management; discontinue core track and start patient on clear liquid diet; remove staples for recommendations of surgery; monitor MARTHA output, if output is equal or less than 50 mL within 24 hours, surgery will discontinue; continue PT/OT; patient to ambulate with walker to bathroom and up for all meals.  -Disposition: Pending clearance from surgery, Dr. ST; SNF versus home with HH  -Lab work: Reviewed; Na 127 -continue IVF  -VSS at this time  -There are no significant changes from my previous ROS/PE, please see my previous notes for further details.    11/26/2021  -Patient seen and examined today.  Patient is very excited and motivated today.  Patient's MARTHA drain removed by surgery, Dr. ST.  Encourage patient to work with physical therapy today.  Patient aware and plan of care.  -Plan of care: Monitor ostomy output; pain management as needed; encourage aggressive physical therapy today for goal of home with home health  -Disposition: Clearance from surgery, Dr. ST; PT recommendations; anticipated to discharge this weekend if patient improves with physical therapy  -Lab work: Reviewed; expected  -VSS at this time  -There are no significant changes to my previous ROS/PE, please see my previous notes for further details.    11/27/2021  -Patient seen and examined.  Patient reports feeling chills and feeling cold  today.  Patient is afebrile.  Patient reports doing 2 laps around the unit and felt very tired right after.  Discussed with patient goals for today of which she will be reevaluated for physical therapy for equipment needs.  -Plan of care: Pain management as needed; switch all to oral medication for pain management; will need recommendations from PT for ambulation equipment if needed  -Disposition:Pending tolerance to oral pain management; need recommendations from physical therapy for equipment use at home  -Lab work: Reviewed; expected  -VSS at this time    I have personally seen and examined the patient at bedside. I discussed the plan of care with patient, family and bedside RN.    Consultants/Specialty  general surgery and GI    Code Status  Full Code    Disposition  Patient is not medically cleared.   Anticipate discharge to to skilled nursing facility.  I have placed the appropriate orders for post-discharge needs.    Review of Systems  Review of Systems   Constitutional: Negative.  Negative for chills and fever.   HENT: Negative.    Eyes: Negative.    Respiratory: Negative.    Cardiovascular: Negative.    Gastrointestinal: Positive for abdominal pain and nausea.   Genitourinary: Negative.    Musculoskeletal: Positive for myalgias.   Skin: Negative.    Neurological: Positive for weakness.   Endo/Heme/Allergies: Negative.    Psychiatric/Behavioral: Negative.         Physical Exam  Temp:  [36.8 °C (98.2 °F)-37.4 °C (99.3 °F)] 37.1 °C (98.8 °F)  Pulse:  [74-85] 85  Resp:  [16-18] 18  BP: (120-139)/(67-78) 139/78  SpO2:  [94 %-99 %] 96 %    Physical Exam  Vitals and nursing note reviewed.   HENT:      Head: Normocephalic.      Nose: Nose normal.      Mouth/Throat:      Mouth: Mucous membranes are moist.      Pharynx: Oropharynx is clear.   Eyes:      Pupils: Pupils are equal, round, and reactive to light.   Cardiovascular:      Rate and Rhythm: Normal rate and regular rhythm.      Pulses: Normal pulses.      Heart  sounds: Normal heart sounds.   Pulmonary:      Effort: Pulmonary effort is normal.      Breath sounds: Normal breath sounds.   Abdominal:      General: Bowel sounds are decreased.      Tenderness: There is generalized abdominal tenderness.       Musculoskeletal:         General: Tenderness present.      Cervical back: Normal range of motion and neck supple.   Skin:     General: Skin is dry.      Capillary Refill: Capillary refill takes 2 to 3 seconds.   Neurological:      Mental Status: She is alert. Mental status is at baseline.         Fluids    Intake/Output Summary (Last 24 hours) at 11/27/2021 1213  Last data filed at 11/27/2021 0900  Gross per 24 hour   Intake 60 ml   Output 1400 ml   Net -1340 ml       Laboratory  Recent Labs     11/25/21  0345 11/26/21  1340 11/27/21  0523   WBC 4.6* 4.9 4.8   RBC 3.08* 3.19* 3.34*   HEMOGLOBIN 9.2* 9.5* 9.8*   HEMATOCRIT 27.5* 28.4* 30.3*   MCV 89.3 89.0 90.7   MCH 29.9 29.8 29.3   MCHC 33.5* 33.5* 32.3*   RDW 55.8* 56.3* 57.7*   PLATELETCT 238 238 247   MPV 9.0 8.6* 8.8*     Recent Labs     11/25/21  0345 11/27/21  0523   SODIUM 127* 127*   POTASSIUM 3.9 4.0   CHLORIDE 98 96   CO2 21 23   GLUCOSE 128* 80   BUN 16 9   CREATININE 0.32* 0.33*   CALCIUM 7.5* 8.1*                   Imaging  CT-ABDOMEN-PELVIS WITH   Final Result      1.  RIGHT lateral no abnormal abscess again seen, decreased in size from prior exam with drain in place, however contents now appears hyperdense potential indicating bowel contrast, concerning for bowel perforation or fistula, although source of contrast    is uncertain.  No cristóbal pneumoperitoneum.   2.  Pneumobilia and biliary stent present.   3.  Interval removal of RIGHT lower quadrant drain.         DX-UPPER GI-SERIES WITH KUB   Final Result      1.  No evidence for duodenal leak.   2.  Reflux of contrast seen into the distal common bile duct at the site of biliary stent, consistent with prior sphincterotomy.      DX-ABDOMEN FOR TUBE PLACEMENT    Final Result         1.  Nonspecific bowel gas pattern.   2.  Dobbhoff tube with tip terminating overlying the expected location of the third or fourth duodenal segment.   3.  Hazy right lower lobe infiltrates.      DX-ABDOMEN FOR TUBE PLACEMENT   Final Result      Feeding tube extends to the region of the stomach and duodenum with tip at the level of the proximal end of the jejunum.      DX-ABDOMEN FOR TUBE PLACEMENT   Final Result      Enteric tube terminates over the duodenal jejunal junction      Covered common bile duct stent      No contrast extravasation is seen      [id number] STENT - TUBE   Final Result      Portable fluoroscopic images obtained during ERCP biliary stent placement for localization purposes.      DX-UPPER GI-SERIES WITH KUB   Final Result      Duodenal perforation at the junction of the second and third portions of duodenum as noted on key images.      CT-ABORTED CT PROCEDURE   Final Result      Interval decrease in size of the RIGHT retroperitoneal fluid collection which contains a surgical drain. Because from bowel is possible. No additional drain was placed.            CT-ABDOMEN-PELVIS WITH   Final Result      1.  Slight interval increase in size in fluid collection the right midabdomen with drain in place.      2.  Slight interval increase in size in trace bilateral pleural effusions, right greater than left with bibasilar atelectasis.      3.  Pneumobilia.      CT-ABDOMEN-PELVIS WITH   Final Result         1. The components in the gallbladder fossa and right flank of the complex fluid collection are mostly resolved. There is still a small residual fluid collection in the perinephric space surrounding the inferior right kidney. Right lower quadrant MARTHA drain    and right upper quadrant catheter are outside of this residual collection.   2. Air-fluid levels throughout the colon could relate to ileus.   3. Bilateral pleural effusions with bibasilar atelectasis.   4. Pneumobilia.       CT-ABDOMEN-PELVIS WITH   Final Result      1.  Slight interval decrease in size of the large RIGHT peritoneal abscess which now contains 3 drains   2.  Decreased atelectasis with persistent opacity in the RIGHT lung base likely atelectasis rather than pneumonia   3.  Small LEFT renal cyst   4.  Prior cholecystectomy with ongoing pneumobilia      CT-DRAINAGE-CATH EXCHANGE   Final Result         1. Organized pancreatic pseudocyst Drainage with CT guidance.      CT-ABDOMEN-PELVIS WITH   Final Result      1.  There has been interval placement of an additional inferior lateral pigtail catheter within the complex right abdominal abscess. The other 2 pigtail catheters are stable in appearance.   2.  There has been no significant interval decrease in size of the large complex loculated abscess collection in the right abdomen.   3.  There is no new fluid collection.   4.  Gallbladder is surgically absent with continued pneumobilia.   5.  Interval decrease in the dependent pleural effusion with minimal airspace disease, likely atelectasis.      IR-DRAINAGE-CATH EXCHANGE   Final Result      1.  Successful left-sided drainage catheter removal.   2.  Successful image guided superior right drainage catheter replacement.      Plan: Suction drainage. Monitor outputs. Please contact interventional radiology if there is any concern for tube dysfunction. Suggest routine tube maintenance at 3 months intervals if the tube remains in place.         CT-DRAIN-PERITONEAL   Final Result      1.  CT GUIDED PERITONEAL RLQ abdominal CATHETER DRAINAGE.   2.  THE CURRENT PLAN IS TO MONITOR DRAINAGE OUTPUT AND OBTAIN A FOLLOWUP CT SCAN IN 5-7 DAYS IF CLINICALLY INDICATED.      CT-ABDOMEN-PELVIS WITH   Final Result         1. Grossly unchanged irregular fluid collection occupying the right abdomen surrounding the right kidney and right colon extending to midline. Additional pigtail catheter in the component about midline anterior abdomen. Both  pigtail catheters seem to be    within the collection.      2. Small right pleural effusion with right basilar atelectasis.               DX-CHEST-LIMITED (1 VIEW)   Final Result      1.  Right basilar atelectasis or consolidation. Small right pleural effusion not excluded.   2.  Atherosclerotic plaque.      CT-DRAIN-PERITONEAL   Final Result      1.  CT guided pancreatic pseudocyst catheter drainage.   2.  The current plan is to obtain a follow-up CT in 5-7 days..      IR-PICC LINE PLACEMENT W/ GUIDANCE > AGE 5   Final Result                  Ultrasound-guided PICC placement performed by qualified nursing staff as    above.          CT-ABDOMEN-PELVIS WITH    (Results Pending)        Assessment/Plan  * Duodenal perforation (HCC)- (present on admission)  Assessment & Plan  -After ERCP with retroperitoneal abscess and bacteremia  -Uncertain etiology - ddx includes pancreatitis, bile leak, iatrogenic  -Pepcid BID  -11/9: Concern perforation may have reopened.  Surgery team recommending NPO status and TPN  -11/10:  Start octreotide.   -11/17: Patient to go for upper GI today to see if leak at perforated site near ampulla.  If leaking, general surgeon to discuss with GI regarding placing a wall duodenal stent/esophageal stent  -11/18: Upper GI showed duodenal leak. Plan for stent placement tomorrow.   -11/19: Endoscope/ERCP, biliary stent, and cortrak placement done yesterday with Dr. Lawrence.    Continue abx, trend drain OP, KUB confirmed NTG placement- enteral feedings to be started.    Per General Surgery- hold oral medications/change to PO medications to IV at this time.   -11/20: Tolerating tube feedings, increase as tolerated. Continue NPO other than enteral feedings. Plan for repeat upper GI this week per general surgery.  -11/21: Tolerating TF. Decreased output to collection bags. Remain NPO beside TF. Plan for repeat upper GI in coming days.   -11/22: Seen by GI. Repeat UGI done today- no duodenal leaks seen.  Continue TF, advance diet per general surgery.   -11/24: Repeat CT indicating improvement in abscess, no leak detected; CLD initiated today, core track discontinued  -11/26: REMOVED MARTHA drain by Dr. ST  -Pain control with dilaudid IV.   -Antiemetics PRN.       Electrolyte abnormality  Assessment & Plan  -Moderate hyponatremia 127  -Likely due to dehydration secondary to slowly titrating TF.   -11/22 NS increased to 83ml/hr  -11/22 Phos 1.6- replenish and monitor.   -Hypokalemia during hospitalization,  resolved.   -Continue to monitor.     Advanced care planning/counseling discussion  Assessment & Plan  -Tearful regarding medical course and progress.   -Seen by palliative care    Generalized weakness  Assessment & Plan  -PT/OT following  -Recommending post-acute placement.  -Acute rehab evaluating patient for admission.   -Likely home with  vs rehab at d/c pending patient's progress    Hypotension  Assessment & Plan  -11/6: Stop lasix; Stop ACEI  -11/7 started midodrine  -11/16-resolved continue to monitor    Acute respiratory failure with hypoxia (HCC)- (present on admission)  Assessment & Plan  -Resolved with diuresis  -Likely volume overload with lower extremity edema present    Antibiotic-associated diarrhea  Assessment & Plan  -Cdiff PCR negative  -Loperamide PRN    Postprocedural retroperitoneal abscess- (present on admission)  Assessment & Plan  -S/P Ex lap, I/D, debridement nec fasc 11/5/2021  -Abx per ID end date 11/24/2021  -11/16/2021: CT abdomen 11/15/2021 showed increase fluid collection in the right mid abdomen and slight increase in trace bilateral pleural effusions.   -Patient go to IR for procedural drainage today  -11/17: RLQ drain was found to have fallen out in IR yesterday.  Fluid collection is smaller and no new drain was indicated.    -11/22: MARTHA drain in place to RUQ, decrease output seen. UGI today showed no duodenal leaks.  -11/24: Updated CT of the abdomen/pelvis, no leaks detected,  improving size of abscess; goal is 50 male or less within 24 hours output for drain to be DC'd  -Pain control with dilaudid IV  -Antiemetics PRN  -Will continue to monitor      Bacteremia due to Gram-negative bacteria and fungemia- (present on admission)  Assessment & Plan  -KALANI negative for signs of endocarditis  -Cont bactrim, Zosyn, and Micafungin per ID for 4-week course with stop date of 11/24.  Repeat imaging prior to discontinuation.   -Repeat Bld Cx neg      Normocytic anemia- (present on admission)  Assessment & Plan  -Transfuse for Hgb <7  -Avoid regular phlebotomy  -Supplements per dietary: 11/6 added thiamine, 6 sm meals/d  -11/21: Thiamine PO held at this time. Will resume when cleared to have PO meds.     Sepsis due to intraabdominal fluid collection/abscess, bacteremia and fungemia (HCC)- (present on admission)  Assessment & Plan  -Sepsis resolved  -Source intra-abdominal  -Zosyn/Mycafungin/Bactrim DS per infectious disease end date 11/24/2021  -ID signed off  -Re-imaging per ID, IR, and Surgery  -S/P Ex lap w I/D and debridement nec fasc 11/5/2021  -11/8 Repeat CT. Updated surgical team re: perinephric fluid collection. Further CT may be considered with IV and Oral contrast if needed.  -11/9:  She has remained afebrile over last 48 hours.  VSS.  Does not appear to be septic at this time.  Continue antibx.   -11/19/2021: sp biliary stent placement. Hold PO meds/change to IV to general surgery. Bactrim changed to IV.     Hyperlipidemia- (present on admission)  Assessment & Plan  -Previously on ezetimibe.  -PO meds held per general surgery,   -Will resume when cleared to have PO medications    Primary hypertension- (present on admission)  Assessment & Plan  -Hypotensive 11/6 - stop lisinopril         VTE prophylaxis: enoxaparin ppx    I have performed a physical exam and reviewed and updated ROS and Plan today (11/27/2021). In review of yesterday's note (11/26/2021), there are no changes except as  documented above.    ========================================================================================================  Please note that this dictation was created using voice recognition software. I have made every reasonable attempt to correct obvious errors, but there may be errors of grammar and possibly content that I did not discover before finalizing the note.    Electronically signed by:  EDISON Waters, MSN, APRN, FNP-C  Hospitalist Services  [facility]  [phone]  [email or web address]  11/27/21    1223

## 2021-11-27 NOTE — DISCHARGE PLANNING
[facility] Transitional [facility] Coordination    Aware of repeat PMR referral from Dr. Cook.  Current documentation reflects limited tolerance/participation for IRF level therapy regimen.      Anticipate PMR follow up Monday 11/29.

## 2021-11-27 NOTE — PROGRESS NOTES
Assessment complete.  A&O x 4. Patient calls appropriately.  Patient ambulates with x1 assist with FWW.    Patient has 9/10 pain. Pain managed per MAR. Patient complaining of nausea. Nausea managed per MAR.  MLI without staples, approximated, open to air. RUQ MARTHA drain, open to air. Fistulas with ostomy appliances to RLQ x 2, clean dry and intact. Dressing to right flank over drain site,clean dry and intact.   + void,  - BM. Normal bowel sounds.  Full liquid diet.  SCD's off.     Call light and personal belongings within reach. Rounding in place. All needs met at this time.

## 2021-11-27 NOTE — CARE PLAN
The patient is Stable - Low risk of patient condition declining or worsening    Shift Goals  Clinical Goals: Pain management  Patient Goals: Pain Management  Family Goals: N/A    Progress made toward(s) clinical / shift goals:       Problem: Pain - Standard  Goal: Alleviation of pain or a reduction in pain to the patient’s comfort goal  Outcome: Progressing  Note: Pain managed with prescribed medications and nonpharmalogical pain interventions      Problem: Skin Integrity  Goal: Skin integrity is maintained or improved  Outcome: Progressing  Note: Appropriate skin integrity interventions in place        Patient is not progressing towards the following goals:

## 2021-11-27 NOTE — WOUND TEAM
[facility]  Inpatient Services  Wound and Skin Care Evaluation    Admission Date: 10/15/2021     Last order of IP CONSULT TO WOUND CARE was found on 11/9/2021 from Hospital Encounter on 10/14/2021     HPI, PMH, SH: Reviewed    Past Surgical History:   Procedure Laterality Date   • PB UPPER GI ENDOSCOPY,DIAGNOSIS N/A 11/18/2021    Procedure: GASTROSCOPY with stent placement;  Surgeon: Migel Lawrence M.D.;  Location: SURGERY SAME DAY [facility];  Service: Gastroenterology   • PB ERCP,DIAGNOSTIC N/A 11/18/2021    Procedure: ERCP (ENDOSCOPIC RETROGRADE CHOLANGIOPANCREATOGRAPHY);  Surgeon: Migel Lawrence M.D.;  Location: SURGERY SAME DAY [facility];  Service: Gastroenterology   • PB PLACE PERCUT GASTROSTOMY TUBE N/A 11/18/2021    Procedure: GASTROSCOPY, WITH FEEDING TUBE INSERTION;  Surgeon: Migel Lawrence M.D.;  Location: SURGERY SAME DAY [facility];  Service: Gastroenterology   • BILIARY STENT PLACEMENT N/A 11/18/2021    Procedure: INSERTION, STENT, BILE DUCT;  Surgeon: Migel Lawrence M.D.;  Location: SURGERY SAME DAY [facility];  Service: Gastroenterology   • PB EXPLORATORY OF ABDOMEN  11/5/2021    Procedure: LAPAROTOMY, EXPLORATORY;  Surgeon: Jaden Cook M.D.;  Location: [facility];  Service: General   • PB ULTRASONIC GUIDANCE, INTRAOPERATIVE  11/5/2021    Procedure: ULTRASOUND GUIDANCE;  Surgeon: Jaden Cook M.D.;  Location: [facility];  Service: General   • ABDOMINAL ABSCESS DRAINAGE  11/5/2021    Procedure: DRAINAGE, ABSCESS, ABDOMEN - PERITONEAL AND RETROPERITONEAL;  Surgeon: Jaden Cook M.D.;  Location: [facility];  Service: General   • PB ERCP,DIAGNOSTIC  10/1/2021    Procedure: ERCP (ENDOSCOPIC RETROGRADE CHOLANGIOPANCREATOGRAPHY);  Surgeon: Fausto Casas M.D.;  Location: [facility];  Service: Gastroenterology   • COLONOSCOPY  8/8/2018    Procedure: COLONOSCOPY;  Surgeon: Shukri Condon M.D.;  Location: [facility];   Service: General   • GASTROSCOPY  2018    Procedure: GASTROSCOPY;  Surgeon: Fausto Casas M.D.;  Location: SURGERY [facility];  Service: Gastroenterology   • EGD W/ENDOSCOPIC ULTRASOUND  2018    Procedure: EGD W/ENDOSCOPIC ULTRASOUND- RADIAL UPPER;  Surgeon: Fausto Casas M.D.;  Location: SURGERY [facility];  Service: Gastroenterology   • CATARACT PHACO WITH IOL Left 2017    Procedure: CATARACT PHACO WITH IOL;  Surgeon: Stuart Estevez M.D.;  Location: SURGERY SAME DAY [facility];  Service:    • CATARACT PHACO WITH IOL Right 2017    Procedure: CATARACT PHACO WITH IOL;  Surgeon: Stuart Estevez M.D.;  Location: SURGERY [facility];  Service:    • GASTRIC SLEEVE LAPAROSCOPY  10/19/2016    Procedure: GASTRIC SLEEVE LAPAROSCOPY, HIATAL HERNIA;  Surgeon: Shukri Condon M.D.;  Location: SURGERY [facility];  Service:    • LIVER BIOPSY LAPAROSCOPIC  10/19/2016    Procedure: LIVER BIOPSY LAPAROSCOPIC;  Surgeon: Shukri Condon M.D.;  Location: [facility];  Service:    • GASTROSCOPY N/A 2016    Procedure: GASTROSCOPY;  Surgeon: Shukri Condon M.D.;  Location: [facility];  Service:    • ROTATOR CUFF REPAIR Right 2016   • ROTATOR CUFF REPAIR Left 2015   • HYSTERECTOMY ROBOTIC  2009    Performed by MEG VILLEGAS at [facility]   • LUMBAR FUSION ANTERIOR      Dr Hillman, L3-S1 fusion   • CHOLECYSTECTOMY      laparoscopic   • TUBAL LIGATION     • GASTRIC RESECTION      gastric stapling   • TONSILLECTOMY AND ADENOIDECTOMY     • PRIMARY C SECTION  , , 1985    x3     Social History     Tobacco Use   • Smoking status: Former Smoker     Packs/day: 0.25     Years: 0.10     Pack years: 0.02     Types: Cigarettes     Quit date: 1973     Years since quittin.9   • Smokeless tobacco: Never Used   Substance Use Topics   • Alcohol use: No     No chief complaint on file.    Diagnosis: Bile  duct leak [K83.8]  Postprocedural intraabdominal abscess [T81.43XA]  Intra-abdominal abscess (HCC) [K65.1]    Unit where seen by Wound Team: T435/02     WOUND CONSULT/FOLLOW UP RELATED TO:  Right abdomen/flank x3    WOUND HISTORY:  Pt is an older woman with history of idiopathic pancreatitis who presented on 11/8 with complaints of abdominal pain. Pt has had ERCP with sphincterotomy by dr. Ramires on 10/1/21. Upon admission to [facility] pt had CT which revealed large irregular fluid collection with thick wall occupying most of the right abdomen, IR drains were placed. Unfortunately there was a lack of improvement in intraabdominal fluid and therefore Dr. Dumont was consulted. Pt underwent surgery with Dr. ST on 11/5 and was found to have a large retroperitoneal abscess containing necrotizing fascititis as well as an anterior peritoneal abscess and necrotizing soft tissue infection. IR placed drains were removed and new surgical drains were placed. Pt began having drainage from her old right upper quad IR drain hole and therefore wound team was requested to evaluate and treat/manage the drainage.     WOUND ASSESSMENT/LDA       Wound 10/25/21 Abdomen;Flank Lateral;Upper Right IR drain insertion site #3 (Active)   Wound Image    11/23/21 1100   Site Assessment Pink    Periwound Assessment Intact;Pink    Margins Attached edges    Closure Secondary intention    Drainage Amount Scant    Drainage Description Serosanguineous    Treatments Cleansed;Offloading;Site care    Wound Cleansing Approved Wound Cleanser    Periwound Protectant Skin Protectant Wipes to Periwound    Dressing Cleansing/Solutions Not Applicable    Dressing Options Hydrofiber Silver;Dry Gauze;Hypafix Tape    Dressing Changed New    Dressing Status Clean;Dry;Intact    Dressing Change/Treatment Frequency Every 72 hrs, and As Needed    NEXT Dressing Change/Treatment Date 11/30/21    NEXT Weekly Photo (Inpatient Only) 11/30/21    Non-staged Wound  Description Full thickness    Wound Length (cm) 0.2 cm    Wound Width (cm) 0.3 cm    Wound Surface Area (cm^2) 0.06 cm^2    Shape Circular    Wound Odor None    Pulses N/A    Exposed Structures MALVIN    WOUND NURSE ONLY - Time Spent with Patient (mins) 20    Wound 11/09/21 Full Thickness Wound Abdomen Lateral;Upper;Quadrant Right Drain #2 (Active)   Wound Image    11/23/21 1100   Site Assessment Red;Pink    Periwound Assessment Red;Pink    Margins Attached edges    Closure Secondary intention    Drainage Amount Moderate    Drainage Description Brown    Treatments Cleansed;Site care    Wound Cleansing Approved Wound Cleanser    Periwound Protectant Skin Protectant Wipes to Periwound    Dressing Cleansing/Solutions Not Applicable    Dressing Options Other (Comments)    Dressing Changed Changed    Dressing Status Clean;Dry;Intact    Dressing Change/Treatment Frequency Every 72 hrs, and As Needed    NEXT Dressing Change/Treatment Date 11/30/21    NEXT Weekly Photo (Inpatient Only) 11/30/21    Non-staged Wound Description Full thickness    Wound Length (cm) 0.3 cm    Wound Width (cm) 0.6 cm    Wound Surface Area (cm^2) 0.18 cm^2    Shape Oval    Wound Odor None    Exposed Structures MALVIN    WOUND NURSE ONLY - Time Spent with Patient (mins) 20        Wound 11/19/21 Full Thickness Wound Hip Anterior Right RLQ DRAIN #4 (Active)   Wound Image    11/23/21 1100   Site Assessment Red;Pink    Periwound Assessment Pink    Margins Unattached edges    Closure Secondary intention    Drainage Amount Moderate    Drainage Description Brown    Treatments Site care;Cleansed    Wound Cleansing Approved Wound Cleanser    Periwound Protectant Skin Protectant Wipes to Periwound;Paste Ring    Dressing Changed Changed    Dressing Status Clean;Dry;Intact    Dressing Change/Treatment Frequency Every 72 hrs, and As Needed    NEXT Dressing Change/Treatment Date 11/30/21    NEXT Weekly Photo (Inpatient Only) 11/30/21    Non-staged Wound Description  Full thickness    Wound Length (cm) 0.7 cm    Wound Width (cm) 2 cm    Wound Surface Area (cm^2) 1.4 cm^2    WOUND NURSE ONLY - Time Spent with Patient (mins) 20        Vascular:    CHARBEL:   No results found.    Lab Values:    Lab Results   Component Value Date/Time    WBC 4.8 11/27/2021 05:23 AM    RBC 3.34 (L) 11/27/2021 05:23 AM    HEMOGLOBIN 9.8 (L) 11/27/2021 05:23 AM    HEMATOCRIT 30.3 (L) 11/27/2021 05:23 AM    CREACTPROT 3.77 (H) 11/22/2021 11:37 AM    SEDRATEWES 10 12/02/2015 07:48 AM    HBA1C 5.7 (H) 09/13/2016 10:40 AM      Culture Results show:  Recent Results (from the past 720 hour(s))   CULTURE WOUND W/ GRAM STAIN    Collection Time: 11/11/21  4:52 PM    Specimen: Wound   Result Value Ref Range    Significant Indicator POS (POS)     Source WND     Site retroperitoneal abscess drain     Culture Result (A)      Growth noted after further incubation, see below for  organism identification.      Gram Stain Result       Rare WBCs.  Many Gram positive cocci.  Few Yeast.      Culture Result Enterococcus faecium  Heavy growth   (A)     Culture Result Candida albicans  Moderate growth   (A)     Culture Result Stenotrophomonas maltophilia  Light growth   (A)        Susceptibility    Enterococcus faecium - MU     Daptomycin 4 Sensitive mcg/mL     Gent Synergy <=500 Sensitive mcg/mL     Ampicillin >8 Resistant mcg/mL     Vancomycin 1 Sensitive mcg/mL    Stenotrophomonas maltophilia - MU     Trimeth/Sulfa <=0.5/9.5 Sensitive mcg/mL     Pain Level/Medicated:  No pain med.     INTERVENTIONS BY WOUND TEAM:  Chart and images reviewed. Discussed with bedside RN. All areas of concern (based on picture review, LDA review and discussion with bedside RN) have been thoroughly assessed. Documentation of areas based on significant findings. This RN in to assess patient. Performed standard wound care which includes appropriate positioning, dressing removal and non-selective debridement. Pictures and measurements obtained  "weekly if/when required.  Preparation for Dressing removal: removed without difficulty   Non-selectively Debrided with:  moist warm washcloth and no rinse foam soap, open areas cleansed with wound cleanser.    Sharp debridement: NA  Ngozi wound: Cleansed with moist warm washcloth, Prepped with no sting   Primary Dressing: A 2 1/4\" barrier was cut larger than wound and MARTHA Lap stab. 1/2 Paste ring was then stretched to fit openings of both barriers. Barriers were applied down to skin.  High output pouch was attached to both barriers and closed.  Right flank drain site cleansed, covered with aquacel AG, secured with half mepilex.   Secondary (Outer) Dressing: NA    Buttocks:  SALDE miconazole ordered.     Interdisciplinary consultation: Patient, Bedside RN, wound RN Regi    EVALUATION / RATIONALE FOR TREATMENT:  Most Recent Date:  11/27/21: R abdominal wound with large amounts of drainage present. No leakage occurring, appliances staying intact. Will continue to change 2x/week.     11/23/21:  Drainage lessoned from flank area.  R ABD wounds still with large amount of drainage.  No leakage from previous pouches.   Continue with 2/weekly changes.   11/19/21: no leakage noted, labeled each bag to make easier for bedside nursing. Continue with plan of care. (could not change dressings 11/18/21 due to patient being in OR)  11/14/21:  Noted to be leaking today.  Patient still with large amounts of drainage from around drains and flank area.     11/13/21: both appliances lasted with no leaks, the pouches seem to be managing the drainage better than any other dressing. Still high amounts of brown/green drainage present. Will continue to follow.   11/10/21: Pouch around RUQ old MARTHA site appears to be working well. Pt noted to have significant leaking around MARTHA drain to RLQ. Pouched around MARTHA drain to prevent skin deterioration. Will continue to follow.   11/9/21: Pt with large amount of liquid brown drainage noted from right upper " quadrant old MARTHA Site. Wound team requested to evaluate and pouch. Wound is small however large amount of drainage noted. High output pouch applied to down drain to better manage output. Pt also having some drainage noted from around surgically placed drainage. Fenestrated gauze placed to better control output.     Goals: Steady decrease in wound area and depth weekly.    WOUND TEAM PLAN OF CARE ([X] for frequency of wound follow up,):   Nursing to follow orders written for wound care. Contact wound team if area fails to progress, deteriorates or with any questions/concerns  Dressing changes by wound team:    X               Follow up 3 times weekly:                NPWT change 3 times weekly:     Follow up 1-2 times weekly:      Follow up Bi-Monthly:                   Follow up as needed:     Other (explain): X    NURSING PLAN OF CARE ORDERS (X):  Dressing changes: See Dressing Care orders:   Skin care: See Skin Care orders:   RN Prevention Protocol:   Rectal tube care: See Rectal Tube Care orders:   Other orders:    RSKIN:   CURRENTLY IN PLACE (X), APPLIED THIS VISIT (A), ORDERED (O):   Q shift Anant:  X  Q shift pressure point assessments:  X    Surface/Positioning   Pressure redistribution mattress  X          Low Airloss        O 11/23/21  Bariatric foam      Bariatric ALBERT     Waffle cushion        Waffle Overlay    X      Reposition q 2 hours X     TAPs Turning system     Z Jorgito Pillow     Offloading/Redistribution   Sacral Mepilex (Silicone dressing)     Heel Mepilex (Silicone dressing)         Heel float boots (Prevalon boot)             Float Heels off Bed with Pillows           Respiratory RA  Silicone O2 tubing         Gray Foam Ear protectors     Cannula fixation Device (Tender )          High flow offloading Clip    Elastic head band offloading device      Anchorfast                                                         Trach with Optifoam split foam             Containment/Moisture Prevention      Rectal tube or BMS    Purwick/Condom Cath        Corea Catheter  X  Barrier wipes           Barrier paste     X  Antifungal tx    11/23  Interdry        Mobilization       Up to chair        Ambulate      PT/OT      Nutrition       Dietician        Diabetes Education      PO  X   TF     TPN     NPO   # days     Other        Anticipated discharge plans:   LTACH:        SNF/Rehab:                  Home Health Care:   X        Outpatient Wound Center:            Self/Family Care:        Other:                  Vac Discharge Needs:   Not Applicable Pt not on a wound vac:     X  Regular Vac while inpatient, alternative dressing at DC:        Regular Vac in use and continued at DC:            Reg. Vac w/ Skin Sub/Biologic in use. Will need to be changed 2x wkly:      Veraflo Vac while inpatient, ok to transition to Regular Vac on Discharge:           Veraflo Vac while inpatient, will need to remain on Veraflo Vac upon discharge:

## 2021-11-27 NOTE — CARE PLAN
Problem: Fluid Volume  Goal: Fluid volume balance will be maintained  Outcome: Progressing   Pt BP WDL  Problem: Urinary - Renal Perfusion  Goal: Ability to achieve and maintain adequate renal perfusion and functioning will improve  Outcome: Progressing   Pt voiding per flowsheets  Problem: Respiratory  Goal: Patient will achieve/maintain optimum respiratory ventilation and gas exchange  Outcome: Progressing  Pt on room air   The patient is Stable - Low risk of patient condition declining or worsening    Shift Goals  Clinical Goals: (P) Pain Management   Patient Goals: (P) Pain Management  Family Goals: (P) N/A    Progress made toward(s) clinical / shift goals: Pain medicated per MAR    Patient is not progressing towards the following goals:

## 2021-11-27 NOTE — PROGRESS NOTES
Assumed care of patient at 0645. Bedside report received. Assessment complete.  AA&Ox4. Denies CP/SOB.  Reporting 7/10 pain. Medicated per MAR.   Educated patient regarding pharmacologic and non pharmacologic modalities for pain management.  Skin per flowsheets  Started on regular diet. Denies N/V.  + void. + BM. Last BM 11/27  Pt ambulates x1 assist with FWW  All needs met at this time. Call light within reach. Pt calls appropriately. Bed low and locked, non skid socks in place. Hourly rounding in place.

## 2021-11-28 VITALS
TEMPERATURE: 98.9 F | HEART RATE: 91 BPM | DIASTOLIC BLOOD PRESSURE: 79 MMHG | WEIGHT: 130.29 LBS | BODY MASS INDEX: 23.09 KG/M2 | OXYGEN SATURATION: 93 % | RESPIRATION RATE: 14 BRPM | HEIGHT: 63 IN | SYSTOLIC BLOOD PRESSURE: 134 MMHG

## 2021-11-28 PROBLEM — K63.1 DUODENAL PERFORATION (HCC): Status: RESOLVED | Noted: 2021-10-16 | Resolved: 2021-11-28

## 2021-11-28 PROBLEM — T36.95XA ANTIBIOTIC-ASSOCIATED DIARRHEA: Status: RESOLVED | Noted: 2021-10-16 | Resolved: 2021-11-28

## 2021-11-28 PROBLEM — E87.8 ELECTROLYTE ABNORMALITY: Status: RESOLVED | Noted: 2021-11-21 | Resolved: 2021-11-28

## 2021-11-28 PROBLEM — K52.1 ANTIBIOTIC-ASSOCIATED DIARRHEA: Status: RESOLVED | Noted: 2021-10-16 | Resolved: 2021-11-28

## 2021-11-28 PROBLEM — R78.81 BACTEREMIA DUE TO GRAM-NEGATIVE BACTERIA: Status: RESOLVED | Noted: 2021-10-13 | Resolved: 2021-11-28

## 2021-11-28 PROBLEM — A41.9 SEPSIS (HCC): Status: RESOLVED | Noted: 2021-10-08 | Resolved: 2021-11-28

## 2021-11-28 PROBLEM — I95.9 HYPOTENSION: Status: RESOLVED | Noted: 2021-11-06 | Resolved: 2021-11-28

## 2021-11-28 PROBLEM — J96.01 ACUTE RESPIRATORY FAILURE WITH HYPOXIA (HCC): Status: RESOLVED | Noted: 2021-10-19 | Resolved: 2021-11-28

## 2021-11-28 PROBLEM — K68.11 POSTPROCEDURAL RETROPERITONEAL ABSCESS: Status: RESOLVED | Noted: 2021-10-16 | Resolved: 2021-11-28

## 2021-11-28 LAB
ANION GAP SERPL CALC-SCNC: 10 MMOL/L (ref 7–16)
BASOPHILS # BLD AUTO: 0.7 % (ref 0–1.8)
BASOPHILS # BLD: 0.03 K/UL (ref 0–0.12)
BUN SERPL-MCNC: 9 MG/DL (ref 8–22)
CALCIUM SERPL-MCNC: 8.1 MG/DL (ref 8.5–10.5)
CHLORIDE SERPL-SCNC: 97 MMOL/L (ref 96–112)
CO2 SERPL-SCNC: 23 MMOL/L (ref 20–33)
CREAT SERPL-MCNC: 0.35 MG/DL (ref 0.5–1.4)
EOSINOPHIL # BLD AUTO: 0 K/UL (ref 0–0.51)
EOSINOPHIL NFR BLD: 0 % (ref 0–6.9)
ERYTHROCYTE [DISTWIDTH] IN BLOOD BY AUTOMATED COUNT: 55.4 FL (ref 35.9–50)
GLUCOSE SERPL-MCNC: 81 MG/DL (ref 65–99)
HCT VFR BLD AUTO: 27.9 % (ref 37–47)
HGB BLD-MCNC: 9.3 G/DL (ref 12–16)
IMM GRANULOCYTES # BLD AUTO: 0.03 K/UL (ref 0–0.11)
IMM GRANULOCYTES NFR BLD AUTO: 0.7 % (ref 0–0.9)
LYMPHOCYTES # BLD AUTO: 0.87 K/UL (ref 1–4.8)
LYMPHOCYTES NFR BLD: 19 % (ref 22–41)
MAGNESIUM SERPL-MCNC: 1.8 MG/DL (ref 1.5–2.5)
MCH RBC QN AUTO: 29.3 PG (ref 27–33)
MCHC RBC AUTO-ENTMCNC: 33.3 G/DL (ref 33.6–35)
MCV RBC AUTO: 88 FL (ref 81.4–97.8)
MONOCYTES # BLD AUTO: 0.23 K/UL (ref 0–0.85)
MONOCYTES NFR BLD AUTO: 5 % (ref 0–13.4)
NEUTROPHILS # BLD AUTO: 3.41 K/UL (ref 2–7.15)
NEUTROPHILS NFR BLD: 74.6 % (ref 44–72)
NRBC # BLD AUTO: 0 K/UL
NRBC BLD-RTO: 0 /100 WBC
PHOSPHATE SERPL-MCNC: 2.8 MG/DL (ref 2.5–4.5)
PLATELET # BLD AUTO: 238 K/UL (ref 164–446)
PMV BLD AUTO: 8.6 FL (ref 9–12.9)
POTASSIUM SERPL-SCNC: 3.7 MMOL/L (ref 3.6–5.5)
RBC # BLD AUTO: 3.17 M/UL (ref 4.2–5.4)
SODIUM SERPL-SCNC: 130 MMOL/L (ref 135–145)
TSH SERPL DL<=0.005 MIU/L-ACNC: 2.97 UIU/ML (ref 0.38–5.33)
WBC # BLD AUTO: 4.6 K/UL (ref 4.8–10.8)

## 2021-11-28 PROCEDURE — 84100 ASSAY OF PHOSPHORUS: CPT

## 2021-11-28 PROCEDURE — A9270 NON-COVERED ITEM OR SERVICE: HCPCS | Performed by: NURSE PRACTITIONER

## 2021-11-28 PROCEDURE — 700102 HCHG RX REV CODE 250 W/ 637 OVERRIDE(OP): Performed by: NURSE PRACTITIONER

## 2021-11-28 PROCEDURE — 302111 WAFER OST 2.25IN N IMG RD 2 PC (BARRIER): Performed by: NURSE PRACTITIONER

## 2021-11-28 PROCEDURE — 85025 COMPLETE CBC W/AUTO DIFF WBC: CPT

## 2021-11-28 PROCEDURE — 700111 HCHG RX REV CODE 636 W/ 250 OVERRIDE (IP): Performed by: SURGERY

## 2021-11-28 PROCEDURE — 80048 BASIC METABOLIC PNL TOTAL CA: CPT

## 2021-11-28 PROCEDURE — 83735 ASSAY OF MAGNESIUM: CPT

## 2021-11-28 PROCEDURE — 99239 HOSP IP/OBS DSCHRG MGMT >30: CPT | Performed by: NURSE PRACTITIONER

## 2021-11-28 PROCEDURE — 84443 ASSAY THYROID STIM HORMONE: CPT

## 2021-11-28 PROCEDURE — 97116 GAIT TRAINING THERAPY: CPT

## 2021-11-28 RX ORDER — OXYCODONE HYDROCHLORIDE 5 MG/1
5 TABLET ORAL EVERY 6 HOURS PRN
Qty: 20 TABLET | Refills: 0 | Status: SHIPPED | OUTPATIENT
Start: 2021-11-28 | End: 2021-12-03

## 2021-11-28 RX ADMIN — ENOXAPARIN SODIUM 40 MG: 40 INJECTION SUBCUTANEOUS at 08:58

## 2021-11-28 RX ADMIN — OXYCODONE 5 MG: 5 TABLET ORAL at 02:33

## 2021-11-28 RX ADMIN — OXYCODONE HYDROCHLORIDE 10 MG: 10 TABLET ORAL at 13:51

## 2021-11-28 RX ADMIN — OXYCODONE 5 MG: 5 TABLET ORAL at 08:53

## 2021-11-28 RX ADMIN — OXYCODONE HYDROCHLORIDE 10 MG: 10 TABLET ORAL at 05:39

## 2021-11-28 ASSESSMENT — GAIT ASSESSMENTS
DEVIATION: BRADYKINETIC
GAIT LEVEL OF ASSIST: SUPERVISED
ASSISTIVE DEVICE: FRONT WHEEL WALKER
DISTANCE (FEET): 250

## 2021-11-28 ASSESSMENT — PAIN DESCRIPTION - PAIN TYPE
TYPE: ACUTE PAIN
TYPE: ACUTE PAIN

## 2021-11-28 ASSESSMENT — FIBROSIS 4 INDEX: FIB4 SCORE: 1.17

## 2021-11-28 ASSESSMENT — COGNITIVE AND FUNCTIONAL STATUS - GENERAL
WALKING IN HOSPITAL ROOM: A LITTLE
MOVING FROM LYING ON BACK TO SITTING ON SIDE OF FLAT BED: A LITTLE
CLIMB 3 TO 5 STEPS WITH RAILING: A LITTLE
SUGGESTED CMS G CODE MODIFIER MOBILITY: CK
MOVING TO AND FROM BED TO CHAIR: A LITTLE
MOBILITY SCORE: 18
TURNING FROM BACK TO SIDE WHILE IN FLAT BAD: A LITTLE
STANDING UP FROM CHAIR USING ARMS: A LITTLE

## 2021-11-28 NOTE — DISCHARGE SUMMARY
Discharge Summary    CHIEF COMPLAINT ON ADMISSION  No chief complaint on file.      Reason for Admission  biliary     Admission Date  10/15/2021    CODE STATUS  Full Code    HPI & HOSPITAL COURSE     Ms. Alfonso is a 66-year-old female with a PMHx of recurrent idiopathic pancreatitis s/p ERCP with sphincterotomy on 10/1/121, sleeve gastrectomy, dyslipidemia, HTN, osteoarthritis of the spine s/p lumbar fusion who was admitted on 10/8/2021 with worsening right lower quadrant pain over the past week.      Since her ERCP on 10/1/2022 1, patient has been experiencing intermittent pain, fevers, and nausea.  Patient initially presented to [facility] for evaluation of the abdominal pain.  Imaging and RSM noted large intra-abdominal fluid collection of which multiple IR drains have been placed.  Also, her infection has also worsened as noted in her WBC count, intermittent fever, and lactic acid.  Patient was transferred to [facility] for escalation of surgical needs.    Patient underwent an ex lap with I&D of multiple loculated abscesses and debridement of necrotizing fasciitis with Dr. ST on 11/5/2021.  Previous blood cultures were positive for stenotrophomonas, maltophilia, mixed yeast, facialis, E. coli, and chryseobacterium in the blood.  Infectious disease was consulted and was placed on Bactrim twice daily, Zosyn, micafungin with end date of 11/24/2021.  Patient had since decreased output from the drains.  Repeat CT of the abdomen on 11/15/2021 noted increase fluid collection in the right mid abdomen and slight increase in trace bilateral pleural effusions.  Interventional radiology was consulted on 11/16/2020 121 for a repeat peritoneal drainage.  RLQ MARTHA drain had fallen out and was not replaced due to the abdominal fluid collection being noted to be smaller.    Upper GI series on 11/17/2021 showed a leak at the perforation in 2/3 portion of the duodenum.  Patient underwent an endoscopy/ERCP, biliary stent, core track placement on  11/18/2021 with Dr. Lawrence [facility] and was started on enteral feedings on 11/19/2021 with monitoring of MARTHA drain output.    Patient with hospital services for management of care.        During course of stay, patient's core track was removed and started on a diet.  Patient tolerated advancement of diet regular diet.  Repeat CT of the abdomen was also performed to evaluate and reassess size of abscess.  This has since decreased in size and the drain was removed by surgery, Dr. Dumont. Patient will also need to follow-up with gastroenterology for stent removal in the near future.  Patient sent home with pain medication and will need to resume all home medications.  All questions and concerns answered prior to being discharged.  Patient discharged home with home health, front wheel walker and hand, and supplies for ostomy.    Therefore, she is discharged in good and stable condition to home with organized home healthcare and close outpatient follow-up.    The patient met 2-midnight criteria for an inpatient stay at the time of discharge.    Discharge Date  11/28/21      FOLLOW UP ITEMS POST DISCHARGE  Please call [phone] to schedule PCP appointment for patient.    Required specialty appointments include:       Discharge Instructions per GAMALIEL WadeRUrielNUriel    -Follow-up with primary care  -Follow-up with gastroenterology, Dr. Young   -Follow-up with general surgery, Dr. Dumont  -Utilize pain medication for pain management  -Resume all home medication    DIET: As tolerated    ACTIVITY: As tolerated    DIAGNOSIS: Abdominal abscesses    Return to ER if symptoms persist, chest pain, palpitations, shortness of breath, numbness, tingling, weakness, and high fevers.      DISCHARGE DIAGNOSES  Principal Problem (Resolved):    Duodenal perforation (HCC) POA: Yes  Active Problems:    Primary hypertension POA: Yes    Hyperlipidemia POA: Yes    Normocytic anemia POA: Yes    Generalized  weakness POA: Unknown    Advanced care planning/counseling discussion POA: Unknown  Resolved Problems:    Sepsis due to intraabdominal fluid collection/abscess, bacteremia and fungemia (HCC) POA: Yes    Hyponatremia POA: Yes    Bacteremia due to Gram-negative bacteria and fungemia POA: Yes    Postprocedural retroperitoneal abscess POA: Yes    Antibiotic-associated diarrhea POA: No    Acute respiratory failure with hypoxia (HCC) POA: Yes    Hypokalemia POA: Yes    Hypophosphatemia POA: Yes    On total parenteral nutrition (TPN) POA: No    Hypotension POA: Unknown    Low magnesium level POA: Unknown    Electrolyte abnormality POA: Unknown      FOLLOW UP  No future appointments.  Pratik Gordon M.D.  [address] [address]  [phone]    Schedule an appointment as soon as possible for a visit in 1 week      Fausto Casas M.D.  [address]  [facility] [address]  [phone]    Schedule an appointment as soon as possible for a visit in 2 weeks      Jaden Cook M.D.  [address] [facility] [address]  [facility] [address]  [phone]    Schedule an appointment as soon as possible for a visit in 2 weeks        MEDICATIONS ON DISCHARGE     Medication List      START taking these medications      Instructions   oxyCODONE immediate-release 5 MG Tabs  Commonly known as: ROXICODONE   Take 1 Tablet by mouth every 6 hours as needed for Severe Pain for up to 5 days.  Dose: 5 mg        CONTINUE taking these medications      Instructions   BIOTIN PO   Take 1 Tablet by mouth every day.  Dose: 1 Tablet     cyclobenzaprine 10 mg Tabs  Commonly known as: Flexeril   Take 10 mg by mouth every evening.  Dose: 10 mg     ezetimibe 10 MG Tabs  Commonly known as: ZETIA   Take 10 mg by mouth every evening.  Dose: 10 mg     gabapentin 300 MG Caps  Commonly known as: NEURONTIN   Take 600 mg by mouth 2 Times a Day.  Dose: 600 mg     HYDROcodone/acetaminophen  MG Tabs  Commonly known as: NORCO   Take 0.5  "Tablets by mouth every 6 hours as needed for Moderate Pain.  Dose: 0.5 Tablet     Magnesium 400 MG Tabs   Take 400 mg by mouth every evening.  Dose: 400 mg     ondansetron 4 MG Tbdp  Commonly known as: ZOFRAN ODT   Take 4 mg by mouth every 6 hours as needed for Nausea.  Dose: 4 mg     VITAMIN D3 PO   Take 1 Each by mouth every day.  Dose: 1 Each            Allergies  Allergies   Allergen Reactions   • Ampicillin Rash     Rash  Tolerates Zosyn 10/21   • Cephalexin Diarrhea, Vomiting and Nausea     .   • Clindamycin Vomiting     \"cleocin\"   • Codeine      Severe stomach pain, cramps, spasms   • Demerol Vomiting   • Levofloxacin Unspecified     Numbness     • Tetracyclines Rash     .   • Tizanidine Itching   • Morphine Vomiting   • Pcn [Penicillins] Itching     Tolerates Zosyn 10/21   • Sulfa Drugs Itching       DIET  Orders Placed This Encounter   Procedures   • Diet Order Diet: Regular     Standing Status:   Standing     Number of Occurrences:   1     Order Specific Question:   Diet:     Answer:   Regular [1]       ACTIVITY  As tolerated.  Weight bearing as tolerated    CONSULTATIONS  Gastroenterology  General surgery    PROCEDURES  Ex lap  Drain placement  Ostomy creation    IMAGING  CT-ABDOMEN-PELVIS WITH   Final Result      1.  RIGHT lateral no abnormal abscess again seen, decreased in size from prior exam with drain in place, however contents now appears hyperdense potential indicating bowel contrast, concerning for bowel perforation or fistula, although source of contrast    is uncertain.  No cristóbal pneumoperitoneum.   2.  Pneumobilia and biliary stent present.   3.  Interval removal of RIGHT lower quadrant drain.         DX-UPPER GI-SERIES WITH KUB   Final Result      1.  No evidence for duodenal leak.   2.  Reflux of contrast seen into the distal common bile duct at the site of biliary stent, consistent with prior sphincterotomy.      DX-ABDOMEN FOR TUBE PLACEMENT   Final Result         1.  Nonspecific bowel " gas pattern.   2.  Dobbhoff tube with tip terminating overlying the expected location of the third or fourth duodenal segment.   3.  Hazy right lower lobe infiltrates.      DX-ABDOMEN FOR TUBE PLACEMENT   Final Result      Feeding tube extends to the region of the stomach and duodenum with tip at the level of the proximal end of the jejunum.      DX-ABDOMEN FOR TUBE PLACEMENT   Final Result      Enteric tube terminates over the duodenal jejunal junction      Covered common bile duct stent      No contrast extravasation is seen      [id number] STENT - TUBE   Final Result      Portable fluoroscopic images obtained during ERCP biliary stent placement for localization purposes.      DX-UPPER GI-SERIES WITH KUB   Final Result      Duodenal perforation at the junction of the second and third portions of duodenum as noted on key images.      CT-ABORTED CT PROCEDURE   Final Result      Interval decrease in size of the RIGHT retroperitoneal fluid collection which contains a surgical drain. Because from bowel is possible. No additional drain was placed.            CT-ABDOMEN-PELVIS WITH   Final Result      1.  Slight interval increase in size in fluid collection the right midabdomen with drain in place.      2.  Slight interval increase in size in trace bilateral pleural effusions, right greater than left with bibasilar atelectasis.      3.  Pneumobilia.      CT-ABDOMEN-PELVIS WITH   Final Result         1. The components in the gallbladder fossa and right flank of the complex fluid collection are mostly resolved. There is still a small residual fluid collection in the perinephric space surrounding the inferior right kidney. Right lower quadrant MARTHA drain    and right upper quadrant catheter are outside of this residual collection.   2. Air-fluid levels throughout the colon could relate to ileus.   3. Bilateral pleural effusions with bibasilar atelectasis.   4. Pneumobilia.      CT-ABDOMEN-PELVIS WITH   Final Result       1.  Slight interval decrease in size of the large RIGHT peritoneal abscess which now contains 3 drains   2.  Decreased atelectasis with persistent opacity in the RIGHT lung base likely atelectasis rather than pneumonia   3.  Small LEFT renal cyst   4.  Prior cholecystectomy with ongoing pneumobilia      CT-DRAINAGE-CATH EXCHANGE   Final Result         1. Organized pancreatic pseudocyst Drainage with CT guidance.      CT-ABDOMEN-PELVIS WITH   Final Result      1.  There has been interval placement of an additional inferior lateral pigtail catheter within the complex right abdominal abscess. The other 2 pigtail catheters are stable in appearance.   2.  There has been no significant interval decrease in size of the large complex loculated abscess collection in the right abdomen.   3.  There is no new fluid collection.   4.  Gallbladder is surgically absent with continued pneumobilia.   5.  Interval decrease in the dependent pleural effusion with minimal airspace disease, likely atelectasis.      IR-DRAINAGE-CATH EXCHANGE   Final Result      1.  Successful left-sided drainage catheter removal.   2.  Successful image guided superior right drainage catheter replacement.      Plan: Suction drainage. Monitor outputs. Please contact interventional radiology if there is any concern for tube dysfunction. Suggest routine tube maintenance at 3 months intervals if the tube remains in place.         CT-DRAIN-PERITONEAL   Final Result      1.  CT GUIDED PERITONEAL RLQ abdominal CATHETER DRAINAGE.   2.  THE CURRENT PLAN IS TO MONITOR DRAINAGE OUTPUT AND OBTAIN A FOLLOWUP CT SCAN IN 5-7 DAYS IF CLINICALLY INDICATED.      CT-ABDOMEN-PELVIS WITH   Final Result         1. Grossly unchanged irregular fluid collection occupying the right abdomen surrounding the right kidney and right colon extending to midline. Additional pigtail catheter in the component about midline anterior abdomen. Both pigtail catheters seem to be    within the  collection.      2. Small right pleural effusion with right basilar atelectasis.               DX-CHEST-LIMITED (1 VIEW)   Final Result      1.  Right basilar atelectasis or consolidation. Small right pleural effusion not excluded.   2.  Atherosclerotic plaque.      CT-DRAIN-PERITONEAL   Final Result      1.  CT guided pancreatic pseudocyst catheter drainage.   2.  The current plan is to obtain a follow-up CT in 5-7 days..      IR-PICC LINE PLACEMENT W/ GUIDANCE > AGE 5   Final Result                  Ultrasound-guided PICC placement performed by qualified nursing staff as    above.          CT-ABDOMEN-PELVIS WITH    (Results Pending)         LABORATORY  Lab Results   Component Value Date    SODIUM 130 (L) 11/28/2021    POTASSIUM 3.7 11/28/2021    CHLORIDE 97 11/28/2021    CO2 23 11/28/2021    GLUCOSE 81 11/28/2021    BUN 9 11/28/2021    CREATININE 0.35 (L) 11/28/2021    CREATININE 1.08 (H) 07/08/2011    GLOMRATE 45 (L) 02/24/2011        Lab Results   Component Value Date    WBC 4.6 (L) 11/28/2021    HEMOGLOBIN 9.3 (L) 11/28/2021    HEMATOCRIT 27.9 (L) 11/28/2021    PLATELETCT 238 11/28/2021        Total time of the discharge process exceeds 36 minutes.    ========================================================================================================  Please note that this dictation was created using voice recognition software. I have made every reasonable attempt to correct obvious errors, but there may be errors of grammar and possibly content that I did not discover before finalizing the note.    Electronically signed by:  EDISON Waters, MSN, APRN, FNP-C  Hospitalist Services  [facility]  [phone]  [email or web address]  11/28/21    [id number]

## 2021-11-28 NOTE — DISCHARGE INSTRUCTIONS
FOLLOW UP ITEMS POST DISCHARGE  Please call [phone] to schedule PCP appointment for patient.    Required specialty appointments include:       Discharge Instructions per AYE Wade    -Follow-up with primary care  -Follow-up with gastroenterology, Dr. Young   -Follow-up with general surgery, Dr. Dumont  -Utilize pain medication for pain management  -Resume all home medication    DIET: As tolerated    ACTIVITY: As tolerated    DIAGNOSIS: Abdominal abscesses    Return to ER if symptoms persist, chest pain, palpitations, shortness of breath, numbness, tingling, weakness, and high fevers.        Exploratory Laparotomy, Adult, Care After  This sheet gives you information about how to care for yourself after your procedure. Your health care provider may also give you more specific instructions. If you have problems or questions, contact your health care provider.  What can I expect after the procedure?  After the procedure, it is common to have:  · Abdominal soreness.  · Fatigue.  · A sore throat from the tube in your throat.  · A lack of appetite.  Follow these instructions at home:  Medicines  · Take over-the-counter and prescription medicines only as told by your health care provider.  · If you were prescribed an antibiotic medicine, take it as told by your health care provider. Do not stop taking the antibiotic even if you start to feel better.  · Do not drive or operate heavy machinery while taking pain medicine.  · If you are taking prescription pain medicine, take actions to prevent or treat constipation. Your health care provider may recommend that you:  ? Drink enough fluid to keep your urine pale yellow.  ? Eat foods that are high in fiber, such as fresh fruits and vegetables, whole grains, and beans.  ? Limit foods that are high in fat and processed sugars, such as fried or sweet foods.  ? Take an over-the-counter or prescription medicine for constipation. Undergoing surgery  and taking pain medicines can make constipation worse.  Incision care    · Follow instructions from your health care provider about how to take care of your incision. Make sure you:  ? Wash your hands with soap and water before you change your bandage (dressing). If soap and water are not available, use hand .  ? Change your dressing as told by your health care provider.  ? Leave stitches (sutures), skin glue, or adhesive strips in place. These skin closures may need to stay in place for 2 weeks or longer. If adhesive strip edges start to loosen and curl up, you may trim the loose edges. Do not remove adhesive strips completely unless your health care provider tells you to do that.  · If you were sent home with a drain, follow instructions from your health care provider about how to care for it.  · Check your incision area every day for signs of infection. Check for:  ? Redness, swelling, or pain.  ? Fluid or blood.  ? Warmth.  ? Pus or a bad smell.  Activity    · Rest as told by your health care provider.  ? Avoid sitting for a long time without moving. Get up to take short walks every 1-2 hours. This is important to improve blood flow and breathing. Ask for help if you feel weak or unsteady.  · Do not lift anything that is heavier than 5 lb (2.2 kg), or the limit that your health care provider tells you, until he or she says that it is safe.  · Ask your health care provider when you can start to do your usual activities again, such as driving, going back to work, and having sex.  Eating and drinking  · You may eat what you usually eat. Include lots of whole grains, fruits, and vegetables in your diet. This will help to prevent constipation.  · Drink enough fluid to keep your urine pale yellow.  Bathing  · Keep your incision clean and dry. Clean it as often as told by your health care provider:  ? Gently wash the incision with soap and water.  ? Rinse the incision with water to remove all soap.  ? Pat the  incision dry with a clean towel. Do not rub the incision.  · You may take showers after 48 hours.  · Do not take baths, swim, or use a hot tub until your health care provider says it is okay to do so.  General instructions  · Do not use any products that contain nicotine or tobacco, such as cigarettes and e-cigarettes. These can delay healing after surgery. If you need help quitting, ask your health care provider.  · Wear compression stockings as told by your health care provider. These stockings help to prevent blood clots and reduce swelling in your legs.  · Keep all follow-up visits as told by your health care provider. This is important.  Contact a health care provider if:  · You have a fever.  · You have chills.  · Your pain medicine is not helping.  · You have constipation or diarrhea.  · You have nausea or vomiting.  · You have drainage, redness, swelling, or pain at your incision site.  Get help right away if:  · Your pain is getting worse.  · You have not had a bowel movement for more than 3 days.  · You have ongoing (persistent) vomiting.  · The edges of your incision open up.  · You have warmth, tenderness, and swelling in your calf.  · You have trouble breathing.  · You have chest pain.  These symptoms may represent a serious problem that is an emergency. Do not wait to see if the symptoms will go away. Get medical help right away. Call your local emergency services (911 in the [place]). Do not drive yourself to the hospital.  Summary  · Abdominal soreness is common after exploratory laparotomy. Take over-the-counter and prescription pain medicines only as told by your health care provider.  · Follow instructions from your health care provider about how to take care of your incision. Do not take baths, swim, or use a hot tub until your health care provider says it is okay to do so.  · Watch for signs and symptoms of infection after surgery, including fever, chills, drainage from your incision, and  worsening abdominal pain.  This information is not intended to replace advice given to you by your health care provider. Make sure you discuss any questions you have with your health care provider.  Document Released: 08/01/2005 Document Revised: 02/10/2020 Document Reviewed: 12/28/2018  [employer] Patient Education © 2020 [employer]      Discharge Instructions    Discharged to home by car with relative. Discharged via wheelchair, hospital escort: Yes.  Special equipment needed: Walker    Be sure to schedule a follow-up appointment with your primary care doctor or any specialists as instructed.     Discharge Plan:   Influenza Vaccine Indication: Indicated: 65 years and older    I understand that a diet low in cholesterol, fat, and sodium is recommended for good health. Unless I have been given specific instructions below for another diet, I accept this instruction as my diet prescription.   Other diet: regular    Special Instructions: None    · Is patient discharged on Warfarin / Coumadin?   No         Endoscopic Retrograde Cholangiopancreatogram, Care After  This sheet gives you information about how to care for yourself after your procedure. Your health care provider may also give you more specific instructions. If you have problems or questions, contact your health care provider.  What can I expect after the procedure?  After the procedure, it is common to have:  · Soreness in your throat.  · Nausea.  · Bloating.  · Dizziness.  · Tiredness (fatigue).  Follow these instructions at home:    · Take over-the-counter and prescription medicines only as told by your health care provider.  · Do not drive for 24 hours if you were given a medicine to help you relax (sedative) during your procedure. Have someone stay with you for 24 hours after the procedure.  · Return to your normal activities as told by your health care provider. Ask your health care provider what activities are safe for you.  · Return to eating what you  normally do as soon as you feel well enough or as told by your health care provider.  · Keep all follow-up visits as told by your health care provider. This is important.  Contact a health care provider if:  · You have pain in your abdomen that does not get better with medicine.  · You develop signs of infection, such as:  ? Chills.  ? Feeling unwell.  Get help right away if:  · You have difficulty swallowing.  · You have worsening pain in your throat, chest, or abdomen.  · You vomit bright red blood or a substance that looks like coffee grounds.  · You have bloody or very black stools.  · You have a fever.  · You have a sudden increase in swelling (bloating) in your abdomen.  Summary  · After the procedure, it is common to feel tired and to have some discomfort in your throat.  · Contact your health care provider if you have signs of infection--such as chills or feeling unwell--or if you have pain that does not improve with medicine.  · Get help right away if you have trouble swallowing, worsening pain, bloody or black vomit, bloody or black stools, a fever, or increased swelling in your abdomen.  · Keep all follow-up visits as told by your health care provider. This is important.  This information is not intended to replace advice given to you by your health care provider. Make sure you discuss any questions you have with your health care provider.  Document Released: 10/08/2014 Document Revised: 11/30/2018 Document Reviewed: 11/06/2017  [employer] Patient Education © 2020 [employer]    Depression / Suicide Risk    As you are discharged from this [facility] facility, it is important to learn how to keep safe from harming yourself.    Recognize the warning signs:  · Abrupt changes in personality, positive or negative- including increase in energy   · Giving away possessions  · Change in eating patterns- significant weight changes-  positive or negative  · Change in sleeping patterns- unable to sleep or sleeping  all the time   · Unwillingness or inability to communicate  · Depression  · Unusual sadness, discouragement and loneliness  · Talk of wanting to die  · Neglect of personal appearance   · Rebelliousness- reckless behavior  · Withdrawal from people/activities they love  · Confusion- inability to concentrate     If you or a loved one observes any of these behaviors or has concerns about self-harm, here's what you can do:  · Talk about it- your feelings and reasons for harming yourself  · Remove any means that you might use to hurt yourself (examples: pills, rope, extension cords, firearm)  · Get professional help from the community (Mental Health, Substance Abuse, psychological counseling)  · Do not be alone:Call your Safe Contact- someone whom you trust who will be there for you.  · Call your local CRISIS HOTLINE 900-3958 or 747-772-9318  · Call your local Children's Mobile Crisis Response Team [place] (262) 443-7782 or www.TourNative  · Call the toll free National Suicide Prevention Hotlines   · National Suicide Prevention Lifeline 434-724-CUCF (4747)  · National Hope Line Network 800-SUICIDE (965-5144)

## 2021-11-28 NOTE — PROGRESS NOTES
Discharging Patient home per physician order.  Discharged with spouse.  Demonstrated understanding of discharge instructions, follow up appointments, home medications, prescriptions, home care for surgical wound, and nursing care instructions for drainage sites  Ambulating x1 with FWW, voiding without difficulty, pain well controlled, tolerating oral medications, oxygen saturation greater than 90% , tolerating diet. Educational handouts given and discussed.  Verbalized understanding of discharge instructions and educational handouts.  Stated several reasons why to return to ED or seek medical attention. All questions answered.  Belongings and dressing supplies with patient at time of discharge.

## 2021-11-28 NOTE — PROGRESS NOTES
Assumed care of patient at 0645. Bedside report received. Assessment complete.  AA&Ox4. Denies CP/SOB.  Reporting 6/10 pain. Medicated per MAR.   Educated patient regarding pharmacologic and non pharmacologic modalities for pain management.  Skin per flowsheets  Tolerating regular diet. Denies N/V.  + void. + BM. Last BM 11/28  Pt ambulates x1 assist with FWW  All needs met at this time. Call light within reach. Pt calls appropriately. Bed low and locked, non skid socks in place. Hourly rounding in place.

## 2021-11-28 NOTE — CARE PLAN
Problem: Hemodynamics  Goal: Patient's hemodynamics, fluid balance and neurologic status will be stable or improve  Outcome: Progressing   Pt AnOX4, BP WDL  Problem: Fluid Volume  Goal: Fluid volume balance will be maintained  Outcome: Progressing   Pt displays no signs of edema, BP WDL  Problem: Urinary - Renal Perfusion  Goal: Ability to achieve and maintain adequate renal perfusion and functioning will improve  Outcome: Progressing   Pt urinating without discomfort  The patient is Stable - Low risk of patient condition declining or worsening    Shift Goals  Clinical Goals: (P) Ambulate/ Discharge   Patient Goals: (P) Ambulate /Discharge  Family Goals: not present    Progress made toward(s) clinical / shift goals:  Pt ambulated x1 with Pt this shift, orders to discharge pending     Patient is not progressing towards the following goals:

## 2021-11-28 NOTE — CARE PLAN
The patient is Stable - Low risk of patient condition declining or worsening    Shift Goals  Clinical Goals: pain management  Patient Goals: rest, pain management, nausea control  Family Goals: not present    Progress made toward(s) clinical / shift goals:      Problem: Pain - Standard  Goal: Alleviation of pain or a reduction in pain to the patient’s comfort goal  Outcome: Progressing  Note: Pain managed with prescribed medications and nonpharmalogical pain interventions      Problem: Fall Risk  Goal: Patient will remain free from falls  Outcome: Progressing  Note: Appropriate safety interventions and fall precautions in place        Patient is not progressing towards the following goals:

## 2021-11-28 NOTE — PROGRESS NOTES
Pt extremely fatigued, Cranial nerves grossly intact, neuro exam WDL, YUSUF. Pt unable to state reason for fatigue, AnO4, refused nursing interventions at this time

## 2021-11-28 NOTE — THERAPY
Physical Therapy   Daily Treatment     Patient Name: Nils Alfonso  Age:  66 y.o., Sex:  female  Medical Record #: [id number]  Today's Date: 11/28/2021     Precautions: Fall Risk  Comments: ostomy bag    Assessment  Pt seen for PT treatment session. Pt motivated to work with therapy d/t possibility of pt DC today. Pt at SPV level for all functional mobility: slightly increased time/effort with bed mobility and stair negotiation, FWW for transfers and ambulation. Pt slightly dizzy upon sitting EOB, however it dissipated before further mobility. VCs for stair negotiation technique. Pt appears functionally capable to DC from PT standpoint and pt has no concerns with DC at this time.  present and reports that he can assist at home as needed. Pt will benefit from HHPT to address higher level functional deficits and increase home safety. Patient will not be actively followed for physical therapy services at this time, however may be seen if requested by physician for 1 more visit within 30 days to address any discharge or equipment needs.    Plan  Discharge secondary to goals met.  DC Equipment Recommendations: None (pt reports FWW in room is hers)  Discharge Recommendations: Recommend home health for continued physical therapy services         11/28/21 1019   Vitals   O2 Delivery Device None - Room Air   Pain 0 - 10 Group   Therapist Pain Assessment no c/o pain   Cognition    Cognition / Consciousness WDL   Level of Consciousness Alert   Comments pleasant and participatory, motivated to work with therapy so she can go home   Balance   Sitting Balance (Static) Fair +   Sitting Balance (Dynamic) Fair +   Standing Balance (Static) Fair   Standing Balance (Dynamic) Fair   Weight Shift Sitting Fair   Weight Shift Standing Fair   Skilled Intervention Verbal Cuing   Comments FWW   Gait Analysis   Gait Level Of Assist Supervised   Assistive Device Front Wheel Walker   Distance (Feet) 250   # of Times Distance was Traveled  1   Deviation Bradykinetic   # of Stairs Climbed 3   Level of Assist with Stairs Supervised   Weight Bearing Status no restrictions   Skilled Intervention Sequencing;Verbal Cuing   Comments no LOB, VCs for stair negotiating technique   Bed Mobility    Supine to Sit Supervised   Sit to Supine Supervised   Scooting Supervised  (EOB)   Rolling Supervised   Skilled Intervention Verbal Cuing   Comments HOB slightly elevated, used rails, pt has recliner at home for sleep if needed. pt reported dizziness at EOB which dissipated before further mobility   Functional Mobility   Sit to Stand Supervised   Skilled Intervention Sequencing;Verbal Cuing   Comments FWW, VCs for hand positioning and sequencing   Short Term Goals    Short Term Goal # 1 pt will move supine<>EOB via log roll with HOB flat, no features, and SPV within 6 visits to facilitate getting in/out of bed.   Goal Outcome # 1 Other (see comments)  (NT, pt has recliner at home)   Short Term Goal # 2 pt will complete all transfers with FWW and SPV within 6 weeks to increase functional mobility.   Goal Outcome # 2 Goal met   Short Term Goal # 3 pt will amb 150' with FWW and SPV within 6 visits for household mobility.   Goal Outcome # 3 Goal met   Short Term Goal # 4 Pt will ascend and descend step x 2 with SPV to access her home by the 6th tx   Goal Outcome # 4 Goal met

## 2021-11-28 NOTE — PROGRESS NOTES
Head to toe assessment complete.  AA&Ox4. Denies CP/SOB.  Patient reporting pain is under control at this time but expresses she is very fatigued and cold. Provided warm blankets and heat packs. Shut lights off to encourage sleep.  Skin per flowsheets  + void. + BM. Last BM 11/27  Pt ambulates x1 assist with FWW  All needs met at this time. Call light within reach. Pt calls appropriately. Bed low and locked, non skid socks in place. Hourly rounding in place.

## 2021-11-29 ENCOUNTER — HOME CARE VISIT (OUTPATIENT)
Dept: HOME HEALTH SERVICES | Facility: HOME HEALTHCARE | Age: 66
End: 2021-11-29
Payer: MEDICARE

## 2021-11-29 ENCOUNTER — DOCUMENTATION (OUTPATIENT)
Dept: MEDICAL GROUP | Facility: PHYSICIAN GROUP | Age: 66
End: 2021-11-29

## 2021-11-29 VITALS
HEART RATE: 88 BPM | WEIGHT: 130 LBS | SYSTOLIC BLOOD PRESSURE: 140 MMHG | OXYGEN SATURATION: 94 % | RESPIRATION RATE: 20 BRPM | TEMPERATURE: 98.5 F | DIASTOLIC BLOOD PRESSURE: 60 MMHG | BODY MASS INDEX: 22.2 KG/M2 | HEIGHT: 64 IN

## 2021-11-29 PROCEDURE — G0493 RN CARE EA 15 MIN HH/HOSPICE: HCPCS

## 2021-11-29 PROCEDURE — 665001 SOC-HOME HEALTH

## 2021-11-29 ASSESSMENT — ENCOUNTER SYMPTOMS
HIGHEST PAIN SEVERITY IN PAST 24 HOURS: 10/10
PAIN: 1
PAIN LOCATION - PAIN FREQUENCY: CONSTANT
SHORTNESS OF BREATH: T
PAIN LOCATION - EXACERBATING FACTORS: MOVEMENT
NAUSEA: DENIES
PAIN LOCATION - PAIN SEVERITY: 6/10
PAIN SEVERITY GOAL: 0/10
LOWEST PAIN SEVERITY IN PAST 24 HOURS: 6/10
PAIN LOCATION - PAIN DURATION: CONSTANT
SUBJECTIVE PAIN PROGRESSION: WAXING AND WANING
PAIN LOCATION: ABDOMEN
VOMITING: DENIES
PERSON REPORTING PAIN: PATIENT
PAIN LOCATION - PAIN QUALITY: DULL ACHE

## 2021-11-29 ASSESSMENT — FIBROSIS 4 INDEX: FIB4 SCORE: 1.17

## 2021-11-29 ASSESSMENT — PATIENT HEALTH QUESTIONNAIRE - PHQ9
2. FEELING DOWN, DEPRESSED, IRRITABLE, OR HOPELESS: 00
1. LITTLE INTEREST OR PLEASURE IN DOING THINGS: 00
CLINICAL INTERPRETATION OF PHQ2 SCORE: 0

## 2021-11-29 ASSESSMENT — ACTIVITIES OF DAILY LIVING (ADL): OASIS_M1830: 03

## 2021-11-29 NOTE — CASE COMMUNICATION
Primary dx/Skilled need: Perforation of intestine- ERCP on 10/1/2022 Post op  intermittent pain, fevers, and nausea. Patient underwent an ex lap with I&D of multiple loculated abscesses and debridement of necrotizing fasciitis with Dr. ST on 11/5/2021. Repeat CT of the abdomen on 11/15/2021 noted increase fluid collection in the right mid abdomen and slight increase in trace bilateral pleural effusions. Interventional radiology was consu lted on 11/16/2020 121 for a repeat peritoneal drainage. RLQ MARTHA drain had fallen out and was not replaced due to the abdominal fluid collection being noted to be smaller.Upper GI series on 11/17/2021 showed a leak at the perforation in 2/3 portion of the duodenum. Patient underwent an endoscopy/ERCP, biliary stent, core track placement on 11/18/2021 with Dr. [facility] and was started on enteral feedings on 11/19/2021 with  monitoring of MARTHA drain output Infectious disease was consulted and was placed on Bactrim twice daily, Zosyn, micafungin with end date of 11/24/2021. SOC to  for surgical Wound Care x3. Post Op Care, Infection and pain management. PT and OT for rehab. eval and treatment  SN frequency. 3wk1, 2wk4, 1wk4, 3 PRN  Zip code.[id number]  Disciplines ordered.SN  PT  OT  Insurance and authorization.[facility]  Certification period.11/29/21 to 01/27/22  Spe cial considerations. Please evaluate for dietician referral at a later date.

## 2021-11-29 NOTE — PROGRESS NOTES
"Medication chart review for [facility] services    PCP:  Pratik Gordon M.D.  [address] [place] [id number]  Fax: [phone]    Current medication list     Current Outpatient Medications:   •  Acetaminophen (TYLENOL PO), 500 mg, Oral, TID PRN  •  oxyCODONE immediate-release, 5 mg, Oral, Q6HRS PRN  •  cyclobenzaprine, 10 mg, Oral, Q EVENING  •  ezetimibe, 10 mg, Oral, Q EVENING  •  Magnesium, 400 mg, Oral, Q EVENING  •  gabapentin, 600 mg, Oral, BID  •  BIOTIN PO, 1 Tablet, Oral, QDAY PRN  •  Cholecalciferol (VITAMIN D3 PO), 1 Each, Oral, DAILY  •  ondansetron, 4 mg, Oral, Q6HRS PRN    Allergies   Allergen Reactions   • Ampicillin Rash     Rash  Tolerates Zosyn 10/21   • Cephalexin Diarrhea, Vomiting and Nausea     .   • Clindamycin Vomiting     \"cleocin\"   • Codeine      Severe stomach pain, cramps, spasms   • Demerol Vomiting   • Levofloxacin Unspecified     Numbness     • Tetracyclines Rash     .   • Tizanidine Itching   • Morphine Vomiting   • Pcn [Penicillins] Itching     Tolerates Zosyn 10/21   • Sulfa Drugs Itching       Labs     Lab Results   Component Value Date/Time    SODIUM 130 (L) 11/28/2021 02:20 AM    POTASSIUM 3.7 11/28/2021 02:20 AM    CHLORIDE 97 11/28/2021 02:20 AM    CO2 23 11/28/2021 02:20 AM    GLUCOSE 81 11/28/2021 02:20 AM    BUN 9 11/28/2021 02:20 AM    CREATININE 0.35 (L) 11/28/2021 02:20 AM    CREATININE 1.08 (H) 07/08/2011 10:53 AM    BUNCREATRAT 15 07/08/2011 10:53 AM    GLOMRATE 45 (L) 02/24/2011 08:52 AM     Lab Results   Component Value Date/Time    ALKPHOSPHAT 85 11/25/2021 03:45 AM    ASTSGOT 22 11/25/2021 03:45 AM    ALTSGPT 27 11/25/2021 03:45 AM    TBILIRUBIN <0.2 11/25/2021 03:45 AM    INR 1.24 (H) 11/04/2021 01:17 PM    ALBUMIN 2.5 (L) 11/25/2021 03:45 AM        Assessment and Plan:   • Received referral from [facility]. Medications reviewed.       Ulysses Caruso, PharmD, MS, BCACP, [facility]  Phone [phone] fax " [phone]    This note was created using voice recognition software (Dragon). The accuracy of the dictation is limited by the abilities of the software. I have reviewed the note prior to signing, however some errors in grammar and context are still possible. If you have any questions related to this note please do not hesitate to contact our office.

## 2021-11-30 ENCOUNTER — TELEPHONE (OUTPATIENT)
Dept: INFECTIOUS DISEASES | Facility: MEDICAL CENTER | Age: 66
End: 2021-11-30

## 2021-11-30 NOTE — TELEPHONE ENCOUNTER
PT d/c from hospital on 11/28, EOT was 11/24/21 on IV and Oral ABX. D/c note states to follow up in ID. Next available appointment is in January. Please advise if you'd like to bring patient in for fv.

## 2021-12-01 ENCOUNTER — HOME CARE VISIT (OUTPATIENT)
Dept: HOME HEALTH SERVICES | Facility: HOME HEALTHCARE | Age: 66
End: 2021-12-01
Payer: MEDICARE

## 2021-12-01 VITALS
RESPIRATION RATE: 18 BRPM | DIASTOLIC BLOOD PRESSURE: 72 MMHG | SYSTOLIC BLOOD PRESSURE: 128 MMHG | OXYGEN SATURATION: 96 % | TEMPERATURE: 99.3 F | HEART RATE: 89 BPM

## 2021-12-01 VITALS
SYSTOLIC BLOOD PRESSURE: 110 MMHG | RESPIRATION RATE: 20 BRPM | OXYGEN SATURATION: 93 % | HEART RATE: 106 BPM | DIASTOLIC BLOOD PRESSURE: 75 MMHG | TEMPERATURE: 98.4 F

## 2021-12-01 PROCEDURE — G0299 HHS/HOSPICE OF RN EA 15 MIN: HCPCS

## 2021-12-01 PROCEDURE — G0151 HHCP-SERV OF PT,EA 15 MIN: HCPCS

## 2021-12-01 ASSESSMENT — ENCOUNTER SYMPTOMS
LOWEST PAIN SEVERITY IN PAST 24 HOURS: 4/10
PAIN: 1
LOWEST PAIN SEVERITY IN PAST 24 HOURS: 4/10
PAIN SEVERITY GOAL: 0/10
PAIN SEVERITY GOAL: 0/10
PAIN LOCATION - PAIN QUALITY: ACHING
PAIN LOCATION - RELIEVING FACTORS: PAIN MEDS, REST, REPOSITIONING
NAUSEA: DENIES
HIGHEST PAIN SEVERITY IN PAST 24 HOURS: 8/10
PAIN LOCATION - EXACERBATING FACTORS: STANDING, WALKING, MOVING
VOMITING: DENIES
HIGHEST PAIN SEVERITY IN PAST 24 HOURS: 4/10
PAIN LOCATION - PAIN SEVERITY: 5/10
PAIN LOCATION - PAIN FREQUENCY: CONSTANT
PAIN LOCATION - PAIN DURATION: DAILY
PAIN LOCATION: ABDOMEN
PERSON REPORTING PAIN: PATIENT
MUSCLE WEAKNESS: 1
SUBJECTIVE PAIN PROGRESSION: WAXING AND WANING
PERSON REPORTING PAIN: PATIENT
MUSCLE WEAKNESS: 1
PAIN: 1
PAIN LOCATION: ABDOMEN

## 2021-12-01 ASSESSMENT — ACTIVITIES OF DAILY LIVING (ADL)
GROOMING_COMMENTS: SEE OT NOTES
FEEDING_COMMENTS: SEE OT NOTES
BATHING_COMMENTS: SEE OT NOTES

## 2021-12-01 NOTE — Clinical Note
Danii can you please fac this CC to Dr. Pratik Gordon at [facility] in [place]  Telephone number [phone]      Dr. Pratik Gordon service called and message left  with KARMA mendoza MA to call this SN back regarding HR of 122/106

## 2021-12-02 NOTE — TELEPHONE ENCOUNTER
Katelin Lopez M.D.  Kylah Thomas, Med Ass't  Caller: Unspecified (2 days ago,  2:05 PM)  She was discharged with GI and surgery follow-up and no discussion with ID.  No ID follow-up. F/up as planned with the other specialties.

## 2021-12-02 NOTE — CASE COMMUNICATION
CM noted  ----- Message -----  From: Sari De La Cruz R.N.  Sent: 12/1/2021   3:12 PM PST  To: ESSENCE Mendez can you please fac this CC to Dr. Pratik Gordon at [facility] in [place]  Telephone number [phone]      Dr. Pratik Gordon service called and message left  with KARMA mendoza MA to call this SN back regarding HR of 122/106

## 2021-12-03 ENCOUNTER — APPOINTMENT (OUTPATIENT)
Dept: RADIOLOGY | Facility: MEDICAL CENTER | Age: 66
End: 2021-12-03
Attending: EMERGENCY MEDICINE
Payer: MEDICARE

## 2021-12-03 ENCOUNTER — HOME CARE VISIT (OUTPATIENT)
Dept: HOME HEALTH SERVICES | Facility: HOME HEALTHCARE | Age: 66
End: 2021-12-03
Payer: MEDICARE

## 2021-12-03 ENCOUNTER — HOSPITAL ENCOUNTER (EMERGENCY)
Facility: MEDICAL CENTER | Age: 66
End: 2021-12-03
Attending: EMERGENCY MEDICINE
Payer: MEDICARE

## 2021-12-03 VITALS
TEMPERATURE: 96.8 F | OXYGEN SATURATION: 97 % | SYSTOLIC BLOOD PRESSURE: 136 MMHG | RESPIRATION RATE: 16 BRPM | HEART RATE: 90 BPM | WEIGHT: 130 LBS | DIASTOLIC BLOOD PRESSURE: 78 MMHG | BODY MASS INDEX: 22.2 KG/M2 | HEIGHT: 64 IN

## 2021-12-03 DIAGNOSIS — R65.20 SEPSIS WITH ACUTE ORGAN DYSFUNCTION WITHOUT SEPTIC SHOCK, DUE TO UNSPECIFIED ORGANISM, UNSPECIFIED TYPE: ICD-10-CM

## 2021-12-03 DIAGNOSIS — A41.9 SEPSIS WITH ACUTE ORGAN DYSFUNCTION WITHOUT SEPTIC SHOCK, DUE TO UNSPECIFIED ORGANISM, UNSPECIFIED TYPE: ICD-10-CM

## 2021-12-03 DIAGNOSIS — T81.43XA POSTPROCEDURAL INTRAABDOMINAL ABSCESS: ICD-10-CM

## 2021-12-03 LAB
ALBUMIN SERPL BCP-MCNC: 2.9 G/DL (ref 3.2–4.9)
ALBUMIN/GLOB SERPL: 1 G/DL
ALP SERPL-CCNC: 118 U/L (ref 30–99)
ALT SERPL-CCNC: 25 U/L (ref 2–50)
ANION GAP SERPL CALC-SCNC: 14 MMOL/L (ref 7–16)
AST SERPL-CCNC: 28 U/L (ref 12–45)
BASOPHILS # BLD AUTO: 0.4 % (ref 0–1.8)
BASOPHILS # BLD: 0.05 K/UL (ref 0–0.12)
BILIRUB SERPL-MCNC: 0.6 MG/DL (ref 0.1–1.5)
BUN SERPL-MCNC: 9 MG/DL (ref 8–22)
CALCIUM SERPL-MCNC: 8.2 MG/DL (ref 8.4–10.2)
CHLORIDE SERPL-SCNC: 92 MMOL/L (ref 96–112)
CO2 SERPL-SCNC: 21 MMOL/L (ref 20–33)
CREAT SERPL-MCNC: 0.48 MG/DL (ref 0.5–1.4)
EKG IMPRESSION: NORMAL
EOSINOPHIL # BLD AUTO: 0.02 K/UL (ref 0–0.51)
EOSINOPHIL NFR BLD: 0.2 % (ref 0–6.9)
ERYTHROCYTE [DISTWIDTH] IN BLOOD BY AUTOMATED COUNT: 53.9 FL (ref 35.9–50)
FLUAV RNA SPEC QL NAA+PROBE: NEGATIVE
FLUBV RNA SPEC QL NAA+PROBE: NEGATIVE
GLOBULIN SER CALC-MCNC: 2.9 G/DL (ref 1.9–3.5)
GLUCOSE SERPL-MCNC: 89 MG/DL (ref 65–99)
HCT VFR BLD AUTO: 36.7 % (ref 37–47)
HGB BLD-MCNC: 12.1 G/DL (ref 12–16)
IMM GRANULOCYTES # BLD AUTO: 0.11 K/UL (ref 0–0.11)
IMM GRANULOCYTES NFR BLD AUTO: 0.9 % (ref 0–0.9)
LACTATE BLD-SCNC: 1.7 MMOL/L (ref 0.5–2)
LACTATE BLD-SCNC: 2.5 MMOL/L (ref 0.5–2)
LIPASE SERPL-CCNC: 55 U/L (ref 7–58)
LYMPHOCYTES # BLD AUTO: 1.67 K/UL (ref 1–4.8)
LYMPHOCYTES NFR BLD: 13.6 % (ref 22–41)
MCH RBC QN AUTO: 29.9 PG (ref 27–33)
MCHC RBC AUTO-ENTMCNC: 33 G/DL (ref 33.6–35)
MCV RBC AUTO: 90.6 FL (ref 81.4–97.8)
MONOCYTES # BLD AUTO: 0.81 K/UL (ref 0–0.85)
MONOCYTES NFR BLD AUTO: 6.6 % (ref 0–13.4)
NEUTROPHILS # BLD AUTO: 9.63 K/UL (ref 2–7.15)
NEUTROPHILS NFR BLD: 78.3 % (ref 44–72)
NRBC # BLD AUTO: 0.02 K/UL
NRBC BLD-RTO: 0.2 /100 WBC
PLATELET # BLD AUTO: 246 K/UL (ref 164–446)
PMV BLD AUTO: 8.7 FL (ref 9–12.9)
POTASSIUM SERPL-SCNC: 3.6 MMOL/L (ref 3.6–5.5)
PROT SERPL-MCNC: 5.8 G/DL (ref 6–8.2)
RBC # BLD AUTO: 4.05 M/UL (ref 4.2–5.4)
RSV RNA SPEC QL NAA+PROBE: NEGATIVE
SARS-COV-2 RNA RESP QL NAA+PROBE: NOTDETECTED
SODIUM SERPL-SCNC: 127 MMOL/L (ref 135–145)
SPECIMEN SOURCE: NORMAL
TROPONIN T SERPL-MCNC: 15 NG/L (ref 6–19)
WBC # BLD AUTO: 12.3 K/UL (ref 4.8–10.8)

## 2021-12-03 PROCEDURE — 99285 EMERGENCY DEPT VISIT HI MDM: CPT

## 2021-12-03 PROCEDURE — 87040 BLOOD CULTURE FOR BACTERIA: CPT

## 2021-12-03 PROCEDURE — 80053 COMPREHEN METABOLIC PANEL: CPT

## 2021-12-03 PROCEDURE — 83690 ASSAY OF LIPASE: CPT

## 2021-12-03 PROCEDURE — 85025 COMPLETE CBC W/AUTO DIFF WBC: CPT

## 2021-12-03 PROCEDURE — 83605 ASSAY OF LACTIC ACID: CPT

## 2021-12-03 PROCEDURE — 96365 THER/PROPH/DIAG IV INF INIT: CPT | Mod: XU

## 2021-12-03 PROCEDURE — 96376 TX/PRO/DX INJ SAME DRUG ADON: CPT

## 2021-12-03 PROCEDURE — G0299 HHS/HOSPICE OF RN EA 15 MIN: HCPCS

## 2021-12-03 PROCEDURE — 74177 CT ABD & PELVIS W/CONTRAST: CPT | Mod: ME

## 2021-12-03 PROCEDURE — 700105 HCHG RX REV CODE 258: Performed by: EMERGENCY MEDICINE

## 2021-12-03 PROCEDURE — 84484 ASSAY OF TROPONIN QUANT: CPT

## 2021-12-03 PROCEDURE — 0241U HCHG SARS-COV-2 COVID-19 NFCT DS RESP RNA 4 TRGT MIC: CPT

## 2021-12-03 PROCEDURE — C9803 HOPD COVID-19 SPEC COLLECT: HCPCS | Performed by: EMERGENCY MEDICINE

## 2021-12-03 PROCEDURE — 700111 HCHG RX REV CODE 636 W/ 250 OVERRIDE (IP): Performed by: EMERGENCY MEDICINE

## 2021-12-03 PROCEDURE — 71045 X-RAY EXAM CHEST 1 VIEW: CPT

## 2021-12-03 PROCEDURE — 93005 ELECTROCARDIOGRAM TRACING: CPT | Performed by: EMERGENCY MEDICINE

## 2021-12-03 PROCEDURE — 700102 HCHG RX REV CODE 250 W/ 637 OVERRIDE(OP): Performed by: EMERGENCY MEDICINE

## 2021-12-03 PROCEDURE — A9270 NON-COVERED ITEM OR SERVICE: HCPCS | Performed by: EMERGENCY MEDICINE

## 2021-12-03 PROCEDURE — 96375 TX/PRO/DX INJ NEW DRUG ADDON: CPT

## 2021-12-03 PROCEDURE — 36415 COLL VENOUS BLD VENIPUNCTURE: CPT

## 2021-12-03 PROCEDURE — 94760 N-INVAS EAR/PLS OXIMETRY 1: CPT

## 2021-12-03 PROCEDURE — 700117 HCHG RX CONTRAST REV CODE 255: Performed by: EMERGENCY MEDICINE

## 2021-12-03 RX ORDER — ONDANSETRON 2 MG/ML
4 INJECTION INTRAMUSCULAR; INTRAVENOUS ONCE
Status: COMPLETED | OUTPATIENT
Start: 2021-12-03 | End: 2021-12-03

## 2021-12-03 RX ORDER — SODIUM CHLORIDE 9 MG/ML
1000 INJECTION, SOLUTION INTRAVENOUS ONCE
Status: COMPLETED | OUTPATIENT
Start: 2021-12-03 | End: 2021-12-03

## 2021-12-03 RX ORDER — MORPHINE SULFATE 4 MG/ML
4 INJECTION INTRAVENOUS ONCE
Status: COMPLETED | OUTPATIENT
Start: 2021-12-03 | End: 2021-12-03

## 2021-12-03 RX ORDER — ACETAMINOPHEN 500 MG
1000 TABLET ORAL ONCE
Status: COMPLETED | OUTPATIENT
Start: 2021-12-03 | End: 2021-12-03

## 2021-12-03 RX ADMIN — IOHEXOL 80 ML: 350 INJECTION, SOLUTION INTRAVENOUS at 17:39

## 2021-12-03 RX ADMIN — ONDANSETRON 4 MG: 2 INJECTION INTRAMUSCULAR; INTRAVENOUS at 22:11

## 2021-12-03 RX ADMIN — PIPERACILLIN AND TAZOBACTAM 3.38 G: 3; .375 INJECTION, POWDER, LYOPHILIZED, FOR SOLUTION INTRAVENOUS; PARENTERAL at 18:49

## 2021-12-03 RX ADMIN — ACETAMINOPHEN 1000 MG: 500 TABLET ORAL at 15:57

## 2021-12-03 RX ADMIN — SODIUM CHLORIDE 1000 ML: 9 INJECTION, SOLUTION INTRAVENOUS at 19:21

## 2021-12-03 RX ADMIN — MORPHINE SULFATE 4 MG: 4 INJECTION INTRAVENOUS at 18:55

## 2021-12-03 RX ADMIN — ONDANSETRON 4 MG: 2 INJECTION INTRAMUSCULAR; INTRAVENOUS at 18:55

## 2021-12-03 RX ADMIN — MORPHINE SULFATE 4 MG: 4 INJECTION INTRAVENOUS at 15:57

## 2021-12-03 RX ADMIN — MORPHINE SULFATE 4 MG: 4 INJECTION INTRAVENOUS at 20:54

## 2021-12-03 RX ADMIN — ONDANSETRON 4 MG: 2 INJECTION INTRAMUSCULAR; INTRAVENOUS at 15:57

## 2021-12-03 RX ADMIN — SODIUM CHLORIDE 1000 ML: 9 INJECTION, SOLUTION INTRAVENOUS at 15:57

## 2021-12-03 ASSESSMENT — FIBROSIS 4 INDEX: FIB4 SCORE: 1.17

## 2021-12-03 ASSESSMENT — PAIN DESCRIPTION - PAIN TYPE
TYPE: ACUTE PAIN

## 2021-12-03 NOTE — ED PROVIDER NOTES
ED Provider Note  ED Provider Note    Scribed for Stuart Barreto by Stuart Barreto. 12/3/2021  3:22 PM    Primary care provider: Pratik Gordon M.D.  Means of arrival: ems  History obtained from: Patient  History limited by: None    CHIEF COMPLAINT  Chief Complaint   Patient presents with   • Weakness     65 y/o female brought in by [facility] for generalized weakness and decreased mobility. Patient stated she has been having dizziness and lightheadness with ambulation, fevers, and feeelings of dehydration.    • Dizziness   • Fever       HPI  Nils Alfonso is a 66 y.o. female who presents to the Emergency Department with fever and right lower quadrant abdominal pain.  Patient has a complicated medical history including recent hospitalization for complicated duodenectomy and recurrent pancreatitis.  She is on chronic opiate medications.  Noted to have low-grade fever today and abdominal pain.  Denies nausea, vomiting, has normal stool output from her ostomy.  Denies body aches, headaches or decreased urination.  She is vaccinated for flu and Covid.  No known sick contacts.  Denies chest pain or shortness of breath or cough.    REVIEW OF SYSTEMS  As above, all other systems reviewed and are negative.   See HPI for further details.     PAST MEDICAL HISTORY   has a past medical history of Allergy, unspecified not elsewhere classified, Anemia, Anesthesia, Arthritis, Backpain, Bronchitis (2010), Cataract, Degeneration of cervical intervertebral disc, Dental disorder, Heart burn, Hiatus hernia syndrome, High cholesterol, Hyperlipidemia, Hypertension, Muscle disorder, Pain (05/16/2018), and Reactive airway disease.    SURGICAL HISTORY   has a past surgical history that includes hysterectomy robotic (1/2/2009); tonsillectomy and adenoidectomy (1967); tubal ligation (1985); gastric resection (1982); primary c section (1976, 1979, 1985); lumbar fusion anterior (2007); cholecystectomy (1996); rotator cuff repair  (Left, 2015); rotator cuff repair (Right, 2016); gastroscopy (N/A, 2016); gastric sleeve laparoscopy (10/19/2016); liver biopsy laparoscopic (10/19/2016); cataract phaco with iol (Right, 2017); cataract phaco with iol (Left, 2017); gastroscopy (2018); egd w/endoscopic ultrasound (2018); colonoscopy (2018); ercp,diagnostic (10/1/2021); upper gi endoscopy,diagnosis (N/A, 2021); ercp,diagnostic (N/A, 2021); place percut gastrostomy tube (N/A, 2021); biliary stent placement (N/A, 2021); exploratory of abdomen (2021); ultrasonic guidance, intraoperative (2021); and abdominal abscess drainage (2021).    SOCIAL HISTORY  Social History     Tobacco Use   • Smoking status: Former Smoker     Packs/day: 0.25     Years: 0.10     Pack years: 0.02     Types: Cigarettes     Quit date: 1973     Years since quittin.9   • Smokeless tobacco: Never Used   Vaping Use   • Vaping Use: Never used   Substance Use Topics   • Alcohol use: No   • Drug use: No      Social History     Substance and Sexual Activity   Drug Use No       FAMILY HISTORY  Family History   Problem Relation Age of Onset   • Stroke Mother    • Cancer Father         larynx   • Diabetes Brother        CURRENT MEDICATIONS  Home Medications     Reviewed by Sylvie Jovel R.N. (Registered Nurse) on 21 at 1501  Med List Status: Complete   Medication Last Dose Status   Acetaminophen (TYLENOL PO) 2021 Active   BIOTIN PO 2021 Active   Cholecalciferol (VITAMIN D3 PO) 2021 Active   cyclobenzaprine (FLEXERIL) 10 mg Tab 2021 Active   ezetimibe (ZETIA) 10 MG Tab 2021 Active   gabapentin (NEURONTIN) 300 MG Cap 2021 Active   ondansetron (ZOFRAN ODT) 4 MG TABLET DISPERSIBLE Unknown Active   oxyCODONE immediate-release (ROXICODONE) 5 MG Tab 2021 Active                ALLERGIES  Allergies   Allergen Reactions   • Ampicillin Rash     Rash  Tolerates Zosyn 10/21   •  "Cephalexin Diarrhea, Vomiting and Nausea     .   • Clindamycin Vomiting     \"cleocin\"   • Codeine      Severe stomach pain, cramps, spasms   • Demerol Vomiting   • Levofloxacin Unspecified     Numbness     • Tetracyclines Rash     .   • Tizanidine Itching   • Morphine Vomiting   • Pcn [Penicillins] Itching     Tolerates Zosyn 10/21   • Sulfa Drugs Itching       PHYSICAL EXAM  VITAL SIGNS: /70   Pulse 97   Temp 36.6 °C (97.8 °F) (Oral)   Resp 16   Ht 1.626 m (5' 4\")   Wt 59 kg (130 lb)   LMP 01/01/2009   SpO2 92%   BMI 22.31 kg/m²     Constitutional: Well developed, Well nourished, No acute distress, Non-toxic appearance.   HENT: Normocephalic, Atraumatic, Bilateral external ears normal, Oropharynx is clear mucous membranes are moist. No oral exudates or nasal discharge.   Eyes: Pupils are equal round and reactive, EOMI, Conjunctiva normal, No discharge.   Neck: Normal range of motion, No tenderness, Supple, No stridor. No meningismus.  Lymphatic: No lymphadenopathy noted.   Cardiovascular: Regular rate and rhythm without murmur rub or gallop.  Thorax & Lungs: Clear breath sounds bilaterally without wheezes, rhonchi or rales. There is no chest wall tenderness.   Abdomen: Soft non-tender non-distended. There is no rebound or guarding. No organomegaly is appreciated. Bowel sounds are normal.  Has colostomy present.  Has 2 drains in the abdomen above the ostomy from prior intra-abdominal abscesses and drains.  Skin: Normal without rash.   Back: No CVA or spinal tenderness.   Extremities: Intact distal pulses, No edema, No tenderness, No cyanosis, No clubbing. Capillary refill is less than 2 seconds.  Musculoskeletal: Good range of motion in all major joints. No tenderness to palpation or major deformities noted.   Neurologic: Alert & oriented x 3, Normal motor function, Normal sensory function, No focal deficits noted. Reflexes are normal.  Psychiatric: Affect normal, Judgment normal, Mood normal. There is " "no suicidal ideation or patient reported hallucinations.       DIAGNOSTIC STUDIES / PROCEDURES    LABS  Labs Reviewed   CBC WITH DIFFERENTIAL - Abnormal; Notable for the following components:       Result Value    WBC 12.3 (*)     RBC 4.05 (*)     Hematocrit 36.7 (*)     MCHC 33.0 (*)     RDW 53.9 (*)     MPV 8.7 (*)     Neutrophils-Polys 78.30 (*)     Lymphocytes 13.60 (*)     Neutrophils (Absolute) 9.63 (*)     All other components within normal limits    Narrative:     Collected By:   COMP METABOLIC PANEL - Abnormal; Notable for the following components:    Sodium 127 (*)     Chloride 92 (*)     Creatinine 0.48 (*)     Calcium 8.2 (*)     Alkaline Phosphatase 118 (*)     Albumin 2.9 (*)     Total Protein 5.8 (*)     All other components within normal limits    Narrative:     Collected By:   LACTIC ACID - Abnormal; Notable for the following components:    Lactic Acid 2.5 (*)     All other components within normal limits    Narrative:     Collected By:   LACTIC ACID    Narrative:     Collected By:   TROPONIN    Narrative:     Collected By:   LIPASE    Narrative:     Collected By:   BLOOD CULTURE    Narrative:     Collected By:  Per Hospital Policy: Only change Specimen Src: to \"Line\" if  specified by physician order.   BLOOD CULTURE    Narrative:     Collected By:  Per Hospital Policy: Only change Specimen Src: to \"Line\" if  specified by physician order.   ESTIMATED GFR    Narrative:     Collected By:   COV-2, FLU A/B, AND RSV BY PCR    Narrative:     Collected By:  Have you been in close contact with a person who is suspected  or known to be positive for COVID-19 within the last 30 days  (e.g. last seen that person < 30 days ago)->Unknown   LACTIC ACID   URINALYSIS      All labs reviewed by me. Significant for Leukocytosis, no significant anemia, mild hyponatremia, normal electrolytes otherwise, normal renal function, normal liver enzymes, normal bilirubin, lipase normal, lactic acid 2.5, troponin negative    EKG " Interpretation:  Results for orders placed or performed during the hospital encounter of 21   EKG   Result Value Ref Range    Report       [facility] Emergency Dept.    Test Date:  2021  Pt Name:    KATERINA PATEL                  Department: [facility]  MRN:        [id number]                      Room:       [facility]  Gender:     Female                       Technician: SARBJIT  :        [id number]                   Requested By:LESLI ALAS  Order #:    [id number]                    Reading MD: Lesli Alas    Measurements  Intervals                                Axis  Rate:       104                          P:          72  IL:         120                          QRS:        34  QRSD:       101                          T:          66  QT:         351  QTc:        462    Interpretive Statements  Sinus tachycardia  RSR' in V1 or V2, probably normal variant  Borderline T abnormalities, lateral leads  Compared to ECG 10/02/2021 13:25:08  T-wave abnormality now present  Sinus rhythm no longer present  Right ventricular hypertrophy no longer present  Electronically Signed On 12-3-2021 15:56: 01 PST by Lesli Alas          EKG read at 15: 33 shows sinus tachycardia rate of 104, normal axis, normal intervals, no ST elevation or depression    RADIOLOGY  CT-ABDOMEN-PELVIS WITH   Final Result      1.  Recurrent fluid collections or abscess in the right upper quadrant and right paracolic gutter region. Interval removal of right upper quadrant drain.      2.  New fluid collection in the rectus sheath in the midline below the skin incision. This may represent postoperative fluid collection or abscess.      3.  Postoperative changes involving the stomach.      4.  Cholecystectomy. Residual common bile duct expansile stent in place.      5.  No bowel or renal obstruction.      6.  No free air.      7.  Small unchanged right pleural effusion and unchanged minimal right lower lobe  atelectasis or pneumonia.      DX-CHEST-PORTABLE (1 VIEW)   Final Result      Hazy right lower lobe opacity may be atelectasis, evaluation of this region is mildly limited secondary to overlying EKG leads.        The radiologist's interpretation of all radiological studies have been reviewed by me.    COURSE & MEDICAL DECISION MAKING  Nursing notes, VS, PMSFHx reviewed in chart.    3:22 PM Nils Alfonso is a 66 y.o. female who presented with abdominal pain and fever with complicated past medical history.  CT imaging shows intra-abdominal abscess.  Discussed with Dr. Vann of surgery who prefer the patient transferred so she can evaluate at [facility] for IR drain placement, antibiotic recommendations with infectious disease and surgical evaluation.  Patient initially arrived tachycardic and febrile but this improved.  She received IV fluids, Zosyn, morphine and Zofran for pain and antibiotic prophylaxis.  Labs concerning for elevated leukocytosis and lactic acidosis.  Discussed with the hospitalist, Dr. Martinez who happily accepts the patient for transfer.  Patient and  updated and are amenable to plan.  Heart score is less than 4.    Patient is critically ill.   The patient continues to have: Sepsis, intra-abdominal abscess, leukocytosis, elevated lactic acidosis, fever, tachycardia  The vital organ system that is affected is the: Intra-abdominal  If untreated there is a high chance of deterioration into: Death, septic shock  And eventually death.   The critical care that I am providing today is: Resuscitation with IV fluids, management of acute abdominal pain with multiple doses of morphine, management of acute nausea, discussion with surgery and hospitalist for transfer for surgical evaluation, immediate IR evaluation and infectious disease.  The critical that has been undertaken is medically complex.   There has been no overlap in critical care time.   Critical Care Time not including procedures:  38    DISPOSITION:  Patient will be discharged home in stable condition.    FINAL IMPRESSION  1. Sepsis with acute organ dysfunction without septic shock, due to unspecified organism, unspecified type (HCC) Acute   2. Postprocedural intraabdominal abscess Acute          The note accurately reflects work and decisions made by me.  Stuart Barreto  12/3/2021  7:41 PM

## 2021-12-04 ENCOUNTER — APPOINTMENT (OUTPATIENT)
Dept: RADIOLOGY | Facility: MEDICAL CENTER | Age: 66
DRG: 862 | End: 2021-12-04
Attending: INTERNAL MEDICINE
Payer: MEDICARE

## 2021-12-04 ENCOUNTER — HOSPITAL ENCOUNTER (INPATIENT)
Facility: MEDICAL CENTER | Age: 66
LOS: 12 days | DRG: 862 | End: 2021-12-16
Attending: INTERNAL MEDICINE | Admitting: STUDENT IN AN ORGANIZED HEALTH CARE EDUCATION/TRAINING PROGRAM
Payer: MEDICARE

## 2021-12-04 VITALS
HEART RATE: 116 BPM | SYSTOLIC BLOOD PRESSURE: 126 MMHG | RESPIRATION RATE: 20 BRPM | DIASTOLIC BLOOD PRESSURE: 60 MMHG | TEMPERATURE: 101 F | OXYGEN SATURATION: 93 %

## 2021-12-04 DIAGNOSIS — K65.1 INTRA-ABDOMINAL ABSCESS (HCC): ICD-10-CM

## 2021-12-04 PROBLEM — T81.43XA POSTPROCEDURAL INTRAABDOMINAL ABSCESS: Status: ACTIVE | Noted: 2021-12-04

## 2021-12-04 PROBLEM — E44.1 MILD PROTEIN-CALORIE MALNUTRITION (HCC): Status: ACTIVE | Noted: 2021-12-04

## 2021-12-04 LAB
ALBUMIN SERPL BCP-MCNC: 2.2 G/DL (ref 3.2–4.9)
ALBUMIN/GLOB SERPL: 0.8 G/DL
ALP SERPL-CCNC: 93 U/L (ref 30–99)
ALT SERPL-CCNC: 18 U/L (ref 2–50)
ANION GAP SERPL CALC-SCNC: 9 MMOL/L (ref 7–16)
AST SERPL-CCNC: 24 U/L (ref 12–45)
BASOPHILS # BLD AUTO: 0.3 % (ref 0–1.8)
BASOPHILS # BLD: 0.02 K/UL (ref 0–0.12)
BILIRUB SERPL-MCNC: 0.5 MG/DL (ref 0.1–1.5)
BUN SERPL-MCNC: 9 MG/DL (ref 8–22)
CALCIUM SERPL-MCNC: 7.5 MG/DL (ref 8.5–10.5)
CHLORIDE SERPL-SCNC: 101 MMOL/L (ref 96–112)
CO2 SERPL-SCNC: 21 MMOL/L (ref 20–33)
CREAT SERPL-MCNC: 0.39 MG/DL (ref 0.5–1.4)
EOSINOPHIL # BLD AUTO: 0.02 K/UL (ref 0–0.51)
EOSINOPHIL NFR BLD: 0.3 % (ref 0–6.9)
ERYTHROCYTE [DISTWIDTH] IN BLOOD BY AUTOMATED COUNT: 54.5 FL (ref 35.9–50)
GLOBULIN SER CALC-MCNC: 2.6 G/DL (ref 1.9–3.5)
GLUCOSE SERPL-MCNC: 86 MG/DL (ref 65–99)
HCT VFR BLD AUTO: 31.3 % (ref 37–47)
HGB BLD-MCNC: 10.3 G/DL (ref 12–16)
IMM GRANULOCYTES # BLD AUTO: 0.04 K/UL (ref 0–0.11)
IMM GRANULOCYTES NFR BLD AUTO: 0.7 % (ref 0–0.9)
INR PPP: 1.42 (ref 0.87–1.13)
LYMPHOCYTES # BLD AUTO: 1.2 K/UL (ref 1–4.8)
LYMPHOCYTES NFR BLD: 20.2 % (ref 22–41)
MCH RBC QN AUTO: 30 PG (ref 27–33)
MCHC RBC AUTO-ENTMCNC: 32.9 G/DL (ref 33.6–35)
MCV RBC AUTO: 91.3 FL (ref 81.4–97.8)
MONOCYTES # BLD AUTO: 0.43 K/UL (ref 0–0.85)
MONOCYTES NFR BLD AUTO: 7.3 % (ref 0–13.4)
NEUTROPHILS # BLD AUTO: 4.22 K/UL (ref 2–7.15)
NEUTROPHILS NFR BLD: 71.2 % (ref 44–72)
NRBC # BLD AUTO: 0 K/UL
NRBC BLD-RTO: 0 /100 WBC
PLATELET # BLD AUTO: 204 K/UL (ref 164–446)
PMV BLD AUTO: 8.6 FL (ref 9–12.9)
POTASSIUM SERPL-SCNC: 3.4 MMOL/L (ref 3.6–5.5)
PROT SERPL-MCNC: 4.8 G/DL (ref 6–8.2)
PROTHROMBIN TIME: 16.9 SEC (ref 12–14.6)
RBC # BLD AUTO: 3.43 M/UL (ref 4.2–5.4)
SODIUM SERPL-SCNC: 131 MMOL/L (ref 135–145)
WBC # BLD AUTO: 5.9 K/UL (ref 4.8–10.8)

## 2021-12-04 PROCEDURE — 0W9H30Z DRAINAGE OF RETROPERITONEUM WITH DRAINAGE DEVICE, PERCUTANEOUS APPROACH: ICD-10-PCS | Performed by: RADIOLOGY

## 2021-12-04 PROCEDURE — 90471 IMMUNIZATION ADMIN: CPT

## 2021-12-04 PROCEDURE — 87205 SMEAR GRAM STAIN: CPT

## 2021-12-04 PROCEDURE — 700111 HCHG RX REV CODE 636 W/ 250 OVERRIDE (IP): Performed by: INTERNAL MEDICINE

## 2021-12-04 PROCEDURE — 0004A HCHG PFIZER COVID ADMIN BOOSTER: CPT

## 2021-12-04 PROCEDURE — 99024 POSTOP FOLLOW-UP VISIT: CPT | Performed by: SURGERY

## 2021-12-04 PROCEDURE — 87186 SC STD MICRODIL/AGAR DIL: CPT

## 2021-12-04 PROCEDURE — 700111 HCHG RX REV CODE 636 W/ 250 OVERRIDE (IP): Performed by: STUDENT IN AN ORGANIZED HEALTH CARE EDUCATION/TRAINING PROGRAM

## 2021-12-04 PROCEDURE — 99223 1ST HOSP IP/OBS HIGH 75: CPT | Mod: AI | Performed by: STUDENT IN AN ORGANIZED HEALTH CARE EDUCATION/TRAINING PROGRAM

## 2021-12-04 PROCEDURE — 700102 HCHG RX REV CODE 250 W/ 637 OVERRIDE(OP): Performed by: INTERNAL MEDICINE

## 2021-12-04 PROCEDURE — 770001 HCHG ROOM/CARE - MED/SURG/GYN PRIV*

## 2021-12-04 PROCEDURE — 87070 CULTURE OTHR SPECIMN AEROBIC: CPT

## 2021-12-04 PROCEDURE — 85610 PROTHROMBIN TIME: CPT

## 2021-12-04 PROCEDURE — 36415 COLL VENOUS BLD VENIPUNCTURE: CPT

## 2021-12-04 PROCEDURE — XW023U6 INTRODUCTION OF COVID-19 VACCINE INTO MUSCLE, PERCUTANEOUS APPROACH, NEW TECHNOLOGY GROUP 6: ICD-10-PCS | Performed by: INTERNAL MEDICINE

## 2021-12-04 PROCEDURE — 4410108 CT-DRAIN-PERITONEAL

## 2021-12-04 PROCEDURE — 700105 HCHG RX REV CODE 258: Performed by: STUDENT IN AN ORGANIZED HEALTH CARE EDUCATION/TRAINING PROGRAM

## 2021-12-04 PROCEDURE — A9270 NON-COVERED ITEM OR SERVICE: HCPCS | Performed by: INTERNAL MEDICINE

## 2021-12-04 PROCEDURE — 700111 HCHG RX REV CODE 636 W/ 250 OVERRIDE (IP): Performed by: RADIOLOGY

## 2021-12-04 PROCEDURE — 87077 CULTURE AEROBIC IDENTIFY: CPT

## 2021-12-04 PROCEDURE — 80053 COMPREHEN METABOLIC PANEL: CPT

## 2021-12-04 PROCEDURE — 700111 HCHG RX REV CODE 636 W/ 250 OVERRIDE (IP)

## 2021-12-04 PROCEDURE — 91300 HCHG RX REV CODE 636 W/ 250 OVERRIDE (IP): CPT | Performed by: INTERNAL MEDICINE

## 2021-12-04 PROCEDURE — 90662 IIV NO PRSV INCREASED AG IM: CPT | Performed by: INTERNAL MEDICINE

## 2021-12-04 PROCEDURE — 85025 COMPLETE CBC W/AUTO DIFF WBC: CPT

## 2021-12-04 PROCEDURE — 700101 HCHG RX REV CODE 250: Performed by: SURGERY

## 2021-12-04 RX ORDER — LABETALOL HYDROCHLORIDE 5 MG/ML
10 INJECTION, SOLUTION INTRAVENOUS EVERY 4 HOURS PRN
Status: ACTIVE | OUTPATIENT
Start: 2021-12-04 | End: 2021-12-04

## 2021-12-04 RX ORDER — MIDAZOLAM HYDROCHLORIDE 5 MG/ML
INJECTION INTRAMUSCULAR; INTRAVENOUS
Status: COMPLETED
Start: 2021-12-04 | End: 2021-12-04

## 2021-12-04 RX ORDER — HEPARIN SODIUM 5000 [USP'U]/ML
5000 INJECTION, SOLUTION INTRAVENOUS; SUBCUTANEOUS EVERY 8 HOURS
Status: DISCONTINUED | OUTPATIENT
Start: 2021-12-04 | End: 2021-12-16 | Stop reason: HOSPADM

## 2021-12-04 RX ORDER — CYCLOBENZAPRINE HCL 10 MG
10 TABLET ORAL EVERY EVENING
Status: DISCONTINUED | OUTPATIENT
Start: 2021-12-04 | End: 2021-12-16 | Stop reason: HOSPADM

## 2021-12-04 RX ORDER — GABAPENTIN 300 MG/1
600 CAPSULE ORAL 2 TIMES DAILY
Status: DISCONTINUED | OUTPATIENT
Start: 2021-12-04 | End: 2021-12-16 | Stop reason: HOSPADM

## 2021-12-04 RX ORDER — HYDROMORPHONE HYDROCHLORIDE 1 MG/ML
0.5 INJECTION, SOLUTION INTRAMUSCULAR; INTRAVENOUS; SUBCUTANEOUS
Status: DISCONTINUED | OUTPATIENT
Start: 2021-12-04 | End: 2021-12-15

## 2021-12-04 RX ORDER — MIDAZOLAM HYDROCHLORIDE 1 MG/ML
INJECTION INTRAMUSCULAR; INTRAVENOUS
Status: COMPLETED
Start: 2021-12-04 | End: 2021-12-04

## 2021-12-04 RX ORDER — MIDAZOLAM HYDROCHLORIDE 1 MG/ML
.5-2 INJECTION INTRAMUSCULAR; INTRAVENOUS PRN
Status: ACTIVE | OUTPATIENT
Start: 2021-12-04 | End: 2021-12-04

## 2021-12-04 RX ORDER — MORPHINE SULFATE 4 MG/ML
4 INJECTION INTRAVENOUS ONCE
Status: COMPLETED | OUTPATIENT
Start: 2021-12-04 | End: 2021-12-04

## 2021-12-04 RX ORDER — ACETAMINOPHEN 500 MG
1000 TABLET ORAL EVERY 6 HOURS PRN
Status: DISCONTINUED | OUTPATIENT
Start: 2021-12-09 | End: 2021-12-08

## 2021-12-04 RX ORDER — ACETAMINOPHEN 500 MG
1000 TABLET ORAL EVERY 6 HOURS
Status: DISCONTINUED | OUTPATIENT
Start: 2021-12-04 | End: 2021-12-08

## 2021-12-04 RX ORDER — ONDANSETRON 2 MG/ML
4 INJECTION INTRAMUSCULAR; INTRAVENOUS PRN
Status: ACTIVE | OUTPATIENT
Start: 2021-12-04 | End: 2021-12-04

## 2021-12-04 RX ORDER — ACETAMINOPHEN 325 MG/1
650 TABLET ORAL EVERY 6 HOURS PRN
Status: ACTIVE | OUTPATIENT
Start: 2021-12-04 | End: 2021-12-04

## 2021-12-04 RX ORDER — ONDANSETRON 2 MG/ML
4 INJECTION INTRAMUSCULAR; INTRAVENOUS EVERY 4 HOURS PRN
Status: ACTIVE | OUTPATIENT
Start: 2021-12-04 | End: 2021-12-04

## 2021-12-04 RX ORDER — EZETIMIBE 10 MG/1
10 TABLET ORAL EVERY EVENING
Status: DISCONTINUED | OUTPATIENT
Start: 2021-12-04 | End: 2021-12-16 | Stop reason: HOSPADM

## 2021-12-04 RX ORDER — NALOXONE HYDROCHLORIDE 0.4 MG/ML
INJECTION, SOLUTION INTRAMUSCULAR; INTRAVENOUS; SUBCUTANEOUS
Status: COMPLETED
Start: 2021-12-04 | End: 2021-12-04

## 2021-12-04 RX ORDER — OXYCODONE HYDROCHLORIDE 5 MG/1
5 TABLET ORAL
Status: DISCONTINUED | OUTPATIENT
Start: 2021-12-04 | End: 2021-12-15

## 2021-12-04 RX ORDER — OXYCODONE HYDROCHLORIDE 10 MG/1
10 TABLET ORAL
Status: DISCONTINUED | OUTPATIENT
Start: 2021-12-04 | End: 2021-12-15

## 2021-12-04 RX ORDER — SODIUM CHLORIDE 9 MG/ML
500 INJECTION, SOLUTION INTRAVENOUS
Status: ACTIVE | OUTPATIENT
Start: 2021-12-04 | End: 2021-12-04

## 2021-12-04 RX ORDER — ONDANSETRON 2 MG/ML
4 INJECTION INTRAMUSCULAR; INTRAVENOUS EVERY 4 HOURS PRN
Status: DISCONTINUED | OUTPATIENT
Start: 2021-12-04 | End: 2021-12-16 | Stop reason: HOSPADM

## 2021-12-04 RX ORDER — SODIUM CHLORIDE 9 MG/ML
INJECTION, SOLUTION INTRAVENOUS CONTINUOUS
Status: DISCONTINUED | OUTPATIENT
Start: 2021-12-04 | End: 2021-12-06

## 2021-12-04 RX ADMIN — FENTANYL CITRATE 25 MCG: 50 INJECTION, SOLUTION INTRAMUSCULAR; INTRAVENOUS at 11:02

## 2021-12-04 RX ADMIN — MIDAZOLAM HYDROCHLORIDE 2 MG: 1 INJECTION, SOLUTION INTRAMUSCULAR; INTRAVENOUS at 11:10

## 2021-12-04 RX ADMIN — OXYCODONE HYDROCHLORIDE 10 MG: 10 TABLET ORAL at 14:45

## 2021-12-04 RX ADMIN — ONDANSETRON 4 MG: 2 INJECTION INTRAMUSCULAR; INTRAVENOUS at 14:45

## 2021-12-04 RX ADMIN — EZETIMIBE 10 MG: 10 TABLET ORAL at 18:18

## 2021-12-04 RX ADMIN — MORPHINE SULFATE 4 MG: 4 INJECTION INTRAVENOUS at 04:12

## 2021-12-04 RX ADMIN — INFLUENZA A VIRUS A/VICTORIA/2570/2019 IVR-215 (H1N1) ANTIGEN (FORMALDEHYDE INACTIVATED), INFLUENZA A VIRUS A/TASMANIA/503/2020 IVR-221 (H3N2) ANTIGEN (FORMALDEHYDE INACTIVATED), INFLUENZA B VIRUS B/PHUKET/3073/2013 ANTIGEN (FORMALDEHYDE INACTIVATED), AND INFLUENZA B VIRUS B/WASHINGTON/02/2019 ANTIGEN (FORMALDEHYDE INACTIVATED) 0.7 ML: 60; 60; 60; 60 INJECTION, SUSPENSION INTRAMUSCULAR at 20:56

## 2021-12-04 RX ADMIN — ONDANSETRON 4 MG: 2 INJECTION INTRAMUSCULAR; INTRAVENOUS at 09:54

## 2021-12-04 RX ADMIN — VANCOMYCIN HYDROCHLORIDE 500 MG: 500 INJECTION, POWDER, LYOPHILIZED, FOR SOLUTION INTRAVENOUS at 18:18

## 2021-12-04 RX ADMIN — ONDANSETRON 4 MG: 2 INJECTION INTRAMUSCULAR; INTRAVENOUS at 22:01

## 2021-12-04 RX ADMIN — MIDAZOLAM HYDROCHLORIDE 2 MG: 5 INJECTION, SOLUTION INTRAMUSCULAR; INTRAVENOUS at 11:02

## 2021-12-04 RX ADMIN — ONDANSETRON 4 MG: 2 INJECTION INTRAMUSCULAR; INTRAVENOUS at 04:12

## 2021-12-04 RX ADMIN — ACETAMINOPHEN 1000 MG: 500 TABLET ORAL at 18:18

## 2021-12-04 RX ADMIN — OXYCODONE HYDROCHLORIDE 10 MG: 10 TABLET ORAL at 18:22

## 2021-12-04 RX ADMIN — GABAPENTIN 600 MG: 300 CAPSULE ORAL at 09:55

## 2021-12-04 RX ADMIN — ACETAMINOPHEN 1000 MG: 500 TABLET ORAL at 23:28

## 2021-12-04 RX ADMIN — OXYCODONE HYDROCHLORIDE 10 MG: 10 TABLET ORAL at 22:01

## 2021-12-04 RX ADMIN — VANCOMYCIN HYDROCHLORIDE 1500 MG: 500 INJECTION, POWDER, LYOPHILIZED, FOR SOLUTION INTRAVENOUS at 05:01

## 2021-12-04 RX ADMIN — SODIUM CHLORIDE: 9 INJECTION, SOLUTION INTRAVENOUS at 04:12

## 2021-12-04 RX ADMIN — FENTANYL CITRATE 25 MCG: 50 INJECTION, SOLUTION INTRAMUSCULAR; INTRAVENOUS at 11:05

## 2021-12-04 RX ADMIN — CYCLOBENZAPRINE 10 MG: 10 TABLET, FILM COATED ORAL at 18:18

## 2021-12-04 RX ADMIN — PIPERACILLIN AND TAZOBACTAM 4.5 G: 4; .5 INJECTION, POWDER, LYOPHILIZED, FOR SOLUTION INTRAVENOUS; PARENTERAL at 04:13

## 2021-12-04 RX ADMIN — ACETAMINOPHEN 1000 MG: 500 TABLET ORAL at 14:40

## 2021-12-04 RX ADMIN — RNA INGREDIENT BNT-162B2 0.3 ML: 0.23 INJECTION, SUSPENSION INTRAMUSCULAR at 20:43

## 2021-12-04 RX ADMIN — GABAPENTIN 600 MG: 300 CAPSULE ORAL at 18:18

## 2021-12-04 RX ADMIN — PIPERACILLIN AND TAZOBACTAM 4.5 G: 4; .5 INJECTION, POWDER, LYOPHILIZED, FOR SOLUTION INTRAVENOUS; PARENTERAL at 20:55

## 2021-12-04 RX ADMIN — LIDOCAINE HYDROCHLORIDE 20 ML: 10 INJECTION, SOLUTION INFILTRATION; PERINEURAL at 08:13

## 2021-12-04 RX ADMIN — FENTANYL CITRATE 25 MCG: 50 INJECTION, SOLUTION INTRAMUSCULAR; INTRAVENOUS at 11:11

## 2021-12-04 RX ADMIN — PIPERACILLIN AND TAZOBACTAM 4.5 G: 4; .5 INJECTION, POWDER, LYOPHILIZED, FOR SOLUTION INTRAVENOUS; PARENTERAL at 14:40

## 2021-12-04 RX ADMIN — PIPERACILLIN AND TAZOBACTAM 4.5 G: 4; .5 INJECTION, POWDER, LYOPHILIZED, FOR SOLUTION INTRAVENOUS; PARENTERAL at 09:55

## 2021-12-04 RX ADMIN — OXYCODONE HYDROCHLORIDE 10 MG: 10 TABLET ORAL at 09:54

## 2021-12-04 ASSESSMENT — ENCOUNTER SYMPTOMS
NEUROLOGICAL NEGATIVE: 1
CHILLS: 1
MENTAL STATUS CHANGE: 0
EYES NEGATIVE: 1
ASSOCIATED SYMPTOMS: DENIES
SUBJECTIVE PAIN PROGRESSION: UNCHANGED
VOMITING: 0
PAIN LOCATION - PAIN SEVERITY: 3/10
CARDIOVASCULAR NEGATIVE: 1
VOMITING: DENIES
PAIN LOCATION - PAIN QUALITY: DULL
FEVER: 0
NAUSEA: 0
MUSCLE WEAKNESS: 1
PAIN: 1
MUSCULOSKELETAL NEGATIVE: 1
NAUSEA: DENIES
FEVER: 1
PSYCHIATRIC NEGATIVE: 1
PAIN LOCATION - RELIEVING FACTORS: DISTRACTIONS
RESPIRATORY NEGATIVE: 1
PAIN LOCATION: ABDOMEN
PAIN LOCATION - EXACERBATING FACTORS: EXERTION
PALPITATIONS: 0
CHILLS: 0
PAIN LOCATION - PAIN DURATION: FEW MINUTES
NAUSEA: 1
LOWEST PAIN SEVERITY IN PAST 24 HOURS: 1/10
PERSON REPORTING PAIN: PATIENT
ABDOMINAL PAIN: 1
HIGHEST PAIN SEVERITY IN PAST 24 HOURS: 3/10

## 2021-12-04 ASSESSMENT — COGNITIVE AND FUNCTIONAL STATUS - GENERAL
DRESSING REGULAR LOWER BODY CLOTHING: A LITTLE
DAILY ACTIVITIY SCORE: 18
WALKING IN HOSPITAL ROOM: A LITTLE
SUGGESTED CMS G CODE MODIFIER DAILY ACTIVITY: CK
EATING MEALS: A LITTLE
MOVING TO AND FROM BED TO CHAIR: A LITTLE
MOVING FROM LYING ON BACK TO SITTING ON SIDE OF FLAT BED: A LITTLE
MOBILITY SCORE: 18
PERSONAL GROOMING: A LITTLE
TURNING FROM BACK TO SIDE WHILE IN FLAT BAD: A LITTLE
SUGGESTED CMS G CODE MODIFIER MOBILITY: CK
CLIMB 3 TO 5 STEPS WITH RAILING: A LITTLE
TOILETING: A LITTLE
HELP NEEDED FOR BATHING: A LITTLE
STANDING UP FROM CHAIR USING ARMS: A LITTLE
DRESSING REGULAR UPPER BODY CLOTHING: A LITTLE

## 2021-12-04 ASSESSMENT — PAIN DESCRIPTION - PAIN TYPE
TYPE: ACUTE PAIN

## 2021-12-04 ASSESSMENT — LIFESTYLE VARIABLES
ON A TYPICAL DAY WHEN YOU DRINK ALCOHOL HOW MANY DRINKS DO YOU HAVE: 0
TOTAL SCORE: 0
ALCOHOL_USE: NO
DOES PATIENT WANT TO STOP DRINKING: NO
HAVE PEOPLE ANNOYED YOU BY CRITICIZING YOUR DRINKING: NO
EVER HAD A DRINK FIRST THING IN THE MORNING TO STEADY YOUR NERVES TO GET RID OF A HANGOVER: NO
HAVE YOU EVER FELT YOU SHOULD CUT DOWN ON YOUR DRINKING: NO
EVER FELT BAD OR GUILTY ABOUT YOUR DRINKING: NO
HOW MANY TIMES IN THE PAST YEAR HAVE YOU HAD 5 OR MORE DRINKS IN A DAY: 0
TOTAL SCORE: 0
AVERAGE NUMBER OF DAYS PER WEEK YOU HAVE A DRINK CONTAINING ALCOHOL: 0
TOTAL SCORE: 0
CONSUMPTION TOTAL: NEGATIVE

## 2021-12-04 ASSESSMENT — FIBROSIS 4 INDEX: FIB4 SCORE: 1.5

## 2021-12-04 NOTE — ED NOTES
Patients abdominal incision was found to be leaking loose brown stool. Provided site care, and dressed with 4x4. Patient was incontinent of urine, yessenia-care provided. Updated receiving RN that patient has voided.    70 yo male PMHx of Paroxysmal  A fib (not on AC) and Prostate CA (s/p RT in 2012) presents to the ED with heart palpitations,  intermittent shortness of breath, diaphoresis since 10am yesterday after he came back from work. Pt endorsed he has some stress from his work and he is aware of his history of Afib and denies being Rx any medication medical history. Patient notes taking 81mg of Aspirin. Patient has an indwelling cardiac monitor since 2015, last interrogation was 2 months ago and was told result normal. His cardiologist is DR. Raheem Patiño. Patient denies fever, chills,  chest pain, abd pain, n/v/c/d or any other complaints.    In ED, pt vitals stable except , EKG noted a fib with RVR 140s, no st-t wave changes. Troponin mild elevated 0.046. s/p lovenox 80mg iv x1 and cardiem 20mg IV x1 in ED. 70 yo male PMHx of Paroxysmal  A fib (not on AC), TIA and Prostate CA (s/p RT in 2012) presents to the ED with heart palpitations,  intermittent shortness of breath, diaphoresis since 10am yesterday after he came back from work. Pt endorsed he has some stress from his work and he is aware of his history of Afib and denies being Rx any medication medical history. Patient notes taking 81mg of Aspirin. Patient has an indwelling cardiac monitor since 2015, last interrogation was 2 months ago and was told result normal. His cardiologist is DR. Raheem Patiño. Patient denies fever, chills,  chest pain, abd pain, n/v/c/d or any other complaints.    In ED, pt vitals stable except , EKG noted a fib with RVR 140s, no st-t wave changes. Troponin mild elevated 0.046. s/p lovenox 80mg iv x1 and cardiem 20mg IV x1 in ED.

## 2021-12-04 NOTE — PROGRESS NOTES
[facility]  [facility]  Hospitalist paged   2RN skin check complete     Pt A&Ox4    Patient answers all questions appropriately and demonstrates proper use of call light    Pain rated as 2/10 upon assessment (Per [facility] patient received 2mg IV Morphine in route).     Tolerating diet, reporting intermittent nausea, medicated prior to arrival, will inform MD of nausea. +bowel sounds, + flatus, LBM PTA (Patient reports 12/3)  +void, purwick placed     Saturating >90% on room air   Patient denies SOB     Pt ambulates with x2 assistance per report, patient declines ambulation at this time due to weakness     x3 drain sites to abdomen with ostomy bags in place   MLI from previous hospital admission observed, drainage noted at incision site, gauze and tape in place     Updated on plan of care. Safety education provided. Bed locked in low. Call light within reach. Rounding in place.

## 2021-12-04 NOTE — ED NOTES
Patient resting more comfortably aware we are awaiting bed at main campus and transport once bed received

## 2021-12-04 NOTE — H&P
Hospital Medicine History & Physical Note    Date of Service  12/4/2021    Primary Care Physician  Pratik Gordon M.D.    Consultants  General surgery    Code Status  Full Code    Chief Complaint  Abdominal pain    History of Presenting Illness  Nils Alfonso is a 66 y.o. female who presented 12/4/2021 with fever right lower quadrant abdominal pain.  Patient has a complicated medical history including recent hospitalization for complicated duodenectomy and recurrent pancreatitis.  She initially presented to [facility] as she was febrile and tachycardic.  CAT scan of the abdomen shows recurrent fluid collections or abscess in the right upper quadrant and right paracolic gutter region.  No fluid collection in the rectus sheath in the midline below the skin incision noted. She was given IV fluids, zosyn, morphine, and zosyn. General surgery was consulted, and requested patient be transferred for IR drain placement, antibiotic recommendations, surgical evaluation, infectious disease consult.      I discussed the plan of care with patient.    Review of Systems  Review of Systems   Constitutional: Positive for chills and fever.   HENT: Negative.    Eyes: Negative.    Respiratory: Negative.    Cardiovascular: Negative.    Gastrointestinal: Positive for abdominal pain and nausea.   Genitourinary: Negative.    Musculoskeletal: Negative.    Skin: Negative.    Neurological: Negative.    Endo/Heme/Allergies: Negative.    Psychiatric/Behavioral: Negative.        Past Medical History   has a past medical history of Allergy, unspecified not elsewhere classified, Anemia, Anesthesia, Arthritis, Backpain, Bronchitis (2010), Cataract, Degeneration of cervical intervertebral disc, Dental disorder, Heart burn, Hiatus hernia syndrome, High cholesterol, Hyperlipidemia, Hypertension, Muscle disorder, Pain (05/16/2018), and Reactive airway disease.    Surgical History   has a past surgical history that includes hysterectomy robotic  "(1/2/2009); tonsillectomy and adenoidectomy (1967); tubal ligation (1985); gastric resection (1982); primary c section (1976, 1979, 1985); lumbar fusion anterior (2007); cholecystectomy (1996); rotator cuff repair (Left, 5/2015); rotator cuff repair (Right, 7/7/2016); gastroscopy (N/A, 8/26/2016); gastric sleeve laparoscopy (10/19/2016); liver biopsy laparoscopic (10/19/2016); cataract phaco with iol (Right, 4/4/2017); cataract phaco with iol (Left, 4/18/2017); gastroscopy (5/23/2018); egd w/endoscopic ultrasound (5/23/2018); colonoscopy (8/8/2018); pr ercp,diagnostic (10/1/2021); pr upper gi endoscopy,diagnosis (N/A, 11/18/2021); pr ercp,diagnostic (N/A, 11/18/2021); pr place percut gastrostomy tube (N/A, 11/18/2021); biliary stent placement (N/A, 11/18/2021); pr exploratory of abdomen (11/5/2021); pr ultrasonic guidance, intraoperative (11/5/2021); and abdominal abscess drainage (11/5/2021).     Family History  family history includes Cancer in her father; Diabetes in her brother; Stroke in her mother.   Family history reviewed with patient. There is no family history that is pertinent to the chief complaint.     Social History   reports that she quit smoking about 48 years ago. Her smoking use included cigarettes. She has a 0.03 pack-year smoking history. She has never used smokeless tobacco. She reports that she does not drink alcohol and does not use drugs.    Allergies  Allergies   Allergen Reactions   • Ampicillin Rash     Rash  Tolerates Zosyn 10/21   • Cephalexin Diarrhea, Vomiting and Nausea     .   • Clindamycin Vomiting     \"cleocin\"   • Codeine      Severe stomach pain, cramps, spasms   • Demerol Vomiting   • Levofloxacin Unspecified     Numbness     • Tetracyclines Rash     .   • Tizanidine Itching   • Morphine Vomiting   • Pcn [Penicillins] Itching     Tolerates Zosyn 10/21   • Sulfa Drugs Itching       Medications  Prior to Admission Medications   Prescriptions Last Dose Informant Patient Reported? " Taking?   Acetaminophen (TYLENOL PO)   Yes No   Sig: Take 500 mg by mouth 3 times a day as needed (for moderate - breakthrough pain). for moderate pain-breakthrough pain   BIOTIN PO  Patient Yes No   Sig: Take 1 Tablet by mouth 1 time a day as needed. as needed for hair and nail growth   Cholecalciferol (VITAMIN D3 PO)  Patient Yes No   Sig: Take 1 Each by mouth every day.   cyclobenzaprine (FLEXERIL) 10 mg Tab  Patient Yes No   Sig: Take 10 mg by mouth every evening.   ezetimibe (ZETIA) 10 MG Tab  Patient Yes No   Sig: Take 10 mg by mouth every evening.   gabapentin (NEURONTIN) 300 MG Cap  Patient Yes No   Sig: Take 600 mg by mouth 2 Times a Day.   ondansetron (ZOFRAN ODT) 4 MG TABLET DISPERSIBLE  Patient Yes No   Sig: Take 4 mg by mouth every 6 hours as needed for Nausea.      Facility-Administered Medications: None       Physical Exam  Temp:  [36.3 °C (97.3 °F)] 36.3 °C (97.3 °F)  Pulse:  [87] 87  Resp:  [16] 16  BP: (129)/(74) 129/74  SpO2:  [96 %] 96 %  Blood Pressure : 129/74   Temperature: 36.3 °C (97.3 °F)   Pulse: 87   Respiration: 16   Pulse Oximetry: 96 %       Physical Exam  Constitutional:       Appearance: Normal appearance. She is normal weight.   HENT:      Head: Normocephalic.      Nose: Nose normal.      Mouth/Throat:      Mouth: Mucous membranes are moist.      Comments: Dry tongue  Eyes:      Pupils: Pupils are equal, round, and reactive to light.   Cardiovascular:      Rate and Rhythm: Normal rate and regular rhythm.      Pulses: Normal pulses.   Pulmonary:      Effort: Pulmonary effort is normal.      Breath sounds: Normal breath sounds.   Abdominal:      Comments: Vertical incision scar at midline noted, healing well  Noted to have 3 ostomy bags on R side of abdomen   Musculoskeletal:         General: Normal range of motion.   Skin:     General: Skin is warm.   Neurological:      General: No focal deficit present.      Mental Status: She is alert and oriented to person, place, and time. Mental  status is at baseline.   Psychiatric:         Mood and Affect: Mood normal.         Behavior: Behavior normal.         Thought Content: Thought content normal.         Judgment: Judgment normal.         Laboratory:  Recent Labs     12/03/21  1545   WBC 12.3*   RBC 4.05*   HEMOGLOBIN 12.1   HEMATOCRIT 36.7*   MCV 90.6   MCH 29.9   MCHC 33.0*   RDW 53.9*   PLATELETCT 246   MPV 8.7*     Recent Labs     12/03/21  1545   SODIUM 127*   POTASSIUM 3.6   CHLORIDE 92*   CO2 21   GLUCOSE 89   BUN 9   CREATININE 0.48*   CALCIUM 8.2*     Recent Labs     12/03/21  1545   ALTSGPT 25   ASTSGOT 28   ALKPHOSPHAT 118*   TBILIRUBIN 0.6   LIPASE 55   GLUCOSE 89         No results for input(s): NTPROBNP in the last 72 hours.      Recent Labs     12/03/21  1545   TROPONINT 15       Imaging:  No orders to display       no X-Ray or EKG requiring interpretation    Assessment/Plan:  I anticipate this patient will require at least two midnights for appropriate medical management, necessitating inpatient admission.    * Intra-abdominal abscess (HCC)- (present on admission)  Assessment & Plan  Admit patient to surgical floor  Given Zosyn outside facility.   Start patient on vancomycin and Zosyn (previous cultures from last admission grew Enterococcus sensitive to vancomycin)  Fluids  Pain medications  Blood cultures  N.p.o.  IR consult a.m.  Surgical follow-up  ID consult a.m.      Sepsis due to intraabdominal fluid collection/abscess, bacteremia and fungemia (HCC)  Assessment & Plan  This is Sepsis Present on admission  SIRS criteria identified on my evaluation include: Tachycardia, with heart rate greater than 90 BPM and Leukocytosis, with WBC greater than 12,000  Source is intra-abdominal abscess  Sepsis protocol initiated  Fluid resuscitation ordered per protocol  IV antibiotics as appropriate for source of sepsis  While organ dysfunction may be noted elsewhere in this problem list or in the chart, degree of organ dysfunction does not meet  CMS criteria for severe sepsis          Hyperlipidemia- (present on admission)  Assessment & Plan  Restart ezetimibe as needed    Primary hypertension- (present on admission)  Assessment & Plan  Restart medications as needed      VTE prophylaxis: heparin ppx

## 2021-12-04 NOTE — ASSESSMENT & PLAN NOTE
Fluid cultures on 12/4 growing Escherichia coli, Enterococcus faecium  CT guided Right retroperitoneal/paracolic gutter drainage tube placement  12/4/2021. Tan, watery discharge noted in bulb, approx 20mL.  IV Vancomycin, Unasyn   Will need repeat CT tomorrow. abx plan pending CT results   Follow ID recs

## 2021-12-04 NOTE — PROGRESS NOTES
IR Nursing Note:    Patient underwent a Right lower quadrant drain placement by Dr. Grimes. Procedure site was marked by MD and verified using imaging guidance.  Patient was placed in a supine position.  Vitals were taken every 5 minutes and remained stable during procedure (see doc flow sheet for results).  CO2 waveform capnography was monitored and remained 28-35 throughout procedure. Drain was sutured in.   A Tegaderm and gauze dressing was placed over surgical site. Report called to Julissa SANON. Pt transported by janeth with RN to T416-2. 15mL hand delivered to lab discarded 25mL.     Fashion To Figure Flexima APDL Locking Pigtail 12F x 25 CM Ref # [id number] Lot # [id number]

## 2021-12-04 NOTE — CARE PLAN
The patient is Stable - Low risk of patient condition declining or worsening    Shift Goals  Clinical Goals: New Admission, Pain Management   Patient Goals: Pain Management, Rest    Progress made toward(s) clinical / shift goals:       Problem: Knowledge Deficit - Standard  Goal: Patient and family/care givers will demonstrate understanding of plan of care, disease process/condition, diagnostic tests and medications  Outcome: Progressing  Note: All questions and concerns addressed with patient, admission profile complete      Problem: Skin Integrity  Goal: Skin integrity is maintained or improved  Outcome: Progressing  Note: Appropriate skin integrity interventions in place        Patient is not progressing towards the following goals:

## 2021-12-04 NOTE — PROGRESS NOTES
Transfer Center Note    [facility] HOSPITALIST TRIAGE OFFICER DIRECT ADMISSION REPORT  Transferring facility: [facility]  Transferring physician: Dr Barreto  Transferring facility/physician contact number:   Chief complaint: RLQ pain  Pertinent history & patient course: fever and right lower quadrant abdominal pain.  Patient has a complicated medical history including recent hospitalization for complicated duodenectomy and recurrent pancreatitis.  She is on chronic opiate medications.  Noted to have low-grade fever today and abdominal pain.  Denies nausea, vomiting, has normal stool output from her ostomy.   Dr. Vann of surgery who prefer the patient transferred so she can evaluate at [facility] for IR drain placement, antibiotic recommendations with infectious disease and surgical evaluation.  Patient initially arrived tachycardic and febrile but this improved.  She received IV fluids, Zosyn, morphine and Zofran     Pertinent imaging & lab results:   Recurrent fluid collections or abscess in the right upper quadrant and right paracolic gutter region. Interval removal of right upper quadrant drain.       2.  New fluid collection in the rectus sheath in the midline below the skin incision. This may represent postoperative fluid collection or abscess.       Code Status: full  per transferring provider, I personally verified with the transferring provider patient's code status and the transferring provider has confirmed this with the patient.  Further work up or recommendations per triage officer prior to transfer:   Consultants called prior to transfer and pertinent input from consultants:   Patient accepted for transfer: Yes  Consultants to be called upon arrival: Interventional radiology in a.m.  Admission status: Inpatient.   Floor requested: [facility]  If ICU transfer, name of intensivist case discussed with and pertinent input from critical care:     Please inform the triage officer upon arrival of  the patient to [facility] for assignment of a hospitalist to perform admission.     For any question or concerns regarding the care of this patient, please reach out to the assigned hospitalist.

## 2021-12-04 NOTE — CONSULTS
"Surgical Oncology Consult History and Physical    Reason for Consult: intra-abdominal fluid collections    Consulted By: Dr. Danielito Bianchi  Location: T416-02    HPI: Patient is a 67 y/o F known to me from recent prolonged hospitalization who was recently discharged to home from the hospital on 11/28/21.  She initially underwent and ERCP 10/1/21 and was admitted to the hospital 10/8/2021 with findings of a large retroperitoneal abscess caused by an ampullary/duodenal leak.  In brief review of her prolonged hospital course -- This was initially managed conservatively with IR drainage and IV abx - however when she failed to progress was taken to the OR 11/5/21 by Dr. Cook for drainage and debridement of her retroperitoneum.  Following this she required an additional ERCP with placement of a covered biliary stent to control leak and subsequent UGI on 11/22/21 demonstrated no further duodenal leak.  ID was managing her antibiotics during her hospital course.  She was ultimately discharged to home on 11/28/21 without drains, antibiotics and on a regular diet.    Since time of discharge she states that she had been doing well for the first few days.  Reports \"ok\" PO intake.  Was ambulating at home.  A few days ago she started feeling weaker and her home health RN was worried as she was tachcyardic and febrile (although unknown degree).  She reports there was no drainage from her midline wound at that time - although home health RN was changing her ostomy appliances (over prior drain sites) daily.  Volume of output unknown - however patient reports \"was the same as when I was in the hospital\".      Given reported tachycardia and fevers she presented to [facility] last night.  Per ER report was mildly tachcycardic on arrival.  Labs were performed with electrolyte abnormalities (Na 127), mild leukocytosis (12.3) as well as mild lactic acidosis (2.5).  Imaging was performed with demonstration of an enlarging " "R retroperitoneal collection, R pelvic collection as well as a new collection in the R midline abdomen.  She was started on IV abx, IVF and transferred to [facility] this AM.    Today patient states that she is \"frustrated\" to be back in the hospital.  She is tearful when discussing this.  She states she is feeling better than she was yesterday after initiation of IV abx and IVF.      Body mass index is 22.02 kg/m².      Past Medical History:          Past Medical History:   Diagnosis Date   • Allergy, unspecified not elsewhere classified    • Anemia     not currently   • Anesthesia     PONV (Demerol/ Dilaudid)   • Arthritis     bilateral shoulders,sacrum, osteo   • Backpain     coccyx and sacrum   • Bronchitis 2010   • Cataract     bilateral IOLI   • Degeneration of cervical intervertebral disc     C5-6,C6-7   • Dental disorder     partial upper and lower   • Heart burn    • Hiatus hernia syndrome     not a problem after gastric sleeve   • High cholesterol     resolved after gastric surgery   • Hyperlipidemia    • Hypertension     off all medication, reveresed after gastric sleeve   • Muscle disorder    • Pain 05/16/2018    low back and SI joints and sacrum   • Reactive airway disease     rescue inhaler not needed unless with bronchitis       Past Surgical History:        Past Surgical History:   Procedure Laterality Date   • PB UPPER GI ENDOSCOPY,DIAGNOSIS N/A 11/18/2021    Procedure: GASTROSCOPY with stent placement;  Surgeon: Migel Lawrence M.D.;  [facility];  Service: Gastroenterology   • PB ERCP,DIAGNOSTIC N/A 11/18/2021    Procedure: ERCP (ENDOSCOPIC RETROGRADE CHOLANGIOPANCREATOGRAPHY);  Surgeon: Migel Lawrence M.D.;  Location: [facility]ERY [facility];  Service: Gastroenterology   • PB PLACE PERCUT GASTROSTOMY TUBE N/A 11/18/2021    Procedure: GASTROSCOPY, WITH FEEDING TUBE INSERTION;  Surgeon: Migel Lawrence M.D.;  Location: [facility]ERY [facility];  Service: Gastroenterology   • BILIARY STENT " PLACEMENT N/A 11/18/2021    Procedure: INSERTION, STENT, BILE DUCT;  Surgeon: Migel Lawrence M.D.;  Location: SURGERY SAME DAY [facility];  Service: Gastroenterology   • PB EXPLORATORY OF ABDOMEN  11/5/2021    Procedure: LAPAROTOMY, EXPLORATORY;  Surgeon: Jaden Cook M.D.;  Location: [facility];  Service: General   • PB ULTRASONIC GUIDANCE, INTRAOPERATIVE  11/5/2021    Procedure: ULTRASOUND GUIDANCE;  Surgeon: Jaden Cook M.D.;  Location: [facility];  Service: General   • ABDOMINAL ABSCESS DRAINAGE  11/5/2021    Procedure: DRAINAGE, ABSCESS, ABDOMEN - PERITONEAL AND RETROPERITONEAL;  Surgeon: Jaden Cook M.D.;  Location: [facility];  Service: General   • PB ERCP,DIAGNOSTIC  10/1/2021    Procedure: ERCP (ENDOSCOPIC RETROGRADE CHOLANGIOPANCREATOGRAPHY);  Surgeon: Fausto Casas M.D.;  Location: [facility];  Service: Gastroenterology   • COLONOSCOPY  8/8/2018    Procedure: COLONOSCOPY;  Surgeon: Shukri Condon M.D.;  Location: [facility];  Service: General   • GASTROSCOPY  5/23/2018    Procedure: GASTROSCOPY;  Surgeon: Fausto Casas M.D.;  Location: [facility];  Service: Gastroenterology   • EGD W/ENDOSCOPIC ULTRASOUND  5/23/2018    Procedure: EGD W/ENDOSCOPIC ULTRASOUND- RADIAL UPPER;  Surgeon: Fausto Casas M.D.;  Location: [facility];  Service: Gastroenterology   • CATARACT PHACO WITH IOL Left 4/18/2017    Procedure: CATARACT PHACO WITH IOL;  Surgeon: Stuart Estevez M.D.;  Location: SURGERY SAME DAY [facility];  Service:    • CATARACT PHACO WITH IOL Right 4/4/2017    Procedure: CATARACT PHACO WITH IOL;  Surgeon: Stuart Estevez M.D.;  Location: [facility];  Service:    • GASTRIC SLEEVE LAPAROSCOPY  10/19/2016    Procedure: GASTRIC SLEEVE LAPAROSCOPY, HIATAL HERNIA;  Surgeon: Shukri Condon M.D.;  Location: [facility];  Service:    • LIVER BIOPSY  "LAPAROSCOPIC  10/19/2016    Procedure: LIVER BIOPSY LAPAROSCOPIC;  Surgeon: Shukri Condon M.D.;  Location: SURGERY [facility];  Service:    • GASTROSCOPY N/A 8/26/2016    Procedure: GASTROSCOPY;  Surgeon: Shukri Condon M.D.;  Location: SURGERY [facility];  Service:    • ROTATOR CUFF REPAIR Right 7/7/2016   • ROTATOR CUFF REPAIR Left 5/2015   • HYSTERECTOMY ROBOTIC  1/2/2009    Performed by MEG VILLEGAS at SURGERY [facility]   • LUMBAR FUSION ANTERIOR  2007    Dr Hillman, L3-S1 fusion   • CHOLECYSTECTOMY  1996    laparoscopic   • TUBAL LIGATION  1985   • GASTRIC RESECTION  1982    gastric stapling   • TONSILLECTOMY AND ADENOIDECTOMY  1967   • PRIMARY C SECTION  1976, 1979, 1985    x3       Current Medications:   No current facility-administered medications on file prior to encounter.     Current Outpatient Medications on File Prior to Encounter   Medication Sig Dispense Refill   • Acetaminophen (TYLENOL PO) Take 500 mg by mouth 3 times a day as needed (for moderate - breakthrough pain). for moderate pain-breakthrough pain     • cyclobenzaprine (FLEXERIL) 10 mg Tab Take 10 mg by mouth every evening.     • ezetimibe (ZETIA) 10 MG Tab Take 10 mg by mouth every evening.     • gabapentin (NEURONTIN) 300 MG Cap Take 600 mg by mouth 2 Times a Day.     • BIOTIN PO Take 1 Tablet by mouth 1 time a day as needed. as needed for hair and nail growth     • Cholecalciferol (VITAMIN D3 PO) Take 1 Each by mouth every day.     • ondansetron (ZOFRAN ODT) 4 MG TABLET DISPERSIBLE Take 4 mg by mouth every 6 hours as needed for Nausea.           Allergies:         Allergies   Allergen Reactions   • Ampicillin Rash     Rash  Tolerates Zosyn 10/21   • Cephalexin Diarrhea, Vomiting and Nausea     .   • Clindamycin Vomiting     \"cleocin\"   • Codeine      Severe stomach pain, cramps, spasms   • Demerol Vomiting   • Levofloxacin Unspecified     Numbness     • Tetracyclines Rash     .   • Tizanidine Itching   • Morphine " Vomiting   • Pcn [Penicillins] Itching     Tolerates Zosyn 10/21   • Sulfa Drugs Itching       Family History:          Family History   Problem Relation Age of Onset   • Stroke Mother    • Cancer Father         larynx   • Diabetes Brother        Social History:       -Tobacco: prior remote smoking hx      -EtOH: Denies      -IVDU: Denies      -Marital Status:  present at bedside          Review of Systems:  Review of Systems - History obtained from the patient  General  ROS: + fevers  Negative for: chills, weight loss   Neurological ROS: negative for: TIA or stroke symptoms, confusion, dizziness, headaches, numbness/tingling and seizures   Ophthalmic ROS: negative for: changes in vision, eye pain, or scleral icterus   ENT ROS: negative for: changes in hearing, nose bleeds, dysphagia   Respiratory ROS: negative for: cough, shortness of breath, or wheezing   Cardiovascular ROS   negative for: chest pain, dyspnea on exertion, irregular heart beat, peripheral edema   Gastrointestinal ROS:   HPI as above  + diarrhea  Genito-Urinary ROS:  negative for: dysuria, trouble voiding, or hematuria   Musculoskeletal ROS:  negative for:  Recent fractures, gait disturbance, joint pain/stiffness, muscular weakness   Dermatological ROS: Negative for: rash and skin lesion changes, jaundice   Hematological and Lymphatic ROS: negative for: bleeding problems, blood transfusions, easy bruising   Endocrine ROS: negative for: malaise/lethargy, skin changes, temperature intolerance and unexpected weight changes   Psychiatric ROS: negative for sleep changes, changes in mood, memory problems     All other ROS negative.      Physical Exam:  /73   Pulse 83   Temp 35.9 °C (96.7 °F) (Temporal)   Resp 16   Wt 58.2 kg (128 lb 4.9 oz)   LMP 01/01/2009   SpO2 97%   BMI 22.02 kg/m²       -General: Patient is cooperative, appears stated age, in no acute distress, AAOx3, appears chronically ill    -HEENT:  Head is atraumatic, neck  supple, no scleral icterus     -Neck: trachea is midline     -Respiratory:  no increased work of breathing, stable on room air     -Cardiovascular: normal rate, regular, no murmur appreciated     -Abdomen: soft, diffuse mild TTP.  Midline wound healed with small area in center of midline incision with active drainage purulent material.  3 ostomy appliances in place - all with scant output with exception of the inferior appliance which has thin, tan effluent.    -: Purewick in place    -MSK/Extremities: atraumatic, normal range of motion and strength, ambulatory     -Integumentary: warm, dry, no obvious skin lesions or rashes appreciated     -Neurologic: alert, speech clear and coherent, functional cognition intact     -Psychiatric:  cooperative, appropriate mood and affect        Imaging:   CT A/P 12/3/21:  IMPRESSION:     1.  Recurrent fluid collections or abscess in the right upper quadrant and right paracolic gutter region. Interval removal of right upper quadrant drain.     2.  New fluid collection in the rectus sheath in the midline below the skin incision. This may represent postoperative fluid collection or abscess.     3.  Postoperative changes involving the stomach.     4.  Cholecystectomy. Residual common bile duct expansile stent in place.     5.  No bowel or renal obstruction.     6.  No free air.     7.  Small unchanged right pleural effusion and unchanged minimal right lower lobe atelectasis or pneumonia.    Pathology:   None applicable    Labs:   Reviewed.    Assessment and Plan:   Patient is a 65 y/o F presenting to hospital with tachycardia, fevers - retroperitoneal, pelvic, and midline fluid collections in context of recent prolonged hospitalization and interventions for ampullary/duodenal perforation.    Imaging reviewed and R retroperitoneal collection has enlarged and appears to communicate with the pelvic collection.  I suspect that the pelvic collection has fistulized to the midline wound.   Midline wound actively draining on my exam this AM.  Discussed with patient the need to open this area fully to allow adequate drainage.  Skin surrounding the draining area of midline injected with approximately 16cc of 1% lidocaine without epinephrine.  Draining sinus opened with combination of sharp and blunt dissection.  Copious volume of purulent material drained.  Tract noted to tunnel both cephalad and caudad on digital inspection.  Wound opened to minimize tunneling.  Given nature of tissue unable to clearly visualize fascia at base - however my suspicion is that the fascia has dehisced.  Would gently packed with moist kerlix followed by 4x4, ABD pad.  Will plan daily wet to dry dressings.    Given the enlarging retroperitonea/pelvic collections discussed with patient need for additional placement of IR drains.  I have discussed the case with IR team who will plan for placement of 2 drains today.  Goal will be to control these retroperitoneal/pelvic collections for control of sepsis as well as to divert drainage from the midline wound.  I have reviewed with patient that I anticipate that additional studies will be necessary in the upcoming days to evaluate if her duodenal/ampullary leak has sealed or if perhaps there is an additional area that has fistualized.    Plan:  -Wound opened at bedside today.  Will continue wet to dry dressings - first dressing change tomorrow AM (MD to perform).  -IR consulted for percutaneous drainage of retroperitoneal/pelvic collections.  They will send cultures from collections at time of drainage.  -Would consider ID consult given complex history as they had previously managed her abx  -Electrolyte management per primary  -Pain management per primary  -Anticipate will need additional studies in upcoming days to determined if still active leak  -Following IR procedure reasonable to resume diet while quantifying and characterizing outputs.  -Will need strict recording of I/O from  drains/ostomy appliances to quantify outputs  -Will continue to follow.    -The patient family asked several great questions which were answered to her satisfaction.  She is in agreement with the care plan as outlined above.  -Primary team updated on plan of care.    Lisy Vann MD  December 4, 2021, 9:10 AM

## 2021-12-04 NOTE — PROGRESS NOTES
Hospital Medicine Daily Progress Note    Date of Service  12/4/2021    Chief Complaint  Nils Alfonso is a 66 y.o. female admitted 12/4/2021 with complicated past medical history including multiple recent hospitalizations for complicated duodenectomy and recurrent pancreatitis who presents with recurrent abdominal pain and progressive/new intra-abdominal fluid collections concerning for abscesses and possible ongoing GI leak.     Hospital Course  No notes on file    Interval Problem Update  Abdominal issues-worse pain in the RLQ, dull and throbbing, does not radiate to her back. Multiple ostomy bags in place. Wound on the L side covered with gauze. Discussed with Dr Vann, will go to IR to drainage and then possible UGI to determine if ongoing leak. Denies any current N/V.    I have personally seen and examined the patient at bedside. I discussed the plan of care with patient.    Consultants/Specialty  general surgery    Code Status  Full Code    Disposition  Patient is not medically cleared.   Anticipate discharge to to home with close outpatient follow-up.  I have placed the appropriate orders for post-discharge needs.    Review of Systems  Review of Systems   Constitutional: Negative for chills and fever.   Cardiovascular: Negative for chest pain and palpitations.   Gastrointestinal: Positive for abdominal pain. Negative for nausea and vomiting.   All other systems reviewed and are negative.       Physical Exam  Temp:  [35.9 °C (96.7 °F)-36.3 °C (97.3 °F)] 35.9 °C (96.7 °F)  Pulse:  [83-87] 83  Resp:  [16] 16  BP: (110-129)/(73-74) 110/73  SpO2:  [96 %-97 %] 97 %    Physical Exam  Vitals and nursing note reviewed.   Constitutional:       General: She is not in acute distress.     Appearance: She is well-developed.      Comments: Thin, slight grimacing, conversant  Mild evidence of subcutaneous fat loss and muscle wasting noted   HENT:      Head: Normocephalic and atraumatic.      Mouth/Throat:      Pharynx:  No oropharyngeal exudate.   Eyes:      General: No scleral icterus.     Pupils: Pupils are equal, round, and reactive to light.   Neck:      Thyroid: No thyromegaly.   Cardiovascular:      Rate and Rhythm: Normal rate and regular rhythm.      Heart sounds: Normal heart sounds. No murmur heard.      Pulmonary:      Effort: Pulmonary effort is normal. No respiratory distress.      Breath sounds: Normal breath sounds. No wheezing.   Abdominal:      General: Bowel sounds are normal. There is no distension.      Palpations: Abdomen is soft.      Tenderness: There is abdominal tenderness.      Comments: Multiple ostomy coverings in place on the R side  Gauze over the L side, appears mostly dry   Musculoskeletal:         General: No tenderness. Normal range of motion.      Cervical back: Normal range of motion and neck supple.      Right lower leg: No edema.      Left lower leg: No edema.   Skin:     General: Skin is warm and dry.      Findings: No rash.   Neurological:      Mental Status: She is alert and oriented to person, place, and time.      Cranial Nerves: No cranial nerve deficit.         Fluids    Intake/Output Summary (Last 24 hours) at 12/4/2021 0937  Last data filed at 12/4/2021 0400  Gross per 24 hour   Intake 0 ml   Output 190 ml   Net -190 ml       Laboratory  Recent Labs     12/03/21  1545 12/04/21  0840   WBC 12.3* 5.9   RBC 4.05* 3.43*   HEMOGLOBIN 12.1 10.3*   HEMATOCRIT 36.7* 31.3*   MCV 90.6 91.3   MCH 29.9 30.0   MCHC 33.0* 32.9*   RDW 53.9* 54.5*   PLATELETCT 246 204   MPV 8.7* 8.6*     Recent Labs     12/03/21  1545 12/04/21  0840   SODIUM 127* 131*   POTASSIUM 3.6 3.4*   CHLORIDE 92* 101   CO2 21 21   GLUCOSE 89 86   BUN 9 9   CREATININE 0.48* 0.39*   CALCIUM 8.2* 7.5*     Recent Labs     12/04/21  0840   INR 1.42*               Imaging  CT-DRAIN-PERITONEAL    (Results Pending)        Assessment/Plan  * Intra-abdominal abscess (HCC)- (present on admission)  Assessment & Plan  Complicated past  medical history with recurrent and possibly new intra-abdominal fluid collections concerning for ongoing abscesses  -prior culture results have shown polymicrobial infections with fungi as well  -continue empiric IV vanc/zosyn  -low threshold to start IV micafungin  -Dr Vann of surgical oncology consulted  -IR to place drains today  -likely will need UGI to determine if ongoing duodenal leak is occurring  -Trend WBC/fever curve, F/U blood cultures  -multimodal pain therapy initiated      Mild protein-calorie malnutrition (HCC)- (present on admission)  Assessment & Plan  -prolonged hospitalizations, e/o subcutaneous fat loss and muscle wasting on exam  -NPO for now for procedures, but did discuss with surgery about initiating modified diet afterwards  -RD consult for help    Hypokalemia- (present on admission)  Assessment & Plan  -mild replacement, daily labs    Hyponatremia- (present on admission)  Assessment & Plan  -mild, improving to 131 from 127  -trend daily, gentle IVF's, likely from hypovolemia    Sepsis due to intraabdominal fluid collection/abscess, bacteremia and fungemia (HCC)- (present on admission)  Assessment & Plan  Stable, see below          Hyperlipidemia- (present on admission)  Assessment & Plan  Restart ezetimibe as needed    Primary hypertension- (present on admission)  Assessment & Plan  Restart medications as needed       VTE prophylaxis: heparin ppx    I have performed a physical exam and reviewed and updated ROS and Plan today (12/4/2021). In review of yesterday's note (12/3/2021), there are no changes except as documented above.

## 2021-12-04 NOTE — OR SURGEON
Immediate Post- Operative Note        Findings: anterior abdominal wall abscess decompressed through midline incision, not drainable  Decreased size of RIGHT paracolic gutter/RP collection      Procedure(s): RIGHT paracolic gutter/RP drain placement by CT  50 mL brown fluid to lab      Estimated Blood Loss: Less than 5 ml        Complications: None            12/4/2021     11:19 AM     Geovanni Grimes M.D.

## 2021-12-04 NOTE — CASE COMMUNICATION
"Danii, please fax to PMD- Pratik Gordon MD , Phone: [phone]   Fax: [phone]   Patient c/o I don't feel good, I am exhausted, I feel so weak, I feel like I am going to pass out,\" Temp 101.0,resting  Heart rate ranges from 106-120/min,  Resp. 20, B/P 126/60, no resp. distress noted,   instructed  to take pt to nearest ER, pt was too weak to stand up, states I can't get up, I am too weak, called 911, pt transferred b y [facility] [place]/ambulance to nearest ER. "

## 2021-12-04 NOTE — PROGRESS NOTES
4 Eyes Skin Assessment Completed by TALITA Ortiz and TALITA Lomas.    Head WDL  Ears WDL  Nose WDL  Mouth WDL - Partial dentures in place  Neck WDL  Breast/Chest WDL  Shoulder Blades Redness and Blanching  Spine Redness and Blanching  (R) Arm/Elbow/Hand WDL  (L) Arm/Elbow/Hand WDL  Abdomen Incision - x3 drain sites with ostomy bags in place, MLI from previous hospital admission observed, drainage noted at incision site, gauze and tape in place   Groin Redness - Purwick and barrier cream in place   Scrotum/Coccyx/Buttocks Redness, Blanching and Discoloration - redness noted, blanching, barrier cream placed   (R) Leg WDL  (L) Leg WDL  (R) Heel/Foot/Toe WDL  (L) Heel/Foot/Toe WDL          Devices In Places Blood Pressure Cuff and Pulse Ox      Interventions In Place Pressure Redistribution Mattress    Possible Skin Injury No    Pictures Uploaded Into Epic N/A  Wound Consult Placed N/A  RN Wound Prevention Protocol Ordered No

## 2021-12-04 NOTE — DIETARY
Nutrition Services: Day 0 of admit.  Nils Alfonso is a 66 y.o. female with admitting DX of Intra-abdominal abscess (HCC), Postprocedural intraabdominal abscess  RD alerted to pt with Malnutrition Screening Score of 2 for unintentional weight loss of 14-23 lb in 1 month; pt denied poor PO intake/decreased appetite per malnutrition screen.     Assessment:  Weight: 58.2 kg (128 lb 4.9 oz)  Body mass index is 22.02 kg/m²., BMI classification: Normal    Diet/Intake: NPO     Evaluation:   1. Pt with lengthy admission 10/15-11/28 for bile duct leak, post-procedural intraabdominal abscess. During admission, pt was on TPN, transitioned to TF, and eventually PO diet. Pt was D/c'd home on regular diet. Weight on D/c was 59.1 kg, indicating 0.9 kg loss since that time; 1.5% weight loss in 6 days is moderate.  2. Current admission:  1. Transfer from [facility] 11/3: Noted to have low-grade fever today and abdominal pain.  Denies nausea, vomiting, has normal stool output from her ostomy.  2. Was tolerating diet with intermittent nausea PTA.  3. Today (12/4): Surgical consult: Wound opened at bedside; IR consulted for percutaneous drainage of retroperitoneal/pelvic collections; Anticipate will need additional studies in upcoming days to determined if still active leak; Following IR procedure reasonable to resume diet while quantifying and characterizing outputs; Will need strict recording of I/O from drains/ostomy appliances to quantify outputs.    Recommendations/Plan:  1. Diet per MD.  2. RD will add oral nutrition supplements as diet advances.  3. Monitor weight.  4. Nutrition Representative will see pt for ongoing meal and snack preferences based on diet order.    RD following.

## 2021-12-04 NOTE — CARE PLAN
Problem: Nutritional:  Goal: Achieve adequate nutritional intake  Description: Pt on PO diet, tolerating, and consuming ~50% of meals/supplements over 3-5 days.   Outcome: Not Met

## 2021-12-04 NOTE — PROGRESS NOTES
"Pharmacy Vancomycin Kinetics Note for 12/4/2021     66 y.o. female on Vancomycin day # 1     Vancomycin Indication (AUC Dosing): Skin/skin structure infection    Provider specified end date: 12/08/21    Active Antibiotics (From admission, onward)    Ordered     Ordering Provider       Sat Dec 4, 2021  6:58 AM    12/04/21 0658  vancomycin (VANCOCIN) 500 mg in  mL IVPB  (vancomycin (VANCOCIN) IV (LD + Maintenance))  EVERY 12 HOURS         Stuart Avalos M.D.       Sat Dec 4, 2021  4:00 AM    12/04/21 0400  vancomycin (VANCOCIN) 1,500 mg in  mL IVPB  (vancomycin (VANCOCIN) IV (LD + Maintenance))  ONCE         Stuart Avalos M.D.       Sat Dec 4, 2021  3:55 AM    12/04/21 0355  piperacillin-tazobactam (ZOSYN) 4.5 g in  mL IVPB  (piperacillin-tazobactam (ZOSYN) IV (Extended-infusion) PANEL )  EVERY 8 HOURS        \"And\" Linked Group Details    Stuart Avalos M.D.       Sat Dec 4, 2021  3:55 AM    12/04/21 0355  MD Alert...Vancomycin per Pharmacy  PHARMACY TO DOSE        Question:  Indication(s) for vancomycin?  Answer:  Skin and soft tissue infection    Stuart Avalos M.D.          Dosing Weight: 58.2 kg (128 lb 4.9 oz)      Admission History: Admitted on 12/4/2021 for Intra-abdominal abscess (HCC) [K65.1]  Postprocedural intraabdominal abscess [T81.43XA]  Pertinent history: Patient has a history of a complicated duodenectomy in which she came in to the hospital for increasing abdominal pain. CT showed a fluid collection below skin incision which could represent fluid collection or abscess. Broad spectrum antibiotics initiated empirically.    Allergies:     Ampicillin, Cephalexin, Clindamycin, Codeine, Demerol, Levofloxacin, Tetracyclines, Tizanidine, Morphine, Pcn [penicillins], and Sulfa drugs     Pertinent cultures to date:     Results     ** No results found for the last 336 hours. **          Labs:     Estimated Creatinine Clearance: 99.6 mL/min (A) (by C-G formula based on SCr of " 0.48 mg/dL (L)).  Recent Labs     21  1545   WBC 12.3*   NEUTSPOLYS 78.30*     Recent Labs     21  1545   BUN 9   CREATININE 0.48*   ALBUMIN 2.9*       Intake/Output Summary (Last 24 hours) at 2021 0658  Last data filed at 2021 0400  Gross per 24 hour   Intake 0 ml   Output 190 ml   Net -190 ml      /73   Pulse 83   Temp 35.9 °C (96.7 °F) (Temporal)   Resp 16   Wt 58.2 kg (128 lb 4.9 oz)   SpO2 97%  Temp (24hrs), Av.1 °C (97 °F), Min:35.9 °C (96.7 °F), Max:36.3 °C (97.3 °F)      List concerns for Vancomycin clearance:     Nephrotoxic drugs;Malnutrition/Low albumin    Pharmacokinetics: AUC Dosing    AUC kinetics:   Ke (hr ^-1): 0.065 hr^-1  Half life: 10.66 hr  Clearance: 2.459  Estimated TDD: 1229.5  Estimated Dose: 651  Estimated interval: 12.7      A/P:     -  Vancomycin dose: 500 mg q12h   -  Next vancomycin level(s): Not currently scheduled, consider after 4th maintenance dose  -  Predicted vancomycin AUC from initial AUC test calculator: 407 mg·hr/L      -  Comments: Due to age and concomitant use of Zosyn, more conservative dosing of 500 mg q12h with approximate AUC of 407 will be employed. Please continue to monitor renal function, urine output and vancomycin levels for dosing adjustments. Please also follow cultures and signs of clinical cure for de-escalation of therapy.       Elvie Hightower, OswaldD

## 2021-12-05 ENCOUNTER — HOME CARE VISIT (OUTPATIENT)
Dept: HOME HEALTH SERVICES | Facility: HOME HEALTHCARE | Age: 66
End: 2021-12-05
Payer: MEDICARE

## 2021-12-05 LAB
ANION GAP SERPL CALC-SCNC: 11 MMOL/L (ref 7–16)
ANISOCYTOSIS BLD QL SMEAR: ABNORMAL
BASOPHILS # BLD AUTO: 0 % (ref 0–1.8)
BASOPHILS # BLD: 0 K/UL (ref 0–0.12)
BUN SERPL-MCNC: 8 MG/DL (ref 8–22)
BURR CELLS BLD QL SMEAR: NORMAL
CALCIUM SERPL-MCNC: 7.8 MG/DL (ref 8.5–10.5)
CHLORIDE SERPL-SCNC: 104 MMOL/L (ref 96–112)
CO2 SERPL-SCNC: 16 MMOL/L (ref 20–33)
CREAT SERPL-MCNC: 0.42 MG/DL (ref 0.5–1.4)
EOSINOPHIL # BLD AUTO: 0.05 K/UL (ref 0–0.51)
EOSINOPHIL NFR BLD: 0.9 % (ref 0–6.9)
ERYTHROCYTE [DISTWIDTH] IN BLOOD BY AUTOMATED COUNT: 55.3 FL (ref 35.9–50)
GLUCOSE SERPL-MCNC: 102 MG/DL (ref 65–99)
GRAM STN SPEC: NORMAL
HCT VFR BLD AUTO: 31.7 % (ref 37–47)
HGB BLD-MCNC: 10.2 G/DL (ref 12–16)
LYMPHOCYTES # BLD AUTO: 0.28 K/UL (ref 1–4.8)
LYMPHOCYTES NFR BLD: 5.2 % (ref 22–41)
MACROCYTES BLD QL SMEAR: ABNORMAL
MANUAL DIFF BLD: NORMAL
MCH RBC QN AUTO: 29.7 PG (ref 27–33)
MCHC RBC AUTO-ENTMCNC: 32.2 G/DL (ref 33.6–35)
MCV RBC AUTO: 92.4 FL (ref 81.4–97.8)
MONOCYTES # BLD AUTO: 0.09 K/UL (ref 0–0.85)
MONOCYTES NFR BLD AUTO: 1.7 % (ref 0–13.4)
MORPHOLOGY BLD-IMP: NORMAL
NEUTROPHILS # BLD AUTO: 4.98 K/UL (ref 2–7.15)
NEUTROPHILS NFR BLD: 88.7 % (ref 44–72)
NEUTS BAND NFR BLD MANUAL: 3.5 % (ref 0–10)
NRBC # BLD AUTO: 0 K/UL
NRBC BLD-RTO: 0 /100 WBC
PLATELET # BLD AUTO: 218 K/UL (ref 164–446)
PLATELET BLD QL SMEAR: NORMAL
PMV BLD AUTO: 9.2 FL (ref 9–12.9)
POIKILOCYTOSIS BLD QL SMEAR: NORMAL
POTASSIUM SERPL-SCNC: 3.2 MMOL/L (ref 3.6–5.5)
RBC # BLD AUTO: 3.43 M/UL (ref 4.2–5.4)
RBC BLD AUTO: PRESENT
SIGNIFICANT IND 70042: NORMAL
SITE SITE: NORMAL
SODIUM SERPL-SCNC: 131 MMOL/L (ref 135–145)
SOURCE SOURCE: NORMAL
WBC # BLD AUTO: 5.4 K/UL (ref 4.8–10.8)

## 2021-12-05 PROCEDURE — 85007 BL SMEAR W/DIFF WBC COUNT: CPT

## 2021-12-05 PROCEDURE — 700102 HCHG RX REV CODE 250 W/ 637 OVERRIDE(OP): Performed by: INTERNAL MEDICINE

## 2021-12-05 PROCEDURE — 700105 HCHG RX REV CODE 258: Performed by: INTERNAL MEDICINE

## 2021-12-05 PROCEDURE — 99024 POSTOP FOLLOW-UP VISIT: CPT | Performed by: SURGERY

## 2021-12-05 PROCEDURE — 700105 HCHG RX REV CODE 258: Performed by: STUDENT IN AN ORGANIZED HEALTH CARE EDUCATION/TRAINING PROGRAM

## 2021-12-05 PROCEDURE — 85027 COMPLETE CBC AUTOMATED: CPT

## 2021-12-05 PROCEDURE — A9270 NON-COVERED ITEM OR SERVICE: HCPCS | Performed by: INTERNAL MEDICINE

## 2021-12-05 PROCEDURE — 80048 BASIC METABOLIC PNL TOTAL CA: CPT

## 2021-12-05 PROCEDURE — 700111 HCHG RX REV CODE 636 W/ 250 OVERRIDE (IP): Performed by: STUDENT IN AN ORGANIZED HEALTH CARE EDUCATION/TRAINING PROGRAM

## 2021-12-05 PROCEDURE — 99232 SBSQ HOSP IP/OBS MODERATE 35: CPT | Performed by: INTERNAL MEDICINE

## 2021-12-05 PROCEDURE — 36415 COLL VENOUS BLD VENIPUNCTURE: CPT

## 2021-12-05 PROCEDURE — 770001 HCHG ROOM/CARE - MED/SURG/GYN PRIV*

## 2021-12-05 PROCEDURE — 82150 ASSAY OF AMYLASE: CPT

## 2021-12-05 RX ADMIN — CYCLOBENZAPRINE 10 MG: 10 TABLET, FILM COATED ORAL at 18:11

## 2021-12-05 RX ADMIN — VANCOMYCIN HYDROCHLORIDE 500 MG: 500 INJECTION, POWDER, LYOPHILIZED, FOR SOLUTION INTRAVENOUS at 18:11

## 2021-12-05 RX ADMIN — PIPERACILLIN AND TAZOBACTAM 4.5 G: 4; .5 INJECTION, POWDER, LYOPHILIZED, FOR SOLUTION INTRAVENOUS; PARENTERAL at 20:39

## 2021-12-05 RX ADMIN — GABAPENTIN 600 MG: 300 CAPSULE ORAL at 18:11

## 2021-12-05 RX ADMIN — VANCOMYCIN HYDROCHLORIDE 500 MG: 500 INJECTION, POWDER, LYOPHILIZED, FOR SOLUTION INTRAVENOUS at 05:09

## 2021-12-05 RX ADMIN — ONDANSETRON 4 MG: 2 INJECTION INTRAMUSCULAR; INTRAVENOUS at 15:52

## 2021-12-05 RX ADMIN — ACETAMINOPHEN 1000 MG: 500 TABLET ORAL at 13:00

## 2021-12-05 RX ADMIN — OXYCODONE HYDROCHLORIDE 10 MG: 10 TABLET ORAL at 04:38

## 2021-12-05 RX ADMIN — OXYCODONE HYDROCHLORIDE 10 MG: 10 TABLET ORAL at 21:47

## 2021-12-05 RX ADMIN — OXYCODONE HYDROCHLORIDE 10 MG: 10 TABLET ORAL at 08:56

## 2021-12-05 RX ADMIN — GABAPENTIN 600 MG: 300 CAPSULE ORAL at 04:37

## 2021-12-05 RX ADMIN — SODIUM CHLORIDE: 9 INJECTION, SOLUTION INTRAVENOUS at 18:15

## 2021-12-05 RX ADMIN — ACETAMINOPHEN 1000 MG: 500 TABLET ORAL at 04:37

## 2021-12-05 RX ADMIN — PIPERACILLIN AND TAZOBACTAM 4.5 G: 4; .5 INJECTION, POWDER, LYOPHILIZED, FOR SOLUTION INTRAVENOUS; PARENTERAL at 04:38

## 2021-12-05 RX ADMIN — ONDANSETRON 4 MG: 2 INJECTION INTRAMUSCULAR; INTRAVENOUS at 08:56

## 2021-12-05 RX ADMIN — EZETIMIBE 10 MG: 10 TABLET ORAL at 18:12

## 2021-12-05 RX ADMIN — ACETAMINOPHEN 1000 MG: 500 TABLET ORAL at 18:11

## 2021-12-05 RX ADMIN — HEPARIN SODIUM 5000 UNITS: 5000 INJECTION, SOLUTION INTRAVENOUS; SUBCUTANEOUS at 21:47

## 2021-12-05 RX ADMIN — HEPARIN SODIUM 5000 UNITS: 5000 INJECTION, SOLUTION INTRAVENOUS; SUBCUTANEOUS at 13:02

## 2021-12-05 RX ADMIN — OXYCODONE HYDROCHLORIDE 10 MG: 10 TABLET ORAL at 15:50

## 2021-12-05 RX ADMIN — PIPERACILLIN AND TAZOBACTAM 4.5 G: 4; .5 INJECTION, POWDER, LYOPHILIZED, FOR SOLUTION INTRAVENOUS; PARENTERAL at 13:00

## 2021-12-05 ASSESSMENT — ENCOUNTER SYMPTOMS
NAUSEA: 0
FEVER: 0
MENTAL STATUS CHANGE: 0
VOMITING: 0
ABDOMINAL PAIN: 1
CHILLS: 0

## 2021-12-05 ASSESSMENT — PAIN DESCRIPTION - PAIN TYPE
TYPE: ACUTE PAIN
TYPE: ACUTE PAIN

## 2021-12-05 NOTE — PROGRESS NOTES
"Pt A&Ox4.  Initially reported feeling \"really good\" this AM but then describes some pain to abdomen after MD changed dressing at bedside and re-packed. Provided medication per MAR.  Fistula sites x3 to right abdomen, ostomy appliances covering.   IR drain to R abdomen, brown/yellow thick output. Flushed per orders.  Tolerating full liquid diet, denies n/v. + bowel sounds, + flatus, LBM 12/4.  Saturating >90% on RA.  Pt ambulates 1x FWW, but is requesting PT/OT and a cane. Updated MD.  Updated on plan of care. Safety education provided. Bed locked in low. Call light within reach. Rounding in place.   "

## 2021-12-05 NOTE — PROGRESS NOTES
"Surgical Oncology Consult Progress Note    Subjective:  No acute events.  States feels like she is in a \"much better place\" today.  Feeling better.  Tolerating PO.  IR able to place single drain yesterday - additional collections improved s/p decompression through the midline.  IR drain with 45cc recorded, appliance #3 with 110cc.    Objective:  /75   Pulse 80   Temp 36.4 °C (97.6 °F) (Temporal)   Resp 16   Wt 58.2 kg (128 lb 4.9 oz)   LMP 01/01/2009   SpO2 98%   BMI 22.02 kg/m²       Intake/Output Summary (Last 24 hours) at 12/5/2021 0839  Last data filed at 12/5/2021 0425  Gross per 24 hour   Intake 240 ml   Output 355 ml   Net -115 ml        Net IO Since Admission: -305 mL [12/05/21 0839]     Exam:  General: AAOx3, appropriate in conversation   Pulmonary:   CV: hemodynamically acceptable   Abdomen:  Soft, NT.  IR drain with thin tan/brown output. Ostomy appliance #3 with thin tan fluid.  Midline wound with viable ratty tissue.  Unclear if fascia intact on visual inspection - but PDS knot intact.  MSK: No peripheral edema, extremities warm     Assessment/Plan:  Patient is a 67 y/o F with recent prolonged hospital course for retroperitoneal sepsis s/p ERCP ampullary/duodenal perforation - now readmitted 12/4 with recurrent retroperitoneal and pelvic fluid collections s/p drainage of midline abscess and placement if IR drain in R retroperitoneal place.    HD #2    -Continue care per primary team  -Dressing changed this AM  -Will need strict I/O from both IR drain as well as ostomy appliances - the outputs will help guide clinical management  -Fluid amylase from ostomy appliance #3 pending  -CT tomorrow with PO and IV contrast to evaluate for possible ongoing leak.  Discussed this at length with patient and she has agreed to drink the PO contrast tomorrow.      Plan of care has been discussed with patient who is in agreement with the plan.  Bedside RN updated. Primary team updated.    Lisy Vann, " MD  December 5, 2021, 8:39 AM

## 2021-12-05 NOTE — CARE PLAN
The patient is Stable - Low risk of patient condition declining or worsening    Shift Goals  Clinical Goals: Monitor drain output, abx  Patient Goals: PT/OT, pain managemen  Family Goals: No family currently present    Progress made toward(s) clinical / shift goals:    Problem: Knowledge Deficit - Standard  Goal: Patient and family/care givers will demonstrate understanding of plan of care, disease process/condition, diagnostic tests and medications  Outcome: Progressing     Problem: Pain - Standard  Goal: Alleviation of pain or a reduction in pain to the patient’s comfort goal  Outcome: Progressing     Problem: Skin Integrity  Goal: Skin integrity is maintained or improved  Outcome: Progressing     Problem: Fluid Volume  Goal: Fluid volume balance will be maintained  Outcome: Progressing     Problem: Urinary - Renal Perfusion  Goal: Ability to achieve and maintain adequate renal perfusion and functioning will improve  Outcome: Progressing     Problem: Respiratory  Goal: Patient will achieve/maintain optimum respiratory ventilation and gas exchange  Outcome: Progressing       Patient is not progressing towards the following goals:

## 2021-12-05 NOTE — PROGRESS NOTES
Hospital Medicine Daily Progress Note    Date of Service  12/5/2021    Chief Complaint  Nils Alfonso is a 66 y.o. female admitted 12/4/2021 with complicated past medical history including multiple recent hospitalizations for complicated duodenectomy and recurrent pancreatitis who presents with recurrent abdominal pain and progressive/new intra-abdominal fluid collections concerning for abscesses and possible ongoing GI leak.     Hospital Course  No notes on file    Interval Problem Update  Abdominal issues-s/p new MARTHA drain placement in the R paracolic gutter space and feels substantially better today. Much less pain and tolerating FLD with Boost PLUS supplement in place. 160 mL output in the last 24 hours.     I have personally seen and examined the patient at bedside. I discussed the plan of care with patient.    Consultants/Specialty  general surgery    Code Status  Full Code    Disposition  Patient is not medically cleared.   Anticipate discharge to to home with close outpatient follow-up.  I have placed the appropriate orders for post-discharge needs.    Review of Systems  Review of Systems   Constitutional: Negative for chills and fever.        Feels better today     Cardiovascular: Negative for chest pain.   Gastrointestinal: Positive for abdominal pain (decreased intensity). Negative for nausea and vomiting.   All other systems reviewed and are negative.       Physical Exam  Temp:  [36.2 °C (97.1 °F)-37 °C (98.6 °F)] 36.4 °C (97.6 °F)  Pulse:  [65-95] 80  Resp:  [13-19] 18  BP: (100-137)/(62-83) 125/68  SpO2:  [92 %-98 %] 98 %    Physical Exam  Vitals and nursing note reviewed.   Constitutional:       General: She is not in acute distress.     Appearance: She is well-developed.      Comments: Thin, appears much more comfortable today  Mild evidence of subcutaneous fat loss and muscle wasting noted   HENT:      Head: Normocephalic and atraumatic.      Mouth/Throat:      Pharynx: No oropharyngeal exudate.    Eyes:      General: No scleral icterus.     Pupils: Pupils are equal, round, and reactive to light.   Neck:      Thyroid: No thyromegaly.   Cardiovascular:      Rate and Rhythm: Normal rate and regular rhythm.      Heart sounds: Normal heart sounds. No murmur heard.      Pulmonary:      Effort: Pulmonary effort is normal. No respiratory distress.      Breath sounds: Normal breath sounds. No wheezing.   Abdominal:      General: Bowel sounds are normal. There is no distension.      Palpations: Abdomen is soft.      Tenderness: There is abdominal tenderness (mild).      Comments: Multiple ostomy coverings in place on the R side  Gauze over the L side, appears mostly dry  New MARTHA drain in the RUQ, insertion site appears ok, bulb with moderate brown purulent fluid appreciated   Musculoskeletal:         General: No tenderness. Normal range of motion.      Cervical back: Normal range of motion and neck supple.      Right lower leg: No edema.      Left lower leg: No edema.   Skin:     General: Skin is warm and dry.      Findings: No rash.   Neurological:      Mental Status: She is alert and oriented to person, place, and time.      Cranial Nerves: No cranial nerve deficit.         Fluids    Intake/Output Summary (Last 24 hours) at 12/5/2021 1105  Last data filed at 12/5/2021 0838  Gross per 24 hour   Intake 1096.67 ml   Output 605 ml   Net 491.67 ml       Laboratory  Recent Labs     12/03/21  1545 12/04/21  0840   WBC 12.3* 5.9   RBC 4.05* 3.43*   HEMOGLOBIN 12.1 10.3*   HEMATOCRIT 36.7* 31.3*   MCV 90.6 91.3   MCH 29.9 30.0   MCHC 33.0* 32.9*   RDW 53.9* 54.5*   PLATELETCT 246 204   MPV 8.7* 8.6*     Recent Labs     12/03/21  1545 12/04/21  0840   SODIUM 127* 131*   POTASSIUM 3.6 3.4*   CHLORIDE 92* 101   CO2 21 21   GLUCOSE 89 86   BUN 9 9   CREATININE 0.48* 0.39*   CALCIUM 8.2* 7.5*     Recent Labs     12/04/21  0840   INR 1.42*               Imaging  CT-DRAIN-PERITONEAL   Final Result      Successful RIGHT  retroperitoneal/paracolic gutter drainage tube placement.      Plan: Thrice daily flushes with 10 mL of sterile saline. Monitor outputs. Please contact interventional radiology if there is any concern for tube dysfunction.      Findings were communicated with Dr. Vann via [employer] after initial scanning was performed.           Assessment/Plan  * Intra-abdominal abscess (HCC)- (present on admission)  Assessment & Plan  Complicated past medical history with recurrent and possibly new intra-abdominal fluid collections concerning for ongoing abscesses  -prior culture results have shown polymicrobial infections with fungi as well  -continue empiric IV vanc/zosyn  -low threshold to start IV micafungin  -Dr Vann of surgical oncology consulted  -IR drain placed in the RUQ on 12/4, decent output  -will get CTAP oral/IV contrast on 12/6 to evaluate leakage, etc.  -Trend WBC/fever curve, F/U blood cultures  -multimodal pain therapy initiated      Mild protein-calorie malnutrition (HCC)- (present on admission)  Assessment & Plan  -prolonged hospitalizations, e/o subcutaneous fat loss and muscle wasting on exam  -NPO for now for procedures, but did discuss with surgery about initiating modified diet afterwards  -RD consult for help    Hypokalemia- (present on admission)  Assessment & Plan  -mild replacement, daily labs    Hyponatremia- (present on admission)  Assessment & Plan  -labs pending at this time  -trend daily, gentle IVF's, likely from hypovolemia    Sepsis due to intraabdominal fluid collection/abscess, bacteremia and fungemia (HCC)- (present on admission)  Assessment & Plan  Stable, see below          Hyperlipidemia- (present on admission)  Assessment & Plan  Restart ezetimibe as needed    Primary hypertension- (present on admission)  Assessment & Plan  Restart medications as needed       VTE prophylaxis: heparin ppx    I have performed a physical exam and reviewed and updated ROS and Plan today (12/5/2021). In  review of yesterday's note (12/4/2021), there are no changes except as documented above.

## 2021-12-05 NOTE — CARE PLAN
The patient is Stable - Low risk of patient condition declining or worsening    Shift Goals  Clinical Goals: pain management  Patient Goals: Pain management  Family Goals: No family currently present    Progress made toward(s) clinical / shift goals:    Problem: Knowledge Deficit - Standard  Goal: Patient and family/care givers will demonstrate understanding of plan of care, disease process/condition, diagnostic tests and medications  Outcome: Progressing     Problem: Pain - Standard  Goal: Alleviation of pain or a reduction in pain to the patient’s comfort goal  Outcome: Progressing     Patient demonstrates understanding of plan of care. Pain managed per MAR.  Patient is not progressing towards the following goals:

## 2021-12-05 NOTE — CARE PLAN
The patient is Stable - Low risk of patient condition declining or worsening    Shift Goals  Clinical Goals: Pain management, anxiety control  Patient Goals: Pain management  Family Goals: No family currently present    Progress made toward(s) clinical / shift goals:      Patient updated on current plan of care and verbalizes understanding.   Pain is controlled with current medications per MAR.   Dressing observed, CDI. Purewick in place.     Patient is not progressing towards the following goals:

## 2021-12-05 NOTE — PROGRESS NOTES
Bedside report received.  Assessment complete.  A&O x 4. Patient calls appropriately.  Patient ambulates x1 assist with fww  Patient has 7/10 pain. Pain managed with prescribed medications.  Denies N&V. Tolerating regular  diet.  Midline incision, packed with dressing cdi, x3 fistulas with ostomy appliances in place, cdi, no output noted.  RLQ MARTHA present set to bulb suction, dressing cdi.   + void, + flatus, last BM 12/4.  Patient denies SOB.  Review plan with of care with patient. Call light and personal belongings within reach. Hourly rounding in place. All needs met at this time.

## 2021-12-06 ENCOUNTER — HOME CARE VISIT (OUTPATIENT)
Dept: HOME HEALTH SERVICES | Facility: HOME HEALTHCARE | Age: 66
End: 2021-12-06
Payer: MEDICARE

## 2021-12-06 ENCOUNTER — HOSPITAL ENCOUNTER (INPATIENT)
Dept: RADIOLOGY | Facility: MEDICAL CENTER | Age: 66
DRG: 862 | End: 2021-12-06
Attending: INTERNAL MEDICINE
Payer: MEDICARE

## 2021-12-06 LAB
ALBUMIN SERPL BCP-MCNC: 2.1 G/DL (ref 3.2–4.9)
ALBUMIN/GLOB SERPL: 0.9 G/DL
ALP SERPL-CCNC: 103 U/L (ref 30–99)
ALT SERPL-CCNC: 24 U/L (ref 2–50)
ANION GAP SERPL CALC-SCNC: 10 MMOL/L (ref 7–16)
AST SERPL-CCNC: 29 U/L (ref 12–45)
BACTERIA FLD AEROBE CULT: ABNORMAL
BASOPHILS # BLD AUTO: 0 % (ref 0–1.8)
BASOPHILS # BLD: 0 K/UL (ref 0–0.12)
BILIRUB SERPL-MCNC: 0.3 MG/DL (ref 0.1–1.5)
BUN SERPL-MCNC: 7 MG/DL (ref 8–22)
BURR CELLS BLD QL SMEAR: NORMAL
CALCIUM SERPL-MCNC: 7.8 MG/DL (ref 8.5–10.5)
CHLORIDE SERPL-SCNC: 107 MMOL/L (ref 96–112)
CO2 SERPL-SCNC: 20 MMOL/L (ref 20–33)
CREAT SERPL-MCNC: 0.45 MG/DL (ref 0.5–1.4)
EOSINOPHIL # BLD AUTO: 0.15 K/UL (ref 0–0.51)
EOSINOPHIL NFR BLD: 2.6 % (ref 0–6.9)
ERYTHROCYTE [DISTWIDTH] IN BLOOD BY AUTOMATED COUNT: 56.6 FL (ref 35.9–50)
GLOBULIN SER CALC-MCNC: 2.4 G/DL (ref 1.9–3.5)
GLUCOSE SERPL-MCNC: 68 MG/DL (ref 65–99)
GRAM STN SPEC: ABNORMAL
HCT VFR BLD AUTO: 32.3 % (ref 37–47)
HGB BLD-MCNC: 10.1 G/DL (ref 12–16)
LYMPHOCYTES # BLD AUTO: 2.09 K/UL (ref 1–4.8)
LYMPHOCYTES NFR BLD: 36 % (ref 22–41)
MANUAL DIFF BLD: NORMAL
MCH RBC QN AUTO: 29.4 PG (ref 27–33)
MCHC RBC AUTO-ENTMCNC: 31.3 G/DL (ref 33.6–35)
MCV RBC AUTO: 93.9 FL (ref 81.4–97.8)
METAMYELOCYTES NFR BLD MANUAL: 2.6 %
MICROCYTES BLD QL SMEAR: ABNORMAL
MONOCYTES # BLD AUTO: 0.1 K/UL (ref 0–0.85)
MONOCYTES NFR BLD AUTO: 1.7 % (ref 0–13.4)
MORPHOLOGY BLD-IMP: NORMAL
MYELOCYTES NFR BLD MANUAL: 0.9 %
NEUTROPHILS # BLD AUTO: 3.26 K/UL (ref 2–7.15)
NEUTROPHILS NFR BLD: 47.4 % (ref 44–72)
NEUTS BAND NFR BLD MANUAL: 8.8 % (ref 0–10)
NRBC # BLD AUTO: 0 K/UL
NRBC BLD-RTO: 0 /100 WBC
PLATELET # BLD AUTO: 244 K/UL (ref 164–446)
PLATELET BLD QL SMEAR: NORMAL
PMV BLD AUTO: 9.1 FL (ref 9–12.9)
POIKILOCYTOSIS BLD QL SMEAR: NORMAL
POTASSIUM SERPL-SCNC: 3.6 MMOL/L (ref 3.6–5.5)
PROT SERPL-MCNC: 4.5 G/DL (ref 6–8.2)
RBC # BLD AUTO: 3.44 M/UL (ref 4.2–5.4)
RBC BLD AUTO: NORMAL
SIGNIFICANT IND 70042: ABNORMAL
SITE SITE: ABNORMAL
SODIUM SERPL-SCNC: 137 MMOL/L (ref 135–145)
SOURCE SOURCE: ABNORMAL
SPHEROCYTES BLD QL SMEAR: NORMAL
VANCOMYCIN PEAK SERPL-MCNC: 14.7 UG/ML (ref 20–40)
WBC # BLD AUTO: 5.8 K/UL (ref 4.8–10.8)

## 2021-12-06 PROCEDURE — 700111 HCHG RX REV CODE 636 W/ 250 OVERRIDE (IP): Performed by: STUDENT IN AN ORGANIZED HEALTH CARE EDUCATION/TRAINING PROGRAM

## 2021-12-06 PROCEDURE — 99232 SBSQ HOSP IP/OBS MODERATE 35: CPT | Performed by: INTERNAL MEDICINE

## 2021-12-06 PROCEDURE — 700117 HCHG RX CONTRAST REV CODE 255: Performed by: INTERNAL MEDICINE

## 2021-12-06 PROCEDURE — 97162 PT EVAL MOD COMPLEX 30 MIN: CPT

## 2021-12-06 PROCEDURE — 85007 BL SMEAR W/DIFF WBC COUNT: CPT

## 2021-12-06 PROCEDURE — 770001 HCHG ROOM/CARE - MED/SURG/GYN PRIV*

## 2021-12-06 PROCEDURE — 700102 HCHG RX REV CODE 250 W/ 637 OVERRIDE(OP): Performed by: INTERNAL MEDICINE

## 2021-12-06 PROCEDURE — 80202 ASSAY OF VANCOMYCIN: CPT

## 2021-12-06 PROCEDURE — 85027 COMPLETE CBC AUTOMATED: CPT

## 2021-12-06 PROCEDURE — 74177 CT ABD & PELVIS W/CONTRAST: CPT

## 2021-12-06 PROCEDURE — 700105 HCHG RX REV CODE 258: Performed by: STUDENT IN AN ORGANIZED HEALTH CARE EDUCATION/TRAINING PROGRAM

## 2021-12-06 PROCEDURE — 80053 COMPREHEN METABOLIC PANEL: CPT

## 2021-12-06 PROCEDURE — 700105 HCHG RX REV CODE 258: Performed by: INTERNAL MEDICINE

## 2021-12-06 PROCEDURE — A9270 NON-COVERED ITEM OR SERVICE: HCPCS | Performed by: INTERNAL MEDICINE

## 2021-12-06 PROCEDURE — 36415 COLL VENOUS BLD VENIPUNCTURE: CPT

## 2021-12-06 RX ADMIN — OXYCODONE HYDROCHLORIDE 10 MG: 10 TABLET ORAL at 09:04

## 2021-12-06 RX ADMIN — PIPERACILLIN AND TAZOBACTAM 4.5 G: 4; .5 INJECTION, POWDER, LYOPHILIZED, FOR SOLUTION INTRAVENOUS; PARENTERAL at 04:10

## 2021-12-06 RX ADMIN — GABAPENTIN 600 MG: 300 CAPSULE ORAL at 16:59

## 2021-12-06 RX ADMIN — HEPARIN SODIUM 5000 UNITS: 5000 INJECTION, SOLUTION INTRAVENOUS; SUBCUTANEOUS at 04:11

## 2021-12-06 RX ADMIN — GABAPENTIN 600 MG: 300 CAPSULE ORAL at 04:10

## 2021-12-06 RX ADMIN — VANCOMYCIN HYDROCHLORIDE 500 MG: 500 INJECTION, POWDER, LYOPHILIZED, FOR SOLUTION INTRAVENOUS at 16:59

## 2021-12-06 RX ADMIN — OXYCODONE HYDROCHLORIDE 10 MG: 10 TABLET ORAL at 21:47

## 2021-12-06 RX ADMIN — ACETAMINOPHEN 1000 MG: 500 TABLET ORAL at 12:17

## 2021-12-06 RX ADMIN — PIPERACILLIN AND TAZOBACTAM 4.5 G: 4; .5 INJECTION, POWDER, LYOPHILIZED, FOR SOLUTION INTRAVENOUS; PARENTERAL at 12:21

## 2021-12-06 RX ADMIN — PIPERACILLIN AND TAZOBACTAM 4.5 G: 4; .5 INJECTION, POWDER, LYOPHILIZED, FOR SOLUTION INTRAVENOUS; PARENTERAL at 21:47

## 2021-12-06 RX ADMIN — ONDANSETRON 4 MG: 2 INJECTION INTRAMUSCULAR; INTRAVENOUS at 12:45

## 2021-12-06 RX ADMIN — OXYCODONE HYDROCHLORIDE 10 MG: 10 TABLET ORAL at 04:10

## 2021-12-06 RX ADMIN — ACETAMINOPHEN 1000 MG: 500 TABLET ORAL at 16:59

## 2021-12-06 RX ADMIN — ACETAMINOPHEN 1000 MG: 500 TABLET ORAL at 04:10

## 2021-12-06 RX ADMIN — CYCLOBENZAPRINE 10 MG: 10 TABLET, FILM COATED ORAL at 16:59

## 2021-12-06 RX ADMIN — HEPARIN SODIUM 5000 UNITS: 5000 INJECTION, SOLUTION INTRAVENOUS; SUBCUTANEOUS at 13:26

## 2021-12-06 RX ADMIN — EZETIMIBE 10 MG: 10 TABLET ORAL at 16:59

## 2021-12-06 RX ADMIN — ONDANSETRON 4 MG: 2 INJECTION INTRAMUSCULAR; INTRAVENOUS at 21:47

## 2021-12-06 RX ADMIN — IOHEXOL 80 ML: 350 INJECTION, SOLUTION INTRAVENOUS at 10:58

## 2021-12-06 RX ADMIN — IOHEXOL 25 ML: 240 INJECTION, SOLUTION INTRATHECAL; INTRAVASCULAR; INTRAVENOUS; ORAL at 10:59

## 2021-12-06 RX ADMIN — VANCOMYCIN HYDROCHLORIDE 500 MG: 500 INJECTION, POWDER, LYOPHILIZED, FOR SOLUTION INTRAVENOUS at 04:11

## 2021-12-06 RX ADMIN — OXYCODONE HYDROCHLORIDE 10 MG: 10 TABLET ORAL at 13:26

## 2021-12-06 RX ADMIN — ONDANSETRON 4 MG: 2 INJECTION INTRAMUSCULAR; INTRAVENOUS at 04:11

## 2021-12-06 RX ADMIN — HEPARIN SODIUM 5000 UNITS: 5000 INJECTION, SOLUTION INTRAVENOUS; SUBCUTANEOUS at 21:47

## 2021-12-06 RX ADMIN — SODIUM CHLORIDE: 9 INJECTION, SOLUTION INTRAVENOUS at 04:12

## 2021-12-06 ASSESSMENT — ENCOUNTER SYMPTOMS
DIZZINESS: 0
BLURRED VISION: 0
MYALGIAS: 0
HEADACHES: 0
PALPITATIONS: 0
SHORTNESS OF BREATH: 0
ABDOMINAL PAIN: 1
HEMOPTYSIS: 0
COUGH: 0
NAUSEA: 0
BRUISES/BLEEDS EASILY: 0
FEVER: 0
CHILLS: 0
VOMITING: 0
WEAKNESS: 1

## 2021-12-06 ASSESSMENT — GAIT ASSESSMENTS
DISTANCE (FEET): 125
GAIT LEVEL OF ASSIST: MINIMAL ASSIST
ASSISTIVE DEVICE: 4 WHEEL WALKER
DEVIATION: BRADYKINETIC

## 2021-12-06 ASSESSMENT — PAIN DESCRIPTION - PAIN TYPE
TYPE: ACUTE PAIN

## 2021-12-06 ASSESSMENT — COGNITIVE AND FUNCTIONAL STATUS - GENERAL
MOVING TO AND FROM BED TO CHAIR: UNABLE
SUGGESTED CMS G CODE MODIFIER MOBILITY: CL
MOVING FROM LYING ON BACK TO SITTING ON SIDE OF FLAT BED: A LOT
MOBILITY SCORE: 14
WALKING IN HOSPITAL ROOM: A LITTLE
CLIMB 3 TO 5 STEPS WITH RAILING: A LOT
STANDING UP FROM CHAIR USING ARMS: A LITTLE
TURNING FROM BACK TO SIDE WHILE IN FLAT BAD: A LITTLE

## 2021-12-06 NOTE — THERAPY
Physical Therapy   Initial Evaluation     Patient Name: Nils Alfonso  Age:  66 y.o., Sex:  female  Medical Record #: [id number]  Today's Date: 12/6/2021     Precautions: Fall Risk  Comments: multiple ostomy bags, MARTHA    Assessment  Patient is a 66 y.o. female transferred from [facility] with fever and RLQ pain, found to have intraabdominal abscess. Now s/p drain placement for abscess on 12/4. Pt had recent hospitalization for complicated duodenectomy and recurrent pancreatitis (10/15-11/28). PMH includes HTN and HLD. PT eval complete. Pt at Min A for bed mobility tasks, primarily assisting sidelying<>EOB. Pt at SBA for STS with 4WW, VCs for sequencing. Pt amb with 4WW and Min A, pt had 1 LOB when she took her hands off her walker, requiring assist from therapist to remain upright. Pt slightly unsteady with 4WW, will trial FWW at next session. Pt will benefit from continued acute PT to increase functional mobility. PT would benefit from HHPT following admission to further address PT needs. However, pt will need to participate in therapy and progress prior to DC. Anticipate that pt will do well with PT as she appears very motivated. Will follow for acute PT needs to address generalized weakness, balance deficits, limited functional mobility, and limited activity tolerance.    Plan  Recommend Physical Therapy 4 times per week until therapy goals are met for the following treatments:  Bed Mobility, Gait Training, Neuro Re-Education / Balance, Self Care/Home Evaluation, Stair Training, Therapeutic Activities and Therapeutic Exercises  DC Equipment Recommendations: None (pt has recommended DME at home)  Discharge Recommendations: Other - (see above)          12/06/21 1533   Vitals   O2 Delivery Device None - Room Air   Pain 0 - 10 Group   Location Abdomen   Location Orientation Mid   Pain Rating Scale (NPRS) 4   Description Aching   Therapist Pain Assessment During Activity   Prior Living Situation   Prior Services  None   Housing / Facility 1 Story House   Steps Into Home 2   Steps In Home 0   Rail Right Rail (Steps into Home)   Bathroom Set up Grab Bars   Equipment Owned Front-Wheel Walker;4-Wheel Walker   Lives with - Patient's Self Care Capacity Spouse  ()   Comments pt reports  can assist as needed at home   Prior Level of Functional Mobility   Bed Mobility Independent   Transfer Status Independent   Ambulation Independent   Distance Ambulation (Feet) community   Assistive Devices Used None   Stairs Independent   Comments above level for prior to recent admission on 10/15. pt was DC'd to home on 11/28 for 6 days and was limited with mobility d/t fever    History of Falls   History of Falls No   Cognition    Cognition / Consciousness WDL   Level of Consciousness Alert   Comments pleasant and participatory, receptive to edu. demo appropriate insight into her condition   Active ROM Lower Body    Active ROM Lower Body  WDL   Strength Lower Body   Lower Body Strength  WDL   Balance Assessment   Sitting Balance (Static) Fair +   Sitting Balance (Dynamic) Fair +   Standing Balance (Static) Fair   Standing Balance (Dynamic) Fair -   Weight Shift Sitting Fair   Weight Shift Standing Fair   Comments FWW   Gait Analysis   Gait Level Of Assist Minimal Assist   Assistive Device 4 Wheel Walker   Distance (Feet) 125   # of Times Distance was Traveled 1   Deviation Bradykinetic   Weight Bearing Status no restrictions   Comments pt had 1 LOB when she took her hands off her walker to adjust her mask, required Mod A from therapist to remain upright. pt reports feeling mildly unsteady with 4WW today and wants to trial FWW next time. pt fatigued towards end of ambulation   Bed Mobility    Supine to Sit Minimal Assist   Sit to Supine Minimal Assist   Rolling Supervised   Comments HOB slightly elevated, used rails   Functional Mobility   Sit to Stand   (SBA)   Comments FWW, VCs for hand positioning/sequencing   Short Term Goals     Short Term Goal # 1 pt will move supine<>EOB via log roll with HOB flat, no features, and SPV within 6 visits to facilitate getting in/out of bed.   Short Term Goal # 2 pt will complete all transfers with FWW and SPV within 6 weeks to increase functional mobility.   Short Term Goal # 3 pt will amb 150' with FWW and SPV within 6 visits for household mobility.   Short Term Goal # 4 Pt will ascend and descend step x 2 with SPV to access her home by the 6th tx

## 2021-12-06 NOTE — PROGRESS NOTES
AA&Ox4.   RA. Denies SOB.  Reported pain medicated per MAR.   Fistulas x3 with ostomy appliances CDI.   RLQ MARTHA with brown output. Flushed per order.   Tolerating full liquid diet. Denies N/V.  + void via purewick.   LBM 12/4.   Pt up with x1 assist using FWW.  All needs met at this time. Call light within reach. Pt calls appropriately.

## 2021-12-06 NOTE — PROGRESS NOTES
Radiology Progress Note   Author: AYE Mahan Date & Time created: 12/6/2021  10:52 AM   Date of admission  12/4/2021  Note to reader: this note follows the APSO format rather than the historical SOAP format. Assessment and plan located at the top of the note for ease of use.    Chief Complaint  66 y.o. female admitted 12/4/2021 with ABD pain       HPI  Nils Alfonso is a 66 y.o. female admitted 12/4/2021 with complicated past medical history including multiple recent hospitalizations for complicated duodenectomy and recurrent pancreatitis who presents with recurrent abdominal pain and progressive/new intra-abdominal fluid collections concerning for abscesses and possible ongoing GI leak.       Assessment/Plan  Interval History   Principal Problem:    Intra-abdominal abscess (HCC)  Active Problems:    Primary hypertension    Hyperlipidemia    Sepsis due to intraabdominal fluid collection/abscess, bacteremia and fungemia (HCC)    Hyponatremia    Hypokalemia    Postprocedural intraabdominal abscess    Mild protein-calorie malnutrition (HCC)      Plan IR  - Irrigate RLQ drain with 10 ml of sterile saline three times per shift   - Fluid cultures, Lactose fermenting Gram negative christopher, Group D Enterococcus species  - Sugery following   - CT abd/pelvis today with oral contrast   - Continue to monitor drains, VS and labs        [id number]  IR:   12/4-RIGHT paracolic gutter/RP drain placement by CT 50 mL brown fluid to lab  12/5- 65 ml              Review of Systems  Physical Exam   Review of Systems   Constitutional: Positive for malaise/fatigue. Negative for chills and fever.   HENT: Negative for hearing loss.    Eyes: Negative for blurred vision.   Respiratory: Negative for cough, hemoptysis and shortness of breath.    Cardiovascular: Negative for chest pain and palpitations.   Gastrointestinal: Positive for abdominal pain. Negative for vomiting.   Genitourinary: Negative for dysuria.   Musculoskeletal:  Negative for myalgias.   Skin: Negative for rash.   Neurological: Positive for weakness. Negative for dizziness and headaches.   Endo/Heme/Allergies: Does not bruise/bleed easily.   Psychiatric/Behavioral: Negative for suicidal ideas.      Vitals:    12/06/21 0710   BP: 108/68   Pulse: 86   Resp: 16   Temp: 36 °C (96.8 °F)   SpO2: 94%        Physical Exam  Constitutional:       Appearance: Normal appearance.   HENT:      Head: Normocephalic.      Nose: Nose normal.      Mouth/Throat:      Mouth: Mucous membranes are moist.   Eyes:      Pupils: Pupils are equal, round, and reactive to light.   Cardiovascular:      Rate and Rhythm: Normal rate.   Pulmonary:      Effort: Pulmonary effort is normal. No respiratory distress.   Abdominal:      Palpations: Abdomen is soft.      Tenderness: There is abdominal tenderness.          Comments: IR drain site CDI, no redness or swelling , brown purulent fluid in MARTHA bulb  Ostomy   X 3 abd dressings    Musculoskeletal:         General: No tenderness or deformity.      Cervical back: Normal range of motion.   Skin:     General: Skin is warm and dry.      Capillary Refill: Capillary refill takes less than 2 seconds.      Coloration: Skin is not jaundiced or pale.   Neurological:      General: No focal deficit present.      Mental Status: She is alert.      Motor: No weakness.   Psychiatric:         Mood and Affect: Mood normal.         Behavior: Behavior normal.             Labs    Recent Labs     12/04/21  0840 12/05/21  1644 12/06/21  0402   WBC 5.9 5.4 5.8   RBC 3.43* 3.43* 3.44*   HEMOGLOBIN 10.3* 10.2* 10.1*   HEMATOCRIT 31.3* 31.7* 32.3*   MCV 91.3 92.4 93.9   MCH 30.0 29.7 29.4   MCHC 32.9* 32.2* 31.3*   RDW 54.5* 55.3* 56.6*   PLATELETCT 204 218 244   MPV 8.6* 9.2 9.1     Recent Labs     12/04/21  0840 12/05/21  1644 12/06/21  0402   SODIUM 131* 131* 137   POTASSIUM 3.4* 3.2* 3.6   CHLORIDE 101 104 107   CO2 21 16* 20   GLUCOSE 86 102* 68   BUN 9 8 7*   CREATININE 0.39* 0.42*  0.45*   CALCIUM 7.5* 7.8* 7.8*     Recent Labs     12/03/21  1545 12/03/21  1545 12/04/21  0840 12/05/21  1644 12/06/21  0402   ALBUMIN 2.9*  --  2.2*  --  2.1*   TBILIRUBIN 0.6  --  0.5  --  0.3   ALKPHOSPHAT 118*  --  93  --  103*   TOTPROTEIN 5.8*  --  4.8*  --  4.5*   ALTSGPT 25  --  18  --  24   ASTSGOT 28  --  24  --  29   CREATININE 0.48*   < > 0.39* 0.42* 0.45*    < > = values in this interval not displayed.     CT-DRAIN-PERITONEAL   Final Result      Successful RIGHT retroperitoneal/paracolic gutter drainage tube placement.      Plan: Thrice daily flushes with 10 mL of sterile saline. Monitor outputs. Please contact interventional radiology if there is any concern for tube dysfunction.      Findings were communicated with Dr. Vann via Voal[employer] Me after initial scanning was performed.      CT-ABDOMEN-PELVIS WITH    (Results Pending)       INR   Date Value Ref Range Status   12/04/2021 1.42 (H) 0.87 - 1.13 Final     Comment:     INR - Non-therapeutic Reference Range: 0.87-1.13  INR - Therapeutic Reference Range: 2.0-4.0       No results found for: POCINR     Intake/Output Summary (Last 24 hours) at 12/6/2021 1052  Last data filed at 12/6/2021 0600  Gross per 24 hour   Intake 1724 ml   Output 1295 ml   Net 429 ml      Labs not explicitly included in this progress note were reviewed by the author. Radiology/imaging not explicitly included in this progress note was reviewed by the author.   I have performed a physical exam and reviewed and updated ROS and Plan today (12/6/2021).     20 minutes in directly providing and coordinating care and extensive data review.  No time overlap and excludes procedures.

## 2021-12-06 NOTE — DISCHARGE PLANNING
Hospital Care Management Discharge Planning      Anticipated Discharge Disposition: Home, possible IV abx needs     Action/Information: SW attended IDT rounds and reviewed chart. Per rounds, pt will possibly require surgery, as well as IV abx and antifungals.      Barriers: Medical clearance; awaiting discharge needs     Plan: Hospital Care Management Team will continue to collaborate with pt/family, medical care team, and outpatient providers for ongoing discharge planning and collaboration.

## 2021-12-06 NOTE — PROGRESS NOTES
Hospital Medicine Daily Progress Note    Date of Service  12/6/2021    Chief Complaint  Nils Alfonso is a 66 y.o. female admitted 12/4/2021 with complicated past medical history including multiple recent hospitalizations for complicated duodenectomy and recurrent pancreatitis who presents with recurrent abdominal pain and progressive/new intra-abdominal fluid collections concerning for abscesses and possible ongoing GI leak.     Hospital Course  No notes on file    Interval Problem Update  Abdominal issues-MARTHA drain with only mild output. CTAP done shows no clear duodenal leak?  Cultures are polymicrobial and appear nearly identical to prior cultures. Remains on broad spectrum abx's. She is sleepy today.    I have personally seen and examined the patient at bedside. I discussed the plan of care with patient.    Consultants/Specialty  general surgery    Code Status  Full Code    Disposition  Patient is not medically cleared.   Anticipate discharge to to home with close outpatient follow-up.  I have placed the appropriate orders for post-discharge needs.    Review of Systems  Review of Systems   Constitutional: Negative for chills and fever.        Feels better today     Cardiovascular: Negative for chest pain.   Gastrointestinal: Positive for abdominal pain. Negative for nausea and vomiting.        Pain is slightly worse today     All other systems reviewed and are negative.       Physical Exam  Temp:  [36 °C (96.8 °F)-37.6 °C (99.6 °F)] 36 °C (96.8 °F)  Pulse:  [83-89] 86  Resp:  [16-17] 16  BP: ()/(62-73) 108/68  SpO2:  [92 %-97 %] 94 %    Physical Exam  Vitals and nursing note reviewed.   Constitutional:       General: She is not in acute distress.     Appearance: She is well-developed.      Comments: Thin, appears much more comfortable today  Mild evidence of subcutaneous fat loss and muscle wasting noted  Sleepy   HENT:      Head: Normocephalic and atraumatic.      Mouth/Throat:      Pharynx: No  oropharyngeal exudate.   Eyes:      General: No scleral icterus.     Pupils: Pupils are equal, round, and reactive to light.   Neck:      Thyroid: No thyromegaly.   Cardiovascular:      Rate and Rhythm: Normal rate and regular rhythm.      Heart sounds: Normal heart sounds. No murmur heard.      Pulmonary:      Effort: Pulmonary effort is normal. No respiratory distress.      Breath sounds: Normal breath sounds. No wheezing.   Abdominal:      General: Bowel sounds are normal. There is no distension.      Palpations: Abdomen is soft.      Tenderness: There is abdominal tenderness (mild to moderate RLQ).      Comments: Multiple ostomy coverings in place on the R side  Gauze over the L side, appears mostly dry  New MARTHA drain in the RUQ, insertion site appears ok, bulb with moderate brown purulent fluid appreciated, no changes noted   Musculoskeletal:         General: No tenderness. Normal range of motion.      Cervical back: Normal range of motion and neck supple.      Right lower leg: No edema.      Left lower leg: No edema.   Skin:     General: Skin is warm and dry.      Findings: No rash.   Neurological:      Mental Status: She is alert and oriented to person, place, and time.      Cranial Nerves: No cranial nerve deficit.         Fluids    Intake/Output Summary (Last 24 hours) at 12/6/2021 1411  Last data filed at 12/6/2021 1400  Gross per 24 hour   Intake 1152 ml   Output 1385 ml   Net -233 ml       Laboratory  Recent Labs     12/04/21  0840 12/05/21  1644 12/06/21  0402   WBC 5.9 5.4 5.8   RBC 3.43* 3.43* 3.44*   HEMOGLOBIN 10.3* 10.2* 10.1*   HEMATOCRIT 31.3* 31.7* 32.3*   MCV 91.3 92.4 93.9   MCH 30.0 29.7 29.4   MCHC 32.9* 32.2* 31.3*   RDW 54.5* 55.3* 56.6*   PLATELETCT 204 218 244   MPV 8.6* 9.2 9.1     Recent Labs     12/04/21  0840 12/05/21  1644 12/06/21  0402   SODIUM 131* 131* 137   POTASSIUM 3.4* 3.2* 3.6   CHLORIDE 101 104 107   CO2 21 16* 20   GLUCOSE 86 102* 68   BUN 9 8 7*   CREATININE 0.39* 0.42*  0.45*   CALCIUM 7.5* 7.8* 7.8*     Recent Labs     12/04/21  0840   INR 1.42*               Imaging  CT-ABDOMEN-PELVIS WITH   Final Result      1.  Interval decrease in size in right lower quadrant fluid collections status post drainage.      2.  Trace bilateral pleural effusions with bibasilar atelectasis.      3.  Biliary stent in place with pneumobilia.      4.  Anasarca      CT-DRAIN-PERITONEAL   Final Result      Successful RIGHT retroperitoneal/paracolic gutter drainage tube placement.      Plan: Thrice daily flushes with 10 mL of sterile saline. Monitor outputs. Please contact interventional radiology if there is any concern for tube dysfunction.      Findings were communicated with Dr. Vann via Voalte Me after initial scanning was performed.           Assessment/Plan  * Intra-abdominal abscess (HCC)- (present on admission)  Assessment & Plan  Complicated past medical history with recurrent and possibly new intra-abdominal fluid collections concerning for ongoing abscesses  -prior culture results have shown polymicrobial infections with fungi as well  -continue empiric IV vanc/zosyn  -low threshold to start IV micafungin  -Dr Vann of surgical oncology consulted  -IR drain placed in the RUQ on 12/4, decent output but has slowed down over the last 24 hours  -will get CTAP oral/IV contrast, no clear duodenal leak.  -fluid cultures are poly with : E coli, E faecium; f/u final speciation  -multimodal pain therapy initiated      Mild protein-calorie malnutrition (HCC)- (present on admission)  Assessment & Plan  -prolonged hospitalizations, e/o subcutaneous fat loss and muscle wasting on exam  -NPO for now for procedures, but did discuss with surgery about initiating modified diet afterwards  -RD consult for help    Hypokalemia- (present on admission)  Assessment & Plan  resolved    Hyponatremia- (present on admission)  Assessment & Plan  resolved    Sepsis due to intraabdominal fluid collection/abscess,  bacteremia and fungemia (HCC)- (present on admission)  Assessment & Plan  Stable, see below          Hyperlipidemia- (present on admission)  Assessment & Plan  Restart ezetimibe as needed    Primary hypertension- (present on admission)  Assessment & Plan  Restart medications as needed       VTE prophylaxis: heparin ppx    I have performed a physical exam and reviewed and updated ROS and Plan today (12/6/2021). In review of yesterday's note (12/5/2021), there are no changes except as documented above.

## 2021-12-06 NOTE — CARE PLAN
The patient is Stable - Low risk of patient condition declining or worsening    Shift Goals  Clinical Goals: monitor drain output  Patient Goals: PT/OT, pain managemen  Family Goals: No family currently present    Progress made toward(s) clinical / shift goals:    Problem: Knowledge Deficit - Standard  Goal: Patient and family/care givers will demonstrate understanding of plan of care, disease process/condition, diagnostic tests and medications  Outcome: Progressing     Problem: Pain - Standard  Goal: Alleviation of pain or a reduction in pain to the patient’s comfort goal  Outcome: Progressing  Patient demonstrates understanding of plan of care. Pain managed per mar.     Patient is not progressing towards the following goals:

## 2021-12-06 NOTE — PROGRESS NOTES
Bedside report received.  Assessment complete.  A&O x 4. Patient calls appropriately.  Patient ambulates with x1 assist and fww.   Denies pain at this time.   Denies N&V. Tolerating full liquid diet.  Midline incision has dip cdi. Fistulas x3 with ostomy appliances in place, MARTHA to RLQ, dip, cdi.   + void, + flatus, - BM.  Patient denies SOB.  Review plan with of care with patient. Call light and personal belongings within reach. Hourly rounding in place. All needs met at this time.

## 2021-12-07 PROBLEM — E83.42 HYPOMAGNESEMIA: Status: ACTIVE | Noted: 2021-11-08

## 2021-12-07 LAB
ANION GAP SERPL CALC-SCNC: 7 MMOL/L (ref 7–16)
BUN SERPL-MCNC: 6 MG/DL (ref 8–22)
CALCIUM SERPL-MCNC: 7.9 MG/DL (ref 8.5–10.5)
CHLORIDE SERPL-SCNC: 107 MMOL/L (ref 96–112)
CO2 SERPL-SCNC: 21 MMOL/L (ref 20–33)
CREAT SERPL-MCNC: 0.48 MG/DL (ref 0.5–1.4)
GLUCOSE SERPL-MCNC: 65 MG/DL (ref 65–99)
MAGNESIUM SERPL-MCNC: 1.7 MG/DL (ref 1.5–2.5)
PHOSPHATE SERPL-MCNC: 2.9 MG/DL (ref 2.5–4.5)
POTASSIUM SERPL-SCNC: 3.6 MMOL/L (ref 3.6–5.5)
SODIUM SERPL-SCNC: 135 MMOL/L (ref 135–145)
TEST NAME 95000: NORMAL
VANCOMYCIN TROUGH SERPL-MCNC: 10.3 UG/ML (ref 10–20)

## 2021-12-07 PROCEDURE — 97166 OT EVAL MOD COMPLEX 45 MIN: CPT

## 2021-12-07 PROCEDURE — 99232 SBSQ HOSP IP/OBS MODERATE 35: CPT | Performed by: FAMILY MEDICINE

## 2021-12-07 PROCEDURE — 700102 HCHG RX REV CODE 250 W/ 637 OVERRIDE(OP): Performed by: INTERNAL MEDICINE

## 2021-12-07 PROCEDURE — 97535 SELF CARE MNGMENT TRAINING: CPT

## 2021-12-07 PROCEDURE — 84100 ASSAY OF PHOSPHORUS: CPT

## 2021-12-07 PROCEDURE — 99024 POSTOP FOLLOW-UP VISIT: CPT | Performed by: SURGERY

## 2021-12-07 PROCEDURE — 700111 HCHG RX REV CODE 636 W/ 250 OVERRIDE (IP): Performed by: STUDENT IN AN ORGANIZED HEALTH CARE EDUCATION/TRAINING PROGRAM

## 2021-12-07 PROCEDURE — 97530 THERAPEUTIC ACTIVITIES: CPT

## 2021-12-07 PROCEDURE — 80202 ASSAY OF VANCOMYCIN: CPT

## 2021-12-07 PROCEDURE — 700105 HCHG RX REV CODE 258: Performed by: FAMILY MEDICINE

## 2021-12-07 PROCEDURE — 80048 BASIC METABOLIC PNL TOTAL CA: CPT

## 2021-12-07 PROCEDURE — 700105 HCHG RX REV CODE 258: Performed by: STUDENT IN AN ORGANIZED HEALTH CARE EDUCATION/TRAINING PROGRAM

## 2021-12-07 PROCEDURE — 97116 GAIT TRAINING THERAPY: CPT

## 2021-12-07 PROCEDURE — 36415 COLL VENOUS BLD VENIPUNCTURE: CPT

## 2021-12-07 PROCEDURE — 700102 HCHG RX REV CODE 250 W/ 637 OVERRIDE(OP): Performed by: FAMILY MEDICINE

## 2021-12-07 PROCEDURE — 83735 ASSAY OF MAGNESIUM: CPT

## 2021-12-07 PROCEDURE — A9270 NON-COVERED ITEM OR SERVICE: HCPCS | Performed by: FAMILY MEDICINE

## 2021-12-07 PROCEDURE — 700111 HCHG RX REV CODE 636 W/ 250 OVERRIDE (IP): Performed by: FAMILY MEDICINE

## 2021-12-07 PROCEDURE — 770001 HCHG ROOM/CARE - MED/SURG/GYN PRIV*

## 2021-12-07 PROCEDURE — A9270 NON-COVERED ITEM OR SERVICE: HCPCS | Performed by: INTERNAL MEDICINE

## 2021-12-07 RX ORDER — CALCIUM CARBONATE 500 MG/1
500 TABLET, CHEWABLE ORAL 3 TIMES DAILY PRN
Status: DISCONTINUED | OUTPATIENT
Start: 2021-12-07 | End: 2021-12-16 | Stop reason: HOSPADM

## 2021-12-07 RX ORDER — MAGNESIUM SULFATE HEPTAHYDRATE 40 MG/ML
2 INJECTION, SOLUTION INTRAVENOUS ONCE
Status: COMPLETED | OUTPATIENT
Start: 2021-12-07 | End: 2021-12-07

## 2021-12-07 RX ADMIN — OXYCODONE HYDROCHLORIDE 10 MG: 10 TABLET ORAL at 01:06

## 2021-12-07 RX ADMIN — ACETAMINOPHEN 1000 MG: 500 TABLET ORAL at 17:39

## 2021-12-07 RX ADMIN — VANCOMYCIN HYDROCHLORIDE 500 MG: 500 INJECTION, POWDER, LYOPHILIZED, FOR SOLUTION INTRAVENOUS at 05:34

## 2021-12-07 RX ADMIN — PIPERACILLIN AND TAZOBACTAM 4.5 G: 4; .5 INJECTION, POWDER, LYOPHILIZED, FOR SOLUTION INTRAVENOUS; PARENTERAL at 12:41

## 2021-12-07 RX ADMIN — HEPARIN SODIUM 5000 UNITS: 5000 INJECTION, SOLUTION INTRAVENOUS; SUBCUTANEOUS at 14:25

## 2021-12-07 RX ADMIN — GABAPENTIN 600 MG: 300 CAPSULE ORAL at 17:44

## 2021-12-07 RX ADMIN — OXYCODONE HYDROCHLORIDE 10 MG: 10 TABLET ORAL at 05:52

## 2021-12-07 RX ADMIN — PIPERACILLIN AND TAZOBACTAM 4.5 G: 4; .5 INJECTION, POWDER, LYOPHILIZED, FOR SOLUTION INTRAVENOUS; PARENTERAL at 05:34

## 2021-12-07 RX ADMIN — EZETIMIBE 10 MG: 10 TABLET ORAL at 17:39

## 2021-12-07 RX ADMIN — PIPERACILLIN AND TAZOBACTAM 4.5 G: 4; .5 INJECTION, POWDER, LYOPHILIZED, FOR SOLUTION INTRAVENOUS; PARENTERAL at 20:55

## 2021-12-07 RX ADMIN — GABAPENTIN 600 MG: 300 CAPSULE ORAL at 05:35

## 2021-12-07 RX ADMIN — OXYCODONE HYDROCHLORIDE 10 MG: 10 TABLET ORAL at 20:55

## 2021-12-07 RX ADMIN — HEPARIN SODIUM 5000 UNITS: 5000 INJECTION, SOLUTION INTRAVENOUS; SUBCUTANEOUS at 21:03

## 2021-12-07 RX ADMIN — CYCLOBENZAPRINE 10 MG: 10 TABLET, FILM COATED ORAL at 17:40

## 2021-12-07 RX ADMIN — ACETAMINOPHEN 1000 MG: 500 TABLET ORAL at 05:34

## 2021-12-07 RX ADMIN — HEPARIN SODIUM 5000 UNITS: 5000 INJECTION, SOLUTION INTRAVENOUS; SUBCUTANEOUS at 05:34

## 2021-12-07 RX ADMIN — OXYCODONE HYDROCHLORIDE 10 MG: 10 TABLET ORAL at 12:36

## 2021-12-07 RX ADMIN — VANCOMYCIN HYDROCHLORIDE 500 MG: 500 INJECTION, POWDER, LYOPHILIZED, FOR SOLUTION INTRAVENOUS at 16:20

## 2021-12-07 RX ADMIN — ONDANSETRON 4 MG: 2 INJECTION INTRAMUSCULAR; INTRAVENOUS at 12:36

## 2021-12-07 RX ADMIN — MAGNESIUM SULFATE HEPTAHYDRATE 2 G: 2 INJECTION, SOLUTION INTRAVENOUS at 08:11

## 2021-12-07 RX ADMIN — ACETAMINOPHEN 1000 MG: 500 TABLET ORAL at 00:59

## 2021-12-07 RX ADMIN — ACETAMINOPHEN 1000 MG: 500 TABLET ORAL at 12:36

## 2021-12-07 RX ADMIN — CALCIUM CARBONATE 500 MG: 500 TABLET, CHEWABLE ORAL at 16:19

## 2021-12-07 ASSESSMENT — COGNITIVE AND FUNCTIONAL STATUS - GENERAL
DAILY ACTIVITIY SCORE: 21
WALKING IN HOSPITAL ROOM: A LITTLE
SUGGESTED CMS G CODE MODIFIER DAILY ACTIVITY: CJ
HELP NEEDED FOR BATHING: A LITTLE
STANDING UP FROM CHAIR USING ARMS: A LITTLE
MOBILITY SCORE: 16
CLIMB 3 TO 5 STEPS WITH RAILING: A LITTLE
DRESSING REGULAR LOWER BODY CLOTHING: A LITTLE
SUGGESTED CMS G CODE MODIFIER MOBILITY: CK
MOVING FROM LYING ON BACK TO SITTING ON SIDE OF FLAT BED: A LOT
TOILETING: A LITTLE
MOVING TO AND FROM BED TO CHAIR: A LOT
TURNING FROM BACK TO SIDE WHILE IN FLAT BAD: A LITTLE

## 2021-12-07 ASSESSMENT — GAIT ASSESSMENTS
DISTANCE (FEET): 125
ASSISTIVE DEVICE: 4 WHEEL WALKER
DEVIATION: BRADYKINETIC
DISTANCE (FEET): 2

## 2021-12-07 ASSESSMENT — ENCOUNTER SYMPTOMS
COUGH: 0
CHILLS: 0
DIARRHEA: 0
HEARTBURN: 0
SHORTNESS OF BREATH: 0
SORE THROAT: 0
DIZZINESS: 0
NAUSEA: 0
DIAPHORESIS: 0
WEAKNESS: 1
VOMITING: 0
WHEEZING: 0
FLANK PAIN: 0
SPEECH CHANGE: 0
WEAKNESS: 0
MYALGIAS: 0
PALPITATIONS: 0
HEADACHES: 0
FEVER: 0
SENSORY CHANGE: 0
BACK PAIN: 0
ABDOMINAL PAIN: 1
FOCAL WEAKNESS: 0
NERVOUS/ANXIOUS: 0
BLURRED VISION: 0
HEMOPTYSIS: 0
NECK PAIN: 0
ABDOMINAL PAIN: 0
BRUISES/BLEEDS EASILY: 0

## 2021-12-07 ASSESSMENT — ACTIVITIES OF DAILY LIVING (ADL): TOILETING: INDEPENDENT

## 2021-12-07 ASSESSMENT — PAIN DESCRIPTION - PAIN TYPE: TYPE: ACUTE PAIN

## 2021-12-07 NOTE — PROGRESS NOTES
Radiology Progress Note   Author: AYE Mahan Date & Time created: 12/7/2021  11:10 AM   Date of admission  12/4/2021  Note to reader: this note follows the APSO format rather than the historical SOAP format. Assessment and plan located at the top of the note for ease of use.    Chief Complaint  66 y.o. female admitted 12/4/2021 with ABD pain       HPI  Nils Alfonso is a 66 y.o. female admitted 12/4/2021 with complicated past medical history including multiple recent hospitalizations for complicated duodenectomy and recurrent pancreatitis who presents with recurrent abdominal pain and progressive/new intra-abdominal fluid collections concerning for abscesses and possible ongoing GI leak.       Assessment/Plan  Interval History   Principal Problem:    Intra-abdominal abscess (HCC)  Active Problems:    Primary hypertension    Hyperlipidemia    Sepsis due to intraabdominal fluid collection/abscess, bacteremia and fungemia (HCC)    Hyponatremia    Hypokalemia    Postprocedural intraabdominal abscess    Mild protein-calorie malnutrition (HCC)      Plan IR  - Irrigate RLQ drain with 10 ml of sterile saline three times per shift   - Fluid cultures, Escherichia coli, Enterococcus faecium  - Sugery following   - CT abd/pelvis today with oral contrast   - Continue to monitor drains, VS and labs        [id number]  IR:   12/4-RIGHT paracolic gutter/RP drain placement by CT 50 mL brown fluid to lab  12/5- 65 ml   12/6- 50 ml purulent brown fluid, Ct- decrease in size in right lower quadrant fluid collection status post drainage. A pigtail catheter remains in place. A fluid collection previously measuring 2.3 cm in width currently measures 9 mm in width.   12/7-        Review of Systems  Physical Exam   Review of Systems   Constitutional: Positive for malaise/fatigue. Negative for chills and fever.   HENT: Negative for hearing loss.    Eyes: Negative for blurred vision.   Respiratory: Negative for cough, hemoptysis  and shortness of breath.    Cardiovascular: Negative for chest pain and palpitations.   Gastrointestinal: Positive for abdominal pain. Negative for vomiting.   Genitourinary: Negative for dysuria.   Musculoskeletal: Negative for myalgias.   Skin: Negative for rash.   Neurological: Positive for weakness. Negative for dizziness and headaches.   Endo/Heme/Allergies: Does not bruise/bleed easily.   Psychiatric/Behavioral: Negative for suicidal ideas.      Vitals:    12/07/21 0748   BP: 111/67   Pulse: 87   Resp: 18   Temp: 36.9 °C (98.4 °F)   SpO2: 96%        Physical Exam  Constitutional:       Appearance: Normal appearance.   HENT:      Head: Normocephalic.      Nose: Nose normal.      Mouth/Throat:      Mouth: Mucous membranes are moist.   Eyes:      Pupils: Pupils are equal, round, and reactive to light.   Cardiovascular:      Rate and Rhythm: Normal rate.   Pulmonary:      Effort: Pulmonary effort is normal. No respiratory distress.   Abdominal:      Palpations: Abdomen is soft.      Tenderness: There is abdominal tenderness.          Comments: IR drain site CDI, no redness or swelling , brown purulent fluid in MARTHA bulb  Ostomy   X 3 abd dressings    Musculoskeletal:         General: No tenderness or deformity.      Cervical back: Normal range of motion.   Skin:     General: Skin is warm and dry.      Capillary Refill: Capillary refill takes less than 2 seconds.      Coloration: Skin is not jaundiced or pale.   Neurological:      General: No focal deficit present.      Mental Status: She is alert.      Motor: No weakness.   Psychiatric:         Mood and Affect: Mood normal.         Behavior: Behavior normal.             Labs    Recent Labs     12/05/21  1644 12/06/21  0402   WBC 5.4 5.8   RBC 3.43* 3.44*   HEMOGLOBIN 10.2* 10.1*   HEMATOCRIT 31.7* 32.3*   MCV 92.4 93.9   MCH 29.7 29.4   MCHC 32.2* 31.3*   RDW 55.3* 56.6*   PLATELETCT 218 244   MPV 9.2 9.1     Recent Labs     12/05/21  1644 12/06/21  0402  12/07/21  0540   SODIUM 131* 137 135   POTASSIUM 3.2* 3.6 3.6   CHLORIDE 104 107 107   CO2 16* 20 21   GLUCOSE 102* 68 65   BUN 8 7* 6*   CREATININE 0.42* 0.45* 0.48*   CALCIUM 7.8* 7.8* 7.9*     Recent Labs     12/05/21  1644 12/06/21  0402 12/07/21  0540   ALBUMIN  --  2.1*  --    TBILIRUBIN  --  0.3  --    ALKPHOSPHAT  --  103*  --    TOTPROTEIN  --  4.5*  --    ALTSGPT  --  24  --    ASTSGOT  --  29  --    CREATININE 0.42* 0.45* 0.48*     CT-ABDOMEN-PELVIS WITH   Final Result      1.  Interval decrease in size in right lower quadrant fluid collections status post drainage.      2.  Trace bilateral pleural effusions with bibasilar atelectasis.      3.  Biliary stent in place with pneumobilia.      4.  Anasarca      CT-DRAIN-PERITONEAL   Final Result      Successful RIGHT retroperitoneal/paracolic gutter drainage tube placement.      Plan: Thrice daily flushes with 10 mL of sterile saline. Monitor outputs. Please contact interventional radiology if there is any concern for tube dysfunction.      Findings were communicated with Dr. Vann via Voal[employer] Me after initial scanning was performed.          INR   Date Value Ref Range Status   12/04/2021 1.42 (H) 0.87 - 1.13 Final     Comment:     INR - Non-therapeutic Reference Range: 0.87-1.13  INR - Therapeutic Reference Range: 2.0-4.0       No results found for: POCINR     Intake/Output Summary (Last 24 hours) at 12/6/2021 1052  Last data filed at 12/6/2021 0600  Gross per 24 hour   Intake 1724 ml   Output 1295 ml   Net 429 ml      Labs not explicitly included in this progress note were reviewed by the author. Radiology/imaging not explicitly included in this progress note was reviewed by the author.   I have performed a physical exam and reviewed and updated ROS and Plan today (12/7/2021).     20 minutes in directly providing and coordinating care and extensive data review.  No time overlap and excludes procedures.   Average Average Average Average

## 2021-12-07 NOTE — THERAPY
"Occupational Therapy   Initial Evaluation     Patient Name: Nils Alfonso  Age:  66 y.o., Sex:  female  Medical Record #: [id number]  Today's Date: 12/7/2021     Precautions: Fall Risk  Comments: MARTHA drain, ostomy bag x 2    Assessment    Patient is 66 y.o. female with h/o HTN, HLD, recent admit (10/15-11/28) for complicated duodenectomy and recurrent pancreatitis requiring multiple abdominal surgeries, now readmitted with abdominal pain, fever. Pt dx with intra-abdominal abscess requiring drain placement. Pt seen for OT eval and treatment. Pt able to mobilize to EOB and complete initial stand with supv. Mobilized to chair with 4WW. Pt requested UB HEP for strengthening. Provided written and verbal instruction on resistance band exercises to B shoulder, triceps, biceps. Spouse present and receptive to education as well. Discussed home safety with use of shower chair. Pt reports she was able to complete one shower at home between admits with assist from spouse. Attempted stand from chair with plant to mobilize to bathroom. Pt required max A to come to standing, then declined further mobilization due to weakness, fatigue. Pt is limited by: generalized weakness, balance impairment, activity intolerance. Recommend post-acute transitional care ideally in Acute Rehab setting, however pt prefers to return home with resumption of HH and support from spouse. Will continue to follow for acute OT.     Plan    Recommend Occupational Therapy 4 times per week until therapy goals are met for the following treatments:  Adaptive Equipment, Neuro Re-Education / Balance, Self Care/Activities of Daily Living, Therapeutic Activities and Therapeutic Exercises.    DC Equipment Recommendations: Raised Toilet Seat with Arms  Discharge Recommendations: Recommend home health for continued occupational therapy services     Subjective    \"I took one shower at home with Robin's help.\"     Objective       12/07/21 1114   Prior Living Situation "   Housing / Facility 1 Story House   Steps Into Home 2   Rail Right Rail (Steps into Home)   Bathroom Set up Walk In Shower;Grab Bars   Equipment Owned Grab Bar(s) In Tub / Shower;Grab Bar(s) By Toilet;Tub / Shower Seat;4-Wheel Walker;Front-Wheel Walker   Comments Spouse can assist as needed on DC   Prior Level of ADL Function   Self Feeding Independent   Grooming / Hygiene Independent   Bathing Requires Assist  (seated shower )   Dressing Independent   Toileting Independent   Prior Level of IADL Function   Medication Management Requires Assist   Laundry Requires Assist   Kitchen Mobility Independent   Finances Requires Assist   Home Management Dependent   Shopping Dependent   Prior Level Of Mobility Supervision With Device in Home  (4WW)   Driving / Transportation Relatives / Others Provide Transportation   Comments Prior to most recent hospitalization pt was completely independent with BADL, I-ADL and functional mobility    Cognition    Level of Consciousness Alert   Sequencing Impaired   Comments Pt oriented to all but day of month; requires repeat cues at times for hand placement during functional mobility and sequencing during ADL    Strength Upper Body   Gross Strength Generalized Weakness, Equal Bilaterally.    Balance Assessment   Sitting Balance (Static) Fair +   Sitting Balance (Dynamic) Fair   Standing Balance (Static) Fair -   Standing Balance (Dynamic) Fair -   Weight Shift Sitting Fair   Weight Shift Standing Fair   Comments 4WW for standing    Bed Mobility    Supine to Sit Minimal Assist  (HOB elevated; pt sleeping in recliner at home )   Scooting Supervised  (seated)   ADL Assessment   Grooming   (declined due to modesty over dentures; hair already done )   Lower Body Dressing Minimal Assist  (supv doff/don B socks; pants NT (anticipate min)`)   Toileting   (NT; declined need )   Functional Mobility   Sit to Stand Maximal Assist  (from arm chair; supv from EOB)   Bed, Chair, Wheelchair Transfer  Minimal Assist   Toilet Transfers Refused   Transfer Method Stand Step  (4WW)   Mobility Supine > EOB, few steps to arm chair with pivot    Short Term Goals   Short Term Goal # 1 Pt will complete ADL transfers with supv    Short Term Goal # 2 Pt will complete FB dressing with supv    Short Term Goal # 3 Pt will complete standing grooming with supv

## 2021-12-07 NOTE — CARE PLAN
Problem: Knowledge Deficit - Standard  Goal: Patient and family/care givers will demonstrate understanding of plan of care, disease process/condition, diagnostic tests and medications  Outcome: Progressing     Problem: Pain - Standard  Goal: Alleviation of pain or a reduction in pain to the patient’s comfort goal  Outcome: Progressing     Problem: Skin Integrity  Goal: Skin integrity is maintained or improved  Outcome: Progressing     The patient is Stable - Low risk of patient condition declining or worsening    Shift Goals  Clinical Goals: CT scan, drain care  Patient Goals: PT/OT, pain managemen  Family Goals: No family currently present    Progress made toward(s) clinical / shift goals:  CT scan complete, IR MARTHA flushed per order.    Patient is not progressing towards the following goals:

## 2021-12-07 NOTE — PROGRESS NOTES
AA&Ox4.   RA. Denies SOB.  Reported pain tolerable at this time per pt.  Fistulas x3 with ostomy appliances CDI.   RLQ MARTHA with brown output. Flushed per order.   Tolerating full liquid diet. Denies N/V.  Pt eager to eat and advance to regular diet.  + void via purewick.   LBM 12/4.    Pt up with x1 assist using FWW.  Plan to mobilize throughout shift.  All needs met at this time. Call light within reach. Pt calls appropriately

## 2021-12-07 NOTE — CARE PLAN
The patient is Stable - Low risk of patient condition declining or worsening    Shift Goals  Clinical Goals: drain care  Patient Goals: PT/OT, pain managemen  Family Goals: No family currently present    Progress made toward(s) clinical / shift goals:    Problem: Knowledge Deficit - Standard  Goal: Patient and family/care givers will demonstrate understanding of plan of care, disease process/condition, diagnostic tests and medications  Outcome: Progressing     Problem: Pain - Standard  Goal: Alleviation of pain or a reduction in pain to the patient’s comfort goal  Outcome: Progressing  Patient demonstrates understanding of plan of care. Pain managed per mar.      Patient is not progressing towards the following goals:

## 2021-12-07 NOTE — PROGRESS NOTES
Bedside report received.  Assessment complete.  A&O x 4. Patient calls appropriately.  Patient ambulates with x1 assist and fww.   Patient has 8/10 pain. Pain managed with prescribed medications.  Reports nausea, managed per MAR. Tolerating full liquid diet..  + void with purewick, - flatus, - BM.  Patient denies SOB..  Patient has x3 fistulas with ostomy appliances in place, RLQ MARTHA, flushed, dip cdi..  Review plan with of care with patient. Call light and personal belongings within reach. Hourly rounding in place. All needs met at this time.

## 2021-12-07 NOTE — CARE PLAN
Problem: Nutritional:  Goal: Achieve adequate nutritional intake  Description: Pt on PO diet, tolerating, and consuming ~50% of meals/supplements over 3-5 days.   Outcome: Progressing   PO intake mostly % of meals and supplements over last 3 days w/ diet advancing to regular today.

## 2021-12-07 NOTE — PROGRESS NOTES
Hospital Medicine Daily Progress Note    Date of Service  12/7/2021    Chief Complaint  Nils Alfonso is a 66 y.o. female admitted 12/4/2021 with abdominal abscess.    Hospital Course  Admitted with intra-abdominal abscess. Patient was started on empiric coverage with IV vancomycin and Zosyn. Surgery was consulted on the case. Patient underwent CT guided Right retroperitoneal/paracolic gutter drainage tube placement on 12/4/2021.  Patient with history of recurrent idiopathic pancreatitis. She had complications of pancreatic pseudocyst and intra-abdominal fluid collections. She underwent CT-guided drainage. She was placed on IV antibiotics. However there was no improvement noted. Surgery was then consulted on the case. Patient underwent Exploratory laparotomy, Incision and drainage and debridement of retroperitoneal abscess, Incision and drainage and debridement of anterior peritoneal abscess., Debridement of necrotizing fasciitis of the lateral abdominal wall on 11/5/2021    Interval Problem Update  Abdominal abscess -culture shows E. coli and Enterococcus faecium  Low magnesium    I have personally seen and examined the patient at bedside. I discussed the plan of care with patient, bedside RN, charge RN and .    Consultants/Specialty  Surgery Oncology    Code Status  Full Code    Disposition  Patient is not medically cleared.   Anticipate discharge to to home with close outpatient follow-up.  I have placed the appropriate orders for post-discharge needs.    Review of Systems  Review of Systems   Constitutional: Negative for chills, diaphoresis, fever and malaise/fatigue.   HENT: Negative for congestion, hearing loss and sore throat.    Eyes: Negative for blurred vision.   Respiratory: Negative for cough, shortness of breath and wheezing.    Cardiovascular: Negative for chest pain, palpitations and leg swelling.   Gastrointestinal: Negative for abdominal pain, diarrhea, heartburn, nausea and vomiting.    Genitourinary: Negative for dysuria, flank pain and hematuria.   Musculoskeletal: Negative for back pain, joint pain, myalgias and neck pain.   Skin: Negative for rash.   Neurological: Negative for dizziness, sensory change, speech change, focal weakness, weakness and headaches.   Psychiatric/Behavioral: The patient is not nervous/anxious.         Physical Exam  Temp:  [36.2 °C (97.2 °F)-37.7 °C (99.8 °F)] 36.9 °C (98.4 °F)  Pulse:  [77-87] 87  Resp:  [16-18] 18  BP: ()/(56-70) 111/67  SpO2:  [95 %-100 %] 96 %    Physical Exam  Vitals and nursing note reviewed.   HENT:      Head: Normocephalic and atraumatic.      Nose: No congestion.      Mouth/Throat:      Mouth: Mucous membranes are moist.   Eyes:      Extraocular Movements: Extraocular movements intact.      Conjunctiva/sclera: Conjunctivae normal.   Cardiovascular:      Rate and Rhythm: Normal rate and regular rhythm.   Pulmonary:      Effort: Pulmonary effort is normal.      Breath sounds: Normal breath sounds.   Abdominal:      General: There is no distension.      Tenderness: There is no abdominal tenderness. There is no guarding or rebound.      Comments: Ostomy  Right flank MARTHA drain   Musculoskeletal:      Cervical back: No tenderness.      Right lower leg: No edema.      Left lower leg: No edema.   Skin:     General: Skin is warm and dry.   Neurological:      General: No focal deficit present.      Mental Status: She is alert and oriented to person, place, and time.      Cranial Nerves: No cranial nerve deficit.         Fluids    Intake/Output Summary (Last 24 hours) at 12/7/2021 1528  Last data filed at 12/7/2021 1455  Gross per 24 hour   Intake 100 ml   Output 1600 ml   Net -1500 ml       Laboratory  Recent Labs     12/05/21  1644 12/06/21  0402   WBC 5.4 5.8   RBC 3.43* 3.44*   HEMOGLOBIN 10.2* 10.1*   HEMATOCRIT 31.7* 32.3*   MCV 92.4 93.9   MCH 29.7 29.4   MCHC 32.2* 31.3*   RDW 55.3* 56.6*   PLATELETCT 218 244   MPV 9.2 9.1     Recent Labs      12/05/21  1644 12/06/21  0402 12/07/21  0540   SODIUM 131* 137 135   POTASSIUM 3.2* 3.6 3.6   CHLORIDE 104 107 107   CO2 16* 20 21   GLUCOSE 102* 68 65   BUN 8 7* 6*   CREATININE 0.42* 0.45* 0.48*   CALCIUM 7.8* 7.8* 7.9*                   Imaging  CT-ABDOMEN-PELVIS WITH   Final Result      1.  Interval decrease in size in right lower quadrant fluid collections status post drainage.      2.  Trace bilateral pleural effusions with bibasilar atelectasis.      3.  Biliary stent in place with pneumobilia.      4.  Anasarca      CT-DRAIN-PERITONEAL   Final Result      Successful RIGHT retroperitoneal/paracolic gutter drainage tube placement.      Plan: Thrice daily flushes with 10 mL of sterile saline. Monitor outputs. Please contact interventional radiology if there is any concern for tube dysfunction.      Findings were communicated with Dr. Vann via Voalte Me after initial scanning was performed.           Assessment/Plan  * Intra-abdominal abscess (HCC)- (present on admission)  Assessment & Plan  CT guided Right retroperitoneal/paracolic gutter drainage tube placement  12/4/2021  IV Vancomycin, change to Zosyn to Unasyn  Consult ID  Diet per Surgery    Sepsis - (present on admission)  Assessment & Plan  Source -abdominal abscess        Hypomagnesemia- (present on admission)  Assessment & Plan  IV Mg 2 g  Follow level    Hypokalemia- (present on admission)  Assessment & Plan  Follow bmp    Hyponatremia- (present on admission)  Assessment & Plan  Follow bmp    Normocytic anemia- (present on admission)  Assessment & Plan  Follow cbc    Mild protein-calorie malnutrition (HCC)- (present on admission)  Assessment & Plan  Nutrition consult    Hyperlipidemia- (present on admission)  Assessment & Plan  Ezetimibe       VTE prophylaxis: heparin ppx    I have performed a physical exam and reviewed and updated ROS and Plan today (12/7/2021). In review of yesterday's note (12/6/2021), there are no changes except as  documented above.

## 2021-12-07 NOTE — PROGRESS NOTES
The patient was seen this morning.  She is in good spirits and in no acute distress.  The drain appears to be decreasing volumes along with the ostomy sites.  The 2 upper ostomy sites have sealed.  Her culture has grown multiple organisms and is being managed by primary care team.  We will treat with antibiotics for 1 to 2 weeks pending infectious disease input.  We will follow drains and possibly repeat imaging in 1 week.

## 2021-12-07 NOTE — CASE COMMUNICATION
Quality Review for 11.29.21 SOC OASIS performed on by EDISON Cervantes RN on 12.6, 2021::     Edits completed by EDISON Cervantes RN:  1. Changed  to 12.3.21 date of re-admission, codes used as part of the collaboration convention  2. Changed  to 3 pt was at [facility] from 10/15 to 11/28 per EPIC  3. Added #3, 4 to  per EPIC  4. Changed  to 3 per wound assessment charting and pt is back in with abscesses.   5. Changed  to 5 and GG [id number] I to 88, pt unable to ambulate on 12/3 date of admission back to hospital and was unable to ambulate with PT on 12/1/21. Changed [id number] D,E,F,G to 88 per narrative pt was too tired to stand  6. Changed  to 1   7. Added fall risk, pressure ulcer prevention and high risk medication precautions to safety measures  8.  Updated F2F data  9. Added HTN and post op care to care plan

## 2021-12-08 ENCOUNTER — HOME CARE VISIT (OUTPATIENT)
Dept: HOME HEALTH SERVICES | Facility: HOME HEALTHCARE | Age: 66
End: 2021-12-08
Payer: MEDICARE

## 2021-12-08 LAB
ANION GAP SERPL CALC-SCNC: 11 MMOL/L (ref 7–16)
ANISOCYTOSIS BLD QL SMEAR: ABNORMAL
BACTERIA BLD CULT: NORMAL
BACTERIA BLD CULT: NORMAL
BASOPHILS # BLD AUTO: 0.9 % (ref 0–1.8)
BASOPHILS # BLD: 0.04 K/UL (ref 0–0.12)
BUN SERPL-MCNC: 7 MG/DL (ref 8–22)
CA-I SERPL-SCNC: 1.1 MMOL/L (ref 1.1–1.3)
CALCIUM SERPL-MCNC: 7.6 MG/DL (ref 8.5–10.5)
CHLORIDE SERPL-SCNC: 106 MMOL/L (ref 96–112)
CO2 SERPL-SCNC: 19 MMOL/L (ref 20–33)
CREAT SERPL-MCNC: 0.41 MG/DL (ref 0.5–1.4)
EOSINOPHIL # BLD AUTO: 0.15 K/UL (ref 0–0.51)
EOSINOPHIL NFR BLD: 3.6 % (ref 0–6.9)
ERYTHROCYTE [DISTWIDTH] IN BLOOD BY AUTOMATED COUNT: 56 FL (ref 35.9–50)
GLUCOSE SERPL-MCNC: 76 MG/DL (ref 65–99)
HCT VFR BLD AUTO: 30.8 % (ref 37–47)
HGB BLD-MCNC: 9.7 G/DL (ref 12–16)
LYMPHOCYTES # BLD AUTO: 1.94 K/UL (ref 1–4.8)
LYMPHOCYTES NFR BLD: 47.3 % (ref 22–41)
MACROCYTES BLD QL SMEAR: ABNORMAL
MAGNESIUM SERPL-MCNC: 1.9 MG/DL (ref 1.5–2.5)
MANUAL DIFF BLD: NORMAL
MCH RBC QN AUTO: 29 PG (ref 27–33)
MCHC RBC AUTO-ENTMCNC: 31.5 G/DL (ref 33.6–35)
MCV RBC AUTO: 92.2 FL (ref 81.4–97.8)
MONOCYTES # BLD AUTO: 0.29 K/UL (ref 0–0.85)
MONOCYTES NFR BLD AUTO: 7.1 % (ref 0–13.4)
MORPHOLOGY BLD-IMP: NORMAL
NEUTROPHILS # BLD AUTO: 1.69 K/UL (ref 2–7.15)
NEUTROPHILS NFR BLD: 41.1 % (ref 44–72)
NRBC # BLD AUTO: 0 K/UL
NRBC BLD-RTO: 0 /100 WBC
OVALOCYTES BLD QL SMEAR: NORMAL
PLATELET # BLD AUTO: 229 K/UL (ref 164–446)
PLATELET BLD QL SMEAR: NORMAL
PMV BLD AUTO: 8.9 FL (ref 9–12.9)
POIKILOCYTOSIS BLD QL SMEAR: NORMAL
POTASSIUM SERPL-SCNC: 3 MMOL/L (ref 3.6–5.5)
RBC # BLD AUTO: 3.34 M/UL (ref 4.2–5.4)
RBC BLD AUTO: PRESENT
SIGNIFICANT IND 70042: NORMAL
SIGNIFICANT IND 70042: NORMAL
SITE SITE: NORMAL
SITE SITE: NORMAL
SODIUM SERPL-SCNC: 136 MMOL/L (ref 135–145)
SOURCE SOURCE: NORMAL
SOURCE SOURCE: NORMAL
WBC # BLD AUTO: 4.1 K/UL (ref 4.8–10.8)

## 2021-12-08 PROCEDURE — 80048 BASIC METABOLIC PNL TOTAL CA: CPT

## 2021-12-08 PROCEDURE — 99024 POSTOP FOLLOW-UP VISIT: CPT | Performed by: SURGERY

## 2021-12-08 PROCEDURE — 700111 HCHG RX REV CODE 636 W/ 250 OVERRIDE (IP): Performed by: INTERNAL MEDICINE

## 2021-12-08 PROCEDURE — 700111 HCHG RX REV CODE 636 W/ 250 OVERRIDE (IP): Performed by: STUDENT IN AN ORGANIZED HEALTH CARE EDUCATION/TRAINING PROGRAM

## 2021-12-08 PROCEDURE — 700111 HCHG RX REV CODE 636 W/ 250 OVERRIDE (IP): Performed by: FAMILY MEDICINE

## 2021-12-08 PROCEDURE — 770001 HCHG ROOM/CARE - MED/SURG/GYN PRIV*

## 2021-12-08 PROCEDURE — 700105 HCHG RX REV CODE 258: Performed by: INTERNAL MEDICINE

## 2021-12-08 PROCEDURE — 82330 ASSAY OF CALCIUM: CPT

## 2021-12-08 PROCEDURE — 85027 COMPLETE CBC AUTOMATED: CPT

## 2021-12-08 PROCEDURE — A9270 NON-COVERED ITEM OR SERVICE: HCPCS | Performed by: INTERNAL MEDICINE

## 2021-12-08 PROCEDURE — 700105 HCHG RX REV CODE 258: Performed by: FAMILY MEDICINE

## 2021-12-08 PROCEDURE — A9270 NON-COVERED ITEM OR SERVICE: HCPCS | Performed by: SURGERY

## 2021-12-08 PROCEDURE — A9270 NON-COVERED ITEM OR SERVICE: HCPCS | Performed by: FAMILY MEDICINE

## 2021-12-08 PROCEDURE — 36415 COLL VENOUS BLD VENIPUNCTURE: CPT

## 2021-12-08 PROCEDURE — 85007 BL SMEAR W/DIFF WBC COUNT: CPT

## 2021-12-08 PROCEDURE — 97530 THERAPEUTIC ACTIVITIES: CPT

## 2021-12-08 PROCEDURE — 99223 1ST HOSP IP/OBS HIGH 75: CPT | Performed by: INTERNAL MEDICINE

## 2021-12-08 PROCEDURE — 97116 GAIT TRAINING THERAPY: CPT

## 2021-12-08 PROCEDURE — 99233 SBSQ HOSP IP/OBS HIGH 50: CPT | Performed by: FAMILY MEDICINE

## 2021-12-08 PROCEDURE — 700102 HCHG RX REV CODE 250 W/ 637 OVERRIDE(OP): Performed by: INTERNAL MEDICINE

## 2021-12-08 PROCEDURE — 700102 HCHG RX REV CODE 250 W/ 637 OVERRIDE(OP): Performed by: FAMILY MEDICINE

## 2021-12-08 PROCEDURE — 700102 HCHG RX REV CODE 250 W/ 637 OVERRIDE(OP): Performed by: SURGERY

## 2021-12-08 PROCEDURE — 83735 ASSAY OF MAGNESIUM: CPT

## 2021-12-08 RX ORDER — MAGNESIUM SULFATE HEPTAHYDRATE 40 MG/ML
2 INJECTION, SOLUTION INTRAVENOUS ONCE
Status: COMPLETED | OUTPATIENT
Start: 2021-12-08 | End: 2021-12-08

## 2021-12-08 RX ORDER — ACETAMINOPHEN 500 MG
1000 TABLET ORAL EVERY 6 HOURS PRN
Status: DISCONTINUED | OUTPATIENT
Start: 2021-12-08 | End: 2021-12-15

## 2021-12-08 RX ORDER — ACETAMINOPHEN 500 MG
1000 TABLET ORAL EVERY 6 HOURS PRN
Status: DISCONTINUED | OUTPATIENT
Start: 2021-12-30 | End: 2021-12-08

## 2021-12-08 RX ORDER — ACETAMINOPHEN 500 MG
1000 TABLET ORAL EVERY 6 HOURS PRN
Status: DISCONTINUED | OUTPATIENT
Start: 2021-12-08 | End: 2021-12-08

## 2021-12-08 RX ORDER — POTASSIUM CHLORIDE 20 MEQ/1
20 TABLET, EXTENDED RELEASE ORAL DAILY
Status: DISCONTINUED | OUTPATIENT
Start: 2021-12-08 | End: 2021-12-09

## 2021-12-08 RX ADMIN — ONDANSETRON 4 MG: 2 INJECTION INTRAMUSCULAR; INTRAVENOUS at 23:26

## 2021-12-08 RX ADMIN — ACETAMINOPHEN 1000 MG: 500 TABLET ORAL at 00:12

## 2021-12-08 RX ADMIN — GABAPENTIN 600 MG: 300 CAPSULE ORAL at 04:22

## 2021-12-08 RX ADMIN — AMPICILLIN SODIUM AND SULBACTAM SODIUM 3 G: 2; 1 INJECTION, POWDER, FOR SOLUTION INTRAMUSCULAR; INTRAVENOUS at 23:27

## 2021-12-08 RX ADMIN — VANCOMYCIN HYDROCHLORIDE 1500 MG: 500 INJECTION, POWDER, LYOPHILIZED, FOR SOLUTION INTRAVENOUS at 00:13

## 2021-12-08 RX ADMIN — MAGNESIUM SULFATE HEPTAHYDRATE 2 G: 40 INJECTION, SOLUTION INTRAVENOUS at 08:45

## 2021-12-08 RX ADMIN — POTASSIUM CHLORIDE 20 MEQ: 1500 TABLET, EXTENDED RELEASE ORAL at 08:44

## 2021-12-08 RX ADMIN — OXYCODONE HYDROCHLORIDE 10 MG: 10 TABLET ORAL at 01:54

## 2021-12-08 RX ADMIN — EZETIMIBE 10 MG: 10 TABLET ORAL at 18:33

## 2021-12-08 RX ADMIN — HEPARIN SODIUM 5000 UNITS: 5000 INJECTION, SOLUTION INTRAVENOUS; SUBCUTANEOUS at 13:24

## 2021-12-08 RX ADMIN — OXYCODONE HYDROCHLORIDE 10 MG: 10 TABLET ORAL at 18:35

## 2021-12-08 RX ADMIN — AMPICILLIN SODIUM AND SULBACTAM SODIUM 3 G: 2; 1 INJECTION, POWDER, FOR SOLUTION INTRAMUSCULAR; INTRAVENOUS at 18:32

## 2021-12-08 RX ADMIN — CYCLOBENZAPRINE 10 MG: 10 TABLET, FILM COATED ORAL at 18:33

## 2021-12-08 RX ADMIN — OXYCODONE HYDROCHLORIDE 10 MG: 10 TABLET ORAL at 11:41

## 2021-12-08 RX ADMIN — ONDANSETRON 4 MG: 2 INJECTION INTRAMUSCULAR; INTRAVENOUS at 18:28

## 2021-12-08 RX ADMIN — ACETAMINOPHEN 1000 MG: 500 TABLET ORAL at 08:55

## 2021-12-08 RX ADMIN — ONDANSETRON 4 MG: 2 INJECTION INTRAMUSCULAR; INTRAVENOUS at 04:01

## 2021-12-08 RX ADMIN — GABAPENTIN 600 MG: 300 CAPSULE ORAL at 18:33

## 2021-12-08 RX ADMIN — OXYCODONE HYDROCHLORIDE 10 MG: 10 TABLET ORAL at 23:26

## 2021-12-08 RX ADMIN — PIPERACILLIN AND TAZOBACTAM 4.5 G: 4; .5 INJECTION, POWDER, LYOPHILIZED, FOR SOLUTION INTRAVENOUS; PARENTERAL at 04:01

## 2021-12-08 RX ADMIN — HEPARIN SODIUM 5000 UNITS: 5000 INJECTION, SOLUTION INTRAVENOUS; SUBCUTANEOUS at 04:23

## 2021-12-08 RX ADMIN — AMPICILLIN SODIUM AND SULBACTAM SODIUM 3 G: 2; 1 INJECTION, POWDER, FOR SOLUTION INTRAMUSCULAR; INTRAVENOUS at 13:24

## 2021-12-08 RX ADMIN — HEPARIN SODIUM 5000 UNITS: 5000 INJECTION, SOLUTION INTRAVENOUS; SUBCUTANEOUS at 22:12

## 2021-12-08 ASSESSMENT — ENCOUNTER SYMPTOMS
HEADACHES: 0
FEVER: 0
SHORTNESS OF BREATH: 0
PALPITATIONS: 0
HEARTBURN: 0
SENSORY CHANGE: 0
FOCAL WEAKNESS: 0
ABDOMINAL PAIN: 0
SPEECH CHANGE: 0
FLANK PAIN: 0
CHILLS: 0
NAUSEA: 0
WEAKNESS: 1
COUGH: 0
ABDOMINAL PAIN: 1
WHEEZING: 0
BACK PAIN: 0
BRUISES/BLEEDS EASILY: 0
VOMITING: 0
MYALGIAS: 0
HEMOPTYSIS: 0
NECK PAIN: 0
WEAKNESS: 0
NERVOUS/ANXIOUS: 0
DIAPHORESIS: 0
DIARRHEA: 0
BLURRED VISION: 0
SORE THROAT: 0
DIZZINESS: 0

## 2021-12-08 ASSESSMENT — COGNITIVE AND FUNCTIONAL STATUS - GENERAL
CLIMB 3 TO 5 STEPS WITH RAILING: A LITTLE
MOVING FROM LYING ON BACK TO SITTING ON SIDE OF FLAT BED: UNABLE
WALKING IN HOSPITAL ROOM: A LITTLE
SUGGESTED CMS G CODE MODIFIER MOBILITY: CK
TURNING FROM BACK TO SIDE WHILE IN FLAT BAD: A LITTLE
STANDING UP FROM CHAIR USING ARMS: A LITTLE
MOBILITY SCORE: 16
MOVING TO AND FROM BED TO CHAIR: A LITTLE

## 2021-12-08 ASSESSMENT — GAIT ASSESSMENTS
GAIT LEVEL OF ASSIST: SUPERVISED
ASSISTIVE DEVICE: 4 WHEEL WALKER
DISTANCE (FEET): 125
DEVIATION: BRADYKINETIC

## 2021-12-08 ASSESSMENT — PAIN DESCRIPTION - PAIN TYPE
TYPE: ACUTE PAIN

## 2021-12-08 NOTE — THERAPY
Physical Therapy   Daily Treatment     Patient Name: Nils Alfonso  Age:  66 y.o., Sex:  female  Medical Record #: [id number]  Today's Date: 12/8/2021     Precautions: Fall Risk  Comments: MARTHA, ostomy x2    Assessment  Pt seen for PT treatment session. Pt at Min A-SPV for bed mobility, primarily requiring assist with moving LE's back into bed. Pt able to complete STS with 4WW and SPV from bed height, however assist level increased to Min A from chair d/t lower surface. Pt states she feels weaker/fatigued today. Pt sequencing and hand positioning was appropriate throughout session. Pt will benefit from continued acute PT to improve functional transfers and activity tolerance. Will follow.     Plan  Continue current treatment plan.  DC Equipment Recommendations: None (pt has recommended DME at home)  Discharge Recommendations: Recommend home health for continued physical therapy services         12/08/21 1407   Vitals   O2 Delivery Device None - Room Air   Pain 0 - 10 Group   Location Abdomen   Location Orientation Right   Pain Rating Scale (NPRS) not quantified   Description Aching   Therapist Pain Assessment During Activity   Cognition    Cognition / Consciousness WDL   Level of Consciousness Alert   Comments very motivated to mobilize OOB   Balance   Sitting Balance (Static) Fair +   Sitting Balance (Dynamic) Fair +   Standing Balance (Static) Fair   Standing Balance (Dynamic) Fair   Weight Shift Sitting Fair   Weight Shift Standing Fair   Skilled Intervention Verbal Cuing   Comments FWW   Gait Analysis   Gait Level Of Assist Supervised   Assistive Device 4 Wheel Walker   Distance (Feet) 125   # of Times Distance was Traveled 1   Deviation Bradykinetic   Weight Bearing Status no restrictions   Skilled Intervention Verbal Cuing   Comments no LOB, pt fatigued towards end of ambulation   Bed Mobility    Supine to Sit Supervised   Sit to Supine Minimal Assist  (assist with LEs)   Scooting Supervised   Rolling Supervised    Skilled Intervention Facilitation;Sequencing;Tactile Cuing;Verbal Cuing   Comments HOB slightly elevated, pt able to sequence correctly, however she had difficulty gettting back into bed and was moving slower than last session   Functional Mobility   Sit to Stand Supervised   Bed, Chair, Wheelchair Transfer Minimal Assist   Skilled Intervention Facilitation;Tactile Cuing;Sequencing;Verbal Cuing   Comments pt able to complete STS with 4WW from bed height, however she required Min A from chair height   Short Term Goals    Short Term Goal # 1 pt will move supine<>EOB via log roll with HOB flat, no features, and SPV within 6 visits to facilitate getting in/out of bed.   Goal Outcome # 1 Progressing as expected   Short Term Goal # 2 pt will complete all transfers with FWW and SPV within 6 weeks to increase functional mobility.   Goal Outcome # 2 Progressing as expected   Short Term Goal # 3 pt will amb 150' with FWW and SPV within 6 visits for household mobility.   Goal Outcome # 3 Progressing as expected   Short Term Goal # 4 Pt will ascend and descend step x 2 with SPV to access her home by the 6th tx   Goal Outcome # 4 Goal not met

## 2021-12-08 NOTE — CASE COMMUNICATION
Quality Review for 12.5.21 Transfer OASIS performed on by EDISON Cervantes RN on 12.8, 2021:    Edits for Camryn to complete:     Edits completed by EDISON Cervantes RN:  1. Clarified admission date to 12.4.21 on transfer order and changed  to 12.4.21  2. Changed  e to yes per POC

## 2021-12-08 NOTE — CASE COMMUNICATION
I agree with these changes      ----- Message -----  From: Liyah Cervantes R.N.  Sent: 12/6/2021   4:22 PM PST  To: Ivania Toro R.N.      Quality Review for 11.29.21 SOC OASIS performed on by EDISON Cervantes RN on 12.6, 2021::     Edits completed by EDISON Cervantes RN:  1. Changed  to 12.3.21 date of re-admission, codes used as part of the collaboration convention  2. Changed  to 3 pt was at [facility] from 10/15 to 11/28 per EPIC  3. Added # 3, 4 to  per EPIC  4. Changed  to 3 per wound assessment charting and pt is back in with abscesses.   5. Changed  to 5 and [id number] I to 88, pt unable to ambulate on 12/3 date of admission back to hospital and was unable to ambulate with PT on 12/1/21. Changed [id number] D,E,F,G to 88 per narrative pt was too tired to stand  6. Changed  to 1   7. Added fall risk, pressure ulcer prevention and high risk medication precautio ns to safety measures  8.  Updated F2F data  9. Added HTN and post op care to care plan

## 2021-12-08 NOTE — PROGRESS NOTES
Patient seen this AM  Feels better since IR drain  Cultures positive and spoke with hospitalist who will re-consult ID  Tolerating general diet  Plan:  Repeat CT in 1 week after IR drain placed  PO antibiotics if OK  Maybe home after repeat CT

## 2021-12-08 NOTE — CARE PLAN
The patient is Stable - Low risk of patient condition declining or worsening    Shift Goals  Clinical Goals: pain control  Patient Goals: PT/OT, pain managemen  Family Goals: No family currently present    Progress made toward(s) clinical / shift goals:    Problem: Knowledge Deficit - Standard  Goal: Patient and family/care givers will demonstrate understanding of plan of care, disease process/condition, diagnostic tests and medications  Outcome: Progressing     Problem: Pain - Standard  Goal: Alleviation of pain or a reduction in pain to the patient’s comfort goal  Outcome: Progressing     Problem: Skin Integrity  Goal: Skin integrity is maintained or improved  Outcome: Progressing     Patient demonstrates understanding of plan of care. Pain managed per MAR. Skin integrity maintained.   Patient is not progressing towards the following goals:

## 2021-12-08 NOTE — PROGRESS NOTES
[facility] Medicine Daily Progress Note    Date of Service  12/8/2021    Chief Complaint  Nils Alfonso is a 66 y.o. female admitted 12/4/2021 with abdominal abscess.    Hospital Course  Admitted with intra-abdominal abscess. Patient was started on empiric coverage with IV Vancomycin and Zosyn. Surgery was consulted on the case. Patient underwent CT guided Right retroperitoneal/paracolic gutter drainage tube placement on 12/4/2021.  Patient with history of recurrent idiopathic pancreatitis. She had complications of pancreatic pseudocyst and intra-abdominal fluid collections. She underwent CT-guided drainage. She was placed on IV antibiotics. However there was no improvement noted. Surgery was then consulted on the case. Patient underwent Exploratory laparotomy, Incision and drainage and debridement of retroperitoneal abscess, Incision and drainage and debridement of anterior peritoneal abscess., Debridement of necrotizing fasciitis of the lateral abdominal wall on 11/5/2021.  She finished an antibiotic course of Zosyn, micafungin and Bactrim on 11/24/2021.    Interval Problem Update  Abdominal abscess - culture shows E. coli and Enterococcus faecium, case discussed with Surgery oncology  Low magnesium, potassium    Updates given and plan of care discussed with patient's spouse who was at bedside.    I have personally seen and examined the patient at bedside. I discussed the plan of care with patient, family, bedside RN, charge RN and .    Consultants/Specialty  infectious disease and Surgery Oncology    Code Status  Full Code    Disposition  Patient is not medically cleared.   Anticipate discharge to to home with close outpatient follow-up.  I have placed the appropriate orders for post-discharge needs.    Review of Systems  Review of Systems   Constitutional: Negative for chills, diaphoresis, fever and malaise/fatigue.   HENT: Negative for congestion, hearing loss and sore throat.    Eyes: Negative for  blurred vision.   Respiratory: Negative for cough, shortness of breath and wheezing.    Cardiovascular: Negative for chest pain, palpitations and leg swelling.   Gastrointestinal: Negative for abdominal pain, diarrhea, heartburn, nausea and vomiting.   Genitourinary: Negative for dysuria, flank pain and hematuria.   Musculoskeletal: Negative for back pain, joint pain, myalgias and neck pain.   Skin: Negative for rash.   Neurological: Negative for dizziness, sensory change, speech change, focal weakness, weakness and headaches.   Psychiatric/Behavioral: The patient is not nervous/anxious.         Physical Exam  Temp:  [36.4 °C (97.6 °F)-37.6 °C (99.6 °F)] 36.6 °C (97.8 °F)  Pulse:  [75-82] 79  Resp:  [18] 18  BP: ()/(57-78) 121/70  SpO2:  [95 %-96 %] 96 %    Physical Exam  Vitals and nursing note reviewed.   HENT:      Head: Normocephalic and atraumatic.      Nose: No congestion.      Mouth/Throat:      Mouth: Mucous membranes are moist.   Eyes:      Extraocular Movements: Extraocular movements intact.      Conjunctiva/sclera: Conjunctivae normal.   Cardiovascular:      Rate and Rhythm: Normal rate and regular rhythm.   Pulmonary:      Effort: Pulmonary effort is normal.      Breath sounds: Normal breath sounds.   Abdominal:      General: There is no distension.      Tenderness: There is no abdominal tenderness. There is no guarding or rebound.      Comments: Ostomy  Right flank MARTHA drain   Musculoskeletal:      Cervical back: No tenderness.      Right lower leg: No edema.      Left lower leg: No edema.   Skin:     General: Skin is warm and dry.   Neurological:      General: No focal deficit present.      Mental Status: She is alert and oriented to person, place, and time.      Cranial Nerves: No cranial nerve deficit.         Fluids    Intake/Output Summary (Last 24 hours) at 12/8/2021 1012  Last data filed at 12/8/2021 0837  Gross per 24 hour   Intake 540 ml   Output 585 ml   Net -45 ml        Laboratory  Recent Labs     12/05/21  1644 12/06/21  0402 12/08/21  0239   WBC 5.4 5.8 4.1*   RBC 3.43* 3.44* 3.34*   HEMOGLOBIN 10.2* 10.1* 9.7*   HEMATOCRIT 31.7* 32.3* 30.8*   MCV 92.4 93.9 92.2   MCH 29.7 29.4 29.0   MCHC 32.2* 31.3* 31.5*   RDW 55.3* 56.6* 56.0*   PLATELETCT 218 244 229   MPV 9.2 9.1 8.9*     Recent Labs     12/06/21  0402 12/07/21  0540 12/08/21  0239   SODIUM 137 135 136   POTASSIUM 3.6 3.6 3.0*   CHLORIDE 107 107 106   CO2 20 21 19*   GLUCOSE 68 65 76   BUN 7* 6* 7*   CREATININE 0.45* 0.48* 0.41*   CALCIUM 7.8* 7.9* 7.6*                   Imaging  CT-ABDOMEN-PELVIS WITH   Final Result      1.  Interval decrease in size in right lower quadrant fluid collections status post drainage.      2.  Trace bilateral pleural effusions with bibasilar atelectasis.      3.  Biliary stent in place with pneumobilia.      4.  Anasarca      CT-DRAIN-PERITONEAL   Final Result      Successful RIGHT retroperitoneal/paracolic gutter drainage tube placement.      Plan: Thrice daily flushes with 10 mL of sterile saline. Monitor outputs. Please contact interventional radiology if there is any concern for tube dysfunction.      Findings were communicated with Dr. Vann via Voalte Me after initial scanning was performed.           Assessment/Plan  * Intra-abdominal abscess (HCC)- (present on admission)  Assessment & Plan  CT guided Right retroperitoneal/paracolic gutter drainage tube placement  12/4/2021  IV Vancomycin, Zosyn   Consult ID  Repeat CT on 12/11/2021    Sepsis - (present on admission)  Assessment & Plan  Source -abdominal abscess      Hypomagnesemia- (present on admission)  Assessment & Plan  IV Mg 2 g  Follow level    Hypokalemia- (present on admission)  Assessment & Plan  Start Kdur, follow bmp    Hyponatremia- (present on admission)  Assessment & Plan  Follow bmp    Normocytic anemia- (present on admission)  Assessment & Plan  Follow cbc    Mild protein-calorie malnutrition (HCC)- (present on  admission)  Assessment & Plan  Nutrition consult    Hyperlipidemia- (present on admission)  Assessment & Plan  Ezetimibe       VTE prophylaxis: heparin ppx    I have performed a physical exam and reviewed and updated ROS and Plan today (12/8/2021). In review of yesterday's note (12/7/2021), there are no changes except as documented above.

## 2021-12-08 NOTE — PROGRESS NOTES
Radiology Progress Note   Author: AYE Mahan Date & Time created: 12/8/2021  11:49 AM   Date of admission  12/4/2021  Note to reader: this note follows the APSO format rather than the historical SOAP format. Assessment and plan located at the top of the note for ease of use.    Chief Complaint  66 y.o. female admitted 12/4/2021 with ABD pain       HPI  Nils Alfonso is a 66 y.o. female admitted 12/4/2021 with complicated past medical history including multiple recent hospitalizations for complicated duodenectomy and recurrent pancreatitis who presents with recurrent abdominal pain and progressive/new intra-abdominal fluid collections concerning for abscesses and possible ongoing GI leak.       Assessment/Plan  Interval History   Principal Problem:    Intra-abdominal abscess (HCC)  Active Problems:    Hyperlipidemia    Sepsis     Normocytic anemia    Hyponatremia    Hypokalemia    Hypomagnesemia    Postprocedural intraabdominal abscess    Mild protein-calorie malnutrition (HCC)      Plan IR  - Irrigate RLQ drain with 10 ml of sterile saline three times per shift   - Fluid cultures, Escherichia coli, Enterococcus faecium  - Sugery following   - Repeat CT on 12/11/2021  - Continue to monitor drains, VS and labs        [id number]  IR:   12/4-RIGHT paracolic gutter/RP drain placement by CT 50 mL brown fluid to lab  12/5- 65 ml   12/6- 50 ml purulent brown fluid, Ct- decrease in size in right lower quadrant fluid collection status post drainage. A pigtail catheter remains in place. A fluid collection previously measuring 2.3 cm in width currently measures 9 mm in width.   12/7-  75 ml  12/8- 20 ml in am       Review of Systems  Physical Exam   Review of Systems   Constitutional: Negative for chills, fever and malaise/fatigue.   HENT: Negative for hearing loss.    Eyes: Negative for blurred vision.   Respiratory: Negative for cough, hemoptysis and shortness of breath.    Cardiovascular: Negative for chest  pain and palpitations.   Gastrointestinal: Positive for abdominal pain. Negative for vomiting.   Genitourinary: Negative for dysuria.   Musculoskeletal: Negative for myalgias.   Skin: Negative for rash.   Neurological: Positive for weakness. Negative for dizziness and headaches.   Endo/Heme/Allergies: Does not bruise/bleed easily.   Psychiatric/Behavioral: Negative for suicidal ideas.      Vitals:    12/08/21 0418   BP: 121/70   Pulse: 79   Resp: 18   Temp: 36.6 °C (97.8 °F)   SpO2: 96%        Physical Exam  Constitutional:       Appearance: Normal appearance.   HENT:      Head: Normocephalic.      Nose: Nose normal.      Mouth/Throat:      Mouth: Mucous membranes are moist.   Eyes:      Pupils: Pupils are equal, round, and reactive to light.   Cardiovascular:      Rate and Rhythm: Normal rate.   Pulmonary:      Effort: Pulmonary effort is normal. No respiratory distress.   Abdominal:      Palpations: Abdomen is soft.      Tenderness: There is abdominal tenderness.          Comments: IR drain site CDI, no redness or swelling , brown purulent fluid in MARTHA bulb  Ostomy   X 3 abd dressings    Musculoskeletal:         General: No tenderness or deformity.      Cervical back: Normal range of motion.   Skin:     General: Skin is warm and dry.      Capillary Refill: Capillary refill takes less than 2 seconds.      Coloration: Skin is not jaundiced or pale.   Neurological:      General: No focal deficit present.      Mental Status: She is alert.      Motor: No weakness.   Psychiatric:         Mood and Affect: Mood normal.         Behavior: Behavior normal.             Labs    Recent Labs     12/05/21  1644 12/06/21  0402 12/08/21  0239   WBC 5.4 5.8 4.1*   RBC 3.43* 3.44* 3.34*   HEMOGLOBIN 10.2* 10.1* 9.7*   HEMATOCRIT 31.7* 32.3* 30.8*   MCV 92.4 93.9 92.2   MCH 29.7 29.4 29.0   MCHC 32.2* 31.3* 31.5*   RDW 55.3* 56.6* 56.0*   PLATELETCT 218 244 229   MPV 9.2 9.1 8.9*     Recent Labs     12/06/21  0402 12/07/21  0540  12/08/21  0239   SODIUM 137 135 136   POTASSIUM 3.6 3.6 3.0*   CHLORIDE 107 107 106   CO2 20 21 19*   GLUCOSE 68 65 76   BUN 7* 6* 7*   CREATININE 0.45* 0.48* 0.41*   CALCIUM 7.8* 7.9* 7.6*     Recent Labs     12/06/21  0402 12/07/21  0540 12/08/21  0239   ALBUMIN 2.1*  --   --    TBILIRUBIN 0.3  --   --    ALKPHOSPHAT 103*  --   --    TOTPROTEIN 4.5*  --   --    ALTSGPT 24  --   --    ASTSGOT 29  --   --    CREATININE 0.45* 0.48* 0.41*     CT-ABDOMEN-PELVIS WITH   Final Result      1.  Interval decrease in size in right lower quadrant fluid collections status post drainage.      2.  Trace bilateral pleural effusions with bibasilar atelectasis.      3.  Biliary stent in place with pneumobilia.      4.  Anasarca      CT-DRAIN-PERITONEAL   Final Result      Successful RIGHT retroperitoneal/paracolic gutter drainage tube placement.      Plan: Thrice daily flushes with 10 mL of sterile saline. Monitor outputs. Please contact interventional radiology if there is any concern for tube dysfunction.      Findings were communicated with Dr. Vann via [employer] after initial scanning was performed.          INR   Date Value Ref Range Status   12/04/2021 1.42 (H) 0.87 - 1.13 Final     Comment:     INR - Non-therapeutic Reference Range: 0.87-1.13  INR - Therapeutic Reference Range: 2.0-4.0       No results found for: POCINR     Intake/Output Summary (Last 24 hours) at 12/6/2021 1052  Last data filed at 12/6/2021 0600  Gross per 24 hour   Intake 1724 ml   Output 1295 ml   Net 429 ml      Labs not explicitly included in this progress note were reviewed by the author. Radiology/imaging not explicitly included in this progress note was reviewed by the author.   I have performed a physical exam and reviewed and updated ROS and Plan today (12/8/2021).     15 minutes in directly providing and coordinating care and extensive data review.  No time overlap and excludes procedures.

## 2021-12-08 NOTE — PROGRESS NOTES
Bedside report received.  Assessment complete.  A&O x 4. Patient calls appropriately.  Patient ambulates with standby assist.    Patient has 7/10 pain. Pain managed with prescribed medications.  Denies N&V. Tolerating diet.  Multiple fistulas all dressed, output noted in flowsheets..  + void, + flatus, + BM.  Patient denies SOB.  Patient pleasant with staff and resting in bed.  Review plan with of care with patient. Call light and personal belongings within reach. Hourly rounding in place. All needs met at this time.

## 2021-12-08 NOTE — CONSULTS
JOCELYNOWN INFECTIOUS DISEASES INPATIENT CONSULT NOTE     Date of Service: 12/8/2021    Consult Requested By: Warren Lerner M.D.    Reason for Consultation: Abdominal abscess    Chief Complaint: Abdominal pain    History of Present Illness:     Nils Alfonso is a 66 y.o. 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  Patient found to have multiple and extensive fluid collections in the abdomen and pelvis on imaging, secondary to duodenal perforation. She underwent placement of multiple drains. Cultures grew multiple organisms including E. coli and Enterococcus faecalis, Candida albicans and glabrata.  Blood cultures grew Chryseobacterium species and Candida glabrata (completed therapy for the bacteremia).  Repeat images showed no improvement, and additional drains were placed which grew Yeast (mixed morphologies), Stenotrophomonas. The decision was made to treat with at least 4 weeks of antibiotics with Continues home infusion IV Zosyn, IV micafungin, and p.o. Bactrim through 11/24/2021, and then follow-up in ID clinic.    However, a lot happened since that plan was made, and unclear if ID was notified.  Patient underwent an exploratory laparotomy on 11/5 for drainage of retroperitoneal abscess, peritoneal abscess, necrotizing fasciitis involving muscle and soft tissue.  Cultures from this procedure grew ampicillin resistant E faecium, sensitive E faecalis, Stenotrophomonas, Candida albicans, E. coli. Repeat CT of the abdomen on 11/15/2021 noted increase fluid collection in the right mid abdomen and slight increase in trace bilateral pleural effusions. IR placed a peritoneal drain but this fell out at some point and was not replaced due to the abdominal fluid  collection being noted to be smaller. Upper GI series on 11/17/2021 showed a leak at the perforation in 2/3 portion of the duodenum.  Patient underwent an ERCP, biliary stent placement (since during the procedure there was concern for bile duct perforation being the cause of patient's leak), core track placement on 11/18/2021 by GI.  Esophageal stent was not placed since no obvious defect was noted.     Last CT scan on 11/24 showed persistent right lateral abdominal fluid collection. Patient was discharged on 11/28. On 12/4, patient returned to the ER with fevers and right lower quadrant abdominal pain. T-max of 99.8, no leukocytosis.  She was started on vancomycin and Zosyn.  CT scan noted fluid collections right upper quadrant and right paracolic gutter, also new fluid collection in the rectus sheath in the midline below the skin incision. IR was consulted and on 12/4, placed drain in the right paracolic gutter.  Repeat CT scan on 12/6 with good improvement in the right lower quadrant fluid collections, the midline rectus fluid collection appears to have drained through the incision.  Cultures from this growing pansensitive E. coli and ampicillin resistant E faecium thus far.  ID consulted for recommendations.    Review of Systems:  All other systems reviewed and are negative expect as noted in HPI    Past Medical History:   Diagnosis Date   • Allergy, unspecified not elsewhere classified    • Anemia     not currently   • Anesthesia     PONV (Demerol/ Dilaudid)   • Arthritis     bilateral shoulders,sacrum, osteo   • Backpain     coccyx and sacrum   • Bronchitis 2010   • Cataract     bilateral IOLI   • Degeneration of cervical intervertebral disc     C5-6,C6-7   • Dental disorder     partial upper and lower   • Heart burn    • Hiatus hernia syndrome     not a problem after gastric sleeve   • High cholesterol     resolved after gastric surgery   • Hyperlipidemia    • Hypertension     off all medication, reveresed  after gastric sleeve   • Muscle disorder    • Pain 05/16/2018    low back and SI joints and sacrum   • Reactive airway disease     rescue inhaler not needed unless with bronchitis       Past Surgical History:   Procedure Laterality Date   • PB UPPER GI ENDOSCOPY,DIAGNOSIS N/A 11/18/2021    Procedure: GASTROSCOPY with stent placement;  Surgeon: Migel Lawrence M.D.;  Location: SURGERY SAME DAY [facility];  Service: Gastroenterology   • PB ERCP,DIAGNOSTIC N/A 11/18/2021    Procedure: ERCP (ENDOSCOPIC RETROGRADE CHOLANGIOPANCREATOGRAPHY);  Surgeon: Migel Lawrence M.D.;  Location: SURGERY SAME DAY [facility];  Service: Gastroenterology   • PB PLACE PERCUT GASTROSTOMY TUBE N/A 11/18/2021    Procedure: GASTROSCOPY, WITH FEEDING TUBE INSERTION;  Surgeon: Migel Lawrence M.D.;  Location: SURGERY SAME DAY [facility];  Service: Gastroenterology   • BILIARY STENT PLACEMENT N/A 11/18/2021    Procedure: INSERTION, STENT, BILE DUCT;  Surgeon: Migel Lawrence M.D.;  Location: SURGERY SAME DAY [facility];  Service: Gastroenterology   • PB EXPLORATORY OF ABDOMEN  11/5/2021    Procedure: LAPAROTOMY, EXPLORATORY;  Surgeon: Jaden Cook M.D.;  Location: [facility];  Service: General   • PB ULTRASONIC GUIDANCE, INTRAOPERATIVE  11/5/2021    Procedure: ULTRASOUND GUIDANCE;  Surgeon: Jaden Cook M.D.;  Location: [facility];  Service: General   • ABDOMINAL ABSCESS DRAINAGE  11/5/2021    Procedure: DRAINAGE, ABSCESS, ABDOMEN - PERITONEAL AND RETROPERITONEAL;  Surgeon: Jaden Cook M.D.;  Location: [facility];  Service: General   • PB ERCP,DIAGNOSTIC  10/1/2021    Procedure: ERCP (ENDOSCOPIC RETROGRADE CHOLANGIOPANCREATOGRAPHY);  Surgeon: Fausto Casas M.D.;  Location: [facility];  Service: Gastroenterology   • COLONOSCOPY  8/8/2018    Procedure: COLONOSCOPY;  Surgeon: Shukri Condon M.D.;  Location: [facility];  Service: General   • GASTROSCOPY  5/23/2018     Procedure: GASTROSCOPY;  Surgeon: Fausto Casas M.D.;  Location: SURGERY [facility];  Service: Gastroenterology   • EGD W/ENDOSCOPIC ULTRASOUND  5/23/2018    Procedure: EGD W/ENDOSCOPIC ULTRASOUND- RADIAL UPPER;  Surgeon: Fausto Casas M.D.;  Location: SURGERY [facility];  Service: Gastroenterology   • CATARACT PHACO WITH IOL Left 4/18/2017    Procedure: CATARACT PHACO WITH IOL;  Surgeon: Stuart Estevez M.D.;  Location: SURGERY SAME DAY [facility];  Service:    • CATARACT PHACO WITH IOL Right 4/4/2017    Procedure: CATARACT PHACO WITH IOL;  Surgeon: Stuart Estevez M.D.;  Location: SURGERY [facility];  Service:    • GASTRIC SLEEVE LAPAROSCOPY  10/19/2016    Procedure: GASTRIC SLEEVE LAPAROSCOPY, HIATAL HERNIA;  Surgeon: Shukri Condon M.D.;  Location: SURGERY [facility];  Service:    • LIVER BIOPSY LAPAROSCOPIC  10/19/2016    Procedure: LIVER BIOPSY LAPAROSCOPIC;  Surgeon: Shukri Condon M.D.;  Location: SURGERY [facility];  Service:    • GASTROSCOPY N/A 8/26/2016    Procedure: GASTROSCOPY;  Surgeon: Shukri Condon M.D.;  Location: SURGERY [facility];  Service:    • ROTATOR CUFF REPAIR Right 7/7/2016   • ROTATOR CUFF REPAIR Left 5/2015   • HYSTERECTOMY ROBOTIC  1/2/2009    Performed by MEG VILLEGAS at SURGERY [facility]   • LUMBAR FUSION ANTERIOR  2007    Dr Hillman, L3-S1 fusion   • CHOLECYSTECTOMY  1996    laparoscopic   • TUBAL LIGATION  1985   • GASTRIC RESECTION  1982    gastric stapling   • TONSILLECTOMY AND ADENOIDECTOMY  1967   • PRIMARY C SECTION  1976, 1979, 1985    x3       Family History   Problem Relation Age of Onset   • Stroke Mother    • Cancer Father         larynx   • Diabetes Brother        Social History     Socioeconomic History   • Marital status:      Spouse name: Not on file   • Number of children: Not on file   • Years of education: Not on file   • Highest education level: Not on file   Occupational History   • Not on  "file   Tobacco Use   • Smoking status: Former Smoker     Packs/day: 0.25     Years: 0.10     Pack years: 0.02     Types: Cigarettes     Quit date: 1973     Years since quittin.9   • Smokeless tobacco: Never Used   Vaping Use   • Vaping Use: Never used   Substance and Sexual Activity   • Alcohol use: No   • Drug use: No   • Sexual activity: Not on file     Comment:    Other Topics Concern   • Not on file   Social History Narrative   • Not on file     Social Determinants of Health     Financial Resource Strain:    • Difficulty of Paying Living Expenses: Not on file   Food Insecurity:    • Worried About Running Out of Food in the Last Year: Not on file   • Ran Out of Food in the Last Year: Not on file   Transportation Needs:    • Lack of Transportation (Medical): Not on file   • Lack of Transportation (Non-Medical): Not on file   Physical Activity:    • Days of Exercise per Week: Not on file   • Minutes of Exercise per Session: Not on file   Stress:    • Feeling of Stress : Not on file   Social Connections:    • Frequency of Communication with Friends and Family: Not on file   • Frequency of Social Gatherings with Friends and Family: Not on file   • Attends Christianity Services: Not on file   • Active Member of Clubs or Organizations: Not on file   • Attends Club or Organization Meetings: Not on file   • Marital Status: Not on file   Intimate Partner Violence:    • Fear of Current or Ex-Partner: Not on file   • Emotionally Abused: Not on file   • Physically Abused: Not on file   • Sexually Abused: Not on file   Housing Stability:    • Unable to Pay for Housing in the Last Year: Not on file   • Number of Places Lived in the Last Year: Not on file   • Unstable Housing in the Last Year: Not on file       Allergies   Allergen Reactions   • Ampicillin Rash     Rash  Tolerates Zosyn 10/21   • Cephalexin Diarrhea, Vomiting and Nausea     .   • Clindamycin Vomiting     \"cleocin\"   • Codeine      Severe stomach " pain, cramps, spasms   • Demerol Vomiting   • Levofloxacin Unspecified     Numbness     • Tetracyclines Rash     .   • Tizanidine Itching   • Morphine Vomiting   • Pcn [Penicillins] Itching     Tolerates Zosyn 10/21   • Sulfa Drugs Itching       Medications:    Current Facility-Administered Medications:   •  potassium chloride SA (Kdur) tablet 20 mEq, 20 mEq, Oral, DAILY, Warren Lerner M.D., 20 mEq at 12/08/21 0844  •  acetaminophen (TYLENOL) tablet 1,000 mg, 1,000 mg, Oral, Q6HRS PRN **FOLLOWED BY** [DISCONTINUED] acetaminophen (TYLENOL) tablet 1,000 mg, 1,000 mg, Oral, Q6HRS PRN, Jaden Cook M.D.  •  calcium carbonate (TUMS) chewable tab 500 mg, 500 mg, Oral, TID PRN, Warren Lerner M.D., 500 mg at 12/07/21 1619  •  vancomycin (VANCOCIN) 1,500 mg in  mL IVPB, 1,500 mg, Intravenous, Q24HR, Warren Lerner M.D., Stopped at 12/08/21 0213  •  ondansetron (ZOFRAN) syringe/vial injection 4 mg, 4 mg, Intravenous, Q4HRS PRN, Erik Crain M.D., 4 mg at 12/08/21 0401  •  MD Alert...Vancomycin per Pharmacy, , Other, PHARMACY TO DOSE, Warren Lerner M.D.  •  [COMPLETED] piperacillin-tazobactam (ZOSYN) 4.5 g in  mL IVPB, 4.5 g, Intravenous, Once, Stopped at 12/04/21 0443 **AND** piperacillin-tazobactam (ZOSYN) 4.5 g in  mL IVPB, 4.5 g, Intravenous, Q8HRS, Warren Lerner M.D., Stopped at 12/08/21 0801  •  heparin injection 5,000 Units, 5,000 Units, Subcutaneous, Q8HRS, Stuart Avalos M.D., 5,000 Units at 12/08/21 0423  •  cyclobenzaprine (Flexeril) tablet 10 mg, 10 mg, Oral, Q EVENING, Danielito Bianchi M.D., 10 mg at 12/07/21 1740  •  ezetimibe (ZETIA) tablet 10 mg, 10 mg, Oral, Q EVENING, Danielito Bianchi M.D., 10 mg at 12/07/21 1739  •  gabapentin (NEURONTIN) capsule 600 mg, 600 mg, Oral, BID, Danielito Bianchi M.D., 600 mg at 12/08/21 0422  •  Pharmacy Consult Request ...Pain Management Review 1 Each, 1 Each, Other, PHARMACY TO DOSE, Danielito Bianchi M.D.  •  oxyCODONE  immediate-release (ROXICODONE) tablet 5 mg, 5 mg, Oral, Q3HRS PRN **OR** oxyCODONE immediate release (ROXICODONE) tablet 10 mg, 10 mg, Oral, Q3HRS PRN, 10 mg at 21 0154 **OR** HYDROmorphone (Dilaudid) injection 0.5 mg, 0.5 mg, Intravenous, Q3HRS PRN, Danielito Bianchi M.D.  •  MD ALERT...Covid-19 Vaccine Order, , Other, PHARMACY TO DOSE, Danielito Bianchi M.D.    Physical Exam:   Vital Signs: /70   Pulse 79   Temp 36.6 °C (97.8 °F) (Temporal)   Resp 18   Wt 58.2 kg (128 lb 4.9 oz)   LMP 2009   SpO2 96%   BMI 22.02 kg/m²   Temp  Av.8 °C (98.3 °F)  Min: 35.8 °C (96.5 °F)  Max: 38.3 °C (101 °F)  Vital signs reviewed  Constitutional: Patient is oriented to person, place, and time. Appears chronically ill, no acute distress  Head: Atraumatic, normocephalic  Eyes: Conjunctivae normal, EOM intact  Mouth/Throat: Lips without lesions,  Neck: Neck supple. No masses/lymphadenopathy  Cardiovascular: Normal rate, regular rhythm, normal S1S2 and intact distal pulses. No murmur, gallop, or friction rub. No pedal edema.  Pulmonary/Chest: No respiratory distress. Unlabored respiratory effort, lungs clear to auscultation. No wheezes or rales.   Abdominal: Soft, right flank MARTHA drain with moderate bilious output, ostomy  Musculoskeletal: No joint tenderness, swelling, erythema, or restriction of motion noted.  Neurological: Alert and oriented to person, place, and time. No gross cranial nerve deficit. No focal neural deficit noted  Skin: Skin is warm and dry. No rashes or embolic phenomena noted on exposed skin  Psychiatric: Very pleasant.  Appropriate mood. Behavior is normal.     LABS:  Recent Labs     21  1644 21  0402 21  0239   WBC 5.4 5.8 4.1*      Recent Labs     21  1644 21  0402 21  0239   HEMOGLOBIN 10.2* 10.1* 9.7*   HEMATOCRIT 31.7* 32.3* 30.8*   MCV 92.4 93.9 92.2   MCH 29.7 29.4 29.0   MACROCYTOSIS 1+  --  1+   ANISOCYTOSIS 1+  --  1+   PLATELETCT 218 244  229       Recent Labs     12/06/21  0402 12/07/21  0540 12/08/21  0239   SODIUM 137 135 136   POTASSIUM 3.6 3.6 3.0*   CHLORIDE 107 107 106   CO2 20 21 19*   CREATININE 0.45* 0.48* 0.41*        Recent Labs     12/06/21  0402   ALBUMIN 2.1*        MICRO:  No results found for: BLOODCULTU, BLDCULT, BCHOLD     Latest pertinent labs were reviewed    IMAGING STUDIES:  As above    Hospital Course/Assessment:   Nils Alfonso is a 66 y.o. female with complex history of recent pancreatitis with ERCP on 10/1/2021, complicated by suspected duodenal perforation with resultant complex multiple intra-abdominal abscesses.  For about 3 weeks, this was attempted to be managed by multiple IR drains but there was no improvement.  Drain cultures from various times grew multiple organisms including E. coli and Enterococcus faecalis, Candida albicans and glabrata, stenotrophomonas, E. coli, ampicillin resistant E faecium.  Blood cultures grew Chryseobacterium species and Candida glabrata (completed therapy for the bacteremia). The decision was made to treat with at least 4 weeks of antibiotics with Continues home infusion IV Zosyn, IV micafungin, and p.o. Bactrim through 11/24/2021, and then follow-up in ID clinic, ID signed off. However, there are further procedures since that plan was made. Patient underwent an exploratory laparotomy on 11/5 for drainage of retroperitoneal abscess, peritoneal abscess, necrotizing fasciitis involving muscle and soft tissue.  Cultures from this procedure grew ampicillin resistant E faecium, sensitive E faecalis, Stenotrophomonas, Candida albicans, E. coli. Repeat CT of the abdomen on 11/15/2021 noted increase fluid collection in the right mid abdomen and slight increase in trace bilateral pleural effusions. IR placed a peritoneal drain but this fell out at some point and was not replaced due to the abdominal fluid collection being noted to be smaller. Upper GI series on 11/17/2021 showed a leak at the  perforation in 2/3 portion of the duodenum.  Patient underwent an ERCP, biliary stent placement (since during the procedure there was concern for bile duct perforation being the cause of patient's leak), core track placement on 11/18/2021 by GI.  Esophageal stent was not placed since no obvious defect was noted.     Patient was discharged on 11/28. On 12/4, patient returned to the ER with fevers and right lower quadrant abdominal pain, found to have fluid collections right upper quadrant and right paracolic gutter. IR on 12/4 placed drain in the right paracolic gutter. Repeat CT scan on 12/6 with good improvement in the right lower quadrant fluid collections, the midline rectus fluid collection appears to have drained through the incision.  Cultures from this growing pansensitive E. coli and ampicillin resistant E faecium thus far.  ID consulted for recommendations.    Pertinent Diagnoses:  Abdominal pelvic abscesses  Recent duodenal/biliary perforation  Polymicrobial infection    Plan:   -Continue IV vancomycin for the if E. Faecium.  Switch Zosyn to IV Unasyn 3 g every 6 hours for the E. coli and additional anaerobes  -Previously, multiple other organisms including Saritha and Stenotrophomonas had grown, but this was prior to the exploratory laparotomy with washout.  Unless these organisms grow again, and if continued improvement is demonstrated on repeat CT scan, will hold off on adding additional potentially harmful antimicrobials  -Current plan is to repeat CT scan 1 week following latest drain placement.  ID will follow along.  Depending on repeat imaging findings, will have to decide on antibiotic therapy which is limited by lack of long-term oral options to target the E. faecium    Plan was discussed with the primary team, Dr. Lerner    ID will follow. Please feel free to call with questions.    Jose Arora M.D.    Please note that this dictation was created using voice recognition software. I have  worked with technical experts from [facility] to optimize the interface.  I have made every reasonable attempt to correct obvious errors, but there may be errors of grammar and possibly content that I did not discover before finalizing the note.

## 2021-12-08 NOTE — CARE PLAN
Problem: Knowledge Deficit - Standard  Goal: Patient and family/care givers will demonstrate understanding of plan of care, disease process/condition, diagnostic tests and medications  Outcome: Progressing     Problem: Pain - Standard  Goal: Alleviation of pain or a reduction in pain to the patient’s comfort goal  Outcome: Progressing     Problem: Skin Integrity  Goal: Skin integrity is maint  ained or improved  Outcome: Progressing     The patient is Stable - Low risk of patient condition declining or worsening    Shift Goals  Clinical Goals: OOB activity, drain care  Patient Goals: PT/OT, pain managemen  Family Goals: No family currently present    Progress made toward(s) clinical / shift goals:  Pt ambulating to bathroom. Walked in halls with PT. IR drain flushed per order.    Patient is not progressing towards the following goals:

## 2021-12-08 NOTE — PROGRESS NOTES
"Pharmacy Vancomycin Kinetics Note for 12/7/2021     66 y.o. female on Vancomycin day # 4     Vancomycin Indication (Two level/Trough based Dosing): Uncomplicated infection (goal trough 10-20)    Provider specified end date: 12/17/21    Active Antibiotics (From admission, onward)    Ordered     Ordering Provider       Tue Dec 7, 2021  4:31 PM    12/07/21 1631  vancomycin (VANCOCIN) 1,500 mg in  mL IVPB  (vancomycin (VANCOCIN) IV (LD + Maintenance))  EVERY 24 HOURS         Warren Lerner M.D.       Sat Dec 4, 2021  3:55 AM    12/04/21 0355  piperacillin-tazobactam (ZOSYN) 4.5 g in  mL IVPB  (piperacillin-tazobactam (ZOSYN) IV (Extended-infusion) PANEL )  EVERY 8 HOURS        \"And\" Linked Group Details    Warren Lerner M.D.       Sat Dec 4, 2021  3:55 AM    12/04/21 0355  MD Alert...Vancomycin per Pharmacy  PHARMACY TO DOSE        Question:  Indication(s) for vancomycin?  Answer:  Skin and soft tissue infection    Warren Lerner M.D.          Dosing Weight: 58.2 kg (128 lb 4.9 oz)      Admission History: Admitted on 12/4/2021 for Intra-abdominal abscess (HCC) [K65.1]  Postprocedural intraabdominal abscess [T81.43XA]  Pertinent history: Patient has a history of a complicated duodenectomy in which she came in to the hospital for increasing abdominal pain. CT showed a fluid collection below skin incision which could represent fluid collection or abscess. Abscess draine 12/4. Abscess cultures indicate E coli and enterococus faecium. Patient has multiple drug allergies.    Allergies:     Ampicillin, Cephalexin, Clindamycin, Codeine, Demerol, Levofloxacin, Tetracyclines, Tizanidine, Morphine, Pcn [penicillins], and Sulfa drugs     Pertinent cultures to date:     Results     Procedure Component Value Units Date/Time    FLUID CULTURE W/GRAM STAIN [[id number]]  (Abnormal)  (Susceptibility) Collected: 12/04/21 1127    Order Status: Completed Specimen: Body Fluid from Peritoneal Fluid Updated: 12/06/21 1111 "     Significant Indicator POS     Source BF     Site PERITONEAL FLUID     Culture Result Light growth mixed enteric stephen.     Gram Stain Result Many WBCs.  Many Gram positive cocci.  Few Gram positive rods.  Moderate Gram negative rods.       Culture Result Escherichia coli  Heavy growth        Enterococcus faecium  Heavy growth  The susceptibility profile for this organism indicates that  Streptomycin would not be an effective component of  combination therapy.      Narrative:      Collected By: [id number] YONG TAYLOR  Right abdomen  Collected By: [id number] YONG TAYLOR    GRAM STAIN [[id number]] Collected: 21 1127    Order Status: Completed Specimen: Body Fluid Updated: 21 0616     Significant Indicator .     Source BF     Site PERITONEAL FLUID     Gram Stain Result Many WBCs.  Many Gram positive cocci.  Few Gram positive rods.  Moderate Gram negative rods.      Narrative:      Collected By: [id number] YONG TAYLOR  Right abdomen  Collected By: [id number] YONG TAYLOR          Labs:     Estimated Creatinine Clearance: 99.6 mL/min (A) (by C-G formula based on SCr of 0.48 mg/dL (L)).  Recent Labs     21  1644 21  0402   WBC 5.4 5.8   NEUTSPOLYS 88.70* 47.40   BANDSSTABS 3.50 8.80     Recent Labs     21  1644 21  0402 21  0540   BUN 8 7* 6*   CREATININE 0.42* 0.45* 0.48*   ALBUMIN  --  2.1*  --        Intake/Output Summary (Last 24 hours) at 2021 1633  Last data filed at 2021 1455  Gross per 24 hour   Intake 100 ml   Output 1100 ml   Net -1000 ml      /67   Pulse 87   Temp 36.9 °C (98.4 °F) (Temporal)   Resp 18   Wt 58.2 kg (128 lb 4.9 oz)   SpO2 96%  Temp (24hrs), Av.6 °C (97.8 °F), Min:36.2 °C (97.2 °F), Max:36.9 °C (98.4 °F)      List concerns for Vancomycin clearance:     Age;Malnutrition/Low albumin;Nephrotoxic drugs    Pharmacokinetics:     Two level kinetics:   Ke (hr ^-1): 0.0375  Half life: 18.5  Vd: Steady state Vd : 86.432  Calculated  AUC: 308 mg·hr/L    Trough kinetics:   Recent Labs     12/06/21 2011 12/07/21  0540   VANCOTROUGH  --  10.3   VANCOPEAK 14.7*  --        A/P:     -  Vancomycin dose: increase dose from 500mg iv q12h to 1500 mg iv q24h (0000 admin time)    -  Next vancomycin level(s):    - in 4-5 days, not yet ordered     -  Predicted vancomycin AUC from two level test calculator: 462 mg·hr/L    -  Comments: calculated AUC = 308. Below goal. Total daily dose increase indicated. Cultures finalized. Multiple drug allergies/intolerances. Likely have ID consult 12/8.    Zak Gracia, PharmD, BCPS

## 2021-12-09 LAB
ANION GAP SERPL CALC-SCNC: 10 MMOL/L (ref 7–16)
ANISOCYTOSIS BLD QL SMEAR: ABNORMAL
BASOPHILS # BLD AUTO: 0 % (ref 0–1.8)
BASOPHILS # BLD: 0 K/UL (ref 0–0.12)
BUN SERPL-MCNC: 7 MG/DL (ref 8–22)
CALCIUM SERPL-MCNC: 7.4 MG/DL (ref 8.5–10.5)
CHLORIDE SERPL-SCNC: 105 MMOL/L (ref 96–112)
CO2 SERPL-SCNC: 20 MMOL/L (ref 20–33)
CREAT SERPL-MCNC: 0.4 MG/DL (ref 0.5–1.4)
EOSINOPHIL # BLD AUTO: 0 K/UL (ref 0–0.51)
EOSINOPHIL NFR BLD: 0 % (ref 0–6.9)
ERYTHROCYTE [DISTWIDTH] IN BLOOD BY AUTOMATED COUNT: 56.7 FL (ref 35.9–50)
GLUCOSE SERPL-MCNC: 85 MG/DL (ref 65–99)
HCT VFR BLD AUTO: 30.4 % (ref 37–47)
HGB BLD-MCNC: 9.7 G/DL (ref 12–16)
LYMPHOCYTES # BLD AUTO: 3.21 K/UL (ref 1–4.8)
LYMPHOCYTES NFR BLD: 52.6 % (ref 22–41)
MACROCYTES BLD QL SMEAR: ABNORMAL
MAGNESIUM SERPL-MCNC: 1.8 MG/DL (ref 1.5–2.5)
MANUAL DIFF BLD: NORMAL
MCH RBC QN AUTO: 29.2 PG (ref 27–33)
MCHC RBC AUTO-ENTMCNC: 31.9 G/DL (ref 33.6–35)
MCV RBC AUTO: 91.6 FL (ref 81.4–97.8)
MONOCYTES # BLD AUTO: 0.16 K/UL (ref 0–0.85)
MONOCYTES NFR BLD AUTO: 2.6 % (ref 0–13.4)
MORPHOLOGY BLD-IMP: NORMAL
NEUTROPHILS # BLD AUTO: 2.73 K/UL (ref 2–7.15)
NEUTROPHILS NFR BLD: 44.8 % (ref 44–72)
NRBC # BLD AUTO: 0 K/UL
NRBC BLD-RTO: 0 /100 WBC
OVALOCYTES BLD QL SMEAR: NORMAL
PLATELET # BLD AUTO: 259 K/UL (ref 164–446)
PLATELET BLD QL SMEAR: NORMAL
PMV BLD AUTO: 8.9 FL (ref 9–12.9)
POIKILOCYTOSIS BLD QL SMEAR: NORMAL
POTASSIUM SERPL-SCNC: 3.2 MMOL/L (ref 3.6–5.5)
RBC # BLD AUTO: 3.32 M/UL (ref 4.2–5.4)
RBC BLD AUTO: PRESENT
SCHISTOCYTES BLD QL SMEAR: NORMAL
SMUDGE CELLS BLD QL SMEAR: NORMAL
SODIUM SERPL-SCNC: 135 MMOL/L (ref 135–145)
WBC # BLD AUTO: 6.1 K/UL (ref 4.8–10.8)

## 2021-12-09 PROCEDURE — 700111 HCHG RX REV CODE 636 W/ 250 OVERRIDE (IP): Performed by: FAMILY MEDICINE

## 2021-12-09 PROCEDURE — A9270 NON-COVERED ITEM OR SERVICE: HCPCS | Performed by: INTERNAL MEDICINE

## 2021-12-09 PROCEDURE — 700111 HCHG RX REV CODE 636 W/ 250 OVERRIDE (IP): Performed by: STUDENT IN AN ORGANIZED HEALTH CARE EDUCATION/TRAINING PROGRAM

## 2021-12-09 PROCEDURE — 99233 SBSQ HOSP IP/OBS HIGH 50: CPT | Performed by: INTERNAL MEDICINE

## 2021-12-09 PROCEDURE — 700105 HCHG RX REV CODE 258: Performed by: INTERNAL MEDICINE

## 2021-12-09 PROCEDURE — 770001 HCHG ROOM/CARE - MED/SURG/GYN PRIV*

## 2021-12-09 PROCEDURE — 36415 COLL VENOUS BLD VENIPUNCTURE: CPT

## 2021-12-09 PROCEDURE — 700102 HCHG RX REV CODE 250 W/ 637 OVERRIDE(OP): Performed by: FAMILY MEDICINE

## 2021-12-09 PROCEDURE — 700105 HCHG RX REV CODE 258: Performed by: FAMILY MEDICINE

## 2021-12-09 PROCEDURE — 700102 HCHG RX REV CODE 250 W/ 637 OVERRIDE(OP): Performed by: INTERNAL MEDICINE

## 2021-12-09 PROCEDURE — 83735 ASSAY OF MAGNESIUM: CPT

## 2021-12-09 PROCEDURE — 80048 BASIC METABOLIC PNL TOTAL CA: CPT

## 2021-12-09 PROCEDURE — 85007 BL SMEAR W/DIFF WBC COUNT: CPT

## 2021-12-09 PROCEDURE — 99232 SBSQ HOSP IP/OBS MODERATE 35: CPT | Performed by: FAMILY MEDICINE

## 2021-12-09 PROCEDURE — 85027 COMPLETE CBC AUTOMATED: CPT

## 2021-12-09 PROCEDURE — A9270 NON-COVERED ITEM OR SERVICE: HCPCS | Performed by: FAMILY MEDICINE

## 2021-12-09 PROCEDURE — 97110 THERAPEUTIC EXERCISES: CPT

## 2021-12-09 PROCEDURE — 700111 HCHG RX REV CODE 636 W/ 250 OVERRIDE (IP): Performed by: INTERNAL MEDICINE

## 2021-12-09 RX ORDER — MAGNESIUM SULFATE HEPTAHYDRATE 40 MG/ML
2 INJECTION, SOLUTION INTRAVENOUS ONCE
Status: COMPLETED | OUTPATIENT
Start: 2021-12-09 | End: 2021-12-09

## 2021-12-09 RX ORDER — POTASSIUM CHLORIDE 20 MEQ/1
20 TABLET, EXTENDED RELEASE ORAL 2 TIMES DAILY
Status: DISCONTINUED | OUTPATIENT
Start: 2021-12-09 | End: 2021-12-10

## 2021-12-09 RX ADMIN — OXYCODONE HYDROCHLORIDE 10 MG: 10 TABLET ORAL at 18:45

## 2021-12-09 RX ADMIN — ACETAMINOPHEN 1000 MG: 500 TABLET ORAL at 09:22

## 2021-12-09 RX ADMIN — HEPARIN SODIUM 5000 UNITS: 5000 INJECTION, SOLUTION INTRAVENOUS; SUBCUTANEOUS at 21:42

## 2021-12-09 RX ADMIN — POTASSIUM CHLORIDE 20 MEQ: 1500 TABLET, EXTENDED RELEASE ORAL at 18:15

## 2021-12-09 RX ADMIN — ONDANSETRON 4 MG: 2 INJECTION INTRAMUSCULAR; INTRAVENOUS at 18:45

## 2021-12-09 RX ADMIN — GABAPENTIN 600 MG: 300 CAPSULE ORAL at 18:15

## 2021-12-09 RX ADMIN — POTASSIUM CHLORIDE 20 MEQ: 1500 TABLET, EXTENDED RELEASE ORAL at 05:35

## 2021-12-09 RX ADMIN — OXYCODONE HYDROCHLORIDE 10 MG: 10 TABLET ORAL at 15:40

## 2021-12-09 RX ADMIN — VANCOMYCIN HYDROCHLORIDE 1500 MG: 500 INJECTION, POWDER, LYOPHILIZED, FOR SOLUTION INTRAVENOUS at 00:07

## 2021-12-09 RX ADMIN — MAGNESIUM SULFATE HEPTAHYDRATE 2 G: 2 INJECTION, SOLUTION INTRAVENOUS at 09:19

## 2021-12-09 RX ADMIN — ONDANSETRON 4 MG: 2 INJECTION INTRAMUSCULAR; INTRAVENOUS at 12:11

## 2021-12-09 RX ADMIN — AMPICILLIN SODIUM AND SULBACTAM SODIUM 3 G: 2; 1 INJECTION, POWDER, FOR SOLUTION INTRAMUSCULAR; INTRAVENOUS at 18:15

## 2021-12-09 RX ADMIN — AMPICILLIN SODIUM AND SULBACTAM SODIUM 3 G: 2; 1 INJECTION, POWDER, FOR SOLUTION INTRAMUSCULAR; INTRAVENOUS at 23:32

## 2021-12-09 RX ADMIN — GABAPENTIN 600 MG: 300 CAPSULE ORAL at 05:35

## 2021-12-09 RX ADMIN — HEPARIN SODIUM 5000 UNITS: 5000 INJECTION, SOLUTION INTRAVENOUS; SUBCUTANEOUS at 15:40

## 2021-12-09 RX ADMIN — AMPICILLIN SODIUM AND SULBACTAM SODIUM 3 G: 2; 1 INJECTION, POWDER, FOR SOLUTION INTRAMUSCULAR; INTRAVENOUS at 05:35

## 2021-12-09 RX ADMIN — ONDANSETRON 4 MG: 2 INJECTION INTRAMUSCULAR; INTRAVENOUS at 05:35

## 2021-12-09 RX ADMIN — CYCLOBENZAPRINE 10 MG: 10 TABLET, FILM COATED ORAL at 18:15

## 2021-12-09 RX ADMIN — AMPICILLIN SODIUM AND SULBACTAM SODIUM 3 G: 2; 1 INJECTION, POWDER, FOR SOLUTION INTRAMUSCULAR; INTRAVENOUS at 12:11

## 2021-12-09 RX ADMIN — HEPARIN SODIUM 5000 UNITS: 5000 INJECTION, SOLUTION INTRAVENOUS; SUBCUTANEOUS at 05:35

## 2021-12-09 RX ADMIN — EZETIMIBE 10 MG: 10 TABLET ORAL at 18:15

## 2021-12-09 ASSESSMENT — ENCOUNTER SYMPTOMS
FLANK PAIN: 0
DIAPHORESIS: 0
FEVER: 0
PALPITATIONS: 0
BACK PAIN: 0
WHEEZING: 0
CHILLS: 0
NERVOUS/ANXIOUS: 0
BLURRED VISION: 0
DIZZINESS: 0
COUGH: 0
SPEECH CHANGE: 0
NAUSEA: 1
HEARTBURN: 0
NAUSEA: 0
MYALGIAS: 0
VOMITING: 0
ABDOMINAL PAIN: 0
DIARRHEA: 0
HEMOPTYSIS: 0
FOCAL WEAKNESS: 0
HEADACHES: 0
SENSORY CHANGE: 0
SHORTNESS OF BREATH: 0
ABDOMINAL PAIN: 1
NECK PAIN: 0
SORE THROAT: 0
WEAKNESS: 1
BRUISES/BLEEDS EASILY: 0

## 2021-12-09 ASSESSMENT — GAIT ASSESSMENTS
DEVIATION: BRADYKINETIC
DISTANCE (FEET): 150
ASSISTIVE DEVICE: 4 WHEEL WALKER
GAIT LEVEL OF ASSIST: SUPERVISED

## 2021-12-09 ASSESSMENT — COGNITIVE AND FUNCTIONAL STATUS - GENERAL
MOVING TO AND FROM BED TO CHAIR: UNABLE
WALKING IN HOSPITAL ROOM: A LITTLE
CLIMB 3 TO 5 STEPS WITH RAILING: A LITTLE
SUGGESTED CMS G CODE MODIFIER MOBILITY: CK
MOBILITY SCORE: 15
MOVING FROM LYING ON BACK TO SITTING ON SIDE OF FLAT BED: UNABLE
STANDING UP FROM CHAIR USING ARMS: A LITTLE

## 2021-12-09 ASSESSMENT — PAIN DESCRIPTION - PAIN TYPE
TYPE: ACUTE PAIN;SURGICAL PAIN
TYPE: ACUTE PAIN;SURGICAL PAIN
TYPE: ACUTE PAIN
TYPE: ACUTE PAIN;SURGICAL PAIN

## 2021-12-09 NOTE — CASE COMMUNICATION
I agree with the changes made.  ----- Message -----  From: Liyah Cervantes R.N.  Sent: 12/8/2021  12:06 PM PST  To: Camryn Babb R.N.      Quality Review for 12.5.21 Transfer OASIS performed on by EDISON Cervantes RN on 12.8, 2021:    Edits for Camryn to complete:     Edits completed by EDISON Cervantes RN:  1. Clarified admission date to 12.4.21 on transfer order and changed  to 12.4.21  2. Changed  e to yes per POC

## 2021-12-09 NOTE — DISCHARGE PLANNING
Received Choice form at [phone]  Agency/Facility Name: [facility]  Referral sent per Choice form @ [phone]

## 2021-12-09 NOTE — DISCHARGE PLANNING
Anticipated Discharge Disposition:   Home with Home Health    Action:   Pt was discussed in IDT rounds. Pt has been on [facility] with [facility] prior to this hospitalization.     This RN CM faxed a referral for [facility] to Bahman PETTIT for resumptions of service.    Barriers to Discharge:   Pending medical clearance  Pending Pt's re-acceptance to Home Health    Plan:   CM to continue to assist Pt with discharge as needed

## 2021-12-09 NOTE — PROGRESS NOTES
Hospital Medicine Daily Progress Note    Date of Service  12/9/2021    Chief Complaint  Nils Alfonso is a 66 y.o. female admitted 12/4/2021 with abdominal abscess.    Hospital Course  Admitted with intra-abdominal abscess. Patient was started on empiric coverage with IV Vancomycin and Zosyn. Surgery was consulted on the case. Patient underwent CT guided Right retroperitoneal/paracolic gutter drainage tube placement on 12/4/2021.  Patient with history of recurrent idiopathic pancreatitis. She had complications of pancreatic pseudocyst and intra-abdominal fluid collections. She underwent CT-guided drainage. She was placed on IV antibiotics. However there was no improvement noted. Surgery was then consulted on the case. Patient underwent Exploratory laparotomy, Incision and drainage and debridement of retroperitoneal abscess, Incision and drainage and debridement of anterior peritoneal abscess., Debridement of necrotizing fasciitis of the lateral abdominal wall on 11/5/2021.  She finished an antibiotic course of Zosyn, micafungin and Bactrim on 11/24/2021.    Interval Problem Update  Abdominal abscess -pain controlled, tolerating regular diet, culture showed E. coli and Enterococcus faecium, case discussed with Surgery Oncology  Low magnesium, potassium    I have personally seen and examined the patient at bedside. I discussed the plan of care with patient, bedside RN, charge RN and .    Consultants/Specialty  infectious disease and Surgery Oncology    Code Status  Full Code    Disposition  Patient is not medically cleared.   Anticipate discharge to to home with organized home healthcare and close outpatient follow-up.  I have placed the appropriate orders for post-discharge needs.    Review of Systems  Review of Systems   Constitutional: Negative for chills, diaphoresis, fever and malaise/fatigue.   HENT: Negative for congestion, hearing loss and sore throat.    Eyes: Negative for blurred vision.    Respiratory: Negative for cough, shortness of breath and wheezing.    Cardiovascular: Negative for chest pain, palpitations and leg swelling.   Gastrointestinal: Positive for nausea. Negative for abdominal pain, diarrhea, heartburn and vomiting.   Genitourinary: Negative for dysuria, flank pain and hematuria.   Musculoskeletal: Negative for back pain, joint pain, myalgias and neck pain.   Skin: Negative for rash.   Neurological: Positive for weakness. Negative for dizziness, sensory change, speech change, focal weakness and headaches.   Psychiatric/Behavioral: The patient is not nervous/anxious.         Physical Exam  Temp:  [36.5 °C (97.7 °F)-37.3 °C (99.1 °F)] 37.3 °C (99.1 °F)  Pulse:  [76-91] 87  Resp:  [17-18] 17  BP: (124-131)/(70-76) 131/75  SpO2:  [93 %-96 %] 94 %    Physical Exam  Vitals and nursing note reviewed.   HENT:      Head: Normocephalic and atraumatic.      Nose: No congestion.      Mouth/Throat:      Mouth: Mucous membranes are moist.   Eyes:      Extraocular Movements: Extraocular movements intact.      Conjunctiva/sclera: Conjunctivae normal.   Cardiovascular:      Rate and Rhythm: Normal rate and regular rhythm.   Pulmonary:      Effort: Pulmonary effort is normal.      Breath sounds: Normal breath sounds.   Abdominal:      General: There is no distension.      Tenderness: There is no abdominal tenderness. There is no guarding or rebound.      Comments: Ostomy  Right flank MARTHA drain   Musculoskeletal:      Cervical back: No tenderness.      Right lower leg: No edema.      Left lower leg: No edema.   Skin:     General: Skin is warm and dry.   Neurological:      General: No focal deficit present.      Mental Status: She is alert and oriented to person, place, and time.      Cranial Nerves: No cranial nerve deficit.         Fluids    Intake/Output Summary (Last 24 hours) at 12/9/2021 1014  Last data filed at 12/9/2021 0740  Gross per 24 hour   Intake 240 ml   Output 575 ml   Net -335 ml        Laboratory  Recent Labs     12/08/21  0239 12/09/21 0219   WBC 4.1* 6.1   RBC 3.34* 3.32*   HEMOGLOBIN 9.7* 9.7*   HEMATOCRIT 30.8* 30.4*   MCV 92.2 91.6   MCH 29.0 29.2   MCHC 31.5* 31.9*   RDW 56.0* 56.7*   PLATELETCT 229 259   MPV 8.9* 8.9*     Recent Labs     12/07/21  0540 12/08/21 0239 12/09/21 0219   SODIUM 135 136 135   POTASSIUM 3.6 3.0* 3.2*   CHLORIDE 107 106 105   CO2 21 19* 20   GLUCOSE 65 76 85   BUN 6* 7* 7*   CREATININE 0.48* 0.41* 0.40*   CALCIUM 7.9* 7.6* 7.4*                   Imaging  CT-ABDOMEN-PELVIS WITH   Final Result      1.  Interval decrease in size in right lower quadrant fluid collections status post drainage.      2.  Trace bilateral pleural effusions with bibasilar atelectasis.      3.  Biliary stent in place with pneumobilia.      4.  Anasarca      CT-DRAIN-PERITONEAL   Final Result      Successful RIGHT retroperitoneal/paracolic gutter drainage tube placement.      Plan: Thrice daily flushes with 10 mL of sterile saline. Monitor outputs. Please contact interventional radiology if there is any concern for tube dysfunction.      Findings were communicated with Dr. Vann via Voalte Me after initial scanning was performed.           Assessment/Plan  * Intra-abdominal abscess (HCC)- (present on admission)  Assessment & Plan  CT guided Right retroperitoneal/paracolic gutter drainage tube placement  12/4/2021  IV Vancomycin, Unasyn   Repeat CT on 12/11/2021    Sepsis - (present on admission)  Assessment & Plan  Source - Abdominal abscess      Hypomagnesemia- (present on admission)  Assessment & Plan  IV Mg 2 g  Follow level    Hypokalemia- (present on admission)  Assessment & Plan  increase Kdur, follow bmp    Hyponatremia- (present on admission)  Assessment & Plan  Follow bmp    Normocytic anemia- (present on admission)  Assessment & Plan  Follow cbc    Mild protein-calorie malnutrition (HCC)- (present on admission)  Assessment & Plan  Nutrition consult    Hyperlipidemia-  (present on admission)  Assessment & Plan  Ezetimibe       VTE prophylaxis: heparin ppx    I have performed a physical exam and reviewed and updated ROS and Plan today (12/9/2021). In review of yesterday's note (12/8/2021), there are no changes except as documented above.

## 2021-12-09 NOTE — FACE TO FACE
Face to Face Supporting Documentation - Home Health    The encounter with this patient was in whole or in part the primary reason for home health admission.    Date of encounter:   Patient:                    MRN:                       YOB: 2021  Nils Alfonso  [id number]  1955     Home health to see patient for:  Skilled Nursing care for assessment, interventions & education    Skilled need for:  Surgical Aftercare Ex Lap    Skilled nursing interventions to include:  Comment: PT/OT    Homebound status evidenced by:  Needs the assistance of another person in order to leave the home. Leaving home requires a considerable and taxing effort. There is a normal inability to leave the home.    Community Physician to provide follow up care: Pratik Gordon M.D.     Optional Interventions? No      I certify the face to face encounter for this home health care referral meets the CMS requirements and the encounter/clinical assessment with the patient was, in whole, or in part, for the medical condition(s) listed above, which is the primary reason for home health care. Based on my clinical findings: the service(s) are medically necessary, support the need for home health care, and the homebound criteria are met.  I certify that this patient has had a face to face encounter by myself.  Warren Lerner M.D. - NPI: [id number]

## 2021-12-09 NOTE — PROGRESS NOTES
Radiology Progress Note   Author: AYE Mahan Date & Time created: 12/9/2021  11:22 AM   Date of admission  12/4/2021  Note to reader: this note follows the APSO format rather than the historical SOAP format. Assessment and plan located at the top of the note for ease of use.    Chief Complaint  66 y.o. female admitted 12/4/2021 with ABD pain       HPI  Nils Alfonso is a 66 y.o. female admitted 12/4/2021 with complicated past medical history including multiple recent hospitalizations for complicated duodenectomy and recurrent pancreatitis who presents with recurrent abdominal pain and progressive/new intra-abdominal fluid collections concerning for abscesses and possible ongoing GI leak.       Assessment/Plan  Interval History   Principal Problem:    Intra-abdominal abscess (HCC)  Active Problems:    Hyperlipidemia    Sepsis     Normocytic anemia    Hyponatremia    Hypokalemia    Hypomagnesemia    Postprocedural intraabdominal abscess    Mild protein-calorie malnutrition (HCC)      Plan IR  - Irrigate RLQ drain with 10 ml of sterile saline three times per shift   - Fluid cultures, Escherichia coli, Enterococcus faecium  - Sugery following   - Repeat CT on 12/11/2021  - Continue to monitor drains, VS and labs        [id number]  IR:   12/4-RIGHT paracolic gutter/RP drain placement by CT 50 mL brown fluid to lab  12/5- 65 ml   12/6- 50 ml purulent brown fluid, Ct- decrease in size in right lower quadrant fluid collection status post drainage. A pigtail catheter remains in place. A fluid collection previously measuring 2.3 cm in width currently measures 9 mm in width.   12/7-  75 ml  12/8- 60 ml   12/9- 35 ml in am       Review of Systems  Physical Exam   Review of Systems   Constitutional: Negative for chills, fever and malaise/fatigue.   HENT: Negative for hearing loss.    Eyes: Negative for blurred vision.   Respiratory: Negative for cough, hemoptysis and shortness of breath.    Cardiovascular: Negative  for chest pain and palpitations.   Gastrointestinal: Positive for abdominal pain. Negative for vomiting.   Genitourinary: Negative for dysuria.   Musculoskeletal: Negative for myalgias.   Skin: Negative for rash.   Neurological: Positive for weakness. Negative for dizziness and headaches.   Endo/Heme/Allergies: Does not bruise/bleed easily.   Psychiatric/Behavioral: Negative for suicidal ideas.      Vitals:    12/09/21 0740   BP: 131/75   Pulse: 87   Resp: 17   Temp: 37.3 °C (99.1 °F)   SpO2: 94%        Physical Exam  Constitutional:       Appearance: Normal appearance.   HENT:      Head: Normocephalic.      Nose: Nose normal.      Mouth/Throat:      Mouth: Mucous membranes are moist.   Eyes:      Pupils: Pupils are equal, round, and reactive to light.   Cardiovascular:      Rate and Rhythm: Normal rate.   Pulmonary:      Effort: Pulmonary effort is normal. No respiratory distress.   Abdominal:      Palpations: Abdomen is soft.      Tenderness: There is abdominal tenderness.          Comments: IR drain site CDI, no redness or swelling , brown purulent fluid in MARTHA bulb  Ostomy   X 3 abd dressings    Musculoskeletal:         General: No tenderness or deformity.      Cervical back: Normal range of motion.   Skin:     General: Skin is warm and dry.      Capillary Refill: Capillary refill takes less than 2 seconds.      Coloration: Skin is not jaundiced or pale.   Neurological:      General: No focal deficit present.      Mental Status: She is alert.      Motor: No weakness.   Psychiatric:         Mood and Affect: Mood normal.         Behavior: Behavior normal.             Labs    Recent Labs     12/08/21 0239 12/09/21 0219   WBC 4.1* 6.1   RBC 3.34* 3.32*   HEMOGLOBIN 9.7* 9.7*   HEMATOCRIT 30.8* 30.4*   MCV 92.2 91.6   MCH 29.0 29.2   MCHC 31.5* 31.9*   RDW 56.0* 56.7*   PLATELETCT 229 259   MPV 8.9* 8.9*     Recent Labs     12/07/21  0540 12/08/21 0239 12/09/21 0219   SODIUM 135 136 135   POTASSIUM 3.6 3.0* 3.2*    CHLORIDE 107 106 105   CO2 21 19* 20   GLUCOSE 65 76 85   BUN 6* 7* 7*   CREATININE 0.48* 0.41* 0.40*   CALCIUM 7.9* 7.6* 7.4*     Recent Labs     12/07/21  0540 12/08/21  0239 12/09/21  0219   CREATININE 0.48* 0.41* 0.40*     CT-ABDOMEN-PELVIS WITH   Final Result      1.  Interval decrease in size in right lower quadrant fluid collections status post drainage.      2.  Trace bilateral pleural effusions with bibasilar atelectasis.      3.  Biliary stent in place with pneumobilia.      4.  Anasarca      CT-DRAIN-PERITONEAL   Final Result      Successful RIGHT retroperitoneal/paracolic gutter drainage tube placement.      Plan: Thrice daily flushes with 10 mL of sterile saline. Monitor outputs. Please contact interventional radiology if there is any concern for tube dysfunction.      Findings were communicated with Dr. Vann via Voalte Me after initial scanning was performed.          INR   Date Value Ref Range Status   12/04/2021 1.42 (H) 0.87 - 1.13 Final     Comment:     INR - Non-therapeutic Reference Range: 0.87-1.13  INR - Therapeutic Reference Range: 2.0-4.0       No results found for: POCINR     Intake/Output Summary (Last 24 hours) at 12/6/2021 1052  Last data filed at 12/6/2021 0600  Gross per 24 hour   Intake 1724 ml   Output 1295 ml   Net 429 ml      Labs not explicitly included in this progress note were reviewed by the author. Radiology/imaging not explicitly included in this progress note was reviewed by the author.   I have performed a physical exam and reviewed and updated ROS and Plan today (12/9/2021).     15 minutes in directly providing and coordinating care and extensive data review.  No time overlap and excludes procedures.

## 2021-12-09 NOTE — PROGRESS NOTES
Report received from Day RN at 1915. Assumed care of patient. Plan of care discussed, questions answered. Assessment completed. VSS, A&O x4, denies chest pain or SOB at this time, states pain on right abdomen. See MAR. Call light in reach. Patient educated on use of call light for assistance.    Three ostomy bags on abdomen on fistula sites, IR MARTHA drain RLQ abdomen  Midline incision with island dressing c/d/i   purewick in place  x1 assist with 4 wheel walker  Hourly rounding in place.

## 2021-12-09 NOTE — CARE PLAN
Problem: Knowledge Deficit - Standard  Goal: Patient and family/care givers will demonstrate understanding of plan of care, disease process/condition, diagnostic tests and medications  Outcome: Progressing     Problem: Pain - Standard  Goal: Alleviation of pain or a reduction in pain to the patient’s comfort goal  Outcome: Progressing     Problem: Skin Integrity  Goal: Skin integrity is maintained or improved  Outcome: Progressing    The patient is Stable - Low risk of patient condition declining or worsening    Shift Goals  Clinical Goals: Pain Management, Ambulation  Patient Goals: Pain Management, Rest  Family Goals: No family currently present    Progress made toward(s) clinical / shift goals:  pain control, skin integrity protocols in place POC discussed, questions answered.    Patient is not progressing towards the following goals:

## 2021-12-09 NOTE — PROGRESS NOTES
Infectious Disease Progress Note    Author: Jose Arora M.D. Date & Time of service: 2021  9:31 AM    Chief Complaint:  Follow-up for intra-abdominal abscess    Interval History:   T-max 99.1, white count 6.1, tolerating switching antimicrobials.    Labs Reviewed and Medications Reviewed.    Review of Systems:  Review of Systems   Constitutional: Positive for malaise/fatigue. Negative for chills and fever.   Gastrointestinal: Positive for abdominal pain. Negative for nausea and vomiting.   Skin: Negative for itching and rash.   All other systems reviewed and are negative.      Hemodynamics:  Temp (24hrs), Av °C (98.6 °F), Min:36.5 °C (97.7 °F), Max:37.3 °C (99.1 °F)  Temperature: 37.3 °C (99.1 °F)  Pulse  Av.5  Min: 53  Max: 99   Blood Pressure : 131/75       Physical Exam:  Physical Exam  Vitals and nursing note reviewed.   Constitutional:       General: She is not in acute distress.     Appearance: She is ill-appearing.   HENT:      Mouth/Throat:      Pharynx: No oropharyngeal exudate.   Eyes:      General: No scleral icterus.        Right eye: No discharge.         Left eye: No discharge.      Conjunctiva/sclera: Conjunctivae normal.   Cardiovascular:      Rate and Rhythm: Normal rate.      Heart sounds: No murmur heard.      Pulmonary:      Effort: Pulmonary effort is normal. No respiratory distress.      Breath sounds: No stridor.   Abdominal:      Comments: Right-sided MARTHA drain with bilious appearing output.  Ostomy   Musculoskeletal:         General: No swelling or tenderness.   Skin:     Findings: No erythema or rash.   Neurological:      Mental Status: She is alert.   Psychiatric:         Mood and Affect: Mood normal.         Behavior: Behavior normal.      Comments: Very pleasant         Meds:    Current Facility-Administered Medications:   •  potassium chloride SA  •  magnesium sulfate  •  acetaminophen **FOLLOWED BY** [DISCONTINUED] acetaminophen  •  ampicillin-sulbactam (UNASYN)  IV  •  calcium carbonate  •  vancomycin  •  ondansetron  •  MD Alert...Vancomycin per Pharmacy  •  heparin  •  cyclobenzaprine  •  ezetimibe  •  gabapentin  •  Pharmacy Consult Request  •  oxyCODONE immediate-release **OR** oxyCODONE immediate-release **OR** HYDROmorphone    Labs:  Recent Labs     12/08/21  0239 12/09/21 0219   WBC 4.1* 6.1   RBC 3.34* 3.32*   HEMOGLOBIN 9.7* 9.7*   HEMATOCRIT 30.8* 30.4*   MCV 92.2 91.6   MCH 29.0 29.2   RDW 56.0* 56.7*   PLATELETCT 229 259   MPV 8.9* 8.9*   NEUTSPOLYS 41.10* 44.80   LYMPHOCYTES 47.30* 52.60*   MONOCYTES 7.10 2.60   EOSINOPHILS 3.60 0.00   BASOPHILS 0.90 0.00   RBCMORPHOLO Present Present     Recent Labs     12/07/21  0540 12/08/21 0239 12/09/21 0219   SODIUM 135 136 135   POTASSIUM 3.6 3.0* 3.2*   CHLORIDE 107 106 105   CO2 21 19* 20   GLUCOSE 65 76 85   BUN 6* 7* 7*     Recent Labs     12/07/21  0540 12/08/21 0239 12/09/21 0219   CREATININE 0.48* 0.41* 0.40*       Imaging:  CT-ABDOMEN-PELVIS WITH    Result Date: 12/6/2021 12/6/2021 10:55 AM HISTORY/REASON FOR EXAM:  Abdominal pain, fever. TECHNIQUE/EXAM DESCRIPTION:   CT scan of the abdomen and pelvis with contrast. Contrast-enhanced helical scanning was obtained from the diaphragmatic domes through the pubic symphysis following the bolus administration of nonionic contrast without complication. 80 mL of Omnipaque 350 nonionic contrast was administered without complication. Low dose optimization technique was utilized for this CT exam including automated exposure control and adjustment of the mA and/or kV according to patient size. COMPARISON: December 3, 2021 FINDINGS: Lower Chest: There are trace bilateral pleural effusions. There is bibasilar atelectasis, right greater left.. Liver: Normal. Spleen: Unremarkable. Pancreas: Unremarkable. Gallbladder: The gallbladder has been resected. Biliary: Common bile duct stent remains in place. There is pneumobilia with gas also identified in the pancreatic duct.  Adrenal glands: Normal. Kidneys: Unremarkable without hydronephrosis. Bowel: No obstruction or acute inflammation. There is diverticulosis without evidence of diverticulitis Lymph nodes: No adenopathy. Vasculature: Unremarkable. Peritoneum: There is been interval decrease in size in right lower quadrant fluid collection status post drainage. A pigtail catheter remains in place. A fluid collection previously measuring 2.3 cm in width currently measures 9 mm in width. A fluid collection more posterior medially previously measured 4.1 cm in width and currently measures 19 mm in width with the drain within this collection. Open anterior abdominal wound noted. There is anasarca. Musculoskeletal: No acute or destructive process. Pelvis: No adenopathy or free fluid.     1.  Interval decrease in size in right lower quadrant fluid collections status post drainage. 2.  Trace bilateral pleural effusions with bibasilar atelectasis. 3.  Biliary stent in place with pneumobilia. 4.  Anasarca    CT-ABDOMEN-PELVIS WITH    Result Date: 12/3/2021  12/3/2021 5:11 PM HISTORY/REASON FOR EXAM: Acute abdominal pain. History of duodenal perforation. TECHNIQUE/EXAM DESCRIPTION:   CT scan of the abdomen and pelvis with contrast. Contrast-enhanced helical scanning was obtained from the diaphragmatic domes through the pubic symphysis following the bolus administration of nonionic contrast without complication. 80 mL of Omnipaque 350 nonionic contrast was administered without complication. Oral contrast was administered. Low dose optimization technique was utilized for this CT exam including automated exposure control and adjustment of the mA and/or kV according to patient size. COMPARISON: CT AP 11/24/2021. Upper GI series 11/22/2021. CT AP 11/15/2021 FINDINGS: Lower Chest: There is minimal atelectasis or pneumonia in the right lower lobe with minimal right pleural effusion. There is been no significant change. The left lung base is without  consolidation. A small calcified granuloma is again noted. No free air is present. Postoperative changes involving the stomach are stable. There is been interval removal of a right upper quadrant drain. There has been an increase in size of a right sided fluid collection or abscess in the right paracolic gutter region. The abscess has a length of 6.3 cm and transverse dimension of 4.1 cm inferiorly. There is a fluid collection superiorly in the right mid abdomen which is connected with the inferior collection. This collection has an air-fluid level and has a maximum diameter of 3 cm. This fluid collection extends inferiorly into the right lower quadrant paracolic gutter. There is a fluid collection in the midline of the rectus sheath below a midline skin incision. This collection has maximum dimension of 5.5 cm transversely and a length of approximately 9 cm. Liver: No hepatic parenchymal mass is present. Expansile common bile duct stent is present. Spleen: Unremarkable. Pancreas: Unremarkable. Gallbladder: The gallbladder has been resected. Biliary: Nondilated. Adrenal glands: Normal. Kidneys: Unremarkable without hydronephrosis. Bowel: No there is no evidence of bowel obstruction or focal inflammation. Lymph nodes: No adenopathy. Vasculature: Unremarkable. Peritoneum: Unremarkable without ascites. Musculoskeletal: No acute or destructive process. Fusion hardware is present in the distal lumbar spine. Pelvis: No adenopathy. The bladder appears normal. No pelvic fluid collections or free fluid is present.     1.  Recurrent fluid collections or abscess in the right upper quadrant and right paracolic gutter region. Interval removal of right upper quadrant drain. 2.  New fluid collection in the rectus sheath in the midline below the skin incision. This may represent postoperative fluid collection or abscess. 3.  Postoperative changes involving the stomach. 4.  Cholecystectomy. Residual common bile duct expansile stent  in place. 5.  No bowel or renal obstruction. 6.  No free air. 7.  Small unchanged right pleural effusion and unchanged minimal right lower lobe atelectasis or pneumonia.    CT-ABDOMEN-PELVIS WITH    Result Date: 11/24/2021 11/24/2021 3:56 PM HISTORY/REASON FOR EXAM:  Abdominal pain, acute, nonlocalized. History of duodenal perforation. TECHNIQUE/EXAM DESCRIPTION:   CT scan of the abdomen and pelvis with contrast. Contrast-enhanced helical scanning was obtained from the diaphragmatic domes through the pubic symphysis following the bolus administration of nonionic contrast without complication. 100 mL of Omnipaque 350 nonionic contrast was administered without complication. Low dose optimization technique was utilized for this CT exam including automated exposure control and adjustment of the mA and/or kV according to patient size. COMPARISON: 11/15/2021 FINDINGS: Lower Chest: Bibasilar atelectasis, improved from prior with improvement of bilateral pleural fluid. Liver: Liver again shows pneumobilia. Spleen: Unremarkable. Pancreas: Present in the pancreatic duct.  Pancreatic duct is mildly dilated. Gallbladder: Surgically absent. Biliary: Metallic common bile duct stent in place. Adrenal glands: Normal. Kidneys: Unremarkable without hydronephrosis. Bowel: Postoperative change of the stomach.  Contrast present in the colon from prior procedure.  RIGHT upper quadrant drain present within irregular fluid collection in the RIGHT lateral midabdomen.  Fluid collection shows peripheral enhancement as well  as subtle hyperdense contents and gas.  Fluid collection measures approximately 7.6 x 7.3 By 2.4 cm and extends into the RIGHT posterior pararenal space, decreased in size from prior exam.  RIGHT lower quadrant drain is been removed. Feeding tube in place with tip at the DJ flexure. Lymph nodes: No adenopathy. Vasculature: Unremarkable. Peritoneum: Unremarkable without ascites. Musculoskeletal: Postoperative change of  lumbar spine. Pelvis: Bladder is unremarkable.  Contrast present in the vagina, likely reflux.  Postoperative change of anterior abdominal wall.  Diffuse body wall edema.     1.  RIGHT lateral no abnormal abscess again seen, decreased in size from prior exam with drain in place, however contents now appears hyperdense potential indicating bowel contrast, concerning for bowel perforation or fistula, although source of contrast  is uncertain.  No cristóbal pneumoperitoneum. 2.  Pneumobilia and biliary stent present. 3.  Interval removal of RIGHT lower quadrant drain.     CT-ABDOMEN-PELVIS WITH    Result Date: 11/15/2021  11/15/2021 11:49 AM HISTORY/REASON FOR EXAM:  intra-abdominal abscess. Follow up. TECHNIQUE/EXAM DESCRIPTION:   CT scan of the abdomen and pelvis with contrast. Contrast-enhanced helical scanning was obtained from the diaphragmatic domes through the pubic symphysis following the bolus administration of nonionic contrast without complication. 100 mL of Omnipaque 350 nonionic contrast was administered without complication. Low dose optimization technique was utilized for this CT exam including automated exposure control and adjustment of the mA and/or kV according to patient size. COMPARISON: November 8, 2021 FINDINGS: Lower Chest: There is bibasilar atelectasis, right greater than left. There is been slight increase in size of trace bilateral pleural effusions.. Liver: Normal. Spleen: Unremarkable. Pancreas: Unremarkable. There is again air within the pancreatic duct. Gallbladder: The gallbladder has been resected. Biliary: Pneumobilia again noted.. Adrenal glands: Normal. Kidneys: Unremarkable without hydronephrosis. Bowel: No obstruction or acute inflammation. Lymph nodes: No adenopathy. Vasculature: Unremarkable. Peritoneum: There is again a mid abdomen fluid collection which appears slightly larger currently measuring 7.2 x 7.1 x 9.4 cm. The surgical drain tracks through this fluid collection. There is  anasarca. Musculoskeletal: No acute or destructive process. Pelvis: No adenopathy or free fluid.     1.  Slight interval increase in size in fluid collection the right midabdomen with drain in place. 2.  Slight interval increase in size in trace bilateral pleural effusions, right greater than left with bibasilar atelectasis. 3.  Pneumobilia.    CT-DRAIN-PERITONEAL    Result Date: 12/4/2021  HISTORY/REASON FOR EXAM:  66-year-old woman with complex medical history who has RIGHT abscesses involving the RIGHT retroperitoneum and paracolic gutter as well as her anterior abdominal wall. Both are decompressing through the dermis. TECHNIQUE/EXAM DESCRIPTION AND NUMBER OF VIEWS: RIGHT paracolic gutter/retroperitoneal drain placement with CT guidance. Low dose optimization technique was utilized for this CT exam including automated exposure control and adjustment of the mA and/or kV according to patient size. COMPARISON:  CT 12/3/2021 MEDICATIONS: Moderate sedation was provided. Pulse oximetry and continuous cardiac monitoring by the nurse was performed throughout the exam. Intraservice time was 15 minutes. PROCEDURE:     The risks, benefits, goals and objectives and alternatives were discussed. Risks were specified as including but not limited to bleeding, infection, damage to vessels or nerves, pain and discomfort as well as the possibility of incomplete resolution of underlying disease. Drain care related issues were discussed with the patient. The patient's questions were answered. Informed oral and written consent were obtained. The patient was placed on the CT gantry. Localizing scan was performed. The skin was prepped and draped in the usual sterile manner.  A timeout was performed. Local anesthetic result was achieved with administration of 1% lidocaine. A(n) 17-gauge access needle was advanced to the target using CT fluoroscopic guidance. Access was secured with a wire and the tract was sequentially dilated to  accommodate a(n) 12 Malay locking loop drain. A total of 50 mL of brown malodorous fluid was removed and sent for laboratory evaluation. This was put in place and formed. The catheter was attached to suction bulbdrainage and secured to the skin with 2-0 nylon suture. Completion scan was performed which showed no complication. The patient tolerated the procedure well with no evidence of complication. The skin was cleaned and a dressing was applied. FINDINGS: Substantial interval decompression of both the anterior abdominal wall and RIGHT paracolic gutter/retroperitoneal fluid collections. No residual targetable collection in the anterior abdominal wall. Appropriate tube position in the RIGHT retroperitoneal/paracolic gutter collection. No evidence of hemorrhage.     Successful RIGHT retroperitoneal/paracolic gutter drainage tube placement. Plan: Thrice daily flushes with 10 mL of sterile saline. Monitor outputs. Please contact interventional radiology if there is any concern for tube dysfunction. Findings were communicated with Dr. Vann via [employer] after initial scanning was performed.    DX-CHEST-PORTABLE (1 VIEW)    Result Date: 12/3/2021  12/3/2021 4:46 PM HISTORY/REASON FOR EXAM: Dehydration and nausea for one day. TECHNIQUE/EXAM DESCRIPTION AND NUMBER OF VIEWS: Single AP view of the chest. COMPARISON: 11/19/2021 FINDINGS: No pneumothorax. There is unchanged elevation of the right hemidiaphragm. No enlarging pleural effusion. Hazy right lower lobe opacity is seen, evaluation of this area is mildly limited secondary to overlying EKG leads. Cardiac mediastinal silhouette is normal. Multiple surgical clips project over the epigastrium and a biliary stent is seen.     Hazy right lower lobe opacity may be atelectasis, evaluation of this region is mildly limited secondary to overlying EKG leads.    [id number] STENT - TUBE    Result Date: 11/18/2021 11/18/2021 2:17 PM HISTORY/REASON FOR EXAM:  ERCP biliary  stent placement TECHNIQUE/EXAM DESCRIPTION AND NUMBER OF VIEWS: 19 portable fluoroscopic images were obtained during ERCP biliary stent placement procedure performed and endoscopy suite. COMPARISON: None FINDINGS: 19 portable fluoroscopic images obtained during ERCP biliary stent placement.     Portable fluoroscopic images obtained during ERCP biliary stent placement for localization purposes.    DX-UPPER GI-SERIES WITH KUB    Result Date: 11/22/2021 11/22/2021 11:03 AM HISTORY/REASON FOR EXAM:  Abdominal Pain; Use water soluble contrast - re-evaluate duodenal perforation. TECHNIQUE/EXAM DESCRIPTION AND NUMBER OF VIEWS: Omnipaque 300 water soluble contrast upper GI series was performed. 19 fluoroscopic images obtained. Fluoroscopy time: 1.1 minutes COMPARISON: 11/17/2021 FINDINGS:  image shows extensive postoperative change of the abdomen feeding tube in place.  Feeding tube tip at the proximal jejunum. Metallic biliary stent in place. Contrast refluxes into the distal common bile duct, within the biliary stent, consistent with prior sphincterotomy. No evidence for leakage of contrast from the stomach or duodenum.     1.  No evidence for duodenal leak. 2.  Reflux of contrast seen into the distal common bile duct at the site of biliary stent, consistent with prior sphincterotomy.    DX-UPPER GI-SERIES WITH KUB    Result Date: 11/17/2021 11/17/2021 10:36 AM HISTORY/REASON FOR EXAM:  Gastrointestinal Complaint; Evaluate duodenal or intestinal leak from previous ERCP perforation. TECHNIQUE/EXAM DESCRIPTION AND NUMBER OF VIEWS: Omnipaque 300 water soluble contrast focused upper GI series was performed. 9 fluoroscopic images obtained. Fluoroscopy time: 0.5 minutes COMPARISON: CT abdomen pelvis dated 11/15/2021 FINDINGS: Extensive postsurgical changes of the upper abdomen. The duodenal C-loop is in appropriate anatomic position. There is contrast extravasation at the junction of the second and third portions of  duodenum (see key images).     Duodenal perforation at the junction of the second and third portions of duodenum as noted on key images.    CT-ABORTED CT PROCEDURE    Result Date: 11/16/2021  HISTORY/REASON FOR EXAM:  RIGHT retroperitoneal abscess with surgical drains in place. TECHNIQUE/EXAM DESCRIPTION AND NUMBER OF VIEWS: Limited pelvic CT. Low dose optimization technique was utilized for this CT exam including automated exposure control and adjustment of the mA and/or kV according to patient size. COMPARISON:  Diagnostic CT 11/15/2021 MEDICATIONS: None PROCEDURE:     The risks, benefits, goals and objectives and alternatives were discussed. Risks were specified as including but not limited to bleeding, infection, damage to vessels or nerves, pain and discomfort as well as the possibility of incomplete resolution of underlying disease. Drain care related issues were discussed with the patient. The patient's questions were answered. Informed oral and written consent were obtained. The patient was placed on the CT gantry. Localizing scan was performed. Based on my findings no procedure was performed. FINDINGS: Decreased size of RIGHT paracolic gutter fluid collection which contains a surgical drain. There is some contrast present in the collection as well as in the adjacent colon.     Interval decrease in size of the RIGHT retroperitoneal fluid collection which contains a surgical drain. Because from bowel is possible. No additional drain was placed.     DX-ABDOMEN FOR TUBE PLACEMENT    Result Date: 11/19/2021 11/19/2021 5:57 AM HISTORY/REASON FOR EXAM: coretrak placement TECHNIQUE/EXAM DESCRIPTION:  Single AP view the abdomen. COMPARISON:  October 19, 2021 FINDINGS: Hazy right lower lobe opacities are seen. Dobbhoff tube is seen, the tip lies to the left of the lumbar spine.  The bowel gas pattern appears nonspecific. TIPS stent is in place. The bony structures appear age-appropriate.     1.  Nonspecific bowel  gas pattern. 2.  Dobbhoff tube with tip terminating overlying the expected location of the third or fourth duodenal segment. 3.  Hazy right lower lobe infiltrates.    DX-ABDOMEN FOR TUBE PLACEMENT    Result Date: 11/18/2021 11/18/2021 5:31 PM HISTORY/REASON FOR EXAM:  Line evaluation. TECHNIQUE/EXAM DESCRIPTION AND NUMBER OF VIEWS:  1 view(s) of the abdomen. COMPARISON:  None. FINDINGS: Enteric tube has been placed. The tip projects over the proximal jejunum. The bowel gas pattern is within normal limits.     Feeding tube extends to the region of the stomach and duodenum with tip at the level of the proximal end of the jejunum.    DX-ABDOMEN FOR TUBE PLACEMENT    Result Date: 11/18/2021 11/18/2021 4:23 PM HISTORY/REASON FOR EXAM:  Line evaluation. TECHNIQUE/EXAM DESCRIPTION AND NUMBER OF VIEWS:  1 view(s) of the abdomen. COMPARISON:  1/5/2007 FINDINGS: Enteric tube has been placed. The tip projects over the duodenal jejunal junction. Common bile duct stent. Epigastrium and left upper quadrant vascular clips. Suture material again overlie the epigastrium The bowel gas pattern is within normal limits. Some contrast seen in the colon from upper GI performed yesterday     Enteric tube terminates over the duodenal jejunal junction Covered common bile duct stent No contrast extravasation is seen      Micro:  Results     Procedure Component Value Units Date/Time    FLUID CULTURE W/GRAM STAIN [[id number]]  (Abnormal)  (Susceptibility) Collected: 12/04/21 1127    Order Status: Completed Specimen: Body Fluid from Peritoneal Fluid Updated: 12/06/21 1111     Significant Indicator POS     Source BF     Site PERITONEAL FLUID     Culture Result Light growth mixed enteric stephen.     Gram Stain Result Many WBCs.  Many Gram positive cocci.  Few Gram positive rods.  Moderate Gram negative rods.       Culture Result Escherichia coli  Heavy growth        Enterococcus faecium  Heavy growth  The susceptibility profile for this organism  indicates that  Streptomycin would not be an effective component of  combination therapy.      Narrative:      Collected By: [id number] YONG TAYLOR  Right abdomen  Collected By: [id number] YONG TAYLOR    Susceptibility     Escherichia coli (1)     Antibiotic Interpretation Microscan   Method Status    Ampicillin Sensitive <=8 mcg/mL MU Final    Ceftriaxone Sensitive <=1 mcg/mL MU Final    Cefazolin Sensitive <=2 mcg/mL MU Final    Ciprofloxacin Sensitive <=0.25 mcg/mL MU Final    Cefepime Sensitive <=2 mcg/mL MU Final    Cefuroxime Sensitive <=4 mcg/mL MU Final    Ertapenem Sensitive <=0.5 mcg/mL MU Final    Gentamicin Sensitive <=2 mcg/mL MU Final    Ampicillin/sulbactam Sensitive <=4/2 mcg/mL MU Final    Minocycline Sensitive <=4 mcg/mL MU Final    Moxifloxacin Sensitive <=2 mcg/mL MU Final    Pip/Tazobactam Sensitive <=8 mcg/mL MU Final    Trimeth/Sulfa Sensitive <=0.5/9.5 mcg/mL MU Final    Tigecycline Sensitive <=2 mcg/mL MU Final    Tobramycin Sensitive <=2 mcg/mL MU Final          Enterococcus faecium (2)     Antibiotic Interpretation Microscan   Method Status    Ampicillin Resistant >8 mcg/mL MU Final    Vancomycin Sensitive 0.5 mcg/mL MU Final    Gent Synergy Sensitive <=500 mcg/mL MU Final            Condensed View                   GRAM STAIN [[id number]] Collected: 12/04/21 1127    Order Status: Completed Specimen: Body Fluid Updated: 12/05/21 0616     Significant Indicator .     Source BF     Site PERITONEAL FLUID     Gram Stain Result Many WBCs.  Many Gram positive cocci.  Few Gram positive rods.  Moderate Gram negative rods.      Narrative:      Collected By: [id number] YONG TAYLOR  Right abdomen  Collected By: [id number] YONG TAYLOR          Assessment:  Nils Alfonso is a 66 y.o. female with complex history of recent pancreatitis with ERCP on 10/1/2021, complicated by suspected duodenal perforation with resultant complex multiple intra-abdominal abscesses.  For about 3  weeks, this was attempted to be managed by multiple IR drains but there was no improvement.  Drain cultures from various times grew multiple organisms including E. coli and Enterococcus faecalis, Candida albicans and glabrata, stenotrophomonas, E. coli, ampicillin resistant E faecium.  Blood cultures grew Chryseobacterium species and Candida glabrata (completed therapy for the bacteremia). The decision was made to treat with at least 4 weeks of antibiotics with Continues home infusion IV Zosyn, IV micafungin, and p.o. Bactrim through 11/24/2021, and then follow-up in ID clinic, ID signed off. However, there are further procedures since that plan was made. Patient underwent an exploratory laparotomy on 11/5 for drainage of retroperitoneal abscess, peritoneal abscess, necrotizing fasciitis involving muscle and soft tissue.  Cultures from this procedure grew ampicillin resistant E faecium, sensitive E faecalis, Stenotrophomonas, Candida albicans, E. coli. Repeat CT of the abdomen on 11/15/2021 noted increase fluid collection in the right mid abdomen and slight increase in trace bilateral pleural effusions. IR placed a peritoneal drain but this fell out at some point and was not replaced due to the abdominal fluid collection being noted to be smaller. Upper GI series on 11/17/2021 showed a leak at the perforation in 2/3 portion of the duodenum.  Patient underwent an ERCP, biliary stent placement (since during the procedure there was concern for bile duct perforation being the cause of patient's leak), core track placement on 11/18/2021 by GI.  Esophageal stent was not placed since no obvious defect was noted.     Patient was discharged on 11/28. On 12/4, patient returned to the ER with fevers and right lower quadrant abdominal pain, found to have fluid collections right upper quadrant and right paracolic gutter. IR on 12/4 placed drain in the right paracolic gutter. Repeat CT scan on 12/6 with good improvement in the  right lower quadrant fluid collections, the midline rectus fluid collection appears to have drained through the incision.  Cultures from this growing pansensitive E. coli and ampicillin resistant E faecium thus far.  ID consulted for recommendations.     Pertinent Diagnoses:  Abdominal pelvic abscesses  Recent duodenal/biliary perforation  Polymicrobial infection    Plan:  -Continue IV vancomycin and Unasyn 3 g every 6 hours.  Monitor vancomycin levels and renal function  -Previously, multiple other organisms including Saritha and Stenotrophomonas had grown, but this was prior to the exploratory laparotomy with washout.  Unless these organisms grow again, and if continued improvement is demonstrated on repeat CT scan, will hold off on adding additional potentially harmful antimicrobials  -Current plan is to repeat CT scan 1 week following latest drain placement (12/11).  ID will follow along.  Depending on repeat imaging findings, will have to decide on antibiotic therapy which is limited by lack of long-term oral options to target the E. faecium    Discussed with Dr. Lerner    ID will follow.  Please call with questions.

## 2021-12-10 LAB
ANION GAP SERPL CALC-SCNC: 10 MMOL/L (ref 7–16)
BASOPHILS # BLD AUTO: 0.8 % (ref 0–1.8)
BASOPHILS # BLD: 0.05 K/UL (ref 0–0.12)
BUN SERPL-MCNC: 7 MG/DL (ref 8–22)
CALCIUM SERPL-MCNC: 7.1 MG/DL (ref 8.5–10.5)
CHLORIDE SERPL-SCNC: 106 MMOL/L (ref 96–112)
CO2 SERPL-SCNC: 20 MMOL/L (ref 20–33)
CREAT SERPL-MCNC: 0.31 MG/DL (ref 0.5–1.4)
EOSINOPHIL # BLD AUTO: 0.03 K/UL (ref 0–0.51)
EOSINOPHIL NFR BLD: 0.5 % (ref 0–6.9)
ERYTHROCYTE [DISTWIDTH] IN BLOOD BY AUTOMATED COUNT: 58.4 FL (ref 35.9–50)
GLUCOSE SERPL-MCNC: 87 MG/DL (ref 65–99)
HCT VFR BLD AUTO: 30.8 % (ref 37–47)
HGB BLD-MCNC: 9.6 G/DL (ref 12–16)
IMM GRANULOCYTES # BLD AUTO: 0.08 K/UL (ref 0–0.11)
IMM GRANULOCYTES NFR BLD AUTO: 1.3 % (ref 0–0.9)
LYMPHOCYTES # BLD AUTO: 2.24 K/UL (ref 1–4.8)
LYMPHOCYTES NFR BLD: 37.3 % (ref 22–41)
MAGNESIUM SERPL-MCNC: 1.8 MG/DL (ref 1.5–2.5)
MCH RBC QN AUTO: 29.5 PG (ref 27–33)
MCHC RBC AUTO-ENTMCNC: 31.2 G/DL (ref 33.6–35)
MCV RBC AUTO: 94.8 FL (ref 81.4–97.8)
MONOCYTES # BLD AUTO: 0.46 K/UL (ref 0–0.85)
MONOCYTES NFR BLD AUTO: 7.7 % (ref 0–13.4)
NEUTROPHILS # BLD AUTO: 3.15 K/UL (ref 2–7.15)
NEUTROPHILS NFR BLD: 52.4 % (ref 44–72)
NRBC # BLD AUTO: 0 K/UL
NRBC BLD-RTO: 0 /100 WBC
PLATELET # BLD AUTO: 219 K/UL (ref 164–446)
PMV BLD AUTO: 8.3 FL (ref 9–12.9)
POTASSIUM SERPL-SCNC: 3.9 MMOL/L (ref 3.6–5.5)
RBC # BLD AUTO: 3.25 M/UL (ref 4.2–5.4)
SODIUM SERPL-SCNC: 136 MMOL/L (ref 135–145)
WBC # BLD AUTO: 6 K/UL (ref 4.8–10.8)

## 2021-12-10 PROCEDURE — A9270 NON-COVERED ITEM OR SERVICE: HCPCS | Performed by: INTERNAL MEDICINE

## 2021-12-10 PROCEDURE — 36415 COLL VENOUS BLD VENIPUNCTURE: CPT

## 2021-12-10 PROCEDURE — 700105 HCHG RX REV CODE 258: Performed by: FAMILY MEDICINE

## 2021-12-10 PROCEDURE — 85025 COMPLETE CBC W/AUTO DIFF WBC: CPT

## 2021-12-10 PROCEDURE — 99233 SBSQ HOSP IP/OBS HIGH 50: CPT | Performed by: INTERNAL MEDICINE

## 2021-12-10 PROCEDURE — 700111 HCHG RX REV CODE 636 W/ 250 OVERRIDE (IP): Performed by: INTERNAL MEDICINE

## 2021-12-10 PROCEDURE — 770001 HCHG ROOM/CARE - MED/SURG/GYN PRIV*

## 2021-12-10 PROCEDURE — 700111 HCHG RX REV CODE 636 W/ 250 OVERRIDE (IP): Performed by: STUDENT IN AN ORGANIZED HEALTH CARE EDUCATION/TRAINING PROGRAM

## 2021-12-10 PROCEDURE — 97535 SELF CARE MNGMENT TRAINING: CPT

## 2021-12-10 PROCEDURE — 97530 THERAPEUTIC ACTIVITIES: CPT

## 2021-12-10 PROCEDURE — 80048 BASIC METABOLIC PNL TOTAL CA: CPT

## 2021-12-10 PROCEDURE — 700105 HCHG RX REV CODE 258: Performed by: INTERNAL MEDICINE

## 2021-12-10 PROCEDURE — 700111 HCHG RX REV CODE 636 W/ 250 OVERRIDE (IP): Performed by: FAMILY MEDICINE

## 2021-12-10 PROCEDURE — 700102 HCHG RX REV CODE 250 W/ 637 OVERRIDE(OP): Performed by: FAMILY MEDICINE

## 2021-12-10 PROCEDURE — 700102 HCHG RX REV CODE 250 W/ 637 OVERRIDE(OP): Performed by: INTERNAL MEDICINE

## 2021-12-10 PROCEDURE — A9270 NON-COVERED ITEM OR SERVICE: HCPCS | Performed by: FAMILY MEDICINE

## 2021-12-10 PROCEDURE — 99232 SBSQ HOSP IP/OBS MODERATE 35: CPT | Performed by: FAMILY MEDICINE

## 2021-12-10 PROCEDURE — 83735 ASSAY OF MAGNESIUM: CPT

## 2021-12-10 RX ORDER — POTASSIUM CHLORIDE 20 MEQ/1
20 TABLET, EXTENDED RELEASE ORAL DAILY
Status: DISCONTINUED | OUTPATIENT
Start: 2021-12-11 | End: 2021-12-11

## 2021-12-10 RX ORDER — MAGNESIUM SULFATE HEPTAHYDRATE 40 MG/ML
2 INJECTION, SOLUTION INTRAVENOUS ONCE
Status: COMPLETED | OUTPATIENT
Start: 2021-12-10 | End: 2021-12-10

## 2021-12-10 RX ADMIN — GABAPENTIN 600 MG: 300 CAPSULE ORAL at 05:02

## 2021-12-10 RX ADMIN — OXYCODONE HYDROCHLORIDE 10 MG: 10 TABLET ORAL at 05:02

## 2021-12-10 RX ADMIN — AMPICILLIN SODIUM AND SULBACTAM SODIUM 3 G: 2; 1 INJECTION, POWDER, FOR SOLUTION INTRAMUSCULAR; INTRAVENOUS at 11:26

## 2021-12-10 RX ADMIN — CYCLOBENZAPRINE 10 MG: 10 TABLET, FILM COATED ORAL at 17:45

## 2021-12-10 RX ADMIN — ONDANSETRON 4 MG: 2 INJECTION INTRAMUSCULAR; INTRAVENOUS at 09:48

## 2021-12-10 RX ADMIN — HEPARIN SODIUM 5000 UNITS: 5000 INJECTION, SOLUTION INTRAVENOUS; SUBCUTANEOUS at 05:02

## 2021-12-10 RX ADMIN — POTASSIUM CHLORIDE 20 MEQ: 1500 TABLET, EXTENDED RELEASE ORAL at 05:02

## 2021-12-10 RX ADMIN — ONDANSETRON 4 MG: 2 INJECTION INTRAMUSCULAR; INTRAVENOUS at 05:02

## 2021-12-10 RX ADMIN — AMPICILLIN SODIUM AND SULBACTAM SODIUM 3 G: 2; 1 INJECTION, POWDER, FOR SOLUTION INTRAMUSCULAR; INTRAVENOUS at 17:44

## 2021-12-10 RX ADMIN — ONDANSETRON 4 MG: 2 INJECTION INTRAMUSCULAR; INTRAVENOUS at 17:45

## 2021-12-10 RX ADMIN — EZETIMIBE 10 MG: 10 TABLET ORAL at 17:46

## 2021-12-10 RX ADMIN — MAGNESIUM SULFATE HEPTAHYDRATE 2 G: 2 INJECTION, SOLUTION INTRAVENOUS at 12:38

## 2021-12-10 RX ADMIN — OXYCODONE HYDROCHLORIDE 10 MG: 10 TABLET ORAL at 17:45

## 2021-12-10 RX ADMIN — HEPARIN SODIUM 5000 UNITS: 5000 INJECTION, SOLUTION INTRAVENOUS; SUBCUTANEOUS at 22:13

## 2021-12-10 RX ADMIN — AMPICILLIN SODIUM AND SULBACTAM SODIUM 3 G: 2; 1 INJECTION, POWDER, FOR SOLUTION INTRAMUSCULAR; INTRAVENOUS at 05:02

## 2021-12-10 RX ADMIN — GABAPENTIN 600 MG: 300 CAPSULE ORAL at 17:46

## 2021-12-10 RX ADMIN — OXYCODONE HYDROCHLORIDE 10 MG: 10 TABLET ORAL at 09:46

## 2021-12-10 RX ADMIN — HEPARIN SODIUM 5000 UNITS: 5000 INJECTION, SOLUTION INTRAVENOUS; SUBCUTANEOUS at 17:46

## 2021-12-10 RX ADMIN — VANCOMYCIN HYDROCHLORIDE 1500 MG: 500 INJECTION, POWDER, LYOPHILIZED, FOR SOLUTION INTRAVENOUS at 00:14

## 2021-12-10 ASSESSMENT — ENCOUNTER SYMPTOMS
HEMOPTYSIS: 0
ABDOMINAL PAIN: 0
PALPITATIONS: 0
ABDOMINAL PAIN: 1
CHILLS: 0
BACK PAIN: 0
SORE THROAT: 0
WEAKNESS: 1
NERVOUS/ANXIOUS: 0
DIAPHORESIS: 0
FLANK PAIN: 0
SHORTNESS OF BREATH: 0
DIZZINESS: 0
HEARTBURN: 0
WHEEZING: 0
FEVER: 0
COUGH: 0
BLURRED VISION: 0
SENSORY CHANGE: 0
FOCAL WEAKNESS: 0
HEADACHES: 0
VOMITING: 0
DIARRHEA: 0
NAUSEA: 1
NECK PAIN: 0
BRUISES/BLEEDS EASILY: 0
MYALGIAS: 0
SPEECH CHANGE: 0
NAUSEA: 0

## 2021-12-10 ASSESSMENT — COGNITIVE AND FUNCTIONAL STATUS - GENERAL
DAILY ACTIVITIY SCORE: 21
HELP NEEDED FOR BATHING: A LITTLE
DRESSING REGULAR LOWER BODY CLOTHING: A LITTLE
TOILETING: A LITTLE
SUGGESTED CMS G CODE MODIFIER DAILY ACTIVITY: CJ

## 2021-12-10 ASSESSMENT — PAIN DESCRIPTION - PAIN TYPE
TYPE: ACUTE PAIN;SURGICAL PAIN
TYPE: ACUTE PAIN
TYPE: ACUTE PAIN;SURGICAL PAIN

## 2021-12-10 NOTE — PROGRESS NOTES
Hospital Medicine Daily Progress Note    Date of Service  12/10/2021    Chief Complaint  Nils Alfonso is a 66 y.o. female admitted 12/4/2021 with abdominal abscess.    Hospital Course  Admitted with intra-abdominal abscess. Patient was started on empiric coverage with IV Vancomycin and Zosyn. Surgery was consulted on the case. Patient underwent CT guided Right retroperitoneal/paracolic gutter drainage tube placement on 12/4/2021.  Patient with history of recurrent idiopathic pancreatitis. She had complications of pancreatic pseudocyst and intra-abdominal fluid collections. She underwent CT-guided drainage. She was placed on IV antibiotics. However there was no improvement noted. Surgery was then consulted on the case. Patient underwent Exploratory laparotomy, Incision and drainage and debridement of retroperitoneal abscess, Incision and drainage and debridement of anterior peritoneal abscess., Debridement of necrotizing fasciitis of the lateral abdominal wall on 11/5/2021.  She finished an antibiotic course of Zosyn, micafungin and Bactrim on 11/24/2021.    Interval Problem Update  Abdominal abscess - pain controlled, tolerating regular diet, culture showed E. coli and Enterococcus faecium, plan for repeat CT tomorrow, case discussed with Surgery Oncology  Low magnesium    I have personally seen and examined the patient at bedside. I discussed the plan of care with patient, bedside RN, charge RN,  and Surgery oncology.    Consultants/Specialty  infectious disease and Surgery Oncology    Code Status  Full Code    Disposition  Patient is not medically cleared.   Anticipate discharge to to home with organized home healthcare and close outpatient follow-up.  I have placed the appropriate orders for post-discharge needs.    Review of Systems  Review of Systems   Constitutional: Positive for malaise/fatigue. Negative for chills, diaphoresis and fever.   HENT: Negative for congestion, hearing loss and sore  throat.    Eyes: Negative for blurred vision.   Respiratory: Negative for cough, shortness of breath and wheezing.    Cardiovascular: Negative for chest pain, palpitations and leg swelling.   Gastrointestinal: Positive for nausea. Negative for abdominal pain, diarrhea, heartburn and vomiting.   Genitourinary: Negative for dysuria, flank pain and hematuria.   Musculoskeletal: Negative for back pain, joint pain, myalgias and neck pain.   Skin: Negative for rash.   Neurological: Positive for weakness. Negative for dizziness, sensory change, speech change, focal weakness and headaches.   Psychiatric/Behavioral: The patient is not nervous/anxious.         Physical Exam  Temp:  [36.2 °C (97.2 °F)-36.8 °C (98.2 °F)] 36.7 °C (98 °F)  Pulse:  [71-92] 76  Resp:  [17] 17  BP: (133-146)/(85-87) 133/87  SpO2:  [93 %-95 %] 93 %    Physical Exam  Vitals and nursing note reviewed.   HENT:      Head: Normocephalic and atraumatic.      Nose: No congestion.      Mouth/Throat:      Mouth: Mucous membranes are moist.   Eyes:      Extraocular Movements: Extraocular movements intact.      Conjunctiva/sclera: Conjunctivae normal.   Cardiovascular:      Rate and Rhythm: Normal rate and regular rhythm.   Pulmonary:      Effort: Pulmonary effort is normal.      Breath sounds: Normal breath sounds.   Abdominal:      General: There is no distension.      Tenderness: There is no abdominal tenderness. There is no guarding or rebound.      Comments: Ostomy  Right flank MARTHA drain   Musculoskeletal:      Cervical back: No tenderness.      Right lower leg: No edema.      Left lower leg: No edema.   Skin:     General: Skin is warm and dry.   Neurological:      General: No focal deficit present.      Mental Status: She is alert and oriented to person, place, and time.      Cranial Nerves: No cranial nerve deficit.         Fluids    Intake/Output Summary (Last 24 hours) at 12/10/2021 0948  Last data filed at 12/10/2021 0755  Gross per 24 hour   Intake  360 ml   Output 450 ml   Net -90 ml       Laboratory  Recent Labs     12/08/21 0239 12/09/21  0219 12/10/21  0708   WBC 4.1* 6.1 6.0   RBC 3.34* 3.32* 3.25*   HEMOGLOBIN 9.7* 9.7* 9.6*   HEMATOCRIT 30.8* 30.4* 30.8*   MCV 92.2 91.6 94.8   MCH 29.0 29.2 29.5   MCHC 31.5* 31.9* 31.2*   RDW 56.0* 56.7* 58.4*   PLATELETCT 229 259 219   MPV 8.9* 8.9* 8.3*     Recent Labs     12/08/21  0239 12/09/21  0219 12/10/21  0708   SODIUM 136 135 136   POTASSIUM 3.0* 3.2* 3.9   CHLORIDE 106 105 106   CO2 19* 20 20   GLUCOSE 76 85 87   BUN 7* 7* 7*   CREATININE 0.41* 0.40* 0.31*   CALCIUM 7.6* 7.4* 7.1*                   Imaging  CT-ABDOMEN-PELVIS WITH   Final Result      1.  Interval decrease in size in right lower quadrant fluid collections status post drainage.      2.  Trace bilateral pleural effusions with bibasilar atelectasis.      3.  Biliary stent in place with pneumobilia.      4.  Anasarca      CT-DRAIN-PERITONEAL   Final Result      Successful RIGHT retroperitoneal/paracolic gutter drainage tube placement.      Plan: Thrice daily flushes with 10 mL of sterile saline. Monitor outputs. Please contact interventional radiology if there is any concern for tube dysfunction.      Findings were communicated with Dr. Vann via Voal[employer] Me after initial scanning was performed.           Assessment/Plan  * Intra-abdominal abscess (HCC)- (present on admission)  Assessment & Plan  CT guided Right retroperitoneal/paracolic gutter drainage tube placement  12/4/2021  IV Vancomycin, Unasyn   Repeat CT tomorrow    Sepsis - (present on admission)  Assessment & Plan  Source - Abdominal abscess      Hypomagnesemia- (present on admission)  Assessment & Plan  IV Mg 2 g  Follow level    Hypokalemia- (present on admission)  Assessment & Plan  Kdur, follow bmp    Hyponatremia- (present on admission)  Assessment & Plan  Follow bmp    Normocytic anemia- (present on admission)  Assessment & Plan  Follow cbc    Mild protein-calorie malnutrition  (HCC)- (present on admission)  Assessment & Plan  Nutrition consult    Hyperlipidemia- (present on admission)  Assessment & Plan  Ezetimibe       VTE prophylaxis: heparin ppx    I have performed a physical exam and reviewed and updated ROS and Plan today (12/10/2021). In review of yesterday's note (12/9/2021), there are no changes except as documented above.

## 2021-12-10 NOTE — PROGRESS NOTES
Radiology Progress Note   Author: AYE Mahan Date & Time created: 12/10/2021  11:52 AM   Date of admission  12/4/2021  Note to reader: this note follows the APSO format rather than the historical SOAP format. Assessment and plan located at the top of the note for ease of use.    Chief Complaint  66 y.o. female admitted 12/4/2021 with ABD pain       HPI  Nils Alfonso is a 66 y.o. female admitted 12/4/2021 with complicated past medical history including multiple recent hospitalizations for complicated duodenectomy and recurrent pancreatitis who presents with recurrent abdominal pain and progressive/new intra-abdominal fluid collections concerning for abscesses and possible ongoing GI leak.       Assessment/Plan  Interval History   Principal Problem:    Intra-abdominal abscess (HCC)  Active Problems:    Hyperlipidemia    Sepsis     Normocytic anemia    Hyponatremia    Hypokalemia    Hypomagnesemia    Postprocedural intraabdominal abscess    Mild protein-calorie malnutrition (HCC)      Plan IR  - Irrigate RLQ drain with 10 ml of sterile saline three times per shift   - Fluid cultures, Escherichia coli, Enterococcus faecium  - Sugery following   - Repeat CT on 12/11/2021  - Continue to monitor drains, VS and labs        [id number]  IR:   12/4-RIGHT paracolic gutter/RP drain placement by CT 50 mL brown fluid to lab  12/5- 65 ml   12/6- 50 ml purulent brown fluid, Ct- decrease in size in right lower quadrant fluid collection status post drainage. A pigtail catheter remains in place. A fluid collection previously measuring 2.3 cm in width currently measures 9 mm in width.   12/7-  75 ml  12/8- 60 ml   12/9- 35 ml in am  12/10- 15 ml        Review of Systems  Physical Exam   Review of Systems   Constitutional: Negative for chills, fever and malaise/fatigue.   HENT: Negative for hearing loss.    Eyes: Negative for blurred vision.   Respiratory: Negative for cough, hemoptysis and shortness of breath.     Cardiovascular: Negative for chest pain and palpitations.   Gastrointestinal: Positive for abdominal pain. Negative for vomiting.   Genitourinary: Negative for dysuria.   Musculoskeletal: Negative for myalgias.   Skin: Negative for rash.   Neurological: Positive for weakness. Negative for dizziness and headaches.   Endo/Heme/Allergies: Does not bruise/bleed easily.   Psychiatric/Behavioral: Negative for suicidal ideas.      Vitals:    12/10/21 0755   BP: 133/87   Pulse: 76   Resp: 17   Temp: 36.7 °C (98 °F)   SpO2: 93%        Physical Exam  Constitutional:       Appearance: Normal appearance.   HENT:      Head: Normocephalic.      Nose: Nose normal.      Mouth/Throat:      Mouth: Mucous membranes are moist.   Eyes:      Pupils: Pupils are equal, round, and reactive to light.   Cardiovascular:      Rate and Rhythm: Normal rate.   Pulmonary:      Effort: Pulmonary effort is normal. No respiratory distress.   Abdominal:      Palpations: Abdomen is soft.      Tenderness: There is abdominal tenderness.          Comments: IR drain site CDI, no redness or swelling , brown purulent fluid in MARTHA bulb  Ostomy   X 3 abd dressings    Musculoskeletal:         General: No tenderness or deformity.      Cervical back: Normal range of motion.   Skin:     General: Skin is warm and dry.      Capillary Refill: Capillary refill takes less than 2 seconds.      Coloration: Skin is not jaundiced or pale.   Neurological:      General: No focal deficit present.      Mental Status: She is alert.      Motor: No weakness.   Psychiatric:         Mood and Affect: Mood normal.         Behavior: Behavior normal.             Labs    Recent Labs     12/08/21  0239 12/09/21  0219 12/10/21  0708   WBC 4.1* 6.1 6.0   RBC 3.34* 3.32* 3.25*   HEMOGLOBIN 9.7* 9.7* 9.6*   HEMATOCRIT 30.8* 30.4* 30.8*   MCV 92.2 91.6 94.8   MCH 29.0 29.2 29.5   MCHC 31.5* 31.9* 31.2*   RDW 56.0* 56.7* 58.4*   PLATELETCT 229 259 219   MPV 8.9* 8.9* 8.3*     Recent Labs      12/08/21  0239 12/09/21  0219 12/10/21  0708   SODIUM 136 135 136   POTASSIUM 3.0* 3.2* 3.9   CHLORIDE 106 105 106   CO2 19* 20 20   GLUCOSE 76 85 87   BUN 7* 7* 7*   CREATININE 0.41* 0.40* 0.31*   CALCIUM 7.6* 7.4* 7.1*     Recent Labs     12/08/21  0239 12/09/21  0219 12/10/21  0708   CREATININE 0.41* 0.40* 0.31*     CT-ABDOMEN-PELVIS WITH   Final Result      1.  Interval decrease in size in right lower quadrant fluid collections status post drainage.      2.  Trace bilateral pleural effusions with bibasilar atelectasis.      3.  Biliary stent in place with pneumobilia.      4.  Anasarca      CT-DRAIN-PERITONEAL   Final Result      Successful RIGHT retroperitoneal/paracolic gutter drainage tube placement.      Plan: Thrice daily flushes with 10 mL of sterile saline. Monitor outputs. Please contact interventional radiology if there is any concern for tube dysfunction.      Findings were communicated with Dr. Vann via Voalte Me after initial scanning was performed.          INR   Date Value Ref Range Status   12/04/2021 1.42 (H) 0.87 - 1.13 Final     Comment:     INR - Non-therapeutic Reference Range: 0.87-1.13  INR - Therapeutic Reference Range: 2.0-4.0       No results found for: POCINR     Intake/Output Summary (Last 24 hours) at 12/6/2021 1052  Last data filed at 12/6/2021 0600  Gross per 24 hour   Intake 1724 ml   Output 1295 ml   Net 429 ml      Labs not explicitly included in this progress note were reviewed by the author. Radiology/imaging not explicitly included in this progress note was reviewed by the author.   I have performed a physical exam and reviewed and updated ROS and Plan today (12/10/2021).     15 minutes in directly providing and coordinating care and extensive data review.  No time overlap and excludes procedures.

## 2021-12-10 NOTE — THERAPY
Physical Therapy   Daily Treatment     Patient Name: Nils Alfonso  Age:  66 y.o., Sex:  female  Medical Record #: [id number]  Today's Date: 12/9/2021     Precautions  Precautions: (P) Fall Risk  Comments: (P) marcel, ostomy x 2     Assessment    Pt maintains motivation and eager to return home;  at beside provides excellent support; will follow to progress sit<>stand ability (discussed assist/demo'd for  today) as well as stair progression; recommend home when medically appropriate to do so;     Plan    Continue current treatment plan.    DC Equipment Recommendations:  None  Discharge Recommendations: Recommend home health for continued physical therapy services      Abridged Subjective/Objective     12/09/21 1815   Cognition    Cognition / Consciousness WDL   Level of Consciousness Alert   Comments pleasant and cooperative;    Sensation Lower Body   Lower Extremity Sensation   WDL   Gait Analysis   Gait Level Of Assist Supervised   Assistive Device 4 Wheel Walker   Distance (Feet) 150   # of Times Distance was Traveled 1   Deviation Bradykinetic   Comments pt declined stairs today; wishes to do them before d/c    Bed Mobility    Supine to Sit Supervised   Sit to Supine Minimal Assist  (for LE management)   Scooting Supervised   Functional Mobility   Sit to Stand Supervised  (min A from low surface )   Bed, Chair, Wheelchair Transfer Supervised   Short Term Goals    Short Term Goal # 1 pt will move supine<>EOB via log roll with HOB flat, no features, and SPV within 6 visits to facilitate getting in/out of bed.   Goal Outcome # 1 goal not met  (does not need to perform; sleeps in recliner chair)   Short Term Goal # 2 pt will complete all transfers with FWW and SPV within 6 weeks to increase functional mobility.   Goal Outcome # 2 Goal not met   Short Term Goal # 3 pt will amb 150' with FWW and SPV within 6 visits for household mobility.   Goal Outcome # 3 Goal not met   Short Term Goal # 4 Pt will ascend  and descend step x 2 with SPV to access her home by the 6th tx   Goal Outcome # 4 Goal not met   Education Group   Role of Physical Therapist Patient Response Patient;Acceptance;Explanation;Verbal Demonstration; idscussed ankle pumps as it relates to fluid management and ankle positioning in bed;

## 2021-12-10 NOTE — CARE PLAN
Problem: Nutritional:  Goal: Achieve adequate nutritional intake  Description: Pt on PO diet, tolerating, and consuming ~50% of meals/supplements over 3-5 days.   Outcome: Met   Pt PO was averaging %. PO dropped one day to 25-50% then increased back to % of meals and supplements.

## 2021-12-10 NOTE — PROGRESS NOTES
Infectious Disease Progress Note    Author: Jose Arora M.D. Date & Time of service: 12/10/2021  1:02 PM    Chief Complaint:  Follow-up for intra-abdominal abscess    Interval History:   T-max 99.1, white count 6.1, tolerating switching antimicrobials.  12/10 patient is afebrile, white count 6000 today, feeling better overall, tolerating antimicrobials thus far    Labs Reviewed and Medications Reviewed.    Review of Systems:  Review of Systems   Constitutional: Positive for malaise/fatigue. Negative for chills and fever.   Gastrointestinal: Positive for abdominal pain. Negative for nausea and vomiting.   Skin: Negative for itching and rash.   All other systems reviewed and are negative.      Hemodynamics:  Temp (24hrs), Av.6 °C (97.8 °F), Min:36.2 °C (97.2 °F), Max:36.8 °C (98.2 °F)  Temperature: 36.7 °C (98 °F)  Pulse  Av.6  Min: 53  Max: 99   Blood Pressure : 133/87       Physical Exam:  Physical Exam  Vitals and nursing note reviewed.   Constitutional:       General: She is not in acute distress.     Appearance: She is ill-appearing.   HENT:      Mouth/Throat:      Pharynx: No oropharyngeal exudate.   Eyes:      General: No scleral icterus.        Right eye: No discharge.         Left eye: No discharge.      Conjunctiva/sclera: Conjunctivae normal.   Cardiovascular:      Rate and Rhythm: Normal rate.      Heart sounds: No murmur heard.      Pulmonary:      Effort: Pulmonary effort is normal. No respiratory distress.      Breath sounds: No stridor.   Abdominal:      Comments: Right-sided MARTHA drain with purulent appearing output.  Ostomy   Musculoskeletal:         General: No swelling or tenderness.   Skin:     Findings: No erythema or rash.   Neurological:      Mental Status: She is alert.   Psychiatric:         Mood and Affect: Mood normal.         Behavior: Behavior normal.      Comments: Very pleasant         Meds:    Current Facility-Administered Medications:   •  [START ON 2021]  potassium chloride SA  •  magnesium sulfate  •  acetaminophen **FOLLOWED BY** [DISCONTINUED] acetaminophen  •  ampicillin-sulbactam (UNASYN) IV  •  calcium carbonate  •  vancomycin  •  ondansetron  •  MD Alert...Vancomycin per Pharmacy  •  heparin  •  cyclobenzaprine  •  ezetimibe  •  gabapentin  •  Pharmacy Consult Request  •  oxyCODONE immediate-release **OR** oxyCODONE immediate-release **OR** HYDROmorphone    Labs:  Recent Labs     12/08/21  0239 12/09/21  0219 12/10/21  0708   WBC 4.1* 6.1 6.0   RBC 3.34* 3.32* 3.25*   HEMOGLOBIN 9.7* 9.7* 9.6*   HEMATOCRIT 30.8* 30.4* 30.8*   MCV 92.2 91.6 94.8   MCH 29.0 29.2 29.5   RDW 56.0* 56.7* 58.4*   PLATELETCT 229 259 219   MPV 8.9* 8.9* 8.3*   NEUTSPOLYS 41.10* 44.80 52.40   LYMPHOCYTES 47.30* 52.60* 37.30   MONOCYTES 7.10 2.60 7.70   EOSINOPHILS 3.60 0.00 0.50   BASOPHILS 0.90 0.00 0.80   RBCMORPHOLO Present Present  --      Recent Labs     12/08/21  0239 12/09/21  0219 12/10/21  0708   SODIUM 136 135 136   POTASSIUM 3.0* 3.2* 3.9   CHLORIDE 106 105 106   CO2 19* 20 20   GLUCOSE 76 85 87   BUN 7* 7* 7*     Recent Labs     12/08/21  0239 12/09/21  0219 12/10/21  0708   CREATININE 0.41* 0.40* 0.31*       Imaging:  CT-ABDOMEN-PELVIS WITH    Result Date: 12/6/2021 12/6/2021 10:55 AM HISTORY/REASON FOR EXAM:  Abdominal pain, fever. TECHNIQUE/EXAM DESCRIPTION:   CT scan of the abdomen and pelvis with contrast. Contrast-enhanced helical scanning was obtained from the diaphragmatic domes through the pubic symphysis following the bolus administration of nonionic contrast without complication. 80 mL of Omnipaque 350 nonionic contrast was administered without complication. Low dose optimization technique was utilized for this CT exam including automated exposure control and adjustment of the mA and/or kV according to patient size. COMPARISON: December 3, 2021 FINDINGS: Lower Chest: There are trace bilateral pleural effusions. There is bibasilar atelectasis, right greater  left.. Liver: Normal. Spleen: Unremarkable. Pancreas: Unremarkable. Gallbladder: The gallbladder has been resected. Biliary: Common bile duct stent remains in place. There is pneumobilia with gas also identified in the pancreatic duct. Adrenal glands: Normal. Kidneys: Unremarkable without hydronephrosis. Bowel: No obstruction or acute inflammation. There is diverticulosis without evidence of diverticulitis Lymph nodes: No adenopathy. Vasculature: Unremarkable. Peritoneum: There is been interval decrease in size in right lower quadrant fluid collection status post drainage. A pigtail catheter remains in place. A fluid collection previously measuring 2.3 cm in width currently measures 9 mm in width. A fluid collection more posterior medially previously measured 4.1 cm in width and currently measures 19 mm in width with the drain within this collection. Open anterior abdominal wound noted. There is anasarca. Musculoskeletal: No acute or destructive process. Pelvis: No adenopathy or free fluid.     1.  Interval decrease in size in right lower quadrant fluid collections status post drainage. 2.  Trace bilateral pleural effusions with bibasilar atelectasis. 3.  Biliary stent in place with pneumobilia. 4.  Anasarca    CT-ABDOMEN-PELVIS WITH    Result Date: 12/3/2021  12/3/2021 5:11 PM HISTORY/REASON FOR EXAM: Acute abdominal pain. History of duodenal perforation. TECHNIQUE/EXAM DESCRIPTION:   CT scan of the abdomen and pelvis with contrast. Contrast-enhanced helical scanning was obtained from the diaphragmatic domes through the pubic symphysis following the bolus administration of nonionic contrast without complication. 80 mL of Omnipaque 350 nonionic contrast was administered without complication. Oral contrast was administered. Low dose optimization technique was utilized for this CT exam including automated exposure control and adjustment of the mA and/or kV according to patient size. COMPARISON: CT AP 11/24/2021. Upper  GI series 11/22/2021. CT AP 11/15/2021 FINDINGS: Lower Chest: There is minimal atelectasis or pneumonia in the right lower lobe with minimal right pleural effusion. There is been no significant change. The left lung base is without consolidation. A small calcified granuloma is again noted. No free air is present. Postoperative changes involving the stomach are stable. There is been interval removal of a right upper quadrant drain. There has been an increase in size of a right sided fluid collection or abscess in the right paracolic gutter region. The abscess has a length of 6.3 cm and transverse dimension of 4.1 cm inferiorly. There is a fluid collection superiorly in the right mid abdomen which is connected with the inferior collection. This collection has an air-fluid level and has a maximum diameter of 3 cm. This fluid collection extends inferiorly into the right lower quadrant paracolic gutter. There is a fluid collection in the midline of the rectus sheath below a midline skin incision. This collection has maximum dimension of 5.5 cm transversely and a length of approximately 9 cm. Liver: No hepatic parenchymal mass is present. Expansile common bile duct stent is present. Spleen: Unremarkable. Pancreas: Unremarkable. Gallbladder: The gallbladder has been resected. Biliary: Nondilated. Adrenal glands: Normal. Kidneys: Unremarkable without hydronephrosis. Bowel: No there is no evidence of bowel obstruction or focal inflammation. Lymph nodes: No adenopathy. Vasculature: Unremarkable. Peritoneum: Unremarkable without ascites. Musculoskeletal: No acute or destructive process. Fusion hardware is present in the distal lumbar spine. Pelvis: No adenopathy. The bladder appears normal. No pelvic fluid collections or free fluid is present.     1.  Recurrent fluid collections or abscess in the right upper quadrant and right paracolic gutter region. Interval removal of right upper quadrant drain. 2.  New fluid collection in  the rectus sheath in the midline below the skin incision. This may represent postoperative fluid collection or abscess. 3.  Postoperative changes involving the stomach. 4.  Cholecystectomy. Residual common bile duct expansile stent in place. 5.  No bowel or renal obstruction. 6.  No free air. 7.  Small unchanged right pleural effusion and unchanged minimal right lower lobe atelectasis or pneumonia.    CT-ABDOMEN-PELVIS WITH    Result Date: 11/24/2021 11/24/2021 3:56 PM HISTORY/REASON FOR EXAM:  Abdominal pain, acute, nonlocalized. History of duodenal perforation. TECHNIQUE/EXAM DESCRIPTION:   CT scan of the abdomen and pelvis with contrast. Contrast-enhanced helical scanning was obtained from the diaphragmatic domes through the pubic symphysis following the bolus administration of nonionic contrast without complication. 100 mL of Omnipaque 350 nonionic contrast was administered without complication. Low dose optimization technique was utilized for this CT exam including automated exposure control and adjustment of the mA and/or kV according to patient size. COMPARISON: 11/15/2021 FINDINGS: Lower Chest: Bibasilar atelectasis, improved from prior with improvement of bilateral pleural fluid. Liver: Liver again shows pneumobilia. Spleen: Unremarkable. Pancreas: Present in the pancreatic duct.  Pancreatic duct is mildly dilated. Gallbladder: Surgically absent. Biliary: Metallic common bile duct stent in place. Adrenal glands: Normal. Kidneys: Unremarkable without hydronephrosis. Bowel: Postoperative change of the stomach.  Contrast present in the colon from prior procedure.  RIGHT upper quadrant drain present within irregular fluid collection in the RIGHT lateral midabdomen.  Fluid collection shows peripheral enhancement as well  as subtle hyperdense contents and gas.  Fluid collection measures approximately 7.6 x 7.3 By 2.4 cm and extends into the RIGHT posterior pararenal space, decreased in size from prior exam.   RIGHT lower quadrant drain is been removed. Feeding tube in place with tip at the DJ flexure. Lymph nodes: No adenopathy. Vasculature: Unremarkable. Peritoneum: Unremarkable without ascites. Musculoskeletal: Postoperative change of lumbar spine. Pelvis: Bladder is unremarkable.  Contrast present in the vagina, likely reflux.  Postoperative change of anterior abdominal wall.  Diffuse body wall edema.     1.  RIGHT lateral no abnormal abscess again seen, decreased in size from prior exam with drain in place, however contents now appears hyperdense potential indicating bowel contrast, concerning for bowel perforation or fistula, although source of contrast  is uncertain.  No cristóbal pneumoperitoneum. 2.  Pneumobilia and biliary stent present. 3.  Interval removal of RIGHT lower quadrant drain.     CT-ABDOMEN-PELVIS WITH    Result Date: 11/15/2021  11/15/2021 11:49 AM HISTORY/REASON FOR EXAM:  intra-abdominal abscess. Follow up. TECHNIQUE/EXAM DESCRIPTION:   CT scan of the abdomen and pelvis with contrast. Contrast-enhanced helical scanning was obtained from the diaphragmatic domes through the pubic symphysis following the bolus administration of nonionic contrast without complication. 100 mL of Omnipaque 350 nonionic contrast was administered without complication. Low dose optimization technique was utilized for this CT exam including automated exposure control and adjustment of the mA and/or kV according to patient size. COMPARISON: November 8, 2021 FINDINGS: Lower Chest: There is bibasilar atelectasis, right greater than left. There is been slight increase in size of trace bilateral pleural effusions.. Liver: Normal. Spleen: Unremarkable. Pancreas: Unremarkable. There is again air within the pancreatic duct. Gallbladder: The gallbladder has been resected. Biliary: Pneumobilia again noted.. Adrenal glands: Normal. Kidneys: Unremarkable without hydronephrosis. Bowel: No obstruction or acute inflammation. Lymph nodes: No  adenopathy. Vasculature: Unremarkable. Peritoneum: There is again a mid abdomen fluid collection which appears slightly larger currently measuring 7.2 x 7.1 x 9.4 cm. The surgical drain tracks through this fluid collection. There is anasarca. Musculoskeletal: No acute or destructive process. Pelvis: No adenopathy or free fluid.     1.  Slight interval increase in size in fluid collection the right midabdomen with drain in place. 2.  Slight interval increase in size in trace bilateral pleural effusions, right greater than left with bibasilar atelectasis. 3.  Pneumobilia.    CT-DRAIN-PERITONEAL    Result Date: 12/4/2021  HISTORY/REASON FOR EXAM:  66-year-old woman with complex medical history who has RIGHT abscesses involving the RIGHT retroperitoneum and paracolic gutter as well as her anterior abdominal wall. Both are decompressing through the dermis. TECHNIQUE/EXAM DESCRIPTION AND NUMBER OF VIEWS: RIGHT paracolic gutter/retroperitoneal drain placement with CT guidance. Low dose optimization technique was utilized for this CT exam including automated exposure control and adjustment of the mA and/or kV according to patient size. COMPARISON:  CT 12/3/2021 MEDICATIONS: Moderate sedation was provided. Pulse oximetry and continuous cardiac monitoring by the nurse was performed throughout the exam. Intraservice time was 15 minutes. PROCEDURE:     The risks, benefits, goals and objectives and alternatives were discussed. Risks were specified as including but not limited to bleeding, infection, damage to vessels or nerves, pain and discomfort as well as the possibility of incomplete resolution of underlying disease. Drain care related issues were discussed with the patient. The patient's questions were answered. Informed oral and written consent were obtained. The patient was placed on the CT gantry. Localizing scan was performed. The skin was prepped and draped in the usual sterile manner.  A timeout was performed. Local  anesthetic result was achieved with administration of 1% lidocaine. A(n) 17-gauge access needle was advanced to the target using CT fluoroscopic guidance. Access was secured with a wire and the tract was sequentially dilated to accommodate a(n) 12 [place] locking loop drain. A total of 50 mL of brown malodorous fluid was removed and sent for laboratory evaluation. This was put in place and formed. The catheter was attached to suction bulbdrainage and secured to the skin with 2-0 nylon suture. Completion scan was performed which showed no complication. The patient tolerated the procedure well with no evidence of complication. The skin was cleaned and a dressing was applied. FINDINGS: Substantial interval decompression of both the anterior abdominal wall and RIGHT paracolic gutter/retroperitoneal fluid collections. No residual targetable collection in the anterior abdominal wall. Appropriate tube position in the RIGHT retroperitoneal/paracolic gutter collection. No evidence of hemorrhage.     Successful RIGHT retroperitoneal/paracolic gutter drainage tube placement. Plan: Thrice daily flushes with 10 mL of sterile saline. Monitor outputs. Please contact interventional radiology if there is any concern for tube dysfunction. Findings were communicated with Dr. Vann via [employer] after initial scanning was performed.    DX-CHEST-PORTABLE (1 VIEW)    Result Date: 12/3/2021  12/3/2021 4:46 PM HISTORY/REASON FOR EXAM: Dehydration and nausea for one day. TECHNIQUE/EXAM DESCRIPTION AND NUMBER OF VIEWS: Single AP view of the chest. COMPARISON: 11/19/2021 FINDINGS: No pneumothorax. There is unchanged elevation of the right hemidiaphragm. No enlarging pleural effusion. Hazy right lower lobe opacity is seen, evaluation of this area is mildly limited secondary to overlying EKG leads. Cardiac mediastinal silhouette is normal. Multiple surgical clips project over the epigastrium and a biliary stent is seen.     Hazy right lower  lobe opacity may be atelectasis, evaluation of this region is mildly limited secondary to overlying EKG leads.    UL-OELG-IEOKBBY STENT - TUBE    Result Date: 11/18/2021 11/18/2021 2:17 PM HISTORY/REASON FOR EXAM:  ERCP biliary stent placement TECHNIQUE/EXAM DESCRIPTION AND NUMBER OF VIEWS: 19 portable fluoroscopic images were obtained during ERCP biliary stent placement procedure performed and endoscopy suite. COMPARISON: None FINDINGS: 19 portable fluoroscopic images obtained during ERCP biliary stent placement.     Portable fluoroscopic images obtained during ERCP biliary stent placement for localization purposes.    DX-UPPER GI-SERIES WITH KUB    Result Date: 11/22/2021 11/22/2021 11:03 AM HISTORY/REASON FOR EXAM:  Abdominal Pain; Use water soluble contrast - re-evaluate duodenal perforation. TECHNIQUE/EXAM DESCRIPTION AND NUMBER OF VIEWS: Omnipaque 300 water soluble contrast upper GI series was performed. 19 fluoroscopic images obtained. Fluoroscopy time: 1.1 minutes COMPARISON: 11/17/2021 FINDINGS:  image shows extensive postoperative change of the abdomen feeding tube in place.  Feeding tube tip at the proximal jejunum. Metallic biliary stent in place. Contrast refluxes into the distal common bile duct, within the biliary stent, consistent with prior sphincterotomy. No evidence for leakage of contrast from the stomach or duodenum.     1.  No evidence for duodenal leak. 2.  Reflux of contrast seen into the distal common bile duct at the site of biliary stent, consistent with prior sphincterotomy.    DX-UPPER GI-SERIES WITH KUB    Result Date: 11/17/2021 11/17/2021 10:36 AM HISTORY/REASON FOR EXAM:  Gastrointestinal Complaint; Evaluate duodenal or intestinal leak from previous ERCP perforation. TECHNIQUE/EXAM DESCRIPTION AND NUMBER OF VIEWS: Omnipaque 300 water soluble contrast focused upper GI series was performed. 9 fluoroscopic images obtained. Fluoroscopy time: 0.5 minutes COMPARISON: CT abdomen  pelvis dated 11/15/2021 FINDINGS: Extensive postsurgical changes of the upper abdomen. The duodenal C-loop is in appropriate anatomic position. There is contrast extravasation at the junction of the second and third portions of duodenum (see key images).     Duodenal perforation at the junction of the second and third portions of duodenum as noted on key images.    CT-ABORTED CT PROCEDURE    Result Date: 11/16/2021  HISTORY/REASON FOR EXAM:  RIGHT retroperitoneal abscess with surgical drains in place. TECHNIQUE/EXAM DESCRIPTION AND NUMBER OF VIEWS: Limited pelvic CT. Low dose optimization technique was utilized for this CT exam including automated exposure control and adjustment of the mA and/or kV according to patient size. COMPARISON:  Diagnostic CT 11/15/2021 MEDICATIONS: None PROCEDURE:     The risks, benefits, goals and objectives and alternatives were discussed. Risks were specified as including but not limited to bleeding, infection, damage to vessels or nerves, pain and discomfort as well as the possibility of incomplete resolution of underlying disease. Drain care related issues were discussed with the patient. The patient's questions were answered. Informed oral and written consent were obtained. The patient was placed on the CT gantry. Localizing scan was performed. Based on my findings no procedure was performed. FINDINGS: Decreased size of RIGHT paracolic gutter fluid collection which contains a surgical drain. There is some contrast present in the collection as well as in the adjacent colon.     Interval decrease in size of the RIGHT retroperitoneal fluid collection which contains a surgical drain. Because from bowel is possible. No additional drain was placed.     DX-ABDOMEN FOR TUBE PLACEMENT    Result Date: 11/19/2021 11/19/2021 5:57 AM HISTORY/REASON FOR EXAM: coretrak placement TECHNIQUE/EXAM DESCRIPTION:  Single AP view the abdomen. COMPARISON:  October 19, 2021 FINDINGS: Hazy right lower lobe  opacities are seen. Dobbhoff tube is seen, the tip lies to the left of the lumbar spine.  The bowel gas pattern appears nonspecific. TIPS stent is in place. The bony structures appear age-appropriate.     1.  Nonspecific bowel gas pattern. 2.  Dobbhoff tube with tip terminating overlying the expected location of the third or fourth duodenal segment. 3.  Hazy right lower lobe infiltrates.    DX-ABDOMEN FOR TUBE PLACEMENT    Result Date: 11/18/2021 11/18/2021 5:31 PM HISTORY/REASON FOR EXAM:  Line evaluation. TECHNIQUE/EXAM DESCRIPTION AND NUMBER OF VIEWS:  1 view(s) of the abdomen. COMPARISON:  None. FINDINGS: Enteric tube has been placed. The tip projects over the proximal jejunum. The bowel gas pattern is within normal limits.     Feeding tube extends to the region of the stomach and duodenum with tip at the level of the proximal end of the jejunum.    DX-ABDOMEN FOR TUBE PLACEMENT    Result Date: 11/18/2021 11/18/2021 4:23 PM HISTORY/REASON FOR EXAM:  Line evaluation. TECHNIQUE/EXAM DESCRIPTION AND NUMBER OF VIEWS:  1 view(s) of the abdomen. COMPARISON:  1/5/2007 FINDINGS: Enteric tube has been placed. The tip projects over the duodenal jejunal junction. Common bile duct stent. Epigastrium and left upper quadrant vascular clips. Suture material again overlie the epigastrium The bowel gas pattern is within normal limits. Some contrast seen in the colon from upper GI performed yesterday     Enteric tube terminates over the duodenal jejunal junction Covered common bile duct stent No contrast extravasation is seen      Micro:  Results     Procedure Component Value Units Date/Time    FLUID CULTURE W/GRAM STAIN [[id number]]  (Abnormal)  (Susceptibility) Collected: 12/04/21 1127    Order Status: Completed Specimen: Body Fluid from Peritoneal Fluid Updated: 12/06/21 1111     Significant Indicator POS     Source BF     Site PERITONEAL FLUID     Culture Result Light growth mixed enteric stephen.     Gram Stain Result Many  WBCs.  Many Gram positive cocci.  Few Gram positive rods.  Moderate Gram negative rods.       Culture Result Escherichia coli  Heavy growth        Enterococcus faecium  Heavy growth  The susceptibility profile for this organism indicates that  Streptomycin would not be an effective component of  combination therapy.      Narrative:      Collected By: [id number] YONG TAYLOR  Right abdomen  Collected By: [id number] YONG TAYLOR    Susceptibility     Escherichia coli (1)     Antibiotic Interpretation Microscan   Method Status    Ampicillin Sensitive <=8 mcg/mL MU Final    Ceftriaxone Sensitive <=1 mcg/mL MU Final    Cefazolin Sensitive <=2 mcg/mL MU Final    Ciprofloxacin Sensitive <=0.25 mcg/mL MU Final    Cefepime Sensitive <=2 mcg/mL MU Final    Cefuroxime Sensitive <=4 mcg/mL MU Final    Ertapenem Sensitive <=0.5 mcg/mL MU Final    Gentamicin Sensitive <=2 mcg/mL MU Final    Ampicillin/sulbactam Sensitive <=4/2 mcg/mL MU Final    Minocycline Sensitive <=4 mcg/mL MU Final    Moxifloxacin Sensitive <=2 mcg/mL MU Final    Pip/Tazobactam Sensitive <=8 mcg/mL MU Final    Trimeth/Sulfa Sensitive <=0.5/9.5 mcg/mL MU Final    Tigecycline Sensitive <=2 mcg/mL MU Final    Tobramycin Sensitive <=2 mcg/mL MU Final          Enterococcus faecium (2)     Antibiotic Interpretation Microscan   Method Status    Ampicillin Resistant >8 mcg/mL MU Final    Vancomycin Sensitive 0.5 mcg/mL MU Final    Gent Synergy Sensitive <=500 mcg/mL MU Final            Condensed View                   GRAM STAIN [[id number]] Collected: 12/04/21 1127    Order Status: Completed Specimen: Body Fluid Updated: 12/05/21 0616     Significant Indicator .     Source BF     Site PERITONEAL FLUID     Gram Stain Result Many WBCs.  Many Gram positive cocci.  Few Gram positive rods.  Moderate Gram negative rods.      Narrative:      Collected By: [id number] YONG TAYLOR  Right abdomen  Collected By: [id number] YONG TAYLOR           Assessment:  Nils Alfonso is a 66 y.o. female with complex history of recent pancreatitis with ERCP on 10/1/2021, complicated by suspected duodenal perforation with resultant complex multiple intra-abdominal abscesses.  For about 3 weeks, this was attempted to be managed by multiple IR drains but there was no improvement.  Drain cultures from various times grew multiple organisms including E. coli and Enterococcus faecalis, Candida albicans and glabrata, stenotrophomonas, E. coli, ampicillin resistant E faecium.  Blood cultures grew Chryseobacterium species and Candida glabrata (completed therapy for the bacteremia). The decision was made to treat with at least 4 weeks of antibiotics with Continues home infusion IV Zosyn, IV micafungin, and p.o. Bactrim through 11/24/2021, and then follow-up in ID clinic, ID signed off. However, there are further procedures since that plan was made. Patient underwent an exploratory laparotomy on 11/5 for drainage of retroperitoneal abscess, peritoneal abscess, necrotizing fasciitis involving muscle and soft tissue.  Cultures from this procedure grew ampicillin resistant E faecium, sensitive E faecalis, Stenotrophomonas, Candida albicans, E. coli. Repeat CT of the abdomen on 11/15/2021 noted increase fluid collection in the right mid abdomen and slight increase in trace bilateral pleural effusions. IR placed a peritoneal drain but this fell out at some point and was not replaced due to the abdominal fluid collection being noted to be smaller. Upper GI series on 11/17/2021 showed a leak at the perforation in 2/3 portion of the duodenum.  Patient underwent an ERCP, biliary stent placement (since during the procedure there was concern for bile duct perforation being the cause of patient's leak), core track placement on 11/18/2021 by GI.  Esophageal stent was not placed since no obvious defect was noted.     Patient was discharged on 11/28. On 12/4, patient returned to the ER  with fevers and right lower quadrant abdominal pain, found to have fluid collections right upper quadrant and right paracolic gutter. IR on 12/4 placed drain in the right paracolic gutter. Repeat CT scan on 12/6 with good improvement in the right lower quadrant fluid collections, the midline rectus fluid collection appears to have drained through the incision.  Cultures from this growing pansensitive E. coli and ampicillin resistant E faecium thus far.  ID consulted for recommendations.     Pertinent Diagnoses:  Abdominal pelvic abscesses  Recent duodenal/biliary perforation  Polymicrobial infection    Plan:  -Continue IV vancomycin and Unasyn 3 g every 6 hours.  Monitor vancomycin levels and renal function.  Last vancomycin trough 10.3 in 12/7  -Previously, multiple other organisms including Saritha and Stenotrophomonas had grown, but this was prior to the exploratory laparotomy with washout.  Unless these organisms grow again, and if continued improvement is demonstrated on repeat CT scan, will hold off on adding additional potentially harmful antimicrobials  -Current plan is to repeat CT scan 1 week following latest drain placement (tomorrow).  ID will follow along.  Depending on repeat imaging findings, will have to decide on antibiotic therapy which is limited by lack of long-term oral options to target the E. faecium    Discussed with Dr. Lerner    ID will follow.  Please call with questions.

## 2021-12-10 NOTE — CARE PLAN
The patient is Stable - Low risk of patient condition declining or worsening    Shift Goals  Clinical Goals: Pain Management, ambulation  Patient Goals: Pain managment, rest      Progress made toward(s) clinical / shift goals:  Pain Management, ambulation        Problem: Knowledge Deficit - Standard  Goal: Patient and family/care givers will demonstrate understanding of plan of care, disease process/condition, diagnostic tests and medications  Outcome: Progressing     Problem: Pain - Standard  Goal: Alleviation of pain or a reduction in pain to the patient’s comfort goal  Outcome: Progressing     Problem: Skin Integrity  Goal: Skin integrity is maintained or improved  Outcome: Progressing

## 2021-12-10 NOTE — PROGRESS NOTES
Report received from Day RN at 1915. Assumed care of patient. Plan of care discussed, questions answered. Assessment completed. VSS, A&O x4, denies chest pain or SOB at this time, states pain on right abdomen, +nausea. See MAR. Call light in reach. Patient educated on use of call light for assistance.     Three ostomy bags on abdomen on fistula sites, IR MARTHA drain RLQ abdomen  Midline incision with island dressing c/d/i   Purewick in place  x1 assist with 4 wheel walker  Hourly rounding in place.

## 2021-12-11 ENCOUNTER — APPOINTMENT (OUTPATIENT)
Dept: RADIOLOGY | Facility: MEDICAL CENTER | Age: 66
DRG: 862 | End: 2021-12-11
Attending: FAMILY MEDICINE
Payer: MEDICARE

## 2021-12-11 PROBLEM — R60.0 BILATERAL LOWER EXTREMITY EDEMA: Status: ACTIVE | Noted: 2021-12-11

## 2021-12-11 PROBLEM — R03.0 ELEVATED BP WITHOUT DIAGNOSIS OF HYPERTENSION: Status: ACTIVE | Noted: 2021-12-11

## 2021-12-11 LAB
ANION GAP SERPL CALC-SCNC: 9 MMOL/L (ref 7–16)
BASOPHILS # BLD AUTO: 0.7 % (ref 0–1.8)
BASOPHILS # BLD: 0.05 K/UL (ref 0–0.12)
BUN SERPL-MCNC: 5 MG/DL (ref 8–22)
CALCIUM SERPL-MCNC: 7.4 MG/DL (ref 8.5–10.5)
CHLORIDE SERPL-SCNC: 104 MMOL/L (ref 96–112)
CO2 SERPL-SCNC: 24 MMOL/L (ref 20–33)
CREAT SERPL-MCNC: 0.34 MG/DL (ref 0.5–1.4)
EOSINOPHIL # BLD AUTO: 0.03 K/UL (ref 0–0.51)
EOSINOPHIL NFR BLD: 0.4 % (ref 0–6.9)
ERYTHROCYTE [DISTWIDTH] IN BLOOD BY AUTOMATED COUNT: 58.3 FL (ref 35.9–50)
GLUCOSE SERPL-MCNC: 74 MG/DL (ref 65–99)
HCT VFR BLD AUTO: 32.6 % (ref 37–47)
HGB BLD-MCNC: 10.1 G/DL (ref 12–16)
IMM GRANULOCYTES # BLD AUTO: 0.1 K/UL (ref 0–0.11)
IMM GRANULOCYTES NFR BLD AUTO: 1.5 % (ref 0–0.9)
LYMPHOCYTES # BLD AUTO: 2.66 K/UL (ref 1–4.8)
LYMPHOCYTES NFR BLD: 39.5 % (ref 22–41)
MAGNESIUM SERPL-MCNC: 1.9 MG/DL (ref 1.5–2.5)
MCH RBC QN AUTO: 29.1 PG (ref 27–33)
MCHC RBC AUTO-ENTMCNC: 31 G/DL (ref 33.6–35)
MCV RBC AUTO: 93.9 FL (ref 81.4–97.8)
MONOCYTES # BLD AUTO: 0.53 K/UL (ref 0–0.85)
MONOCYTES NFR BLD AUTO: 7.9 % (ref 0–13.4)
NEUTROPHILS # BLD AUTO: 3.36 K/UL (ref 2–7.15)
NEUTROPHILS NFR BLD: 50 % (ref 44–72)
NRBC # BLD AUTO: 0 K/UL
NRBC BLD-RTO: 0 /100 WBC
PLATELET # BLD AUTO: 215 K/UL (ref 164–446)
PMV BLD AUTO: 8.9 FL (ref 9–12.9)
POTASSIUM SERPL-SCNC: 3.6 MMOL/L (ref 3.6–5.5)
RBC # BLD AUTO: 3.47 M/UL (ref 4.2–5.4)
SODIUM SERPL-SCNC: 137 MMOL/L (ref 135–145)
WBC # BLD AUTO: 6.7 K/UL (ref 4.8–10.8)

## 2021-12-11 PROCEDURE — 700111 HCHG RX REV CODE 636 W/ 250 OVERRIDE (IP): Performed by: INTERNAL MEDICINE

## 2021-12-11 PROCEDURE — 83735 ASSAY OF MAGNESIUM: CPT

## 2021-12-11 PROCEDURE — 700105 HCHG RX REV CODE 258: Performed by: FAMILY MEDICINE

## 2021-12-11 PROCEDURE — 80048 BASIC METABOLIC PNL TOTAL CA: CPT

## 2021-12-11 PROCEDURE — A9270 NON-COVERED ITEM OR SERVICE: HCPCS | Performed by: FAMILY MEDICINE

## 2021-12-11 PROCEDURE — 700117 HCHG RX CONTRAST REV CODE 255: Performed by: FAMILY MEDICINE

## 2021-12-11 PROCEDURE — 85025 COMPLETE CBC W/AUTO DIFF WBC: CPT

## 2021-12-11 PROCEDURE — 700111 HCHG RX REV CODE 636 W/ 250 OVERRIDE (IP): Performed by: STUDENT IN AN ORGANIZED HEALTH CARE EDUCATION/TRAINING PROGRAM

## 2021-12-11 PROCEDURE — A9270 NON-COVERED ITEM OR SERVICE: HCPCS | Performed by: INTERNAL MEDICINE

## 2021-12-11 PROCEDURE — 700105 HCHG RX REV CODE 258: Performed by: INTERNAL MEDICINE

## 2021-12-11 PROCEDURE — 770001 HCHG ROOM/CARE - MED/SURG/GYN PRIV*

## 2021-12-11 PROCEDURE — 74177 CT ABD & PELVIS W/CONTRAST: CPT | Mod: MG

## 2021-12-11 PROCEDURE — 700111 HCHG RX REV CODE 636 W/ 250 OVERRIDE (IP): Performed by: FAMILY MEDICINE

## 2021-12-11 PROCEDURE — 99233 SBSQ HOSP IP/OBS HIGH 50: CPT | Performed by: FAMILY MEDICINE

## 2021-12-11 PROCEDURE — 36415 COLL VENOUS BLD VENIPUNCTURE: CPT

## 2021-12-11 PROCEDURE — 700102 HCHG RX REV CODE 250 W/ 637 OVERRIDE(OP): Performed by: FAMILY MEDICINE

## 2021-12-11 PROCEDURE — 700102 HCHG RX REV CODE 250 W/ 637 OVERRIDE(OP): Performed by: INTERNAL MEDICINE

## 2021-12-11 RX ORDER — FUROSEMIDE 10 MG/ML
40 INJECTION INTRAMUSCULAR; INTRAVENOUS
Status: DISCONTINUED | OUTPATIENT
Start: 2021-12-11 | End: 2021-12-11

## 2021-12-11 RX ORDER — POTASSIUM CHLORIDE 20 MEQ/1
20 TABLET, EXTENDED RELEASE ORAL 2 TIMES DAILY
Status: DISCONTINUED | OUTPATIENT
Start: 2021-12-11 | End: 2021-12-15

## 2021-12-11 RX ORDER — MAGNESIUM SULFATE HEPTAHYDRATE 40 MG/ML
2 INJECTION, SOLUTION INTRAVENOUS ONCE
Status: DISCONTINUED | OUTPATIENT
Start: 2021-12-11 | End: 2021-12-11

## 2021-12-11 RX ORDER — FUROSEMIDE 10 MG/ML
40 INJECTION INTRAMUSCULAR; INTRAVENOUS ONCE
Status: COMPLETED | OUTPATIENT
Start: 2021-12-11 | End: 2021-12-11

## 2021-12-11 RX ORDER — MAGNESIUM SULFATE HEPTAHYDRATE 40 MG/ML
2 INJECTION, SOLUTION INTRAVENOUS ONCE
Status: COMPLETED | OUTPATIENT
Start: 2021-12-11 | End: 2021-12-11

## 2021-12-11 RX ADMIN — METOPROLOL TARTRATE 25 MG: 25 TABLET, FILM COATED ORAL at 14:26

## 2021-12-11 RX ADMIN — OXYCODONE HYDROCHLORIDE 10 MG: 10 TABLET ORAL at 02:08

## 2021-12-11 RX ADMIN — ONDANSETRON 4 MG: 2 INJECTION INTRAMUSCULAR; INTRAVENOUS at 07:49

## 2021-12-11 RX ADMIN — AMPICILLIN SODIUM AND SULBACTAM SODIUM 3 G: 2; 1 INJECTION, POWDER, FOR SOLUTION INTRAMUSCULAR; INTRAVENOUS at 05:51

## 2021-12-11 RX ADMIN — EZETIMIBE 10 MG: 10 TABLET ORAL at 17:27

## 2021-12-11 RX ADMIN — FUROSEMIDE 40 MG: 10 INJECTION, SOLUTION INTRAMUSCULAR; INTRAVENOUS at 14:26

## 2021-12-11 RX ADMIN — OXYCODONE HYDROCHLORIDE 10 MG: 10 TABLET ORAL at 07:50

## 2021-12-11 RX ADMIN — HEPARIN SODIUM 5000 UNITS: 5000 INJECTION, SOLUTION INTRAVENOUS; SUBCUTANEOUS at 14:26

## 2021-12-11 RX ADMIN — AMPICILLIN SODIUM AND SULBACTAM SODIUM 3 G: 2; 1 INJECTION, POWDER, FOR SOLUTION INTRAMUSCULAR; INTRAVENOUS at 12:27

## 2021-12-11 RX ADMIN — OXYCODONE HYDROCHLORIDE 10 MG: 10 TABLET ORAL at 12:05

## 2021-12-11 RX ADMIN — AMPICILLIN SODIUM AND SULBACTAM SODIUM 3 G: 2; 1 INJECTION, POWDER, FOR SOLUTION INTRAMUSCULAR; INTRAVENOUS at 23:54

## 2021-12-11 RX ADMIN — AMPICILLIN SODIUM AND SULBACTAM SODIUM 3 G: 2; 1 INJECTION, POWDER, FOR SOLUTION INTRAMUSCULAR; INTRAVENOUS at 17:28

## 2021-12-11 RX ADMIN — GABAPENTIN 600 MG: 300 CAPSULE ORAL at 05:51

## 2021-12-11 RX ADMIN — GABAPENTIN 600 MG: 300 CAPSULE ORAL at 17:27

## 2021-12-11 RX ADMIN — MAGNESIUM SULFATE HEPTAHYDRATE 2 G: 2 INJECTION, SOLUTION INTRAVENOUS at 10:15

## 2021-12-11 RX ADMIN — CYCLOBENZAPRINE 10 MG: 10 TABLET, FILM COATED ORAL at 17:28

## 2021-12-11 RX ADMIN — VANCOMYCIN HYDROCHLORIDE 1500 MG: 500 INJECTION, POWDER, LYOPHILIZED, FOR SOLUTION INTRAVENOUS at 01:17

## 2021-12-11 RX ADMIN — ONDANSETRON 4 MG: 2 INJECTION INTRAMUSCULAR; INTRAVENOUS at 16:01

## 2021-12-11 RX ADMIN — OXYCODONE HYDROCHLORIDE 10 MG: 10 TABLET ORAL at 20:14

## 2021-12-11 RX ADMIN — POTASSIUM CHLORIDE 20 MEQ: 1500 TABLET, EXTENDED RELEASE ORAL at 17:27

## 2021-12-11 RX ADMIN — IOHEXOL 80 ML: 350 INJECTION, SOLUTION INTRAVENOUS at 08:57

## 2021-12-11 RX ADMIN — ONDANSETRON 4 MG: 2 INJECTION INTRAMUSCULAR; INTRAVENOUS at 02:07

## 2021-12-11 RX ADMIN — HEPARIN SODIUM 5000 UNITS: 5000 INJECTION, SOLUTION INTRAVENOUS; SUBCUTANEOUS at 05:52

## 2021-12-11 RX ADMIN — AMPICILLIN SODIUM AND SULBACTAM SODIUM 3 G: 2; 1 INJECTION, POWDER, FOR SOLUTION INTRAMUSCULAR; INTRAVENOUS at 00:09

## 2021-12-11 RX ADMIN — OXYCODONE HYDROCHLORIDE 10 MG: 10 TABLET ORAL at 16:01

## 2021-12-11 RX ADMIN — POTASSIUM CHLORIDE 20 MEQ: 1500 TABLET, EXTENDED RELEASE ORAL at 05:51

## 2021-12-11 ASSESSMENT — ENCOUNTER SYMPTOMS
NAUSEA: 1
DIZZINESS: 0
SORE THROAT: 0
WHEEZING: 0
HEARTBURN: 0
FLANK PAIN: 0
COUGH: 0
NECK PAIN: 0
VOMITING: 0
DIARRHEA: 0
BLURRED VISION: 0
MYALGIAS: 0
FOCAL WEAKNESS: 0
WEAKNESS: 1
SPEECH CHANGE: 0
FEVER: 0
ABDOMINAL PAIN: 0
HEADACHES: 0
PALPITATIONS: 0
NERVOUS/ANXIOUS: 0
SHORTNESS OF BREATH: 0
SENSORY CHANGE: 0
DIAPHORESIS: 0
CHILLS: 0
BACK PAIN: 0

## 2021-12-11 ASSESSMENT — PAIN DESCRIPTION - PAIN TYPE
TYPE: ACUTE PAIN

## 2021-12-11 NOTE — ASSESSMENT & PLAN NOTE
Hypoalbuminemia vs dependent edema  Echo on 10/2021 without evidence of heart failure, EF 75%   Albumin 2.1; Pre-albumin 15.9  No erythema or tenderness   Discontinue IV lasix   Encouraged elevation, compression stockings, and ambulation

## 2021-12-11 NOTE — PROGRESS NOTES
Paged Dr. Crain regarding patient IR drain not drawing back full 10 mL of saline flush. No new orders received.

## 2021-12-11 NOTE — PROGRESS NOTES
Hospital Medicine Daily Progress Note    Date of Service  12/11/2021    Chief Complaint  Nils Alfonso is a 66 y.o. female admitted 12/4/2021 with abdominal abscess.    Hospital Course  Admitted with intra-abdominal abscess. Patient was started on empiric coverage with IV Vancomycin and Zosyn. Surgery was consulted on the case. Patient underwent CT guided Right retroperitoneal/paracolic gutter drainage tube placement on 12/4/2021.  Patient with history of recurrent idiopathic pancreatitis. She had complications of pancreatic pseudocyst and intra-abdominal fluid collections. She underwent CT-guided drainage. She was placed on IV antibiotics. However there was no improvement noted. Surgery was then consulted on the case. Patient underwent Exploratory laparotomy, Incision and drainage and debridement of retroperitoneal abscess, Incision and drainage and debridement of anterior peritoneal abscess., Debridement of necrotizing fasciitis of the lateral abdominal wall on 11/5/2021.  She finished an antibiotic course of Zosyn, micafungin and Bactrim on 11/24/2021.    Interval Problem Update  Abdominal abscess - pain controlled,culture showed E. coli and Enterococcus faecium, CT scan results reviewed, case discussed with Surgery Oncology and Interventional Radiology  Elevated BP - sbp 130-160  Low magnesium    Updates given and plan of care discussed with patient's spouse who was at bedside.    I have personally seen and examined the patient at bedside. I discussed the plan of care with patient, family, bedside RN and Surgery oncology and interventional radiology.    Consultants/Specialty  infectious disease and Surgery Oncology    Code Status  Full Code    Disposition  Patient is not medically cleared.   Anticipate discharge to to home with organized home healthcare and close outpatient follow-up.  I have placed the appropriate orders for post-discharge needs.    Review of Systems  Review of Systems   Constitutional:  Positive for malaise/fatigue. Negative for chills, diaphoresis and fever.   HENT: Negative for congestion, hearing loss and sore throat.    Eyes: Negative for blurred vision.   Respiratory: Negative for cough, shortness of breath and wheezing.    Cardiovascular: Positive for leg swelling. Negative for chest pain and palpitations.   Gastrointestinal: Positive for nausea. Negative for abdominal pain, diarrhea, heartburn and vomiting.   Genitourinary: Negative for dysuria, flank pain and hematuria.   Musculoskeletal: Negative for back pain, joint pain, myalgias and neck pain.   Skin: Negative for rash.   Neurological: Positive for weakness. Negative for dizziness, sensory change, speech change, focal weakness and headaches.   Psychiatric/Behavioral: The patient is not nervous/anxious.         Physical Exam  Temp:  [36.2 °C (97.1 °F)-37.2 °C (98.9 °F)] 37.2 °C (98.9 °F)  Pulse:  [74-96] 96  Resp:  [17-18] 18  BP: (131-164)/(80-99) 141/86  SpO2:  [93 %-96 %] 93 %    Physical Exam  Vitals and nursing note reviewed.   HENT:      Head: Normocephalic and atraumatic.      Nose: No congestion.      Mouth/Throat:      Mouth: Mucous membranes are moist.   Eyes:      Extraocular Movements: Extraocular movements intact.      Conjunctiva/sclera: Conjunctivae normal.   Cardiovascular:      Rate and Rhythm: Normal rate and regular rhythm.   Pulmonary:      Effort: Pulmonary effort is normal.      Breath sounds: Normal breath sounds.   Abdominal:      General: There is no distension.      Tenderness: There is no abdominal tenderness. There is no guarding or rebound.      Comments: Ostomy  Right flank MARTHA drain   Musculoskeletal:      Cervical back: No tenderness.      Right lower leg: Edema present.      Left lower leg: Edema present.   Skin:     General: Skin is warm and dry.   Neurological:      General: No focal deficit present.      Mental Status: She is alert and oriented to person, place, and time.      Cranial Nerves: No cranial  nerve deficit.         Fluids    Intake/Output Summary (Last 24 hours) at 12/11/2021 1454  Last data filed at 12/11/2021 1227  Gross per 24 hour   Intake 330 ml   Output 1580 ml   Net -1250 ml       Laboratory  Recent Labs     12/09/21  0219 12/10/21  0708 12/11/21  0549   WBC 6.1 6.0 6.7   RBC 3.32* 3.25* 3.47*   HEMOGLOBIN 9.7* 9.6* 10.1*   HEMATOCRIT 30.4* 30.8* 32.6*   MCV 91.6 94.8 93.9   MCH 29.2 29.5 29.1   MCHC 31.9* 31.2* 31.0*   RDW 56.7* 58.4* 58.3*   PLATELETCT 259 219 215   MPV 8.9* 8.3* 8.9*     Recent Labs     12/09/21  0219 12/10/21  0708 12/11/21  0549   SODIUM 135 136 137   POTASSIUM 3.2* 3.9 3.6   CHLORIDE 105 106 104   CO2 20 20 24   GLUCOSE 85 87 74   BUN 7* 7* 5*   CREATININE 0.40* 0.31* 0.34*   CALCIUM 7.4* 7.1* 7.4*                   Imaging  CT-ABDOMEN-PELVIS WITH   Final Result      1.  Apparent slight increase in size in right lower quadrant fluid collection with drain in place with gas again noted along the superior aspect of the collection to the ascending colon with dense fluid/apparent contrast within the fluid collection    suggesting fistulous communication with the colon.      2.  Anasarca with small bilateral pleural effusions and bibasilar atelectasis.      3.  Biliary stent placement with pneumobilia.      CT-ABDOMEN-PELVIS WITH   Final Result      1.  Interval decrease in size in right lower quadrant fluid collections status post drainage.      2.  Trace bilateral pleural effusions with bibasilar atelectasis.      3.  Biliary stent in place with pneumobilia.      4.  Anasarca      CT-DRAIN-PERITONEAL   Final Result      Successful RIGHT retroperitoneal/paracolic gutter drainage tube placement.      Plan: Thrice daily flushes with 10 mL of sterile saline. Monitor outputs. Please contact interventional radiology if there is any concern for tube dysfunction.      Findings were communicated with Dr. Vann via [employer] after initial scanning was performed.      IR-CONSULT AND TREAT     (Results Pending)        Assessment/Plan  * Intra-abdominal abscess (HCC)- (present on admission)  Assessment & Plan  CT guided Right retroperitoneal/paracolic gutter drainage tube placement  12/4/2021  IV Vancomycin, Unasyn   Monitor drain output    Sepsis - (present on admission)  Assessment & Plan  Source - Abdominal abscess      Bilateral lower extremity edema  Assessment & Plan  IV Lasix today    Elevated BP without diagnosis of hypertension- (present on admission)  Assessment & Plan  Start Metoprolol    Hypomagnesemia- (present on admission)  Assessment & Plan  IV Mg 2 g  Follow level    Hypokalemia- (present on admission)  Assessment & Plan  Kdur, follow bmp    Hyponatremia- (present on admission)  Assessment & Plan  Follow bmp    Normocytic anemia- (present on admission)  Assessment & Plan  Follow cbc    Mild protein-calorie malnutrition (HCC)- (present on admission)  Assessment & Plan  Nutrition consult    Hyperlipidemia- (present on admission)  Assessment & Plan  Ezetimibe       VTE prophylaxis: heparin ppx    I have performed a physical exam and reviewed and updated ROS and Plan today (12/11/2021). In review of yesterday's note (12/10/2021), there are no changes except as documented above.

## 2021-12-11 NOTE — CARE PLAN
The patient is Stable - Low risk of patient condition declining or worsening    Shift Goals  Clinical Goals: pain control  Patient Goals: Pain Management, Rest  Family Goals: No family currently present    Progress made toward(s) clinical / shift goals:  pain control    Patient is not progressing towards the following goals:      Problem: Pain - Standard  Goal: Alleviation of pain or a reduction in pain to the patient’s comfort goal  Outcome: Progressing   Keep pain at or below patient comfort goal of 4 or less, administer pain medication as appropriate, reposition for comfort.    Problem: Skin Integrity  Goal: Skin integrity is maintained or improved  Outcome: Progressing   Assess fistula sites/ostomy bags for leaking, keep skin free of excess moisture, up to chair for meals.

## 2021-12-11 NOTE — PROGRESS NOTES
Report received from Day RN at 1915. Assumed care of patient. Plan of care discussed, questions answered. Assessment completed. VSS, A&O x4, denies chest pain or SOB at this time, states pain on right abdomen, +nausea. See MAR. Call light in reach. Patient educated on use of call light for assistance.     Three ostomy bags on abdomen on fistula sites, IR MARTHA drain RLQ abdomen  Midline incision with island dressing c/d/i. Changed per MD order.   Purewick in place  x1 assist with 4 wheel walker  Hourly rounding in place.

## 2021-12-11 NOTE — THERAPY
"Occupational Therapy  Daily Treatment     Patient Name: Nils Alfonso  Age:  66 y.o., Sex:  female  Medical Record #: [id number]  Today's Date: 12/10/2021       Precautions: Fall Risk  Comments: MARTHA, ostomy x 2    Assessment    Pt seen for OT tx. Received in bed. Declined UB therapeutic exercises due to having just completed. Pt amenable to transfer training. Reports she is still having difficulty with sit to stands. Discussed pros/cons of 3:1 commode versus RTS with arms. Recommend commode for adjustable seat height and option of bed side use or shower use. Spouse agrees to f/u on DME. Pt mobilized to/from bathroom using 4WW. She did require min A for sit to stands from EOB and commode set at highest height. Pt continues to be limited by generalized weakness. Will continue to follow.     Plan    Continue current treatment plan.    DC Equipment Recommendations: Bed Side Commode  Discharge Recommendations: Recommend home health for continued occupational therapy services    Subjective    \"I just did my arm exercises. My arms are tired.\"     Objective       12/10/21 1154   Balance   Sitting Balance (Static) Fair +   Sitting Balance (Dynamic) Fair +   Standing Balance (Static) Fair   Standing Balance (Dynamic) Fair -   Weight Shift Sitting Fair   Weight Shift Standing Fair   Bed Mobility    Supine to Sit Supervised  (HOB slightly elevated )   Sit to Supine Minimal Assist  (for BLE)   Scooting Supervised  (seated)   Activities of Daily Living   Grooming   (declined )   Upper Body Dressing Supervision  (gown change)   Toileting   (declined need, completed toilet transfer )   Functional Mobility   Sit to Stand Minimal Assist   Bed, Chair, Wheelchair Transfer Supervised   Toilet Transfers Minimal Assist  (from BSC over toilet )   Transfer Method Stand Step  (FWW)   Mobility Supine > < EOB, short gait and transfers with 4WW   Short Term Goals   Short Term Goal # 1 Pt will complete ADL transfers with supv    Goal Outcome # 1 " Progressing slower than expected   Short Term Goal # 2 Pt will complete FB dressing with supv    Goal Outcome # 2 Progressing as expected  (UB only this session )   Short Term Goal # 3 Pt will complete standing grooming with supv    Goal Outcome # 3   (NT this session )

## 2021-12-11 NOTE — PROGRESS NOTES
Assumed care of patient at 0700. Patient is alert and oriented, respirations are unlabored and regular on room air. Lung sounds clear throughout, diminished in bases. Abdomen soft, tender, x3 right abdominal fistulas with ostomy bags, light brown output. RLQ MARTHA drain to bulb suction, flushed with 10mL NS. Midline dressing, CDI. BLE +1 pitting edema. purewick in place, yellow output. Pain stated at 7, appropriate pain medication administered. Bed in lowest position, call light within reach, patient states no needs at this time.

## 2021-12-12 PROBLEM — I10 HTN (HYPERTENSION), BENIGN: Status: ACTIVE | Noted: 2021-12-12

## 2021-12-12 LAB
ANION GAP SERPL CALC-SCNC: 7 MMOL/L (ref 7–16)
BASOPHILS # BLD AUTO: 0.7 % (ref 0–1.8)
BASOPHILS # BLD: 0.07 K/UL (ref 0–0.12)
BUN SERPL-MCNC: 5 MG/DL (ref 8–22)
CALCIUM SERPL-MCNC: 7.6 MG/DL (ref 8.5–10.5)
CHLORIDE SERPL-SCNC: 101 MMOL/L (ref 96–112)
CO2 SERPL-SCNC: 27 MMOL/L (ref 20–33)
CREAT SERPL-MCNC: 0.34 MG/DL (ref 0.5–1.4)
EOSINOPHIL # BLD AUTO: 0.03 K/UL (ref 0–0.51)
EOSINOPHIL NFR BLD: 0.3 % (ref 0–6.9)
ERYTHROCYTE [DISTWIDTH] IN BLOOD BY AUTOMATED COUNT: 56.3 FL (ref 35.9–50)
GLUCOSE SERPL-MCNC: 80 MG/DL (ref 65–99)
HCT VFR BLD AUTO: 31.3 % (ref 37–47)
HGB BLD-MCNC: 9.9 G/DL (ref 12–16)
IMM GRANULOCYTES # BLD AUTO: 0.08 K/UL (ref 0–0.11)
IMM GRANULOCYTES NFR BLD AUTO: 0.8 % (ref 0–0.9)
LYMPHOCYTES # BLD AUTO: 2.57 K/UL (ref 1–4.8)
LYMPHOCYTES NFR BLD: 25.5 % (ref 22–41)
MAGNESIUM SERPL-MCNC: 1.7 MG/DL (ref 1.5–2.5)
MCH RBC QN AUTO: 29.4 PG (ref 27–33)
MCHC RBC AUTO-ENTMCNC: 31.6 G/DL (ref 33.6–35)
MCV RBC AUTO: 92.9 FL (ref 81.4–97.8)
MONOCYTES # BLD AUTO: 0.59 K/UL (ref 0–0.85)
MONOCYTES NFR BLD AUTO: 5.9 % (ref 0–13.4)
NEUTROPHILS # BLD AUTO: 6.73 K/UL (ref 2–7.15)
NEUTROPHILS NFR BLD: 66.8 % (ref 44–72)
NRBC # BLD AUTO: 0 K/UL
NRBC BLD-RTO: 0 /100 WBC
NT-PROBNP SERPL IA-MCNC: 2339 PG/ML (ref 0–125)
PLATELET # BLD AUTO: 220 K/UL (ref 164–446)
PMV BLD AUTO: 9.2 FL (ref 9–12.9)
POTASSIUM SERPL-SCNC: 4.1 MMOL/L (ref 3.6–5.5)
RBC # BLD AUTO: 3.37 M/UL (ref 4.2–5.4)
SODIUM SERPL-SCNC: 135 MMOL/L (ref 135–145)
WBC # BLD AUTO: 10.1 K/UL (ref 4.8–10.8)

## 2021-12-12 PROCEDURE — 36415 COLL VENOUS BLD VENIPUNCTURE: CPT

## 2021-12-12 PROCEDURE — 99233 SBSQ HOSP IP/OBS HIGH 50: CPT | Performed by: FAMILY MEDICINE

## 2021-12-12 PROCEDURE — 85025 COMPLETE CBC W/AUTO DIFF WBC: CPT

## 2021-12-12 PROCEDURE — A9270 NON-COVERED ITEM OR SERVICE: HCPCS | Performed by: INTERNAL MEDICINE

## 2021-12-12 PROCEDURE — 700105 HCHG RX REV CODE 258: Performed by: FAMILY MEDICINE

## 2021-12-12 PROCEDURE — 83735 ASSAY OF MAGNESIUM: CPT

## 2021-12-12 PROCEDURE — 99024 POSTOP FOLLOW-UP VISIT: CPT | Performed by: SURGERY

## 2021-12-12 PROCEDURE — 700111 HCHG RX REV CODE 636 W/ 250 OVERRIDE (IP): Performed by: STUDENT IN AN ORGANIZED HEALTH CARE EDUCATION/TRAINING PROGRAM

## 2021-12-12 PROCEDURE — 700102 HCHG RX REV CODE 250 W/ 637 OVERRIDE(OP): Performed by: FAMILY MEDICINE

## 2021-12-12 PROCEDURE — 700111 HCHG RX REV CODE 636 W/ 250 OVERRIDE (IP): Performed by: FAMILY MEDICINE

## 2021-12-12 PROCEDURE — 770001 HCHG ROOM/CARE - MED/SURG/GYN PRIV*

## 2021-12-12 PROCEDURE — 700111 HCHG RX REV CODE 636 W/ 250 OVERRIDE (IP): Performed by: INTERNAL MEDICINE

## 2021-12-12 PROCEDURE — 700102 HCHG RX REV CODE 250 W/ 637 OVERRIDE(OP): Performed by: INTERNAL MEDICINE

## 2021-12-12 PROCEDURE — 83880 ASSAY OF NATRIURETIC PEPTIDE: CPT

## 2021-12-12 PROCEDURE — 80048 BASIC METABOLIC PNL TOTAL CA: CPT

## 2021-12-12 PROCEDURE — 700105 HCHG RX REV CODE 258: Performed by: INTERNAL MEDICINE

## 2021-12-12 PROCEDURE — A9270 NON-COVERED ITEM OR SERVICE: HCPCS | Performed by: FAMILY MEDICINE

## 2021-12-12 PROCEDURE — 99233 SBSQ HOSP IP/OBS HIGH 50: CPT | Performed by: INTERNAL MEDICINE

## 2021-12-12 RX ORDER — MAGNESIUM SULFATE HEPTAHYDRATE 40 MG/ML
2 INJECTION, SOLUTION INTRAVENOUS ONCE
Status: COMPLETED | OUTPATIENT
Start: 2021-12-12 | End: 2021-12-12

## 2021-12-12 RX ORDER — LABETALOL HYDROCHLORIDE 5 MG/ML
10 INJECTION, SOLUTION INTRAVENOUS EVERY 6 HOURS PRN
Status: DISCONTINUED | OUTPATIENT
Start: 2021-12-12 | End: 2021-12-16 | Stop reason: HOSPADM

## 2021-12-12 RX ORDER — ENALAPRILAT 1.25 MG/ML
1.25 INJECTION INTRAVENOUS EVERY 6 HOURS PRN
Status: DISCONTINUED | OUTPATIENT
Start: 2021-12-12 | End: 2021-12-16 | Stop reason: HOSPADM

## 2021-12-12 RX ORDER — FUROSEMIDE 10 MG/ML
40 INJECTION INTRAMUSCULAR; INTRAVENOUS
Status: DISCONTINUED | OUTPATIENT
Start: 2021-12-12 | End: 2021-12-15

## 2021-12-12 RX ADMIN — VANCOMYCIN HYDROCHLORIDE 1500 MG: 500 INJECTION, POWDER, LYOPHILIZED, FOR SOLUTION INTRAVENOUS at 23:13

## 2021-12-12 RX ADMIN — METOPROLOL TARTRATE 25 MG: 25 TABLET, FILM COATED ORAL at 04:43

## 2021-12-12 RX ADMIN — METOPROLOL TARTRATE 25 MG: 25 TABLET, FILM COATED ORAL at 18:42

## 2021-12-12 RX ADMIN — OXYCODONE HYDROCHLORIDE 10 MG: 10 TABLET ORAL at 00:12

## 2021-12-12 RX ADMIN — ACETAMINOPHEN 1000 MG: 500 TABLET ORAL at 06:34

## 2021-12-12 RX ADMIN — GABAPENTIN 600 MG: 300 CAPSULE ORAL at 04:43

## 2021-12-12 RX ADMIN — OXYCODONE HYDROCHLORIDE 10 MG: 10 TABLET ORAL at 08:47

## 2021-12-12 RX ADMIN — OXYCODONE HYDROCHLORIDE 10 MG: 10 TABLET ORAL at 04:43

## 2021-12-12 RX ADMIN — VANCOMYCIN HYDROCHLORIDE 1500 MG: 500 INJECTION, POWDER, LYOPHILIZED, FOR SOLUTION INTRAVENOUS at 00:46

## 2021-12-12 RX ADMIN — FUROSEMIDE 40 MG: 10 INJECTION, SOLUTION INTRAMUSCULAR; INTRAVENOUS at 12:34

## 2021-12-12 RX ADMIN — AMPICILLIN SODIUM AND SULBACTAM SODIUM 3 G: 2; 1 INJECTION, POWDER, FOR SOLUTION INTRAMUSCULAR; INTRAVENOUS at 20:07

## 2021-12-12 RX ADMIN — EZETIMIBE 10 MG: 10 TABLET ORAL at 18:43

## 2021-12-12 RX ADMIN — OXYCODONE HYDROCHLORIDE 10 MG: 10 TABLET ORAL at 18:45

## 2021-12-12 RX ADMIN — ONDANSETRON 4 MG: 2 INJECTION INTRAMUSCULAR; INTRAVENOUS at 05:04

## 2021-12-12 RX ADMIN — MAGNESIUM SULFATE 2 G: 2 INJECTION INTRAVENOUS at 08:47

## 2021-12-12 RX ADMIN — OXYCODONE HYDROCHLORIDE 10 MG: 10 TABLET ORAL at 12:34

## 2021-12-12 RX ADMIN — CYCLOBENZAPRINE 10 MG: 10 TABLET, FILM COATED ORAL at 18:43

## 2021-12-12 RX ADMIN — POTASSIUM CHLORIDE 20 MEQ: 1500 TABLET, EXTENDED RELEASE ORAL at 04:43

## 2021-12-12 RX ADMIN — AMPICILLIN SODIUM AND SULBACTAM SODIUM 3 G: 2; 1 INJECTION, POWDER, FOR SOLUTION INTRAMUSCULAR; INTRAVENOUS at 06:28

## 2021-12-12 RX ADMIN — GABAPENTIN 600 MG: 300 CAPSULE ORAL at 18:44

## 2021-12-12 RX ADMIN — POTASSIUM CHLORIDE 20 MEQ: 1500 TABLET, EXTENDED RELEASE ORAL at 18:43

## 2021-12-12 RX ADMIN — OXYCODONE HYDROCHLORIDE 10 MG: 10 TABLET ORAL at 22:31

## 2021-12-12 RX ADMIN — AMPICILLIN SODIUM AND SULBACTAM SODIUM 3 G: 2; 1 INJECTION, POWDER, FOR SOLUTION INTRAMUSCULAR; INTRAVENOUS at 12:34

## 2021-12-12 ASSESSMENT — ENCOUNTER SYMPTOMS
NAUSEA: 0
SHORTNESS OF BREATH: 0
MYALGIAS: 0
ABDOMINAL PAIN: 1
WHEEZING: 0
PALPITATIONS: 0
VOMITING: 0
FEVER: 0
FOCAL WEAKNESS: 0
NERVOUS/ANXIOUS: 0
COUGH: 0
DIAPHORESIS: 0
ABDOMINAL PAIN: 0
HEADACHES: 0
FLANK PAIN: 0
SORE THROAT: 0
WEAKNESS: 1
BLURRED VISION: 0
CHILLS: 0
NECK PAIN: 0
BACK PAIN: 0
SENSORY CHANGE: 0
HEARTBURN: 0
DIZZINESS: 0
NAUSEA: 1
DIARRHEA: 0
SPEECH CHANGE: 0

## 2021-12-12 ASSESSMENT — PAIN DESCRIPTION - PAIN TYPE: TYPE: ACUTE PAIN

## 2021-12-12 NOTE — PROGRESS NOTES
Pt AO x 4  Vitals signs stable  Pt denies chest pain or SOB  O2 sat >90% on RA breathing unlabored   Pt endorses 7/10 abd pain, medicated per MAR.   ML dressing changed per orders. Wound pictures uploaded to Epic   3xostomy drain bags to R side fistulas, no output in bags #1 & 2. Tan/brown output in bag #3.   RLQ IR drain flushed per orders No output at this time.   Pt denies N/V/D  + voiding, + flatus, + BM 12/10   Pt ambulates with standby assistance and 4WW  SCDs off     Updated plan of care discussed with pt. Safety education done. Falls precautions in place. Pt safety maintained. Hourly rounding done.

## 2021-12-12 NOTE — PROGRESS NOTES
The patient's case was discussed with the hospitalist following the repeat CT scan.  There is a question of air and slightly enlarged fluid collection.  The drain does not appear to be draining well and will need upsizing.  The air pocket in the right upper quadrant has been present since November with minimal change.  Contrast was in the right colon thus we do not feel there is a fistula from the right colon though a barium enema has not been completed.  There was no contrast in the fluid collection on the right.  Discussed the case with the hospitalist team and the plan would have interventional radiology change the drain to an upsize drain with aggressive flushing.  Patient is being treated with p.o. antibiotics thus will hopefully get the patient home in the next several days with the drain and repeat image her as an outpatient.  No surgical intervention needed at this time.

## 2021-12-12 NOTE — PROGRESS NOTES
Infectious Disease Progress Note    Author: Jose Arora M.D. Date & Time of service: 2021  8:46 AM    Chief Complaint:  Follow-up for intra-abdominal abscess    Interval History:   T-max 99.1, white count 6.1, tolerating switching antimicrobials.  12/10 patient is afebrile, white count 6000 today, feeling better overall, tolerating antimicrobials thus far   patient remains afebrile, white count is 10.1, tolerating antimicrobials.  CT scan findings as below    Labs Reviewed and Medications Reviewed.    Review of Systems:  Review of Systems   Constitutional: Positive for malaise/fatigue. Negative for chills and fever.   Gastrointestinal: Positive for abdominal pain. Negative for nausea and vomiting.   Skin: Negative for itching and rash.   All other systems reviewed and are negative.      Hemodynamics:  Temp (24hrs), Av.6 °C (97.9 °F), Min:36.2 °C (97.1 °F), Max:37 °C (98.6 °F)  Temperature: 36.4 °C (97.5 °F)  Pulse  Av.9  Min: 53  Max: 99   Blood Pressure : 141/75       Physical Exam:  Physical Exam  Vitals and nursing note reviewed.   Constitutional:       General: She is not in acute distress.     Appearance: She is ill-appearing.   HENT:      Mouth/Throat:      Pharynx: No oropharyngeal exudate.   Eyes:      General: No scleral icterus.        Right eye: No discharge.         Left eye: No discharge.      Conjunctiva/sclera: Conjunctivae normal.   Cardiovascular:      Rate and Rhythm: Normal rate.      Heart sounds: No murmur heard.      Pulmonary:      Effort: Pulmonary effort is normal. No respiratory distress.      Breath sounds: No stridor.   Abdominal:      Comments: Right-sided MARTHA drain with purulent appearing output.  Ostomy   Musculoskeletal:         General: No swelling or tenderness.   Skin:     Findings: No erythema or rash.   Neurological:      Mental Status: She is alert.   Psychiatric:         Mood and Affect: Mood normal.         Behavior: Behavior normal.      Comments:  Very pleasant.  Tearful         Meds:    Current Facility-Administered Medications:   •  furosemide  •  magnesium sulfate  •  potassium chloride SA  •  metoprolol tartrate  •  acetaminophen **FOLLOWED BY** [DISCONTINUED] acetaminophen  •  ampicillin-sulbactam (UNASYN) IV  •  calcium carbonate  •  vancomycin  •  ondansetron  •  MD Alert...Vancomycin per Pharmacy  •  [Held by provider] heparin  •  cyclobenzaprine  •  ezetimibe  •  gabapentin  •  Pharmacy Consult Request  •  oxyCODONE immediate-release **OR** oxyCODONE immediate-release **OR** HYDROmorphone    Labs:  Recent Labs     12/10/21  0708 12/11/21  0549 12/12/21  0728   WBC 6.0 6.7 10.1   RBC 3.25* 3.47* 3.37*   HEMOGLOBIN 9.6* 10.1* 9.9*   HEMATOCRIT 30.8* 32.6* 31.3*   MCV 94.8 93.9 92.9   MCH 29.5 29.1 29.4   RDW 58.4* 58.3* 56.3*   PLATELETCT 219 215 220   MPV 8.3* 8.9* 9.2   NEUTSPOLYS 52.40 50.00 66.80   LYMPHOCYTES 37.30 39.50 25.50   MONOCYTES 7.70 7.90 5.90   EOSINOPHILS 0.50 0.40 0.30   BASOPHILS 0.80 0.70 0.70     Recent Labs     12/10/21  0708 12/11/21  0549 12/12/21  0728   SODIUM 136 137 135   POTASSIUM 3.9 3.6 4.1   CHLORIDE 106 104 101   CO2 20 24 27   GLUCOSE 87 74 80   BUN 7* 5* 5*     Recent Labs     12/10/21  0708 12/11/21  0549 12/12/21  0728   CREATININE 0.31* 0.34* 0.34*       Imaging:  CT-ABDOMEN-PELVIS WITH    Result Date: 12/6/2021 12/6/2021 10:55 AM HISTORY/REASON FOR EXAM:  Abdominal pain, fever. TECHNIQUE/EXAM DESCRIPTION:   CT scan of the abdomen and pelvis with contrast. Contrast-enhanced helical scanning was obtained from the diaphragmatic domes through the pubic symphysis following the bolus administration of nonionic contrast without complication. 80 mL of Omnipaque 350 nonionic contrast was administered without complication. Low dose optimization technique was utilized for this CT exam including automated exposure control and adjustment of the mA and/or kV according to patient size. COMPARISON: December 3, 2021 FINDINGS:  Lower Chest: There are trace bilateral pleural effusions. There is bibasilar atelectasis, right greater left.. Liver: Normal. Spleen: Unremarkable. Pancreas: Unremarkable. Gallbladder: The gallbladder has been resected. Biliary: Common bile duct stent remains in place. There is pneumobilia with gas also identified in the pancreatic duct. Adrenal glands: Normal. Kidneys: Unremarkable without hydronephrosis. Bowel: No obstruction or acute inflammation. There is diverticulosis without evidence of diverticulitis Lymph nodes: No adenopathy. Vasculature: Unremarkable. Peritoneum: There is been interval decrease in size in right lower quadrant fluid collection status post drainage. A pigtail catheter remains in place. A fluid collection previously measuring 2.3 cm in width currently measures 9 mm in width. A fluid collection more posterior medially previously measured 4.1 cm in width and currently measures 19 mm in width with the drain within this collection. Open anterior abdominal wound noted. There is anasarca. Musculoskeletal: No acute or destructive process. Pelvis: No adenopathy or free fluid.     1.  Interval decrease in size in right lower quadrant fluid collections status post drainage. 2.  Trace bilateral pleural effusions with bibasilar atelectasis. 3.  Biliary stent in place with pneumobilia. 4.  Anasarca    CT-ABDOMEN-PELVIS WITH    Result Date: 12/3/2021  12/3/2021 5:11 PM HISTORY/REASON FOR EXAM: Acute abdominal pain. History of duodenal perforation. TECHNIQUE/EXAM DESCRIPTION:   CT scan of the abdomen and pelvis with contrast. Contrast-enhanced helical scanning was obtained from the diaphragmatic domes through the pubic symphysis following the bolus administration of nonionic contrast without complication. 80 mL of Omnipaque 350 nonionic contrast was administered without complication. Oral contrast was administered. Low dose optimization technique was utilized for this CT exam including automated exposure  control and adjustment of the mA and/or kV according to patient size. COMPARISON: CT AP 11/24/2021. Upper GI series 11/22/2021. CT AP 11/15/2021 FINDINGS: Lower Chest: There is minimal atelectasis or pneumonia in the right lower lobe with minimal right pleural effusion. There is been no significant change. The left lung base is without consolidation. A small calcified granuloma is again noted. No free air is present. Postoperative changes involving the stomach are stable. There is been interval removal of a right upper quadrant drain. There has been an increase in size of a right sided fluid collection or abscess in the right paracolic gutter region. The abscess has a length of 6.3 cm and transverse dimension of 4.1 cm inferiorly. There is a fluid collection superiorly in the right mid abdomen which is connected with the inferior collection. This collection has an air-fluid level and has a maximum diameter of 3 cm. This fluid collection extends inferiorly into the right lower quadrant paracolic gutter. There is a fluid collection in the midline of the rectus sheath below a midline skin incision. This collection has maximum dimension of 5.5 cm transversely and a length of approximately 9 cm. Liver: No hepatic parenchymal mass is present. Expansile common bile duct stent is present. Spleen: Unremarkable. Pancreas: Unremarkable. Gallbladder: The gallbladder has been resected. Biliary: Nondilated. Adrenal glands: Normal. Kidneys: Unremarkable without hydronephrosis. Bowel: No there is no evidence of bowel obstruction or focal inflammation. Lymph nodes: No adenopathy. Vasculature: Unremarkable. Peritoneum: Unremarkable without ascites. Musculoskeletal: No acute or destructive process. Fusion hardware is present in the distal lumbar spine. Pelvis: No adenopathy. The bladder appears normal. No pelvic fluid collections or free fluid is present.     1.  Recurrent fluid collections or abscess in the right upper quadrant and  right paracolic gutter region. Interval removal of right upper quadrant drain. 2.  New fluid collection in the rectus sheath in the midline below the skin incision. This may represent postoperative fluid collection or abscess. 3.  Postoperative changes involving the stomach. 4.  Cholecystectomy. Residual common bile duct expansile stent in place. 5.  No bowel or renal obstruction. 6.  No free air. 7.  Small unchanged right pleural effusion and unchanged minimal right lower lobe atelectasis or pneumonia.    CT-ABDOMEN-PELVIS WITH    Result Date: 11/24/2021 11/24/2021 3:56 PM HISTORY/REASON FOR EXAM:  Abdominal pain, acute, nonlocalized. History of duodenal perforation. TECHNIQUE/EXAM DESCRIPTION:   CT scan of the abdomen and pelvis with contrast. Contrast-enhanced helical scanning was obtained from the diaphragmatic domes through the pubic symphysis following the bolus administration of nonionic contrast without complication. 100 mL of Omnipaque 350 nonionic contrast was administered without complication. Low dose optimization technique was utilized for this CT exam including automated exposure control and adjustment of the mA and/or kV according to patient size. COMPARISON: 11/15/2021 FINDINGS: Lower Chest: Bibasilar atelectasis, improved from prior with improvement of bilateral pleural fluid. Liver: Liver again shows pneumobilia. Spleen: Unremarkable. Pancreas: Present in the pancreatic duct.  Pancreatic duct is mildly dilated. Gallbladder: Surgically absent. Biliary: Metallic common bile duct stent in place. Adrenal glands: Normal. Kidneys: Unremarkable without hydronephrosis. Bowel: Postoperative change of the stomach.  Contrast present in the colon from prior procedure.  RIGHT upper quadrant drain present within irregular fluid collection in the RIGHT lateral midabdomen.  Fluid collection shows peripheral enhancement as well  as subtle hyperdense contents and gas.  Fluid collection measures approximately 7.6 x  7.3 By 2.4 cm and extends into the RIGHT posterior pararenal space, decreased in size from prior exam.  RIGHT lower quadrant drain is been removed. Feeding tube in place with tip at the DJ flexure. Lymph nodes: No adenopathy. Vasculature: Unremarkable. Peritoneum: Unremarkable without ascites. Musculoskeletal: Postoperative change of lumbar spine. Pelvis: Bladder is unremarkable.  Contrast present in the vagina, likely reflux.  Postoperative change of anterior abdominal wall.  Diffuse body wall edema.     1.  RIGHT lateral no abnormal abscess again seen, decreased in size from prior exam with drain in place, however contents now appears hyperdense potential indicating bowel contrast, concerning for bowel perforation or fistula, although source of contrast  is uncertain.  No cristóbal pneumoperitoneum. 2.  Pneumobilia and biliary stent present. 3.  Interval removal of RIGHT lower quadrant drain.     CT-ABDOMEN-PELVIS WITH    Result Date: 11/15/2021  11/15/2021 11:49 AM HISTORY/REASON FOR EXAM:  intra-abdominal abscess. Follow up. TECHNIQUE/EXAM DESCRIPTION:   CT scan of the abdomen and pelvis with contrast. Contrast-enhanced helical scanning was obtained from the diaphragmatic domes through the pubic symphysis following the bolus administration of nonionic contrast without complication. 100 mL of Omnipaque 350 nonionic contrast was administered without complication. Low dose optimization technique was utilized for this CT exam including automated exposure control and adjustment of the mA and/or kV according to patient size. COMPARISON: November 8, 2021 FINDINGS: Lower Chest: There is bibasilar atelectasis, right greater than left. There is been slight increase in size of trace bilateral pleural effusions.. Liver: Normal. Spleen: Unremarkable. Pancreas: Unremarkable. There is again air within the pancreatic duct. Gallbladder: The gallbladder has been resected. Biliary: Pneumobilia again noted.. Adrenal glands: Normal.  Kidneys: Unremarkable without hydronephrosis. Bowel: No obstruction or acute inflammation. Lymph nodes: No adenopathy. Vasculature: Unremarkable. Peritoneum: There is again a mid abdomen fluid collection which appears slightly larger currently measuring 7.2 x 7.1 x 9.4 cm. The surgical drain tracks through this fluid collection. There is anasarca. Musculoskeletal: No acute or destructive process. Pelvis: No adenopathy or free fluid.     1.  Slight interval increase in size in fluid collection the right midabdomen with drain in place. 2.  Slight interval increase in size in trace bilateral pleural effusions, right greater than left with bibasilar atelectasis. 3.  Pneumobilia.    CT-DRAIN-PERITONEAL    Result Date: 12/4/2021  HISTORY/REASON FOR EXAM:  66-year-old woman with complex medical history who has RIGHT abscesses involving the RIGHT retroperitoneum and paracolic gutter as well as her anterior abdominal wall. Both are decompressing through the dermis. TECHNIQUE/EXAM DESCRIPTION AND NUMBER OF VIEWS: RIGHT paracolic gutter/retroperitoneal drain placement with CT guidance. Low dose optimization technique was utilized for this CT exam including automated exposure control and adjustment of the mA and/or kV according to patient size. COMPARISON:  CT 12/3/2021 MEDICATIONS: Moderate sedation was provided. Pulse oximetry and continuous cardiac monitoring by the nurse was performed throughout the exam. Intraservice time was 15 minutes. PROCEDURE:     The risks, benefits, goals and objectives and alternatives were discussed. Risks were specified as including but not limited to bleeding, infection, damage to vessels or nerves, pain and discomfort as well as the possibility of incomplete resolution of underlying disease. Drain care related issues were discussed with the patient. The patient's questions were answered. Informed oral and written consent were obtained. The patient was placed on the CT gantry. Localizing scan  was performed. The skin was prepped and draped in the usual sterile manner.  A timeout was performed. Local anesthetic result was achieved with administration of 1% lidocaine. A(n) 17-gauge access needle was advanced to the target using CT fluoroscopic guidance. Access was secured with a wire and the tract was sequentially dilated to accommodate a(n) 12 [place]ic locking loop drain. A total of 50 mL of brown malodorous fluid was removed and sent for laboratory evaluation. This was put in place and formed. The catheter was attached to suction bulbdrainage and secured to the skin with 2-0 nylon suture. Completion scan was performed which showed no complication. The patient tolerated the procedure well with no evidence of complication. The skin was cleaned and a dressing was applied. FINDINGS: Substantial interval decompression of both the anterior abdominal wall and RIGHT paracolic gutter/retroperitoneal fluid collections. No residual targetable collection in the anterior abdominal wall. Appropriate tube position in the RIGHT retroperitoneal/paracolic gutter collection. No evidence of hemorrhage.     Successful RIGHT retroperitoneal/paracolic gutter drainage tube placement. Plan: Thrice daily flushes with 10 mL of sterile saline. Monitor outputs. Please contact interventional radiology if there is any concern for tube dysfunction. Findings were communicated with Dr. Vann via [employer] after initial scanning was performed.    DX-CHEST-PORTABLE (1 VIEW)    Result Date: 12/3/2021  12/3/2021 4:46 PM HISTORY/REASON FOR EXAM: Dehydration and nausea for one day. TECHNIQUE/EXAM DESCRIPTION AND NUMBER OF VIEWS: Single AP view of the chest. COMPARISON: 11/19/2021 FINDINGS: No pneumothorax. There is unchanged elevation of the right hemidiaphragm. No enlarging pleural effusion. Hazy right lower lobe opacity is seen, evaluation of this area is mildly limited secondary to overlying EKG leads. Cardiac mediastinal silhouette is  normal. Multiple surgical clips project over the epigastrium and a biliary stent is seen.     Hazy right lower lobe opacity may be atelectasis, evaluation of this region is mildly limited secondary to overlying EKG leads.    [id number] STENT - TUBE    Result Date: 11/18/2021 11/18/2021 2:17 PM HISTORY/REASON FOR EXAM:  ERCP biliary stent placement TECHNIQUE/EXAM DESCRIPTION AND NUMBER OF VIEWS: 19 portable fluoroscopic images were obtained during ERCP biliary stent placement procedure performed and endoscopy suite. COMPARISON: None FINDINGS: 19 portable fluoroscopic images obtained during ERCP biliary stent placement.     Portable fluoroscopic images obtained during ERCP biliary stent placement for localization purposes.    DX-UPPER GI-SERIES WITH KUB    Result Date: 11/22/2021 11/22/2021 11:03 AM HISTORY/REASON FOR EXAM:  Abdominal Pain; Use water soluble contrast - re-evaluate duodenal perforation. TECHNIQUE/EXAM DESCRIPTION AND NUMBER OF VIEWS: Omnipaque 300 water soluble contrast upper GI series was performed. 19 fluoroscopic images obtained. Fluoroscopy time: 1.1 minutes COMPARISON: 11/17/2021 FINDINGS:  image shows extensive postoperative change of the abdomen feeding tube in place.  Feeding tube tip at the proximal jejunum. Metallic biliary stent in place. Contrast refluxes into the distal common bile duct, within the biliary stent, consistent with prior sphincterotomy. No evidence for leakage of contrast from the stomach or duodenum.     1.  No evidence for duodenal leak. 2.  Reflux of contrast seen into the distal common bile duct at the site of biliary stent, consistent with prior sphincterotomy.    DX-UPPER GI-SERIES WITH KUB    Result Date: 11/17/2021 11/17/2021 10:36 AM HISTORY/REASON FOR EXAM:  Gastrointestinal Complaint; Evaluate duodenal or intestinal leak from previous ERCP perforation. TECHNIQUE/EXAM DESCRIPTION AND NUMBER OF VIEWS: Omnipaque 300 water soluble contrast focused upper GI  series was performed. 9 fluoroscopic images obtained. Fluoroscopy time: 0.5 minutes COMPARISON: CT abdomen pelvis dated 11/15/2021 FINDINGS: Extensive postsurgical changes of the upper abdomen. The duodenal C-loop is in appropriate anatomic position. There is contrast extravasation at the junction of the second and third portions of duodenum (see key images).     Duodenal perforation at the junction of the second and third portions of duodenum as noted on key images.    CT-ABORTED CT PROCEDURE    Result Date: 11/16/2021  HISTORY/REASON FOR EXAM:  RIGHT retroperitoneal abscess with surgical drains in place. TECHNIQUE/EXAM DESCRIPTION AND NUMBER OF VIEWS: Limited pelvic CT. Low dose optimization technique was utilized for this CT exam including automated exposure control and adjustment of the mA and/or kV according to patient size. COMPARISON:  Diagnostic CT 11/15/2021 MEDICATIONS: None PROCEDURE:     The risks, benefits, goals and objectives and alternatives were discussed. Risks were specified as including but not limited to bleeding, infection, damage to vessels or nerves, pain and discomfort as well as the possibility of incomplete resolution of underlying disease. Drain care related issues were discussed with the patient. The patient's questions were answered. Informed oral and written consent were obtained. The patient was placed on the CT gantry. Localizing scan was performed. Based on my findings no procedure was performed. FINDINGS: Decreased size of RIGHT paracolic gutter fluid collection which contains a surgical drain. There is some contrast present in the collection as well as in the adjacent colon.     Interval decrease in size of the RIGHT retroperitoneal fluid collection which contains a surgical drain. Because from bowel is possible. No additional drain was placed.     DX-ABDOMEN FOR TUBE PLACEMENT    Result Date: 11/19/2021 11/19/2021 5:57 AM HISTORY/REASON FOR EXAM: coretrak placement  TECHNIQUE/EXAM DESCRIPTION:  Single AP view the abdomen. COMPARISON:  October 19, 2021 FINDINGS: Hazy right lower lobe opacities are seen. Dobbhoff tube is seen, the tip lies to the left of the lumbar spine.  The bowel gas pattern appears nonspecific. TIPS stent is in place. The bony structures appear age-appropriate.     1.  Nonspecific bowel gas pattern. 2.  Dobbhoff tube with tip terminating overlying the expected location of the third or fourth duodenal segment. 3.  Hazy right lower lobe infiltrates.    DX-ABDOMEN FOR TUBE PLACEMENT    Result Date: 11/18/2021 11/18/2021 5:31 PM HISTORY/REASON FOR EXAM:  Line evaluation. TECHNIQUE/EXAM DESCRIPTION AND NUMBER OF VIEWS:  1 view(s) of the abdomen. COMPARISON:  None. FINDINGS: Enteric tube has been placed. The tip projects over the proximal jejunum. The bowel gas pattern is within normal limits.     Feeding tube extends to the region of the stomach and duodenum with tip at the level of the proximal end of the jejunum.    DX-ABDOMEN FOR TUBE PLACEMENT    Result Date: 11/18/2021 11/18/2021 4:23 PM HISTORY/REASON FOR EXAM:  Line evaluation. TECHNIQUE/EXAM DESCRIPTION AND NUMBER OF VIEWS:  1 view(s) of the abdomen. COMPARISON:  1/5/2007 FINDINGS: Enteric tube has been placed. The tip projects over the duodenal jejunal junction. Common bile duct stent. Epigastrium and left upper quadrant vascular clips. Suture material again overlie the epigastrium The bowel gas pattern is within normal limits. Some contrast seen in the colon from upper GI performed yesterday     Enteric tube terminates over the duodenal jejunal junction Covered common bile duct stent No contrast extravasation is seen      Micro:  Results     Procedure Component Value Units Date/Time    FLUID CULTURE W/GRAM STAIN [[id number]]  (Abnormal)  (Susceptibility) Collected: 12/04/21 1127    Order Status: Completed Specimen: Body Fluid from Peritoneal Fluid Updated: 12/06/21 1111     Significant Indicator POS      Source BF     Site PERITONEAL FLUID     Culture Result Light growth mixed enteric stephen.     Gram Stain Result Many WBCs.  Many Gram positive cocci.  Few Gram positive rods.  Moderate Gram negative rods.       Culture Result Escherichia coli  Heavy growth        Enterococcus faecium  Heavy growth  The susceptibility profile for this organism indicates that  Streptomycin would not be an effective component of  combination therapy.      Narrative:      Collected By: [id number] YONG TAYLOR  Right abdomen  Collected By: [id number] YONG TAYLOR    Susceptibility     Escherichia coli (1)     Antibiotic Interpretation Microscan   Method Status    Ampicillin Sensitive <=8 mcg/mL MU Final    Ceftriaxone Sensitive <=1 mcg/mL MU Final    Cefazolin Sensitive <=2 mcg/mL MU Final    Ciprofloxacin Sensitive <=0.25 mcg/mL MU Final    Cefepime Sensitive <=2 mcg/mL MU Final    Cefuroxime Sensitive <=4 mcg/mL MU Final    Ertapenem Sensitive <=0.5 mcg/mL MU Final    Gentamicin Sensitive <=2 mcg/mL MU Final    Ampicillin/sulbactam Sensitive <=4/2 mcg/mL MU Final    Minocycline Sensitive <=4 mcg/mL MU Final    Moxifloxacin Sensitive <=2 mcg/mL MU Final    Pip/Tazobactam Sensitive <=8 mcg/mL MU Final    Trimeth/Sulfa Sensitive <=0.5/9.5 mcg/mL MU Final    Tigecycline Sensitive <=2 mcg/mL MU Final    Tobramycin Sensitive <=2 mcg/mL MU Final          Enterococcus faecium (2)     Antibiotic Interpretation Microscan   Method Status    Ampicillin Resistant >8 mcg/mL MU Final    Vancomycin Sensitive 0.5 mcg/mL MU Final    Gent Synergy Sensitive <=500 mcg/mL MU Final            Condensed View                         Assessment:  Nils Alfonso is a 66 y.o. female with complex history of recent pancreatitis with ERCP on 10/1/2021, complicated by suspected duodenal perforation with resultant complex multiple intra-abdominal abscesses.  For about 3 weeks, this was attempted to be managed by multiple IR drains but there was no  improvement.  Drain cultures from various times grew multiple organisms including E. coli and Enterococcus faecalis, Candida albicans and glabrata, stenotrophomonas, E. coli, ampicillin resistant E faecium.  Blood cultures grew Chryseobacterium species and Candida glabrata (completed therapy for the bacteremia). The decision was made to treat with at least 4 weeks of antibiotics with Continues home infusion IV Zosyn, IV micafungin, and p.o. Bactrim through 11/24/2021, and then follow-up in ID clinic, ID signed off. However, there are further procedures since that plan was made. Patient underwent an exploratory laparotomy on 11/5 for drainage of retroperitoneal abscess, peritoneal abscess, necrotizing fasciitis involving muscle and soft tissue.  Cultures from this procedure grew ampicillin resistant E faecium, sensitive E faecalis, Stenotrophomonas, Candida albicans, E. coli. Repeat CT of the abdomen on 11/15/2021 noted increase fluid collection in the right mid abdomen and slight increase in trace bilateral pleural effusions. IR placed a peritoneal drain but this fell out at some point and was not replaced due to the abdominal fluid collection being noted to be smaller. Upper GI series on 11/17/2021 showed a leak at the perforation in 2/3 portion of the duodenum.  Patient underwent an ERCP, biliary stent placement (since during the procedure there was concern for bile duct perforation being the cause of patient's leak), core track placement on 11/18/2021 by GI.  Esophageal stent was not placed since no obvious defect was noted.     Patient was discharged on 11/28. On 12/4, patient returned to the ER with fevers and right lower quadrant abdominal pain, found to have fluid collections right upper quadrant and right paracolic gutter. IR on 12/4 placed drain in the right paracolic gutter. Repeat CT scan on 12/6 with good improvement in the right lower quadrant fluid collections, the midline rectus fluid collection  appears to have drained through the incision.  Cultures from this growing pansensitive E. coli and ampicillin resistant E faecium thus far.  ID consulted for recommendations.     Pertinent Diagnoses:  Abdominal pelvic abscesses  Recent duodenal/biliary perforation  Polymicrobial infection    Plan:  -Continue IV vancomycin and Unasyn 3 g every 6 hours.  Monitor vancomycin levels and renal function.  Last vancomycin trough 10.3 in 12/7  -Previously, multiple other organisms including Saritha and Stenotrophomonas had grown, but this was prior to the exploratory laparotomy with washout.  Unless these organisms grow again, and if continued improvement is demonstrated on repeat CT scan, holding off on adding additional potentially harmful antimicrobials  -Repeat CT scan obtained 12/10 unfortunately with a slight increase in size of the right lower quadrant fluid collection, gas noted along superior aspect of collection with apparent contrast suggesting fistulous communication with the colon  -Unclear antibiotic plan at this time given above CT scan findings.  Will need multidisciplinary discussion    Discussed with Dr. Lerner    ID will follow.  Please call with questions.

## 2021-12-12 NOTE — PROGRESS NOTES
"Pt had an unwitnessed fall in the bathroom. This RN walked pt to the bathroom with  her personal four wheel walker, pt was steady on feet. Non-slip socks in place.  Instructed pt to pull the \"red cord\" when finished and wait for RN before getting up. Pt verbalized understanding. Bathroom alarm went off, CNA walked in to find the pt on the floor. Pt stated she stood up on her own, without pulling the cord, she walked to the sink and washed her hands and then tripped over her feet when trying to exit the bathroom. At this point, the pt pulled the \"red cord\" for help. With the assistance of 3 nurses, got pt up and safely to the bed. Pt denied hitting her head and stated she landed on her \"bottom\". PT denied any new pain at this time. No bruising, discoloration or disfigurement noted. Pt has skin tear on L middle finger, cleaned with NS and placed bandaid. Small skin tear on L lateral hand, cleaned with saline and left BRYAN. Pt re-educated about importance of calling prior to ambulation, pt verbalized understanding. Bed alarm strip placed under patient. Call light within reach.     Pt did not want her  to be called at this time, and would tell him herself.     Notified on-call hospitalist, Dr. Crain of [facility]. No new orders at this time.     Medication list requested from pharmacy.   "

## 2021-12-12 NOTE — PROGRESS NOTES
Bedside report received.  Assessment complete.  A&O x 4. Patient calls appropriately.  Patient ambulates with SB assist and FWW. Bed alarm on  Patient has 7/10 pain. Pain managed with prescribed medications.  Denies N&V. NPO with sips  MLI with dressing, CDI. RUQ fistula with ostomy appliance x2, CDI. RLQ fistula with ostomy appliance, CDI. RLQ IR drain with dressing, CDI  + void, - flatus, - BM.  Patient denies SOB.  SCD's off.    Review plan with of care with patient. Call light and personal belongings within reach. Hourly rounding in place. All needs met at this time.

## 2021-12-12 NOTE — CARE PLAN
The patient is Stable - Low risk of patient condition declining or worsening    Shift Goals  Clinical Goals: pain management   Patient Goals: Pain Management, Rest  Family Goals: No family currently present    Progress made toward(s) clinical / shift goals:  pt endorses 7/10 abd/flank pain. Managed with PRN meds. Pt educated on opioid medications. Pt verbalized understanding. Declines alternative heat/ice therapies       Problem: Knowledge Deficit - Standard  Goal: Patient and family/care givers will demonstrate understanding of plan of care, disease process/condition, diagnostic tests and medications  Outcome: Progressing     Problem: Pain - Standard  Goal: Alleviation of pain or a reduction in pain to the patient’s comfort goal  Outcome: Progressing     Problem: Skin Integrity  Goal: Skin integrity is maintained or improved  Outcome: Progressing       Patient is not progressing towards the following goals:

## 2021-12-12 NOTE — PROGRESS NOTES
Hospital Medicine Daily Progress Note    Date of Service  12/12/2021    Chief Complaint  Nils Alfonso is a 66 y.o. female admitted 12/4/2021 with abdominal abscess.    Hospital Course  Admitted with intra-abdominal abscess. Patient was started on empiric coverage with IV Vancomycin and Zosyn. Surgery was consulted on the case. Patient underwent CT guided Right retroperitoneal/paracolic gutter drainage tube placement on 12/4/2021.  Culture showed E. coli and Enterococcus faecium.  Infectious disease was consulted on the case.  Antibiotics were changed to IV Vancomycin and Unasyn.  Repeat CT scan showed possible increased fluid collection.  Patient with history of recurrent idiopathic pancreatitis. She had complications of pancreatic pseudocyst and intra-abdominal fluid collections. She underwent CT-guided drainage. She was placed on IV antibiotics. However there was no improvement noted. Surgery was then consulted on the case. Patient underwent Exploratory laparotomy, Incision and drainage and debridement of retroperitoneal abscess, Incision and drainage and debridement of anterior peritoneal abscess., Debridement of necrotizing fasciitis of the lateral abdominal wall on 11/5/2021.  She finished an antibiotic course of Zosyn, micafungin and Bactrim on 11/24/2021.    Interval Problem Update  Abdominal abscess - pain controlled,culture showed E. coli and Enterococcus faecium, drain output is 20 cc over 24 hours, case discussed with Surgery Oncology and Interventional Radiology  HTN - sbp 140-150  Low magnesium    I have personally seen and examined the patient at bedside. I discussed the plan of care with patient, bedside RN and Surgery oncology and interventional radiology    Consultants/Specialty  infectious disease and Surgery Oncology    Code Status  Full Code    Disposition  Patient is not medically cleared.   Anticipate discharge to to home with organized home healthcare and close outpatient follow-up.  I  have placed the appropriate orders for post-discharge needs.    Review of Systems  Review of Systems   Constitutional: Positive for malaise/fatigue. Negative for chills, diaphoresis and fever.   HENT: Negative for congestion, hearing loss and sore throat.    Eyes: Negative for blurred vision.   Respiratory: Negative for cough, shortness of breath and wheezing.    Cardiovascular: Positive for leg swelling. Negative for chest pain and palpitations.   Gastrointestinal: Positive for nausea. Negative for abdominal pain, diarrhea, heartburn and vomiting.   Genitourinary: Negative for dysuria, flank pain and hematuria.   Musculoskeletal: Negative for back pain, joint pain, myalgias and neck pain.   Skin: Negative for rash.   Neurological: Positive for weakness. Negative for dizziness, sensory change, speech change, focal weakness and headaches.   Psychiatric/Behavioral: The patient is not nervous/anxious.         Physical Exam  Temp:  [36.2 °C (97.1 °F)-37 °C (98.6 °F)] 36.3 °C (97.3 °F)  Pulse:  [72-96] 72  Resp:  [16-18] 18  BP: (141-157)/(75-91) 145/88  SpO2:  [93 %-96 %] 93 %    Physical Exam  Vitals and nursing note reviewed.   HENT:      Head: Normocephalic and atraumatic.      Nose: No congestion.      Mouth/Throat:      Mouth: Mucous membranes are moist.   Eyes:      Extraocular Movements: Extraocular movements intact.      Conjunctiva/sclera: Conjunctivae normal.   Cardiovascular:      Rate and Rhythm: Normal rate and regular rhythm.   Pulmonary:      Effort: Pulmonary effort is normal.      Breath sounds: Normal breath sounds.   Abdominal:      General: There is no distension.      Tenderness: There is no abdominal tenderness. There is no guarding or rebound.      Comments: Ostomy  Right flank MARTHA drain   Musculoskeletal:      Cervical back: No tenderness.      Right lower leg: Edema present.      Left lower leg: Edema present.   Skin:     General: Skin is warm and dry.   Neurological:      General: No focal  deficit present.      Mental Status: She is alert and oriented to person, place, and time.      Cranial Nerves: No cranial nerve deficit.         Fluids    Intake/Output Summary (Last 24 hours) at 12/12/2021 1152  Last data filed at 12/12/2021 0745  Gross per 24 hour   Intake 320 ml   Output 1830 ml   Net -1510 ml       Laboratory  Recent Labs     12/10/21  0708 12/11/21  0549 12/12/21  0728   WBC 6.0 6.7 10.1   RBC 3.25* 3.47* 3.37*   HEMOGLOBIN 9.6* 10.1* 9.9*   HEMATOCRIT 30.8* 32.6* 31.3*   MCV 94.8 93.9 92.9   MCH 29.5 29.1 29.4   MCHC 31.2* 31.0* 31.6*   RDW 58.4* 58.3* 56.3*   PLATELETCT 219 215 220   MPV 8.3* 8.9* 9.2     Recent Labs     12/10/21  0708 12/11/21  0549 12/12/21  0728   SODIUM 136 137 135   POTASSIUM 3.9 3.6 4.1   CHLORIDE 106 104 101   CO2 20 24 27   GLUCOSE 87 74 80   BUN 7* 5* 5*   CREATININE 0.31* 0.34* 0.34*   CALCIUM 7.1* 7.4* 7.6*                   Imaging  CT-ABDOMEN-PELVIS WITH   Final Result      1.  Apparent slight increase in size in right lower quadrant fluid collection with drain in place with gas again noted along the superior aspect of the collection to the ascending colon with dense fluid/apparent contrast within the fluid collection    suggesting fistulous communication with the colon.      2.  Anasarca with small bilateral pleural effusions and bibasilar atelectasis.      3.  Biliary stent placement with pneumobilia.      CT-ABDOMEN-PELVIS WITH   Final Result      1.  Interval decrease in size in right lower quadrant fluid collections status post drainage.      2.  Trace bilateral pleural effusions with bibasilar atelectasis.      3.  Biliary stent in place with pneumobilia.      4.  Anasarca      CT-DRAIN-PERITONEAL   Final Result      Successful RIGHT retroperitoneal/paracolic gutter drainage tube placement.      Plan: Thrice daily flushes with 10 mL of sterile saline. Monitor outputs. Please contact interventional radiology if there is any concern for tube dysfunction.       Findings were communicated with Dr. Vann via [employer] after initial scanning was performed.      IR-CONSULT AND TREAT    (Results Pending)        Assessment/Plan  * Intra-abdominal abscess (HCC)- (present on admission)  Assessment & Plan  CT guided Right retroperitoneal/paracolic gutter drainage tube placement  12/4/2021  IV Vancomycin, Unasyn   Plan to possibly upsize drain tomorrow    Sepsis - (present on admission)  Assessment & Plan  Source - Abdominal abscess      HTN (hypertension), benign- (present on admission)  Assessment & Plan  Start Metoprolol  IV Vasotec and labetalol as needed with parameters    Bilateral lower extremity edema  Assessment & Plan  IV Lasix    Hypomagnesemia- (present on admission)  Assessment & Plan  IV Mg 2 g  Follow level    Hypokalemia- (present on admission)  Assessment & Plan  Kdur, follow bmp    Hyponatremia- (present on admission)  Assessment & Plan  Follow bmp    Normocytic anemia- (present on admission)  Assessment & Plan  Follow cbc    Mild protein-calorie malnutrition (HCC)- (present on admission)  Assessment & Plan  Nutrition consult    Hyperlipidemia- (present on admission)  Assessment & Plan  Ezetimibe       VTE prophylaxis: heparin ppx    I have performed a physical exam and reviewed and updated ROS and Plan today (12/12/2021). In review of yesterday's note (12/11/2021), there are no changes except as documented above.

## 2021-12-13 ENCOUNTER — APPOINTMENT (OUTPATIENT)
Dept: RADIOLOGY | Facility: MEDICAL CENTER | Age: 66
DRG: 862 | End: 2021-12-13
Attending: FAMILY MEDICINE
Payer: MEDICARE

## 2021-12-13 ENCOUNTER — APPOINTMENT (OUTPATIENT)
Dept: RADIOLOGY | Facility: MEDICAL CENTER | Age: 66
End: 2021-12-13
Attending: FAMILY MEDICINE
Payer: MEDICARE

## 2021-12-13 LAB
ANION GAP SERPL CALC-SCNC: 8 MMOL/L (ref 7–16)
BASOPHILS # BLD AUTO: 0.8 % (ref 0–1.8)
BASOPHILS # BLD: 0.05 K/UL (ref 0–0.12)
BUN SERPL-MCNC: 6 MG/DL (ref 8–22)
CALCIUM SERPL-MCNC: 7.7 MG/DL (ref 8.5–10.5)
CHLORIDE SERPL-SCNC: 99 MMOL/L (ref 96–112)
CO2 SERPL-SCNC: 29 MMOL/L (ref 20–33)
CREAT SERPL-MCNC: 0.43 MG/DL (ref 0.5–1.4)
EOSINOPHIL # BLD AUTO: 0.04 K/UL (ref 0–0.51)
EOSINOPHIL NFR BLD: 0.6 % (ref 0–6.9)
ERYTHROCYTE [DISTWIDTH] IN BLOOD BY AUTOMATED COUNT: 57.4 FL (ref 35.9–50)
GLUCOSE SERPL-MCNC: 80 MG/DL (ref 65–99)
HCT VFR BLD AUTO: 33 % (ref 37–47)
HGB BLD-MCNC: 10 G/DL (ref 12–16)
IMM GRANULOCYTES # BLD AUTO: 0.09 K/UL (ref 0–0.11)
IMM GRANULOCYTES NFR BLD AUTO: 1.4 % (ref 0–0.9)
LYMPHOCYTES # BLD AUTO: 2.19 K/UL (ref 1–4.8)
LYMPHOCYTES NFR BLD: 33 % (ref 22–41)
MAGNESIUM SERPL-MCNC: 1.9 MG/DL (ref 1.5–2.5)
MCH RBC QN AUTO: 28.9 PG (ref 27–33)
MCHC RBC AUTO-ENTMCNC: 30.3 G/DL (ref 33.6–35)
MCV RBC AUTO: 95.4 FL (ref 81.4–97.8)
MONOCYTES # BLD AUTO: 0.5 K/UL (ref 0–0.85)
MONOCYTES NFR BLD AUTO: 7.5 % (ref 0–13.4)
NEUTROPHILS # BLD AUTO: 3.76 K/UL (ref 2–7.15)
NEUTROPHILS NFR BLD: 56.7 % (ref 44–72)
NRBC # BLD AUTO: 0 K/UL
NRBC BLD-RTO: 0 /100 WBC
NT-PROBNP SERPL IA-MCNC: 1379 PG/ML (ref 0–125)
PLATELET # BLD AUTO: 233 K/UL (ref 164–446)
PMV BLD AUTO: 9.3 FL (ref 9–12.9)
POTASSIUM SERPL-SCNC: 3.9 MMOL/L (ref 3.6–5.5)
RBC # BLD AUTO: 3.46 M/UL (ref 4.2–5.4)
SODIUM SERPL-SCNC: 136 MMOL/L (ref 135–145)
WBC # BLD AUTO: 6.6 K/UL (ref 4.8–10.8)

## 2021-12-13 PROCEDURE — A9270 NON-COVERED ITEM OR SERVICE: HCPCS | Performed by: INTERNAL MEDICINE

## 2021-12-13 PROCEDURE — 700111 HCHG RX REV CODE 636 W/ 250 OVERRIDE (IP): Performed by: INTERNAL MEDICINE

## 2021-12-13 PROCEDURE — 700102 HCHG RX REV CODE 250 W/ 637 OVERRIDE(OP): Performed by: FAMILY MEDICINE

## 2021-12-13 PROCEDURE — 36415 COLL VENOUS BLD VENIPUNCTURE: CPT

## 2021-12-13 PROCEDURE — 99233 SBSQ HOSP IP/OBS HIGH 50: CPT | Performed by: INTERNAL MEDICINE

## 2021-12-13 PROCEDURE — A9270 NON-COVERED ITEM OR SERVICE: HCPCS | Performed by: FAMILY MEDICINE

## 2021-12-13 PROCEDURE — 700102 HCHG RX REV CODE 250 W/ 637 OVERRIDE(OP): Performed by: INTERNAL MEDICINE

## 2021-12-13 PROCEDURE — 80048 BASIC METABOLIC PNL TOTAL CA: CPT

## 2021-12-13 PROCEDURE — 700111 HCHG RX REV CODE 636 W/ 250 OVERRIDE (IP): Performed by: FAMILY MEDICINE

## 2021-12-13 PROCEDURE — 700111 HCHG RX REV CODE 636 W/ 250 OVERRIDE (IP): Performed by: STUDENT IN AN ORGANIZED HEALTH CARE EDUCATION/TRAINING PROGRAM

## 2021-12-13 PROCEDURE — 83735 ASSAY OF MAGNESIUM: CPT

## 2021-12-13 PROCEDURE — 770001 HCHG ROOM/CARE - MED/SURG/GYN PRIV*

## 2021-12-13 PROCEDURE — 99233 SBSQ HOSP IP/OBS HIGH 50: CPT | Performed by: FAMILY MEDICINE

## 2021-12-13 PROCEDURE — 700105 HCHG RX REV CODE 258: Performed by: INTERNAL MEDICINE

## 2021-12-13 PROCEDURE — 83880 ASSAY OF NATRIURETIC PEPTIDE: CPT

## 2021-12-13 PROCEDURE — 80202 ASSAY OF VANCOMYCIN: CPT

## 2021-12-13 PROCEDURE — 76937 US GUIDE VASCULAR ACCESS: CPT

## 2021-12-13 PROCEDURE — 05HY33Z INSERTION OF INFUSION DEVICE INTO UPPER VEIN, PERCUTANEOUS APPROACH: ICD-10-PCS | Performed by: FAMILY MEDICINE

## 2021-12-13 PROCEDURE — 85025 COMPLETE CBC W/AUTO DIFF WBC: CPT

## 2021-12-13 RX ORDER — MAGNESIUM SULFATE HEPTAHYDRATE 40 MG/ML
2 INJECTION, SOLUTION INTRAVENOUS ONCE
Status: COMPLETED | OUTPATIENT
Start: 2021-12-13 | End: 2021-12-13

## 2021-12-13 RX ADMIN — ONDANSETRON 4 MG: 2 INJECTION INTRAMUSCULAR; INTRAVENOUS at 08:01

## 2021-12-13 RX ADMIN — AMPICILLIN SODIUM AND SULBACTAM SODIUM 3 G: 2; 1 INJECTION, POWDER, FOR SOLUTION INTRAMUSCULAR; INTRAVENOUS at 23:34

## 2021-12-13 RX ADMIN — ONDANSETRON 4 MG: 2 INJECTION INTRAMUSCULAR; INTRAVENOUS at 03:27

## 2021-12-13 RX ADMIN — AMPICILLIN SODIUM AND SULBACTAM SODIUM 3 G: 2; 1 INJECTION, POWDER, FOR SOLUTION INTRAMUSCULAR; INTRAVENOUS at 17:58

## 2021-12-13 RX ADMIN — METOPROLOL TARTRATE 25 MG: 25 TABLET, FILM COATED ORAL at 04:05

## 2021-12-13 RX ADMIN — OXYCODONE HYDROCHLORIDE 10 MG: 10 TABLET ORAL at 08:01

## 2021-12-13 RX ADMIN — EZETIMIBE 10 MG: 10 TABLET ORAL at 17:58

## 2021-12-13 RX ADMIN — AMPICILLIN SODIUM AND SULBACTAM SODIUM 3 G: 2; 1 INJECTION, POWDER, FOR SOLUTION INTRAMUSCULAR; INTRAVENOUS at 13:19

## 2021-12-13 RX ADMIN — OXYCODONE HYDROCHLORIDE 10 MG: 10 TABLET ORAL at 03:18

## 2021-12-13 RX ADMIN — OXYCODONE HYDROCHLORIDE 10 MG: 10 TABLET ORAL at 21:01

## 2021-12-13 RX ADMIN — POTASSIUM CHLORIDE 20 MEQ: 1500 TABLET, EXTENDED RELEASE ORAL at 17:58

## 2021-12-13 RX ADMIN — HEPARIN SODIUM 5000 UNITS: 5000 INJECTION, SOLUTION INTRAVENOUS; SUBCUTANEOUS at 21:01

## 2021-12-13 RX ADMIN — METOPROLOL TARTRATE 25 MG: 25 TABLET, FILM COATED ORAL at 17:58

## 2021-12-13 RX ADMIN — HEPARIN SODIUM 5000 UNITS: 5000 INJECTION, SOLUTION INTRAVENOUS; SUBCUTANEOUS at 13:19

## 2021-12-13 RX ADMIN — POTASSIUM CHLORIDE 20 MEQ: 1500 TABLET, EXTENDED RELEASE ORAL at 04:05

## 2021-12-13 RX ADMIN — OXYCODONE HYDROCHLORIDE 10 MG: 10 TABLET ORAL at 15:28

## 2021-12-13 RX ADMIN — OXYCODONE HYDROCHLORIDE 10 MG: 10 TABLET ORAL at 11:07

## 2021-12-13 RX ADMIN — MAGNESIUM SULFATE 2 G: 2 INJECTION INTRAVENOUS at 08:01

## 2021-12-13 RX ADMIN — GABAPENTIN 600 MG: 300 CAPSULE ORAL at 17:58

## 2021-12-13 RX ADMIN — CYCLOBENZAPRINE 10 MG: 10 TABLET, FILM COATED ORAL at 17:58

## 2021-12-13 RX ADMIN — FUROSEMIDE 40 MG: 10 INJECTION, SOLUTION INTRAMUSCULAR; INTRAVENOUS at 04:05

## 2021-12-13 RX ADMIN — AMPICILLIN SODIUM AND SULBACTAM SODIUM 3 G: 2; 1 INJECTION, POWDER, FOR SOLUTION INTRAMUSCULAR; INTRAVENOUS at 04:05

## 2021-12-13 RX ADMIN — AMPICILLIN SODIUM AND SULBACTAM SODIUM 3 G: 2; 1 INJECTION, POWDER, FOR SOLUTION INTRAMUSCULAR; INTRAVENOUS at 01:48

## 2021-12-13 RX ADMIN — GABAPENTIN 600 MG: 300 CAPSULE ORAL at 04:05

## 2021-12-13 RX ADMIN — CALCIUM CARBONATE 500 MG: 500 TABLET, CHEWABLE ORAL at 04:05

## 2021-12-13 ASSESSMENT — ENCOUNTER SYMPTOMS
HEMOPTYSIS: 0
NERVOUS/ANXIOUS: 0
SPEECH CHANGE: 0
SORE THROAT: 0
FOCAL WEAKNESS: 0
VOMITING: 0
FEVER: 0
SHORTNESS OF BREATH: 0
FLANK PAIN: 0
MYALGIAS: 0
BRUISES/BLEEDS EASILY: 0
ABDOMINAL PAIN: 1
DIARRHEA: 0
HEADACHES: 0
BLURRED VISION: 0
BACK PAIN: 0
NECK PAIN: 0
PALPITATIONS: 0
DIAPHORESIS: 0
DIZZINESS: 0
HEARTBURN: 0
COUGH: 0
WHEEZING: 0
CHILLS: 0
ABDOMINAL PAIN: 0
SENSORY CHANGE: 0
NAUSEA: 0
WEAKNESS: 1

## 2021-12-13 ASSESSMENT — PAIN DESCRIPTION - PAIN TYPE
TYPE: ACUTE PAIN

## 2021-12-13 ASSESSMENT — FIBROSIS 4 INDEX: FIB4 SCORE: 1.68

## 2021-12-13 NOTE — PROGRESS NOTES
Pt is AOx4  Assessment complete   Patient calls as needed  VSS  Pt ambulated to restroom w/ FWW standby assist   Tolerated well  Patient denies N&V, denies SOB  Denies pain at this time  MLI dressing changed per orders   RUQ fistula ostomy appliance x2 both CDI  RLQ fistula ostomy appliance +output 50mL, CDI  RLQ IR drain with dressing CDI  +void +flatus   SCD's off  Call light in place, bed locked, in lowest position, bed alarm on will cont to monitor

## 2021-12-13 NOTE — THERAPY
Missed Therapy     Patient Name: Nils Alfonso  Age:  66 y.o., Sex:  female  Medical Record #: [id number]  Today's Date: 12/13/2021    Attempted PT treatment session. Pt declining today, states feeling incr pain and fatigue. Pt reports she is ambulating around the unit daily and to/from the bathroom multiple times/day. Pt requests PT return tomorrow as she is motivated to ambulate and negotiate stairs with PT. Will re-attempt tomorrow as able.

## 2021-12-13 NOTE — PROCEDURES
Vascular Access Team    Date of Insertion: December 13, 2021  Arm Circumference: 29 cm  Internal length: 20 cm  External Length: 0   Vein Occupancy %: 38%  Reason for Midline: Access  Labs: WBC 6.6, , BUN 6, Cr 0.43, GFR >60, INR n/a    Orders confirmed, vessel patency confirmed with ultrasound. Risks and benefits of procedure explained to patient and education regarding line associated bloodstream infections provided. Questions answered.     Power Midline placed in LUE per licensed provider order with ultrasound guidance. 4 Fr, single lumen Power Midline placed in brachial vein after 1 attempt(s). 2 mL of 1% lidocaine injected intradermally, 21 gauge microintroducer needle and modified Seldinger technique used. 20 cm catheter inserted with good blood return. Secured at 0 cm marker. Internal positioning stylet removed and verified to be intact. Each lumen flushed without resistance with 10 mL 0.9% normal saline. Midline secured with Biopatch and Tegaderm.     Midline placement is confirmed by nurse using ultrasound and ability to flush and draw blood. Midline is appropriate for use at this time.  Patient tolerated procedure well, without complications.  No X-ray is needed for placement confirmation.  Patient condition relayed to unit RN or ordering physician via this post procedure note in the EMR.     Ultrasound images uploaded to PACS and viewable in the EMR - yes  Ultrasound imaged printed and placed in paper chart - no     BARD Power Midline ref # [id number], Lot # [id number], Expiration Date January 31, 2023

## 2021-12-13 NOTE — CARE PLAN
Shift Goals  Clinical Goals: pain management; mobilize  Patient Goals: mobilize  Family Goals: No family currently present    Progress made toward(s) clinical / shift goals:      Patient understands plan of care; pain controlled adequately per mar     Problem: Knowledge Deficit - Standard  Goal: Patient and family/care givers will demonstrate understanding of plan of care, disease process/condition, diagnostic tests and medications  Outcome: Progressing     Problem: Pain - Standard  Goal: Alleviation of pain or a reduction in pain to the patient’s comfort goal  Outcome: Progressing

## 2021-12-13 NOTE — CARE PLAN
The patient is Stable - Low risk of patient condition declining or worsening    Shift Goals  Clinical Goals: pain management, remain free from falls   Patient Goals: eat   Family Goals:     Progress made toward(s) clinical / shift goals:  patient reports tolerable pain; bed alarm on, call light within reach, patient educated to wait for assistance    Patient is not progressing towards the following goals:      Problem: Pain - Standard  Goal: Alleviation of pain or a reduction in pain to the patient’s comfort goal  Outcome: Progressing     Problem: Fall Risk  Goal: Patient will remain free from falls  Outcome: Progressing

## 2021-12-13 NOTE — DISCHARGE PLANNING
Anticipated Discharge Disposition:   [facility]     Action:   Discussed with IDT and MD said that there is good output from drainage now. Plan is to rescan on Thursday. Possible to switch to oral abx but pt has a Midline PICC.    CM left a message for [facility] to confirm acceptance as they do not have any notes in.     Barriers to Discharge:   Pending medical /surgical clearance depending on what the rescan result.     Plan:   Follow up with MD.  Follow up with [facility] .

## 2021-12-13 NOTE — PROGRESS NOTES
Infectious Disease Progress Note    Author: Jose Arora M.D. Date & Time of service: 2021  8:40 AM    Chief Complaint:  Follow-up for intra-abdominal abscess    Interval History:   T-max 99.1, white count 6.1, tolerating switching antimicrobials.  12/10 patient is afebrile, white count 6000 today, feeling better overall, tolerating antimicrobials thus far   patient remains afebrile, white count is 10.1, tolerating antimicrobials.  CT scan findings as below   patient remains afebrile, white count 6.6, tolerating antimicrobials.  Plan as below.    Labs Reviewed and Medications Reviewed.    Review of Systems:  Review of Systems   Constitutional: Positive for malaise/fatigue. Negative for chills and fever.   Gastrointestinal: Positive for abdominal pain. Negative for nausea and vomiting.   Skin: Negative for itching and rash.   All other systems reviewed and are negative.      Hemodynamics:  Temp (24hrs), Av.7 °C (98 °F), Min:36.3 °C (97.4 °F), Max:37.2 °C (98.9 °F)  Temperature: 37.2 °C (98.9 °F)  Pulse  Av  Min: 53  Max: 99   Blood Pressure : 125/78       Physical Exam:  Physical Exam  Vitals and nursing note reviewed.   Constitutional:       General: She is not in acute distress.     Appearance: She is ill-appearing.   HENT:      Mouth/Throat:      Pharynx: No oropharyngeal exudate.   Eyes:      General: No scleral icterus.        Right eye: No discharge.         Left eye: No discharge.      Conjunctiva/sclera: Conjunctivae normal.   Cardiovascular:      Rate and Rhythm: Normal rate.      Heart sounds: No murmur heard.      Pulmonary:      Effort: Pulmonary effort is normal. No respiratory distress.      Breath sounds: No stridor.   Abdominal:      Comments: Right-sided MARTHA drain with purulent appearing output.  Ostomy   Musculoskeletal:         General: No swelling or tenderness.   Skin:     Findings: No erythema or rash.   Neurological:      Mental Status: She is alert.    Psychiatric:         Mood and Affect: Mood normal.         Behavior: Behavior normal.      Comments: Very pleasant.          Meds:    Current Facility-Administered Medications:   •  magnesium sulfate  •  furosemide  •  enalaprilat  •  labetalol  •  potassium chloride SA  •  metoprolol tartrate  •  acetaminophen **FOLLOWED BY** [DISCONTINUED] acetaminophen  •  ampicillin-sulbactam (UNASYN) IV  •  calcium carbonate  •  vancomycin  •  ondansetron  •  MD Alert...Vancomycin per Pharmacy  •  [Held by provider] heparin  •  cyclobenzaprine  •  ezetimibe  •  gabapentin  •  Pharmacy Consult Request  •  oxyCODONE immediate-release **OR** oxyCODONE immediate-release **OR** HYDROmorphone    Labs:  Recent Labs     12/11/21  0549 12/12/21 0728 12/13/21 0313   WBC 6.7 10.1 6.6   RBC 3.47* 3.37* 3.46*   HEMOGLOBIN 10.1* 9.9* 10.0*   HEMATOCRIT 32.6* 31.3* 33.0*   MCV 93.9 92.9 95.4   MCH 29.1 29.4 28.9   RDW 58.3* 56.3* 57.4*   PLATELETCT 215 220 233   MPV 8.9* 9.2 9.3   NEUTSPOLYS 50.00 66.80 56.70   LYMPHOCYTES 39.50 25.50 33.00   MONOCYTES 7.90 5.90 7.50   EOSINOPHILS 0.40 0.30 0.60   BASOPHILS 0.70 0.70 0.80     Recent Labs     12/11/21  0549 12/12/21  0728 12/13/21 0313   SODIUM 137 135 136   POTASSIUM 3.6 4.1 3.9   CHLORIDE 104 101 99   CO2 24 27 29   GLUCOSE 74 80 80   BUN 5* 5* 6*     Recent Labs     12/11/21  0549 12/12/21 0728 12/13/21 0313   CREATININE 0.34* 0.34* 0.43*       Imaging:  CT-ABDOMEN-PELVIS WITH    Result Date: 12/6/2021 12/6/2021 10:55 AM HISTORY/REASON FOR EXAM:  Abdominal pain, fever. TECHNIQUE/EXAM DESCRIPTION:   CT scan of the abdomen and pelvis with contrast. Contrast-enhanced helical scanning was obtained from the diaphragmatic domes through the pubic symphysis following the bolus administration of nonionic contrast without complication. 80 mL of Omnipaque 350 nonionic contrast was administered without complication. Low dose optimization technique was utilized for this CT exam including  automated exposure control and adjustment of the mA and/or kV according to patient size. COMPARISON: December 3, 2021 FINDINGS: Lower Chest: There are trace bilateral pleural effusions. There is bibasilar atelectasis, right greater left.. Liver: Normal. Spleen: Unremarkable. Pancreas: Unremarkable. Gallbladder: The gallbladder has been resected. Biliary: Common bile duct stent remains in place. There is pneumobilia with gas also identified in the pancreatic duct. Adrenal glands: Normal. Kidneys: Unremarkable without hydronephrosis. Bowel: No obstruction or acute inflammation. There is diverticulosis without evidence of diverticulitis Lymph nodes: No adenopathy. Vasculature: Unremarkable. Peritoneum: There is been interval decrease in size in right lower quadrant fluid collection status post drainage. A pigtail catheter remains in place. A fluid collection previously measuring 2.3 cm in width currently measures 9 mm in width. A fluid collection more posterior medially previously measured 4.1 cm in width and currently measures 19 mm in width with the drain within this collection. Open anterior abdominal wound noted. There is anasarca. Musculoskeletal: No acute or destructive process. Pelvis: No adenopathy or free fluid.     1.  Interval decrease in size in right lower quadrant fluid collections status post drainage. 2.  Trace bilateral pleural effusions with bibasilar atelectasis. 3.  Biliary stent in place with pneumobilia. 4.  Anasarca    CT-ABDOMEN-PELVIS WITH    Result Date: 12/3/2021  12/3/2021 5:11 PM HISTORY/REASON FOR EXAM: Acute abdominal pain. History of duodenal perforation. TECHNIQUE/EXAM DESCRIPTION:   CT scan of the abdomen and pelvis with contrast. Contrast-enhanced helical scanning was obtained from the diaphragmatic domes through the pubic symphysis following the bolus administration of nonionic contrast without complication. 80 mL of Omnipaque 350 nonionic contrast was administered without  complication. Oral contrast was administered. Low dose optimization technique was utilized for this CT exam including automated exposure control and adjustment of the mA and/or kV according to patient size. COMPARISON: CT AP 11/24/2021. Upper GI series 11/22/2021. CT AP 11/15/2021 FINDINGS: Lower Chest: There is minimal atelectasis or pneumonia in the right lower lobe with minimal right pleural effusion. There is been no significant change. The left lung base is without consolidation. A small calcified granuloma is again noted. No free air is present. Postoperative changes involving the stomach are stable. There is been interval removal of a right upper quadrant drain. There has been an increase in size of a right sided fluid collection or abscess in the right paracolic gutter region. The abscess has a length of 6.3 cm and transverse dimension of 4.1 cm inferiorly. There is a fluid collection superiorly in the right mid abdomen which is connected with the inferior collection. This collection has an air-fluid level and has a maximum diameter of 3 cm. This fluid collection extends inferiorly into the right lower quadrant paracolic gutter. There is a fluid collection in the midline of the rectus sheath below a midline skin incision. This collection has maximum dimension of 5.5 cm transversely and a length of approximately 9 cm. Liver: No hepatic parenchymal mass is present. Expansile common bile duct stent is present. Spleen: Unremarkable. Pancreas: Unremarkable. Gallbladder: The gallbladder has been resected. Biliary: Nondilated. Adrenal glands: Normal. Kidneys: Unremarkable without hydronephrosis. Bowel: No there is no evidence of bowel obstruction or focal inflammation. Lymph nodes: No adenopathy. Vasculature: Unremarkable. Peritoneum: Unremarkable without ascites. Musculoskeletal: No acute or destructive process. Fusion hardware is present in the distal lumbar spine. Pelvis: No adenopathy. The bladder appears  normal. No pelvic fluid collections or free fluid is present.     1.  Recurrent fluid collections or abscess in the right upper quadrant and right paracolic gutter region. Interval removal of right upper quadrant drain. 2.  New fluid collection in the rectus sheath in the midline below the skin incision. This may represent postoperative fluid collection or abscess. 3.  Postoperative changes involving the stomach. 4.  Cholecystectomy. Residual common bile duct expansile stent in place. 5.  No bowel or renal obstruction. 6.  No free air. 7.  Small unchanged right pleural effusion and unchanged minimal right lower lobe atelectasis or pneumonia.    CT-ABDOMEN-PELVIS WITH    Result Date: 11/24/2021 11/24/2021 3:56 PM HISTORY/REASON FOR EXAM:  Abdominal pain, acute, nonlocalized. History of duodenal perforation. TECHNIQUE/EXAM DESCRIPTION:   CT scan of the abdomen and pelvis with contrast. Contrast-enhanced helical scanning was obtained from the diaphragmatic domes through the pubic symphysis following the bolus administration of nonionic contrast without complication. 100 mL of Omnipaque 350 nonionic contrast was administered without complication. Low dose optimization technique was utilized for this CT exam including automated exposure control and adjustment of the mA and/or kV according to patient size. COMPARISON: 11/15/2021 FINDINGS: Lower Chest: Bibasilar atelectasis, improved from prior with improvement of bilateral pleural fluid. Liver: Liver again shows pneumobilia. Spleen: Unremarkable. Pancreas: Present in the pancreatic duct.  Pancreatic duct is mildly dilated. Gallbladder: Surgically absent. Biliary: Metallic common bile duct stent in place. Adrenal glands: Normal. Kidneys: Unremarkable without hydronephrosis. Bowel: Postoperative change of the stomach.  Contrast present in the colon from prior procedure.  RIGHT upper quadrant drain present within irregular fluid collection in the RIGHT lateral midabdomen.   Fluid collection shows peripheral enhancement as well  as subtle hyperdense contents and gas.  Fluid collection measures approximately 7.6 x 7.3 By 2.4 cm and extends into the RIGHT posterior pararenal space, decreased in size from prior exam.  RIGHT lower quadrant drain is been removed. Feeding tube in place with tip at the DJ flexure. Lymph nodes: No adenopathy. Vasculature: Unremarkable. Peritoneum: Unremarkable without ascites. Musculoskeletal: Postoperative change of lumbar spine. Pelvis: Bladder is unremarkable.  Contrast present in the vagina, likely reflux.  Postoperative change of anterior abdominal wall.  Diffuse body wall edema.     1.  RIGHT lateral no abnormal abscess again seen, decreased in size from prior exam with drain in place, however contents now appears hyperdense potential indicating bowel contrast, concerning for bowel perforation or fistula, although source of contrast  is uncertain.  No cristóbal pneumoperitoneum. 2.  Pneumobilia and biliary stent present. 3.  Interval removal of RIGHT lower quadrant drain.     CT-ABDOMEN-PELVIS WITH    Result Date: 11/15/2021  11/15/2021 11:49 AM HISTORY/REASON FOR EXAM:  intra-abdominal abscess. Follow up. TECHNIQUE/EXAM DESCRIPTION:   CT scan of the abdomen and pelvis with contrast. Contrast-enhanced helical scanning was obtained from the diaphragmatic domes through the pubic symphysis following the bolus administration of nonionic contrast without complication. 100 mL of Omnipaque 350 nonionic contrast was administered without complication. Low dose optimization technique was utilized for this CT exam including automated exposure control and adjustment of the mA and/or kV according to patient size. COMPARISON: November 8, 2021 FINDINGS: Lower Chest: There is bibasilar atelectasis, right greater than left. There is been slight increase in size of trace bilateral pleural effusions.. Liver: Normal. Spleen: Unremarkable. Pancreas: Unremarkable. There is again  air within the pancreatic duct. Gallbladder: The gallbladder has been resected. Biliary: Pneumobilia again noted.. Adrenal glands: Normal. Kidneys: Unremarkable without hydronephrosis. Bowel: No obstruction or acute inflammation. Lymph nodes: No adenopathy. Vasculature: Unremarkable. Peritoneum: There is again a mid abdomen fluid collection which appears slightly larger currently measuring 7.2 x 7.1 x 9.4 cm. The surgical drain tracks through this fluid collection. There is anasarca. Musculoskeletal: No acute or destructive process. Pelvis: No adenopathy or free fluid.     1.  Slight interval increase in size in fluid collection the right midabdomen with drain in place. 2.  Slight interval increase in size in trace bilateral pleural effusions, right greater than left with bibasilar atelectasis. 3.  Pneumobilia.    CT-DRAIN-PERITONEAL    Result Date: 12/4/2021  HISTORY/REASON FOR EXAM:  66-year-old woman with complex medical history who has RIGHT abscesses involving the RIGHT retroperitoneum and paracolic gutter as well as her anterior abdominal wall. Both are decompressing through the dermis. TECHNIQUE/EXAM DESCRIPTION AND NUMBER OF VIEWS: RIGHT paracolic gutter/retroperitoneal drain placement with CT guidance. Low dose optimization technique was utilized for this CT exam including automated exposure control and adjustment of the mA and/or kV according to patient size. COMPARISON:  CT 12/3/2021 MEDICATIONS: Moderate sedation was provided. Pulse oximetry and continuous cardiac monitoring by the nurse was performed throughout the exam. Intraservice time was 15 minutes. PROCEDURE:     The risks, benefits, goals and objectives and alternatives were discussed. Risks were specified as including but not limited to bleeding, infection, damage to vessels or nerves, pain and discomfort as well as the possibility of incomplete resolution of underlying disease. Drain care related issues were discussed with the patient. The  patient's questions were answered. Informed oral and written consent were obtained. The patient was placed on the CT gantry. Localizing scan was performed. The skin was prepped and draped in the usual sterile manner.  A timeout was performed. Local anesthetic result was achieved with administration of 1% lidocaine. A(n) 17-gauge access needle was advanced to the target using CT fluoroscopic guidance. Access was secured with a wire and the tract was sequentially dilated to accommodate a(n) 12 Ghanaian locking loop drain. A total of 50 mL of brown malodorous fluid was removed and sent for laboratory evaluation. This was put in place and formed. The catheter was attached to suction bulbdrainage and secured to the skin with 2-0 nylon suture. Completion scan was performed which showed no complication. The patient tolerated the procedure well with no evidence of complication. The skin was cleaned and a dressing was applied. FINDINGS: Substantial interval decompression of both the anterior abdominal wall and RIGHT paracolic gutter/retroperitoneal fluid collections. No residual targetable collection in the anterior abdominal wall. Appropriate tube position in the RIGHT retroperitoneal/paracolic gutter collection. No evidence of hemorrhage.     Successful RIGHT retroperitoneal/paracolic gutter drainage tube placement. Plan: Thrice daily flushes with 10 mL of sterile saline. Monitor outputs. Please contact interventional radiology if there is any concern for tube dysfunction. Findings were communicated with Dr. Vann via [employer] after initial scanning was performed.    DX-CHEST-PORTABLE (1 VIEW)    Result Date: 12/3/2021  12/3/2021 4:46 PM HISTORY/REASON FOR EXAM: Dehydration and nausea for one day. TECHNIQUE/EXAM DESCRIPTION AND NUMBER OF VIEWS: Single AP view of the chest. COMPARISON: 11/19/2021 FINDINGS: No pneumothorax. There is unchanged elevation of the right hemidiaphragm. No enlarging pleural effusion. Hazy right  lower lobe opacity is seen, evaluation of this area is mildly limited secondary to overlying EKG leads. Cardiac mediastinal silhouette is normal. Multiple surgical clips project over the epigastrium and a biliary stent is seen.     Hazy right lower lobe opacity may be atelectasis, evaluation of this region is mildly limited secondary to overlying EKG leads.    [id number] STENT - TUBE    Result Date: 11/18/2021 11/18/2021 2:17 PM HISTORY/REASON FOR EXAM:  ERCP biliary stent placement TECHNIQUE/EXAM DESCRIPTION AND NUMBER OF VIEWS: 19 portable fluoroscopic images were obtained during ERCP biliary stent placement procedure performed and endoscopy suite. COMPARISON: None FINDINGS: 19 portable fluoroscopic images obtained during ERCP biliary stent placement.     Portable fluoroscopic images obtained during ERCP biliary stent placement for localization purposes.    DX-UPPER GI-SERIES WITH KUB    Result Date: 11/22/2021 11/22/2021 11:03 AM HISTORY/REASON FOR EXAM:  Abdominal Pain; Use water soluble contrast - re-evaluate duodenal perforation. TECHNIQUE/EXAM DESCRIPTION AND NUMBER OF VIEWS: Omnipaque 300 water soluble contrast upper GI series was performed. 19 fluoroscopic images obtained. Fluoroscopy time: 1.1 minutes COMPARISON: 11/17/2021 FINDINGS:  image shows extensive postoperative change of the abdomen feeding tube in place.  Feeding tube tip at the proximal jejunum. Metallic biliary stent in place. Contrast refluxes into the distal common bile duct, within the biliary stent, consistent with prior sphincterotomy. No evidence for leakage of contrast from the stomach or duodenum.     1.  No evidence for duodenal leak. 2.  Reflux of contrast seen into the distal common bile duct at the site of biliary stent, consistent with prior sphincterotomy.    DX-UPPER GI-SERIES WITH KUB    Result Date: 11/17/2021 11/17/2021 10:36 AM HISTORY/REASON FOR EXAM:  Gastrointestinal Complaint; Evaluate duodenal or intestinal  leak from previous ERCP perforation. TECHNIQUE/EXAM DESCRIPTION AND NUMBER OF VIEWS: Omnipaque 300 water soluble contrast focused upper GI series was performed. 9 fluoroscopic images obtained. Fluoroscopy time: 0.5 minutes COMPARISON: CT abdomen pelvis dated 11/15/2021 FINDINGS: Extensive postsurgical changes of the upper abdomen. The duodenal C-loop is in appropriate anatomic position. There is contrast extravasation at the junction of the second and third portions of duodenum (see key images).     Duodenal perforation at the junction of the second and third portions of duodenum as noted on key images.    CT-ABORTED CT PROCEDURE    Result Date: 11/16/2021  HISTORY/REASON FOR EXAM:  RIGHT retroperitoneal abscess with surgical drains in place. TECHNIQUE/EXAM DESCRIPTION AND NUMBER OF VIEWS: Limited pelvic CT. Low dose optimization technique was utilized for this CT exam including automated exposure control and adjustment of the mA and/or kV according to patient size. COMPARISON:  Diagnostic CT 11/15/2021 MEDICATIONS: None PROCEDURE:     The risks, benefits, goals and objectives and alternatives were discussed. Risks were specified as including but not limited to bleeding, infection, damage to vessels or nerves, pain and discomfort as well as the possibility of incomplete resolution of underlying disease. Drain care related issues were discussed with the patient. The patient's questions were answered. Informed oral and written consent were obtained. The patient was placed on the CT gantry. Localizing scan was performed. Based on my findings no procedure was performed. FINDINGS: Decreased size of RIGHT paracolic gutter fluid collection which contains a surgical drain. There is some contrast present in the collection as well as in the adjacent colon.     Interval decrease in size of the RIGHT retroperitoneal fluid collection which contains a surgical drain. Because from bowel is possible. No additional drain was placed.      DX-ABDOMEN FOR TUBE PLACEMENT    Result Date: 11/19/2021 11/19/2021 5:57 AM HISTORY/REASON FOR EXAM: coretrak placement TECHNIQUE/EXAM DESCRIPTION:  Single AP view the abdomen. COMPARISON:  October 19, 2021 FINDINGS: Hazy right lower lobe opacities are seen. Dobbhoff tube is seen, the tip lies to the left of the lumbar spine.  The bowel gas pattern appears nonspecific. TIPS stent is in place. The bony structures appear age-appropriate.     1.  Nonspecific bowel gas pattern. 2.  Dobbhoff tube with tip terminating overlying the expected location of the third or fourth duodenal segment. 3.  Hazy right lower lobe infiltrates.    DX-ABDOMEN FOR TUBE PLACEMENT    Result Date: 11/18/2021 11/18/2021 5:31 PM HISTORY/REASON FOR EXAM:  Line evaluation. TECHNIQUE/EXAM DESCRIPTION AND NUMBER OF VIEWS:  1 view(s) of the abdomen. COMPARISON:  None. FINDINGS: Enteric tube has been placed. The tip projects over the proximal jejunum. The bowel gas pattern is within normal limits.     Feeding tube extends to the region of the stomach and duodenum with tip at the level of the proximal end of the jejunum.    DX-ABDOMEN FOR TUBE PLACEMENT    Result Date: 11/18/2021 11/18/2021 4:23 PM HISTORY/REASON FOR EXAM:  Line evaluation. TECHNIQUE/EXAM DESCRIPTION AND NUMBER OF VIEWS:  1 view(s) of the abdomen. COMPARISON:  1/5/2007 FINDINGS: Enteric tube has been placed. The tip projects over the duodenal jejunal junction. Common bile duct stent. Epigastrium and left upper quadrant vascular clips. Suture material again overlie the epigastrium The bowel gas pattern is within normal limits. Some contrast seen in the colon from upper GI performed yesterday     Enteric tube terminates over the duodenal jejunal junction Covered common bile duct stent No contrast extravasation is seen      Micro:  Results     Procedure Component Value Units Date/Time    FLUID CULTURE W/GRAM STAIN [[id number]]  (Abnormal)  (Susceptibility) Collected: 12/04/21 1127     Order Status: Completed Specimen: Body Fluid from Peritoneal Fluid Updated: 12/06/21 1111     Significant Indicator POS     Source BF     Site PERITONEAL FLUID     Culture Result Light growth mixed enteric stephen.     Gram Stain Result Many WBCs.  Many Gram positive cocci.  Few Gram positive rods.  Moderate Gram negative rods.       Culture Result Escherichia coli  Heavy growth        Enterococcus faecium  Heavy growth  The susceptibility profile for this organism indicates that  Streptomycin would not be an effective component of  combination therapy.      Narrative:      Collected By: [id number] YONG TAYLOR  Right abdomen  Collected By: [id number] YONG TAYLOR    Susceptibility     Escherichia coli (1)     Antibiotic Interpretation Microscan   Method Status    Ampicillin Sensitive <=8 mcg/mL MU Final    Ceftriaxone Sensitive <=1 mcg/mL MU Final    Cefazolin Sensitive <=2 mcg/mL MU Final    Ciprofloxacin Sensitive <=0.25 mcg/mL MU Final    Cefepime Sensitive <=2 mcg/mL MU Final    Cefuroxime Sensitive <=4 mcg/mL MU Final    Ertapenem Sensitive <=0.5 mcg/mL MU Final    Gentamicin Sensitive <=2 mcg/mL MU Final    Ampicillin/sulbactam Sensitive <=4/2 mcg/mL MU Final    Minocycline Sensitive <=4 mcg/mL MU Final    Moxifloxacin Sensitive <=2 mcg/mL MU Final    Pip/Tazobactam Sensitive <=8 mcg/mL MU Final    Trimeth/Sulfa Sensitive <=0.5/9.5 mcg/mL MU Final    Tigecycline Sensitive <=2 mcg/mL MU Final    Tobramycin Sensitive <=2 mcg/mL MU Final          Enterococcus faecium (2)     Antibiotic Interpretation Microscan   Method Status    Ampicillin Resistant >8 mcg/mL MU Final    Vancomycin Sensitive 0.5 mcg/mL MU Final    Gent Synergy Sensitive <=500 mcg/mL MU Final            Condensed View                         Assessment:  Nils Alfonso is a 66 y.o. female with complex history of recent pancreatitis with ERCP on 10/1/2021, complicated by suspected duodenal perforation with resultant complex  multiple intra-abdominal abscesses.  For about 3 weeks, this was attempted to be managed by multiple IR drains but there was no improvement.  Drain cultures from various times grew multiple organisms including E. coli and Enterococcus faecalis, Candida albicans and glabrata, stenotrophomonas, E. coli, ampicillin resistant E faecium.  Blood cultures grew Chryseobacterium species and Candida glabrata (completed therapy for the bacteremia). The decision was made to treat with at least 4 weeks of antibiotics with Continues home infusion IV Zosyn, IV micafungin, and p.o. Bactrim through 11/24/2021, and then follow-up in ID clinic, ID signed off. However, there are further procedures since that plan was made. Patient underwent an exploratory laparotomy on 11/5 for drainage of retroperitoneal abscess, peritoneal abscess, necrotizing fasciitis involving muscle and soft tissue.  Cultures from this procedure grew ampicillin resistant E faecium, sensitive E faecalis, Stenotrophomonas, Candida albicans, E. coli. Repeat CT of the abdomen on 11/15/2021 noted increase fluid collection in the right mid abdomen and slight increase in trace bilateral pleural effusions. IR placed a peritoneal drain but this fell out at some point and was not replaced due to the abdominal fluid collection being noted to be smaller. Upper GI series on 11/17/2021 showed a leak at the perforation in 2/3 portion of the duodenum.  Patient underwent an ERCP, biliary stent placement (since during the procedure there was concern for bile duct perforation being the cause of patient's leak), core track placement on 11/18/2021 by GI.  Esophageal stent was not placed since no obvious defect was noted.     Patient was discharged on 11/28. On 12/4, patient returned to the ER with fevers and right lower quadrant abdominal pain, found to have fluid collections right upper quadrant and right paracolic gutter. IR on 12/4 placed drain in the right paracolic gutter.  Repeat CT scan on 12/6 with good improvement in the right lower quadrant fluid collections, the midline rectus fluid collection appears to have drained through the incision.  Cultures from this growing pansensitive E. coli and ampicillin resistant E faecium thus far.  ID consulted for recommendations.     Pertinent Diagnoses:  Abdominal pelvic abscesses  Recent duodenal/biliary perforation  Polymicrobial infection    Plan:  -Continue IV vancomycin and Unasyn 3 g every 6 hours.  Monitor vancomycin levels and renal function.  Last vancomycin trough 10.3 in 12/7  -Previously, multiple other organisms including Saritha and Stenotrophomonas had grown, but this was prior to the exploratory laparotomy with washout. Holding off on adding additional potentially harmful antimicrobials unless these organisms grow again  -Repeat CT scan obtained 12/10 with a slight increase in size of the right lower quadrant fluid collection, gas noted along superior aspect of collection with radiologist interpretation of apparent contrast suggesting fistulous communication with the colon.  Surgery interpretation after reviewing past images not concerning for fistula  -Current plan is to increase flushing of the drain. Recommend repeat imaging approximately 5 days after to ensure improvement in size of the abscess  -Antibiotic plan currently undetermined.  Oral options limited by the need for linezolid (unsafe to use for more than a 2-week duration)    Discussed with Dr. Lerner    ID will follow.  Please call with questions.

## 2021-12-13 NOTE — PROGRESS NOTES
[facility] Medicine Daily Progress Note    Date of Service  12/13/2021    Chief Complaint  Nils Alfonso is a 66 y.o. female admitted 12/4/2021 with abdominal abscess.    Hospital Course  Admitted with intra-abdominal abscess. Patient was started on empiric coverage with IV Vancomycin and Zosyn. Surgery was consulted on the case. Patient underwent CT guided Right retroperitoneal/paracolic gutter drainage tube placement on 12/4/2021.  Culture showed E. coli and Enterococcus faecium.  Infectious disease was consulted on the case.  Antibiotics were changed to IV Vancomycin and Unasyn.  Repeat CT scan showed possible increased fluid collection.  Patient with history of recurrent idiopathic pancreatitis. She had complications of pancreatic pseudocyst and intra-abdominal fluid collections. She underwent CT-guided drainage. She was placed on IV antibiotics. However there was no improvement noted. Surgery was then consulted on the case. Patient underwent Exploratory laparotomy, Incision and drainage and debridement of retroperitoneal abscess, Incision and drainage and debridement of anterior peritoneal abscess., Debridement of necrotizing fasciitis of the lateral abdominal wall on 11/5/2021.  She finished an antibiotic course of Zosyn, micafungin and Bactrim on 11/24/2021.    Interval Problem Update  Abdominal abscess - pain controlled,culture showed E. coli and Enterococcus faecium, drain output is 65 cc over 24 hours, case discussed with ID  HTN - sbp 120-150  Low magnesium    I have personally seen and examined the patient at bedside. I discussed the plan of care with patient, bedside RN, charge RN,  and infectious disease    Consultants/Specialty  infectious disease and Surgery Oncology    Code Status  Full Code    Disposition  Patient is not medically cleared.   Anticipate discharge to to home with organized home healthcare and close outpatient follow-up.  I have placed the appropriate orders for  post-discharge needs.    Review of Systems  Review of Systems   Constitutional: Positive for malaise/fatigue. Negative for chills, diaphoresis and fever.   HENT: Negative for congestion, hearing loss and sore throat.    Eyes: Negative for blurred vision.   Respiratory: Negative for cough, shortness of breath and wheezing.    Cardiovascular: Positive for leg swelling. Negative for chest pain and palpitations.   Gastrointestinal: Negative for abdominal pain, diarrhea, heartburn, nausea and vomiting.   Genitourinary: Negative for dysuria, flank pain and hematuria.   Musculoskeletal: Negative for back pain, joint pain, myalgias and neck pain.   Skin: Negative for rash.   Neurological: Positive for weakness. Negative for dizziness, sensory change, speech change, focal weakness and headaches.   Psychiatric/Behavioral: The patient is not nervous/anxious.         Physical Exam  Temp:  [36.3 °C (97.4 °F)-37.2 °C (98.9 °F)] 37.2 °C (98.9 °F)  Pulse:  [64-96] 64  Resp:  [] 18  BP: (125-152)/(78-92) 125/78  SpO2:  [91 %-94 %] 91 %    Physical Exam  Vitals and nursing note reviewed.   HENT:      Head: Normocephalic and atraumatic.      Nose: No congestion.      Mouth/Throat:      Mouth: Mucous membranes are moist.   Eyes:      Extraocular Movements: Extraocular movements intact.      Conjunctiva/sclera: Conjunctivae normal.   Cardiovascular:      Rate and Rhythm: Normal rate and regular rhythm.   Pulmonary:      Effort: Pulmonary effort is normal.      Breath sounds: Normal breath sounds.   Abdominal:      General: There is no distension.      Tenderness: There is no abdominal tenderness. There is no guarding or rebound.      Comments: Ostomy  Right flank MARTHA drain   Musculoskeletal:      Cervical back: No tenderness.      Right lower leg: Edema present.      Left lower leg: Edema present.   Skin:     General: Skin is warm and dry.   Neurological:      General: No focal deficit present.      Mental Status: She is alert and  oriented to person, place, and time.      Cranial Nerves: No cranial nerve deficit.         Fluids    Intake/Output Summary (Last 24 hours) at 12/13/2021 1210  Last data filed at 12/13/2021 0727  Gross per 24 hour   Intake 680 ml   Output 915 ml   Net -235 ml       Laboratory  Recent Labs     12/11/21  0549 12/12/21  0728 12/13/21  0313   WBC 6.7 10.1 6.6   RBC 3.47* 3.37* 3.46*   HEMOGLOBIN 10.1* 9.9* 10.0*   HEMATOCRIT 32.6* 31.3* 33.0*   MCV 93.9 92.9 95.4   MCH 29.1 29.4 28.9   MCHC 31.0* 31.6* 30.3*   RDW 58.3* 56.3* 57.4*   PLATELETCT 215 220 233   MPV 8.9* 9.2 9.3     Recent Labs     12/11/21  0549 12/12/21  0728 12/13/21  0313   SODIUM 137 135 136   POTASSIUM 3.6 4.1 3.9   CHLORIDE 104 101 99   CO2 24 27 29   GLUCOSE 74 80 80   BUN 5* 5* 6*   CREATININE 0.34* 0.34* 0.43*   CALCIUM 7.4* 7.6* 7.7*                   Imaging  CT-ABDOMEN-PELVIS WITH   Final Result      1.  Apparent slight increase in size in right lower quadrant fluid collection with drain in place with gas again noted along the superior aspect of the collection to the ascending colon with dense fluid/apparent contrast within the fluid collection    suggesting fistulous communication with the colon.      2.  Anasarca with small bilateral pleural effusions and bibasilar atelectasis.      3.  Biliary stent placement with pneumobilia.      CT-ABDOMEN-PELVIS WITH   Final Result      1.  Interval decrease in size in right lower quadrant fluid collections status post drainage.      2.  Trace bilateral pleural effusions with bibasilar atelectasis.      3.  Biliary stent in place with pneumobilia.      4.  Anasarca      CT-DRAIN-PERITONEAL   Final Result      Successful RIGHT retroperitoneal/paracolic gutter drainage tube placement.      Plan: Thrice daily flushes with 10 mL of sterile saline. Monitor outputs. Please contact interventional radiology if there is any concern for tube dysfunction.      Findings were communicated with Dr. Vann via Voalte  Me after initial scanning was performed.      IR-CONSULT AND TREAT    (Results Pending)   IR-MIDLINE CATHETER INSERTION WO GUIDANCE > AGE 3    (Results Pending)        Assessment/Plan  * Intra-abdominal abscess (HCC)- (present on admission)  Assessment & Plan  CT guided Right retroperitoneal/paracolic gutter drainage tube placement  12/4/2021  IV Vancomycin, Unasyn   Will need repeat CT on 12/16/2021    Sepsis - (present on admission)  Assessment & Plan  Source - Abdominal abscess      HTN (hypertension), benign- (present on admission)  Assessment & Plan  Metoprolol  IV Vasotec and labetalol as needed with parameters    Bilateral lower extremity edema  Assessment & Plan  IV Lasix    Hypomagnesemia- (present on admission)  Assessment & Plan  IV Mg 2 g  Follow level    Hypokalemia- (present on admission)  Assessment & Plan  Kdur, follow bmp    Hyponatremia- (present on admission)  Assessment & Plan  Follow bmp    Normocytic anemia- (present on admission)  Assessment & Plan  Follow cbc    Mild protein-calorie malnutrition (HCC)- (present on admission)  Assessment & Plan  Nutrition consult    Hyperlipidemia- (present on admission)  Assessment & Plan  Ezetimibe       VTE prophylaxis: heparin ppx    I have performed a physical exam and reviewed and updated ROS and Plan today (12/13/2021). In review of yesterday's note (12/12/2021), there are no changes except as documented above.

## 2021-12-13 NOTE — PROGRESS NOTES
Radiology Progress Note   Author: AYE Mahan Date & Time created: 12/13/2021  10:10 AM   Date of admission  12/4/2021  Note to reader: this note follows the APSO format rather than the historical SOAP format. Assessment and plan located at the top of the note for ease of use.    Chief Complaint  66 y.o. female admitted 12/4/2021 with ABD pain       HPI  Nils Alfonso is a 66 y.o. female admitted 12/4/2021 with complicated past medical history including multiple recent hospitalizations for complicated duodenectomy and recurrent pancreatitis who presents with recurrent abdominal pain and progressive/new intra-abdominal fluid collections concerning for abscesses and possible ongoing GI leak.       Assessment/Plan  Interval History   Principal Problem:    Intra-abdominal abscess (HCC)  Active Problems:    Hyperlipidemia    Sepsis     Normocytic anemia    Hyponatremia    Hypokalemia    Hypomagnesemia    Postprocedural intraabdominal abscess    Mild protein-calorie malnutrition (HCC)    Bilateral lower extremity edema    HTN (hypertension), benign      Plan IR  - Irrigate RLQ drain with 20 ml of sterile saline six times per shift   - Fluid cultures, Escherichia coli, Enterococcus faecium  - Sugery following   - Drain in good position, recommend increasing irrigation frequency and amount    - Continue to monitor drains, VS and labs        [id number]  IR:   12/4-RIGHT paracolic gutter/RP drain placement by CT 50 mL brown fluid to lab  12/5- 65 ml   12/6- 50 ml purulent brown fluid, Ct- decrease in size in right lower quadrant fluid collection status post drainage. A pigtail catheter remains in place. A fluid collection previously measuring 2.3 cm in width currently measures 9 mm in width.   12/7-  75 ml  12/8- 60 ml   12/9- 35 ml in am  12/10- 15 ml   12/11- 15 ml  12/12- 45 ml  12/13- 25 ml in am        Review of Systems  Physical Exam   Review of Systems   Constitutional: Negative for chills, fever and  malaise/fatigue.   HENT: Negative for hearing loss.    Eyes: Negative for blurred vision.   Respiratory: Negative for cough, hemoptysis and shortness of breath.    Cardiovascular: Negative for chest pain and palpitations.   Gastrointestinal: Positive for abdominal pain. Negative for vomiting.   Genitourinary: Negative for dysuria.   Musculoskeletal: Negative for myalgias.   Skin: Negative for rash.   Neurological: Positive for weakness. Negative for dizziness and headaches.   Endo/Heme/Allergies: Does not bruise/bleed easily.   Psychiatric/Behavioral: Negative for suicidal ideas.      Vitals:    12/13/21 0727   BP: 125/78   Pulse: 64   Resp: 18   Temp: 37.2 °C (98.9 °F)   SpO2: 91%        Physical Exam  Constitutional:       Appearance: Normal appearance.   HENT:      Head: Normocephalic.      Nose: Nose normal.      Mouth/Throat:      Mouth: Mucous membranes are moist.   Eyes:      Pupils: Pupils are equal, round, and reactive to light.   Cardiovascular:      Rate and Rhythm: Normal rate.   Pulmonary:      Effort: Pulmonary effort is normal. No respiratory distress.   Abdominal:      Palpations: Abdomen is soft.      Tenderness: There is abdominal tenderness.          Comments: IR drain site CDI, no redness or swelling , brown purulent fluid in MARTHA bulb  Ostomy   X 3 abd dressings    Musculoskeletal:         General: No tenderness or deformity.      Cervical back: Normal range of motion.   Skin:     General: Skin is warm and dry.      Capillary Refill: Capillary refill takes less than 2 seconds.      Coloration: Skin is not jaundiced or pale.   Neurological:      General: No focal deficit present.      Mental Status: She is alert.      Motor: No weakness.   Psychiatric:         Mood and Affect: Mood normal.         Behavior: Behavior normal.             Labs    Recent Labs     12/11/21  0549 12/12/21  0728 12/13/21  0313   WBC 6.7 10.1 6.6   RBC 3.47* 3.37* 3.46*   HEMOGLOBIN 10.1* 9.9* 10.0*   HEMATOCRIT 32.6*  31.3* 33.0*   MCV 93.9 92.9 95.4   MCH 29.1 29.4 28.9   MCHC 31.0* 31.6* 30.3*   RDW 58.3* 56.3* 57.4*   PLATELETCT 215 220 233   MPV 8.9* 9.2 9.3     Recent Labs     12/11/21  0549 12/12/21  0728 12/13/21  0313   SODIUM 137 135 136   POTASSIUM 3.6 4.1 3.9   CHLORIDE 104 101 99   CO2 24 27 29   GLUCOSE 74 80 80   BUN 5* 5* 6*   CREATININE 0.34* 0.34* 0.43*   CALCIUM 7.4* 7.6* 7.7*     Recent Labs     12/11/21  0549 12/12/21  0728 12/13/21  0313   CREATININE 0.34* 0.34* 0.43*     CT-ABDOMEN-PELVIS WITH   Final Result      1.  Apparent slight increase in size in right lower quadrant fluid collection with drain in place with gas again noted along the superior aspect of the collection to the ascending colon with dense fluid/apparent contrast within the fluid collection    suggesting fistulous communication with the colon.      2.  Anasarca with small bilateral pleural effusions and bibasilar atelectasis.      3.  Biliary stent placement with pneumobilia.      CT-ABDOMEN-PELVIS WITH   Final Result      1.  Interval decrease in size in right lower quadrant fluid collections status post drainage.      2.  Trace bilateral pleural effusions with bibasilar atelectasis.      3.  Biliary stent in place with pneumobilia.      4.  Anasarca      CT-DRAIN-PERITONEAL   Final Result      Successful RIGHT retroperitoneal/paracolic gutter drainage tube placement.      Plan: Thrice daily flushes with 10 mL of sterile saline. Monitor outputs. Please contact interventional radiology if there is any concern for tube dysfunction.      Findings were communicated with Dr. Vann via Voalte Me after initial scanning was performed.      IR-CONSULT AND TREAT    (Results Pending)       INR   Date Value Ref Range Status   12/04/2021 1.42 (H) 0.87 - 1.13 Final     Comment:     INR - Non-therapeutic Reference Range: 0.87-1.13  INR - Therapeutic Reference Range: 2.0-4.0       No results found for: POCINR     Intake/Output Summary (Last 24 hours) at  12/6/2021 1052  Last data filed at 12/6/2021 0600  Gross per 24 hour   Intake 1724 ml   Output 1295 ml   Net 429 ml      Labs not explicitly included in this progress note were reviewed by the author. Radiology/imaging not explicitly included in this progress note was reviewed by the author.   I have performed a physical exam and reviewed and updated ROS and Plan today (12/13/2021).     20 minutes in directly providing and coordinating care and extensive data review.  No time overlap and excludes procedures.

## 2021-12-14 LAB
ANION GAP SERPL CALC-SCNC: 9 MMOL/L (ref 7–16)
BASOPHILS # BLD AUTO: 0.6 % (ref 0–1.8)
BASOPHILS # BLD: 0.05 K/UL (ref 0–0.12)
BUN SERPL-MCNC: 8 MG/DL (ref 8–22)
CALCIUM SERPL-MCNC: 7.6 MG/DL (ref 8.5–10.5)
CHLORIDE SERPL-SCNC: 100 MMOL/L (ref 96–112)
CO2 SERPL-SCNC: 29 MMOL/L (ref 20–33)
CREAT SERPL-MCNC: 0.5 MG/DL (ref 0.5–1.4)
EOSINOPHIL # BLD AUTO: 0.05 K/UL (ref 0–0.51)
EOSINOPHIL NFR BLD: 0.6 % (ref 0–6.9)
ERYTHROCYTE [DISTWIDTH] IN BLOOD BY AUTOMATED COUNT: 57.1 FL (ref 35.9–50)
GLUCOSE SERPL-MCNC: 109 MG/DL (ref 65–99)
HCT VFR BLD AUTO: 28.7 % (ref 37–47)
HGB BLD-MCNC: 8.9 G/DL (ref 12–16)
IMM GRANULOCYTES # BLD AUTO: 0.06 K/UL (ref 0–0.11)
IMM GRANULOCYTES NFR BLD AUTO: 0.8 % (ref 0–0.9)
LYMPHOCYTES # BLD AUTO: 2.67 K/UL (ref 1–4.8)
LYMPHOCYTES NFR BLD: 34.6 % (ref 22–41)
MAGNESIUM SERPL-MCNC: 1.9 MG/DL (ref 1.5–2.5)
MCH RBC QN AUTO: 29.4 PG (ref 27–33)
MCHC RBC AUTO-ENTMCNC: 31 G/DL (ref 33.6–35)
MCV RBC AUTO: 94.7 FL (ref 81.4–97.8)
MONOCYTES # BLD AUTO: 0.62 K/UL (ref 0–0.85)
MONOCYTES NFR BLD AUTO: 8 % (ref 0–13.4)
NEUTROPHILS # BLD AUTO: 4.26 K/UL (ref 2–7.15)
NEUTROPHILS NFR BLD: 55.4 % (ref 44–72)
NRBC # BLD AUTO: 0 K/UL
NRBC BLD-RTO: 0 /100 WBC
PLATELET # BLD AUTO: 248 K/UL (ref 164–446)
PMV BLD AUTO: 9.9 FL (ref 9–12.9)
POTASSIUM SERPL-SCNC: 3.9 MMOL/L (ref 3.6–5.5)
RBC # BLD AUTO: 3.03 M/UL (ref 4.2–5.4)
SODIUM SERPL-SCNC: 138 MMOL/L (ref 135–145)
VANCOMYCIN TROUGH SERPL-MCNC: 13.2 UG/ML (ref 10–20)
WBC # BLD AUTO: 7.7 K/UL (ref 4.8–10.8)

## 2021-12-14 PROCEDURE — 700111 HCHG RX REV CODE 636 W/ 250 OVERRIDE (IP): Performed by: NURSE PRACTITIONER

## 2021-12-14 PROCEDURE — 700111 HCHG RX REV CODE 636 W/ 250 OVERRIDE (IP): Performed by: INTERNAL MEDICINE

## 2021-12-14 PROCEDURE — 700105 HCHG RX REV CODE 258: Performed by: NURSE PRACTITIONER

## 2021-12-14 PROCEDURE — 85025 COMPLETE CBC W/AUTO DIFF WBC: CPT

## 2021-12-14 PROCEDURE — A9270 NON-COVERED ITEM OR SERVICE: HCPCS | Performed by: FAMILY MEDICINE

## 2021-12-14 PROCEDURE — 700102 HCHG RX REV CODE 250 W/ 637 OVERRIDE(OP): Performed by: INTERNAL MEDICINE

## 2021-12-14 PROCEDURE — 700111 HCHG RX REV CODE 636 W/ 250 OVERRIDE (IP): Performed by: FAMILY MEDICINE

## 2021-12-14 PROCEDURE — 80048 BASIC METABOLIC PNL TOTAL CA: CPT

## 2021-12-14 PROCEDURE — 700111 HCHG RX REV CODE 636 W/ 250 OVERRIDE (IP): Performed by: STUDENT IN AN ORGANIZED HEALTH CARE EDUCATION/TRAINING PROGRAM

## 2021-12-14 PROCEDURE — A9270 NON-COVERED ITEM OR SERVICE: HCPCS | Performed by: INTERNAL MEDICINE

## 2021-12-14 PROCEDURE — 99233 SBSQ HOSP IP/OBS HIGH 50: CPT | Performed by: INTERNAL MEDICINE

## 2021-12-14 PROCEDURE — 97530 THERAPEUTIC ACTIVITIES: CPT

## 2021-12-14 PROCEDURE — 770001 HCHG ROOM/CARE - MED/SURG/GYN PRIV*

## 2021-12-14 PROCEDURE — 700105 HCHG RX REV CODE 258: Performed by: INTERNAL MEDICINE

## 2021-12-14 PROCEDURE — 99232 SBSQ HOSP IP/OBS MODERATE 35: CPT | Performed by: NURSE PRACTITIONER

## 2021-12-14 PROCEDURE — 700102 HCHG RX REV CODE 250 W/ 637 OVERRIDE(OP): Performed by: FAMILY MEDICINE

## 2021-12-14 PROCEDURE — 83735 ASSAY OF MAGNESIUM: CPT

## 2021-12-14 RX ADMIN — OXYCODONE HYDROCHLORIDE 10 MG: 10 TABLET ORAL at 23:05

## 2021-12-14 RX ADMIN — AMPICILLIN SODIUM AND SULBACTAM SODIUM 3 G: 2; 1 INJECTION, POWDER, FOR SOLUTION INTRAMUSCULAR; INTRAVENOUS at 23:13

## 2021-12-14 RX ADMIN — EZETIMIBE 10 MG: 10 TABLET ORAL at 17:33

## 2021-12-14 RX ADMIN — AMPICILLIN SODIUM AND SULBACTAM SODIUM 3 G: 2; 1 INJECTION, POWDER, FOR SOLUTION INTRAMUSCULAR; INTRAVENOUS at 11:23

## 2021-12-14 RX ADMIN — ONDANSETRON 4 MG: 2 INJECTION INTRAMUSCULAR; INTRAVENOUS at 00:53

## 2021-12-14 RX ADMIN — POTASSIUM CHLORIDE 20 MEQ: 1500 TABLET, EXTENDED RELEASE ORAL at 17:33

## 2021-12-14 RX ADMIN — AMPICILLIN SODIUM AND SULBACTAM SODIUM 3 G: 2; 1 INJECTION, POWDER, FOR SOLUTION INTRAMUSCULAR; INTRAVENOUS at 04:03

## 2021-12-14 RX ADMIN — ONDANSETRON 4 MG: 2 INJECTION INTRAMUSCULAR; INTRAVENOUS at 19:14

## 2021-12-14 RX ADMIN — POTASSIUM CHLORIDE 20 MEQ: 1500 TABLET, EXTENDED RELEASE ORAL at 04:02

## 2021-12-14 RX ADMIN — CYCLOBENZAPRINE 10 MG: 10 TABLET, FILM COATED ORAL at 17:33

## 2021-12-14 RX ADMIN — OXYCODONE HYDROCHLORIDE 10 MG: 10 TABLET ORAL at 14:31

## 2021-12-14 RX ADMIN — OXYCODONE HYDROCHLORIDE 10 MG: 10 TABLET ORAL at 11:23

## 2021-12-14 RX ADMIN — METOPROLOL TARTRATE 25 MG: 25 TABLET, FILM COATED ORAL at 17:43

## 2021-12-14 RX ADMIN — AMPICILLIN SODIUM AND SULBACTAM SODIUM 3 G: 2; 1 INJECTION, POWDER, FOR SOLUTION INTRAMUSCULAR; INTRAVENOUS at 17:32

## 2021-12-14 RX ADMIN — VANCOMYCIN HYDROCHLORIDE 1000 MG: 500 INJECTION, POWDER, LYOPHILIZED, FOR SOLUTION INTRAVENOUS at 13:12

## 2021-12-14 RX ADMIN — HEPARIN SODIUM 5000 UNITS: 5000 INJECTION, SOLUTION INTRAVENOUS; SUBCUTANEOUS at 13:13

## 2021-12-14 RX ADMIN — HEPARIN SODIUM 5000 UNITS: 5000 INJECTION, SOLUTION INTRAVENOUS; SUBCUTANEOUS at 04:03

## 2021-12-14 RX ADMIN — GABAPENTIN 600 MG: 300 CAPSULE ORAL at 04:02

## 2021-12-14 RX ADMIN — ACETAMINOPHEN 1000 MG: 500 TABLET ORAL at 13:11

## 2021-12-14 RX ADMIN — ONDANSETRON 4 MG: 2 INJECTION INTRAMUSCULAR; INTRAVENOUS at 23:11

## 2021-12-14 RX ADMIN — OXYCODONE HYDROCHLORIDE 10 MG: 10 TABLET ORAL at 00:39

## 2021-12-14 RX ADMIN — OXYCODONE HYDROCHLORIDE 10 MG: 10 TABLET ORAL at 17:46

## 2021-12-14 RX ADMIN — HEPARIN SODIUM 5000 UNITS: 5000 INJECTION, SOLUTION INTRAVENOUS; SUBCUTANEOUS at 23:05

## 2021-12-14 RX ADMIN — OXYCODONE HYDROCHLORIDE 10 MG: 10 TABLET ORAL at 04:02

## 2021-12-14 RX ADMIN — FUROSEMIDE 40 MG: 10 INJECTION, SOLUTION INTRAMUSCULAR; INTRAVENOUS at 04:58

## 2021-12-14 RX ADMIN — GABAPENTIN 600 MG: 300 CAPSULE ORAL at 17:33

## 2021-12-14 RX ADMIN — METOPROLOL TARTRATE 25 MG: 25 TABLET, FILM COATED ORAL at 04:02

## 2021-12-14 RX ADMIN — VANCOMYCIN HYDROCHLORIDE 1500 MG: 500 INJECTION, POWDER, LYOPHILIZED, FOR SOLUTION INTRAVENOUS at 00:31

## 2021-12-14 ASSESSMENT — GAIT ASSESSMENTS: DISTANCE (FEET): 20

## 2021-12-14 ASSESSMENT — ENCOUNTER SYMPTOMS
FLANK PAIN: 0
SENSORY CHANGE: 0
DIZZINESS: 0
ABDOMINAL PAIN: 0
SORE THROAT: 0
ABDOMINAL PAIN: 1
SPEECH CHANGE: 0
CHILLS: 0
BACK PAIN: 0
NAUSEA: 0
VOMITING: 0
WEAKNESS: 1
DIAPHORESIS: 0
FEVER: 0
SHORTNESS OF BREATH: 0
PALPITATIONS: 0
WHEEZING: 0
FOCAL WEAKNESS: 0
COUGH: 0
BLURRED VISION: 0
DIARRHEA: 0
HEADACHES: 0
HEARTBURN: 0
MYALGIAS: 0
NECK PAIN: 0
NERVOUS/ANXIOUS: 0

## 2021-12-14 ASSESSMENT — COGNITIVE AND FUNCTIONAL STATUS - GENERAL
SUGGESTED CMS G CODE MODIFIER DAILY ACTIVITY: CI
DAILY ACTIVITIY SCORE: 23
HELP NEEDED FOR BATHING: A LITTLE

## 2021-12-14 ASSESSMENT — PAIN DESCRIPTION - PAIN TYPE
TYPE: ACUTE PAIN
TYPE: ACUTE PAIN
TYPE: ACUTE PAIN;CHRONIC PAIN;SURGICAL PAIN
TYPE: ACUTE PAIN
TYPE: ACUTE PAIN

## 2021-12-14 ASSESSMENT — FIBROSIS 4 INDEX: FIB4 SCORE: 1.68

## 2021-12-14 NOTE — PROGRESS NOTES
Pharmacy Vancomycin Kinetics Note for 12/14/2021     66 y.o. female on Vancomycin day # 11     Vancomycin Indication (Two level/Trough based Dosing): Uncomplicated infection (goal trough 10-20)    Provider specified end date: 01/19/22    Active Antibiotics (From admission, onward)    Ordered     Ordering Provider       Wed Dec 8, 2021 12:15 PM    12/08/21 1215  ampicillin/sulbactam (UNASYN) 3 g in  mL IVPB  EVERY 6 HOURS         Jose Arora M.D.       Tue Dec 7, 2021  4:31 PM    12/07/21 1631  vancomycin (VANCOCIN) 1,500 mg in  mL IVPB  (vancomycin (VANCOCIN) IV (LD + Maintenance))  EVERY 24 HOURS         Jose Arora M.D.       Sat Dec 4, 2021  3:55 AM    12/04/21 0355  MD Alert...Vancomycin per Pharmacy  PHARMACY TO DOSE        Question:  Indication(s) for vancomycin?  Answer:  Skin and soft tissue infection    Jose Arora M.D.          Dosing Weight: 58.2 kg (128 lb 4.9 oz)      Admission History: Admitted on 12/4/2021 for Intra-abdominal abscess (HCC) [K65.1]  Postprocedural intraabdominal abscess [T81.43XA]  Pertinent history: Patient has a history of a complicated duodenectomy in which she came in to the hospital for increasing abdominal pain. CT showed a fluid collection below skin incision which could represent fluid collection or abscess. Abscess draine 12/4. Abscess cultures indicate E coli and enterococus faecium. Patient has multiple drug allergies.    Allergies:     Ampicillin, Cephalexin, Clindamycin, Codeine, Demerol, Levofloxacin, Tetracyclines, Tizanidine, Morphine, Pcn [penicillins], and Sulfa drugs     Pertinent cultures to date:     Results     Procedure Component Value Units Date/Time    FLUID CULTURE W/GRAM STAIN [[id number]]  (Abnormal)  (Susceptibility) Collected: 12/04/21 1127    Order Status: Completed Specimen: Body Fluid from Peritoneal Fluid Updated: 12/06/21 1111     Significant Indicator POS     Source BF     Site PERITONEAL FLUID     Culture Result Light  growth mixed enteric stephen.     Gram Stain Result Many WBCs.  Many Gram positive cocci.  Few Gram positive rods.  Moderate Gram negative rods.       Culture Result Escherichia coli  Heavy growth        Enterococcus faecium  Heavy growth  The susceptibility profile for this organism indicates that  Streptomycin would not be an effective component of  combination therapy.      Narrative:      Collected By: [id number] YONG TAYLOR  Right abdomen  Collected By: [id number] YONG TAYLOR    GRAM STAIN [[id number]] Collected: 21 1127    Order Status: Completed Specimen: Body Fluid Updated: 21 0616     Significant Indicator .     Source BF     Site PERITONEAL FLUID     Gram Stain Result Many WBCs.  Many Gram positive cocci.  Few Gram positive rods.  Moderate Gram negative rods.      Narrative:      Collected By: [id number] YONG TAYLOR  Right abdomen  Collected By: [id number] YONG TAYLOR          Labs:     Estimated Creatinine Clearance: 95.6 mL/min (by C-G formula based on SCr of 0.5 mg/dL).  Recent Labs     21  0051   WBC 10.1 6.6 7.7   NEUTSPOLYS 66.80 56.70 55.40     Recent Labs     21  0051   BUN 5* 6* 8   CREATININE 0.34* 0.43* 0.50       Intake/Output Summary (Last 24 hours) at 2021 0846  Last data filed at 2021 0715  Gross per 24 hour   Intake 460 ml   Output 55 ml   Net 405 ml      /73   Pulse 81   Temp 36.7 °C (98.1 °F) (Temporal)   Resp 16   Wt 63.5 kg (139 lb 15.9 oz)   SpO2 95%  Temp (24hrs), Av.8 °C (98.2 °F), Min:36.2 °C (97.2 °F), Max:37.6 °C (99.7 °F)      List concerns for Vancomycin clearance:     Age    Pharmacokinetics:     Trough kinetics:   Recent Labs     21   VANCOTROUGH 13.2       A/P:     -  Vancomycin dose: Increase dose from 1500mg q24hrs to 1000mg (~17mg/kg) IV q12hrs (starting today at 1230)     - Calculated AUC (from trough): 394 mg.hr/L    -  Comments: Calculated AUC  based on a trough of 13.2 was 394 mg.hr/L (subtherapeutic). Will be increasing dose to 1000mg q12hrs with a calculated AUC of 525 mg.hr/L (goal 400 - 600). Renal indices remain stable. Little concern for accumulation at this time. Repeat CT scan showed an increase in size of fluid collection and therapy was extended until 1/19/2022. ID on case pending final abx plan.  Pharmacy will continue to monitor.     Oswald SmartD

## 2021-12-14 NOTE — PROGRESS NOTES
Hospital Medicine Daily Progress Note    Date of Service  12/14/2021    Chief Complaint  Nils Alfonso is a 66 y.o. female admitted 12/4/2021 with abdominal abscess.    Hospital Course  Admitted with intra-abdominal abscess. Patient was started on empiric coverage with IV Vancomycin and Zosyn. Surgery was consulted on the case. Patient underwent CT guided Right retroperitoneal/paracolic gutter drainage tube placement on 12/4/2021.  Culture showed E. coli and Enterococcus faecium.  Infectious disease was consulted on the case.  Antibiotics were changed to IV Vancomycin and Unasyn.  Repeat CT scan showed possible increased fluid collection.  Patient with history of recurrent idiopathic pancreatitis. She had complications of pancreatic pseudocyst and intra-abdominal fluid collections. She underwent CT-guided drainage. She was placed on IV antibiotics. However there was no improvement noted. Surgery was then consulted on the case. Patient underwent Exploratory laparotomy, Incision and drainage and debridement of retroperitoneal abscess, Incision and drainage and debridement of anterior peritoneal abscess., Debridement of necrotizing fasciitis of the lateral abdominal wall on 11/5/2021.  She finished an antibiotic course of Zosyn, micafungin and Bactrim on 11/24/2021.    Interval Problem Update  12/14: No acute events overnight. Patient denies any complaints of pain. Tolerating regular diet. IR recommending repeat CT on 12/17. Blood pressure control improving.     I have personally seen and examined the patient at bedside. I discussed the plan of care with patient, bedside RN, charge RN,  and infectious disease    Consultants/Specialty  infectious disease and Surgery Oncology    Code Status  Full Code    Disposition  Patient is not medically cleared.   Anticipate discharge to to home with organized home healthcare and close outpatient follow-up.  I have placed the appropriate orders for post-discharge  needs.    Review of Systems  Review of Systems   Constitutional: Positive for malaise/fatigue. Negative for chills, diaphoresis and fever.   HENT: Negative for congestion, hearing loss and sore throat.    Eyes: Negative for blurred vision.   Respiratory: Negative for cough, shortness of breath and wheezing.    Cardiovascular: Positive for leg swelling. Negative for chest pain and palpitations.   Gastrointestinal: Negative for abdominal pain, diarrhea, heartburn, nausea and vomiting.   Genitourinary: Negative for dysuria, flank pain and hematuria.   Musculoskeletal: Negative for back pain, joint pain, myalgias and neck pain.   Skin: Negative for rash.   Neurological: Positive for weakness. Negative for dizziness, sensory change, speech change, focal weakness and headaches.   Psychiatric/Behavioral: The patient is not nervous/anxious.         Physical Exam  Temp:  [36.2 °C (97.2 °F)-37.6 °C (99.7 °F)] 36.9 °C (98.4 °F)  Pulse:  [] 100  Resp:  [16-18] 16  BP: (118-168)/() 141/99  SpO2:  [93 %-96 %] 94 %    Physical Exam  Vitals and nursing note reviewed.   Constitutional:       General: She is not in acute distress.  HENT:      Head: Normocephalic and atraumatic.      Nose: No congestion.      Mouth/Throat:      Mouth: Mucous membranes are moist.      Pharynx: Oropharynx is clear.   Eyes:      Extraocular Movements: Extraocular movements intact.      Conjunctiva/sclera: Conjunctivae normal.   Cardiovascular:      Rate and Rhythm: Normal rate and regular rhythm.      Heart sounds: Normal heart sounds.   Pulmonary:      Effort: Pulmonary effort is normal.      Breath sounds: Normal breath sounds.   Abdominal:      General: Bowel sounds are normal. There is no distension.      Tenderness: There is no abdominal tenderness. There is no guarding or rebound.      Comments: Ostomy  Right flank MARTHA drain   Musculoskeletal:      Cervical back: No tenderness.      Right lower leg: Pitting Edema present.      Left lower  leg: Pitting Edema present.   Skin:     General: Skin is warm and dry.      Coloration: Skin is pale.   Neurological:      General: No focal deficit present.      Mental Status: She is alert and oriented to person, place, and time.      Cranial Nerves: No cranial nerve deficit.         Fluids    Intake/Output Summary (Last 24 hours) at 12/14/2021 1336  Last data filed at 12/14/2021 1130  Gross per 24 hour   Intake 700 ml   Output 60 ml   Net 640 ml       Laboratory  Recent Labs     12/12/21  0728 12/13/21 0313 12/14/21  0051   WBC 10.1 6.6 7.7   RBC 3.37* 3.46* 3.03*   HEMOGLOBIN 9.9* 10.0* 8.9*   HEMATOCRIT 31.3* 33.0* 28.7*   MCV 92.9 95.4 94.7   MCH 29.4 28.9 29.4   MCHC 31.6* 30.3* 31.0*   RDW 56.3* 57.4* 57.1*   PLATELETCT 220 233 248   MPV 9.2 9.3 9.9     Recent Labs     12/12/21 0728 12/13/21 0313 12/14/21  0051   SODIUM 135 136 138   POTASSIUM 4.1 3.9 3.9   CHLORIDE 101 99 100   CO2 27 29 29   GLUCOSE 80 80 109*   BUN 5* 6* 8   CREATININE 0.34* 0.43* 0.50   CALCIUM 7.6* 7.7* 7.6*                   Imaging  IR-MIDLINE CATHETER INSERTION WO GUIDANCE > AGE 3   Final Result                  Ultrasound-guided midline placement performed by qualified nursing staff    as above.          CT-ABDOMEN-PELVIS WITH   Final Result      1.  Apparent slight increase in size in right lower quadrant fluid collection with drain in place with gas again noted along the superior aspect of the collection to the ascending colon with dense fluid/apparent contrast within the fluid collection    suggesting fistulous communication with the colon.      2.  Anasarca with small bilateral pleural effusions and bibasilar atelectasis.      3.  Biliary stent placement with pneumobilia.      CT-ABDOMEN-PELVIS WITH   Final Result      1.  Interval decrease in size in right lower quadrant fluid collections status post drainage.      2.  Trace bilateral pleural effusions with bibasilar atelectasis.      3.  Biliary stent in place with  pneumobilia.      4.  Anasarca      CT-DRAIN-PERITONEAL   Final Result      Successful RIGHT retroperitoneal/paracolic gutter drainage tube placement.      Plan: Thrice daily flushes with 10 mL of sterile saline. Monitor outputs. Please contact interventional radiology if there is any concern for tube dysfunction.      Findings were communicated with Dr. Vann via [employer] after initial scanning was performed.      IR-CONSULT AND TREAT    (Results Pending)        Assessment/Plan  * Intra-abdominal abscess (HCC)- (present on admission)  Assessment & Plan  Fluid cultures on 12/4 growing Escherichia coli, Enterococcus faecium  CT guided Right retroperitoneal/paracolic gutter drainage tube placement  12/4/2021  IV Vancomycin, Unasyn   Will need repeat CT on 12/17/2021  Follow ID recs     HTN (hypertension), benign- (present on admission)  Assessment & Plan  Metoprolol  IV Vasotec and labetalol as needed with parameters    Bilateral lower extremity edema  Assessment & Plan  Echo on 10/2021 without evidence of heart failure, EF 75%   No erythema or tenderness   Continue IV lasix   Encouraged elevation    Mild protein-calorie malnutrition (HCC)- (present on admission)  Assessment & Plan  Nutrition consult    Hypomagnesemia- (present on admission)  Assessment & Plan  Mg normal   Follow BMP    Hypokalemia- (present on admission)  Assessment & Plan  K normal   follow bmp    Hyponatremia- (present on admission)  Assessment & Plan  Follow bmp    Normocytic anemia- (present on admission)  Assessment & Plan  Follow cbc    Sepsis - (present on admission)  Assessment & Plan  Source - Abdominal abscess  Improving     Hyperlipidemia- (present on admission)  Assessment & Plan  Ezetimibe       VTE prophylaxis: heparin ppx    I have performed a physical exam and reviewed and updated ROS and Plan today (12/14/2021). In review of yesterday's note (12/13/2021), there are no changes except as documented above.

## 2021-12-14 NOTE — PROGRESS NOTES
Infectious Disease Progress Note    Author: Jose Arora M.D. Date & Time of service: 2021  8:23 AM    Chief Complaint:  Follow-up for intra-abdominal abscess    Interval History:   T-max 99.1, white count 6.1, tolerating switching antimicrobials.  12/10 patient is afebrile, white count 6000 today, feeling better overall, tolerating antimicrobials thus far   patient remains afebrile, white count is 10.1, tolerating antimicrobials.  CT scan findings as below   patient remains afebrile, white count 6.6, tolerating antimicrobials.  Plan as below.   patient remains afebrile, no CBC this morning, tolerating vancomycin and Unasyn.  Plan as below.  Sleeping soundly this morning.    Labs Reviewed and Medications Reviewed.    Review of Systems:  Review of Systems   Constitutional: Positive for malaise/fatigue. Negative for chills and fever.   Gastrointestinal: Positive for abdominal pain. Negative for nausea and vomiting.   Skin: Negative for itching and rash.   All other systems reviewed and are negative.      Hemodynamics:  Temp (24hrs), Av.8 °C (98.2 °F), Min:36.2 °C (97.2 °F), Max:37.6 °C (99.7 °F)  Temperature: 36.7 °C (98.1 °F)  Pulse  Av.2  Min: 53  Max: 99   Blood Pressure : 131/73       Physical Exam:  Physical Exam  Vitals and nursing note reviewed.   Constitutional:       General: She is not in acute distress.     Comments: Chronically ill-appearing   HENT:      Mouth/Throat:      Pharynx: No oropharyngeal exudate.   Eyes:      General: No scleral icterus.        Right eye: No discharge.         Left eye: No discharge.      Conjunctiva/sclera: Conjunctivae normal.   Cardiovascular:      Rate and Rhythm: Normal rate.      Heart sounds: No murmur heard.      Pulmonary:      Effort: Pulmonary effort is normal. No respiratory distress.      Breath sounds: No stridor.   Abdominal:      Comments: Right-sided MARTHA drain with purulent appearing output.  Ostomy   Musculoskeletal:          General: No swelling or tenderness.   Skin:     Findings: No erythema or rash.   Neurological:      Mental Status: She is alert.   Psychiatric:         Mood and Affect: Mood normal.         Behavior: Behavior normal.      Comments: Very pleasant.          Meds:    Current Facility-Administered Medications:   •  furosemide  •  enalaprilat  •  labetalol  •  potassium chloride SA  •  metoprolol tartrate  •  acetaminophen **FOLLOWED BY** [DISCONTINUED] acetaminophen  •  ampicillin-sulbactam (UNASYN) IV  •  calcium carbonate  •  vancomycin  •  ondansetron  •  MD Alert...Vancomycin per Pharmacy  •  heparin  •  cyclobenzaprine  •  ezetimibe  •  gabapentin  •  Pharmacy Consult Request  •  oxyCODONE immediate-release **OR** oxyCODONE immediate-release **OR** HYDROmorphone    Labs:  Recent Labs     12/12/21 0728 12/13/21 0313   WBC 10.1 6.6   RBC 3.37* 3.46*   HEMOGLOBIN 9.9* 10.0*   HEMATOCRIT 31.3* 33.0*   MCV 92.9 95.4   MCH 29.4 28.9   RDW 56.3* 57.4*   PLATELETCT 220 233   MPV 9.2 9.3   NEUTSPOLYS 66.80 56.70   LYMPHOCYTES 25.50 33.00   MONOCYTES 5.90 7.50   EOSINOPHILS 0.30 0.60   BASOPHILS 0.70 0.80     Recent Labs     12/12/21 0728 12/13/21 0313 12/14/21  0051   SODIUM 135 136 138   POTASSIUM 4.1 3.9 3.9   CHLORIDE 101 99 100   CO2 27 29 29   GLUCOSE 80 80 109*   BUN 5* 6* 8     Recent Labs     12/12/21 0728 12/13/21 0313 12/14/21  0051   CREATININE 0.34* 0.43* 0.50       Imaging:  CT-ABDOMEN-PELVIS WITH    Result Date: 12/6/2021 12/6/2021 10:55 AM HISTORY/REASON FOR EXAM:  Abdominal pain, fever. TECHNIQUE/EXAM DESCRIPTION:   CT scan of the abdomen and pelvis with contrast. Contrast-enhanced helical scanning was obtained from the diaphragmatic domes through the pubic symphysis following the bolus administration of nonionic contrast without complication. 80 mL of Omnipaque 350 nonionic contrast was administered without complication. Low dose optimization technique was utilized for this CT exam including  automated exposure control and adjustment of the mA and/or kV according to patient size. COMPARISON: December 3, 2021 FINDINGS: Lower Chest: There are trace bilateral pleural effusions. There is bibasilar atelectasis, right greater left.. Liver: Normal. Spleen: Unremarkable. Pancreas: Unremarkable. Gallbladder: The gallbladder has been resected. Biliary: Common bile duct stent remains in place. There is pneumobilia with gas also identified in the pancreatic duct. Adrenal glands: Normal. Kidneys: Unremarkable without hydronephrosis. Bowel: No obstruction or acute inflammation. There is diverticulosis without evidence of diverticulitis Lymph nodes: No adenopathy. Vasculature: Unremarkable. Peritoneum: There is been interval decrease in size in right lower quadrant fluid collection status post drainage. A pigtail catheter remains in place. A fluid collection previously measuring 2.3 cm in width currently measures 9 mm in width. A fluid collection more posterior medially previously measured 4.1 cm in width and currently measures 19 mm in width with the drain within this collection. Open anterior abdominal wound noted. There is anasarca. Musculoskeletal: No acute or destructive process. Pelvis: No adenopathy or free fluid.     1.  Interval decrease in size in right lower quadrant fluid collections status post drainage. 2.  Trace bilateral pleural effusions with bibasilar atelectasis. 3.  Biliary stent in place with pneumobilia. 4.  Anasarca    CT-ABDOMEN-PELVIS WITH    Result Date: 12/3/2021  12/3/2021 5:11 PM HISTORY/REASON FOR EXAM: Acute abdominal pain. History of duodenal perforation. TECHNIQUE/EXAM DESCRIPTION:   CT scan of the abdomen and pelvis with contrast. Contrast-enhanced helical scanning was obtained from the diaphragmatic domes through the pubic symphysis following the bolus administration of nonionic contrast without complication. 80 mL of Omnipaque 350 nonionic contrast was administered without  complication. Oral contrast was administered. Low dose optimization technique was utilized for this CT exam including automated exposure control and adjustment of the mA and/or kV according to patient size. COMPARISON: CT AP 11/24/2021. Upper GI series 11/22/2021. CT AP 11/15/2021 FINDINGS: Lower Chest: There is minimal atelectasis or pneumonia in the right lower lobe with minimal right pleural effusion. There is been no significant change. The left lung base is without consolidation. A small calcified granuloma is again noted. No free air is present. Postoperative changes involving the stomach are stable. There is been interval removal of a right upper quadrant drain. There has been an increase in size of a right sided fluid collection or abscess in the right paracolic gutter region. The abscess has a length of 6.3 cm and transverse dimension of 4.1 cm inferiorly. There is a fluid collection superiorly in the right mid abdomen which is connected with the inferior collection. This collection has an air-fluid level and has a maximum diameter of 3 cm. This fluid collection extends inferiorly into the right lower quadrant paracolic gutter. There is a fluid collection in the midline of the rectus sheath below a midline skin incision. This collection has maximum dimension of 5.5 cm transversely and a length of approximately 9 cm. Liver: No hepatic parenchymal mass is present. Expansile common bile duct stent is present. Spleen: Unremarkable. Pancreas: Unremarkable. Gallbladder: The gallbladder has been resected. Biliary: Nondilated. Adrenal glands: Normal. Kidneys: Unremarkable without hydronephrosis. Bowel: No there is no evidence of bowel obstruction or focal inflammation. Lymph nodes: No adenopathy. Vasculature: Unremarkable. Peritoneum: Unremarkable without ascites. Musculoskeletal: No acute or destructive process. Fusion hardware is present in the distal lumbar spine. Pelvis: No adenopathy. The bladder appears  normal. No pelvic fluid collections or free fluid is present.     1.  Recurrent fluid collections or abscess in the right upper quadrant and right paracolic gutter region. Interval removal of right upper quadrant drain. 2.  New fluid collection in the rectus sheath in the midline below the skin incision. This may represent postoperative fluid collection or abscess. 3.  Postoperative changes involving the stomach. 4.  Cholecystectomy. Residual common bile duct expansile stent in place. 5.  No bowel or renal obstruction. 6.  No free air. 7.  Small unchanged right pleural effusion and unchanged minimal right lower lobe atelectasis or pneumonia.    CT-ABDOMEN-PELVIS WITH    Result Date: 11/24/2021 11/24/2021 3:56 PM HISTORY/REASON FOR EXAM:  Abdominal pain, acute, nonlocalized. History of duodenal perforation. TECHNIQUE/EXAM DESCRIPTION:   CT scan of the abdomen and pelvis with contrast. Contrast-enhanced helical scanning was obtained from the diaphragmatic domes through the pubic symphysis following the bolus administration of nonionic contrast without complication. 100 mL of Omnipaque 350 nonionic contrast was administered without complication. Low dose optimization technique was utilized for this CT exam including automated exposure control and adjustment of the mA and/or kV according to patient size. COMPARISON: 11/15/2021 FINDINGS: Lower Chest: Bibasilar atelectasis, improved from prior with improvement of bilateral pleural fluid. Liver: Liver again shows pneumobilia. Spleen: Unremarkable. Pancreas: Present in the pancreatic duct.  Pancreatic duct is mildly dilated. Gallbladder: Surgically absent. Biliary: Metallic common bile duct stent in place. Adrenal glands: Normal. Kidneys: Unremarkable without hydronephrosis. Bowel: Postoperative change of the stomach.  Contrast present in the colon from prior procedure.  RIGHT upper quadrant drain present within irregular fluid collection in the RIGHT lateral midabdomen.   Fluid collection shows peripheral enhancement as well  as subtle hyperdense contents and gas.  Fluid collection measures approximately 7.6 x 7.3 By 2.4 cm and extends into the RIGHT posterior pararenal space, decreased in size from prior exam.  RIGHT lower quadrant drain is been removed. Feeding tube in place with tip at the DJ flexure. Lymph nodes: No adenopathy. Vasculature: Unremarkable. Peritoneum: Unremarkable without ascites. Musculoskeletal: Postoperative change of lumbar spine. Pelvis: Bladder is unremarkable.  Contrast present in the vagina, likely reflux.  Postoperative change of anterior abdominal wall.  Diffuse body wall edema.     1.  RIGHT lateral no abnormal abscess again seen, decreased in size from prior exam with drain in place, however contents now appears hyperdense potential indicating bowel contrast, concerning for bowel perforation or fistula, although source of contrast  is uncertain.  No cristóbal pneumoperitoneum. 2.  Pneumobilia and biliary stent present. 3.  Interval removal of RIGHT lower quadrant drain.     CT-ABDOMEN-PELVIS WITH    Result Date: 11/15/2021  11/15/2021 11:49 AM HISTORY/REASON FOR EXAM:  intra-abdominal abscess. Follow up. TECHNIQUE/EXAM DESCRIPTION:   CT scan of the abdomen and pelvis with contrast. Contrast-enhanced helical scanning was obtained from the diaphragmatic domes through the pubic symphysis following the bolus administration of nonionic contrast without complication. 100 mL of Omnipaque 350 nonionic contrast was administered without complication. Low dose optimization technique was utilized for this CT exam including automated exposure control and adjustment of the mA and/or kV according to patient size. COMPARISON: November 8, 2021 FINDINGS: Lower Chest: There is bibasilar atelectasis, right greater than left. There is been slight increase in size of trace bilateral pleural effusions.. Liver: Normal. Spleen: Unremarkable. Pancreas: Unremarkable. There is again  air within the pancreatic duct. Gallbladder: The gallbladder has been resected. Biliary: Pneumobilia again noted.. Adrenal glands: Normal. Kidneys: Unremarkable without hydronephrosis. Bowel: No obstruction or acute inflammation. Lymph nodes: No adenopathy. Vasculature: Unremarkable. Peritoneum: There is again a mid abdomen fluid collection which appears slightly larger currently measuring 7.2 x 7.1 x 9.4 cm. The surgical drain tracks through this fluid collection. There is anasarca. Musculoskeletal: No acute or destructive process. Pelvis: No adenopathy or free fluid.     1.  Slight interval increase in size in fluid collection the right midabdomen with drain in place. 2.  Slight interval increase in size in trace bilateral pleural effusions, right greater than left with bibasilar atelectasis. 3.  Pneumobilia.    CT-DRAIN-PERITONEAL    Result Date: 12/4/2021  HISTORY/REASON FOR EXAM:  66-year-old woman with complex medical history who has RIGHT abscesses involving the RIGHT retroperitoneum and paracolic gutter as well as her anterior abdominal wall. Both are decompressing through the dermis. TECHNIQUE/EXAM DESCRIPTION AND NUMBER OF VIEWS: RIGHT paracolic gutter/retroperitoneal drain placement with CT guidance. Low dose optimization technique was utilized for this CT exam including automated exposure control and adjustment of the mA and/or kV according to patient size. COMPARISON:  CT 12/3/2021 MEDICATIONS: Moderate sedation was provided. Pulse oximetry and continuous cardiac monitoring by the nurse was performed throughout the exam. Intraservice time was 15 minutes. PROCEDURE:     The risks, benefits, goals and objectives and alternatives were discussed. Risks were specified as including but not limited to bleeding, infection, damage to vessels or nerves, pain and discomfort as well as the possibility of incomplete resolution of underlying disease. Drain care related issues were discussed with the patient. The  patient's questions were answered. Informed oral and written consent were obtained. The patient was placed on the CT gantry. Localizing scan was performed. The skin was prepped and draped in the usual sterile manner.  A timeout was performed. Local anesthetic result was achieved with administration of 1% lidocaine. A(n) 17-gauge access needle was advanced to the target using CT fluoroscopic guidance. Access was secured with a wire and the tract was sequentially dilated to accommodate a(n) 12 [place] locking loop drain. A total of 50 mL of brown malodorous fluid was removed and sent for laboratory evaluation. This was put in place and formed. The catheter was attached to suction bulbdrainage and secured to the skin with 2-0 nylon suture. Completion scan was performed which showed no complication. The patient tolerated the procedure well with no evidence of complication. The skin was cleaned and a dressing was applied. FINDINGS: Substantial interval decompression of both the anterior abdominal wall and RIGHT paracolic gutter/retroperitoneal fluid collections. No residual targetable collection in the anterior abdominal wall. Appropriate tube position in the RIGHT retroperitoneal/paracolic gutter collection. No evidence of hemorrhage.     Successful RIGHT retroperitoneal/paracolic gutter drainage tube placement. Plan: Thrice daily flushes with 10 mL of sterile saline. Monitor outputs. Please contact interventional radiology if there is any concern for tube dysfunction. Findings were communicated with Dr. Vann via [employer] after initial scanning was performed.    DX-CHEST-PORTABLE (1 VIEW)    Result Date: 12/3/2021  12/3/2021 4:46 PM HISTORY/REASON FOR EXAM: Dehydration and nausea for one day. TECHNIQUE/EXAM DESCRIPTION AND NUMBER OF VIEWS: Single AP view of the chest. COMPARISON: 11/19/2021 FINDINGS: No pneumothorax. There is unchanged elevation of the right hemidiaphragm. No enlarging pleural effusion. Hazy right  lower lobe opacity is seen, evaluation of this area is mildly limited secondary to overlying EKG leads. Cardiac mediastinal silhouette is normal. Multiple surgical clips project over the epigastrium and a biliary stent is seen.     Hazy right lower lobe opacity may be atelectasis, evaluation of this region is mildly limited secondary to overlying EKG leads.    OV-IVMA-[id number] STENT - TUBE    Result Date: 11/18/2021 11/18/2021 2:17 PM HISTORY/REASON FOR EXAM:  ERCP biliary stent placement TECHNIQUE/EXAM DESCRIPTION AND NUMBER OF VIEWS: 19 portable fluoroscopic images were obtained during ERCP biliary stent placement procedure performed and endoscopy suite. COMPARISON: None FINDINGS: 19 portable fluoroscopic images obtained during ERCP biliary stent placement.     Portable fluoroscopic images obtained during ERCP biliary stent placement for localization purposes.    DX-UPPER GI-SERIES WITH KUB    Result Date: 11/22/2021 11/22/2021 11:03 AM HISTORY/REASON FOR EXAM:  Abdominal Pain; Use water soluble contrast - re-evaluate duodenal perforation. TECHNIQUE/EXAM DESCRIPTION AND NUMBER OF VIEWS: Omnipaque 300 water soluble contrast upper GI series was performed. 19 fluoroscopic images obtained. Fluoroscopy time: 1.1 minutes COMPARISON: 11/17/2021 FINDINGS:  image shows extensive postoperative change of the abdomen feeding tube in place.  Feeding tube tip at the proximal jejunum. Metallic biliary stent in place. Contrast refluxes into the distal common bile duct, within the biliary stent, consistent with prior sphincterotomy. No evidence for leakage of contrast from the stomach or duodenum.     1.  No evidence for duodenal leak. 2.  Reflux of contrast seen into the distal common bile duct at the site of biliary stent, consistent with prior sphincterotomy.    DX-UPPER GI-SERIES WITH KUB    Result Date: 11/17/2021 11/17/2021 10:36 AM HISTORY/REASON FOR EXAM:  Gastrointestinal Complaint; Evaluate duodenal or intestinal  leak from previous ERCP perforation. TECHNIQUE/EXAM DESCRIPTION AND NUMBER OF VIEWS: Omnipaque 300 water soluble contrast focused upper GI series was performed. 9 fluoroscopic images obtained. Fluoroscopy time: 0.5 minutes COMPARISON: CT abdomen pelvis dated 11/15/2021 FINDINGS: Extensive postsurgical changes of the upper abdomen. The duodenal C-loop is in appropriate anatomic position. There is contrast extravasation at the junction of the second and third portions of duodenum (see key images).     Duodenal perforation at the junction of the second and third portions of duodenum as noted on key images.    CT-ABORTED CT PROCEDURE    Result Date: 11/16/2021  HISTORY/REASON FOR EXAM:  RIGHT retroperitoneal abscess with surgical drains in place. TECHNIQUE/EXAM DESCRIPTION AND NUMBER OF VIEWS: Limited pelvic CT. Low dose optimization technique was utilized for this CT exam including automated exposure control and adjustment of the mA and/or kV according to patient size. COMPARISON:  Diagnostic CT 11/15/2021 MEDICATIONS: None PROCEDURE:     The risks, benefits, goals and objectives and alternatives were discussed. Risks were specified as including but not limited to bleeding, infection, damage to vessels or nerves, pain and discomfort as well as the possibility of incomplete resolution of underlying disease. Drain care related issues were discussed with the patient. The patient's questions were answered. Informed oral and written consent were obtained. The patient was placed on the CT gantry. Localizing scan was performed. Based on my findings no procedure was performed. FINDINGS: Decreased size of RIGHT paracolic gutter fluid collection which contains a surgical drain. There is some contrast present in the collection as well as in the adjacent colon.     Interval decrease in size of the RIGHT retroperitoneal fluid collection which contains a surgical drain. Because from bowel is possible. No additional drain was placed.      DX-ABDOMEN FOR TUBE PLACEMENT    Result Date: 11/19/2021 11/19/2021 5:57 AM HISTORY/REASON FOR EXAM: coretrak placement TECHNIQUE/EXAM DESCRIPTION:  Single AP view the abdomen. COMPARISON:  October 19, 2021 FINDINGS: Hazy right lower lobe opacities are seen. Dobbhoff tube is seen, the tip lies to the left of the lumbar spine.  The bowel gas pattern appears nonspecific. TIPS stent is in place. The bony structures appear age-appropriate.     1.  Nonspecific bowel gas pattern. 2.  Dobbhoff tube with tip terminating overlying the expected location of the third or fourth duodenal segment. 3.  Hazy right lower lobe infiltrates.    DX-ABDOMEN FOR TUBE PLACEMENT    Result Date: 11/18/2021 11/18/2021 5:31 PM HISTORY/REASON FOR EXAM:  Line evaluation. TECHNIQUE/EXAM DESCRIPTION AND NUMBER OF VIEWS:  1 view(s) of the abdomen. COMPARISON:  None. FINDINGS: Enteric tube has been placed. The tip projects over the proximal jejunum. The bowel gas pattern is within normal limits.     Feeding tube extends to the region of the stomach and duodenum with tip at the level of the proximal end of the jejunum.    DX-ABDOMEN FOR TUBE PLACEMENT    Result Date: 11/18/2021 11/18/2021 4:23 PM HISTORY/REASON FOR EXAM:  Line evaluation. TECHNIQUE/EXAM DESCRIPTION AND NUMBER OF VIEWS:  1 view(s) of the abdomen. COMPARISON:  1/5/2007 FINDINGS: Enteric tube has been placed. The tip projects over the duodenal jejunal junction. Common bile duct stent. Epigastrium and left upper quadrant vascular clips. Suture material again overlie the epigastrium The bowel gas pattern is within normal limits. Some contrast seen in the colon from upper GI performed yesterday     Enteric tube terminates over the duodenal jejunal junction Covered common bile duct stent No contrast extravasation is seen      Micro:  Results     ** No results found for the last 168 hours. **          Assessment:  Nils Alfonso is a 66 y.o. female with complex history of recent  pancreatitis with ERCP on 10/1/2021, complicated by suspected duodenal perforation with resultant complex multiple intra-abdominal abscesses.  For about 3 weeks, this was attempted to be managed by multiple IR drains but there was no improvement.  Drain cultures from various times grew multiple organisms including E. coli and Enterococcus faecalis, Candida albicans and glabrata, stenotrophomonas, E. coli, ampicillin resistant E faecium.  Blood cultures grew Chryseobacterium species and Candida glabrata (completed therapy for the bacteremia). The decision was made to treat with at least 4 weeks of antibiotics with Continues home infusion IV Zosyn, IV micafungin, and p.o. Bactrim through 11/24/2021, and then follow-up in ID clinic, ID signed off. However, there are further procedures since that plan was made. Patient underwent an exploratory laparotomy on 11/5 for drainage of retroperitoneal abscess, peritoneal abscess, necrotizing fasciitis involving muscle and soft tissue.  Cultures from this procedure grew ampicillin resistant E faecium, sensitive E faecalis, Stenotrophomonas, Candida albicans, E. coli. Repeat CT of the abdomen on 11/15/2021 noted increase fluid collection in the right mid abdomen and slight increase in trace bilateral pleural effusions. IR placed a peritoneal drain but this fell out at some point and was not replaced due to the abdominal fluid collection being noted to be smaller. Upper GI series on 11/17/2021 showed a leak at the perforation in 2/3 portion of the duodenum.  Patient underwent an ERCP, biliary stent placement (since during the procedure there was concern for bile duct perforation being the cause of patient's leak), core track placement on 11/18/2021 by GI.  Esophageal stent was not placed since no obvious defect was noted.     Patient was discharged on 11/28. On 12/4, patient returned to the ER with fevers and right lower quadrant abdominal pain, found to have fluid collections  right upper quadrant and right paracolic gutter. IR on 12/4 placed drain in the right paracolic gutter. Repeat CT scan on 12/6 with good improvement in the right lower quadrant fluid collections, the midline rectus fluid collection appears to have drained through the incision.  Cultures from this growing pansensitive E. coli and ampicillin resistant E faecium thus far.  ID consulted for recommendations.     Pertinent Diagnoses:  Abdominal pelvic abscesses  Recent duodenal/biliary perforation  Polymicrobial infection    Plan:  -Continue IV vancomycin and Unasyn 3 g every 6 hours.  Monitor vancomycin levels and renal function.  Last vancomycin trough 13.2 on 12/13  -Previously, multiple other organisms including Saritha and Stenotrophomonas had grown, but this was prior to the exploratory laparotomy with washout. Holding off on adding additional potentially harmful antimicrobials unless these organisms grow again  -Repeat CT scan obtained 12/10 with a slight increase in size of the right lower quadrant fluid collection, gas noted along superior aspect of collection with radiologist interpretation of apparent contrast suggesting fistulous communication with the colon.  Surgery interpretation after reviewing past images not concerning for fistula  -Current plan is to increase flushing of the drain. Recommend repeat imaging approximately 5 days after to ensure improvement in size of the abscess  -Antibiotic plan currently undetermined.  Oral options limited by the need for linezolid (unsafe to use for more than a 2-week duration)    Discussed with Dr. Lerner    ID will follow.  Please call with questions.

## 2021-12-14 NOTE — PROGRESS NOTES
Pt is AOx4  Assessment complete   Patient calls as needed  VSS  Pt ambulated to restroom w/ FWW standby assist   Tolerated well  Patient denies N&V, denies SOB  Denies pain at this time  MLI dressing changed per orders   RUQ fistula ostomy appliance x2 both CDI  RLQ fistula ostomy appliance CDI  RLQ IR drain with dressing CDI, to be flushed 6x per shift per orders   +void +flatus   SCD's off  Call light in place, bed locked, in lowest position, bed alarm on will cont to monitor

## 2021-12-14 NOTE — THERAPY
Physical Therapy Contact Note    Patient Name: Nils Alfonso  Age:  66 y.o., Sex:  female  Medical Record #: [id number]  Today's Date: 12/14/2021    Pt observed ambulating halls with spouse and 4WW earlier today. Met with pt and spouse; they report no concerns with mobility or return home at this time. Reported they will continue to walk the halls until DC. Spouse states pt can demo STS from various surfaces and he is confident in ability to assist her at home if needed. They decline need/desire for HHPT. Given previous PT treatment notes, progress pt has made, and observed mobility during the day, PT will sign-off at this time. Pt agrees and was instructed to request PT return should needs/functional status change.

## 2021-12-14 NOTE — CARE PLAN
Shift Goals  Clinical Goals: ADLs  Patient Goals: Shower  Family Goals: Comofrt    Progress made toward(s) clinical / shift goals:  assisting in adls, education offered with each interaction, medicating per MAR      Patient is not progressing towards the following goals:

## 2021-12-14 NOTE — CARE PLAN
Shift Goals  Clinical Goals: pain control  Patient Goals: comfort  Family Goals: Comofrt    Progress made toward(s) clinical / shift goals:      Patient understands plan of care; Pain treated adequately per MAR    Problem: Knowledge Deficit - Standard  Goal: Patient and family/care givers will demonstrate understanding of plan of care, disease process/condition, diagnostic tests and medications  Outcome: Progressing     Problem: Pain - Standard  Goal: Alleviation of pain or a reduction in pain to the patient’s comfort goal  Outcome: Progressing

## 2021-12-14 NOTE — PROGRESS NOTES
Radiology Progress Note   Author: AYE Mahan Date & Time created: 12/14/2021  10:19 AM   Date of admission  12/4/2021  Note to reader: this note follows the APSO format rather than the historical SOAP format. Assessment and plan located at the top of the note for ease of use.    Chief Complaint  66 y.o. female admitted 12/4/2021 with ABD pain       HPI  Nils Alfonso is a 66 y.o. female admitted 12/4/2021 with complicated past medical history including multiple recent hospitalizations for complicated duodenectomy and recurrent pancreatitis who presents with recurrent abdominal pain and progressive/new intra-abdominal fluid collections concerning for abscesses and possible ongoing GI leak.       Assessment/Plan  Interval History   Principal Problem:    Intra-abdominal abscess (HCC)  Active Problems:    Hyperlipidemia    Sepsis     Normocytic anemia    Hyponatremia    Hypokalemia    Hypomagnesemia    Postprocedural intraabdominal abscess    Mild protein-calorie malnutrition (HCC)    Bilateral lower extremity edema    HTN (hypertension), benign      Plan IR  - Irrigate RLQ drain with 20 ml of sterile saline six times per shift   - Fluid cultures, Escherichia coli, Enterococcus faecium  - Sugery following   - Drain in good position, recommend increasing irrigation frequency and amount    - Continue to monitor drains, VS and labs   - repeat CT scan for 12/17      [id number]  IR:   12/4-RIGHT paracolic gutter/RP drain placement by CT 50 mL brown fluid to lab  12/5- 65 ml   12/6- 50 ml purulent brown fluid, Ct- decrease in size in right lower quadrant fluid collection status post drainage. A pigtail catheter remains in place. A fluid collection previously measuring 2.3 cm in width currently measures 9 mm in width.   12/7-  75 ml  12/8- 60 ml   12/9- 35 ml in am  12/10- 15 ml   12/11- 15 ml  12/12- 45 ml  12/13- 55 ml          Review of Systems  Physical Exam   Review of Systems   Unable to perform ROS: Other       Vitals:    12/14/21 0715   BP: 131/73   Pulse: 81   Resp: 16   Temp: 36.7 °C (98.1 °F)   SpO2: 95%        Physical Exam  Constitutional:       General: She is sleeping.      Appearance: Normal appearance.   HENT:      Head: Normocephalic.      Nose: Nose normal.      Mouth/Throat:      Mouth: Mucous membranes are moist.   Eyes:      Pupils: Pupils are equal, round, and reactive to light.   Cardiovascular:      Rate and Rhythm: Normal rate.   Pulmonary:      Effort: Pulmonary effort is normal. No respiratory distress.   Abdominal:      Palpations: Abdomen is soft.      Tenderness: There is abdominal tenderness.          Comments: IR drain site CDI, no redness or swelling , brown purulent fluid in MARTHA bulb  Ostomy   X 3 abd dressings    Musculoskeletal:         General: No tenderness or deformity.      Cervical back: Normal range of motion.   Skin:     General: Skin is warm and dry.      Capillary Refill: Capillary refill takes less than 2 seconds.      Coloration: Skin is not jaundiced or pale.   Neurological:      General: No focal deficit present.      Motor: No weakness.   Psychiatric:         Mood and Affect: Mood normal.         Behavior: Behavior normal.             Labs    Recent Labs     12/12/21  0728 12/13/21 0313 12/14/21  0051   WBC 10.1 6.6 7.7   RBC 3.37* 3.46* 3.03*   HEMOGLOBIN 9.9* 10.0* 8.9*   HEMATOCRIT 31.3* 33.0* 28.7*   MCV 92.9 95.4 94.7   MCH 29.4 28.9 29.4   MCHC 31.6* 30.3* 31.0*   RDW 56.3* 57.4* 57.1*   PLATELETCT 220 233 248   MPV 9.2 9.3 9.9     Recent Labs     12/12/21  0728 12/13/21 0313 12/14/21  0051   SODIUM 135 136 138   POTASSIUM 4.1 3.9 3.9   CHLORIDE 101 99 100   CO2 27 29 29   GLUCOSE 80 80 109*   BUN 5* 6* 8   CREATININE 0.34* 0.43* 0.50   CALCIUM 7.6* 7.7* 7.6*     Recent Labs     12/12/21  0728 12/13/21  0313 12/14/21  0051   CREATININE 0.34* 0.43* 0.50     IR-MIDLINE CATHETER INSERTION WO GUIDANCE > AGE 3   Final Result                  Ultrasound-guided midline  placement performed by qualified nursing staff    as above.          CT-ABDOMEN-PELVIS WITH   Final Result      1.  Apparent slight increase in size in right lower quadrant fluid collection with drain in place with gas again noted along the superior aspect of the collection to the ascending colon with dense fluid/apparent contrast within the fluid collection    suggesting fistulous communication with the colon.      2.  Anasarca with small bilateral pleural effusions and bibasilar atelectasis.      3.  Biliary stent placement with pneumobilia.      CT-ABDOMEN-PELVIS WITH   Final Result      1.  Interval decrease in size in right lower quadrant fluid collections status post drainage.      2.  Trace bilateral pleural effusions with bibasilar atelectasis.      3.  Biliary stent in place with pneumobilia.      4.  Anasarca      CT-DRAIN-PERITONEAL   Final Result      Successful RIGHT retroperitoneal/paracolic gutter drainage tube placement.      Plan: Thrice daily flushes with 10 mL of sterile saline. Monitor outputs. Please contact interventional radiology if there is any concern for tube dysfunction.      Findings were communicated with Dr. Vann via Voalte Me after initial scanning was performed.      IR-CONSULT AND TREAT    (Results Pending)       INR   Date Value Ref Range Status   12/04/2021 1.42 (H) 0.87 - 1.13 Final     Comment:     INR - Non-therapeutic Reference Range: 0.87-1.13  INR - Therapeutic Reference Range: 2.0-4.0       No results found for: POCINR     Intake/Output Summary (Last 24 hours) at 12/6/2021 1052  Last data filed at 12/6/2021 0600  Gross per 24 hour   Intake 1724 ml   Output 1295 ml   Net 429 ml      Labs not explicitly included in this progress note were reviewed by the author. Radiology/imaging not explicitly included in this progress note was reviewed by the author.   I have performed a physical exam and reviewed and updated ROS and Plan today (12/14/2021).     10 minutes in directly  providing and coordinating care and extensive data review.  No time overlap and excludes procedures.

## 2021-12-14 NOTE — DISCHARGE PLANNING
Care Transition Team Discharge Planning    Anticipates discharge disposition:  • Home with [facility]    Action:  • Pt was discussed in IDT rounds today and per Alem RIDER , Pt is pending ID recommendation whether to continue IV or oral antibiotic.  • Plan is Pt for repeat CT Scan on 12/16    Barriers to Discharge:  • Pending medical clearance    Plan:  • CM to continue to assist Pt with discharge as needed

## 2021-12-14 NOTE — CARE PLAN
The patient is Stable - Low risk of patient condition declining or worsening    Shift Goals  Clinical Goals: Pain control, OOB activity, monitor IR drain  Patient Goals: Comfort and OOB activity  Family Goals: Comfort    Problem: Knowledge Deficit - Standard  Goal: Patient and family/care givers will demonstrate understanding of plan of care, disease process/condition, diagnostic tests and medications  Outcome: Progressing     Problem: Pain - Standard  Goal: Alleviation of pain or a reduction in pain to the patient’s comfort goal  Outcome: Progressing     Problem: Fall Risk  Goal: Patient will remain free from falls  Outcome: Progressing       Progress made toward(s) clinical / shift goals:      Patient updated on current plan of care and verbalizes understanding.   Pain is controlled with current medications per MAR.   Fall education provided and patient verbalizes understanding.     Patient is not progressing towards the following goals:

## 2021-12-15 LAB
ANION GAP SERPL CALC-SCNC: 9 MMOL/L (ref 7–16)
BUN SERPL-MCNC: 8 MG/DL (ref 8–22)
CALCIUM SERPL-MCNC: 7.9 MG/DL (ref 8.5–10.5)
CHLORIDE SERPL-SCNC: 100 MMOL/L (ref 96–112)
CO2 SERPL-SCNC: 29 MMOL/L (ref 20–33)
CREAT SERPL-MCNC: 0.53 MG/DL (ref 0.5–1.4)
ERYTHROCYTE [DISTWIDTH] IN BLOOD BY AUTOMATED COUNT: 56.4 FL (ref 35.9–50)
GLUCOSE SERPL-MCNC: 112 MG/DL (ref 65–99)
HCT VFR BLD AUTO: 28.7 % (ref 37–47)
HGB BLD-MCNC: 8.9 G/DL (ref 12–16)
MAGNESIUM SERPL-MCNC: 1.8 MG/DL (ref 1.5–2.5)
MCH RBC QN AUTO: 29.2 PG (ref 27–33)
MCHC RBC AUTO-ENTMCNC: 31 G/DL (ref 33.6–35)
MCV RBC AUTO: 94.1 FL (ref 81.4–97.8)
PLATELET # BLD AUTO: 237 K/UL (ref 164–446)
PMV BLD AUTO: 9.1 FL (ref 9–12.9)
POTASSIUM SERPL-SCNC: 4.1 MMOL/L (ref 3.6–5.5)
RBC # BLD AUTO: 3.05 M/UL (ref 4.2–5.4)
SODIUM SERPL-SCNC: 138 MMOL/L (ref 135–145)
WBC # BLD AUTO: 7.5 K/UL (ref 4.8–10.8)

## 2021-12-15 PROCEDURE — 700111 HCHG RX REV CODE 636 W/ 250 OVERRIDE (IP): Performed by: NURSE PRACTITIONER

## 2021-12-15 PROCEDURE — 700111 HCHG RX REV CODE 636 W/ 250 OVERRIDE (IP): Performed by: STUDENT IN AN ORGANIZED HEALTH CARE EDUCATION/TRAINING PROGRAM

## 2021-12-15 PROCEDURE — 80048 BASIC METABOLIC PNL TOTAL CA: CPT

## 2021-12-15 PROCEDURE — 700105 HCHG RX REV CODE 258: Performed by: INTERNAL MEDICINE

## 2021-12-15 PROCEDURE — 83735 ASSAY OF MAGNESIUM: CPT

## 2021-12-15 PROCEDURE — 770001 HCHG ROOM/CARE - MED/SURG/GYN PRIV*

## 2021-12-15 PROCEDURE — 700111 HCHG RX REV CODE 636 W/ 250 OVERRIDE (IP): Performed by: FAMILY MEDICINE

## 2021-12-15 PROCEDURE — A9270 NON-COVERED ITEM OR SERVICE: HCPCS | Performed by: FAMILY MEDICINE

## 2021-12-15 PROCEDURE — A9270 NON-COVERED ITEM OR SERVICE: HCPCS | Performed by: INTERNAL MEDICINE

## 2021-12-15 PROCEDURE — 85027 COMPLETE CBC AUTOMATED: CPT

## 2021-12-15 PROCEDURE — A9270 NON-COVERED ITEM OR SERVICE: HCPCS

## 2021-12-15 PROCEDURE — 700102 HCHG RX REV CODE 250 W/ 637 OVERRIDE(OP)

## 2021-12-15 PROCEDURE — 700105 HCHG RX REV CODE 258: Performed by: NURSE PRACTITIONER

## 2021-12-15 PROCEDURE — 99232 SBSQ HOSP IP/OBS MODERATE 35: CPT | Performed by: NURSE PRACTITIONER

## 2021-12-15 PROCEDURE — 700102 HCHG RX REV CODE 250 W/ 637 OVERRIDE(OP): Performed by: INTERNAL MEDICINE

## 2021-12-15 PROCEDURE — 700111 HCHG RX REV CODE 636 W/ 250 OVERRIDE (IP): Performed by: INTERNAL MEDICINE

## 2021-12-15 PROCEDURE — 700102 HCHG RX REV CODE 250 W/ 637 OVERRIDE(OP): Performed by: FAMILY MEDICINE

## 2021-12-15 PROCEDURE — 99232 SBSQ HOSP IP/OBS MODERATE 35: CPT | Performed by: INTERNAL MEDICINE

## 2021-12-15 RX ORDER — HYDROCODONE BITARTRATE AND ACETAMINOPHEN 10; 325 MG/1; MG/1
1 TABLET ORAL EVERY 6 HOURS PRN
Status: DISCONTINUED | OUTPATIENT
Start: 2021-12-15 | End: 2021-12-16 | Stop reason: HOSPADM

## 2021-12-15 RX ADMIN — CYCLOBENZAPRINE 10 MG: 10 TABLET, FILM COATED ORAL at 17:50

## 2021-12-15 RX ADMIN — HYDROCODONE BITARTRATE AND ACETAMINOPHEN 1 TABLET: 10; 325 TABLET ORAL at 18:33

## 2021-12-15 RX ADMIN — OXYCODONE HYDROCHLORIDE 10 MG: 10 TABLET ORAL at 05:54

## 2021-12-15 RX ADMIN — AMPICILLIN SODIUM AND SULBACTAM SODIUM 3 G: 2; 1 INJECTION, POWDER, FOR SOLUTION INTRAMUSCULAR; INTRAVENOUS at 05:33

## 2021-12-15 RX ADMIN — VANCOMYCIN HYDROCHLORIDE 1000 MG: 500 INJECTION, POWDER, LYOPHILIZED, FOR SOLUTION INTRAVENOUS at 00:19

## 2021-12-15 RX ADMIN — POTASSIUM CHLORIDE 20 MEQ: 1500 TABLET, EXTENDED RELEASE ORAL at 05:26

## 2021-12-15 RX ADMIN — VANCOMYCIN HYDROCHLORIDE 1000 MG: 500 INJECTION, POWDER, LYOPHILIZED, FOR SOLUTION INTRAVENOUS at 12:45

## 2021-12-15 RX ADMIN — GABAPENTIN 600 MG: 300 CAPSULE ORAL at 17:50

## 2021-12-15 RX ADMIN — HYDROCODONE BITARTRATE AND ACETAMINOPHEN 1 TABLET: 10; 325 TABLET ORAL at 12:43

## 2021-12-15 RX ADMIN — OXYCODONE HYDROCHLORIDE 10 MG: 10 TABLET ORAL at 10:17

## 2021-12-15 RX ADMIN — EZETIMIBE 10 MG: 10 TABLET ORAL at 17:50

## 2021-12-15 RX ADMIN — GABAPENTIN 600 MG: 300 CAPSULE ORAL at 05:25

## 2021-12-15 RX ADMIN — AMPICILLIN SODIUM AND SULBACTAM SODIUM 3 G: 2; 1 INJECTION, POWDER, FOR SOLUTION INTRAMUSCULAR; INTRAVENOUS at 12:44

## 2021-12-15 RX ADMIN — HEPARIN SODIUM 5000 UNITS: 5000 INJECTION, SOLUTION INTRAVENOUS; SUBCUTANEOUS at 05:26

## 2021-12-15 RX ADMIN — HEPARIN SODIUM 5000 UNITS: 5000 INJECTION, SOLUTION INTRAVENOUS; SUBCUTANEOUS at 22:54

## 2021-12-15 RX ADMIN — METOPROLOL TARTRATE 25 MG: 25 TABLET, FILM COATED ORAL at 17:57

## 2021-12-15 RX ADMIN — AMPICILLIN SODIUM AND SULBACTAM SODIUM 3 G: 2; 1 INJECTION, POWDER, FOR SOLUTION INTRAMUSCULAR; INTRAVENOUS at 17:48

## 2021-12-15 RX ADMIN — ONDANSETRON 4 MG: 2 INJECTION INTRAMUSCULAR; INTRAVENOUS at 17:58

## 2021-12-15 RX ADMIN — HEPARIN SODIUM 5000 UNITS: 5000 INJECTION, SOLUTION INTRAVENOUS; SUBCUTANEOUS at 15:53

## 2021-12-15 RX ADMIN — METOPROLOL TARTRATE 25 MG: 25 TABLET, FILM COATED ORAL at 05:25

## 2021-12-15 RX ADMIN — FUROSEMIDE 40 MG: 10 INJECTION, SOLUTION INTRAMUSCULAR; INTRAVENOUS at 05:30

## 2021-12-15 ASSESSMENT — ENCOUNTER SYMPTOMS
SENSORY CHANGE: 0
WHEEZING: 0
WEAKNESS: 0
NERVOUS/ANXIOUS: 0
FLANK PAIN: 0
CHILLS: 0
VOMITING: 0
HEARTBURN: 0
MYALGIAS: 0
SHORTNESS OF BREATH: 0
FOCAL WEAKNESS: 0
NAUSEA: 0
PALPITATIONS: 0
NECK PAIN: 0
SPEECH CHANGE: 0
FEVER: 0
BRUISES/BLEEDS EASILY: 0
DIARRHEA: 0
HEADACHES: 0
COUGH: 0
BACK PAIN: 0
DIZZINESS: 0
BLURRED VISION: 0
HEMOPTYSIS: 0
DIAPHORESIS: 0
WEAKNESS: 1
ABDOMINAL PAIN: 1
SORE THROAT: 0

## 2021-12-15 ASSESSMENT — PAIN DESCRIPTION - PAIN TYPE
TYPE: ACUTE PAIN

## 2021-12-15 NOTE — PROGRESS NOTES
Hospital Medicine Daily Progress Note    Date of Service  12/15/2021    Chief Complaint  Nils Alfonso is a 66 y.o. female admitted 12/4/2021 with abdominal abscess.     Hospital Course  Admitted with intra-abdominal abscess on 12/4/2021. Patient was started on empiric coverage with IV Vancomycin and Zosyn. Surgery was consulted on the case. Patient underwent CT guided Right retroperitoneal/paracolic gutter drainage tube placement on 12/4/2021.  Culture showed E. coli and Enterococcus faecium.  Infectious disease was consulted on the case and antibiotics were changed to IV Vancomycin and Unasyn.  Repeat CT scan showed possible increased fluid collection.    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 Zosyn, micafungin, and bactrim antibiotic course completed 11/24/2021.    Interval Problem Update  12/14: No acute events overnight. Patient denies any complaints of pain. Tolerating regular diet. IR recommending repeat CT on 12/17. Blood pressure control improving.     12/15: No acute events overnight. Pain controlled this morning. Plan discussed with IR and ID. Will plan for repeat CT abdomen tomorrow. Oral versus IV antibiotics pending CT findings      I have personally seen and examined the patient at bedside. I discussed the plan of care with patient, bedside RN, charge RN,  and infectious disease    Consultants/Specialty  infectious disease and Surgery Oncology    Code Status  Full Code    Disposition  Patient is not medically cleared.   Anticipate discharge to to home with organized home healthcare and close outpatient follow-up. Consider need for outpatient  antibiotic infusions.  I have placed the appropriate orders for post-discharge needs.    Review of Systems  Review of Systems   Constitutional: Negative for chills, diaphoresis and fever.   HENT: Negative for congestion, hearing loss and sore throat.    Eyes: Negative for blurred vision.   Respiratory: Negative for cough, shortness of breath and wheezing.    Cardiovascular: Positive for leg swelling. Negative for chest pain and palpitations.   Gastrointestinal: Positive for abdominal pain. Negative for diarrhea, heartburn, nausea and vomiting.   Genitourinary: Negative for dysuria, flank pain and hematuria.   Musculoskeletal: Negative for back pain, joint pain, myalgias and neck pain.   Skin: Negative for rash.   Neurological: Positive for weakness. Negative for dizziness, sensory change, speech change, focal weakness and headaches.   Psychiatric/Behavioral: The patient is not nervous/anxious.         Physical Exam  Temp:  [36.6 °C (97.8 °F)-37.1 °C (98.7 °F)] 37.1 °C (98.7 °F)  Pulse:  [63-86] 85  Resp:  [16-18] 16  BP: (115-155)/(69-81) 115/80  SpO2:  [94 %-97 %] 97 %    Physical Exam  Vitals and nursing note reviewed.   Constitutional:       General: She is not in acute distress.  HENT:      Head: Normocephalic and atraumatic.      Nose: No congestion.      Mouth/Throat:      Mouth: Mucous membranes are moist.      Pharynx: Oropharynx is clear.   Eyes:      Extraocular Movements: Extraocular movements intact.      Conjunctiva/sclera: Conjunctivae normal.   Cardiovascular:      Rate and Rhythm: Normal rate and regular rhythm.      Heart sounds: Normal heart sounds.   Pulmonary:      Effort: Pulmonary effort is normal.      Breath sounds: Normal breath sounds.   Abdominal:      General: Bowel sounds are normal. There is no distension.      Tenderness: There is no abdominal tenderness. There is no guarding or rebound.      Comments: Ostomy  Right flank MARTHA drain, approx 20mL tan, watery drainage noted. Dressings  clean/dry.   Musculoskeletal:      Cervical back: No tenderness.      Right lower leg: Edema present.      Left lower leg: Pitting Edema present.      Comments: +3 BLE pitting edema   Skin:     General: Skin is warm and dry.      Coloration: Skin is pale.   Neurological:      General: No focal deficit present.      Mental Status: She is alert and oriented to person, place, and time.         Fluids    Intake/Output Summary (Last 24 hours) at 12/15/2021 1405  Last data filed at 12/15/2021 0900  Gross per 24 hour   Intake 860 ml   Output 670 ml   Net 190 ml       Laboratory  Recent Labs     12/13/21 0313 12/14/21  0051 12/15/21  0025   WBC 6.6 7.7 7.5   RBC 3.46* 3.03* 3.05*   HEMOGLOBIN 10.0* 8.9* 8.9*   HEMATOCRIT 33.0* 28.7* 28.7*   MCV 95.4 94.7 94.1   MCH 28.9 29.4 29.2   MCHC 30.3* 31.0* 31.0*   RDW 57.4* 57.1* 56.4*   PLATELETCT 233 248 237   MPV 9.3 9.9 9.1     Recent Labs     12/13/21 0313 12/14/21  0051 12/15/21  0025   SODIUM 136 138 138   POTASSIUM 3.9 3.9 4.1   CHLORIDE 99 100 100   CO2 29 29 29   GLUCOSE 80 109* 112*   BUN 6* 8 8   CREATININE 0.43* 0.50 0.53   CALCIUM 7.7* 7.6* 7.9*                   Imaging  IR-MIDLINE CATHETER INSERTION WO GUIDANCE > AGE 3   Final Result                  Ultrasound-guided midline placement performed by qualified nursing staff    as above.          CT-ABDOMEN-PELVIS WITH   Final Result      1.  Apparent slight increase in size in right lower quadrant fluid collection with drain in place with gas again noted along the superior aspect of the collection to the ascending colon with dense fluid/apparent contrast within the fluid collection    suggesting fistulous communication with the colon.      2.  Anasarca with small bilateral pleural effusions and bibasilar atelectasis.      3.  Biliary stent placement with pneumobilia.      CT-ABDOMEN-PELVIS WITH   Final Result      1.  Interval decrease in size in right lower quadrant fluid collections status post drainage.      2.   Trace bilateral pleural effusions with bibasilar atelectasis.      3.  Biliary stent in place with pneumobilia.      4.  Anasarca      CT-DRAIN-PERITONEAL   Final Result      Successful RIGHT retroperitoneal/paracolic gutter drainage tube placement.      Plan: Thrice daily flushes with 10 mL of sterile saline. Monitor outputs. Please contact interventional radiology if there is any concern for tube dysfunction.      Findings were communicated with Dr. Vann via Voalte Me after initial scanning was performed.      IR-CONSULT AND TREAT    (Results Pending)          Assessment/Plan  * Intra-abdominal abscess (HCC)- (present on admission)  Assessment & Plan  Fluid cultures on 12/4 growing Escherichia coli, Enterococcus faecium  CT guided Right retroperitoneal/paracolic gutter drainage tube placement  12/4/2021. Tan, watery discharge noted in bulb, approx 20mL.  IV Vancomycin, Unasyn   Will need repeat CT tomorrow. abx plan pending CT results   Follow ID recs     HTN (hypertension), benign- (present on admission)  Assessment & Plan  Metoprolol  IV Vasotec and labetalol as needed with parameters    Bilateral lower extremity edema  Assessment & Plan  Hypoalbuminemia vs dependent edema  Echo on 10/2021 without evidence of heart failure, EF 75%   Albumin 2.1; Pre-albumin 15.9  No erythema or tenderness   Discontinue IV lasix   Encouraged elevation, compression stockings, and ambulation    Mild protein-calorie malnutrition (HCC)- (present on admission)  Assessment & Plan  Nutrition consult    Hypomagnesemia- (present on admission)  Assessment & Plan  Mg normal   Follow BMP    Hypokalemia- (present on admission)  Assessment & Plan  K normal   follow bmp  KDur     Hyponatremia- (present on admission)  Assessment & Plan  Follow bmp    Normocytic anemia- (present on admission)  Assessment & Plan  Follow cbc    Sepsis - (present on admission)  Assessment & Plan  Source - Abdominal abscess  Resolved    Hyperlipidemia- (present on  admission)  Assessment & Plan  Ezetimibe       VTE prophylaxis: heparin ppx    I have performed a physical exam and reviewed and updated ROS and Plan today (12/15/2021). In review of yesterday's note (12/14/2021), there are no changes except as documented above.

## 2021-12-15 NOTE — PROGRESS NOTES
Assessment done  Pt is AOx4  Pain treated per mar  States some nausea, also treated per mar  Currently resting in bed  Denies SOB, or vomiting  MLI dressing intact  IR drain to be flushed per orders  VSS  Will continue to monitor   Call light in place   Bed locked and in lowest position

## 2021-12-15 NOTE — CARE PLAN
Shift Goals  Clinical Goals: walk  Patient Goals: sleep  Family Goals: Comofrt    Progress made toward(s) clinical / shift goals:  Edu offered with each encounter, normalizing ADLs, collaborating with patient and family for POC.     Patient is not progressing towards the following goals:      Problem: Knowledge Deficit - Standard  Goal: Patient and family/care givers will demonstrate understanding of plan of care, disease process/condition, diagnostic tests and medications  Outcome: Progressing     Problem: Pain - Standard  Goal: Alleviation of pain or a reduction in pain to the patient’s comfort goal  Outcome: Progressing     Problem: Skin Integrity  Goal: Skin integrity is maintained or improved  Outcome: Progressing

## 2021-12-15 NOTE — THERAPY
"Occupational Therapy  Daily Treatment     Patient Name: Nils Alfonso  Age:  66 y.o., Sex:  female  Medical Record #: [id number]  Today's Date: 12/14/2021     Precautions: Fall Risk  Comments: MARTHA, ostomy     Assessment    Pt seen for brief OT tx. Able to complete sit to stands without assist. Much improved since last session. Pt plans to use BSC over toilet (for elevated surface) and avoid lower seats. Pt is completing BADL and transfers with no more than supv in this setting. Reports excellent spousal support. Has all necessary DME in place. Patient will not be actively followed for occupational therapy services at this time, however may be seen if requested by physician for 1 more visit within 30 days to address any discharge or equipment needs.     Plan    Discharge secondary to goals met. DC needs only    DC Equipment Recommendations: Bed Side Commode  Discharge Recommendations: Anticipate that the patient will have no further occupational therapy needs after discharge from the hospital    Subjective    \"I fell in the bathroom but I know it was because of the uneven floor and I was washing my hands\"     Objective       12/14/21 1603   Cognition    Comments very receptive to education    Balance   Sitting Balance (Static) Good   Sitting Balance (Dynamic) Good   Standing Balance (Static) Fair +   Standing Balance (Dynamic) Fair   Weight Shift Sitting Good   Weight Shift Standing Fair   Comments 4WW for standing    Bed Mobility    Supine to Sit Supervised   Sit to Supine Supervised   Scooting Supervised   Functional Mobility   Sit to Stand Supervised   Bed, Chair, Wheelchair Transfer Supervised   Toilet Transfers Supervised   Transfer Method Stand Step  (4WW)   Mobility Supine > < EOB, short gait and transfers with 4WW   Short Term Goals   Short Term Goal # 1 Pt will complete ADL transfers with supv    Goal Outcome # 1 Goal met   Short Term Goal # 2 Pt will complete FB dressing with supv    Goal Outcome # 2 Goal " met   Short Term Goal # 3 Pt will complete standing grooming with supv    Goal Outcome # 3 Goal met

## 2021-12-15 NOTE — PROGRESS NOTES
Infectious Disease Progress Note    Author: Jose Arora M.D. Date & Time of service: 12/15/2021  7:15 AM    Chief Complaint:  Follow-up for intra-abdominal abscess    Interval History:   T-max 99.1, white count 6.1, tolerating switching antimicrobials.  12/10 patient is afebrile, white count 6000 today, feeling better overall, tolerating antimicrobials thus far   patient remains afebrile, white count is 10.1, tolerating antimicrobials.  CT scan findings as below   patient remains afebrile, white count 6.6, tolerating antimicrobials.  Plan as below.   patient remains afebrile, no CBC this morning, tolerating vancomycin and Unasyn.  Plan as below.  Sleeping soundly this morning.  12/15 patient remains afebrile, white count 7.5, tolerating antimicrobials, plan as below. Sleeping comfortably this afternoon    Labs Reviewed and Medications Reviewed.    Review of Systems:  Review of Systems   Unable to perform ROS: Other       Hemodynamics:  Temp (24hrs), Av.8 °C (98.3 °F), Min:36.6 °C (97.8 °F), Max:37.1 °C (98.7 °F)  Temperature: 37.1 °C (98.7 °F)  Pulse  Av.3  Min: 53  Max: 100   Blood Pressure : 115/80       Physical Exam:  Physical Exam  Vitals and nursing note reviewed.   Constitutional:       General: She is not in acute distress.     Comments: Chronically ill-appearing   HENT:      Mouth/Throat:      Pharynx: No oropharyngeal exudate.   Eyes:      General: No scleral icterus.        Right eye: No discharge.         Left eye: No discharge.      Conjunctiva/sclera: Conjunctivae normal.   Cardiovascular:      Rate and Rhythm: Normal rate.      Heart sounds: No murmur heard.      Pulmonary:      Effort: Pulmonary effort is normal. No respiratory distress.      Breath sounds: No stridor.   Abdominal:      Comments: Right-sided MARTHA drain with purulent appearing output.  Ostomy   Musculoskeletal:         General: No swelling or tenderness.   Skin:     Findings: No erythema or rash.    Neurological:      Mental Status: She is alert.   Psychiatric:         Mood and Affect: Mood normal.         Behavior: Behavior normal.      Comments: Very pleasant.          Meds:    Current Facility-Administered Medications:   •  vancomycin  •  furosemide  •  enalaprilat  •  labetalol  •  potassium chloride SA  •  metoprolol tartrate  •  acetaminophen **FOLLOWED BY** [DISCONTINUED] acetaminophen  •  ampicillin-sulbactam (UNASYN) IV  •  calcium carbonate  •  ondansetron  •  MD Alert...Vancomycin per Pharmacy  •  heparin  •  cyclobenzaprine  •  ezetimibe  •  gabapentin  •  Pharmacy Consult Request  •  oxyCODONE immediate-release **OR** oxyCODONE immediate-release **OR** HYDROmorphone    Labs:  Recent Labs     12/12/21  0728 12/12/21  0728 12/13/21  0313 12/14/21  0051 12/15/21  0025   WBC 10.1   < > 6.6 7.7 7.5   RBC 3.37*   < > 3.46* 3.03* 3.05*   HEMOGLOBIN 9.9*   < > 10.0* 8.9* 8.9*   HEMATOCRIT 31.3*   < > 33.0* 28.7* 28.7*   MCV 92.9   < > 95.4 94.7 94.1   MCH 29.4   < > 28.9 29.4 29.2   RDW 56.3*   < > 57.4* 57.1* 56.4*   PLATELETCT 220   < > 233 248 237   MPV 9.2   < > 9.3 9.9 9.1   NEUTSPOLYS 66.80  --  56.70 55.40  --    LYMPHOCYTES 25.50  --  33.00 34.60  --    MONOCYTES 5.90  --  7.50 8.00  --    EOSINOPHILS 0.30  --  0.60 0.60  --    BASOPHILS 0.70  --  0.80 0.60  --     < > = values in this interval not displayed.     Recent Labs     12/13/21  0313 12/14/21  0051 12/15/21  0025   SODIUM 136 138 138   POTASSIUM 3.9 3.9 4.1   CHLORIDE 99 100 100   CO2 29 29 29   GLUCOSE 80 109* 112*   BUN 6* 8 8     Recent Labs     12/13/21  0313 12/14/21  0051 12/15/21  0025   CREATININE 0.43* 0.50 0.53       Imaging:  CT-ABDOMEN-PELVIS WITH    Result Date: 12/6/2021 12/6/2021 10:55 AM HISTORY/REASON FOR EXAM:  Abdominal pain, fever. TECHNIQUE/EXAM DESCRIPTION:   CT scan of the abdomen and pelvis with contrast. Contrast-enhanced helical scanning was obtained from the diaphragmatic domes through the pubic symphysis  following the bolus administration of nonionic contrast without complication. 80 mL of Omnipaque 350 nonionic contrast was administered without complication. Low dose optimization technique was utilized for this CT exam including automated exposure control and adjustment of the mA and/or kV according to patient size. COMPARISON: December 3, 2021 FINDINGS: Lower Chest: There are trace bilateral pleural effusions. There is bibasilar atelectasis, right greater left.. Liver: Normal. Spleen: Unremarkable. Pancreas: Unremarkable. Gallbladder: The gallbladder has been resected. Biliary: Common bile duct stent remains in place. There is pneumobilia with gas also identified in the pancreatic duct. Adrenal glands: Normal. Kidneys: Unremarkable without hydronephrosis. Bowel: No obstruction or acute inflammation. There is diverticulosis without evidence of diverticulitis Lymph nodes: No adenopathy. Vasculature: Unremarkable. Peritoneum: There is been interval decrease in size in right lower quadrant fluid collection status post drainage. A pigtail catheter remains in place. A fluid collection previously measuring 2.3 cm in width currently measures 9 mm in width. A fluid collection more posterior medially previously measured 4.1 cm in width and currently measures 19 mm in width with the drain within this collection. Open anterior abdominal wound noted. There is anasarca. Musculoskeletal: No acute or destructive process. Pelvis: No adenopathy or free fluid.     1.  Interval decrease in size in right lower quadrant fluid collections status post drainage. 2.  Trace bilateral pleural effusions with bibasilar atelectasis. 3.  Biliary stent in place with pneumobilia. 4.  Anasarca    CT-ABDOMEN-PELVIS WITH    Result Date: 12/3/2021  12/3/2021 5:11 PM HISTORY/REASON FOR EXAM: Acute abdominal pain. History of duodenal perforation. TECHNIQUE/EXAM DESCRIPTION:   CT scan of the abdomen and pelvis with contrast. Contrast-enhanced helical  scanning was obtained from the diaphragmatic domes through the pubic symphysis following the bolus administration of nonionic contrast without complication. 80 mL of Omnipaque 350 nonionic contrast was administered without complication. Oral contrast was administered. Low dose optimization technique was utilized for this CT exam including automated exposure control and adjustment of the mA and/or kV according to patient size. COMPARISON: CT AP 11/24/2021. Upper GI series 11/22/2021. CT AP 11/15/2021 FINDINGS: Lower Chest: There is minimal atelectasis or pneumonia in the right lower lobe with minimal right pleural effusion. There is been no significant change. The left lung base is without consolidation. A small calcified granuloma is again noted. No free air is present. Postoperative changes involving the stomach are stable. There is been interval removal of a right upper quadrant drain. There has been an increase in size of a right sided fluid collection or abscess in the right paracolic gutter region. The abscess has a length of 6.3 cm and transverse dimension of 4.1 cm inferiorly. There is a fluid collection superiorly in the right mid abdomen which is connected with the inferior collection. This collection has an air-fluid level and has a maximum diameter of 3 cm. This fluid collection extends inferiorly into the right lower quadrant paracolic gutter. There is a fluid collection in the midline of the rectus sheath below a midline skin incision. This collection has maximum dimension of 5.5 cm transversely and a length of approximately 9 cm. Liver: No hepatic parenchymal mass is present. Expansile common bile duct stent is present. Spleen: Unremarkable. Pancreas: Unremarkable. Gallbladder: The gallbladder has been resected. Biliary: Nondilated. Adrenal glands: Normal. Kidneys: Unremarkable without hydronephrosis. Bowel: No there is no evidence of bowel obstruction or focal inflammation. Lymph nodes: No adenopathy.  Vasculature: Unremarkable. Peritoneum: Unremarkable without ascites. Musculoskeletal: No acute or destructive process. Fusion hardware is present in the distal lumbar spine. Pelvis: No adenopathy. The bladder appears normal. No pelvic fluid collections or free fluid is present.     1.  Recurrent fluid collections or abscess in the right upper quadrant and right paracolic gutter region. Interval removal of right upper quadrant drain. 2.  New fluid collection in the rectus sheath in the midline below the skin incision. This may represent postoperative fluid collection or abscess. 3.  Postoperative changes involving the stomach. 4.  Cholecystectomy. Residual common bile duct expansile stent in place. 5.  No bowel or renal obstruction. 6.  No free air. 7.  Small unchanged right pleural effusion and unchanged minimal right lower lobe atelectasis or pneumonia.    CT-ABDOMEN-PELVIS WITH    Result Date: 11/24/2021 11/24/2021 3:56 PM HISTORY/REASON FOR EXAM:  Abdominal pain, acute, nonlocalized. History of duodenal perforation. TECHNIQUE/EXAM DESCRIPTION:   CT scan of the abdomen and pelvis with contrast. Contrast-enhanced helical scanning was obtained from the diaphragmatic domes through the pubic symphysis following the bolus administration of nonionic contrast without complication. 100 mL of Omnipaque 350 nonionic contrast was administered without complication. Low dose optimization technique was utilized for this CT exam including automated exposure control and adjustment of the mA and/or kV according to patient size. COMPARISON: 11/15/2021 FINDINGS: Lower Chest: Bibasilar atelectasis, improved from prior with improvement of bilateral pleural fluid. Liver: Liver again shows pneumobilia. Spleen: Unremarkable. Pancreas: Present in the pancreatic duct.  Pancreatic duct is mildly dilated. Gallbladder: Surgically absent. Biliary: Metallic common bile duct stent in place. Adrenal glands: Normal. Kidneys: Unremarkable without  hydronephrosis. Bowel: Postoperative change of the stomach.  Contrast present in the colon from prior procedure.  RIGHT upper quadrant drain present within irregular fluid collection in the RIGHT lateral midabdomen.  Fluid collection shows peripheral enhancement as well  as subtle hyperdense contents and gas.  Fluid collection measures approximately 7.6 x 7.3 By 2.4 cm and extends into the RIGHT posterior pararenal space, decreased in size from prior exam.  RIGHT lower quadrant drain is been removed. Feeding tube in place with tip at the DJ flexure. Lymph nodes: No adenopathy. Vasculature: Unremarkable. Peritoneum: Unremarkable without ascites. Musculoskeletal: Postoperative change of lumbar spine. Pelvis: Bladder is unremarkable.  Contrast present in the vagina, likely reflux.  Postoperative change of anterior abdominal wall.  Diffuse body wall edema.     1.  RIGHT lateral no abnormal abscess again seen, decreased in size from prior exam with drain in place, however contents now appears hyperdense potential indicating bowel contrast, concerning for bowel perforation or fistula, although source of contrast  is uncertain.  No cristóbal pneumoperitoneum. 2.  Pneumobilia and biliary stent present. 3.  Interval removal of RIGHT lower quadrant drain.     CT-ABDOMEN-PELVIS WITH    Result Date: 11/15/2021  11/15/2021 11:49 AM HISTORY/REASON FOR EXAM:  intra-abdominal abscess. Follow up. TECHNIQUE/EXAM DESCRIPTION:   CT scan of the abdomen and pelvis with contrast. Contrast-enhanced helical scanning was obtained from the diaphragmatic domes through the pubic symphysis following the bolus administration of nonionic contrast without complication. 100 mL of Omnipaque 350 nonionic contrast was administered without complication. Low dose optimization technique was utilized for this CT exam including automated exposure control and adjustment of the mA and/or kV according to patient size. COMPARISON: November 8, 2021 FINDINGS: Lower  Chest: There is bibasilar atelectasis, right greater than left. There is been slight increase in size of trace bilateral pleural effusions.. Liver: Normal. Spleen: Unremarkable. Pancreas: Unremarkable. There is again air within the pancreatic duct. Gallbladder: The gallbladder has been resected. Biliary: Pneumobilia again noted.. Adrenal glands: Normal. Kidneys: Unremarkable without hydronephrosis. Bowel: No obstruction or acute inflammation. Lymph nodes: No adenopathy. Vasculature: Unremarkable. Peritoneum: There is again a mid abdomen fluid collection which appears slightly larger currently measuring 7.2 x 7.1 x 9.4 cm. The surgical drain tracks through this fluid collection. There is anasarca. Musculoskeletal: No acute or destructive process. Pelvis: No adenopathy or free fluid.     1.  Slight interval increase in size in fluid collection the right midabdomen with drain in place. 2.  Slight interval increase in size in trace bilateral pleural effusions, right greater than left with bibasilar atelectasis. 3.  Pneumobilia.    CT-DRAIN-PERITONEAL    Result Date: 12/4/2021  HISTORY/REASON FOR EXAM:  66-year-old woman with complex medical history who has RIGHT abscesses involving the RIGHT retroperitoneum and paracolic gutter as well as her anterior abdominal wall. Both are decompressing through the dermis. TECHNIQUE/EXAM DESCRIPTION AND NUMBER OF VIEWS: RIGHT paracolic gutter/retroperitoneal drain placement with CT guidance. Low dose optimization technique was utilized for this CT exam including automated exposure control and adjustment of the mA and/or kV according to patient size. COMPARISON:  CT 12/3/2021 MEDICATIONS: Moderate sedation was provided. Pulse oximetry and continuous cardiac monitoring by the nurse was performed throughout the exam. Intraservice time was 15 minutes. PROCEDURE:     The risks, benefits, goals and objectives and alternatives were discussed. Risks were specified as including but not  limited to bleeding, infection, damage to vessels or nerves, pain and discomfort as well as the possibility of incomplete resolution of underlying disease. Drain care related issues were discussed with the patient. The patient's questions were answered. Informed oral and written consent were obtained. The patient was placed on the CT gantry. Localizing scan was performed. The skin was prepped and draped in the usual sterile manner.  A timeout was performed. Local anesthetic result was achieved with administration of 1% lidocaine. A(n) 17-gauge access needle was advanced to the target using CT fluoroscopic guidance. Access was secured with a wire and the tract was sequentially dilated to accommodate a(n) 12 Dutch locking loop drain. A total of 50 mL of brown malodorous fluid was removed and sent for laboratory evaluation. This was put in place and formed. The catheter was attached to suction bulbdrainage and secured to the skin with 2-0 nylon suture. Completion scan was performed which showed no complication. The patient tolerated the procedure well with no evidence of complication. The skin was cleaned and a dressing was applied. FINDINGS: Substantial interval decompression of both the anterior abdominal wall and RIGHT paracolic gutter/retroperitoneal fluid collections. No residual targetable collection in the anterior abdominal wall. Appropriate tube position in the RIGHT retroperitoneal/paracolic gutter collection. No evidence of hemorrhage.     Successful RIGHT retroperitoneal/paracolic gutter drainage tube placement. Plan: Thrice daily flushes with 10 mL of sterile saline. Monitor outputs. Please contact interventional radiology if there is any concern for tube dysfunction. Findings were communicated with Dr. Vann via [employer] after initial scanning was performed.    DX-CHEST-PORTABLE (1 VIEW)    Result Date: 12/3/2021  12/3/2021 4:46 PM HISTORY/REASON FOR EXAM: Dehydration and nausea for one day.  TECHNIQUE/EXAM DESCRIPTION AND NUMBER OF VIEWS: Single AP view of the chest. COMPARISON: 11/19/2021 FINDINGS: No pneumothorax. There is unchanged elevation of the right hemidiaphragm. No enlarging pleural effusion. Hazy right lower lobe opacity is seen, evaluation of this area is mildly limited secondary to overlying EKG leads. Cardiac mediastinal silhouette is normal. Multiple surgical clips project over the epigastrium and a biliary stent is seen.     Hazy right lower lobe opacity may be atelectasis, evaluation of this region is mildly limited secondary to overlying EKG leads.    [id number] STENT - TUBE    Result Date: 11/18/2021 11/18/2021 2:17 PM HISTORY/REASON FOR EXAM:  ERCP biliary stent placement TECHNIQUE/EXAM DESCRIPTION AND NUMBER OF VIEWS: 19 portable fluoroscopic images were obtained during ERCP biliary stent placement procedure performed and endoscopy suite. COMPARISON: None FINDINGS: 19 portable fluoroscopic images obtained during ERCP biliary stent placement.     Portable fluoroscopic images obtained during ERCP biliary stent placement for localization purposes.    DX-UPPER GI-SERIES WITH KUB    Result Date: 11/22/2021 11/22/2021 11:03 AM HISTORY/REASON FOR EXAM:  Abdominal Pain; Use water soluble contrast - re-evaluate duodenal perforation. TECHNIQUE/EXAM DESCRIPTION AND NUMBER OF VIEWS: Omnipaque 300 water soluble contrast upper GI series was performed. 19 fluoroscopic images obtained. Fluoroscopy time: 1.1 minutes COMPARISON: 11/17/2021 FINDINGS:  image shows extensive postoperative change of the abdomen feeding tube in place.  Feeding tube tip at the proximal jejunum. Metallic biliary stent in place. Contrast refluxes into the distal common bile duct, within the biliary stent, consistent with prior sphincterotomy. No evidence for leakage of contrast from the stomach or duodenum.     1.  No evidence for duodenal leak. 2.  Reflux of contrast seen into the distal common bile duct at the  site of biliary stent, consistent with prior sphincterotomy.    DX-UPPER GI-SERIES WITH KUB    Result Date: 11/17/2021 11/17/2021 10:36 AM HISTORY/REASON FOR EXAM:  Gastrointestinal Complaint; Evaluate duodenal or intestinal leak from previous ERCP perforation. TECHNIQUE/EXAM DESCRIPTION AND NUMBER OF VIEWS: Omnipaque 300 water soluble contrast focused upper GI series was performed. 9 fluoroscopic images obtained. Fluoroscopy time: 0.5 minutes COMPARISON: CT abdomen pelvis dated 11/15/2021 FINDINGS: Extensive postsurgical changes of the upper abdomen. The duodenal C-loop is in appropriate anatomic position. There is contrast extravasation at the junction of the second and third portions of duodenum (see key images).     Duodenal perforation at the junction of the second and third portions of duodenum as noted on key images.    CT-ABORTED CT PROCEDURE    Result Date: 11/16/2021  HISTORY/REASON FOR EXAM:  RIGHT retroperitoneal abscess with surgical drains in place. TECHNIQUE/EXAM DESCRIPTION AND NUMBER OF VIEWS: Limited pelvic CT. Low dose optimization technique was utilized for this CT exam including automated exposure control and adjustment of the mA and/or kV according to patient size. COMPARISON:  Diagnostic CT 11/15/2021 MEDICATIONS: None PROCEDURE:     The risks, benefits, goals and objectives and alternatives were discussed. Risks were specified as including but not limited to bleeding, infection, damage to vessels or nerves, pain and discomfort as well as the possibility of incomplete resolution of underlying disease. Drain care related issues were discussed with the patient. The patient's questions were answered. Informed oral and written consent were obtained. The patient was placed on the CT gantry. Localizing scan was performed. Based on my findings no procedure was performed. FINDINGS: Decreased size of RIGHT paracolic gutter fluid collection which contains a surgical drain. There is some contrast  present in the collection as well as in the adjacent colon.     Interval decrease in size of the RIGHT retroperitoneal fluid collection which contains a surgical drain. Because from bowel is possible. No additional drain was placed.     DX-ABDOMEN FOR TUBE PLACEMENT    Result Date: 11/19/2021 11/19/2021 5:57 AM HISTORY/REASON FOR EXAM: coretrak placement TECHNIQUE/EXAM DESCRIPTION:  Single AP view the abdomen. COMPARISON:  October 19, 2021 FINDINGS: Hazy right lower lobe opacities are seen. Dobbhoff tube is seen, the tip lies to the left of the lumbar spine.  The bowel gas pattern appears nonspecific. TIPS stent is in place. The bony structures appear age-appropriate.     1.  Nonspecific bowel gas pattern. 2.  Dobbhoff tube with tip terminating overlying the expected location of the third or fourth duodenal segment. 3.  Hazy right lower lobe infiltrates.    DX-ABDOMEN FOR TUBE PLACEMENT    Result Date: 11/18/2021 11/18/2021 5:31 PM HISTORY/REASON FOR EXAM:  Line evaluation. TECHNIQUE/EXAM DESCRIPTION AND NUMBER OF VIEWS:  1 view(s) of the abdomen. COMPARISON:  None. FINDINGS: Enteric tube has been placed. The tip projects over the proximal jejunum. The bowel gas pattern is within normal limits.     Feeding tube extends to the region of the stomach and duodenum with tip at the level of the proximal end of the jejunum.    DX-ABDOMEN FOR TUBE PLACEMENT    Result Date: 11/18/2021 11/18/2021 4:23 PM HISTORY/REASON FOR EXAM:  Line evaluation. TECHNIQUE/EXAM DESCRIPTION AND NUMBER OF VIEWS:  1 view(s) of the abdomen. COMPARISON:  1/5/2007 FINDINGS: Enteric tube has been placed. The tip projects over the duodenal jejunal junction. Common bile duct stent. Epigastrium and left upper quadrant vascular clips. Suture material again overlie the epigastrium The bowel gas pattern is within normal limits. Some contrast seen in the colon from upper GI performed yesterday     Enteric tube terminates over the duodenal jejunal  junction Covered common bile duct stent No contrast extravasation is seen      Micro:  Results     ** No results found for the last 168 hours. **          Assessment:  Nils Alfonso is a 66 y.o. female with complex history of recent pancreatitis with ERCP on 10/1/2021, complicated by suspected duodenal perforation with resultant complex multiple intra-abdominal abscesses.  For about 3 weeks, this was attempted to be managed by multiple IR drains but there was no improvement.  Drain cultures from various times grew multiple organisms including E. coli and Enterococcus faecalis, Candida albicans and glabrata, stenotrophomonas, E. coli, ampicillin resistant E faecium.  Blood cultures grew Chryseobacterium species and Candida glabrata (completed therapy for the bacteremia). The decision was made to treat with at least 4 weeks of antibiotics with Continues home infusion IV Zosyn, IV micafungin, and p.o. Bactrim through 11/24/2021, and then follow-up in ID clinic, ID signed off. However, there are further procedures since that plan was made. Patient underwent an exploratory laparotomy on 11/5 for drainage of retroperitoneal abscess, peritoneal abscess, necrotizing fasciitis involving muscle and soft tissue.  Cultures from this procedure grew ampicillin resistant E faecium, sensitive E faecalis, Stenotrophomonas, Candida albicans, E. coli. Repeat CT of the abdomen on 11/15/2021 noted increase fluid collection in the right mid abdomen and slight increase in trace bilateral pleural effusions. IR placed a peritoneal drain but this fell out at some point and was not replaced due to the abdominal fluid collection being noted to be smaller. Upper GI series on 11/17/2021 showed a leak at the perforation in 2/3 portion of the duodenum.  Patient underwent an ERCP, biliary stent placement (since during the procedure there was concern for bile duct perforation being the cause of patient's leak), core track placement on 11/18/2021  by GI.  Esophageal stent was not placed since no obvious defect was noted.     Patient was discharged on 11/28. On 12/4, patient returned to the ER with fevers and right lower quadrant abdominal pain, found to have fluid collections right upper quadrant and right paracolic gutter. IR on 12/4 placed drain in the right paracolic gutter. Repeat CT scan on 12/6 with good improvement in the right lower quadrant fluid collections, the midline rectus fluid collection appears to have drained through the incision.  Cultures from this growing pansensitive E. coli and ampicillin resistant E faecium thus far.  ID consulted for recommendations.     Pertinent Diagnoses:  Abdominal pelvic abscesses  Recent duodenal/biliary perforation  Polymicrobial infection    Plan:  -Continue IV vancomycin and Unasyn 3 g every 6 hours.  Monitor vancomycin levels and renal function.  Last vancomycin peak of 14.7 12/6  -Previously, multiple other organisms including Saritha and Stenotrophomonas had grown, but this was prior to the exploratory laparotomy with washout. Holding off on adding additional potentially harmful antimicrobials unless these organisms grow again  -Repeat CT scan obtained 12/10 with a slight increase in size of the right lower quadrant fluid collection, gas noted along superior aspect of collection with radiologist interpretation of apparent contrast suggesting fistulous communication with the colon.  Surgery interpretation after reviewing past images not concerning for fistula  -Current plan is to increase flushing of the drain. Recommend repeat imaging approximately 5 days after to ensure improvement in size of the abscess  -Antibiotic plan currently undetermined.  Oral options limited by the need for linezolid (unsafe to use for more than a 2-week duration)    Discussed with Dr. Lerner    ID will follow.  Please call with questions.

## 2021-12-15 NOTE — DISCHARGE SUMMARY
Care Transition Team Discharge Planning    Anticipates discharge disposition:  • [facility]    Action:  • Pt was dicussed in rounds today. Pt has a midline placed on 12 /13 and per Dr Ramirez plan is Pt to have a CT Scan tomorrow 12/16.  • Plan is to wait for I.D. recommendations on antibiotics whether  oral or IV.    Barriers to Discharge:  • Pending medical clearance    Plan:  • CM to continue to assist Pt with discharge as needed

## 2021-12-15 NOTE — PROGRESS NOTES
Radiology Progress Note   Author: AYE Mahan Date & Time created: 12/15/2021  2:32 PM   Date of admission  12/4/2021  Note to reader: this note follows the APSO format rather than the historical SOAP format. Assessment and plan located at the top of the note for ease of use.    Chief Complaint  66 y.o. female admitted 12/4/2021 with ABD pain       HPI  Nils Alfonso is a 66 y.o. female admitted 12/4/2021 with complicated past medical history including multiple recent hospitalizations for complicated duodenectomy and recurrent pancreatitis who presents with recurrent abdominal pain and progressive/new intra-abdominal fluid collections concerning for abscesses and possible ongoing GI leak.       Assessment/Plan  Interval History   Principal Problem:    Intra-abdominal abscess (HCC)  Active Problems:    Hyperlipidemia    Sepsis     Normocytic anemia    Hyponatremia    Hypokalemia    Hypomagnesemia    Postprocedural intraabdominal abscess    Mild protein-calorie malnutrition (HCC)    Bilateral lower extremity edema    HTN (hypertension), benign      Plan IR  - Irrigate RLQ drain with 20 ml of sterile saline six times per shift   - Fluid cultures, Escherichia coli, Enterococcus faecium  - Sugery following   - Continue to monitor drains, VS and labs   - repeat CT scan for tomorrow 12/16   - IF decrease in abscess size ok for patient to DC home with drain in place, will need to be educated on how to flush drain. Can arrange outpatient imaging. Will need follow up as outpatient with ID      [id number]  IR:   12/4-RIGHT paracolic gutter/RP drain placement by CT 50 mL brown fluid to lab  12/5- 65 ml   12/6- 50 ml purulent brown fluid, Ct- decrease in size in right lower quadrant fluid collection status post drainage. A pigtail catheter remains in place. A fluid collection previously measuring 2.3 cm in width currently measures 9 mm in width.   12/7-  75 ml  12/8- 60 ml   12/9- 35 ml in am  12/10- 15 ml   12/11-  15 ml  12/12- 45 ml  12/13- 55 ml   12/14- 15 ml  12/15- 70 ml       Review of Systems  Physical Exam   Review of Systems   Unable to perform ROS: Other   Constitutional: Positive for malaise/fatigue. Negative for chills and fever.   HENT: Negative for hearing loss.    Eyes: Negative for blurred vision.   Respiratory: Negative for cough, hemoptysis and shortness of breath.    Cardiovascular: Negative for chest pain and palpitations.   Gastrointestinal: Positive for abdominal pain. Negative for vomiting.   Genitourinary: Negative for dysuria.   Musculoskeletal: Negative for myalgias.   Skin: Negative for rash.   Neurological: Negative for dizziness, weakness and headaches.   Endo/Heme/Allergies: Does not bruise/bleed easily.   Psychiatric/Behavioral: Negative for suicidal ideas.      Vitals:    12/15/21 0705   BP: 115/80   Pulse: 85   Resp: 16   Temp: 37.1 °C (98.7 °F)   SpO2: 97%        Physical Exam  Constitutional:       Appearance: Normal appearance.   HENT:      Head: Normocephalic.      Nose: Nose normal.      Mouth/Throat:      Mouth: Mucous membranes are moist.   Eyes:      Pupils: Pupils are equal, round, and reactive to light.   Cardiovascular:      Rate and Rhythm: Normal rate.   Pulmonary:      Effort: Pulmonary effort is normal. No respiratory distress.   Abdominal:      Palpations: Abdomen is soft.      Tenderness: There is abdominal tenderness.          Comments: IR drain site CDI, no redness or swelling , brown purulent fluid in MARTHA bulb  Ostomy   X 3 abd dressings    Musculoskeletal:         General: No tenderness or deformity.      Cervical back: Normal range of motion.   Skin:     General: Skin is warm and dry.      Capillary Refill: Capillary refill takes less than 2 seconds.      Coloration: Skin is not jaundiced or pale.   Neurological:      General: No focal deficit present.      Mental Status: She is alert.      Motor: No weakness.   Psychiatric:         Mood and Affect: Mood normal.          Behavior: Behavior normal.             Labs    Recent Labs     12/13/21  0313 12/14/21  0051 12/15/21  0025   WBC 6.6 7.7 7.5   RBC 3.46* 3.03* 3.05*   HEMOGLOBIN 10.0* 8.9* 8.9*   HEMATOCRIT 33.0* 28.7* 28.7*   MCV 95.4 94.7 94.1   MCH 28.9 29.4 29.2   MCHC 30.3* 31.0* 31.0*   RDW 57.4* 57.1* 56.4*   PLATELETCT 233 248 237   MPV 9.3 9.9 9.1     Recent Labs     12/13/21  0313 12/14/21  0051 12/15/21  0025   SODIUM 136 138 138   POTASSIUM 3.9 3.9 4.1   CHLORIDE 99 100 100   CO2 29 29 29   GLUCOSE 80 109* 112*   BUN 6* 8 8   CREATININE 0.43* 0.50 0.53   CALCIUM 7.7* 7.6* 7.9*     Recent Labs     12/13/21  0313 12/14/21  0051 12/15/21  0025   CREATININE 0.43* 0.50 0.53     IR-MIDLINE CATHETER INSERTION WO GUIDANCE > AGE 3   Final Result                  Ultrasound-guided midline placement performed by qualified nursing staff    as above.          CT-ABDOMEN-PELVIS WITH   Final Result      1.  Apparent slight increase in size in right lower quadrant fluid collection with drain in place with gas again noted along the superior aspect of the collection to the ascending colon with dense fluid/apparent contrast within the fluid collection    suggesting fistulous communication with the colon.      2.  Anasarca with small bilateral pleural effusions and bibasilar atelectasis.      3.  Biliary stent placement with pneumobilia.      CT-ABDOMEN-PELVIS WITH   Final Result      1.  Interval decrease in size in right lower quadrant fluid collections status post drainage.      2.  Trace bilateral pleural effusions with bibasilar atelectasis.      3.  Biliary stent in place with pneumobilia.      4.  Anasarca      CT-DRAIN-PERITONEAL   Final Result      Successful RIGHT retroperitoneal/paracolic gutter drainage tube placement.      Plan: Thrice daily flushes with 10 mL of sterile saline. Monitor outputs. Please contact interventional radiology if there is any concern for tube dysfunction.      Findings were communicated with   Edwar Seaman after initial scanning was performed.      IR-CONSULT AND TREAT    (Results Pending)       INR   Date Value Ref Range Status   12/04/2021 1.42 (H) 0.87 - 1.13 Final     Comment:     INR - Non-therapeutic Reference Range: 0.87-1.13  INR - Therapeutic Reference Range: 2.0-4.0       No results found for: POCINR     Intake/Output Summary (Last 24 hours) at 12/6/2021 1052  Last data filed at 12/6/2021 0600  Gross per 24 hour   Intake 1724 ml   Output 1295 ml   Net 429 ml      Labs not explicitly included in this progress note were reviewed by the author. Radiology/imaging not explicitly included in this progress note was reviewed by the author.   I have performed a physical exam and reviewed and updated ROS and Plan today (12/15/2021).     15 minutes in directly providing and coordinating care and extensive data review.  No time overlap and excludes procedures.

## 2021-12-15 NOTE — CARE PLAN
Shift Goals  Clinical Goals: sleep  Patient Goals: sleep  Family Goals: Com[place]frt    Progress made toward(s) clinical / shift goals:      Patient understands plan of care and is participatory. Pain controled by MAR    Problem: Knowledge Deficit - Standard  Goal: Patient and family/care givers will demonstrate understanding of plan of care, disease process/condition, diagnostic tests and medications  Outcome: Progressing     Problem: Pain - Standard  Goal: Alleviation of pain or a reduction in pain to the patient’s comfort goal  Outcome: Progressing

## 2021-12-16 ENCOUNTER — PHARMACY VISIT (OUTPATIENT)
Dept: PHARMACY | Facility: MEDICAL CENTER | Age: 66
End: 2021-12-16
Payer: COMMERCIAL

## 2021-12-16 ENCOUNTER — APPOINTMENT (OUTPATIENT)
Dept: RADIOLOGY | Facility: MEDICAL CENTER | Age: 66
DRG: 862 | End: 2021-12-16
Attending: NURSE PRACTITIONER
Payer: MEDICARE

## 2021-12-16 VITALS
RESPIRATION RATE: 18 BRPM | OXYGEN SATURATION: 95 % | HEART RATE: 87 BPM | SYSTOLIC BLOOD PRESSURE: 125 MMHG | BODY MASS INDEX: 24.03 KG/M2 | WEIGHT: 139.99 LBS | TEMPERATURE: 98 F | DIASTOLIC BLOOD PRESSURE: 71 MMHG

## 2021-12-16 PROBLEM — E87.6 HYPOKALEMIA: Status: RESOLVED | Noted: 2021-10-21 | Resolved: 2021-12-16

## 2021-12-16 PROBLEM — E83.42 HYPOMAGNESEMIA: Status: RESOLVED | Noted: 2021-11-08 | Resolved: 2021-12-16

## 2021-12-16 PROBLEM — A41.9 SEPSIS (HCC): Status: RESOLVED | Noted: 2021-10-08 | Resolved: 2021-12-16

## 2021-12-16 PROBLEM — E87.1 HYPONATREMIA: Status: RESOLVED | Noted: 2021-10-12 | Resolved: 2021-12-16

## 2021-12-16 PROCEDURE — 99239 HOSP IP/OBS DSCHRG MGMT >30: CPT | Performed by: NURSE PRACTITIONER

## 2021-12-16 PROCEDURE — 700111 HCHG RX REV CODE 636 W/ 250 OVERRIDE (IP): Performed by: NURSE PRACTITIONER

## 2021-12-16 PROCEDURE — 74177 CT ABD & PELVIS W/CONTRAST: CPT | Mod: MC

## 2021-12-16 PROCEDURE — A9270 NON-COVERED ITEM OR SERVICE: HCPCS

## 2021-12-16 PROCEDURE — 700102 HCHG RX REV CODE 250 W/ 637 OVERRIDE(OP)

## 2021-12-16 PROCEDURE — A9270 NON-COVERED ITEM OR SERVICE: HCPCS | Performed by: FAMILY MEDICINE

## 2021-12-16 PROCEDURE — 700105 HCHG RX REV CODE 258: Performed by: NURSE PRACTITIONER

## 2021-12-16 PROCEDURE — 700102 HCHG RX REV CODE 250 W/ 637 OVERRIDE(OP): Performed by: FAMILY MEDICINE

## 2021-12-16 PROCEDURE — 700102 HCHG RX REV CODE 250 W/ 637 OVERRIDE(OP): Performed by: INTERNAL MEDICINE

## 2021-12-16 PROCEDURE — 99232 SBSQ HOSP IP/OBS MODERATE 35: CPT | Performed by: INTERNAL MEDICINE

## 2021-12-16 PROCEDURE — 700105 HCHG RX REV CODE 258: Performed by: INTERNAL MEDICINE

## 2021-12-16 PROCEDURE — 700111 HCHG RX REV CODE 636 W/ 250 OVERRIDE (IP): Performed by: INTERNAL MEDICINE

## 2021-12-16 PROCEDURE — A9270 NON-COVERED ITEM OR SERVICE: HCPCS | Performed by: INTERNAL MEDICINE

## 2021-12-16 PROCEDURE — 302105 PEDIATRIC UROSTOMY POUCH: Performed by: NURSE PRACTITIONER

## 2021-12-16 PROCEDURE — 700117 HCHG RX CONTRAST REV CODE 255: Performed by: NURSE PRACTITIONER

## 2021-12-16 PROCEDURE — 700111 HCHG RX REV CODE 636 W/ 250 OVERRIDE (IP): Performed by: STUDENT IN AN ORGANIZED HEALTH CARE EDUCATION/TRAINING PROGRAM

## 2021-12-16 PROCEDURE — RXMED WILLOW AMBULATORY MEDICATION CHARGE: Performed by: NURSE PRACTITIONER

## 2021-12-16 RX ORDER — AMOXICILLIN AND CLAVULANATE POTASSIUM 875; 125 MG/1; MG/1
1 TABLET, FILM COATED ORAL 2 TIMES DAILY
Qty: 28 TABLET | Refills: 0 | Status: SHIPPED | OUTPATIENT
Start: 2021-12-16 | End: 2021-12-29 | Stop reason: SDUPTHER

## 2021-12-16 RX ORDER — LINEZOLID 600 MG/1
600 TABLET, FILM COATED ORAL 2 TIMES DAILY
Qty: 28 TABLET | Refills: 0 | Status: SHIPPED | OUTPATIENT
Start: 2021-12-16 | End: 2021-12-30

## 2021-12-16 RX ADMIN — METOPROLOL TARTRATE 25 MG: 25 TABLET, FILM COATED ORAL at 05:57

## 2021-12-16 RX ADMIN — ONDANSETRON 4 MG: 2 INJECTION INTRAMUSCULAR; INTRAVENOUS at 05:41

## 2021-12-16 RX ADMIN — VANCOMYCIN HYDROCHLORIDE 1000 MG: 500 INJECTION, POWDER, LYOPHILIZED, FOR SOLUTION INTRAVENOUS at 02:04

## 2021-12-16 RX ADMIN — IOHEXOL 100 ML: 350 INJECTION, SOLUTION INTRAVENOUS at 11:42

## 2021-12-16 RX ADMIN — AMPICILLIN SODIUM AND SULBACTAM SODIUM 3 G: 2; 1 INJECTION, POWDER, FOR SOLUTION INTRAMUSCULAR; INTRAVENOUS at 11:57

## 2021-12-16 RX ADMIN — HYDROCODONE BITARTRATE AND ACETAMINOPHEN 1 TABLET: 10; 325 TABLET ORAL at 13:23

## 2021-12-16 RX ADMIN — AMPICILLIN SODIUM AND SULBACTAM SODIUM 3 G: 2; 1 INJECTION, POWDER, FOR SOLUTION INTRAMUSCULAR; INTRAVENOUS at 01:10

## 2021-12-16 RX ADMIN — HYDROCODONE BITARTRATE AND ACETAMINOPHEN 1 TABLET: 10; 325 TABLET ORAL at 06:23

## 2021-12-16 RX ADMIN — HEPARIN SODIUM 5000 UNITS: 5000 INJECTION, SOLUTION INTRAVENOUS; SUBCUTANEOUS at 05:57

## 2021-12-16 RX ADMIN — AMPICILLIN SODIUM AND SULBACTAM SODIUM 3 G: 2; 1 INJECTION, POWDER, FOR SOLUTION INTRAMUSCULAR; INTRAVENOUS at 05:54

## 2021-12-16 RX ADMIN — ONDANSETRON 4 MG: 2 INJECTION INTRAMUSCULAR; INTRAVENOUS at 10:21

## 2021-12-16 RX ADMIN — GABAPENTIN 600 MG: 300 CAPSULE ORAL at 05:57

## 2021-12-16 RX ADMIN — HEPARIN SODIUM 5000 UNITS: 5000 INJECTION, SOLUTION INTRAVENOUS; SUBCUTANEOUS at 14:08

## 2021-12-16 ASSESSMENT — ENCOUNTER SYMPTOMS
SHORTNESS OF BREATH: 0
COUGH: 0
PALPITATIONS: 0
MYALGIAS: 0
WEAKNESS: 0
FEVER: 0
VOMITING: 0
ABDOMINAL PAIN: 1
HEMOPTYSIS: 0
BLURRED VISION: 0
DIZZINESS: 0
CHILLS: 0
HEADACHES: 0
BRUISES/BLEEDS EASILY: 0

## 2021-12-16 NOTE — PROGRESS NOTES
Pt participating in flushing her own drain, two fistula bags removed and covered with gauze teg dressings 2/ no output for several days.

## 2021-12-16 NOTE — PROGRESS NOTES
CT called for time tomorrow. Notified that discharge tomorrow is pending this CT, asked for earliest time possible.

## 2021-12-16 NOTE — PROGRESS NOTES
Infectious Disease Progress Note    Author: Jackie Jimenez M.D. Date & Time of service: 2021  12:13 PM    Chief Complaint:  Follow-up for intra-abdominal abscess    Interval History:   T-max 99.1, white count 6.1, tolerating switching antimicrobials.  12/10 patient is afebrile, white count 6000 today, feeling better overall, tolerating antimicrobials thus far   patient remains afebrile, white count is 10.1, tolerating antimicrobials.  CT scan findings as below   patient remains afebrile, white count 6.6, tolerating antimicrobials.  Plan as below.   patient remains afebrile, no CBC this morning, tolerating vancomycin and Unasyn.  Plan as below.  Sleeping soundly this morning.  12/15 patient remains afebrile, white count 7.5, tolerating antimicrobials, plan as below. Sleeping comfortably this afternoon   AF WBC 7.5 sleeping soundly at time of rounds-no acute events overnight    Labs Reviewed and Medications Reviewed.    Review of Systems:  Review of Systems   Unable to perform ROS: Other   Constitutional: Negative for fever.       Hemodynamics:  Temp (24hrs), Av.7 °C (98.1 °F), Min:36.6 °C (97.9 °F), Max:36.9 °C (98.4 °F)  Temperature: 36.7 °C (98 °F)  Pulse  Av.3  Min: 53  Max: 100   Blood Pressure : 125/71       Physical Exam:  Physical Exam  Vitals and nursing note reviewed.   Constitutional:       General: She is not in acute distress.     Appearance: She is not toxic-appearing or diaphoretic.      Comments: Chronically ill-appearing   HENT:      Nose: No rhinorrhea.   Eyes:      General:         Right eye: No discharge.         Left eye: No discharge.   Cardiovascular:      Rate and Rhythm: Normal rate.   Pulmonary:      Effort: Pulmonary effort is normal. No respiratory distress.      Breath sounds: No stridor.   Abdominal:      General: There is no distension.      Comments: Right-sided MARTHA drain  Ostomy   Musculoskeletal:         General: No tenderness.   Skin:      Coloration: Skin is not jaundiced.      Findings: No erythema or rash.         Meds:    Current Facility-Administered Medications:   •  HYDROcodone/acetaminophen  •  vancomycin  •  enalaprilat  •  labetalol  •  metoprolol tartrate  •  ampicillin-sulbactam (UNASYN) IV  •  calcium carbonate  •  ondansetron  •  MD Alert...Vancomycin per Pharmacy  •  heparin  •  cyclobenzaprine  •  ezetimibe  •  gabapentin  •  Pharmacy Consult Request    Labs:  Recent Labs     12/14/21 0051 12/15/21  0025   WBC 7.7 7.5   RBC 3.03* 3.05*   HEMOGLOBIN 8.9* 8.9*   HEMATOCRIT 28.7* 28.7*   MCV 94.7 94.1   MCH 29.4 29.2   RDW 57.1* 56.4*   PLATELETCT 248 237   MPV 9.9 9.1   NEUTSPOLYS 55.40  --    LYMPHOCYTES 34.60  --    MONOCYTES 8.00  --    EOSINOPHILS 0.60  --    BASOPHILS 0.60  --      Recent Labs     12/14/21  0051 12/15/21  0025   SODIUM 138 138   POTASSIUM 3.9 4.1   CHLORIDE 100 100   CO2 29 29   GLUCOSE 109* 112*   BUN 8 8     Recent Labs     12/14/21  0051 12/15/21  0025   CREATININE 0.50 0.53       Imaging:  CT-ABDOMEN-PELVIS WITH    Result Date: 12/6/2021 12/6/2021 10:55 AM HISTORY/REASON FOR EXAM:  Abdominal pain, fever. TECHNIQUE/EXAM DESCRIPTION:   CT scan of the abdomen and pelvis with contrast. Contrast-enhanced helical scanning was obtained from the diaphragmatic domes through the pubic symphysis following the bolus administration of nonionic contrast without complication. 80 mL of Omnipaque 350 nonionic contrast was administered without complication. Low dose optimization technique was utilized for this CT exam including automated exposure control and adjustment of the mA and/or kV according to patient size. COMPARISON: December 3, 2021 FINDINGS: Lower Chest: There are trace bilateral pleural effusions. There is bibasilar atelectasis, right greater left.. Liver: Normal. Spleen: Unremarkable. Pancreas: Unremarkable. Gallbladder: The gallbladder has been resected. Biliary: Common bile duct stent remains in place. There is  pneumobilia with gas also identified in the pancreatic duct. Adrenal glands: Normal. Kidneys: Unremarkable without hydronephrosis. Bowel: No obstruction or acute inflammation. There is diverticulosis without evidence of diverticulitis Lymph nodes: No adenopathy. Vasculature: Unremarkable. Peritoneum: There is been interval decrease in size in right lower quadrant fluid collection status post drainage. A pigtail catheter remains in place. A fluid collection previously measuring 2.3 cm in width currently measures 9 mm in width. A fluid collection more posterior medially previously measured 4.1 cm in width and currently measures 19 mm in width with the drain within this collection. Open anterior abdominal wound noted. There is anasarca. Musculoskeletal: No acute or destructive process. Pelvis: No adenopathy or free fluid.     1.  Interval decrease in size in right lower quadrant fluid collections status post drainage. 2.  Trace bilateral pleural effusions with bibasilar atelectasis. 3.  Biliary stent in place with pneumobilia. 4.  Anasarca    CT-ABDOMEN-PELVIS WITH    Result Date: 12/3/2021  12/3/2021 5:11 PM HISTORY/REASON FOR EXAM: Acute abdominal pain. History of duodenal perforation. TECHNIQUE/EXAM DESCRIPTION:   CT scan of the abdomen and pelvis with contrast. Contrast-enhanced helical scanning was obtained from the diaphragmatic domes through the pubic symphysis following the bolus administration of nonionic contrast without complication. 80 mL of Omnipaque 350 nonionic contrast was administered without complication. Oral contrast was administered. Low dose optimization technique was utilized for this CT exam including automated exposure control and adjustment of the mA and/or kV according to patient size. COMPARISON: CT AP 11/24/2021. Upper GI series 11/22/2021. CT AP 11/15/2021 FINDINGS: Lower Chest: There is minimal atelectasis or pneumonia in the right lower lobe with minimal right pleural effusion. There is  been no significant change. The left lung base is without consolidation. A small calcified granuloma is again noted. No free air is present. Postoperative changes involving the stomach are stable. There is been interval removal of a right upper quadrant drain. There has been an increase in size of a right sided fluid collection or abscess in the right paracolic gutter region. The abscess has a length of 6.3 cm and transverse dimension of 4.1 cm inferiorly. There is a fluid collection superiorly in the right mid abdomen which is connected with the inferior collection. This collection has an air-fluid level and has a maximum diameter of 3 cm. This fluid collection extends inferiorly into the right lower quadrant paracolic gutter. There is a fluid collection in the midline of the rectus sheath below a midline skin incision. This collection has maximum dimension of 5.5 cm transversely and a length of approximately 9 cm. Liver: No hepatic parenchymal mass is present. Expansile common bile duct stent is present. Spleen: Unremarkable. Pancreas: Unremarkable. Gallbladder: The gallbladder has been resected. Biliary: Nondilated. Adrenal glands: Normal. Kidneys: Unremarkable without hydronephrosis. Bowel: No there is no evidence of bowel obstruction or focal inflammation. Lymph nodes: No adenopathy. Vasculature: Unremarkable. Peritoneum: Unremarkable without ascites. Musculoskeletal: No acute or destructive process. Fusion hardware is present in the distal lumbar spine. Pelvis: No adenopathy. The bladder appears normal. No pelvic fluid collections or free fluid is present.     1.  Recurrent fluid collections or abscess in the right upper quadrant and right paracolic gutter region. Interval removal of right upper quadrant drain. 2.  New fluid collection in the rectus sheath in the midline below the skin incision. This may represent postoperative fluid collection or abscess. 3.  Postoperative changes involving the stomach. 4.   Cholecystectomy. Residual common bile duct expansile stent in place. 5.  No bowel or renal obstruction. 6.  No free air. 7.  Small unchanged right pleural effusion and unchanged minimal right lower lobe atelectasis or pneumonia.    CT-ABDOMEN-PELVIS WITH    Result Date: 11/24/2021 11/24/2021 3:56 PM HISTORY/REASON FOR EXAM:  Abdominal pain, acute, nonlocalized. History of duodenal perforation. TECHNIQUE/EXAM DESCRIPTION:   CT scan of the abdomen and pelvis with contrast. Contrast-enhanced helical scanning was obtained from the diaphragmatic domes through the pubic symphysis following the bolus administration of nonionic contrast without complication. 100 mL of Omnipaque 350 nonionic contrast was administered without complication. Low dose optimization technique was utilized for this CT exam including automated exposure control and adjustment of the mA and/or kV according to patient size. COMPARISON: 11/15/2021 FINDINGS: Lower Chest: Bibasilar atelectasis, improved from prior with improvement of bilateral pleural fluid. Liver: Liver again shows pneumobilia. Spleen: Unremarkable. Pancreas: Present in the pancreatic duct.  Pancreatic duct is mildly dilated. Gallbladder: Surgically absent. Biliary: Metallic common bile duct stent in place. Adrenal glands: Normal. Kidneys: Unremarkable without hydronephrosis. Bowel: Postoperative change of the stomach.  Contrast present in the colon from prior procedure.  RIGHT upper quadrant drain present within irregular fluid collection in the RIGHT lateral midabdomen.  Fluid collection shows peripheral enhancement as well  as subtle hyperdense contents and gas.  Fluid collection measures approximately 7.6 x 7.3 By 2.4 cm and extends into the RIGHT posterior pararenal space, decreased in size from prior exam.  RIGHT lower quadrant drain is been removed. Feeding tube in place with tip at the DJ flexure. Lymph nodes: No adenopathy. Vasculature: Unremarkable. Peritoneum: Unremarkable  without ascites. Musculoskeletal: Postoperative change of lumbar spine. Pelvis: Bladder is unremarkable.  Contrast present in the vagina, likely reflux.  Postoperative change of anterior abdominal wall.  Diffuse body wall edema.     1.  RIGHT lateral no abnormal abscess again seen, decreased in size from prior exam with drain in place, however contents now appears hyperdense potential indicating bowel contrast, concerning for bowel perforation or fistula, although source of contrast  is uncertain.  No cristóbal pneumoperitoneum. 2.  Pneumobilia and biliary stent present. 3.  Interval removal of RIGHT lower quadrant drain.     CT-ABDOMEN-PELVIS WITH    Result Date: 11/15/2021  11/15/2021 11:49 AM HISTORY/REASON FOR EXAM:  intra-abdominal abscess. Follow up. TECHNIQUE/EXAM DESCRIPTION:   CT scan of the abdomen and pelvis with contrast. Contrast-enhanced helical scanning was obtained from the diaphragmatic domes through the pubic symphysis following the bolus administration of nonionic contrast without complication. 100 mL of Omnipaque 350 nonionic contrast was administered without complication. Low dose optimization technique was utilized for this CT exam including automated exposure control and adjustment of the mA and/or kV according to patient size. COMPARISON: November 8, 2021 FINDINGS: Lower Chest: There is bibasilar atelectasis, right greater than left. There is been slight increase in size of trace bilateral pleural effusions.. Liver: Normal. Spleen: Unremarkable. Pancreas: Unremarkable. There is again air within the pancreatic duct. Gallbladder: The gallbladder has been resected. Biliary: Pneumobilia again noted.. Adrenal glands: Normal. Kidneys: Unremarkable without hydronephrosis. Bowel: No obstruction or acute inflammation. Lymph nodes: No adenopathy. Vasculature: Unremarkable. Peritoneum: There is again a mid abdomen fluid collection which appears slightly larger currently measuring 7.2 x 7.1 x 9.4 cm. The  surgical drain tracks through this fluid collection. There is anasarca. Musculoskeletal: No acute or destructive process. Pelvis: No adenopathy or free fluid.     1.  Slight interval increase in size in fluid collection the right midabdomen with drain in place. 2.  Slight interval increase in size in trace bilateral pleural effusions, right greater than left with bibasilar atelectasis. 3.  Pneumobilia.    CT-DRAIN-PERITONEAL    Result Date: 12/4/2021  HISTORY/REASON FOR EXAM:  66-year-old woman with complex medical history who has RIGHT abscesses involving the RIGHT retroperitoneum and paracolic gutter as well as her anterior abdominal wall. Both are decompressing through the dermis. TECHNIQUE/EXAM DESCRIPTION AND NUMBER OF VIEWS: RIGHT paracolic gutter/retroperitoneal drain placement with CT guidance. Low dose optimization technique was utilized for this CT exam including automated exposure control and adjustment of the mA and/or kV according to patient size. COMPARISON:  CT 12/3/2021 MEDICATIONS: Moderate sedation was provided. Pulse oximetry and continuous cardiac monitoring by the nurse was performed throughout the exam. Intraservice time was 15 minutes. PROCEDURE:     The risks, benefits, goals and objectives and alternatives were discussed. Risks were specified as including but not limited to bleeding, infection, damage to vessels or nerves, pain and discomfort as well as the possibility of incomplete resolution of underlying disease. Drain care related issues were discussed with the patient. The patient's questions were answered. Informed oral and written consent were obtained. The patient was placed on the CT gantry. Localizing scan was performed. The skin was prepped and draped in the usual sterile manner.  A timeout was performed. Local anesthetic result was achieved with administration of 1% lidocaine. A(n) 17-gauge access needle was advanced to the target using CT fluoroscopic guidance. Access was  secured with a wire and the tract was sequentially dilated to accommodate a(n) 12 [place] locking loop drain. A total of 50 mL of brown malodorous fluid was removed and sent for laboratory evaluation. This was put in place and formed. The catheter was attached to suction bulbdrainage and secured to the skin with 2-0 nylon suture. Completion scan was performed which showed no complication. The patient tolerated the procedure well with no evidence of complication. The skin was cleaned and a dressing was applied. FINDINGS: Substantial interval decompression of both the anterior abdominal wall and RIGHT paracolic gutter/retroperitoneal fluid collections. No residual targetable collection in the anterior abdominal wall. Appropriate tube position in the RIGHT retroperitoneal/paracolic gutter collection. No evidence of hemorrhage.     Successful RIGHT retroperitoneal/paracolic gutter drainage tube placement. Plan: Thrice daily flushes with 10 mL of sterile saline. Monitor outputs. Please contact interventional radiology if there is any concern for tube dysfunction. Findings [facility] initial [facility] was performed.    DX-CHEST-PORTABLE (1 VIEW)    Result Date: 12/3/2021  12/3/2021 4:46 PM HISTORY/REASON FOR EXAM: Dehydration and nausea for one day. TECHNIQUE/EXAM DESCRIPTION AND NUMBER OF VIEWS: Single AP view of the chest. COMPARISON: 11/19/2021 FINDINGS: No pneumothorax. There is unchanged elevation of the right hemidiaphragm. No enlarging pleural effusion. Hazy right lower lobe opacity is seen, evaluation of this area is mildly limited secondary to overlying EKG leads. Cardiac mediastinal silhouette is normal. Multiple surgical clips project over the epigastrium and a biliary stent is seen.     Hazy right lower lobe opacity may be atelectasis, evaluation of this region is mildly limited secondary to overlying EKG leads.    [id number] STENT - TUBE    Result Date:  11/18/2021 11/18/2021 2:17 PM HISTORY/REASON FOR EXAM:  ERCP biliary stent placement TECHNIQUE/EXAM DESCRIPTION AND NUMBER OF VIEWS: 19 portable fluoroscopic images were obtained during ERCP biliary stent placement procedure performed and endoscopy suite. COMPARISON: None FINDINGS: 19 portable fluoroscopic images obtained during ERCP biliary stent placement.     Portable fluoroscopic images obtained during ERCP biliary stent placement for localization purposes.    DX-UPPER GI-SERIES WITH KUB    Result Date: 11/22/2021 11/22/2021 11:03 AM HISTORY/REASON FOR EXAM:  Abdominal Pain; Use water soluble contrast - re-evaluate duodenal perforation. TECHNIQUE/EXAM DESCRIPTION AND NUMBER OF VIEWS: Omnipaque 300 water soluble contrast upper GI series was performed. 19 fluoroscopic images obtained. Fluoroscopy time: 1.1 minutes COMPARISON: 11/17/2021 FINDINGS:  image shows extensive postoperative change of the abdomen feeding tube in place.  Feeding tube tip at the proximal jejunum. Metallic biliary stent in place. Contrast refluxes into the distal common bile duct, within the biliary stent, consistent with prior sphincterotomy. No evidence for leakage of contrast from the stomach or duodenum.     1.  No evidence for duodenal leak. 2.  Reflux of contrast seen into the distal common bile duct at the site of biliary stent, consistent with prior sphincterotomy.    DX-UPPER GI-SERIES WITH KUB    Result Date: 11/17/2021 11/17/2021 10:36 AM HISTORY/REASON FOR EXAM:  Gastrointestinal Complaint; Evaluate duodenal or intestinal leak from previous ERCP perforation. TECHNIQUE/EXAM DESCRIPTION AND NUMBER OF VIEWS: Omnipaque 300 water soluble contrast focused upper GI series was performed. 9 fluoroscopic images obtained. Fluoroscopy time: 0.5 minutes COMPARISON: CT abdomen pelvis dated 11/15/2021 FINDINGS: Extensive postsurgical changes of the upper abdomen. The duodenal C-loop is in appropriate anatomic position. There is contrast  extravasation at the junction of the second and third portions of duodenum (see key images).     Duodenal perforation at the junction of the second and third portions of duodenum as noted on key images.    CT-ABORTED CT PROCEDURE    Result Date: 11/16/2021  HISTORY/REASON FOR EXAM:  RIGHT retroperitoneal abscess with surgical drains in place. TECHNIQUE/EXAM DESCRIPTION AND NUMBER OF VIEWS: Limited pelvic CT. Low dose optimization technique was utilized for this CT exam including automated exposure control and adjustment of the mA and/or kV according to patient size. COMPARISON:  Diagnostic CT 11/15/2021 MEDICATIONS: None PROCEDURE:     The risks, benefits, goals and objectives and alternatives were discussed. Risks were specified as including but not limited to bleeding, infection, damage to vessels or nerves, pain and discomfort as well as the possibility of incomplete resolution of underlying disease. Drain care related issues were discussed with the patient. The patient's questions were answered. Informed oral and written consent were obtained. The patient was placed on the CT gantry. Localizing scan was performed. Based on my findings no procedure was performed. FINDINGS: Decreased size of RIGHT paracolic gutter fluid collection which contains a surgical drain. There is some contrast present in the collection as well as in the adjacent colon.     Interval decrease in size of the RIGHT retroperitoneal fluid collection which contains a surgical drain. Because from bowel is possible. No additional drain was placed.     DX-ABDOMEN FOR TUBE PLACEMENT    Result Date: 11/19/2021 11/19/2021 5:57 AM HISTORY/REASON FOR EXAM: coretrak placement TECHNIQUE/EXAM DESCRIPTION:  Single AP view the abdomen. COMPARISON:  October 19, 2021 FINDINGS: Hazy right lower lobe opacities are seen. Dobbhoff tube is seen, the tip lies to the left of the lumbar spine.  The bowel gas pattern appears nonspecific. TIPS stent is in place. The  bony structures appear age-appropriate.     1.  Nonspecific bowel gas pattern. 2.  Dobbhoff tube with tip terminating overlying the expected location of the third or fourth duodenal segment. 3.  Hazy right lower lobe infiltrates.    DX-ABDOMEN FOR TUBE PLACEMENT    Result Date: 11/18/2021 11/18/2021 5:31 PM HISTORY/REASON FOR EXAM:  Line evaluation. TECHNIQUE/EXAM DESCRIPTION AND NUMBER OF VIEWS:  1 view(s) of the abdomen. COMPARISON:  None. FINDINGS: Enteric tube has been placed. The tip projects over the proximal jejunum. The bowel gas pattern is within normal limits.     Feeding tube extends to the region of the stomach and duodenum with tip at the level of the proximal end of the jejunum.    DX-ABDOMEN FOR TUBE PLACEMENT    Result Date: 11/18/2021 11/18/2021 4:23 PM HISTORY/REASON FOR EXAM:  Line evaluation. TECHNIQUE/EXAM DESCRIPTION AND NUMBER OF VIEWS:  1 view(s) of the abdomen. COMPARISON:  1/5/2007 FINDINGS: Enteric tube has been placed. The tip projects over the duodenal jejunal junction. Common bile duct stent. Epigastrium and left upper quadrant vascular clips. Suture material again overlie the epigastrium The bowel gas pattern is within normal limits. Some contrast seen in the colon from upper GI performed yesterday     Enteric tube terminates over the duodenal jejunal junction Covered common bile duct stent No contrast extravasation is seen      Micro:  Results     ** No results found for the last 168 hours. **          Assessment:  Nils Alfonso is a 66 y.o. female with complex history of recent pancreatitis with ERCP on 10/1/2021, complicated by suspected duodenal perforation with resultant complex multiple intra-abdominal abscesses.  For about 3 weeks, this was attempted to be managed by multiple IR drains but there was no improvement.  Drain cultures from various times grew multiple organisms including E. coli and Enterococcus faecalis, Candida albicans and glabrata, stenotrophomonas, E. coli,  ampicillin resistant E faecium.  Blood cultures grew Chryseobacterium species and Candida glabrata (completed therapy for the bacteremia). The decision was made to treat with at least 4 weeks of antibiotics with Continues home infusion IV Zosyn, IV micafungin, and p.o. Bactrim through 11/24/2021, and then follow-up in ID clinic, ID signed off. However, there are further procedures since that plan was made. Patient underwent an exploratory laparotomy on 11/5 for drainage of retroperitoneal abscess, peritoneal abscess, necrotizing fasciitis involving muscle and soft tissue.  Cultures from this procedure grew ampicillin resistant E faecium, sensitive E faecalis, Stenotrophomonas, Candida albicans, E. coli. Repeat CT of the abdomen on 11/15/2021 noted increase fluid collection in the right mid abdomen and slight increase in trace bilateral pleural effusions. IR placed a peritoneal drain but this fell out at some point and was not replaced due to the abdominal fluid collection being noted to be smaller. Upper GI series on 11/17/2021 showed a leak at the perforation in 2/3 portion of the duodenum.  Patient underwent an ERCP, biliary stent placement (since during the procedure there was concern for bile duct perforation being the cause of patient's leak), core track placement on 11/18/2021 by GI.  Esophageal stent was not placed since no obvious defect was noted.     Patient was discharged on 11/28. On 12/4, patient returned to the ER with fevers and right lower quadrant abdominal pain, found to have fluid collections right upper quadrant and right paracolic gutter. IR on 12/4 placed drain in the right paracolic gutter. Repeat CT scan on 12/6 with good improvement in the right lower quadrant fluid collections, the midline rectus fluid collection appears to have drained through the incision.  Cultures from this growing pansensitive E. coli and ampicillin resistant E faecium thus far.  ID consulted for  recommendations.     Pertinent Diagnoses:  Abdominal pelvic abscesses  Recent duodenal/biliary perforation  Polymicrobial infection    Plan:  -Continue IV vancomycin and Unasyn 3 g every 6 hours.  Monitor vancomycin levels and renal function.  Last vancomycin peak of 13.2  -Previously, multiple other organisms including Saritha and Stenotrophomonas had grown, but this was prior to the exploratory laparotomy with washout. Holding off on adding additional potentially harmful antimicrobials unless these organisms grow again  -CT scan obtained 12/10 with a slight increase in size of the right lower quadrant fluid collection, gas noted along superior aspect of collection with radiologist interpretation of apparent contrast suggesting fistulous communication with the colon.  Surgery interpretation after reviewing past images not concerning for fistula  -Current plan is to increase flushing of the drain.   Repeat imaging done today shows decreased size of the abscess to 2 cm X 2.3 cm  -Can switch to oral zyvox 600 mg PO BID with Unasyn or oral Augmentin 875 mg PO BID for 2 weeks      ID will follow.  Please call with questions.

## 2021-12-16 NOTE — PROGRESS NOTES
Pt is A&O 4  Pain -, medication available per MAR   + nausea, declines PRNs  Tolerating a regular diet   Incision + MLI  + Drains, RLQ IR  RLQ fistula with ostomy bag   + Voids  + flatus  + BM  Up x1 FWW  SCD's off, TEDs on  Bed alarm on, pt moderate fall risk per shaan browning  Reviewed plan of care with patient, bed in lowest position and locked, pt resting comfortably now, call light within reach, all needs met at this time. Interventions will be executed per plan of care

## 2021-12-16 NOTE — PROGRESS NOTES
"Patient transferring to [facility] at this time. Patient provided with supplies to care for abdominal wound, MARTHA drain, and fistula. Patient verbalized understanding of how to care for abdominal wound, MARTHA drain, and fistula. Patient declines additional education for these items, states, \"the nurses have already gone over all of that and my home health nurse can help me too.\"    Discharge RN to provide d/c education, follow-up information, and remove midline IV. Update provided to flor Santiago RN. Patient transporting via wheelchair. Belongings and meds-to-beds with patient at time of transfer.   "

## 2021-12-16 NOTE — CARE PLAN
The patient is Stable - Low risk of patient condition declining or worsening    Progress made toward(s) clinical / shift goals:      Problem: Knowledge Deficit - Standard  Goal: Patient and family/care givers will demonstrate understanding of plan of care, disease process/condition, diagnostic tests and medications  Outcome: Progressing  Note: Plan of care discussed with patient and . All questions addressed.     Problem: Discharge Barriers/Planning  Goal: Patient's continuum of care needs are met  Outcome: Progressing  Note: Patient discharged home today per provider order. Discharge plan discussed with patient. Discharge lounge utilized.

## 2021-12-16 NOTE — PROGRESS NOTES
Received report of patient at start of shift. Patient is AOx4, PRN Norco administered for complaints of pain. Assessment complete, patient on room air. RLQ ostomy appliance and RLQ IR drain with tan colored output. Dressings to abdomen CDI. Discharge orders received. Patient updated on plan of care, encouraged to use call light for any needs/assistance. Safety education provided.

## 2021-12-16 NOTE — DISCHARGE INSTRUCTIONS
Abscess  Care After  An abscess (also called a boil or furuncle) is an infected area that contains a collection of pus. Signs and symptoms of an abscess include pain, tenderness, redness, or hardness, or you may feel a moveable soft area under your skin. An abscess can occur anywhere in the body. The infection may spread to surrounding tissues causing cellulitis. A cut (incision) by the surgeon was made over your abscess and the pus was drained out. Gauze may have been packed into the space to provide a drain that will allow the cavity to heal from the inside outwards. The boil may be painful for 5 to 7 days. Most people with a boil do not have high fevers. Your abscess, if seen early, may not have localized, and may not have been lanced. If not, another appointment may be required for this if it does not get better on its own or with medications.  HOME CARE INSTRUCTIONS   · Only take over-the-counter or prescription medicines for pain, discomfort, or fever as directed by your caregiver.  · When you bathe, soak and then remove gauze or iodoform packs at least daily or as directed by your caregiver. You may then wash the wound gently with mild soapy water. Repack with gauze or do as your caregiver directs.  SEEK IMMEDIATE MEDICAL CARE IF:   · You develop increased pain, swelling, redness, drainage, or bleeding in the wound site.  · You develop signs of generalized infection including muscle aches, chills, fever, or a general ill feeling.  · An oral temperature above 102° F (38.9° C) develops, not controlled by medication.  See your caregiver for a recheck if you develop any of the symptoms described above. If medications (antibiotics) were prescribed, take them as directed.  Document Released: 07/06/2006 Document Revised: 03/11/2013 Document Reviewed: 03/02/2009  ExitCare® Patient Information ©2014 [employer], [employer].    Flush drains with 20cc sterile fluid every 8 hrs and record IR drain output separately from  ostomy  Surgical Drain Home Care  Surgical drains are used to remove extra fluid that normally builds up in a surgical wound after surgery. A surgical drain helps to heal a surgical wound. Different kinds of surgical drains include:  · Active drains. These drains use suction to pull drainage away from the surgical wound. Drainage flows through a tube to a container outside of the body. With these drains, you need to keep the bulb or the drainage container flat (compressed) at all times, except while you empty it. Flattening the bulb or container creates suction.  · Passive drains. These drains allow fluid to drain naturally, by gravity. Drainage flows through a tube to a bandage (dressing) or a container outside of the body. Passive drains do not need to be emptied.  A drain is placed during surgery. Right after surgery, drainage is usually bright red and a little thicker than water. The drainage may gradually turn yellow or pink and become thinner. It is likely that your health care provider will remove the drain when the drainage stops or when the amount decreases to 1-2 Tbsp (15-30 mL) during a 24-hour period.  Supplies needed:  · Tape.  · Germ-free cleaning solution (sterile saline).  · Cotton swabs.  · Split gauze drain sponge: 4 x 4 inches (10 x 10 cm).  · Gauze square: 4 x 4 inches (10 x 10 cm).  How to care for your surgical drain  Care for your drain as told by your health care provider. This is important to help prevent infection. If your drain is placed at your back, or any other hard-to-reach area, ask another person to assist you in performing the following tasks:  General care  · Keep the skin around the drain dry and covered with a dressing at all times.  · Check your drain area every day for signs of infection. Check for:  ? Redness, swelling, or pain.  ? Pus or a bad smell.  ? Cloudy drainage.  ? Tenderness or pressure at the drain exit site.  Changing the dressing  Follow instructions from your  health care provider about how to change your dressing. Change your dressing at least once a day. Change it more often if needed to keep the dressing dry. Make sure you:  1. Gather your supplies.  2. Wash your hands with soap and water before you change your dressing. If soap and water are not available, use hand .  3. Remove the old dressing. Avoid using scissors to do that.  4. Wash your hands with soap and water again after removing the old dressing.  5. Use sterile saline to clean your skin around the drain. You may need to use a cotton swab to clean the skin.  6. Place the tube through the slit in a drain sponge. Place the drain sponge so that it covers your wound.  7. Place the gauze square or another drain sponge on top of the drain sponge that is on the wound. Make sure the tube is between those layers.  8. Tape the dressing to your skin.  9. Tape the drainage tube to your skin 1-2 inches (2.5-5 cm) below the place where the tube enters your body. Taping keeps the tube from pulling on any stitches (sutures) that you have.  10. Wash your hands with soap and water.  11. Write down the color of your drainage and how often you change your dressing.  How to empty your active drain    1. Make sure that you have a measuring cup that you can empty your drainage into.  2. Wash your hands with soap and water. If soap and water are not available, use hand .  3. Loosen any pins or clips that hold the tube in place.  4. If your health care provider tells you to strip the tube to prevent clots and tube blockages:  ? Hold the tube at the skin with one hand. Use your other hand to pinch the tubing with your thumb and first finger.  ? Gently move your fingers down the tube while squeezing very lightly. This clears any drainage, clots, or tissue from the tube.  ? You may need to do this several times each day to keep the tube clear. Do not pull on the tube.  5. Open the bulb cap or the drain plug. Do not  touch the inside of the cap or the bottom of the plug.  6. Turn the device upside down and gently squeeze.  7. Empty all of the drainage into the measuring cup.  8. Compress the bulb or the container and replace the cap or the plug. To compress the bulb or the container, squeeze it firmly in the middle while you close the cap or plug the container.  9. Write down the amount of drainage that you have in each 24-hour period. If you have less than 2 Tbsp (30 mL) of drainage during 24 hours, contact your health care provider.  10. Flush the drainage down the toilet.  11. Wash your hands with soap and water.  Contact a health care provider if:  · You have redness, swelling, or pain around your drain area.  · You have pus or a bad smell coming from your drain area.  · You have a fever or chills.  · The skin around your drain is warm to the touch.  · The amount of drainage that you have is increasing instead of decreasing.  · You have drainage that is cloudy.  · There is a sudden stop or a sudden decrease in the amount of drainage that you have.  · Your drain tube falls out.  · Your active drain does not stay compressed after you empty it.  Summary  · Surgical drains are used to remove extra fluid that normally builds up in a surgical wound after surgery.  · Different kinds of surgical drains include active drains and passive drains. Active drains use suction to pull drainage away from the surgical wound, and passive drains allow fluid to drain naturally.  · It is important to care for your drain to prevent infection. If your drain is placed at your back, or any other hard-to-reach area, ask another person to assist you.  · Contact your health care provider if you have redness, swelling, or pain around your drain area.  This information is not intended to replace advice given to you by your health care provider. Make sure you discuss any questions you have with your health care provider.  Document Released: 12/15/2001  Document Revised: 01/22/2020 Document Reviewed: 01/22/2020  [employer] Patient Education © 2020 [employer]        Discharge Instructions    Discharged to home by car with friend. Discharged via wheelchair, hospital escort: Yes.  Special equipment needed: Not Applicable    Be sure to schedule a follow-up appointment with your primary care doctor or any specialists as instructed.     Discharge Plan:   Influenza Vaccine Indication: Not indicated: Previously immunized this influenza season and > 8 years of age  Influenza Vaccine Given - only chart on this line when given: Influenza Vaccine Given (See MAR)    I understand that a diet low in cholesterol, fat, and sodium is recommended for good health. Unless I have been given specific instructions below for another diet, I accept this instruction as my diet prescription.   Other diet: Regular    Special Instructions: None    · Is patient discharged on Warfarin / Coumadin?   No     Depression / Suicide Risk    As you are discharged from this [facility], it is important to learn how to keep safe from harming yourself.    Recognize the warning signs:  · Abrupt changes in personality, positive or negative- including increase in energy   · Giving away possessions  · Change in eating patterns- significant weight changes-  positive or negative  · Change in sleeping patterns- unable to sleep or sleeping all the time   · Unwillingness or inability to communicate  · Depression  · Unusual sadness, discouragement and loneliness  · Talk of wanting to die  · Neglect of personal appearance   · Rebelliousness- reckless behavior  · Withdrawal from people/activities they love  · Confusion- inability to concentrate     If you or a loved one observes any of these behaviors or has concerns about self-harm, here's what you can do:  · Talk about it- your feelings and reasons for harming yourself  · Remove any means that you might use to hurt yourself (examples: pills, rope,  extension cords, firearm)  · Get professional help from the community (Mental Health, Substance Abuse, psychological counseling)  · Do not be alone:Call your Safe Contact- someone whom you trust who will be there for you.  · Call your local CRISIS HOTLINE 318-3838 or 809-309-5512  · Call your local Children's Mobile Crisis Response Team [place] (266) 843-3289 or www.YOLLEGE  · Call the toll free National Suicide Prevention Hotlines   · National Suicide Prevention Lifeline 476-684-BJNG (1094)  · National Hope Line Network 800-SUICIDE (244-1572)

## 2021-12-16 NOTE — CARE PLAN
The patient is Stable - Low risk of patient condition declining or worsening    Shift Goals  Clinical Goals: CT tomorrow   Patient Goals: rest, safety   Family Goals: [place]    Progress made toward(s) clinical / shift goals:  Pt to go to CT tomorrow. Pt resting comfortably.     Problem: Pain - Standard  Goal: Alleviation of pain or a reduction in pain to the patient’s comfort goal  Outcome: Progressing     Problem: Skin Integrity  Goal: Skin integrity is maintained or improved  Outcome: Progressing     Problem: Fall Risk  Goal: Patient will remain free from falls  Outcome: Progressing       Patient is not progressing towards the following goals:

## 2021-12-16 NOTE — PROGRESS NOTES
Radiology Progress Note   Author: AYE Mahan Date & Time created: 12/16/2021  10:29 AM   Date of admission  12/4/2021  Note to reader: this note follows the APSO format rather than the historical SOAP format. Assessment and plan located at the top of the note for ease of use.    Chief Complaint  66 y.o. female admitted 12/4/2021 with ABD pain       HPI  Nils Alfonso is a 66 y.o. female admitted 12/4/2021 with complicated past medical history including multiple recent hospitalizations for complicated duodenectomy and recurrent pancreatitis who presents with recurrent abdominal pain and progressive/new intra-abdominal fluid collections concerning for abscesses and possible ongoing GI leak.       Assessment/Plan  Interval History   Principal Problem:    Intra-abdominal abscess (HCC)  Active Problems:    Hyperlipidemia    Normocytic anemia    Postprocedural intraabdominal abscess    Mild protein-calorie malnutrition (HCC)    Bilateral lower extremity edema    HTN (hypertension), benign      Plan IR  - Irrigate RLQ drain with 20 ml of sterile saline six times per shift   - Fluid cultures, Escherichia coli, Enterococcus faecium  - Sugery following   - Continue to monitor drains, VS and labs   - Repeat CT scan for today shows decreased size and fluid component of the RIGHT retroperitoneal and extraperitoneal fluid collection with a drain in place    - Ok for patient to DC home with drain in place from IR standpoint, will need to be educated on how to flush drain. She will need follow up as outpatient with ID and Dr. Cook for when drain can be removed     - Thank you for allowing Interventional Radiology team to participate in the patients care, if any additonal care or requests are needed in the future please do not hesitate call or place IR order. IR signing off         [id number]  IR:   12/4-RIGHT paracolic gutter/RP drain placement by CT 50 mL brown fluid to lab  12/5- 65 ml   12/6- 50 ml purulent  brown fluid, Ct- decrease in size in right lower quadrant fluid collection status post drainage. A pigtail catheter remains in place. A fluid collection previously measuring 2.3 cm in width currently measures 9 mm in width.   12/7-  75 ml  12/8- 60 ml   12/9- 35 ml in am  12/10- 15 ml   12/11- 15 ml  12/12- 45 ml  12/13- 55 ml   12/14- 15 ml  12/15- 70 ml  12/16- 5 ml in am, Decreased size and fluid component of the RIGHT retroperitoneal and extraperitoneal fluid collection with a drain in place       Review of Systems  Physical Exam   Review of Systems   Unable to perform ROS: Other   Constitutional: Negative for chills, fever and malaise/fatigue.   HENT: Negative for hearing loss.    Eyes: Negative for blurred vision.   Respiratory: Negative for cough, hemoptysis and shortness of breath.    Cardiovascular: Negative for chest pain and palpitations.   Gastrointestinal: Positive for abdominal pain. Negative for vomiting.   Genitourinary: Negative for dysuria.   Musculoskeletal: Negative for myalgias.   Skin: Negative for rash.   Neurological: Negative for dizziness, weakness and headaches.   Endo/Heme/Allergies: Does not bruise/bleed easily.   Psychiatric/Behavioral: Negative for suicidal ideas.      Vitals:    12/16/21 0814   BP: 128/74   Pulse: 62   Resp: 18   Temp: 36.6 °C (97.9 °F)   SpO2: 96%        Physical Exam  Constitutional:       Appearance: Normal appearance.   HENT:      Head: Normocephalic.      Nose: Nose normal.      Mouth/Throat:      Mouth: Mucous membranes are moist.   Eyes:      Pupils: Pupils are equal, round, and reactive to light.   Cardiovascular:      Rate and Rhythm: Normal rate.   Pulmonary:      Effort: Pulmonary effort is normal. No respiratory distress.   Abdominal:      Palpations: Abdomen is soft.      Tenderness: There is abdominal tenderness.          Comments: IR drain site CDI, no redness or swelling , brown purulent fluid in MARTHA bulb  Ostomy   X 3 abd dressings    Musculoskeletal:          General: No tenderness or deformity.      Cervical back: Normal range of motion.   Skin:     General: Skin is warm and dry.      Capillary Refill: Capillary refill takes less than 2 seconds.      Coloration: Skin is not jaundiced or pale.   Neurological:      General: No focal deficit present.      Mental Status: She is alert.      Motor: No weakness.   Psychiatric:         Mood and Affect: Mood normal.         Behavior: Behavior normal.             Labs    Recent Labs     12/14/21  0051 12/15/21  0025   WBC 7.7 7.5   RBC 3.03* 3.05*   HEMOGLOBIN 8.9* 8.9*   HEMATOCRIT 28.7* 28.7*   MCV 94.7 94.1   MCH 29.4 29.2   MCHC 31.0* 31.0*   RDW 57.1* 56.4*   PLATELETCT 248 237   MPV 9.9 9.1     Recent Labs     12/14/21  0051 12/15/21  0025   SODIUM 138 138   POTASSIUM 3.9 4.1   CHLORIDE 100 100   CO2 29 29   GLUCOSE 109* 112*   BUN 8 8   CREATININE 0.50 0.53   CALCIUM 7.6* 7.9*     Recent Labs     12/14/21  0051 12/15/21  0025   CREATININE 0.50 0.53     IR-MIDLINE CATHETER INSERTION WO GUIDANCE > AGE 3   Final Result                  Ultrasound-guided midline placement performed by qualified nursing staff    as above.          CT-ABDOMEN-PELVIS WITH   Final Result      1.  Apparent slight increase in size in right lower quadrant fluid collection with drain in place with gas again noted along the superior aspect of the collection to the ascending colon with dense fluid/apparent contrast within the fluid collection    suggesting fistulous communication with the colon.      2.  Anasarca with small bilateral pleural effusions and bibasilar atelectasis.      3.  Biliary stent placement with pneumobilia.      CT-ABDOMEN-PELVIS WITH   Final Result      1.  Interval decrease in size in right lower quadrant fluid collections status post drainage.      2.  Trace bilateral pleural effusions with bibasilar atelectasis.      3.  Biliary stent in place with pneumobilia.      4.  Anasarca      CT-DRAIN-PERITONEAL   Final Result       Successful RIGHT retroperitoneal/paracolic gutter drainage tube placement.      Plan: Thrice daily flushes with 10 mL of sterile saline. Monitor outputs. Please contact interventional radiology if there is any concern for tube dysfunction.      Findings were communicated with Dr. Vann via [employer] after initial scanning was performed.      IR-CONSULT AND TREAT    (Results Pending)   CT-ABDOMEN-PELVIS WITH    (Results Pending)       INR   Date Value Ref Range Status   12/04/2021 1.42 (H) 0.87 - 1.13 Final     Comment:     INR - Non-therapeutic Reference Range: 0.87-1.13  INR - Therapeutic Reference Range: 2.0-4.0       No results found for: POCINR     Intake/Output Summary (Last 24 hours) at 12/6/2021 1052  Last data filed at 12/6/2021 0600  Gross per 24 hour   Intake 1724 ml   Output 1295 ml   Net 429 ml      Labs not explicitly included in this progress note were reviewed by the author. Radiology/imaging not explicitly included in this progress note was reviewed by the author.   I have performed a physical exam and reviewed and updated ROS and Plan today (12/16/2021).     20 minutes in directly providing and coordinating care and extensive data review.  No time overlap and excludes procedures.

## 2021-12-16 NOTE — DISCHARGE SUMMARY
Discharge Summary    CHIEF COMPLAINT ON ADMISSION  Abdominal pain    Reason for Admission  Intraabdominal abscess     Admission Date  12/4/2021    CODE STATUS  Full Code    HPI & HOSPITAL COURSE  Admitted with intra-abdominal abscess on 12/4/2021. Patient was started on empiric coverage with IV Vancomycin and Zosyn. Surgery was consulted on the case. Patient underwent CT guided Right retroperitoneal/paracolic gutter drainage tube placement on 12/4/2021.  Culture showed E. coli and Enterococcus faecium.  Infectious disease was consulted on the case and antibiotics were changed to IV Vancomycin and Unasyn.  Repeat CT scan showed possible increased fluid collection.     General surgery was consulted who recommended IR consult for drainage. Right paracolic gutter/RP drain placed 12/4/2021. Cultures from this growing pansensitive E. coli and ampicillin resistant E faecium thus far.  ID consulted for recommendations. Continued patient on Unasyn and Vancomycin inpatient. Repeat CT completed 12/16 showed decrease in size of abscess. ID was OK with patient being discharged home with instructions to continue Zyvox and Augmentin BID x 14 days. IR also cleared patient for discharge home with drain. Patient was educated on drain care and flushing by RN.     Patient's clinical course shows improvement and patient is eager for discharge. She denies new or worsening symptoms; denies fever, abdominal pain, nausea or vomiting prior to discharge.     Therefore, she is discharged in fair and stable condition to home with organized home healthcare and close outpatient follow-up.    The patient met 2-midnight criteria for an inpatient stay at the time of discharge.    Discharge Date  12/16/2021    FOLLOW UP ITEMS POST DISCHARGE  ID follow up   IR follow up    DISCHARGE DIAGNOSES  Principal Problem:    Intra-abdominal abscess (HCC) POA: Yes  Active Problems:    Hyperlipidemia POA: Yes    Normocytic anemia POA: Yes    Postprocedural  "intraabdominal abscess POA: Yes    Mild protein-calorie malnutrition (HCC) POA: Yes    Bilateral lower extremity edema POA: No    HTN (hypertension), benign POA: Yes  Resolved Problems:    Sepsis  POA: Yes    Hyponatremia POA: Yes    Hypokalemia POA: Yes    Hypomagnesemia POA: Yes      FOLLOW UP  No future appointments.  [facility]  [address]  [place] [address]  [phone]        [facility]  [address] [facility]  [place] [id number] [phone]  Call  Follow up      MEDICATIONS ON DISCHARGE     Medication List      START taking these medications      Instructions   amoxicillin-clavulanate 875-125 MG Tabs  Commonly known as: AUGMENTIN   Take 1 Tablet by mouth 2 times a day for 14 days.  Dose: 1 Tablet     linezolid 600 MG Tabs  Commonly known as: Zyvox   Take 1 Tablet by mouth 2 times a day for 14 days.  Dose: 600 mg        CONTINUE taking these medications      Instructions   BIOTIN PO   Take 1 Tablet by mouth 1 time a day as needed. as needed for hair and nail growth  Dose: 1 Tablet     cyclobenzaprine 10 mg Tabs  Commonly known as: Flexeril   Take 10 mg by mouth every evening.  Dose: 10 mg     ezetimibe 10 MG Tabs  Commonly known as: ZETIA   Take 10 mg by mouth every evening.  Dose: 10 mg     gabapentin 300 MG Caps  Commonly known as: NEURONTIN   Take 600 mg by mouth 2 Times a Day.  Dose: 600 mg     ondansetron 4 MG Tbdp  Commonly known as: ZOFRAN ODT   Take 4 mg by mouth every 6 hours as needed for Nausea.  Dose: 4 mg     TYLENOL PO   Take 500 mg by mouth 3 times a day as needed (for moderate - breakthrough pain). for moderate pain-breakthrough pain  Dose: 500 mg     VITAMIN D3 PO   Take 1 Each by mouth every day.  Dose: 1 Each            Allergies  Allergies   Allergen Reactions   • Ampicillin Rash     Rash  Tolerates Zosyn 10/21   • Cephalexin Diarrhea, Vomiting and Nausea     .   • Clindamycin Vomiting     \"cleocin\"   • Codeine      Severe stomach pain, " cramps, spasms   • Demerol Vomiting   • Levofloxacin Unspecified     Numbness     • Tetracyclines Rash     .   • Tizanidine Itching   • Morphine Vomiting   • Pcn [Penicillins] Itching     Tolerates Zosyn 10/21   • Sulfa Drugs Itching     Tolerated Bactrim 10/2021       DIET  Orders Placed This Encounter   Procedures   • Diet Order Diet: Regular     Standing Status:   Standing     Number of Occurrences:   1     Order Specific Question:   Diet:     Answer:   Regular [1]       ACTIVITY  As tolerated.  Weight bearing as tolerated    CONSULTATIONS  IR  General surgery  ID    PROCEDURES  IR drain placement (12/4)    LABORATORY  Lab Results   Component Value Date    SODIUM 138 12/15/2021    POTASSIUM 4.1 12/15/2021    CHLORIDE 100 12/15/2021    CO2 29 12/15/2021    GLUCOSE 112 (H) 12/15/2021    BUN 8 12/15/2021    CREATININE 0.53 12/15/2021    CREATININE 1.08 (H) 07/08/2011    GLOMRATE 45 (L) 02/24/2011        Lab Results   Component Value Date    WBC 7.5 12/15/2021    HEMOGLOBIN 8.9 (L) 12/15/2021    HEMATOCRIT 28.7 (L) 12/15/2021    PLATELETCT 237 12/15/2021        Total time of the discharge process exceeds 46 minutes.

## 2021-12-16 NOTE — PROGRESS NOTES
Pt D/C'd. Midline removed.  Discharge instructions provided to pt.  Pt states understanding.  Pt states all questions have been answered.  Copy of discharge provided to pt.  Signed copy in chart.  Prescriptions provided to pt via meds to bed. Pt states that all personal belongings are in possession.

## 2021-12-18 ENCOUNTER — HOME CARE VISIT (OUTPATIENT)
Dept: HOME HEALTH SERVICES | Facility: HOME HEALTHCARE | Age: 66
End: 2021-12-18
Payer: MEDICARE

## 2021-12-18 VITALS
HEART RATE: 76 BPM | BODY MASS INDEX: 23.22 KG/M2 | RESPIRATION RATE: 18 BRPM | SYSTOLIC BLOOD PRESSURE: 138 MMHG | WEIGHT: 136 LBS | DIASTOLIC BLOOD PRESSURE: 74 MMHG | HEIGHT: 64 IN | OXYGEN SATURATION: 97 % | TEMPERATURE: 98.4 F

## 2021-12-18 PROCEDURE — G0493 RN CARE EA 15 MIN HH/HOSPICE: HCPCS

## 2021-12-18 ASSESSMENT — ENCOUNTER SYMPTOMS
HIGHEST PAIN SEVERITY IN PAST 24 HOURS: 2/10
SHORTNESS OF BREATH: T
PAIN LOCATION: BACK
SUBJECTIVE PAIN PROGRESSION: UNCHANGED
PAIN SEVERITY GOAL: 0/10
PAIN LOCATION - PAIN QUALITY: ACHY
PAIN LOCATION - PAIN FREQUENCY: CONSTANT
NAUSEA: DENIES
VOMITING: DENIES
PAIN LOCATION - PAIN SEVERITY: 2/10
LOWEST PAIN SEVERITY IN PAST 24 HOURS: 0/10
DENIES PAIN: 1

## 2021-12-18 ASSESSMENT — PATIENT HEALTH QUESTIONNAIRE - PHQ9
2. FEELING DOWN, DEPRESSED, IRRITABLE, OR HOPELESS: 00
1. LITTLE INTEREST OR PLEASURE IN DOING THINGS: 00

## 2021-12-18 ASSESSMENT — FIBROSIS 4 INDEX: FIB4 SCORE: 1.65

## 2021-12-19 ASSESSMENT — PATIENT HEALTH QUESTIONNAIRE - PHQ9: CLINICAL INTERPRETATION OF PHQ2 SCORE: 0

## 2021-12-20 ENCOUNTER — DOCUMENTATION (OUTPATIENT)
Dept: MEDICAL GROUP | Facility: PHYSICIAN GROUP | Age: 66
End: 2021-12-20

## 2021-12-20 NOTE — CASE COMMUNICATION
FYI: On call note: pt was trying to flush it, but pt says that the fluid was not going in. Talked to pt about this and found that the tube wasn't opened and pt needed to turn it. Pt says that it is not flushing. Pt appreciated the phone call.

## 2021-12-20 NOTE — PROGRESS NOTES
"Medication chart review for [facility] services    PCP:  Pratik Gordon M.D.  [address] [place] [place] [id number]  Fax: [phone]    Current medication list     Current Outpatient Medications:   •  vitamin E, 180 mg, Oral, DAILY  •  amoxicillin-clavulanate, 1 Tablet, Oral, BID (Patient taking differently: 1 Tablet, Oral, 2 TIMES DAILY, Indications: Intra-Abdominal Infection)  •  linezolid, 600 mg, Oral, BID (Patient taking differently: 600 mg, Oral, 2 TIMES DAILY, Indications: Infection due to Enterococcus)  •  Acetaminophen (TYLENOL PO), 500 mg, Oral, TID PRN  •  cyclobenzaprine, 10 mg, Oral, Q EVENING  •  ezetimibe, 10 mg, Oral, Q EVENING  •  gabapentin, 600 mg, Oral, BID  •  BIOTIN PO, 1 Tablet, Oral, QDAY PRN  •  Cholecalciferol (VITAMIN D3 PO), 1 Each, Oral, DAILY  •  ondansetron, 4 mg, Oral, Q6HRS PRN    Allergies   Allergen Reactions   • Ampicillin Rash     Rash  Tolerates Zosyn 10/21   • Cephalexin Diarrhea, Vomiting and Nausea     .   • Clindamycin Vomiting     \"cleocin\"   • Codeine      Severe stomach pain, cramps, spasms   • Demerol Vomiting   • Levofloxacin Unspecified     Numbness     • Tetracyclines Rash     .   • Tizanidine Itching   • Morphine Vomiting   • Pcn [Penicillins] Itching     Tolerates Zosyn 10/21   • Sulfa Drugs Itching     Tolerated Bactrim 10/2021       Labs     Lab Results   Component Value Date/Time    SODIUM 138 12/15/2021 12:25 AM    POTASSIUM 4.1 12/15/2021 12:25 AM    CHLORIDE 100 12/15/2021 12:25 AM    CO2 29 12/15/2021 12:25 AM    GLUCOSE 112 (H) 12/15/2021 12:25 AM    BUN 8 12/15/2021 12:25 AM    CREATININE 0.53 12/15/2021 12:25 AM    CREATININE 1.08 (H) 07/08/2011 10:53 AM    BUNCREATRAT 15 07/08/2011 10:53 AM    GLOMRATE 45 (L) 02/24/2011 08:52 AM     Lab Results   Component Value Date/Time    ALKPHOSPHAT 103 (H) 12/06/2021 04:02 AM    ASTSGOT 29 12/06/2021 04:02 AM    ALTSGPT 24 12/06/2021 04:02 AM    TBILIRUBIN 0.3 12/06/2021 04:02 AM    INR 1.42 (H) " 12/04/2021 08:40 AM    ALBUMIN 2.1 (L) 12/06/2021 04:02 AM        Assessment and Plan:   • Received referral from [facility]. Medications reviewed.       Drug-Drug: cyclobenzaprine and linezolid  The 's literature states that the concurrent use of Cyclobenzaprine and Monoamine Oxidase Inhibitors is contraindicated. Clinical significance is not known. It should be noted for selegiline that only higher dosages participate in this interaction          Ulysses Caruso, PharmD, MS, BCACP, [facility]  Phone [phone] fax [phone]    This note was created using voice recognition software (Dragon). The accuracy of the dictation is limited by the abilities of the software. I have reviewed the note prior to signing, however some errors in grammar and context are still possible. If you have any questions related to this note please do not hesitate to contact our office.

## 2021-12-21 ENCOUNTER — HOME CARE VISIT (OUTPATIENT)
Dept: HOME HEALTH SERVICES | Facility: HOME HEALTHCARE | Age: 66
End: 2021-12-21
Payer: MEDICARE

## 2021-12-21 VITALS
OXYGEN SATURATION: 97 % | SYSTOLIC BLOOD PRESSURE: 138 MMHG | TEMPERATURE: 98.5 F | RESPIRATION RATE: 18 BRPM | DIASTOLIC BLOOD PRESSURE: 80 MMHG | HEART RATE: 78 BPM

## 2021-12-21 VITALS
RESPIRATION RATE: 18 BRPM | OXYGEN SATURATION: 97 % | SYSTOLIC BLOOD PRESSURE: 138 MMHG | HEART RATE: 78 BPM | TEMPERATURE: 98.5 F | DIASTOLIC BLOOD PRESSURE: 80 MMHG

## 2021-12-21 PROCEDURE — G0299 HHS/HOSPICE OF RN EA 15 MIN: HCPCS

## 2021-12-21 PROCEDURE — G0151 HHCP-SERV OF PT,EA 15 MIN: HCPCS

## 2021-12-21 ASSESSMENT — ACTIVITIES OF DAILY LIVING (ADL)
PHYSICAL TRANSFERS ASSESSED: 1
BATHING_COMMENTS: SEE OT NOTES
CURRENT_FUNCTION: STAND BY ASSIST
AMBULATION ASSISTANCE ON FLAT SURFACES: 1
GROOMING_COMMENTS: SEE OT NOTES
FEEDING_COMMENTS: SEE OT NOTES
AMBULATION ASSISTANCE: 1
PHYSICAL_TRANSFERS_DEVICES: USE OF B UES TO ASSIST.
AMBULATION ASSISTANCE: STAND BY ASSIST

## 2021-12-21 ASSESSMENT — ENCOUNTER SYMPTOMS
LOWEST PAIN SEVERITY IN PAST 24 HOURS: 0/10
PAIN SEVERITY GOAL: 0/10
HIGHEST PAIN SEVERITY IN PAST 24 HOURS: 0/10
DENIES PAIN: 1

## 2021-12-21 NOTE — NON-PROVIDER
Outcome: Declined Comprehensive Health Assessment.     Please transfer to Patient Outreach Team at [phone] when patient returns call.    [facility] Verified: yes    Attempt # 2

## 2021-12-22 ENCOUNTER — TELEPHONE (OUTPATIENT)
Dept: INFECTIOUS DISEASES | Facility: MEDICAL CENTER | Age: 66
End: 2021-12-22

## 2021-12-22 ASSESSMENT — ENCOUNTER SYMPTOMS
ASSOCIATED SYMPTOMS: DENIES
MUSCLE WEAKNESS: 1
HIGHEST PAIN SEVERITY IN PAST 24 HOURS: 3/10
NAUSEA: DENIES
PAIN LOCATION - RELIEVING FACTORS: REST
PAIN LOCATION: BACK
PAIN LOCATION - PAIN SEVERITY: 2/10
PAIN: 1
PAIN LOCATION - EXACERBATING FACTORS: EXERTION
PAIN LOCATION - PAIN FREQUENCY: INFREQUENT
PAIN LOCATION - PAIN QUALITY: DULL
PERSON REPORTING PAIN: PATIENT
PAIN LOCATION - PAIN DURATION: 20 MINUTES
SUBJECTIVE PAIN PROGRESSION: GRADUALLY IMPROVING
VOMITING: DENIES
PAIN SEVERITY GOAL: 1/10

## 2021-12-22 NOTE — TELEPHONE ENCOUNTER
Pt oral abx EOT is 12/30, Pt next appointment is 01/06 with our department.    Would you like to end treatment or extend abx until we see her in clinic. Pt states she has been having diarrhea, after taking imodium stools are beginning to reform.

## 2021-12-23 ENCOUNTER — HOME CARE VISIT (OUTPATIENT)
Dept: HOME HEALTH SERVICES | Facility: HOME HEALTHCARE | Age: 66
End: 2021-12-23
Payer: MEDICARE

## 2021-12-23 VITALS
RESPIRATION RATE: 18 BRPM | TEMPERATURE: 97.6 F | SYSTOLIC BLOOD PRESSURE: 124 MMHG | OXYGEN SATURATION: 98 % | DIASTOLIC BLOOD PRESSURE: 84 MMHG | HEART RATE: 88 BPM

## 2021-12-23 PROCEDURE — G0299 HHS/HOSPICE OF RN EA 15 MIN: HCPCS

## 2021-12-23 PROCEDURE — G0152 HHCP-SERV OF OT,EA 15 MIN: HCPCS

## 2021-12-23 ASSESSMENT — ENCOUNTER SYMPTOMS
NAUSEA: DENIES
PAIN SEVERITY GOAL: 0/10
LOWEST PAIN SEVERITY IN PAST 24 HOURS: 0/10
PAIN: 1
VOMITING: DENIES
HIGHEST PAIN SEVERITY IN PAST 24 HOURS: 0/10
MUSCLE WEAKNESS: 1
PERSON REPORTING PAIN: PATIENT

## 2021-12-24 ENCOUNTER — HOME CARE VISIT (OUTPATIENT)
Dept: HOME HEALTH SERVICES | Facility: HOME HEALTHCARE | Age: 66
End: 2021-12-24
Payer: MEDICARE

## 2021-12-24 ENCOUNTER — HOSPITAL ENCOUNTER (OUTPATIENT)
Facility: MEDICAL CENTER | Age: 66
End: 2021-12-24
Attending: NURSE PRACTITIONER
Payer: MEDICARE

## 2021-12-24 LAB
ANION GAP SERPL CALC-SCNC: 12 MMOL/L (ref 7–16)
BASOPHILS # BLD AUTO: 0.5 % (ref 0–1.8)
BASOPHILS # BLD: 0.03 K/UL (ref 0–0.12)
BUN SERPL-MCNC: 6 MG/DL (ref 8–22)
CALCIUM SERPL-MCNC: 8.9 MG/DL (ref 8.5–10.5)
CHLORIDE SERPL-SCNC: 100 MMOL/L (ref 96–112)
CO2 SERPL-SCNC: 23 MMOL/L (ref 20–33)
CREAT SERPL-MCNC: 0.47 MG/DL (ref 0.5–1.4)
EOSINOPHIL # BLD AUTO: 0.17 K/UL (ref 0–0.51)
EOSINOPHIL NFR BLD: 2.8 % (ref 0–6.9)
ERYTHROCYTE [DISTWIDTH] IN BLOOD BY AUTOMATED COUNT: 58 FL (ref 35.9–50)
GLUCOSE SERPL-MCNC: 105 MG/DL (ref 65–99)
HCT VFR BLD AUTO: 31.2 % (ref 37–47)
HGB BLD-MCNC: 9.9 G/DL (ref 12–16)
IMM GRANULOCYTES # BLD AUTO: 0.03 K/UL (ref 0–0.11)
IMM GRANULOCYTES NFR BLD AUTO: 0.5 % (ref 0–0.9)
LYMPHOCYTES # BLD AUTO: 1.67 K/UL (ref 1–4.8)
LYMPHOCYTES NFR BLD: 27.8 % (ref 22–41)
MCH RBC QN AUTO: 30 PG (ref 27–33)
MCHC RBC AUTO-ENTMCNC: 31.7 G/DL (ref 33.6–35)
MCV RBC AUTO: 94.5 FL (ref 81.4–97.8)
MONOCYTES # BLD AUTO: 0.34 K/UL (ref 0–0.85)
MONOCYTES NFR BLD AUTO: 5.7 % (ref 0–13.4)
NEUTROPHILS # BLD AUTO: 3.76 K/UL (ref 2–7.15)
NEUTROPHILS NFR BLD: 62.7 % (ref 44–72)
NRBC # BLD AUTO: 0 K/UL
NRBC BLD-RTO: 0 /100 WBC
PLATELET # BLD AUTO: 282 K/UL (ref 164–446)
PMV BLD AUTO: 8.2 FL (ref 9–12.9)
POTASSIUM SERPL-SCNC: 3.7 MMOL/L (ref 3.6–5.5)
RBC # BLD AUTO: 3.3 M/UL (ref 4.2–5.4)
SODIUM SERPL-SCNC: 135 MMOL/L (ref 135–145)
WBC # BLD AUTO: 6 K/UL (ref 4.8–10.8)

## 2021-12-24 PROCEDURE — 85025 COMPLETE CBC W/AUTO DIFF WBC: CPT

## 2021-12-24 PROCEDURE — G0299 HHS/HOSPICE OF RN EA 15 MIN: HCPCS

## 2021-12-24 PROCEDURE — 80048 BASIC METABOLIC PNL TOTAL CA: CPT

## 2021-12-25 VITALS
OXYGEN SATURATION: 94 % | TEMPERATURE: 97.8 F | RESPIRATION RATE: 17 BRPM | SYSTOLIC BLOOD PRESSURE: 128 MMHG | HEART RATE: 89 BPM | DIASTOLIC BLOOD PRESSURE: 64 MMHG

## 2021-12-25 ASSESSMENT — ENCOUNTER SYMPTOMS
HIGHEST PAIN SEVERITY IN PAST 24 HOURS: 0/10
VOMITING: DENIES
PAIN: 1
NAUSEA: DENIES
PERSON REPORTING PAIN: PATIENT
MUSCLE WEAKNESS: 1
LOWEST PAIN SEVERITY IN PAST 24 HOURS: 0/10
PAIN SEVERITY GOAL: 0/10

## 2021-12-26 VITALS
SYSTOLIC BLOOD PRESSURE: 124 MMHG | DIASTOLIC BLOOD PRESSURE: 84 MMHG | RESPIRATION RATE: 18 BRPM | OXYGEN SATURATION: 98 % | TEMPERATURE: 97.6 F | HEART RATE: 88 BPM

## 2021-12-26 ASSESSMENT — ACTIVITIES OF DAILY LIVING (ADL)
TOILETING: 1
FEEDING ASSESSED: 1
GROOMING_CURRENT_FUNCTION: INDEPENDENT
DRESSING_UB_CURRENT_FUNCTION: INDEPENDENT
DRESSING_LB_CURRENT_FUNCTION: INDEPENDENT
GROOMING ASSESSED: 1
TOILETING: INDEPENDENT
PHYSICAL TRANSFERS ASSESSED: 1
LIGHT HOUSEKEEPING: DEPENDENT
AMBULATION ASSISTANCE: 1
FEEDING: INDEPENDENT
ORAL_CARE_ASSESSED: 1
CURRENT_FUNCTION: STAND BY ASSIST
AMBULATION ASSISTANCE: STAND BY ASSIST
GROOMING_WITHIN_DEFINED_LIMITS: 1
PREPARING MEALS: DEPENDENT
FEEDING_WITHIN_DEFINED_LIMITS: 1
HOUSEKEEPING ASSESSED: 1
ORAL_CARE_CURRENT_FUNCTION: INDEPENDENT

## 2021-12-26 ASSESSMENT — ENCOUNTER SYMPTOMS
PERSON REPORTING PAIN: PATIENT
DENIES PAIN: 1

## 2021-12-27 ENCOUNTER — HOME CARE VISIT (OUTPATIENT)
Dept: HOME HEALTH SERVICES | Facility: HOME HEALTHCARE | Age: 66
End: 2021-12-27
Payer: MEDICARE

## 2021-12-27 DIAGNOSIS — R18.8 INTRAABDOMINAL FLUID COLLECTION: ICD-10-CM

## 2021-12-27 DIAGNOSIS — T81.43XA POSTPROCEDURAL INTRAABDOMINAL ABSCESS: ICD-10-CM

## 2021-12-27 DIAGNOSIS — K65.1 INTRA-ABDOMINAL ABSCESS (HCC): ICD-10-CM

## 2021-12-27 ASSESSMENT — ACTIVITIES OF DAILY LIVING (ADL): OASIS_M1830: 03

## 2021-12-27 NOTE — CASE COMMUNICATION
Quality Review for 12/18/21 [facility] OASIS by HERBERT Hurtado RN on  December 27, 2021    Edits completed by HERBERT Hurtado RN:  1. Completed home bound status per narrative  2. Per narrative that patient needs min assist and a walker for safety, the following changes were made: , , ,  are 2;  and  are 3  3.  is 3 per ambulation status  4.  is 11

## 2021-12-28 NOTE — CASE COMMUNICATION
I agree with these changes  ----- Message -----  From: Alexandra Hurtado R.N.  Sent: 12/27/2021  11:30 AM PST  To: Nina Li R.N.         Quality Review for 12/18/21 [facility] OASIS by HERBERT Hurtado RN on  December 27, 2021    Edits completed by HERBERT Hurtado RN:  1. Completed home bound status per narrative  2. Per narrative that patient needs min assist and a walker for safety, the following changes were made: , , M 1820,  are 2;  and  are 3  3.  is 3 per ambulation status  4.  is 11

## 2021-12-29 ENCOUNTER — TELEPHONE (OUTPATIENT)
Dept: SURGICAL ONCOLOGY | Facility: MEDICAL CENTER | Age: 66
End: 2021-12-29

## 2021-12-29 ENCOUNTER — HOSPITAL ENCOUNTER (EMERGENCY)
Facility: MEDICAL CENTER | Age: 66
End: 2021-12-29
Payer: MEDICARE

## 2021-12-29 RX ORDER — AMOXICILLIN AND CLAVULANATE POTASSIUM 875; 125 MG/1; MG/1
1 TABLET, FILM COATED ORAL 2 TIMES DAILY
Qty: 28 TABLET | Refills: 0 | Status: SHIPPED | OUTPATIENT
Start: 2021-12-29 | End: 2022-01-12

## 2021-12-29 NOTE — PROGRESS NOTES
Bedside report received.  Assessment complete.  A&O x 4. Patient calls appropriately.  Patient ambulates with x1 assist and fww.   Patient has 7/10 pain. Pain managed with prescribed medications.  Denies N&V. Tolerating regular diet.  x3 fistulas with ostomy appliances in place. RLQ MARTHA flushed per order. Midline has dip, cdi.  + void, + flatus, last  BM 12/7.  Patient denies SOB.  Review plan with of care with patient. Call light and personal belongings within reach. Hourly rounding in place. All needs met at this time.   [FreeTextEntry1] : Left ureteral stone:\par asymptomatic\par will obtain US and KUB to evaluate\par Pt will be traveling to [place] next week. \par Discuss result over phone. She will f/u with urologist in [place]

## 2021-12-29 NOTE — TELEPHONE ENCOUNTER
Patient called today stating she couldn't flush her drain, it had an odder and, was not draining anything anymore. I told patient that  was not in town and she needed to go to the ER.

## 2021-12-29 NOTE — TELEPHONE ENCOUNTER
Jackie Jimenez M.D.  You 7 days ago       She needs a repeat CT prior to appt   Depending on result will dtermione antibiotic duration    Message text     CT SCHEDULED. Will route results to provider for duration decision

## 2021-12-30 ENCOUNTER — HOME CARE VISIT (OUTPATIENT)
Dept: HOME HEALTH SERVICES | Facility: HOME HEALTHCARE | Age: 66
End: 2021-12-30
Payer: MEDICARE

## 2021-12-30 VITALS
DIASTOLIC BLOOD PRESSURE: 88 MMHG | SYSTOLIC BLOOD PRESSURE: 142 MMHG | RESPIRATION RATE: 18 BRPM | HEART RATE: 95 BPM | TEMPERATURE: 98.5 F | OXYGEN SATURATION: 96 %

## 2021-12-30 VITALS
TEMPERATURE: 98.5 F | RESPIRATION RATE: 18 BRPM | HEART RATE: 95 BPM | OXYGEN SATURATION: 96 % | SYSTOLIC BLOOD PRESSURE: 142 MMHG | DIASTOLIC BLOOD PRESSURE: 88 MMHG

## 2021-12-30 PROCEDURE — G0151 HHCP-SERV OF PT,EA 15 MIN: HCPCS

## 2021-12-30 PROCEDURE — A6212 FOAM DRG <=16 SQ IN W/BORDER: HCPCS

## 2021-12-30 PROCEDURE — G0495 RN CARE TRAIN/EDU IN HH: HCPCS

## 2021-12-30 PROCEDURE — 665001 SOC-HOME HEALTH

## 2021-12-30 ASSESSMENT — ENCOUNTER SYMPTOMS
PAIN LOCATION - PAIN DURATION: DAILY
SUBJECTIVE PAIN PROGRESSION: UNCHANGED
SUBJECTIVE PAIN PROGRESSION: WAXING AND WANING
PAIN LOCATION - EXACERBATING FACTORS: STANDING, WALKING
PAIN LOCATION - PAIN FREQUENCY: INTERMITTENT
PERSON REPORTING PAIN: PATIENT
PAIN: 1
PAIN: 1
PAIN LOCATION - PAIN SEVERITY: 6/10
PAIN LOCATION - EXACERBATING FACTORS: EXERTION
PAIN SEVERITY GOAL: 0/10
PAIN SEVERITY GOAL: 2/10
PAIN LOCATION - RELIEVING FACTORS: PAIN MEDICATION
PAIN LOCATION: BACK
PAIN LOCATION - RELIEVING FACTORS: REST, PAIN MEDS
LOWEST PAIN SEVERITY IN PAST 24 HOURS: 6/10
PERSON REPORTING PAIN: PATIENT
PAIN LOCATION - PAIN QUALITY: DULL
VOMITING: NO
MUSCLE WEAKNESS: 1
LOWEST PAIN SEVERITY IN PAST 24 HOURS: 1/10
PAIN LOCATION: BACK
PAIN LOCATION - PAIN QUALITY: ACHING
HIGHEST PAIN SEVERITY IN PAST 24 HOURS: 7/10
PAIN LOCATION - PAIN SEVERITY: 6/10
HIGHEST PAIN SEVERITY IN PAST 24 HOURS: 6/10
NAUSEA: NO

## 2021-12-30 NOTE — Clinical Note
"ACTION REQUESTED - suggested abdominal wound dressing submitted to Dr ST for review and signature if in agreement.  Pt has RLQ MARTHA drain that she has been instilling 10 ml saline daily and drawing back to irrigate. Last time she was able to do that was 2 days ago. She was instilling 10 ml and recently only getting 5 back. Yesterday she was not able to instill saline at all. Effluent from abscess has been decreasing, stating only about 1 Tbsp a day. Reports she called Dr Cook' office yesterday. He is on vacation and she was instructed to go to ED who could call Dr Vann to see her. She did go to the ED and was told there was a 3 hr wait to be seen and they likely could not help her. States people looked very ill and she opted to go home. I tried to aspirate line first in case blockage could be opened; unable to instill saline either. Dr Vann's office called and she called us back. Man Vann questioned pt with instructions to leave line for now. Pt is scheduled for CT 1/4/22.  She will contact Dr ST when he is available and see if he wants to move CT date up. Also has caleb't with ID 1/6/22 and with Dr ST 1/11/22. Pt has no abdominal discomfort. T 98.5F. Dr Vann reviewed with her that if she developed any fever or signs of infection to go to ED. Augmentin is finished. Pt states pharmacy called her to  prescription but she states she already completed 14 days and \"I can't take anymore diarrhea - my bottom is so sore.\" Finished Zyvox today. Dressing changed at MARTHA insertion site. Some redness at insertion site but skin intact. There was some tension on the tube which we relieved. Ostomy appliance over lower drain site removed as it was annoying pt. Healed. Site covered with 3x3 silicone bordered foam dressing to prevent irritation from clothing. Mid abdominal wound with 3 cm tunneling. Serous exudate on dressing removed. Beefy red base where visible. Gently packing with saline moistened gauze daily. Some " periwound irritation. Cavilon No-sting barrier wipe to periwound to protect from moisture. New dressing applied. Antibiotics completed. Recommend gently packing wound with SilvaSorb hydrogel on NuGauze plain packing strip. Other option would be moistening with Puracyn hydrogel (hypochlorous acid). Order submitted to Dr ST but will not be initiated without his review and signature. Once tunneling is not as deep, could use Odette collagen to base, then packing strip. Pt does not want NPWT and would be difficult area to tolerate. BLE edema resolving. Pt comfortable with instructions from Dr Vann and appreciative for call.    Thank you, Carmen Pinedo, RN, MSN, CWCN

## 2021-12-31 ENCOUNTER — TELEPHONE (OUTPATIENT)
Dept: SURGERY | Facility: MEDICAL CENTER | Age: 66
End: 2021-12-31

## 2021-12-31 NOTE — TELEPHONE ENCOUNTER
"Delayed Documentation.  Conversation with patient and HHRN yesterday afternoon.    Received call from patient HHRN regarding issues with her drain.  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  States that they have been doing flushes of the drain daily - however starting a few days ago were no longer able to flush drain and are additionally unable to get any fluid on aspiration (and there is no drainage into the tubing).  Denies drainage around the drain.  Patient feeling well otherwise - denies fevers, ect.  Tolerating diet.  States is no longer on antibiotics.  Denies drainage from midline wound which is currently being managed with wet to dry dressings.  Prior to drain malfunctioning it was putting out \"about a tablespoon\" of fluid daily.    Patient had previously called clinic the day prior to report these issues and it had been recommended that she present to the ER for evaluation.  She states that she did go to the ER - however was informed that it would be a ~3 hour wait and she did not feel comfortable waiting there for that length of time in the waiting room with sick patients - hence her HHRN calling today.    I discussed with them that based on description I suspect her drain is clogged - and will either need to be removed or replaced.  We discussed that ideally this would be dealt with today - and the only way to make the determination as to what to do with the drain would be with imaging.   She states that she has a CT scheduled as an outpatient on 1/4 with follow-up with Dr. Cook the week after.  I discussed that it would not be possible for me to set up a more urgent outpatient scan - and the only way to do this would be through the ER.  If she was not comfortable going to the ER for evaluation - then she could try to wait until her CT on 1/4 and I would see if Dr. Cook would be able to get her into clinic sooner.  We " discussed warnings which would require more urgent (immediate) evaluation in the ER - including fevers, increasing pain, new drainage from wound, drainage around drain site, ect.  Patient and HHRN expressed understanding.  Patient not currently planning on going to ER - and would prefer to wait until previously scheduled CT.      HHRN additionally queried regarding other options for management of her midline wound.  I discussed that as I have not personally examined her wound in several weeks I did not feel comfortable making recommendations over the phone - and would defer this to when she is seen in clinic by Dr. Cook.    All patient and HHRN questions answered.

## 2021-12-31 NOTE — CASE COMMUNICATION
"noted  ----- Message -----  From: Carmen Pinedo R.N.  Sent: 12/30/2021   9:46 PM PST  To: Camryn Babb R.N.      ACTION REQUESTED - suggested abdominal wound dressing submitted to Dr ST for review and signature if in agreement.  Pt has RLQ MARTHA drain that she has been instilling 10 ml saline daily and drawing back to irrigate. Last time she was able to do that was 2 days ago. She was instilling 10 ml and recently only getting 5 back. Yest erday she was not able to instill saline at all. Effluent from abscess has been decreasing, stating only about 1 Tbsp a day. Reports she called Dr Cook' office yesterday. He is on vacation and she was instructed to go to ED who could call Dr Vann to see her. She did go to the ED and was told there was a 3 hr wait to be seen and they likely could not help her. States people looked very ill and she opted to go home. I tried to a spirate line first in case blockage could be opened; unable to instill saline either. Dr Vann's office called and she called us back. Man Vann questioned pt with instructions to leave line for now. Pt is scheduled for CT 1/4/22.  She will contact Dr ST when he is available and see if he wants to move CT date up. Also has caleb't with ID 1/6/22 and with Dr ST 1/11/22. Pt has no abdominal discomfort. T 98.5F. Dr Vann reviewed with her th at if she developed any fever or signs of infection to go to ED. Augmentin is finished. Pt states pharmacy called her to  prescription but she states she already completed 14 days and \"I can't take anymore diarrhea - my bottom is so sore.\" Finished Zyvox today. Dressing changed at MARTHA insertion site. Some redness at insertion site but skin intact. There was some tension on the tube which we relieved. Ostomy appliance over lower dr rey site removed as it was annoying pt. Healed. Site covered with 3x3 silicone bordered foam dressing to prevent irritation from clothing. Mid abdominal wound with 3 cm tunneling. " Serous exudate on dressing removed. Beefy red base where visible. Gently packing with saline moistened gauze daily. Some periwound irritation. Cavilon No-sting barrier wipe to periwound to protect from moisture. New dressing applied. Antibiotics completed. Rec ommend gently packing wound with SilvaSorb hydrogel on NuGauze plain packing strip. Other option would be moistening with Puracyn hydrogel (hypochlorous acid). Order submitted to Dr ST but will not be initiated without his review and signature. Once tunneling is not as deep, could use Odette collagen to base, then packing strip. Pt does not want NPWT and would be difficult area to tolerate. BLE edema resolving. Pt comfortable with instru ctions from Dr Vann and appreciative for call.    Thank you, Carmen Pinedo, RN, MSN, CWCN

## 2021-12-31 NOTE — PROGRESS NOTES
Infectious Disease Follow up Note      Subjective:     Chief Complaint   Patient presents with   • Hospital Follow-up     intra-abdominal abscess         Interval History:   Nils Alfonso is a 66 y.o. female with complex history of recent pancreatitis with ERCP on 10/1/2021, complicated by suspected duodenal perforation with resultant complex multiple intra-abdominal abscesses.  For about 3 weeks, this was attempted to be managed by multiple IR drains but there was no improvement.  Drain cultures from various times grew multiple organisms including E. coli and Enterococcus faecalis, Candida albicans and glabrata, stenotrophomonas, E. coli, ampicillin resistant E faecium.  Blood cultures grew Chryseobacterium species and Candida glabrata (completed therapy for the bacteremia). The decision was made to treat with at least 4 weeks of antibiotics with Continues home infusion IV Zosyn, IV micafungin, and p.o. Bactrim through 11/24/2021, and then follow-up in ID clinic, ID signed off. However, there are further procedures since that plan was made. Patient underwent an exploratory laparotomy on 11/5 for drainage of retroperitoneal abscess, peritoneal abscess, necrotizing fasciitis involving muscle and soft tissue.  Cultures from this procedure grew ampicillin resistant E faecium, sensitive E faecalis, Stenotrophomonas, Candida albicans, E. coli. Repeat CT of the abdomen on 11/15/2021 noted increase fluid collection in the right mid abdomen and slight increase in trace bilateral pleural effusions. IR placed a peritoneal drain but this fell out at some point and was not replaced due to the abdominal fluid collection being noted to be smaller. Upper GI series on 11/17/2021 showed a leak at the perforation in 2/3 portion of the duodenum.  Patient underwent an ERCP, biliary stent placement (since during the procedure there was concern for bile duct perforation being the cause of patient's leak), core track placement on  11/18/2021 by GI.  Esophageal stent was not placed since no obvious defect was noted.     Patient was discharged on 11/28. On 12/4, patient returned to the ER with fevers and right lower quadrant abdominal pain, found to have fluid collections right upper quadrant and right paracolic gutter. IR on 12/4 placed drain in the right paracolic gutter. Repeat CT scan on 12/6 with good improvement in the right lower quadrant fluid collections, the midline rectus fluid collection appears to have drained through the incision.  Cultures from this growing pansensitive E. coli and ampicillin resistant E faecium thus far.  ID consulted for recommendations.    Repeat CT scan obtained 12/10 with a slight increase in size of the right lower quadrant fluid collection, gas noted along superior aspect of collection with radiologist interpretation of apparent contrast suggesting fistulous communication with the colon.  Surgery interpretation after reviewing past images not concerning for fistula.     Repeat CT scan on 12/16/2021 revealed decreased size of the abscess to 2 cm x 2.3 cm.  At discharge, patient was transitioned to Zyvox 600 mg twice a day and Augmentin 875 mg twice a day for a 2-week course.    Hospital records reviewed    Patient here for hospital follow-up.  Patient is accompanied by her .  She arrives with a walker.  Patient completed her course of Augmentin and Zyvox.  She however developed diarrhea on Augmentin.  Today was the first day she had a normal bowel movement.  Her surgical wound has been healing well.  She has been following with Dr. Wallace.  Patient still has a right lower quadrant drain in place with approximately 1 teaspoon of drainage per day.  Her surgeon has recommended drain removal per IR.  Patient will contact Billy from IR for drain removal.  She denies any abdominal pain, nausea or vomiting.  There has been no recent fevers or chills.  Overall the patient is doing well.    Review of  Systems   Constitutional: Positive for weight loss. Negative for chills and fever.   Respiratory: Negative for cough and shortness of breath.    Gastrointestinal: Negative for abdominal pain, diarrhea, nausea and vomiting.   Neurological: Negative for dizziness and headaches.       Past Medical History:   Diagnosis Date   • Allergy, unspecified not elsewhere classified    • Anemia     not currently   • Anesthesia     PONV (Demerol/ Dilaudid)   • Arthritis     bilateral shoulders,sacrum, osteo   • Backpain     coccyx and sacrum   • Bronchitis 2010   • Cataract     bilateral IOLI   • Degeneration of cervical intervertebral disc     C5-6,C6-7   • Dental disorder     partial upper and lower   • Heart burn    • Hiatus hernia syndrome     not a problem after gastric sleeve   • High cholesterol     resolved after gastric surgery   • Hyperlipidemia    • Hypertension     off all medication, reveresed after gastric sleeve   • Muscle disorder    • Pain 05/16/2018    low back and SI joints and sacrum   • Reactive airway disease     rescue inhaler not needed unless with bronchitis       Past Surgical History:   Procedure Laterality Date   • PB UPPER GI ENDOSCOPY,DIAGNOSIS N/A 11/18/2021    Procedure: GASTROSCOPY with stent placement;  Surgeon: Migel Lawrence M.D.;  Location: SURGERY SAME DAY [facility];  Service: Gastroenterology   • PB ERCP,DIAGNOSTIC N/A 11/18/2021    Procedure: ERCP (ENDOSCOPIC RETROGRADE CHOLANGIOPANCREATOGRAPHY);  Surgeon: Migel Lawrence M.D.;  Location: SURGERY SAME DAY [facility];  Service: Gastroenterology   • PB PLACE PERCUT GASTROSTOMY TUBE N/A 11/18/2021    Procedure: GASTROSCOPY, WITH FEEDING TUBE INSERTION;  Surgeon: Migel Lawrence M.D.;  Location: SURGERY SAME DAY [facility];  Service: Gastroenterology   • BILIARY STENT PLACEMENT N/A 11/18/2021    Procedure: INSERTION, STENT, BILE DUCT;  Surgeon: Migel Lawrence M.D.;  Location: SURGERY SAME DAY [facility];  Service: Gastroenterology   • PB EXPLORATORY OF ABDOMEN   11/5/2021    Procedure: LAPAROTOMY, EXPLORATORY;  Surgeon: Jaden Cook M.D.;  Location: SURGERY [facility];  Service: General   • PB ULTRASONIC GUIDANCE, INTRAOPERATIVE  11/5/2021    Procedure: ULTRASOUND GUIDANCE;  Surgeon: Jaden Cook M.D.;  Location: [facility];  Service: General   • ABDOMINAL ABSCESS DRAINAGE  11/5/2021    Procedure: DRAINAGE, ABSCESS, ABDOMEN - PERITONEAL AND RETROPERITONEAL;  Surgeon: Jaden Cook M.D.;  Location: [facility];  Service: General   • PB ERCP,DIAGNOSTIC  10/1/2021    Procedure: ERCP (ENDOSCOPIC RETROGRADE CHOLANGIOPANCREATOGRAPHY);  Surgeon: Fausto Casas M.D.;  Location: [facility];  Service: Gastroenterology   • COLONOSCOPY  8/8/2018    Procedure: COLONOSCOPY;  Surgeon: Shukri Condon M.D.;  Location: [facility];  Service: General   • GASTROSCOPY  5/23/2018    Procedure: GASTROSCOPY;  Surgeon: Fausto Casas M.D.;  Location: [facility];  Service: Gastroenterology   • EGD W/ENDOSCOPIC ULTRASOUND  5/23/2018    Procedure: EGD W/ENDOSCOPIC ULTRASOUND- RADIAL UPPER;  Surgeon: Fausto Casas M.D.;  Location: [facility];  Service: Gastroenterology   • CATARACT PHACO WITH IOL Left 4/18/2017    Procedure: CATARACT PHACO WITH IOL;  Surgeon: Stuart Estevez M.D.;  Location: SURGERY SAME DAY [facility];  Service:    • CATARACT PHACO WITH IOL Right 4/4/2017    Procedure: CATARACT PHACO WITH IOL;  Surgeon: Stuart Estevez M.D.;  Location: SURGERY [facility];  Service:    • GASTRIC SLEEVE LAPAROSCOPY  10/19/2016    Procedure: GASTRIC SLEEVE LAPAROSCOPY, HIATAL HERNIA;  Surgeon: Shukri Condon M.D.;  Location: [facility];  Service:    • LIVER BIOPSY LAPAROSCOPIC  10/19/2016    Procedure: LIVER BIOPSY LAPAROSCOPIC;  Surgeon: Shukri Condon M.D.;  Location: [facility];  Service:    • GASTROSCOPY N/A 8/26/2016    Procedure:  "GASTROSCOPY;  Surgeon: Shukri Condon M.D.;  Location: SURGERY [facility];  Service:    • ROTATOR CUFF REPAIR Right 7/7/2016   • ROTATOR CUFF REPAIR Left 5/2015   • HYSTERECTOMY ROBOTIC  1/2/2009    Performed by MEG VILLEGAS at SURGERY [facility] ORS   • LUMBAR FUSION ANTERIOR  2007    Dr Hillman, L3-S1 fusion   • CHOLECYSTECTOMY  1996    laparoscopic   • TUBAL LIGATION  1985   • GASTRIC RESECTION  1982    gastric stapling   • TONSILLECTOMY AND ADENOIDECTOMY  1967   • PRIMARY C SECTION  1976, 1979, 1985    x3       Allergies:   Allergies   Allergen Reactions   • Ampicillin Rash     Rash  Tolerates Zosyn 10/21   • Cephalexin Diarrhea, Vomiting and Nausea     .   • Clindamycin Vomiting     \"cleocin\"   • Codeine      Severe stomach pain, cramps, spasms   • Demerol Vomiting   • Levofloxacin Unspecified     Numbness     • Tetracyclines Rash     .   • Tizanidine Itching   • Morphine Vomiting   • Pcn [Penicillins] Itching     Tolerates Zosyn 10/21   • Sulfa Drugs Itching     Tolerated Bactrim 10/2021         Medications:  Current Outpatient Medications on File Prior to Visit   Medication Sig Dispense Refill   • HYDROcodone-Acetaminophen (NORCO PO) Take 5 mg by mouth 3 times a day. historical medication for back pain; 10 mg tabs that she breaks in half for the 5 mg dose     • amoxicillin-clavulanate (AUGMENTIN) 875-125 MG Tab Take 1 Tablet by mouth 2 times a day for 14 days. Indications: Infection Within the Abdomen (Patient not taking: Reported on 12/30/2021) 28 Tablet 0   • potassium chloride SA (K-DUR) 10 MEQ Tab CR Take 10 mEq by mouth 2 times a day. Indications: Low Amount of Potassium in the Blood     • furosemide (LASIX) 20 MG Tab Take 20 mg by mouth every day.     • vitamin E 180 MG (400 UNIT) Cap Take 180 mg by mouth every day. Indications: suppliment     • Acetaminophen (TYLENOL PO) Take 500 mg by mouth 3 times a day as needed (for moderate - breakthrough pain). for moderate pain-breakthrough pain     • " "cyclobenzaprine (FLEXERIL) 10 mg Tab Take 10 mg by mouth every evening. Indications: Muscle Spasm     • ezetimibe (ZETIA) 10 MG Tab Take 10 mg by mouth every evening. Indications: High Amount of Fats in the Blood     • gabapentin (NEURONTIN) 300 MG Cap Take 600 mg by mouth 2 times a day. Indications: Neuropathic Pain     • BIOTIN PO Take 1 Tablet by mouth 1 time a day as needed. as needed for hair and nail growth     • Cholecalciferol (VITAMIN D3 PO) Take 1 Each by mouth every day. Indications: Suppliment     • ondansetron (ZOFRAN ODT) 4 MG TABLET DISPERSIBLE Take 4 mg by mouth every 6 hours as needed for Nausea.       No current facility-administered medications on file prior to visit.         ROS  As documented above in my HPI       Objective:     PE:  /84 (BP Location: Left arm, Patient Position: Sitting, BP Cuff Size: Adult)   Pulse (!) 122   Temp 37.1 °C (98.8 °F) (Temporal)   Resp 16   Ht 1.626 m (5' 4\")   Wt 55.2 kg (121 lb 9.6 oz)   LMP 01/01/2009   SpO2 96%   BMI 20.87 kg/m²      Vital signs reviewed  Constitutional: patient is oriented to person, place, and time. Appears well-developed and well-nourished. No distress  Eyes: Conjunctivae normal and EOM are normal. Pupils are equal, round, and reactive to light.   Mouth/Throat: Lips without lesions, good dentition, oropharynx is clear and moist.  Neck: Trachea midline. Normal range of motion. Neck supple. No masses  Cardiovascular: Tachycardic   Pulmonary/Chest: No respiratory distress. Unlabored respiratory effort  Abdominal: Soft, non tender.  Small midline surgical site is open and packed.  There is some serosanguineous drainage on dressing.  No abdominal tenderness to palpation.  Presence of right lower quadrant drain in place with a small amount of tan-colored fluid in the bulb   musculoskeletal: Normal range of motion. No tenderness, swelling, erythema, deformity noted.  Neurological: alert and oriented to person, place, and time. No " cranial nerve deficit. Coordination normal. Moves all extremities  Skin: Skin is warm and dry. Good turgor. No rashes visable.  Psychiatric: Normal mood and affect. Behavior is normal.  Pleasant    LABS:  WBC   Date/Time Value Ref Range Status   12/24/2021 12:00 PM 6.0 4.8 - 10.8 K/uL Final     RBC   Date/Time Value Ref Range Status   12/24/2021 12:00 PM 3.30 (L) 4.20 - 5.40 M/uL Final     Hemoglobin   Date/Time Value Ref Range Status   12/24/2021 12:00 PM 9.9 (L) 12.0 - 16.0 g/dL Final     Hematocrit   Date/Time Value Ref Range Status   12/24/2021 12:00 PM 31.2 (L) 37.0 - 47.0 % Final     MCV   Date/Time Value Ref Range Status   12/24/2021 12:00 PM 94.5 81.4 - 97.8 fL Final     MCH   Date/Time Value Ref Range Status   12/24/2021 12:00 PM 30.0 27.0 - 33.0 pg Final     MCHC   Date/Time Value Ref Range Status   12/24/2021 12:00 PM 31.7 (L) 33.6 - 35.0 g/dL Final     MPV   Date/Time Value Ref Range Status   12/24/2021 12:00 PM 8.2 (L) 9.0 - 12.9 fL Final        Sodium   Date/Time Value Ref Range Status   12/24/2021 12:00  135 - 145 mmol/L Final     Potassium   Date/Time Value Ref Range Status   12/24/2021 12:00 PM 3.7 3.6 - 5.5 mmol/L Final     Chloride   Date/Time Value Ref Range Status   12/24/2021 12:00  96 - 112 mmol/L Final     Co2   Date/Time Value Ref Range Status   12/24/2021 12:00 PM 23 20 - 33 mmol/L Final     Glucose   Date/Time Value Ref Range Status   12/24/2021 12:00  (H) 65 - 99 mg/dL Final     Bun   Date/Time Value Ref Range Status   12/24/2021 12:00 PM 6 (L) 8 - 22 mg/dL Final     Creatinine   Date/Time Value Ref Range Status   12/24/2021 12:00 PM 0.47 (L) 0.50 - 1.40 mg/dL Final   07/08/2011 10:53 AM 1.08 (H) 0.57 - 1.00 mg/dL Final     Bun-Creatinine Ratio   Date/Time Value Ref Range Status   07/08/2011 10:53 AM 15 9 - 23 Final     Glom Filt Rate, Est   Date/Time Value Ref Range Status   02/24/2011 08:52 AM 45 (L) >59 mL/min/1.73 Final     Alkaline Phosphatase   Date/Time Value Ref  Range Status   12/06/2021 04:02  (H) 30 - 99 U/L Final     AST(SGOT)   Date/Time Value Ref Range Status   12/06/2021 04:02 AM 29 12 - 45 U/L Final     ALT(SGPT)   Date/Time Value Ref Range Status   12/06/2021 04:02 AM 24 2 - 50 U/L Final     Total Bilirubin   Date/Time Value Ref Range Status   12/06/2021 04:02 AM 0.3 0.1 - 1.5 mg/dL Final        CPK Total   Date/Time Value Ref Range Status   06/19/2011 04:20 AM 43 0 - 154 U/L Final        MICRO:  No results found for: BLOODCULTU, BLDCULT, BCHOLD       IMAGING STUDIES:  CT a/p on 12/16/21  RIGHT retroperitoneal/extraperitoneal paracolic fluid and gas collection is redemonstrated. It contains a drainage tube as before. The overall collection is decreased in size and fluid component with a representative portion in the RIGHT iliac fossa now   measuring 20 x 23 mm, previously 22 x 32 mm. Inferiorly this appears to communicate with a tract to the dermis.    Assessment/Plan:     Problem List Items Addressed This Visit     Intra-abdominal abscess (HCC)      Other Visit Diagnoses     Polymicrobial bacterial infection            Patient overall clinically improving.  Repeat CT scan showed decrease in intra-abdominal abscesses.  She has now completed a 2-week course of Augmentin and Zyvox.  No signs or symptoms of infection today.  Abdominal exam benign.    -Recommend drain removal given minimal drain output.  Patient will reach out to interventional radiology.  -Continue wound care  -Follow-up with surgeon as scheduled    Follow up: As needed. FU with PCP for ongoing chronic medical conditions.     Tamra Mcfarland M.D.

## 2022-01-01 NOTE — DISCHARGE PLANNING
Received Choice form at 2068  Agency/Facility Name: [facility]  Referral sent per Choice form @ [phone]      This note was copied from the mother's chart.  Does not want to breastfeed or pump while in hospital. May latch at home after discharge. Does not have electric pump at home. Only has awais. Discussed receiving breast pump through insurance. Educated on supply and demand of breastmilk. Encouraged to call with any questions or concerns.

## 2022-01-03 ENCOUNTER — HOME CARE VISIT (OUTPATIENT)
Dept: HOME HEALTH SERVICES | Facility: HOME HEALTHCARE | Age: 67
End: 2022-01-03
Payer: MEDICARE

## 2022-01-03 VITALS
OXYGEN SATURATION: 95 % | HEART RATE: 101 BPM | TEMPERATURE: 97.6 F | RESPIRATION RATE: 18 BRPM | SYSTOLIC BLOOD PRESSURE: 140 MMHG | DIASTOLIC BLOOD PRESSURE: 90 MMHG

## 2022-01-03 PROCEDURE — G0299 HHS/HOSPICE OF RN EA 15 MIN: HCPCS

## 2022-01-03 ASSESSMENT — ENCOUNTER SYMPTOMS: MENTAL STATUS CHANGE: 0

## 2022-01-04 ENCOUNTER — HOSPITAL ENCOUNTER (OUTPATIENT)
Dept: RADIOLOGY | Facility: MEDICAL CENTER | Age: 67
End: 2022-01-04
Attending: NURSE PRACTITIONER
Payer: MEDICARE

## 2022-01-04 ENCOUNTER — HOME CARE VISIT (OUTPATIENT)
Dept: HOME HEALTH SERVICES | Facility: HOME HEALTHCARE | Age: 67
End: 2022-01-04
Payer: MEDICARE

## 2022-01-04 DIAGNOSIS — K65.1 INTRA-ABDOMINAL ABSCESS (HCC): ICD-10-CM

## 2022-01-04 DIAGNOSIS — T81.43XA POSTPROCEDURAL INTRAABDOMINAL ABSCESS: ICD-10-CM

## 2022-01-04 DIAGNOSIS — R18.8 INTRAABDOMINAL FLUID COLLECTION: ICD-10-CM

## 2022-01-04 PROCEDURE — 74177 CT ABD & PELVIS W/CONTRAST: CPT | Mod: MG

## 2022-01-04 PROCEDURE — 700117 HCHG RX CONTRAST REV CODE 255: Performed by: NURSE PRACTITIONER

## 2022-01-04 RX ADMIN — IOHEXOL 100 ML: 350 INJECTION, SOLUTION INTRAVENOUS at 15:21

## 2022-01-04 ASSESSMENT — ENCOUNTER SYMPTOMS
PAIN: 1
MUSCLE WEAKNESS: 1
HIGHEST PAIN SEVERITY IN PAST 24 HOURS: 0/10
LOWEST PAIN SEVERITY IN PAST 24 HOURS: 0/10
PERSON REPORTING PAIN: PATIENT
PAIN SEVERITY GOAL: 0/10
NAUSEA: DENIES

## 2022-01-05 ENCOUNTER — HOME CARE VISIT (OUTPATIENT)
Dept: HOME HEALTH SERVICES | Facility: HOME HEALTHCARE | Age: 67
End: 2022-01-05
Payer: MEDICARE

## 2022-01-05 PROCEDURE — G0152 HHCP-SERV OF OT,EA 15 MIN: HCPCS

## 2022-01-05 NOTE — PROGRESS NOTES
Subjective:      Primary care physician: Pratik Gordon M.D.  Referring Provider: TBDOUG    Chief Complaint: No chief complaint on file.    Diagnosis:   1. Intra-abdominal abscess (HCC)     2. Intraabdominal fluid collection     3. Small bowel perforation (HCC)     4. Post-ERCP acute pancreatitis     5. History of pancreatitis         History of presenting illness:  Nils Alfonso  is a pleasant 66 y.o. year old female.     She has a complex history of recent pancreatitis with ERCP on 10/1/2021, complicated by suspected duodenal perforation with multiple intra-abdominal abscesses.  For about 3 weeks, this was attempted to be managed by multiple IR drains but there was little improvement. Drain cultures from various times grew multiple organisms including E. coli and Enterococcus faecalis, Candida albicans and glabrata, stenotrophomonas, E. coli, ampicillin resistant E. faecium.  Blood cultures grew Chryseobacterium species and Candida glabrata.    Patient underwent an exploratory laparotomy by me on 11/5/2021 for drainage of retroperitoneal abscess, peritoneal abscess, necrotizing fasciitis involving muscle and soft tissue. Repeat CT of the abdomen on 11/15/2021 noted increase fluid collection in the right mid abdomen and slight increase in trace bilateral pleural effusions. IR placed a peritoneal drain but this fell out at some point and was not replaced due to the abdominal fluid collection being noted to be smaller. Upper GI series on 11/17/2021 showed a leak at the perforation in 2/3 portion of the duodenum. Patient underwent an ERCP, biliary stent placement and core track placement on 11/18/2021 by GI.      On 12/4, patient returned to the ER with fevers and right lower quadrant abdominal pain, and found to have progressive/new intra-abdominal fluid collections concerning for abscesses and possible ongoing GI leak, and also found to have cultures positive to E. Coli and Enterococcus faecium. A drain was placed at  [facility], and she was discharged home with drain in place. Drain removed by IR on 1/7/21.    She is here today for follow-up status post laparotomy and drainage of multiple abscesses from her perforated duodenum.  She is doing well.  She denies any fever or chills, nausea or vomiting.  All her drains are out.  She has been afebrile.  She has been eating well.  She has finally gained 3 pounds.  Her performance status has improved to 1-0.  She is ambulating on her own and moving around on her own.  She drove herself here.  She is doing extremely well after having a very difficult postoperative course.    Past Medical History:   Diagnosis Date   • Allergy, unspecified not elsewhere classified    • Anemia     not currently   • Anesthesia     PONV (Demerol/ Dilaudid)   • Arthritis     bilateral shoulders,sacrum, osteo   • Backpain     coccyx and sacrum   • Bronchitis 2010   • Cataract     bilateral IOLI   • Degeneration of cervical intervertebral disc     C5-6,C6-7   • Dental disorder     partial upper and lower   • Heart burn    • Hiatus hernia syndrome     not a problem after gastric sleeve   • High cholesterol     resolved after gastric surgery   • Hyperlipidemia    • Hypertension     off all medication, reveresed after gastric sleeve   • Muscle disorder    • Pain 05/16/2018    low back and SI joints and sacrum   • Reactive airway disease     rescue inhaler not needed unless with bronchitis     Past Surgical History:   Procedure Laterality Date   • PB UPPER GI ENDOSCOPY,DIAGNOSIS N/A 11/18/2021    Procedure: GASTROSCOPY with stent placement;  Surgeon: Migel Lawrence M.D.;  Location: SURGERY SAME DAY [facility];  Service: Gastroenterology   • PB ERCP,DIAGNOSTIC N/A 11/18/2021    Procedure: ERCP (ENDOSCOPIC RETROGRADE CHOLANGIOPANCREATOGRAPHY);  Surgeon: Migel Lawrence M.D.;  Location: SURGERY SAME DAY [facility];  Service: Gastroenterology   • PB PLACE PERCUT GASTROSTOMY TUBE N/A 11/18/2021    Procedure: GASTROSCOPY, WITH FEEDING  TUBE INSERTION;  Surgeon: Migel Lawrence M.D.;  Location: SURGERY SAME DAY [facility];  Service: Gastroenterology   • BILIARY STENT PLACEMENT N/A 11/18/2021    Procedure: INSERTION, STENT, BILE DUCT;  Surgeon: Migel Lawrence M.D.;  Location: SURGERY SAME DAY [facility];  Service: Gastroenterology   • PB EXPLORATORY OF ABDOMEN  11/5/2021    Procedure: LAPAROTOMY, EXPLORATORY;  Surgeon: Jaden Cook M.D.;  Location: [facility];  Service: General   • PB ULTRASONIC GUIDANCE, INTRAOPERATIVE  11/5/2021    Procedure: ULTRASOUND GUIDANCE;  Surgeon: Jaden Cook M.D.;  Location: [facility];  Service: General   • ABDOMINAL ABSCESS DRAINAGE  11/5/2021    Procedure: DRAINAGE, ABSCESS, ABDOMEN - PERITONEAL AND RETROPERITONEAL;  Surgeon: Jaden Cook M.D.;  Location: [facility];  Service: General   • PB ERCP,DIAGNOSTIC  10/1/2021    Procedure: ERCP (ENDOSCOPIC RETROGRADE CHOLANGIOPANCREATOGRAPHY);  Surgeon: Fausto Casas M.D.;  Location: [facility];  Service: Gastroenterology   • COLONOSCOPY  8/8/2018    Procedure: COLONOSCOPY;  Surgeon: Shukri Condon M.D.;  Location: [facility];  Service: General   • GASTROSCOPY  5/23/2018    Procedure: GASTROSCOPY;  Surgeon: Fausto Casas M.D.;  Location: [facility];  Service: Gastroenterology   • EGD W/ENDOSCOPIC ULTRASOUND  5/23/2018    Procedure: EGD W/ENDOSCOPIC ULTRASOUND- RADIAL UPPER;  Surgeon: Fausto Casas M.D.;  Location: [facility];  Service: Gastroenterology   • CATARACT PHACO WITH IOL Left 4/18/2017    Procedure: CATARACT PHACO WITH IOL;  Surgeon: Stuart Estevez M.D.;  Location: SURGERY SAME DAY [facility];  Service:    • CATARACT PHACO WITH IOL Right 4/4/2017    Procedure: CATARACT PHACO WITH IOL;  Surgeon: Stuart Estevez M.D.;  Location: [facility];  Service:    • GASTRIC SLEEVE LAPAROSCOPY  10/19/2016    Procedure: GASTRIC  "SLEEVE LAPAROSCOPY, HIATAL HERNIA;  Surgeon: Shukri Condon M.D.;  Location: SURGERY [facility];  Service:    • LIVER BIOPSY LAPAROSCOPIC  10/19/2016    Procedure: LIVER BIOPSY LAPAROSCOPIC;  Surgeon: Shukri Condon M.D.;  Location: SURGERY [facility];  Service:    • GASTROSCOPY N/A 8/26/2016    Procedure: GASTROSCOPY;  Surgeon: Shukri Condon M.D.;  Location: SURGERY [facility];  Service:    • ROTATOR CUFF REPAIR Right 7/7/2016   • ROTATOR CUFF REPAIR Left 5/2015   • HYSTERECTOMY ROBOTIC  1/2/2009    Performed by MEG VILLEGAS at SURGERY [facility]   • LUMBAR FUSION ANTERIOR  2007    Dr Hillman, L3-S1 fusion   • CHOLECYSTECTOMY  1996    laparoscopic   • TUBAL LIGATION  1985   • GASTRIC RESECTION  1982    gastric stapling   • TONSILLECTOMY AND ADENOIDECTOMY  1967   • PRIMARY C SECTION  1976, 1979, 1985    x3     Allergies   Allergen Reactions   • Ampicillin Rash     Rash  Tolerates Zosyn 10/21   • Cephalexin Diarrhea, Vomiting and Nausea     .   • Clindamycin Vomiting     \"cleocin\"   • Codeine      Severe stomach pain, cramps, spasms   • Demerol Vomiting   • Levofloxacin Unspecified     Numbness     • Tetracyclines Rash     .   • Tizanidine Itching   • Morphine Vomiting   • Pcn [Penicillins] Itching     Tolerates Zosyn 10/21   • Sulfa Drugs Itching     Tolerated Bactrim 10/2021     Outpatient Encounter Medications as of 1/11/2022   Medication Sig Dispense Refill   • HYDROcodone-Acetaminophen (NORCO PO) Take 5 mg by mouth 3 times a day. historical medication for back pain; 10 mg tabs that she breaks in half for the 5 mg dose     • amoxicillin-clavulanate (AUGMENTIN) 875-125 MG Tab Take 1 Tablet by mouth 2 times a day for 14 days. Indications: Infection Within the Abdomen (Patient not taking: Reported on 12/30/2021) 28 Tablet 0   • potassium chloride SA (K-DUR) 10 MEQ Tab CR Take 10 mEq by mouth 2 times a day. Indications: Low Amount of Potassium in the Blood     • furosemide (LASIX) 20 MG Tab " Take 20 mg by mouth every day.     • vitamin E 180 MG (400 UNIT) Cap Take 180 mg by mouth every day. Indications: suppliment     • Acetaminophen (TYLENOL PO) Take 500 mg by mouth 3 times a day as needed (for moderate - breakthrough pain). for moderate pain-breakthrough pain     • cyclobenzaprine (FLEXERIL) 10 mg Tab Take 10 mg by mouth every evening. Indications: Muscle Spasm     • ezetimibe (ZETIA) 10 MG Tab Take 10 mg by mouth every evening. Indications: High Amount of Fats in the Blood     • gabapentin (NEURONTIN) 300 MG Cap Take 600 mg by mouth 2 times a day. Indications: Neuropathic Pain     • BIOTIN PO Take 1 Tablet by mouth 1 time a day as needed. as needed for hair and nail growth     • Cholecalciferol (VITAMIN D3 PO) Take 1 Each by mouth every day. Indications: Suppliment     • ondansetron (ZOFRAN ODT) 4 MG TABLET DISPERSIBLE Take 4 mg by mouth every 6 hours as needed for Nausea.       No facility-administered encounter medications on file as of 2022.     Social History     Socioeconomic History   • Marital status:      Spouse name: Not on file   • Number of children: Not on file   • Years of education: Not on file   • Highest education level: Not on file   Occupational History   • Not on file   Tobacco Use   • Smoking status: Former Smoker     Packs/day: 0.25     Years: 0.10     Pack years: 0.02     Types: Cigarettes     Quit date: 1973     Years since quittin.0   • Smokeless tobacco: Never Used   Vaping Use   • Vaping Use: Never used   Substance and Sexual Activity   • Alcohol use: No   • Drug use: No   • Sexual activity: Not on file     Comment:    Other Topics Concern   • Not on file   Social History Narrative   • Not on file     Social Determinants of Health     Financial Resource Strain:    • Difficulty of Paying Living Expenses: Not on file   Food Insecurity:    • Worried About Running Out of Food in the Last Year: Not on file   • Ran Out of Food in the Last Year: Not on  file   Transportation Needs:    • Lack of Transportation (Medical): Not on file   • Lack of Transportation (Non-Medical): Not on file   Physical Activity:    • Days of Exercise per Week: Not on file   • Minutes of Exercise per Session: Not on file   Stress:    • Feeling of Stress : Not on file   Social Connections:    • Frequency of Communication with Friends and Family: Not on file   • Frequency of Social Gatherings with Friends and Family: Not on file   • Attends Christian Services: Not on file   • Active Member of Clubs or Organizations: Not on file   • Attends Club or Organization Meetings: Not on file   • Marital Status: Not on file   Intimate Partner Violence:    • Fear of Current or Ex-Partner: Not on file   • Emotionally Abused: Not on file   • Physically Abused: Not on file   • Sexually Abused: Not on file   Housing Stability:    • Unable to Pay for Housing in the Last Year: Not on file   • Number of Places Lived in the Last Year: Not on file   • Unstable Housing in the Last Year: Not on file      Social History     Tobacco Use   Smoking Status Former Smoker   • Packs/day: 0.25   • Years: 0.10   • Pack years: 0.02   • Types: Cigarettes   • Quit date: 1973   • Years since quittin.0   Smokeless Tobacco Never Used     Social History     Substance and Sexual Activity   Alcohol Use No     Social History     Substance and Sexual Activity   Drug Use No        Family History   Problem Relation Age of Onset   • Stroke Mother    • Cancer Father         larynx   • Diabetes Brother      Review of Systems   Gastrointestinal: Positive for abdominal pain.   All other systems reviewed and are negative.       Objective:   LMP 2009     Physical Exam  Vitals and nursing note reviewed.   Cardiovascular:      Rate and Rhythm: Normal rate and regular rhythm.      Pulses: Normal pulses.      Heart sounds: Normal heart sounds.   Pulmonary:      Effort: Pulmonary effort is normal.      Breath sounds: Normal breath  sounds.   Abdominal:      General: Abdomen is flat. Bowel sounds are normal.      Palpations: Abdomen is soft.      Comments: The patient still has a small opening in her midline incision from previous VAC placement.  It is nonpurulent, well vascularized edges that are pink.  She is doing open dressings to this.  Wound care is managing her.         Labs:  Results for KATERINA PATEL (MRN [id number]) as of 1/5/2022 10:29   Ref. Range 12/24/2021 12:00   WBC Latest Ref Range: 4.8 - 10.8 K/uL 6.0   RBC Latest Ref Range: 4.20 - 5.40 M/uL 3.30 (L)   Hemoglobin Latest Ref Range: 12.0 - 16.0 g/dL 9.9 (L)   Hematocrit Latest Ref Range: 37.0 - 47.0 % 31.2 (L)   MCV Latest Ref Range: 81.4 - 97.8 fL 94.5   MCH Latest Ref Range: 27.0 - 33.0 pg 30.0   MCHC Latest Ref Range: 33.6 - 35.0 g/dL 31.7 (L)   RDW Latest Ref Range: 35.9 - 50.0 fL 58.0 (H)   Platelet Count Latest Ref Range: 164 - 446 K/uL 282   MPV Latest Ref Range: 9.0 - 12.9 fL 8.2 (L)   Neutrophils-Polys Latest Ref Range: 44.00 - 72.00 % 62.70   Neutrophils (Absolute) Latest Ref Range: 2.00 - 7.15 K/uL 3.76   Lymphocytes Latest Ref Range: 22.00 - 41.00 % 27.80   Lymphs (Absolute) Latest Ref Range: 1.00 - 4.80 K/uL 1.67   Monocytes Latest Ref Range: 0.00 - 13.40 % 5.70   Monos (Absolute) Latest Ref Range: 0.00 - 0.85 K/uL 0.34   Eosinophils Latest Ref Range: 0.00 - 6.90 % 2.80   Eos (Absolute) Latest Ref Range: 0.00 - 0.51 K/uL 0.17   Basophils Latest Ref Range: 0.00 - 1.80 % 0.50   Baso (Absolute) Latest Ref Range: 0.00 - 0.12 K/uL 0.03   Immature Granulocytes Latest Ref Range: 0.00 - 0.90 % 0.50   Immature Granulocytes (abs) Latest Ref Range: 0.00 - 0.11 K/uL 0.03   Nucleated RBC Latest Units: /100 WBC 0.00   NRBC (Absolute) Latest Units: K/uL 0.00   Sodium Latest Ref Range: 135 - 145 mmol/L 135   Potassium Latest Ref Range: 3.6 - 5.5 mmol/L 3.7   Chloride Latest Ref Range: 96 - 112 mmol/L 100   Co2 Latest Ref Range: 20 - 33 mmol/L 23   Anion Gap Latest Ref Range: 7.0 -  16.0  12.0   Glucose Latest Ref Range: 65 - 99 mg/dL 105 (H)   Bun Latest Ref Range: 8 - 22 mg/dL 6 (L)   Creatinine Latest Ref Range: 0.50 - 1.40 mg/dL 0.47 (L)   GFR If  Latest Ref Range: >60 mL/min/1.73 m 2 >60   GFR If Non  Latest Ref Range: >60 mL/min/1.73 m 2 >60   Calcium Latest Ref Range: 8.5 - 10.5 mg/dL 8.9     Imaging:  Per my read,   CT - ABDOMEN (1/4/2022) by Parag Bowden M.D  IMPRESSION:  1.  Decreased volume of residual right retroperitoneal fluid collection with drainage catheter in place. Minimal residual fluid is present slightly inferior and medial to the catheter tip measuring 2 x 1 cm in transverse dimensions.  2.  No new abscess identified.  3.  Stable cholecystectomy changes with common bile duct stent in place.  4.  No free air.  5.  No acute inflammatory or obstructive process.    CT - ABDOMEN/PELVIS (12/16/2021) by Geovanni Grimes M.D.  IMPRESSION:  1.  Decreased size and fluid component of the RIGHT retroperitoneal and extraperitoneal fluid collection with a drain in place. Suspect fistula to the dermis in the RIGHT lower quadrant.  2.  Prior cholecystectomy  3.  Metallic biliary stent in place  4.  Prior hysterectomy    Pathology:  SPECIMEN (10/1/2021) by Philip Lopez MD  FINAL DIAGNOSIS:   A. Bile duct polyp biopsy:          Benign polyp with inflammatory and hyperplastic changes.          No evidence of epithelial dysplasia/neoplasia.     Procedures:  IR DRAIN REMOVAL (1/7/2022) by Samuel Andre A.P.R.N.  IR abscess drain removed without complications per ID request.     IR DRAIN PLACEMENT (12/16/2021) by ELENA MahnaP.RUrielN.  CT-DRAIN-PERITONEAL   Final Result       Successful RIGHT retroperitoneal/paracolic gutter drainage tube placement.     SURGERY (11/5/2021) by Jaden Cook MD  PROCEDURES DONE:    1.  Exploratory laparotomy.  2.  Intraoperative ultrasound survey of the retroperitoneum in order to   evaluate location of  abscess.  3.  Incision and drainage and debridement of retroperitoneal abscess.  4.  Incision and drainage and debridement of anterior peritoneal abscess.  5.  Debridement of necrotizing fasciitis of the lateral abdominal wall.    PROCEDURE (10/1/2021) by Fausto Casas MD  PROCEDURES:  Endoscopic retrograde cholangiopancreatography with:  1.  Biliary sphincterotomy.  2.  Bile duct biopsy.  3.  Bile duct ampulla dilation.  4.  Radiologic interpretation of static and dynamic fluoroscopic images by   myself at no time was a radiologist present in the room.    Diagnosis:     1. Intra-abdominal abscess (HCC)     2. Intraabdominal fluid collection     3. Small bowel perforation (HCC)     4. Post-ERCP acute pancreatitis     5. History of pancreatitis         Medical Decision Making:  Today's Assessment / Status / Plan:     In light of the present findings, the patient is doing well.  She has had a remarkable recovery.  All her fistulas have closed.  She has been afebrile and she has gained weight.  She has minimal to no pain in the PATSY  helps her in the morning.  The patient is planning on moving south to near [place].  I have given her a close friend of [facility]'s number Dr. Goldman to follow-up with when she is there.  In the meantime she will contact me if there are any issues.  I have given the okay to use the silvabsob gel in her wound per the request of wound care.    I, Dr. Cook have entered, reviewed and confirmed the above diagnoses related to this patient on this date of service, 1/11/2022  7:10 AM.    She agreed and verbalized her agreement and understanding with the current plan. I answered all questions and concerns she has at this time and advised her to call at any time in the interim with questions or concerns in regards to her care.    Thank you for allowing me to participate in her care, I will continue to follow closely.       Please note that this dictation was created using voice recognition  software. I have made every reasonable attempt to correct obvious errors, but I expect that there are errors of grammar and possibly content that I did not discover before finalizing the note.     Thank you for this consultation and allowing me to participate in your patient's care. If I can be of further service please contact my office.

## 2022-01-06 ENCOUNTER — OFFICE VISIT (OUTPATIENT)
Dept: INFECTIOUS DISEASES | Facility: MEDICAL CENTER | Age: 67
End: 2022-01-06
Payer: MEDICARE

## 2022-01-06 ENCOUNTER — HOME CARE VISIT (OUTPATIENT)
Dept: HOME HEALTH SERVICES | Facility: HOME HEALTHCARE | Age: 67
End: 2022-01-06
Payer: MEDICARE

## 2022-01-06 VITALS
HEART RATE: 122 BPM | RESPIRATION RATE: 16 BRPM | DIASTOLIC BLOOD PRESSURE: 84 MMHG | HEIGHT: 64 IN | WEIGHT: 121.6 LBS | BODY MASS INDEX: 20.76 KG/M2 | SYSTOLIC BLOOD PRESSURE: 132 MMHG | OXYGEN SATURATION: 96 % | TEMPERATURE: 98.8 F

## 2022-01-06 VITALS
OXYGEN SATURATION: 97 % | RESPIRATION RATE: 18 BRPM | DIASTOLIC BLOOD PRESSURE: 84 MMHG | SYSTOLIC BLOOD PRESSURE: 138 MMHG | HEART RATE: 107 BPM | TEMPERATURE: 97.5 F

## 2022-01-06 DIAGNOSIS — K65.1 INTRA-ABDOMINAL ABSCESS (HCC): ICD-10-CM

## 2022-01-06 DIAGNOSIS — A49.9 POLYMICROBIAL BACTERIAL INFECTION: ICD-10-CM

## 2022-01-06 PROCEDURE — 99213 OFFICE O/P EST LOW 20 MIN: CPT | Performed by: INTERNAL MEDICINE

## 2022-01-06 PROCEDURE — G0151 HHCP-SERV OF PT,EA 15 MIN: HCPCS

## 2022-01-06 RX ORDER — LOSARTAN POTASSIUM 50 MG/1
TABLET ORAL
COMMUNITY
Start: 2022-01-05 | End: 2022-07-15

## 2022-01-06 ASSESSMENT — ENCOUNTER SYMPTOMS
LOWEST PAIN SEVERITY IN PAST 24 HOURS: 0/10
COUGH: 0
SHORTNESS OF BREATH: 0
DIZZINESS: 0
HIGHEST PAIN SEVERITY IN PAST 24 HOURS: 6/10
PAIN: 1
PAIN LOCATION - EXACERBATING FACTORS: POOR POSITIONING
DIARRHEA: 0
PAIN LOCATION - PAIN QUALITY: ACHING
PERSON REPORTING PAIN: PATIENT
VOMITING: 0
CHILLS: 0
ABDOMINAL PAIN: 0
PAIN LOCATION - PAIN SEVERITY: 3/10
PAIN LOCATION: BACK
FEVER: 0
WEIGHT LOSS: 1
PAIN LOCATION - PAIN FREQUENCY: INTERMITTENT
SUBJECTIVE PAIN PROGRESSION: WAXING AND WANING
PAIN LOCATION - PAIN DURATION: DAILY
PAIN SEVERITY GOAL: 0/10
NAUSEA: 0
HEADACHES: 0

## 2022-01-06 ASSESSMENT — FIBROSIS 4 INDEX: FIB4 SCORE: 1.39

## 2022-01-07 ENCOUNTER — HOSPITAL ENCOUNTER (OUTPATIENT)
Dept: RADIOLOGY | Facility: MEDICAL CENTER | Age: 67
End: 2022-01-07
Attending: NURSE PRACTITIONER
Payer: MEDICARE

## 2022-01-07 ENCOUNTER — HOME CARE VISIT (OUTPATIENT)
Dept: HOME HEALTH SERVICES | Facility: HOME HEALTHCARE | Age: 67
End: 2022-01-07
Payer: MEDICARE

## 2022-01-07 DIAGNOSIS — K65.1 INTRA-ABDOMINAL ABSCESS (HCC): ICD-10-CM

## 2022-01-07 NOTE — CASE COMMUNICATION
Danii,please fax to PMD- Pratik Gordon M.D.   FYI-missed SNV d/t md appt, received call from pt and states I am cancelling the nursing appointment today, I am getting my MARTHA removed today. I will see you next week.

## 2022-01-07 NOTE — H&P
IR abscess drain removed without complications per ID request. Dressing applied. Wound education provided, supplies provided to patient. Patient escorted out        CT- Decreased volume of residual right retroperitoneal fluid collection with drainage catheter in place. Minimal residual fluid is present slightly inferior and medial to the catheter tip measuring 2 x 1 cm in transverse dimensions     10 minutes in directly providing and coordinating care and extensive data review.  No time overlap and excludes procedures.

## 2022-01-09 VITALS
OXYGEN SATURATION: 99 % | HEART RATE: 63 BPM | TEMPERATURE: 98.5 F | DIASTOLIC BLOOD PRESSURE: 58 MMHG | SYSTOLIC BLOOD PRESSURE: 90 MMHG | RESPIRATION RATE: 18 BRPM

## 2022-01-09 ASSESSMENT — ENCOUNTER SYMPTOMS
PERSON REPORTING PAIN: PATIENT
DENIES PAIN: 1

## 2022-01-10 ENCOUNTER — HOME CARE VISIT (OUTPATIENT)
Dept: HOME HEALTH SERVICES | Facility: HOME HEALTHCARE | Age: 67
End: 2022-01-10
Payer: MEDICARE

## 2022-01-10 VITALS
TEMPERATURE: 97.6 F | RESPIRATION RATE: 18 BRPM | DIASTOLIC BLOOD PRESSURE: 60 MMHG | OXYGEN SATURATION: 97 % | HEART RATE: 102 BPM | SYSTOLIC BLOOD PRESSURE: 120 MMHG

## 2022-01-10 PROCEDURE — G0299 HHS/HOSPICE OF RN EA 15 MIN: HCPCS

## 2022-01-11 ENCOUNTER — HOME CARE VISIT (OUTPATIENT)
Dept: HOME HEALTH SERVICES | Facility: HOME HEALTHCARE | Age: 67
End: 2022-01-11
Payer: MEDICARE

## 2022-01-11 ENCOUNTER — OFFICE VISIT (OUTPATIENT)
Dept: SURGICAL ONCOLOGY | Facility: MEDICAL CENTER | Age: 67
End: 2022-01-11
Payer: MEDICARE

## 2022-01-11 VITALS
SYSTOLIC BLOOD PRESSURE: 122 MMHG | RESPIRATION RATE: 18 BRPM | DIASTOLIC BLOOD PRESSURE: 76 MMHG | HEART RATE: 97 BPM | TEMPERATURE: 98.8 F | OXYGEN SATURATION: 98 %

## 2022-01-11 VITALS
OXYGEN SATURATION: 97 % | BODY MASS INDEX: 20.81 KG/M2 | HEIGHT: 64 IN | DIASTOLIC BLOOD PRESSURE: 78 MMHG | TEMPERATURE: 98 F | WEIGHT: 121.9 LBS | HEART RATE: 98 BPM | SYSTOLIC BLOOD PRESSURE: 144 MMHG

## 2022-01-11 VITALS
RESPIRATION RATE: 18 BRPM | DIASTOLIC BLOOD PRESSURE: 70 MMHG | OXYGEN SATURATION: 95 % | HEART RATE: 89 BPM | SYSTOLIC BLOOD PRESSURE: 128 MMHG | TEMPERATURE: 97.6 F

## 2022-01-11 DIAGNOSIS — K65.1 INTRA-ABDOMINAL ABSCESS (HCC): ICD-10-CM

## 2022-01-11 DIAGNOSIS — Z87.19 HISTORY OF PANCREATITIS: ICD-10-CM

## 2022-01-11 DIAGNOSIS — K91.89 POST-ERCP ACUTE PANCREATITIS: ICD-10-CM

## 2022-01-11 DIAGNOSIS — K85.90 POST-ERCP ACUTE PANCREATITIS: ICD-10-CM

## 2022-01-11 DIAGNOSIS — K63.1 SMALL BOWEL PERFORATION (HCC): ICD-10-CM

## 2022-01-11 DIAGNOSIS — R18.8 INTRAABDOMINAL FLUID COLLECTION: ICD-10-CM

## 2022-01-11 PROCEDURE — 99024 POSTOP FOLLOW-UP VISIT: CPT | Performed by: SURGERY

## 2022-01-11 PROCEDURE — G0299 HHS/HOSPICE OF RN EA 15 MIN: HCPCS

## 2022-01-11 PROCEDURE — G0151 HHCP-SERV OF PT,EA 15 MIN: HCPCS

## 2022-01-11 ASSESSMENT — ENCOUNTER SYMPTOMS
PAIN LOCATION - PAIN FREQUENCY: INFREQUENT
VOMITING: DENIES
PAIN LOCATION: BACK
PERSON REPORTING PAIN: PATIENT
ABDOMINAL PAIN: 1
PAIN SEVERITY GOAL: 0/10
HIGHEST PAIN SEVERITY IN PAST 24 HOURS: 1/10
PAIN LOCATION - PAIN DURATION: FEW MINUTES
PAIN LOCATION - EXACERBATING FACTORS: EXERTION
PAIN: 1
PAIN LOCATION - PAIN FREQUENCY: INTERMITTENT
SUBJECTIVE PAIN PROGRESSION: WAXING AND WANING
PAIN LOCATION - PAIN DURATION: DAILY
SUBJECTIVE PAIN PROGRESSION: GRADUALLY IMPROVING
PAIN LOCATION - PAIN SEVERITY: 1/10
ASSOCIATED SYMPTOMS: DENIES
PAIN LOCATION - PAIN SEVERITY: 2/10
PERSON REPORTING PAIN: PATIENT
PAIN LOCATION: ABDOMEN
LOWEST PAIN SEVERITY IN PAST 24 HOURS: 0/10
LOWEST PAIN SEVERITY IN PAST 24 HOURS: 1/10
NAUSEA: DENIES
PAIN: 1
PAIN LOCATION - RELIEVING FACTORS: REST
HIGHEST PAIN SEVERITY IN PAST 24 HOURS: 3/10
PAIN SEVERITY GOAL: 0/10
MUSCLE WEAKNESS: 1
PAIN LOCATION - PAIN QUALITY: ACHING
PAIN LOCATION - RELIEVING FACTORS: PAIN MEDS, REST
PAIN LOCATION - PAIN QUALITY: ACHY

## 2022-01-11 ASSESSMENT — FIBROSIS 4 INDEX: FIB4 SCORE: 1.39

## 2022-01-11 NOTE — CASE COMMUNICATION
Danii, please fax to DR AIDEN Gordon, Fax: [phone]  Pt HR of 102 is outside of normal parameters and is to be reported to MD  Pt is otherwise asymptomatic. All other vitals are WFL.

## 2022-01-12 ENCOUNTER — HOME CARE VISIT (OUTPATIENT)
Dept: HOME HEALTH SERVICES | Facility: HOME HEALTHCARE | Age: 67
End: 2022-01-12
Payer: MEDICARE

## 2022-01-12 VITALS
OXYGEN SATURATION: 98 % | HEART RATE: 114 BPM | DIASTOLIC BLOOD PRESSURE: 64 MMHG | TEMPERATURE: 97.8 F | SYSTOLIC BLOOD PRESSURE: 120 MMHG | RESPIRATION RATE: 18 BRPM

## 2022-01-12 PROCEDURE — G0152 HHCP-SERV OF OT,EA 15 MIN: HCPCS

## 2022-01-12 ASSESSMENT — ENCOUNTER SYMPTOMS
SUBJECTIVE PAIN PROGRESSION: GRADUALLY IMPROVING
NAUSEA: DENIES
PERSON REPORTING PAIN: PATIENT
VOMITING: DENIES
MUSCLE WEAKNESS: 1
LOWEST PAIN SEVERITY IN PAST 24 HOURS: 0/10
PAIN SEVERITY GOAL: 0/10
DENIES PAIN: 1
MENTAL STATUS CHANGE: 0
PAIN: 1
HIGHEST PAIN SEVERITY IN PAST 24 HOURS: 0/10
PERSON REPORTING PAIN: PATIENT

## 2022-01-13 ENCOUNTER — HOME CARE VISIT (OUTPATIENT)
Dept: HOME HEALTH SERVICES | Facility: HOME HEALTHCARE | Age: 67
End: 2022-01-13
Payer: MEDICARE

## 2022-01-13 VITALS
DIASTOLIC BLOOD PRESSURE: 72 MMHG | OXYGEN SATURATION: 98 % | SYSTOLIC BLOOD PRESSURE: 110 MMHG | TEMPERATURE: 97.6 F | RESPIRATION RATE: 18 BRPM | HEART RATE: 95 BPM

## 2022-01-13 PROCEDURE — G0151 HHCP-SERV OF PT,EA 15 MIN: HCPCS

## 2022-01-13 ASSESSMENT — GAIT ASSESSMENTS
WALKING STANCE: 0 - HEELS APART
STEP CONTINUITY: 1 - STEPS APPEAR CONTINUOUS
BALANCE AND GAIT SCORE: 21
PATH SCORE: 1
STEP SYMMETRY: 1 - RIGHT AND LEFT STEP LENGTH APPEAR EQUAL
INITIATION OF GAIT IMMEDIATELY AFTER GO: 1 - NO HESITANCY
TRUNK SCORE: 1
PATH: 1 - MILD/MODERATE DEVIATION OR USES WALKING AID
GAIT SCORE: 9
TRUNK: 1 - NO SWAY BUT FLEXION OF KNEES OR BACK OR SPREADS ARMS WHILE WALKING

## 2022-01-13 ASSESSMENT — ENCOUNTER SYMPTOMS
PERSON REPORTING PAIN: PATIENT
LOWEST PAIN SEVERITY IN PAST 24 HOURS: 0/10
DENIES PAIN: 1
HIGHEST PAIN SEVERITY IN PAST 24 HOURS: 3/10
PAIN SEVERITY GOAL: 0/10

## 2022-01-13 ASSESSMENT — BALANCE ASSESSMENTS
ARISING SCORE: 1
TURNING 360 DEGREES STEPS: 1 - CONTINUOUS STEPS
ATTEMPTS TO ARISE: 2 - ABLE TO RISE, ONE ATTEMPT
STANDING BALANCE: 1 - STEADY BUT WIDE STANCE AND USES CANE OR OTHER SUPPORT
ARISES: 1 - ABLE, USES ARMS TO HELP
NUDGED SCORE: 1
SITTING BALANCE: 1 - STEADY, SAFE
SITTING DOWN: 1 - USES ARMS OR NOT SMOOTH MOTION
BALANCE SCORE: 12
EYES CLOSED AT MAXIMUM POSITION NUDGED: 1 - STEADY
IMMEDIATE STANDING BALANCE FIRST 5 SECONDS: 2 - STEADY WITHOUT WALKER OR OTHER SUPPORT
NUDGED: 1 - STAGGERS, GRABS, CATCHES SELF

## 2022-01-14 ENCOUNTER — HOME CARE VISIT (OUTPATIENT)
Dept: HOME HEALTH SERVICES | Facility: HOME HEALTHCARE | Age: 67
End: 2022-01-14
Payer: MEDICARE

## 2022-01-14 VITALS
RESPIRATION RATE: 18 BRPM | SYSTOLIC BLOOD PRESSURE: 120 MMHG | HEART RATE: 98 BPM | DIASTOLIC BLOOD PRESSURE: 60 MMHG | OXYGEN SATURATION: 95 % | TEMPERATURE: 97.8 F

## 2022-01-14 PROCEDURE — G0299 HHS/HOSPICE OF RN EA 15 MIN: HCPCS

## 2022-01-14 ASSESSMENT — ENCOUNTER SYMPTOMS
MUSCLE WEAKNESS: 1
SUBJECTIVE PAIN PROGRESSION: GRADUALLY IMPROVING
ASSOCIATED SYMPTOMS: DENIES
PERSON REPORTING PAIN: PATIENT
HIGHEST PAIN SEVERITY IN PAST 24 HOURS: 3/10
PAIN: 1
NAUSEA: DENIES
PAIN SEVERITY GOAL: 0/10
VOMITING: DENIES
LOWEST PAIN SEVERITY IN PAST 24 HOURS: 0/10

## 2022-01-15 ASSESSMENT — ENCOUNTER SYMPTOMS: MENTAL STATUS CHANGE: 0

## 2022-01-15 NOTE — CASE COMMUNICATION
Danii, please fax to pmd-Pratik Gordon MD   [address] [place] [id number]   Phone: [phone]   Fax: [phone]   Patients resting HR 98/min and is outside of normal parameters and is to be reported to MD , HR with exertion 118/min, recovers with rest and heart rate decreased to 98. Pt is otherwise asymptomatic. All other vitals are WFL.

## 2022-01-17 ENCOUNTER — HOME CARE VISIT (OUTPATIENT)
Dept: HOME HEALTH SERVICES | Facility: HOME HEALTHCARE | Age: 67
End: 2022-01-17
Payer: MEDICARE

## 2022-01-17 VITALS
TEMPERATURE: 97.5 F | RESPIRATION RATE: 18 BRPM | SYSTOLIC BLOOD PRESSURE: 124 MMHG | HEART RATE: 89 BPM | DIASTOLIC BLOOD PRESSURE: 60 MMHG | OXYGEN SATURATION: 98 %

## 2022-01-17 PROCEDURE — G0299 HHS/HOSPICE OF RN EA 15 MIN: HCPCS

## 2022-01-17 ASSESSMENT — ENCOUNTER SYMPTOMS
PAIN LOCATION - PAIN SEVERITY: 1/10
PAIN: 1
PAIN LOCATION - PAIN FREQUENCY: INFREQUENT
NAUSEA: DENIES
PAIN LOCATION - PAIN QUALITY: DULL
PAIN SEVERITY GOAL: 0/10
PAIN LOCATION - RELIEVING FACTORS: REST
HIGHEST PAIN SEVERITY IN PAST 24 HOURS: 2/10
MUSCLE WEAKNESS: 1
SUBJECTIVE PAIN PROGRESSION: GRADUALLY IMPROVING
PAIN LOCATION - PAIN DURATION: FEW MINUTES
PAIN LOCATION - EXACERBATING FACTORS: PROLONGED STANDING
PAIN LOCATION: BACK
PERSON REPORTING PAIN: PATIENT
LOWEST PAIN SEVERITY IN PAST 24 HOURS: 1/10
ASSOCIATED SYMPTOMS: DENIES
VOMITING: DENIES

## 2022-01-21 ENCOUNTER — HOME CARE VISIT (OUTPATIENT)
Dept: HOME HEALTH SERVICES | Facility: HOME HEALTHCARE | Age: 67
End: 2022-01-21
Payer: MEDICARE

## 2022-01-21 NOTE — Clinical Note
Missed visit documentation: Patient refused visit stating that she will not be home due to moving on 1/23/2022, wound care supplies dropped off to .

## 2022-01-22 ENCOUNTER — HOME CARE VISIT (OUTPATIENT)
Dept: HOME HEALTH SERVICES | Facility: HOME HEALTHCARE | Age: 67
End: 2022-01-22
Payer: MEDICARE

## 2022-01-24 NOTE — CASE COMMUNICATION
noted  ----- Message -----  From: Octavia Sanchez R.N.  Sent: 1/21/2022   4:53 PM PST  To: Camryn Babb R.N.      Missed visit documentation: Patient refused visit stating that she will not be home due to moving on 1/23/2022, wound care supplies dropped off to .

## 2022-01-26 ASSESSMENT — ENCOUNTER SYMPTOMS
PAIN: 1
PAIN SEVERITY GOAL: 0/10
LOWEST PAIN SEVERITY IN PAST 24 HOURS: 0/10
HIGHEST PAIN SEVERITY IN PAST 24 HOURS: 0/10
PERSON REPORTING PAIN: PATIENT

## 2022-01-26 ASSESSMENT — ACTIVITIES OF DAILY LIVING (ADL)
HOME_HEALTH_OASIS: 00
OASIS_M1830: 00

## 2022-01-26 ASSESSMENT — PATIENT HEALTH QUESTIONNAIRE - PHQ9: CLINICAL INTERPRETATION OF PHQ2 SCORE: 0

## 2022-01-27 ENCOUNTER — HOME CARE VISIT (OUTPATIENT)
Dept: HOME HEALTH SERVICES | Facility: HOME HEALTHCARE | Age: 67
End: 2022-01-27
Payer: MEDICARE

## 2022-01-27 NOTE — CASE COMMUNICATION
Quality Review Completed for Transfer 1/22 OASIS by FREDERICK Bailey RN on 1/27/2022:  Edits completed by FREDERICK Bailey RN:  1.  is yes to Pressure ulcer per care plan interventions

## 2022-01-29 NOTE — CASE COMMUNICATION
I agree with the changes made.  ----- Message -----  From: Ina Bailey R.N.  Sent: 1/27/2022   3:27 PM PST  To: Camryn Babb R.N.      Quality Review Completed for Transfer 1/22 OASIS by FREDERICK Bailey RN on 1/27/2022:  Edits completed by FREDERICK Bailey RN:  1.  is yes to Pressure ulcer per care plan interventions

## 2022-03-02 ENCOUNTER — TELEPHONE (OUTPATIENT)
Dept: HEALTH INFORMATION MANAGEMENT | Facility: OTHER | Age: 67
End: 2022-03-02
Payer: MEDICARE

## 2022-04-18 ENCOUNTER — HOSPITAL ENCOUNTER (OUTPATIENT)
Dept: LAB | Facility: MEDICAL CENTER | Age: 67
End: 2022-04-18
Attending: PHYSICIAN ASSISTANT
Payer: MEDICARE

## 2022-04-18 LAB
ERYTHROCYTE [DISTWIDTH] IN BLOOD BY AUTOMATED COUNT: 43.4 FL (ref 35.9–50)
HCT VFR BLD AUTO: 43 % (ref 37–47)
HGB BLD-MCNC: 13.4 G/DL (ref 12–16)
IRON SATN MFR SERPL: 19 % (ref 15–55)
IRON SERPL-MCNC: 48 UG/DL (ref 40–170)
MCH RBC QN AUTO: 28.6 PG (ref 27–33)
MCHC RBC AUTO-ENTMCNC: 31.2 G/DL (ref 33.6–35)
MCV RBC AUTO: 91.9 FL (ref 81.4–97.8)
PLATELET # BLD AUTO: 259 K/UL (ref 164–446)
PMV BLD AUTO: 10.2 FL (ref 9–12.9)
RBC # BLD AUTO: 4.68 M/UL (ref 4.2–5.4)
TIBC SERPL-MCNC: 248 UG/DL (ref 250–450)
UIBC SERPL-MCNC: 200 UG/DL (ref 110–370)
WBC # BLD AUTO: 7 K/UL (ref 4.8–10.8)

## 2022-04-18 PROCEDURE — 36415 COLL VENOUS BLD VENIPUNCTURE: CPT

## 2022-04-18 PROCEDURE — 85027 COMPLETE CBC AUTOMATED: CPT

## 2022-04-18 PROCEDURE — 83540 ASSAY OF IRON: CPT

## 2022-04-18 PROCEDURE — 85610 PROTHROMBIN TIME: CPT

## 2022-04-18 PROCEDURE — 83550 IRON BINDING TEST: CPT

## 2022-04-19 LAB
INR PPP: 1.03 (ref 0.87–1.13)
PROTHROMBIN TIME: 13.2 SEC (ref 12–14.6)

## 2022-07-07 ENCOUNTER — APPOINTMENT (RX ONLY)
Dept: URBAN - METROPOLITAN AREA CLINIC 31 | Facility: CLINIC | Age: 67
Setting detail: DERMATOLOGY
End: 2022-07-07

## 2022-07-07 DIAGNOSIS — Z71.89 OTHER SPECIFIED COUNSELING: ICD-10-CM

## 2022-07-07 DIAGNOSIS — L82.1 OTHER SEBORRHEIC KERATOSIS: ICD-10-CM

## 2022-07-07 DIAGNOSIS — D18.0 HEMANGIOMA: ICD-10-CM

## 2022-07-07 DIAGNOSIS — D22 MELANOCYTIC NEVI: ICD-10-CM

## 2022-07-07 DIAGNOSIS — L57.0 ACTINIC KERATOSIS: ICD-10-CM

## 2022-07-07 DIAGNOSIS — L81.4 OTHER MELANIN HYPERPIGMENTATION: ICD-10-CM

## 2022-07-07 DIAGNOSIS — D485 NEOPLASM OF UNCERTAIN BEHAVIOR OF SKIN: ICD-10-CM

## 2022-07-07 PROBLEM — D18.01 HEMANGIOMA OF SKIN AND SUBCUTANEOUS TISSUE: Status: ACTIVE | Noted: 2022-07-07

## 2022-07-07 PROBLEM — D48.5 NEOPLASM OF UNCERTAIN BEHAVIOR OF SKIN: Status: ACTIVE | Noted: 2022-07-07

## 2022-07-07 PROBLEM — D22.5 MELANOCYTIC NEVI OF TRUNK: Status: ACTIVE | Noted: 2022-07-07

## 2022-07-07 PROCEDURE — 11102 TANGNTL BX SKIN SINGLE LES: CPT

## 2022-07-07 PROCEDURE — ? BIOPSY BY SHAVE METHOD

## 2022-07-07 PROCEDURE — 17003 DESTRUCT PREMALG LES 2-14: CPT

## 2022-07-07 PROCEDURE — 99213 OFFICE O/P EST LOW 20 MIN: CPT | Mod: 25

## 2022-07-07 PROCEDURE — ? COUNSELING

## 2022-07-07 PROCEDURE — ? LIQUID NITROGEN

## 2022-07-07 PROCEDURE — 17000 DESTRUCT PREMALG LESION: CPT | Mod: 59

## 2022-07-07 ASSESSMENT — LOCATION ZONE DERM
LOCATION ZONE: LEG
LOCATION ZONE: TRUNK
LOCATION ZONE: FACE
LOCATION ZONE: ARM
LOCATION ZONE: HAND

## 2022-07-07 ASSESSMENT — LOCATION DETAILED DESCRIPTION DERM
LOCATION DETAILED: LEFT PROXIMAL DORSAL FOREARM
LOCATION DETAILED: LEFT INFERIOR CENTRAL MALAR CHEEK
LOCATION DETAILED: LEFT RADIAL DORSAL HAND
LOCATION DETAILED: RIGHT DISTAL DORSAL FOREARM
LOCATION DETAILED: LEFT ULNAR DORSAL HAND
LOCATION DETAILED: LEFT SUPERIOR UPPER BACK
LOCATION DETAILED: LEFT MEDIAL UPPER BACK
LOCATION DETAILED: RIGHT ANTERIOR DISTAL THIGH
LOCATION DETAILED: LEFT DISTAL DORSAL FOREARM
LOCATION DETAILED: RIGHT INFERIOR LATERAL FOREHEAD
LOCATION DETAILED: RIGHT RADIAL DORSAL HAND
LOCATION DETAILED: RIGHT ANTERIOR PROXIMAL THIGH
LOCATION DETAILED: LEFT VENTRAL PROXIMAL FOREARM
LOCATION DETAILED: LEFT INFRAMAMMARY CREASE (INNER QUADRANT)
LOCATION DETAILED: RIGHT PROXIMAL DORSAL FOREARM
LOCATION DETAILED: LEFT ANTERIOR DISTAL THIGH
LOCATION DETAILED: LEFT ANTERIOR PROXIMAL THIGH
LOCATION DETAILED: RIGHT VENTRAL PROXIMAL FOREARM
LOCATION DETAILED: RIGHT MEDIAL SUPERIOR CHEST
LOCATION DETAILED: RIGHT INFERIOR CENTRAL MALAR CHEEK

## 2022-07-07 ASSESSMENT — LOCATION SIMPLE DESCRIPTION DERM
LOCATION SIMPLE: LEFT HAND
LOCATION SIMPLE: LEFT FOREARM
LOCATION SIMPLE: LEFT BREAST
LOCATION SIMPLE: RIGHT CHEEK
LOCATION SIMPLE: LEFT THIGH
LOCATION SIMPLE: CHEST
LOCATION SIMPLE: LEFT UPPER BACK
LOCATION SIMPLE: LEFT CHEEK
LOCATION SIMPLE: RIGHT FOREARM
LOCATION SIMPLE: RIGHT HAND
LOCATION SIMPLE: RIGHT FOREHEAD
LOCATION SIMPLE: RIGHT THIGH

## 2022-07-07 NOTE — PROCEDURE: MIPS QUALITY
Quality 226: Preventive Care And Screening: Tobacco Use: Screening And Cessation Intervention: Patient screened for tobacco use and is an ex/non-smoker
Quality 111:Pneumonia Vaccination Status For Older Adults: Pneumococcal vaccine administered on or after patient’s 60th birthday and before the end of the measurement period
Detail Level: Detailed
Quality 130: Documentation Of Current Medications In The Medical Record: Current Medications Documented
Quality 431: Preventive Care And Screening: Unhealthy Alcohol Use - Screening: Patient not identified as an unhealthy alcohol user when screened for unhealthy alcohol use using a systematic screening method
Quality 110: Preventive Care And Screening: Influenza Immunization: Influenza Immunization Administered during Influenza season

## 2022-07-07 NOTE — PROCEDURE: LIQUID NITROGEN
Duration Of Freeze Thaw-Cycle (Seconds): 0
Post-Care Instructions: I reviewed with the patient in detail post-care instructions. Patient is to wear sunprotection, and avoid picking at any of the treated lesions. Pt may apply Vaseline to crusted or scabbing areas.
Render Note In Bullet Format When Appropriate: No
Show Aperture Variable?: Yes
Consent: The patient's consent was obtained including but not limited to risks of crusting, scabbing, blistering, scarring, darker or lighter pigmentary change, recurrence, incomplete removal and infection.
Detail Level: Simple

## 2022-07-11 ENCOUNTER — HOSPITAL ENCOUNTER (OUTPATIENT)
Dept: RADIOLOGY | Facility: MEDICAL CENTER | Age: 67
End: 2022-07-11
Attending: FAMILY MEDICINE
Payer: MEDICARE

## 2022-07-11 DIAGNOSIS — Z12.31 ENCOUNTER FOR SCREENING MAMMOGRAM FOR MALIGNANT NEOPLASM OF BREAST: ICD-10-CM

## 2022-07-11 PROCEDURE — 77063 BREAST TOMOSYNTHESIS BI: CPT

## 2022-07-15 ENCOUNTER — OFFICE VISIT (OUTPATIENT)
Dept: URGENT CARE | Facility: CLINIC | Age: 67
End: 2022-07-15
Payer: MEDICARE

## 2022-07-15 VITALS
OXYGEN SATURATION: 98 % | WEIGHT: 131 LBS | SYSTOLIC BLOOD PRESSURE: 104 MMHG | TEMPERATURE: 97.6 F | BODY MASS INDEX: 22.36 KG/M2 | DIASTOLIC BLOOD PRESSURE: 68 MMHG | HEIGHT: 64 IN | RESPIRATION RATE: 14 BRPM | HEART RATE: 72 BPM

## 2022-07-15 DIAGNOSIS — U07.1 COVID-19: ICD-10-CM

## 2022-07-15 LAB
EXTERNAL QUALITY CONTROL: ABNORMAL
FLUAV+FLUBV AG SPEC QL IA: NEGATIVE
INT CON NEG: NORMAL
INT CON POS: NORMAL
SARS-COV+SARS-COV-2 AG RESP QL IA.RAPID: POSITIVE

## 2022-07-15 PROCEDURE — 87426 SARSCOV CORONAVIRUS AG IA: CPT | Performed by: NURSE PRACTITIONER

## 2022-07-15 PROCEDURE — 87804 INFLUENZA ASSAY W/OPTIC: CPT | Performed by: NURSE PRACTITIONER

## 2022-07-15 PROCEDURE — 99203 OFFICE O/P NEW LOW 30 MIN: CPT | Mod: CS | Performed by: NURSE PRACTITIONER

## 2022-07-15 RX ORDER — LISINOPRIL 20 MG/1
20 TABLET ORAL DAILY
COMMUNITY
Start: 2022-05-17

## 2022-07-15 RX ORDER — HYDROCODONE BITARTRATE AND ACETAMINOPHEN 10; 325 MG/1; MG/1
1 TABLET ORAL EVERY 6 HOURS PRN
COMMUNITY
Start: 2022-06-20

## 2022-07-15 RX ORDER — ALBUTEROL SULFATE 90 UG/1
1-2 AEROSOL, METERED RESPIRATORY (INHALATION) EVERY 4 HOURS PRN
Qty: 8.5 G | Refills: 0 | Status: SHIPPED | OUTPATIENT
Start: 2022-07-15 | End: 2022-11-09

## 2022-07-15 ASSESSMENT — FIBROSIS 4 INDEX: FIB4 SCORE: 1.51

## 2022-07-15 ASSESSMENT — ENCOUNTER SYMPTOMS
HEMOPTYSIS: 0
SORE THROAT: 0
RHINORRHEA: 1
COUGH: 1
SHORTNESS OF BREATH: 0
WHEEZING: 1
VOMITING: 1
FEVER: 1
NECK PAIN: 1
DIARRHEA: 0
HEADACHES: 1
SPUTUM PRODUCTION: 1

## 2022-07-15 NOTE — PROGRESS NOTES
Subjective:     Nils Alfonso is a 66 y.o. female who presents for Cough (X 4 days, cough, chest congestion, with green sputum, possible bronchitis, runny nose. )      102 fever yesterday. Mild wheeze. Vomited x1 yesterday, took zofran. Decreased appetite. No hx of COVID. Is Vaccinated. Negative home tests. Hx of bronchitis, inquiring about z-pack.     Cough  This is a new problem. The current episode started in the past 7 days. The problem has been gradually worsening. The cough is productive of sputum. Associated symptoms include a fever, headaches, rhinorrhea and wheezing. Pertinent negatives include no hemoptysis, sore throat or shortness of breath. Her past medical history is significant for bronchitis and environmental allergies.       Past Medical History:   Diagnosis Date   • Allergy, unspecified not elsewhere classified    • Anemia     not currently   • Anesthesia     PONV (Demerol/ Dilaudid)   • Arthritis     bilateral shoulders,sacrum, osteo   • Backpain     coccyx and sacrum   • Bronchitis 2010   • Cataract     bilateral IOLI   • Degeneration of cervical intervertebral disc     C5-6,C6-7   • Dental disorder     partial upper and lower   • Heart burn    • Hiatus hernia syndrome     not a problem after gastric sleeve   • High cholesterol     resolved after gastric surgery   • Hyperlipidemia    • Hypertension     off all medication, reveresed after gastric sleeve   • Muscle disorder    • Pain 05/16/2018    low back and SI joints and sacrum   • Reactive airway disease     rescue inhaler not needed unless with bronchitis       Past Surgical History:   Procedure Laterality Date   • MA UPPER GI ENDOSCOPY,DIAGNOSIS N/A 11/18/2021    Procedure: GASTROSCOPY with stent placement;  Surgeon: Migel Lawrence M.D.;  Location: SURGERY SAME DAY [facility];  Service: Gastroenterology   • MA ERCP,DIAGNOSTIC N/A 11/18/2021    Procedure: ERCP (ENDOSCOPIC RETROGRADE CHOLANGIOPANCREATOGRAPHY);  Surgeon: Migel Lawrence M.D.;   Location: SURGERY SAME DAY [facility];  Service: Gastroenterology   • PA PLACE PERCUT GASTROSTOMY TUBE N/A 11/18/2021    Procedure: GASTROSCOPY, WITH FEEDING TUBE INSERTION;  Surgeon: Migel Lawrence M.D.;  Location: SURGERY SAME DAY [facility];  Service: Gastroenterology   • BILIARY STENT PLACEMENT N/A 11/18/2021    Procedure: INSERTION, STENT, BILE DUCT;  Surgeon: Migel Lawrence M.D.;  Location: SURGERY SAME DAY [facility];  Service: Gastroenterology   • PA EXPLORATORY OF ABDOMEN  11/5/2021    Procedure: LAPAROTOMY, EXPLORATORY;  Surgeon: Jaden Cook M.D.;  Location: [facility];  Service: General   • PA ULTRASONIC GUIDANCE, INTRAOPERATIVE  11/5/2021    Procedure: ULTRASOUND GUIDANCE;  Surgeon: Jaden Cook M.D.;  Location: [facility];  Service: General   • ABDOMINAL ABSCESS DRAINAGE  11/5/2021    Procedure: DRAINAGE, ABSCESS, ABDOMEN - PERITONEAL AND RETROPERITONEAL;  Surgeon: Jaden Cook M.D.;  Location: [facility];  Service: General   • PA ERCP,DIAGNOSTIC  10/1/2021    Procedure: ERCP (ENDOSCOPIC RETROGRADE CHOLANGIOPANCREATOGRAPHY);  Surgeon: Fausto Casas M.D.;  Location: [facility];  Service: Gastroenterology   • COLONOSCOPY  8/8/2018    Procedure: COLONOSCOPY;  Surgeon: Shukri Condon M.D.;  Location: [facility];  Service: General   • GASTROSCOPY  5/23/2018    Procedure: GASTROSCOPY;  Surgeon: Fausto Casas M.D.;  Location: [facility];  Service: Gastroenterology   • EGD W/ENDOSCOPIC ULTRASOUND  5/23/2018    Procedure: EGD W/ENDOSCOPIC ULTRASOUND- RADIAL UPPER;  Surgeon: Fausto Casas M.D.;  Location: [facility];  Service: Gastroenterology   • CATARACT PHACO WITH IOL Left 4/18/2017    Procedure: CATARACT PHACO WITH IOL;  Surgeon: Stuart Estevez M.D.;  Location: SURGERY SAME DAY [facility];  Service:    • CATARACT PHACO WITH IOL Right 4/4/2017    Procedure: CATARACT PHACO WITH  IOL;  Surgeon: Stuart Estevez M.D.;  Location: SURGERY [facility];  Service:    • GASTRIC SLEEVE LAPAROSCOPY  10/19/2016    Procedure: GASTRIC SLEEVE LAPAROSCOPY, HIATAL HERNIA;  Surgeon: Shukri Condon M.D.;  Location: SURGERY [facility];  Service:    • LIVER BIOPSY LAPAROSCOPIC  10/19/2016    Procedure: LIVER BIOPSY LAPAROSCOPIC;  Surgeon: Shukri Condon M.D.;  Location: SURGERY [facility];  Service:    • GASTROSCOPY N/A 2016    Procedure: GASTROSCOPY;  Surgeon: Shukri Condon M.D.;  Location: SURGERY [facility];  Service:    • ROTATOR CUFF REPAIR Right 2016   • ROTATOR CUFF REPAIR Left 2015   • HYSTERECTOMY ROBOTIC  2009    Performed by MEG VILLEGAS at [facility]   • LUMBAR FUSION ANTERIOR      Dr Hillman, L3-S1 fusion   • CHOLECYSTECTOMY      laparoscopic   • TUBAL LIGATION     • GASTRIC RESECTION      gastric stapling   • TONSILLECTOMY AND ADENOIDECTOMY     • PRIMARY C SECTION  , , 1985    x3       Social History     Socioeconomic History   • Marital status:      Spouse name: Not on file   • Number of children: Not on file   • Years of education: Not on file   • Highest education level: Not on file   Occupational History   • Not on file   Tobacco Use   • Smoking status: Former Smoker     Packs/day: 0.25     Years: 0.10     Pack years: 0.02     Types: Cigarettes     Quit date: 1973     Years since quittin.5   • Smokeless tobacco: Never Used   Vaping Use   • Vaping Use: Never used   Substance and Sexual Activity   • Alcohol use: No   • Drug use: No   • Sexual activity: Not on file     Comment:    Other Topics Concern   • Not on file   Social History Narrative   • Not on file     Social Determinants of Health     Financial Resource Strain: Not on file   Food Insecurity: Not on file   Transportation Needs: Not on file   Physical Activity: Not on file   Stress: Not on file   Social Connections: Not on file  "  Intimate Partner Violence: Not on file   Housing Stability: Not on file        Family History   Problem Relation Age of Onset   • Stroke Mother    • Cancer Father         larynx   • Diabetes Brother         Allergies   Allergen Reactions   • Ampicillin Rash     Rash  Tolerates Zosyn 10/21   • Cephalexin Diarrhea, Vomiting and Nausea     .   • Clindamycin Vomiting     \"cleocin\"   • Codeine      Severe stomach pain, cramps, spasms   • Demerol Vomiting   • Levofloxacin Unspecified     Numbness     • Tetracyclines Rash     .   • Tizanidine Itching   • Other Drug Rash     Augmentin, rash, diarrhea.   • Morphine Vomiting   • Pcn [Penicillins] Itching     Tolerates Zosyn 10/21   • Sulfa Drugs Itching     Tolerated Bactrim 10/2021       Review of Systems   Constitutional: Positive for fever and malaise/fatigue.   HENT: Positive for rhinorrhea. Negative for sore throat.    Respiratory: Positive for cough, sputum production and wheezing. Negative for hemoptysis and shortness of breath.    Gastrointestinal: Positive for vomiting. Negative for diarrhea.   Musculoskeletal: Positive for neck pain.   Neurological: Positive for headaches.   Endo/Heme/Allergies: Positive for environmental allergies.   All other systems reviewed and are negative.       Objective:   /68   Pulse 72   Temp 36.4 °C (97.6 °F) (Temporal)   Resp 14   Ht 1.626 m (5' 4\")   Wt 59.4 kg (131 lb)   LMP 01/01/2009   SpO2 98%   Breastfeeding No   BMI 22.49 kg/m²     Physical Exam  Vitals reviewed.   Constitutional:       General: She is not in acute distress.     Appearance: She is well-developed. She is ill-appearing. She is not toxic-appearing.   HENT:      Head: Normocephalic and atraumatic.      Right Ear: Ear canal and external ear normal. A PE tube is present. Tympanic membrane is not erythematous.      Left Ear: Tympanic membrane, ear canal and external ear normal. Tympanic membrane is not erythematous.      Nose: Rhinorrhea present.      " Mouth/Throat:      Lips: Pink.      Mouth: Mucous membranes are moist.      Pharynx: Posterior oropharyngeal erythema present.   Eyes:      Conjunctiva/sclera: Conjunctivae normal.   Cardiovascular:      Rate and Rhythm: Normal rate.   Pulmonary:      Effort: Pulmonary effort is normal. No respiratory distress.      Breath sounds: Normal breath sounds. No stridor. No wheezing, rhonchi or rales.   Musculoskeletal:         General: Normal range of motion.      Cervical back: Normal range of motion and neck supple. No rigidity.   Skin:     General: Skin is warm and dry.      Findings: No rash.   Neurological:      General: No focal deficit present.      Mental Status: She is alert and oriented to person, place, and time.      GCS: GCS eye subscore is 4. GCS verbal subscore is 5. GCS motor subscore is 6.   Psychiatric:         Mood and Affect: Mood normal.         Speech: Speech normal.         Behavior: Behavior normal.         Thought Content: Thought content normal.         Judgment: Judgment normal.         Assessment/Plan:   1. COVID-19  - POCT SARS-COV Antigen KUSH (Symptomatic only)  - POCT Influenza A/B  - Nirmatrelvir & Ritonavir 20 x 150 MG & 10 x 100MG Tablet Therapy Pack; Take 300 mg nirmatrelvir (two 150 mg tablets) with 100 mg ritonavir (one 100 mg tablet) by mouth, with all three tablets taken together twice daily for 5 days.  Dispense: 30 Each; Refill: 0  - albuterol 108 (90 Base) MCG/ACT Aero Soln inhalation aerosol; Inhale 1-2 Puffs every four hours as needed for Shortness of Breath.  Dispense: 8.5 g; Refill: 0  Results for orders placed or performed in visit on 07/15/22   POCT SARS-COV Antigen KUSH (Symptomatic only)   Result Value Ref Range    Internal  Valid     SARS-COV ANTIGEN KUSH Positive (A) Negative, Indeterminate, None Detected, Valid, Invalid, Pass   POCT Influenza A/B   Result Value Ref Range    Rapid Influenza A-B negative     Internal Control Positive Valid     Internal  Control Negative Valid    Symptomatic care.  -Oral hydration and rest.   -Cough control: nonpharmacologic options for cough relief such as throat lozenges, hot tea, honey.  -Over the counter expectorant as directed; Guaifenesin (Mucinex).  -Tylenol or ibuprofen for pain and fever as directed.   -Warm salt water gargles.  -OTC Throat lozenges or spray (Cepacol).    Seek emergency medical care immediately for: Trouble breathing, persistent pain or pressure in the chest, confusion, inability to wake or stay awake, bluish lips or face, persistent tachycardia (fast heart rate), prolonged dizziness, persistent high grade fevers. Follow up for prolonged cough, persistent wheezing, persistent throat pain, difficulty swallowing, persistent fevers, leg swelling, or any other concerns. Follow up with your Primary Care Provider.     -Discussed viral etiology. COVID S&S, and self isolation guidelines. S&S of PNA with follow up. Stable Vitals. No wheeze on exam. GFR >60. Discussed risks and benefits of antiviral therapy.     Differential diagnosis, natural history, supportive care, and indications for immediate follow-up discussed.

## 2022-07-15 NOTE — PATIENT INSTRUCTIONS
Symptomatic care.  -Oral hydration and rest.   -Cough control: nonpharmacologic options for cough relief such as throat lozenges, hot tea, honey.  -Over the counter expectorant as directed; Guaifenesin (Mucinex).  -Tylenol or ibuprofen for pain and fever as directed.   -Warm salt water gargles.  -OTC Throat lozenges or spray (Cepacol).    Seek emergency medical care immediately for: Trouble breathing, persistent pain or pressure in the chest, confusion, inability to wake or stay awake, bluish lips or face, persistent tachycardia (fast heart rate), prolonged dizziness, persistent high grade fevers. Follow up for prolonged cough, persistent wheezing, persistent throat pain, difficulty swallowing, persistent fevers, leg swelling, or any other concerns. Follow up with your Primary Care Provider.

## 2022-07-20 ENCOUNTER — HOSPITAL ENCOUNTER (OUTPATIENT)
Dept: LAB | Facility: MEDICAL CENTER | Age: 67
End: 2022-07-20
Attending: PHYSICIAN ASSISTANT
Payer: MEDICARE

## 2022-07-20 LAB
25(OH)D3 SERPL-MCNC: 57 NG/ML (ref 30–100)
CHOLEST SERPL-MCNC: 201 MG/DL (ref 100–199)
FASTING STATUS PATIENT QL REPORTED: NORMAL
HDLC SERPL-MCNC: 36 MG/DL
IRON SATN MFR SERPL: 31 % (ref 15–55)
IRON SERPL-MCNC: 104 UG/DL (ref 40–170)
LDLC SERPL CALC-MCNC: 132 MG/DL
TIBC SERPL-MCNC: 333 UG/DL (ref 250–450)
TRIGL SERPL-MCNC: 167 MG/DL (ref 0–149)
UIBC SERPL-MCNC: 229 UG/DL (ref 110–370)

## 2022-07-20 PROCEDURE — 83550 IRON BINDING TEST: CPT

## 2022-07-20 PROCEDURE — 80061 LIPID PANEL: CPT

## 2022-07-20 PROCEDURE — 36415 COLL VENOUS BLD VENIPUNCTURE: CPT

## 2022-07-20 PROCEDURE — 82306 VITAMIN D 25 HYDROXY: CPT

## 2022-07-20 PROCEDURE — 83540 ASSAY OF IRON: CPT

## 2022-08-10 NOTE — THERAPY
Physical Therapy   Daily Treatment     Patient Name: Nils Alfonso  Age:  66 y.o., Sex:  female  Medical Record #: [id number]  Today's Date: 12/7/2021     Precautions: Fall Risk  Comments: MARTHA, ostomy x2    Assessment  Pt seen for PT treatment session. Pt at SPV level for bed mobility with increased time and effort, particularly with moving EOB<>sidelying for log rolling. Pt initially had difficulty completing STS on own, however following VCs to push off bed with 2 hands pt was able to stand up with 4WW and SPV. Will attempt with lower surface next session to progress pt. Pt amb with 4WW and SBA, no LOB or fatigue. Pt will benefit from continued acute PT to increase functional independence. Anticipate that pt will progress to HHPT level with 1-2 more treatment sessions as pt is very motivated to mobilize with PT. Will follow.     Plan  Continue current treatment plan.  DC Equipment Recommendations: None (pt has recommended DME at home)  Discharge Recommendations: Recommend home health for continued physical therapy services         12/07/21 1548   Vitals   O2 Delivery Device None - Room Air   Pain 0 - 10 Group   Location Abdomen   Location Orientation Mid   Pain Rating Scale (NPRS) 3   Description Aching   Therapist Pain Assessment During Activity   Cognition    Cognition / Consciousness WDL   Level of Consciousness Alert   Comments receptive to transfer edu   Balance   Sitting Balance (Static) Fair +   Sitting Balance (Dynamic) Fair   Standing Balance (Static) Fair   Standing Balance (Dynamic) Fair -   Weight Shift Sitting Fair   Weight Shift Standing Fair   Skilled Intervention Verbal Cuing   Comments FWW   Gait Analysis   Gait Level Of Assist SBA   Assistive Device 4 Wheel Walker   Distance (Feet) 125   # of Times Distance was Traveled 1   Deviation Bradykinetic   Weight Bearing Status no restrictions   Skilled Intervention Verbal Cuing   Comments pt bradykinetic yet steady with no LOB   Bed Mobility    Supine to  Specialty Pharmacy - Refill Coordination    Specialty Medication Orders Linked to Encounter    Flowsheet Row Most Recent Value   Medication #1 evolocumab (REPATHA SURECLICK) 140 mg/mL PnIj (Order#[id number], Rx#[id number])          Refill Questions - Documented Responses    Flowsheet Row Most Recent Value   Patient Availability and HIPAA Verification    Does patient want to proceed with activity? Yes   HIPAA/medical authority confirmed? Yes   Relationship to patient of person spoken to? Self   Refill Screening Questions    Would patient like to speak to a pharmacist? No   When does the patient need to receive the medication? 08/19/22   Refill Delivery Questions    How will the patient receive the medication? Delivery Sherri   When does the patient need to receive the medication? 08/19/22   Shipping Address Home   Address in [facility] confirmed and updated if neccessary? Yes   Expected Copay ($) 0   Is the patient able to afford the medication copay? Yes   Payment Method zero copay   Days supply of Refill 28   Supplies needed? No supplies needed   Refill activity completed? Yes   Refill activity plan Refill scheduled   Shipment/Pickup Date: 08/18/22          Current Outpatient Medications   Medication Sig    amitriptyline (ELAVIL) 75 MG tablet Take 1 tablet (75 mg total) by mouth every evening.    amLODIPine (NORVASC) 5 MG tablet Take 1 tablet (5 mg total) by mouth once daily.    atorvastatin (LIPITOR) 40 MG tablet Take 1 tablet (40 mg total) by mouth every evening.    blood sugar diagnostic Strp 1 each by Misc.(Non-Drug; Combo Route) route 4 (four) times daily.    blood-glucose meter,continuous (DEXCOM G6 ) Misc 1 each by Misc.(Non-Drug; Combo Route) route Daily. Use as directed with Dexcom G6 transmitter and sensor    blood-glucose sensor (DEXCOM G6 SENSOR) Roberta 1 each by Misc.(Non-Drug; Combo Route) route every 10 days. Apply/change sensor to skin every 10 days.    blood-glucose transmitter  (DEXCOM G6 TRANSMITTER) Roberta 1 each by Misc.(Non-Drug; Combo Route) route Daily. Use transmitter in sensor with G6 system. Change new transmitter every 3 months.    ciclopirox (PENLAC) 8 % Soln Apply topically nightly.    clopidogreL (PLAVIX) 75 mg tablet Take 1 tablet (75 mg total) by mouth once daily.    clotrimazole-betamethasone 1-0.05% (LOTRISONE) cream Apply topically 2 (two) times daily.    cyclobenzaprine (FLEXERIL) 10 MG tablet Take 1 tablet (10 mg total) by mouth 3 (three) times a week.    diclofenac sodium (VOLTAREN) 1 % Gel Apply 1 application topically.    dulaglutide (TRULICITY) 4.5 mg/0.5 mL pen injector Inject 4.5 mg (one pen) into the skin every 7 days.    empagliflozin (JARDIANCE) 25 mg tablet Take 1 tablet (25 mg total) by mouth once daily.    evolocumab (REPATHA SURECLICK) 140 mg/mL PnIj Inject 1 mL (140 mg total) into the skin every 14 (fourteen) days.    furosemide (LASIX) 40 MG tablet Take 1 tablet (40 mg total) by mouth once daily.    gabapentin (NEURONTIN) 300 MG capsule TAKE 1 CAPSULE(300 MG) BY MOUTH THREE TIMES DAILY    insulin lispro (HUMALOG U-100 INSULIN) 100 unit/mL injection Inject 100 Units into the skin continuous. Gives up to 100 units daily via Omnipod. Do not directly inject - only use within pods.    LIDOcaine HCL 2% (XYLOCAINE) 2 % jelly Apply topically as needed. Apply topically once nightly to affected part of foot/feet.    LIDOcaine-prilocaine (EMLA) cream Apply topically 2 (two) times a day.    lisinopriL (PRINIVIL,ZESTRIL) 40 MG tablet Take 1 tablet (40 mg total) by mouth once daily.    meclizine (ANTIVERT) 12.5 mg tablet Take 2 tablets (25 mg total) by mouth 2 (two) times daily as needed for Dizziness.    metFORMIN (GLUCOPHAGE-XR) 500 MG ER 24hr tablet Take 2 tablets (1,000 mg total) by mouth every morning.    metoprolol succinate (TOPROL-XL) 200 MG 24 hr tablet Take 1 tablet (200 mg total) by mouth once daily.    pen needle, diabetic (PEN NEEDLE) 32  Sit Supervised   Sit to Supine Supervised   Scooting Supervised   Rolling Supervised   Skilled Intervention Sequencing;Verbal Cuing;Tactile Cuing   Comments HOB slighlty elevated, increased time/effort with moving sidelying<>EOB, VCs for log roll sequencing   Functional Mobility   Sit to Stand Supervised   Skilled Intervention Sequencing;Tactile Cuing;Verbal Cuing   Comments FWW. pt initially unable to stand up on own, however she moved to SPV following VCs to push with both hands off the bed. will increase difficulty next session with lower surface   Short Term Goals    Short Term Goal # 1 pt will move supine<>EOB via log roll with HOB flat, no features, and SPV within 6 visits to facilitate getting in/out of bed.   Goal Outcome # 1 Progressing as expected   Short Term Goal # 2 pt will complete all transfers with FWW and SPV within 6 weeks to increase functional mobility.   Goal Outcome # 2 Progressing as expected   Short Term Goal # 3 pt will amb 150' with FWW and SPV within 6 visits for household mobility.   Goal Outcome # 3 Progressing as expected   Short Term Goal # 4 Pt will ascend and descend step x 2 with SPV to access her home by the 6th tx   Goal Outcome # 4 Goal not met          "gauge x 5/32" Ndle 1 each by Misc.(Non-Drug; Combo Route) route 4 (four) times daily. ICD 10 E11.42    spironolactone (ALDACTONE) 50 MG tablet Take 1 tablet (50 mg total) by mouth once daily.   Last reviewed on 7/27/2022  9:21 AM by Rosemary Ulloa MA    Review of patient's allergies indicates:  No Known Allergies Last reviewed on  7/27/2022 9:20 AM by Rosemary Ulloa      Tasks added this encounter   No tasks added.   Tasks due within next 3 months   8/10/2022 - Referral Authorization - Refill Call  8/10/2022 - Refill Call (Auto Added)     [facility]  [address] [facility] [id number]  Phone: [phone]  Fax: [phone]      "

## 2022-11-02 ENCOUNTER — HOSPITAL ENCOUNTER (OUTPATIENT)
Dept: LAB | Facility: MEDICAL CENTER | Age: 67
End: 2022-11-02
Attending: NURSE PRACTITIONER
Payer: MEDICARE

## 2022-11-02 LAB
25(OH)D3 SERPL-MCNC: 57 NG/ML (ref 30–100)
ALBUMIN SERPL BCP-MCNC: 4.4 G/DL (ref 3.2–4.9)
ALBUMIN/GLOB SERPL: 1.3 G/DL
ALP SERPL-CCNC: 78 U/L (ref 30–99)
ALT SERPL-CCNC: 26 U/L (ref 2–50)
ANION GAP SERPL CALC-SCNC: 10 MMOL/L (ref 7–16)
AST SERPL-CCNC: 47 U/L (ref 12–45)
BASOPHILS # BLD AUTO: 0.8 % (ref 0–1.8)
BASOPHILS # BLD AUTO: 0.9 % (ref 0–1.8)
BASOPHILS # BLD: 0.05 K/UL (ref 0–0.12)
BASOPHILS # BLD: 0.06 K/UL (ref 0–0.12)
BILIRUB CONJ SERPL-MCNC: <0.2 MG/DL (ref 0.1–0.5)
BILIRUB INDIRECT SERPL-MCNC: NORMAL MG/DL (ref 0–1)
BILIRUB SERPL-MCNC: 0.3 MG/DL (ref 0.1–1.5)
BILIRUB SERPL-MCNC: 0.3 MG/DL (ref 0.1–1.5)
BUN SERPL-MCNC: 28 MG/DL (ref 8–22)
CALCIUM SERPL-MCNC: 9.9 MG/DL (ref 8.5–10.5)
CHLORIDE SERPL-SCNC: 102 MMOL/L (ref 96–112)
CHOLEST SERPL-MCNC: 287 MG/DL (ref 100–199)
CO2 SERPL-SCNC: 26 MMOL/L (ref 20–33)
CREAT SERPL-MCNC: 1.11 MG/DL (ref 0.5–1.4)
EOSINOPHIL # BLD AUTO: 0.08 K/UL (ref 0–0.51)
EOSINOPHIL # BLD AUTO: 0.09 K/UL (ref 0–0.51)
EOSINOPHIL NFR BLD: 1.2 % (ref 0–6.9)
EOSINOPHIL NFR BLD: 1.4 % (ref 0–6.9)
ERYTHROCYTE [DISTWIDTH] IN BLOOD BY AUTOMATED COUNT: 47.2 FL (ref 35.9–50)
ERYTHROCYTE [DISTWIDTH] IN BLOOD BY AUTOMATED COUNT: 47.4 FL (ref 35.9–50)
FERRITIN SERPL-MCNC: 361 NG/ML (ref 10–291)
FOLATE SERPL-MCNC: 7.3 NG/ML
GFR SERPLBLD CREATININE-BSD FMLA CKD-EPI: 54 ML/MIN/1.73 M 2
GLOBULIN SER CALC-MCNC: 3.3 G/DL (ref 1.9–3.5)
GLUCOSE SERPL-MCNC: 86 MG/DL (ref 65–99)
HCT VFR BLD AUTO: 48 % (ref 37–47)
HCT VFR BLD AUTO: 48.1 % (ref 37–47)
HDLC SERPL-MCNC: 50 MG/DL
HGB BLD-MCNC: 15.4 G/DL (ref 12–16)
HGB BLD-MCNC: 15.4 G/DL (ref 12–16)
IMM GRANULOCYTES # BLD AUTO: 0.02 K/UL (ref 0–0.11)
IMM GRANULOCYTES # BLD AUTO: 0.03 K/UL (ref 0–0.11)
IMM GRANULOCYTES NFR BLD AUTO: 0.3 % (ref 0–0.9)
IMM GRANULOCYTES NFR BLD AUTO: 0.5 % (ref 0–0.9)
IRON SERPL-MCNC: 136 UG/DL (ref 40–170)
LDLC SERPL CALC-MCNC: 214 MG/DL
LYMPHOCYTES # BLD AUTO: 2.72 K/UL (ref 1–4.8)
LYMPHOCYTES # BLD AUTO: 2.79 K/UL (ref 1–4.8)
LYMPHOCYTES NFR BLD: 41.2 % (ref 22–41)
LYMPHOCYTES NFR BLD: 43 % (ref 22–41)
MCH RBC QN AUTO: 30 PG (ref 27–33)
MCH RBC QN AUTO: 30 PG (ref 27–33)
MCHC RBC AUTO-ENTMCNC: 32 G/DL (ref 33.6–35)
MCHC RBC AUTO-ENTMCNC: 32.1 G/DL (ref 33.6–35)
MCV RBC AUTO: 93.6 FL (ref 81.4–97.8)
MCV RBC AUTO: 93.6 FL (ref 81.4–97.8)
MONOCYTES # BLD AUTO: 0.39 K/UL (ref 0–0.85)
MONOCYTES # BLD AUTO: 0.45 K/UL (ref 0–0.85)
MONOCYTES NFR BLD AUTO: 6 % (ref 0–13.4)
MONOCYTES NFR BLD AUTO: 6.8 % (ref 0–13.4)
NEUTROPHILS # BLD AUTO: 3.13 K/UL (ref 2–7.15)
NEUTROPHILS # BLD AUTO: 3.28 K/UL (ref 2–7.15)
NEUTROPHILS NFR BLD: 48.2 % (ref 44–72)
NEUTROPHILS NFR BLD: 49.7 % (ref 44–72)
NRBC # BLD AUTO: 0 K/UL
NRBC # BLD AUTO: 0 K/UL
NRBC BLD-RTO: 0 /100 WBC
NRBC BLD-RTO: 0 /100 WBC
PLATELET # BLD AUTO: 317 K/UL (ref 164–446)
PLATELET # BLD AUTO: 323 K/UL (ref 164–446)
PMV BLD AUTO: 9.8 FL (ref 9–12.9)
PMV BLD AUTO: 9.8 FL (ref 9–12.9)
POTASSIUM SERPL-SCNC: 4.4 MMOL/L (ref 3.6–5.5)
PREALB SERPL-MCNC: 31.2 MG/DL (ref 18–38)
PROT SERPL-MCNC: 7.7 G/DL (ref 6–8.2)
RBC # BLD AUTO: 5.13 M/UL (ref 4.2–5.4)
RBC # BLD AUTO: 5.14 M/UL (ref 4.2–5.4)
SODIUM SERPL-SCNC: 138 MMOL/L (ref 135–145)
TRANSFERRIN SERPL-MCNC: 343 MG/DL (ref 200–370)
TRIGL SERPL-MCNC: 115 MG/DL (ref 0–149)
VIT B12 SERPL-MCNC: 544 PG/ML (ref 211–911)
WBC # BLD AUTO: 6.5 K/UL (ref 4.8–10.8)
WBC # BLD AUTO: 6.6 K/UL (ref 4.8–10.8)

## 2022-11-02 PROCEDURE — 80061 LIPID PANEL: CPT

## 2022-11-02 PROCEDURE — 84252 ASSAY OF VITAMIN B-2: CPT

## 2022-11-02 PROCEDURE — 80053 COMPREHEN METABOLIC PANEL: CPT

## 2022-11-02 PROCEDURE — 36415 COLL VENOUS BLD VENIPUNCTURE: CPT

## 2022-11-02 PROCEDURE — 84207 ASSAY OF VITAMIN B-6: CPT

## 2022-11-02 PROCEDURE — 82247 BILIRUBIN TOTAL: CPT

## 2022-11-02 PROCEDURE — 84630 ASSAY OF ZINC: CPT

## 2022-11-02 PROCEDURE — 83540 ASSAY OF IRON: CPT

## 2022-11-02 PROCEDURE — 84425 ASSAY OF VITAMIN B-1: CPT

## 2022-11-02 PROCEDURE — 84134 ASSAY OF PREALBUMIN: CPT

## 2022-11-02 PROCEDURE — 85025 COMPLETE CBC W/AUTO DIFF WBC: CPT | Mod: 91

## 2022-11-02 PROCEDURE — 82728 ASSAY OF FERRITIN: CPT

## 2022-11-02 PROCEDURE — 84466 ASSAY OF TRANSFERRIN: CPT

## 2022-11-02 PROCEDURE — 82306 VITAMIN D 25 HYDROXY: CPT

## 2022-11-02 PROCEDURE — 82746 ASSAY OF FOLIC ACID SERUM: CPT

## 2022-11-02 PROCEDURE — 82607 VITAMIN B-12: CPT

## 2022-11-02 PROCEDURE — 85025 COMPLETE CBC W/AUTO DIFF WBC: CPT

## 2022-11-02 PROCEDURE — 82248 BILIRUBIN DIRECT: CPT

## 2022-11-04 LAB — ZINC SERPL-MCNC: 127.9 UG/DL (ref 60–120)

## 2022-11-07 LAB
VIT B2 SERPL-SCNC: 3 NMOL/L (ref 5–50)
VIT B6 SERPL-MCNC: 14.4 NMOL/L (ref 20–125)

## 2022-11-09 ENCOUNTER — PRE-ADMISSION TESTING (OUTPATIENT)
Dept: ADMISSIONS | Facility: MEDICAL CENTER | Age: 67
End: 2022-11-09
Attending: INTERNAL MEDICINE
Payer: MEDICARE

## 2022-11-09 DIAGNOSIS — Z01.810 PRE-OPERATIVE CARDIOVASCULAR EXAMINATION: ICD-10-CM

## 2022-11-09 LAB — EKG IMPRESSION: NORMAL

## 2022-11-09 PROCEDURE — 93005 ELECTROCARDIOGRAM TRACING: CPT

## 2022-11-09 PROCEDURE — 93010 ELECTROCARDIOGRAM REPORT: CPT | Performed by: INTERNAL MEDICINE

## 2022-11-09 RX ORDER — FENOFIBRATE 134 MG/1
134 CAPSULE ORAL EVERY EVENING
COMMUNITY
Start: 2022-10-24

## 2022-11-09 RX ORDER — DOCUSATE SODIUM 100 MG/1
100 CAPSULE, LIQUID FILLED ORAL NIGHTLY
COMMUNITY

## 2022-11-09 RX ORDER — MAGNESIUM OXIDE 400 MG/1
400 TABLET ORAL NIGHTLY
COMMUNITY

## 2022-11-09 ASSESSMENT — FIBROSIS 4 INDEX: FIB4 SCORE: 1.95

## 2022-11-09 NOTE — PREPROCEDURE INSTRUCTIONS
"Pre-admit appointment completed. \"Preparing for your procedure\" sheet given to pt along with verbal and written instructions. Pt instructed to continue regularly prescribed medications through the day before surgery. Pt instructed to take the following medications the day of surgery with a sip of water, per anesthesia protocol;  Gabapentin, and if needed-norco.    MET's= >4.  "

## 2022-11-10 LAB — MISCELLANEOUS LAB RESULT MISCLAB: NORMAL

## 2022-11-15 ENCOUNTER — APPOINTMENT (OUTPATIENT)
Dept: RADIOLOGY | Facility: MEDICAL CENTER | Age: 67
End: 2022-11-15
Attending: INTERNAL MEDICINE
Payer: MEDICARE

## 2022-11-15 ENCOUNTER — ANESTHESIA (OUTPATIENT)
Dept: SURGERY | Facility: MEDICAL CENTER | Age: 67
End: 2022-11-15
Payer: MEDICARE

## 2022-11-15 ENCOUNTER — HOSPITAL ENCOUNTER (OUTPATIENT)
Facility: MEDICAL CENTER | Age: 67
End: 2022-11-15
Attending: INTERNAL MEDICINE | Admitting: INTERNAL MEDICINE
Payer: MEDICARE

## 2022-11-15 ENCOUNTER — ANESTHESIA EVENT (OUTPATIENT)
Dept: SURGERY | Facility: MEDICAL CENTER | Age: 67
End: 2022-11-15
Payer: MEDICARE

## 2022-11-15 VITALS
HEART RATE: 64 BPM | BODY MASS INDEX: 23.75 KG/M2 | TEMPERATURE: 97 F | WEIGHT: 139.11 LBS | DIASTOLIC BLOOD PRESSURE: 68 MMHG | SYSTOLIC BLOOD PRESSURE: 124 MMHG | OXYGEN SATURATION: 94 % | RESPIRATION RATE: 16 BRPM | HEIGHT: 64 IN

## 2022-11-15 PROCEDURE — 160046 HCHG PACU - 1ST 60 MINS PHASE II: Performed by: INTERNAL MEDICINE

## 2022-11-15 PROCEDURE — A9270 NON-COVERED ITEM OR SERVICE: HCPCS | Performed by: ANESTHESIOLOGY

## 2022-11-15 PROCEDURE — 160009 HCHG ANES TIME/MIN: Performed by: INTERNAL MEDICINE

## 2022-11-15 PROCEDURE — 160025 RECOVERY II MINUTES (STATS): Performed by: INTERNAL MEDICINE

## 2022-11-15 PROCEDURE — C1769 GUIDE WIRE: HCPCS | Performed by: INTERNAL MEDICINE

## 2022-11-15 PROCEDURE — 700111 HCHG RX REV CODE 636 W/ 250 OVERRIDE (IP): Performed by: ANESTHESIOLOGY

## 2022-11-15 PROCEDURE — 00732 ANES UPR GI NDSC PX ERCP: CPT | Performed by: ANESTHESIOLOGY

## 2022-11-15 PROCEDURE — 160047 HCHG PACU  - EA ADDL 30 MINS PHASE II: Performed by: INTERNAL MEDICINE

## 2022-11-15 PROCEDURE — 160036 HCHG PACU - EA ADDL 30 MINS PHASE I: Performed by: INTERNAL MEDICINE

## 2022-11-15 PROCEDURE — 160203 HCHG ENDO MINUTES - 1ST 30 MINS LEVEL 4: Performed by: INTERNAL MEDICINE

## 2022-11-15 PROCEDURE — 700101 HCHG RX REV CODE 250: Performed by: INTERNAL MEDICINE

## 2022-11-15 PROCEDURE — 700101 HCHG RX REV CODE 250: Performed by: ANESTHESIOLOGY

## 2022-11-15 PROCEDURE — 160048 HCHG OR STATISTICAL LEVEL 1-5: Performed by: INTERNAL MEDICINE

## 2022-11-15 PROCEDURE — 160035 HCHG PACU - 1ST 60 MINS PHASE I: Performed by: INTERNAL MEDICINE

## 2022-11-15 PROCEDURE — 160002 HCHG RECOVERY MINUTES (STAT): Performed by: INTERNAL MEDICINE

## 2022-11-15 PROCEDURE — 160208 HCHG ENDO MINUTES - EA ADDL 1 MIN LEVEL 4: Performed by: INTERNAL MEDICINE

## 2022-11-15 PROCEDURE — C1748 ENDOSCOPE, SINGLE, UGI: HCPCS | Performed by: INTERNAL MEDICINE

## 2022-11-15 PROCEDURE — 700102 HCHG RX REV CODE 250 W/ 637 OVERRIDE(OP): Performed by: ANESTHESIOLOGY

## 2022-11-15 PROCEDURE — C1889 IMPLANT/INSERT DEVICE, NOC: HCPCS | Performed by: INTERNAL MEDICINE

## 2022-11-15 PROCEDURE — 74328 X-RAY BILE DUCT ENDOSCOPY: CPT

## 2022-11-15 PROCEDURE — 502240 HCHG MISC OR SUPPLY RC 0272: Performed by: INTERNAL MEDICINE

## 2022-11-15 PROCEDURE — 700105 HCHG RX REV CODE 258: Performed by: INTERNAL MEDICINE

## 2022-11-15 RX ORDER — METOPROLOL TARTRATE 1 MG/ML
1 INJECTION, SOLUTION INTRAVENOUS
Status: DISCONTINUED | OUTPATIENT
Start: 2022-11-15 | End: 2022-11-15 | Stop reason: HOSPADM

## 2022-11-15 RX ORDER — HYDROMORPHONE HYDROCHLORIDE 1 MG/ML
0.4 INJECTION, SOLUTION INTRAMUSCULAR; INTRAVENOUS; SUBCUTANEOUS
Status: DISCONTINUED | OUTPATIENT
Start: 2022-11-15 | End: 2022-11-15 | Stop reason: HOSPADM

## 2022-11-15 RX ORDER — ONDANSETRON 2 MG/ML
4 INJECTION INTRAMUSCULAR; INTRAVENOUS
Status: COMPLETED | OUTPATIENT
Start: 2022-11-15 | End: 2022-11-15

## 2022-11-15 RX ORDER — IPRATROPIUM BROMIDE AND ALBUTEROL SULFATE 2.5; .5 MG/3ML; MG/3ML
3 SOLUTION RESPIRATORY (INHALATION)
Status: DISCONTINUED | OUTPATIENT
Start: 2022-11-15 | End: 2022-11-15 | Stop reason: HOSPADM

## 2022-11-15 RX ORDER — CEFAZOLIN SODIUM 1 G/3ML
INJECTION, POWDER, FOR SOLUTION INTRAMUSCULAR; INTRAVENOUS PRN
Status: DISCONTINUED | OUTPATIENT
Start: 2022-11-15 | End: 2022-11-15 | Stop reason: SURG

## 2022-11-15 RX ORDER — DIPHENHYDRAMINE HYDROCHLORIDE 50 MG/ML
12.5 INJECTION INTRAMUSCULAR; INTRAVENOUS
Status: DISCONTINUED | OUTPATIENT
Start: 2022-11-15 | End: 2022-11-15 | Stop reason: HOSPADM

## 2022-11-15 RX ORDER — LIDOCAINE HYDROCHLORIDE 20 MG/ML
INJECTION, SOLUTION EPIDURAL; INFILTRATION; INTRACAUDAL; PERINEURAL PRN
Status: DISCONTINUED | OUTPATIENT
Start: 2022-11-15 | End: 2022-11-15 | Stop reason: SURG

## 2022-11-15 RX ORDER — PHENYLEPHRINE HCL IN 0.9% NACL 0.5 MG/5ML
SYRINGE (ML) INTRAVENOUS PRN
Status: DISCONTINUED | OUTPATIENT
Start: 2022-11-15 | End: 2022-11-15 | Stop reason: SURG

## 2022-11-15 RX ORDER — ONDANSETRON 2 MG/ML
INJECTION INTRAMUSCULAR; INTRAVENOUS PRN
Status: DISCONTINUED | OUTPATIENT
Start: 2022-11-15 | End: 2022-11-15 | Stop reason: SURG

## 2022-11-15 RX ORDER — DEXAMETHASONE SODIUM PHOSPHATE 4 MG/ML
INJECTION, SOLUTION INTRA-ARTICULAR; INTRALESIONAL; INTRAMUSCULAR; INTRAVENOUS; SOFT TISSUE PRN
Status: DISCONTINUED | OUTPATIENT
Start: 2022-11-15 | End: 2022-11-15 | Stop reason: SURG

## 2022-11-15 RX ORDER — SODIUM CHLORIDE, SODIUM LACTATE, POTASSIUM CHLORIDE, CALCIUM CHLORIDE 600; 310; 30; 20 MG/100ML; MG/100ML; MG/100ML; MG/100ML
INJECTION, SOLUTION INTRAVENOUS CONTINUOUS
Status: CANCELLED | OUTPATIENT
Start: 2022-11-15 | End: 2022-11-15

## 2022-11-15 RX ORDER — HYDROMORPHONE HYDROCHLORIDE 1 MG/ML
0.2 INJECTION, SOLUTION INTRAMUSCULAR; INTRAVENOUS; SUBCUTANEOUS
Status: DISCONTINUED | OUTPATIENT
Start: 2022-11-15 | End: 2022-11-15 | Stop reason: HOSPADM

## 2022-11-15 RX ORDER — HYDRALAZINE HYDROCHLORIDE 20 MG/ML
5 INJECTION INTRAMUSCULAR; INTRAVENOUS
Status: DISCONTINUED | OUTPATIENT
Start: 2022-11-15 | End: 2022-11-15 | Stop reason: HOSPADM

## 2022-11-15 RX ORDER — HALOPERIDOL 5 MG/ML
1 INJECTION INTRAMUSCULAR
Status: DISCONTINUED | OUTPATIENT
Start: 2022-11-15 | End: 2022-11-15 | Stop reason: HOSPADM

## 2022-11-15 RX ORDER — OXYCODONE HCL 5 MG/5 ML
10 SOLUTION, ORAL ORAL
Status: COMPLETED | OUTPATIENT
Start: 2022-11-15 | End: 2022-11-15

## 2022-11-15 RX ORDER — SODIUM CHLORIDE, SODIUM LACTATE, POTASSIUM CHLORIDE, CALCIUM CHLORIDE 600; 310; 30; 20 MG/100ML; MG/100ML; MG/100ML; MG/100ML
INJECTION, SOLUTION INTRAVENOUS CONTINUOUS
Status: ACTIVE | OUTPATIENT
Start: 2022-11-15 | End: 2022-11-15

## 2022-11-15 RX ORDER — ROCURONIUM BROMIDE 10 MG/ML
INJECTION, SOLUTION INTRAVENOUS PRN
Status: DISCONTINUED | OUTPATIENT
Start: 2022-11-15 | End: 2022-11-15 | Stop reason: SURG

## 2022-11-15 RX ORDER — MORPHINE SULFATE 0.5 MG/ML
INJECTION, SOLUTION EPIDURAL; INTRATHECAL; INTRAVENOUS PRN
Status: DISCONTINUED | OUTPATIENT
Start: 2022-11-15 | End: 2022-11-15 | Stop reason: SURG

## 2022-11-15 RX ORDER — HYDROMORPHONE HYDROCHLORIDE 1 MG/ML
0.1 INJECTION, SOLUTION INTRAMUSCULAR; INTRAVENOUS; SUBCUTANEOUS
Status: DISCONTINUED | OUTPATIENT
Start: 2022-11-15 | End: 2022-11-15 | Stop reason: HOSPADM

## 2022-11-15 RX ORDER — OXYCODONE HCL 5 MG/5 ML
5 SOLUTION, ORAL ORAL
Status: COMPLETED | OUTPATIENT
Start: 2022-11-15 | End: 2022-11-15

## 2022-11-15 RX ORDER — MIDAZOLAM HYDROCHLORIDE 1 MG/ML
INJECTION INTRAMUSCULAR; INTRAVENOUS PRN
Status: DISCONTINUED | OUTPATIENT
Start: 2022-11-15 | End: 2022-11-15 | Stop reason: SURG

## 2022-11-15 RX ADMIN — ROCURONIUM BROMIDE 50 MG: 10 INJECTION, SOLUTION INTRAVENOUS at 07:50

## 2022-11-15 RX ADMIN — ONDANSETRON 4 MG: 2 INJECTION INTRAMUSCULAR; INTRAVENOUS at 09:28

## 2022-11-15 RX ADMIN — PROPOFOL 40 MG: 10 INJECTION, EMULSION INTRAVENOUS at 08:13

## 2022-11-15 RX ADMIN — MIDAZOLAM HYDROCHLORIDE 2 MG: 1 INJECTION, SOLUTION INTRAMUSCULAR; INTRAVENOUS at 07:45

## 2022-11-15 RX ADMIN — FENTANYL CITRATE 100 MCG: 50 INJECTION, SOLUTION INTRAMUSCULAR; INTRAVENOUS at 07:48

## 2022-11-15 RX ADMIN — LIDOCAINE HYDROCHLORIDE 100 MG: 20 INJECTION, SOLUTION EPIDURAL; INFILTRATION; INTRACAUDAL at 07:50

## 2022-11-15 RX ADMIN — CEFAZOLIN 2 G: 330 INJECTION, POWDER, FOR SOLUTION INTRAMUSCULAR; INTRAVENOUS at 07:57

## 2022-11-15 RX ADMIN — Medication 200 MCG: at 08:32

## 2022-11-15 RX ADMIN — SUGAMMADEX 200 MG: 100 INJECTION, SOLUTION INTRAVENOUS at 08:46

## 2022-11-15 RX ADMIN — FENTANYL CITRATE 25 MCG: 50 INJECTION, SOLUTION INTRAMUSCULAR; INTRAVENOUS at 09:28

## 2022-11-15 RX ADMIN — OXYCODONE HYDROCHLORIDE 10 MG: 5 SOLUTION ORAL at 09:27

## 2022-11-15 RX ADMIN — ONDANSETRON 4 MG: 2 INJECTION INTRAMUSCULAR; INTRAVENOUS at 08:10

## 2022-11-15 RX ADMIN — DEXAMETHASONE SODIUM PHOSPHATE 8 MG: 4 INJECTION, SOLUTION INTRA-ARTICULAR; INTRALESIONAL; INTRAMUSCULAR; INTRAVENOUS; SOFT TISSUE at 08:10

## 2022-11-15 RX ADMIN — PROPOFOL 160 MG: 10 INJECTION, EMULSION INTRAVENOUS at 07:50

## 2022-11-15 RX ADMIN — MORPHINE SULFATE 5 MG: 0.5 INJECTION EPIDURAL; INTRATHECAL; INTRAVENOUS at 08:16

## 2022-11-15 RX ADMIN — SODIUM CHLORIDE, POTASSIUM CHLORIDE, SODIUM LACTATE AND CALCIUM CHLORIDE: 600; 310; 30; 20 INJECTION, SOLUTION INTRAVENOUS at 06:46

## 2022-11-15 RX ADMIN — LIDOCAINE HYDROCHLORIDE 0.5 ML: 10 INJECTION, SOLUTION EPIDURAL; INFILTRATION; INTRACAUDAL; PERINEURAL at 06:46

## 2022-11-15 RX ADMIN — Medication 200 MCG: at 08:25

## 2022-11-15 ASSESSMENT — PAIN SCALES - GENERAL: PAIN_LEVEL: 0

## 2022-11-15 ASSESSMENT — PAIN DESCRIPTION - PAIN TYPE: TYPE: ACUTE PAIN

## 2022-11-15 ASSESSMENT — FIBROSIS 4 INDEX: FIB4 SCORE: 1.95

## 2022-11-15 NOTE — DISCHARGE INSTRUCTIONS
ENDOSCOPY HOME CARE INSTRUCTIONS    ERCP  1. Don't eat or drink anything for about an hour after the test. You can then resume your regular diet.  2. Don't drive or drink alcohol for 24 hours. The medication you received will make you too drowsy.  3. Don't take any coffee, tea, or aspirin products until after you see your doctor. These can harm the lining of your stomach.  4. If you begin to vomit bloody material, or develop black or bloody stools, call your doctor as soon as possible.  5. If you have any neck, chest, abdominal pain or temp of 100 degrees, call your doctor.  6. See your doctor Follow-up in clinic as needed.  7. Additional instructions:     SUMMARY:  1.) Successful removal of metal biliary stent  2.) Successful removal of bile duct stones  3.) A full cholangiogram was not obtained due to the large orifice at the ampulla allowing contrast to spill from the duct prior to filling the proximal ducts     RECOMMENDATIONS:   1.) discharge today  2.) f/u in clinic as needed  3.) restart regular diet     If any questions arise, call your provider Dr. Barrios [phone].  If your provider is not available, please feel free to call the Surgical Center at [phone].    MEDICATIONS: Resume taking daily medication.  Take prescribed pain medication with food.  If no medication is prescribed, you may take non-aspirin pain medication if needed.  PAIN MEDICATION CAN BE VERY CONSTIPATING.  Take a stool softener or laxative such as senokot, pericolace, or milk of magnesia if needed.    Last pain medication given at 9:27 am, 10mg Oxycodone.     What to Expect Post Anesthesia    Rest and take it easy for the first 24 hours.  A responsible adult is recommended to remain with you during that time.  It is normal to feel sleepy.  We encourage you to not do anything that requires balance, judgment or coordination.    FOR 24 HOURS DO NOT:  Drive, operate machinery or run household appliances.  Drink beer or alcoholic  beverages.  Make important decisions or sign legal documents.    To avoid nausea, slowly advance diet as tolerated, avoiding spicy or greasy foods for the first day.  Add more substantial food to your diet according to your provider's instructions. INCREASE FLUIDS AND FIBER TO AVOID CONSTIPATION.    MILD FLU-LIKE SYMPTOMS ARE NORMAL.  YOU MAY EXPERIENCE GENERALIZED MUSCLE ACHES, THROAT IRRITATION, HEADACHE AND/OR SOME NAUSEA.

## 2022-11-15 NOTE — ANESTHESIA TIME REPORT
Anesthesia Start and Stop Event Times     Date Time Event    11/15/2022 0730 Ready for Procedure     0744 Anesthesia Start     0854 Anesthesia Stop        Responsible Staff  11/15/22    Name Role Begin End    Nik Petit M.D. Anesth 0744 [id number]        Overtime Reason:  no overtime (within assigned shift)    Comments:

## 2022-11-15 NOTE — ANESTHESIA PREPROCEDURE EVALUATION
Case: [id number] Date/Time: 11/15/22 0715    Procedures:       ENDOSCOPIC RETROGRADE CHOLANGIOPANCREATOGRAPHY WITH STENT REMOVAL      ERCP, WITH FOREIGN BODY REMOVAL, STENT REPLACEMENT, OR BOTH    Pre-op diagnosis: BILARY STENT REMOVAL, HISTORY OF BILIARY DUCT STENT PLACEMENT    Location:  ENDOSCOPIC ULTRASOUND ROOM / SURGERY [facility]    Surgeons: Darwin Barrios M.D.          Relevant Problems   CARDIAC   (positive) HTN (hypertension), benign      GI   (positive) Esophageal reflux         (positive) MOLINA (acute kidney injury) (HCC)       Physical Exam    Airway   Mallampati: II  TM distance: >3 FB  Neck ROM: limited       Cardiovascular - normal exam  Rhythm: regular  Rate: normal  (-) murmur     Dental - normal exam  (+) implants           Pulmonary - normal exam  Breath sounds clear to auscultation     Abdominal    Neurological - normal exam                 Anesthesia Plan    ASA 2       Plan - general       Airway plan will be ETT          Induction: intravenous    Postoperative Plan: Postoperative administration of opioids is intended.    Pertinent diagnostic labs and testing reviewed    Informed Consent:    Anesthetic plan and risks discussed with patient.    Use of blood products discussed with: patient whom consented to blood products.

## 2022-11-15 NOTE — ANESTHESIA PROCEDURE NOTES
Airway    Date/Time: 11/15/2022 7:52 AM  Performed by: Nik Petit M.D.  Authorized by: Nik Petit M.D.     Location:  OR  Urgency:  Elective  Difficult Airway: No    Indications for Airway Management:  Anesthesia      Spontaneous Ventilation: absent    Sedation Level:  Deep  Preoxygenated: Yes    Patient Position:  Sniffing  Mask Difficulty Assessment:  1 - vent by mask  Final Airway Type:  Endotracheal airway  Final Endotracheal Airway:  ETT  Cuffed: Yes    Technique Used for Successful ETT Placement:  Direct laryngoscopy    Insertion Site:  Oral  Blade Type:  Cárdenas  Laryngoscope Blade/Videolaryngoscope Blade Size:  2  ETT Size (mm):  7.5  Measured from:  Teeth  ETT to Teeth (cm):  22  Placement Verified by: auscultation and capnometry    Cormack-Lehane Classification:  Grade I - full view of glottis  Number of Attempts at Approach:  1

## 2022-11-15 NOTE — OR NURSING
0849: Pt from ENDO to PACU via gurney, respirations, spontaneous, and non-labored OPA. Pt not rouseable.  0857: OPA dc'd  0900: Pt rouses to voice, nods no to pain and nausea, and quickly falls back to sleep. Pt nods to being cold, blanket applied.  0915: Pt more alert, denies nausea and pain.  0930: Pt having pain, plan analgesia. Pt also complaining of nausea, plan medication.  0945: Pt resting, states pain is improving, tolerating sips of water.  1000: No change. Meets criteria to transfer to Stage 2.   1005: Report given to Roberto SANON.

## 2022-11-15 NOTE — OR NURSING
1007: To stage ll. Pain is tolerable, no nausea. Awaiting , ETA 11.    1232: Home care instructions reviewed w/ pt and . No questions. Meets criteria for discharge.

## 2022-11-15 NOTE — OP REPORT
DATE OF SERVICE:  11/15/2022     PROCEDURE PERFORMED:  Endoscopic retrograde cholangiography with stent removal and stone removal     CONSENT:  Procedure risks and benefits reviewed thoroughly with the patient, risks including but not limited to bleeding, perforation, side effects of medication were informed.  Patient  voiced understanding and agreed to proceed.    Additional risks inherent to ERCP that being mild, moderate, severe pancreatitis that could lead to postprocedural pain, prolonged hospitalization, intensive care unit stay, and/or death were reviewed with the patient who voiced understanding and agreed to proceed.     PREPROCEDURE DIAGNOSIS:  biliary foreign body     POSTPROCEDURE DIAGNOSES:  biliary foreign body, choledocholithiasis     PHYSICIAN: Darwin Barrios MD        DESCRIPTION OF PROCEDURE:  The patient was placed in a prone position after intubation and sedation.  A bite block was inserted in the mouth and a [employer] Exalt duodenoscope was initially used and was passed carefully and easily under semi-direct visualization into the esophagus and passed through the stomach to the duodenum. Upon reaching the second portion of the duodenum, the scope was withdrawn to the short position.The ampulla was identified. There was a metal stent protruding from the biliary orifice.    Attempts were made to remove the stent using a rat tooth forceps but do to the stiffness of the Exalt duodenoscope, the stent could not be removed. The Exalt duodenoscope was then removed and exchanged for a regular side viewing Olympus duodenoscope. The scope was positioned directly in front of the stent. Unfortunately, the duodenal end of the stent was imbedded in the duodenal wall. The stent was grasped along the lateral portion and pulled from the duct so that the entire stent was in the duodenum. Attention was then turned to the imbedded end of the stent. Using the rat-tooth forceps a small portion of duodenal  mucosa was removed and the stent was freed. The stent was then removed from the body entirely. The bile duct was then addressed. There was a wide opening at the ampulla. Direct visualization was easily obtained of the pancreatic duct and bile duct. A 9-12mm biliary extraction balloon was then advanced into the bile duct. Multiple balloon sweeps were performed to remove any debris. Multiple small stones and sludge were removed. Contrast was injected to obtain a cholangiogram but due to the large ampullary orifice, the contrast refluxed into the PD and then into the duodenum. No additional attempts were made to get a Cholangiogram and all instruments were removed.        COMPLICATIONS:  None.     BLOOD LOSS:  None.     SPECIMENS:  None.    SUMMARY:  1.) Successful removal of metal biliary stent  2.) Successful removal of bile duct stones  3.) A full cholangiogram was not obtained due to the large orifice at the ampulla allowing contrast to spill from the duct prior to filling the proximal ducts     RECOMMENDATIONS:   1.) discharge today  2.) f/u in clinic as needed  3.) restart regular diet

## 2022-11-15 NOTE — ANESTHESIA POSTPROCEDURE EVALUATION
Patient: Nils Alfonso    Procedure Summary     Date: 11/15/22 Room / Location:  ENDOSCOPIC ULTRASOUND ROOM / SURGERY [facility]    Anesthesia Start: 0744 Anesthesia Stop: 0854    Procedure: ENDOSCOPIC RETROGRADE CHOLANGIOPANCREATOGRAPHY WITH STENT REMOVAL Diagnosis:       Encounter for removal of biliary stent      (BILARY STENT REMOVAL)    Surgeons: Darwin Barrios M.D. Responsible Provider: Nik Petit M.D.    Anesthesia Type: general ASA Status: 2          Final Anesthesia Type: general  Last vitals  BP   Blood Pressure : 120/62    Temp   36.3 °C (97.3 °F)    Pulse   68   Resp   18    SpO2   95 %      Anesthesia Post Evaluation    Patient location during evaluation: PACU  Patient participation: complete - patient participated  Level of consciousness: awake and alert  Pain score: 0    Airway patency: patent  Anesthetic complications: no  Cardiovascular status: hemodynamically stable  Respiratory status: acceptable  Hydration status: euvolemic    PONV: none          There were no known notable events for this encounter.     Nurse Pain Score: 4 (NPRS)

## 2023-05-04 NOTE — PROGRESS NOTES
"Surgical Oncology Progress Note    Subjective:  No acute events.  States continues to feel better than she did prior to surgery.  Anxious about the fact that she was told has \"stool\" draining from her drains.  + ambulation yesterday.  C/o randolph.  Afebrile last 24h    Objective:  /53   Pulse 63   Temp 36.9 °C (98.5 °F) (Temporal)   Resp 16   Ht 1.6 m (5' 2.99\")   Wt 66.4 kg (146 lb 6.2 oz)   LMP 01/01/2009   SpO2 92%   BMI 25.94 kg/m²       Intake/Output Summary (Last 24 hours) at 11/9/2021 0806  Last data filed at 11/9/2021 0711  Gross per 24 hour   Intake 889.8 ml   Output 965 ml   Net -75.2 ml        Net IO Since Admission: -5,249.53 mL [11/09/21 0806]     Exam:  General: AAOx3, appropriate in conversation    Pulmonary: NC in place, no increased WOB  CV: hemodynamically acceptable   Abdomen:  Soft, appropriately TTP.  MARTHA x2 with thick, bilious fluid.  Incisional vac in place.  Fluid leaking from prior R lateral abdominal wall prior IR drain site which is consistent with same fluid collecting in MARTHA drains  MSK: No peripheral edema, extremities warm       Assessment/Plan:  Patient is a 65 y/o F admitted after duodenal perforation following ERCP - found to have large retroperitoneal abscess which failed conservative management with IR drains.  OR 11/5/21 for ex-lap, drainage of intra-abdominal and retroperitoneal abscess, drain placement     POD#: 3     -Continue care per primary team   -Discussed with patient today at bedside that the character of her drains is not entirely unexpected given the operation she required.  We discussed that when debriding the abscess cavity in her retroperitoneum it is likely (and somewhat anticipated) that the walled off cavity from her duodenal perforation would be disrupted.    -What is important at this point is to have strict quantification of the drainage.  Discussed with bedside RN applying ostomy appliance around her prior drain site which is leaking to obtain 2 " Informed pt of info below & pt states he understand.        ----- Message from Carroll Pavon MD sent at 5/4/2023  2:56 PM CDT -----  Please let Darion know that he has been cleared for surgery and his A1c has improved from 9.8% to 8.3%. Previously elevated liver enzymes have also normalized.     -continue with a carb conscious diet and continued surveillance labs every 6 months.     Thank you      goals: 1. Quantify drainage  2. Protect patient skin  -I have discussed with the patient that depending on the amount of drainage - we wound need to re-visit the strict NPO/TPN route if drainage was high  -Anticipate likely need for repeat imaging in next few days with PO and IV contrasted CT.  Patient has expressed concern regarding drinking the PO contrast and we discussed that would be reasonable for her to drink it over a few hours (any maybe not the entire volume).  -Will continue to follow    Plan of care has been discussed with patient who is in agreement with the plan.  Bedside RN updated.  Primary team updated.    Lisy Vann MD  November 9, 2021, 8:06 AM

## 2023-07-13 ENCOUNTER — HOSPITAL ENCOUNTER (OUTPATIENT)
Dept: RADIOLOGY | Facility: MEDICAL CENTER | Age: 68
End: 2023-07-13
Attending: FAMILY MEDICINE
Payer: MEDICARE

## 2023-07-13 DIAGNOSIS — Z12.31 VISIT FOR SCREENING MAMMOGRAM: ICD-10-CM

## 2023-07-13 PROCEDURE — 77063 BREAST TOMOSYNTHESIS BI: CPT

## 2023-07-14 ENCOUNTER — APPOINTMENT (RX ONLY)
Dept: URBAN - METROPOLITAN AREA CLINIC 15 | Facility: CLINIC | Age: 68
Setting detail: DERMATOLOGY
End: 2023-07-14

## 2023-07-14 DIAGNOSIS — D22 MELANOCYTIC NEVI: ICD-10-CM

## 2023-07-14 DIAGNOSIS — L81.4 OTHER MELANIN HYPERPIGMENTATION: ICD-10-CM

## 2023-07-14 DIAGNOSIS — Z71.89 OTHER SPECIFIED COUNSELING: ICD-10-CM

## 2023-07-14 DIAGNOSIS — L57.0 ACTINIC KERATOSIS: ICD-10-CM

## 2023-07-14 DIAGNOSIS — L82.1 OTHER SEBORRHEIC KERATOSIS: ICD-10-CM

## 2023-07-14 DIAGNOSIS — D69.2 OTHER NONTHROMBOCYTOPENIC PURPURA: ICD-10-CM

## 2023-07-14 DIAGNOSIS — L30.4 ERYTHEMA INTERTRIGO: ICD-10-CM

## 2023-07-14 PROBLEM — D22.5 MELANOCYTIC NEVI OF TRUNK: Status: ACTIVE | Noted: 2023-07-14

## 2023-07-14 PROCEDURE — ? SUNSCREEN TREATMENT REGIMEN

## 2023-07-14 PROCEDURE — ? LIQUID NITROGEN

## 2023-07-14 PROCEDURE — 17000 DESTRUCT PREMALG LESION: CPT

## 2023-07-14 PROCEDURE — ? COUNSELING

## 2023-07-14 PROCEDURE — 99213 OFFICE O/P EST LOW 20 MIN: CPT | Mod: 25

## 2023-07-14 ASSESSMENT — LOCATION DETAILED DESCRIPTION DERM
LOCATION DETAILED: INFERIOR THORACIC SPINE
LOCATION DETAILED: LEFT PROXIMAL DORSAL FOREARM
LOCATION DETAILED: RIGHT VENTRAL PROXIMAL FOREARM
LOCATION DETAILED: RIGHT PROXIMAL DORSAL FOREARM
LOCATION DETAILED: INFERIOR MID FOREHEAD
LOCATION DETAILED: LEFT INFERIOR FOREHEAD
LOCATION DETAILED: LEFT INFRAMAMMARY CREASE (INNER QUADRANT)
LOCATION DETAILED: SUPERIOR THORACIC SPINE
LOCATION DETAILED: LEFT VENTRAL PROXIMAL FOREARM
LOCATION DETAILED: LEFT SUPERIOR PARIETAL SCALP

## 2023-07-14 ASSESSMENT — LOCATION SIMPLE DESCRIPTION DERM
LOCATION SIMPLE: RIGHT FOREARM
LOCATION SIMPLE: LEFT FOREARM
LOCATION SIMPLE: SCALP
LOCATION SIMPLE: UPPER BACK
LOCATION SIMPLE: LEFT FOREHEAD
LOCATION SIMPLE: LEFT BREAST
LOCATION SIMPLE: INFERIOR FOREHEAD

## 2023-07-14 ASSESSMENT — LOCATION ZONE DERM
LOCATION ZONE: ARM
LOCATION ZONE: TRUNK
LOCATION ZONE: SCALP
LOCATION ZONE: FACE

## 2023-07-18 ENCOUNTER — RX ONLY (OUTPATIENT)
Age: 68
Setting detail: RX ONLY
End: 2023-07-18

## 2023-07-18 RX ORDER — HYDROCORTISONE 25 MG/G
CREAM TOPICAL
Qty: 28 | Refills: 0 | Status: ERX

## 2023-07-18 RX ORDER — NYSTATIN 100000 [USP'U]/G
OINTMENT TOPICAL
Qty: 15 | Refills: 2 | Status: ERX | COMMUNITY
Start: 2023-07-18

## 2023-07-18 RX ORDER — HYDROCORTISONE 25 MG/G
CREAM TOPICAL
Qty: 28 | Refills: 0 | Status: ERX | COMMUNITY
Start: 2023-07-18

## 2023-07-18 RX ORDER — NYSTATIN 100000 [USP'U]/G
OINTMENT TOPICAL
Qty: 15 | Refills: 2 | Status: ERX

## 2023-11-03 ENCOUNTER — HOSPITAL ENCOUNTER (OUTPATIENT)
Dept: LAB | Facility: MEDICAL CENTER | Age: 68
End: 2023-11-03
Attending: FAMILY MEDICINE
Payer: MEDICARE

## 2023-11-03 LAB
25(OH)D3 SERPL-MCNC: 58 NG/ML (ref 30–100)
ALBUMIN SERPL BCP-MCNC: 4.1 G/DL (ref 3.2–4.9)
ALBUMIN/GLOB SERPL: 1.5 G/DL
ALP SERPL-CCNC: 64 U/L (ref 30–99)
ALT SERPL-CCNC: 16 U/L (ref 2–50)
ANION GAP SERPL CALC-SCNC: 9 MMOL/L (ref 7–16)
AST SERPL-CCNC: 33 U/L (ref 12–45)
BILIRUB SERPL-MCNC: 0.4 MG/DL (ref 0.1–1.5)
BUN SERPL-MCNC: 18 MG/DL (ref 8–22)
CALCIUM ALBUM COR SERPL-MCNC: 10 MG/DL (ref 8.5–10.5)
CALCIUM SERPL-MCNC: 10.1 MG/DL (ref 8.5–10.5)
CHLORIDE SERPL-SCNC: 104 MMOL/L (ref 96–112)
CHOLEST SERPL-MCNC: 196 MG/DL (ref 100–199)
CO2 SERPL-SCNC: 28 MMOL/L (ref 20–33)
CREAT SERPL-MCNC: 0.9 MG/DL (ref 0.5–1.4)
FASTING STATUS PATIENT QL REPORTED: NORMAL
GFR SERPLBLD CREATININE-BSD FMLA CKD-EPI: 70 ML/MIN/1.73 M 2
GLOBULIN SER CALC-MCNC: 2.8 G/DL (ref 1.9–3.5)
GLUCOSE SERPL-MCNC: 81 MG/DL (ref 65–99)
HCV AB SER QL: NORMAL
HDLC SERPL-MCNC: 59 MG/DL
LDLC SERPL CALC-MCNC: 114 MG/DL
POTASSIUM SERPL-SCNC: 3.9 MMOL/L (ref 3.6–5.5)
PROT SERPL-MCNC: 6.9 G/DL (ref 6–8.2)
SODIUM SERPL-SCNC: 141 MMOL/L (ref 135–145)
TRIGL SERPL-MCNC: 115 MG/DL (ref 0–149)

## 2023-11-03 PROCEDURE — 80053 COMPREHEN METABOLIC PANEL: CPT

## 2023-11-03 PROCEDURE — 86803 HEPATITIS C AB TEST: CPT

## 2023-11-03 PROCEDURE — 80061 LIPID PANEL: CPT

## 2023-11-03 PROCEDURE — 36415 COLL VENOUS BLD VENIPUNCTURE: CPT

## 2023-11-03 PROCEDURE — 82306 VITAMIN D 25 HYDROXY: CPT

## 2024-04-21 NOTE — CASE COMMUNICATION
Danii, please fax to pmd-DR Zapata Fax: [phone]    fyi, Patient's heartrate at 101/min is outside of normal parameters and is to be reported to MD . Patient denies any dizziness, light headedness, chest pain. . All other vitals are within HH parameters   Carried

## 2024-05-14 ENCOUNTER — APPOINTMENT (RX ONLY)
Dept: URBAN - METROPOLITAN AREA CLINIC 31 | Facility: CLINIC | Age: 69
Setting detail: DERMATOLOGY
End: 2024-05-14

## 2024-05-14 DIAGNOSIS — L65.9 NONSCARRING HAIR LOSS, UNSPECIFIED: ICD-10-CM

## 2024-05-14 DIAGNOSIS — D485 NEOPLASM OF UNCERTAIN BEHAVIOR OF SKIN: ICD-10-CM

## 2024-05-14 PROBLEM — D48.5 NEOPLASM OF UNCERTAIN BEHAVIOR OF SKIN: Status: ACTIVE | Noted: 2024-05-14

## 2024-05-14 PROCEDURE — 99214 OFFICE O/P EST MOD 30 MIN: CPT | Mod: 25

## 2024-05-14 PROCEDURE — ? ADDITIONAL NOTES

## 2024-05-14 PROCEDURE — ? TREATMENT REGIMEN

## 2024-05-14 PROCEDURE — 11102 TANGNTL BX SKIN SINGLE LES: CPT

## 2024-05-14 PROCEDURE — ? PRESCRIPTION

## 2024-05-14 PROCEDURE — ? BIOPSY BY SHAVE METHOD

## 2024-05-14 PROCEDURE — ? COUNSELING

## 2024-05-14 RX ORDER — CLOBETASOL PROPIONATE 0.5 MG/ML
SOLUTION TOPICAL
Qty: 50 | Refills: 3 | Status: ERX | COMMUNITY
Start: 2024-05-14

## 2024-05-14 RX ORDER — MINOXIDIL 5 %
SOLUTION, NON-ORAL TOPICAL BID
Qty: 180 | Refills: 6 | Status: ERX | COMMUNITY
Start: 2024-05-14

## 2024-05-14 RX ADMIN — CLOBETASOL PROPIONATE: 0.5 SOLUTION TOPICAL at 00:00

## 2024-05-14 RX ADMIN — Medication: at 00:00

## 2024-05-14 ASSESSMENT — LOCATION SIMPLE DESCRIPTION DERM
LOCATION SIMPLE: ANTERIOR SCALP
LOCATION SIMPLE: LEFT TEMPLE

## 2024-05-14 ASSESSMENT — LOCATION ZONE DERM
LOCATION ZONE: FACE
LOCATION ZONE: SCALP

## 2024-05-14 ASSESSMENT — LOCATION DETAILED DESCRIPTION DERM
LOCATION DETAILED: MID-FRONTAL SCALP
LOCATION DETAILED: LEFT CENTRAL TEMPLE

## 2024-05-14 NOTE — PROCEDURE: TREATMENT REGIMEN
Detail Level: Zone
Initiate Treatment: minoxidil 5 % topical solution BID\\nQuantity: 180.0 ml  Days Supply: 30\\nSig: Apply twice daily to affected areas on scalp.\\n\\nclobetasol 0.05 % scalp solution \\nQuantity: 50.0 ml\\nSig: Apply to scalp bid x 2 weeks. Prn for flares.

## 2024-05-14 NOTE — PROCEDURE: ADDITIONAL NOTES
Patient Management Risk Assessment: Moderate
Additional Notes: Did not resolve with ln2
Render Risk Assessment In Note?: no
Detail Level: Simple

## 2024-06-21 ENCOUNTER — RESEARCH ENCOUNTER (OUTPATIENT)
Dept: RESEARCH | Facility: MEDICAL CENTER | Age: 69
End: 2024-06-21
Payer: MEDICARE

## 2024-06-27 ENCOUNTER — HOSPITAL ENCOUNTER (OUTPATIENT)
Dept: LAB | Facility: MEDICAL CENTER | Age: 69
End: 2024-06-27
Attending: FAMILY MEDICINE

## 2024-07-15 ENCOUNTER — APPOINTMENT (RX ONLY)
Dept: URBAN - METROPOLITAN AREA CLINIC 15 | Facility: CLINIC | Age: 69
Setting detail: DERMATOLOGY
End: 2024-07-15

## 2024-07-15 ENCOUNTER — HOSPITAL ENCOUNTER (OUTPATIENT)
Dept: RADIOLOGY | Facility: MEDICAL CENTER | Age: 69
End: 2024-07-15
Attending: FAMILY MEDICINE
Payer: MEDICARE

## 2024-07-15 DIAGNOSIS — Z12.31 VISIT FOR SCREENING MAMMOGRAM: ICD-10-CM

## 2024-07-15 DIAGNOSIS — Z71.89 OTHER SPECIFIED COUNSELING: ICD-10-CM

## 2024-07-15 DIAGNOSIS — Z78.0 ASYMPTOMATIC POSTMENOPAUSAL STATUS: ICD-10-CM

## 2024-07-15 DIAGNOSIS — L81.8 OTHER SPECIFIED DISORDERS OF PIGMENTATION: ICD-10-CM

## 2024-07-15 PROCEDURE — ? COUNSELING

## 2024-07-15 PROCEDURE — 99212 OFFICE O/P EST SF 10 MIN: CPT

## 2024-07-15 PROCEDURE — 77080 DXA BONE DENSITY AXIAL: CPT

## 2024-07-15 PROCEDURE — 77063 BREAST TOMOSYNTHESIS BI: CPT

## 2024-07-15 PROCEDURE — ? ADDITIONAL NOTES

## 2024-07-15 ASSESSMENT — LOCATION DETAILED DESCRIPTION DERM: LOCATION DETAILED: LEFT MID TEMPLE

## 2024-07-15 ASSESSMENT — LOCATION SIMPLE DESCRIPTION DERM: LOCATION SIMPLE: LEFT TEMPLE

## 2024-07-15 ASSESSMENT — LOCATION ZONE DERM: LOCATION ZONE: FACE

## 2024-07-15 NOTE — PROCEDURE: ADDITIONAL NOTES
Detail Level: Simple
Additional Notes: No remaining AK, presence of scar tissue
Render Risk Assessment In Note?: no

## 2024-07-31 ENCOUNTER — APPOINTMENT (RX ONLY)
Dept: URBAN - METROPOLITAN AREA CLINIC 15 | Facility: CLINIC | Age: 69
Setting detail: DERMATOLOGY
End: 2024-07-31

## 2024-07-31 DIAGNOSIS — D22 MELANOCYTIC NEVI: ICD-10-CM

## 2024-07-31 DIAGNOSIS — L81.4 OTHER MELANIN HYPERPIGMENTATION: ICD-10-CM

## 2024-07-31 DIAGNOSIS — L57.0 ACTINIC KERATOSIS: ICD-10-CM

## 2024-07-31 DIAGNOSIS — D18.0 HEMANGIOMA: ICD-10-CM

## 2024-07-31 DIAGNOSIS — L82.1 OTHER SEBORRHEIC KERATOSIS: ICD-10-CM

## 2024-07-31 DIAGNOSIS — L81.8 OTHER SPECIFIED DISORDERS OF PIGMENTATION: ICD-10-CM

## 2024-07-31 DIAGNOSIS — L85.3 XEROSIS CUTIS: ICD-10-CM

## 2024-07-31 DIAGNOSIS — Z71.89 OTHER SPECIFIED COUNSELING: ICD-10-CM

## 2024-07-31 PROBLEM — D22.5 MELANOCYTIC NEVI OF TRUNK: Status: ACTIVE | Noted: 2024-07-31

## 2024-07-31 PROBLEM — D18.01 HEMANGIOMA OF SKIN AND SUBCUTANEOUS TISSUE: Status: ACTIVE | Noted: 2024-07-31

## 2024-07-31 PROBLEM — D22.62 MELANOCYTIC NEVI OF LEFT UPPER LIMB, INCLUDING SHOULDER: Status: ACTIVE | Noted: 2024-07-31

## 2024-07-31 PROBLEM — D22.72 MELANOCYTIC NEVI OF LEFT LOWER LIMB, INCLUDING HIP: Status: ACTIVE | Noted: 2024-07-31

## 2024-07-31 PROBLEM — D22.61 MELANOCYTIC NEVI OF RIGHT UPPER LIMB, INCLUDING SHOULDER: Status: ACTIVE | Noted: 2024-07-31

## 2024-07-31 PROBLEM — D22.71 MELANOCYTIC NEVI OF RIGHT LOWER LIMB, INCLUDING HIP: Status: ACTIVE | Noted: 2024-07-31

## 2024-07-31 PROCEDURE — ? LIQUID NITROGEN

## 2024-07-31 PROCEDURE — 17003 DESTRUCT PREMALG LES 2-14: CPT

## 2024-07-31 PROCEDURE — ? COUNSELING

## 2024-07-31 PROCEDURE — 17000 DESTRUCT PREMALG LESION: CPT

## 2024-07-31 PROCEDURE — 99213 OFFICE O/P EST LOW 20 MIN: CPT | Mod: 25

## 2024-07-31 ASSESSMENT — LOCATION DETAILED DESCRIPTION DERM
LOCATION DETAILED: RIGHT MEDIAL INFERIOR CHEST
LOCATION DETAILED: RIGHT DISTAL POSTERIOR THIGH
LOCATION DETAILED: RIGHT SUPERIOR LATERAL MIDBACK
LOCATION DETAILED: LEFT ANTERIOR PROXIMAL THIGH
LOCATION DETAILED: NASAL TIP
LOCATION DETAILED: LEFT PROXIMAL CALF
LOCATION DETAILED: RIGHT LATERAL BREAST 9-10:00 REGION
LOCATION DETAILED: LEFT CENTRAL MALAR CHEEK
LOCATION DETAILED: RIGHT PROXIMAL CALF
LOCATION DETAILED: LEFT VENTRAL DISTAL FOREARM
LOCATION DETAILED: LEFT PERIAREOLAR BREAST 5-6:00 REGION
LOCATION DETAILED: LEFT INFERIOR UPPER BACK
LOCATION DETAILED: RIGHT PROXIMAL DORSAL FOREARM
LOCATION DETAILED: RIGHT ANTERIOR DISTAL UPPER ARM
LOCATION DETAILED: RIGHT VENTRAL PROXIMAL FOREARM
LOCATION DETAILED: LEFT MID TEMPLE
LOCATION DETAILED: RIGHT VENTRAL DISTAL FOREARM
LOCATION DETAILED: LEFT DISTAL POSTERIOR THIGH
LOCATION DETAILED: RIGHT MID-UPPER BACK
LOCATION DETAILED: LEFT ANTERIOR DISTAL UPPER ARM
LOCATION DETAILED: LEFT PROXIMAL DORSAL FOREARM
LOCATION DETAILED: RIGHT ANTERIOR PROXIMAL THIGH
LOCATION DETAILED: RIGHT CENTRAL EYEBROW

## 2024-07-31 ASSESSMENT — LOCATION SIMPLE DESCRIPTION DERM
LOCATION SIMPLE: RIGHT UPPER BACK
LOCATION SIMPLE: LEFT UPPER BACK
LOCATION SIMPLE: LEFT UPPER ARM
LOCATION SIMPLE: RIGHT FOREARM
LOCATION SIMPLE: LEFT POSTERIOR THIGH
LOCATION SIMPLE: RIGHT BREAST
LOCATION SIMPLE: LEFT CHEEK
LOCATION SIMPLE: RIGHT CALF
LOCATION SIMPLE: RIGHT POSTERIOR THIGH
LOCATION SIMPLE: RIGHT EYEBROW
LOCATION SIMPLE: RIGHT UPPER ARM
LOCATION SIMPLE: LEFT CALF
LOCATION SIMPLE: LEFT THIGH
LOCATION SIMPLE: RIGHT THIGH
LOCATION SIMPLE: LEFT FOREARM
LOCATION SIMPLE: LEFT BREAST
LOCATION SIMPLE: LEFT TEMPLE
LOCATION SIMPLE: CHEST
LOCATION SIMPLE: NOSE
LOCATION SIMPLE: RIGHT LOWER BACK

## 2024-07-31 ASSESSMENT — LOCATION ZONE DERM
LOCATION ZONE: TRUNK
LOCATION ZONE: LEG
LOCATION ZONE: NOSE
LOCATION ZONE: FACE
LOCATION ZONE: ARM

## 2024-07-31 NOTE — PROCEDURE: LIQUID NITROGEN
Show Applicator Variable?: Yes
Detail Level: Detailed
Render Note In Bullet Format When Appropriate: No
Consent: The patient's consent was obtained including but not limited to risks of crusting, scabbing, blistering, scarring, darker or lighter pigmentary change, recurrence, incomplete removal and infection.
Number Of Freeze-Thaw Cycles: 1 freeze-thaw cycle
Post-Care Instructions: I reviewed with the patient in detail post-care instructions. Patient is to wear sunprotection, and avoid picking at any of the treated lesions. Pt may apply Vaseline to crusted or scabbing areas.
Duration Of Freeze Thaw-Cycle (Seconds): 5
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
## 2024-09-04 ENCOUNTER — PATIENT MESSAGE (OUTPATIENT)
Dept: HEALTH INFORMATION MANAGEMENT | Facility: OTHER | Age: 69
End: 2024-09-04

## 2024-12-16 ENCOUNTER — HOSPITAL ENCOUNTER (OUTPATIENT)
Dept: LAB | Facility: MEDICAL CENTER | Age: 69
End: 2024-12-16
Attending: FAMILY MEDICINE
Payer: MEDICARE

## 2024-12-16 LAB
ALBUMIN SERPL BCP-MCNC: 4.3 G/DL (ref 3.2–4.9)
ALBUMIN/GLOB SERPL: 1.8 G/DL
ALP SERPL-CCNC: 51 U/L (ref 30–99)
ALT SERPL-CCNC: 12 U/L (ref 2–50)
ANION GAP SERPL CALC-SCNC: 10 MMOL/L (ref 7–16)
AST SERPL-CCNC: 27 U/L (ref 12–45)
BILIRUB SERPL-MCNC: 0.3 MG/DL (ref 0.1–1.5)
BUN SERPL-MCNC: 23 MG/DL (ref 8–22)
CALCIUM ALBUM COR SERPL-MCNC: 9.4 MG/DL (ref 8.5–10.5)
CALCIUM SERPL-MCNC: 9.6 MG/DL (ref 8.5–10.5)
CHLORIDE SERPL-SCNC: 101 MMOL/L (ref 96–112)
CHOLEST SERPL-MCNC: 183 MG/DL (ref 100–199)
CO2 SERPL-SCNC: 28 MMOL/L (ref 20–33)
CREAT SERPL-MCNC: 0.85 MG/DL (ref 0.5–1.4)
GFR SERPLBLD CREATININE-BSD FMLA CKD-EPI: 74 ML/MIN/1.73 M 2
GLOBULIN SER CALC-MCNC: 2.4 G/DL (ref 1.9–3.5)
GLUCOSE SERPL-MCNC: 87 MG/DL (ref 65–99)
HDLC SERPL-MCNC: 70 MG/DL
LDLC SERPL CALC-MCNC: 97 MG/DL
POTASSIUM SERPL-SCNC: 3.9 MMOL/L (ref 3.6–5.5)
PROT SERPL-MCNC: 6.7 G/DL (ref 6–8.2)
SODIUM SERPL-SCNC: 139 MMOL/L (ref 135–145)
TRIGL SERPL-MCNC: 81 MG/DL (ref 0–149)

## 2024-12-16 PROCEDURE — 80061 LIPID PANEL: CPT

## 2024-12-16 PROCEDURE — 80053 COMPREHEN METABOLIC PANEL: CPT

## 2024-12-16 PROCEDURE — 36415 COLL VENOUS BLD VENIPUNCTURE: CPT

## 2024-12-18 NOTE — PROGRESS NOTES
Covid-19 surge in effect     Assumed care. Pt Axo4. RA, VSS. Biliary drain in place on the right lower abd with collection bag. + greenish  Output. Pt  ambulatory with steady gait. +void+BM, pt tolerating low fiber diet . educated to call when the need arise      Detail Level: Zone Detail Level: Simple Detail Level: Detailed

## 2024-12-31 ENCOUNTER — TELEPHONE (OUTPATIENT)
Dept: HEALTH INFORMATION MANAGEMENT | Facility: OTHER | Age: 69
End: 2024-12-31
Payer: MEDICARE

## 2025-07-06 ENCOUNTER — APPOINTMENT (OUTPATIENT)
Dept: RADIOLOGY | Facility: MEDICAL CENTER | Age: 70
End: 2025-07-06
Attending: EMERGENCY MEDICINE
Payer: MEDICARE

## 2025-07-06 ENCOUNTER — HOSPITAL ENCOUNTER (EMERGENCY)
Facility: MEDICAL CENTER | Age: 70
End: 2025-07-06
Attending: EMERGENCY MEDICINE
Payer: MEDICARE

## 2025-07-06 VITALS
HEIGHT: 64 IN | WEIGHT: 140 LBS | TEMPERATURE: 97.2 F | RESPIRATION RATE: 18 BRPM | SYSTOLIC BLOOD PRESSURE: 155 MMHG | DIASTOLIC BLOOD PRESSURE: 70 MMHG | BODY MASS INDEX: 23.9 KG/M2 | OXYGEN SATURATION: 95 % | HEART RATE: 60 BPM

## 2025-07-06 DIAGNOSIS — S01.81XA LACERATION OF FOREHEAD, INITIAL ENCOUNTER: ICD-10-CM

## 2025-07-06 DIAGNOSIS — S00.83XA CONTUSION OF FACE, INITIAL ENCOUNTER: ICD-10-CM
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
## 2025-07-06 DIAGNOSIS — S92.312A CLOSED DISPLACED FRACTURE OF FIRST METATARSAL BONE OF LEFT FOOT, INITIAL ENCOUNTER: ICD-10-CM

## 2025-07-06 DIAGNOSIS — S09.90XA CLOSED HEAD INJURY, INITIAL ENCOUNTER: Primary | ICD-10-CM

## 2025-07-06 PROCEDURE — 304999 HCHG REPAIR-SIMPLE/INTERMED LEVEL 1

## 2025-07-06 PROCEDURE — 99284 EMERGENCY DEPT VISIT MOD MDM: CPT

## 2025-07-06 PROCEDURE — 70486 CT MAXILLOFACIAL W/O DYE: CPT

## 2025-07-06 PROCEDURE — 303353 HCHG DERMABOND SKIN ADHESIVE

## 2025-07-06 PROCEDURE — 72125 CT NECK SPINE W/O DYE: CPT

## 2025-07-06 PROCEDURE — 70450 CT HEAD/BRAIN W/O DYE: CPT

## 2025-07-06 PROCEDURE — 73630 X-RAY EXAM OF FOOT: CPT | Mod: LT

## 2025-07-07 NOTE — DISCHARGE INSTRUCTIONS
Laceration will heal, leave the glue on and will fall off in the next week or 2.    You do have a fracture of the foot this require further evaluation and treatment.  Use the crutches to stay off of it and call the [place] orthopedic office tomorrow to make a follow-up appointment.

## 2025-07-07 NOTE — ED TRIAGE NOTES
"Chief Complaint   Patient presents with    T-5000 FALL     Fall around 1600 today; multiple abrasions. Hit L forehead/eyebrow with small laceration    Foot Pain     L foot pain     /68   Pulse 67   Temp 36.2 °C (97.2 °F) (Temporal)   Resp 16   Ht 1.626 m (5' 4\")   Wt 63.5 kg (140 lb)   LMP 01/01/2009   SpO2 97%   BMI 24.03 kg/m²     Patient presents with complaints of L foot pain and L forehead pain and laceration after incurring a mechanical fall around 1600 today. Patient has been icing foot but is non-weight bearing.  "

## 2025-07-07 NOTE — ED PROVIDER NOTES
ED Provider Note    ED Provider    CHIEF COMPLAINT  Chief Complaint   Patient presents with    T-5000 FALL     Fall around 1600 today; multiple abrasions. Hit L forehead/eyebrow with small laceration    Foot Pain     L foot pain       HPI  Nils Alfonso is a 69 y.o. female who presents facial injury, abrasions to the extremities and left foot pain.  She was [place] mountain hiking [place] trail, fell down.  She has a laceration to the left forehead and she but dressings out on after being cleaned.  She did not have loss of consciousness.  She has pain in the left face, pain in the left hip and abrasions to bilateral upper extremities.    She has no headache no nausea no vomiting no numbness tingling or weakness    REVIEW OF SYSTEMS  See HPI for further details. All other systems are negative.     PAST MEDICAL HISTORY   has a past medical history of Allergy, unspecified not elsewhere classified, Anemia, Anesthesia, Arthritis, Backpain, Bronchitis (), Cataract, COVID-19 (2022), Degeneration of cervical intervertebral disc, Dental disorder, Heart burn, Hiatus hernia syndrome, High cholesterol, History of pancreatitis, Hyperlipidemia, Hypertension, Muscle disorder, Pain (2018), PONV (postoperative nausea and vomiting) (), Reactive airway disease, Rib pain on left side (2022), Sepsis (HCC) (10/08/2021), and Sepsis (HCC) (2021).    SOCIAL HISTORY  Social History     Tobacco Use    Smoking status: Former     Current packs/day: 0.00     Types: Cigarettes     Start date: 1972     Quit date: 1973     Years since quittin.5    Smokeless tobacco: Never    Tobacco comments:     Maybe 5 cigs per year   Vaping Use    Vaping status: Never Used   Substance and Sexual Activity    Alcohol use: Never    Drug use: Never    Sexual activity: Not on file     Comment:        SURGICAL HISTORY   has a past surgical history that includes hysterectomy robotic (2009); tonsillectomy and adenoidectomy  "(01/01/1967); tubal ligation (01/01/1985); gastric resection (01/01/1982); primary c section (1976, 1979, 1985); lumbar fusion anterior (01/05/2007); cholecystectomy (01/01/1996); rotator cuff repair (Left, 05/01/2015); rotator cuff repair (Right, 07/07/2016); gastroscopy (N/A, 08/26/2016); gastric sleeve laparoscopy (10/19/2016); liver biopsy laparoscopic (10/19/2016); cataract phaco with iol (Right, 04/04/2017); cataract phaco with iol (Left, 04/18/2017); gastroscopy (05/23/2018); egd w/endoscopic ultrasound (05/23/2018); colonoscopy (08/08/2018); ercp,diagnostic (10/01/2021); upper gi endoscopy,diagnosis (N/A, 11/18/2021); ercp,diagnostic (N/A, 11/18/2021); place percut gastrostomy tube (N/A, 11/18/2021); biliary stent placement (N/A, 11/18/2021); exploratory of abdomen (11/05/2021); ultrasonic guidance, intraoperative (11/05/2021); abdominal abscess drainage (11/05/2021); other orthopedic surgery; and ercp,diagnostic (N/A, 11/15/2022).    CURRENT MEDICATIONS  Home Medications       Reviewed by Skylar Bright R.N. (Registered Nurse) on 07/06/25 at 1936  Med List Status: Not Addressed     Medication Last Dose Status   BIOTIN PO  Active   Cholecalciferol (VITAMIN D3 PO)  Active   cyclobenzaprine (FLEXERIL) 10 mg Tab  Active   diclofenac sodium (VOLTAREN) 1 % Gel  Active   docusate sodium (COLACE) 100 MG Cap  Active   Fenofibrate 134 MG Cap  Active   gabapentin (NEURONTIN) 300 MG Cap  Active   HYDROcodone/acetaminophen (NORCO)  MG Tab  Active   lisinopril (PRINIVIL) 20 MG Tab  Active   magnesium oxide (MAG-OX) 400 MG Tab tablet  Active   Non Formulary Request  Active   vitamin E 180 MG (400 UNIT) Cap  Active                  Audit from Redirected Encounters    **Home medications have not yet been reviewed for this encounter**         ALLERGIES  Allergies[1]    PHYSICAL EXAM  VITAL SIGNS: /68   Pulse 67   Temp 36.2 °C (97.2 °F) (Temporal)   Resp 16   Ht 1.626 m (5' 4\")   Wt 63.5 kg (140 lb)   LMP " 01/01/2009   SpO2 97%   BMI 24.03 kg/m²   Constitutional: Alert in no apparent distress.  HENT: There is laceration to left forehead, there is periorbital ecchymosis and tenderness to the zygoma mucous membranes are moist  Eyes: Conjunctiva normal, Non-icteric.   Neck: Normal range of motion, No tenderness, Supple.  Lymphatic: No lymphadenopathy noted.   Cardiovascular: Regular rate and rhythm, no murmurs.   Thorax & Lungs: Normal breath sounds, No respiratory distress, No wheezing, No chest tenderness.   Abdomen: Bowel sounds normal, Soft, No tenderness, No masses, No pulsatile masses. No peritoneal signs.  Skin: Warm, Dry, normal color.   Back: No bony tenderness, No CVA tenderness.   Extremities: Superficial abrasions at the elbow no deformity no bony tenderness.  She has discomfort at the metatarsal of the left foot.  There is no obvious deformity.  s  Musculoskeletal: Good range of motion in all major joints. No tenderness to palpation or major deformities noted.   Neurologic: Alert and oriented x4, Normal motor function, Normal sensory function, No focal deficits noted.   Psychiatric: Affect normal, Judgment normal, Mood normal.         DIAGNOSTIC STUDIES / PROCEDURES    EKG  12 Lead EKG interpreted by me shown below.      LABS    All labs reviewed by me.    RADIOLOGY  CT-MAXILLOFACIAL W/O PLUS RECONS   Final Result         1.  No acute traumatic facial bony injuries identified.      CT-CSPINE WITHOUT PLUS RECONS   Final Result         1.  No acute traumatic bony injury of the cervical spine is apparent.   2.  Left thyroid nodule, recommend follow-up thyroid sonography due to nodule size.   3.  Atherosclerosis.      CT-HEAD W/O   Final Result         1.  No acute intracranial abnormality is identified, there are nonspecific white matter changes, commonly associated with small vessel ischemic disease.  Associated mild cerebral atrophy is noted.               DX-FOOT-COMPLETE 3+ LEFT   Final Result         1.   Fracture through the lateral base of the first toe metatarsal.        The radiologist's interpretations of all radiological studies have been reviewed by me.    Films have been independently by me fracture of the foot      Initial assessment: This patient presented with c/o fall.  There is a laceration to the left forehead above the eyebrow.  There is no eye injury.  There is Jalen orbital ecchymosis and tenderness at the zygoma.  Extraocular movements are intact there is no hyphema.    Primary closure done of the laceration.  CTs of head face and C-spine will be done.  There is no significant pain in the foot so x-ray of the foot will be done.    Procedure: Wound closure    The area was cleaned with peroxide, closed with Dermabond.  Wound length is 1.5 cm, depth of subcutaneous    COURSE      MEDICAL DECISION MAKING  This is a 69 y.o. female who presents with fall, she has laceration to left forehead that had primary closure done here.  She had tenderness to the face so CT head neck and base were completed there is no fracture or cerebral injury.    There is a fracture to the first metatarsal of the foot that is displaced.  The patient is given crutches to ambulate.  She declines a narcotic pain medications and will use over-the-counter and will follow-up with [facility]ist a referral has been placed.      Discharged home in stable condition    FINAL IMPRESSION  1. Closed head injury, initial encounter    2. Laceration of forehead, initial encounter    3. Fall, initial encounter    4. Contusion of face, initial encounter    5. Closed displaced fracture of first metatarsal bone of left foot, initial encounter            Electronically signed by: Aleksandar Velasquez D.O., 7/6/2025              [1]   Allergies  Allergen Reactions    Augmentin Diarrhea, Rash and Vomiting     SEVERE DIARRHEA    Ampicillin Rash     Rash  Tolerates Zosyn 10/21    Cephalexin Diarrhea, Vomiting and Nausea     .    Clindamycin Vomiting      "\"cleocin\"    Codeine      Severe stomach pain, cramps, spasms    Demerol Vomiting    Levofloxacin Unspecified     Numbness      Tetracyclines Rash     .    Tizanidine Itching    Morphine Vomiting    Pcn [Penicillins] Itching     Tolerates Zosyn 10/21    Sulfa Drugs Itching     Tolerated Bactrim 10/2021     "

## 2025-07-08 PROBLEM — S93.322A: Status: ACTIVE | Noted: 2025-07-08

## 2025-07-08 PROBLEM — S92.312A: Status: ACTIVE | Noted: 2025-07-08

## 2025-08-07 ENCOUNTER — APPOINTMENT (OUTPATIENT)
Dept: URBAN - METROPOLITAN AREA CLINIC 31 | Facility: CLINIC | Age: 70
Setting detail: DERMATOLOGY
End: 2025-08-07

## 2025-08-07 DIAGNOSIS — D18.0 HEMANGIOMA: ICD-10-CM

## 2025-08-07 DIAGNOSIS — L82.1 OTHER SEBORRHEIC KERATOSIS: ICD-10-CM

## 2025-08-07 DIAGNOSIS — L81.4 OTHER MELANIN HYPERPIGMENTATION: ICD-10-CM

## 2025-08-07 DIAGNOSIS — L57.8 OTHER SKIN CHANGES DUE TO CHRONIC EXPOSURE TO NONIONIZING RADIATION: ICD-10-CM

## 2025-08-07 PROBLEM — D18.01 HEMANGIOMA OF SKIN AND SUBCUTANEOUS TISSUE: Status: ACTIVE | Noted: 2025-08-07

## 2025-08-07 PROCEDURE — ? MEDICATION COUNSELING

## 2025-08-07 PROCEDURE — ? SUNSCREEN RECOMMENDATIONS

## 2025-08-07 PROCEDURE — ? PRESCRIPTION MEDICATION MANAGEMENT

## 2025-08-07 PROCEDURE — ? PRESCRIPTION

## 2025-08-07 PROCEDURE — ? COUNSELING

## 2025-08-07 RX ORDER — TRETIONIN 0.5 MG/G
CREAM TOPICAL
Qty: 45 | Refills: 3 | Status: ERX | COMMUNITY
Start: 2025-08-07

## 2025-08-07 RX ADMIN — TRETIONIN: 0.5 CREAM TOPICAL at 00:00

## 2025-08-07 ASSESSMENT — LOCATION SIMPLE DESCRIPTION DERM
LOCATION SIMPLE: INFERIOR FOREHEAD
LOCATION SIMPLE: NOSE
LOCATION SIMPLE: RIGHT SHOULDER
LOCATION SIMPLE: RIGHT FOREARM
LOCATION SIMPLE: LEFT FOREARM

## 2025-08-07 ASSESSMENT — LOCATION DETAILED DESCRIPTION DERM
LOCATION DETAILED: INFERIOR MID FOREHEAD
LOCATION DETAILED: RIGHT PROXIMAL DORSAL FOREARM
LOCATION DETAILED: RIGHT POSTERIOR SHOULDER
LOCATION DETAILED: LEFT PROXIMAL DORSAL FOREARM
LOCATION DETAILED: NASAL DORSUM

## 2025-08-07 ASSESSMENT — LOCATION ZONE DERM
LOCATION ZONE: NOSE
LOCATION ZONE: ARM
LOCATION ZONE: FACE

## 2025-08-13 ENCOUNTER — HOSPITAL ENCOUNTER (OUTPATIENT)
Dept: RADIOLOGY | Facility: MEDICAL CENTER | Age: 70
End: 2025-08-13
Attending: FAMILY MEDICINE
Payer: MEDICARE

## 2025-08-13 VITALS — HEIGHT: 64 IN | BODY MASS INDEX: 23.73 KG/M2 | WEIGHT: 139 LBS

## 2025-08-13 DIAGNOSIS — Z12.31 VISIT FOR SCREENING MAMMOGRAM: ICD-10-CM

## 2025-08-13 PROCEDURE — 77067 SCR MAMMO BI INCL CAD: CPT

## (undated) DEVICE — CATHETER IV 20 GA X 1-1/4 ---SURG.& SDS ONLY--- (50EA/BX)

## (undated) DEVICE — KIT CUSTOM PROCEDURE SINGLE FOR ENDO  (15/CA)

## (undated) DEVICE — GOWN SURGEONS X-LARGE - DISP. (30/CA)

## (undated) DEVICE — TOWELS CLOTH SURGICAL - (4/PK 20PK/CA)

## (undated) DEVICE — EXTRACTOR CORTEX ANGL LT 26GA - (10/BX) BINKHORST

## (undated) DEVICE — NEEDLE CYSTOTOME OPTH VSTC  0.4MM X 16MM - (10/CA)

## (undated) DEVICE — LACTATED RINGERS INJ. 500 ML - (24EA/CA)

## (undated) DEVICE — FORCEP RADIAL JAW 4 STANDARD CAPACITY W/NEEDLE 240CM (40EA/BX)

## (undated) DEVICE — SET EXTENSION WITH 2 PORTS (48EA/CA) ***PART #2C8610 IS A SUBSTITUTE*****

## (undated) DEVICE — KNIFE STEP 1.1 (6EA/BX)

## (undated) DEVICE — GOWN SURGEONS LARGE - (32/CA)

## (undated) DEVICE — GLOVE SZ 6.5 BIOGEL PI MICRO - PF LF (50PR/BX)

## (undated) DEVICE — SODIUM CHL. IRRIGATION 0.9% 3000ML (4EA/CA 65CA/PF)

## (undated) DEVICE — PACK CATARACT GENERAL (4EA/CA)

## (undated) DEVICE — CON SEDATION EA ADDL 15 MIN

## (undated) DEVICE — SUTURE 2-0 ETHILON FS - (36/BX) 18 INCH

## (undated) DEVICE — PACK MAJOR BASIN - (2EA/CA)

## (undated) DEVICE — SUTURE 1 PDS II PLUS TP-1 - (12PK/BX)

## (undated) DEVICE — NEPTUNE 4 PORT MANIFOLD - (20/PK)

## (undated) DEVICE — CON SEDATION/>5 YR 1ST 15 MIN

## (undated) DEVICE — CANNULA

## (undated) DEVICE — DRAPE LAPAROTOMY T SHEET - (12EA/CA)

## (undated) DEVICE — BOVIE BLADE COATED &INSULATED (50EA/PK)

## (undated) DEVICE — BASIN EMESIS DISP. - (250/CA)

## (undated) DEVICE — DRAIN SILICONE CLOSED WOUND SUCTION CHANNEL 24FR ROUND HUBLESS (10/CA)

## (undated) DEVICE — SENSOR SPO2 NEO LNCS ADHESIVE (20/BX) SEE USER NOTES

## (undated) DEVICE — CATHETER IV SAFETY 20 GA X 1-1/4 (50/BX)

## (undated) DEVICE — CANISTER SUCTION 3000ML MECHANICAL FILTER AUTO SHUTOFF MEDI-VAC NONSTERILE LF DISP  (40EA/CA)

## (undated) DEVICE — GLOVE BIOGEL PI ORTHO SZ 7.5 PF LF (40PR/BX)

## (undated) DEVICE — Device

## (undated) DEVICE — CANISTER SUCTION RIGID RED 1500CC (40EA/CA)

## (undated) DEVICE — SYRINGE SAFETY 3 ML 18 GA X 1 1/2 BLUNT LL (100/BX 8BX/CA)

## (undated) DEVICE — BLOCK BITE ENDOSCOPIC 2809 - (100/BX) INTERMEDIATE

## (undated) DEVICE — TUBE CONNECTING SUCTION - CLEAR PLASTIC STERILE 72 IN (50EA/CA)

## (undated) DEVICE — GLOVE SURGICAL PROTEXIS 8 1/2 - (50PR/BX)

## (undated) DEVICE — CHLORAPREP 26 ML APPLICATOR - ORANGE TINT(25/CA)

## (undated) DEVICE — ELECTRODE 850 FOAM ADHESIVE - HYDROGEL RADIOTRNSPRNT (50/PK)

## (undated) DEVICE — BOVIE BLADE COATED - (50/PK)

## (undated) DEVICE — HEAD HOLDER JUNIOR/ADULT

## (undated) DEVICE — SUTURE 4-0 PROLENE SH 36 (36PK/BX)"

## (undated) DEVICE — GOWN SURGICAL X-LARGE ULTRA - FILM-REINFORCED (20/CA)

## (undated) DEVICE — SET LEADWIRE 5 LEAD BEDSIDE DISPOSABLE ECG (1SET OF 5/EA)

## (undated) DEVICE — BLANKET WARMING LOWER BODY (10EA/CA)

## (undated) DEVICE — KIT  I.V. START (100EA/CA)

## (undated) DEVICE — TOWEL STOP TIMEOUT SAFETY FLAG (40EA/CA)

## (undated) DEVICE — TUBE SUCTION YANKAUER  1/4 X 6FT (20EA/CA)"

## (undated) DEVICE — DRAPE LARGE 3 QUARTER - (20/CA)

## (undated) DEVICE — TRAY ANESTHESIA - CONTINUOUS EPIDURAL (10EA/CA) THIS IS A CUSTOM PACK

## (undated) DEVICE — TIP POLYMER I&A

## (undated) DEVICE — SPONGE GAUZESTER 4 X 4 4PLY - (128PK/CA)

## (undated) DEVICE — SUTURE GENERAL

## (undated) DEVICE — SPONGE LAP XR ST 36X36 LF - (1/PK40PK/CA)

## (undated) DEVICE — WATER IRRIGATION STERILE 1000ML (12EA/CA)

## (undated) DEVICE — GLOVE BIOGEL SZ 8 SURGICAL PF LTX - (50PR/BX 4BX/CA)

## (undated) DEVICE — SENSOR OXIMETER ADULT SPO2 RD SET (20EA/BX)

## (undated) DEVICE — GLOVE, LITE (PAIR)

## (undated) DEVICE — TUBING CLEARLINK DUO-VENT - C-FLO (48EA/CA)

## (undated) DEVICE — DEVICE BIOPSY RX BILIARY SYSTEM CAP (10EA/BX)

## (undated) DEVICE — CANNULA ANTERIOR CHAMBER 27G

## (undated) DEVICE — SWAB CULTURE AMIES ESWAB (50EA/PK)

## (undated) DEVICE — CUFF SOFT ADLT 12W/RECTUS - CONNECTER (20/CA)

## (undated) DEVICE — SLEEVE VASO CALF MED - (10PR/CA)

## (undated) DEVICE — MASK ANESTHESIA ADULT  - (100/CA)

## (undated) DEVICE — HANDPIECE 10FT INTPLS SCT PLS IRRIGATION HAND CONTROL SET (6/PK)

## (undated) DEVICE — BLANKET WARMING UPPER BODY - (10/CA)

## (undated) DEVICE — KIT ANESTHESIA W/CIRCUIT & 3/LT BAG W/FILTER (20EA/CA)

## (undated) DEVICE — SUTURE 2-0 VICRYL PLUS CT-1 - 8 X 18 INCH(12/BX)

## (undated) DEVICE — LEAD SET 6 DISP. EKG NIHON KOHDEN

## (undated) DEVICE — TRAY SURESTEP FOLEY TEMP SENSING 16FR (10EA/CA) ORDER  #18764 FOR TEMP FOLEY ONLY

## (undated) DEVICE — SPONGE GAUZE NON-STERILE 4X4 - (2000/CA 10PK/CA)

## (undated) DEVICE — ELECTRODE DUAL RETURN W/ CORD - (50/PK)

## (undated) DEVICE — DEVICE ENCORE INFLATION 20CC (FORMERLY BREEZE)

## (undated) DEVICE — BALLOON  DILATATION  10-4 5.8 180

## (undated) DEVICE — SPHINCTEROTOME CLEVER CUT

## (undated) DEVICE — CANNULA INJECTION 27G (EYE) - 10/BX ALCON

## (undated) DEVICE — DUODENOSCOPE FLEXIBLE EXALT MODEL-D SINGLE USE (2EA/BX)

## (undated) DEVICE — LACTATED RINGERS INJ 1000 ML - (14EA/CA 60CA/PF)

## (undated) DEVICE — FILM CASSETTE ENDO

## (undated) DEVICE — CANNULA DIVIDED ADULT CO2 - SAMPLE W/FEMALE CONNCT (25/CA)

## (undated) DEVICE — STAPLER SKIN DISP - (6/BX 10BX/CA) VISISTAT

## (undated) DEVICE — TUBE NG SALEM SUMP 16FR (50EA/CA)

## (undated) DEVICE — SPONGE GAUZESTER. 2X2 4-PL - (2/PK 50PK/BX 30BX/CS)

## (undated) DEVICE — DRAPE MAYO STAND - (30/CA)

## (undated) DEVICE — SPONGE DRAIN 4 X 4IN 6-PLY - (2/PK25PK/BX12BX/CS)

## (undated) DEVICE — BALLOON RETRIEVAL EXTRACTOR PRO RX   9-12MM

## (undated) DEVICE — SYRINGE DISP. 60 CC LL - (30/BX, 12BX/CA)**WHEN THESE ARE GONE ORDER #500206**

## (undated) DEVICE — FORCEP GRASPING RESCUE ALLIGATOR LONG (5EA/BX)

## (undated) DEVICE — ATOMIC EDGE ACCURATE DEPTH 550 MICRON (10/CA)

## (undated) DEVICE — BLADE SURGICAL #15 - (50/BX 3BX/CA)

## (undated) DEVICE — CYSTOTOME 27G

## (undated) DEVICE — PROTECTOR ULNA NERVE - (36PR/CA)

## (undated) DEVICE — CLIP LG INTNL HRZN TI ESCP LGT - (20/BX)

## (undated) DEVICE — CANNULA W/ SUPPLY TUBING O2 - (50/CA)

## (undated) DEVICE — GUIDE JAGWIRE 035 STRAIGHT (2EA/BX)

## (undated) DEVICE — GOWN WARMING STANDARD FLEX - (30/CA)

## (undated) DEVICE — TUBE FEEDING 8 FR L90CM ORANGE ENFIT  (10EA/PK)

## (undated) DEVICE — ADHESIVE MASTISOL - (48/BX)

## (undated) DEVICE — SYRINGE DISP. 50CC LS - (40/BX)

## (undated) DEVICE — MASK WITH FACE SHIELD (25/BX 4BX/CA)

## (undated) DEVICE — KIT SLEEVE PARTS - 6/BX

## (undated) DEVICE — BAG SPONGE COUNT 10.25 X 32 - BLUE (250/CA)

## (undated) DEVICE — TUBE E-T HI-LO CUFF 7.5MM (10EA/PK)

## (undated) DEVICE — SODIUM CHL IRRIGATION 0.9% 1000ML (12EA/CA)

## (undated) DEVICE — CLIP MED INTNL HRZN TI ESCP - (25/BX)

## (undated) DEVICE — BITE BLOCK ADULT 60FR (100EA/CA)

## (undated) DEVICE — FORCEP GRASPING RESCUE COMBO RAT/ALLIGATOR (5EA/BX)

## (undated) DEVICE — SNARECAPTIVATOR 13MM SMALL HEXAGONAL SNARE (10/BX)

## (undated) DEVICE — SYRINGE SAFETY 10 ML 18 GA X 1 1/2 BLUNT LL (100/BX 4BX/CA)

## (undated) DEVICE — GUIDEWIRE .035X260 STRAIGHT (2/BX)

## (undated) DEVICE — SYRINGE 12 CC LUER TIP - (80/BX) OBSOLETE ITEM

## (undated) DEVICE — RESERVOIR SUCTION 100 CC - SILICONE (20EA/CA)

## (undated) DEVICE — DRAPESURG STERI-DRAPE LONG - (10/BX 4BX/CA)

## (undated) DEVICE — TUBE E-T HI-LO CUFF 7.0MM (10EA/PK)

## (undated) DEVICE — PAD LAP STERILE 18 X 18 - (5/PK 40PK/CA)

## (undated) DEVICE — TUBING IV NEEDLESS PRE-OP - (50/CA)

## (undated) DEVICE — DRAPE IOBAN II INCISE 23X17 - (10EA/BX 4BX/CA)

## (undated) DEVICE — SUTURE 4-0 PROLENE RB-1 D/A 36 (36PK/BX)"

## (undated) DEVICE — CORETEMP DRAPE FORM-FITTED EASY DROPANDGO DRAPE FOR USE ON THE CORETEMP FLUID MANAGEMENT 56IN X 56IN

## (undated) DEVICE — SUTURE 2-0 SILK SH 30 IN C/R (12PK/BX)

## (undated) DEVICE — SYRINGE SAFETY 5 ML 18 GA X 1-1/2 BLUNT LL (100/BX 4BX/CA)

## (undated) DEVICE — SUTURE 3-0 SILK SH (12PK/BX)

## (undated) DEVICE — GLOVE BIOGEL PI INDICATOR SZ 7.0 SURGICAL PF LF - (50/BX 4BX/CA)

## (undated) DEVICE — CANNULA O2 COMFORT SOFT EAR ADULT 7 FT TUBING (50/CA)

## (undated) DEVICE — EXTRACTOR PRO XL 9-12 MM ABOVE

## (undated) DEVICE — SENSOR SPO2 ADULT LNCS ADTX (20/BX) ORDER ITEM #19593

## (undated) DEVICE — DRESSING TRANSPARENT FILM TEGADERM 4 X 4.75" (50EA/BX)"

## (undated) DEVICE — SUCTION INSTRUMENT YANKAUER BULBOUS TIP W/O VENT (50EA/CA)

## (undated) DEVICE — CLIP MED LG INTNL HRZN TI ESCP - (20/BX)

## (undated) DEVICE — KIT, EYE POST-OP FOR PHACOS

## (undated) DEVICE — SYSTEM PEEL & PLACE 13CM INCISIONS

## (undated) DEVICE — SYRINGE 1 ML LL POLYCARBONATE LUER LOCK (100EA/BX 8BX/CA)

## (undated) DEVICE — GUARD SPLASH FOR PULSEVAC (12EA/PK)

## (undated) DEVICE — TUBE CONNECT SUCTION CLEAR 120 X 1/4" (50EA/CA)"